# Patient Record
Sex: FEMALE | Race: AMERICAN INDIAN OR ALASKA NATIVE | NOT HISPANIC OR LATINO | Employment: OTHER | URBAN - METROPOLITAN AREA
[De-identification: names, ages, dates, MRNs, and addresses within clinical notes are randomized per-mention and may not be internally consistent; named-entity substitution may affect disease eponyms.]

---

## 2019-11-03 ENCOUNTER — HOSPITAL ENCOUNTER (EMERGENCY)
Facility: CLINIC | Age: 59
Discharge: HOME | End: 2019-11-03
Attending: EMERGENCY MEDICINE | Admitting: EMERGENCY MEDICINE
Payer: COMMERCIAL

## 2019-11-03 VITALS
TEMPERATURE: 97.6 F | BODY MASS INDEX: 39.08 KG/M2 | RESPIRATION RATE: 18 BRPM | OXYGEN SATURATION: 99 % | SYSTOLIC BLOOD PRESSURE: 147 MMHG | WEIGHT: 242.1 LBS | HEART RATE: 69 BPM | DIASTOLIC BLOOD PRESSURE: 84 MMHG

## 2019-11-03 DIAGNOSIS — R60.0 PERIPHERAL EDEMA: ICD-10-CM

## 2019-11-03 DIAGNOSIS — J02.9 SORE THROAT: ICD-10-CM

## 2019-11-03 LAB
ALBUMIN SERPL-MCNC: 3.5 G/DL (ref 3.4–5)
ALBUMIN UR-MCNC: NEGATIVE MG/DL
ALP SERPL-CCNC: 123 U/L (ref 40–150)
ALT SERPL W P-5'-P-CCNC: 33 U/L (ref 0–50)
ANION GAP SERPL CALCULATED.3IONS-SCNC: 4 MMOL/L (ref 3–14)
APPEARANCE UR: ABNORMAL
AST SERPL W P-5'-P-CCNC: 30 U/L (ref 0–45)
BACTERIA #/AREA URNS HPF: ABNORMAL /HPF
BASOPHILS # BLD AUTO: 0 10E9/L (ref 0–0.2)
BASOPHILS NFR BLD AUTO: 0.6 %
BILIRUB SERPL-MCNC: 0.2 MG/DL (ref 0.2–1.3)
BILIRUB UR QL STRIP: NEGATIVE
BUN SERPL-MCNC: 21 MG/DL (ref 7–30)
CALCIUM SERPL-MCNC: 8.2 MG/DL (ref 8.5–10.1)
CHLORIDE SERPL-SCNC: 104 MMOL/L (ref 94–109)
CO2 SERPL-SCNC: 29 MMOL/L (ref 20–32)
COLOR UR AUTO: YELLOW
CREAT SERPL-MCNC: 0.7 MG/DL (ref 0.52–1.04)
DEPRECATED S PYO AG THROAT QL EIA: NORMAL
DIFFERENTIAL METHOD BLD: ABNORMAL
EOSINOPHIL # BLD AUTO: 0.2 10E9/L (ref 0–0.7)
EOSINOPHIL NFR BLD AUTO: 3.1 %
ERYTHROCYTE [DISTWIDTH] IN BLOOD BY AUTOMATED COUNT: 15.4 % (ref 10–15)
GFR SERPL CREATININE-BSD FRML MDRD: >90 ML/MIN/{1.73_M2}
GLUCOSE SERPL-MCNC: 126 MG/DL (ref 70–99)
GLUCOSE UR STRIP-MCNC: NEGATIVE MG/DL
HCT VFR BLD AUTO: 36.6 % (ref 35–47)
HGB BLD-MCNC: 11.7 G/DL (ref 11.7–15.7)
HGB UR QL STRIP: NEGATIVE
IMM GRANULOCYTES # BLD: 0 10E9/L (ref 0–0.4)
IMM GRANULOCYTES NFR BLD: 0.4 %
KETONES UR STRIP-MCNC: NEGATIVE MG/DL
LEUKOCYTE ESTERASE UR QL STRIP: ABNORMAL
LYMPHOCYTES # BLD AUTO: 2.3 10E9/L (ref 0.8–5.3)
LYMPHOCYTES NFR BLD AUTO: 31.7 %
MCH RBC QN AUTO: 29.7 PG (ref 26.5–33)
MCHC RBC AUTO-ENTMCNC: 32 G/DL (ref 31.5–36.5)
MCV RBC AUTO: 93 FL (ref 78–100)
MONOCYTES # BLD AUTO: 0.8 10E9/L (ref 0–1.3)
MONOCYTES NFR BLD AUTO: 10.9 %
NEUTROPHILS # BLD AUTO: 3.8 10E9/L (ref 1.6–8.3)
NEUTROPHILS NFR BLD AUTO: 53.3 %
NITRATE UR QL: NEGATIVE
NRBC # BLD AUTO: 0 10*3/UL
NRBC BLD AUTO-RTO: 0 /100
NT-PROBNP SERPL-MCNC: 179 PG/ML (ref 0–900)
PH UR STRIP: 5.5 PH (ref 5–7)
PLATELET # BLD AUTO: 248 10E9/L (ref 150–450)
POTASSIUM SERPL-SCNC: 3.8 MMOL/L (ref 3.4–5.3)
PROT SERPL-MCNC: 8.1 G/DL (ref 6.8–8.8)
RBC # BLD AUTO: 3.94 10E12/L (ref 3.8–5.2)
RBC #/AREA URNS AUTO: 10 /HPF (ref 0–2)
SODIUM SERPL-SCNC: 137 MMOL/L (ref 133–144)
SOURCE: ABNORMAL
SP GR UR STRIP: 1.02 (ref 1–1.03)
SPECIMEN SOURCE: NORMAL
SQUAMOUS #/AREA URNS AUTO: 5 /HPF (ref 0–1)
TROPONIN I SERPL-MCNC: <0.015 UG/L (ref 0–0.04)
UROBILINOGEN UR STRIP-MCNC: NORMAL MG/DL (ref 0–2)
WBC # BLD AUTO: 7.2 10E9/L (ref 4–11)
WBC #/AREA URNS AUTO: 39 /HPF (ref 0–5)

## 2019-11-03 PROCEDURE — 84484 ASSAY OF TROPONIN QUANT: CPT | Performed by: EMERGENCY MEDICINE

## 2019-11-03 PROCEDURE — 87880 STREP A ASSAY W/OPTIC: CPT | Performed by: FAMILY MEDICINE

## 2019-11-03 PROCEDURE — 87081 CULTURE SCREEN ONLY: CPT | Performed by: FAMILY MEDICINE

## 2019-11-03 PROCEDURE — 87086 URINE CULTURE/COLONY COUNT: CPT | Performed by: EMERGENCY MEDICINE

## 2019-11-03 PROCEDURE — 80053 COMPREHEN METABOLIC PANEL: CPT | Performed by: EMERGENCY MEDICINE

## 2019-11-03 PROCEDURE — 99284 EMERGENCY DEPT VISIT MOD MDM: CPT | Performed by: EMERGENCY MEDICINE

## 2019-11-03 PROCEDURE — 83880 ASSAY OF NATRIURETIC PEPTIDE: CPT | Performed by: EMERGENCY MEDICINE

## 2019-11-03 PROCEDURE — 81001 URINALYSIS AUTO W/SCOPE: CPT | Performed by: EMERGENCY MEDICINE

## 2019-11-03 PROCEDURE — 99284 EMERGENCY DEPT VISIT MOD MDM: CPT | Mod: 25 | Performed by: EMERGENCY MEDICINE

## 2019-11-03 PROCEDURE — 85025 COMPLETE CBC W/AUTO DIFF WBC: CPT | Performed by: EMERGENCY MEDICINE

## 2019-11-03 PROCEDURE — 93010 ELECTROCARDIOGRAM REPORT: CPT | Mod: Z6 | Performed by: EMERGENCY MEDICINE

## 2019-11-03 PROCEDURE — 93005 ELECTROCARDIOGRAM TRACING: CPT | Performed by: EMERGENCY MEDICINE

## 2019-11-03 RX ORDER — HYDROXYZINE PAMOATE 25 MG/1
25 CAPSULE ORAL 3 TIMES DAILY PRN
Qty: 15 CAPSULE | Refills: 0 | Status: ON HOLD | OUTPATIENT
Start: 2019-11-03 | End: 2020-01-28

## 2019-11-03 RX ORDER — AMLODIPINE BESYLATE 5 MG/1
5 TABLET ORAL DAILY
Qty: 15 TABLET | Refills: 0 | Status: ON HOLD | OUTPATIENT
Start: 2019-11-03 | End: 2020-02-02

## 2019-11-03 RX ORDER — AMOXICILLIN 500 MG/1
1000 CAPSULE ORAL 2 TIMES DAILY
Qty: 40 CAPSULE | Refills: 0 | Status: ON HOLD | OUTPATIENT
Start: 2019-11-03 | End: 2020-01-28

## 2019-11-03 RX ORDER — GABAPENTIN 600 MG/1
600 TABLET ORAL 3 TIMES DAILY
Qty: 90 TABLET | Refills: 0 | Status: ON HOLD | OUTPATIENT
Start: 2019-11-03 | End: 2020-01-28

## 2019-11-03 ASSESSMENT — ENCOUNTER SYMPTOMS
SHORTNESS OF BREATH: 0
COLOR CHANGE: 0
EYE REDNESS: 0
SORE THROAT: 1
ABDOMINAL PAIN: 0
NECK STIFFNESS: 0
ARTHRALGIAS: 0
HEADACHES: 0
FEVER: 0
CONFUSION: 0
DIFFICULTY URINATING: 0

## 2019-11-03 NOTE — ED TRIAGE NOTES
Pt when asked about being suicidal stated know but states she had an overdose back in Aug and has been in treatment since that time.  Pt states feeling depressed and at times feels like life is nor worth living but states she is getting help with that at the treatment facility and contracts for safety at this time.

## 2019-11-03 NOTE — ED AVS SNAPSHOT
[facility], [place], Emergency Department  [address]  [facility] [address]  Phone:  [phone]  Fax:  [phone]                                    No Yusuf   MRN: [id number]    Department:  [facility], Emergency Department   Date of Visit:  11/3/2019           After Visit Summary Signature Page    I have received my discharge instructions, and my questions have been answered. I have discussed any challenges I see with this plan with the nurse or doctor.    ..........................................................................................................................................  Patient/Patient Representative Signature      ..........................................................................................................................................  Patient Representative Print Name and Relationship to Patient    ..................................................               ................................................  Date                                   Time    ..........................................................................................................................................  Reviewed by Signature/Title    ...................................................              ..............................................  Date                                               Time          22EPIC Rev 08/18

## 2019-11-03 NOTE — ED PROVIDER NOTES
[facility] [place] [facility] ([facility])    11/03/19      History     Chief Complaint   Patient presents with     Pharyngitis     Joint Swelling     The history is provided by the patient and medical records.     No Yusuf is a 59 year old female with a past medical history of arthritis, fibromyalgia, overdose, and hypertension who is S/P cholecystectomy who presents to the Emergency Department with pharyngitis and leg swelling.  Patient states that she has had a sudden onset of lower leg swelling roughly four days ago. The patient states that the swelling worsens with movement and usage of her legs, and gets better if she is immobile. She presented to the Emergency Department after resting for most of the day, with some relief of her leg swelling. The patient states her legs were still more swollen than baseline. The patient states that this is her third overall episode of her leg swelling. She states that she was treated with IV antibiotics to treat the latest episode of this. The patient states that she also has a sore throat accompanying her leg swelling. She is unsure if the symptoms are related, as they haven't accompanied each other in the past.  She notes that she has been out of her home medications for the past several days including her amlodipine.  Other symptoms noted.       Past Medical History:   Diagnosis Date     Arthritis      Fibromyalgia      Hypertension        Past Surgical History:   Procedure Laterality Date     CHOLECYSTECTOMY       GYN SURGERY      c section     GYN SURGERY      oblation     ORTHOPEDIC SURGERY      left leg, left shoulder, back       History reviewed. No pertinent family history.    Social History     Tobacco Use     Smoking status: Former Smoker     Packs/day: 0.00   Substance Use Topics     Alcohol use: No       No current facility-administered medications for this encounter.      Current Outpatient Medications   Medication     acetaminophen (TYLENOL)  500 MG tablet     DULoxetine HCl (CYMBALTA PO)     gabapentin (NEURONTIN) 600 MG tablet     HYDROCHLOROTHIAZIDE     LEVOTHYROXINE SODIUM PO     NAPROXEN PO     oxyCODONE-acetaminophen (PERCOCET) 5-325 MG per tablet        Allergies   Allergen Reactions     Codeine Phosphate GI Disturbance     Phenergan [Promethazine]      Noted in 8/30/08 ER     I have reviewed the Medications, Allergies, Past Medical and Surgical History, and Social History in the Epic system.    Review of Systems   Constitutional: Negative for fever.   HENT: Positive for sore throat. Negative for congestion.    Eyes: Negative for redness.   Respiratory: Negative for shortness of breath.    Cardiovascular: Positive for leg swelling. Negative for chest pain.   Gastrointestinal: Negative for abdominal pain.   Genitourinary: Negative for difficulty urinating.   Musculoskeletal: Negative for arthralgias and neck stiffness.   Skin: Negative for color change.   Neurological: Negative for headaches.   Psychiatric/Behavioral: Negative for confusion.   All other systems reviewed and are negative.      Physical Exam   BP: (!) 143/79  Pulse: 84  Temp: 97.6  F (36.4  C)  Resp: 18  Weight: 109.8 kg (242 lb 1.6 oz)  SpO2: 100 %      Physical Exam  Constitutional:       General: She is not in acute distress.     Appearance: She is well-developed. She is not diaphoretic.   HENT:      Head: Normocephalic and atraumatic.      Mouth/Throat:      Dentition: Abnormal dentition. No gingival swelling or dental abscesses.      Pharynx: No pharyngeal swelling, oropharyngeal exudate or posterior oropharyngeal erythema.   Eyes:      General: No scleral icterus.        Right eye: No discharge.         Left eye: No discharge.      Pupils: Pupils are equal, round, and reactive to light.   Neck:      Musculoskeletal: Normal range of motion and neck supple.   Cardiovascular:      Rate and Rhythm: Normal rate and regular rhythm.      Heart sounds: Normal heart sounds. No murmur.  No friction rub. No gallop.    Pulmonary:      Effort: Pulmonary effort is normal. No respiratory distress.      Breath sounds: Normal breath sounds. No wheezing.   Chest:      Chest wall: No tenderness.   Abdominal:      General: Bowel sounds are normal. There is no distension.      Palpations: Abdomen is soft.      Tenderness: There is no tenderness.   Musculoskeletal: Normal range of motion.         General: No tenderness or deformity.      Right lower leg: Edema present.      Left lower leg: Edema present.      Comments: Bilateral lower extremity edema with no erythema or warmth.   Lymphadenopathy:      Cervical: Cervical adenopathy present.   Skin:     General: Skin is warm and dry.      Coloration: Skin is not pale.      Findings: No erythema or rash.   Neurological:      Mental Status: She is alert and oriented to person, place, and time.      Cranial Nerves: No cranial nerve deficit.         ED Course   5:28 PM  The patient was seen and examined by Rayray Lagunas DO in Room ED09.        Procedures             EKG Interpretation:      Interpreted by Rayray Lagunas DO  Time reviewed: 1746  Symptoms at time of EKG: leg swelling   Rhythm: normal sinus   Rate: 74  Axis: normal  Ectopy: none  Conduction: normal  ST Segments/ T Waves: No ST-T wave changes  Q Waves: none  Comparison to prior: No old EKG available    Clinical Impression: normal EKG          Critical Care time:  none             Labs Ordered and Resulted from Time of ED Arrival Up to the Time of Departure from the ED - No data to display         Assessments & Plan (with Medical Decision Making)   Is a 59-year-old female who presents with bilateral lower extremity edema.  This has been present for the past several days.  No known precipitating factors.  She has had 2 similar occurrences in the distant past.  She also notes pharyngitis.  On exam she does have bilateral lower extremity edema with no appearance of cellulitis.  She has anterior cervical  lymphadenopathy.  No exudate or pharyngeal swelling.  ECG shows no acute abnormalities.  Lab work shows no acute abnormalities.  Troponin and proBNP are not elevated.  I discussed all results with patient.  Patient not having her antihypertensives may be contributing to her peripheral edema and we will provide a prescription for short course of her home medications until she can get them refilled.  Strep culture is pending at this time and I will provide a prescription for an antibiotic that can be filled and used if it is positive or she does not have resolution of her symptoms.  Will discharge home with return precautions. Discussed reasons to return to the emergency department.  Patient understands and agrees with this plan.    I have reviewed the nursing notes.    I have reviewed the findings, diagnosis, plan and need for follow up with the patient.    New Prescriptions    No medications on file       Final diagnoses:   None   Rogelio ABDUL, am serving as a trained medical scribe to document services personally performed by Rayray Lagunas DO, based on the provider's statements to me.      Rayray ABDUL DO, was physically present and have reviewed and verified the accuracy of this note documented by Rogelio Ordaz.    11/3/2019   [facility], [place], EMERGENCY DEPARTMENT     Rayray Laguans DO  11/04/19 0036

## 2019-11-03 NOTE — ED TRIAGE NOTES
Pt complaining of a ST for the past couple of days and has bilat ankle swelling with redness.  Pt concern she has strep and cellulitis.

## 2019-11-04 NOTE — RESULT ENCOUNTER NOTE
Emergency Dept/Urgent Care discharge antibiotic (if prescribed): Amoxicillin 500 mg PO capsule, 2 capsules (1,000 mg) by mouth 2 times daily for 10 days  Date of Rx (if applicable):  11/3/19  No changes in treatment per Urine culture protocol.

## 2019-11-05 LAB
BACTERIA SPEC CULT: NORMAL
BACTERIA SPEC CULT: NORMAL
Lab: NORMAL
Lab: NORMAL
SPECIMEN SOURCE: NORMAL
SPECIMEN SOURCE: NORMAL

## 2019-11-05 NOTE — RESULT ENCOUNTER NOTE
Final Beta strep group A r/o culture is NEGATIVE for Group A streptococcus.    No treatment or change in treatment per [place] Strep protocol.

## 2019-11-17 ENCOUNTER — HOSPITAL ENCOUNTER (EMERGENCY)
Facility: CLINIC | Age: 59
Discharge: HOME | End: 2019-11-17
Attending: FAMILY MEDICINE | Admitting: FAMILY MEDICINE
Payer: COMMERCIAL

## 2019-11-17 ENCOUNTER — APPOINTMENT (OUTPATIENT)
Dept: ULTRASOUND IMAGING | Facility: CLINIC | Age: 59
End: 2019-11-17
Attending: FAMILY MEDICINE
Payer: COMMERCIAL

## 2019-11-17 VITALS
SYSTOLIC BLOOD PRESSURE: 132 MMHG | OXYGEN SATURATION: 96 % | DIASTOLIC BLOOD PRESSURE: 74 MMHG | BODY MASS INDEX: 40.85 KG/M2 | RESPIRATION RATE: 16 BRPM | HEART RATE: 80 BPM | TEMPERATURE: 98.8 F | WEIGHT: 253.1 LBS

## 2019-11-17 DIAGNOSIS — R60.0 PERIPHERAL EDEMA: ICD-10-CM

## 2019-11-17 LAB
ALBUMIN SERPL-MCNC: 3.5 G/DL (ref 3.4–5)
ALP SERPL-CCNC: 122 U/L (ref 40–150)
ALT SERPL W P-5'-P-CCNC: 38 U/L (ref 0–50)
ANION GAP SERPL CALCULATED.3IONS-SCNC: 2 MMOL/L (ref 3–14)
AST SERPL W P-5'-P-CCNC: 33 U/L (ref 0–45)
BASOPHILS # BLD AUTO: 0.1 10E9/L (ref 0–0.2)
BASOPHILS NFR BLD AUTO: 0.8 %
BILIRUB SERPL-MCNC: 0.2 MG/DL (ref 0.2–1.3)
BUN SERPL-MCNC: 20 MG/DL (ref 7–30)
CALCIUM SERPL-MCNC: 8.2 MG/DL (ref 8.5–10.1)
CHLORIDE SERPL-SCNC: 103 MMOL/L (ref 94–109)
CO2 SERPL-SCNC: 31 MMOL/L (ref 20–32)
CREAT SERPL-MCNC: 0.72 MG/DL (ref 0.52–1.04)
CRP SERPL-MCNC: <2.9 MG/L (ref 0–8)
DIFFERENTIAL METHOD BLD: ABNORMAL
EOSINOPHIL # BLD AUTO: 0.3 10E9/L (ref 0–0.7)
EOSINOPHIL NFR BLD AUTO: 3.8 %
ERYTHROCYTE [DISTWIDTH] IN BLOOD BY AUTOMATED COUNT: 14.6 % (ref 10–15)
ERYTHROCYTE [SEDIMENTATION RATE] IN BLOOD BY WESTERGREN METHOD: 35 MM/H (ref 0–30)
GFR SERPL CREATININE-BSD FRML MDRD: >90 ML/MIN/{1.73_M2}
GLUCOSE SERPL-MCNC: 98 MG/DL (ref 70–99)
HCT VFR BLD AUTO: 38.5 % (ref 35–47)
HGB BLD-MCNC: 12 G/DL (ref 11.7–15.7)
IMM GRANULOCYTES # BLD: 0.1 10E9/L (ref 0–0.4)
IMM GRANULOCYTES NFR BLD: 0.6 %
LYMPHOCYTES # BLD AUTO: 2.4 10E9/L (ref 0.8–5.3)
LYMPHOCYTES NFR BLD AUTO: 31 %
MCH RBC QN AUTO: 29.1 PG (ref 26.5–33)
MCHC RBC AUTO-ENTMCNC: 31.2 G/DL (ref 31.5–36.5)
MCV RBC AUTO: 93 FL (ref 78–100)
MONOCYTES # BLD AUTO: 0.8 10E9/L (ref 0–1.3)
MONOCYTES NFR BLD AUTO: 10.8 %
NEUTROPHILS # BLD AUTO: 4.1 10E9/L (ref 1.6–8.3)
NEUTROPHILS NFR BLD AUTO: 53 %
NRBC # BLD AUTO: 0 10*3/UL
NRBC BLD AUTO-RTO: 0 /100
NT-PROBNP SERPL-MCNC: 66 PG/ML (ref 0–900)
PLATELET # BLD AUTO: 244 10E9/L (ref 150–450)
POTASSIUM SERPL-SCNC: 4.2 MMOL/L (ref 3.4–5.3)
PROT SERPL-MCNC: 8.4 G/DL (ref 6.8–8.8)
RBC # BLD AUTO: 4.13 10E12/L (ref 3.8–5.2)
SODIUM SERPL-SCNC: 136 MMOL/L (ref 133–144)
T4 FREE SERPL-MCNC: 0.48 NG/DL (ref 0.76–1.46)
TSH SERPL DL<=0.005 MIU/L-ACNC: 97.65 MU/L (ref 0.4–4)
WBC # BLD AUTO: 7.7 10E9/L (ref 4–11)

## 2019-11-17 PROCEDURE — 84439 ASSAY OF FREE THYROXINE: CPT | Performed by: FAMILY MEDICINE

## 2019-11-17 PROCEDURE — 76700 US EXAM ABDOM COMPLETE: CPT

## 2019-11-17 PROCEDURE — 84443 ASSAY THYROID STIM HORMONE: CPT | Performed by: FAMILY MEDICINE

## 2019-11-17 PROCEDURE — 85025 COMPLETE CBC W/AUTO DIFF WBC: CPT | Performed by: FAMILY MEDICINE

## 2019-11-17 PROCEDURE — 85652 RBC SED RATE AUTOMATED: CPT | Performed by: FAMILY MEDICINE

## 2019-11-17 PROCEDURE — 80053 COMPREHEN METABOLIC PANEL: CPT | Performed by: FAMILY MEDICINE

## 2019-11-17 PROCEDURE — 83880 ASSAY OF NATRIURETIC PEPTIDE: CPT | Performed by: FAMILY MEDICINE

## 2019-11-17 PROCEDURE — 99284 EMERGENCY DEPT VISIT MOD MDM: CPT | Mod: 25 | Performed by: FAMILY MEDICINE

## 2019-11-17 PROCEDURE — 99283 EMERGENCY DEPT VISIT LOW MDM: CPT | Mod: Z6 | Performed by: FAMILY MEDICINE

## 2019-11-17 PROCEDURE — 86140 C-REACTIVE PROTEIN: CPT | Performed by: FAMILY MEDICINE

## 2019-11-17 RX ORDER — MULTIPLE VITAMINS W/ MINERALS TAB 9MG-400MCG
1 TAB ORAL DAILY
COMMUNITY
End: 2022-02-09

## 2019-11-17 RX ORDER — TOLNAFTATE 1 G/100G
POWDER TOPICAL 2 TIMES DAILY
Status: ON HOLD | COMMUNITY
End: 2020-01-28

## 2019-11-17 RX ORDER — METHADONE HYDROCHLORIDE 10 MG/5ML
80 SOLUTION ORAL DAILY
Status: ON HOLD | COMMUNITY
End: 2022-03-04

## 2019-11-17 RX ORDER — FUROSEMIDE 20 MG
20 TABLET ORAL DAILY
Qty: 5 TABLET | Refills: 0 | Status: ON HOLD | OUTPATIENT
Start: 2019-11-17 | End: 2020-01-28

## 2019-11-17 RX ORDER — NYSTATIN 100000 U/G
CREAM TOPICAL 2 TIMES DAILY
Status: ON HOLD | COMMUNITY
End: 2020-01-28

## 2019-11-17 ASSESSMENT — ENCOUNTER SYMPTOMS
SORE THROAT: 1
UNEXPECTED WEIGHT CHANGE: 1
COLOR CHANGE: 1
ABDOMINAL DISTENTION: 1

## 2019-11-17 NOTE — ED PROVIDER NOTES
[facility] [place]GENCY DEPART[facility] ([facility])    11/17/19     ED 3 5:49 PM   History     Chief Complaint   Patient presents with     Cellulitis     legs are painful now with some ankle edema. She is keeping them elevated. The lower leg does appear reddened. She was seen on 11/3 for leg swelling.     The history is provided by the patient and medical records.     No Yusuf is a 59 year old female who presents with lower extremity redness, swelling and pain that has been ongoing for some time. She has a history of hepatitis C, opiate abuse (currently in treatment), hypertension, thyroid disease, and prior episodes of bilateral lower extremity edema and cellulitis. Patient states she has had ongoing lower extremity edema for some time that improves with keeping legs elevated.  She notes that the swelling has gotten worse to point where she has to keep them elevated for hours and hours at a time to help reduce the swelling.  She also has noticed discoloration to her lower extremities in a sock-like distribution from her foot up to her ankles as well as pain.  She is concerned that she has cellulitis to her extremities bilaterally.  She had presented to the ED on 11/3/2019 complaining of leg swelling and sore throat, was seen by Dr. Lua who performed rapid strep screen, twelve-lead EKG, labs.  It was felt that patient not taking her antihypertensive may be contributing to her peripheral edema and she was prescribed provided a short course of hypertension medications until she could get them refilled.  She was given a prescription for antibiotics to be filled if she has a positive culture or does not have resolution of symptoms.  She did take these antibiotics with no improvement to her symptoms--her throat is still sore and she continues to have concern for cellulitis of her lower extremities. She is not sure if its due to hypertension medications, notes she was on hydrochlorothiazide in the past. She  states she is  taking hypertension medications. She notes prior history of cellultis this spring, was treated with oral antibiotics. She notes that her legs look fine now, but they get swollen and shiny the longer her legs are in a dependent position. She can't wear socks due to the swelling. Patient notes unintentional weight gain over the past few months, was 210 lbs when she first seen at [facility] this summer and is around 250 lbs today. Patient states she doesn't eat a lot. Abdomen seems bigger, not sure if its from fluid retention or eating. No history of kidney disease     Patient states she came to the [place] area in the past year, was depressed, not taking care of herself. Had had a slip up going while through emotional issues and grieving the loss of 2 children which resulted in a significant overdose where she needed aggressive resuscitation. Patient notes that she is at [facility] for opiate treatment and has been sober x 80 days. She has plans to go to a step down program afterwards. She wants to dedicate a year to recovery and grief. She had previously gone to treatment in the spring for grief and loss. Patient follows with [facility] clinic.    I have reviewed the Medications, Allergies, Past Medical and Surgical History, and Social History in the [employer] system.  PAST MEDICAL HISTORY:   Past Medical History:   Diagnosis Date     Arthritis      Fibromyalgia      Hypertension        PAST SURGICAL HISTORY:   Past Surgical History:   Procedure Laterality Date     CHOLECYSTECTOMY       GYN SURGERY      c section     GYN SURGERY      oblation     ORTHOPEDIC SURGERY      left leg, left shoulder, back       FAMILY HISTORY: History reviewed. No pertinent family history.    SOCIAL HISTORY:   Social History     Tobacco Use     Smoking status: Current Every Day Smoker     Packs/day: 0.25     Smokeless tobacco: Never Used   Substance Use Topics     Alcohol use: No       Patient's Medications   New Prescriptions     FUROSEMIDE (LASIX) 20 MG TABLET    Take 1 tablet (20 mg) by mouth daily for 5 days   Previous Medications    ACETAMINOPHEN (TYLENOL) 500 MG TABLET    Take 1-2 tablets by mouth every 6 hours as needed.    AMLODIPINE (NORVASC) 5 MG TABLET    Take 1 tablet (5 mg) by mouth daily    CHOLECALCIFEROL 25 MCG (1000 UT) TABS    Take 1,000 Units by mouth    CYANOCOBALAMIN 1000 MCG CAPS        DULOXETINE HCL (CYMBALTA PO)    Take 60 mg by mouth daily. Takes a 60mg and a 30mg tablet in am     GABAPENTIN (NEURONTIN) 600 MG TABLET    Take 1 tablet (600 mg) by mouth 3 times daily Takes two 600mg tablets three times a day.    HYDROXYZINE (VISTARIL) 25 MG CAPSULE    Take 1 capsule (25 mg) by mouth 3 times daily as needed for itching    LEVOTHYROXINE SODIUM PO    Take 175 mcg by mouth    METHADONE HCL 10 MG/5ML SOLN    Take 10 mg by mouth Dosed at clinic for her    MULTIVITAMIN W/MINERALS (MULTI-VITAMIN) TABLET    Take 1 tablet by mouth daily    NYSTATIN (MYCOSTATIN) 954268 UNIT/GM EXTERNAL CREAM    Apply topically 2 times daily    RANITIDINE (ZANTAC) 150 MG TABLET    Take 150 mg by mouth 2 times daily    TOLNAFTATE (TINACTIN) 1 % EXTERNAL POWDER    Apply topically 2 times daily   Modified Medications    No medications on file   Discontinued Medications    HYDROCHLOROTHIAZIDE    daily.    NAPROXEN PO    Take 500 mg by mouth    OXYCODONE-ACETAMINOPHEN (PERCOCET) 5-325 MG PER TABLET    Take 1-2 tablets by mouth every 4 hours as needed for pain          Allergies   Allergen Reactions     Codeine Phosphate GI Disturbance     Phenergan [Promethazine]      Noted in 8/30/08 ER     Trileptal [Oxcarbazepine] Unknown      Review of Systems   Constitutional: Positive for unexpected weight change.   HENT: Positive for sore throat.    Cardiovascular: Positive for leg swelling.   Gastrointestinal: Positive for abdominal distention.   Skin: Positive for color change (erythema to lower extremities).   All other systems were reviewed and are  negative      Physical Exam   BP: 132/74  Pulse: 80  Temp: 98.8  F (37.1  C)  Resp: 16  Weight: 114.8 kg (253 lb 1.6 oz)  SpO2: 96 %      Physical Exam  Constitutional:       General: She is not in acute distress.     Appearance: She is not diaphoretic.   HENT:      Head: Atraumatic.      Mouth/Throat:      Pharynx: No oropharyngeal exudate.   Eyes:      General: No scleral icterus.     Pupils: Pupils are equal, round, and reactive to light.   Cardiovascular:      Heart sounds: Normal heart sounds.   Pulmonary:      Effort: No respiratory distress.      Breath sounds: Normal breath sounds.   Abdominal:      General: Bowel sounds are normal. There is distension.      Palpations: Abdomen is soft.      Tenderness: There is no abdominal tenderness.   Musculoskeletal:         General: No tenderness.      Right lower leg: Edema present.      Left lower leg: Edema present.      Comments: Patient has 1-2+ edema of both lower extremities.  There is some dependent rubor but no erythema, warmth or signs of cellulitis or abscess.  Pedal pulses normal.   Skin:     General: Skin is warm.      Findings: No rash.   Neurological:      General: No focal deficit present.      Mental Status: Mental status is at baseline.         ED Course        Procedures             Critical Care time:  none             Results for orders placed or performed during the hospital encounter of 11/17/19 (from the past 24 hour(s))   CBC with platelets differential   Result Value Ref Range    WBC 7.7 4.0 - 11.0 10e9/L    RBC Count 4.13 3.8 - 5.2 10e12/L    Hemoglobin 12.0 11.7 - 15.7 g/dL    Hematocrit 38.5 35.0 - 47.0 %    MCV 93 78 - 100 fl    MCH 29.1 26.5 - 33.0 pg    MCHC 31.2 (L) 31.5 - 36.5 g/dL    RDW 14.6 10.0 - 15.0 %    Platelet Count 244 150 - 450 10e9/L    Diff Method Automated Method     % Neutrophils 53.0 %    % Lymphocytes 31.0 %    % Monocytes 10.8 %    % Eosinophils 3.8 %    % Basophils 0.8 %    % Immature Granulocytes 0.6 %    Nucleated  RBCs 0 0 /100    Absolute Neutrophil 4.1 1.6 - 8.3 10e9/L    Absolute Lymphocytes 2.4 0.8 - 5.3 10e9/L    Absolute Monocytes 0.8 0.0 - 1.3 10e9/L    Absolute Eosinophils 0.3 0.0 - 0.7 10e9/L    Absolute Basophils 0.1 0.0 - 0.2 10e9/L    Abs Immature Granulocytes 0.1 0 - 0.4 10e9/L    Absolute Nucleated RBC 0.0    Comprehensive metabolic panel   Result Value Ref Range    Sodium 136 133 - 144 mmol/L    Potassium 4.2 3.4 - 5.3 mmol/L    Chloride 103 94 - 109 mmol/L    Carbon Dioxide 31 20 - 32 mmol/L    Anion Gap 2 (L) 3 - 14 mmol/L    Glucose 98 70 - 99 mg/dL    Urea Nitrogen 20 7 - 30 mg/dL    Creatinine 0.72 0.52 - 1.04 mg/dL    GFR Estimate >90 >60 mL/min/[1.73_m2]    GFR Estimate If Black >90 >60 mL/min/[1.73_m2]    Calcium 8.2 (L) 8.5 - 10.1 mg/dL    Bilirubin Total 0.2 0.2 - 1.3 mg/dL    Albumin 3.5 3.4 - 5.0 g/dL    Protein Total 8.4 6.8 - 8.8 g/dL    Alkaline Phosphatase 122 40 - 150 U/L    ALT 38 0 - 50 U/L    AST 33 0 - 45 U/L   Erythrocyte sedimentation rate auto   Result Value Ref Range    Sed Rate 35 (H) 0 - 30 mm/h   CRP inflammation   Result Value Ref Range    CRP Inflammation <2.9 0.0 - 8.0 mg/L   Nt probnp inpatient (BNP)   Result Value Ref Range    N-Terminal Pro BNP Inpatient 66 0 - 900 pg/mL   TSH with free T4 reflex   Result Value Ref Range    TSH 97.65 (H) 0.40 - 4.00 mU/L   T4 free   Result Value Ref Range    T4 Free 0.48 (L) 0.76 - 1.46 ng/dL   US Abdomen Complete    Narrative    US ABDOMEN COMPLETE 11/17/2019 7:46 PM     HISTORY: Abdominal pain, possible ascites.     FINDINGS: Study is limited due to patient body habitus. Liver is  grossly normal in echogenicity but is predominantly obscured by  shadowing. Gallbladder is surgically absent. Common bile duct is  normal in diameter. Pancreas is obscured by shadowing from overlying  bowel. Spleen is normal. Kidneys are normal in size. There is no  hydronephrosis. Proximal abdominal aorta and IVC are within normal  limits. No ultrasound findings of  ascites.      Impression    IMPRESSION:  Limited exam due to patient body habitus and shadowing  related to bowel gas. Pancreas is obscured. Prior cholecystectomy. No  bile duct dilatation.    JOHNIE DURANT MD     Medications - No data to display      Assessments & Plan (with Medical Decision Making)   Patient with a history of chemical dependency and hepatitis C presenting with several weeks of bilateral lower extremity edema.  My initial differential diagnosis includes cellulitis, DVT, congestive heart failure, venous insufficiency, lymphedema states, edema due to protein wasting states such as nephrotic syndrome, liver or renal insufficiency, severe anemia.  On exam her vital signs are normal.  She is in no respiratory distress.  Her cardiovascular exam is normal.  There is no jugular venous distention or signs of volume overload other than her lower extremity edema.  Her lungs are clear to auscultation.  Abdomen slightly distended and there may be a fluid wave.  The exam does not show any findings to support infection.  Her CRP and white count are normal.  Sed rate normal for age.  Abdominal ultrasound does not show any ascites or other significant findings of chronic liver disease, cirrhosis or hydronephrosis.  B-type natriuretic peptide also normal.  She does have elevated TSH and low T4, however upon review of her chart this is her baseline and in fact is improved from the last time it was checked.  She reassures me that she is in fact taking her levothyroxine.  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  Patient is very frustrated and would like a diuretic.  I agreed to give her 5 days of Lasix 20 mg.  She will follow-up in 3 to 5 days at her clinic for recheck.  Based on the clinical findings and the entire clinical scenario, the patient appears stable at this time to be treated symptomatically, and to follow-up as an outpatient for any further evaluation and treatment.  Discussed expected course, need for  follow up, and indications for return with the patient.  See discharge instructions.      I have reviewed the nursing notes.    I have reviewed the findings, diagnosis, plan and need for follow up with the patient.    New Prescriptions    FUROSEMIDE (LASIX) 20 MG TABLET    Take 1 tablet (20 mg) by mouth daily for 5 days       Final diagnoses:   Peripheral edema     I, Lindsay Sood, am serving as a trained medical scribe to document services personally performed by Misael Angel MD based on the provider's statements to me on November 17, 2019.  This document has been checked and approved by the attending provider.    I, Misael Angel MD, was physically present and have reviewed and verified the accuracy of this note documented by Lindsay Sood, medical scribe.     11/17/2019   [facility], [place], EMERGENCY DEPARTMENT     Misael Angel MD  11/17/19 2019

## 2019-11-18 NOTE — DISCHARGE INSTRUCTIONS
Thank you for choosing [facility].     Please closely monitor for further symptoms. Return to the Emergency Department if you develop any new or worsening signs or symptoms.    If you received any opiate pain medications or sedatives during your visit, please do not drive for at least 8 hours.     Labs, cultures or final xray interpretations may still need to be reviewed.  We will call you if your plan of care needs to be changed.    Please follow up with your primary care physician or clinic this week 3-5 days for re-check.  Elevate legs as much as possible.

## 2020-01-10 ENCOUNTER — TRANSFERRED RECORDS (OUTPATIENT)
Dept: HEALTH INFORMATION MANAGEMENT | Facility: CLINIC | Age: 60
End: 2020-01-10

## 2020-01-11 ENCOUNTER — TRANSFERRED RECORDS (OUTPATIENT)
Dept: HEALTH INFORMATION MANAGEMENT | Facility: CLINIC | Age: 60
End: 2020-01-11

## 2020-01-14 ENCOUNTER — TRANSCRIBE ORDERS (OUTPATIENT)
Dept: OTHER | Age: 60
End: 2020-01-14

## 2020-01-14 DIAGNOSIS — R60.9 EDEMA: Primary | ICD-10-CM

## 2020-01-17 ENCOUNTER — TELEPHONE (OUTPATIENT)
Dept: SLEEP MEDICINE | Facility: CLINIC | Age: 60
End: 2020-01-17

## 2020-01-17 NOTE — TELEPHONE ENCOUNTER
Received referral from [facility] clinic for patient to have sleep consultation. Left phone number for patient to call and schedule if interested.

## 2020-01-17 NOTE — TELEPHONE ENCOUNTER
RECORDS RECEIVED FROM: Internal/External/Care Everywhere   DATE RECEIVED: 1-21-20   NOTES STATUS DETAILS   OFFICE NOTE from referring provider    Received Referral from [facility] sent to scanning   OFFICE NOTE from other cardiologist    N/A    DISCHARGE SUMMARY from hospital    N/A    DISCHARGE REPORT from the ER   Internal 11-17-19  11-3-19   OPERATIVE REPORT    N/A    MEDICATION LIST   Internal    LABS     BMP   Care Everywhere 8-25-19   CBC   Internal 11-17-19   CMP   Internal 11-17-19   Lipids   N/A    TSH   Internal 11-17-19   DIAGNOSTIC PROCEDURES     EKG   Internal 11-3-19   Monitor Reports   N/A    IMAGING (DISC & REPORT)      Echo   Care Everywhere 8-29-19   Stress Tests   N/A    Cath   N/A    MRI/MRA   N/A    CT/CTA   N/A

## 2020-01-21 ENCOUNTER — OFFICE VISIT (OUTPATIENT)
Dept: CARDIOLOGY | Facility: CLINIC | Age: 60
End: 2020-01-21
Attending: INTERNAL MEDICINE
Payer: COMMERCIAL

## 2020-01-21 ENCOUNTER — PRE VISIT (OUTPATIENT)
Dept: CARDIOLOGY | Facility: CLINIC | Age: 60
End: 2020-01-21

## 2020-01-21 VITALS
BODY MASS INDEX: 50.85 KG/M2 | HEIGHT: 63 IN | HEART RATE: 84 BPM | WEIGHT: 287 LBS | DIASTOLIC BLOOD PRESSURE: 75 MMHG | OXYGEN SATURATION: 94 % | SYSTOLIC BLOOD PRESSURE: 117 MMHG

## 2020-01-21 DIAGNOSIS — I87.2 CHRONIC VENOUS INSUFFICIENCY OF LOWER EXTREMITY: ICD-10-CM

## 2020-01-21 DIAGNOSIS — I89.0 LYMPHEDEMA OF BOTH LOWER EXTREMITIES: ICD-10-CM

## 2020-01-21 DIAGNOSIS — R60.0 BILATERAL EDEMA OF LOWER EXTREMITY: Primary | ICD-10-CM

## 2020-01-21 LAB — INTERPRETATION ECG - MUSE: NORMAL

## 2020-01-21 PROCEDURE — G0463 HOSPITAL OUTPT CLINIC VISIT: HCPCS | Mod: 25,ZF

## 2020-01-21 PROCEDURE — 93005 ELECTROCARDIOGRAM TRACING: CPT | Mod: ZF

## 2020-01-21 PROCEDURE — 99204 OFFICE O/P NEW MOD 45 MIN: CPT | Mod: ZP | Performed by: INTERNAL MEDICINE

## 2020-01-21 RX ORDER — NALOXONE HYDROCHLORIDE 4 MG/.1ML
SPRAY NASAL
Refills: 0 | Status: ON HOLD | COMMUNITY
Start: 2019-11-07 | End: 2023-12-05

## 2020-01-21 RX ORDER — DIPHENOXYLATE HYDROCHLORIDE AND ATROPINE SULFATE 2.5; .025 MG/1; MG/1
TABLET ORAL
Refills: 3 | Status: ON HOLD | COMMUNITY
Start: 2019-06-13 | End: 2020-01-28

## 2020-01-21 RX ORDER — CALCIUM CARBONATE 500 MG/1
TABLET, CHEWABLE ORAL
Refills: 0 | Status: ON HOLD | COMMUNITY
Start: 2019-11-07 | End: 2020-01-28

## 2020-01-21 RX ORDER — DULOXETIN HYDROCHLORIDE 30 MG/1
60 CAPSULE, DELAYED RELEASE ORAL DAILY
Refills: 3 | Status: ON HOLD | COMMUNITY
Start: 2019-06-13 | End: 2020-01-28

## 2020-01-21 RX ORDER — CEPHALEXIN 500 MG/1
CAPSULE ORAL
Status: ON HOLD | COMMUNITY
Start: 2019-03-17 | End: 2020-01-28

## 2020-01-21 RX ORDER — IBUPROFEN 600 MG/1
600 TABLET, FILM COATED ORAL EVERY 6 HOURS PRN
Status: ON HOLD | COMMUNITY
Start: 2020-01-18 | End: 2020-06-16

## 2020-01-21 RX ORDER — AMLODIPINE BESYLATE 5 MG/1
5 TABLET ORAL DAILY
Refills: 4 | Status: ON HOLD | COMMUNITY
Start: 2019-06-13 | End: 2020-01-28

## 2020-01-21 RX ORDER — PANTOPRAZOLE SODIUM 40 MG/1
TABLET, DELAYED RELEASE ORAL
Refills: 3 | Status: ON HOLD | COMMUNITY
Start: 2019-06-13 | End: 2020-01-28

## 2020-01-21 RX ORDER — GABAPENTIN 400 MG/1
CAPSULE ORAL
Refills: 0 | Status: ON HOLD | COMMUNITY
Start: 2019-10-03 | End: 2020-01-28

## 2020-01-21 RX ORDER — BUPRENORPHINE HYDROCHLORIDE AND NALOXONE HYDROCHLORIDE DIHYDRATE 8; 2 MG/1; MG/1
TABLET SUBLINGUAL
Status: ON HOLD | COMMUNITY
Start: 2019-04-12 | End: 2020-01-28

## 2020-01-21 RX ORDER — CEFADROXIL 500 MG/1
CAPSULE ORAL
Refills: 0 | Status: ON HOLD | COMMUNITY
Start: 2019-10-03 | End: 2020-01-28

## 2020-01-21 RX ORDER — HYDROXYZINE HYDROCHLORIDE 50 MG/1
50 TABLET, FILM COATED ORAL EVERY 4 HOURS PRN
Refills: 0 | Status: ON HOLD | COMMUNITY
Start: 2019-10-03 | End: 2021-05-27

## 2020-01-21 ASSESSMENT — MIFFLIN-ST. JEOR: SCORE: 1850.95

## 2020-01-21 ASSESSMENT — PAIN SCALES - GENERAL: PAINLEVEL: NO PAIN (0)

## 2020-01-21 NOTE — PATIENT INSTRUCTIONS
You were seen at the [facility] today.  You saw Dr. Kendell Lunsford  Here are your Instructions:    1. Follow up in edema clinic for your leg swelling (lymphedema/venous insufficiency.)  2. Follow up with Cardiology as needed.         If you have questions after this visit:  Send a [employer] message or contact Gracia Perez LPN  Office:  [phone] option #1, then #3 & ask for Gracia (nurse line)  Fax:  [phone]  After Hours:  [phone] option #4 ask to speak to he on-call Cardiologist  Appointments:  [phone] option #1, then option #1

## 2020-01-21 NOTE — PROGRESS NOTES
Chief complaint: bilateral leg edema    HPI:   No Yusuf is a 59 year old female with no prior cardiac history referred for evaluation of leg edema.     She had an opioid overdose 8/24/19 for which she was admitted at [facility]. She reportedly briefly received CPR in that setting but per notes recovered with naloxone and this was ultimately attributed to respiratory arrest. TTE (see below) was unremarkable at that time. She is currently in a chemical dependency treatment center.     She reports bilateral leg edema. She was seen in the ED 11/17/19 for this and was felt to have lymphedema and prescribed a short supply of furosemide. She has longstanding bilateral leg swelling (including an episode of cellulitis in 2015) but feels this has been worse over the past year. Her left leg has typically been worse since she had a trauma requiring surgery on that side a number of years ago.     ECG today shows: sinus, VR 81,  QRS 80 QTc 478. Low voltage QRS.     She denies any chest pain, dyspnea at rest or with exertion, PND, orthopnea, palpitations, lightheadedness or syncope.       PAST MEDICAL HISTORY:  Past Medical History:   Diagnosis Date     Arthritis      Fibromyalgia      Hypertension        CURRENT MEDICATIONS:  Current Outpatient Medications   Medication Sig Dispense Refill     acetaminophen (TYLENOL) 500 MG tablet Take 1-2 tablets by mouth every 6 hours as needed.       amLODIPine (NORVASC) 5 MG tablet Take 1 tablet (5 mg) by mouth daily 15 tablet 0     cholecalciferol 25 MCG (1000 UT) TABS Take 1,000 Units by mouth       Cyanocobalamin 1000 MCG CAPS        DULoxetine HCl (CYMBALTA PO) Take 60 mg by mouth daily. Takes a 60mg and a 30mg tablet in am        gabapentin (NEURONTIN) 600 MG tablet Take 1 tablet (600 mg) by mouth 3 times daily Takes two 600mg tablets three times a day. 90 tablet 0     hydrOXYzine (VISTARIL) 25 MG capsule Take 1 capsule (25 mg) by mouth 3 times daily as needed for itching  "15 capsule 0     LEVOTHYROXINE SODIUM PO Take 175 mcg by mouth       methadone HCl 10 MG/5ML SOLN Take 10 mg by mouth Dosed at clinic for her       multivitamin w/minerals (MULTI-VITAMIN) tablet Take 1 tablet by mouth daily       nystatin (MYCOSTATIN) 744614 UNIT/GM external cream Apply topically 2 times daily       ranitidine (ZANTAC) 150 MG tablet Take 150 mg by mouth 2 times daily       tolnaftate (TINACTIN) 1 % external powder Apply topically 2 times daily       furosemide (LASIX) 20 MG tablet Take 1 tablet (20 mg) by mouth daily for 5 days 5 tablet 0       PAST SURGICAL HISTORY:  Past Surgical History:   Procedure Laterality Date     CHOLECYSTECTOMY       GYN SURGERY      c section     GYN SURGERY      oblation     ORTHOPEDIC SURGERY      left leg, left shoulder, back       ALLERGIES:     Allergies   Allergen Reactions     Codeine Phosphate GI Disturbance     Phenergan [Promethazine]      Noted in 8/30/08 ER     Trileptal [Oxcarbazepine] Unknown       FAMILY HISTORY:  No family history of premature CAD or sudden death.    SOCIAL HISTORY:  Social History     Tobacco Use     Smoking status: Current Every Day Smoker     Packs/day: 0.25     Smokeless tobacco: Never Used   Substance Use Topics     Alcohol use: No     Drug use: No       ROS:   A comprehensive 14 point review of systems is negative other than as mentioned in HPI.    Exam:  /75 (BP Location: Right arm, Patient Position: Chair, Cuff Size: Adult Regular)   Pulse 84   Ht 1.6 m (5' 3\")   Wt 130.2 kg (287 lb)   LMP 11/01/2013   SpO2 94%   BMI 50.84 kg/m    GENERAL APPEARANCE: healthy, alert and no distress  EYES: no icterus, no xanthelasmas  ENT: normal palate, mucosa moist, no central cyanosis  NECK: JVP~7 cmH2O  RESPIRATORY: lungs clear to auscultation - no rales, rhonchi or wheezes, no use of accessory muscles, no retractions, respirations are unlabored, normal respiratory rate  CARDIOVASCULAR: regular rhythm, normal S1 with physiologic split " S2, no S3 or S4 and no murmur, click or rub.  GI: soft, non tender, bowel sounds normal,no abdominal bruits  EXTREMITIES: 3+ edema (mixed pitting and non-pitting) to bilateral knees, no bruits  NEURO: alert and oriented to person/place/time, normal speech, gait and affect  VASC: Radial, dorsalis pedis and posterior tibialis pulses 2+ bilaterally.  SKIN: no ecchymoses, no rashes.  PSYCH: cooperative, affect appropriate.     Labs:  Reviewed.       Testing/Procedures:      I personally visualized and interpreted:  ECG 1/21/20: sinus, VR 81,  QRS 80 QTc 478. Low voltage QRS.     Outside records from [facility] were obtained, and relevant results/notes have been incorporated into HPI.    Outside results of note:  TTE 8/25/19 ([facility]):  The left ventricular systolic function is normal, estimated LVEF 55-60%.  Left ventricular wall motion is normal.  There is mild mitral regurgitation.  No prior study.    Assessment and Plan:   1. Bilateral lower extremity edema  2. Venous insufficiency  3. Lymphedema  The patient's clinical exam is overall suggestive of euvolemia.  Furthermore her echocardiogram from [facility] is normal and reassuring against a cardiac cause of edema.  Clinically her overall presentation is most suggestive of a combination of chronic venous insufficiency and lymphedema.  I counseled her regarding local behavioral measures including elevation of her lower extremities, and have referred her to Edema clinic for compressive therapy.  No further cardiac work-up is indicated at this time, and future Cardiology follow can be on as-needed basis.     The patient states understanding and is agreeable with plan.     Kendell Lunsford MD  Cardiology    CC  [facility]

## 2020-01-21 NOTE — LETTER
1/21/2020      RE: No Yusuf  [address]  [facility] [address]       Dear Colleague,    Thank you for the opportunity to participate in the care of your patient, No Yusuf, at the [facility]. Please see a copy of my visit note below.    Chief complaint: bilateral leg edema    HPI:   No Yusuf is a 59 year old female with no prior cardiac history referred for evaluation of leg edema.     She had an opioid overdose 8/24/19 for which she was admitted at [facility]. She reportedly briefly received CPR in that setting but per notes recovered with naloxone and this was ultimately attributed to respiratory arrest. TTE (see below) was unremarkable at that time. She is currently in a chemical dependency treatment center.     She reports bilateral leg edema. She was seen in the ED 11/17/19 for this and was felt to have lymphedema and prescribed a short supply of furosemide. She has longstanding bilateral leg swelling (including an episode of cellulitis in 2015) but feels this has been worse over the past year. Her left leg has typically been worse since she had a trauma requiring surgery on that side a number of years ago.     ECG today shows: sinus, VR 81,  QRS 80 QTc 478. Low voltage QRS.     She denies any chest pain, dyspnea at rest or with exertion, PND, orthopnea, palpitations, lightheadedness or syncope.       PAST MEDICAL HISTORY:  Past Medical History:   Diagnosis Date     Arthritis      Fibromyalgia      Hypertension        CURRENT MEDICATIONS:  Current Outpatient Medications   Medication Sig Dispense Refill     acetaminophen (TYLENOL) 500 MG tablet Take 1-2 tablets by mouth every 6 hours as needed.       amLODIPine (NORVASC) 5 MG tablet Take 1 tablet (5 mg) by mouth daily 15 tablet 0     cholecalciferol 25 MCG (1000 UT) TABS Take 1,000 Units by mouth       Cyanocobalamin 1000 MCG CAPS        DULoxetine HCl (CYMBALTA PO) Take 60 mg by  "mouth daily. Takes a 60mg and a 30mg tablet in am        gabapentin (NEURONTIN) 600 MG tablet Take 1 tablet (600 mg) by mouth 3 times daily Takes two 600mg tablets three times a day. 90 tablet 0     hydrOXYzine (VISTARIL) 25 MG capsule Take 1 capsule (25 mg) by mouth 3 times daily as needed for itching 15 capsule 0     LEVOTHYROXINE SODIUM PO Take 175 mcg by mouth       methadone HCl 10 MG/5ML SOLN Take 10 mg by mouth Dosed at clinic for her       multivitamin w/minerals (MULTI-VITAMIN) tablet Take 1 tablet by mouth daily       nystatin (MYCOSTATIN) 546012 UNIT/GM external cream Apply topically 2 times daily       ranitidine (ZANTAC) 150 MG tablet Take 150 mg by mouth 2 times daily       tolnaftate (TINACTIN) 1 % external powder Apply topically 2 times daily       furosemide (LASIX) 20 MG tablet Take 1 tablet (20 mg) by mouth daily for 5 days 5 tablet 0       PAST SURGICAL HISTORY:  Past Surgical History:   Procedure Laterality Date     CHOLECYSTECTOMY       GYN SURGERY      c section     GYN SURGERY      oblation     ORTHOPEDIC SURGERY      left leg, left shoulder, back       ALLERGIES:     Allergies   Allergen Reactions     Codeine Phosphate GI Disturbance     Phenergan [Promethazine]      Noted in 8/30/08 ER     Trileptal [Oxcarbazepine] Unknown       FAMILY HISTORY:  No family history of premature CAD or sudden death.    SOCIAL HISTORY:  Social History     Tobacco Use     Smoking status: Current Every Day Smoker     Packs/day: 0.25     Smokeless tobacco: Never Used   Substance Use Topics     Alcohol use: No     Drug use: No       ROS:   A comprehensive 14 point review of systems is negative other than as mentioned in HPI.    Exam:  /75 (BP Location: Right arm, Patient Position: Chair, Cuff Size: Adult Regular)   Pulse 84   Ht 1.6 m (5' 3\")   Wt 130.2 kg (287 lb)   LMP 11/01/2013   SpO2 94%   BMI 50.84 kg/m     GENERAL APPEARANCE: healthy, alert and no distress  EYES: no icterus, no xanthelasmas  ENT: " normal palate, mucosa moist, no central cyanosis  NECK: JVP~7 cmH2O  RESPIRATORY: lungs clear to auscultation - no rales, rhonchi or wheezes, no use of accessory muscles, no retractions, respirations are unlabored, normal respiratory rate  CARDIOVASCULAR: regular rhythm, normal S1 with physiologic split S2, no S3 or S4 and no murmur, click or rub.  GI: soft, non tender, bowel sounds normal,no abdominal bruits  EXTREMITIES: 3+ edema (mixed pitting and non-pitting) to bilateral knees, no bruits  NEURO: alert and oriented to person/place/time, normal speech, gait and affect  VASC: Radial, dorsalis pedis and posterior tibialis pulses 2+ bilaterally.  SKIN: no ecchymoses, no rashes.  PSYCH: cooperative, affect appropriate.     Labs:  Reviewed.       Testing/Procedures:      I personally visualized and interpreted:  ECG 1/21/20: sinus, VR 81,  QRS 80 QTc 478. Low voltage QRS.     Outside records from [facility] were obtained, and relevant results/notes have been incorporated into HPI.    Outside results of note:  TTE 8/25/19 ([facility]):  The left ventricular systolic function is normal, estimated LVEF 55-60%.  Left ventricular wall motion is normal.  There is mild mitral regurgitation.  No prior study.    Assessment and Plan:   1. Bilateral lower extremity edema  2. Venous insufficiency  3. Lymphedema  The patient's clinical exam is overall suggestive of euvolemia.  Furthermore her echocardiogram from [facility] is normal and reassuring against a cardiac cause of edema.  Clinically her overall presentation is most suggestive of a combination of chronic venous insufficiency and lymphedema.  I counseled her regarding local behavioral measures including elevation of her lower extremities, and have referred her to Edema clinic for compressive therapy.  No further cardiac work-up is indicated at this time, and future Cardiology follow can be on as-needed basis.     The patient states understanding and is  agreeable with plan.     Kendell Lunsford MD  Cardiology    CC  [facility]

## 2020-01-21 NOTE — NURSING NOTE
Chief Complaint   Patient presents with     Follow Up     consult for bilateral LE edema     Vitals were taken and medications were reconciled. EKG was performed.    Estefany Miller  12:29 PM

## 2020-01-27 ENCOUNTER — HOSPITAL ENCOUNTER (INPATIENT)
Facility: CLINIC | Age: 60
LOS: 6 days | Discharge: PSYCHIATRIC OR SUBSTANCE USE FACILITY | DRG: 603 | End: 2020-02-03
Attending: EMERGENCY MEDICINE | Admitting: INTERNAL MEDICINE
Payer: COMMERCIAL

## 2020-01-27 DIAGNOSIS — M79.604 RIGHT LEG PAIN: ICD-10-CM

## 2020-01-27 DIAGNOSIS — L03.115 BILATERAL LOWER LEG CELLULITIS: ICD-10-CM

## 2020-01-27 DIAGNOSIS — R60.0 EDEMA OF BOTH LEGS: Primary | ICD-10-CM

## 2020-01-27 DIAGNOSIS — L03.311 ABDOMINAL WALL CELLULITIS: ICD-10-CM

## 2020-01-27 DIAGNOSIS — M79.605 LEFT LEG PAIN: ICD-10-CM

## 2020-01-27 DIAGNOSIS — L03.116 BILATERAL LOWER LEG CELLULITIS: ICD-10-CM

## 2020-01-27 LAB
ALBUMIN UR-MCNC: NEGATIVE MG/DL
APPEARANCE UR: CLEAR
BACTERIA #/AREA URNS HPF: ABNORMAL /HPF
BILIRUB UR QL STRIP: NEGATIVE
COLOR UR AUTO: YELLOW
GLUCOSE UR STRIP-MCNC: NEGATIVE MG/DL
HGB UR QL STRIP: NEGATIVE
KETONES UR STRIP-MCNC: NEGATIVE MG/DL
LEUKOCYTE ESTERASE UR QL STRIP: ABNORMAL
MUCOUS THREADS #/AREA URNS LPF: PRESENT /LPF
NITRATE UR QL: NEGATIVE
PH UR STRIP: 6.5 PH (ref 5–7)
RBC #/AREA URNS AUTO: 0 /HPF (ref 0–2)
RENAL EPI CELLS #/AREA URNS HPF: <1 /HPF
SOURCE: ABNORMAL
SP GR UR STRIP: 1.02 (ref 1–1.03)
SQUAMOUS #/AREA URNS AUTO: 2 /HPF (ref 0–1)
UROBILINOGEN UR STRIP-MCNC: NORMAL MG/DL (ref 0–2)
WBC #/AREA URNS AUTO: 7 /HPF (ref 0–5)

## 2020-01-27 PROCEDURE — 99285 EMERGENCY DEPT VISIT HI MDM: CPT | Mod: 25 | Performed by: EMERGENCY MEDICINE

## 2020-01-27 PROCEDURE — 87880 STREP A ASSAY W/OPTIC: CPT | Performed by: EMERGENCY MEDICINE

## 2020-01-27 PROCEDURE — 87086 URINE CULTURE/COLONY COUNT: CPT | Performed by: EMERGENCY MEDICINE

## 2020-01-27 PROCEDURE — 93010 ELECTROCARDIOGRAM REPORT: CPT | Mod: Z6 | Performed by: EMERGENCY MEDICINE

## 2020-01-27 PROCEDURE — 87081 CULTURE SCREEN ONLY: CPT | Performed by: EMERGENCY MEDICINE

## 2020-01-27 PROCEDURE — 93005 ELECTROCARDIOGRAM TRACING: CPT | Performed by: EMERGENCY MEDICINE

## 2020-01-27 PROCEDURE — 81001 URINALYSIS AUTO W/SCOPE: CPT | Performed by: EMERGENCY MEDICINE

## 2020-01-27 RX ORDER — KETOROLAC TROMETHAMINE 15 MG/ML
15 INJECTION, SOLUTION INTRAMUSCULAR; INTRAVENOUS ONCE
Status: COMPLETED | OUTPATIENT
Start: 2020-01-27 | End: 2020-01-28

## 2020-01-28 ENCOUNTER — APPOINTMENT (OUTPATIENT)
Dept: CARDIOLOGY | Facility: CLINIC | Age: 60
DRG: 603 | End: 2020-01-28
Attending: INTERNAL MEDICINE
Payer: COMMERCIAL

## 2020-01-28 ENCOUNTER — APPOINTMENT (OUTPATIENT)
Dept: CT IMAGING | Facility: CLINIC | Age: 60
DRG: 603 | End: 2020-01-28
Attending: EMERGENCY MEDICINE
Payer: COMMERCIAL

## 2020-01-28 ENCOUNTER — APPOINTMENT (OUTPATIENT)
Dept: ULTRASOUND IMAGING | Facility: CLINIC | Age: 60
DRG: 603 | End: 2020-01-28
Attending: EMERGENCY MEDICINE
Payer: COMMERCIAL

## 2020-01-28 PROBLEM — R60.0 EDEMA OF BOTH LEGS: Status: ACTIVE | Noted: 2020-01-28

## 2020-01-28 LAB
ALBUMIN SERPL-MCNC: 2.7 G/DL (ref 3.4–5)
ALP SERPL-CCNC: 118 U/L (ref 40–150)
ALT SERPL W P-5'-P-CCNC: 36 U/L (ref 0–50)
ANION GAP SERPL CALCULATED.3IONS-SCNC: <1 MMOL/L (ref 3–14)
AST SERPL W P-5'-P-CCNC: 38 U/L (ref 0–45)
BASOPHILS # BLD AUTO: 0 10E9/L (ref 0–0.2)
BASOPHILS NFR BLD AUTO: 0.3 %
BILIRUB SERPL-MCNC: 0.3 MG/DL (ref 0.2–1.3)
BUN SERPL-MCNC: 19 MG/DL (ref 7–30)
CALCIUM SERPL-MCNC: 7.9 MG/DL (ref 8.5–10.1)
CHLORIDE SERPL-SCNC: 103 MMOL/L (ref 94–109)
CO2 SERPL-SCNC: 34 MMOL/L (ref 20–32)
CREAT BLD-MCNC: 0.8 MG/DL (ref 0.52–1.04)
CREAT SERPL-MCNC: 0.7 MG/DL (ref 0.52–1.04)
CRP SERPL-MCNC: 28.5 MG/L (ref 0–8)
DEPRECATED S PYO AG THROAT QL EIA: NORMAL
DIFFERENTIAL METHOD BLD: ABNORMAL
EOSINOPHIL # BLD AUTO: 0.3 10E9/L (ref 0–0.7)
EOSINOPHIL NFR BLD AUTO: 2.9 %
ERYTHROCYTE [DISTWIDTH] IN BLOOD BY AUTOMATED COUNT: 14.3 % (ref 10–15)
ERYTHROCYTE [SEDIMENTATION RATE] IN BLOOD BY WESTERGREN METHOD: 82 MM/H (ref 0–30)
FERRITIN SERPL-MCNC: 32 NG/ML (ref 8–252)
FLUAV+FLUBV AG SPEC QL: NEGATIVE
FLUAV+FLUBV AG SPEC QL: NEGATIVE
GFR SERPL CREATININE-BSD FRML MDRD: 73 ML/MIN/{1.73_M2}
GFR SERPL CREATININE-BSD FRML MDRD: >90 ML/MIN/{1.73_M2}
GLUCOSE BLDC GLUCOMTR-MCNC: 108 MG/DL (ref 70–99)
GLUCOSE SERPL-MCNC: 136 MG/DL (ref 70–99)
HCT VFR BLD AUTO: 32.1 % (ref 35–47)
HGB BLD-MCNC: 10 G/DL (ref 11.7–15.7)
IMM GRANULOCYTES # BLD: 0.1 10E9/L (ref 0–0.4)
IMM GRANULOCYTES NFR BLD: 1 %
INR PPP: 1.03 (ref 0.86–1.14)
INTERPRETATION ECG - MUSE: NORMAL
IRON SATN MFR SERPL: NORMAL % (ref 15–46)
IRON SERPL-MCNC: NORMAL UG/DL (ref 35–180)
LACTATE BLD-SCNC: 1 MMOL/L (ref 0.7–2)
LYMPHOCYTES # BLD AUTO: 1.8 10E9/L (ref 0.8–5.3)
LYMPHOCYTES NFR BLD AUTO: 18 %
MCH RBC QN AUTO: 28.6 PG (ref 26.5–33)
MCHC RBC AUTO-ENTMCNC: 31.2 G/DL (ref 31.5–36.5)
MCV RBC AUTO: 92 FL (ref 78–100)
MONOCYTES # BLD AUTO: 1.3 10E9/L (ref 0–1.3)
MONOCYTES NFR BLD AUTO: 12.5 %
NEUTROPHILS # BLD AUTO: 6.6 10E9/L (ref 1.6–8.3)
NEUTROPHILS NFR BLD AUTO: 65.3 %
NRBC # BLD AUTO: 0 10*3/UL
NRBC BLD AUTO-RTO: 0 /100
NT-PROBNP SERPL-MCNC: 328 PG/ML (ref 0–900)
PLATELET # BLD AUTO: 297 10E9/L (ref 150–450)
POTASSIUM SERPL-SCNC: 4.5 MMOL/L (ref 3.4–5.3)
PROT SERPL-MCNC: 7.6 G/DL (ref 6.8–8.8)
RBC # BLD AUTO: 3.5 10E12/L (ref 3.8–5.2)
RETICS # AUTO: 125 10E9/L (ref 25–95)
RETICS/RBC NFR AUTO: 3.5 % (ref 0.5–2)
SODIUM SERPL-SCNC: 137 MMOL/L (ref 133–144)
SPECIMEN SOURCE: NORMAL
SPECIMEN SOURCE: NORMAL
T4 FREE SERPL-MCNC: 0.98 NG/DL (ref 0.76–1.46)
TIBC SERPL-MCNC: NORMAL UG/DL (ref 240–430)
TROPONIN I SERPL-MCNC: <0.015 UG/L (ref 0–0.04)
TSH SERPL DL<=0.005 MIU/L-ACNC: 19.01 MU/L (ref 0.4–4)
WBC # BLD AUTO: 10.1 10E9/L (ref 4–11)

## 2020-01-28 PROCEDURE — 96367 TX/PROPH/DG ADDL SEQ IV INF: CPT | Performed by: EMERGENCY MEDICINE

## 2020-01-28 PROCEDURE — 83880 ASSAY OF NATRIURETIC PEPTIDE: CPT | Performed by: EMERGENCY MEDICINE

## 2020-01-28 PROCEDURE — 87804 INFLUENZA ASSAY W/OPTIC: CPT | Performed by: EMERGENCY MEDICINE

## 2020-01-28 PROCEDURE — 25000128 H RX IP 250 OP 636

## 2020-01-28 PROCEDURE — 83605 ASSAY OF LACTIC ACID: CPT | Performed by: EMERGENCY MEDICINE

## 2020-01-28 PROCEDURE — 96375 TX/PRO/DX INJ NEW DRUG ADDON: CPT | Performed by: EMERGENCY MEDICINE

## 2020-01-28 PROCEDURE — 96365 THER/PROPH/DIAG IV INF INIT: CPT | Mod: 59 | Performed by: EMERGENCY MEDICINE

## 2020-01-28 PROCEDURE — 25000125 ZZHC RX 250: Performed by: EMERGENCY MEDICINE

## 2020-01-28 PROCEDURE — 25000132 ZZH RX MED GY IP 250 OP 250 PS 637: Performed by: INTERNAL MEDICINE

## 2020-01-28 PROCEDURE — 25000128 H RX IP 250 OP 636: Performed by: INTERNAL MEDICINE

## 2020-01-28 PROCEDURE — 80053 COMPREHEN METABOLIC PANEL: CPT | Performed by: EMERGENCY MEDICINE

## 2020-01-28 PROCEDURE — 85610 PROTHROMBIN TIME: CPT | Performed by: EMERGENCY MEDICINE

## 2020-01-28 PROCEDURE — 84484 ASSAY OF TROPONIN QUANT: CPT | Performed by: EMERGENCY MEDICINE

## 2020-01-28 PROCEDURE — 93306 TTE W/DOPPLER COMPLETE: CPT

## 2020-01-28 PROCEDURE — 84439 ASSAY OF FREE THYROXINE: CPT | Performed by: EMERGENCY MEDICINE

## 2020-01-28 PROCEDURE — 82728 ASSAY OF FERRITIN: CPT | Performed by: EMERGENCY MEDICINE

## 2020-01-28 PROCEDURE — 96366 THER/PROPH/DIAG IV INF ADDON: CPT | Performed by: EMERGENCY MEDICINE

## 2020-01-28 PROCEDURE — 83550 IRON BINDING TEST: CPT | Performed by: EMERGENCY MEDICINE

## 2020-01-28 PROCEDURE — 00000146 ZZHCL STATISTIC GLUCOSE BY METER IP

## 2020-01-28 PROCEDURE — 83540 ASSAY OF IRON: CPT | Performed by: EMERGENCY MEDICINE

## 2020-01-28 PROCEDURE — 85045 AUTOMATED RETICULOCYTE COUNT: CPT | Performed by: EMERGENCY MEDICINE

## 2020-01-28 PROCEDURE — 86140 C-REACTIVE PROTEIN: CPT | Performed by: EMERGENCY MEDICINE

## 2020-01-28 PROCEDURE — 85652 RBC SED RATE AUTOMATED: CPT | Performed by: EMERGENCY MEDICINE

## 2020-01-28 PROCEDURE — 99223 1ST HOSP IP/OBS HIGH 75: CPT | Mod: AI | Performed by: INTERNAL MEDICINE

## 2020-01-28 PROCEDURE — 25000128 H RX IP 250 OP 636: Performed by: EMERGENCY MEDICINE

## 2020-01-28 PROCEDURE — 87040 BLOOD CULTURE FOR BACTERIA: CPT | Performed by: EMERGENCY MEDICINE

## 2020-01-28 PROCEDURE — 85025 COMPLETE CBC W/AUTO DIFF WBC: CPT | Performed by: EMERGENCY MEDICINE

## 2020-01-28 PROCEDURE — 25800030 ZZH RX IP 258 OP 636: Performed by: EMERGENCY MEDICINE

## 2020-01-28 PROCEDURE — 12000001 ZZH R&B MED SURG/OB UMMC

## 2020-01-28 PROCEDURE — 93970 EXTREMITY STUDY: CPT

## 2020-01-28 PROCEDURE — 93306 TTE W/DOPPLER COMPLETE: CPT | Mod: 26 | Performed by: INTERNAL MEDICINE

## 2020-01-28 PROCEDURE — 36415 COLL VENOUS BLD VENIPUNCTURE: CPT | Performed by: EMERGENCY MEDICINE

## 2020-01-28 PROCEDURE — 84443 ASSAY THYROID STIM HORMONE: CPT | Performed by: EMERGENCY MEDICINE

## 2020-01-28 PROCEDURE — 82565 ASSAY OF CREATININE: CPT

## 2020-01-28 PROCEDURE — 25800030 ZZH RX IP 258 OP 636

## 2020-01-28 PROCEDURE — 71275 CT ANGIOGRAPHY CHEST: CPT

## 2020-01-28 RX ORDER — GABAPENTIN 600 MG/1
1200 TABLET ORAL 3 TIMES DAILY
Status: DISCONTINUED | OUTPATIENT
Start: 2020-01-28 | End: 2020-02-03 | Stop reason: HOSPADM

## 2020-01-28 RX ORDER — AMOXICILLIN 250 MG
1 CAPSULE ORAL 2 TIMES DAILY
Status: DISCONTINUED | OUTPATIENT
Start: 2020-01-28 | End: 2020-02-03 | Stop reason: HOSPADM

## 2020-01-28 RX ORDER — SODIUM CHLORIDE 9 MG/ML
INJECTION, SOLUTION INTRAVENOUS
Status: COMPLETED
Start: 2020-01-28 | End: 2020-01-28

## 2020-01-28 RX ORDER — IOPAMIDOL 755 MG/ML
100 INJECTION, SOLUTION INTRAVASCULAR ONCE
Status: COMPLETED | OUTPATIENT
Start: 2020-01-28 | End: 2020-01-28

## 2020-01-28 RX ORDER — SODIUM CHLORIDE 9 MG/ML
INJECTION, SOLUTION INTRAVENOUS
Status: DISCONTINUED
Start: 2020-01-28 | End: 2020-01-28 | Stop reason: HOSPADM

## 2020-01-28 RX ORDER — LEVOTHYROXINE SODIUM 100 UG/1
200 TABLET ORAL
Status: DISCONTINUED | OUTPATIENT
Start: 2020-01-28 | End: 2020-02-03 | Stop reason: HOSPADM

## 2020-01-28 RX ORDER — METHADONE HYDROCHLORIDE 5 MG/5ML
5 SOLUTION ORAL ONCE
Status: DISCONTINUED | OUTPATIENT
Start: 2020-01-28 | End: 2020-01-28

## 2020-01-28 RX ORDER — GABAPENTIN 600 MG/1
600 TABLET ORAL 3 TIMES DAILY
Status: ON HOLD | COMMUNITY
End: 2020-02-02

## 2020-01-28 RX ORDER — NALOXONE HYDROCHLORIDE 0.4 MG/ML
.1-.4 INJECTION, SOLUTION INTRAMUSCULAR; INTRAVENOUS; SUBCUTANEOUS
Status: DISCONTINUED | OUTPATIENT
Start: 2020-01-28 | End: 2020-02-03 | Stop reason: HOSPADM

## 2020-01-28 RX ORDER — AMOXICILLIN 250 MG
2 CAPSULE ORAL 2 TIMES DAILY
Status: DISCONTINUED | OUTPATIENT
Start: 2020-01-28 | End: 2020-02-03 | Stop reason: HOSPADM

## 2020-01-28 RX ORDER — METHADONE HYDROCHLORIDE 10 MG/5ML
65 SOLUTION ORAL DAILY
Status: DISCONTINUED | OUTPATIENT
Start: 2020-01-28 | End: 2020-01-28

## 2020-01-28 RX ORDER — POLYETHYLENE GLYCOL 3350 17 G/17G
17 POWDER, FOR SOLUTION ORAL DAILY PRN
Status: DISCONTINUED | OUTPATIENT
Start: 2020-01-28 | End: 2020-02-03 | Stop reason: HOSPADM

## 2020-01-28 RX ORDER — FAMOTIDINE 20 MG/1
20 TABLET, FILM COATED ORAL 2 TIMES DAILY PRN
Status: DISCONTINUED | OUTPATIENT
Start: 2020-01-28 | End: 2020-02-03 | Stop reason: HOSPADM

## 2020-01-28 RX ORDER — KETOROLAC TROMETHAMINE 30 MG/ML
30 INJECTION, SOLUTION INTRAMUSCULAR; INTRAVENOUS EVERY 6 HOURS PRN
Status: DISCONTINUED | OUTPATIENT
Start: 2020-01-28 | End: 2020-01-29

## 2020-01-28 RX ORDER — ACETAMINOPHEN 500 MG
500-1000 TABLET ORAL EVERY 6 HOURS PRN
Status: DISCONTINUED | OUTPATIENT
Start: 2020-01-28 | End: 2020-02-03 | Stop reason: HOSPADM

## 2020-01-28 RX ORDER — FUROSEMIDE 20 MG
20 TABLET ORAL DAILY
Status: ON HOLD | COMMUNITY
End: 2020-02-02

## 2020-01-28 RX ORDER — METHADONE HYDROCHLORIDE 5 MG/5ML
70 SOLUTION ORAL DAILY
Status: DISCONTINUED | OUTPATIENT
Start: 2020-01-28 | End: 2020-02-03 | Stop reason: HOSPADM

## 2020-01-28 RX ORDER — NYSTATIN 100000 U/G
CREAM TOPICAL 2 TIMES DAILY
Status: DISCONTINUED | OUTPATIENT
Start: 2020-01-28 | End: 2020-01-28

## 2020-01-28 RX ORDER — LIDOCAINE 40 MG/G
CREAM TOPICAL
Status: DISCONTINUED | OUTPATIENT
Start: 2020-01-28 | End: 2020-02-03 | Stop reason: HOSPADM

## 2020-01-28 RX ORDER — PIPERACILLIN SODIUM, TAZOBACTAM SODIUM 3; .375 G/15ML; G/15ML
3.38 INJECTION, POWDER, LYOPHILIZED, FOR SOLUTION INTRAVENOUS EVERY 6 HOURS
Status: DISCONTINUED | OUTPATIENT
Start: 2020-01-28 | End: 2020-01-29

## 2020-01-28 RX ORDER — VITAMIN B COMPLEX
1000 TABLET ORAL DAILY
Status: DISCONTINUED | OUTPATIENT
Start: 2020-01-28 | End: 2020-02-03 | Stop reason: HOSPADM

## 2020-01-28 RX ORDER — PANTOPRAZOLE SODIUM 40 MG/1
40 TABLET, DELAYED RELEASE ORAL
Status: DISCONTINUED | OUTPATIENT
Start: 2020-01-28 | End: 2020-02-03 | Stop reason: HOSPADM

## 2020-01-28 RX ORDER — FUROSEMIDE 10 MG/ML
20 INJECTION INTRAMUSCULAR; INTRAVENOUS DAILY
Status: DISCONTINUED | OUTPATIENT
Start: 2020-01-28 | End: 2020-01-30

## 2020-01-28 RX ORDER — PIPERACILLIN SODIUM, TAZOBACTAM SODIUM 3; .375 G/15ML; G/15ML
3.38 INJECTION, POWDER, LYOPHILIZED, FOR SOLUTION INTRAVENOUS ONCE
Status: COMPLETED | OUTPATIENT
Start: 2020-01-28 | End: 2020-01-28

## 2020-01-28 RX ORDER — DULOXETIN HYDROCHLORIDE 60 MG/1
60 CAPSULE, DELAYED RELEASE ORAL DAILY
Status: DISCONTINUED | OUTPATIENT
Start: 2020-01-28 | End: 2020-02-03 | Stop reason: HOSPADM

## 2020-01-28 RX ORDER — SODIUM CHLORIDE 9 MG/ML
INJECTION, SOLUTION INTRAVENOUS
Status: DISPENSED
Start: 2020-01-28 | End: 2020-01-28

## 2020-01-28 RX ORDER — ACETAMINOPHEN 325 MG/1
650 TABLET ORAL EVERY 4 HOURS PRN
Status: DISCONTINUED | OUTPATIENT
Start: 2020-01-28 | End: 2020-01-28

## 2020-01-28 RX ORDER — DULOXETIN HYDROCHLORIDE 60 MG/1
60 CAPSULE, DELAYED RELEASE ORAL DAILY
Status: ON HOLD | COMMUNITY
End: 2022-03-04

## 2020-01-28 RX ORDER — TRAZODONE HYDROCHLORIDE 50 MG/1
50 TABLET, FILM COATED ORAL AT BEDTIME
Status: ON HOLD | COMMUNITY
End: 2022-03-04

## 2020-01-28 RX ADMIN — PIPERACILLIN AND TAZOBACTAM 3.38 G: 3; .375 INJECTION, POWDER, FOR SOLUTION INTRAVENOUS at 04:17

## 2020-01-28 RX ADMIN — LEVOTHYROXINE SODIUM 200 MCG: 100 TABLET ORAL at 10:43

## 2020-01-28 RX ADMIN — SENNOSIDES AND DOCUSATE SODIUM 2 TABLET: 8.6; 5 TABLET ORAL at 09:32

## 2020-01-28 RX ADMIN — GABAPENTIN 1200 MG: 600 TABLET, FILM COATED ORAL at 10:47

## 2020-01-28 RX ADMIN — VANCOMYCIN HYDROCHLORIDE 2000 MG: 1 INJECTION, POWDER, LYOPHILIZED, FOR SOLUTION INTRAVENOUS at 17:30

## 2020-01-28 RX ADMIN — IOPAMIDOL 135 ML: 755 INJECTION, SOLUTION INTRAVENOUS at 03:39

## 2020-01-28 RX ADMIN — METHADONE HYDROCHLORIDE 70 MG: 5 SOLUTION ORAL at 14:47

## 2020-01-28 RX ADMIN — MELATONIN 1000 UNITS: at 10:43

## 2020-01-28 RX ADMIN — SODIUM CHLORIDE 500 ML: 9 INJECTION, SOLUTION INTRAVENOUS at 17:31

## 2020-01-28 RX ADMIN — PIPERACILLIN AND TAZOBACTAM 3.38 G: 3; .375 INJECTION, POWDER, FOR SOLUTION INTRAVENOUS at 16:00

## 2020-01-28 RX ADMIN — PIPERACILLIN AND TAZOBACTAM 3.38 G: 3; .375 INJECTION, POWDER, FOR SOLUTION INTRAVENOUS at 22:10

## 2020-01-28 RX ADMIN — FUROSEMIDE 20 MG: 10 INJECTION, SOLUTION INTRAMUSCULAR; INTRAVENOUS at 14:47

## 2020-01-28 RX ADMIN — POLYETHYLENE GLYCOL 3350 17 G: 17 POWDER, FOR SOLUTION ORAL at 10:43

## 2020-01-28 RX ADMIN — VANCOMYCIN HYDROCHLORIDE 2000 MG: 10 INJECTION, POWDER, LYOPHILIZED, FOR SOLUTION INTRAVENOUS at 04:59

## 2020-01-28 RX ADMIN — PIPERACILLIN AND TAZOBACTAM 3.38 G: 3; .375 INJECTION, POWDER, FOR SOLUTION INTRAVENOUS at 10:42

## 2020-01-28 RX ADMIN — GABAPENTIN 1200 MG: 600 TABLET, FILM COATED ORAL at 20:30

## 2020-01-28 RX ADMIN — SODIUM CHLORIDE 90 ML: 9 INJECTION, SOLUTION INTRAVENOUS at 03:39

## 2020-01-28 RX ADMIN — SENNOSIDES AND DOCUSATE SODIUM 2 TABLET: 8.6; 5 TABLET ORAL at 20:30

## 2020-01-28 RX ADMIN — KETOROLAC TROMETHAMINE 15 MG: 15 INJECTION, SOLUTION INTRAMUSCULAR; INTRAVENOUS at 01:18

## 2020-01-28 RX ADMIN — ACETAMINOPHEN 1000 MG: 325 TABLET, FILM COATED ORAL at 10:49

## 2020-01-28 RX ADMIN — ACETAMINOPHEN 1000 MG: 325 TABLET, FILM COATED ORAL at 20:33

## 2020-01-28 RX ADMIN — KETOROLAC TROMETHAMINE 30 MG: 30 INJECTION, SOLUTION INTRAMUSCULAR at 10:53

## 2020-01-28 RX ADMIN — DULOXETINE HYDROCHLORIDE 60 MG: 60 CAPSULE, DELAYED RELEASE ORAL at 10:42

## 2020-01-28 RX ADMIN — ENOXAPARIN SODIUM 40 MG: 40 INJECTION SUBCUTANEOUS at 09:32

## 2020-01-28 RX ADMIN — PANTOPRAZOLE SODIUM 40 MG: 40 TABLET, DELAYED RELEASE ORAL at 09:32

## 2020-01-28 ASSESSMENT — ACTIVITIES OF DAILY LIVING (ADL)
AMBULATION: 1-->ASSISTIVE EQUIPMENT
TRANSFERRING: 1-->ASSISTIVE EQUIPMENT
SWALLOWING: 0-->SWALLOWS FOODS/LIQUIDS WITHOUT DIFFICULTY
COGNITION: 0 - NO COGNITION ISSUES REPORTED
TOILETING: 0-->INDEPENDENT
RETIRED_COMMUNICATION: 0-->UNDERSTANDS/COMMUNICATES WITHOUT DIFFICULTY
BATHING: 0-->INDEPENDENT
RETIRED_EATING: 0-->INDEPENDENT
DRESS: 0-->INDEPENDENT
FALL_HISTORY_WITHIN_LAST_SIX_MONTHS: NO

## 2020-01-28 NOTE — PHARMACY-ADMISSION MEDICATION HISTORY
Admission Medication History by pharmacy is complete.  See EPIC admission navigator for Prior to Admission medications.     Medication History Sources:  Patient interview, [facility] dispense report and [facility] to obtain methadone dose and regimen.      Medication History Source Reliability: Fair; patient was able to recall medication name and regimen but was not able to state doses for many of her prescriptions.      Medication Adherence: Fair; Patient reports that she is reminded to take her medications by staff at [facility] in [place] and dispense report shows consistent fills for prescribed medications.     Current Outpatient Pharmacy: Data Unavailable     Changes made to PTA medication list (reason)  Added:     Omeprazole: Per [facility], 30 day supply dispensed on 01/14/2020.     Trazodone: Per [facility], 30 day supply dispensed recently on 01/12/2020.     Nicotine gum: Per [facility], dispensed recently on 01/20/2020.     Furosemide: Per [facility], 30 day supply dispensed on 12/13/2019.    Deleted:     Amoxicillin: Per patient, finished antibiotic course on 11/13/2019.     Cholecalciferol:  Per patient, not taking.     Cyanocobalamin: Per patient, not taking.     Nystatin: Per patient, not taking.     pantoprazole: Per patient, not taking; Per [facility] patient is now on omeprazole.     Suboxone: Per [facility]; last dispensed on 06/12/2019. Patient reports not taking.     Tolnaftate: Per patient, not taking.     Multivitamin: Duplicate on file.     Changed:    Amlodipine:     Current dose: Per [facility], patient filled 30 day supply of Amlodipine 5 mg on 01/19/2020.     Previous dose: Amlodipine 10 mg, last filled 06/13/2019.     Methadone:     Current Dose: Per Methadone clinic patient is taking 70 mg daily instead of 60 mg daily.     Duloxetine    Current dose: Per [facility], patient filled 30 day supply of Duloxetine 60 mg capsule on 01/12/2020.      Previous dose: Per [facility], 30 day supply of Duloxetine 30 mg last filled 06/13/2019.     Gabapentin     Current dose: Per [facility], patient filled 30 day supply of Gabapentin 600 mg capsule on 01/12/2020.     Previous dose: Per [facility], 30 day supply of Gabapentin 300 mg last filled 12/26/2019.    Levothyroxine     Per [facility], clarified unknown strength to 200 mg tablets.      Additional medication history information (including reliability of information, actions taken by pharmacist):   Medication Adherence     Patient report taking medications as prescribed with the help of the staff at the [facility].     Methadone Clinic:     Clinic: [facility]     Phone number: [phone]    Per clinic: Patient received 6 doses on 1/22/2019. Patient should finishing her last dose today 1/28/2019. However, patient reports last dose was yesterday morning.       Prior to Admission medications    Medication Sig Last Dose Taking? Auth Provider   acetaminophen (TYLENOL) 500 MG tablet Take 1-2 tablets by mouth every 6 hours as needed. Past Week Yes Reported, Patient   amLODIPine (NORVASC) 5 MG tablet Take 1 tablet (5 mg) by mouth daily Past Week Yes Rayray Lagunas,    DULoxetine (CYMBALTA) 60 MG capsule Take 60 mg by mouth daily Past Week Yes Unknown, Entered By History   furosemide (LASIX) 20 MG tablet Take 20 mg by mouth daily Past Month Yes Unknown, Entered By History   gabapentin (NEURONTIN) 600 MG tablet Take 600 mg by mouth 3 times daily Past Week Yes Unknown, Entered By History   hydrOXYzine (ATARAX) 50 MG tablet Take 50 mg by mouth every 8 hours as needed  Past Month Yes Reported, Patient   ibuprofen (ADVIL/MOTRIN) 600 MG tablet  Past Week Yes Reported, Patient   methadone HCl 10 MG/5ML SOLN Take 70 mg by mouth Dosed at clinic for her  1/27/2020 Yes Reported, Patient   nicotine polacrilex (NICORETTE) 4 MG gum Place 4 mg inside cheek as needed for smoking cessation Past  Week Yes Unknown, Entered By History   omeprazole 20 MG tablet Take 20 mg by mouth daily Past Week Yes Unknown, Entered By History   ranitidine (ZANTAC) 150 MG tablet Take 150 mg by mouth 2 times daily as needed Past Week Yes Reported, Patient   traZODone (DESYREL) 50 MG tablet Take 50 mg by mouth At Bedtime Past Week Yes Unknown, Entered By History   levothyroxine (SYNTHROID/LEVOTHROID) 200 MCG tablet Take 200 mcg by mouth daily    Reported, Patient   multivitamin w/minerals (MULTI-VITAMIN) tablet Take 1 tablet by mouth daily   Reported, Patient   NARCAN 4 MG/0.1ML nasal spray CALL 911. SPRAY CONTENTS OF ONE SPRAYER (0.1ML) INTO ONE NOSTRIL. REPEAT IN 2-3 MINS IF SYMPTOMS OF OPIOID PERSIST, ALTERNATE NOSTRILS Unknown  Reported, Patient     Time spent in this activity: 25 minutes     Medication history completed by: Isadora Kelly, Student Pharmacist

## 2020-01-28 NOTE — ED NOTES
[facility], [place]   ED Nurse to Floor Handoff     No Yusuf is a 60 year old female who speaks English and lives with others,  in a home  They arrived in the ED by car from home    ED Chief Complaint: Leg Swelling (Bilateral leg swollen, pain and red been going on for about a month. )    ED Dx;   Final diagnoses:   Bilateral lower leg cellulitis   Abdominal wall cellulitis         Needed?: No    Allergies:   Allergies   Allergen Reactions     Codeine Phosphate GI Disturbance     Phenergan [Promethazine]      Noted in 8/30/08 ER     Trileptal [Oxcarbazepine] Unknown   .  Past Medical Hx:   Past Medical History:   Diagnosis Date     Arthritis      Bipolar affective (H)      Fibromyalgia      Hypertension       Baseline Mental status: WDL  Current Mental Status changes: at basesline    Infection present or suspected this encounter: yes skin/wound/contact  Sepsis suspected: No  Isolation type: No active isolations     Activity level - Baseline/Home:  Independent  Activity Level - Current:   Independent    Bariatric equipment needed?: No    In the ED these meds were given:   Medications   vancomycin (VANCOCIN) 2,000 mg in sodium chloride 0.9 % 500 mL intermittent infusion (2,000 mg Intravenous New Bag 1/28/20 0459)   sodium chloride 0.9 % infusion (  Canceled Entry 1/28/20 0418)   acetaminophen (TYLENOL) tablet 650 mg (has no administration in time range)   ketorolac (TORADOL) injection 15 mg (15 mg Intravenous Given 1/28/20 0118)   iopamidol (ISOVUE-370) solution 100 mL (135 mLs Intravenous Given 1/28/20 0339)   sodium chloride 0.9 % bag 500mL for CT scan flush use (90 mLs Intravenous Given 1/28/20 0339)   piperacillin-tazobactam (ZOSYN) 3.375 g vial to attach to  mL bag (0 g Intravenous Stopped 1/28/20 0458)       Drips running?  Yes    Home pump  No    Current LDAs  Peripheral IV 11/17/19 Right Lower forearm (Active)   Number of days: 72       Peripheral IV 01/28/20  Right Lower forearm (Active)   Number of days: 0       Peripheral IV 01/28/20 Left (Active)   Number of days: 0       Labs results:   Labs Ordered and Resulted from Time of ED Arrival Up to the Time of Departure from the ED   CBC WITH PLATELETS DIFFERENTIAL - Abnormal; Notable for the following components:       Result Value    RBC Count 3.50 (*)     Hemoglobin 10.0 (*)     Hematocrit 32.1 (*)     MCHC 31.2 (*)     All other components within normal limits   COMPREHENSIVE METABOLIC PANEL - Abnormal; Notable for the following components:    Carbon Dioxide 34 (*)     Anion Gap <1 (*)     Glucose 136 (*)     Calcium 7.9 (*)     Albumin 2.7 (*)     All other components within normal limits   TSH WITH FREE T4 REFLEX - Abnormal; Notable for the following components:    TSH 19.01 (*)     All other components within normal limits   ROUTINE UA WITH MICROSCOPIC - Abnormal; Notable for the following components:    Leukocyte Esterase Urine Large (*)     WBC Urine 7 (*)     Bacteria Urine Few (*)     Squamous Epithelial /HPF Urine 2 (*)     Renal Tub Epi <1 (*)     Mucous Urine Present (*)     All other components within normal limits   ERYTHROCYTE SEDIMENTATION RATE AUTO - Abnormal; Notable for the following components:    Sed Rate 82 (*)     All other components within normal limits   CRP INFLAMMATION - Abnormal; Notable for the following components:    CRP Inflammation 28.5 (*)     All other components within normal limits   NT PROBNP INPATIENT   TROPONIN I   LACTIC ACID WHOLE BLOOD   CREATININE POCT   T4 FREE   T4 FREE   INR   ISTAT CREATININE NURSING POCT   BLOOD CULTURE   BLOOD CULTURE   INFLUENZA A/B ANTIGEN   RAPID STREP SCREEN   BETA STREP GROUP A CULTURE   URINE CULTURE AEROBIC BACTERIAL       Imaging Studies:   Recent Results (from the past 24 hour(s))   US Lower Extremity Venous Duplex Bilateral    Narrative    EXAM: US LOWER EXTREMITY VENOUS DUPLEX BILATERAL  LOCATION: [facility]  DATE/TIME: 1/28/2020  2:41 AM    INDICATION: Bilateral leg swelling, pain and redness.  COMPARISON: None.  TECHNIQUE: Venous Duplex ultrasound of bilateral lower extremities with and without compression, augmentation and duplex. Color flow and spectral Doppler with waveform analysis performed.    FINDINGS: Exam includes the common femoral, femoral, popliteal veins as well as segmentally visualized deep calf veins and greater saphenous vein.     RIGHT: No deep vein thrombosis. No superficial thrombophlebitis. No popliteal cyst.    LEFT: No deep vein thrombosis. No superficial thrombophlebitis. No popliteal cyst.      Impression    IMPRESSION:  1.  No deep venous thrombosis in the bilateral lower extremities.   CT Chest (PE) Abdomen Pelvis w Contrast    Narrative    EXAM: CT CHEST PE ABDOMEN PELVIS W CONTRAST  LOCATION: [facility]  DATE/TIME: 1/28/2020 2:58 AM    INDICATION: Chest pain, shortness of breath, leg swelling  COMPARISON: None.  TECHNIQUE: CT angiogram chest and routine CT abdomen pelvis with IV contrast. Arterial phase through the chest and venous phase through the abdomen and pelvis. 2D and 3D MIP reconstructions were preformed by the CT technologist. Dose reduction techniques   were used.   CONTRAST: 135ml's     FINDINGS:  ANGIOGRAM CHEST: Pulmonary arteries are normal caliber and negative for pulmonary emboli. Thoracic aorta is negative for dissection. Normal ductus bump noted distal aortic arch. There is mild atherosclerosis of the aorta and coronary arteries. No CT   evidence of right heart strain.     LUNGS AND PLEURA: In the lingula there are few patchy clustered groundglass nodularities up to 8 mm seen, likely inflammatory. No solid features. No lung consolidation. No pleural effusion or pneumothorax. There are couple thin-walled air cysts in the   left lung. A small cystic focus in the left lower lobe on image #50 series 8 measures 7 x 6 mm and has slightly thicker walls, also see coronal image  #119. There is a 3 mm fissural node in the right upper lung along the minor fissure, image #51 series 8.   In the posterior right upper lung there is some mild dependent groundglass attenuation, probably atelectasis. No pleural effusion or pneumothorax.    MEDIASTINUM/AXILLAE: Normal heart size. No pericardial effusion. Normal esophagus. No adenopathy.    HEPATOBILIARY: Hepatic steatosis. The liver is normal in contour, mildly enlarged. In the inferior right liver there is a circumscribed hypodense lesion measuring 1.6 cm which has density of 17 Hounsfield units, fluid attenuation, consistent with a cyst.   There are some geographic areas of fat sparing in the anterior left hepatic medial segment bordering the anterior capsule and bordering the falciform ligament, also seen along the upper gallbladder fossa. No suspicious hepatic lesion. There is a cyst in   the left hepatic lateral segment measuring 13 mm. Status post cholecystectomy. No significant biliary dilatation.    PANCREAS: Normal.    SPLEEN: Normal.    ADRENAL GLANDS: 1.4 x 1.9 cm homogeneous right adrenal gland nodule is likely an adenoma.    KIDNEYS/BLADDER: Normal in size. No hydronephrosis. Some density seen in the collecting systems is favored to be excreted contrast. No definite stones. Normal caliber ureters. The bladder is low in volume and otherwise unremarkable.    BOWEL: Normal caliber bowel. No obstruction or acute inflammatory process. Large amount of stool noted. There is a duodenal diverticulum. Normal appendix.    LYMPH NODES: There are shotty retroperitoneal para-aortic nodes, none of which are pathologically enlarged, nonspecific and probably reactive. No adenopathy by size criteria in the abdomen or pelvis.    VASCULAR: No aortic aneurysm. Moderate atherosclerosis of the aorta and iliac arteries. There are no areas of venous thrombosis.    PELVIC ORGANS: Normal-sized uterus and ovaries. No pelvic free fluid. Unremarkable  rectum.    OTHER: Body wall edema along the lateral flanks and dependent lumbar region.    MUSCULOSKELETAL: There is levoscoliosis of the lumbar spine. Multilevel degenerative changes are noted in the spine, which are moderate to severe in the lumbar spine. No acute fracture. Bilateral L5 pars defects and grade 1 spondylolisthesis noted.   Schmorl's node in the superior L5 endplate. No suspicious osseous lesion.      Impression    IMPRESSION:  1.  No PE.    2.  Clustered groundglass nodularities in the lingula up to 8 mm are likely inflammatory such as from mild aspiration or other pneumonitis. There is some respiratory motion in this region which slightly limits assessment. Airways are clear. A few   thin-walled air cysts are also noted in the left lung and there is an thick-walled cystic semisolid nodule 7 mm diameter which has thicker walls but no definite nodularity. Suggest follow-up chest CT in 3 months.  3.  Right adrenal gland homogeneous 1.4 cm nodule is almost certainly an adenoma. Attention suggested at time of next chest CT which could be extended through the adrenal glands in order to assess the right adrenal without contrast for more definitive   characterization.  4.  Hepatic steatosis and mild hepatomegaly. Couple incidental hepatic cysts are noted.  5. Large amount of colonic stool. Correlate for constipation. No bowel obstruction or acute inflammatory process of the bowel.  6.  Nonspecific body wall edema in the flanks and dependent lumbar region.  7. Bilateral L5 spondylolysis and grade 1 L5-S1 spondylolisthesis.    REFERENCE:  Guidelines for Management of Incidental Pulmonary Nodules Detected on CT Images: From the Fleischner Society 2017.   Guidelines apply to incidental nodules in patients who are 35 years or older.  Guidelines do not apply to lung cancer screening, patients with immunosuppression, or patients with known primary cancer.    SUBSOLID NODULES  Ground glass  Nodule size <6 mm  No  routine follow-up. If suspicious, consider follow-up at 2 and 4 years.    Nodule size 6 mm or greater  CT at 6-12 months to confirm persistence, then CT every 2 years until 5 years.    Part solid  Nodule size <6 mm  No routine follow-up.    Nodule size 6 mm or greater  CT at 3-6 months to confirm persistence. If unchanged and solid component remains <6 mm, annual CT for 5 years.    Multiple  CT at 3-6 months. If stable, consider CT at 2 and 4 years. Management based on the most suspicious nodule.    Consider referral to lung nodule clinic.         Recent vital signs:   /58   Pulse 92   Temp 98.3  F (36.8  C) (Oral)   Resp 16   Wt 131 kg (288 lb 14.4 oz)   LMP 11/01/2013   SpO2 97%   BMI 51.18 kg/m              Cardiac Rhythm: Normal Sinus  Pt needs tele? No  Skin/wound Issues: None    Code Status: Full Code    Pain control: good    Nausea control: good    Abnormal labs/tests/findings requiring intervention:     Family present during ED course? No   Family Comments/Social Situation comments:     Tasks needing completion: None    Nigel Mark, RN  [address] [facility]

## 2020-01-28 NOTE — PLAN OF CARE
Patient discussed with Dr Reese , see H and P for details.  Patient seen and discussed with nursing   Agree with IV diuresis and IV abx for now   Psych consult  Social work consult  Echo   Follow inflammatory markers
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
## 2020-01-28 NOTE — PHARMACY
Methadone Clinic Information Note    Clinic Name:  [facility]   Clinic Location (city): [place]  Phone Number: [phone]  Verified dose with SILVIO Aguilar  Dose verified: Methadone 70 mg daily last dosed at the center on 1/22/2020   Take home: 6 doses (last dose should be today 1/28) due to go back to center on 1/29/2020    Ana Laura Saenz, BenitezD, BCPS

## 2020-01-28 NOTE — H&P
[facility] [place]    History and Physical - Hospitalist Service       Date of Admission:  1/27/2020    Assessment & Plan   No Yusuf is a 60 year old female with PMHx of fibromyalgia, hypertension, hypothyroidism, hep C, OUD on methadone maintenance who presents with bilateral LE redness, pain, and 40+ lb weight gain.     # Bilateral LE erythema   Possible cellulitis, though symmetry and extension is atypical. Redness doesn't completely improve with elevation, extension onto pannus concerning.  - started on vanc/zosyn in the ED, will continue for now.   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 Echo last 8/2019 WNL, seen by cardiology 1 week PTA where impression was more of lymphedema and non-cardiac etiology of chronic edema. Must consider GENE/Pickwickian and pulmonary hypertension, worsening diastolic dysfunction (previously grade 1). Hepatic steatosis without evidence of CORREA (nl LFTs though have fluctuated in the past, normal INR, no ascites on CT). Creatinine normal/baseline, no protein in UA to suggest nephrotic syndrome. Additional ddx includes hypoalbuminemia, known thyroid disease, calcium channel blocker  - stop amlodipine, reassess choice if antihypertensive indicated   - repeat echo   - notable protein gap, consider SPEP/UPEP  - essentially lasix naive, will start with IV lasix 20 mg once, redose based on response  - Referred for outpaitent sleep study, not done yet.  - lymphedema consult when skin can tolerate compression     # Cough   # GGO on CT   Unclear etiology, atypical pneumonia vs pulmonary edema vs other. Exam benign, nonfocal, no hypoxia.    - diuresis as above, continue  to monitor for now     # Mild normocytic anemia   Unclear etiology or duration.   - anemia panel added on     # Likely GENE   As above, likely contributing.   - Referred for outpaitent sleep study, pending.     Chronic issues:   # MDD/Anxiety: continue duloxetine, hydroxyzine    # Abnormal imaging: nodules and adrenal adenoma will need outpatient PCP follow-up per radiology recs   # GERD: continue PPI, zantac   # OUD: continue methadone 65 mg per patient   # Hep C: not on treatment, followed at [facility]   # hypothyroidism: TSH elevated but improved from OSH records, fT4 nl. Continue levothyroxine   # Constipation: increase bowel regimen     Pharmacy med-rec requested due to patient not knowing doses of her medications, difficult to verify.      Diet: regular   DVT Prophylaxis: Enoxaparin (Lovenox) SQ  Valle Catheter: not present  Code Status: full     Disposition Plan   Expected discharge: 2 - 3 days, recommended to prior living arrangement once adequate pain management/ tolerating PO medications, antibiotic plan established and workup completed.  Entered: Merissa Reese MD 01/28/2020, 5:47 AM     The patient's care was discussed with the Bedside Nurse and Patient.    Merissa Reese MD  [facility], [place]    ______________________________________________________________________    Chief Complaint   Leg pain     History is obtained from the patient    History of Present Illness   No Yusuf is a 60 year old female with PMHx of fibromyalgia, hypertension, hypothyroidism, hep C, OUD on methadone maintenance who presents with bilateral LE redness, pain, and 40+ lb weight gain.     Patient has been dealing with chronic worsening lower extremity edema for many months.  She has also been in a residential treatment facility after accidental overdose in August last year, currently on methadone.  She describes that over the past year her lower extremity edema has gotten worse, associated with  "significant (40+ pound) weight gain, abdominal distention, early satiety, and \"whole body heaviness\" that limits her ADLs.  She denies any chest pain, palpitations.  Feels like her urine output is somewhat decreased.  No easy bleeding/bruising.  Poor appetite and mild nausea.  She has been having some cough and shortness of breath while lying flat the last few days, no fevers, sputum production.    She was seen by cardiology on 1/21, where they reviewed her outside echo from August 2019, EKG.  During that visit she did not seem to have significant lower extremity redness.  At the time it was suspected her symptoms were due to lymphedema, and she was referred to their service.  Endorses that it is suspected she has sleep apnea, she does snore and frequently wake up at night, has excessive sleepiness throughout the day - was referred for sleep study in the outpatient setting but has not done this yet.    She has been seen intermittently at different facilities, [facility] and [facility] for the lower extremity edema and possible cellulitis.  At that time work-up did not prompt any specific diagnosis, she was given a few days of Lasix (which she is not able to take due to insurance/pharmacy reasons).     Patient is a poor historian when it comes to her medications timeline of symptoms, very tearful throughout the interview and relating many of her family losses over the past year.     Review of Systems    The 10 point Review of Systems is negative other than noted in the HPI or here.     Past Medical History    I have reviewed this patient's medical history and updated it with pertinent information if needed.   Past Medical History:   Diagnosis Date     Arthritis      Bipolar affective (H)      Fibromyalgia      Hypertension        Past Surgical History   I have reviewed this patient's surgical history and updated it with pertinent information if needed.  Past Surgical History:   Procedure Laterality Date     " CHOLECYSTECTOMY       GYN SURGERY      c section     GYN SURGERY      oblation     ORTHOPEDIC SURGERY      left leg, left shoulder, back       Social History   I have reviewed this patient's social history and updated it with pertinent information if needed.  Social History     Tobacco Use     Smoking status: Current Every Day Smoker     Packs/day: 0.25     Smokeless tobacco: Never Used   Substance Use Topics     Alcohol use: No     Drug use: No       Family History   I have reviewed this patient's family history and updated it with pertinent information if needed.   No family history on file.    Prior to Admission Medications   Prior to Admission Medications   Prescriptions Last Dose Informant Patient Reported? Taking?   CALCIUM ANTACID 500 MG chewable tablet   Yes No   Sig: CHEW AND SWALLOW 1 TAB BY MOUTH FOUR TIMES DAILY AS NEEDED   Cyanocobalamin 1000 MCG CAPS   Yes No   DULoxetine (CYMBALTA) 30 MG capsule   Yes No   DULoxetine HCl (CYMBALTA PO)   Yes No   Sig: Take 60 mg by mouth daily. Takes a 60mg and a 30mg tablet in am    LEVOTHYROXINE SODIUM PO   Yes No   Sig: Take 200 mcg by mouth    Multiple Vitamin (MULTI-VITAMINS) TABS   Yes No   Sig: TK 1 T PO ONCE D WITH FOOD   NARCAN 4 MG/0.1ML nasal spray   Yes No   Sig: CALL 911. SPRAY CONTENTS OF ONE SPRAYER (0.1ML) INTO ONE NOSTRIL. REPEAT IN 2-3 MINS IF SYMPTOMS OF OPIOID PERSIST, ALTERNATE NOSTRILS   acetaminophen (TYLENOL) 500 MG tablet   Yes No   Sig: Take 1-2 tablets by mouth every 6 hours as needed.   amLODIPine (NORVASC) 10 MG tablet   Yes No   Sig: TK 1 T PO QD   amLODIPine (NORVASC) 5 MG tablet   No No   Sig: Take 1 tablet (5 mg) by mouth daily   amoxicillin (AMOXIL) 500 MG capsule   No No   Sig: Take 2 capsules (1,000 mg) by mouth 2 times daily for 10 days   buprenorphine-naloxone (SUBOXONE) 8-2 MG SUBL sublingual tablet   Yes No   cefadroxil (DURICEF) 500 MG capsule   Yes No   Sig: TK 2 CS PO BID FOR 6 DOSES   cephALEXin (KEFLEX) 500 MG capsule   Yes  No   cholecalciferol 25 MCG (1000 UT) TABS   Yes No   Sig: Take 1,000 Units by mouth   furosemide (LASIX) 20 MG tablet   No No   Sig: Take 1 tablet (20 mg) by mouth daily for 5 days   gabapentin (NEURONTIN) 400 MG capsule   Yes No   gabapentin (NEURONTIN) 600 MG tablet   No No   Sig: Take 1 tablet (600 mg) by mouth 3 times daily Takes two 600mg tablets three times a day.   hydrOXYzine (ATARAX) 50 MG tablet   Yes No   hydrOXYzine (VISTARIL) 25 MG capsule   No No   Sig: Take 1 capsule (25 mg) by mouth 3 times daily as needed for itching   Patient not taking: Reported on 1/21/2020   ibuprofen (ADVIL/MOTRIN) 600 MG tablet   Yes No   methadone HCl 10 MG/5ML SOLN   Yes No   Sig: Take 65 mg by mouth Dosed at clinic for her    multivitamin w/minerals (MULTI-VITAMIN) tablet   Yes No   Sig: Take 1 tablet by mouth daily   nystatin (MYCOSTATIN) 982276 UNIT/GM external cream   Yes No   Sig: Apply topically 2 times daily   pantoprazole (PROTONIX) 40 MG EC tablet   Yes No   ranitidine (ZANTAC) 150 MG tablet   Yes No   Sig: Take 150 mg by mouth 2 times daily   tolnaftate (TINACTIN) 1 % external powder   Yes No   Sig: Apply topically 2 times daily      Facility-Administered Medications: None     Allergies   Allergies   Allergen Reactions     Codeine Phosphate GI Disturbance     Phenergan [Promethazine]      Noted in 8/30/08 ER     Trileptal [Oxcarbazepine] Unknown       Physical Exam   Vital Signs: Temp: 98.3  F (36.8  C) Temp src: Oral BP: 131/58 Pulse: 92   Resp: 16 SpO2: 97 % O2 Device: None (Room air)    Weight: 288 lbs 14.4 oz  Gen: sleeping, wakes to voice, labile mood and tearful during interview   Head: Normocephalic/atraumatic  Eyes: sclera clear, PERRLA, EOEMs intact   ENT: no rhinorrhea, oropharynx clear with moist mucous membranes. Unable to confidently assess JVD.   Resp: Diminished but clear bilaterally, easy work of breathing on room air  CV: Very distant but regular rate and rhythm, no murmurs noted   Abd: soft,  obese, non-tender, non-distended, +BS, mild pitting laterally on flank  Ext:   - Bilateral LE with erythema, mildly pitting edema. Erythema extends up to her thighs, including the lower abdominal pannus. Tender to palpation. No clear lesions/bullae.  brisk capillary refill   Neuro: Alert and oriented x4, grossly non-focal, moving all extremities equally   Psych: tearful       Data   Data reviewed today: I reviewed all medications, new labs and imaging results over the last 24 hours.

## 2020-01-28 NOTE — PHARMACY-VANCOMYCIN DOSING SERVICE
Pharmacy Vancomycin Initial Note  Date of Service 2020  Patient's  1960  60 year old, female    Indication: Skin and Soft Tissue Infection    Current estimated CrCl = Estimated Creatinine Clearance: 113.9 mL/min (based on SCr of 0.7 mg/dL).    Creatinine for last 3 days  2020: 12:25 AM Creatinine 0.70 mg/dL    Recent Vancomycin Level(s) for last 3 days  No results found for requested labs within last 72 hours.      Vancomycin IV Administrations (past 72 hours)                   vancomycin (VANCOCIN) 2,000 mg in sodium chloride 0.9 % 500 mL intermittent infusion (mg) 2,000 mg New Bag 20 0459                Nephrotoxins and other renal medications (From now, onward)    Start     Dose/Rate Route Frequency Ordered Stop    20 1007  ketorolac (TORADOL) injection 30 mg      30 mg Intravenous EVERY 6 HOURS PRN 20 1007 20 1006    20 1000  piperacillin-tazobactam (ZOSYN) 3.375 g vial to attach to  mL bag      3.375 g  over 30 Minutes Intravenous EVERY 6 HOURS 20 0851      20 0900  furosemide (LASIX) injection 20 mg      20 mg  over 1-2 Minutes Intravenous DAILY 20 0851            Contrast Orders - past 72 hours (72h ago, onward)    Start     Dose/Rate Route Frequency Ordered Stop    20 0245  iopamidol (ISOVUE-370) solution 100 mL      100 mL Intravenous ONCE 20 0244 20 0339                Plan:  1.  Start vancomycin  2000 mg IV q12h.   2.  Goal Trough Level: 10-15 mg/L   3.  Pharmacy will check trough levels as appropriate in 1-3 Days.    4. Serum creatinine levels will be ordered a minimum of twice weekly.    5. Hudson method utilized to dose vancomycin therapy: Method 2    Isadora Kelly

## 2020-01-28 NOTE — PROGRESS NOTES
Pt admitted to unit at approx 0830 for B LE cellulitis. Pt is SBA w/ cane, reports pain to B LE, hands, abd and back, Pt sleeping when not being assessed. Pt is VSS (SaO2 drops below 90% briefly.) B LE appear slightly swollen, pink and warm to the touch.

## 2020-01-28 NOTE — ED PROVIDER NOTES
"    [facility] EMERGENCY DEPARTMENT ([facility])     January 27, 2020  History     Chief Complaint   Patient presents with     Leg Swelling     Bilateral leg swollen, pain and red been going on for about a month.      AYE Yusuf is a 59 year old female with a past medical history for fibromyalgia, hypertension, arthritis, chronic lower extremity swelling, polysubstance abuse on methadone, and bipolar affective disorder who presents to the Emergency Department for complaints of bilateral leg pain, swelling, and redness and redness on her abdomen.  Patient complains of symptoms that has been ongoing for the last couple of months.  Patient states that the coloration of her skin has been gradually worsening and now including her upper legs. She reports that her legs are more red and it is now spreading to her thighs over the past week and her stomach today.  Patient describes swelling of her legs like \"it is going to rip her skin off \".  She reports of gaining approximately 60 pounds since December 2019.  She denies wrapping her legs.  Patient states she does elevate her legs for some slight improvements.  She reports of taking Tylenol and gabapentin today.  Patient endorses of associated shortness of breath, cough, nausea.  Patient denies chest pain. She denies medical history for DVT/PE.  States that she is not on blood thinner.  Patient denies fever, diarrhea, or vomiting.     While here in the ED, patient complains of abdominal pain, abdominal distention, and reddening of the lower abdomen pain.  Patient states she was seen in by her cardiologist last week where they referred her to a lymphedema specialist.  Patient states that she is currently living at a [facility] house.  She is currently taking methadone for heroin withdrawal.  States that she was advised by her care team to take Suboxone but because of the side effect of making her feel nauseous, she stopped taking it  She denies any recent " illicit drug use since her last treatment.      Past Medical History:   Diagnosis Date     Arthritis      Bipolar affective (H)      Fibromyalgia      Hypertension      Past Surgical History:   Procedure Laterality Date     CHOLECYSTECTOMY       GYN SURGERY      c section     GYN SURGERY      oblation     ORTHOPEDIC SURGERY      left leg, left shoulder, back     No family history on file.    Social History     Tobacco Use     Smoking status: Current Every Day Smoker     Packs/day: 0.25     Smokeless tobacco: Never Used   Substance Use Topics     Alcohol use: No     Current Facility-Administered Medications   Medication     ketorolac (TORADOL) injection 15 mg     Current Outpatient Medications   Medication     acetaminophen (TYLENOL) 500 MG tablet     amLODIPine (NORVASC) 10 MG tablet     amLODIPine (NORVASC) 5 MG tablet     buprenorphine-naloxone (SUBOXONE) 8-2 MG SUBL sublingual tablet     CALCIUM ANTACID 500 MG chewable tablet     cefadroxil (DURICEF) 500 MG capsule     cephALEXin (KEFLEX) 500 MG capsule     cholecalciferol 25 MCG (1000 UT) TABS     Cyanocobalamin 1000 MCG CAPS     DULoxetine (CYMBALTA) 30 MG capsule     DULoxetine HCl (CYMBALTA PO)     furosemide (LASIX) 20 MG tablet     gabapentin (NEURONTIN) 400 MG capsule     gabapentin (NEURONTIN) 600 MG tablet     hydrOXYzine (ATARAX) 50 MG tablet     hydrOXYzine (VISTARIL) 25 MG capsule     ibuprofen (ADVIL/MOTRIN) 600 MG tablet     LEVOTHYROXINE SODIUM PO     methadone HCl 10 MG/5ML SOLN     Multiple Vitamin (MULTI-VITAMINS) TABS     multivitamin w/minerals (MULTI-VITAMIN) tablet     NARCAN 4 MG/0.1ML nasal spray     nystatin (MYCOSTATIN) [id number] UNIT/GM external cream     pantoprazole (PROTONIX) 40 MG EC tablet     ranitidine (ZANTAC) 150 MG tablet     tolnaftate (TINACTIN) 1 % external powder        Allergies   Allergen Reactions     Codeine Phosphate GI Disturbance     Phenergan [Promethazine]      Noted in 8/30/08 ER     Trileptal [Oxcarbazepine]  Unknown     I have reviewed the Medications, Allergies, Past Medical and Surgical History, and Social History in the Epic system.    Review of Systems  Pertinent positives and negatives are documented in the HPI. All other systems reviewed and are negative.    Physical Exam   BP: 131/58  Pulse: 92  Temp: 97.4  F (36.3  C)  Resp: 16  Weight: 131 kg (288 lb 14.4 oz)  SpO2: 97 %      Physical Exam  Gen: labile mood and tearful during interview, alert and oriented  Head: Normocephalic/atraumatic  Eyes: sclera clear, PERRLA, EOMs intact   ENT: no rhinorrhea, oropharynx clear with moist mucous membranes. Unable to confidently assess JVD.   Resp: Diminished but clear bilaterally, easy work of breathing on room air  CV:  regular rate and rhythm, no murmurs noted   Abd: soft, obese, non-distended, +BS, mild pitting laterally on flank with erythema and tenderness over the erythema.  Ext: Bilateral lower extremities with erythema from feet to thighs, mildly pitting edema. Erythema extends up to her thighs, including the lower abdominal pannus. Tender to palpation diffusely in both legs. No clear lesions/bullae.  brisk capillary refill. 2+ radial and DP, PT pulses. Blanchable erythema. No crepitus. Comparments are soft and compressible.  Neuro: Alert and oriented x4, 5-5 strength in all 4 extremities bilaterally.  Sensation intact light touch in all 4 extremities bilaterally.  Psych: tearful   ED Course   10:49 PM  The patient was seen and examined by Olivia Hayden MD, in Room ED03.      Procedures             EKG Interpretation:      Interpreted by Olivia Hayden MD  Time reviewed: 11:40 pm  Symptoms at time of EKG: swelling   Rhythm: normal sinus   Rate: normal  Axis: normal  Ectopy: none  Conduction: normal  ST Segments/ T Waves: No ST-T wave changes  Q Waves: none  Comparison to prior: Unchanged    Clinical Impression: no evidence of acute ischemia           Critical Care time:  none       Labs Ordered and Resulted  from Time of ED Arrival Up to the Time of Departure from the ED   CBC WITH PLATELETS DIFFERENTIAL - Abnormal; Notable for the following components:       Result Value    RBC Count 3.50 (*)     Hemoglobin 10.0 (*)     Hematocrit 32.1 (*)     MCHC 31.2 (*)     All other components within normal limits   COMPREHENSIVE METABOLIC PANEL - Abnormal; Notable for the following components:    Carbon Dioxide 34 (*)     Anion Gap <1 (*)     Glucose 136 (*)     Calcium 7.9 (*)     Albumin 2.7 (*)     All other components within normal limits   TSH WITH FREE T4 REFLEX - Abnormal; Notable for the following components:    TSH 19.01 (*)     All other components within normal limits   ROUTINE UA WITH MICROSCOPIC - Abnormal; Notable for the following components:    Leukocyte Esterase Urine Large (*)     WBC Urine 7 (*)     Bacteria Urine Few (*)     Squamous Epithelial /HPF Urine 2 (*)     Renal Tub Epi <1 (*)     Mucous Urine Present (*)     All other components within normal limits   ERYTHROCYTE SEDIMENTATION RATE AUTO - Abnormal; Notable for the following components:    Sed Rate 82 (*)     All other components within normal limits   CRP INFLAMMATION - Abnormal; Notable for the following components:    CRP Inflammation 28.5 (*)     All other components within normal limits   RETICULOCYTE COUNT - Abnormal; Notable for the following components:    % Retic 3.5 (*)     Absolute Retic 125.0 (*)     All other components within normal limits   NT PROBNP INPATIENT   TROPONIN I   LACTIC ACID WHOLE BLOOD   CREATININE POCT   T4 FREE   T4 FREE   INR   FERRITIN   ISTAT CREATININE NURSING POCT   INFLUENZA A/B ANTIGEN   RAPID STREP SCREEN            Assessments & Plan (with Medical Decision Making)   Patient presents for worsening bilateral lower extremity swelling as well as worsening spreading erythema from her dorsum of her feet all the way up to her thighs and onto her abdomen.  She has had ongoing issues with lymphedema however this is  worsened and the redness has spread over the past week and on her abdomen today.  She reports some tenderness in the abdomen and distention in the abdomen as well.  Differential diagnosis for this is broad including worsening lymphedema and chronic venous stasis, cellulitis, worsening hypothyroidism, pulmonary edema and volume overload due to heart failure, DVT or PE.  She has not had any trauma and she does not have any open skin noted on exam.  I also examined her external genitalia and she does not have any erythema of the labia or groin area.  This was done with chaperone.  Thus we have low suspicion for Alec's gangrene at this time.  Patient would like to avoid narcotic pain medication as she is recovering on methadone.  Thus she was given IV Toradol.  We obtained ultrasound of the bilateral lower extremities to evaluate for DVT.  We also obtain CT of the chest abdomen and pelvis for further evaluation of PE or intra-abdominal pathology or pulmonary edema. CT showed no evidence of PE.  There was clustered groundglass nodularities in the lingula likely inflammatory such as from aspiration or pneumonitis.  There is a right adrenal nodule.  There is hepatic steatosis and mild hepatomegaly without ascites.  There is nonspecific body wall edema in the flanks and dependent lumbar region.  There is a large amount of stool.  No other acute pathology.  Doppler venous ultrasounds did not reveal any evidence of DVT bilaterally.  No evidence of pulmonary edema on CT.  EKG had been obtained which not reveal any evidence of acute ischemia.  Troponin was less than 0.015 and BNP was within normal limits at 328.  CRP was slightly elevated at 28.5 with a sed rate of 82.  Urinalysis revealed large leukocyte esterase with 7 white blood cells.  Urine culture was sent.  We obtain blood cultures and did start the patient on IV Zosyn and IV vancomycin with the concern for this being cellulitis as we do not have an exact cause at  this time.  There is no obvious sign of necrotizing fasciitis at this time.  Again no sign of Alec's gangrene.CBC reveals a white blood cell count of 10.1.  Hemoglobin was 10.  CMP is largely unremarkable with normal renal function.  TSH was elevated at 19 however free T4 was within normal limits at 0.98.  INR is 1.03.  Again at this time I am not sure of the exact etiology of her symptoms however does appear that she at least has cellulitis.  Influenza was negative.  We also obtained a rapid strep as she reported some intermittent throat discomfort this was negative.  Culture is pending.  At this point this does not appear to be a scarlet fever appearing rash or sandpaper rash.  I did discuss the patient with the hospitalist service here at the [facility] who accepted her for admission.  Patient was in agreement with this plan.  She was transferred to the floor in stable condition.  She remained hemodynamically stable and afebrile without signs of sepsis at this time.  Lactic acid was within normal limits.    I have reviewed the nursing notes.    I have reviewed the findings, diagnosis, plan and need for follow up with the patient.    New Prescriptions    No medications on file       Final diagnoses:   Bilateral lower leg cellulitis   Abdominal wall cellulitis   IFlo, am serving as a trained medical scribe to document services personally performed by Olivia Hayden MD, based on the provider's statements to me.      IOlivia MD, was physically present and have reviewed and verified the accuracy of this note documented by Flo Smalls.      1/27/2020   [facility], [place], EMERGENCY DEPARTMENT     Olivia Hayden MD  01/31/20 1126

## 2020-01-28 NOTE — PHARMACY-VANCOMYCIN DOSING SERVICE
Pharmacy Vancomycin Initial Note  Date of Service 2020  Patient's  1960  60 year old, female    Indication: Skin and Soft Tissue Infection    Current estimated CrCl = Estimated Creatinine Clearance: 114.5 mL/min (based on SCr of 0.7 mg/dL).    Creatinine for last 3 days  2020: 12:25 AM Creatinine 0.70 mg/dL    Recent Vancomycin Level(s) for last 3 days  No results found for requested labs within last 72 hours.      Vancomycin IV Administrations (past 72 hours)      No vancomycin orders with administrations in past 72 hours.                Nephrotoxins and other renal medications (From now, onward)    Start     Dose/Rate Route Frequency Ordered Stop    20 0430  vancomycin (VANCOCIN) 2,000 mg in sodium chloride 0.9 % 500 mL intermittent infusion      2,000 mg  over 2 Hours Intravenous ONCE 20 0352      20 0348  piperacillin-tazobactam (ZOSYN) 3.375 g vial to attach to  mL bag      3.375 g  over 30 Minutes Intravenous ONCE 20 0348            Contrast Orders - past 72 hours (72h ago, onward)    Start     Dose/Rate Route Frequency Ordered Stop    20 0245  iopamidol (ISOVUE-370) solution 100 mL      100 mL Intravenous ONCE 20 0244 20 0339                Plan:  1.  Start vancomycin  2000 mg IV once. Recommend 2000 mg IV q12h if continuing  2.  Goal Trough Level: 10-15 mg/L   3.  Pharmacy will check trough levels as appropriate in 1-3 Days.    4. Serum creatinine levels will be ordered daily for the first week of therapy and at least twice weekly for subsequent weeks if continuing therapy  5. Tyrone method utilized to dose vancomycin therapy: Method 1    Colt Hebert [facility]

## 2020-01-28 NOTE — PLAN OF CARE
VS: Stable    O2: Sats 88-90% while sleeping.    Output: Voiding adequate amts in bathroom and had a BM today    Last BM: 1/28   Activity: Ambulating with SBA and cane.    Skin: Reddened and inflamed tissue to BLE.    Pain: Reports generalized pain.    CMS: Intact    Dressing: N/A   Diet: Tolerating regular    LDA: PIV bilateral arms    Equipment: Fww or cane.    Plan: Continue to monitor and follow abx plan.    Additional Info:

## 2020-01-29 LAB
ALBUMIN SERPL-MCNC: 2.6 G/DL (ref 3.4–5)
ALP SERPL-CCNC: 96 U/L (ref 40–150)
ALT SERPL W P-5'-P-CCNC: 47 U/L (ref 0–50)
ANION GAP SERPL CALCULATED.3IONS-SCNC: 2 MMOL/L (ref 3–14)
AST SERPL W P-5'-P-CCNC: 40 U/L (ref 0–45)
BACTERIA SPEC CULT: NORMAL
BILIRUB SERPL-MCNC: 0.4 MG/DL (ref 0.2–1.3)
BUN SERPL-MCNC: 16 MG/DL (ref 7–30)
CALCIUM SERPL-MCNC: 7.9 MG/DL (ref 8.5–10.1)
CHLORIDE SERPL-SCNC: 107 MMOL/L (ref 94–109)
CO2 SERPL-SCNC: 31 MMOL/L (ref 20–32)
CREAT SERPL-MCNC: 0.87 MG/DL (ref 0.52–1.04)
CRP SERPL-MCNC: 32.2 MG/L (ref 0–8)
ERYTHROCYTE [DISTWIDTH] IN BLOOD BY AUTOMATED COUNT: 14.8 % (ref 10–15)
GFR SERPL CREATININE-BSD FRML MDRD: 73 ML/MIN/{1.73_M2}
GLUCOSE SERPL-MCNC: 137 MG/DL (ref 70–99)
HCT VFR BLD AUTO: 33.6 % (ref 35–47)
HGB BLD-MCNC: 10.2 G/DL (ref 11.7–15.7)
Lab: NORMAL
MCH RBC QN AUTO: 28.6 PG (ref 26.5–33)
MCHC RBC AUTO-ENTMCNC: 30.4 G/DL (ref 31.5–36.5)
MCV RBC AUTO: 94 FL (ref 78–100)
PLATELET # BLD AUTO: 300 10E9/L (ref 150–450)
POTASSIUM SERPL-SCNC: 4.2 MMOL/L (ref 3.4–5.3)
PROT SERPL-MCNC: 7.3 G/DL (ref 6.8–8.8)
RBC # BLD AUTO: 3.57 10E12/L (ref 3.8–5.2)
SODIUM SERPL-SCNC: 140 MMOL/L (ref 133–144)
SPECIMEN SOURCE: NORMAL
VANCOMYCIN SERPL-MCNC: 26.4 MG/L
WBC # BLD AUTO: 11 10E9/L (ref 4–11)

## 2020-01-29 PROCEDURE — 99207 ZZC CDG-MDM COMPONENT: MEETS MODERATE - UP CODED: CPT | Performed by: INTERNAL MEDICINE

## 2020-01-29 PROCEDURE — 25000128 H RX IP 250 OP 636

## 2020-01-29 PROCEDURE — 80053 COMPREHEN METABOLIC PANEL: CPT | Performed by: INTERNAL MEDICINE

## 2020-01-29 PROCEDURE — 25000128 H RX IP 250 OP 636: Performed by: INTERNAL MEDICINE

## 2020-01-29 PROCEDURE — 86140 C-REACTIVE PROTEIN: CPT | Performed by: INTERNAL MEDICINE

## 2020-01-29 PROCEDURE — 25000132 ZZH RX MED GY IP 250 OP 250 PS 637: Performed by: INTERNAL MEDICINE

## 2020-01-29 PROCEDURE — 80202 ASSAY OF VANCOMYCIN: CPT | Performed by: INTERNAL MEDICINE

## 2020-01-29 PROCEDURE — 85027 COMPLETE CBC AUTOMATED: CPT | Performed by: INTERNAL MEDICINE

## 2020-01-29 PROCEDURE — 25800030 ZZH RX IP 258 OP 636

## 2020-01-29 PROCEDURE — 12000001 ZZH R&B MED SURG/OB UMMC

## 2020-01-29 PROCEDURE — 99232 SBSQ HOSP IP/OBS MODERATE 35: CPT | Performed by: INTERNAL MEDICINE

## 2020-01-29 PROCEDURE — 36415 COLL VENOUS BLD VENIPUNCTURE: CPT | Performed by: INTERNAL MEDICINE

## 2020-01-29 RX ORDER — IBUPROFEN 600 MG/1
600 TABLET, FILM COATED ORAL EVERY 6 HOURS PRN
Status: DISCONTINUED | OUTPATIENT
Start: 2020-01-29 | End: 2020-01-30

## 2020-01-29 RX ORDER — CEFAZOLIN SODIUM 1 G/3ML
1 INJECTION, POWDER, FOR SOLUTION INTRAMUSCULAR; INTRAVENOUS EVERY 8 HOURS
Status: DISCONTINUED | OUTPATIENT
Start: 2020-01-29 | End: 2020-02-03 | Stop reason: HOSPADM

## 2020-01-29 RX ORDER — SODIUM CHLORIDE 9 MG/ML
INJECTION, SOLUTION INTRAVENOUS
Status: COMPLETED
Start: 2020-01-29 | End: 2020-01-29

## 2020-01-29 RX ADMIN — IBUPROFEN 600 MG: 600 TABLET ORAL at 14:47

## 2020-01-29 RX ADMIN — SODIUM CHLORIDE 500 ML: 9 INJECTION, SOLUTION INTRAVENOUS at 20:10

## 2020-01-29 RX ADMIN — Medication 1 MG: at 23:54

## 2020-01-29 RX ADMIN — CEFAZOLIN 1 G: 1 INJECTION, POWDER, FOR SOLUTION INTRAMUSCULAR; INTRAVENOUS at 20:07

## 2020-01-29 RX ADMIN — GABAPENTIN 1200 MG: 600 TABLET, FILM COATED ORAL at 08:06

## 2020-01-29 RX ADMIN — IBUPROFEN 600 MG: 600 TABLET ORAL at 23:49

## 2020-01-29 RX ADMIN — NICOTINE POLACRILEX 2 MG: 2 GUM, CHEWING BUCCAL at 23:49

## 2020-01-29 RX ADMIN — GABAPENTIN 1200 MG: 600 TABLET, FILM COATED ORAL at 20:06

## 2020-01-29 RX ADMIN — DULOXETINE HYDROCHLORIDE 60 MG: 60 CAPSULE, DELAYED RELEASE ORAL at 08:06

## 2020-01-29 RX ADMIN — PIPERACILLIN AND TAZOBACTAM 3.38 G: 3; .375 INJECTION, POWDER, FOR SOLUTION INTRAVENOUS at 03:51

## 2020-01-29 RX ADMIN — CEFAZOLIN 1 G: 1 INJECTION, POWDER, FOR SOLUTION INTRAMUSCULAR; INTRAVENOUS at 12:36

## 2020-01-29 RX ADMIN — PANTOPRAZOLE SODIUM 40 MG: 40 TABLET, DELAYED RELEASE ORAL at 08:06

## 2020-01-29 RX ADMIN — METHADONE HYDROCHLORIDE 70 MG: 5 SOLUTION ORAL at 08:06

## 2020-01-29 RX ADMIN — GABAPENTIN 1200 MG: 600 TABLET, FILM COATED ORAL at 14:47

## 2020-01-29 RX ADMIN — MELATONIN 1000 UNITS: at 08:06

## 2020-01-29 RX ADMIN — ENOXAPARIN SODIUM 40 MG: 40 INJECTION SUBCUTANEOUS at 08:06

## 2020-01-29 RX ADMIN — LEVOTHYROXINE SODIUM 200 MCG: 100 TABLET ORAL at 12:37

## 2020-01-29 RX ADMIN — VANCOMYCIN HYDROCHLORIDE 2000 MG: 1 INJECTION, POWDER, LYOPHILIZED, FOR SOLUTION INTRAVENOUS at 05:15

## 2020-01-29 RX ADMIN — KETOROLAC TROMETHAMINE 30 MG: 30 INJECTION, SOLUTION INTRAMUSCULAR at 08:06

## 2020-01-29 RX ADMIN — FUROSEMIDE 20 MG: 10 INJECTION, SOLUTION INTRAMUSCULAR; INTRAVENOUS at 08:06

## 2020-01-29 NOTE — CONSULTS
Social Work Services Progress Note    Hospital Day: 3  Collaborated with:  Pt, chart review    Data:  SW consulted to assist with discharge planning    Intervention:  SW met with pt to provide introductions and reason for visit. Per pt, she came from [facility] in [place] [phone]. Pt plans to return to [facility] upon discharge and use her [employer] benefits for a ride (has used this previously for other appointments).     SW offered to call [facility] to coordinate discharge planning, as pt was likely ready to discharge within a day or so. Pt not comfortable with SW calling, reports that she is capable of calling and making arrangements. Pt stated that writer was caring for [facility] more than her needs. Pt expressed frustration toward [facility], stating that they did not let her go to the hospital and threatened to put her in a nursing home because she has been unable to put her own shoes on or wipe her bottom.     Pt stated that she would call [facility] to coordinate with her counselor, Renee. Pt able to call and set up her ride.    SW available to assist as needed.    Assessment:  Pt feeling that SW cared about needs of [facility] more than her needs. Pt interrupts writer. Pt makes appropraite eye contact. Pt appears capable to call and make arragements.     Plan:    Anticipated Disposition:  Facility:  [facility]    Barriers to d/c plan:  Medical stability     Follow Up:  SW to continue to be available for discharge planning    SHERRY Martinez  5A/10A Ortho/Med/S[facility]  Phone: [phone] Pager: [phone]

## 2020-01-29 NOTE — PHARMACY-VANCOMYCIN DOSING SERVICE
Pharmacy Vancomycin Note  Date of Service 2020  Patient's  1960   60 year old, female    Indication: Skin and Soft Tissue Infection  Goal Trough Level: 10-15 mg/L  Day of Therapy: 2  Current Vancomycin regimen:  2000 mg IV q12h    Current estimated CrCl = Estimated Creatinine Clearance: 91.7 mL/min (based on SCr of 0.87 mg/dL).    Creatinine for last 3 days  2020: 12:25 AM Creatinine 0.70 mg/dL  2020:  6:18 AM Creatinine 0.87 mg/dL    Recent Vancomycin Levels (past 3 days)  2020:  4:06 PM Vancomycin Level 26.4 mg/L (11 hour trough)    Vancomycin IV Administrations (past 72 hours)                   vancomycin (VANCOCIN) 2,000 mg in sodium chloride 0.9 % 500 mL intermittent infusion (mg) 2,000 mg New Bag 20 0515     2,000 mg New Bag 20 1730    vancomycin (VANCOCIN) 2,000 mg in sodium chloride 0.9 % 500 mL intermittent infusion (mg) 2,000 mg New Bag 20 0459                Nephrotoxins and other renal medications (From now, onward)    Start     Dose/Rate Route Frequency Ordered Stop    20 1400  ibuprofen (ADVIL/MOTRIN) tablet 600 mg      600 mg Oral EVERY 6 HOURS PRN 20 1128      20 0900  furosemide (LASIX) injection 20 mg      20 mg  over 1-2 Minutes Intravenous DAILY 20 0851               Contrast Orders - past 72 hours (72h ago, onward)    Start     Dose/Rate Route Frequency Ordered Stop    20 0245  iopamidol (ISOVUE-370) solution 100 mL      100 mL Intravenous ONCE 20 0244 20 0339          Interpretation of levels and current regimen:  Trough level is  Supratherapeutic    Has serum creatinine changed > 50% in last 72 hours: No    Urine output:  unable to determine    Renal Function: Stable    Plan:  1.  Vancomycin was discontinued today. No need for further levels.    Norma Walters [facility]        .

## 2020-01-29 NOTE — PROGRESS NOTES
VS: Stable    O2: Sats > 90% on RA   Output: Voiding adequate in bathroom   Last BM: 1/28   Activity: Ambulating with SBA and cane.    Skin: Mild redness and tense edema to bilateral LE   Pain: Complained of generalized pain and was medicated with PRN Tylenol 1000 mg X 1.    CMS: Intact    Dressing: N/A   Diet: Tolerating regular    LDA: PIV bilateral arms    Equipment: Fww or cane.    Plan: Continue the plan of care   Additional Info:

## 2020-01-29 NOTE — PLAN OF CARE
Pt A&O x's 4. VSS. Afebrile. Pt de-sat to low  to upper 80s on RA. Pt put on 02 at 2  LPM, weaned off to 1 LPM. Lungs clear. Denies SOB, CP or  nausea. Tolerating regular diet. Bowel sound active in all quadrants. No BM but passing gas. Voiding adequate amount into the toilet. Complained of legs pain , but declined tylenol.  want to take Ibuprofen  and gabapentin. Bilateral LE edematous. Legs elevation encouraged. CMS intact, denies N/T. PIV and infusing abx.Pt slept between care and is able to make needs known, call light with in reach. Will continue to monitor.

## 2020-01-29 NOTE — PROGRESS NOTES
[facility] Progress Note - Hospitalist Service       Date of Admission:  1/27/2020  Assessment & Plan   No Yusuf is a 60 year old female with PMHx of fibromyalgia, hypertension, hypothyroidism, hep C, OUD on methadone maintenance who presented with bilateral LE redness, pain, and 40+ lb weight gain.     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 Echo last 8/2019 WNL, seen by cardiology 1 week PTA where impression was more of lymphedema and non-cardiac etiology of chronic edema. Must consider GENE/Pickwickian and pulmonary hypertension, worsening diastolic dysfunction (previously grade 1). Hepatic steatosis without evidence of CORREA (nl LFTs though have fluctuated in the past, normal INR, no ascites on CT). Creatinine normal/baseline, no protein in UA to suggest nephrotic syndrome. Additional ddx includes hypoalbuminemia, known thyroid disease, calcium channel blocker  - stopped amlodipine, reassess choice if antihypertensive indicated   - repeat echo   Interpretation Summary 1/28  Left ventricular function, chamber size, wall motion, and wall thickness are  normal.The EF is 60-65%.  Right ventricular function, chamber size, wall motion, and thickness are  normal.  No hemodynamically significant valve abnormalities.  Pulmonary artery systolic pressure is normal.        # Cough   # GGO on CT   Unclear etiology, atypical pneumonia vs pulmonary edema vs other. Exam benign, nonfocal, no hypoxia.    - diuresis  continue to monitor for now     # Mild normocytic anemia   Unclear etiology or duration.       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   - Referred for outpaitent sleep  study, pending.     Chronic issues:   # MDD/Anxiety: continue duloxetine, hydroxyzine  . Appreciate psych input 1/28  # Abnormal imaging: nodules and adrenal adenoma will need outpatient PCP follow-up per radiology recs   # GERD: continue PPI, zantac   # OUD: continue methadone 65 mg per patient   # Hep C: not on treatment, followed at [facility]   # hypothyroidism: TSH elevated but improved from OSH records, fT4 nl. Continue levothyroxine   # Constipation: increase bowel regimen   Diet: Regular Diet Adult    DVT Prophylaxis: Enoxaparin (Lovenox) SQ  Valle Catheter: not present  Code Status: full     Disposition Plan   Expected discharge: 2 - 3 days, recommended to prior living arrangement once cellulitis and edema improved.  Entered: Zena Wheeler MD 01/29/2020, 11:48 AM       The patient's care was discussed with the Bedside Nurse and Patient.    Zena Wheeler MD  Hospitalist Service  [facility], [place]    ______________________________________________________________________    Interval History   Feels better today   No cp   No sob   No fever   No chills  No nausea     Data reviewed today: I reviewed all medications, new labs and imaging results over the last 24 hours.   Physical Exam   Vital Signs: Temp: 98.2  F (36.8  C) Temp src: Oral BP: 96/56 Pulse: 75   Resp: 16 SpO2: 95 % O2 Device: Nasal cannula Oxygen Delivery: 1 LPM  Weight: 286 lbs 9.6 oz   Eyes: sclera clear, PERRLA, EOEMs intact   ENT: no rhinorrhea, oropharynx clear with moist mucous membranes. Unable to  assess JVD.   Resp: Diminished but clear bilaterally, easy work of breathing on room air  CV: Very distant but regular rate and rhythm, no murmurs noted   Abd: soft, obese, non-tender, non-distended, +BS, mild pitting laterally on flank  Ext:   - Bilateral LE with erythema, mildly pitting edema. Erythema extends up to her thighs, including the lower abdominal pannus. Tender to palpation. No clear lesions/bullae.   brisk capillary refill   Neuro: Alert and oriented x4, grossly non-focal, moving all extremities equally

## 2020-01-30 LAB
ANION GAP SERPL CALCULATED.3IONS-SCNC: 2 MMOL/L (ref 3–14)
ANION GAP SERPL CALCULATED.3IONS-SCNC: 5 MMOL/L (ref 3–14)
BACTERIA SPEC CULT: NORMAL
BUN SERPL-MCNC: 13 MG/DL (ref 7–30)
BUN SERPL-MCNC: 15 MG/DL (ref 7–30)
CALCIUM SERPL-MCNC: 8.2 MG/DL (ref 8.5–10.1)
CALCIUM SERPL-MCNC: 8.7 MG/DL (ref 8.5–10.1)
CHLORIDE SERPL-SCNC: 103 MMOL/L (ref 94–109)
CHLORIDE SERPL-SCNC: 106 MMOL/L (ref 94–109)
CO2 SERPL-SCNC: 31 MMOL/L (ref 20–32)
CO2 SERPL-SCNC: 32 MMOL/L (ref 20–32)
CREAT SERPL-MCNC: 0.74 MG/DL (ref 0.52–1.04)
CREAT SERPL-MCNC: 0.85 MG/DL (ref 0.52–1.04)
ERYTHROCYTE [DISTWIDTH] IN BLOOD BY AUTOMATED COUNT: 14.5 % (ref 10–15)
GFR SERPL CREATININE-BSD FRML MDRD: 75 ML/MIN/{1.73_M2}
GFR SERPL CREATININE-BSD FRML MDRD: 89 ML/MIN/{1.73_M2}
GLUCOSE SERPL-MCNC: 106 MG/DL (ref 70–99)
GLUCOSE SERPL-MCNC: 119 MG/DL (ref 70–99)
HCT VFR BLD AUTO: 32.3 % (ref 35–47)
HGB BLD-MCNC: 10 G/DL (ref 11.7–15.7)
MCH RBC QN AUTO: 28.9 PG (ref 26.5–33)
MCHC RBC AUTO-ENTMCNC: 31 G/DL (ref 31.5–36.5)
MCV RBC AUTO: 93 FL (ref 78–100)
PLATELET # BLD AUTO: 270 10E9/L (ref 150–450)
POTASSIUM SERPL-SCNC: 4.1 MMOL/L (ref 3.4–5.3)
POTASSIUM SERPL-SCNC: 4.5 MMOL/L (ref 3.4–5.3)
RBC # BLD AUTO: 3.46 10E12/L (ref 3.8–5.2)
SODIUM SERPL-SCNC: 139 MMOL/L (ref 133–144)
SODIUM SERPL-SCNC: 140 MMOL/L (ref 133–144)
SPECIMEN SOURCE: NORMAL
WBC # BLD AUTO: 7.4 10E9/L (ref 4–11)

## 2020-01-30 PROCEDURE — 80048 BASIC METABOLIC PNL TOTAL CA: CPT | Performed by: INTERNAL MEDICINE

## 2020-01-30 PROCEDURE — 25000132 ZZH RX MED GY IP 250 OP 250 PS 637: Performed by: INTERNAL MEDICINE

## 2020-01-30 PROCEDURE — 12000001 ZZH R&B MED SURG/OB UMMC

## 2020-01-30 PROCEDURE — 99207 ZZC CDG-MDM COMPONENT: MEETS MODERATE - UP CODED: CPT | Performed by: INTERNAL MEDICINE

## 2020-01-30 PROCEDURE — 99233 SBSQ HOSP IP/OBS HIGH 50: CPT | Performed by: INTERNAL MEDICINE

## 2020-01-30 PROCEDURE — 93005 ELECTROCARDIOGRAM TRACING: CPT

## 2020-01-30 PROCEDURE — 25000132 ZZH RX MED GY IP 250 OP 250 PS 637: Performed by: STUDENT IN AN ORGANIZED HEALTH CARE EDUCATION/TRAINING PROGRAM

## 2020-01-30 PROCEDURE — 25000128 H RX IP 250 OP 636: Performed by: INTERNAL MEDICINE

## 2020-01-30 PROCEDURE — 93010 ELECTROCARDIOGRAM REPORT: CPT | Performed by: INTERNAL MEDICINE

## 2020-01-30 PROCEDURE — 25800030 ZZH RX IP 258 OP 636

## 2020-01-30 PROCEDURE — 85027 COMPLETE CBC AUTOMATED: CPT | Performed by: INTERNAL MEDICINE

## 2020-01-30 PROCEDURE — 36415 COLL VENOUS BLD VENIPUNCTURE: CPT | Performed by: INTERNAL MEDICINE

## 2020-01-30 RX ORDER — POTASSIUM CL/LIDO/0.9 % NACL 10MEQ/0.1L
10 INTRAVENOUS SOLUTION, PIGGYBACK (ML) INTRAVENOUS
Status: DISCONTINUED | OUTPATIENT
Start: 2020-01-30 | End: 2020-02-03 | Stop reason: HOSPADM

## 2020-01-30 RX ORDER — POTASSIUM CHLORIDE 7.45 MG/ML
10 INJECTION INTRAVENOUS
Status: DISCONTINUED | OUTPATIENT
Start: 2020-01-30 | End: 2020-02-03 | Stop reason: HOSPADM

## 2020-01-30 RX ORDER — KETOROLAC TROMETHAMINE 30 MG/ML
30 INJECTION, SOLUTION INTRAMUSCULAR; INTRAVENOUS EVERY 6 HOURS PRN
Status: COMPLETED | OUTPATIENT
Start: 2020-01-30 | End: 2020-01-31

## 2020-01-30 RX ORDER — CALCIUM CARBONATE 500 MG/1
1000 TABLET, CHEWABLE ORAL 2 TIMES DAILY PRN
Status: DISCONTINUED | OUTPATIENT
Start: 2020-01-30 | End: 2020-02-03 | Stop reason: HOSPADM

## 2020-01-30 RX ORDER — FUROSEMIDE 10 MG/ML
40 INJECTION INTRAMUSCULAR; INTRAVENOUS DAILY
Status: DISCONTINUED | OUTPATIENT
Start: 2020-01-30 | End: 2020-01-30

## 2020-01-30 RX ORDER — FUROSEMIDE 10 MG/ML
40 INJECTION INTRAMUSCULAR; INTRAVENOUS 2 TIMES DAILY
Status: DISCONTINUED | OUTPATIENT
Start: 2020-01-30 | End: 2020-01-31

## 2020-01-30 RX ORDER — SODIUM CHLORIDE 9 MG/ML
INJECTION, SOLUTION INTRAVENOUS
Status: COMPLETED
Start: 2020-01-30 | End: 2020-01-30

## 2020-01-30 RX ORDER — POTASSIUM CHLORIDE 29.8 MG/ML
20 INJECTION INTRAVENOUS
Status: DISCONTINUED | OUTPATIENT
Start: 2020-01-30 | End: 2020-02-03 | Stop reason: HOSPADM

## 2020-01-30 RX ORDER — POTASSIUM CHLORIDE 1.5 G/1.58G
20-40 POWDER, FOR SOLUTION ORAL
Status: DISCONTINUED | OUTPATIENT
Start: 2020-01-30 | End: 2020-02-03 | Stop reason: HOSPADM

## 2020-01-30 RX ORDER — POTASSIUM CHLORIDE 750 MG/1
20 TABLET, EXTENDED RELEASE ORAL 2 TIMES DAILY
Status: DISCONTINUED | OUTPATIENT
Start: 2020-01-30 | End: 2020-02-03 | Stop reason: HOSPADM

## 2020-01-30 RX ORDER — POTASSIUM CHLORIDE 750 MG/1
20-40 TABLET, EXTENDED RELEASE ORAL
Status: DISCONTINUED | OUTPATIENT
Start: 2020-01-30 | End: 2020-02-03 | Stop reason: HOSPADM

## 2020-01-30 RX ADMIN — GABAPENTIN 1200 MG: 600 TABLET, FILM COATED ORAL at 20:24

## 2020-01-30 RX ADMIN — DULOXETINE HYDROCHLORIDE 60 MG: 60 CAPSULE, DELAYED RELEASE ORAL at 09:00

## 2020-01-30 RX ADMIN — NICOTINE POLACRILEX 2 MG: 2 GUM, CHEWING BUCCAL at 09:00

## 2020-01-30 RX ADMIN — SODIUM CHLORIDE 100 ML: 900 INJECTION INTRAVENOUS at 13:06

## 2020-01-30 RX ADMIN — FUROSEMIDE 40 MG: 10 INJECTION, SOLUTION INTRAMUSCULAR; INTRAVENOUS at 09:00

## 2020-01-30 RX ADMIN — Medication 1 MG: at 23:02

## 2020-01-30 RX ADMIN — CALCIUM CARBONATE (ANTACID) CHEW TAB 500 MG 1000 MG: 500 CHEW TAB at 04:20

## 2020-01-30 RX ADMIN — CALCIUM CARBONATE (ANTACID) CHEW TAB 500 MG 1000 MG: 500 CHEW TAB at 23:56

## 2020-01-30 RX ADMIN — CEFAZOLIN 1 G: 1 INJECTION, POWDER, FOR SOLUTION INTRAMUSCULAR; INTRAVENOUS at 20:24

## 2020-01-30 RX ADMIN — CEFAZOLIN 1 G: 1 INJECTION, POWDER, FOR SOLUTION INTRAMUSCULAR; INTRAVENOUS at 13:05

## 2020-01-30 RX ADMIN — POTASSIUM CHLORIDE 20 MEQ: 750 TABLET, EXTENDED RELEASE ORAL at 08:59

## 2020-01-30 RX ADMIN — PANTOPRAZOLE SODIUM 40 MG: 40 TABLET, DELAYED RELEASE ORAL at 06:29

## 2020-01-30 RX ADMIN — FUROSEMIDE 40 MG: 10 INJECTION, SOLUTION INTRAMUSCULAR; INTRAVENOUS at 14:29

## 2020-01-30 RX ADMIN — IBUPROFEN 600 MG: 600 TABLET ORAL at 06:25

## 2020-01-30 RX ADMIN — METHADONE HYDROCHLORIDE 70 MG: 5 SOLUTION ORAL at 09:04

## 2020-01-30 RX ADMIN — POTASSIUM CHLORIDE 20 MEQ: 750 TABLET, EXTENDED RELEASE ORAL at 14:19

## 2020-01-30 RX ADMIN — GABAPENTIN 1200 MG: 600 TABLET, FILM COATED ORAL at 08:59

## 2020-01-30 RX ADMIN — KETOROLAC TROMETHAMINE 30 MG: 30 INJECTION, SOLUTION INTRAMUSCULAR at 12:56

## 2020-01-30 RX ADMIN — NICOTINE POLACRILEX 4 MG: 4 GUM, CHEWING ORAL at 14:19

## 2020-01-30 RX ADMIN — CEFAZOLIN 1 G: 1 INJECTION, POWDER, FOR SOLUTION INTRAMUSCULAR; INTRAVENOUS at 03:58

## 2020-01-30 RX ADMIN — MELATONIN 1000 UNITS: at 08:59

## 2020-01-30 RX ADMIN — GABAPENTIN 1200 MG: 600 TABLET, FILM COATED ORAL at 14:19

## 2020-01-30 RX ADMIN — ENOXAPARIN SODIUM 40 MG: 40 INJECTION SUBCUTANEOUS at 08:59

## 2020-01-30 RX ADMIN — KETOROLAC TROMETHAMINE 30 MG: 30 INJECTION, SOLUTION INTRAMUSCULAR at 20:34

## 2020-01-30 RX ADMIN — LEVOTHYROXINE SODIUM 200 MCG: 100 TABLET ORAL at 06:30

## 2020-01-30 RX ADMIN — ACETAMINOPHEN 650 MG: 325 TABLET, FILM COATED ORAL at 23:54

## 2020-01-30 NOTE — PLAN OF CARE
VS: Stable    O2: Sats 88-90% while sleeping.    Output: Voiding adequate amts in bathroom and had BM x3 today    Last BM: 1/29   Activity: Ambulating with SBA and cane.    Skin: Reddened and inflamed tissue to BLE.    Pain: Reports generalized pain.    CMS: Intact    Dressing: N/A   Diet: Tolerating regular    LDA: PIV bilateral arms    Equipment: Fww or cane.    Plan: Continue to monitor and follow abx plan.    Additional Info:  Critical Vanco level although Vanco has been discontinued so no further interventions per Dr. Parra and pharmacy.

## 2020-01-30 NOTE — PLAN OF CARE
VS: VSS.   O2: 93% on RA.   Output: Up to BR x3.    Last BM: 1/30. Small amt loose stools x3 overnight.   Activity: SBA w/ walker.     Skin: BLE reddened and inflamed tissue. Seems like edema has increased overnight. Legs are elevated on two pillows.   Pain: Reports generalized pain. Feels pain is increasing in legs. Given PRN ibuprofen. Declines tylenol.    CMS: Intact    Dressing: N/A   Diet: Regular.   LDA: Bilateral forearm PIVs SL.   Equipment: Walker, cane, IV pole   Plan: Continue abx plan.    Additional Info: Pt reported heaviness in chest. MD notified. EKG performed. Pt request PRN TUMS.

## 2020-01-30 NOTE — PROGRESS NOTES
Assessment & Plan     No Yusuf is a 60 year old female with PMHx of fibromyalgia, hypertension, hypothyroidism, hep C, OUD on methadone maintenance who presented with bilateral LE redness, pain, and 40+ lb weight gain.     # Bilateral LE cellulitis;  Improving   Initially was vanc and zosyn which was stopped .  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 Echo last 8/2019 WNL, seen by cardiology 1 week PTA where impression was more of lymphedema and non-cardiac etiology of chronic edema. Must consider GENE/Pickwickian and pulmonary hypertension, worsening diastolic dysfunction (previously grade 1). Hepatic steatosis without evidence of CORREA (nl LFTs though have fluctuated in the past, normal INR, no ascites on CT). Creatinine normal/baseline, no protein in UA to suggest nephrotic syndrome. Additional ddx includes hypoalbuminemia, known thyroid disease, calcium channel blocker  - stopped amlodipine, reassess choice if antihypertensive indicated   - repeat echo   Interpretation Summary 1/28  Left ventricular function, chamber size, wall motion, and wall thickness are  normal.The EF is 60-65%.  Right ventricular function, chamber size, wall motion, and thickness are  normal.  No hemodynamically significant valve abnormalities.  Pulmonary artery systolic pressure is normal.        # Cough   # GGO on CT   Unclear etiology, atypical pneumonia vs pulmonary edema vs other. Exam benign, nonfocal, no hypoxia.    - diuresis  continue to monitor for now     # Mild normocytic anemia   Unclear etiology or duration.       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   - Referred for outpaitent sleep study, pending.     Chronic issues:  "    # MDD/Anxiety: continue duloxetine, hydroxyzine  . Appreciate psych input 1/28  # Abnormal imaging: nodules and adrenal adenoma will need outpatient PCP follow-up per radiology recs   # GERD: continue PPI, zantac   # OUD: continue methadone 65 mg per patient   # Hep C: not on treatment, [place] [facility]   # hypothyroidism: TSH elevated but improved from OSH records, fT4 nl. Continue levothyroxine   # Constipation: increase bowel regimen   Diet: Regular Diet Adult    DVT Prophylaxis: Enoxaparin (Lovenox) SQ  Valle Catheter: not present  Code Status: full                 The patient's care was discussed with the Bedside Nurse and Patient.     Zena Wheeler MD  Hospitalist Service  [facility], [place]     ______________________________________________________________________        Interval History     Feels more bloated   Pain in legs worse   Weight up 2 piunds  Has some chest discomfort yesterday resolved with tums. EKG non revealing   No sob   No fever   No chills  No nausea        Physical Exam  Vital signs:  Temp: 96.4  F (35.8  C) Temp src: Oral BP: 135/72 Pulse: 74   Resp: 16 SpO2: 96 % O2 Device: None (Room air) Oxygen Delivery: 1 LPM   Weight: 131.2 kg (289 lb 3.2 oz)  Estimated body mass index is 51.23 kg/m  as calculated from the following:    Height as of 1/21/20: 1.6 m (5' 3\").    Weight as of this encounter: 131.2 kg (289 lb 3.2 oz).         Eyes: sclera clear, PERRLA, EOEMs intact   ENT: no rhinorrhea, oropharynx clear with moist mucous membranes. Unable to  assess JVD.   Resp: Diminished but clear bilaterally, easy work of breathing on room air  CV: Very distant but regular rate and rhythm, no murmurs noted   Abd: soft, obese, non-tender, non-distended, +BS, mild pitting laterally on flank  Ext:   - Bilateral LE with erythema, mildly pitting edema. Erythema extends up to her thighs, including the lower abdominal pannus. Tender to palpation. No clear " lesions/bullae.  brisk capillary refill   Neuro: Alert and oriented x4, grossly non-focal, moving all extremities equally      labs 'pending

## 2020-01-30 NOTE — PLAN OF CARE
VS: VSS   O2: Room Air     Output: Strict I & O   Last BM: 01/30/2020, loose, smear; loose stools 01/29/2020, bowel meds held today.    Activity: SBA with FWW   Skin: Edema Dhruv LEs   Pain: 5/10 Dhruv LE, PRN toradol administered   CMS: Tingling Dhruv LEs   Dressing: Mod A of 1    Diet: Reg thin   LDA: PIVs Dhruv upper extremities     Equipment: Walker   Plan:    Additional Info: AOX4, C of B&B, mod A of 1 for pericares and clothing management, prefers 4 mg nicorette gum

## 2020-01-30 NOTE — PROVIDER NOTIFICATION
Pt reported heaviness in her chest and increased pain in her legs. Text page sent to overnight Hospitalist. Orders added for TUMs and EKG. EKG completed.

## 2020-01-31 LAB
ANION GAP SERPL CALCULATED.3IONS-SCNC: 3 MMOL/L (ref 3–14)
BUN SERPL-MCNC: 18 MG/DL (ref 7–30)
CALCIUM SERPL-MCNC: 8.3 MG/DL (ref 8.5–10.1)
CHLORIDE SERPL-SCNC: 103 MMOL/L (ref 94–109)
CO2 SERPL-SCNC: 32 MMOL/L (ref 20–32)
CREAT SERPL-MCNC: 0.87 MG/DL (ref 0.52–1.04)
ERYTHROCYTE [DISTWIDTH] IN BLOOD BY AUTOMATED COUNT: 14.4 % (ref 10–15)
GFR SERPL CREATININE-BSD FRML MDRD: 73 ML/MIN/{1.73_M2}
GLUCOSE SERPL-MCNC: 97 MG/DL (ref 70–99)
HCT VFR BLD AUTO: 32 % (ref 35–47)
HGB BLD-MCNC: 10 G/DL (ref 11.7–15.7)
INTERPRETATION ECG - MUSE: NORMAL
MCH RBC QN AUTO: 28.8 PG (ref 26.5–33)
MCHC RBC AUTO-ENTMCNC: 31.3 G/DL (ref 31.5–36.5)
MCV RBC AUTO: 92 FL (ref 78–100)
PLATELET # BLD AUTO: 281 10E9/L (ref 150–450)
POTASSIUM SERPL-SCNC: 4.2 MMOL/L (ref 3.4–5.3)
RBC # BLD AUTO: 3.47 10E12/L (ref 3.8–5.2)
SODIUM SERPL-SCNC: 138 MMOL/L (ref 133–144)
WBC # BLD AUTO: 8.9 10E9/L (ref 4–11)

## 2020-01-31 PROCEDURE — 25000128 H RX IP 250 OP 636: Performed by: INTERNAL MEDICINE

## 2020-01-31 PROCEDURE — 99233 SBSQ HOSP IP/OBS HIGH 50: CPT | Performed by: INTERNAL MEDICINE

## 2020-01-31 PROCEDURE — 25000132 ZZH RX MED GY IP 250 OP 250 PS 637: Performed by: INTERNAL MEDICINE

## 2020-01-31 PROCEDURE — 99207 ZZC CDG-MDM COMPONENT: MEETS MODERATE - UP CODED: CPT | Performed by: INTERNAL MEDICINE

## 2020-01-31 PROCEDURE — 85027 COMPLETE CBC AUTOMATED: CPT | Performed by: INTERNAL MEDICINE

## 2020-01-31 PROCEDURE — 12000001 ZZH R&B MED SURG/OB UMMC

## 2020-01-31 PROCEDURE — 36415 COLL VENOUS BLD VENIPUNCTURE: CPT | Performed by: INTERNAL MEDICINE

## 2020-01-31 PROCEDURE — 80048 BASIC METABOLIC PNL TOTAL CA: CPT | Performed by: INTERNAL MEDICINE

## 2020-01-31 PROCEDURE — 25800030 ZZH RX IP 258 OP 636

## 2020-01-31 RX ORDER — FUROSEMIDE 10 MG/ML
60 INJECTION INTRAMUSCULAR; INTRAVENOUS 2 TIMES DAILY
Status: DISCONTINUED | OUTPATIENT
Start: 2020-01-31 | End: 2020-02-02

## 2020-01-31 RX ORDER — FUROSEMIDE 10 MG/ML
60 INJECTION INTRAMUSCULAR; INTRAVENOUS 2 TIMES DAILY
Status: DISCONTINUED | OUTPATIENT
Start: 2020-01-31 | End: 2020-01-31

## 2020-01-31 RX ORDER — MULTIVITAMIN,THERAPEUTIC
1 TABLET ORAL DAILY
Status: DISCONTINUED | OUTPATIENT
Start: 2020-01-31 | End: 2020-02-03 | Stop reason: HOSPADM

## 2020-01-31 RX ORDER — SODIUM CHLORIDE 9 MG/ML
INJECTION, SOLUTION INTRAVENOUS
Status: COMPLETED
Start: 2020-01-31 | End: 2020-01-31

## 2020-01-31 RX ORDER — UBIDECARENONE 75 MG
100 CAPSULE ORAL DAILY
Status: DISCONTINUED | OUTPATIENT
Start: 2020-01-31 | End: 2020-02-03 | Stop reason: HOSPADM

## 2020-01-31 RX ORDER — GABAPENTIN 600 MG/1
600 TABLET ORAL 3 TIMES DAILY
Qty: 90 TABLET | Refills: 0 | Status: CANCELLED | OUTPATIENT
Start: 2020-01-31

## 2020-01-31 RX ORDER — GABAPENTIN 600 MG/1
1200 TABLET ORAL 3 TIMES DAILY
Status: CANCELLED | OUTPATIENT
Start: 2020-01-31

## 2020-01-31 RX ADMIN — GABAPENTIN 1200 MG: 600 TABLET, FILM COATED ORAL at 08:14

## 2020-01-31 RX ADMIN — SODIUM CHLORIDE 500 ML: 9 INJECTION, SOLUTION INTRAVENOUS at 04:55

## 2020-01-31 RX ADMIN — GABAPENTIN 1200 MG: 600 TABLET, FILM COATED ORAL at 14:43

## 2020-01-31 RX ADMIN — GABAPENTIN 1200 MG: 600 TABLET, FILM COATED ORAL at 20:14

## 2020-01-31 RX ADMIN — MELATONIN 1000 UNITS: at 08:14

## 2020-01-31 RX ADMIN — SENNOSIDES AND DOCUSATE SODIUM 2 TABLET: 8.6; 5 TABLET ORAL at 20:14

## 2020-01-31 RX ADMIN — VITAM B12 100 MCG: 100 TAB at 09:49

## 2020-01-31 RX ADMIN — CEFAZOLIN 1 G: 1 INJECTION, POWDER, FOR SOLUTION INTRAMUSCULAR; INTRAVENOUS at 12:39

## 2020-01-31 RX ADMIN — LEVOTHYROXINE SODIUM 200 MCG: 100 TABLET ORAL at 06:48

## 2020-01-31 RX ADMIN — DULOXETINE HYDROCHLORIDE 60 MG: 60 CAPSULE, DELAYED RELEASE ORAL at 08:14

## 2020-01-31 RX ADMIN — KETOROLAC TROMETHAMINE 30 MG: 30 INJECTION, SOLUTION INTRAMUSCULAR at 20:59

## 2020-01-31 RX ADMIN — FUROSEMIDE 60 MG: 10 INJECTION, SOLUTION INTRAMUSCULAR; INTRAVENOUS at 08:47

## 2020-01-31 RX ADMIN — ENOXAPARIN SODIUM 40 MG: 40 INJECTION SUBCUTANEOUS at 08:16

## 2020-01-31 RX ADMIN — POTASSIUM CHLORIDE 20 MEQ: 750 TABLET, EXTENDED RELEASE ORAL at 14:43

## 2020-01-31 RX ADMIN — KETOROLAC TROMETHAMINE 30 MG: 30 INJECTION, SOLUTION INTRAMUSCULAR at 14:58

## 2020-01-31 RX ADMIN — FUROSEMIDE 60 MG: 10 INJECTION, SOLUTION INTRAMUSCULAR; INTRAVENOUS at 14:44

## 2020-01-31 RX ADMIN — CEFAZOLIN 1 G: 1 INJECTION, POWDER, FOR SOLUTION INTRAMUSCULAR; INTRAVENOUS at 04:52

## 2020-01-31 RX ADMIN — THERA TABS 1 TABLET: TAB at 08:45

## 2020-01-31 RX ADMIN — METHADONE HYDROCHLORIDE 70 MG: 5 SOLUTION ORAL at 08:36

## 2020-01-31 RX ADMIN — KETOROLAC TROMETHAMINE 30 MG: 30 INJECTION, SOLUTION INTRAMUSCULAR at 07:51

## 2020-01-31 RX ADMIN — PANTOPRAZOLE SODIUM 40 MG: 40 TABLET, DELAYED RELEASE ORAL at 06:48

## 2020-01-31 RX ADMIN — POTASSIUM CHLORIDE 20 MEQ: 750 TABLET, EXTENDED RELEASE ORAL at 08:35

## 2020-01-31 RX ADMIN — CEFAZOLIN 1 G: 1 INJECTION, POWDER, FOR SOLUTION INTRAMUSCULAR; INTRAVENOUS at 20:13

## 2020-01-31 NOTE — PLAN OF CARE
VS: VSS. /81.   O2: 98% on RA.    Output: Up to bathroom x2 overnight. Voiding adequately. On strict I&Os. Pt misses hat.   Last BM: Loose stool on 1/30. Bowel meds held.   Activity: SBA and walker or cane.   Skin: BLE reddened and inflamed tissue. Legs elevated.    Pain: Pt reports generalized pain. Given PRN tylenol and toradol.   CMS: Intact. Baseline neuropathy.   Dressing: N/A.   Diet: Regular.   LDA: R hand PIV infusing.   Equipment: Walker, cane, IV pole.   Plan: Continue w/ abx plan.   Additional Info: Given PRN TUMS for stomach upset.

## 2020-01-31 NOTE — PLAN OF CARE
Pt A/O X 4. Afebrile. VSS. Lungs-Clear bilaterally with both anterior and posterior. Bowels-Hyperactive in all four quadrants. Voids spontaneously without difficulty in the bathroom. Is on Strict I & O's. Denies nausea and vomiting. CMS and Neuro's are intact. Has baseline numbness and tingling in the BLE's. Has pain in the bilateral legs and given Toradol, and pain is manageable. Is on a Regular diet and appetite was Good this shift. Both BLE's have slight blanchable redness, +3 - +4 edema, IV Lasix given.  is aware of pt's potassium lab level of 4.2 and instructed writer to give scheduled Potassium replacements. Pt up in room with SBA and a walker. PIV patent in the right hand and saline locked. Bilateral heels are elevated off the bed. Pt is able to make needs known, and call light is within reach. Continue to monitor.

## 2020-01-31 NOTE — PROGRESS NOTES
Assessment & Plan     No Yusuf is a 60 year old female with PMHx of fibromyalgia, hypertension, hypothyroidism, hep C, OUD on methadone maintenance who presented with bilateral LE redness, pain, and 40+ lb weight gain.     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 Echo last 8/2019 WNL, seen by cardiology 1 week PTA where impression was more of lymphedema and non-cardiac etiology of chronic edema. Must consider GENE/Pickwickian and pulmonary hypertension, worsening diastolic dysfunction (previously grade 1). Hepatic steatosis without evidence of CORREA (nl LFTs though have fluctuated in the past, normal INR, no ascites on CT). Creatinine normal/baseline, no protein in UA to suggest nephrotic syndrome. Additional ddx includes hypoalbuminemia, known thyroid disease, calcium channel blocker  - stopped amlodipine, reassess choice if antihypertensive indicated   - repeat echo   Interpretation Summary 1/28  Left ventricular function, chamber size, wall motion, and wall thickness are  normal.The EF is 60-65%.  Right ventricular function, chamber size, wall motion, and thickness are  normal.  No hemodynamically significant valve abnormalities.  Pulmonary artery systolic pressure is normal.        # Cough ; reolved   # GGO on CT   Unclear etiology, atypical pneumonia vs pulmonary edema vs other. Exam benign, nonfocal, no hypoxia.    - diuresis  continue to monitor for now     # Mild normocytic anemia   Unclear etiology or duration.       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   - Referred for outpaitent sleep study, pending.     Chronic issues:     # MDD   #Anxiety: continue duloxetine, hydroxyzine  . Appreciate psych input  "1/28  # Abnormal imaging: nodules and adrenal adenoma will need outpatient PCP follow-up per radiology recs   # GERD: continue PPI, zantac   # OUD: continue methadone 70 mg per pharmacy  # Hep C: not on treatment, followed at [facility]   # Hypothyroidism: TSH elevated but improved from OSH records, fT4 nl. Continue levothyroxine   # Constipation: increase bowel regimen   Diet: Regular Diet Adult    DVT Prophylaxis: Enoxaparin (Lovenox) SQ  Valle Catheter: not present  Code Status: full                  The patient's care was discussed with the Bedside Nurse and Patient.     Zena Wheeler MD  Hospitalist Service  [facility], [place]     ______________________________________________________________________        Interval History     Feels swelling is better   Leg pain improved   No sob   No fever   No chills  No nausea         Physical Exam  Vital signs:  Temp: 96.7  F (35.9  C) Temp src: Oral BP: (!) 145/81 Pulse: 80   Resp: 14 SpO2: 98 % O2 Device: None (Room air) Oxygen Delivery: 1 LPM   Weight: 131.2 kg (289 lb 3.2 oz)  Estimated body mass index is 51.23 kg/m  as calculated from the following:    Height as of 1/21/20: 1.6 m (5' 3\").    Weight as of this encounter: 131.2 kg (289 lb 3.2 oz).    Eyes: sclera clear, PERRL  ENT: no rhinorrhea, oropharynx clear with moist mucous membranes. Unable to  assess JVD.   Resp: Diminished but clear bilaterally, easy work of breathing on room air  CV: Very distant but regular rate and rhythm, no murmurs noted   Abd: soft, obese, non-tender, non-distended, +BS, mild pitting laterally on flank  Ext:   - Bilateral LE with erythema, mildly pitting edema. Erythema extends up to her thighs, including the lower abdominal pannus. Tender to palpation. No clear lesions/bullae.  brisk capillary refill     Neuro: Alert and oriented x4, grossly non-focal, moving all extremities equally    Labs reviewed ; BMP and cbc     "

## 2020-01-31 NOTE — PROGRESS NOTES
Acupuncture Clinical Internship Intake and Treatment Documentation   [facility]    Date:  1/31/2020  Patient Name:  No Yusuf   YOB: 1960     Repeat Patient:  no  Has patient had acupoint/acupressure treatment before:  yes    Signed consent placed in the medical record:  yes  Patient/Family verbalizes understanding of risks and benefits:  yes  Required information provided to patient:  yes    Diagnosis:  Abdominal wall cellulitis [L03.311]  Bilateral lower leg cellulitis [L03.116, L03.115]    Patient condition and treatment:  Edema of the legs, Depression, overall pain  Reason for Intervention Today/Chief Complaint:  Pain, edema, depression    Isolation:  No  Type:  None    PRE-SCORE:  severe    Other Western medical information:  Has fibromyalgia     Medications  No current outpatient medications on file.     Current Facility-Administered Medications   Medication     acetaminophen (TYLENOL) tablet 500-1,000 mg     calcium carbonate (TUMS) chewable tablet 1,000 mg     ceFAZolin (ANCEF) 1 g vial to attach to  ml bag for ADULT or 50 ml bag for PEDS     cyanocobalamin (VITAMIN B-12) tablet 100 mcg     DULoxetine (CYMBALTA) DR capsule 60 mg     enoxaparin ANTICOAGULANT (LOVENOX) injection 40 mg     famotidine (PEPCID) tablet 20 mg     furosemide (LASIX) injection 60 mg     gabapentin (NEURONTIN) tablet 1,200 mg     ketorolac (TORADOL) injection 30 mg     levothyroxine (SYNTHROID/LEVOTHROID) tablet 200 mcg     lidocaine (LMX4) cream     lidocaine 1 % 0.1-1 mL     melatonin tablet 1 mg     methadone (DOLPHINE) solution 70 mg     multivitamin, therapeutic (THERA-VIT) tablet 1 tablet     naloxone (NARCAN) injection 0.1-0.4 mg     nicotine polacrilex (NICORETTE) gum 4 mg     pantoprazole (PROTONIX) EC tablet 40 mg     polyethylene glycol (MIRALAX/GLYCOLAX) Packet 17 g     potassium chloride (KLOR-CON) Packet 20-40 mEq     potassium chloride 10 mEq in 100 mL intermittent  infusion with 10 mg lidocaine     potassium chloride 10 mEq in 100 mL sterile water intermittent infusion (premix)     potassium chloride 20 mEq in 50 mL intermittent infusion     potassium chloride ER (K-DUR/KLOR-CON M) CR tablet 20 mEq     potassium chloride ER (K-DUR/KLOR-CON M) CR tablet 20-40 mEq     senna-docusate (SENOKOT-S/PERICOLACE) 8.6-50 MG per tablet 1 tablet    Or     senna-docusate (SENOKOT-S/PERICOLACE) 8.6-50 MG per tablet 2 tablet     sodium chloride (PF) 0.9% PF flush 3 mL     sodium chloride (PF) 0.9% PF flush 3 mL     Vitamin D3 (CHOLECALCIFEROL) 25 mcg (1000 units) tablet 1,000 Units       Pre-Treatment Assessment  Chief Complaint/ Reason for Intervention Today:  Edema in legs, Overall pain, Depression  Chief Complaint Pre-Score:  severe   Describe:  Edema in legs: Patient has edema in legs that makes her skin feel tight. There is lots of pain with the edema especially down the back of the patients legs and along the sides. There is also a throbbing and burning quality to the pain much of the time.  Depression: patient is currently dealing with severe grief over the loss of two of her children. This has ssemed to manifest in back pain, severe sadness, and increased anxiety  Pain Location:  Legs, abdomen  Pre Session Pain:  Severe  Pre Session Anxiety:  Moderate  Pre Session Nausea:  None    10 Traditional Chinese Medicine Assessment Questions  - Cold/ Heat:  Patient has occasional chills but no other symptoms  - Sweat:  No reported symptoms  - Headaches/Body aches:  Patient has a headache that feels like a band around her head that is incredibly sharp and occurs occasionally. Hip hurts and feels very tight especially on the left side but is better with movement and stretching. Patient feels heaviness in shoulders that seems to drag her entire body down.  - Chest/Abdomen: Chest: Patient gets tightness in her chest , palpitations, and SOB associated with increased anxiety. Abdomen: Patient has  bloating and pain in her abdomen associated with her current condition along with the retention of water.  - Digestion:  Patient feels that she has had a normal appetite but has gained a lot of weight recently. Patient has had some acid reflux during her sleep leaving her throat feeling dry and sore in the morning. She has also noted that she has been extra gassy and belching more frequently.  - Bowel Movement/Urination:  BM: She has had a hard time having bowel movements and when they do come they are small hard balls and does not feel as though she has had a full bowel movement. Often times she feels as though she has to go but instead just has gas. U: 1x/3hours with a strong smell. Occasionally she feels as though she is urinating more than she drinks.  - Hearing/Vision:  Hearing: Inside the right ear is painful Vision: Floaters in one eyes (unsure which) that has occurred for years  - Sleep (prior to hospital):  Patient has been sleeping okay. Often wakes up a couple of times during the night due to pain or the need to urinate  - Energy: Patient does not feel rested and has been diagnosed with chronic fatigue syndrome  - Emotions:  Getting through everything but severely depressed over the loss of her two children  - Ob Gyn:  No symptoms reported  - Miscellaneous:  No symptoms reported    Traditional Chinese Medicine Assessment  - TONGUE:  Fissures across tongue, pink, dry, no coat  - PULSE:  Strong and tight  - OBSERVATIONS:  None     Traditional Chinese Medicine Diagnosis  - BRANCH:  Spleen Qi Deficiency with phlegm dampness causing edema  - ROOT:  Stomach yin deficiency and Liver Qi stagnation     Traditional Chinese Medicine Treatment  - ACUPUNCTURE:  Du 20, Ventura Men, Point zero, Yin Ferraro, Sp 6, Sp 9, St 36, Jenna 3, Leftside: LI 4, Pc 6, Maryan 9, Ht 7     Magnet informed consent signed and given:  no    Post Treatment Assessment  Chief complaint post score:  better  Post Session Observation:  Patient seems  calmer and having less pain in legs  Patient/Family Education:  None  Verbal information provided:  no  Written information provided:  no  All questions answered at time of treatment:  yes    Treatment/Procedure(s) performed by: Luz Ware    Date: 1/31/2020     I attest that this acupuncture treatment was done under my supervision and this note is complete and true.     Supervising acupuncturist:  Micheal Dalton LAc [facility] license #: [id number]  P: [phone]

## 2020-02-01 LAB
ANION GAP SERPL CALCULATED.3IONS-SCNC: 4 MMOL/L (ref 3–14)
BUN SERPL-MCNC: 20 MG/DL (ref 7–30)
CALCIUM SERPL-MCNC: 8.5 MG/DL (ref 8.5–10.1)
CHLORIDE SERPL-SCNC: 102 MMOL/L (ref 94–109)
CO2 SERPL-SCNC: 32 MMOL/L (ref 20–32)
CREAT SERPL-MCNC: 0.92 MG/DL (ref 0.52–1.04)
GFR SERPL CREATININE-BSD FRML MDRD: 68 ML/MIN/{1.73_M2}
GLUCOSE SERPL-MCNC: 122 MG/DL (ref 70–99)
MAGNESIUM SERPL-MCNC: 2.2 MG/DL (ref 1.6–2.3)
POTASSIUM SERPL-SCNC: 4.2 MMOL/L (ref 3.4–5.3)
SODIUM SERPL-SCNC: 138 MMOL/L (ref 133–144)

## 2020-02-01 PROCEDURE — 25000132 ZZH RX MED GY IP 250 OP 250 PS 637: Performed by: STUDENT IN AN ORGANIZED HEALTH CARE EDUCATION/TRAINING PROGRAM

## 2020-02-01 PROCEDURE — 12000001 ZZH R&B MED SURG/OB UMMC

## 2020-02-01 PROCEDURE — 99207 ZZC CDG-MDM COMPONENT: MEETS MODERATE - UP CODED: CPT | Performed by: INTERNAL MEDICINE

## 2020-02-01 PROCEDURE — 80048 BASIC METABOLIC PNL TOTAL CA: CPT | Performed by: INTERNAL MEDICINE

## 2020-02-01 PROCEDURE — 25000128 H RX IP 250 OP 636: Performed by: INTERNAL MEDICINE

## 2020-02-01 PROCEDURE — 25000132 ZZH RX MED GY IP 250 OP 250 PS 637: Performed by: INTERNAL MEDICINE

## 2020-02-01 PROCEDURE — 83735 ASSAY OF MAGNESIUM: CPT | Performed by: INTERNAL MEDICINE

## 2020-02-01 PROCEDURE — 36415 COLL VENOUS BLD VENIPUNCTURE: CPT | Performed by: INTERNAL MEDICINE

## 2020-02-01 PROCEDURE — 99233 SBSQ HOSP IP/OBS HIGH 50: CPT | Performed by: INTERNAL MEDICINE

## 2020-02-01 RX ORDER — LIDOCAINE 4 G/G
1 PATCH TOPICAL
Status: DISCONTINUED | OUTPATIENT
Start: 2020-02-01 | End: 2020-02-03 | Stop reason: HOSPADM

## 2020-02-01 RX ORDER — KETOROLAC TROMETHAMINE 30 MG/ML
30 INJECTION, SOLUTION INTRAMUSCULAR; INTRAVENOUS EVERY 6 HOURS PRN
Status: DISCONTINUED | OUTPATIENT
Start: 2020-02-01 | End: 2020-02-02

## 2020-02-01 RX ADMIN — NICOTINE POLACRILEX 4 MG: 4 GUM, CHEWING ORAL at 02:04

## 2020-02-01 RX ADMIN — SENNOSIDES AND DOCUSATE SODIUM 1 TABLET: 8.6; 5 TABLET ORAL at 19:46

## 2020-02-01 RX ADMIN — THERA TABS 1 TABLET: TAB at 08:31

## 2020-02-01 RX ADMIN — CEFAZOLIN 1 G: 1 INJECTION, POWDER, FOR SOLUTION INTRAMUSCULAR; INTRAVENOUS at 12:17

## 2020-02-01 RX ADMIN — LEVOTHYROXINE SODIUM 200 MCG: 100 TABLET ORAL at 08:31

## 2020-02-01 RX ADMIN — CEFAZOLIN 1 G: 1 INJECTION, POWDER, FOR SOLUTION INTRAMUSCULAR; INTRAVENOUS at 20:28

## 2020-02-01 RX ADMIN — PANTOPRAZOLE SODIUM 40 MG: 40 TABLET, DELAYED RELEASE ORAL at 08:31

## 2020-02-01 RX ADMIN — POTASSIUM CHLORIDE 20 MEQ: 750 TABLET, EXTENDED RELEASE ORAL at 14:32

## 2020-02-01 RX ADMIN — KETOROLAC TROMETHAMINE 30 MG: 30 INJECTION, SOLUTION INTRAMUSCULAR; INTRAVENOUS at 12:43

## 2020-02-01 RX ADMIN — DULOXETINE HYDROCHLORIDE 60 MG: 60 CAPSULE, DELAYED RELEASE ORAL at 08:31

## 2020-02-01 RX ADMIN — GABAPENTIN 1200 MG: 600 TABLET, FILM COATED ORAL at 14:32

## 2020-02-01 RX ADMIN — Medication 1 MG: at 22:24

## 2020-02-01 RX ADMIN — ENOXAPARIN SODIUM 40 MG: 40 INJECTION SUBCUTANEOUS at 09:06

## 2020-02-01 RX ADMIN — FUROSEMIDE 60 MG: 10 INJECTION, SOLUTION INTRAMUSCULAR; INTRAVENOUS at 08:55

## 2020-02-01 RX ADMIN — POTASSIUM CHLORIDE 20 MEQ: 750 TABLET, EXTENDED RELEASE ORAL at 08:31

## 2020-02-01 RX ADMIN — GABAPENTIN 1200 MG: 600 TABLET, FILM COATED ORAL at 19:43

## 2020-02-01 RX ADMIN — ACETAMINOPHEN 1000 MG: 325 TABLET, FILM COATED ORAL at 02:01

## 2020-02-01 RX ADMIN — CEFAZOLIN 1 G: 1 INJECTION, POWDER, FOR SOLUTION INTRAMUSCULAR; INTRAVENOUS at 03:59

## 2020-02-01 RX ADMIN — METHADONE HYDROCHLORIDE 70 MG: 5 SOLUTION ORAL at 08:54

## 2020-02-01 RX ADMIN — KETOROLAC TROMETHAMINE 30 MG: 30 INJECTION, SOLUTION INTRAMUSCULAR; INTRAVENOUS at 19:43

## 2020-02-01 RX ADMIN — GABAPENTIN 1200 MG: 600 TABLET, FILM COATED ORAL at 08:31

## 2020-02-01 RX ADMIN — LIDOCAINE 1 PATCH: 560 PATCH PERCUTANEOUS; TOPICAL; TRANSDERMAL at 08:42

## 2020-02-01 RX ADMIN — MELATONIN 1000 UNITS: at 08:31

## 2020-02-01 RX ADMIN — NICOTINE POLACRILEX 4 MG: 4 GUM, CHEWING ORAL at 13:14

## 2020-02-01 RX ADMIN — VITAM B12 100 MCG: 100 TAB at 08:31

## 2020-02-01 RX ADMIN — FUROSEMIDE 60 MG: 10 INJECTION, SOLUTION INTRAMUSCULAR; INTRAVENOUS at 15:17

## 2020-02-01 NOTE — PLAN OF CARE
VS: Stable.   O2: Saturation >90% on RA.   Output: Voiding without difficulty to the bathroom.   Last BM: 1/31 and passing gas.   Activity: Up independently in room and hallway,    Skin: Intact except +3 pitting edema in BLE's.   Pain: C/o pain in BLE's, given prn IV Toradol with relief.   CMS: Intact. Except c/o occasional numbness in bilateral hands.   Dressing: None.   Diet: Tolerating regular diet.   LDA: PIV SL between abx into right hand.   Equipment: Walker, IV pole.   Plan: TBD. Will continue to monitor.   Additional Info:

## 2020-02-01 NOTE — PLAN OF CARE
"Patient has been educated on potential risks of choosing to leave the unit and that the responsibility for patient well-being will belong to the patient. Pt has been informed that admission to hospital is due to need for medical treatment. Education given to the patient on some of the potential risks included but are not limited to:      - lack of access to nursing intervention      - possible missed appointments with MD, therapies, tests      - possible missed medications, antibiotics, management of IV's    Patient Response: Pt called unit from the [facility] ED saying she went downstairs to visit family and help them in car and \"see them off.\" \"I'm coming back don't worry about me!\"    Patient notified staff prior to leaving unit: No  Coban wrap placed over IV prior to pt leaving unit: No  "

## 2020-02-01 NOTE — PLAN OF CARE
"        VS:     Pt A/O X 4. Afebrile. VSS. /74   Pulse 87   Temp 99  F (37.2  C) (Oral)   Resp 16  SpO2 93%. Lungs- clear and equal bilaterally. Denies nausea, shortness of breath, and chest pain.     Output:     Bowels- active in all four quadrants. Strict I/O. Emptied hat in toilet herself. Educated to call after voiding so we can measure. 475 output measured by staff. Pt taking Lasix.      Activity:     Pt up independently.      Skin: Edema to BLEs.      Pain:     Has pain \"all over\" and Toradol administered. Lidocaine patch applied to bilat shoulders.      CMS:     CMS and Neuro's are intact. Denies numbness and tingling in all extremities.      Dressing:     None.      Diet:     Pt is on a Regular diet and appetite was good this shift.       LDA:     PIV is patent in the right hand and saline locked.      Equipment:     None.      Plan:     Pt is able to make needs known and the call light is within the pt's reach. Continue to monitor.                      "

## 2020-02-01 NOTE — PLAN OF CARE
Patient A/Ox4. Declined VS and weight overnight - pt hopeless about edema and did not want to do it. Denies CP, SOB, dizziness/LH. LSCTA. +fl/BS. Voiding well in bathroom. CMS intact/baseline for patient. Tolerating regular diet without NV. Activity level is pretty good, pt was able to spend some time today in peds ED with a relative. IV infusing antibiotics occasionally. Pain rated as not well managed per pt - she is sad and distraught that she cannot have Toradol (greater than 3 days is evidently contraindicated per cross-coverage doctor), pt really thought toradol was doing the trick, and would like to follow-up with Dr. Wheeler this morning regarding pain control. Pt loved acupuncture and it really helped her spiritually as well. Patient has demonstrated ability to call appropriately. Patient is resting with call light within reach. Will continue to monitor.

## 2020-02-01 NOTE — PROGRESS NOTES
[facility], [place] Progress Note - Hospitalist Service       Date of Admission:  1/27/2020  Assessment & Plan    No Yusuf is a 60 year old female with PMHx of fibromyalgia, hypertension, hypothyroidism, hep C, OUD on methadone maintenance who presented with bilateral LE redness, pain, and 40+ lb weight gain.     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 Continue same dose   KCL BID  Strict I/0  Weights     # Hypertension     40 + lb weight gain since November 2019. Echo last 8/2019 WNL, seen by cardiology 1 week PTA where impression was more of lymphedema and non-cardiac etiology of chronic edema. Consider GENE/Pickwickian and pulmonary hypertension, worsening diastolic dysfunction (previously grade 1). Hepatic steatosis without evidence of CORREA (nl LFTs though have fluctuated in the past, normal INR, no ascites on CT). Creatinine normal/baseline, no protein in UA to suggest nephrotic syndrome. Additional ddx includes hypoalbuminemia, known thyroid disease, calcium channel blocker  - stopped amlodipine, reassess choice if antihypertensive indicated   - repeat echo   Interpretation Summary 1/28  Left ventricular function, chamber size, wall motion, and wall thickness are  normal.The EF is 60-65%.  Right ventricular function, chamber size, wall motion, and thickness are  normal.  No hemodynamically significant valve abnormalities.  Pulmonary artery systolic pressure is normal.  bnp 328        # Cough ; resolved   # GGO on CT   Unclear etiology, atypical pneumonia vs pulmonary edema vs other. Exam benign, nonfocal, no hypoxia.    - diuresis  continue to monitor for now     # Mild normocytic anemia   Unclear etiology or  "duration.       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   - Referred for outpaitent sleep study, pending.     Chronic issues:     # MDD  Anxiety: continue duloxetine, hydroxyzine  . Appreciate psych input 1/28  # Abnormal imaging: nodules and adrenal adenoma will need outpatient PCP follow-up per radiology recs   # GERD: continue PPI, zantac   # OUD: continue methadone 70 mg per pharmacy  # Hep C: not on treatment, followed at [facility]   # Hypothyroidism: TSH elevated but improved from OSH records, fT4 nl. Continue levothyroxine   # Constipation: increase bowel regimen   Diet: Regular Diet Adult    DVT Prophylaxis: Enoxaparin (Lovenox) SQ  Valle Catheter: not present  Code Status: full     Diet: Regular Diet Adult    DVT Prophylaxis: Enoxaparin (Lovenox) SQ  Valle Catheter: not present      Disposition Plan   Expected discharge: 2 - 3 days, recommended to prior living arrangement once cellulitis and Fluid retention improved .  Entered: Zena Wheeler MD 02/01/2020, 8:47 AM       The patient's care was discussed with the Bedside Nurse and Patient.    Zena Wheeler MD  Hospitalist Service  [facility], [place]    ______________________________________________________________________    Interval History   Feels leg swelling is improved   Thinks may have over done activity yesterday   Tolerating lasix    Data reviewed today: I reviewed all medications, new labs and imaging results over the last 24 hours.   On Exam ;   Alert and oriented . No acute distress  Vital signs:  Temp: 97.7  F (36.5  C) Temp src: Oral BP: 120/68 Pulse: 74   Resp: 16 SpO2: 94 % O2 Device: None (Room air) Oxygen Delivery: 1 LPM   Weight: 131.4 kg (289 lb 11.2 oz)  Estimated body mass index is 51.32 kg/m  as calculated from the following:    Height as of 1/21/20: 1.6 m (5' 3\").    Weight as of this encounter: 131.4 kg (289 lb 11.2 oz).    Chest ; equal BS bilaterally , no rales or rhonchi   CVS; RRR, no murmur " /rubs or gallops  GI ; soft abdomen, non tender, BS positive  Ext ; pitting  edema , no cynosis  Neuro ; CN 2 to 12 grossly intact , No Facial asymmetry noticed  .  Psych ; appropriate mood and effect  Skin ; erythema both lower ext improving

## 2020-02-02 LAB
ANION GAP SERPL CALCULATED.3IONS-SCNC: 3 MMOL/L (ref 3–14)
BUN SERPL-MCNC: 22 MG/DL (ref 7–30)
CALCIUM SERPL-MCNC: 8.2 MG/DL (ref 8.5–10.1)
CHLORIDE SERPL-SCNC: 101 MMOL/L (ref 94–109)
CO2 SERPL-SCNC: 34 MMOL/L (ref 20–32)
CREAT SERPL-MCNC: 1 MG/DL (ref 0.52–1.04)
GFR SERPL CREATININE-BSD FRML MDRD: 62 ML/MIN/{1.73_M2}
GLUCOSE SERPL-MCNC: 112 MG/DL (ref 70–99)
POTASSIUM SERPL-SCNC: 4.5 MMOL/L (ref 3.4–5.3)
SODIUM SERPL-SCNC: 138 MMOL/L (ref 133–144)

## 2020-02-02 PROCEDURE — 12000001 ZZH R&B MED SURG/OB UMMC

## 2020-02-02 PROCEDURE — 99239 HOSP IP/OBS DSCHRG MGMT >30: CPT | Performed by: INTERNAL MEDICINE

## 2020-02-02 PROCEDURE — 25000128 H RX IP 250 OP 636: Performed by: INTERNAL MEDICINE

## 2020-02-02 PROCEDURE — 36415 COLL VENOUS BLD VENIPUNCTURE: CPT | Performed by: INTERNAL MEDICINE

## 2020-02-02 PROCEDURE — 80048 BASIC METABOLIC PNL TOTAL CA: CPT | Performed by: INTERNAL MEDICINE

## 2020-02-02 PROCEDURE — 25000132 ZZH RX MED GY IP 250 OP 250 PS 637: Performed by: INTERNAL MEDICINE

## 2020-02-02 RX ORDER — FUROSEMIDE 40 MG
40 TABLET ORAL 2 TIMES DAILY
Qty: 60 TABLET | Refills: 0 | Status: ON HOLD | OUTPATIENT
Start: 2020-02-02 | End: 2020-06-16

## 2020-02-02 RX ORDER — FUROSEMIDE 40 MG
40 TABLET ORAL 2 TIMES DAILY
Qty: 60 TABLET | Refills: 0 | Status: SHIPPED | OUTPATIENT
Start: 2020-02-02 | End: 2020-02-02

## 2020-02-02 RX ORDER — KETOROLAC TROMETHAMINE 15 MG/ML
15 INJECTION, SOLUTION INTRAMUSCULAR; INTRAVENOUS EVERY 6 HOURS PRN
Status: COMPLETED | OUTPATIENT
Start: 2020-02-02 | End: 2020-02-03

## 2020-02-02 RX ORDER — GABAPENTIN 600 MG/1
1200 TABLET ORAL 3 TIMES DAILY
Status: ON HOLD | COMMUNITY
Start: 2020-02-02 | End: 2020-06-16

## 2020-02-02 RX ORDER — FUROSEMIDE 10 MG/ML
40 INJECTION INTRAMUSCULAR; INTRAVENOUS 2 TIMES DAILY
Status: DISCONTINUED | OUTPATIENT
Start: 2020-02-02 | End: 2020-02-03 | Stop reason: HOSPADM

## 2020-02-02 RX ORDER — POTASSIUM CHLORIDE 1500 MG/1
20 TABLET, EXTENDED RELEASE ORAL 2 TIMES DAILY
Qty: 60 TABLET | Refills: 0 | Status: SHIPPED | OUTPATIENT
Start: 2020-02-02 | End: 2020-07-06

## 2020-02-02 RX ADMIN — POTASSIUM CHLORIDE 20 MEQ: 750 TABLET, EXTENDED RELEASE ORAL at 13:25

## 2020-02-02 RX ADMIN — VITAM B12 100 MCG: 100 TAB at 08:22

## 2020-02-02 RX ADMIN — THERA TABS 1 TABLET: TAB at 08:22

## 2020-02-02 RX ADMIN — CEFAZOLIN 1 G: 1 INJECTION, POWDER, FOR SOLUTION INTRAMUSCULAR; INTRAVENOUS at 20:17

## 2020-02-02 RX ADMIN — GABAPENTIN 1200 MG: 600 TABLET, FILM COATED ORAL at 08:22

## 2020-02-02 RX ADMIN — MELATONIN 1000 UNITS: at 08:22

## 2020-02-02 RX ADMIN — PANTOPRAZOLE SODIUM 40 MG: 40 TABLET, DELAYED RELEASE ORAL at 08:22

## 2020-02-02 RX ADMIN — CEFAZOLIN 1 G: 1 INJECTION, POWDER, FOR SOLUTION INTRAMUSCULAR; INTRAVENOUS at 12:34

## 2020-02-02 RX ADMIN — GABAPENTIN 1200 MG: 600 TABLET, FILM COATED ORAL at 13:25

## 2020-02-02 RX ADMIN — KETOROLAC TROMETHAMINE 30 MG: 30 INJECTION, SOLUTION INTRAMUSCULAR; INTRAVENOUS at 02:09

## 2020-02-02 RX ADMIN — POTASSIUM CHLORIDE 20 MEQ: 750 TABLET, EXTENDED RELEASE ORAL at 08:22

## 2020-02-02 RX ADMIN — CEFAZOLIN 1 G: 1 INJECTION, POWDER, FOR SOLUTION INTRAMUSCULAR; INTRAVENOUS at 04:06

## 2020-02-02 RX ADMIN — SENNOSIDES AND DOCUSATE SODIUM 1 TABLET: 8.6; 5 TABLET ORAL at 20:12

## 2020-02-02 RX ADMIN — FUROSEMIDE 40 MG: 10 INJECTION, SOLUTION INTRAMUSCULAR; INTRAVENOUS at 13:25

## 2020-02-02 RX ADMIN — FUROSEMIDE 40 MG: 10 INJECTION, SOLUTION INTRAMUSCULAR; INTRAVENOUS at 08:36

## 2020-02-02 RX ADMIN — GABAPENTIN 1200 MG: 600 TABLET, FILM COATED ORAL at 20:12

## 2020-02-02 RX ADMIN — ACETAMINOPHEN 1000 MG: 325 TABLET, FILM COATED ORAL at 20:24

## 2020-02-02 RX ADMIN — KETOROLAC TROMETHAMINE 15 MG: 15 INJECTION, SOLUTION INTRAMUSCULAR; INTRAVENOUS at 08:21

## 2020-02-02 RX ADMIN — DULOXETINE HYDROCHLORIDE 60 MG: 60 CAPSULE, DELAYED RELEASE ORAL at 08:22

## 2020-02-02 RX ADMIN — ENOXAPARIN SODIUM 40 MG: 40 INJECTION SUBCUTANEOUS at 08:22

## 2020-02-02 RX ADMIN — KETOROLAC TROMETHAMINE 15 MG: 15 INJECTION, SOLUTION INTRAMUSCULAR; INTRAVENOUS at 15:02

## 2020-02-02 RX ADMIN — METHADONE HYDROCHLORIDE 70 MG: 5 SOLUTION ORAL at 12:33

## 2020-02-02 RX ADMIN — ACETAMINOPHEN 500 MG: 325 TABLET, FILM COATED ORAL at 05:52

## 2020-02-02 RX ADMIN — KETOROLAC TROMETHAMINE 15 MG: 15 INJECTION, SOLUTION INTRAMUSCULAR; INTRAVENOUS at 22:23

## 2020-02-02 RX ADMIN — LEVOTHYROXINE SODIUM 200 MCG: 100 TABLET ORAL at 08:22

## 2020-02-02 NOTE — PLAN OF CARE
Vitals: /79 (BP Location: Left arm)   Pulse 81   Temp 97.8  F (36.6  C) (Oral)   Resp 16   Wt 127.9 kg (282 lb)   LMP 11/01/2013   SpO2 95%   BMI 49.95 kg/m    Lung sounds clear bilaterlly. Denies CP, SOB.   Output: Continent of B/B.  Voiding spontaneously w/out difficulty.   Strict I&O  Last BM: 2/1   Activity: Independent w/ walker   Skin: Intact. Ex 3+ pitting edema on BLE   Pain: Leg pain- managed with PRN IV toradol.   CMS/Neuro: Intact. A&O x 4.   Dressing: None   Diet: Regular. Denies N/V. Strict I&O   LDA: R-hand-PIV- SL   Equipment: Walker, IV pole/pump   Plan/Add'l info: Pt received melatonin at beginning of shift and slept most of the night.   Able to make needs known. Call light within reach. Continue with POC.

## 2020-02-02 NOTE — PLAN OF CARE
VS: Stable.   O2: Saturation >90% on RA.   Output: Voiding without difficulty to the bathroom.   Last BM: 2/1 and passing gas.   Activity: Up independently in room and hallway,    Skin: Intact except +3 pitting edema in BLE's.   Pain: C/o pain in BLE's, given prn IV Toradol with relief.   CMS: Intact. Except c/o occasional numbness in bilateral hands.   Dressing: None.   Diet: Tolerating regular diet. On strict I&O.   LDA: PIV SL between abx into right hand.   Equipment: Walker, IV pole.   Plan: TBD. Will continue to monitor.   Additional Info:        Patient has been educated on potential risks of choosing to leave the unit and that the responsibility for patient well-being will belong to the patient. Pt has been informed that admission to hospital is due to need for medical treatment. Education given to the patient on some of the potential risks included but are not limited to:      - lack of access to nursing intervention      - possible missed appointments with MD, therapies, tests      - possible missed medications, antibiotics, management of IV's    Patient Response: verbalize understanding.    Patient notified staff prior to leaving unit: yes.  Coban wrap placed over IV prior to pt leaving unit. Done.

## 2020-02-02 NOTE — PLAN OF CARE
VS: VSS   O2: >90% on RA   Output: Voiding without difficulty in BR, strict I/O (pt helpful and puts times/volumes on board). Frequent urination with lasix use.    Last BM: LBM 2/2 per pt report, passing flatus, BS active   Activity: Pt up independently, using walker as needed. Goes outside to smoke at times.    Skin: Skin intact ex for redness to BLE and lower abdomen. Skin is warm and dry.    Pain: Pain is being managed with PRN toradol Q6H, scheduled gabapentin, scheduled methadone.    CMS: Reports baseline neuropathy in BLE, swelling +3 in BLE, +2 in bilateral ankles, and trace swelling in feet. DP +2 bilaterally. Able to wiggle toes. Swelling is improving per pt and MD.   Dressing: No dressings, BLE and abdomen VU.   Diet: Tolerating regular diet without N/V, adequate intake of PO fluids   LDA: PIV removed due to infiltration- new PIV placed and SL.    Equipment: IV pole, walker, personal belongings    Plan: Continue to monitor patient, manage pain/swelling. Plan to most likely discharge home tomorrow with PO abx and PO lasix.    Additional Info: Please obtain daily weight early AM- obtained this afternoon and number may not be most accurate.   Daughter has been present throughout shift, lethargic and rarely is awake during shift.   Noted daughter has on patient wristband- inquired to pt, told RN daughter went through ED (was not admitted) to come visit her and a band was placed on her- Charge RN also notified of situation.

## 2020-02-02 NOTE — PROGRESS NOTES
Patient has been educated on potential risks of choosing to leave the unit and that the responsibility for patient well-being will belong to the patient. Pt has been informed that admission to hospital is due to need for medical treatment. Education given to the patient on some of the potential risks included but are not limited to:      - lack of access to nursing intervention      - possible missed appointments with MD, therapies, tests      - possible missed medications, antibiotics, management of IV's    Patient Response: Told RN she was going out to smoke    Patient notified staff prior to leaving unit: YES  Coban wrap placed over IV prior to pt leaving unit: NO, PIV was removed

## 2020-02-02 NOTE — DISCHARGE SUMMARY
Medicine Discharge Summary       No Yusuf MRN# [id number]   YOB: 1960 Age: 60 year old     Date of Admission:  1/27/2020  Date of Discharge:  2/2/2020  Admitting Physician:  Merissa Reese MD  Discharge Physician:  Zena Wheeler  Discharging Service:  Hospital Medicine     Primary Provider: [facility]              Discharge Diagnosis:     Bilateral lower extremity cellulitis  Hypertension  Normocytic anemia etiology unspecified  Obstructive sleep apnea likely  Obesity  hypo albuminemia  Lymphedema  chronic venous stasis  cough resolved  ground glass opacities and CT etiology unspecified  GERD  methadone dependence  hepatitis C not on treatment  Hypothyroidism  major depressive disorder  Anxiety  adrenal adenoma         Hospital Course     Patient is a 60-year-old woman with past medical history significant for fibromyalgia, hypertension, hypothyroidism, hepatitis C, on methadone maintenance who presented with bilateral lower extremity pain redness and a 30 pound weight gain.    Patient had been seen by cardiology a week prior to admission by impression was more of lymphedema and noncardiac etiology of chronic edema she had an echocardiogram done which showed a normal LV function.  She was diuresed in the hospital with IV Lasix.  Her swelling improved and she had a weight loss of 8 pounds she has been advised to continue oral Lasix and follow-up with her primary care physician in one weeks time for repeat visibility panel and continued need of diuretics    she also had been on amlodipine which was stopped as the thought was that may be aggravating the lymphedema    Initially she was started on vancomycin and Zosyn for her cellulitis this was later switch to answer which he tolerated well and on discharge she is being given Augmentin to complete a 10 day course            On Exam ;   Alert and oriented . No acute distress  Vital signs:  Temp: 97.7  F (36.5  C) Temp src: Oral BP: 111/51 Pulse:  "73   Resp: 16 SpO2: 96 % O2 Device: None (Room air) Oxygen Delivery: 1 LPM   Weight: 127.9 kg (282 lb)  Estimated body mass index is 49.95 kg/m  as calculated from the following:    Height as of 1/21/20: 1.6 m (5' 3\").    Weight as of this encounter: 127.9 kg (282 lb).  Neck ; supple ,   Chest ; equal BS bilaterally , no rales or rhonchi   CVS; RRR, no murmur /rubs or gallops  GI ; soft abdomen, non tender, BS positive  Ext ; edema upto groin  Neuro ; CN 2 to 12 grossly intact , No Facial asymmetry noticed  .  Psych ; appropriate mood and effect      ROUTINE IP LABS (Last four results)  BMP  Recent Labs   Lab 02/03/20  0601 02/02/20  0556 02/01/20  0733 01/31/20  0608 01/30/20  1756   NA  --  138 138 138 139   POTASSIUM  --  4.5 4.2 4.2 4.5   CHLORIDE  --  101 102 103 103   RODOLFO  --  8.2* 8.5 8.3* 8.7   CO2  --  34* 32 32 31   BUN  --  22 20 18 15   CR 1.02 1.00 0.92 0.87 0.85   GLC  --  112* 122* 97 119*     CBC  Recent Labs   Lab 02/03/20  0601 01/31/20  0608 01/30/20  0844 01/29/20  0618 01/28/20  0025   WBC  --  8.9 7.4 11.0 10.1   RBC  --  3.47* 3.46* 3.57* 3.50*   HGB  --  10.0* 10.0* 10.2* 10.0*   HCT  --  32.0* 32.3* 33.6* 32.1*   MCV  --  92 93 94 92   MCH  --  28.8 28.9 28.6 28.6   MCHC  --  31.3* 31.0* 30.4* 31.2*   RDW  --  14.4 14.5 14.8 14.3    281 270 300 297     INR  Recent Labs   Lab 01/28/20  0025   INR 1.03       Results for orders placed or performed during the hospital encounter of 01/27/20   US Lower Extremity Venous Duplex Bilateral    Narrative    EXAM: US LOWER EXTREMITY VENOUS DUPLEX BILATERAL  LOCATION: [facility]  DATE/TIME: 1/28/2020 2:41 AM    INDICATION: Bilateral leg swelling, pain and redness.  COMPARISON: None.  TECHNIQUE: Venous Duplex ultrasound of bilateral lower extremities with and without compression, augmentation and duplex. Color flow and spectral Doppler with waveform analysis performed.    FINDINGS: Exam includes the common femoral, femoral, popliteal " veins as well as segmentally visualized deep calf veins and greater saphenous vein.     RIGHT: No deep vein thrombosis. No superficial thrombophlebitis. No popliteal cyst.    LEFT: No deep vein thrombosis. No superficial thrombophlebitis. No popliteal cyst.      Impression    IMPRESSION:  1.  No deep venous thrombosis in the bilateral lower extremities.   CT Chest (PE) Abdomen Pelvis w Contrast    Narrative    EXAM: CT CHEST PE ABDOMEN PELVIS W CONTRAST  LOCATION: [facility]  DATE/TIME: 1/28/2020 2:58 AM    INDICATION: Chest pain, shortness of breath, leg swelling  COMPARISON: None.  TECHNIQUE: CT angiogram chest and routine CT abdomen pelvis with IV contrast. Arterial phase through the chest and venous phase through the abdomen and pelvis. 2D and 3D MIP reconstructions were preformed by the CT technologist. Dose reduction techniques   were used.   CONTRAST: 135ml's     FINDINGS:  ANGIOGRAM CHEST: Pulmonary arteries are normal caliber and negative for pulmonary emboli. Thoracic aorta is negative for dissection. Normal ductus bump noted distal aortic arch. There is mild atherosclerosis of the aorta and coronary arteries. No CT   evidence of right heart strain.     LUNGS AND PLEURA: In the lingula there are few patchy clustered groundglass nodularities up to 8 mm seen, likely inflammatory. No solid features. No lung consolidation. No pleural effusion or pneumothorax. There are couple thin-walled air cysts in the   left lung. A small cystic focus in the left lower lobe on image #50 series 8 measures 7 x 6 mm and has slightly thicker walls, also see coronal image #119. There is a 3 mm fissural node in the right upper lung along the minor fissure, image #51 series 8.   In the posterior right upper lung there is some mild dependent groundglass attenuation, probably atelectasis. No pleural effusion or pneumothorax.    MEDIASTINUM/AXILLAE: Normal heart size. No pericardial effusion. Normal esophagus. No  adenopathy.    HEPATOBILIARY: Hepatic steatosis. The liver is normal in contour, mildly enlarged. In the inferior right liver there is a circumscribed hypodense lesion measuring 1.6 cm which has density of 17 Hounsfield units, fluid attenuation, consistent with a cyst.   There are some geographic areas of fat sparing in the anterior left hepatic medial segment bordering the anterior capsule and bordering the falciform ligament, also seen along the upper gallbladder fossa. No suspicious hepatic lesion. There is a cyst in   the left hepatic lateral segment measuring 13 mm. Status post cholecystectomy. No significant biliary dilatation.    PANCREAS: Normal.    SPLEEN: Normal.    ADRENAL GLANDS: 1.4 x 1.9 cm homogeneous right adrenal gland nodule is likely an adenoma.    KIDNEYS/BLADDER: Normal in size. No hydronephrosis. Some density seen in the collecting systems is favored to be excreted contrast. No definite stones. Normal caliber ureters. The bladder is low in volume and otherwise unremarkable.    BOWEL: Normal caliber bowel. No obstruction or acute inflammatory process. Large amount of stool noted. There is a duodenal diverticulum. Normal appendix.    LYMPH NODES: There are shotty retroperitoneal para-aortic nodes, none of which are pathologically enlarged, nonspecific and probably reactive. No adenopathy by size criteria in the abdomen or pelvis.    VASCULAR: No aortic aneurysm. Moderate atherosclerosis of the aorta and iliac arteries. There are no areas of venous thrombosis.    PELVIC ORGANS: Normal-sized uterus and ovaries. No pelvic free fluid. Unremarkable rectum.    OTHER: Body wall edema along the lateral flanks and dependent lumbar region.    MUSCULOSKELETAL: There is levoscoliosis of the lumbar spine. Multilevel degenerative changes are noted in the spine, which are moderate to severe in the lumbar spine. No acute fracture. Bilateral L5 pars defects and grade 1 spondylolisthesis noted.   Schmorl's node in  the superior L5 endplate. No suspicious osseous lesion.      Impression    IMPRESSION:  1.  No PE.    2.  Clustered groundglass nodularities in the lingula up to 8 mm are likely inflammatory such as from mild aspiration or other pneumonitis. There is some respiratory motion in this region which slightly limits assessment. Airways are clear. A few   thin-walled air cysts are also noted in the left lung and there is an thick-walled cystic semisolid nodule 7 mm diameter which has thicker walls but no definite nodularity. Suggest follow-up chest CT in 3 months.  3.  Right adrenal gland homogeneous 1.4 cm nodule is almost certainly an adenoma. Attention suggested at time of next chest CT which could be extended through the adrenal glands in order to assess the right adrenal without contrast for more definitive   characterization.  4.  Hepatic steatosis and mild hepatomegaly. Couple incidental hepatic cysts are noted.  5. Large amount of colonic stool. Correlate for constipation. No bowel obstruction or acute inflammatory process of the bowel.  6.  Nonspecific body wall edema in the flanks and dependent lumbar region.  7. Bilateral L5 spondylolysis and grade 1 L5-S1 spondylolisthesis.    REFERENCE:  Guidelines for Management of Incidental Pulmonary Nodules Detected on CT Images: From the Fleischner Society 2017.   Guidelines apply to incidental nodules in patients who are 35 years or older.  Guidelines do not apply to lung cancer screening, patients with immunosuppression, or patients with known primary cancer.    SUBSOLID NODULES  Ground glass  Nodule size <6 mm  No routine follow-up. If suspicious, consider follow-up at 2 and 4 years.    Nodule size 6 mm or greater  CT at 6-12 months to confirm persistence, then CT every 2 years until 5 years.    Part solid  Nodule size <6 mm  No routine follow-up.    Nodule size 6 mm or greater  CT at 3-6 months to confirm persistence. If unchanged and solid component remains <6 mm,  annual CT for 5 years.    Multiple  CT at 3-6 months. If stable, consider CT at 2 and 4 years. Management based on the most suspicious nodule.    Consider referral to lung nodule clinic.     Echo Complete    Narrative    [id number]  [id number]  [id number]  [id number]^[email or web address]^LUIS           [facility],[place]  Echocardiography Laboratory  [address] [place] [address]     Name: ELLA MOULTON  MRN: [id number]  : 1960  Study Date: 2020 01:58 PM  Age: 60 yrs  Gender: Female  [place] [facility] [place]  Reason For Study: Edema  Ordering Physician: LUIS PORTILLO  Performed By: Orlin Jorgensen RDCS     BSA: 2.3 m2  Height: 63 in  Weight: 288 lb  BP: 129/74 mmHg  _____________________________________________________________________________  __        Procedure  Complete Portable Echo Adult.  _____________________________________________________________________________  __        Interpretation Summary  Left ventricular function, chamber size, wall motion, and wall thickness are  normal.The EF is 60-65%.  Right ventricular function, chamber size, wall motion, and thickness are  normal.  No hemodynamically significant valve abnormalities.  Pulmonary artery systolic pressure is normal.     There is no prior study for direct comparison.  _____________________________________________________________________________  __        Left Ventricle  Left ventricular function, chamber size, wall motion, and wall thickness are  normal.The EF is 60-65%. Left ventricular diastolic function is normal.     Right Ventricle  Right ventricular function, chamber size, wall motion, and thickness are  normal.     Atria  The right atria appears normal. Mild left atrial enlargement is present.        Mitral Valve  The mitral valve is normal.     Aortic Valve  The valve leaflets are not well visualized. On Doppler interrogation, there is  no significant stenosis or regurgitation.     Tricuspid Valve  The  tricuspid valve is normal. Mild tricuspid insufficiency is present.  Estimated pulmonary artery systolic pressure is 26 mmHg plus right atrial  pressure. Pulmonary artery systolic pressure is normal.     Pulmonic Valve  The valve leaflets are not well visualized. On Doppler interrogation, there is  no significant stenosis or regurgitation.     Vessels  Normal diameter aortic root and proximal ascending aorta. The inferior vena  cava cannot be assessed.     Pericardium  No pericardial effusion is present.        Compared to Previous Study  There is no prior study for direct comparison.  _____________________________________________________________________________  __     MMode/2D Measurements & Calculations  RVDd: 3.6 cm  IVSd: 1.0 cm  LVIDd: 5.1 cm  LVIDs: 3.4 cm  LVPWd: 0.97 cm  FS: 33.4 %  LV mass(C)d: 187.6 grams  LV mass(C)dI: 83.1 grams/m2  asc Aorta Diam: 3.2 cm  LVOT diam: 2.0 cm  LVOT area: 3.1 cm2  RWT: 0.38        Doppler Measurements & Calculations  MV E max kathy: 109.6 cm/sec  MV A max kathy: 110.1 cm/sec  MV E/A: 1.00  MV dec time: 0.27 sec  TR max kathy: 247.2 cm/sec  TR max P.5 mmHg  E/E' av.4  Lateral E/e': 9.3  Medial E/e': 13.5        _____________________________________________________________________________  __           Report approved by: Santo Escobar 2020 04:02 PM                   Discharge Medications:     Current Discharge Medication List      START taking these medications    Details   amoxicillin-clavulanate (AUGMENTIN) 875-125 MG tablet Take 1 tablet by mouth 2 times daily  Qty: 20 tablet, Refills: 0    Associated Diagnoses: Bilateral lower leg cellulitis      potassium chloride ER (K-DUR/KLOR-CON M) 20 MEQ CR tablet Take 1 tablet (20 mEq) by mouth 2 times daily  Qty: 60 tablet, Refills: 0    Associated Diagnoses: Edema of both legs         CONTINUE these medications which have CHANGED    Details   cholecalciferol 25 MCG (1000 UT) TABS Take 1,000 Units by mouth       furosemide (LASIX) 40 MG tablet Take 1 tablet (40 mg) by mouth 2 times daily  Qty: 60 tablet, Refills: 0    Associated Diagnoses: Edema of both legs      gabapentin (NEURONTIN) 600 MG tablet Take 1 tablet (600 mg) by mouth 3 times daily Takes two 600mg tablets three times a day.         CONTINUE these medications which have NOT CHANGED    Details   acetaminophen (TYLENOL) 500 MG tablet Take 1-2 tablets by mouth every 6 hours as needed.      DULoxetine (CYMBALTA) 60 MG capsule Take 60 mg by mouth daily      hydrOXYzine (ATARAX) 50 MG tablet Take 50 mg by mouth every 8 hours as needed   Refills: 0      ibuprofen (ADVIL/MOTRIN) 600 MG tablet 600 mg every 6 hours as needed       levothyroxine (SYNTHROID/LEVOTHROID) 200 MCG tablet Take 200 mcg by mouth daily       methadone HCl 10 MG/5ML SOLN Take 70 mg by mouth Dosed at clinic for her       multivitamin w/minerals (MULTI-VITAMIN) tablet Take 1 tablet by mouth daily      nicotine polacrilex (NICORETTE) 4 MG gum Place 4 mg inside cheek as needed for smoking cessation      omeprazole 20 MG tablet Take 20 mg by mouth daily      ranitidine (ZANTAC) 150 MG tablet Take 150 mg by mouth 2 times daily as needed       traZODone (DESYREL) 50 MG tablet Take 50 mg by mouth At Bedtime      NARCAN 4 MG/0.1ML nasal spray CALL 911. SPRAY CONTENTS OF ONE SPRAYER (0.1ML) INTO ONE NOSTRIL. REPEAT IN 2-3 MINS IF SYMPTOMS OF OPIOID PERSIST, ALTERNATE NOSTRILS  Refills: 0         STOP taking these medications       amLODIPine (NORVASC) 5 MG tablet Comments:   Reason for Stopping:         pantoprazole (PROTONIX) 40 MG EC tablet Comments:   Reason for Stopping:                    Discharge Instructions and Follow-Up:     Discharge Procedure Orders   Reason for your hospital stay   Order Comments: You were admitted with cellulitis and edema     Adult [facility] Follow-up and recommended labs and tests   Order Comments: Follow up with primary care provider, [facility], within 7 days to evaluate  treatment change.  The following labs/tests are recommended: BMP.     # Clustered groundglass nodularities in the lingula up to 8 mm are likely inflammatory such as from mild aspiration or other pneumonitis. There is some respiratory motion in this region which slightly limits assessment. Airways are clear. A few   thin-walled air cysts are also noted in the left lung and there is an thick-walled cystic semisolid nodule 7 mm diameter which has thicker walls but no definite nodularity. Suggest follow-up chest CT in 3 months.  # Right adrenal gland homogeneous 1.4 cm nodule is almost certainly an adenoma. Attention suggested at time of next chest CT which could be extended through the adrenal glands in order to assess the right adrenal without contrast for more definitive   characterization    Appointments on [place] and/or [facility] (with [facility] or [facility] provider or service). Call [phone] if you haven't heard regarding these appointments within 7 days of discharge.     Activity   Order Comments: Your activity upon discharge: activity as tolerated     Order Specific Question Answer Comments   Is discharge order? Yes      Monitor and record   Order Comments: fluid intake and output daily  Limit intake to 1800 ml  per day     When to contact your care team   Order Comments: Call your primary doctor if you have any of the following: temperature greater than 101 F or less than or increased swelling.     Diet   Order Comments: Follow this diet upon discharge: Orders Placed This Encounter      Regular Diet Adult     Order Specific Question Answer Comments   Is discharge order? Yes          Reason for your hospital stay    You were admitted with cellulitis and edema     Adult [facility] Follow-up and recommended labs and tests    Follow up with primary care provider, [facility], within 7 days to evaluate treatment change.  The following labs/tests are recommended: BMP.     # Clustered groundglass nodularities in  the lingula up to 8 mm are likely inflammatory such as from mild aspiration or other pneumonitis. There is some respiratory motion in this region which slightly limits assessment. Airways are clear. A few   thin-walled air cysts are also noted in the left lung and there is an thick-walled cystic semisolid nodule 7 mm diameter which has thicker walls but no definite nodularity. Suggest follow-up chest CT in 3 months.  # Right adrenal gland homogeneous 1.4 cm nodule is almost certainly an adenoma. Attention suggested at time of next chest CT which could be extended through the adrenal glands in order to assess the right adrenal without contrast for more definitive   characterization    Appointments on [place] and/or [facility] (with [facility] or [facility] provider or service). Call [phone] if you haven't heard regarding these appointments within 7 days of discharge.     Activity    Your activity upon discharge: activity as tolerated     Monitor and record    fluid intake and output daily  Limit intake to 1800 ml  per day     When to contact your care team    Call your primary doctor if you have any of the following: temperature greater than 101 F or less than or increased swelling.     Diet    Follow this diet upon discharge: Orders Placed This Encounter      Regular Diet Adult                Discharge Disposition:   35  minutes spent in discharge, including >50% in counseling and coordination of care, medication review and plan of care recommended on follow up. Questions were answered to satisfaction       Zena Wheeler MD  Internal Medicine Hospitalist & Staff Physician  [facility]

## 2020-02-03 VITALS
DIASTOLIC BLOOD PRESSURE: 98 MMHG | HEART RATE: 85 BPM | BODY MASS INDEX: 50.96 KG/M2 | TEMPERATURE: 97.9 F | OXYGEN SATURATION: 96 % | RESPIRATION RATE: 16 BRPM | SYSTOLIC BLOOD PRESSURE: 133 MMHG | WEIGHT: 287.7 LBS

## 2020-02-03 LAB
BACTERIA SPEC CULT: NO GROWTH
BACTERIA SPEC CULT: NO GROWTH
CREAT SERPL-MCNC: 1.02 MG/DL (ref 0.52–1.04)
GFR SERPL CREATININE-BSD FRML MDRD: 60 ML/MIN/{1.73_M2}
Lab: NORMAL
Lab: NORMAL
PLATELET # BLD AUTO: 324 10E9/L (ref 150–450)
SPECIMEN SOURCE: NORMAL
SPECIMEN SOURCE: NORMAL

## 2020-02-03 PROCEDURE — 36415 COLL VENOUS BLD VENIPUNCTURE: CPT | Performed by: INTERNAL MEDICINE

## 2020-02-03 PROCEDURE — 25000128 H RX IP 250 OP 636: Performed by: INTERNAL MEDICINE

## 2020-02-03 PROCEDURE — 25000132 ZZH RX MED GY IP 250 OP 250 PS 637: Performed by: INTERNAL MEDICINE

## 2020-02-03 PROCEDURE — 85049 AUTOMATED PLATELET COUNT: CPT | Performed by: INTERNAL MEDICINE

## 2020-02-03 PROCEDURE — 82565 ASSAY OF CREATININE: CPT | Performed by: INTERNAL MEDICINE

## 2020-02-03 PROCEDURE — 25000132 ZZH RX MED GY IP 250 OP 250 PS 637: Performed by: STUDENT IN AN ORGANIZED HEALTH CARE EDUCATION/TRAINING PROGRAM

## 2020-02-03 RX ORDER — HYDROXYZINE HYDROCHLORIDE 25 MG/1
50 TABLET, FILM COATED ORAL ONCE
Status: COMPLETED | OUTPATIENT
Start: 2020-02-03 | End: 2020-02-03

## 2020-02-03 RX ADMIN — METHADONE HYDROCHLORIDE 70 MG: 5 SOLUTION ORAL at 11:39

## 2020-02-03 RX ADMIN — POTASSIUM CHLORIDE 20 MEQ: 750 TABLET, EXTENDED RELEASE ORAL at 13:13

## 2020-02-03 RX ADMIN — POTASSIUM CHLORIDE 20 MEQ: 750 TABLET, EXTENDED RELEASE ORAL at 08:27

## 2020-02-03 RX ADMIN — HYDROXYZINE HYDROCHLORIDE 50 MG: 25 TABLET, FILM COATED ORAL at 05:36

## 2020-02-03 RX ADMIN — KETOROLAC TROMETHAMINE 15 MG: 15 INJECTION, SOLUTION INTRAMUSCULAR; INTRAVENOUS at 11:39

## 2020-02-03 RX ADMIN — CEFAZOLIN 1 G: 1 INJECTION, POWDER, FOR SOLUTION INTRAMUSCULAR; INTRAVENOUS at 13:13

## 2020-02-03 RX ADMIN — MELATONIN 1000 UNITS: at 08:27

## 2020-02-03 RX ADMIN — ACETAMINOPHEN 1000 MG: 325 TABLET, FILM COATED ORAL at 03:33

## 2020-02-03 RX ADMIN — GABAPENTIN 1200 MG: 600 TABLET, FILM COATED ORAL at 13:13

## 2020-02-03 RX ADMIN — Medication 1 MG: at 00:51

## 2020-02-03 RX ADMIN — PANTOPRAZOLE SODIUM 40 MG: 40 TABLET, DELAYED RELEASE ORAL at 08:27

## 2020-02-03 RX ADMIN — VITAM B12 100 MCG: 100 TAB at 08:27

## 2020-02-03 RX ADMIN — DULOXETINE HYDROCHLORIDE 60 MG: 60 CAPSULE, DELAYED RELEASE ORAL at 08:27

## 2020-02-03 RX ADMIN — LEVOTHYROXINE SODIUM 200 MCG: 100 TABLET ORAL at 08:27

## 2020-02-03 RX ADMIN — SENNOSIDES AND DOCUSATE SODIUM 1 TABLET: 8.6; 5 TABLET ORAL at 08:27

## 2020-02-03 RX ADMIN — FUROSEMIDE 40 MG: 10 INJECTION, SOLUTION INTRAMUSCULAR; INTRAVENOUS at 13:13

## 2020-02-03 RX ADMIN — CEFAZOLIN 1 G: 1 INJECTION, POWDER, FOR SOLUTION INTRAMUSCULAR; INTRAVENOUS at 04:47

## 2020-02-03 RX ADMIN — FUROSEMIDE 40 MG: 10 INJECTION, SOLUTION INTRAMUSCULAR; INTRAVENOUS at 08:27

## 2020-02-03 RX ADMIN — GABAPENTIN 1200 MG: 600 TABLET, FILM COATED ORAL at 08:27

## 2020-02-03 RX ADMIN — ENOXAPARIN SODIUM 40 MG: 40 INJECTION SUBCUTANEOUS at 08:27

## 2020-02-03 RX ADMIN — THERA TABS 1 TABLET: TAB at 08:27

## 2020-02-03 RX ADMIN — KETOROLAC TROMETHAMINE 15 MG: 15 INJECTION, SOLUTION INTRAMUSCULAR; INTRAVENOUS at 04:47

## 2020-02-03 NOTE — PLAN OF CARE
VS: Stable except hypertensive at start of shift.   O2: RA.   Output: Voiding independently in bathroom, strict I/O, pt is pretty good about telling staff her outputs.   Last BM: 2/1/20.   Activity: Independent with cane, walker, or WC.   Skin: BLE redness, abdominal redness.   Pain: Aching, cramping pain in back and legs managed with toradol and tylenol.   CMS: BLE numbness and tingling.   Dressing: NA.   Diet: Regular, strict I/O, pt frequently asks for multiple ice creams with lots of patel crackers.   LDA: PIV left upper forearm SL between abx.   Equipment: IV pole, cane, walker, WC, call light within reach.   Plan: Anticipate discharge on orals.   Additional Info: Pt reporting anxiety about being discharged, one time dose of atarax given.    Pt's daughter slept in pt's bed throughout entire shift.

## 2020-02-03 NOTE — PLAN OF CARE
VS: VSS   O2: 90% on room air    Output: Voiding spontaneously without difficulties    Last BM: 2/1/2020 and passing gas   Activity: Up independently   Skin: Redness to BLE and abdomen    Pain: C/o lower back pain that is well controlled with PRN Toradol and tylenol     CMS: Intact    Dressing: None    Diet: Regular    LDA: PIV to left arm   Equipment: IV pole and Pump, walker, cane, call light within reach   Plan: To discharge to [facility] home.    Additional Info:

## 2020-02-04 ENCOUNTER — PATIENT OUTREACH (OUTPATIENT)
Dept: CARE COORDINATION | Facility: CLINIC | Age: 60
End: 2020-02-04

## 2020-02-04 NOTE — PROGRESS NOTES
Attempted post discharge  Staffed the phone for Care facility   Stated patient is doing good this morning they have no questions or concerns

## 2020-02-21 ENCOUNTER — PRE VISIT (OUTPATIENT)
Dept: SLEEP MEDICINE | Facility: CLINIC | Age: 60
End: 2020-02-21

## 2020-02-21 NOTE — TELEPHONE ENCOUNTER
"  1.  Reason for the visit:  Consult to discuss sleep apnea  2.  Referring provider and clinic name:    3.  Previous Sleep Doctor or Pulmonlogist (clinic name)?  RAGHAV and info needed  4.  Records, Procedures, Imaging, and Labs (see below)          All NOTES from previous office visits that pertain to why they are being seen in the Sleep Center    Previous Sleep Studies, Chest CT, Echos and reports that pertain to why they are seeing Sleep Center    All Sleep records that have been done in the last 2 years that pertain to why they are seeing Sleep Center            Are they being seen for continuation of care for Cpap/Bipap/Avap/Trilogy/Dental Device?     If yes to above Who and Where was Device issued/currently getting supplies from?     Are you currently on \"Supplemental Oxygen\" during the day or night?                                                                                                                                                         Please remind pt to bring Cpap machine and ask to arrive 15 minutes early to appointment due traffic and congestion                                                 5. Pt Sleep Center Packet received Message left asking pt to arrive 30 minutes early to appointment if no packet received.        Yes: \"please make sure that you bring this to your appointment completed, either the doctor will not see you until this completed or you may be asked to reschedule your appointment.\"     No: mail or email to the pt and explain, \"please make sure that you bring this to your appointment completed, either the doctor will not see you until this completed or you may be asked to reschedule your appointment.\"     ~If pt coming early to fill packet out, ask that they come 30 minutes prior to their appointment~     6. Has the pt's medication list been updated and preferred pharmacy added?     7. Has the allergy list been reviewed?    \"Thank you for choosing [facility] and we " "look forward to seeing you at your upcoming appointment\"     "

## 2020-05-15 ENCOUNTER — ANCILLARY PROCEDURE (OUTPATIENT)
Dept: CT IMAGING | Facility: CLINIC | Age: 60
End: 2020-05-15
Attending: FAMILY MEDICINE

## 2020-05-15 DIAGNOSIS — R60.0 EDEMA OF BOTH LEGS: ICD-10-CM

## 2020-05-15 RX ORDER — IOPAMIDOL 755 MG/ML
135 INJECTION, SOLUTION INTRAVASCULAR ONCE
Status: COMPLETED | OUTPATIENT
Start: 2020-05-15 | End: 2020-05-15

## 2020-05-15 RX ADMIN — IOPAMIDOL 125 ML: 755 INJECTION, SOLUTION INTRAVASCULAR at 13:50

## 2020-05-15 NOTE — DISCHARGE INSTRUCTIONS

## 2020-06-09 ENCOUNTER — APPOINTMENT (OUTPATIENT)
Dept: GENERAL RADIOLOGY | Facility: CLINIC | Age: 60
DRG: 607 | End: 2020-06-09
Attending: EMERGENCY MEDICINE
Payer: COMMERCIAL

## 2020-06-09 ENCOUNTER — HOSPITAL ENCOUNTER (INPATIENT)
Facility: CLINIC | Age: 60
LOS: 6 days | Discharge: HOME | DRG: 607 | End: 2020-06-16
Attending: EMERGENCY MEDICINE | Admitting: INTERNAL MEDICINE
Payer: COMMERCIAL

## 2020-06-09 DIAGNOSIS — Z20.828 CONTACT WITH AND (SUSPECTED) EXPOSURE TO OTHER VIRAL COMMUNICABLE DISEASES: ICD-10-CM

## 2020-06-09 DIAGNOSIS — R06.02 SHORTNESS OF BREATH: ICD-10-CM

## 2020-06-09 DIAGNOSIS — K59.00 CONSTIPATION, UNSPECIFIED CONSTIPATION TYPE: Primary | ICD-10-CM

## 2020-06-09 DIAGNOSIS — E03.8 OTHER SPECIFIED HYPOTHYROIDISM: ICD-10-CM

## 2020-06-09 DIAGNOSIS — B37.2 YEAST INFECTION OF THE SKIN: ICD-10-CM

## 2020-06-09 DIAGNOSIS — R60.0 EDEMA OF BOTH LEGS: ICD-10-CM

## 2020-06-09 DIAGNOSIS — I89.0 LYMPHEDEMA: ICD-10-CM

## 2020-06-09 DIAGNOSIS — M17.12 PRIMARY OSTEOARTHRITIS OF LEFT KNEE: ICD-10-CM

## 2020-06-09 DIAGNOSIS — M79.7 FIBROMYALGIA: ICD-10-CM

## 2020-06-09 LAB
BASOPHILS # BLD AUTO: 0 10E9/L (ref 0–0.2)
BASOPHILS NFR BLD AUTO: 0.3 %
DIFFERENTIAL METHOD BLD: ABNORMAL
EOSINOPHIL # BLD AUTO: 0.2 10E9/L (ref 0–0.7)
EOSINOPHIL NFR BLD AUTO: 2.3 %
ERYTHROCYTE [DISTWIDTH] IN BLOOD BY AUTOMATED COUNT: 16.5 % (ref 10–15)
HCT VFR BLD AUTO: 34.5 % (ref 35–47)
HGB BLD-MCNC: 10.9 G/DL (ref 11.7–15.7)
IMM GRANULOCYTES # BLD: 0 10E9/L (ref 0–0.4)
IMM GRANULOCYTES NFR BLD: 0.4 %
LYMPHOCYTES # BLD AUTO: 1.7 10E9/L (ref 0.8–5.3)
LYMPHOCYTES NFR BLD AUTO: 17.9 %
MCH RBC QN AUTO: 27.4 PG (ref 26.5–33)
MCHC RBC AUTO-ENTMCNC: 31.6 G/DL (ref 31.5–36.5)
MCV RBC AUTO: 87 FL (ref 78–100)
MONOCYTES # BLD AUTO: 1.1 10E9/L (ref 0–1.3)
MONOCYTES NFR BLD AUTO: 11.2 %
NEUTROPHILS # BLD AUTO: 6.4 10E9/L (ref 1.6–8.3)
NEUTROPHILS NFR BLD AUTO: 67.9 %
NRBC # BLD AUTO: 0 10*3/UL
NRBC BLD AUTO-RTO: 0 /100
PLATELET # BLD AUTO: 284 10E9/L (ref 150–450)
RBC # BLD AUTO: 3.98 10E12/L (ref 3.8–5.2)
WBC # BLD AUTO: 9.4 10E9/L (ref 4–11)

## 2020-06-09 PROCEDURE — 85025 COMPLETE CBC W/AUTO DIFF WBC: CPT | Performed by: EMERGENCY MEDICINE

## 2020-06-09 PROCEDURE — 93010 ELECTROCARDIOGRAM REPORT: CPT | Mod: Z6 | Performed by: EMERGENCY MEDICINE

## 2020-06-09 PROCEDURE — 84145 PROCALCITONIN (PCT): CPT | Performed by: EMERGENCY MEDICINE

## 2020-06-09 PROCEDURE — 80053 COMPREHEN METABOLIC PANEL: CPT | Performed by: EMERGENCY MEDICINE

## 2020-06-09 PROCEDURE — 99285 EMERGENCY DEPT VISIT HI MDM: CPT | Mod: 25 | Performed by: EMERGENCY MEDICINE

## 2020-06-09 PROCEDURE — 93005 ELECTROCARDIOGRAM TRACING: CPT | Performed by: EMERGENCY MEDICINE

## 2020-06-09 PROCEDURE — 71045 X-RAY EXAM CHEST 1 VIEW: CPT

## 2020-06-09 PROCEDURE — 85379 FIBRIN DEGRADATION QUANT: CPT | Performed by: EMERGENCY MEDICINE

## 2020-06-09 PROCEDURE — C9803 HOPD COVID-19 SPEC COLLECT: HCPCS | Performed by: EMERGENCY MEDICINE

## 2020-06-09 RX ORDER — FUROSEMIDE 10 MG/ML
40 INJECTION INTRAMUSCULAR; INTRAVENOUS ONCE
Status: COMPLETED | OUTPATIENT
Start: 2020-06-09 | End: 2020-06-10

## 2020-06-09 ASSESSMENT — ENCOUNTER SYMPTOMS
FEVER: 0
ABDOMINAL PAIN: 0
COUGH: 0
SHORTNESS OF BREATH: 1

## 2020-06-10 ENCOUNTER — APPOINTMENT (OUTPATIENT)
Dept: ULTRASOUND IMAGING | Facility: CLINIC | Age: 60
DRG: 607 | End: 2020-06-10
Attending: EMERGENCY MEDICINE
Payer: COMMERCIAL

## 2020-06-10 ENCOUNTER — APPOINTMENT (OUTPATIENT)
Dept: CT IMAGING | Facility: CLINIC | Age: 60
DRG: 607 | End: 2020-06-10
Attending: EMERGENCY MEDICINE
Payer: COMMERCIAL

## 2020-06-10 PROBLEM — I89.0 LYMPHEDEMA: Status: ACTIVE | Noted: 2020-06-10

## 2020-06-10 LAB
ALBUMIN SERPL-MCNC: 3 G/DL (ref 3.4–5)
ALBUMIN UR-MCNC: NEGATIVE MG/DL
ALP SERPL-CCNC: 147 U/L (ref 40–150)
ALT SERPL W P-5'-P-CCNC: 55 U/L (ref 0–50)
ANION GAP SERPL CALCULATED.3IONS-SCNC: 5 MMOL/L (ref 3–14)
ANION GAP SERPL CALCULATED.3IONS-SCNC: 6 MMOL/L (ref 3–14)
APPEARANCE UR: CLEAR
AST SERPL W P-5'-P-CCNC: 51 U/L (ref 0–45)
BILIRUB SERPL-MCNC: 0.2 MG/DL (ref 0.2–1.3)
BILIRUB UR QL STRIP: NEGATIVE
BUN SERPL-MCNC: 11 MG/DL (ref 7–30)
BUN SERPL-MCNC: 12 MG/DL (ref 7–30)
CALCIUM SERPL-MCNC: 8.8 MG/DL (ref 8.5–10.1)
CALCIUM SERPL-MCNC: 8.8 MG/DL (ref 8.5–10.1)
CHLORIDE SERPL-SCNC: 103 MMOL/L (ref 94–109)
CHLORIDE SERPL-SCNC: 103 MMOL/L (ref 94–109)
CO2 SERPL-SCNC: 29 MMOL/L (ref 20–32)
CO2 SERPL-SCNC: 31 MMOL/L (ref 20–32)
COLOR UR AUTO: NORMAL
CREAT SERPL-MCNC: 0.84 MG/DL (ref 0.52–1.04)
CREAT SERPL-MCNC: 0.86 MG/DL (ref 0.52–1.04)
CRP SERPL-MCNC: 20 MG/L (ref 0–8)
D DIMER PPP FEU-MCNC: 1 UG/ML FEU (ref 0–0.5)
GFR SERPL CREATININE-BSD FRML MDRD: 74 ML/MIN/{1.73_M2}
GFR SERPL CREATININE-BSD FRML MDRD: 75 ML/MIN/{1.73_M2}
GLUCOSE SERPL-MCNC: 113 MG/DL (ref 70–99)
GLUCOSE SERPL-MCNC: 127 MG/DL (ref 70–99)
GLUCOSE UR STRIP-MCNC: NEGATIVE MG/DL
HGB UR QL STRIP: NEGATIVE
INTERPRETATION ECG - MUSE: NORMAL
KETONES UR STRIP-MCNC: NEGATIVE MG/DL
LEUKOCYTE ESTERASE UR QL STRIP: NEGATIVE
MAGNESIUM SERPL-MCNC: 2.1 MG/DL (ref 1.6–2.3)
NITRATE UR QL: NEGATIVE
NT-PROBNP SERPL-MCNC: 277 PG/ML (ref 0–900)
PH UR STRIP: 6.5 PH (ref 5–7)
PLATELET # BLD AUTO: 278 10E9/L (ref 150–450)
POTASSIUM SERPL-SCNC: 3.8 MMOL/L (ref 3.4–5.3)
POTASSIUM SERPL-SCNC: 3.9 MMOL/L (ref 3.4–5.3)
PROCALCITONIN SERPL-MCNC: 0.05 NG/ML
PROCALCITONIN SERPL-MCNC: <0.05 NG/ML
PROT SERPL-MCNC: 8.3 G/DL (ref 6.8–8.8)
RBC #/AREA URNS AUTO: <1 /HPF (ref 0–2)
SARS-COV-2 PCR COMMENT: NORMAL
SARS-COV-2 RNA SPEC QL NAA+PROBE: NEGATIVE
SARS-COV-2 RNA SPEC QL NAA+PROBE: NORMAL
SARS-COV-2 RNA SPEC QL NAA+PROBE: NOT DETECTED
SODIUM SERPL-SCNC: 138 MMOL/L (ref 133–144)
SODIUM SERPL-SCNC: 139 MMOL/L (ref 133–144)
SOURCE: NORMAL
SP GR UR STRIP: 1.01 (ref 1–1.03)
SPECIMEN SOURCE: NORMAL
SQUAMOUS #/AREA URNS AUTO: <1 /HPF (ref 0–1)
T4 FREE SERPL-MCNC: 1.26 NG/DL (ref 0.76–1.46)
TSH SERPL DL<=0.005 MIU/L-ACNC: 9.07 MU/L (ref 0.4–4)
UROBILINOGEN UR STRIP-MCNC: NORMAL MG/DL (ref 0–2)
WBC #/AREA URNS AUTO: <1 /HPF (ref 0–5)

## 2020-06-10 PROCEDURE — 25000132 ZZH RX MED GY IP 250 OP 250 PS 637: Performed by: EMERGENCY MEDICINE

## 2020-06-10 PROCEDURE — 96374 THER/PROPH/DIAG INJ IV PUSH: CPT | Mod: 59 | Performed by: EMERGENCY MEDICINE

## 2020-06-10 PROCEDURE — 25000125 ZZHC RX 250: Performed by: EMERGENCY MEDICINE

## 2020-06-10 PROCEDURE — 25000128 H RX IP 250 OP 636: Performed by: HOSPITALIST

## 2020-06-10 PROCEDURE — 25000132 ZZH RX MED GY IP 250 OP 250 PS 637: Performed by: INTERNAL MEDICINE

## 2020-06-10 PROCEDURE — 25000128 H RX IP 250 OP 636: Performed by: EMERGENCY MEDICINE

## 2020-06-10 PROCEDURE — 81001 URINALYSIS AUTO W/SCOPE: CPT | Performed by: EMERGENCY MEDICINE

## 2020-06-10 PROCEDURE — 71275 CT ANGIOGRAPHY CHEST: CPT

## 2020-06-10 PROCEDURE — 80048 BASIC METABOLIC PNL TOTAL CA: CPT | Performed by: INTERNAL MEDICINE

## 2020-06-10 PROCEDURE — 36415 COLL VENOUS BLD VENIPUNCTURE: CPT | Performed by: INTERNAL MEDICINE

## 2020-06-10 PROCEDURE — 84443 ASSAY THYROID STIM HORMONE: CPT | Performed by: INTERNAL MEDICINE

## 2020-06-10 PROCEDURE — 99223 1ST HOSP IP/OBS HIGH 75: CPT | Mod: AI | Performed by: INTERNAL MEDICINE

## 2020-06-10 PROCEDURE — 86140 C-REACTIVE PROTEIN: CPT | Performed by: INTERNAL MEDICINE

## 2020-06-10 PROCEDURE — 12000001 ZZH R&B MED SURG/OB UMMC

## 2020-06-10 PROCEDURE — 84145 PROCALCITONIN (PCT): CPT | Performed by: INTERNAL MEDICINE

## 2020-06-10 PROCEDURE — 25000128 H RX IP 250 OP 636: Performed by: INTERNAL MEDICINE

## 2020-06-10 PROCEDURE — 85049 AUTOMATED PLATELET COUNT: CPT | Performed by: INTERNAL MEDICINE

## 2020-06-10 PROCEDURE — 83880 ASSAY OF NATRIURETIC PEPTIDE: CPT | Performed by: INTERNAL MEDICINE

## 2020-06-10 PROCEDURE — 83735 ASSAY OF MAGNESIUM: CPT | Performed by: INTERNAL MEDICINE

## 2020-06-10 PROCEDURE — 87635 SARS-COV-2 COVID-19 AMP PRB: CPT | Performed by: EMERGENCY MEDICINE

## 2020-06-10 PROCEDURE — 84439 ASSAY OF FREE THYROXINE: CPT | Performed by: INTERNAL MEDICINE

## 2020-06-10 PROCEDURE — 93970 EXTREMITY STUDY: CPT

## 2020-06-10 PROCEDURE — U0003 INFECTIOUS AGENT DETECTION BY NUCLEIC ACID (DNA OR RNA); SEVERE ACUTE RESPIRATORY SYNDROME CORONAVIRUS 2 (SARS-COV-2) (CORONAVIRUS DISEASE [COVID-19]), AMPLIFIED PROBE TECHNIQUE, MAKING USE OF HIGH THROUGHPUT TECHNOLOGIES AS DESCRIBED BY CMS-2020-01-R: HCPCS | Performed by: HOSPITALIST

## 2020-06-10 RX ORDER — IOPAMIDOL 755 MG/ML
100 INJECTION, SOLUTION INTRAVASCULAR ONCE
Status: COMPLETED | OUTPATIENT
Start: 2020-06-10 | End: 2020-06-10

## 2020-06-10 RX ORDER — GABAPENTIN 600 MG/1
600 TABLET ORAL 3 TIMES DAILY
Status: DISCONTINUED | OUTPATIENT
Start: 2020-06-10 | End: 2020-06-10

## 2020-06-10 RX ORDER — VITAMIN B COMPLEX
1000 TABLET ORAL DAILY
Status: DISCONTINUED | OUTPATIENT
Start: 2020-06-10 | End: 2020-06-16 | Stop reason: HOSPADM

## 2020-06-10 RX ORDER — LIDOCAINE 40 MG/G
CREAM TOPICAL
Status: DISCONTINUED | OUTPATIENT
Start: 2020-06-10 | End: 2020-06-16 | Stop reason: HOSPADM

## 2020-06-10 RX ORDER — FUROSEMIDE 10 MG/ML
40 INJECTION INTRAMUSCULAR; INTRAVENOUS ONCE
Status: COMPLETED | OUTPATIENT
Start: 2020-06-10 | End: 2020-06-10

## 2020-06-10 RX ORDER — LEVOTHYROXINE SODIUM 100 UG/1
200 TABLET ORAL DAILY
Status: DISCONTINUED | OUTPATIENT
Start: 2020-06-10 | End: 2020-06-11

## 2020-06-10 RX ORDER — METHADONE HYDROCHLORIDE 10 MG/5ML
70 SOLUTION ORAL DAILY
Status: DISCONTINUED | OUTPATIENT
Start: 2020-06-10 | End: 2020-06-10

## 2020-06-10 RX ORDER — DULOXETIN HYDROCHLORIDE 60 MG/1
60 CAPSULE, DELAYED RELEASE ORAL DAILY
Status: DISCONTINUED | OUTPATIENT
Start: 2020-06-10 | End: 2020-06-16 | Stop reason: HOSPADM

## 2020-06-10 RX ORDER — GABAPENTIN 800 MG/1
800 TABLET ORAL 3 TIMES DAILY
Status: DISCONTINUED | OUTPATIENT
Start: 2020-06-10 | End: 2020-06-16 | Stop reason: HOSPADM

## 2020-06-10 RX ORDER — POTASSIUM CHLORIDE 750 MG/1
20 TABLET, EXTENDED RELEASE ORAL 2 TIMES DAILY
Status: DISCONTINUED | OUTPATIENT
Start: 2020-06-10 | End: 2020-06-16 | Stop reason: HOSPADM

## 2020-06-10 RX ORDER — IBUPROFEN 600 MG/1
600 TABLET, FILM COATED ORAL ONCE
Status: COMPLETED | OUTPATIENT
Start: 2020-06-10 | End: 2020-06-10

## 2020-06-10 RX ORDER — HYDROXYZINE HYDROCHLORIDE 25 MG/1
50 TABLET, FILM COATED ORAL EVERY 8 HOURS PRN
Status: DISCONTINUED | OUTPATIENT
Start: 2020-06-10 | End: 2020-06-16 | Stop reason: HOSPADM

## 2020-06-10 RX ORDER — TRAZODONE HYDROCHLORIDE 50 MG/1
50 TABLET, FILM COATED ORAL AT BEDTIME
Status: DISCONTINUED | OUTPATIENT
Start: 2020-06-10 | End: 2020-06-16 | Stop reason: HOSPADM

## 2020-06-10 RX ORDER — FAMOTIDINE 20 MG/1
20 TABLET, FILM COATED ORAL 2 TIMES DAILY PRN
Status: DISCONTINUED | OUTPATIENT
Start: 2020-06-10 | End: 2020-06-10

## 2020-06-10 RX ORDER — MULTIPLE VITAMINS W/ MINERALS TAB 9MG-400MCG
1 TAB ORAL DAILY
Status: DISCONTINUED | OUTPATIENT
Start: 2020-06-10 | End: 2020-06-16 | Stop reason: HOSPADM

## 2020-06-10 RX ORDER — TRAZODONE HYDROCHLORIDE 50 MG/1
50 TABLET, FILM COATED ORAL
Status: DISCONTINUED | OUTPATIENT
Start: 2020-06-10 | End: 2020-06-16 | Stop reason: HOSPADM

## 2020-06-10 RX ORDER — FUROSEMIDE 10 MG/ML
40 INJECTION INTRAMUSCULAR; INTRAVENOUS DAILY
Status: DISCONTINUED | OUTPATIENT
Start: 2020-06-10 | End: 2020-06-12

## 2020-06-10 RX ADMIN — FUROSEMIDE 40 MG: 10 INJECTION, SOLUTION INTRAMUSCULAR; INTRAVENOUS at 13:57

## 2020-06-10 RX ADMIN — MELATONIN 1000 UNITS: at 08:15

## 2020-06-10 RX ADMIN — IBUPROFEN 600 MG: 600 TABLET, FILM COATED ORAL at 02:34

## 2020-06-10 RX ADMIN — POTASSIUM CHLORIDE 20 MEQ: 750 TABLET, EXTENDED RELEASE ORAL at 21:14

## 2020-06-10 RX ADMIN — MULTIPLE VITAMINS W/ MINERALS TAB 1 TABLET: TAB at 08:15

## 2020-06-10 RX ADMIN — TRAZODONE HYDROCHLORIDE 50 MG: 50 TABLET ORAL at 21:14

## 2020-06-10 RX ADMIN — ENOXAPARIN SODIUM 40 MG: 40 INJECTION SUBCUTANEOUS at 08:17

## 2020-06-10 RX ADMIN — SODIUM CHLORIDE 90 ML: 9 INJECTION, SOLUTION INTRAVENOUS at 02:31

## 2020-06-10 RX ADMIN — OMEPRAZOLE 20 MG: 20 CAPSULE, DELAYED RELEASE ORAL at 08:15

## 2020-06-10 RX ADMIN — DULOXETINE HYDROCHLORIDE 60 MG: 60 CAPSULE, DELAYED RELEASE ORAL at 08:15

## 2020-06-10 RX ADMIN — LEVOTHYROXINE SODIUM 200 MCG: 100 TABLET ORAL at 08:15

## 2020-06-10 RX ADMIN — GABAPENTIN 800 MG: 800 TABLET, FILM COATED ORAL at 21:14

## 2020-06-10 RX ADMIN — FUROSEMIDE 40 MG: 10 INJECTION, SOLUTION INTRAMUSCULAR; INTRAVENOUS at 00:35

## 2020-06-10 RX ADMIN — POTASSIUM CHLORIDE 20 MEQ: 750 TABLET, EXTENDED RELEASE ORAL at 08:15

## 2020-06-10 RX ADMIN — FUROSEMIDE 40 MG: 10 INJECTION, SOLUTION INTRAMUSCULAR; INTRAVENOUS at 08:32

## 2020-06-10 RX ADMIN — IOPAMIDOL 77 ML: 755 INJECTION, SOLUTION INTRAVENOUS at 02:30

## 2020-06-10 RX ADMIN — GABAPENTIN 800 MG: 800 TABLET, FILM COATED ORAL at 13:49

## 2020-06-10 RX ADMIN — GABAPENTIN 800 MG: 800 TABLET, FILM COATED ORAL at 08:14

## 2020-06-10 ASSESSMENT — ACTIVITIES OF DAILY LIVING (ADL)
RETIRED_EATING: 0-->INDEPENDENT
COGNITION: 0 - NO COGNITION ISSUES REPORTED
FALL_HISTORY_WITHIN_LAST_SIX_MONTHS: NO
TRANSFERRING: 1-->ASSISTIVE EQUIPMENT
DRESS: 0-->INDEPENDENT
AMBULATION: 1-->ASSISTIVE EQUIPMENT
BATHING: 1-->ASSISTIVE EQUIPMENT
TOILETING: 1-->ASSISTIVE EQUIPMENT
SWALLOWING: 0-->SWALLOWS FOODS/LIQUIDS WITHOUT DIFFICULTY
RETIRED_COMMUNICATION: 0-->UNDERSTANDS/COMMUNICATES WITHOUT DIFFICULTY

## 2020-06-10 NOTE — H&P
[facility], [place]    History and Physical - Hospitalist Service       Date of Admission:  6/9/2020    Assessment & Plan   No Yusuf is a 60 year old female with htn, hypothyroidism, lymphedema, polysubstance abuse, bipolar, arthritis, and fibromyalgia who was admitted with shortness of breath, weight gain, and increased diameter of thighs with associated pain. It is difficult for her to ambulate due to pain/tightness in legs    Acute lymphedema exacerbation with fluid overload likely into lungs causing dyspnea and increased orthopnea- pink and swollen, but not exquisitely tender or very warm. BLE US without DVT. D dimer elevated but obese, CTA without evidence of PE. Weight up 16 lbs since discharge 2/2/20, with furosemide dose decreased from 40 mg BID to 40 mg qam during her hospitalization for influenza at [facility] (2/14-2/17) before she returned to her chem dep treatment center in [place]. Amlodipine was also restarted at the [facility] Hospital[facility] but will be held now (peripheral edema). BLE are now tight and edematous, with dyspnea on exertion now. 40 IV lasix given in ED. augmentin given in ED. CXR interpretation limited by habitus.  - 40 mg IV lasix daily  - monitor and replace lytes prn  - monitor for evidence of cellulitis, will not give additional abx at this time, crp pending and procal 0.05   - daily weights  - repeat echo ordered  - cont home KCl 20 mg BID and adjust prn  - BNP, TSH pending    Covid-r/o-  No is a LOW SUSPICION PUI.  Follow these instructions:    If COVID test positive -> continue isolation precautions    If COVID test negative -> discontinue COVID-specific isolation precautions    Bipolar, insomnia-  - cont home duloxetine and trazodone prn    Chronic neuropathic pain- home dose of gabapentin 1200mg TID per [place] and [facility] discharges, but we discussed that this seems unsafe. We settled on 800 mg TID, and this can be verified in  am  - 800 mg TID gabapentin    Hx of polysubstance abuse- opiate dependence, pt says takes methadone 75 daily- (chart had 70 mg daily)  - can verify methadone dose with [facility] (due for weekly supply tmrw) and will need to order this in am    Hypothyroidism-  - cont home synthroid    JOSE-  - cont home omeprazole    Tobacco dependence  - cont home nicotine gum     Diet: Combination Diet Regular Diet Adult  Cont home multivit  DVT Prophylaxis: Enoxaparin (Lovenox) SQ  [place] Catheter: not present  Code Status: Full Code         Disposition Plan   Expected discharge: 2 - 3 days, recommended to prior living arrangement once diuresed adequately so can ambulate and will remain stable on po lasix.  Entered: Makenzie Beauchamp MD 06/10/2020, 5:27 AM     The patient's care was discussed with the Bedside Nurse and Patient.    Makenzie Beauchamp MD   [id number]  [facility], [place]  ______________________________________________________________________    Chief Complaint   Shortness of breath    History is obtained from the patient and epic    History of Present Illness   No Yusuf is a 60 year old female with htn, hypothyroidism, lymphedema, polysubstance abuse, bipolar, arthritis, and fibromyalgia who was admitted with shortness of breath, weight gain, and increased diameter of thighs with associated pain. She feels like her BLE swelling began 2 wks ago, and now has become very painful and making it difficult for her to ambulate. She doesn't wear compression stockings. She has a hx of lymphedema with a normal echocardiogram on 1/27 during a previous hospitalization ([place] 1/27-2/2) during which she was diuresed and treated for BLE cellulitis.  She was discharged from that hospitalization at 282 lbs with a prescription for 40 mg lasix BID to a chem dep treatment center in [place]. She was hospitalized at [facility] 2/14-2/17 with influenza and discharged at 283 lbs with instructions to take 40  mg qam lasix. Her weight is now 298 lbs. With this weight gain she has had dyspnea on exertion and increased orthopnea. Her legs are both tender and pink now.     In the ED she was found not to have a clot on US of BLE and CTA (ddimer was elevated likely due to obesity). She was given IV lasix 40 mg and augmentin for presumed BLE cellulitis.    Review of Systems    The 10 point Review of Systems is negative other than noted in the HPI or here.     Past Medical History    I have reviewed this patient's medical history and updated it with pertinent information if needed.   Past Medical History:   Diagnosis Date     Arthritis      Bipolar affective (H)      Fibromyalgia      Hypertension    hypothyroidism  Lymphedema  Polysubstance abuse- on methadone    Past Surgical History   I have reviewed this patient's surgical history and updated it with pertinent information if needed.  Past Surgical History:   Procedure Laterality Date     CHOLECYSTECTOMY       GYN SURGERY      c section     GYN SURGERY      oblation     ORTHOPEDIC SURGERY      left leg, left shoulder, back       Social History   I have reviewed this patient's social history and updated it with pertinent information if needed.  Social History     Tobacco Use     Smoking status: Current Every Day Smoker, trying to quit with nicotine gum     Packs/day: 0.25     Smokeless tobacco: Never Used   Substance Use Topics     Alcohol use: Not now     Drug use: Not now- hx of opiates and meth     She has been at the [facility] treatment center in [place] since Nov, and she feels it is working well for her.  Family History   I have reviewed this patient's family history and updated it with pertinent information if needed.   Father who had bypass x4 and lung cancer  DM in the family    Prior to Admission Medications   Prior to Admission Medications   Prescriptions Last Dose Informant Patient Reported? Taking?   DULoxetine (CYMBALTA) 60 MG capsule 6/9/2020 at Unknown time  Yes  Yes   Sig: Take 60 mg by mouth daily   NARCAN 4 MG/0.1ML nasal spray   Yes No   Sig: CALL 911. SPRAY CONTENTS OF ONE SPRAYER (0.1ML) INTO ONE NOSTRIL. REPEAT IN 2-3 MINS IF SYMPTOMS OF OPIOID PERSIST, ALTERNATE NOSTRILS   acetaminophen (TYLENOL) 500 MG tablet   Yes No   Sig: Take 1-2 tablets by mouth every 6 hours as needed.   amoxicillin-clavulanate (AUGMENTIN) 875-125 MG tablet   No No   Sig: Take 1 tablet by mouth 2 times daily   cholecalciferol 25 MCG (1000 UT) TABS   Yes No   Sig: Take 1,000 Units by mouth   furosemide (LASIX) 40 MG tablet   No No   Sig: Take 1 tablet (40 mg) by mouth 2 times daily   gabapentin (NEURONTIN) 600 MG tablet 2020 at Unknown time  Yes Yes   Sig: Take 1 tablet (600 mg) by mouth 3 times daily Takes two 600mg tablets three times a day.   hydrOXYzine (ATARAX) 50 MG tablet   Yes No   Sig: Take 50 mg by mouth every 8 hours as needed    ibuprofen (ADVIL/MOTRIN) 600 MG tablet   Yes No   Si mg every 6 hours as needed    levothyroxine (SYNTHROID/LEVOTHROID) 200 MCG tablet 2020 at Unknown time  Yes Yes   Sig: Take 200 mcg by mouth daily    methadone HCl 10 MG/5ML SOLN   Yes No   Sig: Take 70 mg by mouth Dosed at clinic for her    multivitamin w/minerals (MULTI-VITAMIN) tablet   Yes No   Sig: Take 1 tablet by mouth daily   nicotine polacrilex (NICORETTE) 4 MG gum   Yes No   Sig: Place 4 mg inside cheek as needed for smoking cessation   omeprazole 20 MG tablet   Yes No   Sig: Take 20 mg by mouth daily   potassium chloride ER (K-DUR/KLOR-CON M) 20 MEQ CR tablet   No No   Sig: Take 1 tablet (20 mEq) by mouth 2 times daily   ranitidine (ZANTAC) 150 MG tablet   Yes No   Sig: Take 150 mg by mouth 2 times daily as needed    traZODone (DESYREL) 50 MG tablet   Yes No   Sig: Take 50 mg by mouth At Bedtime      Facility-Administered Medications: None     Allergies   Allergies   Allergen Reactions     Promethazine Other (See Comments) and Anxiety     Noted in 08 ER     Codeine Phosphate  GI Disturbance     Lamotrigine Other (See Comments)     hyponatremia     Oxcarbazepine Unknown and Other (See Comments)     Low sodium  LegacyRecord#[id number]       Codeine Other (See Comments) and Rash     GI bleeding  LegacyRecord#[id number]       Physical Exam   Vital Signs: Temp: 98.6  F (37  C) Temp src: Oral BP: 115/68 Pulse: 84 Heart Rate: 84 Resp: 16 SpO2: 94 % O2 Device: None (Room air)    Weight: 298 lbs 0 oz  Gen-  oriented, sleepy due to the hour, but easily interacts and answers questions  HEENT- no scleral icterus, MMM, no rhinorrhea  Resp: CTAB but auscultation limited by habitus, no wheezes or crackles auscultated  CV: RRR, no m/r/g  Abd: soft, NT, obese, ++BS  Extrem: +3 pitting pedal edema hiral with light pink erythema from ankles up to knees  Neuro: CN grossly intact, oriented    Data   Data reviewed today: I reviewed all medications, new labs and imaging results over the last 24 hours.     Most Recent 3 CBC's:  Recent Labs   Lab Test 06/09/20 2323 02/03/20  0601 01/31/20  0608 01/30/20  0844   WBC 9.4  --  8.9 7.4   HGB 10.9*  --  10.0* 10.0*   MCV 87  --  92 93    324 281 270     Most Recent 3 BMP's:  Recent Labs   Lab Test 06/09/20 2323 02/03/20  0601 02/02/20  0556 02/01/20  0733     --  138 138   POTASSIUM 3.9  --  4.5 4.2   CHLORIDE 103  --  101 102   CO2 29  --  34* 32   BUN 11  --  22 20   CR 0.84 1.02 1.00 0.92   ANIONGAP 6  --  3 4   RODOLFO 8.8  --  8.2* 8.5   *  --  112* 122*     Most Recent 2 LFT's:  Recent Labs   Lab Test 06/09/20 2323 01/29/20  0618   AST 51* 40   ALT 55* 47   ALKPHOS 147 96   BILITOTAL 0.2 0.4     Most Recent 3 BNP's:  Recent Labs   Lab Test 01/28/20  0025 11/17/19  1829 11/03/19  1828   NTBNPI 328 66 179     Most Recent TSH and T4:  Recent Labs   Lab Test 01/28/20  0025   TSH 19.01*   T4 0.98     Most Recent ESR & CRP:  Recent Labs   Lab Test 01/29/20  0618 01/28/20 0025   SED  --  82*   CRP 32.2* 28.5*     Recent Results (from the past 24 hour(s))   XR  Chest Port 1 View    Narrative    EXAM: XR CHEST PORT 1 VW  LOCATION: [facility]  DATE/TIME: 6/9/2020 11:49 PM    INDICATION: Short of breath.    COMPARISON: Chest CT 1/28/2020.      Impression    IMPRESSION: Shallow inspiration. No convincing focal air-space disease. Normal heart size.   US Lower Extremity Venous Duplex Bilateral    Narrative    EXAM: US LOWER EXTREMITY VENOUS DUPLEX BILATERAL  LOCATION: [facility]  DATE/TIME: 6/10/2020 12:16 AM    INDICATION: Bilateral leg swelling.  COMPARISON: 7 01/28/2020  TECHNIQUE: Venous Duplex ultrasound of bilateral lower extremities with and without compression, augmentation and duplex. Color flow and spectral Doppler with waveform analysis performed.    FINDINGS: Exam includes the common femoral, femoral, popliteal veins as well as segmentally visualized deep calf veins and greater saphenous vein.     RIGHT: No deep vein thrombosis. No superficial thrombophlebitis. No popliteal cyst.    LEFT: No deep vein thrombosis. No superficial thrombophlebitis. No popliteal cyst.      Impression    IMPRESSION:  1.  No deep venous thrombosis in the bilateral lower extremities.   CT Chest Pulmonary Embolism w Contrast    Narrative    EXAM: CT CHEST PULMONARY EMBOLISM W CONTRAST  LOCATION: [facility]  DATE/TIME: 6/10/2020 2:06 AM    INDICATION: Pulmonary embolus suspected, intermediate probability, positive D-dimer.    COMPARISON: None.    TECHNIQUE: CT chest pulmonary angiogram during arterial phase injection of IV contrast. Multiplanar reformats and MIP reconstructions were performed. Dose reduction techniques were used.     CONTRAST: 77 mL Isovue-370.    FINDINGS:  ANGIOGRAM CHEST: Pulmonary arteries are normal caliber and negative for pulmonary emboli. Thoracic aorta is negative for dissection. No CT evidence of right heart strain.    LUNGS AND PLEURA: Diffuse air trapping. Small bilateral basilar pneumatoceles.    MEDIASTINUM/AXILLAE:  Mildly enlarged heart. No pericardial effusion. No hilar or mediastinal lymphadenopathy.    UPPER ABDOMEN: Fatty liver.    MUSCULOSKELETAL: Normal.      Impression    IMPRESSION:  1.  No pulmonary embolus, aortic dissection, or aneurysm.    2.  Mildly enlarged heart.    3.  Diffuse air trapping, correlate for small airways disease/bronchiolitis.

## 2020-06-10 NOTE — PROGRESS NOTES
[facility], [place], [place]  Internal Medicine Progress Note      Assessment and Plans:       No Yusuf is a 60 year old female who was admitted on 6/9/2020.     No Yusuf is a 60 year old female with htn, hypothyroidism, lymphedema, polysubstance abuse, bipolar, arthritis, and fibromyalgia who was admitted with shortness of breath, weight gain, and increased diameter of thighs with associated pain. It is difficult for her to ambulate due to pain/tightness in legs     # Acute lymphedema exacerbation with fluid overload likely into lungs causing dyspnea and increased orthopnea- pink and swollen, but not exquisitely tender or very warm. BLE US without DVT. D dimer elevated but obese, CTA without evidence of PE. Weight up 16 lbs since discharge 2/2/20, with furosemide dose decreased from 40 mg BID to 40 mg qam during her hospitalization for influenza at [facility] (2/14-2/17) before she returned to her chem dep treatment center in [place]. Amlodipine was also restarted at the [facility] but will be held now (peripheral edema). BLE are now tight and edematous, with dyspnea on exertion now. 40 IV lasix given in ED. augmentin given in ED. CXR interpretation limited by habitus.    - Needs 40 mg IV lasix BID for two days, then daily may be fine. We will order it daily based on the need and labs.   - monitor and replace lytes prn  - monitor for evidence of cellulitis, will not give additional abx at this time, crp pending and procal 0.05   - daily weights  - Follow-up echo  - cont home KCl 20 mg BID and adjust prn  - BNP, TSH pending  - lymphedema consult  - strict iO     # Covid-r/o-  No is a LOW SUSPICION PUI.  Follow these instructions:    If COVID test positive -> continue isolation precautions    If COVID test negative -> discontinue COVID-specific isolation precautions     # Bipolar, insomnia-  - cont home duloxetine and  trazodone prn     # Chronic neuropathic pain- home dose of gabapentin 1200mg TID per [place] [facility] discharges, but we discussed that this seems unsafe. We settled on 800 mg TID, and this can be verified in am  - 800 mg TID gabapentin     # Hx of polysubstance abuse- opiate dependence, pt says takes methadone 75 daily- (chart had 70 mg daily)  - can verify methadone dose with [facility] (due for weekly supply tmrw) and will need to order this in am     # Hypothyroidism-  - cont home synthroid     # GERD  - cont home omeprazole     # Tobacco dependence  - cont home nicotine gum       Code Status: Full Code  Disposition: Expected discharge: 2 - 3 days, recommended to prior living arrangement once diuresed adequately so can ambulate and will remain stable on po lasix.      Tl Roman MD  Text Page  (7am - 5pm, M-F)    Subjective:      This is tele health visit. Not seen in person.   Patient was just admitted this morning. She is admitted for leg edema, weight gain, sob. No fevers. Covid test ordered and is pending.   Overnight Events reviewed, Chart Reviewed  Sign out taken from Dr Beauchamp.   I called her room and tried tele health. No one is picking up.   I called her room as well. Finally we were able to get IPAD set up done and I was able to talk to her.     She reports pain in the legs. Weight gai. Sob is better.   No fever or chills.   Have been using ibuprofen for pain 600 mg BID. Has fibromyalgia. Using it for a while.   She came from chemical dependency program. She has been there since Nov 2019.        -Data reviewed today: I reviewed all new labs and imaging results over the last 24 hours.     Exam:         Temp: 97.6  F (36.4  C) Temp src: Oral BP: 131/73 Pulse: 84 Heart Rate: 77 Resp: 15 SpO2: 93 % O2 Device: None (Room air)    Vitals:    06/09/20 2256 06/10/20 1037   Weight: 135.2 kg (298 lb) 127 kg (280 lb)     Vital Signs with Ranges  Temp:  [97.6  F (36.4  C)-98.6  F (37  C)] 97.6  F  (36.4  C)  Pulse:  [84] 84  Heart Rate:  [77-90] 77  Resp:  [15-18] 15  BP: ()/(59-75) 131/73  SpO2:  [93 %-96 %] 93 %  No intake/output data recorded.    No exam was done. This is virtual visit.     This is per Dr Boland visit:     Vital Signs: Temp: 98.6  F (37  C) Temp src: Oral BP: 115/68 Pulse: 84 Heart Rate: 84 Resp: 16 SpO2: 94 % O2 Device: None (Room air)    Weight: 298 lbs 0 oz  Gen-  oriented, sleepy due to the hour, but easily interacts and answers questions  HEENT- no scleral icterus, MMM, no rhinorrhea  Resp: CTAB but auscultation limited by habitus, no wheezes or crackles auscultated  CV: RRR, no m/r/g  Abd: soft, NT, obese, ++BS  Extrem: +3 pitting pedal edema hiral with light pink erythema from ankles up to knees  Neuro: CN grossly intact, oriented    Medications       DULoxetine  60 mg Oral Daily     enoxaparin ANTICOAGULANT  40 mg Subcutaneous Q24H     furosemide  40 mg Intravenous Daily     gabapentin  800 mg Oral TID     levothyroxine  200 mcg Oral Daily     multivitamin w/minerals  1 tablet Oral Daily     omeprazole  20 mg Oral Daily     potassium chloride ER  20 mEq Oral BID     sodium chloride (PF)  3 mL Intracatheter Q8H     traZODone  50 mg Oral At Bedtime     Vitamin D3  1,000 Units Oral Daily       Data   Recent Labs   Lab 06/10/20  0701 06/09/20  2323   WBC  --  9.4   HGB  --  10.9*   MCV  --  87    284    138   POTASSIUM 3.8 3.9   CHLORIDE 103 103   CO2 31 29   BUN 12 11   CR 0.86 0.84   ANIONGAP 5 6   RODOLFO 8.8 8.8   * 127*   ALBUMIN  --  3.0*   PROTTOTAL  --  8.3   BILITOTAL  --  0.2   ALKPHOS  --  147   ALT  --  55*   AST  --  51*       Recent Results (from the past 24 hour(s))   XR Chest Port 1 View    Narrative    EXAM: XR CHEST PORT 1 VW  LOCATION: [facility]  DATE/TIME: 6/9/2020 11:49 PM    INDICATION: Short of breath.    COMPARISON: Chest CT 1/28/2020.      Impression    IMPRESSION: Shallow inspiration. No convincing focal air-space  disease. Normal heart size.   US Lower Extremity Venous Duplex Bilateral    Narrative    EXAM: US LOWER EXTREMITY VENOUS DUPLEX BILATERAL  LOCATION: [facility]  DATE/TIME: 6/10/2020 12:16 AM    INDICATION: Bilateral leg swelling.  COMPARISON: 7 01/28/2020  TECHNIQUE: Venous Duplex ultrasound of bilateral lower extremities with and without compression, augmentation and duplex. Color flow and spectral Doppler with waveform analysis performed.    FINDINGS: Exam includes the common femoral, femoral, popliteal veins as well as segmentally visualized deep calf veins and greater saphenous vein.     RIGHT: No deep vein thrombosis. No superficial thrombophlebitis. No popliteal cyst.    LEFT: No deep vein thrombosis. No superficial thrombophlebitis. No popliteal cyst.      Impression    IMPRESSION:  1.  No deep venous thrombosis in the bilateral lower extremities.   CT Chest Pulmonary Embolism w Contrast    Narrative    EXAM: CT CHEST PULMONARY EMBOLISM W CONTRAST  LOCATION: [facility]  DATE/TIME: 6/10/2020 2:06 AM    INDICATION: Pulmonary embolus suspected, intermediate probability, positive D-dimer.    COMPARISON: None.    TECHNIQUE: CT chest pulmonary angiogram during arterial phase injection of IV contrast. Multiplanar reformats and MIP reconstructions were performed. Dose reduction techniques were used.     CONTRAST: 77 mL Isovue-370.    FINDINGS:  ANGIOGRAM CHEST: Pulmonary arteries are normal caliber and negative for pulmonary emboli. Thoracic aorta is negative for dissection. No CT evidence of right heart strain.    LUNGS AND PLEURA: Diffuse air trapping. Small bilateral basilar pneumatoceles.    MEDIASTINUM/AXILLAE: Mildly enlarged heart. No pericardial effusion. No hilar or mediastinal lymphadenopathy.    UPPER ABDOMEN: Fatty liver.    MUSCULOSKELETAL: Normal.      Impression    IMPRESSION:  1.  No pulmonary embolus, aortic dissection, or aneurysm.    2.  Mildly enlarged heart.    3.   Diffuse air trapping, correlate for small airways disease/bronchiolitis.

## 2020-06-10 NOTE — ED NOTES
"[facility]   ED Nurse to Floor Handoff     No Yusuf is a 60 year old female who speaks English and lives with family members,  in a home  They arrived in the ED by car from home    ED Chief Complaint: Leg Swelling (per pt she has been gaining wt \"I got a lot of fluids especially on lower legs\" Denies any cardiac conditions) and Shortness of Breath    ED Dx;   Final diagnoses:   Lymphedema   Shortness of breath         Needed?: No    Allergies:   Allergies   Allergen Reactions     Codeine Phosphate GI Disturbance     Phenergan [Promethazine]      Noted in 8/30/08 ER     Trileptal [Oxcarbazepine] Unknown   .  Past Medical Hx:   Past Medical History:   Diagnosis Date     Arthritis      Bipolar affective (H)      Fibromyalgia      Hypertension       Baseline Mental status: WDL  Current Mental Status changes: at basesline    Infection present or suspected this encounter: no  Sepsis suspected: No  Isolation type: Special Precautions-COVID-19     Activity level - Baseline/Home:  Independent  Activity Level - Current:   Stand with Assist    Bariatric equipment needed?: No    In the ED these meds were given:   Medications   ibuprofen (ADVIL/MOTRIN) tablet 600 mg (has no administration in time range)   amoxicillin-clavulanate (AUGMENTIN) 875-125 MG per tablet 1 tablet (has no administration in time range)   iopamidol (ISOVUE-370) solution 100 mL (has no administration in time range)   sodium chloride 0.9 % bag 500mL for CT scan flush use (has no administration in time range)   furosemide (LASIX) injection 40 mg (40 mg Intravenous Given 6/10/20 0035)       Drips running?  No    Home pump  No    Current LDAs  Peripheral IV 11/17/19 Right Lower forearm (Active)   Number of days: 206       Peripheral IV 02/02/20 Left Upper forearm (Active)   Number of days: 129       Peripheral IV 05/15/20 Right Upper forearm (Active)   Number of days: 26       Peripheral IV 06/09/20 Right " Hand (Active)   Site Assessment [facility] 06/09/20 2322   Line Status Saline locked 06/09/20 2322   Phlebitis Scale 0-->no symptoms 06/09/20 2322   Infiltration Scale 0 06/09/20 2322   Number of days: 1       Peripheral IV 06/10/20 Left Upper arm (Active)   Site Assessment [facility] 06/10/20 0156   Line Status Saline locked 06/10/20 0156   Phlebitis Scale 0-->no symptoms 06/10/20 0156   Infiltration Scale 0 06/10/20 0156   Number of days: 0       Labs results:   Labs Ordered and Resulted from Time of ED Arrival Up to the Time of Departure from the ED   CBC WITH PLATELETS DIFFERENTIAL - Abnormal; Notable for the following components:       Result Value    Hemoglobin 10.9 (*)     Hematocrit 34.5 (*)     RDW 16.5 (*)     All other components within normal limits   COMPREHENSIVE METABOLIC PANEL - Abnormal; Notable for the following components:    Glucose 127 (*)     Albumin 3.0 (*)     ALT 55 (*)     AST 51 (*)     All other components within normal limits   D DIMER QUANTITATIVE - Abnormal; Notable for the following components:    D Dimer 1.0 (*)     All other components within normal limits   ROUTINE UA WITH MICROSCOPIC   COVID-19 VIRUS (CORONAVIRUS) BY PCR   CARDIAC CONTINUOUS MONITORING   PULSE OXIMETRY NURSING       Imaging Studies:   Recent Results (from the past 24 hour(s))   XR Chest Port 1 View    Narrative    EXAM: XR CHEST PORT 1 VW  LOCATION: [facility]  DATE/TIME: 6/9/2020 11:49 PM    INDICATION: Short of breath.    COMPARISON: Chest CT 1/28/2020.      Impression    IMPRESSION: Shallow inspiration. No convincing focal air-space disease. Normal heart size.   US Lower Extremity Venous Duplex Bilateral    Narrative    EXAM: US LOWER EXTREMITY VENOUS DUPLEX BILATERAL  LOCATION: [facility]  DATE/TIME: 6/10/2020 12:16 AM    INDICATION: Bilateral leg swelling.  COMPARISON: 7 01/28/2020  TECHNIQUE: Venous Duplex ultrasound of bilateral lower extremities with and without compression, augmentation and  duplex. Color flow and spectral Doppler with waveform analysis performed.    FINDINGS: Exam includes the common femoral, femoral, popliteal veins as well as segmentally visualized deep calf veins and greater saphenous vein.     RIGHT: No deep vein thrombosis. No superficial thrombophlebitis. No popliteal cyst.    LEFT: No deep vein thrombosis. No superficial thrombophlebitis. No popliteal cyst.      Impression    IMPRESSION:  1.  No deep venous thrombosis in the bilateral lower extremities.       Recent vital signs:   /63   Temp 98.6  F (37  C) (Oral)   Resp 16   Wt 135.2 kg (298 lb)   LMP 11/01/2013   SpO2 95%   BMI 52.79 kg/m      [place] Coma Scale Score: 15 (06/09/20 2256)       Cardiac Rhythm: Normal Sinus  Pt needs tele? Yes  Skin/wound Issues: None    Code Status: Full Code    Pain control: fair    Nausea control: good    Abnormal labs/tests/findings requiring intervention: CT and Ultrasound pending    Family present during ED course? No   Family Comments/Social Situation comments:     Tasks needing completion: tests pending, COVID 19 pending    Janay Fofana, RN  [phone] [place] [facility]  [phone] [facility]

## 2020-06-10 NOTE — PLAN OF CARE
VS: /68   Pulse 84   Temp 98.6  F (37  C) (Oral)   Resp 16   Wt 135.2 kg (298 lb)   LMP 11/01/2013   SpO2 94%   BMI 52.79 kg/m       O2: RA, PLATT    Output: Voids frequently due to diuresis (lasix)   Last BM:    Activity: UAL, BSC    Skin: intact   Pain: Neuropathy, gabapentin PO   CMS: +3/4 edema bilateral lower legs to pannus   Dressing: none   Diet: regular   LDA: PIV x2   Equipment: Cane (patient's own)   Plan: Monitor diuresis, SOB/PLATT   Additional Info: Pt is part of methadone program, presently treatment housing

## 2020-06-10 NOTE — ED PROVIDER NOTES
"    [facility] EMERGENCY DEPARTMENT ([facility])    6/09/20        History     Chief Complaint   Patient presents with     Leg Swelling     per pt she has been gaining wt \"I got a lot of fluids especially on lower legs\" Denies any cardiac conditions     Shortness of Breath     The history is provided by the patient and medical records.   Shortness of Breath   Associated symptoms: no abdominal pain, no chest pain, no cough and no fever      No Yusuf is a 60 year old female with a past medical history of hypertension, hypothyroidism, lymphedema, polysubstance abuse (on methadone), and bipolar affective disorder who presents to the Emergency Department for evaluation of bilateral leg swelling that began a couple of weeks ago. She admits to pain in both lower extremities that she believed was worse on the right side initially but notes today her pain was worse in the left extremity.  She states she is on diuretics (Lasix).  She states she does not wear compression stockings.  She admits to associated feelings of bloating in her abdomen and burning in her feet and thighs.  She admits to associated shortness of breath that is present at rest and with exertion.  She denies any chest pain, fever, cough, abdominal pain, history of DVT or PE, history of kidney problems, or known contacts with anyone positive for COVID-19.  She states she is in a chemical dependency treatment center in [place], [place] and no one there has tested positive for COVID-19.    Per chart review, the patient was admitted here from 1/27/2020-2/3/2020 for bilateral lower extremity pain, swelling, and redness.  The patient was diuresed in the hospital with IV Lasix and her swelling improved.  It was noted in this visit that she had been on amlodipine which was stopped as the thought was it may have been aggravating the lymphedema.  On discharge she was given a 10-day course of Augmentin to complete for lower extremity cellulitis.    The " patient recently had a CT chest abdomen pelvis with contrast done on 5/15/2020 as well as a echocardiogram on 1/27/2020, impressions & interpretation summary below the HPI.    CT Chest/Abdomen/Pelvis w/contrast impressions (5/15/2020):  1. Interval resolution of groundglass nodularity in the left lingula  consistent with resolved infectious etiology.     2. 8 mm nodule in the superior left lower lobe demonstrating  four-month stability. Consider 12 month follow-up.     3. Diffuse faint centrilobular nodules, favors respiratory  bronchiolitis in the setting of current smoking.     4. Severe hepatic steatosis.     5. Right adrenal nodule with microscopic fat, likely adrenal.    Echo Interpretation Summary (1/27/2020):  Left ventricular function, chamber size, wall motion, and wall thickness are  normal.The EF is 60-65%.  Right ventricular function, chamber size, wall motion, and thickness are  normal.  No hemodynamically significant valve abnormalities.  Pulmonary artery systolic pressure is normal.     There is no prior study for direct comparison.    I have reviewed the Medications, Allergies, Past Medical and Surgical History, and Social History in the [employer] system.  PAST MEDICAL HISTORY:   Past Medical History:   Diagnosis Date     Arthritis      Bipolar affective (H)      Fibromyalgia      Hypertension        PAST SURGICAL HISTORY:   Past Surgical History:   Procedure Laterality Date     CHOLECYSTECTOMY       GYN SURGERY      c section     GYN SURGERY      oblation     ORTHOPEDIC SURGERY      left leg, left shoulder, back       Past medical history, past surgical history, medications, and allergies were reviewed with the patient. Additional pertinent items: None    FAMILY HISTORY: No family history on file.    SOCIAL HISTORY:   Social History     Tobacco Use     Smoking status: Current Every Day Smoker     Packs/day: 0.25     Smokeless tobacco: Never Used   Substance Use Topics     Alcohol use: No       Patient's  Medications   New Prescriptions    No medications on file   Previous Medications    ACETAMINOPHEN (TYLENOL) 500 MG TABLET    Take 1-2 tablets by mouth every 6 hours as needed.    AMOXICILLIN-CLAVULANATE (AUGMENTIN) 875-125 MG TABLET    Take 1 tablet by mouth 2 times daily    CHOLECALCIFEROL 25 MCG (1000 UT) TABS    Take 1,000 Units by mouth    DULOXETINE (CYMBALTA) 60 MG CAPSULE    Take 60 mg by mouth daily    FUROSEMIDE (LASIX) 40 MG TABLET    Take 1 tablet (40 mg) by mouth 2 times daily    GABAPENTIN (NEURONTIN) 600 MG TABLET    Take 1 tablet (600 mg) by mouth 3 times daily Takes two 600mg tablets three times a day.    HYDROXYZINE (ATARAX) 50 MG TABLET    Take 50 mg by mouth every 8 hours as needed     IBUPROFEN (ADVIL/MOTRIN) 600 MG TABLET    600 mg every 6 hours as needed     LEVOTHYROXINE (SYNTHROID/LEVOTHROID) 200 MCG TABLET    Take 200 mcg by mouth daily     METHADONE HCL 10 MG/5ML SOLN    Take 70 mg by mouth Dosed at clinic for her     MULTIVITAMIN W/MINERALS (MULTI-VITAMIN) TABLET    Take 1 tablet by mouth daily    NARCAN 4 MG/0.1ML NASAL SPRAY    CALL 911. SPRAY CONTENTS OF ONE SPRAYER (0.1ML) INTO ONE NOSTRIL. REPEAT IN 2-3 MINS IF SYMPTOMS OF OPIOID PERSIST, ALTERNATE NOSTRILS    NICOTINE POLACRILEX (NICORETTE) 4 MG GUM    Place 4 mg inside cheek as needed for smoking cessation    OMEPRAZOLE 20 MG TABLET    Take 20 mg by mouth daily    POTASSIUM CHLORIDE ER (K-DUR/KLOR-CON M) 20 MEQ CR TABLET    Take 1 tablet (20 mEq) by mouth 2 times daily    RANITIDINE (ZANTAC) 150 MG TABLET    Take 150 mg by mouth 2 times daily as needed     TRAZODONE (DESYREL) 50 MG TABLET    Take 50 mg by mouth At Bedtime   Modified Medications    No medications on file   Discontinued Medications    No medications on file          Allergies   Allergen Reactions     Codeine Phosphate GI Disturbance     Phenergan [Promethazine]      Noted in 8/30/08 ER     Trileptal [Oxcarbazepine] Unknown        Review of Systems   Constitutional:  Negative for fever.   Respiratory: Positive for shortness of breath. Negative for cough.    Cardiovascular: Positive for leg swelling (w/pain, bilateral). Negative for chest pain.   Gastrointestinal: Negative for abdominal pain.   All other systems reviewed and are negative.    Physical Exam   BP: 139/75  Heart Rate: 87  Temp: 98.6  F (37  C)  Resp: 18  Weight: 135.2 kg (298 lb)  SpO2: 96 %      Physical Exam  GEN:  Well developed, no acute distress  HEENT:  EOMI, Mucous membranes are moist.   Cardio:  RRR, no murmur, radial pulses equal bilaterally  PULM:  Lungs clear, good air movement, no wheezes, rales   Abd:  Soft, normal bowel sounds, no focal tenderness  Musculoskeletal:  normal range of motion of all 4 extremities, there is bilateral lower extremity swelling bilateral pedal edema and bilateral calf tenderness.  Both legs have a pink color diffusely throughout the lower leg, but no induration, no wheeping  Neuro:  Alert and oriented X3, Follows commands, moving all extremities spontaneously   Skin:  Warm, dry    ED Course   11:15 PM  The patient was seen and examined by Larissa Soto MD in Room ED03.      Procedures             EKG Interpretation:      Interpreted by Larissa Soto MD  Time reviewed: 23:10  Symptoms at time of EKG: shortness of breath   Rhythm: normal sinus   Rate: normal  Axis: normal  Ectopy: none  Conduction: Prolonged QT with a QTC of 484  ST Segments/ T Waves: No ST-T wave changes  Q Waves: none  Comparison to prior: Unchanged from January 30, 2020, QTC at that time was 464 ms    Clinical Impression: normal sinus rhythm with prolonged QT.       Patient was given 40 mg of IV Lasix.  Labs are normal except as shown.  Lateral lower extremity ultrasound was done and results are shown below.  Chest x-ray was reviewed by me and results are shown below.  Patient's d-dimer is elevated, so we will plan for CT of the chest to rule out PE.  Patient was also given oral Augmentin to  treat for possible cellulitis of both legs.      Results for orders placed or performed during the hospital encounter of 06/09/20 (from the past 24 hour(s))   EKG 12 lead   Result Value Ref Range    Interpretation ECG Click View Image link to view waveform and result    CBC with platelets differential   Result Value Ref Range    WBC 9.4 4.0 - 11.0 10e9/L    RBC Count 3.98 3.8 - 5.2 10e12/L    Hemoglobin 10.9 (L) 11.7 - 15.7 g/dL    Hematocrit 34.5 (L) 35.0 - 47.0 %    MCV 87 78 - 100 fl    MCH 27.4 26.5 - 33.0 pg    MCHC 31.6 31.5 - 36.5 g/dL    RDW 16.5 (H) 10.0 - 15.0 %    Platelet Count 284 150 - 450 10e9/L    Diff Method Automated Method     % Neutrophils 67.9 %    % Lymphocytes 17.9 %    % Monocytes 11.2 %    % Eosinophils 2.3 %    % Basophils 0.3 %    % Immature Granulocytes 0.4 %    Nucleated RBCs 0 0 /100    Absolute Neutrophil 6.4 1.6 - 8.3 10e9/L    Absolute Lymphocytes 1.7 0.8 - 5.3 10e9/L    Absolute Monocytes 1.1 0.0 - 1.3 10e9/L    Absolute Eosinophils 0.2 0.0 - 0.7 10e9/L    Absolute Basophils 0.0 0.0 - 0.2 10e9/L    Abs Immature Granulocytes 0.0 0 - 0.4 10e9/L    Absolute Nucleated RBC 0.0    Comprehensive metabolic panel   Result Value Ref Range    Sodium 138 133 - 144 mmol/L    Potassium 3.9 3.4 - 5.3 mmol/L    Chloride 103 94 - 109 mmol/L    Carbon Dioxide 29 20 - 32 mmol/L    Anion Gap 6 3 - 14 mmol/L    Glucose 127 (H) 70 - 99 mg/dL    Urea Nitrogen 11 7 - 30 mg/dL    Creatinine 0.84 0.52 - 1.04 mg/dL    GFR Estimate 75 >60 mL/min/[1.73_m2]    GFR Estimate If Black 87 >60 mL/min/[1.73_m2]    Calcium 8.8 8.5 - 10.1 mg/dL    Bilirubin Total 0.2 0.2 - 1.3 mg/dL    Albumin 3.0 (L) 3.4 - 5.0 g/dL    Protein Total 8.3 6.8 - 8.8 g/dL    Alkaline Phosphatase 147 40 - 150 U/L    ALT 55 (H) 0 - 50 U/L    AST 51 (H) 0 - 45 U/L   D dimer quantitative   Result Value Ref Range    D Dimer 1.0 (H) 0.0 - 0.50 ug/ml FEU   XR Chest Port 1 View    Narrative    EXAM: XR CHEST PORT 1 VW  LOCATION: [facility]  Services  DATE/TIME: 6/9/2020 11:49 PM    INDICATION: Short of breath.    COMPARISON: Chest CT 1/28/2020.      Impression    IMPRESSION: Shallow inspiration. No convincing focal air-space disease. Normal heart size.   Routine UA with microscopic   Result Value Ref Range    Color Urine Light Yellow     Appearance Urine Clear     Glucose Urine Negative NEG^Negative mg/dL    Bilirubin Urine Negative NEG^Negative    Ketones Urine Negative NEG^Negative mg/dL    Specific Gravity Urine 1.008 1.003 - 1.035    Blood Urine Negative NEG^Negative    pH Urine 6.5 5.0 - 7.0 pH    Protein Albumin Urine Negative NEG^Negative mg/dL    Urobilinogen mg/dL Normal 0.0 - 2.0 mg/dL    Nitrite Urine Negative NEG^Negative    Leukocyte Esterase Urine Negative NEG^Negative    Source Unspecified Urine     WBC Urine <1 0 - 5 /HPF    RBC Urine <1 0 - 2 /HPF    Squamous Epithelial /HPF Urine <1 0 - 1 /HPF   US Lower Extremity Venous Duplex Bilateral    Narrative    EXAM: US LOWER EXTREMITY VENOUS DUPLEX BILATERAL  LOCATION: [facility]  DATE/TIME: 6/10/2020 12:16 AM    INDICATION: Bilateral leg swelling.  COMPARISON: 7 01/28/2020  TECHNIQUE: Venous Duplex ultrasound of bilateral lower extremities with and without compression, augmentation and duplex. Color flow and spectral Doppler with waveform analysis performed.    FINDINGS: Exam includes the common femoral, femoral, popliteal veins as well as segmentally visualized deep calf veins and greater saphenous vein.     RIGHT: No deep vein thrombosis. No superficial thrombophlebitis. No popliteal cyst.    LEFT: No deep vein thrombosis. No superficial thrombophlebitis. No popliteal cyst.      Impression    IMPRESSION:  1.  No deep venous thrombosis in the bilateral lower extremities.     Medications   furosemide (LASIX) injection 40 mg (40 mg Intravenous Given 6/10/20 0035)   ibuprofen (ADVIL/MOTRIN) tablet 600 mg (600 mg Oral Given 6/10/20 0234)   iopamidol (ISOVUE-370) solution 100 mL  (77 mLs Intravenous Given 6/10/20 0230)   sodium chloride 0.9 % bag 500mL for CT scan flush use (90 mLs Intravenous Given 6/10/20 0231)          Assessments & Plan (with Medical Decision Making)   Patient presents with bilateral lower extremity swelling with discomfort and shortness of breath.  She has had previous echocardiogram, has no history of CHF.  Had a previous admission from January 27 to February 2 with similar presentation where she was diuresed and was able to go home on Lasix.  At this time, patient is reporting that she has so much swelling, that is difficult for her to walk and do her personal cares.  She does not think [facility] with [facility].  We will plan for admission to medicine for treatment of both possible cellulitis and lymphedema.    I have reviewed the nursing notes.    I have reviewed the findings, diagnosis, plan and need for follow up with the patient.    New Prescriptions    No medications on file       Final diagnoses:   Lymphedema   Shortness of breath       6/9/2020   [facility], [place], EMERGENCY DEPARTMENT    I, Sam Fuentes, am serving as a trained medical scribe to document services personally performed by Larissa Soto MD, based on the provider's statements to me.     I, Larissa Soto MD, was physically present and have reviewed and verified the accuracy of this note documented by Sam Fuentes.     Larissa Soot MD  06/10/20 0238

## 2020-06-10 NOTE — PLAN OF CARE
Pt A/O X 4. Afebrile. VSS. Lungs-Clear bilaterally with both anterior and posterior. Bowels-Hyperactive in all four quadrants. Voids spontaneously without difficulty into the bedside commode. Denies nausea and vomiting. CMS and Neuro's are intact. Denies numbness and tingling in all extremities. Has pain in the bilateral legs and given scheduled Gabapentin, and pain is manageable. Is on a Regular diet and appetite and pt did not have an appetite  this shift. BLE's has +4 edema that is blanchable red and warm to touch, non-pitting. Pt up to the bedside commode independently with a cane. PIV is patent in the right hand and saline locked, 2nd PIV patent in the left arm and saline locked. Bilateral heels are elevated off the bed. Pt is able to make needs known, and call light is within reach.  notified writer that it is OK to wait with the Echocardiogram until the PUI results come back. Continue to monitor.

## 2020-06-11 ENCOUNTER — APPOINTMENT (OUTPATIENT)
Dept: CARDIOLOGY | Facility: CLINIC | Age: 60
DRG: 607 | End: 2020-06-11
Attending: INTERNAL MEDICINE
Payer: COMMERCIAL

## 2020-06-11 PROCEDURE — 25000132 ZZH RX MED GY IP 250 OP 250 PS 637: Performed by: INTERNAL MEDICINE

## 2020-06-11 PROCEDURE — 25000132 ZZH RX MED GY IP 250 OP 250 PS 637: Performed by: HOSPITALIST

## 2020-06-11 PROCEDURE — 99232 SBSQ HOSP IP/OBS MODERATE 35: CPT | Performed by: HOSPITALIST

## 2020-06-11 PROCEDURE — 93306 TTE W/DOPPLER COMPLETE: CPT

## 2020-06-11 PROCEDURE — 25000128 H RX IP 250 OP 636: Performed by: INTERNAL MEDICINE

## 2020-06-11 PROCEDURE — 93306 TTE W/DOPPLER COMPLETE: CPT | Mod: 26 | Performed by: INTERNAL MEDICINE

## 2020-06-11 PROCEDURE — 25500064 ZZH RX 255 OP 636: Performed by: INTERNAL MEDICINE

## 2020-06-11 PROCEDURE — 12000001 ZZH R&B MED SURG/OB UMMC

## 2020-06-11 RX ORDER — AMLODIPINE BESYLATE 5 MG/1
5 TABLET ORAL DAILY
Status: ON HOLD | COMMUNITY
End: 2020-06-16

## 2020-06-11 RX ORDER — DIPHENHYDRAMINE HCL 25 MG
25 CAPSULE ORAL EVERY 6 HOURS PRN
Status: ON HOLD | COMMUNITY
End: 2021-05-27

## 2020-06-11 RX ORDER — NYSTATIN 100000 U/G
CREAM TOPICAL DAILY
Status: ON HOLD | COMMUNITY
End: 2020-06-16

## 2020-06-11 RX ORDER — CALCIUM CARBONATE 500 MG/1
1 TABLET, CHEWABLE ORAL 4 TIMES DAILY PRN
Status: ON HOLD | COMMUNITY
End: 2021-05-27

## 2020-06-11 RX ORDER — METHADONE HYDROCHLORIDE 5 MG/5ML
75 SOLUTION ORAL DAILY
Status: DISCONTINUED | OUTPATIENT
Start: 2020-06-11 | End: 2020-06-16 | Stop reason: HOSPADM

## 2020-06-11 RX ORDER — ALBUTEROL SULFATE 90 UG/1
2 AEROSOL, METERED RESPIRATORY (INHALATION) EVERY 6 HOURS PRN
Status: ON HOLD | COMMUNITY
End: 2021-05-27

## 2020-06-11 RX ORDER — LOPERAMIDE HCL 2 MG
2 CAPSULE ORAL PRN
COMMUNITY
End: 2020-07-06

## 2020-06-11 RX ORDER — ACETAMINOPHEN 650 MG/1
650 SUPPOSITORY RECTAL EVERY 4 HOURS PRN
Status: DISCONTINUED | OUTPATIENT
Start: 2020-06-11 | End: 2020-06-16 | Stop reason: HOSPADM

## 2020-06-11 RX ORDER — THERMOMETER, ELECTRONIC,ORAL
EACH MISCELLANEOUS 2 TIMES DAILY
Status: ON HOLD | COMMUNITY
End: 2020-06-16

## 2020-06-11 RX ORDER — LANOLIN ALCOHOL/MO/W.PET/CERES
5 CREAM (GRAM) TOPICAL AT BEDTIME
Status: ON HOLD | COMMUNITY
End: 2021-06-04

## 2020-06-11 RX ORDER — ACETAMINOPHEN 325 MG/1
650 TABLET ORAL EVERY 4 HOURS PRN
Status: DISCONTINUED | OUTPATIENT
Start: 2020-06-11 | End: 2020-06-16 | Stop reason: HOSPADM

## 2020-06-11 RX ADMIN — GABAPENTIN 800 MG: 800 TABLET, FILM COATED ORAL at 08:57

## 2020-06-11 RX ADMIN — POTASSIUM CHLORIDE 20 MEQ: 750 TABLET, EXTENDED RELEASE ORAL at 08:57

## 2020-06-11 RX ADMIN — DULOXETINE HYDROCHLORIDE 60 MG: 60 CAPSULE, DELAYED RELEASE ORAL at 08:57

## 2020-06-11 RX ADMIN — TRAZODONE HYDROCHLORIDE 50 MG: 50 TABLET ORAL at 22:41

## 2020-06-11 RX ADMIN — OMEPRAZOLE 20 MG: 20 CAPSULE, DELAYED RELEASE ORAL at 08:57

## 2020-06-11 RX ADMIN — MELATONIN 1000 UNITS: at 08:57

## 2020-06-11 RX ADMIN — MULTIPLE VITAMINS W/ MINERALS TAB 1 TABLET: TAB at 08:57

## 2020-06-11 RX ADMIN — ACETAMINOPHEN 650 MG: 325 TABLET, FILM COATED ORAL at 16:48

## 2020-06-11 RX ADMIN — LEVOTHYROXINE SODIUM 200 MCG: 100 TABLET ORAL at 08:57

## 2020-06-11 RX ADMIN — FUROSEMIDE 40 MG: 10 INJECTION, SOLUTION INTRAMUSCULAR; INTRAVENOUS at 09:02

## 2020-06-11 RX ADMIN — HUMAN ALBUMIN MICROSPHERES AND PERFLUTREN 4 ML: 10; .22 INJECTION, SOLUTION INTRAVENOUS at 11:15

## 2020-06-11 RX ADMIN — ACETAMINOPHEN 650 MG: 325 TABLET, FILM COATED ORAL at 12:34

## 2020-06-11 RX ADMIN — POTASSIUM CHLORIDE 20 MEQ: 750 TABLET, EXTENDED RELEASE ORAL at 20:04

## 2020-06-11 RX ADMIN — GABAPENTIN 800 MG: 800 TABLET, FILM COATED ORAL at 13:43

## 2020-06-11 RX ADMIN — METHADONE HYDROCHLORIDE 75 MG: 5 SOLUTION ORAL at 12:27

## 2020-06-11 RX ADMIN — GABAPENTIN 800 MG: 800 TABLET, FILM COATED ORAL at 20:04

## 2020-06-11 RX ADMIN — ENOXAPARIN SODIUM 40 MG: 40 INJECTION SUBCUTANEOUS at 09:00

## 2020-06-11 NOTE — PHARMACY
Methadone Clinic Information Note    Clinic Name: [facility]  Clinic Location (city): [place]  Phone Number: [phone]  Verified with: SILVIO Aguilar    Verified dose: Methadone 75 mg daily (last dosed 6/9)    Ana Laura Saenz, BenitezD, BCPS

## 2020-06-11 NOTE — PLAN OF CARE
"Pt A/O X 4. Afebrile. VSS. Lungs-Clear bilaterally with both anterior and posterior. Bowels-Hyperactive in all four quadrants, and pt reports having a BM today. Voids spontaneously without difficulty on the bedside commode. Denies nausea and vomiting. CMS and Neuro's are intact. Denies numbness and tingling in all extremities. Has pain in the bilateral legs and given Tylenol, scheduled Gabapentin, and pain is manageable. Is on a Regular diet and appetite was Fair this shift. BLE's have blanchable redness with +3 to +4 edema (Non-pitting). Pt up to the bedside commode with SBA to independently. PIV's are patent in the left hand and right arm, and each are saline locked. Bilateral heels are elevated off the bed. Pt is able to make needs known, and call light is within reach. Pt wanted to sleep most of this shift, and was easy to awaken. Pt stated,\"I just don't feel like getting up and moving. I want to just relax.\" Continue to monitor.   "

## 2020-06-11 NOTE — PLAN OF CARE
VS:     Pt A/O X 4. Afebrile. VSS. Lungs- clear and equal bilaterally. Denies nausea, shortness of breath, and chest pain.     Output:     Bowels- active in all four quadrants. Voids spontaneously without   difficulty in the commode.      Activity:     Pt up SBA with cane.     Skin: BLE edema.      Pain:     Pt c/o generalized pain. PRN Tylenol 650mg administered.      CMS:     CMS and Neuro's are intact. Denies numbness and tingling in all extremities.      Dressing:     None.     Diet:     Pt is on a Regular diet and appetite was good this shift.       LDA:     PIV is patent in the right hand and left arm and saline locked.      Equipment:     Commode.      Plan:     Continue to monitor.

## 2020-06-11 NOTE — PHARMACY-ADMISSION MEDICATION HISTORY
Admission medication history for the June 11, 2020 admission is complete.     Interview sources:  [facility] medication list    Reliability of source: Good    Medication compliance: Fair    Changes made to PTA medication list (reason)  Added:   Albuterol inhaler  Amlodipine  Nystatin cream  Imodium  Melatonin  Milk of Mag  Tums  Tolnaftate powder  Vitamin B-12  Benadryl  Chloraseptic lozenge  Deleted:   Augmentin  Changed:   Hydroxyzine 50 mg--->Hydroxyzine 25 mg  Methadone 70mg--->Methadone 75 mg    Additional medication history information:   None    Prior to Admission medications    Medication Sig Last Dose Taking? Auth Provider   albuterol (PROAIR HFA/PROVENTIL HFA/VENTOLIN HFA) 108 (90 Base) MCG/ACT inhaler Inhale 2 puffs into the lungs every 6 hours as needed for shortness of breath / dyspnea or wheezing  Yes Unknown, Entered By History   amLODIPine (NORVASC) 5 MG tablet Take 5 mg by mouth daily 6/9/2020 Yes Unknown, Entered By History   benzocaine-menthol (CHLORASEPTIC) 6-10 MG lozenge Place 1 lozenge inside cheek every 2 hours as needed for moderate pain  Yes Unknown, Entered By History   calcium carbonate (TUMS) 500 MG chewable tablet Take 1 chew tab by mouth 4 times daily as needed for heartburn  Yes Unknown, Entered By History   diphenhydrAMINE (BENADRYL) 25 MG capsule Take 25 mg by mouth every 6 hours as needed for allergies  Yes Unknown, Entered By History   DULoxetine (CYMBALTA) 60 MG capsule Take 60 mg by mouth daily 6/9/2020 at Unknown time Yes Unknown, Entered By History   gabapentin (NEURONTIN) 600 MG tablet Take 1,200 mg by mouth 3 times daily  6/9/2020 at Unknown time Yes Zena Wheeler MD   levothyroxine (SYNTHROID/LEVOTHROID) 200 MCG tablet Take 200 mcg by mouth daily  6/9/2020 at Unknown time Yes Reported, Patient   loperamide (IMODIUM) 2 MG capsule Take 2 mg by mouth as needed for diarrhea Take 2 capsules after the first loose stool. Do not exceed 4 capsules in 24 hours  Yes  Unknown, Entered By History   magnesium hydroxide (MILK OF MAGNESIA) 400 MG/5ML suspension Take 10 mLs by mouth daily as needed for constipation or heartburn  Yes Unknown, Entered By History   melatonin 3 MG tablet Take 3 mg by mouth nightly as needed for sleep  Yes Unknown, Entered By History   nystatin (MYCOSTATIN) 179436 UNIT/GM external cream Apply topically daily Apply to skin folds daily  Yes Unknown, Entered By History   tolnaftate (TINACTIN) 1 % external cream Apply topically 2 times daily Apply to affected areas bid 6/9/2020 Yes Unknown, Entered By History   vitamin B-12 (CYANOCOBALAMIN) 1000 MCG tablet Take 1,000 mcg by mouth daily 6/9/2020 Yes Unknown, Entered By History   acetaminophen (TYLENOL) 500 MG tablet Take 1-2 tablets by mouth every 6 hours as needed.   Reported, Patient   cholecalciferol 25 MCG (1000 UT) TABS Take 1,000 Units by mouth daily  6/9/2020  Zena Wheeler MD   furosemide (LASIX) 40 MG tablet Take 1 tablet (40 mg) by mouth 2 times daily 6/9/2020  Zena Wheeler MD   hydrOXYzine (ATARAX) 50 MG tablet Take 25 mg by mouth 3 times daily as needed for itching    Reported, Patient   ibuprofen (ADVIL/MOTRIN) 600 MG tablet 600 mg every 6 hours as needed    Reported, Patient   methadone HCl 10 MG/5ML SOLN Take 75 mg by mouth Dosed at clinic for her    Reported, Patient   multivitamin w/minerals (MULTI-VITAMIN) tablet Take 1 tablet by mouth daily   Reported, Patient   NARCAN 4 MG/0.1ML nasal spray CALL 911. SPRAY CONTENTS OF ONE SPRAYER (0.1ML) INTO ONE NOSTRIL. REPEAT IN 2-3 MINS IF SYMPTOMS OF OPIOID PERSIST, ALTERNATE NOSTRILS   Reported, Patient   nicotine polacrilex (NICORETTE) 4 MG gum Place 4 mg inside cheek 5 x daily PRN for smoking cessation    Unknown, Entered By History   omeprazole 20 MG tablet Take 20 mg by mouth daily   Unknown, Entered By History   potassium chloride ER (K-DUR/KLOR-CON M) 20 MEQ CR tablet Take 1 tablet (20 mEq) by mouth 2 times daily 6/9/2020  Zena Wheeler MD    ranitidine (ZANTAC) 150 MG tablet Take 150 mg by mouth 2 times daily as needed    Reported, Patient   traZODone (DESYREL) 50 MG tablet Take 50 mg by mouth At Bedtime   Unknown, Entered By History       Time spent: 30 minutes    Medication history completed by:     Ana Laura Saenz, PharmD, BCPS

## 2020-06-11 NOTE — PROGRESS NOTES
Brief Cross Cover Note    COVID PCR negative, per prior notes, patient is low suspicion and has developed no clinical change or worsening in the interim.  Per protocol, special precautions were discontinued.    Rosa Vazquez MD  6/10/2020

## 2020-06-11 NOTE — PLAN OF CARE
VS: VSS   O2: 95% on room air. PLATT    Output: Voids frequently due to diuresis   Last BM: 6/9/2020   Activity: SBA with a cane    Skin: Intact    Pain: C/o BLE pain when walking and with touch, schedule Gabapentin PO given    CMS: +3 edema to BLE   Dressing: none   Diet: Regular diet, however patient has no appetite.    LDA: PIV too both arms saline locked    Equipment: Cane, BSC, call light within reach    Plan: Discharge prior living arrangement once diuresed adequately so can ambulate and will remain stable on po lasix.      Additional Info:

## 2020-06-11 NOTE — PLAN OF CARE
VS: VSS   O2: Room air, maintaining sats >90 this shift   Output: Voiding in bedside commode overnight.   Last BM: 6/9/2020   Activity: SBA, cane. Pt. Stated they were up to bedside commode overnight   Skin: Edema in BLE   Pain: Complains of generalized pain in body. Pt. Stated they just wanted rest.    CMS: Denies numbness/tingling   Dressing: none   Diet: Regular diet as tolerated by pt. Denies nausea   LDA: L PIV SL, R PIV SL   Equipment: Bedside commode, pillows   Plan: Lymphedema consult in place   Additional Info: Pt. A&O x4

## 2020-06-11 NOTE — PROGRESS NOTES
[facility], [facility], [place], [place]  Internal Medicine Progress Note      Assessment and Plans:       No Yusuf is a 60 year old female who was admitted on 6/9/2020.     No Yusuf is a 60 year old female with htn, hypothyroidism, lymphedema, polysubstance abuse, bipolar, arthritis, and fibromyalgia who was admitted with shortness of breath, weight gain, and increased diameter of thighs with associated pain. It is difficult for her to ambulate due to pain/tightness in legs     # Acute lymphedema exacerbation with fluid overload likely into lungs causing dyspnea and increased orthopnea- pink and swollen, but not exquisitely tender or very warm. BLE US without DVT. D dimer elevated but obese, CTA without evidence of PE. Weight up 16 lbs since discharge 2/2/20, with furosemide dose decreased from 40 mg BID to 40 mg qam during her hospitalization for influenza at [facility] (2/14-2/17) before she returned to her chem dep treatment center in [place]. Amlodipine was also restarted at the [facility] Hospitalization but will be held now (peripheral edema). BLE are now tight and edematous, with dyspnea on exertion now. 40 IV lasix given in ED. augmentin given in ED. CXR interpretation limited by habitus.    - Continue 40 mg of Lasix IV daily  - monitor and replace lytes prn  - monitor for evidence of cellulitis, will not give additional abx at this time, crp pending and procal 0.05   - daily weights  - Follow-up echo  - cont home KCl 20 mg BID and adjust prn  - , TSH pending elevated at 9.07, free T4 is 1.26 which is normal  - lymphedema consult  - strict iO  - COVID-19 negative     # Bipolar, insomnia-  - cont home duloxetine and trazodone prn     # Chronic neuropathic pain- home dose of gabapentin 1200mg TID per [place] and [facility] discharges, but we discussed that this seems unsafe. We settled on 800 mg TID, and this can be verified in am  -  800 mg TID gabapentin     # Hx of polysubstance abuse- opiate dependence, pt says takes methadone 75 daily- (chart had 70 mg daily)  -Resume methadone.  Pharmacy to verify the dose.     # Hypothyroidism-  - cont home synthroid  -Currently on 200 mcg of Synthroid daily.  TSH is elevated free T4 is normal.  TSH is not suppressed.  We will go ahead and increase the dose of Synthroid to 225 mcg daily.  Needs follow-up TSH in 6 to 8 weeks time     # GERD  - cont home omeprazole     # Tobacco dependence  - cont home nicotine gum       Code Status: Full Code  Disposition: Expected discharge: Likely tomorrow back to her prior detox facility where she came from       Tl Roman MD  Text Page  (7am - 5pm, M-F)    Subjective:     Patient was seen in person.  Chart reviewed.  I spoke with the nursing.  Patient has aches and pains all over.  Specifically her shoulders.  She is chronically on methadone which she has not gotten it yet.  Her shortness of breath is gotten better leg swelling is down.  No fever sweats or chills no cough or phlegm.     -Data reviewed today: I reviewed all new labs and imaging results over the last 24 hours.     Exam:      Obese individual.  No apparent distress noted   H EENT: Mucosa moist neck supple   Lungs: Air entry is good on both side is no wheezing or crackles   Heart: S1-S2 normal no murmurs or gallops   Skin: Venous stasis changes noted in the bilateral lower extremity redness is better   Legs some venous stasis changes noted.  Leg edema has improved.  There is no presacral edema  either.    Temp: 98.1  F (36.7  C) Temp src: Oral BP: 123/67 Pulse: 85 Heart Rate: 86 Resp: 15 SpO2: 91 % O2 Device: None (Room air)    Vitals:    06/09/20 2256 06/10/20 1037   Weight: 135.2 kg (298 lb) 127 kg (280 lb)     Vital Signs with Ranges  Temp:  [97  F (36.1  C)-98.4  F (36.9  C)] 98.1  F (36.7  C)  Pulse:  [80-85] 85  Heart Rate:  [74-86] 86  Resp:  [15-17] 15  BP: (111-132)/(54-67) 123/67  SpO2:   [91 %-95 %] 91 %  I/O last 3 completed shifts:  In: -   Out: 2500 [Urine:2500]        Medications       DULoxetine  60 mg Oral Daily     enoxaparin ANTICOAGULANT  40 mg Subcutaneous Q24H     furosemide  40 mg Intravenous Daily     gabapentin  800 mg Oral TID     levothyroxine  200 mcg Oral Daily     methadone  75 mg Oral Daily     multivitamin w/minerals  1 tablet Oral Daily     omeprazole  20 mg Oral Daily     potassium chloride ER  20 mEq Oral BID     sodium chloride (PF)  3 mL Intracatheter Q8H     traZODone  50 mg Oral At Bedtime     Vitamin D3  1,000 Units Oral Daily       Data   Recent Labs   Lab 06/10/20  0701 06/09/20  2323   WBC  --  9.4   HGB  --  10.9*   MCV  --  87    284    138   POTASSIUM 3.8 3.9   CHLORIDE 103 103   CO2 31 29   BUN 12 11   CR 0.86 0.84   ANIONGAP 5 6   RODOLFO 8.8 8.8   * 127*   ALBUMIN  --  3.0*   PROTTOTAL  --  8.3   BILITOTAL  --  0.2   ALKPHOS  --  147   ALT  --  55*   AST  --  51*       No results found for this or any previous visit (from the past 24 hour(s)).

## 2020-06-12 LAB
ANION GAP SERPL CALCULATED.3IONS-SCNC: 3 MMOL/L (ref 3–14)
BUN SERPL-MCNC: 21 MG/DL (ref 7–30)
CALCIUM SERPL-MCNC: 8.5 MG/DL (ref 8.5–10.1)
CHLORIDE SERPL-SCNC: 102 MMOL/L (ref 94–109)
CO2 SERPL-SCNC: 31 MMOL/L (ref 20–32)
CREAT SERPL-MCNC: 0.91 MG/DL (ref 0.52–1.04)
ERYTHROCYTE [DISTWIDTH] IN BLOOD BY AUTOMATED COUNT: 16.7 % (ref 10–15)
GFR SERPL CREATININE-BSD FRML MDRD: 69 ML/MIN/{1.73_M2}
GLUCOSE SERPL-MCNC: 123 MG/DL (ref 70–99)
HCT VFR BLD AUTO: 38.9 % (ref 35–47)
HGB BLD-MCNC: 12.1 G/DL (ref 11.7–15.7)
MAGNESIUM SERPL-MCNC: 2.3 MG/DL (ref 1.6–2.3)
MCH RBC QN AUTO: 26.8 PG (ref 26.5–33)
MCHC RBC AUTO-ENTMCNC: 31.1 G/DL (ref 31.5–36.5)
MCV RBC AUTO: 86 FL (ref 78–100)
PLATELET # BLD AUTO: 321 10E9/L (ref 150–450)
POTASSIUM SERPL-SCNC: 3.8 MMOL/L (ref 3.4–5.3)
RBC # BLD AUTO: 4.51 10E12/L (ref 3.8–5.2)
SODIUM SERPL-SCNC: 136 MMOL/L (ref 133–144)
WBC # BLD AUTO: 6.9 10E9/L (ref 4–11)

## 2020-06-12 PROCEDURE — 25000132 ZZH RX MED GY IP 250 OP 250 PS 637: Performed by: INTERNAL MEDICINE

## 2020-06-12 PROCEDURE — 25000128 H RX IP 250 OP 636: Performed by: INTERNAL MEDICINE

## 2020-06-12 PROCEDURE — 12000001 ZZH R&B MED SURG/OB UMMC

## 2020-06-12 PROCEDURE — 25000132 ZZH RX MED GY IP 250 OP 250 PS 637: Performed by: HOSPITALIST

## 2020-06-12 PROCEDURE — 99232 SBSQ HOSP IP/OBS MODERATE 35: CPT | Performed by: HOSPITALIST

## 2020-06-12 PROCEDURE — 85027 COMPLETE CBC AUTOMATED: CPT | Performed by: HOSPITALIST

## 2020-06-12 PROCEDURE — 80048 BASIC METABOLIC PNL TOTAL CA: CPT | Performed by: HOSPITALIST

## 2020-06-12 PROCEDURE — 36415 COLL VENOUS BLD VENIPUNCTURE: CPT | Performed by: HOSPITALIST

## 2020-06-12 PROCEDURE — 83735 ASSAY OF MAGNESIUM: CPT | Performed by: HOSPITALIST

## 2020-06-12 RX ADMIN — GABAPENTIN 800 MG: 800 TABLET, FILM COATED ORAL at 15:39

## 2020-06-12 RX ADMIN — Medication 3 MG: at 22:46

## 2020-06-12 RX ADMIN — POTASSIUM CHLORIDE 20 MEQ: 750 TABLET, EXTENDED RELEASE ORAL at 20:27

## 2020-06-12 RX ADMIN — ACETAMINOPHEN 650 MG: 325 TABLET, FILM COATED ORAL at 20:32

## 2020-06-12 RX ADMIN — OMEPRAZOLE 20 MG: 20 CAPSULE, DELAYED RELEASE ORAL at 10:36

## 2020-06-12 RX ADMIN — TRAZODONE HYDROCHLORIDE 50 MG: 50 TABLET ORAL at 22:34

## 2020-06-12 RX ADMIN — ACETAMINOPHEN 650 MG: 325 TABLET, FILM COATED ORAL at 06:45

## 2020-06-12 RX ADMIN — NICOTINE POLACRILEX 4 MG: 2 GUM, CHEWING BUCCAL at 17:35

## 2020-06-12 RX ADMIN — Medication 30 MG: at 15:39

## 2020-06-12 RX ADMIN — DULOXETINE HYDROCHLORIDE 60 MG: 60 CAPSULE, DELAYED RELEASE ORAL at 10:36

## 2020-06-12 RX ADMIN — LEVOTHYROXINE SODIUM 225 MCG: 150 TABLET ORAL at 10:36

## 2020-06-12 RX ADMIN — ENOXAPARIN SODIUM 40 MG: 40 INJECTION SUBCUTANEOUS at 10:35

## 2020-06-12 RX ADMIN — MULTIPLE VITAMINS W/ MINERALS TAB 1 TABLET: TAB at 10:36

## 2020-06-12 RX ADMIN — GABAPENTIN 800 MG: 800 TABLET, FILM COATED ORAL at 20:27

## 2020-06-12 RX ADMIN — ACETAMINOPHEN 650 MG: 325 TABLET, FILM COATED ORAL at 10:45

## 2020-06-12 RX ADMIN — POTASSIUM CHLORIDE 20 MEQ: 750 TABLET, EXTENDED RELEASE ORAL at 10:36

## 2020-06-12 RX ADMIN — MELATONIN 1000 UNITS: at 10:37

## 2020-06-12 RX ADMIN — METHADONE HYDROCHLORIDE 75 MG: 5 SOLUTION ORAL at 10:45

## 2020-06-12 RX ADMIN — GABAPENTIN 800 MG: 800 TABLET, FILM COATED ORAL at 10:36

## 2020-06-12 RX ADMIN — Medication 30 MG: at 11:21

## 2020-06-12 NOTE — PLAN OF CARE
0700-1930  Patient A & O x4. VSS. Pt up with SBA pt goes to commode IND next to her bed because of lasix. Lung sounds . Patient denies chest pain, SOB, lightheadedness, dizziness, tingling, numbness, nausea, and vomiting. Bowel sounds active, passing flatus, and tolerating regular diet. Drinking well and voiding spontaneously without difficulties. PIV SL in L AC. Patient able to wiggle toes. CMS intact, BLE edema going down with lasix. Pain is tolerable and patient taking PRN tylenol for pain, ice pack applied. Plan is to discharge to home possibly tomorrow. Patient demonstrates ability to use call light appropriately. Will continue to monitor patient.

## 2020-06-12 NOTE — PLAN OF CARE
Pt. A&Ox4. VSS. Afebrile. Lung sounds CTA. Maintaining sats on RA. Bowel sounds active, LBM 6/11 per pt report. CMS and neuro's are intact. Denies numbness and tingling in all extremities. BLE edema +2. Denies nausea, shortness of breath, and chest pain. Pain managed w/ prn Tylenol. Voids spontaneously without difficulty in the BSC or up to BR. Tolerating regular diet. Pt up with SBA. PIV is patent and SL. PCD's on BLE's. Bilateral heels are elevated off the bed. Call light is within reach, pt able to make needs known and is resting comfortably between cares. Pt was feeling better after a shower last evening. Will continue to monitor.

## 2020-06-12 NOTE — PROGRESS NOTES
[facility], [facility], [place], [place]  Internal Medicine Progress Note      Assessment and Plans:       No Yusuf is a 60 year old female who was admitted on 6/9/2020.     No Yusuf is a 60 year old female with htn, hypothyroidism, lymphedema, polysubstance abuse, bipolar, arthritis, and fibromyalgia who was admitted with shortness of breath, weight gain, and increased diameter of thighs with associated pain. It is difficult for her to ambulate due to pain/tightness in legs     # Acute lymphedema exacerbation with fluid overload likely into lungs causing dyspnea and increased orthopnea- pink and swollen, but not exquisitely tender or very warm. BLE US without DVT. D dimer elevated but obese, CTA without evidence of PE. Weight up 16 lbs since discharge 2/2/20, with furosemide dose decreased from 40 mg BID to 40 mg qam during her hospitalization for influenza at [facility] (2/14-2/17) before she returned to her chem dep treatment center in [place]. Amlodipine was also restarted at the [facility] Hospitalization but will be held now (peripheral edema). BLE are now tight and edematous, with dyspnea on exertion now. 40 IV lasix given in ED. augmentin given in ED. CXR interpretation limited by habitus.    - Stop IV lasix. Start oral lasix 30 mg BID. See how she does on this. If doing well. Discharge back to her inpatient treatment program  - monitor and replace lytes prn  -venous stasis changes in the legs. Better with diuresis.   - daily weights  - Follow-up echo  - cont home KCl 20 mg BID and adjust prn  - , TSH pending elevated at 9.07, free T4 is 1.26 which is normal  - lymphedema consult  - strict iO  - COVID-19 negative     # Bipolar, insomnia-  - cont home duloxetine and trazodone prn     # Chronic neuropathic pain- home dose of gabapentin 1200mg TID per [place] and [facility] discharges, but we discussed that this seems unsafe. We settled  on 800 mg TID, and this can be verified in am  - 800 mg TID gabapentin     # Hx of polysubstance abuse- opiate dependence, pt says takes methadone 75 daily- (chart had 70 mg daily)  -Resume methadone.  Pharmacy to verify the dose.     # Hypothyroidism-  - cont home synthroid  -Currently on 200 mcg of Synthroid daily.  TSH is elevated free T4 is normal.  TSH is not suppressed.  We will go ahead and increase the dose of Synthroid to 225 mcg daily.  Needs follow-up TSH in 6 to 8 weeks time     # GERD  - cont home omeprazole     # Tobacco dependence  - cont home nicotine gum       Code Status: Full Code  Disposition: Expected discharge: Likely tomorrow back to her prior detox facility where she came from       Tl Roman MD  Text Page  (7am - 5pm, M-F)    Subjective:        Patient was seen in person.  Chart reviewed.    Sob is better. No chest pain.    Body aches is better.      -Data reviewed today: I reviewed all new labs and imaging results over the last 24 hours.     Exam:      Obese individual.  No apparent distress noted   H EENT: Mucosa moist neck supple   Lungs: Air entry is good on both side is no wheezing or crackles   Heart: S1-S2 normal no murmurs or gallops   Skin: Venous stasis changes noted in the bilateral lower extremity redness is better   Legs some venous stasis changes noted.  Leg edema has improved.  There is no presacral edema either.    Temp: 96.6  F (35.9  C) Temp src: Oral BP: 130/73 Pulse: 82 Heart Rate: 88 Resp: 16 SpO2: 93 % O2 Device: None (Room air)    Vitals:    06/09/20 2256 06/10/20 1037 06/11/20 1235   Weight: 135.2 kg (298 lb) 127 kg (280 lb) 123.8 kg (273 lb)     Vital Signs with Ranges  Temp:  [96.6  F (35.9  C)-98.7  F (37.1  C)] 96.6  F (35.9  C)  Pulse:  [82] 82  Heart Rate:  [88-89] 88  Resp:  [16-17] 16  BP: ()/(50-73) 130/73  SpO2:  [92 %-94 %] 93 %  I/O last 3 completed shifts:  In: 500 [P.O.:500]  Out: -         Medications       DULoxetine  60 mg Oral Daily      enoxaparin ANTICOAGULANT  40 mg Subcutaneous Q24H     furosemide  30 mg Oral BID     gabapentin  800 mg Oral TID     levothyroxine  225 mcg Oral Daily     methadone  75 mg Oral Daily     multivitamin w/minerals  1 tablet Oral Daily     omeprazole  20 mg Oral Daily     potassium chloride ER  20 mEq Oral BID     sodium chloride (PF)  3 mL Intracatheter Q8H     traZODone  50 mg Oral At Bedtime     Vitamin D3  1,000 Units Oral Daily     Data      Recent Labs   Lab 06/12/20  0801 06/10/20  0701 06/09/20  2323   WBC 6.9  --  9.4   HGB 12.1  --  10.9*   MCV 86  --  87    278 284    139 138   POTASSIUM 3.8 3.8 3.9   CHLORIDE 102 103 103   CO2 31 31 29   BUN 21 12 11   CR 0.91 0.86 0.84   ANIONGAP 3 5 6   RODOLFO 8.5 8.8 8.8   * 113* 127*   ALBUMIN  --   --  3.0*   PROTTOTAL  --   --  8.3   BILITOTAL  --   --  0.2   ALKPHOS  --   --  147   ALT  --   --  55*   AST  --   --  51*       No results found for this or any previous visit (from the past 24 hour(s)).

## 2020-06-13 ENCOUNTER — APPOINTMENT (OUTPATIENT)
Dept: GENERAL RADIOLOGY | Facility: CLINIC | Age: 60
DRG: 607 | End: 2020-06-13
Attending: HOSPITALIST
Payer: COMMERCIAL

## 2020-06-13 LAB
ANION GAP SERPL CALCULATED.3IONS-SCNC: 5 MMOL/L (ref 3–14)
BUN SERPL-MCNC: 21 MG/DL (ref 7–30)
CALCIUM SERPL-MCNC: 8.4 MG/DL (ref 8.5–10.1)
CHLORIDE SERPL-SCNC: 103 MMOL/L (ref 94–109)
CO2 SERPL-SCNC: 30 MMOL/L (ref 20–32)
CREAT SERPL-MCNC: 0.93 MG/DL (ref 0.52–1.04)
GFR SERPL CREATININE-BSD FRML MDRD: 67 ML/MIN/{1.73_M2}
GLUCOSE SERPL-MCNC: 174 MG/DL (ref 70–99)
MAGNESIUM SERPL-MCNC: 2.1 MG/DL (ref 1.6–2.3)
PLATELET # BLD AUTO: 289 10E9/L (ref 150–450)
POTASSIUM SERPL-SCNC: 3.8 MMOL/L (ref 3.4–5.3)
SODIUM SERPL-SCNC: 138 MMOL/L (ref 133–144)

## 2020-06-13 PROCEDURE — 25000128 H RX IP 250 OP 636: Performed by: INTERNAL MEDICINE

## 2020-06-13 PROCEDURE — 73562 X-RAY EXAM OF KNEE 3: CPT | Mod: LT

## 2020-06-13 PROCEDURE — 12000001 ZZH R&B MED SURG/OB UMMC

## 2020-06-13 PROCEDURE — 83735 ASSAY OF MAGNESIUM: CPT | Performed by: INTERNAL MEDICINE

## 2020-06-13 PROCEDURE — 25000132 ZZH RX MED GY IP 250 OP 250 PS 637: Performed by: INTERNAL MEDICINE

## 2020-06-13 PROCEDURE — 85049 AUTOMATED PLATELET COUNT: CPT | Performed by: INTERNAL MEDICINE

## 2020-06-13 PROCEDURE — 80048 BASIC METABOLIC PNL TOTAL CA: CPT | Performed by: INTERNAL MEDICINE

## 2020-06-13 PROCEDURE — 36415 COLL VENOUS BLD VENIPUNCTURE: CPT | Performed by: INTERNAL MEDICINE

## 2020-06-13 PROCEDURE — 25000132 ZZH RX MED GY IP 250 OP 250 PS 637: Performed by: HOSPITALIST

## 2020-06-13 PROCEDURE — 99207 ZZC CDG-MDM COMPONENT: MEETS LOW - DOWN CODED: CPT | Performed by: HOSPITALIST

## 2020-06-13 PROCEDURE — 99232 SBSQ HOSP IP/OBS MODERATE 35: CPT | Performed by: HOSPITALIST

## 2020-06-13 RX ORDER — POLYETHYLENE GLYCOL 3350 17 G/17G
17 POWDER, FOR SOLUTION ORAL DAILY
Status: DISCONTINUED | OUTPATIENT
Start: 2020-06-13 | End: 2020-06-16 | Stop reason: HOSPADM

## 2020-06-13 RX ORDER — POLYETHYLENE GLYCOL 3350 17 G/17G
17 POWDER, FOR SOLUTION ORAL DAILY
Status: DISCONTINUED | OUTPATIENT
Start: 2020-06-14 | End: 2020-06-13

## 2020-06-13 RX ADMIN — Medication 30 MG: at 09:25

## 2020-06-13 RX ADMIN — Medication 1 MG: at 21:54

## 2020-06-13 RX ADMIN — NICOTINE POLACRILEX 2 MG: 2 GUM, CHEWING BUCCAL at 20:03

## 2020-06-13 RX ADMIN — POLYETHYLENE GLYCOL 3350 17 G: 17 POWDER, FOR SOLUTION ORAL at 21:49

## 2020-06-13 RX ADMIN — ACETAMINOPHEN 650 MG: 325 TABLET, FILM COATED ORAL at 20:04

## 2020-06-13 RX ADMIN — ACETAMINOPHEN 650 MG: 325 TABLET, FILM COATED ORAL at 09:33

## 2020-06-13 RX ADMIN — TRAZODONE HYDROCHLORIDE 50 MG: 50 TABLET ORAL at 21:54

## 2020-06-13 RX ADMIN — ACETAMINOPHEN 650 MG: 325 TABLET, FILM COATED ORAL at 03:06

## 2020-06-13 RX ADMIN — LEVOTHYROXINE SODIUM 225 MCG: 150 TABLET ORAL at 09:24

## 2020-06-13 RX ADMIN — GABAPENTIN 800 MG: 800 TABLET, FILM COATED ORAL at 21:54

## 2020-06-13 RX ADMIN — POTASSIUM CHLORIDE 20 MEQ: 750 TABLET, EXTENDED RELEASE ORAL at 20:01

## 2020-06-13 RX ADMIN — HYDROXYZINE HYDROCHLORIDE 50 MG: 25 TABLET, FILM COATED ORAL at 21:54

## 2020-06-13 RX ADMIN — MELATONIN 1000 UNITS: at 09:25

## 2020-06-13 RX ADMIN — DULOXETINE HYDROCHLORIDE 60 MG: 60 CAPSULE, DELAYED RELEASE ORAL at 09:23

## 2020-06-13 RX ADMIN — GABAPENTIN 800 MG: 800 TABLET, FILM COATED ORAL at 09:24

## 2020-06-13 RX ADMIN — POTASSIUM CHLORIDE 20 MEQ: 750 TABLET, EXTENDED RELEASE ORAL at 09:24

## 2020-06-13 RX ADMIN — NICOTINE POLACRILEX 2 MG: 2 GUM, CHEWING BUCCAL at 20:06

## 2020-06-13 RX ADMIN — Medication 30 MG: at 19:08

## 2020-06-13 RX ADMIN — GABAPENTIN 800 MG: 800 TABLET, FILM COATED ORAL at 19:08

## 2020-06-13 RX ADMIN — OMEPRAZOLE 20 MG: 20 CAPSULE, DELAYED RELEASE ORAL at 09:25

## 2020-06-13 RX ADMIN — METHADONE HYDROCHLORIDE 75 MG: 5 SOLUTION ORAL at 09:33

## 2020-06-13 RX ADMIN — ENOXAPARIN SODIUM 40 MG: 40 INJECTION SUBCUTANEOUS at 09:23

## 2020-06-13 RX ADMIN — MULTIPLE VITAMINS W/ MINERALS TAB 1 TABLET: TAB at 09:25

## 2020-06-13 NOTE — PLAN OF CARE
VS: VSS.    O2: Room air >90%.    Output: Voiding without difficulty.    Last BM: 6/11.   Activity: Up independently/SBA with walker.    Skin: Intact.    Pain: C/o knee pain. Tylenol PRN and pt wanted knee wrapped. Heat/ice as needed   CMS: Intact.    Dressing: None.    Diet: Regular.    LDA: PIV removed per pt request-stated it hurt.    Equipment: Personal belongings, walker.    Plan: Discharge back to inpatient tx program, possibly today, 6/13.    Additional Info:

## 2020-06-13 NOTE — PROGRESS NOTES
[facility], [facility], [place], [place]  Internal Medicine Progress Note      Assessment and Plans:       No Yusuf is a 60 year old female who was admitted on 6/9/2020.     No Yusuf is a 60 year old female with htn, hypothyroidism, lymphedema, polysubstance abuse, bipolar, arthritis, and fibromyalgia who was admitted with shortness of breath, weight gain, and increased diameter of thighs with associated pain. It is difficult for her to ambulate due to pain/tightness in legs     # Acute lymphedema exacerbation with fluid overload likely into lungs causing dyspnea and increased orthopnea- pink and swollen, but not exquisitely tender or very warm. BLE US without DVT. D dimer elevated but obese, CTA without evidence of PE. Weight up 16 lbs since discharge 2/2/20, with furosemide dose decreased from 40 mg BID to 40 mg qam during her hospitalization for influenza at [facility] (2/14-2/17) before she returned to her [facility] treatment center in [place]. Amlodipine was also restarted at the [facility] Hospitalization but will be held now (peripheral edema). BLE are now tight and edematous, with dyspnea on exertion now. 40 IV lasix given in ED. augmentin given in ED. CXR interpretation limited by habitus.    - Stop IV lasix. Start oral lasix 30 mg BID. See how she does on this. If doing well. Discharge back to her inpatient treatment program  - monitor and replace lytes prn  - Venous stasis changes in the legs. Better with diuresis.   - Daily weights  - ECHO on 06/09 showed EF is 60-65%. Left ventricular diastolic function is indeterminate, The right ventricle is normal size. Global right ventricular function is  normal.  - cont home KCl 20 mg BID and adjust prn  - , TSH pending elevated at 9.07, free T4 is 1.26 which is normal  - antiembolic stockings  - strict iO  - COVID-19 negative     # Bipolar, insomnia-  - cont home duloxetine and trazodone  prn     # Chronic neuropathic pain- home dose of gabapentin 1200mg TID per [facility] discharges, but we discussed that this seems unsafe. We settled on 800 mg TID, and this can be verified in am  - 800 mg TID gabapentin     # Hx of polysubstance abuse- opiate dependence, pt says takes methadone 75 daily- (chart had 70 mg daily)  -Resume methadone.  Pharmacy to verify the dose.     # Hypothyroidism-  -cont home synthroid  -Currently on 200 mcg of Synthroid daily.  TSH is elevated free T4 is normal.  TSH is not suppressed.  We will go ahead and increase the dose of Synthroid to 225 mcg daily.  Needs follow-up TSH in 6 to 8 weeks time     # GERD  - cont home omeprazole     # Tobacco dependence  - cont home nicotine gum    # Left knee pain:   Get  XR of left knee. Likely OA. Start topical diclofenac and tylenol.      Code Status: Full Code  Disposition: Expected discharge: Likely tomorrow back to her prior detox facility where she came from       Tl Roman MD  Text Page  (7am - 5pm, M-F)    Subjective:        Patient was seen in person.  Chart reviewed.    Sob is better. No chest pain.    Body aches is better.    She has left knee pain. Difficulty walking.      -Data reviewed today: I reviewed all new labs and imaging results over the last 24 hours.     Exam:      Obese individual.  No apparent distress noted   H EENT: Mucosa moist neck supple   Lungs: Air entry is good on both side is no wheezing or crackles   Heart: S1-S2 normal no murmurs or gallops   Skin: Venous stasis changes noted in the bilateral lower extremity redness is better   Legs some venous stasis changes noted.  Leg edema has improved.  There is no presacral edema either.    Temp: 97  F (36.1  C) Temp src: Oral BP: 124/72 Pulse: 82 Heart Rate: 74 Resp: 16 SpO2: 95 % O2 Device: None (Room air)    Vitals:    06/11/20 1235 06/12/20 1545 06/13/20 0913   Weight: 123.8 kg (273 lb) 123.6 kg (272 lb 6.4 oz) 125 kg (275 lb 8 oz)      Vital Signs with Ranges  Temp:  [96.6  F (35.9  C)-97.6  F (36.4  C)] 97  F (36.1  C)  Pulse:  [82] 82  Heart Rate:  [74-91] 74  Resp:  [16] 16  BP: (124-138)/(71-75) 124/72  SpO2:  [93 %-95 %] 95 %  No intake/output data recorded.        Medications       DULoxetine  60 mg Oral Daily     enoxaparin ANTICOAGULANT  40 mg Subcutaneous Q24H     furosemide  30 mg Oral BID     gabapentin  800 mg Oral TID     levothyroxine  225 mcg Oral Daily     methadone  75 mg Oral Daily     multivitamin w/minerals  1 tablet Oral Daily     omeprazole  20 mg Oral Daily     potassium chloride ER  20 mEq Oral BID     sodium chloride (PF)  3 mL Intracatheter Q8H     traZODone  50 mg Oral At Bedtime     Vitamin D3  1,000 Units Oral Daily     Data      Recent Labs   Lab 06/13/20  0526 06/12/20  0801 06/10/20  0701 06/09/20  2323   WBC  --  6.9  --  9.4   HGB  --  12.1  --  10.9*   MCV  --  86  --  87    321 278 284    136 139 138   POTASSIUM 3.8 3.8 3.8 3.9   CHLORIDE 103 102 103 103   CO2 30 31 31 29   BUN 21 21 12 11   CR 0.93 0.91 0.86 0.84   ANIONGAP 5 3 5 6   RODOLFO 8.4* 8.5 8.8 8.8   * 123* 113* 127*   ALBUMIN  --   --   --  3.0*   PROTTOTAL  --   --   --  8.3   BILITOTAL  --   --   --  0.2   ALKPHOS  --   --   --  147   ALT  --   --   --  55*   AST  --   --   --  51*       No results found for this or any previous visit (from the past 24 hour(s)).

## 2020-06-13 NOTE — PROGRESS NOTES
Social Work Services Progress Note    Hospital Day: 5    Collaborated with:  5A charge RN, Chart Review, attempted to contact pt in her room, no answer    Data:  Discharge plan    Intervention:  Pt lives at [facility], and in January 2020 she did not want support of SW to help with discharge from hospital to return there. SW attempted to call pt in her room, no answer.    Assessment:  pt may need help with discharge plan and return to [facility]    Plan:    Anticipated Disposition:  ? [facility]    Barriers to d/c plan:  Medical stability    Follow Up:  [facility] or [facility] to follow for discharge plan    SHERRY Jose, MSW     [employer]     Text paging available through [employer] on [employer] - search SOCIAL WORK     ON CALL PAGER [phone]   ON CALL COVERAGE AFTER 1600 [phone]

## 2020-06-13 NOTE — PLAN OF CARE
VS: VSS, pt denies CP or SOB .   O2: Room air sat. > 90%.   Output: Voiding adequate amount in the bathroom.    Last BM: 06/11/20 passing gas.   Activity: Up walking in the room and fritz using walker independent.    Skin: Intact.    Pain: Denies pain or discomfort.    CMS: CMS and neuro intact.    Dressing: None.    Diet: Regular tolerating without N/V.    LDA: none   Equipment: Walker, and personal belongings.    Plan: TBD.   Additional Info: Strict I&O and abdullahi patiño. Standing.

## 2020-06-14 LAB
ANION GAP SERPL CALCULATED.3IONS-SCNC: 5 MMOL/L (ref 3–14)
BUN SERPL-MCNC: 17 MG/DL (ref 7–30)
CALCIUM SERPL-MCNC: 8.6 MG/DL (ref 8.5–10.1)
CHLORIDE SERPL-SCNC: 103 MMOL/L (ref 94–109)
CO2 SERPL-SCNC: 30 MMOL/L (ref 20–32)
CREAT SERPL-MCNC: 0.86 MG/DL (ref 0.52–1.04)
GFR SERPL CREATININE-BSD FRML MDRD: 73 ML/MIN/{1.73_M2}
GLUCOSE SERPL-MCNC: 122 MG/DL (ref 70–99)
POTASSIUM SERPL-SCNC: 3.9 MMOL/L (ref 3.4–5.3)
SODIUM SERPL-SCNC: 138 MMOL/L (ref 133–144)

## 2020-06-14 PROCEDURE — 25000132 ZZH RX MED GY IP 250 OP 250 PS 637: Performed by: INTERNAL MEDICINE

## 2020-06-14 PROCEDURE — 99232 SBSQ HOSP IP/OBS MODERATE 35: CPT | Performed by: HOSPITALIST

## 2020-06-14 PROCEDURE — 12000001 ZZH R&B MED SURG/OB UMMC

## 2020-06-14 PROCEDURE — 25000128 H RX IP 250 OP 636: Performed by: INTERNAL MEDICINE

## 2020-06-14 PROCEDURE — 25000132 ZZH RX MED GY IP 250 OP 250 PS 637: Performed by: HOSPITALIST

## 2020-06-14 PROCEDURE — 36415 COLL VENOUS BLD VENIPUNCTURE: CPT | Performed by: HOSPITALIST

## 2020-06-14 PROCEDURE — 80048 BASIC METABOLIC PNL TOTAL CA: CPT | Performed by: HOSPITALIST

## 2020-06-14 RX ORDER — FUROSEMIDE 20 MG
40 TABLET ORAL
Status: DISCONTINUED | OUTPATIENT
Start: 2020-06-14 | End: 2020-06-15

## 2020-06-14 RX ADMIN — METHADONE HYDROCHLORIDE 75 MG: 5 SOLUTION ORAL at 09:48

## 2020-06-14 RX ADMIN — POTASSIUM CHLORIDE 20 MEQ: 750 TABLET, EXTENDED RELEASE ORAL at 09:48

## 2020-06-14 RX ADMIN — MELATONIN 1000 UNITS: at 09:48

## 2020-06-14 RX ADMIN — MULTIPLE VITAMINS W/ MINERALS TAB 1 TABLET: TAB at 09:47

## 2020-06-14 RX ADMIN — ENOXAPARIN SODIUM 40 MG: 40 INJECTION SUBCUTANEOUS at 09:45

## 2020-06-14 RX ADMIN — GABAPENTIN 800 MG: 800 TABLET, FILM COATED ORAL at 20:09

## 2020-06-14 RX ADMIN — DULOXETINE HYDROCHLORIDE 60 MG: 60 CAPSULE, DELAYED RELEASE ORAL at 09:47

## 2020-06-14 RX ADMIN — OMEPRAZOLE 20 MG: 20 CAPSULE, DELAYED RELEASE ORAL at 09:47

## 2020-06-14 RX ADMIN — FUROSEMIDE 40 MG: 20 TABLET ORAL at 15:59

## 2020-06-14 RX ADMIN — GABAPENTIN 800 MG: 800 TABLET, FILM COATED ORAL at 15:59

## 2020-06-14 RX ADMIN — Medication 3 MG: at 21:59

## 2020-06-14 RX ADMIN — POLYETHYLENE GLYCOL 3350 17 G: 17 POWDER, FOR SOLUTION ORAL at 09:45

## 2020-06-14 RX ADMIN — GABAPENTIN 800 MG: 800 TABLET, FILM COATED ORAL at 09:47

## 2020-06-14 RX ADMIN — FUROSEMIDE 40 MG: 20 TABLET ORAL at 09:44

## 2020-06-14 RX ADMIN — POTASSIUM CHLORIDE 20 MEQ: 750 TABLET, EXTENDED RELEASE ORAL at 20:09

## 2020-06-14 RX ADMIN — ACETAMINOPHEN 650 MG: 325 TABLET, FILM COATED ORAL at 13:44

## 2020-06-14 RX ADMIN — DICLOFENAC SODIUM 2 G: 10 GEL TOPICAL at 09:51

## 2020-06-14 RX ADMIN — LEVOTHYROXINE SODIUM 225 MCG: 150 TABLET ORAL at 09:46

## 2020-06-14 RX ADMIN — TRAZODONE HYDROCHLORIDE 50 MG: 50 TABLET ORAL at 21:59

## 2020-06-14 NOTE — PROGRESS NOTES
[facility], [facility], [place], [place]  Internal Medicine Progress Note      Assessment and Plans:       No Yusuf is a 60 year old female who was admitted on 6/9/2020.     No Yusuf is a 60 year old female with htn, hypothyroidism, lymphedema, polysubstance abuse, bipolar, arthritis, and fibromyalgia who was admitted with shortness of breath, weight gain, and increased diameter of thighs with associated pain. It is difficult for her to ambulate due to pain/tightness in legs     # Acute lymphedema exacerbation with fluid overload likely into lungs causing dyspnea and increased orthopnea- pink and swollen, but not exquisitely tender or very warm. BLE US without DVT. D dimer elevated but obese, CTA without evidence of PE. Weight up 16 lbs since discharge 2/2/20, with furosemide dose decreased from 40 mg BID to 40 mg qam during her hospitalization for influenza at [facility] (2/14-2/17) before she returned to her chem dep treatment center in [place]. Amlodipine was also restarted at the [facility] but will be held now (peripheral edema). BLE are now tight and edematous, with dyspnea on exertion now. 40 IV lasix given in ED. augmentin given in ED. CXR interpretation limited by habitus.    - Change lasix to 40 mg BID. Her weight is going up. Making sure the weight is stable prior to discharge.   - Monitor and replace lytes prn  - Venous stasis changes in the legs. Better with diuresis.   - ECHO on 06/09 showed EF is 60-65%. Left ventricular diastolic function is indeterminate, The right ventricle is normal size. Global right ventricular function is  normal.  - Cont home KCl 20 mg BID and adjust prn  - , TSH pending elevated at 9.07, free T4 is 1.26 which is normal  - Antiembolic stockings  - COVID-19 negative     # Bipolar, insomnia:  - cont home duloxetine and trazodone prn     # Chronic neuropathic pain- home dose of gabapentin 1200mg  TID per [facility] discharges, but we discussed that this seems unsafe. We settled on 800 mg TID, and this can be verified in am  - 800 mg TID gabapentin     # Hx of polysubstance abuse- opiate dependence, pt says takes methadone 75 daily- (chart had 70 mg daily)  -Resume methadone 75 mg po daily.       # Hypothyroidism:   -Cont home synthroid  -PTA on 200 mcg of Synthroid daily.  TSH is elevated free T4 is normal.  TSH is not suppressed.  We will go ahead and increase the dose of Synthroid to 225 mcg daily.  Needs follow-up TSH in 6 to 8 weeks time     # GERD  - cont home omeprazole     # Tobacco dependence  - cont home nicotine gum    # Left knee pain from OA, arthrosis of knee noted.   Ct diclofenac and tylenol. She is on Neurontin and methadone too.   -out pt Follow-up with Orthopedics. Likely needs surgery.     Code Status: Full Code  Disposition: Expected discharge: Likely tomorrow back to her prior detox facility where she came from       Tl Roman MD  Text Page  (7am - 5pm, M-F)    Subjective:      She is doing better. Sob and leg swelling have improved.    Reports left knee pain.    No fever or chills.    No cough or phlegm.      -Data reviewed today: I reviewed all new labs and imaging results over the last 24 hours.     Exam:      Obese individual.  No apparent distress noted   H EENT: Mucosa moist neck supple   Lungs: Air entry is good on both side is no wheezing or crackles   Heart: S1-S2 normal no murmurs or gallops  Skin: Improved, leg edema. Venous stasis changes noted in the bilateral lower extremity,  redness is better       Temp: 97.4  F (36.3  C) Temp src: Oral BP: (!) 145/82 Pulse: 75 Heart Rate: 73 Resp: 16 SpO2: 95 % O2 Device: None (Room air)    Vitals:    06/12/20 1545 06/13/20 0913 06/14/20 0824   Weight: 123.6 kg (272 lb 6.4 oz) 125 kg (275 lb 8 oz) 125.8 kg (277 lb 4.8 oz)     Vital Signs with Ranges  Temp:  [96  F (35.6  C)-97.4  F (36.3  C)] 97.4  F (36.3   C)  Pulse:  [75-87] 75  Heart Rate:  [73] 73  Resp:  [15-16] 16  BP: (112-145)/(63-82) 145/82  SpO2:  [94 %-95 %] 95 %  I/O last 3 completed shifts:  In: 1210 [P.O.:1210]  Out: 1100 [Urine:1100]        Medications       DULoxetine  60 mg Oral Daily     enoxaparin ANTICOAGULANT  40 mg Subcutaneous Q24H     furosemide  40 mg Oral BID     gabapentin  800 mg Oral TID     levothyroxine  225 mcg Oral Daily     methadone  75 mg Oral Daily     multivitamin w/minerals  1 tablet Oral Daily     omeprazole  20 mg Oral Daily     polyethylene glycol  17 g Oral Daily     potassium chloride ER  20 mEq Oral BID     sodium chloride (PF)  3 mL Intracatheter Q8H     traZODone  50 mg Oral At Bedtime     Vitamin D3  1,000 Units Oral Daily     Data      Recent Labs   Lab 06/14/20  0538 06/13/20  0526 06/12/20  0801 06/10/20  0701 06/09/20  2323   WBC  --   --  6.9  --  9.4   HGB  --   --  12.1  --  10.9*   MCV  --   --  86  --  87   PLT  --  289 321 278 284    138 136 139 138   POTASSIUM 3.9 3.8 3.8 3.8 3.9   CHLORIDE 103 103 102 103 103   CO2 30 30 31 31 29   BUN 17 21 21 12 11   CR 0.86 0.93 0.91 0.86 0.84   ANIONGAP 5 5 3 5 6   RODOLFO 8.6 8.4* 8.5 8.8 8.8   * 174* 123* 113* 127*   ALBUMIN  --   --   --   --  3.0*   PROTTOTAL  --   --   --   --  8.3   BILITOTAL  --   --   --   --  0.2   ALKPHOS  --   --   --   --  147   ALT  --   --   --   --  55*   AST  --   --   --   --  51*       Recent Results (from the past 24 hour(s))   XR Knee Left 3 Views    Narrative    Exam: 3 views of the left knee dated 6/13/2020.    COMPARISON: None.    CLINICAL HISTORY: Left knee pain.    FINDINGS: AP, lateral, and axial views of the left knee were obtained.  Joint space narrowing in the medial femorotibial joint compartment of  the left knee. Small left knee joint effusion. Marked osteoarthrosis  in the left knee patellofemoral joint compartment with marked  irregularity along the trochlear surface.      Impression    IMPRESSION: Osteoarthrosis  in the left knee, greatest in the  patellofemoral joint compartment, with a small to moderate-sized left  knee joint effusion.    RUT VITAL MD

## 2020-06-14 NOTE — PLAN OF CARE
VS: VSS, pt denies CP or SOB.    O2: Room air sat. > 90%.    Output: Voiding adequate amount in the bathroom.    Last BM: 06/11/20 passing gas, dulcolax suppository offered but pt declined, prune juice given.    Activity: Up independent using walker.    Skin: Intact.    Pain: Denies pain or discomfort.    CMS: CMS and neuro intact.    Dressing: None.    Diet: Regular tolerating without N/V.    LDA: None.    Equipment: Walker, personal belongings.    Plan: TBD.    Additional Info:

## 2020-06-14 NOTE — PLAN OF CARE
VS: VSS.   O2: Room air >90%.    Output: Voiding without difficulty.    Last BM: 6/11. Pt states she takes miralax at home- order placed this evening.    Activity: Up independently with walker. Walks the halls.    Skin: Intact.    Pain: Minimal pain-generalized. Tylenol PRN.    CMS: Intact.    Dressing: None.    Diet: Regular.    LDA: None.    Equipment: Personal belongings, walker.    Plan: Possible discharge tomorrow, 6/14 to previous treatment center.    Additional Info:

## 2020-06-15 ENCOUNTER — APPOINTMENT (OUTPATIENT)
Dept: PHYSICAL THERAPY | Facility: CLINIC | Age: 60
DRG: 607 | End: 2020-06-15
Payer: COMMERCIAL

## 2020-06-15 LAB
ANION GAP SERPL CALCULATED.3IONS-SCNC: 7 MMOL/L (ref 3–14)
BUN SERPL-MCNC: 14 MG/DL (ref 7–30)
CALCIUM SERPL-MCNC: 8.6 MG/DL (ref 8.5–10.1)
CHLORIDE SERPL-SCNC: 103 MMOL/L (ref 94–109)
CO2 SERPL-SCNC: 28 MMOL/L (ref 20–32)
CREAT SERPL-MCNC: 0.84 MG/DL (ref 0.52–1.04)
GFR SERPL CREATININE-BSD FRML MDRD: 75 ML/MIN/{1.73_M2}
GLUCOSE SERPL-MCNC: 139 MG/DL (ref 70–99)
POTASSIUM SERPL-SCNC: 3.9 MMOL/L (ref 3.4–5.3)
SODIUM SERPL-SCNC: 138 MMOL/L (ref 133–144)

## 2020-06-15 PROCEDURE — 25000132 ZZH RX MED GY IP 250 OP 250 PS 637: Performed by: INTERNAL MEDICINE

## 2020-06-15 PROCEDURE — 97161 PT EVAL LOW COMPLEX 20 MIN: CPT | Mod: GP | Performed by: PHYSICAL THERAPIST

## 2020-06-15 PROCEDURE — 99233 SBSQ HOSP IP/OBS HIGH 50: CPT | Performed by: HOSPITALIST

## 2020-06-15 PROCEDURE — 36415 COLL VENOUS BLD VENIPUNCTURE: CPT | Performed by: HOSPITALIST

## 2020-06-15 PROCEDURE — 97140 MANUAL THERAPY 1/> REGIONS: CPT | Mod: GP | Performed by: PHYSICAL THERAPIST

## 2020-06-15 PROCEDURE — 25000132 ZZH RX MED GY IP 250 OP 250 PS 637: Performed by: HOSPITALIST

## 2020-06-15 PROCEDURE — 12000001 ZZH R&B MED SURG/OB UMMC

## 2020-06-15 PROCEDURE — 25000128 H RX IP 250 OP 636: Performed by: INTERNAL MEDICINE

## 2020-06-15 PROCEDURE — 80048 BASIC METABOLIC PNL TOTAL CA: CPT | Performed by: HOSPITALIST

## 2020-06-15 RX ORDER — FUROSEMIDE 20 MG
60 TABLET ORAL
Status: DISCONTINUED | OUTPATIENT
Start: 2020-06-15 | End: 2020-06-16 | Stop reason: HOSPADM

## 2020-06-15 RX ORDER — FUROSEMIDE 20 MG
60 TABLET ORAL
Status: DISCONTINUED | OUTPATIENT
Start: 2020-06-15 | End: 2020-06-15

## 2020-06-15 RX ORDER — FUROSEMIDE 20 MG
20 TABLET ORAL ONCE
Status: COMPLETED | OUTPATIENT
Start: 2020-06-15 | End: 2020-06-15

## 2020-06-15 RX ADMIN — LEVOTHYROXINE SODIUM 225 MCG: 150 TABLET ORAL at 08:13

## 2020-06-15 RX ADMIN — DULOXETINE HYDROCHLORIDE 60 MG: 60 CAPSULE, DELAYED RELEASE ORAL at 08:13

## 2020-06-15 RX ADMIN — POTASSIUM CHLORIDE 20 MEQ: 750 TABLET, EXTENDED RELEASE ORAL at 08:13

## 2020-06-15 RX ADMIN — NICOTINE POLACRILEX 4 MG: 2 GUM, CHEWING BUCCAL at 20:03

## 2020-06-15 RX ADMIN — ACETAMINOPHEN 650 MG: 325 TABLET, FILM COATED ORAL at 00:06

## 2020-06-15 RX ADMIN — DICLOFENAC SODIUM 2 G: 10 GEL TOPICAL at 10:45

## 2020-06-15 RX ADMIN — ACETAMINOPHEN 650 MG: 325 TABLET, FILM COATED ORAL at 15:33

## 2020-06-15 RX ADMIN — OMEPRAZOLE 20 MG: 20 CAPSULE, DELAYED RELEASE ORAL at 08:13

## 2020-06-15 RX ADMIN — ACETAMINOPHEN 650 MG: 325 TABLET, FILM COATED ORAL at 10:45

## 2020-06-15 RX ADMIN — FUROSEMIDE 40 MG: 20 TABLET ORAL at 08:13

## 2020-06-15 RX ADMIN — TRAZODONE HYDROCHLORIDE 50 MG: 50 TABLET ORAL at 21:26

## 2020-06-15 RX ADMIN — POLYETHYLENE GLYCOL 3350 17 G: 17 POWDER, FOR SOLUTION ORAL at 08:13

## 2020-06-15 RX ADMIN — GABAPENTIN 800 MG: 800 TABLET, FILM COATED ORAL at 19:57

## 2020-06-15 RX ADMIN — GABAPENTIN 800 MG: 800 TABLET, FILM COATED ORAL at 15:33

## 2020-06-15 RX ADMIN — MULTIPLE VITAMINS W/ MINERALS TAB 1 TABLET: TAB at 08:13

## 2020-06-15 RX ADMIN — FUROSEMIDE 60 MG: 20 TABLET ORAL at 19:57

## 2020-06-15 RX ADMIN — HYDROXYZINE HYDROCHLORIDE 50 MG: 25 TABLET, FILM COATED ORAL at 20:03

## 2020-06-15 RX ADMIN — MELATONIN 1000 UNITS: at 08:13

## 2020-06-15 RX ADMIN — DICLOFENAC SODIUM 2 G: 10 GEL TOPICAL at 00:11

## 2020-06-15 RX ADMIN — FUROSEMIDE 20 MG: 20 TABLET ORAL at 15:33

## 2020-06-15 RX ADMIN — METHADONE HYDROCHLORIDE 75 MG: 5 SOLUTION ORAL at 08:12

## 2020-06-15 RX ADMIN — NICOTINE POLACRILEX 4 MG: 2 GUM, CHEWING BUCCAL at 17:08

## 2020-06-15 RX ADMIN — POTASSIUM CHLORIDE 20 MEQ: 750 TABLET, EXTENDED RELEASE ORAL at 19:57

## 2020-06-15 RX ADMIN — GABAPENTIN 800 MG: 800 TABLET, FILM COATED ORAL at 08:13

## 2020-06-15 RX ADMIN — ENOXAPARIN SODIUM 40 MG: 40 INJECTION SUBCUTANEOUS at 08:13

## 2020-06-15 RX ADMIN — NICOTINE POLACRILEX 4 MG: 2 GUM, CHEWING BUCCAL at 21:26

## 2020-06-15 NOTE — PLAN OF CARE
Discharge Planner PT   Patient plan for discharge: CD treatment  Current status: WBAT - uses 4WW for amb -IND with bed mobility. Pt was prepped and wrapped on both legs. Pt wrapped with TG soft 8 cm and 10 cm wraps. PT stated they were comfortable and educated on compression stockings that she can purchase at a medical supply.   Barriers to return to prior living situation: None  Recommendations for discharge: CD treatment  Rationale for recommendations: Pt 1/4 for LE edema today. PT education for compression but she is at her baseline for mobility.        Entered by: Arjun Greenfield 06/15/2020 2:49 PM

## 2020-06-15 NOTE — PLAN OF CARE
VS: VSS.   O2: Room air >90%.    Output: Voiding without difficulty.        Activity: Up independently.    Skin: Intact.    Pain: Generalized body aches. Tylenol PRN.    CMS: Intact.    Dressing: None.    Diet: Regular.    LDA: None.    Equipment: Personal belongings.    Plan: Discharge back to treatment facility once edema improves.    Additional Info: Strict I and Os and daily weights.   Pt on methadone for hx of substance abuse

## 2020-06-15 NOTE — PROGRESS NOTES
[facility], [facility], [place], [place]  Internal Medicine Progress Note      Assessment and Plans:       No Yusuf is a 60 year old female who was admitted on 6/9/2020.     No Yusuf is a 60 year old female with HT, chronic lymphedema, hypothyroidism, polysubstance abuse, bipolar, arthritis, and fibromyalgia who was admitted with shortness of breath, weight gain, and increased diameter of thighs with associated pain. It is difficult for her to ambulate due to pain/tightness in legs.     # Acute lymphedema exacerbation with fluid overload likely into lungs causing dyspnea and increased orthopnea- pink and swollen, but not exquisitely tender or very warm. BLE US without DVT. D dimer elevated but pt is obese, CTA without evidence of PE. Weight up 16 lbs since discharge 2/2/20, with furosemide dose decreased from 40 mg BID to 40 mg qam during her hospitalization for influenza at [facility] (2/14-2/17) before she returned to her chem dep treatment center in [place]. Amlodipine was also restarted at the [facility] but will be held now (peripheral edema). BLE are were tight and edematous on admit, with new dyspnea on exertion. CXR interpretation limited by habitus.    - ECHO on 06/09 showed EF is 60-65%. Left ventricular diastolic function is indeterminate, The right ventricle is normal size. Global right ventricular function is  normal.  - Initially diuresed with lasix 40 mg IV BID. Baseline weight in the clinics have varied, but around 270 lbs to 280 lbs range. Admit weight here was 298 lbs. It dropped to 272 lbs with IV lasix. Now slowly creeping up on oral lasix. So I have increased the dose to 40 mg BID, but still weight going up.  - Change lasix to 60 mg BID oral. Needs to have stable weight on oral regimen prior to discharge.   - Fluid restriction 1500 ml per day. PILI diet. Lower extremity stockings.   - Monitor and replace lytes prn  -  Venous stasis changes in the legs. Better with diuresis.   - Cont home KCl 20 mg BID and adjust prn  - , TSH elevated at 9.07, free T4 is 1.26 which is normal. Out pt FU  - Lymphedema consult.     # Bipolar, insomnia:  - cont home duloxetine and trazodone prn     # Chronic neuropathic pain- home dose of gabapentin 1200mg TID per [facility] discharges, but we discussed that this seems unsafe. We settled on 800 mg TID, and this can be verified in am  - 800 mg TID gabapentin     # Hx of polysubstance abuse- opiate dependence, pt says takes methadone 75 daily- (chart had 70 mg daily)  -Resume methadone 75 mg po daily.       # Hypothyroidism:   -Cont home synthroid  -PTA on 200 mcg of Synthroid daily.  TSH is elevated free T4 is normal.  TSH is not suppressed.  We will go ahead and increase the dose of Synthroid to 225 mcg daily.  Needs follow-up TSH in 6 to 8 weeks time     # GERD  - cont home omeprazole     # Tobacco dependence  - cont home nicotine gum    # Left knee pain from OA, arthrosis of knee noted on XR knee here.   Ct topical diclofenac and oral tylenol. She is on Neurontin and methadone too.   -out pt Follow-up with Orthopedics. Likely needs surgery.     Code Status: Full Code  Disposition: Expected discharge: Likely tomorrow back to her prior detox facility where she came from       Tl Roman MD  Text Page  (7am - 5pm, M-F)    Subjective:      Her weight went up a touch.     Reports left knee pain.    No fever or chills.    No cough or phlegm.      -Data reviewed today: I reviewed all new labs and imaging results over the last 24 hours.     Exam:      Obese individual.  No apparent distress noted   H EENT: Mucosa moist neck supple   Lungs: Air entry is good on both side is no wheezing or crackles   Heart: S1-S2 normal no murmurs or gallops  Skin: Improved, leg edema. Venous stasis changes noted in the bilateral lower extremity,  redness is better       Temp: 97.5  F (36.4  C)  Temp src: Oral BP: 123/79 Pulse: 77   Resp: 16 SpO2: 94 % O2 Device: None (Room air)    Vitals:    06/13/20 0913 06/14/20 0824 06/15/20 1041   Weight: 125 kg (275 lb 8 oz) 125.8 kg (277 lb 4.8 oz) 127 kg (280 lb)     Vital Signs with Ranges  Temp:  [97  F (36.1  C)-97.5  F (36.4  C)] 97.5  F (36.4  C)  Pulse:  [73-77] 77  Resp:  [16-17] 16  BP: (121-123)/(70-79) 123/79  SpO2:  [94 %-95 %] 94 %  I/O last 3 completed shifts:  In: 928 [P.O.:928]  Out: 580 [Urine:580]        Medications       DULoxetine  60 mg Oral Daily     enoxaparin ANTICOAGULANT  40 mg Subcutaneous Q24H     furosemide  40 mg Oral BID     gabapentin  800 mg Oral TID     levothyroxine  225 mcg Oral Daily     methadone  75 mg Oral Daily     multivitamin w/minerals  1 tablet Oral Daily     omeprazole  20 mg Oral Daily     polyethylene glycol  17 g Oral Daily     potassium chloride ER  20 mEq Oral BID     sodium chloride (PF)  3 mL Intracatheter Q8H     traZODone  50 mg Oral At Bedtime     Vitamin D3  1,000 Units Oral Daily     Data      Recent Labs   Lab 06/15/20  0827 06/14/20  0538 06/13/20  0526 06/12/20  0801 06/10/20  0701 06/09/20  2323   WBC  --   --   --  6.9  --  9.4   HGB  --   --   --  12.1  --  10.9*   MCV  --   --   --  86  --  87   PLT  --   --  289 321 278 284    138 138 136 139 138   POTASSIUM 3.9 3.9 3.8 3.8 3.8 3.9   CHLORIDE 103 103 103 102 103 103   CO2 28 30 30 31 31 29   BUN 14 17 21 21 12 11   CR 0.84 0.86 0.93 0.91 0.86 0.84   ANIONGAP 7 5 5 3 5 6   RODOLFO 8.6 8.6 8.4* 8.5 8.8 8.8   * 122* 174* 123* 113* 127*   ALBUMIN  --   --   --   --   --  3.0*   PROTTOTAL  --   --   --   --   --  8.3   BILITOTAL  --   --   --   --   --  0.2   ALKPHOS  --   --   --   --   --  147   ALT  --   --   --   --   --  55*   AST  --   --   --   --   --  51*       No results found for this or any previous visit (from the past 24 hour(s)).

## 2020-06-15 NOTE — PLAN OF CARE
VS: VSS   O2: >90% on RA   Output: Adequate in BR   Last BM: 6/14; +fl   Activity: WBAT; up ind in room   Up for meals? Yes   Skin: CDI   Pain: Managed with tylenol and voltaren gel   CMS: Intact   Dressing: None; BLE will be lymphedema wrapped by PT this afternoon   Diet: Regular   LDA: None   Equipment: Personal rolling walker   Plan: Back to [facility] when medically stable   Additional Info: 1500 fluid restriction, strict I&O. Up 3lbs from yesterday; MD aware.

## 2020-06-15 NOTE — PLAN OF CARE
VS: Stable   O2: Room air sat. > 90%.    Output: Voiding spontaneously without difficulty   Last BM: No BM tonight   Activity: Up independent using walker.    Skin: Intact.    Pain: Denies pain or discomfort.    CMS: CMS and neuro intact.    Dressing: None.    Diet: Regular tolerating without N/V.    LDA: None.    Equipment: Walker, personal belongings.    Plan: Possible discharge (back ) to CD treatment   Additional Info:

## 2020-06-16 ENCOUNTER — PATIENT OUTREACH (OUTPATIENT)
Dept: CARE COORDINATION | Facility: CLINIC | Age: 60
End: 2020-06-16

## 2020-06-16 VITALS
SYSTOLIC BLOOD PRESSURE: 121 MMHG | RESPIRATION RATE: 16 BRPM | WEIGHT: 279 LBS | DIASTOLIC BLOOD PRESSURE: 58 MMHG | TEMPERATURE: 98.3 F | BODY MASS INDEX: 49.42 KG/M2 | HEART RATE: 77 BPM | OXYGEN SATURATION: 97 %

## 2020-06-16 LAB
ANION GAP SERPL CALCULATED.3IONS-SCNC: 3 MMOL/L (ref 3–14)
BUN SERPL-MCNC: 15 MG/DL (ref 7–30)
CALCIUM SERPL-MCNC: 8.7 MG/DL (ref 8.5–10.1)
CHLORIDE SERPL-SCNC: 102 MMOL/L (ref 94–109)
CO2 SERPL-SCNC: 32 MMOL/L (ref 20–32)
CREAT SERPL-MCNC: 0.93 MG/DL (ref 0.52–1.04)
ERYTHROCYTE [DISTWIDTH] IN BLOOD BY AUTOMATED COUNT: 16.1 % (ref 10–15)
GFR SERPL CREATININE-BSD FRML MDRD: 67 ML/MIN/{1.73_M2}
GLUCOSE SERPL-MCNC: 123 MG/DL (ref 70–99)
HCT VFR BLD AUTO: 37.2 % (ref 35–47)
HGB BLD-MCNC: 11.4 G/DL (ref 11.7–15.7)
MAGNESIUM SERPL-MCNC: 2 MG/DL (ref 1.6–2.3)
MCH RBC QN AUTO: 26.6 PG (ref 26.5–33)
MCHC RBC AUTO-ENTMCNC: 30.6 G/DL (ref 31.5–36.5)
MCV RBC AUTO: 87 FL (ref 78–100)
PLATELET # BLD AUTO: 264 10E9/L (ref 150–450)
POTASSIUM SERPL-SCNC: 3.9 MMOL/L (ref 3.4–5.3)
RBC # BLD AUTO: 4.28 10E12/L (ref 3.8–5.2)
SODIUM SERPL-SCNC: 137 MMOL/L (ref 133–144)
WBC # BLD AUTO: 7.1 10E9/L (ref 4–11)

## 2020-06-16 PROCEDURE — 25000128 H RX IP 250 OP 636: Performed by: INTERNAL MEDICINE

## 2020-06-16 PROCEDURE — 25000132 ZZH RX MED GY IP 250 OP 250 PS 637: Performed by: INTERNAL MEDICINE

## 2020-06-16 PROCEDURE — 85027 COMPLETE CBC AUTOMATED: CPT | Performed by: HOSPITALIST

## 2020-06-16 PROCEDURE — 36415 COLL VENOUS BLD VENIPUNCTURE: CPT | Performed by: HOSPITALIST

## 2020-06-16 PROCEDURE — 25000132 ZZH RX MED GY IP 250 OP 250 PS 637: Performed by: HOSPITALIST

## 2020-06-16 PROCEDURE — 99239 HOSP IP/OBS DSCHRG MGMT >30: CPT | Performed by: INTERNAL MEDICINE

## 2020-06-16 PROCEDURE — 83735 ASSAY OF MAGNESIUM: CPT | Performed by: HOSPITALIST

## 2020-06-16 PROCEDURE — 80048 BASIC METABOLIC PNL TOTAL CA: CPT | Performed by: HOSPITALIST

## 2020-06-16 RX ORDER — POLYETHYLENE GLYCOL 3350 17 G/17G
17 POWDER, FOR SOLUTION ORAL DAILY
Status: ON HOLD
Start: 2020-06-17 | End: 2021-05-27

## 2020-06-16 RX ORDER — THERMOMETER, ELECTRONIC,ORAL
EACH MISCELLANEOUS 2 TIMES DAILY PRN
DISCHARGE
Start: 2020-06-16 | End: 2020-07-06

## 2020-06-16 RX ORDER — NYSTATIN 100000 U/G
CREAM TOPICAL DAILY PRN
Status: ON HOLD | DISCHARGE
Start: 2020-06-16 | End: 2021-05-27

## 2020-06-16 RX ORDER — GABAPENTIN 800 MG/1
800 TABLET ORAL 3 TIMES DAILY
Qty: 90 TABLET | Refills: 0 | Status: SHIPPED | OUTPATIENT
Start: 2020-06-16 | End: 2020-07-06

## 2020-06-16 RX ORDER — LEVOTHYROXINE SODIUM 75 UG/1
225 TABLET ORAL DAILY
Qty: 30 TABLET | Refills: 0 | Status: SHIPPED | OUTPATIENT
Start: 2020-06-17 | End: 2021-05-26

## 2020-06-16 RX ORDER — FUROSEMIDE 20 MG
60 TABLET ORAL
Qty: 60 TABLET | Refills: 0 | Status: ON HOLD | OUTPATIENT
Start: 2020-06-16 | End: 2021-06-09

## 2020-06-16 RX ADMIN — LEVOTHYROXINE SODIUM 225 MCG: 150 TABLET ORAL at 08:32

## 2020-06-16 RX ADMIN — OMEPRAZOLE 20 MG: 20 CAPSULE, DELAYED RELEASE ORAL at 08:33

## 2020-06-16 RX ADMIN — ENOXAPARIN SODIUM 40 MG: 40 INJECTION SUBCUTANEOUS at 08:32

## 2020-06-16 RX ADMIN — METHADONE HYDROCHLORIDE 75 MG: 5 SOLUTION ORAL at 08:33

## 2020-06-16 RX ADMIN — DULOXETINE HYDROCHLORIDE 60 MG: 60 CAPSULE, DELAYED RELEASE ORAL at 08:33

## 2020-06-16 RX ADMIN — MELATONIN 1000 UNITS: at 08:32

## 2020-06-16 RX ADMIN — FUROSEMIDE 60 MG: 20 TABLET ORAL at 08:32

## 2020-06-16 RX ADMIN — POLYETHYLENE GLYCOL 3350 17 G: 17 POWDER, FOR SOLUTION ORAL at 08:32

## 2020-06-16 RX ADMIN — GABAPENTIN 800 MG: 800 TABLET, FILM COATED ORAL at 08:32

## 2020-06-16 RX ADMIN — MULTIPLE VITAMINS W/ MINERALS TAB 1 TABLET: TAB at 08:32

## 2020-06-16 RX ADMIN — POTASSIUM CHLORIDE 20 MEQ: 750 TABLET, EXTENDED RELEASE ORAL at 08:32

## 2020-06-16 NOTE — PLAN OF CARE
Patient A&O x4, lungs sound clear, Bowel sound active- passing gas, Denied CP, lightheadedness, dizziness, numbness, tingling and SOB, drinking well and voiding spontaneously without difficulties,lymphedema wrap on both legs,denied pain, up to bathroom using walker, heels elevated off bed, demonstrates the ability to use call light appropriately, will continue to monitor patient and discharge today.

## 2020-06-16 NOTE — PLAN OF CARE
PT: Patient discharging today    Physical Therapy Discharge Summary    Reason for therapy discharge:    Discharged to treatment    Progress towards therapy goal(s). See goals on Care Plan in [facility] electronic health record for goal details.  Goals partially met.  Barriers to achieving goals:   discharge from facility.    Therapy recommendation(s):    OP lymphedema as appropriate

## 2020-06-16 NOTE — PROGRESS NOTES
Discharged instructions read and explained to patient. No medication given to patient as patient is restricted to her primary MD only.

## 2020-06-16 NOTE — PLAN OF CARE
VS:   BP (!) 143/79 (BP Location: Left arm)   Pulse 77   Temp 97.6  F (36.4  C) (Oral)   Resp 16   Wt 127 kg (280 lb)   LMP 11/01/2013   SpO2 95%   BMI 49.60 kg/m    Room air   Output:   Pt voiding well, LBM 6/14   Lungs Lung sounds clear, diminished   Activity:   Up independently   Skin: WDL ex edema   Pain:   Pt taking tylenol for pain, Voltaren cream, declined this shift   Neuro/CMS:   CMS intact, denies N/T, A&O x4   Dressing(s):   Lymph wraps placed today, CDI   Diet:   Low saturated fat diet, 1500ml fluid restriction.   LDA:   None present   Equipment:   Lymph wraps, television, call light   Plan:   Continue to monitor lymphedema, strict I&O, lasix.    Additional Info:   Increased lasix dose today, instructed pt to call afer she voids so we can record.

## 2020-06-16 NOTE — DISCHARGE SUMMARY
[facility] [place]  Hospitalist Discharge Summary      Date of Admission:  6/9/2020  Date of Discharge:  6/16/2020  3:00 PM  Discharging Provider: Benjamín Parra MD      Discharge Diagnoses      Acute on chronic lymphedema exacerbation with fluid overload  Bipolar, insomnia  Chronic neuropathic pain  Hx of polysubstance abuse- opiate dependence  Hypothyroidism  GERD  Tobacco dependence  Left knee pain from OA      Follow-ups Needed After Discharge   Follow-up Appointments     Adult [facility] Follow-up and recommended labs and tests      Follow up with primary care provider, [facility], within  4 days to   evaluate medication change, to evaluate treatment change and for hospital   follow- up.  The following labs/tests are recommended: BMP.      Appointments on [place] and/or [facility] (with [facility] or [facility]   provider or service). Call [phone] if you haven't heard regarding   these appointments within 7 days of discharge.         Follow Up and recommended labs and tests      BMP in 4 days             Discharge Disposition   Discharged to home  Condition at discharge: Stable      Hospital Course      No Yusuf is a 60 year old female who was admitted on 6/9/2020.      No Yusuf is a 60 year old female with HT, chronic lymphedema, hypothyroidism, polysubstance abuse, bipolar, arthritis, and fibromyalgia who was admitted with shortness of breath, weight gain, and increased diameter of thighs with associated pain. It is difficult for her to ambulate due to pain/tightness in legs.     # Acute on chronic lymphedema exacerbation with fluid overload likely into lungs causing dyspnea and increased orthopnea- pink and swollen, but not exquisitely tender or very warm. BLE US without DVT. D dimer elevated but pt is obese, CTA without evidence of PE. Weight up 16 lbs since discharge 2/2/20, with furosemide dose decreased from 40 mg BID to 40 mg qam during her hospitalization for  influenza at [facility] (2/14-2/17) before she returned to her chem dep treatment center in [place]. Amlodipine was also restarted at the [facility] but will be held now (peripheral edema). BLE are were tight and edematous on admit, with new dyspnea on exertion. CXR interpretation limited by habitus.     - ECHO on 06/09 showed EF is 60-65%. Left ventricular diastolic function is indeterminate, The right ventricle is normal size. Global right ventricular function is normal.  - Initially diuresed with lasix 40 mg IV BID. Baseline weight in the clinics have varied, but around 270 lbs to 280 lbs range. Admit weight here was 298 lbs. It dropped to 272 lbs with IV lasix. Now slowly creeping up on oral lasix. So I have increased the dose to 40 mg BID, but still weight going up.  - Change lasix to 60 mg BID oral. Needs to have stable weight on oral regimen prior to discharge.   - Fluid restriction 1500 ml per day. PILI diet. Lower extremity stockings.   - Monitor and replace lytes prn  - Venous stasis changes in the legs. Better with diuresis.   - Cont home KCl 20 mg BID and adjust prn  - , TSH elevated at 9.07, free T4 is 1.26 which is normal. Out pt FU  - Lymphedema consulted.      # Bipolar, insomnia:  - cont home duloxetine and trazodone prn  - Mood stable.      # Chronic neuropathic pain- home dose of gabapentin 1200mg TID per [place] and [facility] discharges, but we discussed that this seems unsafe. We settled on 800 mg TID, and this can be verified in am  - 800 mg TID gabapentin     # Hx of polysubstance abuse- opiate dependence, pt says takes methadone 75 daily- (chart had 70 mg daily)  -Resume methadone 75 mg po daily.       # Hypothyroidism:   -Cont home synthroid  -PTA on 200 mcg of Synthroid daily.  TSH is elevated free T4 is normal.  TSH is not suppressed.  We will go ahead and increase the dose of Synthroid to 225 mcg daily.  Needs follow-up TSH in 6 to 8 weeks time     # GERD  -  cont home omeprazole     # Tobacco dependence  - cont home nicotine gum     # Left knee pain from OA, arthrosis of knee noted on XR knee here.   Ct topical diclofenac and oral tylenol. She is on Neurontin and methadone too.   -Out pt Follow-up with Orthopedics. Likely needs surgery.        Consultations This Hospital Stay   LYMPHEDEMA THERAPY IP CONSULT  MEDICATION HISTORY IP PHARMACY CONSULT  LYMPHEDEMA THERAPY IP CONSULT    Code Status   Full Code    Time Spent on this Encounter   IBenjamín MD, personally saw the patient today and spent greater than 30 minutes discharging this patient.       Benjamín Parra MD  [facility] [place]  ______________________________________________________________________    Physical Exam   Vital Signs: Temp: 98.3  F (36.8  C) Temp src: Oral BP: 121/58   Heart Rate: 73 Resp: 16 SpO2: 97 % O2 Device: None (Room air)    Weight: 279 lbs 0 oz    General: alert, interactive, NAD, obese.   HEENT: AT/NC, Anicteric, Moist MM  Neck: Supple.  Respi/Chest: Non labored, CTA BL.  CVS/Heart: S1S2 regular  GI/Abd: Soft, non tender, non distended  MSK/Extremities: Distally warm, well perfused. bl leg ace wraps.      Neuro: AO x 4  Psychiatry: Stable mood.   Skin: no new rash on exposed areas.            Primary Care Physician   [facility]    Discharge Orders      **Basic metabolic panel FUTURE 1yr    BMP in 4 days prior to PCP FU  Report to PCP     **TSH with free T4 reflex FUTURE 1yr    Do TSH with Reflex free T4 after 6 weeks. Report to PCP    Last Lab Result: TSH (mU/L)       Date                     Value                 06/10/2020               9.07 (H)         ----------     Orthopedic & Spine  Referral      Reason for your hospital stay    BL LE edema and sob due to fluid overload.     Adult [facility] Follow-up and recommended labs and tests    Follow up with primary care provider, [facility], within  4 days to evaluate medication change, to  evaluate treatment change and for hospital follow- up.  The following labs/tests are recommended: BMP.      Appointments on [place] and/or [facility] (with [facility] or [facility] provider or service). Call [phone] if you haven't heard regarding these appointments within 7 days of discharge.     Follow Up and recommended labs and tests    BMP in 4 days     Activity    Your activity upon discharge: activity as tolerated     Miscellaneous DME Order    DME Documentation:   Describe the reason for need to support medical necessity: gait instability.     I, the undersigned, certify that the above prescribed supplies are medically necessary for this patient and is both reasonable and necessary in reference to accepted standards of medical and necessary in reference to accepted standards of medical practice in the treatment of this patient's condition and is not prescribed as a convenience.     Diet    Follow this diet upon discharge: Orders Placed This Encounter      Fluid restriction 1500 ML FLUID      Low Saturated Fat Na <2400 mg       Significant Results and Procedures   Most Recent 3 CBC's:  Recent Labs   Lab Test 06/16/20  0537 06/13/20  0526 06/12/20  0801  06/09/20  2323   WBC 7.1  --  6.9  --  9.4   HGB 11.4*  --  12.1  --  10.9*   MCV 87  --  86  --  87    289 321   < > 284    < > = values in this interval not displayed.     Most Recent 3 BMP's:  Recent Labs   Lab Test 06/16/20  0537 06/15/20  0827 06/14/20  0538    138 138   POTASSIUM 3.9 3.9 3.9   CHLORIDE 102 103 103   CO2 32 28 30   BUN 15 14 17   CR 0.93 0.84 0.86   ANIONGAP 3 7 5   RODOLFO 8.7 8.6 8.6   * 139* 122*     Most Recent 2 LFT's:  Recent Labs   Lab Test 06/09/20  2323 01/29/20  0618   AST 51* 40   ALT 55* 47   ALKPHOS 147 96   BILITOTAL 0.2 0.4     Most Recent 6 Bacteria Isolates From Any Culture (See EPIC Reports for Culture Details):  Recent Labs   Lab Test 01/28/20  0025 01/27/20  2305 01/27/20 2035 11/03/19 1945  11/03/19  1710   CULT No growth  No growth No Beta Streptococcus isolated 50,000 to 100,000 colonies/mL  mixed urogenital abril  Susceptibility testing not routinely done   50,000 to 100,000 colonies/mL  mixed urogenital abril   No Beta Streptococcus isolated     Most Recent ESR & CRP:  Recent Labs   Lab Test 06/10/20  0701  01/28/20  0025   SED  --   --  82*   CRP 20.0*   < > 28.5*    < > = values in this interval not displayed.     Most Recent CPK:No lab results found.,   Results for orders placed or performed during the hospital encounter of 06/09/20   US Lower Extremity Venous Duplex Bilateral    Narrative    EXAM: US LOWER EXTREMITY VENOUS DUPLEX BILATERAL  LOCATION: [facility]  DATE/TIME: 6/10/2020 12:16 AM    INDICATION: Bilateral leg swelling.  COMPARISON: 7 01/28/2020  TECHNIQUE: Venous Duplex ultrasound of bilateral lower extremities with and without compression, augmentation and duplex. Color flow and spectral Doppler with waveform analysis performed.    FINDINGS: Exam includes the common femoral, femoral, popliteal veins as well as segmentally visualized deep calf veins and greater saphenous vein.     RIGHT: No deep vein thrombosis. No superficial thrombophlebitis. No popliteal cyst.    LEFT: No deep vein thrombosis. No superficial thrombophlebitis. No popliteal cyst.      Impression    IMPRESSION:  1.  No deep venous thrombosis in the bilateral lower extremities.   XR Chest Port 1 View    Narrative    EXAM: XR CHEST PORT 1 VW  LOCATION: [facility]  DATE/TIME: 6/9/2020 11:49 PM    INDICATION: Short of breath.    COMPARISON: Chest CT 1/28/2020.      Impression    IMPRESSION: Shallow inspiration. No convincing focal air-space disease. Normal heart size.   CT Chest Pulmonary Embolism w Contrast    Narrative    EXAM: CT CHEST PULMONARY EMBOLISM W CONTRAST  LOCATION: [facility]  DATE/TIME: 6/10/2020 2:06 AM    INDICATION: Pulmonary embolus suspected, intermediate  probability, positive D-dimer.    COMPARISON: None.    TECHNIQUE: CT chest pulmonary angiogram during arterial phase injection of IV contrast. Multiplanar reformats and MIP reconstructions were performed. Dose reduction techniques were used.     CONTRAST: 77 mL Isovue-370.    FINDINGS:  ANGIOGRAM CHEST: Pulmonary arteries are normal caliber and negative for pulmonary emboli. Thoracic aorta is negative for dissection. No CT evidence of right heart strain.    LUNGS AND PLEURA: Diffuse air trapping. Small bilateral basilar pneumatoceles.    MEDIASTINUM/AXILLAE: Mildly enlarged heart. No pericardial effusion. No hilar or mediastinal lymphadenopathy.    UPPER ABDOMEN: Fatty liver.    MUSCULOSKELETAL: Normal.      Impression    IMPRESSION:  1.  No pulmonary embolus, aortic dissection, or aneurysm.    2.  Mildly enlarged heart.    3.  Diffuse air trapping, correlate for small airways disease/bronchiolitis.     XR Knee Left 3 Views    Narrative    Exam: 3 views of the left knee dated 2020.    COMPARISON: None.    CLINICAL HISTORY: Left knee pain.    FINDINGS: AP, lateral, and axial views of the left knee were obtained.  Joint space narrowing in the medial femorotibial joint compartment of  the left knee. Small left knee joint effusion. Marked osteoarthrosis  in the left knee patellofemoral joint compartment with marked  irregularity along the trochlear surface.      Impression    IMPRESSION: Osteoarthrosis in the left knee, greatest in the  patellofemoral joint compartment, with a small to moderate-sized left  knee joint effusion.    RUT VITAL MD   Echo Complete    Narrative    [id number]  [id number]  [id number]  [id number]^MAGY^HOPE^RICK           [facility],[place]  Echocardiography Laboratory  [address] [place] [id number]     Name: ELLA MOULTON  MRN: [id number]  : 1960  Study Date: 2020 10:55 AM  Age: 60 yrs  Gender: Female  [place] [facility]  Reason For  Study: SOB  Ordering Physician: PAMELA BHATTI  Performed By: Johnny Arshad     BSA: 2.2 m2  Height: 63 in  Weight: 280 lb  HR: 81  BP: 115/68 mmHg  _____________________________________________________________________________  __        Procedure  Complete Portable Echo Adult. Technically difficult study. Poor acoustic  windows. Optison (NDC #0644-9694-39) given intravenously. Patient was given 4  ml mixture of 3 ml Optison and 6 ml saline. 5 ml wasted.  _____________________________________________________________________________  __        Interpretation Summary  Left ventricular function, chamber size, wall motion, and wall thickness are  normal.The EF is 60-65%.  Global right ventricular size and function are normal.  No hemodynamically significant valve abnormalities.     This study was compared with the study from 1/28/20. There has been no  significant change.  _____________________________________________________________________________  __        Left Ventricle  Left ventricular function, chamber size, wall motion, and wall thickness are  normal.The EF is 60-65%. Left ventricular diastolic function is indeterminate.     Right Ventricle  The right ventricle is normal size. Global right ventricular function is  normal.     Atria  The right atria appears normal. Mild left atrial enlargement is present.     Mitral Valve  The mitral valve is normal.        Aortic Valve  The valve leaflets are not well visualized. On Doppler interrogation, there is  no significant stenosis or regurgitation.     Tricuspid Valve  The valve leaflets are not well visualized. Trace tricuspid insufficiency is  present. Pulmonary artery systolic pressure is normal.     Pulmonic Valve  The valve leaflets are not well visualized. On Doppler interrogation, there is  no significant stenosis or regurgitation.     Vessels  Normal diameter aortic root and proximal ascending aorta. The inferior vena  cava cannot be assessed.     Pericardium  No  pericardial effusion is present.        Compared to Previous Study  This study was compared with the study from 20 . There has been no  change.  _____________________________________________________________________________  __  MMode/2D Measurements & Calculations     IVSd: 1.0 cm  LVIDd: 4.9 cm  LVIDs: 3.6 cm  LVPWd: 1.1 cm  FS: 26.0 %  LV mass(C)d: 195.6 grams  LV mass(C)dI: 87.7 grams/m2  Ao root diam: 3.2 cm  asc Aorta Diam: 3.0 cm  LVOT diam: 1.9 cm  LVOT area: 2.9 cm2  RWT: 0.45        Doppler Measurements & Calculations  MV E max kathy: 70.1 cm/sec  MV A max kathy: 102.7 cm/sec  MV E/A: 0.68  MV dec time: 0.20 sec  PA acc time: 0.09 sec  E/E' av.9  Lateral E/e': 7.6  Medial E/e': 12.2        _____________________________________________________________________________  __           Report approved by: Santo Escobar 2020 12:59 PM            Discharge Medications   Current Discharge Medication List      START taking these medications    Details   diclofenac (VOLTAREN) 1 % topical gel Place 2 g onto the skin 4 times daily as needed (left knee pain)  Qty: 1 Tube, Refills: 0    Associated Diagnoses: Primary osteoarthritis of left knee      polyethylene glycol (MIRALAX) 17 g packet Take 17 g by mouth daily  Qty:      Associated Diagnoses: Constipation, unspecified constipation type         CONTINUE these medications which have CHANGED    Details   furosemide (LASIX) 20 MG tablet Take 3 tablets (60 mg) by mouth 2 times daily  Qty: 60 tablet, Refills: 0    Associated Diagnoses: Lymphedema      gabapentin (NEURONTIN) 800 MG tablet Take 1 tablet (800 mg) by mouth 3 times daily  Qty: 90 tablet, Refills: 0    Associated Diagnoses: Primary osteoarthritis of left knee; Fibromyalgia      levothyroxine (SYNTHROID/LEVOTHROID) 75 MCG tablet Take 3 tablets (225 mcg) by mouth daily  Qty: 30 tablet, Refills: 0    Associated Diagnoses: Other specified hypothyroidism      nystatin (MYCOSTATIN) 894676 UNIT/GM  external cream Apply topically daily as needed for dry skin Apply to skin folds daily    Associated Diagnoses: Yeast infection of the skin      tolnaftate (TINACTIN) 1 % external cream Apply topically 2 times daily as needed for irritation Apply to affected areas bid    Associated Diagnoses: Yeast infection of the skin         CONTINUE these medications which have NOT CHANGED    Details   albuterol (PROAIR HFA/PROVENTIL HFA/VENTOLIN HFA) 108 (90 Base) MCG/ACT inhaler Inhale 2 puffs into the lungs every 6 hours as needed for shortness of breath / dyspnea or wheezing    Comments: Pharmacy may dispense brand covered by insurance (Proair, or proventil or ventolin or generic albuterol inhaler)      calcium carbonate (TUMS) 500 MG chewable tablet Take 1 chew tab by mouth 4 times daily as needed for heartburn      diphenhydrAMINE (BENADRYL) 25 MG capsule Take 25 mg by mouth every 6 hours as needed for allergies      DULoxetine (CYMBALTA) 60 MG capsule Take 60 mg by mouth daily      loperamide (IMODIUM) 2 MG capsule Take 2 mg by mouth as needed for diarrhea Take 2 capsules after the first loose stool. Do not exceed 4 capsules in 24 hours      magnesium hydroxide (MILK OF MAGNESIA) 400 MG/5ML suspension Take 10 mLs by mouth daily as needed for constipation or heartburn      melatonin 3 MG tablet Take 3 mg by mouth nightly as needed for sleep      vitamin B-12 (CYANOCOBALAMIN) 1000 MCG tablet Take 1,000 mcg by mouth daily      acetaminophen (TYLENOL) 500 MG tablet Take 1-2 tablets by mouth every 6 hours as needed.      cholecalciferol 25 MCG (1000 UT) TABS Take 1,000 Units by mouth daily       hydrOXYzine (ATARAX) 50 MG tablet Take 25 mg by mouth 3 times daily as needed for itching   Refills: 0      methadone HCl 10 MG/5ML SOLN Take 75 mg by mouth Dosed at clinic for her       multivitamin w/minerals (MULTI-VITAMIN) tablet Take 1 tablet by mouth daily      NARCAN 4 MG/0.1ML nasal spray CALL 911. SPRAY CONTENTS OF ONE SPRAYER  (0.1ML) INTO ONE NOSTRIL. REPEAT IN 2-3 MINS IF SYMPTOMS OF OPIOID PERSIST, ALTERNATE NOSTRILS  Refills: 0      nicotine polacrilex (NICORETTE) 4 MG gum Place 4 mg inside cheek 5 x daily PRN for smoking cessation       omeprazole 20 MG tablet Take 20 mg by mouth daily      potassium chloride ER (K-DUR/KLOR-CON M) 20 MEQ CR tablet Take 1 tablet (20 mEq) by mouth 2 times daily  Qty: 60 tablet, Refills: 0    Associated Diagnoses: Edema of both legs      ranitidine (ZANTAC) 150 MG tablet Take 150 mg by mouth 2 times daily as needed       traZODone (DESYREL) 50 MG tablet Take 50 mg by mouth At Bedtime         STOP taking these medications       amLODIPine (NORVASC) 5 MG tablet Comments:   Reason for Stopping:         benzocaine-menthol (CHLORASEPTIC) 6-10 MG lozenge Comments:   Reason for Stopping:         ibuprofen (ADVIL/MOTRIN) 600 MG tablet Comments:   Reason for Stopping:             Allergies   Allergies   Allergen Reactions     Promethazine Other (See Comments) and Anxiety     Noted in 8/30/08 ER     Codeine Phosphate GI Disturbance     Lamotrigine Other (See Comments)     hyponatremia     Oxcarbazepine Unknown and Other (See Comments)     Low sodium  [employer]acyRecord#[id number]       Codeine Other (See Comments) and Rash     GI bleeding  [employer]#[id number]

## 2020-07-06 ENCOUNTER — APPOINTMENT (OUTPATIENT)
Dept: GENERAL RADIOLOGY | Facility: CLINIC | Age: 60
DRG: 603 | End: 2020-07-06
Attending: EMERGENCY MEDICINE
Payer: COMMERCIAL

## 2020-07-06 ENCOUNTER — HOSPITAL ENCOUNTER (INPATIENT)
Facility: CLINIC | Age: 60
LOS: 2 days | Discharge: HOME | DRG: 603 | End: 2020-07-08
Attending: EMERGENCY MEDICINE | Admitting: INTERNAL MEDICINE
Payer: COMMERCIAL

## 2020-07-06 ENCOUNTER — APPOINTMENT (OUTPATIENT)
Dept: ULTRASOUND IMAGING | Facility: CLINIC | Age: 60
DRG: 603 | End: 2020-07-06
Attending: EMERGENCY MEDICINE
Payer: COMMERCIAL

## 2020-07-06 DIAGNOSIS — Z20.822 COVID-19 VIRUS NOT DETECTED: ICD-10-CM

## 2020-07-06 DIAGNOSIS — R60.0 EDEMA OF BOTH LEGS: ICD-10-CM

## 2020-07-06 DIAGNOSIS — Z20.828 EXPOSURE TO SARS-ASSOCIATED CORONAVIRUS: ICD-10-CM

## 2020-07-06 DIAGNOSIS — R60.0 BILATERAL LOWER EXTREMITY EDEMA: ICD-10-CM

## 2020-07-06 DIAGNOSIS — L03.116 CELLULITIS OF LEFT LOWER EXTREMITY: ICD-10-CM

## 2020-07-06 DIAGNOSIS — R06.02 SHORTNESS OF BREATH: ICD-10-CM

## 2020-07-06 LAB
ALBUMIN SERPL-MCNC: 3.1 G/DL (ref 3.4–5)
ALBUMIN UR-MCNC: NEGATIVE MG/DL
ALP SERPL-CCNC: 143 U/L (ref 40–150)
ALT SERPL W P-5'-P-CCNC: 52 U/L (ref 0–50)
ANION GAP SERPL CALCULATED.3IONS-SCNC: <1 MMOL/L (ref 3–14)
APPEARANCE UR: CLEAR
AST SERPL W P-5'-P-CCNC: 55 U/L (ref 0–45)
BASOPHILS # BLD AUTO: 0 10E9/L (ref 0–0.2)
BASOPHILS NFR BLD AUTO: 0.6 %
BILIRUB SERPL-MCNC: 0.4 MG/DL (ref 0.2–1.3)
BILIRUB UR QL STRIP: NEGATIVE
BUN SERPL-MCNC: 13 MG/DL (ref 7–30)
CALCIUM SERPL-MCNC: 8.9 MG/DL (ref 8.5–10.1)
CHLORIDE SERPL-SCNC: 102 MMOL/L (ref 94–109)
CO2 SERPL-SCNC: 37 MMOL/L (ref 20–32)
COLOR UR AUTO: ABNORMAL
CREAT SERPL-MCNC: 0.96 MG/DL (ref 0.52–1.04)
CREAT SERPL-MCNC: 0.99 MG/DL (ref 0.52–1.04)
CRP SERPL-MCNC: 15.5 MG/L (ref 0–8)
DIFFERENTIAL METHOD BLD: ABNORMAL
EOSINOPHIL # BLD AUTO: 0.3 10E9/L (ref 0–0.7)
EOSINOPHIL NFR BLD AUTO: 4.5 %
ERYTHROCYTE [DISTWIDTH] IN BLOOD BY AUTOMATED COUNT: 16.6 % (ref 10–15)
ERYTHROCYTE [SEDIMENTATION RATE] IN BLOOD BY WESTERGREN METHOD: 57 MM/H (ref 0–30)
GFR SERPL CREATININE-BSD FRML MDRD: 62 ML/MIN/{1.73_M2}
GFR SERPL CREATININE-BSD FRML MDRD: 64 ML/MIN/{1.73_M2}
GLUCOSE SERPL-MCNC: 104 MG/DL (ref 70–99)
GLUCOSE UR STRIP-MCNC: NEGATIVE MG/DL
HCT VFR BLD AUTO: 39.8 % (ref 35–47)
HGB BLD-MCNC: 12.3 G/DL (ref 11.7–15.7)
HGB UR QL STRIP: NEGATIVE
IMM GRANULOCYTES # BLD: 0.1 10E9/L (ref 0–0.4)
IMM GRANULOCYTES NFR BLD: 0.8 %
INTERPRETATION ECG - MUSE: NORMAL
KETONES UR STRIP-MCNC: NEGATIVE MG/DL
LACTATE BLD-SCNC: 0.7 MMOL/L (ref 0.7–2)
LEUKOCYTE ESTERASE UR QL STRIP: NEGATIVE
LYMPHOCYTES # BLD AUTO: 1.9 10E9/L (ref 0.8–5.3)
LYMPHOCYTES NFR BLD AUTO: 26.2 %
MCH RBC QN AUTO: 27.2 PG (ref 26.5–33)
MCHC RBC AUTO-ENTMCNC: 30.9 G/DL (ref 31.5–36.5)
MCV RBC AUTO: 88 FL (ref 78–100)
MONOCYTES # BLD AUTO: 0.7 10E9/L (ref 0–1.3)
MONOCYTES NFR BLD AUTO: 10.1 %
MUCOUS THREADS #/AREA URNS LPF: PRESENT /LPF
NEUTROPHILS # BLD AUTO: 4.1 10E9/L (ref 1.6–8.3)
NEUTROPHILS NFR BLD AUTO: 57.8 %
NITRATE UR QL: NEGATIVE
NRBC # BLD AUTO: 0 10*3/UL
NRBC BLD AUTO-RTO: 0 /100
NT-PROBNP SERPL-MCNC: 347 PG/ML (ref 0–900)
PH UR STRIP: 7 PH (ref 5–7)
PLATELET # BLD AUTO: 246 10E9/L (ref 150–450)
PLATELET # BLD AUTO: 278 10E9/L (ref 150–450)
POTASSIUM SERPL-SCNC: 3.9 MMOL/L (ref 3.4–5.3)
PROCALCITONIN SERPL-MCNC: <0.05 NG/ML
PROT SERPL-MCNC: 9.1 G/DL (ref 6.8–8.8)
RBC # BLD AUTO: 4.52 10E12/L (ref 3.8–5.2)
RBC #/AREA URNS AUTO: 0 /HPF (ref 0–2)
SARS-COV-2 PCR COMMENT: NORMAL
SARS-COV-2 RNA SPEC QL NAA+PROBE: NEGATIVE
SARS-COV-2 RNA SPEC QL NAA+PROBE: NORMAL
SODIUM SERPL-SCNC: 139 MMOL/L (ref 133–144)
SOURCE: ABNORMAL
SP GR UR STRIP: 1 (ref 1–1.03)
SPECIMEN SOURCE: NORMAL
SPECIMEN SOURCE: NORMAL
TROPONIN I SERPL-MCNC: 0.01 UG/L (ref 0–0.04)
UROBILINOGEN UR STRIP-MCNC: NORMAL MG/DL (ref 0–2)
WBC # BLD AUTO: 7.1 10E9/L (ref 4–11)
WBC #/AREA URNS AUTO: <1 /HPF (ref 0–5)

## 2020-07-06 PROCEDURE — 81001 URINALYSIS AUTO W/SCOPE: CPT | Performed by: EMERGENCY MEDICINE

## 2020-07-06 PROCEDURE — 99285 EMERGENCY DEPT VISIT HI MDM: CPT | Mod: 25 | Performed by: EMERGENCY MEDICINE

## 2020-07-06 PROCEDURE — 25000132 ZZH RX MED GY IP 250 OP 250 PS 637: Performed by: INTERNAL MEDICINE

## 2020-07-06 PROCEDURE — 25000132 ZZH RX MED GY IP 250 OP 250 PS 637: Performed by: EMERGENCY MEDICINE

## 2020-07-06 PROCEDURE — 80053 COMPREHEN METABOLIC PANEL: CPT | Performed by: EMERGENCY MEDICINE

## 2020-07-06 PROCEDURE — 71045 X-RAY EXAM CHEST 1 VIEW: CPT

## 2020-07-06 PROCEDURE — 93010 ELECTROCARDIOGRAM REPORT: CPT | Mod: Z6 | Performed by: EMERGENCY MEDICINE

## 2020-07-06 PROCEDURE — 85652 RBC SED RATE AUTOMATED: CPT | Performed by: EMERGENCY MEDICINE

## 2020-07-06 PROCEDURE — 83880 ASSAY OF NATRIURETIC PEPTIDE: CPT | Performed by: EMERGENCY MEDICINE

## 2020-07-06 PROCEDURE — 36415 COLL VENOUS BLD VENIPUNCTURE: CPT | Performed by: EMERGENCY MEDICINE

## 2020-07-06 PROCEDURE — 25800030 ZZH RX IP 258 OP 636: Performed by: EMERGENCY MEDICINE

## 2020-07-06 PROCEDURE — 93970 EXTREMITY STUDY: CPT

## 2020-07-06 PROCEDURE — 85025 COMPLETE CBC W/AUTO DIFF WBC: CPT | Performed by: EMERGENCY MEDICINE

## 2020-07-06 PROCEDURE — 12000001 ZZH R&B MED SURG/OB UMMC

## 2020-07-06 PROCEDURE — 73590 X-RAY EXAM OF LOWER LEG: CPT | Mod: LT

## 2020-07-06 PROCEDURE — 96365 THER/PROPH/DIAG IV INF INIT: CPT | Performed by: EMERGENCY MEDICINE

## 2020-07-06 PROCEDURE — 36415 COLL VENOUS BLD VENIPUNCTURE: CPT | Performed by: INTERNAL MEDICINE

## 2020-07-06 PROCEDURE — 99223 1ST HOSP IP/OBS HIGH 75: CPT | Mod: AI | Performed by: INTERNAL MEDICINE

## 2020-07-06 PROCEDURE — 87040 BLOOD CULTURE FOR BACTERIA: CPT | Performed by: EMERGENCY MEDICINE

## 2020-07-06 PROCEDURE — 85049 AUTOMATED PLATELET COUNT: CPT | Performed by: INTERNAL MEDICINE

## 2020-07-06 PROCEDURE — 84484 ASSAY OF TROPONIN QUANT: CPT | Performed by: EMERGENCY MEDICINE

## 2020-07-06 PROCEDURE — 82565 ASSAY OF CREATININE: CPT | Performed by: INTERNAL MEDICINE

## 2020-07-06 PROCEDURE — 83605 ASSAY OF LACTIC ACID: CPT | Performed by: FAMILY MEDICINE

## 2020-07-06 PROCEDURE — 84145 PROCALCITONIN (PCT): CPT | Performed by: INTERNAL MEDICINE

## 2020-07-06 PROCEDURE — 86140 C-REACTIVE PROTEIN: CPT | Performed by: EMERGENCY MEDICINE

## 2020-07-06 PROCEDURE — 93005 ELECTROCARDIOGRAM TRACING: CPT | Performed by: EMERGENCY MEDICINE

## 2020-07-06 PROCEDURE — 25000128 H RX IP 250 OP 636: Performed by: INTERNAL MEDICINE

## 2020-07-06 PROCEDURE — C9803 HOPD COVID-19 SPEC COLLECT: HCPCS | Performed by: EMERGENCY MEDICINE

## 2020-07-06 PROCEDURE — 25000128 H RX IP 250 OP 636: Performed by: EMERGENCY MEDICINE

## 2020-07-06 PROCEDURE — 96366 THER/PROPH/DIAG IV INF ADDON: CPT | Performed by: EMERGENCY MEDICINE

## 2020-07-06 RX ORDER — IBUPROFEN 600 MG/1
600 TABLET, FILM COATED ORAL ONCE
Status: COMPLETED | OUTPATIENT
Start: 2020-07-06 | End: 2020-07-06

## 2020-07-06 RX ORDER — NALOXONE HYDROCHLORIDE 0.4 MG/ML
.1-.4 INJECTION, SOLUTION INTRAMUSCULAR; INTRAVENOUS; SUBCUTANEOUS
Status: DISCONTINUED | OUTPATIENT
Start: 2020-07-06 | End: 2020-07-08 | Stop reason: HOSPADM

## 2020-07-06 RX ORDER — ONDANSETRON 4 MG/1
4 TABLET, ORALLY DISINTEGRATING ORAL EVERY 6 HOURS PRN
Status: DISCONTINUED | OUTPATIENT
Start: 2020-07-06 | End: 2020-07-08 | Stop reason: HOSPADM

## 2020-07-06 RX ORDER — MULTIPLE VITAMINS W/ MINERALS TAB 9MG-400MCG
1 TAB ORAL DAILY
Status: DISCONTINUED | OUTPATIENT
Start: 2020-07-07 | End: 2020-07-08 | Stop reason: HOSPADM

## 2020-07-06 RX ORDER — AMLODIPINE BESYLATE 5 MG/1
5 TABLET ORAL DAILY
Status: DISCONTINUED | OUTPATIENT
Start: 2020-07-07 | End: 2020-07-08 | Stop reason: HOSPADM

## 2020-07-06 RX ORDER — TRAZODONE HYDROCHLORIDE 50 MG/1
50 TABLET, FILM COATED ORAL
Status: DISCONTINUED | OUTPATIENT
Start: 2020-07-06 | End: 2020-07-08 | Stop reason: HOSPADM

## 2020-07-06 RX ORDER — GABAPENTIN 600 MG/1
1200 TABLET ORAL 3 TIMES DAILY
Status: DISCONTINUED | OUTPATIENT
Start: 2020-07-06 | End: 2020-07-08 | Stop reason: HOSPADM

## 2020-07-06 RX ORDER — CALCIUM CARBONATE 500 MG/1
500 TABLET, CHEWABLE ORAL 4 TIMES DAILY PRN
Status: DISCONTINUED | OUTPATIENT
Start: 2020-07-06 | End: 2020-07-08 | Stop reason: HOSPADM

## 2020-07-06 RX ORDER — ONDANSETRON 2 MG/ML
4 INJECTION INTRAMUSCULAR; INTRAVENOUS EVERY 6 HOURS PRN
Status: DISCONTINUED | OUTPATIENT
Start: 2020-07-06 | End: 2020-07-08 | Stop reason: HOSPADM

## 2020-07-06 RX ORDER — LIDOCAINE 40 MG/G
CREAM TOPICAL
Status: DISCONTINUED | OUTPATIENT
Start: 2020-07-06 | End: 2020-07-08 | Stop reason: HOSPADM

## 2020-07-06 RX ORDER — POTASSIUM CHLORIDE 600 MG/1
8 TABLET, FILM COATED, EXTENDED RELEASE ORAL 2 TIMES DAILY
COMMUNITY

## 2020-07-06 RX ORDER — ALBUTEROL SULFATE 90 UG/1
2 AEROSOL, METERED RESPIRATORY (INHALATION) EVERY 6 HOURS PRN
Status: DISCONTINUED | OUTPATIENT
Start: 2020-07-06 | End: 2020-07-08 | Stop reason: HOSPADM

## 2020-07-06 RX ORDER — METHADONE HYDROCHLORIDE 5 MG/5ML
80 SOLUTION ORAL DAILY
Status: DISCONTINUED | OUTPATIENT
Start: 2020-07-07 | End: 2020-07-08 | Stop reason: HOSPADM

## 2020-07-06 RX ORDER — VITAMIN B COMPLEX
1000 TABLET ORAL DAILY
Status: DISCONTINUED | OUTPATIENT
Start: 2020-07-07 | End: 2020-07-08 | Stop reason: HOSPADM

## 2020-07-06 RX ORDER — AMLODIPINE BESYLATE 5 MG/1
5 TABLET ORAL DAILY
Status: ON HOLD | COMMUNITY
End: 2021-06-09

## 2020-07-06 RX ORDER — TRAZODONE HYDROCHLORIDE 50 MG/1
50 TABLET, FILM COATED ORAL AT BEDTIME
Status: DISCONTINUED | OUTPATIENT
Start: 2020-07-06 | End: 2020-07-06

## 2020-07-06 RX ORDER — ACETAMINOPHEN 325 MG/1
325-650 TABLET ORAL EVERY 6 HOURS PRN
Status: DISCONTINUED | OUTPATIENT
Start: 2020-07-06 | End: 2020-07-08 | Stop reason: HOSPADM

## 2020-07-06 RX ORDER — POTASSIUM CHLORIDE 750 MG/1
40 TABLET, EXTENDED RELEASE ORAL 2 TIMES DAILY
Status: DISCONTINUED | OUTPATIENT
Start: 2020-07-06 | End: 2020-07-08 | Stop reason: HOSPADM

## 2020-07-06 RX ORDER — GABAPENTIN 600 MG/1
1200 TABLET ORAL 3 TIMES DAILY
Status: ON HOLD | COMMUNITY
End: 2021-06-04

## 2020-07-06 RX ORDER — UREA 10 %
1000 LOTION (ML) TOPICAL DAILY
Status: DISCONTINUED | OUTPATIENT
Start: 2020-07-07 | End: 2020-07-08 | Stop reason: HOSPADM

## 2020-07-06 RX ORDER — POLYETHYLENE GLYCOL 3350 17 G/17G
17 POWDER, FOR SOLUTION ORAL DAILY
Status: DISCONTINUED | OUTPATIENT
Start: 2020-07-07 | End: 2020-07-08 | Stop reason: HOSPADM

## 2020-07-06 RX ORDER — DIPHENHYDRAMINE HCL 25 MG
25 CAPSULE ORAL EVERY 6 HOURS PRN
Status: DISCONTINUED | OUTPATIENT
Start: 2020-07-06 | End: 2020-07-08 | Stop reason: HOSPADM

## 2020-07-06 RX ORDER — DULOXETIN HYDROCHLORIDE 60 MG/1
60 CAPSULE, DELAYED RELEASE ORAL DAILY
Status: DISCONTINUED | OUTPATIENT
Start: 2020-07-07 | End: 2020-07-08 | Stop reason: HOSPADM

## 2020-07-06 RX ADMIN — POTASSIUM CHLORIDE 40 MEQ: 750 TABLET, EXTENDED RELEASE ORAL at 19:54

## 2020-07-06 RX ADMIN — TRAZODONE HYDROCHLORIDE 50 MG: 50 TABLET ORAL at 20:02

## 2020-07-06 RX ADMIN — VANCOMYCIN HYDROCHLORIDE 2000 MG: 1 INJECTION, POWDER, LYOPHILIZED, FOR SOLUTION INTRAVENOUS at 17:31

## 2020-07-06 RX ADMIN — GABAPENTIN 1200 MG: 600 TABLET, FILM COATED ORAL at 19:54

## 2020-07-06 RX ADMIN — IBUPROFEN 600 MG: 600 TABLET ORAL at 17:48

## 2020-07-06 RX ADMIN — ENOXAPARIN SODIUM 40 MG: 40 INJECTION SUBCUTANEOUS at 19:54

## 2020-07-06 RX ADMIN — Medication 1 MG: at 20:02

## 2020-07-06 NOTE — ED NOTES
Pt  called and pt was to move from a treatment facility to a group home today.  Pt needs a covid test done for that move.   is Angélica at [phone]

## 2020-07-06 NOTE — ED PROVIDER NOTES
[facility] ([facility])     July 6, 2020    History     Chief Complaint   Patient presents with     Leg Pain     Bilateral lower leg redness and swelling.     The history is provided by the patient and medical records.     No Yusuf is a 60 year old female with a past medical history significant for bilateral cellulitis of lower leg, lymphedema, sudden cardiac arrest(overdose 2019), chronic pain syndrome, HTN, opiate dependence, influenza A, hypothyroidism, GERD, fibromyalgia, polysubstance abuse, depression, bipolar affective disorder and anxiety who presents to the Emergency Department for evaluation of bilateral leg swelling and redness.    Patient reports redness and swelling in her left leg started approximately 5 days ago.  She states the redness goes from knee to ankle. Patient notes her other leg became red and swollen after the first leg. She reports the left is more red than the right. Patient states both of her legs hurt and make it harder for her to walk.  She reports she started to get a subjective fever (99.9  F) after noticing her swelling but has been taking Tylenol (last dose 1 hour PTA).  She reports getting an inhaler for shortness of breath during her last visit to the ED but notes it makes her feel shaky after using. She states her intermittent shortness of breath has gotten worse in the last week since onset of symptoms. Patient notes increased urination per onset of symptoms.  Patient notes intermittent paraesthesias in her legs simultaneously with her symptoms.  Patient denies chest pain, cough, generalized myalgias, abdominal pain, back pain, runny nose, loss of taste or smell.  Patient denies prior history of diabetes. She has been admitted for similar presentation in the past. No known trauma or falls.     She states she lives in a transformation home in her own room with no known exposure to COVID.  Patient notes compliance with her Lasix 60 mg twice  daily.  She does note she is taking high blood pressure medication and is unsure if she has high cholesterol.  She reports having cardiac problems in her family.  Patient states compliance with her methadone.  Patient reports she is still smoking cigarettes. She denies any recent IV drug use. No history of DVT or PE.     She was admitted in June for lymphedema and diuresis and reports history of cellulitis in the past but uncertain of any history of MRSA (not listed as isolation status on epic). She appears to have gained 20 pounds since June admission.       PAST MEDICAL HISTORY:   Past Medical History:   Diagnosis Date     Arthritis      Bipolar affective (H)      Fibromyalgia      Hypertension        PAST SURGICAL HISTORY:   Past Surgical History:   Procedure Laterality Date     CHOLECYSTECTOMY       GYN SURGERY      c section     GYN SURGERY      oblation     ORTHOPEDIC SURGERY      left leg, left shoulder, back       Past medical history, past surgical history, medications, and allergies were reviewed with the patient. Additional pertinent items: None    FAMILY HISTORY: No family history on file.    SOCIAL HISTORY:   Social History     Tobacco Use     Smoking status: Current Every Day Smoker     Packs/day: 0.25     Smokeless tobacco: Never Used   Substance Use Topics     Alcohol use: No     Social history was reviewed with the patient. Additional pertinent items: None      Patient's Medications   New Prescriptions    No medications on file   Previous Medications    ACETAMINOPHEN (TYLENOL) 500 MG TABLET    Take 1-2 tablets by mouth every 6 hours as needed.    ALBUTEROL (PROAIR HFA/PROVENTIL HFA/VENTOLIN HFA) 108 (90 BASE) MCG/ACT INHALER    Inhale 2 puffs into the lungs every 6 hours as needed for shortness of breath / dyspnea or wheezing    CALCIUM CARBONATE (TUMS) 500 MG CHEWABLE TABLET    Take 1 chew tab by mouth 4 times daily as needed for heartburn    CHOLECALCIFEROL 25 MCG (1000 UT) TABS    Take 1,000  Units by mouth daily     DICLOFENAC (VOLTAREN) 1 % TOPICAL GEL    Place 2 g onto the skin 4 times daily as needed (left knee pain)    DIPHENHYDRAMINE (BENADRYL) 25 MG CAPSULE    Take 25 mg by mouth every 6 hours as needed for allergies    DULOXETINE (CYMBALTA) 60 MG CAPSULE    Take 60 mg by mouth daily    FUROSEMIDE (LASIX) 20 MG TABLET    Take 3 tablets (60 mg) by mouth 2 times daily    GABAPENTIN (NEURONTIN) 800 MG TABLET    Take 1 tablet (800 mg) by mouth 3 times daily    HYDROXYZINE (ATARAX) 50 MG TABLET    Take 25 mg by mouth 3 times daily as needed for itching     LEVOTHYROXINE (SYNTHROID/LEVOTHROID) 75 MCG TABLET    Take 3 tablets (225 mcg) by mouth daily    LOPERAMIDE (IMODIUM) 2 MG CAPSULE    Take 2 mg by mouth as needed for diarrhea Take 2 capsules after the first loose stool. Do not exceed 4 capsules in 24 hours    MAGNESIUM HYDROXIDE (MILK OF MAGNESIA) 400 MG/5ML SUSPENSION    Take 10 mLs by mouth daily as needed for constipation or heartburn    MELATONIN 3 MG TABLET    Take 3 mg by mouth nightly as needed for sleep    METHADONE HCL 10 MG/5ML SOLN    Take 75 mg by mouth Dosed at clinic for her     MULTIVITAMIN W/MINERALS (MULTI-VITAMIN) TABLET    Take 1 tablet by mouth daily    NARCAN 4 MG/0.1ML NASAL SPRAY    CALL 911. SPRAY CONTENTS OF ONE SPRAYER (0.1ML) INTO ONE NOSTRIL. REPEAT IN 2-3 MINS IF SYMPTOMS OF OPIOID PERSIST, ALTERNATE NOSTRILS    NICOTINE POLACRILEX (NICORETTE) 4 MG GUM    Place 4 mg inside cheek 5 x daily PRN for smoking cessation     NYSTATIN (MYCOSTATIN) 613127 UNIT/GM EXTERNAL CREAM    Apply topically daily as needed for dry skin Apply to skin folds daily    OMEPRAZOLE 20 MG TABLET    Take 20 mg by mouth daily    POLYETHYLENE GLYCOL (MIRALAX) 17 G PACKET    Take 17 g by mouth daily    POTASSIUM CHLORIDE ER (K-DUR/KLOR-CON M) 20 MEQ CR TABLET    Take 1 tablet (20 mEq) by mouth 2 times daily    RANITIDINE (ZANTAC) 150 MG TABLET    Take 150 mg by mouth 2 times daily as needed      TOLNAFTATE (TINACTIN) 1 % EXTERNAL CREAM    Apply topically 2 times daily as needed for irritation Apply to affected areas bid    TRAZODONE (DESYREL) 50 MG TABLET    Take 50 mg by mouth At Bedtime    VITAMIN B-12 (CYANOCOBALAMIN) 1000 MCG TABLET    Take 1,000 mcg by mouth daily   Modified Medications    No medications on file   Discontinued Medications    No medications on file          Allergies   Allergen Reactions     Promethazine Other (See Comments) and Anxiety     Noted in 8/30/08 ER     Codeine Phosphate GI Disturbance     Lamotrigine Other (See Comments)     hyponatremia     Oxcarbazepine Unknown and Other (See Comments)     Low sodium  LegacyRecord#[id number]       Codeine Other (See Comments) and Rash     GI bleeding  LegacyRecord#[id number]          Review of Systems  Pertinent positives and negatives are documented in the HPI. All other systems reviewed and are negative.    Physical Exam   Pulse: 106  Temp: 98.9  F (37.2  C)  Resp: 22  SpO2: 96 %      Physical Exam  Vitals signs reviewed.   Constitutional:       General: She is not in acute distress.     Appearance: She is well-developed.   HENT:      Head: Normocephalic and atraumatic.      Nose: Nose normal.      Mouth/Throat:      Mouth: Mucous membranes are moist.      Pharynx: No oropharyngeal exudate or posterior oropharyngeal erythema.   Eyes:      Extraocular Movements: Extraocular movements intact.      Conjunctiva/sclera: Conjunctivae normal.      Pupils: Pupils are equal, round, and reactive to light.   Neck:      Musculoskeletal: Normal range of motion and neck supple.   Cardiovascular:      Rate and Rhythm: Normal rate and regular rhythm.      Pulses: Normal pulses.      Heart sounds: Normal heart sounds. No murmur.   Pulmonary:      Effort: Pulmonary effort is normal. No respiratory distress.      Breath sounds: Normal breath sounds. No stridor. No wheezing, rhonchi or rales.   Abdominal:      General: Bowel sounds are normal. There is no distension.       Palpations: Abdomen is soft. There is no mass.      Tenderness: There is no abdominal tenderness. There is no guarding or rebound.   Musculoskeletal: Normal range of motion.      Comments: Patient has erythema and warmth covering the anterior shin from the mid shin to the ankle bilaterally.  She is tender over this area.  She does have normal range of motion of the ankle, knee, and hip joints bilaterally.  She does not have pain with range of motion of these joints.  She has 2+ pitting edema bilaterally.  2+ radial pulses bilaterally.  1+ DP and PT pulses bilaterally.  Compartments are soft and compressible.  No obvious abrasions or lacerations noted.  Erythema appears to be worse in the left shin compared to the right. No crepitus.    Skin:     General: Skin is warm and dry.      Capillary Refill: Capillary refill takes less than 2 seconds.      Comments: Skin changes as above in lower extremities.    Neurological:      General: No focal deficit present.      Mental Status: She is alert and oriented to person, place, and time.      GCS: GCS eye subscore is 4. GCS verbal subscore is 5. GCS motor subscore is 6.      Cranial Nerves: No cranial nerve deficit.      Sensory: No sensory deficit.      Motor: No weakness.      Comments: 5-5 strength in all 4 extremities bilaterally.  Sensation intact light touch in all 4 extremities bilaterally.   Psychiatric:         Mood and Affect: Mood normal.         ED Course   12:33 PM  The patient was seen and examined by Dr. Hayden in Room ED19.      Procedures             EKG Interpretation:      Interpreted by Olivia Hayden MD  Time reviewed: 1:40 pm  Symptoms at time of EKG: shortness of breath   Rhythm: normal sinus   Rate: normal  Axis: normal  Ectopy: none  Conduction: normal  ST Segments/ T Waves: No ST-T wave changes  Q Waves: III and avF, unchanged from prior  Comparison to prior: Unchanged from 6/9/20    Clinical Impression: no evidence of acute ischemia.  Unchanged from prior EKG.            Labs Ordered and Resulted from Time of ED Arrival Up to the Time of Departure from the ED   CBC WITH PLATELETS DIFFERENTIAL - Abnormal; Notable for the following components:       Result Value    MCHC 30.9 (*)     RDW 16.6 (*)     All other components within normal limits   COMPREHENSIVE METABOLIC PANEL - Abnormal; Notable for the following components:    Carbon Dioxide 37 (*)     Anion Gap <1 (*)     Glucose 104 (*)     Albumin 3.1 (*)     Protein Total 9.1 (*)     ALT 52 (*)     AST 55 (*)     All other components within normal limits   CRP INFLAMMATION - Abnormal; Notable for the following components:    CRP Inflammation 15.5 (*)     All other components within normal limits   ERYTHROCYTE SEDIMENTATION RATE AUTO - Abnormal; Notable for the following components:    Sed Rate 57 (*)     All other components within normal limits   ROUTINE UA WITH MICROSCOPIC - Abnormal; Notable for the following components:    Mucous Urine Present (*)     All other components within normal limits   TROPONIN I   NT PROBNP INPATIENT   LACTIC ACID WHOLE BLOOD   COVID-19 VIRUS (CORONAVIRUS) BY PCR   SARS-COV-2 (COVID-19) VIRUS RT-PCR       Medications   sodium chloride 0.9 % infusion (  Canceled Entry 7/7/20 0528)   vancomycin (VANCOCIN) 2,000 mg in sodium chloride 0.9 % 500 mL intermittent infusion (0 mg Intravenous Stopped 7/6/20 1940)   ibuprofen (ADVIL/MOTRIN) tablet 600 mg (600 mg Oral Given 7/6/20 1748)   furosemide (LASIX) injection 20 mg (20 mg Intravenous Given 7/8/20 1116)             Assessments & Plan (with Medical Decision Making)   On exam, patient has bilateral erythema swelling and tenderness and warmth of the shins bilaterally.  Left does appear more red than right.  No obvious asymmetric swelling.  Patient has been admitted for lymphedema and diuresis as well as cellulitis in the past.  It appears she does not have known history of MRSA.  There is no obvious traumatic injury to the  area.  It appears she has gained 20 pounds since her admission in June for similar presentation.  Her presentation is concerning for worsening lymphedema, cellulitis, and we also considered the potential of necrotizing fasciitis however this would be abnormal to be bilateral.  We also considered the potential for DVT which would also be abnormal to be bilateral.  She reports some chronic intermittent shortness of breath that is somewhat worsening and thus we did consider the potential for pulmonary edema as well.  She reports a subjective fever so COVID-19 is also on the differential.  We considered cardiac etiology as well.  EKG was obtained which did not appear to show any evidence of acute ischemia and troponin was negative at 0.015 and she has not had any chest pain and thus we felt that cardiac etiology was less likely.  Laboratory studies were obtained.  Lactic acid was normal at 0.7.  Urinalysis was unremarkable.  CMP was unrevealing.  CBC revealed a white blood cell count of 7.1 with a hemoglobin of 12.3.  CRP is slightly elevated at 15.5.  Sed rate is 57.  BNP is 347.  2 blood cultures were ordered.  Symptomatic COVID-19 test was obtained.  She was initially mildly tachycardic at 106 however this resolved spontaneously to 77.  She was afebrile here.  She had no signs of respiratory distress.  Lung sounds were clear on exam.  With the left having more redness on the right we did obtain an x-ray of the left tibia and fibula which did not show any evidence of gas.  It did show soft tissue swelling.  She did not have any signs of compartment syndrome on exam.  Pedal pulses were equal and palpable bilaterally and thus we felt vascular occlusion was less likely. LRINEC score is low at 1 and with her having bilateral symptoms we felt that necrotizing fasciitis was less likely at this time.  X-ray did not show any fracture.  We did obtain a chest x-ray which did not show any acute pathology.  DVT ultrasound  bilaterally did not reveal any evidence of DVT.  I do feel that most likely this represents cellulitis at this time.  We felt that PE was less likely as the patient just had a CT scan on Sera 10 that did not show any evidence of PE and this overall appears to be more consistent with her chronic lymphedema now giving her cellulitis as well.  She was started on IV vancomycin.  She remained hemodynamically stable here.  No signs of sepsis at this time.  We did feel that she warranted admission for further antibiotic treatment and monitoring as well as probably likely diuresis.  She was given ibuprofen for pain.  She was in agreement with this plan.  I spoke with the [facility] hospitalist who accepted the patient for admission.  She was transferred to the floor in stable condition.    I have reviewed the nursing notes.    I have reviewed the findings, diagnosis, plan and need for follow up with the patient.    New Prescriptions    No medications on file       Final diagnoses:   Cellulitis of left lower extremity   Bilateral lower extremity edema     IPeter, am serving as a trained medical scribe to document services personally performed by Olivia Hayden MD, based on the provider's statements to me.     Olivia ABDUL MD, was physically present and have reviewed and verified the accuracy of this note documented by Peter Mancini.    7/6/2020   [facility], [place], EMERGENCY DEPARTMENT     Olivia Hayden MD  07/22/20 7800

## 2020-07-06 NOTE — PHARMACY
Methadone Clinic Information Note    Clinic Name: [facility]  Clinic Location & Phone Number: [place], [place] [phone]    Verified patient's current methadone dose is 80 mg daily (verified with [facility] inpatient pharmacy - Fritz Carpio at [phone]).   Last dose was administered in clinic on 7/1/2020.   Does patient receive take-home doses? Yes - patient received 6 take home doses (through 7/7/2020 - patient receives one dose in clinic each week on Wednesdays + 6 take home doses).    Mansoor Quintero, PharmD  [facility]  Emergency Department: Ascom *[phone]

## 2020-07-06 NOTE — PHARMACY-VANCOMYCIN DOSING SERVICE
Pharmacy Vancomycin Initial Note  Date of Service 2020  Patient's  1960  60 year old, female    Indication: Skin and Soft Tissue Infection    Current estimated CrCl = Estimated Creatinine Clearance: 81.5 mL/min (based on SCr of 0.99 mg/dL).    Creatinine for last 3 days  2020:  2:00 PM Creatinine 0.99 mg/dL    Recent Vancomycin Level(s) for last 3 days  No results found for requested labs within last 72 hours.      Vancomycin IV Administrations (past 72 hours)      No vancomycin orders with administrations in past 72 hours.                Nephrotoxins and other renal medications (From now, onward)    Start     Dose/Rate Route Frequency Ordered Stop    20 0400  vancomycin (VANCOCIN) 2,000 mg in sodium chloride 0.9 % 500 mL intermittent infusion      2,000 mg  over 2 Hours Intravenous EVERY 12 HOURS 20 1536      20 1600  vancomycin (VANCOCIN) 2,000 mg in sodium chloride 0.9 % 500 mL intermittent infusion      2,000 mg  over 2 Hours Intravenous ONCE 20 1534            Contrast Orders - past 72 hours (72h ago, onward)    None                Plan:  1. Start vancomycin  2000 mg IV q12h (15.1 mg/kg/dose).   2. Goal trough level: 10-15 mg/L   3. Pharmacy will check trough levels as appropriate in 1-3 Days.    4. Serum creatinine levels will be ordered a minimum of twice weekly.    5. [place] method utilized to dose vancomycin therapy: Method 2      Mansoor Quintero, [place]  [facility]  Emergency Department: Ascom *[phone]

## 2020-07-06 NOTE — PHARMACY-ADMISSION MEDICATION HISTORY
Admission Medication History Completed by Pharmacy    See [facility] Admission Navigator for allergy information, preferred outpatient pharmacy, prior to admission medications and immunization status.     Medication history sources:  patient via iPad interview, [facility] dispense history, [email or web address] [phone]    Changes made to PTA medication list (reason)  Added:   - amlodipine  Deleted:   - loperamide PRN, tolnaftate 1% cream BID PRN (not taking)  Changed:   - gabapentin 800 mg TID-->1200 mg TID (per pt/[facility])  - potassium chloride 20 mEq BID-->8 mEq BID (per [facility])    Additional medication history information:  - Patient reviewed paperwork from her treatment facility with writer - confirmed scheduled medications matched PTA list below.    Prior to Admission medications    Medication Sig Last Dose Taking? Auth Provider   acetaminophen (TYLENOL) 500 MG tablet Take 325-650 mg by mouth every 6 hours as needed for mild pain or fever  7/6/2020 Yes Reported, Patient   albuterol (PROAIR HFA/PROVENTIL HFA/VENTOLIN HFA) 108 (90 Base) MCG/ACT inhaler Inhale 2 puffs into the lungs every 6 hours as needed for shortness of breath / dyspnea or wheezing PRN Yes Unknown, Entered By History   amLODIPine (NORVASC) 5 MG tablet Take 5 mg by mouth daily 7/6/2020 Yes Unknown, Entered By History   calcium carbonate (TUMS) 500 MG chewable tablet Take 1 chew tab by mouth 4 times daily as needed for heartburn PRN Yes Unknown, Entered By History   cholecalciferol 25 MCG (1000 UT) TABS Take 1,000 Units by mouth daily  7/6/2020 Yes Zena Wheeler MD   diclofenac (VOLTAREN) 1 % topical gel Place 2 g onto the skin 4 times daily as needed (left knee pain) PRN Yes Tl Roman MD   diphenhydrAMINE (BENADRYL) 25 MG capsule Take 25 mg by mouth every 6 hours as needed for allergies PRN Yes Unknown, Entered By History   DULoxetine (CYMBALTA) 60 MG capsule Take 60 mg by mouth daily 7/6/2020 Yes Unknown, Entered By  History   furosemide (LASIX) 20 MG tablet Take 3 tablets (60 mg) by mouth 2 times daily 7/6/2020 Yes Tl Roman MD   gabapentin (NEURONTIN) 600 MG tablet Take 1,200 mg by mouth 3 times daily 7/6/2020 Yes Unknown, Entered By History   hydrOXYzine (ATARAX) 50 MG tablet Take 50 mg by mouth every 4 hours as needed for anxiety  PRN Yes Reported, Patient   levothyroxine (SYNTHROID/LEVOTHROID) 75 MCG tablet Take 3 tablets (225 mcg) by mouth daily Past Week Yes Tl Roman MD   magnesium hydroxide (MILK OF MAGNESIA) 400 MG/5ML suspension Take 10 mLs by mouth daily as needed for constipation or heartburn PRN Yes Unknown, Entered By History   melatonin 3 MG tablet Take 3 mg by mouth At Bedtime  7/5/2020 Yes Unknown, Entered By History   methadone HCl 10 MG/5ML SOLN Take 80 mg by mouth daily 7/6/2020 Yes Reported, Patient   multivitamin w/minerals (MULTI-VITAMIN) tablet Take 1 tablet by mouth daily 7/6/2020 Yes Reported, Patient   NARCAN 4 MG/0.1ML nasal spray CALL 911. SPRAY CONTENTS OF ONE SPRAYER (0.1ML) INTO ONE NOSTRIL. REPEAT IN 2-3 MINS IF SYMPTOMS OF OPIOID PERSIST, ALTERNATE NOSTRILS  Yes Reported, Patient   nicotine polacrilex (NICORETTE) 4 MG gum Place 4 mg inside cheek every hour as needed for smoking cessation  PRN Yes Unknown, Entered By History   nystatin (MYCOSTATIN) 981862 UNIT/GM external cream Apply topically daily as needed for dry skin Apply to skin folds daily PRN Yes Tl Roman MD   omeprazole (PRILOSEC) 20 MG DR capsule Take 20 mg by mouth daily  7/6/2020 Yes Unknown, Entered By History   polyethylene glycol (MIRALAX) 17 g packet Take 17 g by mouth daily Past Week Yes Tl Roman MD   potassium chloride ER (KLOR-CON) 8 MEQ CR tablet Take 8 mEq by mouth 2 times daily 7/6/2020 Yes Unknown, Entered By History   traZODone (DESYREL) 50 MG tablet Take 50 mg by mouth At Bedtime 7/5/2020 Yes Unknown, Entered By History   vitamin B-12 (CYANOCOBALAMIN) 1000 MCG tablet Take 1,000 mcg by mouth  daily 7/6/2020 Yes Unknown, Entered By History       Date completed: 07/06/20    Medication history completed by:   Mansoor Quintero PharmD  [facility]  Emergency Department: Ascom *[phone]

## 2020-07-06 NOTE — LETTER
[facility]  [address] [address]  Phone: [phone]  Fax: [phone]    July 8, 2020        No Yusuf  [address] [facility] [place] [address]          To whom it may concern:    RE: No Yusuf was admitted to the [facility] under my care.  I recommend that whenever possible, she lifts her legs up when sitting.  She should also walk 5 - 10 minutes at least four times a day.    Please contact me should you have questions or concerns.      Sincerely,            Oseas Cao MD  [phone]

## 2020-07-06 NOTE — ED NOTES
[facility], [place]   ED Nurse to Floor Handoff     No Yusuf is a 60 year old female who speaks English and lives with others,  home status is unknown  They arrived in the ED by car from clinic    ED Chief Complaint: Leg Pain (Bilateral lower leg redness and swelling.)    ED Dx;   Final diagnoses:   None         Needed?: No    Allergies:   Allergies   Allergen Reactions     Promethazine Other (See Comments) and Anxiety     Noted in 8/30/08 ER     Codeine Phosphate GI Disturbance     Lamotrigine Other (See Comments)     hyponatremia     Oxcarbazepine Unknown and Other (See Comments)     Low sodium  LegacyRecord#[id number]       Codeine Other (See Comments) and Rash     GI bleeding  LegacyRecord#[id number]     .  Past Medical Hx:   Past Medical History:   Diagnosis Date     Arthritis      Bipolar affective (H)      Fibromyalgia      Hypertension       Baseline Mental status: WDL  Current Mental Status changes: at basesline    Infection present or suspected this encounter: cultures pending  Sepsis suspected: No  Isolation type: Special Precautions-COVID-19     Activity level - Baseline/Home:  Independent  Activity Level - Current:   Independent    Bariatric equipment needed?: No    In the ED these meds were given:   Medications   vancomycin (VANCOCIN) 2,000 mg in sodium chloride 0.9 % 500 mL intermittent infusion (has no administration in time range)   vancomycin (VANCOCIN) 2,000 mg in sodium chloride 0.9 % 500 mL intermittent infusion (has no administration in time range)       Drips running?  No    Home pump  No    Current LDAs  Peripheral IV 07/06/20 Left Wrist (Active)   Site Assessment WDL 07/06/20 1401   Line Status Saline locked 07/06/20 1401   Phlebitis Scale 0-->no symptoms 07/06/20 1401   Infiltration Scale 0 07/06/20 1401   Extravasation? No 07/06/20 1401   Dressing Intervention New dressing  07/06/20 1401   Number of days: 0       Labs results:   Labs Ordered and Resulted  from Time of ED Arrival Up to the Time of Departure from the ED   CBC WITH PLATELETS DIFFERENTIAL - Abnormal; Notable for the following components:       Result Value    MCHC 30.9 (*)     RDW 16.6 (*)     All other components within normal limits   COMPREHENSIVE METABOLIC PANEL - Abnormal; Notable for the following components:    Carbon Dioxide 37 (*)     Anion Gap <1 (*)     Glucose 104 (*)     Albumin 3.1 (*)     Protein Total 9.1 (*)     ALT 52 (*)     AST 55 (*)     All other components within normal limits   CRP INFLAMMATION - Abnormal; Notable for the following components:    CRP Inflammation 15.5 (*)     All other components within normal limits   ERYTHROCYTE SEDIMENTATION RATE AUTO - Abnormal; Notable for the following components:    Sed Rate 57 (*)     All other components within normal limits   ROUTINE UA WITH MICROSCOPIC - Abnormal; Notable for the following components:    Mucous Urine Present (*)     All other components within normal limits   TROPONIN I   NT PROBNP INPATIENT   LACTIC ACID WHOLE BLOOD   COVID-19 VIRUS (CORONAVIRUS) BY PCR   SARS-COV-2 (COVID-19) VIRUS RT-PCR   BLOOD CULTURE   BLOOD CULTURE       Imaging Studies:   Recent Results (from the past 24 hour(s))   US Lower Extremity Venous Bilateral Port    Narrative    US LOWER EXTREMITY VENOUS DUPLEX BILATERAL PORT 7/6/2020 3:43 PM    CLINICAL HISTORY: leg swelling, eval for DVT  TECHNIQUE: Venous Duplex ultrasound of bilateral lower extremities  with and without compression, augmentation and duplex. Color flow and  spectral Doppler with waveform analysis performed.    COMPARISON: None.    FINDINGS: Exam includes the common femoral, femoral, popliteal veins  as well as segmentally visualized deep calf veins and greater  saphenous vein.     RIGHT: No deep vein thrombosis. No superficial thrombophlebitis. No  popliteal cyst.    LEFT: No deep vein thrombosis. No superficial thrombophlebitis. No  popliteal cyst.      Impression    IMPRESSION:  1.   No deep venous thrombosis in the bilateral lower extremities.  2.  Bilateral lower extremity subcutaneous edema.    EDILMA BURTON MD       Recent vital signs:   /75   Pulse 77   Temp 98.6  F (37  C) (Oral)   Resp 18   Wt 132.5 kg (292 lb)   LMP 11/01/2013   SpO2 92%   Breastfeeding No   BMI 51.73 kg/m      Leonel Coma Scale Score: 15 (07/06/20 1303)       Cardiac Rhythm: Normal Sinus  Pt needs tele? No  Skin/wound Issues: None    Code Status: Full Code    Pain control: good    Nausea control: pt had none    Abnormal labs/tests/findings requiring intervention:     Family present during ED course? No   Family Comments/Social Situation comments:       Tasks needing completion: None    Sherine Frazier RN  [facility] --   [phone] [facility]  [phone] [facility]

## 2020-07-06 NOTE — H&P
[facility], [place]    History and Physical - Hospitalist Service       Date of Admission:  7/6/2020    Assessment & Plan   No Yusuf is a 60 year old female with hypertesnion, hypothyroidism, lymphedema, polysubstance abuse, bipolar, arthritis, and fibromyalgia who was admitted with increase pain, erythema and swelling in her legs and subjective fevers  She is admitted to Internal Medicine with concern of Infection ( evidenced by clinical presentation) and diuresis due to acute on chronic lymphedema   Individual problems and their management are outlined below       Acute lymphedema exacerbation  Bilateral Cellulitis  Bilateral legs  exquisitely tender and  very warm. BLE US without DVT  Weight up 20  lbs since discharge 6/16/20, despite lasix increased to 60 mg BID, which patient says she is compliant with   BLE are now tight and edematous and tender.  Patient received IV vancomycin in the ER. We will continue that ( ESR at 58 and CRP at 15.5)  Change lasix to 40 mg IV BID  - Monitor and replace lytes prn  - Daily weights  - Potassium supplements 40 meq BID while on IV lasix    - Na restricted diet  -1500 mls fluid restriction     Bipolar, insomnia  Cont home duloxetine and trazodone prn    Chronic neuropathic pain   Home dose of gabapentin 1200mg TID per [place] [facility] discharges     Hx of polysubstance abuse- opiate dependence   Pt says takes methadone 80 mg daily   Pharmacy Verified patient's current methadone dose is 80 mg daily (verified with [facility] inpatient pharmacy - Fritz Carpio at [phone]).   Last dose was administered in clinic on 7/1/2020.     Hypothyroidism  Cont home synthroid     JOSE  Cont home omeprazole     Tobacco dependence  Cont home nicotine gum        Diet: Na restricted diet   DVT Prophylaxis: Enoxaparin (Lovenox) SQ  Valle Catheter: not present  Code Status: Full  Rule Out COVID-19 Handoff:  No is a LOW SUSPICION PUI.  Follow these  "instructions:    If COVID test positive -> continue isolation precautions    If COVID test negative -> discontinue COVID-specific isolation precautions       Disposition Plan   Expected discharge: 2 - 3 days, recommended to prior living arrangement  Vs group home once antibiotic plan established, safe disposition plan/ TCU bed available, SIRS/Sepsis treated and Weight at baseline .  Entered: Jared Hathaway MD 07/06/2020, 7:15 PM     The patient's care was discussed with the Bedside Nurse and Patient.    aJred Hathaway MD  [facility], [place]    ______________________________________________________________________    Chief Complaint   Bilateral lower extremity pain and swelling     History is obtained from the patient    History of Present Illness   No Yusuf is a 60 year old female with a past medical history significant for bilateral cellulitis of lower leg, sudden cardiac arrest(overdose 2019), chronic pain syndrome, HTN, opiate dependence, influenza A, hypothyroidism, GERD, fibromyalgia, polysubstance abuse, depression, bipolar affective disorder and anxiety who presents to the Emergency Department for evaluation of cellulitis.  She has been at an inpatient treatment program called \"[facility]\", from where she was to discharge to a group home today.     Patient reports redness and swelling in her left leg started approximately 5 days ago.  She states the redness goes from knee to ankle. Patient notes her other leg became red and swollen after the first leg. Patient states both of her legs hurt and make it harder for her to walk.  She reports she started to get a subjective fever (99.9  F) after noticing her swelling but has been taking Tylenol (last dose 1 hour PTA).   She states her intermittent shortness of breath has gotten worse in the last week since onset of symptoms. Patient denies chest pain, cough, generalized myalgias, abdominal " pain, back pain, runny nose, loss of taste or smell.  Patient denies prior history of diabetes.     No known exposure to COVID.  Patient notes compliance with her Lasix 60 mg twice daily. Patient is on methadone daily and did take her methadone today  She was only recently discharged from [facility] on 6/1 , when she was treated for acute on chronic lymphedema exacerbation with fluid overload. She has been compliant with her medications ( 60 BID of lasix). Baseline weight at 270 lbs        Review of Systems    The 10 point Review of Systems is negative other than noted in the HPI or here.     Past Medical History    I have reviewed this patient's medical history and updated it with pertinent information if needed.   Past Medical History:   Diagnosis Date     Arthritis      Bipolar affective (H)      Fibromyalgia      Hypertension        Past Surgical History   I have reviewed this patient's surgical history and updated it with pertinent information if needed.  Past Surgical History:   Procedure Laterality Date     CHOLECYSTECTOMY       GYN SURGERY      c section     GYN SURGERY      oblation     ORTHOPEDIC SURGERY      left leg, left shoulder, back       Social History   I have reviewed this patient's social history and updated it with pertinent information if needed.      Family History     Family history reviewed and non contributory     Prior to Admission Medications   Prior to Admission Medications   Prescriptions Last Dose Informant Patient Reported? Taking?   DULoxetine (CYMBALTA) 60 MG capsule 7/6/2020  Yes Yes   Sig: Take 60 mg by mouth daily   NARCAN 4 MG/0.1ML nasal spray   Yes Yes   Sig: CALL 911. SPRAY CONTENTS OF ONE SPRAYER (0.1ML) INTO ONE NOSTRIL. REPEAT IN 2-3 MINS IF SYMPTOMS OF OPIOID PERSIST, ALTERNATE NOSTRILS   acetaminophen (TYLENOL) 500 MG tablet 7/6/2020  Yes Yes   Sig: Take 325-650 mg by mouth every 6 hours as needed for mild pain or fever    albuterol (PROAIR HFA/PROVENTIL  HFA/VENTOLIN HFA) 108 (90 Base) MCG/ACT inhaler PRN  Yes Yes   Sig: Inhale 2 puffs into the lungs every 6 hours as needed for shortness of breath / dyspnea or wheezing   amLODIPine (NORVASC) 5 MG tablet 7/6/2020  Yes Yes   Sig: Take 5 mg by mouth daily   calcium carbonate (TUMS) 500 MG chewable tablet PRN  Yes Yes   Sig: Take 1 chew tab by mouth 4 times daily as needed for heartburn   cholecalciferol 25 MCG (1000 UT) TABS 7/6/2020  Yes Yes   Sig: Take 1,000 Units by mouth daily    diclofenac (VOLTAREN) 1 % topical gel PRN  No Yes   Sig: Place 2 g onto the skin 4 times daily as needed (left knee pain)   diphenhydrAMINE (BENADRYL) 25 MG capsule PRN  Yes Yes   Sig: Take 25 mg by mouth every 6 hours as needed for allergies   furosemide (LASIX) 20 MG tablet 7/6/2020  No Yes   Sig: Take 3 tablets (60 mg) by mouth 2 times daily   gabapentin (NEURONTIN) 600 MG tablet 7/6/2020  Yes Yes   Sig: Take 1,200 mg by mouth 3 times daily   hydrOXYzine (ATARAX) 50 MG tablet PRN  Yes Yes   Sig: Take 50 mg by mouth every 4 hours as needed for anxiety    levothyroxine (SYNTHROID/LEVOTHROID) 75 MCG tablet Past Week  No Yes   Sig: Take 3 tablets (225 mcg) by mouth daily   magnesium hydroxide (MILK OF MAGNESIA) 400 MG/5ML suspension PRN  Yes Yes   Sig: Take 10 mLs by mouth daily as needed for constipation or heartburn   melatonin 3 MG tablet 7/5/2020  Yes Yes   Sig: Take 3 mg by mouth At Bedtime    methadone HCl 10 MG/5ML SOLN 7/6/2020  Yes Yes   Sig: Take 80 mg by mouth daily    multivitamin w/minerals (MULTI-VITAMIN) tablet 7/6/2020  Yes Yes   Sig: Take 1 tablet by mouth daily   nicotine polacrilex (NICORETTE) 4 MG gum PRN  Yes Yes   Sig: Place 4 mg inside cheek every hour as needed for smoking cessation    nystatin (MYCOSTATIN) 190474 UNIT/GM external cream PRN  No Yes   Sig: Apply topically daily as needed for dry skin Apply to skin folds daily   omeprazole (PRILOSEC) 20 MG DR capsule 7/6/2020  Yes Yes   Sig: Take 20 mg by mouth daily     polyethylene glycol (MIRALAX) 17 g packet Past Week  No Yes   Sig: Take 17 g by mouth daily   potassium chloride ER (KLOR-CON) 8 MEQ CR tablet 7/6/2020  Yes Yes   Sig: Take 8 mEq by mouth 2 times daily   traZODone (DESYREL) 50 MG tablet 7/5/2020  Yes Yes   Sig: Take 50 mg by mouth At Bedtime   vitamin B-12 (CYANOCOBALAMIN) 1000 MCG tablet 7/6/2020  Yes Yes   Sig: Take 1,000 mcg by mouth daily      Facility-Administered Medications: None     Allergies   Allergies   Allergen Reactions     Promethazine Other (See Comments) and Anxiety     Noted in 8/30/08 ER     Codeine Phosphate GI Disturbance     Lamotrigine Other (See Comments)     hyponatremia     Oxcarbazepine Unknown and Other (See Comments)     Low sodium  LegacyRecord#[id number]       Codeine Other (See Comments) and Rash     GI bleeding  LegacyRecord#[id number]         Physical Exam   Vital Signs: Temp: 97.4  F (36.3  C) Temp src: Oral BP: 122/70 Pulse: 76   Resp: 18 SpO2: 93 % O2 Device: None (Room air)    Weight: 292 lbs 0 oz    Constitutional: awake, alert, cooperative, no apparent distress, and appears stated age  Eyes: Lids and lashes normal, pupils equal, round and reactive to light, extra ocular muscles intact, sclera clear, conjunctiva normal  ENT: Normocephalic, without obvious abnormality, atraumatic, sinuses nontender on palpation, external ears without lesions, oral pharynx with moist mucous membranes  Respiratory: No increased work of breathing, good air exchange, clear to auscultation bilaterally, no crackles or wheezing  Cardiovascular: Normal apical impulse, regular rate and rhythm, normal S1 and S2, no S3 or S4, and no murmur noted  GI: Normal bowel sounds, soft, non-distended, non-tender, no masses palpated, no hepatosplenomegally  Skin: no bruising or bleeding  Musculoskeletal: erythema, warmth and tenderness in both legs from mid way down the shin  Neurologic: Awake, alert, oriented to name, place and time.  Cranial nerves II-XII are grossly intact.     Data   Data reviewed today: I reviewed all medications, new labs and imaging results over the last 24 hours. I personally reviewed the chest x-ray image(s) showing low lung volumes, but no acute cardiopulmonarty disease .  Doppler U/S of both extremities with no evidence of DVT   COVID - negative     Recent Labs   Lab 07/06/20  1400   WBC 7.1   HGB 12.3   MCV 88         POTASSIUM 3.9   CHLORIDE 102   CO2 37*   BUN 13   CR 0.99   ANIONGAP <1*   RODOLFO 8.9   *   ALBUMIN 3.1*   PROTTOTAL 9.1*   BILITOTAL 0.4   ALKPHOS 143   ALT 52*   AST 55*   TROPI 0.015

## 2020-07-07 LAB
ALBUMIN SERPL-MCNC: 2.6 G/DL (ref 3.4–5)
ALP SERPL-CCNC: 121 U/L (ref 40–150)
ALT SERPL W P-5'-P-CCNC: 41 U/L (ref 0–50)
ANION GAP SERPL CALCULATED.3IONS-SCNC: 2 MMOL/L (ref 3–14)
AST SERPL W P-5'-P-CCNC: 39 U/L (ref 0–45)
BASOPHILS # BLD AUTO: 0 10E9/L (ref 0–0.2)
BASOPHILS NFR BLD AUTO: 0.6 %
BILIRUB SERPL-MCNC: 0.3 MG/DL (ref 0.2–1.3)
BUN SERPL-MCNC: 16 MG/DL (ref 7–30)
CALCIUM SERPL-MCNC: 8.1 MG/DL (ref 8.5–10.1)
CHLORIDE SERPL-SCNC: 106 MMOL/L (ref 94–109)
CO2 SERPL-SCNC: 32 MMOL/L (ref 20–32)
CREAT SERPL-MCNC: 0.98 MG/DL (ref 0.52–1.04)
CRP SERPL-MCNC: 13 MG/L (ref 0–8)
DIFFERENTIAL METHOD BLD: ABNORMAL
EOSINOPHIL # BLD AUTO: 0.3 10E9/L (ref 0–0.7)
EOSINOPHIL NFR BLD AUTO: 4.8 %
ERYTHROCYTE [DISTWIDTH] IN BLOOD BY AUTOMATED COUNT: 16.6 % (ref 10–15)
GFR SERPL CREATININE-BSD FRML MDRD: 63 ML/MIN/{1.73_M2}
GLUCOSE SERPL-MCNC: 106 MG/DL (ref 70–99)
HCT VFR BLD AUTO: 37.1 % (ref 35–47)
HGB BLD-MCNC: 11.3 G/DL (ref 11.7–15.7)
IMM GRANULOCYTES # BLD: 0 10E9/L (ref 0–0.4)
IMM GRANULOCYTES NFR BLD: 0.4 %
LYMPHOCYTES # BLD AUTO: 1.7 10E9/L (ref 0.8–5.3)
LYMPHOCYTES NFR BLD AUTO: 25.4 %
MCH RBC QN AUTO: 27 PG (ref 26.5–33)
MCHC RBC AUTO-ENTMCNC: 30.5 G/DL (ref 31.5–36.5)
MCV RBC AUTO: 89 FL (ref 78–100)
MONOCYTES # BLD AUTO: 0.8 10E9/L (ref 0–1.3)
MONOCYTES NFR BLD AUTO: 11.8 %
NEUTROPHILS # BLD AUTO: 3.9 10E9/L (ref 1.6–8.3)
NEUTROPHILS NFR BLD AUTO: 57 %
NRBC # BLD AUTO: 0 10*3/UL
NRBC BLD AUTO-RTO: 0 /100
PLATELET # BLD AUTO: 251 10E9/L (ref 150–450)
POTASSIUM SERPL-SCNC: 4.2 MMOL/L (ref 3.4–5.3)
PROT SERPL-MCNC: 7.7 G/DL (ref 6.8–8.8)
RBC # BLD AUTO: 4.19 10E12/L (ref 3.8–5.2)
SODIUM SERPL-SCNC: 140 MMOL/L (ref 133–144)
WBC # BLD AUTO: 6.9 10E9/L (ref 4–11)

## 2020-07-07 PROCEDURE — 25000128 H RX IP 250 OP 636: Performed by: INTERNAL MEDICINE

## 2020-07-07 PROCEDURE — 86140 C-REACTIVE PROTEIN: CPT | Performed by: INTERNAL MEDICINE

## 2020-07-07 PROCEDURE — 85025 COMPLETE CBC W/AUTO DIFF WBC: CPT | Performed by: INTERNAL MEDICINE

## 2020-07-07 PROCEDURE — 25800030 ZZH RX IP 258 OP 636: Performed by: EMERGENCY MEDICINE

## 2020-07-07 PROCEDURE — 25000132 ZZH RX MED GY IP 250 OP 250 PS 637: Performed by: INTERNAL MEDICINE

## 2020-07-07 PROCEDURE — 36415 COLL VENOUS BLD VENIPUNCTURE: CPT | Performed by: INTERNAL MEDICINE

## 2020-07-07 PROCEDURE — 80053 COMPREHEN METABOLIC PANEL: CPT | Performed by: INTERNAL MEDICINE

## 2020-07-07 PROCEDURE — 25000128 H RX IP 250 OP 636: Performed by: EMERGENCY MEDICINE

## 2020-07-07 PROCEDURE — 99232 SBSQ HOSP IP/OBS MODERATE 35: CPT | Performed by: INTERNAL MEDICINE

## 2020-07-07 PROCEDURE — 40000893 ZZH STATISTIC PT IP EVAL DEFER

## 2020-07-07 PROCEDURE — 12000001 ZZH R&B MED SURG/OB UMMC

## 2020-07-07 RX ORDER — SODIUM CHLORIDE 9 MG/ML
INJECTION, SOLUTION INTRAVENOUS
Status: DISPENSED
Start: 2020-07-07 | End: 2020-07-07

## 2020-07-07 RX ORDER — DOXYCYCLINE 100 MG/1
100 CAPSULE ORAL EVERY 12 HOURS SCHEDULED
Status: DISCONTINUED | OUTPATIENT
Start: 2020-07-07 | End: 2020-07-08 | Stop reason: HOSPADM

## 2020-07-07 RX ORDER — FUROSEMIDE 10 MG/ML
20 INJECTION INTRAMUSCULAR; INTRAVENOUS EVERY 8 HOURS
Status: COMPLETED | OUTPATIENT
Start: 2020-07-07 | End: 2020-07-08

## 2020-07-07 RX ADMIN — DULOXETINE HYDROCHLORIDE 60 MG: 60 CAPSULE, DELAYED RELEASE ORAL at 10:36

## 2020-07-07 RX ADMIN — MULTIPLE VITAMINS W/ MINERALS TAB 1 TABLET: TAB at 10:36

## 2020-07-07 RX ADMIN — TRAZODONE HYDROCHLORIDE 50 MG: 50 TABLET ORAL at 20:29

## 2020-07-07 RX ADMIN — ACETAMINOPHEN 650 MG: 325 TABLET, FILM COATED ORAL at 12:03

## 2020-07-07 RX ADMIN — AMLODIPINE BESYLATE 5 MG: 5 TABLET ORAL at 10:36

## 2020-07-07 RX ADMIN — GABAPENTIN 1200 MG: 600 TABLET, FILM COATED ORAL at 14:16

## 2020-07-07 RX ADMIN — POTASSIUM CHLORIDE 40 MEQ: 750 TABLET, EXTENDED RELEASE ORAL at 20:23

## 2020-07-07 RX ADMIN — DOXYCYCLINE 100 MG: 100 CAPSULE ORAL at 20:23

## 2020-07-07 RX ADMIN — POTASSIUM CHLORIDE 40 MEQ: 750 TABLET, EXTENDED RELEASE ORAL at 10:35

## 2020-07-07 RX ADMIN — CYANOCOBALAMIN TAB 500 MCG 1000 MCG: 500 TAB at 12:03

## 2020-07-07 RX ADMIN — GABAPENTIN 1200 MG: 600 TABLET, FILM COATED ORAL at 20:23

## 2020-07-07 RX ADMIN — FUROSEMIDE 20 MG: 10 INJECTION, SOLUTION INTRAMUSCULAR; INTRAVENOUS at 16:59

## 2020-07-07 RX ADMIN — MELATONIN 1000 UNITS: at 10:36

## 2020-07-07 RX ADMIN — OMEPRAZOLE 20 MG: 20 CAPSULE, DELAYED RELEASE ORAL at 10:36

## 2020-07-07 RX ADMIN — ACETAMINOPHEN 650 MG: 325 TABLET, FILM COATED ORAL at 05:37

## 2020-07-07 RX ADMIN — METHADONE HYDROCHLORIDE 80 MG: 5 SOLUTION ORAL at 10:43

## 2020-07-07 RX ADMIN — ACETAMINOPHEN 650 MG: 325 TABLET, FILM COATED ORAL at 20:23

## 2020-07-07 RX ADMIN — LEVOTHYROXINE SODIUM 225 MCG: 50 TABLET ORAL at 10:35

## 2020-07-07 RX ADMIN — VANCOMYCIN HYDROCHLORIDE 2000 MG: 1 INJECTION, POWDER, LYOPHILIZED, FOR SOLUTION INTRAVENOUS at 05:25

## 2020-07-07 RX ADMIN — ENOXAPARIN SODIUM 40 MG: 40 INJECTION SUBCUTANEOUS at 20:23

## 2020-07-07 RX ADMIN — GABAPENTIN 1200 MG: 600 TABLET, FILM COATED ORAL at 10:36

## 2020-07-07 ASSESSMENT — ACTIVITIES OF DAILY LIVING (ADL)
AMBULATION: 1-->ASSISTIVE EQUIPMENT
TRANSFERRING: 1-->ASSISTIVE EQUIPMENT
COGNITION: 0 - NO COGNITION ISSUES REPORTED
BATHING: 1-->ASSISTIVE EQUIPMENT
RETIRED_EATING: 0-->INDEPENDENT
FALL_HISTORY_WITHIN_LAST_SIX_MONTHS: NO
RETIRED_COMMUNICATION: 0-->UNDERSTANDS/COMMUNICATES WITHOUT DIFFICULTY
SWALLOWING: 0-->SWALLOWS FOODS/LIQUIDS WITHOUT DIFFICULTY
TOILETING: 1-->ASSISTIVE EQUIPMENT
DRESS: 0-->INDEPENDENT

## 2020-07-07 NOTE — PROGRESS NOTES
[facility], [place]    Medicine Progress Note - Hospitalist Service       Date of Admission:  7/6/2020  Assessment & Plan   Patient Active Problem List   Diagnosis     Fibromyalgia     Lymphedema of both lower extremities     Chronic venous insufficiency of lower extremity     Edema of both legs     Lymphedema     Cellulitis       Lower extremity cellulitis  -  Appears mostly resolved  -  Discontinue IV vancomycin  -  Initiated on doxycycline  -  Pain control with ibuprofen.  Avoid narcotic medication.    Anxiety and depression  -  Continue duloxetine    Hypothyroidism  -  Continue levothyroxine    Chronic pain syndrome  -  Continue methadone       Diet: Fluid restriction 1500 ML FLUID  Combination Diet Regular Diet Adult; 2 gm NA Diet    DVT Prophylaxis: Enoxaparin (Lovenox) SQ  Valle Catheter: not present  Code Status: Full Code           Disposition Plan   Expected discharge: Tomorrow, recommended to home once adequate pain management/ tolerating PO medications.  Entered: Oseas Cao MD 07/07/2020, 2:47 PM       The patient's care was discussed with the care Team.    Oseas Cao MD  Hospitalist Service  [facility] [place]    ______________________________________________________________________    Interval History   Continues to complain of bilateral lower extremity pain.    Data reviewed today: I reviewed all medications, new labs and imaging results over the last 24 hours. I personally reviewed the US image(s) showing No acte DVT.    Physical Exam   Vital Signs: Temp: 97.8  F (36.6  C) Temp src: Oral BP: 126/69 Pulse: 71   Resp: 16 SpO2: 98 % O2 Device: None (Room air)    Weight: 290 lbs 3.2 oz  General Appearance: Awake, alert, interactive. NAD.  Respiratory: CTA hiral  Cardiovascular: Controlled HR.  1+ bilateral lower extremity edema.  GI: Abd nondistended  Skin: Normally turgid.  Mild Right leg erythema  MSK: No joint abnormalities  noted    Data   Recent Labs   Lab 07/07/20  0645 07/06/20 2042 07/06/20  1400   WBC 6.9  --  7.1   HGB 11.3*  --  12.3   MCV 89  --  88    246 278     --  139   POTASSIUM 4.2  --  3.9   CHLORIDE 106  --  102   CO2 32  --  37*   BUN 16  --  13   CR 0.98 0.96 0.99   ANIONGAP 2*  --  <1*   RODOLFO 8.1*  --  8.9   *  --  104*   ALBUMIN 2.6*  --  3.1*   PROTTOTAL 7.7  --  9.1*   BILITOTAL 0.3  --  0.4   ALKPHOS 121  --  143   ALT 41  --  52*   AST 39  --  55*   TROPI  --   --  0.015           US LOWER EXTREMITY VENOUS DUPLEX BILATERAL PORT 7/6/2020 3:43 PM     CLINICAL HISTORY: leg swelling, eval for DVT  TECHNIQUE: Venous Duplex ultrasound of bilateral lower extremities  with and without compression, augmentation and duplex. Color flow and  spectral Doppler with waveform analysis performed.     COMPARISON: None.     FINDINGS: Exam includes the common femoral, femoral, popliteal veins  as well as segmentally visualized deep calf veins and greater  saphenous vein.      RIGHT: No deep vein thrombosis. No superficial thrombophlebitis. No  popliteal cyst.     LEFT: No deep vein thrombosis. No superficial thrombophlebitis. No  popliteal cyst.                                                                      IMPRESSION:  1.  No deep venous thrombosis in the bilateral lower extremities.  2.  Bilateral lower extremity subcutaneous edema.

## 2020-07-07 NOTE — PROGRESS NOTES
Pt arrived on unit around 7pm from ED.  Pt is negative for Covid 19.  She was oriented to room and unit and all questions were answered.  She is resting in room at this time and has no other needs at this time.

## 2020-07-07 NOTE — PROGRESS NOTES
Care Coordinator Progress Note    Admission Date/Time:  7/6/2020  Attending MD:  Roney Dumont MD    Data  Chart reviewed, discussed with interdisciplinary team.   Patient was admitted for:    Cellulitis of left lower extremity  Bilateral lower extremity edema.       Assessment  Per chart review no home care or outpatient physical therapy indicated at this time. RNCC available as needed.     Plan  Anticipated Discharge Date:  7/8/2020  Anticipated Discharge Plan:  Home    Michelle Castanon RN, BSN  Care Coordinator, [facility]  Phone [phone]  Pager [phone]

## 2020-07-07 NOTE — UTILIZATION REVIEW
"  Admission Status; Secondary Review Determination         Under the authority of the Utilization Management Committee, the utilization review process indicated a secondary review on the above patient.  The review outcome is based on review of the medical records, discussions with staff, and applying clinical experience noted on the date of the review.        (X)      Inpatient Status Appropriate - This patient's medical care is consistent with medical management for inpatient care and reasonable inpatient medical practice.      () Observation Status Appropriate - This patient does not meet hospital inpatient criteria and is placed in observation status. If this patient's primary payer is Medicare and was admitted as an inpatient, Condition Code 44 should be used and patient status changed to \"observation\".   () Admission Status NOT Appropriate - This patient's medical care is not consistent with medical management for Inpatient or Observation Status.          RATIONALE FOR DETERMINATION       60 year old female with hypertesnion, hypothyroidism, lymphedema, polysubstance abuse, bipolar, arthritis, and fibromyalgia who was admitted with  increased pain, erythema and swelling in her legs and subjective fevers.    She was admitted to Internal Medicine for treatment of infection and diuresis due to acute on chronic lymphedema.  Cultures were obtained.  Patient was placed on IV antibiotics and scheduled IV Lasix.  She will be in the hospital for several days, and is appropriate for inpatient status.     The severity of illness, intensity of service provided, expected LOS and risk for adverse outcome make the care complex, high risk and appropriate for hospital admission.        The information on this document is developed by the utilization review team in order for the business office to ensure compliance.  This only denotes the appropriateness of proper admission status and does not reflect the quality of care " rendered.         The definitions of Inpatient Status and Observation Status used in making the determination above are those provided in the CMS Coverage Manual, Chapter 1 and Chapter 6, section 70.4.      Sincerely,     Misael Marie MD  Physician Advisor  Utilization Review/ Case Management  [facility].

## 2020-07-07 NOTE — PLAN OF CARE
"PT order received and chart reviewed. Order received for deconditioning. Per nurse patient mobilizing independently and able to get up to the bathroom to complete hygiene cares. Met with patient whom indicates \"when I feel better I will be just fine\", \"right now I am sick\". When asked about need for PT patient very pleasantly declined. Of note previous admissions patient did not see therapies with exception of edema. Currently appears that team is trying to figure an antibiotic plan with then plan to return to treatment in a few days. At this time no acute inpatient PT needs for deconditioning. Patient mobilizing at baseline level of function. Will complete orders but remain open to re-consult if needed.   "

## 2020-07-07 NOTE — PLAN OF CARE
VSS. A/Ox's 4. Pain rated as manageable. Tylenol given for pain control. Tolerated regular diet, given box lunch and patel crackers/ice cream. Denied any nausea, CP, SOB, lightheadedness or dizziness. Voiding without pain or difficulty. Passing flatus. Resting in bed at this time with call light in reach and able to make needs known.

## 2020-07-07 NOTE — PLAN OF CARE
VS: Temp: 97.8  F (36.6  C) Temp src: Oral BP: 126/69 Pulse: 71   Resp: 16 SpO2: 98 % O2 Device: None (Room air)     O2: >90% on RA, denies SOB or CP   Output: Voiding without difficulty in BR   Last BM: LBM 7/6 per pt report, passing flatus, BS active   Activity: Pt up independently in room with walker or cane. Ambulated to BR independently this AM. PT seen, signed off as no PT need.   Skin: Skin intact ex for LLE cellulitis, leg is red and warm. No open areas noted.    Pain: Pain is being managed with scheduled methadone and PRN tylenol. C/o generalized body aches this AM and L leg pain.    CMS: CMS intact, denies N/T. DP +2 bilaterally. Able to wiggle toes. +2 edema to BLE.    Dressing: None   Diet: 2g sodium diet, fluid restriction 1500mL. Denies N/V.    LDA: PIV SL between abx   Equipment: Walker, cane, personal belongings at bedside    Plan: Continue to monitor patient, manage pain. Discharge TBD.    Additional Info: Daily weights

## 2020-07-07 NOTE — PLAN OF CARE
VS: VSS. A&O x4.   O2: Room air. Denies SOB.   Output: Voiding spontaneously in bathroom.   Last BM: 7/6.   Activity: Up independently w/ walker. SBA while IV is running.   Skin: BLE red and warm w/ mild edema.   Pain: C/o pain in legs, managed w/ PRN tylenol.   CMS: Intact, denies numbness/tingling.   Dressing: None.   Diet: 2gm NA diet and 1500mL fluid restriction.    LDA: IV in L wrist saline locked between abx.   Plan: TBD.   Additional Info:

## 2020-07-08 ENCOUNTER — PATIENT OUTREACH (OUTPATIENT)
Dept: CARE COORDINATION | Facility: CLINIC | Age: 60
End: 2020-07-08

## 2020-07-08 VITALS
TEMPERATURE: 98 F | RESPIRATION RATE: 16 BRPM | SYSTOLIC BLOOD PRESSURE: 140 MMHG | WEIGHT: 290.2 LBS | DIASTOLIC BLOOD PRESSURE: 78 MMHG | OXYGEN SATURATION: 95 % | HEART RATE: 70 BPM | BODY MASS INDEX: 51.41 KG/M2

## 2020-07-08 PROCEDURE — 25000128 H RX IP 250 OP 636: Performed by: INTERNAL MEDICINE

## 2020-07-08 PROCEDURE — 25000132 ZZH RX MED GY IP 250 OP 250 PS 637: Performed by: INTERNAL MEDICINE

## 2020-07-08 PROCEDURE — 99239 HOSP IP/OBS DSCHRG MGMT >30: CPT | Performed by: INTERNAL MEDICINE

## 2020-07-08 RX ORDER — DOXYCYCLINE 100 MG/1
100 CAPSULE ORAL EVERY 12 HOURS
Qty: 14 CAPSULE | Refills: 0 | Status: SHIPPED | OUTPATIENT
Start: 2020-07-08 | End: 2020-07-15

## 2020-07-08 RX ADMIN — GABAPENTIN 1200 MG: 600 TABLET, FILM COATED ORAL at 15:01

## 2020-07-08 RX ADMIN — FUROSEMIDE 20 MG: 10 INJECTION, SOLUTION INTRAMUSCULAR; INTRAVENOUS at 11:16

## 2020-07-08 RX ADMIN — ACETAMINOPHEN 650 MG: 325 TABLET, FILM COATED ORAL at 11:15

## 2020-07-08 RX ADMIN — DULOXETINE HYDROCHLORIDE 60 MG: 60 CAPSULE, DELAYED RELEASE ORAL at 11:18

## 2020-07-08 RX ADMIN — FUROSEMIDE 20 MG: 10 INJECTION, SOLUTION INTRAMUSCULAR; INTRAVENOUS at 01:09

## 2020-07-08 RX ADMIN — MULTIPLE VITAMINS W/ MINERALS TAB 1 TABLET: TAB at 11:18

## 2020-07-08 RX ADMIN — LEVOTHYROXINE SODIUM 225 MCG: 50 TABLET ORAL at 11:15

## 2020-07-08 RX ADMIN — GABAPENTIN 1200 MG: 600 TABLET, FILM COATED ORAL at 11:15

## 2020-07-08 RX ADMIN — POTASSIUM CHLORIDE 40 MEQ: 750 TABLET, EXTENDED RELEASE ORAL at 11:16

## 2020-07-08 RX ADMIN — METHADONE HYDROCHLORIDE 80 MG: 5 SOLUTION ORAL at 11:16

## 2020-07-08 RX ADMIN — MELATONIN 1000 UNITS: at 11:15

## 2020-07-08 RX ADMIN — OMEPRAZOLE 20 MG: 20 CAPSULE, DELAYED RELEASE ORAL at 11:15

## 2020-07-08 RX ADMIN — DOXYCYCLINE 100 MG: 100 CAPSULE ORAL at 11:15

## 2020-07-08 RX ADMIN — AMLODIPINE BESYLATE 5 MG: 5 TABLET ORAL at 11:16

## 2020-07-08 RX ADMIN — CYANOCOBALAMIN TAB 500 MCG 1000 MCG: 500 TAB at 11:23

## 2020-07-08 NOTE — PLAN OF CARE
VS: VSS  Temp: 98  F (36.7  C) Temp src: Oral BP: 121/62 Pulse: 71   Resp: 16 SpO2: 95 % O2 Device: None (Room air)     O2: >90% on RA, denies SOB or CP   Output: Voiding without difficulty in BR   Last BM: LBM 7/8 per pt report, passing flatus, BS active   Activity: Pt up independently in room with walker or cane. Ambulated to BR independently this AM.   Skin: Skin intact ex for LLE cellulitis, leg is red and warm. No open areas noted.    Pain: Pain is being managed with scheduled methadone and PRN tylenol.    CMS: CMS intact, denies N/T. DP +2 bilaterally. Able to wiggle toes. +2 edema to BLE.    Dressing: None   Diet: 2g sodium diet, fluid restriction 1500mL. Denies N/V.    LDA: PIV SL between abx   Equipment: Walker, cane, personal belongings at bedside    Plan: Continue to monitor patient, manage pain. Pt is tolerating PO abx. Discharge today back to prior living situation, pt will take a cab back.    Additional Info: Daily weights-refused as she is being discharged.  IV lasix given this AM

## 2020-07-08 NOTE — PROGRESS NOTES
Pt will be discharging this afternoon. Medications were e-prescribed to pt pharmacy by gideon BURDICK. Oncoming RN notified and will go through discharge paperwork with pt. PIV was removed.

## 2020-07-08 NOTE — PLAN OF CARE
VS: VSS. Denies CP/SOB   O2: >90% on RA   Output: Voiding w/o pain or difficulty   Last BM: 7/7/20   Activity: Up independently, steady gait. Walker. Knows to call for help if she needs it   Up for meals? declined   Skin: Redness in LLE   Pain: Mild pain in LLE, tylenol given   CMS: Intact    Dressing: None   Diet: Regular   LDA: PIV saline locked   Equipment: Four wheel walker   Plan: TBD, potential discharge tomorrow   Additional Info:

## 2020-07-08 NOTE — DISCHARGE SUMMARY
[facility], [place]  Hospitalist Discharge Summary      Date of Admission:  7/6/2020  Date of Discharge:  7/8/2020  Discharging Provider: Oseas Cao MD      Discharge Diagnoses   Patient Active Problem List   Diagnosis     Fibromyalgia     Lymphedema of both lower extremities     Chronic venous insufficiency of lower extremity     Edema of both legs     Lymphedema     Cellulitis         Follow-ups Needed After Discharge   Follow-up Appointments     Follow Up and recommended labs and tests      Follow up with primary care provider, [facility], within 7 days for   hospital follow- up.  No follow up labs or test are needed.         PCP    Unresulted Labs Ordered in the Past 30 Days of this Admission     Date and Time Order Name Status Description    7/6/2020 1523 Blood culture Preliminary     7/6/2020 1523 Blood culture Preliminary       These results will be followed up by PCP    Discharge Disposition   Discharged to home  Condition at discharge: Stable      Hospital Course   No Yusuf is a 60-year-old lady who was admitted for evaluation and management of LE cellulitis.      Lower extremity cellulitis  -  Appears mostly resolved  -  Discharged on doxycycline  -  Pain control with ibuprofen.  Avoid narcotic medication.    Chronic LE edema  -  Continue diuresis with furosemide     Anxiety and depression  -  Continue duloxetine     Hypothyroidism  -  Continue levothyroxine     Chronic pain syndrome  -  Continue methadone    Primary hypertension  -  BP elevated  -  Continue amlodipine  -  Monitoring BP          Consultations This Hospital Stay   PHARMACY TO DOSE VANCO  MEDICATION HISTORY IP PHARMACY CONSULT  SOCIAL WORK IP CONSULT  PHYSICAL THERAPY ADULT IP CONSULT  OCCUPATIONAL THERAPY ADULT IP CONSULT  PHARMACY TO DOSE VANCO    Code Status   Full Code    Time Spent on this Encounter   IOseas MD, personally saw the patient today and spent greater than 30 minutes  discharging this patient.       Oseas Cao MD  [facility], [place]  ______________________________________________________________________    Physical Exam   Vital Signs: Temp: 98  F (36.7  C) Temp src: Oral BP: (!) 140/78 Pulse: 70   Resp: 16 SpO2: 95 % O2 Device: None (Room air)    Weight: 290 lbs 3.2 oz  General Appearance: Awake, alert, interactive. NAD.  Respiratory: CTA hiral  Cardiovascular: Controlled HR  GI: Abd nondistended  Skin: Normally turgid         Primary Care Physician   [facility]    Discharge Orders      Reason for your hospital stay    Cellulitis     Follow Up and recommended labs and tests    Follow up with primary care provider, [facility], within 7 days for hospital follow- up.  No follow up labs or test are needed.     Activity    Your activity upon discharge: activity as tolerated     Diet    Follow this diet upon discharge: Fluid restriction:  limit fluid intake to 1500  per day.  No more than 2g of salt daily.       Significant Results and Procedures   Results for orders placed or performed during the hospital encounter of 07/06/20   XR Chest Port 1 View    Narrative    EXAM: AP chest radiograph  7/6/2020 4:23 PM      HISTORY: Shortness of breath, pulmonary edema or infection    COMPARISON: Chest CT 6/10/2020    FINDINGS: Single AP chest radiograph. Trachea is midline, heart size  is normal. Low lung volumes. No focal pulmonary opacity, pneumothorax,  or pleural effusion. No acute osseous or abdominal abnormality.      Impression    IMPRESSION: Low lung volumes, acute cardiopulmonary disease.         I have personally reviewed the examination and initial interpretation  and I agree with the findings.    DERRELL JUAREZ MD   XR Tibia & Fibula Left 2 Views    Narrative    XR LEFT TIBIA AND FIBULA TWO VIEWS  7/6/2020 4:27 PM     HISTORY: Pain, redness, evaluate for injury, gas.    COMPARISON: None.      Impression    IMPRESSION: No acute bony  abnormality. Status post ORIF bimalleolar  fracture which appears healed. Diffuse subcutaneous edema throughout  the lower leg. No radiographic evidence for soft tissue gas. Two small  calcific densities projecting in the anterior knee joint region,  largest measuring 5 mm, potentially representing loose bodies.    KEVIN DENNEY MD   US Lower Extremity Venous Bilateral Port    Narrative    US LOWER EXTREMITY VENOUS DUPLEX BILATERAL PORT 7/6/2020 3:43 PM    CLINICAL HISTORY: leg swelling, eval for DVT  TECHNIQUE: Venous Duplex ultrasound of bilateral lower extremities  with and without compression, augmentation and duplex. Color flow and  spectral Doppler with waveform analysis performed.    COMPARISON: None.    FINDINGS: Exam includes the common femoral, femoral, popliteal veins  as well as segmentally visualized deep calf veins and greater  saphenous vein.     RIGHT: No deep vein thrombosis. No superficial thrombophlebitis. No  popliteal cyst.    LEFT: No deep vein thrombosis. No superficial thrombophlebitis. No  popliteal cyst.      Impression    IMPRESSION:  1.  No deep venous thrombosis in the bilateral lower extremities.  2.  Bilateral lower extremity subcutaneous edema.    EDILMA BURTON MD       Discharge Medications   Current Discharge Medication List      START taking these medications    Details   doxycycline hyclate (VIBRAMYCIN) 100 MG capsule Take 1 capsule (100 mg) by mouth every 12 hours for 7 days  Qty: 14 capsule, Refills: 0    Associated Diagnoses: Cellulitis of left lower extremity         CONTINUE these medications which have NOT CHANGED    Details   acetaminophen (TYLENOL) 500 MG tablet Take 325-650 mg by mouth every 6 hours as needed for mild pain or fever       albuterol (PROAIR HFA/PROVENTIL HFA/VENTOLIN HFA) 108 (90 Base) MCG/ACT inhaler Inhale 2 puffs into the lungs every 6 hours as needed for shortness of breath / dyspnea or wheezing    Comments: Pharmacy may dispense brand covered by  insurance (Proair, or proventil or ventolin or generic albuterol inhaler)      amLODIPine (NORVASC) 5 MG tablet Take 5 mg by mouth daily      calcium carbonate (TUMS) 500 MG chewable tablet Take 1 chew tab by mouth 4 times daily as needed for heartburn      cholecalciferol 25 MCG (1000 UT) TABS Take 1,000 Units by mouth daily       diclofenac (VOLTAREN) 1 % topical gel Place 2 g onto the skin 4 times daily as needed (left knee pain)  Qty: 1 Tube, Refills: 0    Associated Diagnoses: Primary osteoarthritis of left knee      diphenhydrAMINE (BENADRYL) 25 MG capsule Take 25 mg by mouth every 6 hours as needed for allergies      DULoxetine (CYMBALTA) 60 MG capsule Take 60 mg by mouth daily      furosemide (LASIX) 20 MG tablet Take 3 tablets (60 mg) by mouth 2 times daily  Qty: 60 tablet, Refills: 0    Associated Diagnoses: Lymphedema      gabapentin (NEURONTIN) 600 MG tablet Take 1,200 mg by mouth 3 times daily      hydrOXYzine (ATARAX) 50 MG tablet Take 50 mg by mouth every 4 hours as needed for anxiety   Refills: 0      levothyroxine (SYNTHROID/LEVOTHROID) 75 MCG tablet Take 3 tablets (225 mcg) by mouth daily  Qty: 30 tablet, Refills: 0    Associated Diagnoses: Other specified hypothyroidism      magnesium hydroxide (MILK OF MAGNESIA) 400 MG/5ML suspension Take 10 mLs by mouth daily as needed for constipation or heartburn      melatonin 3 MG tablet Take 3 mg by mouth At Bedtime       methadone HCl 10 MG/5ML SOLN Take 80 mg by mouth daily       multivitamin w/minerals (MULTI-VITAMIN) tablet Take 1 tablet by mouth daily      NARCAN 4 MG/0.1ML nasal spray CALL 911. SPRAY CONTENTS OF ONE SPRAYER (0.1ML) INTO ONE NOSTRIL. REPEAT IN 2-3 MINS IF SYMPTOMS OF OPIOID PERSIST, ALTERNATE NOSTRILS  Refills: 0      nicotine polacrilex (NICORETTE) 4 MG gum Place 4 mg inside cheek every hour as needed for smoking cessation       nystatin (MYCOSTATIN) 340810 UNIT/GM external cream Apply topically daily as needed for dry skin Apply to  skin folds daily    Associated Diagnoses: Yeast infection of the skin      omeprazole (PRILOSEC) 20 MG DR capsule Take 20 mg by mouth daily       polyethylene glycol (MIRALAX) 17 g packet Take 17 g by mouth daily  Qty:      Associated Diagnoses: Constipation, unspecified constipation type      potassium chloride ER (KLOR-CON) 8 MEQ CR tablet Take 8 mEq by mouth 2 times daily      traZODone (DESYREL) 50 MG tablet Take 50 mg by mouth At Bedtime      vitamin B-12 (CYANOCOBALAMIN) 1000 MCG tablet Take 1,000 mcg by mouth daily           Allergies   Allergies   Allergen Reactions     Promethazine Other (See Comments) and Anxiety     Noted in 8/30/08 ER     Codeine Phosphate GI Disturbance     Lamotrigine Other (See Comments)     hyponatremia     Oxcarbazepine Unknown and Other (See Comments)     Low sodium  LegacyRecord#[id number]       Codeine Other (See Comments) and Rash     GI bleeding  LegacyRecord#[id number]

## 2020-07-08 NOTE — PLAN OF CARE
VS: Temp: 97.3  F (36.3  C) Temp src: Oral BP: 122/65 Pulse: 71   Resp: 16 SpO2: 94 % O2 Device: None (Room air)      O2: Stable. Room air. Denies SOB   Output: Voids spontaneously    Last BM: 7/6/2020 per pt report    Activity: Independent    Skin: Intact    Pain: L leg pain    CMS: Blanchable redness on L leg and +2 edema   Dressing: N/a   Diet: 2g Na, 1500 fluid restriction    LDA: L PIV SL   Equipment: Iv pole/pump, walker    Plan: Discharge tomorrow    Additional Info:

## 2020-07-08 NOTE — CONSULTS
Social Work Services Progress Note  SW consulted for discharge planning. Pt returning to previous living situation. Pt coordinates her own transportation and communicates with the home she lives at ([facility] in [place] [phone]). Pt is discharging. No SW needs at this time. SW to remain available.     SHERRY Martinez  [address] Ortho/Med/Surg [facility]  Phone: [phone] Pager: [phone]

## 2020-07-09 ENCOUNTER — TELEPHONE (OUTPATIENT)
Dept: CARE COORDINATION | Facility: CLINIC | Age: 60
End: 2020-07-09

## 2020-07-09 NOTE — PROGRESS NOTES
Patient has clinic visit within 24-48 hours of Discharge so no post DC follow up call is needed    7/9/2020 Status: Mckayla    Time: 3:00 PM Length: 30   Visit Type: UMP NEW KNEE [[id number]] MAIRA:     Provider: Aakash Herndon MD Department:  ORTHOPEDICS

## 2020-07-09 NOTE — PLAN OF CARE
VS: VSS. Denies CP/SOB   O2: >90% on RA   Output: Voiding adequate amounts w/o pain or dificulty   Last BM: 7/8/20   Activity: Up independently steady gait   Skin: Redness/swelling LLE otherwise intact   LDA: Removed   Additional Info: Discharge paperwork and medications reviewed with patient. No further questions. All belongings sent home with pt. Escorted safely to elevators

## 2020-07-12 LAB
BACTERIA SPEC CULT: NO GROWTH
BACTERIA SPEC CULT: NO GROWTH
SPECIMEN SOURCE: NORMAL
SPECIMEN SOURCE: NORMAL

## 2020-09-28 ENCOUNTER — TRANSFERRED RECORDS (OUTPATIENT)
Dept: HEALTH INFORMATION MANAGEMENT | Facility: CLINIC | Age: 60
End: 2020-09-28

## 2020-09-29 ENCOUNTER — MEDICAL CORRESPONDENCE (OUTPATIENT)
Dept: HEALTH INFORMATION MANAGEMENT | Facility: CLINIC | Age: 60
End: 2020-09-29

## 2020-10-01 ENCOUNTER — TRANSCRIBE ORDERS (OUTPATIENT)
Dept: OTHER | Age: 60
End: 2020-10-01

## 2020-10-01 DIAGNOSIS — G89.29 CHRONIC PAIN OF BOTH ANKLES: Primary | ICD-10-CM

## 2020-10-01 DIAGNOSIS — M25.561 BILATERAL CHRONIC KNEE PAIN: ICD-10-CM

## 2020-10-01 DIAGNOSIS — M25.562 BILATERAL CHRONIC KNEE PAIN: ICD-10-CM

## 2020-10-01 DIAGNOSIS — M25.571 CHRONIC PAIN OF BOTH ANKLES: Primary | ICD-10-CM

## 2020-10-01 DIAGNOSIS — M25.572 CHRONIC PAIN OF BOTH ANKLES: Primary | ICD-10-CM

## 2020-10-01 DIAGNOSIS — G89.29 BILATERAL CHRONIC KNEE PAIN: ICD-10-CM

## 2020-11-27 NOTE — TELEPHONE ENCOUNTER
DIAGNOSIS: Chronic pain of both ankles /Bilateral chronic knee pain /Dr Le/no images/Medicaid/   APPOINTMENT DATE: 12.10.20   NOTES STATUS DETAILS   OFFICE NOTE from referring provider Care Everywhere 10.1.20 KELVIN Le   OFFICE NOTE from other specialist N/A    DISCHARGE SUMMARY from hospital N/A    DISCHARGE REPORT from the ER N/A    OPERATIVE REPORT N/A    MEDICATION LIST Internal    MRI N/A    CT SCAN N/A    XRAYS (IMAGES & REPORTS) Internal 7.6.20 tibia and fibula  6.9.20 L knee

## 2020-12-10 ENCOUNTER — ANCILLARY PROCEDURE (OUTPATIENT)
Dept: GENERAL RADIOLOGY | Facility: CLINIC | Age: 60
End: 2020-12-10
Attending: FAMILY MEDICINE
Payer: COMMERCIAL

## 2020-12-10 ENCOUNTER — PRE VISIT (OUTPATIENT)
Dept: ORTHOPEDICS | Facility: CLINIC | Age: 60
End: 2020-12-10

## 2020-12-10 ENCOUNTER — OFFICE VISIT (OUTPATIENT)
Dept: ORTHOPEDICS | Facility: CLINIC | Age: 60
End: 2020-12-10
Payer: COMMERCIAL

## 2020-12-10 ENCOUNTER — MEDICAL CORRESPONDENCE (OUTPATIENT)
Dept: HEALTH INFORMATION MANAGEMENT | Facility: CLINIC | Age: 60
End: 2020-12-10

## 2020-12-10 VITALS — BODY MASS INDEX: 51.91 KG/M2 | HEIGHT: 63 IN | WEIGHT: 293 LBS

## 2020-12-10 DIAGNOSIS — M75.41 IMPINGEMENT SYNDROME OF RIGHT SHOULDER: ICD-10-CM

## 2020-12-10 DIAGNOSIS — M25.511 RIGHT SHOULDER PAIN, UNSPECIFIED CHRONICITY: Primary | ICD-10-CM

## 2020-12-10 DIAGNOSIS — M25.511 RIGHT SHOULDER PAIN, UNSPECIFIED CHRONICITY: ICD-10-CM

## 2020-12-10 DIAGNOSIS — M17.10 PATELLOFEMORAL ARTHRITIS: ICD-10-CM

## 2020-12-10 PROCEDURE — 99203 OFFICE O/P NEW LOW 30 MIN: CPT | Mod: 25 | Performed by: FAMILY MEDICINE

## 2020-12-10 PROCEDURE — 73030 X-RAY EXAM OF SHOULDER: CPT | Mod: RT | Performed by: RADIOLOGY

## 2020-12-10 PROCEDURE — 20610 DRAIN/INJ JOINT/BURSA W/O US: CPT | Mod: LT | Performed by: FAMILY MEDICINE

## 2020-12-10 RX ORDER — LEVOTHYROXINE SODIUM 200 UG/1
200 TABLET ORAL DAILY
COMMUNITY
Start: 2020-11-25

## 2020-12-10 RX ORDER — IBUPROFEN 600 MG/1
600 TABLET, FILM COATED ORAL
Status: ON HOLD | COMMUNITY
Start: 2020-07-15 | End: 2021-05-27

## 2020-12-10 RX ORDER — LIDOCAINE 50 MG/G
1 PATCH TOPICAL EVERY 24 HOURS
Qty: 30 PATCH | Refills: 0 | Status: ON HOLD | OUTPATIENT
Start: 2020-12-10 | End: 2021-05-27

## 2020-12-10 RX ADMIN — TRIAMCINOLONE ACETONIDE 40 MG: 40 INJECTION, SUSPENSION INTRA-ARTICULAR; INTRAMUSCULAR at 15:23

## 2020-12-10 RX ADMIN — LIDOCAINE HYDROCHLORIDE 4 ML: 10 INJECTION, SOLUTION EPIDURAL; INFILTRATION; INTRACAUDAL; PERINEURAL at 15:23

## 2020-12-10 ASSESSMENT — MIFFLIN-ST. JEOR: SCORE: 1879.07

## 2020-12-10 NOTE — LETTER
12/10/2020      RE: No Yusuf  [address]  [place] [address]        Subjective:   No Yusuf is a 60 year old RHD female who presents with several concerns today. Biggest concerns that she would like to focus on are her right shoulder and left knee pain.    Background:   Right anterior shoulder: For the last 2-3 months she has been experiencing shoulder pain on the top of shoulder. History of bone spurs on left shoulder than needed to be surgically removed. Reaching above her head makes the pain worse.  History of numbness in tingling in the past prior to shoulder pain starting. Taking gabapentin which helps.  Date of injury: None  Duration of symptoms: 2-3 months  Mechanism of Injury: Insidious Onset; Unknown   Intensity:  7/10 with movement  Aggravating factors: Movement, sleeping on right side  Relieving Factors: Pain cream   Prior Evaluation: None    Left knee: Left knee has been bothering her over the last 7 years. Feels majority of her pain in her knee in the front. Intermittent swelling. Walks with a cane. Walking, standing motion and going up stairs makes the pain worse.  Date of injury: None  Duration of symptoms:  Couple years  Mechanism of Injury: Chronic; Unknown   Intensity: 9/10 with activity   Aggravating factors: Walking, bending  Relieving Factors: rest and elevation  Prior Evaluation: [facility], xrays    PAST MEDICAL, SOCIAL, SURGICAL AND FAMILY HISTORY: She  has a past medical history of Arthritis, Bipolar affective (H), Fibromyalgia, and Hypertension.  She  has a past surgical history that includes Cholecystectomy; orthopedic surgery; GYN surgery; and GYN surgery.  Her family history is not on file.  She reports that she has been smoking. She has been smoking about 0.10 packs per day. She has never used smokeless tobacco. She reports that she does not drink alcohol or use drugs.    ALLERGIES: She is allergic to promethazine; codeine phosphate; lamotrigine; oxcarbazepine; and  "codeine.    CURRENT MEDICATIONS: She has a current medication list which includes the following prescription(s): amlodipine, cholecalciferol, diclofenac, diclofenac, duloxetine, furosemide, gabapentin, hydroxyzine, ibuprofen, levothyroxine, lidocaine, methadone hcl, multivitamin w/minerals, nicotine polacrilex, omeprazole, polyethylene glycol, potassium chloride er, trazodone, vitamin b-12, acetaminophen, albuterol, calcium carbonate, diphenhydramine, levothyroxine, magnesium hydroxide, melatonin, narcan, and nystatin.     REVIEW OF SYSTEMS: 3 point review of systems is negative except as noted above.     Exam:   Ht 1.6 m (5' 3\")   Wt 133.5 kg (294 lb 4.8 oz)   LMP 11/01/2013   BMI 52.13 kg/m       CONSTITUTIONAL: healthy, alert and no distress  HEAD: Normocephalic. No masses, lesions, tenderness or abnormalities  SKIN: no suspicious lesions or rashes  GAIT: normal  NEUROLOGIC: Non-focal  PSYCHIATRIC: affect normal/bright and mentation appears normal.    MUSCULOSKELETAL:     Right shoulder: Full range of motion with flexion, extension, external rotation. Internal rotation to the level of the sacrum. 5/5 strength with flexion, extension, internal/external rotation. Pain with Neers, Rothman and cross arm. Minimal pain with empty can. Negative speeds and obriens. Sensation intact. Good distal pulses      Left knee: Full range of motion with flexion extension of the left knee.  Pain to palpation along medial joint line.  Positive patellar compression and quad inhibition.  5/5 strength with flexion extension.  Sensation intact.  Good distal pulses.    Xray tibia and fibula left 2 views: No acute bony abnormality. Status post ORIF bimalleolar fracture which appears healed. Diffuse subcutaneous edema throughout the lower leg. No radiographic evidence for soft tissue gas. Two small calcific densities projecting in the anterior knee joint region, largest measuring 5 mm, potentially representing loose bodies.    Xray right " shoulder: Moderate amount of degenerative disease noted in the AC joint. No significant degenerative disease in glenohumeral joint. No other bony abnormalities.     Assessment/Plan:   1. Right shoulder pain, unspecified chronicity  2. Impingement syndrome of right shoulder with AC arthritis  Physical exam and history concerning for possible impingement syndrome of the right shoulder.  X-ray consistent with AC arthritis.  We will have the patient start with physical therapy.  Diclofenac and lidocaine patches as below.  If not improving patient will call to do a trial of an AC CSI.  - X-ray rt Shoulder G/E 3 vw; Future  - PHYSICAL THERAPY REFERRAL (Internal); Future  - diclofenac (VOLTAREN) 1 % topical gel; Apply 4 g topically 4 times daily  Dispense: 350 g; Refill: 1  - lidocaine (LIDODERM) 5 % patch; Place 1 patch onto the skin every 24 hours To prevent lidocaine toxicity, patient should be patch free for 12 hrs daily.  Dispense: 30 patch; Refill: 0    2. Patellofemoral arthritis  Patient's x-ray and physical exam findings consistent with patellofemoral arthritis.  Corticosteroid injection performed today.  No injections in recent history.  Diclofenac and lidocaine patches as below.  Physical therapy for bilateral knees.  - PHYSICAL THERAPY REFERRAL (Internal); Future  - diclofenac (VOLTAREN) 1 % topical gel; Apply 4 g topically 4 times daily  Dispense: 350 g; Refill: 1  - lidocaine (LIDODERM) 5 % patch; Place 1 patch onto the skin every 24 hours To prevent lidocaine toxicity, patient should be patch free for 12 hrs daily.  Dispense: 30 patch; Refill: 0  - CSI as noted below    Return to clinic in 6 weeks for follow up. Return sooner if develops new or worsening symptoms.      Large Joint Injection/Arthocentesis: L knee joint    Date/Time: 12/10/2020 3:23 PM  Performed by: Christie Tenorio DO  Authorized by: Christie Tenorio DO     Indications:  Osteoarthritis  Needle Size:  22 G  Guidance: landmark guided     Approach:  Anterolateral  Location:  Knee      Medications:  40 mg triamcinolone 40 MG/ML; 4 mL lidocaine (PF) 1 %  Outcome:  Tolerated well, no immediate complications  Procedure discussed: discussed risks, benefits, and alternatives    Consent Given by:  Patient  Timeout: timeout called immediately prior to procedure    Prep: patient was prepped and draped in usual sterile fashion     Scribed by Lizbet Herrera MS, LAT, ATC for Dr. Tenorio on 12/10/2020 at 3:23 PM, based on the provider's statements to me.          Outcome: Patient felt improvement of pain following procedure.     The patient was seen/dicussed with Dr. Fouzia MD and procedure was performed by myself with the supervision of Dr. Fouzia MD.                 Christie Tenorio DO   [facility]  Sports Medicine Fellow      Attending Note:   I have  examined this patient and have reviewed the clinical presentation and progress note with the fellow. I agree with the treatment plan as outlined. The plan was formulated with the fellow on the day of the patient's visit. I have reviewed all imaging with the fellow and agree with the findings in the documentation. I have supervised the injection.     Regina Fragoso MD, CAQ, CCD  [facility]  Sports Medicine and Bone Health      Christie Tenorio DO

## 2020-12-10 NOTE — PROGRESS NOTES
Subjective:   No Yusuf is a 60 year old RHD female who presents with several concerns today. Biggest concerns that she would like to focus on are her right shoulder and left knee pain.    Background:   Right anterior shoulder: For the last 2-3 months she has been experiencing shoulder pain on the top of shoulder. History of bone spurs on left shoulder than needed to be surgically removed. Reaching above her head makes the pain worse.  History of numbness in tingling in the past prior to shoulder pain starting. Taking gabapentin which helps.  Date of injury: None  Duration of symptoms: 2-3 months  Mechanism of Injury: Insidious Onset; Unknown   Intensity:  7/10 with movement  Aggravating factors: Movement, sleeping on right side  Relieving Factors: Pain cream   Prior Evaluation: None    Left knee: Left knee has been bothering her over the last 7 years. Feels majority of her pain in her knee in the front. Intermittent swelling. Walks with a cane. Walking, standing motion and going up stairs makes the pain worse.  Date of injury: None  Duration of symptoms:  Couple years  Mechanism of Injury: Chronic; Unknown   Intensity: 9/10 with activity   Aggravating factors: Walking, bending  Relieving Factors: rest and elevation  Prior Evaluation: [facility], xrays    PAST MEDICAL, SOCIAL, SURGICAL AND FAMILY HISTORY: She  has a past medical history of Arthritis, Bipolar affective (H), Fibromyalgia, and Hypertension.  She  has a past surgical history that includes Cholecystectomy; orthopedic surgery; GYN surgery; and GYN surgery.  Her family history is not on file.  She reports that she has been smoking. She has been smoking about 0.10 packs per day. She has never used smokeless tobacco. She reports that she does not drink alcohol or use drugs.    ALLERGIES: She is allergic to promethazine; codeine phosphate; lamotrigine; oxcarbazepine; and codeine.    CURRENT MEDICATIONS: She has a current medication list which includes the  "following prescription(s): amlodipine, cholecalciferol, diclofenac, diclofenac, duloxetine, furosemide, gabapentin, hydroxyzine, ibuprofen, levothyroxine, lidocaine, methadone hcl, multivitamin w/minerals, nicotine polacrilex, omeprazole, polyethylene glycol, potassium chloride er, trazodone, vitamin b-12, acetaminophen, albuterol, calcium carbonate, diphenhydramine, levothyroxine, magnesium hydroxide, melatonin, narcan, and nystatin.     REVIEW OF SYSTEMS: 3 point review of systems is negative except as noted above.     Exam:   Ht 1.6 m (5' 3\")   Wt 133.5 kg (294 lb 4.8 oz)   LMP 11/01/2013   BMI 52.13 kg/m       CONSTITUTIONAL: healthy, alert and no distress  HEAD: Normocephalic. No masses, lesions, tenderness or abnormalities  SKIN: no suspicious lesions or rashes  GAIT: normal  NEUROLOGIC: Non-focal  PSYCHIATRIC: affect normal/bright and mentation appears normal.    MUSCULOSKELETAL:     Right shoulder: Full range of motion with flexion, extension, external rotation. Internal rotation to the level of the sacrum. 5/5 strength with flexion, extension, internal/external rotation. Pain with Neers, Rothman and cross arm. Minimal pain with empty can. Negative speeds and obriens. Sensation intact. Good distal pulses      Left knee: Full range of motion with flexion extension of the left knee.  Pain to palpation along medial joint line.  Positive patellar compression and quad inhibition.  5/5 strength with flexion extension.  Sensation intact.  Good distal pulses.    Xray tibia and fibula left 2 views: No acute bony abnormality. Status post ORIF bimalleolar fracture which appears healed. Diffuse subcutaneous edema throughout the lower leg. No radiographic evidence for soft tissue gas. Two small calcific densities projecting in the anterior knee joint region, largest measuring 5 mm, potentially representing loose bodies.    Xray right shoulder: Moderate amount of degenerative disease noted in the AC joint. No " significant degenerative disease in glenohumeral joint. No other bony abnormalities.     Assessment/Plan:   1. Right shoulder pain, unspecified chronicity  2. Impingement syndrome of right shoulder with AC arthritis  Physical exam and history concerning for possible impingement syndrome of the right shoulder.  X-ray consistent with AC arthritis.  We will have the patient start with physical therapy.  Diclofenac and lidocaine patches as below.  If not improving patient will call to do a trial of an AC CSI.  - X-ray rt Shoulder G/E 3 vw; Future  - PHYSICAL THERAPY REFERRAL (Internal); Future  - diclofenac (VOLTAREN) 1 % topical gel; Apply 4 g topically 4 times daily  Dispense: 350 g; Refill: 1  - lidocaine (LIDODERM) 5 % patch; Place 1 patch onto the skin every 24 hours To prevent lidocaine toxicity, patient should be patch free for 12 hrs daily.  Dispense: 30 patch; Refill: 0    2. Patellofemoral arthritis  Patient's x-ray and physical exam findings consistent with patellofemoral arthritis.  Corticosteroid injection performed today.  No injections in recent history.  Diclofenac and lidocaine patches as below.  Physical therapy for bilateral knees.  - PHYSICAL THERAPY REFERRAL (Internal); Future  - diclofenac (VOLTAREN) 1 % topical gel; Apply 4 g topically 4 times daily  Dispense: 350 g; Refill: 1  - lidocaine (LIDODERM) 5 % patch; Place 1 patch onto the skin every 24 hours To prevent lidocaine toxicity, patient should be patch free for 12 hrs daily.  Dispense: 30 patch; Refill: 0  - CSI as noted below    Return to clinic in 6 weeks for follow up. Return sooner if develops new or worsening symptoms.      Large Joint Injection/Arthocentesis: L knee joint    Date/Time: 12/10/2020 3:23 PM  Performed by: Christie Tenorio DO  Authorized by: Christie Tenorio DO     Indications:  Osteoarthritis  Needle Size:  22 G  Guidance: landmark guided    Approach:  Anterolateral  Location:  Knee      Medications:  40 mg  triamcinolone 40 MG/ML; 4 mL lidocaine (PF) 1 %  Outcome:  Tolerated well, no immediate complications  Procedure discussed: discussed risks, benefits, and alternatives    Consent Given by:  Patient  Timeout: timeout called immediately prior to procedure    Prep: patient was prepped and draped in usual sterile fashion     Scribed by Lizbet Herrera MS, LAT, ATC for Dr. Tenorio on 12/10/2020 at 3:23 PM, based on the provider's statements to me.          Outcome: Patient felt improvement of pain following procedure.     The patient was seen/dicussed with Dr. Fouzia MD and procedure was performed by myself with the supervision of Dr. Fouzia MD.                 Christie Tenorio DO   [facility]  Sports Medicine Fellow

## 2020-12-10 NOTE — NURSING NOTE
[address] [address]  Dept: [phone]  ______________________________________________________________________________    Patient: No Yusuf   : 1960   MRN: [id number]   December 10, 2020    INVASIVE PROCEDURE SAFETY CHECKLIST    Date: 12/10/2020   Procedure: Left knee kenalog injection  Patient Name: No Yusuf  MRN: [id number]  YOB: 1960    Action: Complete sections as appropriate. Any discrepancy results in a HARD COPY until resolved.     PRE PROCEDURE:  Patient ID verified with 2 identifiers (name and  or MRN): Yes  Procedure and site verified with patient/designee (when able): Yes  Accurate consent documentation in medical record: Yes  H&P (or appropriate assessment) documented in medical record: Yes  H&P must be up to 20 days prior to procedure and updates within 24 hours of procedure as applicable: NA  Relevant diagnostic and radiology test results appropriately labeled and displayed as applicable: Yes  Procedure site(s) marked with provider initials: NA    TIMEOUT:  Time-Out performed immediately prior to starting procedure, including verbal and active participation of all team members addressing the following:Yes  * Correct patient identify  * Confirmed that the correct side and site are marked  * An accurate procedure consent form  * Agreement on the procedure to be done  * Correct patient position  * Relevant images and results are properly labeled and appropriately displayed  * The need to administer antibiotics or fluids for irrigation purposes during the procedure as applicable   * Safety precautions based on patient history or medication use    DURING PROCEDURE: Verification of correct person, site, and procedures any time the responsibility for care of the patient is transferred to another member of the care team.       Prior to injection, verified patient identity using patient's name and date of  birth.  Due to injection administration, patient instructed to remain in clinic for 15 minutes  afterwards, and to report any adverse reaction to me immediately.    Joint injection was performed.      Drug Amount Wasted:  Yes: 1 mg/ml lidocaine  Vial/Syringe: Single dose vial  Expiration Date:  04/24      Lizbet Herrera, [facility]  December 10, 2020

## 2020-12-13 RX ORDER — TRIAMCINOLONE ACETONIDE 40 MG/ML
40 INJECTION, SUSPENSION INTRA-ARTICULAR; INTRAMUSCULAR
Status: DISCONTINUED | OUTPATIENT
Start: 2020-12-10 | End: 2021-05-27

## 2020-12-13 RX ORDER — LIDOCAINE HYDROCHLORIDE 10 MG/ML
4 INJECTION, SOLUTION EPIDURAL; INFILTRATION; INTRACAUDAL; PERINEURAL
Status: DISCONTINUED | OUTPATIENT
Start: 2020-12-10 | End: 2021-05-27

## 2020-12-14 NOTE — PROGRESS NOTES
Attending Note:   I have  examined this patient and have reviewed the clinical presentation and progress note with the fellow. I agree with the treatment plan as outlined. The plan was formulated with the fellow on the day of the patient's visit. I have reviewed all imaging with the fellow and agree with the findings in the documentation. I have supervised the injection.     Regina Fragoso MD, CAQ, CCD  [facility]  Sports Medicine and Bone Health

## 2021-01-19 ENCOUNTER — TRANSCRIBE ORDERS (OUTPATIENT)
Dept: OTHER | Age: 61
End: 2021-01-19

## 2021-01-19 DIAGNOSIS — B19.20 HEPATITIS C: Primary | ICD-10-CM

## 2021-01-26 ENCOUNTER — TRANSFERRED RECORDS (OUTPATIENT)
Dept: HEALTH INFORMATION MANAGEMENT | Facility: CLINIC | Age: 61
End: 2021-01-26

## 2021-03-22 ENCOUNTER — TRANSCRIBE ORDERS (OUTPATIENT)
Dept: GASTROENTEROLOGY | Facility: OUTPATIENT CENTER | Age: 61
End: 2021-03-22

## 2021-03-22 DIAGNOSIS — C18.9 COLON CANCER (H): Primary | ICD-10-CM

## 2021-05-24 NOTE — TELEPHONE ENCOUNTER
RECORDS RECEIVED FROM: Care Everywhere   Appt Date: 06.01.2021   NOTES STATUS DETAILS   OFFICE NOTE from referring provider Care Everywhere 01.15.2021 Price Naranjo MD  [facility]     OFFICE NOTES from other specialists N/A    DISCHARGE SUMMARY from hospital N/A    MEDICATION LIST Internal / CE    LIVER BIOSPY (IF APPLICABLE)      PATHOLOGY REPORTS  N/A    IMAGING     ENDOSCOPY (IF AVAILABLE) N/A    COLONOSCOPY (IF AVAILABLE) N/A    ULTRASOUND LIVER N/A    CT OF ABDOMEN N/A    MRI OF LIVER N/A    FIBROSCAN, US ELASTOGRAPHY, FIBROSIS SCAN, MR ELASTOGRAPHY N/A    LABS     HEPATIC PANEL (LIVER PANEL) N/A    BASIC METABOLIC PANEL Care Everywhere 03.18.2021   COMPLETE METABOLIC PANEL Care Everywhere 01.07.2021   COMPLETE BLOOD COUNT (CBC) Care Everywhere 01.07.2021   INTERNATIONAL NORMALIZED RATIO (INR) N/A    HEPATITIS C ANTIBODY Care Everywhere 01.07. 09.26.2019   HEPATITIS C VIRAL LOAD/PCR N/A    HEPATITIS C GENOTYPE N/A    HEPATITIS B SURFACE ANTIGEN Care Everywhere 12.10.2019  09.26.2019   HEPATITIS B SURFACE ANTIBODY Care Everywhere 12.10.2019  09.26.2019   HEPATITIS B DNA QUANT LEVEL N/A    HEPATITIS B CORE ANTIBODY N/A

## 2021-05-25 ENCOUNTER — TRANSCRIBE ORDERS (OUTPATIENT)
Dept: OTHER | Age: 61
End: 2021-05-25

## 2021-05-25 DIAGNOSIS — Z12.11 SCREEN FOR COLON CANCER: Primary | ICD-10-CM

## 2021-05-26 ENCOUNTER — DOCUMENTATION ONLY (OUTPATIENT)
Dept: LAB | Facility: CLINIC | Age: 61
End: 2021-05-26

## 2021-05-26 ENCOUNTER — TRANSCRIBE ORDERS (OUTPATIENT)
Dept: OTHER | Age: 61
End: 2021-05-26

## 2021-05-26 DIAGNOSIS — B18.2 CHRONIC HEPATITIS C WITHOUT HEPATIC COMA (H): Primary | ICD-10-CM

## 2021-05-26 DIAGNOSIS — Z12.4 SCREENING FOR CERVICAL CANCER: Primary | ICD-10-CM

## 2021-05-26 PROCEDURE — 93005 ELECTROCARDIOGRAM TRACING: CPT | Performed by: EMERGENCY MEDICINE

## 2021-05-26 PROCEDURE — 99285 EMERGENCY DEPT VISIT HI MDM: CPT | Mod: 25 | Performed by: EMERGENCY MEDICINE

## 2021-05-26 PROCEDURE — C9803 HOPD COVID-19 SPEC COLLECT: HCPCS | Performed by: EMERGENCY MEDICINE

## 2021-05-26 PROCEDURE — 93010 ELECTROCARDIOGRAM REPORT: CPT | Performed by: EMERGENCY MEDICINE

## 2021-05-26 ASSESSMENT — MIFFLIN-ST. JEOR: SCORE: 1900.37

## 2021-05-27 ENCOUNTER — HOSPITAL ENCOUNTER (OUTPATIENT)
Facility: CLINIC | Age: 61
Setting detail: OBSERVATION
Discharge: NURSING, REHAB OR LONG-TERM CARE | End: 2021-05-28
Attending: EMERGENCY MEDICINE | Admitting: FAMILY MEDICINE
Payer: COMMERCIAL

## 2021-05-27 ENCOUNTER — APPOINTMENT (OUTPATIENT)
Dept: GENERAL RADIOLOGY | Facility: CLINIC | Age: 61
End: 2021-05-27
Attending: EMERGENCY MEDICINE
Payer: COMMERCIAL

## 2021-05-27 ENCOUNTER — APPOINTMENT (OUTPATIENT)
Dept: PHYSICAL THERAPY | Facility: CLINIC | Age: 61
End: 2021-05-27
Payer: COMMERCIAL

## 2021-05-27 ENCOUNTER — APPOINTMENT (OUTPATIENT)
Dept: ULTRASOUND IMAGING | Facility: CLINIC | Age: 61
End: 2021-05-27
Attending: EMERGENCY MEDICINE
Payer: COMMERCIAL

## 2021-05-27 DIAGNOSIS — R06.02 SHORTNESS OF BREATH: ICD-10-CM

## 2021-05-27 DIAGNOSIS — R53.1 WEAKNESS: ICD-10-CM

## 2021-05-27 DIAGNOSIS — Z11.52 ENCOUNTER FOR SCREENING LABORATORY TESTING FOR COVID-19 VIRUS: ICD-10-CM

## 2021-05-27 DIAGNOSIS — E11.9 TYPE 2 DIABETES MELLITUS WITHOUT COMPLICATION, UNSPECIFIED WHETHER LONG TERM INSULIN USE (H): ICD-10-CM

## 2021-05-27 DIAGNOSIS — L03.116 CELLULITIS OF LEFT LOWER EXTREMITY: ICD-10-CM

## 2021-05-27 DIAGNOSIS — M79.89 LEG SWELLING: ICD-10-CM

## 2021-05-27 PROBLEM — L03.90 CELLULITIS: Status: ACTIVE | Noted: 2020-07-06

## 2021-05-27 LAB
ALBUMIN SERPL-MCNC: 2.9 G/DL (ref 3.4–5)
ALBUMIN UR-MCNC: NEGATIVE MG/DL
ALP SERPL-CCNC: 128 U/L (ref 40–150)
ALT SERPL W P-5'-P-CCNC: 68 U/L (ref 0–50)
AMMONIA PLAS-SCNC: 27 UMOL/L (ref 10–50)
AMPHETAMINES UR QL SCN: NEGATIVE
ANION GAP SERPL CALCULATED.3IONS-SCNC: 6 MMOL/L (ref 3–14)
APPEARANCE UR: CLEAR
AST SERPL W P-5'-P-CCNC: 35 U/L (ref 0–45)
BASOPHILS # BLD AUTO: 0.1 10E9/L (ref 0–0.2)
BASOPHILS # BLD AUTO: 0.1 10E9/L (ref 0–0.2)
BASOPHILS NFR BLD AUTO: 0.7 %
BASOPHILS NFR BLD AUTO: 0.7 %
BILIRUB SERPL-MCNC: 0.3 MG/DL (ref 0.2–1.3)
BILIRUB UR QL STRIP: NEGATIVE
BUN SERPL-MCNC: 15 MG/DL (ref 7–30)
CALCIUM SERPL-MCNC: 8.8 MG/DL (ref 8.5–10.1)
CANNABINOIDS UR QL SCN: NEGATIVE
CHLORIDE SERPL-SCNC: 98 MMOL/L (ref 94–109)
CO2 BLDCOV-SCNC: 29 MMOL/L (ref 21–28)
CO2 SERPL-SCNC: 30 MMOL/L (ref 20–32)
COCAINE UR QL: NEGATIVE
COLOR UR AUTO: ABNORMAL
CREAT SERPL-MCNC: 0.9 MG/DL (ref 0.52–1.04)
CRP SERPL-MCNC: 11.1 MG/L (ref 0–8)
CRP SERPL-MCNC: 13 MG/L (ref 0–8)
DIFFERENTIAL METHOD BLD: ABNORMAL
DIFFERENTIAL METHOD BLD: NORMAL
EOSINOPHIL # BLD AUTO: 0.3 10E9/L (ref 0–0.7)
EOSINOPHIL # BLD AUTO: 0.3 10E9/L (ref 0–0.7)
EOSINOPHIL NFR BLD AUTO: 2.6 %
EOSINOPHIL NFR BLD AUTO: 2.9 %
ERYTHROCYTE [DISTWIDTH] IN BLOOD BY AUTOMATED COUNT: 15 % (ref 10–15)
ERYTHROCYTE [DISTWIDTH] IN BLOOD BY AUTOMATED COUNT: 15.3 % (ref 10–15)
ERYTHROCYTE [SEDIMENTATION RATE] IN BLOOD BY WESTERGREN METHOD: 67 MM/H (ref 0–30)
ETHANOL UR QL SCN: NEGATIVE
GFR SERPL CREATININE-BSD FRML MDRD: 69 ML/MIN/{1.73_M2}
GLUCOSE BLDC GLUCOMTR-MCNC: 114 MG/DL (ref 70–99)
GLUCOSE BLDC GLUCOMTR-MCNC: 126 MG/DL (ref 70–99)
GLUCOSE BLDC GLUCOMTR-MCNC: 146 MG/DL (ref 70–99)
GLUCOSE BLDC GLUCOMTR-MCNC: 166 MG/DL (ref 70–99)
GLUCOSE SERPL-MCNC: 167 MG/DL (ref 70–99)
GLUCOSE UR STRIP-MCNC: NEGATIVE MG/DL
HBA1C MFR BLD: 6.5 % (ref 0–5.6)
HCT VFR BLD AUTO: 37.6 % (ref 35–47)
HCT VFR BLD AUTO: 41.6 % (ref 35–47)
HGB BLD-MCNC: 12 G/DL (ref 11.7–15.7)
HGB BLD-MCNC: 12.9 G/DL (ref 11.7–15.7)
HGB UR QL STRIP: NEGATIVE
IMM GRANULOCYTES # BLD: 0.1 10E9/L (ref 0–0.4)
IMM GRANULOCYTES # BLD: 0.1 10E9/L (ref 0–0.4)
IMM GRANULOCYTES NFR BLD: 0.9 %
IMM GRANULOCYTES NFR BLD: 0.9 %
INTERPRETATION ECG - MUSE: NORMAL
KETONES UR STRIP-MCNC: NEGATIVE MG/DL
LABORATORY COMMENT REPORT: NORMAL
LACTATE BLD-SCNC: 0.9 MMOL/L (ref 0.7–2.1)
LACTATE BLD-SCNC: 2.3 MMOL/L (ref 0.7–2)
LEUKOCYTE ESTERASE UR QL STRIP: ABNORMAL
LYMPHOCYTES # BLD AUTO: 1.7 10E9/L (ref 0.8–5.3)
LYMPHOCYTES # BLD AUTO: 1.8 10E9/L (ref 0.8–5.3)
LYMPHOCYTES NFR BLD AUTO: 16.5 %
LYMPHOCYTES NFR BLD AUTO: 18.7 %
MCH RBC QN AUTO: 28.7 PG (ref 26.5–33)
MCH RBC QN AUTO: 28.8 PG (ref 26.5–33)
MCHC RBC AUTO-ENTMCNC: 31 G/DL (ref 31.5–36.5)
MCHC RBC AUTO-ENTMCNC: 31.9 G/DL (ref 31.5–36.5)
MCV RBC AUTO: 90 FL (ref 78–100)
MCV RBC AUTO: 92 FL (ref 78–100)
MONOCYTES # BLD AUTO: 0.9 10E9/L (ref 0–1.3)
MONOCYTES # BLD AUTO: 0.9 10E9/L (ref 0–1.3)
MONOCYTES NFR BLD AUTO: 9 %
MONOCYTES NFR BLD AUTO: 9.2 %
MUCOUS THREADS #/AREA URNS LPF: PRESENT /LPF
NEUTROPHILS # BLD AUTO: 6.4 10E9/L (ref 1.6–8.3)
NEUTROPHILS # BLD AUTO: 7.4 10E9/L (ref 1.6–8.3)
NEUTROPHILS NFR BLD AUTO: 67.6 %
NEUTROPHILS NFR BLD AUTO: 70.3 %
NITRATE UR QL: NEGATIVE
NRBC # BLD AUTO: 0 10*3/UL
NRBC # BLD AUTO: 0 10*3/UL
NRBC BLD AUTO-RTO: 0 /100
NRBC BLD AUTO-RTO: 0 /100
NT-PROBNP SERPL-MCNC: 60 PG/ML (ref 0–900)
OPIATES UR QL SCN: NEGATIVE
PCO2 BLDV: 53 MM HG (ref 40–50)
PH BLDV: 7.34 PH (ref 7.32–7.43)
PH UR STRIP: 5 PH (ref 5–7)
PLATELET # BLD AUTO: 244 10E9/L (ref 150–450)
PLATELET # BLD AUTO: 249 10E9/L (ref 150–450)
PO2 BLDV: 55 MM HG (ref 25–47)
POTASSIUM SERPL-SCNC: 3.8 MMOL/L (ref 3.4–5.3)
PROT SERPL-MCNC: 8.2 G/DL (ref 6.8–8.8)
RBC # BLD AUTO: 4.16 10E12/L (ref 3.8–5.2)
RBC # BLD AUTO: 4.5 10E12/L (ref 3.8–5.2)
RBC #/AREA URNS AUTO: <1 /HPF (ref 0–2)
SAO2 % BLDV FROM PO2: 86 %
SARS-COV-2 RNA RESP QL NAA+PROBE: NEGATIVE
SODIUM SERPL-SCNC: 134 MMOL/L (ref 133–144)
SOURCE: ABNORMAL
SP GR UR STRIP: 1.01 (ref 1–1.03)
SPECIMEN SOURCE: NORMAL
SQUAMOUS #/AREA URNS AUTO: 1 /HPF (ref 0–1)
TROPONIN I SERPL-MCNC: <0.015 UG/L (ref 0–0.04)
UROBILINOGEN UR STRIP-MCNC: NORMAL MG/DL (ref 0–2)
WBC # BLD AUTO: 10.5 10E9/L (ref 4–11)
WBC # BLD AUTO: 9.4 10E9/L (ref 4–11)
WBC #/AREA URNS AUTO: 4 /HPF (ref 0–5)

## 2021-05-27 PROCEDURE — 83036 HEMOGLOBIN GLYCOSYLATED A1C: CPT | Performed by: EMERGENCY MEDICINE

## 2021-05-27 PROCEDURE — G0378 HOSPITAL OBSERVATION PER HR: HCPCS

## 2021-05-27 PROCEDURE — 83880 ASSAY OF NATRIURETIC PEPTIDE: CPT | Performed by: EMERGENCY MEDICINE

## 2021-05-27 PROCEDURE — 250N000013 HC RX MED GY IP 250 OP 250 PS 637: Performed by: FAMILY MEDICINE

## 2021-05-27 PROCEDURE — 86140 C-REACTIVE PROTEIN: CPT | Performed by: EMERGENCY MEDICINE

## 2021-05-27 PROCEDURE — 999N001017 HC STATISTIC GLUCOSE BY METER IP

## 2021-05-27 PROCEDURE — 96375 TX/PRO/DX INJ NEW DRUG ADDON: CPT | Performed by: EMERGENCY MEDICINE

## 2021-05-27 PROCEDURE — 99220 PR INITIAL OBSERVATION CARE,LEVEL III: CPT | Performed by: FAMILY MEDICINE

## 2021-05-27 PROCEDURE — 82140 ASSAY OF AMMONIA: CPT | Performed by: FAMILY MEDICINE

## 2021-05-27 PROCEDURE — 84484 ASSAY OF TROPONIN QUANT: CPT | Performed by: EMERGENCY MEDICINE

## 2021-05-27 PROCEDURE — 250N000013 HC RX MED GY IP 250 OP 250 PS 637: Performed by: NURSE PRACTITIONER

## 2021-05-27 PROCEDURE — 83605 ASSAY OF LACTIC ACID: CPT | Performed by: EMERGENCY MEDICINE

## 2021-05-27 PROCEDURE — 71046 X-RAY EXAM CHEST 2 VIEWS: CPT

## 2021-05-27 PROCEDURE — 81001 URINALYSIS AUTO W/SCOPE: CPT | Performed by: NURSE PRACTITIONER

## 2021-05-27 PROCEDURE — 93970 EXTREMITY STUDY: CPT

## 2021-05-27 PROCEDURE — 36415 COLL VENOUS BLD VENIPUNCTURE: CPT | Performed by: FAMILY MEDICINE

## 2021-05-27 PROCEDURE — 85025 COMPLETE CBC W/AUTO DIFF WBC: CPT | Performed by: EMERGENCY MEDICINE

## 2021-05-27 PROCEDURE — 258N000003 HC RX IP 258 OP 636: Performed by: EMERGENCY MEDICINE

## 2021-05-27 PROCEDURE — 250N000011 HC RX IP 250 OP 636: Performed by: EMERGENCY MEDICINE

## 2021-05-27 PROCEDURE — 250N000013 HC RX MED GY IP 250 OP 250 PS 637: Performed by: PHYSICIAN ASSISTANT

## 2021-05-27 PROCEDURE — 80307 DRUG TEST PRSMV CHEM ANLYZR: CPT | Performed by: NURSE PRACTITIONER

## 2021-05-27 PROCEDURE — 83605 ASSAY OF LACTIC ACID: CPT

## 2021-05-27 PROCEDURE — 85025 COMPLETE CBC W/AUTO DIFF WBC: CPT | Performed by: NURSE PRACTITIONER

## 2021-05-27 PROCEDURE — 80053 COMPREHEN METABOLIC PANEL: CPT | Performed by: EMERGENCY MEDICINE

## 2021-05-27 PROCEDURE — 86140 C-REACTIVE PROTEIN: CPT | Mod: 91 | Performed by: NURSE PRACTITIONER

## 2021-05-27 PROCEDURE — 80320 DRUG SCREEN QUANTALCOHOLS: CPT | Performed by: NURSE PRACTITIONER

## 2021-05-27 PROCEDURE — 82803 BLOOD GASES ANY COMBINATION: CPT

## 2021-05-27 PROCEDURE — 96361 HYDRATE IV INFUSION ADD-ON: CPT | Performed by: EMERGENCY MEDICINE

## 2021-05-27 PROCEDURE — 250N000011 HC RX IP 250 OP 636: Performed by: PHYSICIAN ASSISTANT

## 2021-05-27 PROCEDURE — 96372 THER/PROPH/DIAG INJ SC/IM: CPT | Performed by: PHYSICIAN ASSISTANT

## 2021-05-27 PROCEDURE — 87635 SARS-COV-2 COVID-19 AMP PRB: CPT | Performed by: EMERGENCY MEDICINE

## 2021-05-27 PROCEDURE — 36415 COLL VENOUS BLD VENIPUNCTURE: CPT | Performed by: NURSE PRACTITIONER

## 2021-05-27 PROCEDURE — 96365 THER/PROPH/DIAG IV INF INIT: CPT | Performed by: EMERGENCY MEDICINE

## 2021-05-27 PROCEDURE — 87040 BLOOD CULTURE FOR BACTERIA: CPT | Performed by: EMERGENCY MEDICINE

## 2021-05-27 PROCEDURE — 85652 RBC SED RATE AUTOMATED: CPT | Performed by: EMERGENCY MEDICINE

## 2021-05-27 PROCEDURE — 97161 PT EVAL LOW COMPLEX 20 MIN: CPT | Mod: GP

## 2021-05-27 RX ORDER — CALCIUM CARBONATE 500 MG/1
500 TABLET, CHEWABLE ORAL 4 TIMES DAILY PRN
Status: DISCONTINUED | OUTPATIENT
Start: 2021-05-27 | End: 2021-05-28 | Stop reason: HOSPADM

## 2021-05-27 RX ORDER — AMLODIPINE BESYLATE 5 MG/1
5 TABLET ORAL DAILY
Status: DISCONTINUED | OUTPATIENT
Start: 2021-05-27 | End: 2021-05-28 | Stop reason: HOSPADM

## 2021-05-27 RX ORDER — NICOTINE POLACRILEX 4 MG
15-30 LOZENGE BUCCAL
Status: DISCONTINUED | OUTPATIENT
Start: 2021-05-27 | End: 2021-05-28 | Stop reason: HOSPADM

## 2021-05-27 RX ORDER — FUROSEMIDE 20 MG
60 TABLET ORAL DAILY
Status: DISCONTINUED | OUTPATIENT
Start: 2021-05-27 | End: 2021-05-28 | Stop reason: HOSPADM

## 2021-05-27 RX ORDER — CEFAZOLIN SODIUM 2 G/100ML
2 INJECTION, SOLUTION INTRAVENOUS ONCE
Status: COMPLETED | OUTPATIENT
Start: 2021-05-27 | End: 2021-05-27

## 2021-05-27 RX ORDER — ONDANSETRON 2 MG/ML
4 INJECTION INTRAMUSCULAR; INTRAVENOUS EVERY 6 HOURS PRN
Status: DISCONTINUED | OUTPATIENT
Start: 2021-05-27 | End: 2021-05-28 | Stop reason: HOSPADM

## 2021-05-27 RX ORDER — ACETAMINOPHEN 500 MG
1000 TABLET ORAL EVERY 8 HOURS PRN
Status: DISCONTINUED | OUTPATIENT
Start: 2021-05-27 | End: 2021-05-28 | Stop reason: HOSPADM

## 2021-05-27 RX ORDER — METHADONE HYDROCHLORIDE 10 MG/ML
80 CONCENTRATE ORAL DAILY
Status: DISCONTINUED | OUTPATIENT
Start: 2021-05-27 | End: 2021-05-28 | Stop reason: HOSPADM

## 2021-05-27 RX ORDER — HYDROXYZINE HYDROCHLORIDE 25 MG/1
50 TABLET, FILM COATED ORAL EVERY 4 HOURS PRN
Status: DISCONTINUED | OUTPATIENT
Start: 2021-05-27 | End: 2021-05-28 | Stop reason: HOSPADM

## 2021-05-27 RX ORDER — KETOROLAC TROMETHAMINE 15 MG/ML
15 INJECTION, SOLUTION INTRAMUSCULAR; INTRAVENOUS ONCE
Status: COMPLETED | OUTPATIENT
Start: 2021-05-27 | End: 2021-05-27

## 2021-05-27 RX ORDER — POTASSIUM CHLORIDE 600 MG/1
8 TABLET, FILM COATED, EXTENDED RELEASE ORAL DAILY
Status: DISCONTINUED | OUTPATIENT
Start: 2021-05-27 | End: 2021-05-28 | Stop reason: HOSPADM

## 2021-05-27 RX ORDER — DIPHENHYDRAMINE HCL 25 MG
25 CAPSULE ORAL EVERY 6 HOURS PRN
Status: DISCONTINUED | OUTPATIENT
Start: 2021-05-27 | End: 2021-05-28 | Stop reason: HOSPADM

## 2021-05-27 RX ORDER — ATORVASTATIN CALCIUM 20 MG/1
20 TABLET, FILM COATED ORAL AT BEDTIME
Status: ON HOLD | COMMUNITY
End: 2022-03-04

## 2021-05-27 RX ORDER — METHADONE HYDROCHLORIDE 5 MG/5ML
80 SOLUTION ORAL DAILY
Status: DISCONTINUED | OUTPATIENT
Start: 2021-05-27 | End: 2021-05-27

## 2021-05-27 RX ORDER — ACETAMINOPHEN 650 MG/1
650 SUPPOSITORY RECTAL EVERY 4 HOURS PRN
Status: DISCONTINUED | OUTPATIENT
Start: 2021-05-27 | End: 2021-05-28 | Stop reason: HOSPADM

## 2021-05-27 RX ORDER — ALBUTEROL SULFATE 90 UG/1
1-2 AEROSOL, METERED RESPIRATORY (INHALATION) EVERY 4 HOURS PRN
Status: ON HOLD | COMMUNITY
End: 2021-06-04

## 2021-05-27 RX ORDER — DEXTROSE MONOHYDRATE 25 G/50ML
25-50 INJECTION, SOLUTION INTRAVENOUS
Status: DISCONTINUED | OUTPATIENT
Start: 2021-05-27 | End: 2021-05-28 | Stop reason: HOSPADM

## 2021-05-27 RX ORDER — GABAPENTIN 600 MG/1
1200 TABLET ORAL 3 TIMES DAILY
Status: DISCONTINUED | OUTPATIENT
Start: 2021-05-27 | End: 2021-05-28 | Stop reason: HOSPADM

## 2021-05-27 RX ORDER — ONDANSETRON 4 MG/1
4 TABLET, ORALLY DISINTEGRATING ORAL EVERY 6 HOURS PRN
Status: DISCONTINUED | OUTPATIENT
Start: 2021-05-27 | End: 2021-05-28 | Stop reason: HOSPADM

## 2021-05-27 RX ORDER — LEVOTHYROXINE SODIUM 100 UG/1
200 TABLET ORAL DAILY
Status: DISCONTINUED | OUTPATIENT
Start: 2021-05-27 | End: 2021-05-28 | Stop reason: HOSPADM

## 2021-05-27 RX ORDER — DULOXETIN HYDROCHLORIDE 30 MG/1
60 CAPSULE, DELAYED RELEASE ORAL DAILY
Status: DISCONTINUED | OUTPATIENT
Start: 2021-05-27 | End: 2021-05-28 | Stop reason: HOSPADM

## 2021-05-27 RX ORDER — ATORVASTATIN CALCIUM 20 MG/1
20 TABLET, FILM COATED ORAL DAILY
Status: DISCONTINUED | OUTPATIENT
Start: 2021-05-27 | End: 2021-05-28 | Stop reason: HOSPADM

## 2021-05-27 RX ADMIN — METFORMIN HYDROCHLORIDE 1000 MG: 500 TABLET ORAL at 09:13

## 2021-05-27 RX ADMIN — KETOROLAC TROMETHAMINE 15 MG: 15 INJECTION, SOLUTION INTRAMUSCULAR; INTRAVENOUS at 02:01

## 2021-05-27 RX ADMIN — GABAPENTIN 1200 MG: 600 TABLET, FILM COATED ORAL at 19:33

## 2021-05-27 RX ADMIN — DULOXETINE HYDROCHLORIDE 60 MG: 60 CAPSULE, DELAYED RELEASE ORAL at 09:13

## 2021-05-27 RX ADMIN — ACETAMINOPHEN 1000 MG: 500 TABLET, FILM COATED ORAL at 17:49

## 2021-05-27 RX ADMIN — AMOXICILLIN AND CLAVULANATE POTASSIUM 1 TABLET: 875; 125 TABLET, FILM COATED ORAL at 19:33

## 2021-05-27 RX ADMIN — CEFAZOLIN SODIUM 2 G: 2 INJECTION, SOLUTION INTRAVENOUS at 03:22

## 2021-05-27 RX ADMIN — GABAPENTIN 1200 MG: 600 TABLET, FILM COATED ORAL at 14:08

## 2021-05-27 RX ADMIN — SODIUM CHLORIDE 500 ML: 9 INJECTION, SOLUTION INTRAVENOUS at 02:25

## 2021-05-27 RX ADMIN — POTASSIUM CHLORIDE 8 MEQ: 600 TABLET, FILM COATED, EXTENDED RELEASE ORAL at 10:56

## 2021-05-27 RX ADMIN — ENOXAPARIN SODIUM 40 MG: 40 INJECTION, SOLUTION INTRAVENOUS; SUBCUTANEOUS at 10:57

## 2021-05-27 RX ADMIN — ATORVASTATIN CALCIUM 20 MG: 20 TABLET, FILM COATED ORAL at 11:51

## 2021-05-27 RX ADMIN — METFORMIN HYDROCHLORIDE 1000 MG: 500 TABLET ORAL at 17:47

## 2021-05-27 RX ADMIN — AMOXICILLIN AND CLAVULANATE POTASSIUM 1 TABLET: 875; 125 TABLET, FILM COATED ORAL at 09:12

## 2021-05-27 RX ADMIN — LEVOTHYROXINE SODIUM 200 MCG: 100 TABLET ORAL at 09:13

## 2021-05-27 RX ADMIN — METHADONE HYDROCHLORIDE 80 MG: 10 CONCENTRATE ORAL at 12:14

## 2021-05-27 RX ADMIN — OMEPRAZOLE 20 MG: 20 CAPSULE, DELAYED RELEASE ORAL at 09:13

## 2021-05-27 RX ADMIN — FUROSEMIDE 60 MG: 20 TABLET ORAL at 09:13

## 2021-05-27 RX ADMIN — GABAPENTIN 1200 MG: 600 TABLET, FILM COATED ORAL at 09:13

## 2021-05-27 RX ADMIN — AMLODIPINE BESYLATE 5 MG: 5 TABLET ORAL at 09:14

## 2021-05-27 ASSESSMENT — ENCOUNTER SYMPTOMS
VOMITING: 0
NAUSEA: 0
SHORTNESS OF BREATH: 1
BRUISES/BLEEDS EASILY: 0
CONFUSION: 0
FEVER: 0
BACK PAIN: 0
COUGH: 0
WEAKNESS: 1

## 2021-05-27 ASSESSMENT — MIFFLIN-ST. JEOR: SCORE: 1891.3

## 2021-05-27 NOTE — PROGRESS NOTES
05/27/21 0900   Quick Adds   Type of Visit Initial PT Evaluation   Living Environment   People in home facility resident  (pt reports group home type setting where has A as needed)   Current Living Arrangements group home   Home Accessibility stairs within home   Number of Stairs, Within Home, Primary other (see comments)  (14+)   Stair Railings, Within Home, Primary railing on right side (ascending)   Living Environment Comments Pt reports goes up daily for breakfast and usually another meal, but can eat in her room on lower level if needed. Pt reports takes stairs slowly, has no difficulty and not concerned about doing stairs upon return home. Pt declined need to practice stairs here, states a little weaker but has confidence will do fine on stairs at home.    Self-Care   Usual Activity Tolerance good   Current Activity Tolerance moderate   Regular Exercise No   Equipment Currently Used at Home cane, straight  (has walker can use, has graduated from it)   Activity/Exercise/Self-Care Comment n   Disability/Function   Hearing Difficulty or Deaf no   Wear Glasses or Blind no   Concentrating, Remembering or Making Decisions Difficulty no   Difficulty Communicating no   Difficulty Eating/Swallowing no   Walking or Climbing Stairs Difficulty yes   Walking or Climbing Stairs ambulation difficulty, requires equipment;stair climbing difficulty, requires equipment   Dressing/Bathing Difficulty no   Toileting issues no   Doing Errands Independently Difficulty (such as shopping) yes   [employer] home employees A as needed   Change in Functional Status Since Onset of Current Illness/Injury no   General Information   Onset of Illness/Injury or Date of Surgery 05/27/21   Referring Physician Misael Angel MD   Patient/Family Therapy Goals Statement (PT) return home   Pertinent History of Current Problem (include personal factors and/or comorbidities that impact the POC) 61 year old female admitted on 5/27/2021.  She has a past medical history significant for bilateral cellulitis of lower leg, lymphedema, sudden cardiac arrest(overdose 2019), chronic pain syndrome, HTN, opiate dependence, influenza A, hypothyroidism, GERD, fibromyalgia, polysubstance abuse, depression, bipolar affective disorder and anxiety who presents to the Emergency Department for evaluation of bilateral leg swelling, pain, and redness.   General Observations Pt supine, very agreeable to PT assessment and walking a bit, would like to eat breakfast right after (arrived while up with PT) and agreable to sitting in chair   Cognition   Orientation Status (Cognition) oriented x 4   Affect/Mental Status (Cognition) WFL   Follows Commands (Cognition) follows two-step commands;50-74% accuracy;delayed response/completion;increased processing time needed  (in an dout of sleepiness)   Posture    Posture Forward head position;Protracted shoulders   Strength   Strength Comments B LEs WFL based on functional mobility, mildly decreased activity tolerance but functional   Bed Mobility   Comment (Bed Mobility) mod I   Transfers   Transfer Safety Comments mod I   Gait/Stairs (Locomotion)   Comment (Gait/Stairs) SBA to mod I with SEC 50'   Clinical Impression   Criteria for Skilled Therapeutic Intervention evaluation only   Functional limitations due to impairments mildly decreased activity tolerance, pt reports due to feeling ill   Risk & Benefits of therapy have been explained evaluation/treatment results reviewed;participants voiced agreement with care plan;participants included;patient   Clinical Impression Comments No skilled needs, will discontinue PT.   PT Discharge Planning    PT Discharge Recommendation (DC Rec) home with assist   PT Rationale for DC Rec Pt declines need for home or OP PT reports will resume PT for shoulder when she talks to her doc about it again.   PT Brief overview of current status  Pt  mobilizing mod I with SEC and transferring mod I. Pt  appears at baseline mobility, with mildly decreased activity tolerance, but declines need for continued PT. PT eval only completed, no further skilled acute needs and pt safe to discharge to home when medical goals met.   Total Evaluation Time   Total Evaluation Time (Minutes) 11

## 2021-05-27 NOTE — H&P
[facility]    History and Physical - ED Observation Service       Date of Admission:  5/27/2021    Assessment & Plan   No Yusuf is a 61 year old female admitted on 5/27/2021. She has a past medical history significant for bilateral cellulitis of lower leg, lymphedema, sudden cardiac arrest(overdose 2019), chronic pain syndrome, HTN, opiate dependence, influenza A, hypothyroidism, GERD, fibromyalgia, polysubstance abuse, depression, bipolar affective disorder and anxiety who presents to the Emergency Department for evaluation of bilateral leg swelling, pain, and redness.     ##. BLE Edema: In ED, VSS, afebrile. Labs show normal CMP with ALT 68, glucose 167, albumin 2.9 otherwise normal. CBC normal. Lactic acid 2.3 and down to 0.9 after 500ml NS bolus. CRP 11.1 Normal BNP, troponin I. VBG 7.34, pCO2 53, pO2 55, bicarb 29. Covid 19 negative. CXR negative. US bilateral LE negative for DVT. EKG which demonstrates normal sinus rhythm with a ventricular rate of 77 bpm, normal axis, , diffuse T wave flattening with no acute ischemic change, no significant change when compared to prior 7/6/2020. In ED patient given 500ml NS bolus, ancef 2gm IV x 1, toradol 15mg IV x 1. Patient was going to be discharged from ED with po antibiotics but reportsedconcern for discharge due to generalized weakness and concern for her being able to care for self at home, states she has difficulty ambulating, and feels too weak to go home. Cellulitis vs lymphedema exacerbation vs chronic venous stasis. Given Ancef in ED. No erythema noted on area of demarcation - likely marked improvement in a few hours.   - Start Augmentin 875mg po bid  - PT consult  - SW consult  - Strict I/O  - Daily weights  - Continue diuresis with Lasix - per patient 60mg daily, per PTA MAR 60mg BID. Pharmacy consult to reconcile meds  - Follow daily CBC, inflammatory markers     ##. Generalized Weakness: Per report  "in ED. Patient is sleepy at the moment and cannot assess. Labs are unremarkable.  - Check UA with reflex culture  - PT evaluation     ##. DM2: Last A1c was 6.5 on 5/20/21. . On Metformin per chart review   - Continue metformin per home regimen  - BG checks TID ac and Qhs  - Carbohydrate Consistent Diet  - Pharmacy consult for med rec    ##. Chronic neuropathic pain:  - Continue with PTA Gabapentin 1200mg TID      ##. H/o Polysubstance Abuse & Opiate Dependence: Patient reports taking methadone 85 mg daily but per chart review it is 80mg.  - pharmacy IP consult to verify Methadone dose     ##. Hypothyroidism: - Continue with PTA Levothyroxine     ##. GERD: - Continue with PTA omeprazole     ##. Tobacco dependence: - Continue home nicotine gum    ##. Bipolar Disorder  ##. MDD  ##. Insomnia - Continue PTA Duloxetine, Hydroxyzine and Trazodone prn    ##. Hep C: not on treatment, followed at [facility]     ##. Constipation: - Continue with PTA Bowel Regimen        Diet: Combination Diet 3564-7980 Calories: Moderate Consistent CHO (4-6 CHO units/meal); 2 gm NA Diet  DVT Prophylaxis: Lovenox  Valle Catheter: not present  Code Status: Full Code         Disposition Plan   Expected discharge: Today, recommended to TBD once adequate pain management/ tolerating PO medications, antibiotic plan established, renal function improved and safe disposition plan/ TCU bed available.  Entered: HECTOR Morris 05/27/2021, 7:44 AM     The patient's care was discussed with the Attending Physician, Dr. Angel.    HECTOR Morris  [facility]  Contact information available via [facility] Paging/Directory      ______________________________________________________________________    Chief Complaint   Leg pain and swelling    History is obtained from the patient and chart review     History of Present Illness   Per Ed Note: \"No Yusuf is a 61 year old female with a past medical " history significant for bilateral cellulitis of lower leg, lymphedema, sudden cardiac arrest(overdose 2019), chronic pain syndrome, HTN, opiate dependence, influenza A, hypothyroidism, GERD, fibromyalgia, polysubstance abuse, depression, bipolar affective disorder and anxiety who presents to the Emergency Department for evaluation of bilateral leg swelling, pain, and redness. Patient c/o of bilateral leg pain for the past few days. Patient reports pain came on quickly and seems to be worse when standing up or weight bearing on her lower extremities. Patient reports trying to take it easy the past few days but attempting to elevate her legs while at rest. Patient states she is sedentary majority of the day but she does try to get up and walk. Patient reports difficulty walking and does not like to walk alone as she has had falls in the past. Patient likes to watch TV and do crafts.       Patient also noticed redness mostly to her left lower extremity that she reports has increased over the past 24 hours. Redness worse on the left when compared to right. Patient describes the pain in her legs as a sharp pain however also does report some cramping when she is lying down and is worse at night.  Patient reports feeling unwell with generalized weakness and also notes some shortness of breath today.  Patient reports history of lower extremity edema as well as cellulitis in the past, states that she has been taking her Lasix daily as directed but feels as if it is not helping.  Patient denies any fever, chills, chest pain, cough, no recent illnesses.  Denies any nausea, vomiting, diarrhea, constipation.  Patient does report some occasional abdominal pain but no new or worsening pain. Patient denies any history of congestive heart failure but did have history of cardiac arrest in 2019 from an overdose.      Patient denies any history of DVT/PE. Patient does report she is worried for a blood clot due to the calf cramping.   "She has been taking Tylenol for pain. Patient states that her diabetes was just diagnosed 3 months ago.  Patient has cut down on her smoking, down to 1-2 cigarettes a day. \"    In the ED, HR 70's-80's, -119/56-88, RR 16, SaO2 92-94% on RA, Temp 97.6. Labs show normal CMP with ALT 68, glucose 167, albumin 2.9 otherwise normal. CBC normal. Lactic acid 2.3 and down to 0.9 after 500ml NS bolus. CRP 11.1 normal BNP, troponin I. VBG 7.34, pCO2 53, pO2 55, bicarb 29. Covid 19 negative. CXR negative. US bilateral LE negative for DVT. EKG which demonstrates normal sinus rhythm with a ventricular rate of 77 bpm, normal axis, , diffuse T wave flattening with no acute ischemic change, no significant change when compared to prior 7/6/2020. In ED patient given 500ml NS bolus, ancef 2gm IV x 1, toradol 15mg IV x 1. Patient was going to be discharged from ED with po antibiotics but reportsedconcern for discharge due to generalized weakness and concern for her being able to care for self at home, states she has difficulty ambulating, and feels too weak to go home.      Upon arrival from [place] to the [address], patient apparently went to use the bathroom and once she got back in bed was very drowsy. She is arousable briefly but goes right back to sleep and unable to answers simple questions sustainably.     Review of Systems    All other ROS negative except those mentioned in above note.      Past Medical History    I have reviewed this patient's medical history and updated it with pertinent information if needed.   Past Medical History:   Diagnosis Date     Arthritis      Bipolar affective (H)      Fibromyalgia      Hypertension        Past Surgical History   I have reviewed this patient's surgical history and updated it with pertinent information if needed.  Past Surgical History:   Procedure Laterality Date     CHOLECYSTECTOMY       GYN SURGERY      c section     GYN SURGERY      oblation     ORTHOPEDIC SURGERY   "    left leg, left shoulder, back       Social History   I have reviewed this patient's social history and updated it with pertinent information if needed.  Social History     Tobacco Use     Smoking status: Current Every Day Smoker     Packs/day: 0.10     Smokeless tobacco: Never Used   Substance Use Topics     Alcohol use: No     Drug use: No       Family History         Prior to Admission Medications   Prior to Admission Medications   Prescriptions Last Dose Informant Patient Reported? Taking?   DULoxetine (CYMBALTA) 60 MG capsule 5/26/2021 at Unknown time  Yes Yes   Sig: Take 60 mg by mouth daily   NARCAN 4 MG/0.1ML nasal spray   Yes No   Sig: CALL 911. SPRAY CONTENTS OF ONE SPRAYER (0.1ML) INTO ONE NOSTRIL. REPEAT IN 2-3 MINS IF SYMPTOMS OF OPIOID PERSIST, ALTERNATE NOSTRILS   acetaminophen (TYLENOL) 500 MG tablet 5/26/2021 at Unknown time  Yes Yes   Sig: Take 325-650 mg by mouth every 6 hours as needed for mild pain or fever    albuterol (PROAIR HFA/PROVENTIL HFA/VENTOLIN HFA) 108 (90 Base) MCG/ACT inhaler 5/26/2021 at Unknown time  Yes Yes   Sig: Inhale 2 puffs into the lungs every 6 hours as needed for shortness of breath / dyspnea or wheezing   amLODIPine (NORVASC) 5 MG tablet 5/26/2021 at Unknown time  Yes Yes   Sig: Take 5 mg by mouth daily   calcium carbonate (TUMS) 500 MG chewable tablet 5/26/2021 at Unknown time  Yes Yes   Sig: Take 1 chew tab by mouth 4 times daily as needed for heartburn   cholecalciferol 25 MCG (1000 UT) TABS 5/26/2021 at Unknown time  Yes Yes   Sig: Take 1,000 Units by mouth daily    diclofenac (VOLTAREN) 1 % topical gel   No No   Sig: Place 2 g onto the skin 4 times daily as needed (left knee pain)   diclofenac (VOLTAREN) 1 % topical gel   No No   Sig: Apply 4 g topically 4 times daily   diphenhydrAMINE (BENADRYL) 25 MG capsule Unknown at Unknown time  Yes No   Sig: Take 25 mg by mouth every 6 hours as needed for allergies   furosemide (LASIX) 20 MG tablet 5/26/2021 at Unknown time   No Yes   Sig: Take 3 tablets (60 mg) by mouth 2 times daily   gabapentin (NEURONTIN) 600 MG tablet 5/26/2021 at Unknown time  Yes Yes   Sig: Take 1,200 mg by mouth 3 times daily   hydrOXYzine (ATARAX) 50 MG tablet Past Month at Unknown time  Yes Yes   Sig: Take 50 mg by mouth every 4 hours as needed for anxiety    ibuprofen (ADVIL/MOTRIN) 600 MG tablet Past Month at Unknown time  Yes Yes   Sig: Take 600 mg by mouth   levothyroxine (SYNTHROID/LEVOTHROID) 200 MCG tablet 5/26/2021 at Unknown time  Yes Yes   lidocaine (LIDODERM) 5 % patch   No No   Sig: Place 1 patch onto the skin every 24 hours To prevent lidocaine toxicity, patient should be patch free for 12 hrs daily.   magnesium hydroxide (MILK OF MAGNESIA) 400 MG/5ML suspension Unknown at Unknown time  Yes No   Sig: Take 10 mLs by mouth daily as needed for constipation or heartburn   melatonin 3 MG tablet Past Month at Unknown time  Yes Yes   Sig: Take 3 mg by mouth At Bedtime    metFORMIN (GLUCOPHAGE) 1000 MG tablet 5/26/2021 at Unknown time  Yes Yes   Sig: Take 1,000 mg by mouth 2 times daily (with meals)   methadone HCl 10 MG/5ML SOLN 5/26/2021 at Unknown time  Yes Yes   Sig: Take 80 mg by mouth daily    multivitamin w/minerals (MULTI-VITAMIN) tablet 5/26/2021 at Unknown time  Yes Yes   Sig: Take 1 tablet by mouth daily   nicotine polacrilex (NICORETTE) 4 MG gum   Yes No   Sig: Place 4 mg inside cheek every hour as needed for smoking cessation    nystatin (MYCOSTATIN) 820546 UNIT/GM external cream   No No   Sig: Apply topically daily as needed for dry skin Apply to skin folds daily   omeprazole (PRILOSEC) 20 MG DR capsule Unknown at Unknown time  Yes No   Sig: Take 20 mg by mouth daily    polyethylene glycol (MIRALAX) 17 g packet Unknown at Unknown time  No No   Sig: Take 17 g by mouth daily   potassium chloride ER (KLOR-CON) 8 MEQ CR tablet 5/26/2021 at Unknown time  Yes Yes   Sig: Take 8 mEq by mouth 2 times daily   traZODone (DESYREL) 50 MG tablet 5/25/2021  at Unknown time  Yes Yes   Sig: Take 50 mg by mouth At Bedtime   vitamin B-12 (CYANOCOBALAMIN) 1000 MCG tablet 5/26/2021 at Unknown time  Yes Yes   Sig: Take 1,000 mcg by mouth daily      Facility-Administered Medications Last Administration Doses Remaining   lidocaine (PF) (XYLOCAINE) 1 % injection 4 mL 12/10/2020  3:23 PM    triamcinolone (KENALOG-40) injection 40 mg 12/10/2020  3:23 PM         Allergies   Allergies   Allergen Reactions     Promethazine Other (See Comments) and Anxiety     Noted in 8/30/08 ER     Codeine Phosphate GI Disturbance     Lamotrigine Other (See Comments)     hyponatremia     Oxcarbazepine Unknown and Other (See Comments)     Low sodium  LegacyRecord#[id number]       Codeine Other (See Comments) and Rash     GI bleeding  LegacyRecord#[id number]         Physical Exam   Vital Signs: Temp: 98  F (36.7  C) Temp src: Oral BP: 102/55 Pulse: 77   Resp: 18 SpO2: 92 % O2 Device: None (Room air)    Weight: 300 lbs 1.6 oz    Constitutional: obese, sleepy, appears to have upper A/W obstruction, arousable with stimulation briefly, no apparent distress, and appears stated age  Eyes: Lids and lashes normal, pupils equal, round and reactive to light, extra ocular muscles intact, sclera clear, conjunctiva normal  ENT: Normocephalic, without obvious abnormality, atraumatic, sinuses nontender on palpation, external ears without lesions, oral pharynx with moist mucous membranes, tonsils without erythema or exudates, gums normal and good dentition.  Hematologic / Lymphatic: no cervical lymphadenopathy  Respiratory: upper airway obstruction, good air exchange, clear to auscultation bilaterally, no crackles or wheezing  Cardiovascular: Normal apical impulse, regular rate and rhythm, normal S1 and S2, no S3 or S4, and no murmur noted  GI: No scars, normal bowel sounds, soft, obese, non-tender, no masses palpated, no hepatosplenomegally  Skin: no bruising or bleeding  Musculoskeletal: BLE edema, 2+ pitting, TTP  bilaterally, mild warmth. Difficult to appreciate erythema demarcated in ED. Full spontaneous ROM.   Neurologic: Grossly Sensory is intact.  Responds to stimuli x 3  Neuropsychiatric: General: sleeping, calm      Data   Data reviewed today: I reviewed all medications, new labs and imaging results over the last 24 hours. I personally reviewed   Recent Labs   Lab 05/27/21 0137   WBC 10.5   HGB 12.0   MCV 90         POTASSIUM 3.8   CHLORIDE 98   CO2 30   BUN 15   CR 0.90   ANIONGAP 6   RODOLFO 8.8   *   ALBUMIN 2.9*   PROTTOTAL 8.2   BILITOTAL 0.3   ALKPHOS 128   ALT 68*   AST 35   TROPI <0.015     Most Recent 3 CBC's:  Recent Labs   Lab Test 05/27/21 0137 07/07/20 0645 07/06/20 2042 07/06/20  1400   WBC 10.5 6.9  --  7.1   HGB 12.0 11.3*  --  12.3   MCV 90 89  --  88    251 246 278     Most Recent 3 BMP's:  Recent Labs   Lab Test 05/27/21 0137 07/07/20  0645 07/06/20 2042 07/06/20  1400    140  --  139   POTASSIUM 3.8 4.2  --  3.9   CHLORIDE 98 106  --  102   CO2 30 32  --  37*   BUN 15 16  --  13   CR 0.90 0.98 0.96 0.99   ANIONGAP 6 2*  --  <1*   RODOLFO 8.8 8.1*  --  8.9   * 106*  --  104*     Most Recent 2 LFT's:  Recent Labs   Lab Test 05/27/21 0137 07/07/20  0645   AST 35 39   ALT 68* 41   ALKPHOS 128 121   BILITOTAL 0.3 0.3     Most Recent 3 Creatinines:  Recent Labs   Lab Test 05/27/21 0137 07/07/20  0645 07/06/20  2042   CR 0.90 0.98 0.96     Most Recent 3 Troponin's:  Recent Labs   Lab Test 05/27/21 0137 07/06/20  1400 01/28/20  0025   TROPI <0.015 0.015 <0.015     Most Recent 3 BNP's:  Recent Labs   Lab Test 05/27/21 0137 07/06/20  1400 06/10/20  0701   NTBNPI 60 347 277     Most Recent Hemoglobin A1c:No lab results found.  Most Recent 6 glucoses:  Recent Labs   Lab Test 05/27/21  0137 07/07/20  0645 07/06/20  1400 06/16/20  0537 06/15/20  0827 06/14/20  0538   * 106* 104* 123* 139* 122*     Recent Results (from the past 24 hour(s))   XR Chest 2 Views     Narrative    EXAM: XR CHEST 2 VW  LOCATION: [facility]  DATE/TIME: 5/27/2021 12:50 AM    INDICATION: Shortness of breath  COMPARISON: 07/06/2020      Impression    IMPRESSION: Heart size and pulmonary vascularity normal. Minimal atelectasis left base, lungs otherwise clear.   US Lower Extremity Venous Duplex Bilateral    Narrative    EXAM: US LOWER EXTREMITY VENOUS DUPLEX BILATERAL  LOCATION: [facility]  DATE/TIME: 5/27/2021 12:57 AM    INDICATION: Bilateral leg pain and swelling.  COMPARISON: None.  TECHNIQUE: Venous Duplex ultrasound of bilateral lower extremities with and without compression, augmentation and duplex. Color flow and spectral Doppler with waveform analysis performed.    FINDINGS: Exam includes the common femoral, femoral, popliteal veins as well as segmentally visualized deep calf veins and greater saphenous vein.     RIGHT: No deep vein thrombosis. No superficial thrombophlebitis. No popliteal cyst.    LEFT: No deep vein thrombosis. No superficial thrombophlebitis. No popliteal cyst.      Impression    IMPRESSION:  1.  No deep venous thrombosis in the bilateral lower extremities.

## 2021-05-27 NOTE — PROGRESS NOTES
Admitted/transferred from: [facility] [facility] ED  2 RN full   skin assessment completed by Luis Daniel James, RN and Leroy Saleh, SILVIO.  Skin assessment finding: redness on BLE, worse on L. Bilateral feet with purple discoloration, nonblanchable.    Interventions/actions: none at this time (no orders placed by team)     Will continue to monitor.

## 2021-05-27 NOTE — PLAN OF CARE
"Observation Goals:   -diagnostic tests and consults completed and resulted: Not met     -vital signs normal or at patient baseline: met     -tolerating oral intake to maintain hydration: met     -adequate pain control on oral analgesics: in progress     -tolerating oral antibiotics or has plans for home infusion setup: met    -infection is improving: in progress     -returns to baseline functional status: not met     -safe disposition plan has been identified: Met      -pt evaluation completed: met     /83 (BP Location: Right arm)   Pulse 81   Temp 98  F (36.7  C) (Oral)   Resp 15   Ht 1.6 m (5' 3\")   Wt 135.2 kg (298 lb 1.6 oz)   LMP 11/01/2013   SpO2 91%   Breastfeeding No   BMI 52.81 kg/m     "

## 2021-05-27 NOTE — ED PROVIDER NOTES
[facility] [place] EMERGENCY DEPARTMENT ([facility])   May 26, 2021  ED 2   History     Chief Complaint   Patient presents with     Leg Swelling     Bilateral leg swelling. Left leg pain with some redness. Leg cramps when lying down.     HPI   The history is provided by the patient and medical records.      No Yusuf is a 61 year old female with a past medical history significant for bilateral cellulitis of lower leg, lymphedema, sudden cardiac arrest(overdose 2019), chronic pain syndrome, HTN, opiate dependence, influenza A, hypothyroidism, GERD, fibromyalgia, polysubstance abuse, depression, bipolar affective disorder and anxiety who presents to the Emergency Department for evaluation of bilateral leg swelling, pain, and redness. Patient c/o of bilateral leg pain for the past few days. Patient reports pain came on quickly and seems to be worse when standing up or weight bearing on her lower extremities. Patient reports trying to take it easy the past few days but attempting to elevate her legs while at rest. Patient states she is sedentary majority of the day but she does try to get up and walk. Patient reports difficulty walking and does not like to walk alone as she has had falls in the past. Patient likes to watch TV and do crafts.      Patient also noticed redness mostly to her left lower extremity that she reports has increased over the past 24 hours. Redness worse on the left when compared to right. Patient describes the pain in her legs as a sharp pain however also does report some cramping when she is lying down and is worse at night.  Patient reports feeling unwell with generalized weakness and also notes some shortness of breath today.  Patient reports history of lower extremity edema as well as cellulitis in the past, states that she has been taking her Lasix daily as directed but feels as if it is not helping.  Patient denies any fever, chills, chest pain, cough, no recent illnesses.  Denies any  nausea, vomiting, diarrhea, constipation.  Patient does report some occasional abdominal pain but no new or worsening pain. Patient denies any history of congestive heart failure but did have history of cardiac arrest in 2019 from an overdose.     Patient denies any history of DVT/PE. Patient does report she is worried for a blood clot due to the calf cramping.  She has been taking Tylenol for pain. Patient states that her diabetes was just diagnosed 3 months ago.  Patient has cut down on her smoking, down to 1-2 cigarettes a day.     Wt Readings from Last 10 Encounters:   05/26/21 136.1 kg (300 lb 1.6 oz)   12/10/20 133.5 kg (294 lb 4.8 oz)   07/07/20 131.6 kg (290 lb 3.2 oz)   06/16/20 126.6 kg (279 lb)   02/03/20 130.5 kg (287 lb 11.2 oz)   01/21/20 130.2 kg (287 lb)   11/17/19 114.8 kg (253 lb 1.6 oz)   11/03/19 109.8 kg (242 lb 1.6 oz)   05/10/12 113.4 kg (250 lb)         Accompanied by daughter   PAST MEDICAL HISTORY:   Past Medical History:   Diagnosis Date     Arthritis      Bipolar affective (H)      Fibromyalgia      Hypertension        PAST SURGICAL HISTORY:   Past Surgical History:   Procedure Laterality Date     CHOLECYSTECTOMY       GYN SURGERY      c section     GYN SURGERY      oblation     ORTHOPEDIC SURGERY      left leg, left shoulder, back       Past medical history, past surgical history, medications, and allergies were reviewed with the patient. Additional pertinent items: None    FAMILY HISTORY: No family history on file.    SOCIAL HISTORY:   Social History     Tobacco Use     Smoking status: Current Every Day Smoker     Packs/day: 0.10     Smokeless tobacco: Never Used   Substance Use Topics     Alcohol use: No     Social history was reviewed with the patient. Additional pertinent items: None      Patient's Medications   New Prescriptions    No medications on file   Previous Medications    ACETAMINOPHEN (TYLENOL) 500 MG TABLET    Take 325-650 mg by mouth every 6 hours as needed for mild pain  or fever     ALBUTEROL (PROAIR HFA/PROVENTIL HFA/VENTOLIN HFA) 108 (90 BASE) MCG/ACT INHALER    Inhale 2 puffs into the lungs every 6 hours as needed for shortness of breath / dyspnea or wheezing    AMLODIPINE (NORVASC) 5 MG TABLET    Take 5 mg by mouth daily    CALCIUM CARBONATE (TUMS) 500 MG CHEWABLE TABLET    Take 1 chew tab by mouth 4 times daily as needed for heartburn    CHOLECALCIFEROL 25 MCG (1000 UT) TABS    Take 1,000 Units by mouth daily     DICLOFENAC (VOLTAREN) 1 % TOPICAL GEL    Place 2 g onto the skin 4 times daily as needed (left knee pain)    DICLOFENAC (VOLTAREN) 1 % TOPICAL GEL    Apply 4 g topically 4 times daily    DIPHENHYDRAMINE (BENADRYL) 25 MG CAPSULE    Take 25 mg by mouth every 6 hours as needed for allergies    DULOXETINE (CYMBALTA) 60 MG CAPSULE    Take 60 mg by mouth daily    FUROSEMIDE (LASIX) 20 MG TABLET    Take 3 tablets (60 mg) by mouth 2 times daily    GABAPENTIN (NEURONTIN) 600 MG TABLET    Take 1,200 mg by mouth 3 times daily    HYDROXYZINE (ATARAX) 50 MG TABLET    Take 50 mg by mouth every 4 hours as needed for anxiety     IBUPROFEN (ADVIL/MOTRIN) 600 MG TABLET    Take 600 mg by mouth    LEVOTHYROXINE (SYNTHROID/LEVOTHROID) 200 MCG TABLET        LIDOCAINE (LIDODERM) 5 % PATCH    Place 1 patch onto the skin every 24 hours To prevent lidocaine toxicity, patient should be patch free for 12 hrs daily.    MAGNESIUM HYDROXIDE (MILK OF MAGNESIA) 400 MG/5ML SUSPENSION    Take 10 mLs by mouth daily as needed for constipation or heartburn    MELATONIN 3 MG TABLET    Take 3 mg by mouth At Bedtime     METFORMIN (GLUCOPHAGE) 1000 MG TABLET    Take 1,000 mg by mouth 2 times daily (with meals)    METHADONE HCL 10 MG/5ML SOLN    Take 80 mg by mouth daily     MULTIVITAMIN W/MINERALS (MULTI-VITAMIN) TABLET    Take 1 tablet by mouth daily    NARCAN 4 MG/0.1ML NASAL SPRAY    CALL 911. SPRAY CONTENTS OF ONE SPRAYER (0.1ML) INTO ONE NOSTRIL. REPEAT IN 2-3 MINS IF SYMPTOMS OF OPIOID PERSIST,  ALTERNATE NOSTRILS    NICOTINE POLACRILEX (NICORETTE) 4 MG GUM    Place 4 mg inside cheek every hour as needed for smoking cessation     NYSTATIN (MYCOSTATIN) 250190 UNIT/GM EXTERNAL CREAM    Apply topically daily as needed for dry skin Apply to skin folds daily    OMEPRAZOLE (PRILOSEC) 20 MG DR CAPSULE    Take 20 mg by mouth daily     POLYETHYLENE GLYCOL (MIRALAX) 17 G PACKET    Take 17 g by mouth daily    POTASSIUM CHLORIDE ER (KLOR-CON) 8 MEQ CR TABLET    Take 8 mEq by mouth 2 times daily    TRAZODONE (DESYREL) 50 MG TABLET    Take 50 mg by mouth At Bedtime    VITAMIN B-12 (CYANOCOBALAMIN) 1000 MCG TABLET    Take 1,000 mcg by mouth daily   Modified Medications    No medications on file   Discontinued Medications    LEVOTHYROXINE (SYNTHROID/LEVOTHROID) 75 MCG TABLET    Take 3 tablets (225 mcg) by mouth daily          Allergies   Allergen Reactions     Promethazine Other (See Comments) and Anxiety     Noted in 8/30/08 ER     Codeine Phosphate GI Disturbance     Lamotrigine Other (See Comments)     hyponatremia     Oxcarbazepine Unknown and Other (See Comments)     Low sodium  LegacyRecord#[id number]       Codeine Other (See Comments) and Rash     GI bleeding  LegacyRecord#[id number]          Review of Systems   Constitutional: Negative for fever.   HENT: Negative for congestion.    Eyes: Negative for visual disturbance.   Respiratory: Positive for shortness of breath. Negative for cough.    Cardiovascular: Positive for leg swelling. Negative for chest pain.   Gastrointestinal: Negative for nausea and vomiting.   Endocrine: Negative for polyuria.   Musculoskeletal: Negative for back pain.        +b/l leg pain   Skin: Negative for rash.   Allergic/Immunologic: Negative for immunocompromised state.   Neurological: Positive for weakness.   Hematological: Does not bruise/bleed easily.   Psychiatric/Behavioral: Negative for confusion.     Physical Exam   BP: 109/56  Pulse: 80  Temp: 97.6  F (36.4  C)  Resp: 16  Height: 160 cm (5'  "3\")  Weight: 136.1 kg (300 lb 1.6 oz)  SpO2: 93 %      Physical Exam  General: Afebrile, no acute distress   HEENT: Normocephalic, atraumatic, conjunctivae normal. MMM  Neck: non-tender, supple  Cardio: regular rate. regular rhythm   Resp: Normal work of breathing, no respiratory distress, lungs clear bilaterally, no wheezing, rhonchi, rales  Chest/Back: no visual signs of trauma, no CVA tenderness   Abdomen: soft, non distension, no tenderness, no peritoneal signs   Neuro: alert and fully oriented. CN II-XII grossly intact. Grossly normal strength and sensation in all extremities.   MSK: bilaterally symmetrical lower extremity swelling with 2+ pitting edema bilaterally,  Along with +tenderness to palpation over both lower extremities mostly over anterior aspect of shin.   left lower extremity with warmth to touch and erythema over anterior shin up to mid calf, no deformities, no evidence of trauma, normal range of motion, compartments soft, cap refill < 3 seconds  Integumentary/Skin: no rash visualized, normal color  Psych: normal affect, normal behavior      ED Course        Procedures         EKG Interpretation:      Interpreted by Lauren Jaimes MD  Time reviewed: 0048  Symptoms at time of EKG: shortness of breath   Rhythm: normal sinus   Rate: normal  Axis: normal  Ectopy: none  Conduction: normal  ST Segments/ T Waves: diffuse T wave flattening, no acute ischemic change   Q Waves: none  Comparison to prior: no significant change when compared to prior EKG 7/6/2020    Clinical Impression: normal sinus rhythm with no acute ischemic change     Results for orders placed or performed during the hospital encounter of 05/27/21 (from the past 24 hour(s))   EKG 12-lead, tracing only   Result Value Ref Range    Interpretation ECG Click View Image link to view waveform and result    XR Chest 2 Views    Narrative    EXAM: XR CHEST 2 VW  LOCATION: [facility]  DATE/TIME: 5/27/2021 12:50 AM    INDICATION: " Shortness of breath  COMPARISON: 07/06/2020      Impression    IMPRESSION: Heart size and pulmonary vascularity normal. Minimal atelectasis left base, lungs otherwise clear.   US Lower Extremity Venous Duplex Bilateral    Narrative    EXAM: US LOWER EXTREMITY VENOUS DUPLEX BILATERAL  LOCATION: [facility]  DATE/TIME: 5/27/2021 12:57 AM    INDICATION: Bilateral leg pain and swelling.  COMPARISON: None.  TECHNIQUE: Venous Duplex ultrasound of bilateral lower extremities with and without compression, augmentation and duplex. Color flow and spectral Doppler with waveform analysis performed.    FINDINGS: Exam includes the common femoral, femoral, popliteal veins as well as segmentally visualized deep calf veins and greater saphenous vein.     RIGHT: No deep vein thrombosis. No superficial thrombophlebitis. No popliteal cyst.    LEFT: No deep vein thrombosis. No superficial thrombophlebitis. No popliteal cyst.      Impression    IMPRESSION:  1.  No deep venous thrombosis in the bilateral lower extremities.   CBC with platelets differential   Result Value Ref Range    WBC 10.5 4.0 - 11.0 10e9/L    RBC Count 4.16 3.8 - 5.2 10e12/L    Hemoglobin 12.0 11.7 - 15.7 g/dL    Hematocrit 37.6 35.0 - 47.0 %    MCV 90 78 - 100 fl    MCH 28.8 26.5 - 33.0 pg    MCHC 31.9 31.5 - 36.5 g/dL    RDW 15.0 10.0 - 15.0 %    Platelet Count 249 150 - 450 10e9/L    Diff Method Automated Method     % Neutrophils 70.3 %    % Lymphocytes 16.5 %    % Monocytes 9.0 %    % Eosinophils 2.6 %    % Basophils 0.7 %    % Immature Granulocytes 0.9 %    Nucleated RBCs 0 0 /100    Absolute Neutrophil 7.4 1.6 - 8.3 10e9/L    Absolute Lymphocytes 1.7 0.8 - 5.3 10e9/L    Absolute Monocytes 0.9 0.0 - 1.3 10e9/L    Absolute Eosinophils 0.3 0.0 - 0.7 10e9/L    Absolute Basophils 0.1 0.0 - 0.2 10e9/L    Abs Immature Granulocytes 0.1 0 - 0.4 10e9/L    Absolute Nucleated RBC 0.0    Comprehensive metabolic panel   Result Value Ref Range    Sodium 134 133 -  144 mmol/L    Potassium 3.8 3.4 - 5.3 mmol/L    Chloride 98 94 - 109 mmol/L    Carbon Dioxide 30 20 - 32 mmol/L    Anion Gap 6 3 - 14 mmol/L    Glucose 167 (H) 70 - 99 mg/dL    Urea Nitrogen 15 7 - 30 mg/dL    Creatinine 0.90 0.52 - 1.04 mg/dL    GFR Estimate 69 >60 mL/min/[1.73_m2]    GFR Estimate If Black 80 >60 mL/min/[1.73_m2]    Calcium 8.8 8.5 - 10.1 mg/dL    Bilirubin Total 0.3 0.2 - 1.3 mg/dL    Albumin 2.9 (L) 3.4 - 5.0 g/dL    Protein Total 8.2 6.8 - 8.8 g/dL    Alkaline Phosphatase 128 40 - 150 U/L    ALT 68 (H) 0 - 50 U/L    AST 35 0 - 45 U/L   Nt probnp inpatient (BNP)   Result Value Ref Range    N-Terminal Pro BNP Inpatient 60 0 - 900 pg/mL   Lactic acid whole blood   Result Value Ref Range    Lactic Acid 2.3 (H) 0.7 - 2.0 mmol/L   CRP inflammation   Result Value Ref Range    CRP Inflammation 11.1 (H) 0.0 - 8.0 mg/L   Troponin I   Result Value Ref Range    Troponin I ES <0.015 0.000 - 0.045 ug/L     Medications   ceFAZolin (ANCEF) intermittent infusion 2 g in 100 mL dextrose PRE-MIX (2 g Intravenous New Bag 5/27/21 0322)   ketorolac (TORADOL) injection 15 mg (15 mg Intravenous Given 5/27/21 0201)   0.9% sodium chloride BOLUS (500 mLs Intravenous New Bag 5/27/21 0225)             Assessments & Plan (with Medical Decision Making)   The history is provided by the patient and medical records.      No Yusuf is a 61 year old female with a past medical history significant for bilateral cellulitis of lower leg, lymphedema, sudden cardiac arrest(overdose 2019), chronic pain syndrome, HTN, opiate dependence, influenza A, hypothyroidism, GERD, fibromyalgia, polysubstance abuse, depression, bipolar affective disorder and anxiety who presents to the Emergency Department for evaluation of bilateral leg pain, swelling, and redness.  Upon arrival patient is well-appearing, afebrile, mild distress secondary to pain.  Patient hemodynamically stable and vital signs within normal limits, blood pressure 109/56, heart  rate 80 patient 93% on room air.  On examination patient with bilateral lower extremity swelling and tenderness to palpation, left lower extremity with more erythema and warmth when compared to the right.  Differential diagnosis includes but is not limited to cellulitis versus lymphedema versus peripheral edema versus chronic venous insufficiency versus DVT among others.  At this time will plan for EKG, comprehensive labs, will obtain chest x-ray given patient's complaint of shortness of breath as well has bilateral lower extremity ultrasound to rule out dvt for her lower extremity pain, swelling. Patient given IV Toradol for pain control. Per chart review patient has done well with ibuprofen in the past, has a history of opiate abuse and cardiac arrest in 2019 from overdose so will try to avoid narcotic pain medication if possible.     I reviewed EKG which demonstrates normal sinus rhythm with a ventricular rate of 77 bpm, normal axis, , diffuse T wave flattening with no acute ischemic change, no significant change when compared to prior 7/6/2020. I reviewed CXR which is demonstrates no acute cardiopulmonary process, no evidence of focal infiltrate, pneumothorax, effusion, minimal atelectasis at the left base.  I reviewed ultrasound of bilateral lower extremities which is unremarkable with no evidence of DVT, popliteal cyst, or superficial thrombophlebitis. I reviewed comprehensive labs blood cell count of 10.5, hemoglobin 12, no acute Bolick or electrolyte abnormality, lactic acid mildly elevated at 2.3, CRP 11.1,negative troponin, BNP 60.  Given patient's lower extremity pain along with warmth, and redness, concern for cellulitis.  No signs of fluid overload on chest x-ray, no evidence of DVT on ultrasound, patient does have a history of lymphedema which could be contributing to some of her lower extremity swelling/edema.  At this time will give patient a 500 cc IV fluid bolus as well as IV antibiotics  with cefazolin in the emergency department.  I reviewed patient chart and patient was last admitted for cellulitis of her lower extremities back in July 2020.  At this time patient did well with IV  Cefazolin while in patient and transition to oral Augmentin upon discharge.  Patient does not have a history of MRSA.     I discussed results with patient and on reevaluation patient continues to be nontoxic appearing, area of cellulitis marked, discussed with patient possible discharge after IV hydration, IV fluids, and discharge home with oral outpatient antibiotics with close outpatient follow-up.  Patient reports concern for discharge due to generalized weakness and concern for her being able to care for self at home, states she has difficulty ambulating, and feels too weak to go home.  At this time I discussed the case with ED observation who is agreeable to admission for IV antibiotics, PT/OT, and continue close monitoring of her lower extremity cellulitis.  Patient is agreeable to this plan.       I have reviewed the nursing notes.    I have reviewed the findings, diagnosis, plan and need for follow up with the patient.    New Prescriptions    No medications on file       Final diagnoses:   Cellulitis of left lower extremity   Leg swelling   Weakness       5/26/2021   [facility] EMERGENCY DEPARTMENT     Lauren Jaimes MD  05/27/21 1067

## 2021-05-27 NOTE — PLAN OF CARE
VSS. Lethargic but wakes up when talked to.  LS clear.  +BS. Voids spontaneously.    Observation goals:  -diagnostic tests and consults completed and resulted -in progress, labs drawn.  -vital signs normal or at patient baseline - met, VSS  -tolerating oral intake to maintain hydration - met, Tolerating combi-diet.  -adequate pain control on oral analgesics - in progress, Pain controlled with methadone.   -tolerating oral antibiotics or has plans for home infusion setup -in progress, PO abx given.  -infection is improving - not met,redness in BLE not improving  -returns to baseline functional status- met, up using her own cane   -safe disposition plan has been identified - not met.no plans for discharge at this time.   -pt evaluation completed - in progress, still observation

## 2021-05-27 NOTE — PROGRESS NOTES
[facility] [place]  Daily Progress Note          Assessment & Plan:   No Yusuf is a 61 year old female admitted on 5/27/2021. She has a past medical history significant for bilateral cellulitis of lower leg, lymphedema, sudden cardiac arrest(overdose 2019), chronic pain syndrome, HTN, opiate dependence, influenza A, hypothyroidism, GERD, fibromyalgia, polysubstance abuse, depression, bipolar affective disorder and anxiety who presents to the Emergency Department for evaluation of bilateral leg swelling, pain, and redness.      ##. BLE Edema: In ED, VSS, afebrile. Labs show normal CMP with ALT 68, glucose 167, albumin 2.9 otherwise normal. CBC normal. Lactic acid 2.3 and down to 0.9 after 500ml NS bolus. CRP 11.1 Normal BNP, troponin I. VBG 7.34, pCO2 53, pO2 55, bicarb 29. Covid 19 negative. CXR negative. US bilateral LE negative for DVT. EKG which demonstrates normal sinus rhythm with a ventricular rate of 77 bpm, normal axis, , diffuse T wave flattening with no acute ischemic change, no significant change when compared to prior 7/6/2020. In ED patient given 500ml NS bolus, ancef 2gm IV x 1, toradol 15mg IV x 1. Patient was going to be discharged from ED with po antibiotics but reportsedconcern for discharge due to generalized weakness and concern for her being able to care for self at home, states she has difficulty ambulating, and feels too weak to go home. Cellulitis vs lymphedema exacerbation vs chronic venous stasis. Given Ancef in ED. No erythema noted on area of demarcation - likely marked improvement in a few hours. PT consulted and recommended discharge back to group home.    - continue Augmentin 875mg po bid  - SW consult  - Strict I/O  - Daily weights  - Continue diuresis with Lasix - per patient 60mg daily, per PTA MAR 60mg BID.   - Follow daily CBC, inflammatory markers      ##. Generalized Weakness: Was more sleepy this am, now alert and oriented this  "afternoon.  Patient states she hadn't slept for 2 nights.  Ammonia normal. VSS, afebrile.  Utox negative. UA with no evidence of infection. PT recommending discharge back to group home.  Called staff at group home today and gave update.  #[phone]     ##. DM2: Last A1c was 6.5 on 5/20/21. . On Metformin per chart review and Levemir 20 units in am.  Held Levemir this am as patient was not eating much and sleepy.  Now more alert. Glucose 126 after eating lunch.   - Continue metformin per home regimen  -hold Levemir for now, if needed can restart at half dose  - BG checks TID ac and Qhs  - Carbohydrate Consistent Diet       ##. Chronic neuropathic pain:  - Continue with PTA Gabapentin 1200mg TID      ##. H/o Polysubstance Abuse & Opiate Dependence: Patient takes 80 mg methadone daily, last dose was yesterday.  Fills through [facility].  -continue methadone 80 mg po daily     ##. Hypothyroidism: - Continue with PTA Levothyroxine     ##. GERD: - Continue with PTA omeprazole     ##. Tobacco dependence: - Continue home nicotine gum    ##. Bipolar Disorder  ##. MDD  ##. Insomnia - Continue PTA Duloxetine, Hydroxyzine and Trazodone prn    ##. Hep C: not on treatment, followed at [facility]     ##. Constipation: - Continue with PTA Bowel Regimen       FEN: moderate CHO diet  Lines: PIV  Prophylaxis: anticipate short stay          Consults:   none         Discharge Planning:   Tomorrow am        Interval History:   No is doing ok this am, reports some LE cramping and swelling.      ROS:   Constitutional: No fevers/chills. Tolerating diet.   Cardiovascular: No chest pain or palpitations.   Respiratory: No cough or SOB.   GI: No abdominal pain. No N/V.      : No urinary complaints.   Musculoskeletal: Denies pain.           Physical Exam:   /83 (BP Location: Right arm)   Pulse 81   Temp 98  F (36.7  C) (Oral)   Resp 15   Ht 1.6 m (5' 3\")   Wt 135.2 kg (298 lb 1.6 oz)   LMP 11/01/2013   SpO2 91%   Breastfeeding No "   BMI 52.81 kg/m       GENERAL: Alert and oriented x 3. NAD.   HEENT: Anicteric sclera. Mucous membranes moist.   CV: RRR. S1, S2. No murmurs appreciated.   RESPIRATORY: Effort normal. Lungs CTAB with no wheezing, rales, rhonchi.   GI: Abdomen soft and non distended with normoactive bowel sounds present in all quadrants. No tenderness, rebound, guarding.   NEUROLOGICAL: No focal deficits. Moves all extremities.    EXTREMITIES: No peripheral edema. Intact bilateral pedal pulses.   SKIN: No jaundice. No rashes. Bilateral LE edema with slight erythema on LLE    Medication list reviewed.   Labs and imaging were reviewed.     Terrie Lopes, APRN, CNP  Ascom #[id number]

## 2021-05-27 NOTE — ED NOTES
[facility]   ED Nurse to Floor Handoff     No Yusuf is a 61 year old female who speaks English and lives with family members,  in a home  They arrived in the ED by car from home    ED Chief Complaint: Leg Swelling (Bilateral leg swelling. Left leg pain with some redness. Leg cramps when lying down.)    ED Dx;   Final diagnoses:   Cellulitis of left lower extremity   Leg swelling   Weakness         Needed?: No    Allergies:   Allergies   Allergen Reactions     Promethazine Other (See Comments) and Anxiety     Noted in 8/30/08 ER     Codeine Phosphate GI Disturbance     Lamotrigine Other (See Comments)     hyponatremia     Oxcarbazepine Unknown and Other (See Comments)     Low sodium  LegacyRecord#[id number]       Codeine Other (See Comments) and Rash     GI bleeding  LegacyRecord#[id number]     .  Past Medical Hx:   Past Medical History:   Diagnosis Date     Arthritis      Bipolar affective (H)      Fibromyalgia      Hypertension       Baseline Mental status: WDL  Current Mental Status changes: at basesline    Infection present or suspected this encounter: no  Sepsis suspected: No  Isolation type: No active isolations  Patient tested for COVID 19 prior to admission: YES     Activity level - Baseline/Home:  Cane  Activity Level - Current:   Stand with Assist and Cane    Bariatric equipment needed?: No    In the ED these meds were given:   Medications   ketorolac (TORADOL) injection 15 mg (15 mg Intravenous Given 5/27/21 0201)   0.9% sodium chloride BOLUS (0 mLs Intravenous Stopped 5/27/21 0358)   ceFAZolin (ANCEF) intermittent infusion 2 g in 100 mL dextrose PRE-MIX (0 g Intravenous Stopped 5/27/21 0358)       Drips running?  No    Home pump  No    Current LDAs  Peripheral IV 05/27/21 Anterior;Left Lower forearm (Active)   Number of days: 0       Labs results:   Labs Ordered and Resulted from Time of ED Arrival Up to the Time of Departure from the ED    COMPREHENSIVE METABOLIC PANEL - Abnormal; Notable for the following components:       Result Value    Glucose 167 (*)     Albumin 2.9 (*)     ALT 68 (*)     All other components within normal limits   LACTIC ACID WHOLE BLOOD - Abnormal; Notable for the following components:    Lactic Acid 2.3 (*)     All other components within normal limits   CRP INFLAMMATION - Abnormal; Notable for the following components:    CRP Inflammation 11.1 (*)     All other components within normal limits   ERYTHROCYTE SEDIMENTATION RATE AUTO - Abnormal; Notable for the following components:    Sed Rate 67 (*)     All other components within normal limits   ISTAT  GASES LACTATE JAQUELINE POCT - Abnormal; Notable for the following components:    PCO2 Venous 53 (*)     PO2 Venous 55 (*)     Bicarbonate Venous 29 (*)     All other components within normal limits   CBC WITH PLATELETS DIFFERENTIAL   NT PROBNP INPATIENT   TROPONIN I   SARS-COV-2 (COVID-19) VIRUS RT-PCR   PERIPHERAL IV CATHETER   NURSING DRAW AND HOLD   ISTAT CG4 GASES LACTATE JAQUELINE NURSING POCT   BLOOD CULTURE   BLOOD CULTURE       Imaging Studies:   Recent Results (from the past 24 hour(s))   XR Chest 2 Views    Narrative    EXAM: XR CHEST 2 VW  LOCATION: [facility]  DATE/TIME: 5/27/2021 12:50 AM    INDICATION: Shortness of breath  COMPARISON: 07/06/2020      Impression    IMPRESSION: Heart size and pulmonary vascularity normal. Minimal atelectasis left base, lungs otherwise clear.   US Lower Extremity Venous Duplex Bilateral    Narrative    EXAM: US LOWER EXTREMITY VENOUS DUPLEX BILATERAL  LOCATION: [facility]  DATE/TIME: 5/27/2021 12:57 AM    INDICATION: Bilateral leg pain and swelling.  COMPARISON: None.  TECHNIQUE: Venous Duplex ultrasound of bilateral lower extremities with and without compression, augmentation and duplex. Color flow and spectral Doppler with waveform analysis performed.    FINDINGS: Exam includes the common femoral, femoral,  "popliteal veins as well as segmentally visualized deep calf veins and greater saphenous vein.     RIGHT: No deep vein thrombosis. No superficial thrombophlebitis. No popliteal cyst.    LEFT: No deep vein thrombosis. No superficial thrombophlebitis. No popliteal cyst.      Impression    IMPRESSION:  1.  No deep venous thrombosis in the bilateral lower extremities.       Recent vital signs:   /88   Pulse 70   Temp 97.6  F (36.4  C) (Oral)   Resp 16   Ht 1.6 m (5' 3\")   Wt 136.1 kg (300 lb 1.6 oz)   LMP 11/01/2013   SpO2 94%   Breastfeeding No   BMI 53.16 kg/m              Cardiac Rhythm: Normal Sinus  Pt needs tele? No  Skin/wound Issues: None    Code Status: Full Code    Pain control: fair    Nausea control: pt had none    Abnormal labs/tests/findings requiring intervention:     Family present during ED course? Yes   Family Comments/Social Situation comments: Daughter in hospital w/ pt.    Tasks needing completion: None    Jaqueline Lael RN  [facility]   [phone] [place] ED  [phone] [facility] ED      "

## 2021-05-27 NOTE — PHARMACY-CONSULT NOTE
Methadone Clinic Information Note    Clinic Name: [facility]   Clinic Location (city): [place]  Phone Number: [phone]    Patient received methadone 80mg on 5/26/21 at the clinic and received 13 take-out doses. She is due to return to the clinic on 6/9/26.    Blanca Hines RPH on 5/27/2021 at 9:56 AM

## 2021-05-27 NOTE — PROGRESS NOTES
Observation goals:  -diagnostic tests and consults completed and resulted -in progress, labs drawn.  -vital signs normal or at patient baseline - met, VSS  -tolerating oral intake to maintain hydration - met, Tolerating combi-diet.  -adequate pain control on oral analgesics - in progress, Pain controlled with methadone.   -tolerating oral antibiotics or has plans for home infusion setup -in progress, PO abx given.  -infection is improving - not met,redness in BLE not improving  -returns to baseline functional status- met, up using her own cane   -safe disposition plan has been identified - not met.no plans for discharge at this time.   -pt evaluation completed - in progress, still observation  Pt to be transferred to ED/OBS, report given to Yanick ANGUIANO.

## 2021-05-27 NOTE — PLAN OF CARE
Pt arrived from [place] ED at 0600. Complains of soreness in BLE, awaiting orders. Voided upon arrival. PIV SL. BLE with redness and +2 edema, L leg is slightly more red and tender per pt. Up with assist of 1 and cane. Oriented to room and call light, pt currently sleeping.

## 2021-05-27 NOTE — PHARMACY-ADMISSION MEDICATION HISTORY
Admission Medication History Completed by Pharmacy    See [facility] Admission Navigator for allergy information, preferred outpatient pharmacy, prior to admission medications and immunization status.     Medication History Sources:     MAR from [facility], Phone [phone]    Changes made to PTA medication list (reason):    Added: insulin detemir, atorvastatin, Advair    Deleted: albuterol, Tums, Benadryl, hydroxyzine, lidocaine patch, milk of mag, nystatin cream, Miralax,     Changed: Lasix to once daily (was BID), Tylenol from PRN to scheduled, Volataren gel to BID, melatonin from 3mg to 5mg,     Additional Information:    It's possible PRN meds were not included on list. Will add back albuterol PRN.     Prior to Admission medications    Medication Sig Last Dose Taking? Auth Provider   acetaminophen (TYLENOL) 500 MG tablet Take 500 mg by mouth 3 times daily  5/26/2021 at Unknown time Yes Reported, Patient   amLODIPine (NORVASC) 5 MG tablet Take 5 mg by mouth daily 5/26/2021 at Unknown time Yes Unknown, Entered By History   atorvastatin (LIPITOR) 20 MG tablet Take 20 mg by mouth daily 5/26/2021 at Unknown time Yes Unknown, Entered By History   cholecalciferol 25 MCG (1000 UT) TABS Take 1,000 Units by mouth daily  5/26/2021 at Unknown time Yes Zena Wheeler MD   diclofenac (VOLTAREN) 1 % topical gel Apply 4 g topically 4 times daily  Patient taking differently: Apply 2-4 g topically 2 times daily  Past Week at Unknown time Yes Regina Fragoso MD   DULoxetine (CYMBALTA) 60 MG capsule Take 60 mg by mouth daily 5/26/2021 at Unknown time Yes Unknown, Entered By History   fluticasone-salmeterol (ADVAIR) 250-50 MCG/DOSE inhaler Inhale 1 puff into the lungs 2 times daily 5/26/2021 at Unknown time Yes Unknown, Entered By History   furosemide (LASIX) 20 MG tablet Take 3 tablets (60 mg) by mouth 2 times daily  Patient taking differently: Take 60 mg by mouth daily  5/26/2021 at Unknown time Yes Abel  MD Tl   gabapentin (NEURONTIN) 600 MG tablet Take 1,200 mg by mouth 3 times daily 5/26/2021 at Unknown time Yes Unknown, Entered By History   insulin detemir (LEVEMIR PEN) 100 UNIT/ML pen Inject 20 Units Subcutaneous every morning 5/26/2021 at Unknown time Yes Unknown, Entered By History   levothyroxine (SYNTHROID/LEVOTHROID) 200 MCG tablet  5/26/2021 at Unknown time Yes Reported, Patient   melatonin 3 MG tablet Take 5 mg by mouth At Bedtime  5/26/2021 at Unknown time Yes Unknown, Entered By History   metFORMIN (GLUCOPHAGE) 1000 MG tablet Take 1,000 mg by mouth 2 times daily (with meals) 5/26/2021 at Unknown time Yes Reported, Patient   methadone HCl 10 MG/5ML SOLN Take 80 mg by mouth daily 10mg/mL strength 5/26/2021 at Unknown time Yes Reported, Patient   multivitamin w/minerals (MULTI-VITAMIN) tablet Take 1 tablet by mouth daily 5/26/2021 at Unknown time Yes Reported, Patient   nicotine polacrilex (NICORETTE) 4 MG gum Place 4 mg inside cheek every hour as needed for smoking cessation  5/26/2021 at Unknown time Yes Unknown, Entered By History   potassium chloride ER (KLOR-CON) 8 MEQ CR tablet Take 8 mEq by mouth 2 times daily 5/26/2021 at Unknown time Yes Unknown, Entered By History   traZODone (DESYREL) 50 MG tablet Take 50 mg by mouth At Bedtime 5/25/2021 at Unknown time Yes Unknown, Entered By History   vitamin B-12 (CYANOCOBALAMIN) 1000 MCG tablet Take 1,000 mcg by mouth daily 5/26/2021 at Unknown time Yes Unknown, Entered By History   albuterol (PROAIR HFA/PROVENTIL HFA/VENTOLIN HFA) 108 (90 Base) MCG/ACT inhaler Inhale 1-2 puffs into the lungs every 4 hours as needed for shortness of breath / dyspnea or wheezing Unknown at Unknown time  Unknown, Entered By History   NARCAN 4 MG/0.1ML nasal spray CALL 911. SPRAY CONTENTS OF ONE SPRAYER (0.1ML) INTO ONE NOSTRIL. REPEAT IN 2-3 MINS IF SYMPTOMS OF OPIOID PERSIST, ALTERNATE NOSTRILS Unknown at Unknown time  Reported, Patient   omeprazole (PRILOSEC) 20 MG  DR capsule Take 20 mg by mouth daily  Unknown at Unknown time  Unknown, Entered By History       Date completed: 05/27/21    Medication history completed by: Blanca Hines [facility]

## 2021-05-28 VITALS
TEMPERATURE: 98.5 F | RESPIRATION RATE: 20 BRPM | HEART RATE: 89 BPM | SYSTOLIC BLOOD PRESSURE: 126 MMHG | OXYGEN SATURATION: 95 % | HEIGHT: 63 IN | WEIGHT: 293 LBS | DIASTOLIC BLOOD PRESSURE: 73 MMHG | BODY MASS INDEX: 51.91 KG/M2

## 2021-05-28 LAB
ALBUMIN SERPL-MCNC: 2.9 G/DL (ref 3.4–5)
ALBUMIN SERPL-MCNC: NORMAL G/DL (ref 3.4–5)
ALP SERPL-CCNC: 106 U/L (ref 40–150)
ALP SERPL-CCNC: NORMAL U/L (ref 40–150)
ALT SERPL W P-5'-P-CCNC: 55 U/L (ref 0–50)
ALT SERPL W P-5'-P-CCNC: NORMAL U/L (ref 0–50)
ANION GAP SERPL CALCULATED.3IONS-SCNC: 4 MMOL/L (ref 3–14)
ANION GAP SERPL CALCULATED.3IONS-SCNC: NORMAL MMOL/L (ref 6–17)
AST SERPL W P-5'-P-CCNC: 35 U/L (ref 0–45)
AST SERPL W P-5'-P-CCNC: NORMAL U/L (ref 0–45)
BASOPHILS # BLD AUTO: 0.1 10E9/L (ref 0–0.2)
BASOPHILS NFR BLD AUTO: 0.7 %
BILIRUB SERPL-MCNC: 0.4 MG/DL (ref 0.2–1.3)
BILIRUB SERPL-MCNC: NORMAL MG/DL (ref 0.2–1.3)
BUN SERPL-MCNC: 14 MG/DL (ref 7–30)
BUN SERPL-MCNC: NORMAL MG/DL (ref 7–30)
CALCIUM SERPL-MCNC: 8.6 MG/DL (ref 8.5–10.1)
CALCIUM SERPL-MCNC: NORMAL MG/DL (ref 8.5–10.1)
CHLORIDE SERPL-SCNC: 101 MMOL/L (ref 94–109)
CHLORIDE SERPL-SCNC: NORMAL MMOL/L (ref 94–109)
CO2 SERPL-SCNC: 30 MMOL/L (ref 20–32)
CO2 SERPL-SCNC: NORMAL MMOL/L (ref 20–32)
CREAT SERPL-MCNC: 0.82 MG/DL (ref 0.52–1.04)
CREAT SERPL-MCNC: NORMAL MG/DL (ref 0.52–1.04)
CRP SERPL-MCNC: 13 MG/L (ref 0–8)
CRP SERPL-MCNC: NORMAL MG/L (ref 0–8)
DIFFERENTIAL METHOD BLD: ABNORMAL
DIFFERENTIAL METHOD BLD: NORMAL
EOSINOPHIL # BLD AUTO: 0.2 10E9/L (ref 0–0.7)
EOSINOPHIL NFR BLD AUTO: 2.7 %
ERYTHROCYTE [DISTWIDTH] IN BLOOD BY AUTOMATED COUNT: 15.3 % (ref 10–15)
ERYTHROCYTE [DISTWIDTH] IN BLOOD BY AUTOMATED COUNT: NORMAL % (ref 10–15)
GFR SERPL CREATININE-BSD FRML MDRD: 77 ML/MIN/{1.73_M2}
GFR SERPL CREATININE-BSD FRML MDRD: NORMAL ML/MIN/{1.73_M2}
GLUCOSE SERPL-MCNC: 99 MG/DL (ref 70–99)
GLUCOSE SERPL-MCNC: NORMAL MG/DL (ref 70–99)
HCT VFR BLD AUTO: 38.9 % (ref 35–47)
HCT VFR BLD AUTO: NORMAL % (ref 35–47)
HGB BLD-MCNC: 12.2 G/DL (ref 11.7–15.7)
HGB BLD-MCNC: NORMAL G/DL (ref 11.7–15.7)
IMM GRANULOCYTES # BLD: 0.1 10E9/L (ref 0–0.4)
IMM GRANULOCYTES NFR BLD: 1.3 %
LYMPHOCYTES # BLD AUTO: 2 10E9/L (ref 0.8–5.3)
LYMPHOCYTES NFR BLD AUTO: 23.9 %
MCH RBC QN AUTO: 28.6 PG (ref 26.5–33)
MCH RBC QN AUTO: NORMAL PG (ref 26.5–33)
MCHC RBC AUTO-ENTMCNC: 31.4 G/DL (ref 31.5–36.5)
MCHC RBC AUTO-ENTMCNC: NORMAL G/DL (ref 31.5–36.5)
MCV RBC AUTO: 91 FL (ref 78–100)
MCV RBC AUTO: NORMAL FL (ref 78–100)
MONOCYTES # BLD AUTO: 0.8 10E9/L (ref 0–1.3)
MONOCYTES NFR BLD AUTO: 9.3 %
NEUTROPHILS # BLD AUTO: 5.2 10E9/L (ref 1.6–8.3)
NEUTROPHILS NFR BLD AUTO: 62.1 %
NRBC # BLD AUTO: 0 10*3/UL
NRBC BLD AUTO-RTO: 0 /100
PLATELET # BLD AUTO: 254 10E9/L (ref 150–450)
PLATELET # BLD AUTO: NORMAL 10E9/L (ref 150–450)
POTASSIUM SERPL-SCNC: 4 MMOL/L (ref 3.4–5.3)
POTASSIUM SERPL-SCNC: NORMAL MMOL/L (ref 3.4–5.3)
PROT SERPL-MCNC: 8.3 G/DL (ref 6.8–8.8)
PROT SERPL-MCNC: NORMAL G/DL (ref 6.8–8.8)
RBC # BLD AUTO: 4.27 10E12/L (ref 3.8–5.2)
RBC # BLD AUTO: NORMAL 10E12/L (ref 3.8–5.2)
SODIUM SERPL-SCNC: 134 MMOL/L (ref 133–144)
SODIUM SERPL-SCNC: NORMAL MMOL/L (ref 133–144)
WBC # BLD AUTO: 8.4 10E9/L (ref 4–11)
WBC # BLD AUTO: NORMAL 10E9/L (ref 4–11)

## 2021-05-28 PROCEDURE — 96372 THER/PROPH/DIAG INJ SC/IM: CPT | Mod: 59 | Performed by: PHYSICIAN ASSISTANT

## 2021-05-28 PROCEDURE — 250N000013 HC RX MED GY IP 250 OP 250 PS 637: Performed by: PHYSICIAN ASSISTANT

## 2021-05-28 PROCEDURE — 99217 PR OBSERVATION CARE DISCHARGE: CPT | Performed by: FAMILY MEDICINE

## 2021-05-28 PROCEDURE — 36415 COLL VENOUS BLD VENIPUNCTURE: CPT | Performed by: FAMILY MEDICINE

## 2021-05-28 PROCEDURE — 86140 C-REACTIVE PROTEIN: CPT | Performed by: NURSE PRACTITIONER

## 2021-05-28 PROCEDURE — 80053 COMPREHEN METABOLIC PANEL: CPT | Performed by: NURSE PRACTITIONER

## 2021-05-28 PROCEDURE — 36415 COLL VENOUS BLD VENIPUNCTURE: CPT | Performed by: NURSE PRACTITIONER

## 2021-05-28 PROCEDURE — 85025 COMPLETE CBC W/AUTO DIFF WBC: CPT | Performed by: FAMILY MEDICINE

## 2021-05-28 PROCEDURE — G0378 HOSPITAL OBSERVATION PER HR: HCPCS

## 2021-05-28 PROCEDURE — 250N000013 HC RX MED GY IP 250 OP 250 PS 637: Performed by: NURSE PRACTITIONER

## 2021-05-28 PROCEDURE — 250N000011 HC RX IP 250 OP 636: Performed by: PHYSICIAN ASSISTANT

## 2021-05-28 PROCEDURE — 250N000013 HC RX MED GY IP 250 OP 250 PS 637: Performed by: FAMILY MEDICINE

## 2021-05-28 RX ADMIN — ENOXAPARIN SODIUM 40 MG: 40 INJECTION, SOLUTION INTRAVENOUS; SUBCUTANEOUS at 12:19

## 2021-05-28 RX ADMIN — GABAPENTIN 1200 MG: 600 TABLET, FILM COATED ORAL at 09:02

## 2021-05-28 RX ADMIN — FUROSEMIDE 60 MG: 20 TABLET ORAL at 08:59

## 2021-05-28 RX ADMIN — GABAPENTIN 1200 MG: 600 TABLET, FILM COATED ORAL at 13:50

## 2021-05-28 RX ADMIN — POTASSIUM CHLORIDE 8 MEQ: 600 TABLET, FILM COATED, EXTENDED RELEASE ORAL at 09:02

## 2021-05-28 RX ADMIN — METHADONE HYDROCHLORIDE 80 MG: 10 CONCENTRATE ORAL at 09:28

## 2021-05-28 RX ADMIN — DULOXETINE HYDROCHLORIDE 60 MG: 60 CAPSULE, DELAYED RELEASE ORAL at 08:59

## 2021-05-28 RX ADMIN — OMEPRAZOLE 20 MG: 20 CAPSULE, DELAYED RELEASE ORAL at 08:59

## 2021-05-28 RX ADMIN — ATORVASTATIN CALCIUM 20 MG: 20 TABLET, FILM COATED ORAL at 08:59

## 2021-05-28 RX ADMIN — AMOXICILLIN AND CLAVULANATE POTASSIUM 1 TABLET: 875; 125 TABLET, FILM COATED ORAL at 08:59

## 2021-05-28 RX ADMIN — AMLODIPINE BESYLATE 5 MG: 5 TABLET ORAL at 08:59

## 2021-05-28 RX ADMIN — METFORMIN HYDROCHLORIDE 1000 MG: 500 TABLET ORAL at 08:59

## 2021-05-28 RX ADMIN — LEVOTHYROXINE SODIUM 200 MCG: 100 TABLET ORAL at 08:58

## 2021-05-28 NOTE — PLAN OF CARE
"Observation Goals:   -diagnostic tests and consults completed and resulted: Not met     -vital signs normal or at patient baseline: met     -tolerating oral intake to maintain hydration: met     -adequate pain control on oral analgesics: in progress     -tolerating oral antibiotics or has plans for home infusion setup: met    -infection is improving: in progress     -returns to baseline functional status: not met     -safe disposition plan has been identified: Met      -pt evaluation completed: met       /88 (BP Location: Right arm)   Pulse 84   Temp 98.5  F (36.9  C) (Oral)   Resp 20   Ht 1.6 m (5' 3\")   Wt 135.2 kg (298 lb 1.6 oz)   LMP 11/01/2013   SpO2 93%   Breastfeeding No   BMI 52.81 kg/m         "

## 2021-05-28 NOTE — PROGRESS NOTES
BRAYDEN  (SHEYLA) was notified of patient discharge,nursing report given to her,discharge med script sent to pharmacy used by [facility]..[facility] is providing transportation ride for patient .Patient ready for discharge waiting for ride to [facility].

## 2021-05-28 NOTE — DISCHARGE SUMMARY
Discharge Summary    No Yusuf MRN# [id number]   YOB: 1960 Age: 61 year old     Date of Admission:  5/27/2021  Date of Discharge:  5/28/2021  Admitting Physician:  Misael Angel MD  Discharge Physician:  Misael Angel MD   Discharging Service:  Emergency Medicine     Primary Provider: [facility]          Discharge Diagnosis:     Cellulitis    Leg swelling    * No resolved hospital problems. *               Discharge Disposition:   Discharged to group home           Condition on Discharge:   Discharge condition: Stable   Code status on discharge: Full Code           Procedures:   No procedures performed during this admission          Discharge Medications:     Current Discharge Medication List      START taking these medications    Details   amoxicillin-clavulanate (AUGMENTIN) 875-125 MG tablet Take 1 tablet by mouth every 12 hours for 7 days  Qty: 14 tablet, Refills: 0    Associated Diagnoses: Cellulitis of left lower extremity         CONTINUE these medications which have NOT CHANGED    Details   acetaminophen (TYLENOL) 500 MG tablet Take 500 mg by mouth 3 times daily       amLODIPine (NORVASC) 5 MG tablet Take 5 mg by mouth daily      atorvastatin (LIPITOR) 20 MG tablet Take 20 mg by mouth daily      cholecalciferol 25 MCG (1000 UT) TABS Take 1,000 Units by mouth daily       diclofenac (VOLTAREN) 1 % topical gel Apply 4 g topically 4 times daily  Qty: 350 g, Refills: 1    Associated Diagnoses: Patellofemoral arthritis; Impingement syndrome of right shoulder      DULoxetine (CYMBALTA) 60 MG capsule Take 60 mg by mouth daily      fluticasone-salmeterol (ADVAIR) 250-50 MCG/DOSE inhaler Inhale 1 puff into the lungs 2 times daily      furosemide (LASIX) 20 MG tablet Take 3 tablets (60 mg) by mouth 2 times daily  Qty: 60 tablet, Refills: 0    Associated Diagnoses: Lymphedema      gabapentin (NEURONTIN) 600 MG tablet Take 1,200 mg by mouth 3 times daily      insulin detemir (LEVEMIR PEN) 100  UNIT/ML pen Inject 20 Units Subcutaneous every morning      levothyroxine (SYNTHROID/LEVOTHROID) 200 MCG tablet       melatonin 3 MG tablet Take 5 mg by mouth At Bedtime       metFORMIN (GLUCOPHAGE) 1000 MG tablet Take 1,000 mg by mouth 2 times daily (with meals)      methadone HCl 10 MG/5ML SOLN Take 80 mg by mouth daily 10mg/mL strength      multivitamin w/minerals (MULTI-VITAMIN) tablet Take 1 tablet by mouth daily      nicotine polacrilex (NICORETTE) 4 MG gum Place 4 mg inside cheek every hour as needed for smoking cessation       potassium chloride ER (KLOR-CON) 8 MEQ CR tablet Take 8 mEq by mouth 2 times daily      traZODone (DESYREL) 50 MG tablet Take 50 mg by mouth At Bedtime      vitamin B-12 (CYANOCOBALAMIN) 1000 MCG tablet Take 1,000 mcg by mouth daily      albuterol (PROAIR HFA/PROVENTIL HFA/VENTOLIN HFA) 108 (90 Base) MCG/ACT inhaler Inhale 1-2 puffs into the lungs every 4 hours as needed for shortness of breath / dyspnea or wheezing    Comments: Pharmacy may dispense brand covered by insurance (Proair, or proventil or ventolin or generic albuterol inhaler)      NARCAN 4 MG/0.1ML nasal spray CALL 911. SPRAY CONTENTS OF ONE SPRAYER (0.1ML) INTO ONE NOSTRIL. REPEAT IN 2-3 MINS IF SYMPTOMS OF OPIOID PERSIST, ALTERNATE NOSTRILS  Refills: 0      omeprazole (PRILOSEC) 20 MG DR capsule Take 20 mg by mouth daily          STOP taking these medications       calcium carbonate (TUMS) 500 MG chewable tablet Comments:   Reason for Stopping:         diphenhydrAMINE (BENADRYL) 25 MG capsule Comments:   Reason for Stopping:         hydrOXYzine (ATARAX) 50 MG tablet Comments:   Reason for Stopping:         magnesium hydroxide (MILK OF MAGNESIA) 400 MG/5ML suspension Comments:   Reason for Stopping:                     Consultations:   No consultations were requested during this admission             Brief History of Illness:   Please see detailed H&P from 5/27/21, in brief: No Yusuf is a 61 year old female  admitted on 5/27/2021. She has a past medical history significant for bilateral cellulitis of lower leg, lymphedema, sudden cardiac arrest(overdose 2019), chronic pain syndrome, HTN, opiate dependence, influenza A, hypothyroidism, GERD, fibromyalgia, polysubstance abuse, depression, bipolar affective disorder and anxiety who presents to the Emergency Department for evaluation of bilateral leg swelling, pain, and redness.           Hospital Course:      ##. BLE Edema: In ED, VSS, afebrile. Labs show normal CMP with ALT 68, glucose 167, albumin 2.9 otherwise normal. CBC normal. Lactic acid 2.3 and down to 0.9 after 500ml NS bolus. CRP 11.1 Normal BNP, troponin I. VBG 7.34, pCO2 53, pO2 55, bicarb 29. Covid 19 negative. CXR negative. US bilateral LE negative for DVT. EKG which demonstrates normal sinus rhythm with a ventricular rate of 77 bpm, normal axis, , diffuse T wave flattening with no acute ischemic change, no significant change when compared to prior 7/6/2020. In ED patient given 500ml NS bolus, ancef 2gm IV x 1, toradol 15mg IV x 1. Patient was going to be discharged from ED with po antibiotics but reportsedconcern for discharge due to generalized weakness and concern for her being able to care for self at home, states she has difficulty ambulating, and feels too weak to go home. Cellulitis vs lymphedema exacerbation vs chronic venous stasis. Given Ancef in ED. She was given PO Augmenting throughout her observation unit stay. PT consulted and recommended discharge back to group home. Today, her LE are looking improved, LLE shows erythema receding from previous marked borders.  VSS, afebrile. No leukocytosis.  CRP 13.  She is stable for discharge back to the group home.    - continue Augmentin 875mg po bid X 7 days  - follow up with PCP, Dr. Naranjo in 1 week  -when to return to the ED reviewed with the patient  - continue PTA Lasix  -elevated LE when not sitting or ambulating     ##. Generalized Weakness:  "Was more sleepy this am, now alert and oriented this afternoon.  Patient states she hadn't slept for 2 nights.  Ammonia normal. VSS, afebrile.  Utox negative. UA with no evidence of infection. PT recommending discharge back to group home.  Called staff at group home today and gave update.  #[phone]  -resolved.     ##. DM2: Last A1c was 6.5 on 5/20/21. . On Metformin per chart review and Levemir 20 units in am.  Held Levemir this am as patient was not eating much and sleepy.  Now more alert. Glucose  without Levemir.  I suspect she may eat different foods with more carbs at the group home.    - Continue metformin per home regimen  -hold Levemir until glucose >200  -check glucose before meals and bedtime at group home  - if glucose persistently in low 100s, call PCP to adjust or stop her Levemir dose        ##. Chronic neuropathic pain:  - Continue with PTA Gabapentin 1200mg TID      ##. H/o Polysubstance Abuse & Opiate Dependence: Patient takes 80 mg methadone daily, last dose was yesterday.  [place] [facility] [place]  -continue methadone 80 mg po daily     ##. Hypothyroidism: - Continue with PTA Levothyroxine     ##. GERD: - Continue with PTA omeprazole     ##. Tobacco dependence: - Continue home nicotine gum    ##. Bipolar Disorder  ##. MDD  ##. Insomnia - Continue PTA Duloxetine, Hydroxyzine and Trazodone prn    ##. Hep C: not on treatment, [place] [facility] [place]     ##. Constipation: - Continue with PTA Bowel Regimen                  Final Day of Progress before Discharge:       Physical Exam:  Blood pressure 126/73, pulse 89, temperature 98.5  F (36.9  C), temperature source Oral, resp. rate 20, height 1.6 m (5' 3\"), weight 135.2 kg (298 lb 1.6 oz), last menstrual period 11/01/2013, SpO2 95 %, not currently breastfeeding.    EXAM:  GENERAL: Alert and oriented x 3. NAD.   HEENT: Anicteric sclera. Mucous membranes moist.   CV: RRR. S1, S2. No murmurs appreciated.   RESPIRATORY: Effort normal. Lungs CTAB with " no wheezing, rales, rhonchi.   GI: Abdomen soft and non distended with normoactive bowel sounds present in all quadrants. No tenderness, rebound, guarding.   NEUROLOGICAL: No focal deficits. Moves all extremities.    EXTREMITIES: No peripheral edema. Intact bilateral pedal pulses.   SKIN: No jaundice. No rashes. Bilateral LE edema with slight erythema on LLE       Data:  All laboratory data reviewed             Significant Results:     Results for orders placed or performed during the hospital encounter of 05/27/21   XR Chest 2 Views     Status: None    Narrative    EXAM: XR CHEST 2 VW  LOCATION: [facility]  DATE/TIME: 5/27/2021 12:50 AM    INDICATION: Shortness of breath  COMPARISON: 07/06/2020      Impression    IMPRESSION: Heart size and pulmonary vascularity normal. Minimal atelectasis left base, lungs otherwise clear.   US Lower Extremity Venous Duplex Bilateral     Status: None    Narrative    EXAM: US LOWER EXTREMITY VENOUS DUPLEX BILATERAL  LOCATION: [facility]  DATE/TIME: 5/27/2021 12:57 AM    INDICATION: Bilateral leg pain and swelling.  COMPARISON: None.  TECHNIQUE: Venous Duplex ultrasound of bilateral lower extremities with and without compression, augmentation and duplex. Color flow and spectral Doppler with waveform analysis performed.    FINDINGS: Exam includes the common femoral, femoral, popliteal veins as well as segmentally visualized deep calf veins and greater saphenous vein.     RIGHT: No deep vein thrombosis. No superficial thrombophlebitis. No popliteal cyst.    LEFT: No deep vein thrombosis. No superficial thrombophlebitis. No popliteal cyst.      Impression    IMPRESSION:  1.  No deep venous thrombosis in the bilateral lower extremities.   CBC with platelets differential     Status: None   Result Value Ref Range    WBC 10.5 4.0 - 11.0 10e9/L    RBC Count 4.16 3.8 - 5.2 10e12/L    Hemoglobin 12.0 11.7 - 15.7 g/dL    Hematocrit 37.6 35.0 - 47.0 %    MCV 90 78 -  100 fl    MCH 28.8 26.5 - 33.0 pg    MCHC 31.9 31.5 - 36.5 g/dL    RDW 15.0 10.0 - 15.0 %    Platelet Count 249 150 - 450 10e9/L    Diff Method Automated Method     % Neutrophils 70.3 %    % Lymphocytes 16.5 %    % Monocytes 9.0 %    % Eosinophils 2.6 %    % Basophils 0.7 %    % Immature Granulocytes 0.9 %    Nucleated RBCs 0 0 /100    Absolute Neutrophil 7.4 1.6 - 8.3 10e9/L    Absolute Lymphocytes 1.7 0.8 - 5.3 10e9/L    Absolute Monocytes 0.9 0.0 - 1.3 10e9/L    Absolute Eosinophils 0.3 0.0 - 0.7 10e9/L    Absolute Basophils 0.1 0.0 - 0.2 10e9/L    Abs Immature Granulocytes 0.1 0 - 0.4 10e9/L    Absolute Nucleated RBC 0.0    Comprehensive metabolic panel     Status: Abnormal   Result Value Ref Range    Sodium 134 133 - 144 mmol/L    Potassium 3.8 3.4 - 5.3 mmol/L    Chloride 98 94 - 109 mmol/L    Carbon Dioxide 30 20 - 32 mmol/L    Anion Gap 6 3 - 14 mmol/L    Glucose 167 (H) 70 - 99 mg/dL    Urea Nitrogen 15 7 - 30 mg/dL    Creatinine 0.90 0.52 - 1.04 mg/dL    GFR Estimate 69 >60 mL/min/[1.73_m2]    GFR Estimate If Black 80 >60 mL/min/[1.73_m2]    Calcium 8.8 8.5 - 10.1 mg/dL    Bilirubin Total 0.3 0.2 - 1.3 mg/dL    Albumin 2.9 (L) 3.4 - 5.0 g/dL    Protein Total 8.2 6.8 - 8.8 g/dL    Alkaline Phosphatase 128 40 - 150 U/L    ALT 68 (H) 0 - 50 U/L    AST 35 0 - 45 U/L   Nt probnp inpatient (BNP)     Status: None   Result Value Ref Range    N-Terminal Pro BNP Inpatient 60 0 - 900 pg/mL   Lactic acid whole blood     Status: Abnormal   Result Value Ref Range    Lactic Acid 2.3 (H) 0.7 - 2.0 mmol/L   CRP inflammation     Status: Abnormal   Result Value Ref Range    CRP Inflammation 11.1 (H) 0.0 - 8.0 mg/L   Erythrocyte sedimentation rate auto     Status: Abnormal   Result Value Ref Range    Sed Rate 67 (H) 0 - 30 mm/h   Troponin I     Status: None   Result Value Ref Range    Troponin I ES <0.015 0.000 - 0.045 ug/L   Asymptomatic SARS-CoV-2 COVID-19 Virus (Coronavirus) by PCR     Status: None    Specimen:  Nasopharyngeal   Result Value Ref Range    SARS-CoV-2 Virus Specimen Source Nasopharyngeal     SARS-CoV-2 PCR Result NEGATIVE     SARS-CoV-2 PCR Comment (Note)    Hemoglobin A1c     Status: Abnormal   Result Value Ref Range    Hemoglobin A1C 6.5 (H) 0 - 5.6 %   Glucose by meter     Status: Abnormal   Result Value Ref Range    Glucose 166 (H) 70 - 99 mg/dL   Drug abuse screen 4 chem dep urine (, [facility])     Status: None   Result Value Ref Range    Amphetamine Qual Urine Negative NEG^Negative    Cannabinoids Qual Urine Negative NEG^Negative    Cocaine Qual Urine Negative NEG^Negative    Ethanol Qual Urine Negative NEG^Negative    Opiates Qualitative Urine Negative NEG^Negative   UA with Microscopic reflex to Culture     Status: Abnormal    Specimen: Random Urine; Unspecified Urine   Result Value Ref Range    Color Urine Light Yellow     Appearance Urine Clear     Glucose Urine Negative NEG^Negative mg/dL    Bilirubin Urine Negative NEG^Negative    Ketones Urine Negative NEG^Negative mg/dL    Specific Gravity Urine 1.015 1.003 - 1.035    Blood Urine Negative NEG^Negative    pH Urine 5.0 5.0 - 7.0 pH    Protein Albumin Urine Negative NEG^Negative mg/dL    Urobilinogen mg/dL Normal 0.0 - 2.0 mg/dL    Nitrite Urine Negative NEG^Negative    Leukocyte Esterase Urine Trace (A) NEG^Negative    Source Unspecified Urine     WBC Urine 4 0 - 5 /HPF    RBC Urine <1 0 - 2 /HPF    Squamous Epithelial /HPF Urine 1 0 - 1 /HPF    Mucous Urine Present (A) NEG^Negative /LPF   CBC with platelets differential     Status: Abnormal   Result Value Ref Range    WBC 9.4 4.0 - 11.0 10e9/L    RBC Count 4.50 3.8 - 5.2 10e12/L    Hemoglobin 12.9 11.7 - 15.7 g/dL    Hematocrit 41.6 35.0 - 47.0 %    MCV 92 78 - 100 fl    MCH 28.7 26.5 - 33.0 pg    MCHC 31.0 (L) 31.5 - 36.5 g/dL    RDW 15.3 (H) 10.0 - 15.0 %    Platelet Count 244 150 - 450 10e9/L    Diff Method Automated Method     % Neutrophils 67.6 %    % Lymphocytes 18.7 %    % Monocytes 9.2 %    %  Eosinophils 2.9 %    % Basophils 0.7 %    % Immature Granulocytes 0.9 %    Nucleated RBCs 0 0 /100    Absolute Neutrophil 6.4 1.6 - 8.3 10e9/L    Absolute Lymphocytes 1.8 0.8 - 5.3 10e9/L    Absolute Monocytes 0.9 0.0 - 1.3 10e9/L    Absolute Eosinophils 0.3 0.0 - 0.7 10e9/L    Absolute Basophils 0.1 0.0 - 0.2 10e9/L    Abs Immature Granulocytes 0.1 0 - 0.4 10e9/L    Absolute Nucleated RBC 0.0    CRP inflammation     Status: Abnormal   Result Value Ref Range    CRP Inflammation 13.0 (H) 0.0 - 8.0 mg/L   Glucose by meter     Status: Abnormal   Result Value Ref Range    Glucose 126 (H) 70 - 99 mg/dL   Ammonia     Status: None   Result Value Ref Range    Ammonia 27 10 - 50 umol/L   Glucose by meter     Status: Abnormal   Result Value Ref Range    Glucose 114 (H) 70 - 99 mg/dL   Glucose by meter     Status: Abnormal   Result Value Ref Range    Glucose 146 (H) 70 - 99 mg/dL   Comprehensive metabolic panel     Status: None   Result Value Ref Range    Sodium Canceled, Test credited 133 - 144 mmol/L    Potassium Canceled, Test credited 3.4 - 5.3 mmol/L    Chloride Canceled, Test credited 94 - 109 mmol/L    Carbon Dioxide Canceled, Test credited 20 - 32 mmol/L    Anion Gap Canceled, Test credited 6 - 17 mmol/L    Glucose Canceled, Test credited 70 - 99 mg/dL    Urea Nitrogen Canceled, Test credited 7 - 30 mg/dL    Creatinine Canceled, Test credited 0.52 - 1.04 mg/dL    GFR Estimate Canceled, Test credited >60 mL/min/[1.73_m2]    GFR Estimate If Black Canceled, Test credited >60 mL/min/[1.73_m2]    Calcium Canceled, Test credited 8.5 - 10.1 mg/dL    Bilirubin Total Canceled, Test credited 0.2 - 1.3 mg/dL    Albumin Canceled, Test credited 3.4 - 5.0 g/dL    Protein Total Canceled, Test credited 6.8 - 8.8 g/dL    Alkaline Phosphatase Canceled, Test credited 40 - 150 U/L    ALT Canceled, Test credited 0 - 50 U/L    AST Canceled, Test credited 0 - 45 U/L   CBC with platelets differential     Status: None   Result Value Ref Range     WBC Canceled, Test credited 4.0 - 11.0 10e9/L    RBC Count Canceled, Test credited 3.8 - 5.2 10e12/L    Hemoglobin Canceled, Test credited 11.7 - 15.7 g/dL    Hematocrit Canceled, Test credited 35.0 - 47.0 %    MCV Canceled, Test credited 78 - 100 fl    MCH Canceled, Test credited 26.5 - 33.0 pg    MCHC Canceled, Test credited 31.5 - 36.5 g/dL    RDW Canceled, Test credited 10.0 - 15.0 %    Platelet Count Canceled, Test credited 150 - 450 10e9/L    Diff Method Canceled, Test credited    CRP inflammation     Status: None   Result Value Ref Range    CRP Inflammation Canceled, Test credited 0.0 - 8.0 mg/L   CBC with platelets differential     Status: Abnormal   Result Value Ref Range    WBC 8.4 4.0 - 11.0 10e9/L    RBC Count 4.27 3.8 - 5.2 10e12/L    Hemoglobin 12.2 11.7 - 15.7 g/dL    Hematocrit 38.9 35.0 - 47.0 %    MCV 91 78 - 100 fl    MCH 28.6 26.5 - 33.0 pg    MCHC 31.4 (L) 31.5 - 36.5 g/dL    RDW 15.3 (H) 10.0 - 15.0 %    Platelet Count 254 150 - 450 10e9/L    Diff Method Automated Method     % Neutrophils 62.1 %    % Lymphocytes 23.9 %    % Monocytes 9.3 %    % Eosinophils 2.7 %    % Basophils 0.7 %    % Immature Granulocytes 1.3 %    Nucleated RBCs 0 0 /100    Absolute Neutrophil 5.2 1.6 - 8.3 10e9/L    Absolute Lymphocytes 2.0 0.8 - 5.3 10e9/L    Absolute Monocytes 0.8 0.0 - 1.3 10e9/L    Absolute Eosinophils 0.2 0.0 - 0.7 10e9/L    Absolute Basophils 0.1 0.0 - 0.2 10e9/L    Abs Immature Granulocytes 0.1 0 - 0.4 10e9/L    Absolute Nucleated RBC 0.0    Comprehensive metabolic panel     Status: Abnormal   Result Value Ref Range    Sodium 134 133 - 144 mmol/L    Potassium 4.0 3.4 - 5.3 mmol/L    Chloride 101 94 - 109 mmol/L    Carbon Dioxide 30 20 - 32 mmol/L    Anion Gap 4 3 - 14 mmol/L    Glucose 99 70 - 99 mg/dL    Urea Nitrogen 14 7 - 30 mg/dL    Creatinine 0.82 0.52 - 1.04 mg/dL    GFR Estimate 77 >60 mL/min/[1.73_m2]    GFR Estimate If Black 89 >60 mL/min/[1.73_m2]    Calcium 8.6 8.5 - 10.1 mg/dL     Bilirubin Total 0.4 0.2 - 1.3 mg/dL    Albumin 2.9 (L) 3.4 - 5.0 g/dL    Protein Total 8.3 6.8 - 8.8 g/dL    Alkaline Phosphatase 106 40 - 150 U/L    ALT 55 (H) 0 - 50 U/L    AST 35 0 - 45 U/L   CRP inflammation     Status: Abnormal   Result Value Ref Range    CRP Inflammation 13.0 (H) 0.0 - 8.0 mg/L   EKG 12-lead, tracing only     Status: None   Result Value Ref Range    Interpretation ECG Click View Image link to view waveform and result    ISTAT gases lactate leah POCT     Status: Abnormal   Result Value Ref Range    Ph Venous 7.34 7.32 - 7.43 pH    PCO2 Venous 53 (H) 40 - 50 mm Hg    PO2 Venous 55 (H) 25 - 47 mm Hg    Bicarbonate Venous 29 (H) 21 - 28 mmol/L    O2 Sat Venous 86 %    Lactic Acid 0.9 0.7 - 2.1 mmol/L   Blood culture     Status: None (Preliminary result)    Specimen: Blood    Left Arm   Result Value Ref Range    Specimen Description Blood Left Arm     Culture Micro No growth after 1 day       Recent Results (from the past 48 hour(s))   XR Chest 2 Views    Narrative    EXAM: XR CHEST 2 VW  LOCATION: [facility]  DATE/TIME: 5/27/2021 12:50 AM    INDICATION: Shortness of breath  COMPARISON: 07/06/2020      Impression    IMPRESSION: Heart size and pulmonary vascularity normal. Minimal atelectasis left base, lungs otherwise clear.   US Lower Extremity Venous Duplex Bilateral    Narrative    EXAM: US LOWER EXTREMITY VENOUS DUPLEX BILATERAL  LOCATION: [facility]  DATE/TIME: 5/27/2021 12:57 AM    INDICATION: Bilateral leg pain and swelling.  COMPARISON: None.  TECHNIQUE: Venous Duplex ultrasound of bilateral lower extremities with and without compression, augmentation and duplex. Color flow and spectral Doppler with waveform analysis performed.    FINDINGS: Exam includes the common femoral, femoral, popliteal veins as well as segmentally visualized deep calf veins and greater saphenous vein.     RIGHT: No deep vein thrombosis. No superficial thrombophlebitis. No popliteal  cyst.    LEFT: No deep vein thrombosis. No superficial thrombophlebitis. No popliteal cyst.      Impression    IMPRESSION:  1.  No deep venous thrombosis in the bilateral lower extremities.                Pending Results:   Unresulted Labs Ordered in the Past 30 Days of this Admission     Date and Time Order Name Status Description    5/27/2021 0235 Blood culture Preliminary                   Discharge Instructions and Follow-Up:     Discharge Procedure Orders   Reason for your hospital stay   Order Comments: You were admitted to the observation unit for lower extremity swelling and redness.  There was concern for cellulitis.  You were started on antibiotics for this with slow improvement.  PT consulted and recommended discharge back to the group home. Your blood sugar was in the low 100s without Levemir.  Please continue to check your blood sugar before meals and bedtime.  If greater than >200 restart your Levemir.  If your blood sugars are still in the low 100's without Levemir please discuss with your PCP- Dr. Naranjo.     Follow Up and recommended labs and tests   Order Comments: Follow up with primary care provider, [facility], within 7 days for hospital follow- up.  Discuss glucose levels and Levemir     Activity   Order Comments: Your activity upon discharge: activity as tolerated, elevate lower extremities while not sitting up or ambulating.     Order Specific Question Answer Comments   Is discharge order? Yes      When to contact your care team   Order Comments: Return to the ER if you have fevers, worsening pain, worsening redness, falls, confusion.     Monitor and record   Order Comments: blood glucose 4 times a day, before meals and at bedtime     Diet   Order Comments: Follow this diet upon discharge: Orders Placed This Encounter      Combination Diet [id number] Calories: Moderate Consistent CHO (4-6 CHO units/meal); 2 gm NA Diet     Order Specific Question Answer Comments   Is discharge order? Yes            Attestation:  Terrie Lopes, APRN CNP.

## 2021-05-28 NOTE — PLAN OF CARE
"-diagnostic tests and consults completed and resulted- met  -vital signs normal or at patient baseline- met  -tolerating oral intake to maintain hydration- met  -adequate pain control on oral analgesics- met  -tolerating oral antibiotics or has plans for home infusion setup- met  -infection is improving- in progress  -returns to baseline functional status- in progress  -safe disposition plan has been identified- not met  -pt evaluation completed- met    /78 (BP Location: Right arm)   Pulse 77   Temp 98.5  F (36.9  C) (Oral)   Resp 20   Ht 1.6 m (5' 3\")   Wt 135.2 kg (298 lb 1.6 oz)   LMP 11/01/2013   SpO2 94%   Breastfeeding No   BMI 52.81 kg/m      Pt reported sharp cramp in left side overnight. T-pump heat pad was ordered, which patient is utilizing. Pt ambulated in room and to bathroom with cane and standby assist.   "

## 2021-05-28 NOTE — PROGRESS NOTES
diagnostic tests and consults completed and resulted :met  -vital signs normal or at patient baseline :met  -tolerating oral intake to maintain hydration:Met   -adequate pain control on oral analgesics:Met   -tolerating oral antibiotics or has plans for home infusion setup Met,would be home on oral antibiotics  -infection is improving:Met,improving, Left leg cellulitis  redness improving,swelling improving ,afebrile and labs are stable.   -returns to baseline functional status :Met  -safe disposition plan has been identified Met,pt returning to her group home  with outpatient follow up.  -pt evaluation completed :Met., discharge order  written

## 2021-05-28 NOTE — CONSULTS
Care Management Initial Consult    General Information  Assessment completed with: No Martinez  Type of CM/SW Visit: Initial Assessment    Primary Care Provider verified and updated as needed: Yes   Readmission within the last 30 days: no previous admission in last 30 days   Reason for Consult: discharge planning  Advance Care Planning: Advance Care Planning Reviewed: other (comment)(No docs)       Communication Assessment  Patient's communication style: spoken language (English or Bilingual)    Hearing Difficulty or Deaf: no   Wear Glasses or Blind: yes    Cognitive  Cognitive/Neuro/Behavioral: WDL  Level of Consciousness: alert     Orientation: oriented x 4             Living Environment:   People in home: facility resident     Current living Arrangements: group home      Able to return to prior arrangements: yes    Family/Social Support:  Care provided by: other (see comments)( Staff)  Provides care for: no one, unable/limited ability to care for self  Marital Status: Single  Children, Facility resident(s)/Staff          Description of Support System: Supportive, Involved    Support Assessment: Adequate family and caregiver support    Current Resources:   Patient receiving home care services: No  Community Resources: [facility]-waiver services. CM is Shannon  Equipment currently used at home: cane, straight  Supplies currently used at home: None    Employment/Financial:  Employment Status: disabled        Financial Concerns: No concerns identified   Referral to Financial Counselor: No    Lifestyle & Psychosocial Needs:    Socioeconomic History     Marital status: Single     Spouse name: Not on file     Number of children: Not on file     Years of education: Not on file     Highest education level: Not on file     Tobacco Use     Smoking status: Current Every Day Smoker     Packs/day: 0.10     Smokeless tobacco: Never Used   Substance and Sexual Activity     Alcohol use: No     Drug use: No     Sexual  activity: Not Currently       Functional Status:  Prior to admission patient needed assistance: Independent.  staff assists as needed     Mental Health Status: No current issues          Chemical Dependency Status:No current issues        Values/Beliefs:  Spiritual, Cultural Beliefs, Hoahaoism Practices, Values that affect care: no         Care Management Discharge Note    Discharge Date: 05/28/21  Expected Time of Departure: TBD-pending ride    Discharge Disposition: Group Home([facility]  [phone])    Discharge Services: None    Discharge DME: None    Discharge Transportation: family or friend will provide( will transport)    Private pay costs discussed: Not applicable    PAS Confirmation Code: (N/A)  Patient/family educated on Medicare website which has current facility and service quality ratings: (N/A)    Education Provided on the Discharge Plan:  Yes  Persons Notified of Discharge Plans: Patient,   Patient/Family in Agreement with the Plan: yes    Additional Information:  Per observation provider, patient is ready for discharge back to . She is a restricted patient (restricted to Dr.Christopher Naranjo at [facility] [phone]). Patient needs an antibiotic at discharge which cannot be filled here due to restriction. This RNCC spoke to [facility] triage RN. She reports that Dr. Naranjo is in today. She sent an urgent message to get abx ordered. Augmentin was ordered at the [facility] (this is the pharmacy the  uses).   This RNCC met with patient to provide PELAEZ and to discuss discharge plan. She is in agreement with plan and is aware that abx will be at the [facility]. She called  while I was with her. They will provide transportation home. They will call patient when they are on the way. She will let nursing staff know when she has a time.  Patient is ready for discharge back to [facility].    Junaid Busby, RN Care Coordinator [phone]

## 2021-05-28 NOTE — PROGRESS NOTES
diagnostic tests and consults completed and resulted :Yes  -vital signs normal or at patient baseline :yes  -tolerating oral intake to maintain hydration:Yes   -adequate pain control on oral analgesics :Yes  -tolerating oral antibiotics or has plans for home infusion setup  Yes :tolerating oral antibiotics,   -infection is improving:On going,afebrile, lab stable, left leg cellulitis improving.  -returns to baseline functional status:yes   -safe disposition plan has been identified :Yes plan for discharge in progress.  -pt evaluation completed: In progress.  Nurse to notify provider when observation goals have been met and patient is ready for discharge.

## 2021-05-28 NOTE — PLAN OF CARE
"-diagnostic tests and consults completed and resulted- met  -vital signs normal or at patient baseline- met  -tolerating oral intake to maintain hydration- met  -adequate pain control on oral analgesics- met  -tolerating oral antibiotics or has plans for home infusion setup- met  -infection is improving- in progress  -returns to baseline functional status- in progress  -safe disposition plan has been identified- not met  -pt evaluation completed- met    /88 (BP Location: Right arm)   Pulse 84   Temp 98.5  F (36.9  C) (Oral)   Resp 20   Ht 1.6 m (5' 3\")   Wt 135.2 kg (298 lb 1.6 oz)   LMP 11/01/2013   SpO2 93%   Breastfeeding No   BMI 52.81 kg/m      "

## 2021-05-31 ENCOUNTER — MEDICAL CORRESPONDENCE (OUTPATIENT)
Dept: HEALTH INFORMATION MANAGEMENT | Facility: CLINIC | Age: 61
End: 2021-05-31

## 2021-06-01 ENCOUNTER — PRE VISIT (OUTPATIENT)
Dept: GASTROENTEROLOGY | Facility: CLINIC | Age: 61
End: 2021-06-01

## 2021-06-02 LAB
BACTERIA SPEC CULT: NO GROWTH
SPECIMEN SOURCE: NORMAL

## 2021-06-03 ENCOUNTER — OFFICE VISIT (OUTPATIENT)
Dept: OBGYN | Facility: CLINIC | Age: 61
DRG: 603 | End: 2021-06-03
Attending: FAMILY MEDICINE
Payer: COMMERCIAL

## 2021-06-03 ENCOUNTER — HOSPITAL ENCOUNTER (INPATIENT)
Facility: CLINIC | Age: 61
LOS: 6 days | Discharge: HOME | DRG: 603 | End: 2021-06-09
Attending: EMERGENCY MEDICINE | Admitting: PEDIATRICS
Payer: COMMERCIAL

## 2021-06-03 VITALS
HEIGHT: 64 IN | HEART RATE: 83 BPM | SYSTOLIC BLOOD PRESSURE: 106 MMHG | WEIGHT: 286.3 LBS | DIASTOLIC BLOOD PRESSURE: 70 MMHG | BODY MASS INDEX: 48.88 KG/M2

## 2021-06-03 DIAGNOSIS — L03.116 CELLULITIS OF LEFT LOWER EXTREMITY: ICD-10-CM

## 2021-06-03 DIAGNOSIS — I89.0 LYMPHEDEMA: ICD-10-CM

## 2021-06-03 DIAGNOSIS — E11.9 TYPE 2 DIABETES MELLITUS WITHOUT COMPLICATION, WITHOUT LONG-TERM CURRENT USE OF INSULIN (H): ICD-10-CM

## 2021-06-03 DIAGNOSIS — Z12.4 SCREENING FOR MALIGNANT NEOPLASM OF CERVIX: ICD-10-CM

## 2021-06-03 DIAGNOSIS — Z11.52 ENCOUNTER FOR SCREENING LABORATORY TESTING FOR COVID-19 VIRUS: ICD-10-CM

## 2021-06-03 DIAGNOSIS — Z12.4 SCREENING FOR CERVICAL CANCER: ICD-10-CM

## 2021-06-03 DIAGNOSIS — M79.609 PAIN IN SOFT TISSUES OF LIMB: ICD-10-CM

## 2021-06-03 DIAGNOSIS — K59.00 CONSTIPATION, UNSPECIFIED CONSTIPATION TYPE: Primary | ICD-10-CM

## 2021-06-03 DIAGNOSIS — Z01.419 ENCOUNTER FOR GYNECOLOGICAL EXAMINATION WITHOUT ABNORMAL FINDING: Primary | ICD-10-CM

## 2021-06-03 DIAGNOSIS — L03.119 CELLULITIS OF LOWER EXTREMITY, UNSPECIFIED LATERALITY: ICD-10-CM

## 2021-06-03 LAB
ALBUMIN SERPL-MCNC: 3.4 G/DL (ref 3.4–5)
ALP SERPL-CCNC: 119 U/L (ref 40–150)
ALT SERPL W P-5'-P-CCNC: 52 U/L (ref 0–50)
ANION GAP SERPL CALCULATED.3IONS-SCNC: 8 MMOL/L (ref 3–14)
AST SERPL W P-5'-P-CCNC: 34 U/L (ref 0–45)
BASOPHILS # BLD AUTO: 0.1 10E9/L (ref 0–0.2)
BASOPHILS NFR BLD AUTO: 0.6 %
BILIRUB SERPL-MCNC: 0.4 MG/DL (ref 0.2–1.3)
BUN SERPL-MCNC: 13 MG/DL (ref 7–30)
CALCIUM SERPL-MCNC: 9.5 MG/DL (ref 8.5–10.1)
CHLORIDE SERPL-SCNC: 97 MMOL/L (ref 94–109)
CO2 SERPL-SCNC: 32 MMOL/L (ref 20–32)
CREAT SERPL-MCNC: 0.98 MG/DL (ref 0.52–1.04)
DIFFERENTIAL METHOD BLD: ABNORMAL
EOSINOPHIL # BLD AUTO: 0.3 10E9/L (ref 0–0.7)
EOSINOPHIL NFR BLD AUTO: 2.4 %
ERYTHROCYTE [DISTWIDTH] IN BLOOD BY AUTOMATED COUNT: 15.1 % (ref 10–15)
GFR SERPL CREATININE-BSD FRML MDRD: 63 ML/MIN/{1.73_M2}
GLUCOSE SERPL-MCNC: 86 MG/DL (ref 70–99)
HCT VFR BLD AUTO: 43.7 % (ref 35–47)
HGB BLD-MCNC: 13.5 G/DL (ref 11.7–15.7)
IMM GRANULOCYTES # BLD: 0.1 10E9/L (ref 0–0.4)
IMM GRANULOCYTES NFR BLD: 0.9 %
LYMPHOCYTES # BLD AUTO: 2 10E9/L (ref 0.8–5.3)
LYMPHOCYTES NFR BLD AUTO: 18.5 %
MCH RBC QN AUTO: 28.4 PG (ref 26.5–33)
MCHC RBC AUTO-ENTMCNC: 30.9 G/DL (ref 31.5–36.5)
MCV RBC AUTO: 92 FL (ref 78–100)
MONOCYTES # BLD AUTO: 1.1 10E9/L (ref 0–1.3)
MONOCYTES NFR BLD AUTO: 9.7 %
NEUTROPHILS # BLD AUTO: 7.5 10E9/L (ref 1.6–8.3)
NEUTROPHILS NFR BLD AUTO: 67.9 %
NRBC # BLD AUTO: 0 10*3/UL
NRBC BLD AUTO-RTO: 0 /100
PLATELET # BLD AUTO: 313 10E9/L (ref 150–450)
POTASSIUM SERPL-SCNC: 4.1 MMOL/L (ref 3.4–5.3)
PROT SERPL-MCNC: 9.3 G/DL (ref 6.8–8.8)
RBC # BLD AUTO: 4.75 10E12/L (ref 3.8–5.2)
SODIUM SERPL-SCNC: 137 MMOL/L (ref 133–144)
WBC # BLD AUTO: 11 10E9/L (ref 4–11)

## 2021-06-03 PROCEDURE — 80053 COMPREHEN METABOLIC PANEL: CPT | Performed by: EMERGENCY MEDICINE

## 2021-06-03 PROCEDURE — 99285 EMERGENCY DEPT VISIT HI MDM: CPT | Performed by: EMERGENCY MEDICINE

## 2021-06-03 PROCEDURE — 250N000011 HC RX IP 250 OP 636: Performed by: EMERGENCY MEDICINE

## 2021-06-03 PROCEDURE — 258N000003 HC RX IP 258 OP 636: Performed by: EMERGENCY MEDICINE

## 2021-06-03 PROCEDURE — G0463 HOSPITAL OUTPT CLINIC VISIT: HCPCS

## 2021-06-03 PROCEDURE — C9803 HOPD COVID-19 SPEC COLLECT: HCPCS | Performed by: EMERGENCY MEDICINE

## 2021-06-03 PROCEDURE — G0101 CA SCREEN;PELVIC/BREAST EXAM: HCPCS | Performed by: NURSE PRACTITIONER

## 2021-06-03 PROCEDURE — G0145 SCR C/V CYTO,THINLAYER,RESCR: HCPCS | Performed by: NURSE PRACTITIONER

## 2021-06-03 PROCEDURE — 96367 TX/PROPH/DG ADDL SEQ IV INF: CPT | Performed by: EMERGENCY MEDICINE

## 2021-06-03 PROCEDURE — 99285 EMERGENCY DEPT VISIT HI MDM: CPT | Mod: 25 | Performed by: EMERGENCY MEDICINE

## 2021-06-03 PROCEDURE — 85025 COMPLETE CBC W/AUTO DIFF WBC: CPT | Performed by: EMERGENCY MEDICINE

## 2021-06-03 PROCEDURE — 87624 HPV HI-RISK TYP POOLED RSLT: CPT | Performed by: NURSE PRACTITIONER

## 2021-06-03 PROCEDURE — 87506 IADNA-DNA/RNA PROBE TQ 6-11: CPT | Performed by: EMERGENCY MEDICINE

## 2021-06-03 PROCEDURE — 120N000002 HC R&B MED SURG/OB UMMC

## 2021-06-03 PROCEDURE — 258N000003 HC RX IP 258 OP 636

## 2021-06-03 PROCEDURE — 96365 THER/PROPH/DIAG IV INF INIT: CPT | Performed by: EMERGENCY MEDICINE

## 2021-06-03 RX ORDER — CEFTRIAXONE 2 G/1
2 INJECTION, POWDER, FOR SOLUTION INTRAMUSCULAR; INTRAVENOUS ONCE
Status: COMPLETED | OUTPATIENT
Start: 2021-06-03 | End: 2021-06-03

## 2021-06-03 RX ORDER — CEFAZOLIN SODIUM 1 G/50ML
1750 SOLUTION INTRAVENOUS EVERY 24 HOURS
Status: DISCONTINUED | OUTPATIENT
Start: 2021-06-04 | End: 2021-06-06 | Stop reason: DRUGHIGH

## 2021-06-03 RX ORDER — CEFAZOLIN SODIUM 1 G/50ML
2000 SOLUTION INTRAVENOUS ONCE
Status: COMPLETED | OUTPATIENT
Start: 2021-06-03 | End: 2021-06-04

## 2021-06-03 RX ORDER — SODIUM CHLORIDE 9 MG/ML
INJECTION, SOLUTION INTRAVENOUS
Status: COMPLETED
Start: 2021-06-03 | End: 2021-06-03

## 2021-06-03 RX ORDER — CEFAZOLIN SODIUM 1 G/50ML
1750 SOLUTION INTRAVENOUS EVERY 24 HOURS
Status: DISCONTINUED | OUTPATIENT
Start: 2021-06-04 | End: 2021-06-03 | Stop reason: RX

## 2021-06-03 RX ORDER — CEFAZOLIN SODIUM 1 G/50ML
1750 SOLUTION INTRAVENOUS EVERY 24 HOURS
Status: DISCONTINUED | OUTPATIENT
Start: 2021-06-04 | End: 2021-06-03

## 2021-06-03 RX ADMIN — SODIUM CHLORIDE 50 ML: 9 INJECTION, SOLUTION INTRAVENOUS at 21:32

## 2021-06-03 RX ADMIN — CEFTRIAXONE SODIUM 2 G: 2 INJECTION, POWDER, FOR SOLUTION INTRAMUSCULAR; INTRAVENOUS at 21:33

## 2021-06-03 RX ADMIN — VANCOMYCIN HYDROCHLORIDE 2000 MG: 1 INJECTION, POWDER, LYOPHILIZED, FOR SOLUTION INTRAVENOUS at 22:28

## 2021-06-03 ASSESSMENT — PATIENT HEALTH QUESTIONNAIRE - PHQ9
SUM OF ALL RESPONSES TO PHQ QUESTIONS 1-9: 3
5. POOR APPETITE OR OVEREATING: NOT AT ALL

## 2021-06-03 ASSESSMENT — ANXIETY QUESTIONNAIRES
GAD7 TOTAL SCORE: 4
5. BEING SO RESTLESS THAT IT IS HARD TO SIT STILL: NOT AT ALL
6. BECOMING EASILY ANNOYED OR IRRITABLE: SEVERAL DAYS
1. FEELING NERVOUS, ANXIOUS, OR ON EDGE: NOT AT ALL
7. FEELING AFRAID AS IF SOMETHING AWFUL MIGHT HAPPEN: SEVERAL DAYS
2. NOT BEING ABLE TO STOP OR CONTROL WORRYING: SEVERAL DAYS
3. WORRYING TOO MUCH ABOUT DIFFERENT THINGS: SEVERAL DAYS

## 2021-06-03 ASSESSMENT — PAIN SCALES - GENERAL: PAINLEVEL: NO PAIN (0)

## 2021-06-03 ASSESSMENT — MIFFLIN-ST. JEOR: SCORE: 1853.65

## 2021-06-03 ASSESSMENT — ENCOUNTER SYMPTOMS: DIARRHEA: 1

## 2021-06-03 NOTE — LETTER
June 13, 2021      No Moulton  [address] [address]        Dear ,    We are writing to inform you of your test results. Your pap smear and test for HPV were negative (normal). Your next pap smear is due in 5 yrs.      Resulted Orders   Pap imaged thin layer screen with HPV - recommended age 30 - 65 years (select HPV order below)   Result Value Ref Range    PAP NIL     Copath Report         Patient Name: NO MOULTON  MR#: [id number]  Specimen #: [id number]  Collected: 6/3/2021  Received: 6/4/2021  Reported: 6/8/2021 10:38  Ordering Phy(s): RAINA MAHONEY    For improved result formatting, select 'View Enhanced Report Format' under   Linked Documents section.    SPECIMEN/STAIN PROCESS:  Pap imaged thin layer prep screening (Surepath, FocalPoint with guided   screening)       Pap-Cyto x 1, HPV ordered x 1    SOURCE: Cervical, endocervical  ----------------------------------------------------------------   Pap imaged thin layer prep screening (Surepath, FocalPoint with guided   screening)  SPECIMEN ADEQUACY:  Satisfactory for evaluation.  -Transitional zone component could not be determined due to atrophy.    CYTOLOGIC INTERPRETATION:    Negative for intraepithelial lesion or malignancy    Electronically signed out by:  AMINA Curtis (ASCP)    CLINICAL HISTORY:  LMP: 11/01/2013    Papanicolaou Test Limitations:  Cervical cytology is a screening test with    limited sensitivity; regular  screening is critical for cancer prevention; Pap tests are primarily   effective for the diagnosis/prevention of  squamous cell carcinoma, not adenocarcinomas or other cancers.    [facility] of [facility] with the professional component performed   at [facility], [address] [place] [id number]  ([phone])    COLLECTION SITE:  Client:  [facility] [place]  Location: [facility] (B)       HPV High Risk Types DNA Cervical   Result Value Ref Range    HPV Source SurePath     HPV 16 DNA Negative NEG^Negative    HPV 18 DNA Negative NEG^Negative    Other HR HPV Negative NEG^Negative    Final Diagnosis This patient's sample is negative for HPV DNA.       Comment:      This test was developed and its performance characteristics determined by the   [facility], [facility]. It   has not been cleared or approved by the FDA. The laboratory is regulated under   CLIA as qualified to perform high-complexity testing. This test is used for   clinical purposes. It should not be regarded as investigational or for   research.  (Note)  METHODOLOGY:  The Roche rancho 4800 system uses automated extraction,   simultaneous amplification of HPV (L1 region) and beta-globin,    followed by  real time detection of fluorescent labeled HPV and beta   globin using specific oligonucleotide probes . The test specifically   identifies types HPV 16 DNA and HPV 18 DNA while concurrently   detecting the rest of the high risk types (31, 33, 35, 39, 45, 51,   52, 56, 58, 59, 66 or 68).  COMMENTS:  This test is not intended for use as a screening device   for women under age 30 with normal cervical cytology.  Results should   be correl ated with cytologic and histologic findings. Close clinical   followup is recommended.      Specimen Description Cervical Cells        If you have any questions or concerns, please call the clinic at the number listed above.       Sincerely,      ETTA Salcedo CNP

## 2021-06-03 NOTE — PROGRESS NOTES
Progress Note    SUBJECTIVE:  No Yusuf is an 61 year old  , who requests a breast and pelvic exam.  Patient is followed by Dr. Naranjo for primary care.    Concerns today include:     Last mammogram: unknown  - Scheduled for mammography on  at the [facility]    Last pap smear:    - no hx of an abnormal pap smear    Sexual Health:  - Not currently sexually active  - Denies pelvic or vaginal concerns    Menstrual Hx:  - Had an ablation at age 51 or 52 and no bleeding since then.  Had hot flashes but these have resolved. Denies vaginal dryness.     Menstrual History:  Menstrual History 2013 6/3/2021   LAST MENSTRUAL PERIOD 2013 -   Menarche Age - 14   Period Cycle (Days) - menapausal  since      HISTORY:  Prescription Medications as of 6/3/2021       Rx Number Disp Refills Start End Last Dispensed Date Next Fill Date Owning Pharmacy    acetaminophen (TYLENOL) 500 MG tablet            Sig: Take 500 mg by mouth 3 times daily     Class: Historical    Route: Oral    albuterol (PROAIR HFA/PROVENTIL HFA/VENTOLIN HFA) 108 (90 Base) MCG/ACT inhaler            Sig: Inhale 1-2 puffs into the lungs every 4 hours as needed for shortness of breath / dyspnea or wheezing    Class: Historical    Notes to Pharmacy: Pharmacy may dispense brand covered by insurance (Proair, or proventil or ventolin or generic albuterol inhaler)    Route: Inhalation    amLODIPine (NORVASC) 5 MG tablet            Sig: Take 5 mg by mouth daily    Class: Historical    Route: Oral    amoxicillin-clavulanate (AUGMENTIN) 875-125 MG tablet  14 tablet 0 2021   [facility] [place], [place] - [address] [facility]    Sig: Take 1 tablet by mouth every 12 hours for 7 days    Class: E-Prescribe    Route: Oral    atorvastatin (LIPITOR) 20 MG tablet            Sig: Take 20 mg by mouth daily    Class: Historical    Route: Oral    cholecalciferol 25 MCG (1000 UT) TABS    2020        Sig: Take  1,000 Units by mouth daily     Class: Historical    Route: Oral    diclofenac (VOLTAREN) 1 % topical gel  350 g 1 12/10/2020        Sig: Apply 4 g topically 4 times daily    Class: Local Print    Route: Topical    DULoxetine (CYMBALTA) 60 MG capsule            Sig: Take 60 mg by mouth daily    Class: Historical    Route: Oral    fluticasone-salmeterol (ADVAIR) 250-50 MCG/DOSE inhaler            Sig: Inhale 1 puff into the lungs 2 times daily    Class: Historical    Route: Inhalation    furosemide (LASIX) 20 MG tablet  60 tablet 0 6/16/2020    [place], [place] - [address]    Sig: Take 3 tablets (60 mg) by mouth 2 times daily    Class: E-Prescribe    Route: Oral    gabapentin (NEURONTIN) 600 MG tablet            Sig: Take 1,200 mg by mouth 3 times daily    Class: Historical    Route: Oral    insulin detemir (LEVEMIR PEN) 100 UNIT/ML pen            Sig: Inject 20 Units Subcutaneous every morning    Class: Historical    Route: Subcutaneous    levothyroxine (SYNTHROID/LEVOTHROID) 200 MCG tablet    11/25/2020        Class: Historical    melatonin 3 MG tablet            Sig: Take 5 mg by mouth At Bedtime     Class: Historical    Route: Oral    metFORMIN (GLUCOPHAGE) 1000 MG tablet            Sig: Take 1,000 mg by mouth 2 times daily (with meals)    Class: Historical    Route: Oral    methadone HCl 10 MG/5ML SOLN            Sig: Take 80 mg by mouth daily 10mg/mL strength    Class: Historical    Route: Oral    multivitamin w/minerals (MULTI-VITAMIN) tablet            Sig: Take 1 tablet by mouth daily    Class: Historical    Route: Oral    NARCAN 4 MG/0.1ML nasal spray   0 11/7/2019    [facility] [place], [place] - [address] [place] [address] [phone]    Sig: CALL 911. SPRAY CONTENTS OF ONE SPRAYER (0.1ML) INTO ONE NOSTRIL. REPEAT IN 2-3 MINS IF SYMPTOMS OF OPIOID PERSIST, ALTERNATE NOSTRILS    Class: Historical    nicotine polacrilex (NICORETTE) 4 MG gum            Sig: Place  4 mg inside cheek every hour as needed for smoking cessation     Class: Historical    Route: Buccal    omeprazole (PRILOSEC) 20 MG DR capsule            Sig: Take 20 mg by mouth daily     Class: Historical    Route: Oral    potassium chloride ER (KLOR-CON) 8 MEQ CR tablet            Sig: Take 8 mEq by mouth 2 times daily    Class: Historical    Route: Oral    traZODone (DESYREL) 50 MG tablet            Sig: Take 50 mg by mouth At Bedtime    Class: Historical    Route: Oral    vitamin B-12 (CYANOCOBALAMIN) 1000 MCG tablet            Sig: Take 1,000 mcg by mouth daily    Class: Historical    Route: Oral        Allergies   Allergen Reactions     Promethazine Other (See Comments) and Anxiety     Noted in 08 ER     Codeine Phosphate GI Disturbance     Lamotrigine Other (See Comments)     hyponatremia     Oxcarbazepine Unknown and Other (See Comments)     Low sodium  LegacyRecord#[id number]       Codeine Other (See Comments) and Rash     GI bleeding  LegacyRecord#[id number]       Immunization History   Administered Date(s) Administered     COVID-19,PF,Moderna 2021, 2021     DTaP, Unspecified 2007     Flu, Unspecified 2012     [id number] Novel Flu 2010     Hep B, Peds or Adolescent 2000     HepA-Adult 2018     HepB, Unspecified 1994, 2000     HepB-Adult 1993, 1993     Influenza (IIV3) PF 2012     Influenza Vaccine IM > 6 months [place] IIV4 2015, 2021     Td (Adult), Adsorbed 1997, 2018     Tdap (Adacel,Boostrix) 2012       OB History    Para Term  AB Living   7 0 0 0 0 5   SAB TAB Ectopic Multiple Live Births   0 0 0 0 0     Past Medical History:   Diagnosis Date     Arthritis      Bipolar affective (H)      Fibromyalgia      Hypertension      Past Surgical History:   Procedure Laterality Date     CHOLECYSTECTOMY       GYN SURGERY      c section     GYN SURGERY      oblation     ORTHOPEDIC SURGERY      left leg, left  "shoulder, back     Family History   Problem Relation Age of Onset     Heart Disease Mother      Diabetes Mother      Ovarian Cancer Mother      Heart Disease Father      Lung Cancer Father      Diabetes Sister      Ovarian Cancer Sister      Diabetes Brother      Social History     Socioeconomic History     Marital status: Single     Spouse name: None     Number of children: None     Years of education: None     Highest education level: None   Occupational History     None   Social Needs     Financial resource strain: None     Food insecurity     Worry: None     Inability: None     Transportation needs     Medical: None     Non-medical: None   Tobacco Use     Smoking status: Former Smoker     Packs/day: 0.10     Smokeless tobacco: Never Used   Substance and Sexual Activity     Alcohol use: No     Drug use: No     Sexual activity: Not Currently   Lifestyle     Physical activity     Days per week: None     Minutes per session: None     Stress: None   Relationships     Social connections     Talks on phone: None     Gets together: None     Attends Adventism service: None     Active member of club or organization: None     Attends meetings of clubs or organizations: None     Relationship status: None     Intimate partner violence     Fear of current or ex partner: None     Emotionally abused: None     Physically abused: None     Forced sexual activity: None   Other Topics Concern     None   Social History Narrative     None       ROS    EXAM:  Blood pressure 106/70, pulse 83, height 1.626 m (5' 4\"), weight 129.9 kg (286 lb 4.8 oz), last menstrual period 11/01/2013, not currently breastfeeding. Body mass index is 49.14 kg/m .  General appearance: Pleasant female in no acute distress.     BREAST EXAM:  Breast: Without visible skin changes. No dimpling or lesions seen.   Breasts supple, non-tender with palpation, no dominant mass, nodularity, or nipple discharge noted bilaterally. Axillary nodes negative.      PELVIC " EXAM:  EG/BUS: Normal genital architecture without lesions, erythema or abnormal secretions; Bartholin's, Urethra, Royston's normal   Urethral meatus: normal   Urethra: no masses, tenderness, or scarring   Bladder: no masses or tenderness   Vagina: pink, moist, minimal rugae with normal clear secretions  Cervix: pale, dry, stenotic    ASSESSMENT:  Encounter Diagnoses   Name Primary?     Screening for cervical cancer      Screening for malignant neoplasm of cervix      Encounter for gynecological examination without abnormal finding Yes      61 year old Female Pelvic and Breast Exam    PLAN:   Pap smear with HPV testing completed today.  If normal, next pap smear due in 5 yrs.  Pt to complete scheduled mammogram 6/7/2021.     Return in one year/PRN for preventive care or problems/concerns.     Verbalized understanding and agreement with visit plan.    Evangelina Jang, DNP, APRN, WHNP

## 2021-06-04 PROBLEM — E11.9 TYPE 2 DIABETES MELLITUS, WITHOUT LONG-TERM CURRENT USE OF INSULIN (H): Status: ACTIVE | Noted: 2021-01-09

## 2021-06-04 PROBLEM — Z72.0 TOBACCO ABUSE: Status: ACTIVE | Noted: 2018-05-17

## 2021-06-04 PROBLEM — F14.21 COCAINE USE DISORDER, SEVERE, IN EARLY REMISSION (H): Status: ACTIVE | Noted: 2017-03-20

## 2021-06-04 PROBLEM — Z86.74 HISTORY OF SUDDEN CARDIAC ARREST: Status: ACTIVE | Noted: 2019-08-24

## 2021-06-04 PROBLEM — F15.21 METHAMPHETAMINE USE DISORDER, SEVERE, IN EARLY REMISSION (H): Status: ACTIVE | Noted: 2020-01-08

## 2021-06-04 PROBLEM — F11.20 OPIOID TYPE DEPENDENCE, ABUSE (H): Status: ACTIVE | Noted: 2020-02-15

## 2021-06-04 LAB
C COLI+JEJUNI+LARI FUSA STL QL NAA+PROBE: NOT DETECTED
CREAT SERPL-MCNC: 0.81 MG/DL (ref 0.52–1.04)
CRP SERPL-MCNC: 12 MG/L (ref 0–8)
EC STX1 GENE STL QL NAA+PROBE: NOT DETECTED
EC STX2 GENE STL QL NAA+PROBE: NOT DETECTED
ENTERIC PATHOGEN COMMENT: NORMAL
ERYTHROCYTE [SEDIMENTATION RATE] IN BLOOD BY WESTERGREN METHOD: 83 MM/H (ref 0–30)
GFR SERPL CREATININE-BSD FRML MDRD: 79 ML/MIN/{1.73_M2}
GLUCOSE BLDC GLUCOMTR-MCNC: 106 MG/DL (ref 70–99)
GLUCOSE BLDC GLUCOMTR-MCNC: 121 MG/DL (ref 70–99)
GLUCOSE BLDC GLUCOMTR-MCNC: 130 MG/DL (ref 70–99)
GLUCOSE BLDC GLUCOMTR-MCNC: 134 MG/DL (ref 70–99)
GLUCOSE BLDC GLUCOMTR-MCNC: 139 MG/DL (ref 70–99)
LABORATORY COMMENT REPORT: NORMAL
NOROV GI+II ORF1-ORF2 JNC STL QL NAA+PR: NOT DETECTED
RVA NSP5 STL QL NAA+PROBE: NOT DETECTED
SALMONELLA SP RPOD STL QL NAA+PROBE: NOT DETECTED
SARS-COV-2 RNA RESP QL NAA+PROBE: NEGATIVE
SHIGELLA SP+EIEC IPAH STL QL NAA+PROBE: NOT DETECTED
SPECIMEN SOURCE: NORMAL
V CHOL+PARA RFBL+TRKH+TNAA STL QL NAA+PR: NOT DETECTED
Y ENTERO RECN STL QL NAA+PROBE: NOT DETECTED

## 2021-06-04 PROCEDURE — 82565 ASSAY OF CREATININE: CPT | Performed by: PEDIATRICS

## 2021-06-04 PROCEDURE — 999N001017 HC STATISTIC GLUCOSE BY METER IP

## 2021-06-04 PROCEDURE — 250N000011 HC RX IP 250 OP 636: Performed by: PEDIATRICS

## 2021-06-04 PROCEDURE — 36415 COLL VENOUS BLD VENIPUNCTURE: CPT | Performed by: PEDIATRICS

## 2021-06-04 PROCEDURE — 87635 SARS-COV-2 COVID-19 AMP PRB: CPT | Performed by: EMERGENCY MEDICINE

## 2021-06-04 PROCEDURE — 85652 RBC SED RATE AUTOMATED: CPT | Performed by: PEDIATRICS

## 2021-06-04 PROCEDURE — 85652 RBC SED RATE AUTOMATED: CPT | Performed by: HOSPITALIST

## 2021-06-04 PROCEDURE — 86140 C-REACTIVE PROTEIN: CPT | Performed by: PEDIATRICS

## 2021-06-04 PROCEDURE — 99223 1ST HOSP IP/OBS HIGH 75: CPT | Mod: AI | Performed by: PEDIATRICS

## 2021-06-04 PROCEDURE — 250N000013 HC RX MED GY IP 250 OP 250 PS 637: Performed by: PEDIATRICS

## 2021-06-04 PROCEDURE — 250N000012 HC RX MED GY IP 250 OP 636 PS 637: Performed by: PEDIATRICS

## 2021-06-04 PROCEDURE — 120N000002 HC R&B MED SURG/OB UMMC

## 2021-06-04 PROCEDURE — 258N000003 HC RX IP 258 OP 636: Performed by: PEDIATRICS

## 2021-06-04 PROCEDURE — 258N000003 HC RX IP 258 OP 636

## 2021-06-04 RX ORDER — LEVALBUTEROL TARTRATE 45 UG/1
2 AEROSOL, METERED ORAL EVERY 4 HOURS PRN
Status: ON HOLD | COMMUNITY
End: 2022-03-25

## 2021-06-04 RX ORDER — GABAPENTIN 400 MG/1
1200 CAPSULE ORAL 3 TIMES DAILY
Status: ON HOLD | COMMUNITY
End: 2022-03-04

## 2021-06-04 RX ORDER — ALBUTEROL SULFATE 90 UG/1
1-2 AEROSOL, METERED RESPIRATORY (INHALATION) EVERY 4 HOURS PRN
Status: DISCONTINUED | OUTPATIENT
Start: 2021-06-04 | End: 2021-06-09 | Stop reason: HOSPADM

## 2021-06-04 RX ORDER — POLYETHYLENE GLYCOL 3350 17 G/17G
17 POWDER, FOR SOLUTION ORAL DAILY PRN
Status: DISCONTINUED | OUTPATIENT
Start: 2021-06-04 | End: 2021-06-09 | Stop reason: HOSPADM

## 2021-06-04 RX ORDER — POTASSIUM CHLORIDE 750 MG/1
10 CAPSULE, EXTENDED RELEASE ORAL 2 TIMES DAILY
Status: DISCONTINUED | OUTPATIENT
Start: 2021-06-04 | End: 2021-06-09 | Stop reason: HOSPADM

## 2021-06-04 RX ORDER — CALCIUM CARBONATE 500(1250)
500 TABLET,CHEWABLE ORAL
Status: ON HOLD | COMMUNITY
Start: 2019-10-01 | End: 2021-06-04

## 2021-06-04 RX ORDER — LANOLIN ALCOHOL/MO/W.PET/CERES
1000 CREAM (GRAM) TOPICAL DAILY
Status: DISCONTINUED | OUTPATIENT
Start: 2021-06-04 | End: 2021-06-09 | Stop reason: HOSPADM

## 2021-06-04 RX ORDER — LIDOCAINE 40 MG/G
CREAM TOPICAL
Status: DISCONTINUED | OUTPATIENT
Start: 2021-06-04 | End: 2021-06-09 | Stop reason: HOSPADM

## 2021-06-04 RX ORDER — ONDANSETRON 4 MG/1
4 TABLET, ORALLY DISINTEGRATING ORAL EVERY 6 HOURS PRN
Status: DISCONTINUED | OUTPATIENT
Start: 2021-06-04 | End: 2021-06-09 | Stop reason: HOSPADM

## 2021-06-04 RX ORDER — FUROSEMIDE 20 MG
60 TABLET ORAL DAILY
Status: DISCONTINUED | OUTPATIENT
Start: 2021-06-04 | End: 2021-06-06

## 2021-06-04 RX ORDER — NICOTINE POLACRILEX 4 MG
15-30 LOZENGE BUCCAL
Status: DISCONTINUED | OUTPATIENT
Start: 2021-06-04 | End: 2021-06-09 | Stop reason: HOSPADM

## 2021-06-04 RX ORDER — ACETAMINOPHEN 325 MG/1
650 TABLET ORAL EVERY 4 HOURS PRN
Status: DISCONTINUED | OUTPATIENT
Start: 2021-06-04 | End: 2021-06-04

## 2021-06-04 RX ORDER — METHADONE HYDROCHLORIDE 10 MG/ML
80 CONCENTRATE ORAL DAILY
Status: DISCONTINUED | OUTPATIENT
Start: 2021-06-04 | End: 2021-06-09 | Stop reason: HOSPADM

## 2021-06-04 RX ORDER — SODIUM CHLORIDE 9 MG/ML
INJECTION, SOLUTION INTRAVENOUS
Status: COMPLETED
Start: 2021-06-04 | End: 2021-06-04

## 2021-06-04 RX ORDER — MULTIPLE VITAMINS W/ MINERALS TAB 9MG-400MCG
1 TAB ORAL DAILY
Status: DISCONTINUED | OUTPATIENT
Start: 2021-06-04 | End: 2021-06-09 | Stop reason: HOSPADM

## 2021-06-04 RX ORDER — ATORVASTATIN CALCIUM 20 MG/1
20 TABLET, FILM COATED ORAL DAILY
Status: DISCONTINUED | OUTPATIENT
Start: 2021-06-04 | End: 2021-06-09 | Stop reason: HOSPADM

## 2021-06-04 RX ORDER — HYDROXYZINE HYDROCHLORIDE 50 MG/1
100 TABLET, FILM COATED ORAL
Status: ON HOLD | COMMUNITY
Start: 2020-10-02 | End: 2021-06-04

## 2021-06-04 RX ORDER — GABAPENTIN 600 MG/1
1200 TABLET ORAL 3 TIMES DAILY
Status: DISCONTINUED | OUTPATIENT
Start: 2021-06-04 | End: 2021-06-09 | Stop reason: HOSPADM

## 2021-06-04 RX ORDER — LEVOTHYROXINE SODIUM 200 UG/1
200 TABLET ORAL
Status: DISCONTINUED | OUTPATIENT
Start: 2021-06-04 | End: 2021-06-09 | Stop reason: HOSPADM

## 2021-06-04 RX ORDER — ONDANSETRON 2 MG/ML
4 INJECTION INTRAMUSCULAR; INTRAVENOUS EVERY 6 HOURS PRN
Status: DISCONTINUED | OUTPATIENT
Start: 2021-06-04 | End: 2021-06-09 | Stop reason: HOSPADM

## 2021-06-04 RX ORDER — BLOOD SUGAR DIAGNOSTIC
STRIP MISCELLANEOUS
Status: ON HOLD | COMMUNITY
Start: 2021-04-20 | End: 2022-03-25

## 2021-06-04 RX ORDER — ACETAMINOPHEN 325 MG/1
975 TABLET ORAL EVERY 6 HOURS PRN
Status: DISCONTINUED | OUTPATIENT
Start: 2021-06-04 | End: 2021-06-09 | Stop reason: HOSPADM

## 2021-06-04 RX ORDER — BLOOD SUGAR DIAGNOSTIC
STRIP MISCELLANEOUS
COMMUNITY
Start: 2021-01-27

## 2021-06-04 RX ORDER — AMLODIPINE BESYLATE 5 MG/1
5 TABLET ORAL DAILY
Status: DISCONTINUED | OUTPATIENT
Start: 2021-06-04 | End: 2021-06-05

## 2021-06-04 RX ORDER — DEXTROSE MONOHYDRATE 25 G/50ML
25-50 INJECTION, SOLUTION INTRAVENOUS
Status: DISCONTINUED | OUTPATIENT
Start: 2021-06-04 | End: 2021-06-09 | Stop reason: HOSPADM

## 2021-06-04 RX ORDER — HYDROXYZINE HYDROCHLORIDE 50 MG/1
100 TABLET, FILM COATED ORAL EVERY 6 HOURS PRN
COMMUNITY

## 2021-06-04 RX ORDER — VITAMIN B COMPLEX
1000 TABLET ORAL DAILY
Status: DISCONTINUED | OUTPATIENT
Start: 2021-06-04 | End: 2021-06-09 | Stop reason: HOSPADM

## 2021-06-04 RX ORDER — TRAZODONE HYDROCHLORIDE 50 MG/1
50 TABLET, FILM COATED ORAL AT BEDTIME
Status: DISCONTINUED | OUTPATIENT
Start: 2021-06-04 | End: 2021-06-09 | Stop reason: HOSPADM

## 2021-06-04 RX ORDER — DULOXETIN HYDROCHLORIDE 60 MG/1
60 CAPSULE, DELAYED RELEASE ORAL DAILY
Status: DISCONTINUED | OUTPATIENT
Start: 2021-06-04 | End: 2021-06-09 | Stop reason: HOSPADM

## 2021-06-04 RX ORDER — BLOOD GLUCOSE CONTROL HIGH,LOW
EACH MISCELLANEOUS
COMMUNITY
Start: 2021-06-01

## 2021-06-04 RX ADMIN — METFORMIN HYDROCHLORIDE 1000 MG: 500 TABLET, FILM COATED ORAL at 09:03

## 2021-06-04 RX ADMIN — GABAPENTIN 1200 MG: 600 TABLET, FILM COATED ORAL at 20:35

## 2021-06-04 RX ADMIN — GABAPENTIN 1200 MG: 600 TABLET, FILM COATED ORAL at 09:02

## 2021-06-04 RX ADMIN — SODIUM CHLORIDE 500 ML: 9 INJECTION, SOLUTION INTRAVENOUS at 21:34

## 2021-06-04 RX ADMIN — TRAZODONE HYDROCHLORIDE 50 MG: 50 TABLET ORAL at 21:18

## 2021-06-04 RX ADMIN — ACETAMINOPHEN 975 MG: 325 TABLET, FILM COATED ORAL at 09:16

## 2021-06-04 RX ADMIN — FLUTICASONE FUROATE AND VILANTEROL TRIFENATATE 1 PUFF: 200; 25 POWDER RESPIRATORY (INHALATION) at 09:10

## 2021-06-04 RX ADMIN — ATORVASTATIN CALCIUM 20 MG: 20 TABLET, FILM COATED ORAL at 09:02

## 2021-06-04 RX ADMIN — OMEPRAZOLE 20 MG: 20 CAPSULE, DELAYED RELEASE ORAL at 09:03

## 2021-06-04 RX ADMIN — Medication 1000 UNITS: at 09:02

## 2021-06-04 RX ADMIN — GABAPENTIN 1200 MG: 600 TABLET, FILM COATED ORAL at 15:01

## 2021-06-04 RX ADMIN — ENOXAPARIN SODIUM 40 MG: 40 INJECTION SUBCUTANEOUS at 09:02

## 2021-06-04 RX ADMIN — POTASSIUM CHLORIDE 10 MEQ: 750 CAPSULE, EXTENDED RELEASE ORAL at 09:02

## 2021-06-04 RX ADMIN — VANCOMYCIN HYDROCHLORIDE 1750 MG: 10 INJECTION, POWDER, LYOPHILIZED, FOR SOLUTION INTRAVENOUS at 21:34

## 2021-06-04 RX ADMIN — METHADONE HYDROCHLORIDE 80 MG: 10 CONCENTRATE ORAL at 09:50

## 2021-06-04 RX ADMIN — DICLOFENAC SODIUM 4 G: 10 GEL TOPICAL at 20:37

## 2021-06-04 RX ADMIN — POTASSIUM CHLORIDE 10 MEQ: 750 CAPSULE, EXTENDED RELEASE ORAL at 21:18

## 2021-06-04 RX ADMIN — INSULIN DETEMIR 20 UNITS: 100 INJECTION, SOLUTION SUBCUTANEOUS at 09:11

## 2021-06-04 RX ADMIN — METFORMIN HYDROCHLORIDE 1000 MG: 500 TABLET, FILM COATED ORAL at 18:06

## 2021-06-04 RX ADMIN — ACETAMINOPHEN 975 MG: 325 TABLET, FILM COATED ORAL at 20:35

## 2021-06-04 RX ADMIN — MULTIPLE VITAMINS W/ MINERALS TAB 1 TABLET: TAB at 09:03

## 2021-06-04 RX ADMIN — CYANOCOBALAMIN TAB 1000 MCG 1000 MCG: 1000 TAB at 09:03

## 2021-06-04 RX ADMIN — DULOXETINE HYDROCHLORIDE 60 MG: 60 CAPSULE, DELAYED RELEASE ORAL at 09:03

## 2021-06-04 RX ADMIN — LEVOTHYROXINE SODIUM 200 MCG: 200 TABLET ORAL at 09:06

## 2021-06-04 ASSESSMENT — ACTIVITIES OF DAILY LIVING (ADL): DEPENDENT_IADLS:: COOKING;LAUNDRY;SHOPPING;MEAL PREPARATION;MEDICATION MANAGEMENT;TRANSPORTATION

## 2021-06-04 ASSESSMENT — ANXIETY QUESTIONNAIRES: GAD7 TOTAL SCORE: 4

## 2021-06-04 NOTE — DISCHARGE SUMMARY
[facility], [place], [place]  Internal Medicine Progress Note      Assessment and Plans:     No Yusuf is a 62 yo F w/PMH of DM2 c/b neuropathy, HTN HLD Morbid obesity (BMI 49), polysubstance abuse and dependance hx on stable methadone, fibromyalgia, GERD, depression, and chronic lymphedema, who presented with worsening left lower extremity cellulitis.    # Left LE cellulitis  # Reports having BL cellulitis. It appears that BL legs improved on Augmentin for a short while, then left leg worsened. Althought reassuring no pus or induration or purulence, staph infection must be considered.   -Ct IV Vancomycin    # BL LE Chronic edema  -Likely from obesity. But Amlodipine not helping. Stop amlodipine  -Consider starting ARB or ACEI given pts dgx of Dm2 and HTN  -Ct with home lasix 60 mg daily    # Hypertension: BP on soft side. PTA on Amlodipine and lasix.   -Hold amlodipine.   -Ct lasix.        # DM2  -Last A1c was 6.5 on 5/20/21   -c/w home Metformin, Levemir 20 units  -BG checks TID ac and at bedtime; medium sliding scale insulin        # H/o Polysubstance Abuse & Opiate Dependence:   -takes 80 mg methadone daily, last dose was yesterday.  Fills through [facility].  -continue methadone 80 mg po daily     # Hypothyroidism:   -Continue with PTA Levothyroxine  # GERD:   - Continue with PTA omeprazole  # Depression  - Continue PTA Duloxetine, Hydroxyzine  # Insomnia  - Continue PTA Trazodone prn  # Chronic neuropathic pain:    - Continue with PTA Gabapentin 1200mg TID, duloxetine    # Hep C: not on treatment, followed at [facility]  -per care everywhere HCV RNA Quant 1/7/2021 was [phone] and on 12/10/2019 was [phone] with  HEPATITIS C, RNA, QUANT 1/7/2021 was 6.2 and on 12/10/2019  was 5.9  -possible HCV contributing to edema and rash, but not likely      Diet: Combination Diet Regular Diet Adult    DVT Prophylaxis: Enoxaparin (Lovenox) SQ  [place]  Catheter: not present  Code Status: Full Code         Disposition Plan    Expect discharge on sunday    Tl Roman MD, MPH.  Internal Medicine Attending  [place], [place]    Click on the link below to page me.   Text Page  (7am - 5pm, M-F)    Subjective:     She left leg cellulitis is same  No fever.   She had diarrhea with augmentin, but now better.   No abdominal pain.   No fevers.     -Data reviewed today: I reviewed all new labs and imaging results over the last 24 hours.     Exam:     Temp: 98.5  F (36.9  C) Temp src: Oral BP: 126/76 Pulse: 70   Resp: 14 SpO2: 92 % O2 Device: None (Room air)    Vitals:    06/03/21 1902   Weight: 129.3 kg (285 lb)     Vital Signs with Ranges  Temp:  [97.4  F (36.3  C)-98.5  F (36.9  C)] 98.5  F (36.9  C)  Pulse:  [50-90] 70  Resp:  [14-17] 14  BP: (105-126)/(62-76) 126/76  SpO2:  [92 %-95 %] 92 %  No intake/output data recorded.    Constitutional: No distress noted, Alert, Answering questions appropriately  Respiratory: AE is goog on both sides, no wheezing onr crackles  Cardiovascular: S1S2 normal, no new murmur  GI: Soft, non tender  Skin/Integumen:  LLE cellulitis in marked area. B/L venous stasis.     Medications       [Held by provider] amLODIPine  5 mg Oral Daily     atorvastatin  20 mg Oral Daily     cyanocobalamin  1,000 mcg Oral Daily     diclofenac  2-4 g Topical BID     DULoxetine  60 mg Oral Daily     enoxaparin ANTICOAGULANT  40 mg Subcutaneous Q24H     fluticasone-vilanterol  1 puff Inhalation Daily     furosemide  60 mg Oral Daily     gabapentin  1,200 mg Oral TID     insulin aspart  1-7 Units Subcutaneous TID AC     insulin aspart  1-5 Units Subcutaneous At Bedtime     insulin detemir  20 Units Subcutaneous QAM     levothyroxine  200 mcg Oral QAM AC     metFORMIN  1,000 mg Oral BID w/meals     methadone  80 mg Oral Daily     multivitamin w/minerals  1 tablet Oral Daily     omeprazole  20 mg Oral QAM AC     potassium  chloride ER  10 mEq Oral BID     sodium chloride (PF)  3 mL Intracatheter Q8H     traZODone  50 mg Oral At Bedtime     vancomycin (VANCOCIN) IV  1,750 mg Intravenous Q24H     Vitamin D3  1,000 Units Oral Daily       Data   Recent Labs   Lab 06/04/21 0805 06/03/21  1956   WBC  --  11.0   HGB  --  13.5   MCV  --  92   PLT  --  313   NA  --  137   POTASSIUM  --  4.1   CHLORIDE  --  97   CO2  --  32   BUN  --  13   CR 0.81 0.98   ANIONGAP  --  8   RODOLFO  --  9.5   GLC  --  86   ALBUMIN  --  3.4   PROTTOTAL  --  9.3*   BILITOTAL  --  0.4   ALKPHOS  --  119   ALT  --  52*   AST  --  34       No results found for this or any previous visit (from the past 24 hour(s)).

## 2021-06-04 NOTE — PHARMACY-ADMISSION MEDICATION HISTORY
Admission Medication History Completed by Pharmacy    See [facility] Admission Navigator for allergy information, preferred outpatient pharmacy, prior to admission medications and immunization status.     Medication History Sources:     Patient    Sure Scripts    Care Everywhere    Changes made to PTA medication list (reason):    Added: hydroxyzine prn (per fill hx/pt)    Deleted: None    Changed: albuterol inhaler to xopenex inhaler (per fill hx), gabapentin capsule strength (dose still same though), calcium (per pt)    Additional Information:    Patient was taking Augmentin for cellulitis prior to admission, but is now on IV antibiotics.    Prior to Admission medications    Medication Sig Last Dose Taking? Auth Provider   ACCU-CHEK RAFAELA PLUS test strip  Past Week at Unknown time Yes Reported, Patient   acetaminophen (TYLENOL) 500 MG tablet Take 500 mg by mouth 3 times daily  Past Week at Unknown time Yes Reported, Patient   Alcohol Swabs (ALCOHOL WIPES) 70 % PADS USE WITH INSULIN INJECTION ONCE DAILY. **100 DAYS SUPPLY** Past Week at Unknown time Yes Reported, Patient   amLODIPine (NORVASC) 5 MG tablet Take 5 mg by mouth daily 6/3/2021 at Unknown time Yes Unknown, Entered By History   amoxicillin-clavulanate (AUGMENTIN) 875-125 MG tablet Take 1 tablet by mouth every 12 hours for 7 days 6/3/2021 at Unknown time Yes Terrie Lopes APRN CNP   atorvastatin (LIPITOR) 20 MG tablet Take 20 mg by mouth daily 6/3/2021 at Unknown time Yes Unknown, Entered By History   blood glucose (ACCU-CHEK RAFAELA PLUS) test strip Use to check blood sugar 3x daily or as directed.  Yes Reported, Patient   Blood Glucose Calibration (ACCU-CHEK ACTIVE GLUCOSE CONT VI) 1 each by Other route Past Week at Unknown time Yes Reported, Patient   blood glucose calibration (ACCU-CHEK RAFAELA) solution  Past Week at Unknown time Yes Reported, Patient   calcium carbonate (OS-RODOLOF) 1500 (600 Ca) MG tablet Take 600 mg by mouth daily 6/3/2021 at Unknown time  Yes Unknown, Entered By History   cholecalciferol 25 MCG (1000 UT) TABS Take 1,000 Units by mouth daily  6/3/2021 at Unknown time Yes Zena Wheeler MD   diclofenac (VOLTAREN) 1 % topical gel Apply 4 g topically 4 times daily  Patient taking differently: Apply 2-4 g topically 2 times daily  Past Week at Unknown time Yes Regina Fragoso MD   DULoxetine (CYMBALTA) 60 MG capsule Take 60 mg by mouth daily 6/3/2021 at Unknown time Yes Unknown, Entered By History   fluticasone-salmeterol (ADVAIR) 250-50 MCG/DOSE inhaler Inhale 1 puff into the lungs 2 times daily 6/3/2021 at Unknown time Yes Unknown, Entered By History   furosemide (LASIX) 20 MG tablet Take 3 tablets (60 mg) by mouth 2 times daily  Patient taking differently: Take 60 mg by mouth daily  6/3/2021 at Unknown time Yes Tl Roman MD   gabapentin (NEURONTIN) 400 MG capsule Take 1,200 mg by mouth 3 times daily 6/3/2021 at Unknown time Yes Unknown, Entered By History   hydrOXYzine (ATARAX) 50 MG tablet Take 100 mg by mouth every 6 hours as needed for anxiety Past Month at Unknown time Yes Unknown, Entered By History   insulin detemir (LEVEMIR PEN) 100 UNIT/ML pen Inject 20 Units Subcutaneous every morning 6/3/2021 at Unknown time Yes Unknown, Entered By History   levalbuterol (XOPENEX HFA) 45 MCG/ACT inhaler Inhale 2 puffs into the lungs every 4 hours as needed for shortness of breath / dyspnea or wheezing  Past Month at Unknown time Yes Unknown, Entered By History   levothyroxine (SYNTHROID/LEVOTHROID) 200 MCG tablet Take 200 mcg by mouth daily  6/3/2021 at Unknown time Yes Reported, Patient   melatonin 5 MG tablet Take 5 mg by mouth At Bedtime 6/2/2021 at Unknown time Yes Unknown, Entered By History   metFORMIN (GLUCOPHAGE) 1000 MG tablet Take 1,000 mg by mouth 2 times daily (with meals) 6/3/2021 at Unknown time Yes Reported, Patient   methadone HCl 10 MG/5ML SOLN Take 80 mg by mouth daily 10mg/mL strength 6/3/2021 at Unknown time Yes  Reported, Patient   multivitamin w/minerals (MULTI-VITAMIN) tablet Take 1 tablet by mouth daily 6/3/2021 at Unknown time Yes Reported, Patient   nicotine polacrilex (NICORETTE) 4 MG gum Place 4 mg inside cheek every hour as needed for smoking cessation  6/3/2021 at Unknown time Yes Unknown, Entered By History   omeprazole (PRILOSEC) 20 MG DR capsule Take 20 mg by mouth daily  6/3/2021 at Unknown time Yes Unknown, Entered By History   potassium chloride ER (KLOR-CON) 8 MEQ CR tablet Take 8 mEq by mouth 2 times daily 6/3/2021 at Unknown time Yes Unknown, Entered By History   traZODone (DESYREL) 50 MG tablet Take 50 mg by mouth At Bedtime 6/3/2021 at Unknown time Yes Unknown, Entered By History   vitamin B-12 (CYANOCOBALAMIN) 1000 MCG tablet Take 1,000 mcg by mouth daily 6/3/2021 at Unknown time Yes Unknown, Entered By History   NARCAN 4 MG/0.1ML nasal spray CALL 911. SPRAY CONTENTS OF ONE SPRAYER (0.1ML) INTO ONE NOSTRIL. REPEAT IN 2-3 MINS IF SYMPTOMS OF OPIOID PERSIST, ALTERNATE NOSTRILS More than a month at Unknown time  Reported, Patient       Date completed: 06/04/21    Medication history completed by: Yanet Gonzalez, PharmD, BCPS

## 2021-06-04 NOTE — H&P
[facility]    History and Physical - Hospitalist Service       Date of Admission:  6/3/2021    Assessment & Plan   No Yusuf is a 62 yo F w/PMH of DM2 c/b neuropathy, HTN HLD Morbid obesity (BMI 49), polysubstance abuse and dependance hx on stable methadone, fibromyalgia, GERD, depression, and chronic lymphedema, who presented with worsening left lower extremity cellulitis       #Left LE cellulitis  :: hx notable for BL cellulitis reportedly, this is quite rare; it appears that BL legs improved on Augmentin for a short while, then left leg worsened. Althought reassuring no pus or induration or purulence, staph infection must be considered.   :: got CTX + vanc in the ID, would favor continued IV vanc monotherapy for now, consider PO doxy BID once ready for discharge    #BL LE edema aka chronic lymphedema  :: consider stopping amlodipine and starting ARB or ACEI given pts dgx of Dm2 and HTN, defer to PCP  :: c/w home lasix 60 mg daily       #DM2  :: Last A1c was 6.5 on 5/20/21.   :: c/w home Metformin, Levemir 20 units  :: BG checks TID ac and at bedtime; medium sliding scale insulin     #H/o Polysubstance Abuse & Opiate Dependence:   :: takes 80 mg methadone daily, last dose was yesterday.  [place] [facility] [place]  :: continue methadone 80 mg po daily     #Hypothyroidism: - Continue with PTA Levothyroxine  #GERD: - Continue with PTA omeprazole  #Depression- Continue PTA Duloxetine, Hydroxyzine  #Insomnia-    Continue PTA Trazodone prn  #Chronic neuropathic pain:  - Continue with PTA Gabapentin 1200mg TID, duloxetine    #Hep C: not on treatment[place] [facility] [place]  ::  per care everywhere HCV RNA Quant 1/7/2021 was [phone] and on 12/10/2019 was [phone] with  HEPATITIS C, RNA, QUANT 1/7/2021 was 6.2 and on 12/10/2019  was 5.9  :: possible HCV contributing to edema and rash, but not likely        Diet: Combination Diet Regular Diet Adult    DVT Prophylaxis: Enoxaparin  (Lovenox) SQ  [place] Catheter: not present  Code Status: Full Code           Disposition Plan   Expected discharge: 2 - 3 days, recommended to prior living arrangement once adequate pain management/ tolerating PO medications and antibiotic plan established.  Entered: Mario Mcleod MD 06/04/2021, 1:57 AM     The patient's care was discussed with the Bedside Nurse and Patient.    Mario Mcleod MD  [facility]  Contact information available via [facility] Paging/Directory      ______________________________________________________________________    Chief Complaint   Leg rash worsening, pain in left leg    History is obtained from the patient, thorough EMR and careeverywhere review    History of Present Illness   No Yusuf is a  62 yo F w/PMH of DM2 c/b neuropathy, HTN HLD Morbid obesity (BMI 49), polysubstance abuse and dependance hx on stable methadone, fibromyalgia, GERD, depression, and chronic lymphedema, who presented with worsening left lower extremity cellulitis  She noted improvement in right leg since dispo to group home  Left leg improved somewhat but now worse again such that rash is beyond original margins from admission  Compliant on meds, no recent trauma to leg  Legs kept up mostly during day, pt not very active of late, below her baseline of 30 to 40 min walking per day (10 min walks x3 )  Denies numbness or tingling, no FCS, no SOB no CP no NV no abd pain,  no blood per any os  Stable diarrhea since dispo, assoc with abtx      Review of Systems    The 10 point Review of Systems is negative other than noted in the HPI or here.      Past Medical History    I have reviewed this patient's medical history and updated it with pertinent information if needed.   Past Medical History:   Diagnosis Date     Arthritis      Bipolar affective (H)      Fibromyalgia      Hypertension        Past Surgical History   I have reviewed this patient's surgical history and  updated it with pertinent information if needed.  Past Surgical History:   Procedure Laterality Date     CHOLECYSTECTOMY       GYN SURGERY      c section     GYN SURGERY      oblation     ORTHOPEDIC SURGERY      left leg, left shoulder, back       Social History   I have reviewed this patient's social history and updated it with pertinent information if needed.  Social History     Tobacco Use     Smoking status: Former Smoker     Packs/day: 0.10     Smokeless tobacco: Never Used   Substance Use Topics     Alcohol use: No     Drug use: No       Family History   I have reviewed this patient's family history and updated it with pertinent information if needed.  Family History   Problem Relation Age of Onset     Heart Disease Mother      Diabetes Mother      Ovarian Cancer Mother      Heart Disease Father      Lung Cancer Father      Diabetes Sister      Ovarian Cancer Sister      Diabetes Brother        Prior to Admission Medications   Prior to Admission Medications   Prescriptions Last Dose Informant Patient Reported? Taking?   ACCU-CHEK RAFAELA PLUS test strip Past Week at Unknown time  Yes Yes   Alcohol Swabs (ALCOHOL WIPES) 70 % PADS Past Week at Unknown time  Yes Yes   Sig: USE WITH INSULIN INJECTION ONCE DAILY. **100 DAYS SUPPLY**   Blood Glucose Calibration (ACCU-CHEK ACTIVE GLUCOSE CONT VI) Past Week at Unknown time  Yes Yes   Si each by Other route   DULoxetine (CYMBALTA) 60 MG capsule 6/3/2021 at Unknown time  Yes Yes   Sig: Take 60 mg by mouth daily   NARCAN 4 MG/0.1ML nasal spray More than a month at Unknown time  Yes No   Sig: CALL 911. SPRAY CONTENTS OF ONE SPRAYER (0.1ML) INTO ONE NOSTRIL. REPEAT IN 2-3 MINS IF SYMPTOMS OF OPIOID PERSIST, ALTERNATE NOSTRILS   acetaminophen (TYLENOL) 500 MG tablet Past Week at Unknown time  Yes Yes   Sig: Take 500 mg by mouth 3 times daily    albuterol (PROAIR HFA/PROVENTIL HFA/VENTOLIN HFA) 108 (90 Base) MCG/ACT inhaler More than a month at Unknown time  Yes No   Sig:  Inhale 1-2 puffs into the lungs every 4 hours as needed for shortness of breath / dyspnea or wheezing   amLODIPine (NORVASC) 5 MG tablet 6/3/2021 at Unknown time  Yes Yes   Sig: Take 5 mg by mouth daily   amoxicillin-clavulanate (AUGMENTIN) 875-125 MG tablet 6/3/2021 at Unknown time  No Yes   Sig: Take 1 tablet by mouth every 12 hours for 7 days   atorvastatin (LIPITOR) 20 MG tablet 6/3/2021 at Unknown time  Yes Yes   Sig: Take 20 mg by mouth daily   blood glucose (ACCU-CHEK RAFAELA PLUS) test strip   Yes Yes   Sig: Use to check blood sugar 3x daily or as directed.   blood glucose calibration (ACCU-CHEK RAFAELA) solution Past Week at Unknown time  Yes Yes   calcium carbonate 1250 (500 Ca) MG CHEW Past Week at Unknown time  Yes Yes   Sig: Take 500 mg by mouth   cholecalciferol 25 MCG (1000 UT) TABS 6/3/2021 at Unknown time  Yes Yes   Sig: Take 1,000 Units by mouth daily    diclofenac (VOLTAREN) 1 % topical gel Past Week at Unknown time  No Yes   Sig: Apply 4 g topically 4 times daily   Patient taking differently: Apply 2-4 g topically 2 times daily    fluticasone-salmeterol (ADVAIR) 250-50 MCG/DOSE inhaler 6/3/2021 at Unknown time  Yes Yes   Sig: Inhale 1 puff into the lungs 2 times daily   furosemide (LASIX) 20 MG tablet 6/3/2021 at Unknown time  No Yes   Sig: Take 3 tablets (60 mg) by mouth 2 times daily   Patient taking differently: Take 60 mg by mouth daily    gabapentin (NEURONTIN) 600 MG tablet   Yes No   Sig: Take 1,200 mg by mouth 3 times daily   insulin detemir (LEVEMIR PEN) 100 UNIT/ML pen 6/3/2021 at Unknown time  Yes Yes   Sig: Inject 20 Units Subcutaneous every morning   levothyroxine (SYNTHROID/LEVOTHROID) 200 MCG tablet 6/3/2021 at Unknown time  Yes Yes   melatonin 3 MG tablet 6/3/2021 at Unknown time  Yes Yes   Sig: Take 5 mg by mouth At Bedtime    metFORMIN (GLUCOPHAGE) 1000 MG tablet 6/3/2021 at Unknown time  Yes Yes   Sig: Take 1,000 mg by mouth 2 times daily (with meals)   methadone HCl 10 MG/5ML SOLN  6/3/2021 at Unknown time  Yes Yes   Sig: Take 80 mg by mouth daily 10mg/mL strength   multivitamin w/minerals (MULTI-VITAMIN) tablet 6/3/2021 at Unknown time  Yes Yes   Sig: Take 1 tablet by mouth daily   nicotine polacrilex (NICORETTE) 4 MG gum More than a month at Unknown time  Yes No   Sig: Place 4 mg inside cheek every hour as needed for smoking cessation    omeprazole (PRILOSEC) 20 MG DR capsule 6/3/2021 at Unknown time  Yes Yes   Sig: Take 20 mg by mouth daily    potassium chloride ER (KLOR-CON) 8 MEQ CR tablet 6/3/2021 at Unknown time  Yes Yes   Sig: Take 8 mEq by mouth 2 times daily   traZODone (DESYREL) 50 MG tablet 6/3/2021 at Unknown time  Yes Yes   Sig: Take 50 mg by mouth At Bedtime   vitamin B-12 (CYANOCOBALAMIN) 1000 MCG tablet 6/3/2021 at Unknown time  Yes Yes   Sig: Take 1,000 mcg by mouth daily      Facility-Administered Medications: None     Allergies   Allergies   Allergen Reactions     Promethazine Other (See Comments) and Anxiety     Noted in 8/30/08 ER     Codeine Phosphate GI Disturbance     Lamotrigine Other (See Comments)     hyponatremia     Oxcarbazepine Unknown and Other (See Comments)     Low sodium  LegacyRecord#[id number]       Codeine Other (See Comments) and Rash     GI bleeding  LegacyRecord#[id number]         Physical Exam   Vital Signs: Temp: 97.9  F (36.6  C) Temp src: Oral BP: 121/62 Pulse: 90   Resp: 16 SpO2: 93 % O2 Device: None (Room air)    Weight: 285 lbs 0 oz    General: Well-appearing woman lying flat in bed, asleep, rouses to voice  Head: NC, AT  Eye: symm gaze, anicteric sclerae  ENT: patent nares wo drainage/epistaxis, MMM  Pulm: CTAB, comfortable WOB on room air  CV: normal rate, regular rhythm  GI: soft, NTND, obese  Neuro: awake, alert, hearing speech and phonation, intact grossly  Skin: multiplel well healed tattoos on UE and LE; Left LE warm with circumferential erythema and trace edema; no flaking, crusting, or skin breakdown in interdigit areas BL, + onychomycosis  BL                  Data   Data reviewed today: I reviewed all medications, new labs and imaging results over the last 24 hours. I personally reviewed the CXR and the US from 5-27, agree with report, and the EKG on 5-27 was NSR      Recent Labs   Lab 06/03/21 1956 05/28/21  0732 05/28/21  0654   WBC 11.0 8.4 Canceled, Test credited   HGB 13.5 12.2 Canceled, Test credited   MCV 92 91 Canceled, Test credited    254 Canceled, Test credited    134 Canceled, Test credited   POTASSIUM 4.1 4.0 Canceled, Test credited   CHLORIDE 97 101 Canceled, Test credited   CO2 32 30 Canceled, Test credited   BUN 13 14 Canceled, Test credited   CR 0.98 0.82 Canceled, Test credited   ANIONGAP 8 4 Canceled, Test credited   RODOLFO 9.5 8.6 Canceled, Test credited   GLC 86 99 Canceled, Test credited   ALBUMIN 3.4 2.9* Canceled, Test credited   PROTTOTAL 9.3* 8.3 Canceled, Test credited   BILITOTAL 0.4 0.4 Canceled, Test credited   ALKPHOS 119 106 Canceled, Test credited   ALT 52* 55* Canceled, Test credited   AST 34 35 Canceled, Test credited     Most Recent 3 CBC's:  Recent Labs   Lab Test 06/03/21 1956 05/28/21  0732 05/28/21  0654   WBC 11.0 8.4 Canceled, Test credited   HGB 13.5 12.2 Canceled, Test credited   MCV 92 91 Canceled, Test credited    254 Canceled, Test credited     Most Recent 3 BMP's:  Recent Labs   Lab Test 06/03/21 1956 05/28/21  0732 05/28/21  0654    134 Canceled, Test credited   POTASSIUM 4.1 4.0 Canceled, Test credited   CHLORIDE 97 101 Canceled, Test credited   CO2 32 30 Canceled, Test credited   BUN 13 14 Canceled, Test credited   CR 0.98 0.82 Canceled, Test credited   ANIONGAP 8 4 Canceled, Test credited   RODOLFO 9.5 8.6 Canceled, Test credited   GLC 86 99 Canceled, Test credited     Most Recent 2 LFT's:  Recent Labs   Lab Test 06/03/21 1956 05/28/21  0732   AST 34 35   ALT 52* 55*   ALKPHOS 119 106   BILITOTAL 0.4 0.4     Most Recent 3 INR's:  Recent Labs   Lab Test 01/28/20  0025   INR 1.03      Anticoagulation Dose History     Recent Dosing and Labs Latest Ref Rng & Units 1/28/2020    INR 0.86 - 1.14 1.03          Most Recent 3 Creatinines:  Recent Labs   Lab Test 06/03/21 1956 05/28/21  0732 05/28/21  0654   CR 0.98 0.82 Canceled, Test credited     Most Recent 3 Hemoglobins:  Recent Labs   Lab Test 06/03/21 1956 05/28/21  0732 05/28/21  0654   HGB 13.5 12.2 Canceled, Test credited     Most Recent 3 Troponin's:  Recent Labs   Lab Test 05/27/21  0137 07/06/20  1400 01/28/20  0025   TROPI <0.015 0.015 <0.015     Most Recent 3 BNP's:  Recent Labs   Lab Test 05/27/21  0137 07/06/20  1400 06/10/20  0701   NTBNPI 60 347 277     Most Recent D-dimer:  Recent Labs   Lab Test 06/09/20  2323   DD 1.0*     Most Recent Cholesterol Panel:No lab results found.  Most Recent 6 Bacteria Isolates From Any Culture (See EPIC Reports for Culture Details):  Recent Labs   Lab Test 05/27/21  0137 07/06/20  1630 07/06/20  1400 01/28/20  0025 01/27/20  2305 01/27/20 2035   CULT No growth No growth No growth No growth  No growth No Beta Streptococcus isolated 50,000 to 100,000 colonies/mL  mixed urogenital abril  Susceptibility testing not routinely done       Most Recent TSH and T4:  Recent Labs   Lab Test 06/10/20  0701   TSH 9.07*   T4 1.26     Most Recent Hemoglobin A1c:  Recent Labs   Lab Test 05/27/21  0137   A1C 6.5*     Most Recent 6 glucoses:  Recent Labs   Lab Test 06/03/21 1956 05/28/21  0732 05/28/21  0654 05/27/21  0137 07/07/20  0645 07/06/20  1400   GLC 86 99 Canceled, Test credited 167* 106* 104*     Most Recent Urinalysis:  Recent Labs   Lab Test 05/27/21  1045   COLOR Light Yellow   APPEARANCE Clear   URINEGLC Negative   URINEBILI Negative   URINEKETONE Negative   SG 1.015   UBLD Negative   URINEPH 5.0   PROTEIN Negative   NITRITE Negative   LEUKEST Trace*   RBCU <1   WBCU 4     Most Recent ABG:No lab results found.  Most Recent ESR & CRP:  Recent Labs   Lab Test 05/28/21  0732 05/27/21  0137 05/27/21  0137    SED  --   --  67*   CRP 13.0*   < > 11.1*    < > = values in this interval not displayed.     Most Recent Anemia Panel:  Recent Labs   Lab Test 06/03/21 1956 01/28/20 0025 01/28/20  0025   WBC 11.0   < > 10.1   HGB 13.5   < > 10.0*   HCT 43.7   < > 32.1*   MCV 92   < > 92      < > 297   IRON  --   --  Unsatisfactory specimen - hemolyzed   IRONSAT  --   --  Not Calculated   RETICABSCT  --   --  125.0*   RETP  --   --  3.5*   FEB  --   --  Unsatisfactory specimen - hemolyzed   GILA  --   --  32    < > = values in this interval not displayed.     Most Recent CPK:No lab results found.  No results found for this or any previous visit (from the past 24 hour(s)).

## 2021-06-04 NOTE — PHARMACY
Methadone Clinic information    Clinic Name: [facility]              Clinic Location (city): [place]  Phone Number: [phone]     Patient received methadone 80mg on 5/26/21 at the clinic and received 13 take-out doses. She is due to return to the clinic on 6/9/26. She got 2 doses while hospitalized on [place] 5/27 and 5/28/21.

## 2021-06-04 NOTE — ED PROVIDER NOTES
[facility] EMERGENCY DEPARTMENT ([facility])    6/03/21     ED 19    History     Chief Complaint   Patient presents with     Cellulitis     Worsening Left Leg Cellulitis, patient currently on oral antibiotics but patient think its not working     Diarrhea     Started a week ago      The history is provided by the patient and medical records.     No Yusuf is a 61 year old female with history of type 2 diabetes, and chronic lymphedema, who presents with worsening left lower extremity cellulitis as well as diarrhea. Patient had recent ED Obs unit stay from 5/27-5/28/2021 for bilateral lower extremity edema with left lower extremity cellulitis..  She underwent evaluation with labs, EKG.  She was treated with Ancef followed by p.o. Augmentin throughout her observation unit stay.  She had a physical therapy consult and was recommended discharge back to group home.  Her lower extremity erythema started to improve and was receding from the marked borders.  She was discharged home on a prescription for Augmentin 875 p.o. twice daily x7-day course with instructions to follow-up with her doctor in a week.  She returns to the ER today with worsening left lower extremity cellulitis. Patient lives in a group home and both patient and group staff have noticed that the erythema is extending past the original margins that were previously illustrated. They are concerned that her antibiotics are not working, as she is on day 6 of 7 Augmentin. She also notes developing diarrhea as soon as she got the IV Ancef. No mucus or blood in her stools, no abdominal discomfort, just loose stools. She endorses generalized weakness, subjective fevers and has been sleeping all day. No other symptoms noted.      PAST MEDICAL HISTORY:   Past Medical History:   Diagnosis Date     Arthritis      Bipolar affective (H)      Fibromyalgia      Hypertension        PAST SURGICAL HISTORY:   Past Surgical History:   Procedure Laterality Date      CHOLECYSTECTOMY       GYN SURGERY      c section     GYN SURGERY      oblation     ORTHOPEDIC SURGERY      left leg, left shoulder, back       Past medical history, past surgical history, medications, and allergies were reviewed with the patient. Additional pertinent items: None    FAMILY HISTORY:   Family History   Problem Relation Age of Onset     Heart Disease Mother      Diabetes Mother      Ovarian Cancer Mother      Heart Disease Father      Lung Cancer Father      Diabetes Sister      Ovarian Cancer Sister      Diabetes Brother        SOCIAL HISTORY:   Social History     Tobacco Use     Smoking status: Former Smoker     Packs/day: 0.10     Smokeless tobacco: Never Used   Substance Use Topics     Alcohol use: No     Social history was reviewed with the patient. Additional pertinent items: None    Patient's Medications   New Prescriptions    No medications on file   Previous Medications    ACETAMINOPHEN (TYLENOL) 500 MG TABLET    Take 500 mg by mouth 3 times daily     ALBUTEROL (PROAIR HFA/PROVENTIL HFA/VENTOLIN HFA) 108 (90 BASE) MCG/ACT INHALER    Inhale 1-2 puffs into the lungs every 4 hours as needed for shortness of breath / dyspnea or wheezing    AMLODIPINE (NORVASC) 5 MG TABLET    Take 5 mg by mouth daily    AMOXICILLIN-CLAVULANATE (AUGMENTIN) 875-125 MG TABLET    Take 1 tablet by mouth every 12 hours for 7 days    ATORVASTATIN (LIPITOR) 20 MG TABLET    Take 20 mg by mouth daily    CHOLECALCIFEROL 25 MCG (1000 UT) TABS    Take 1,000 Units by mouth daily     DICLOFENAC (VOLTAREN) 1 % TOPICAL GEL    Apply 4 g topically 4 times daily    DULOXETINE (CYMBALTA) 60 MG CAPSULE    Take 60 mg by mouth daily    FLUTICASONE-SALMETEROL (ADVAIR) 250-50 MCG/DOSE INHALER    Inhale 1 puff into the lungs 2 times daily    FUROSEMIDE (LASIX) 20 MG TABLET    Take 3 tablets (60 mg) by mouth 2 times daily    GABAPENTIN (NEURONTIN) 600 MG TABLET    Take 1,200 mg by mouth 3 times daily    INSULIN DETEMIR (LEVEMIR PEN) 100  UNIT/ML PEN    Inject 20 Units Subcutaneous every morning    LEVOTHYROXINE (SYNTHROID/LEVOTHROID) 200 MCG TABLET        MELATONIN 3 MG TABLET    Take 5 mg by mouth At Bedtime     METFORMIN (GLUCOPHAGE) 1000 MG TABLET    Take 1,000 mg by mouth 2 times daily (with meals)    METHADONE HCL 10 MG/5ML SOLN    Take 80 mg by mouth daily 10mg/mL strength    MULTIVITAMIN W/MINERALS (MULTI-VITAMIN) TABLET    Take 1 tablet by mouth daily    NARCAN 4 MG/0.1ML NASAL SPRAY    CALL 911. SPRAY CONTENTS OF ONE SPRAYER (0.1ML) INTO ONE NOSTRIL. REPEAT IN 2-3 MINS IF SYMPTOMS OF OPIOID PERSIST, ALTERNATE NOSTRILS    NICOTINE POLACRILEX (NICORETTE) 4 MG GUM    Place 4 mg inside cheek every hour as needed for smoking cessation     OMEPRAZOLE (PRILOSEC) 20 MG DR CAPSULE    Take 20 mg by mouth daily     POTASSIUM CHLORIDE ER (KLOR-CON) 8 MEQ CR TABLET    Take 8 mEq by mouth 2 times daily    TRAZODONE (DESYREL) 50 MG TABLET    Take 50 mg by mouth At Bedtime    VITAMIN B-12 (CYANOCOBALAMIN) 1000 MCG TABLET    Take 1,000 mcg by mouth daily   Modified Medications    No medications on file   Discontinued Medications    No medications on file          Allergies   Allergen Reactions     Promethazine Other (See Comments) and Anxiety     Noted in 8/30/08 ER     Codeine Phosphate GI Disturbance     Lamotrigine Other (See Comments)     hyponatremia     Oxcarbazepine Unknown and Other (See Comments)     Low sodium  LegacyRecord#[id number]       Codeine Other (See Comments) and Rash     GI bleeding  LegacyRecord#[id number]         ROS: 14 point ROS neg other than the symptoms noted above in the HPI.      Physical Exam   BP: 121/62  Pulse: 90  Temp: 97.9  F (36.6  C)  Resp: 16  Weight: 129.3 kg (285 lb)  SpO2: 93 %      Physical Exam  Vitals signs and nursing note reviewed.   Constitutional:       General: She is not in acute distress.     Appearance: She is well-developed. She is not diaphoretic.   HENT:      Head: Normocephalic and atraumatic.      Mouth/Throat:       Pharynx: No oropharyngeal exudate.   Eyes:      General: No scleral icterus.        Right eye: No discharge.         Left eye: No discharge.      Pupils: Pupils are equal, round, and reactive to light.   Neck:      Musculoskeletal: Normal range of motion and neck supple.   Cardiovascular:      Rate and Rhythm: Normal rate and regular rhythm.      Heart sounds: Normal heart sounds. No murmur. No friction rub. No gallop.    Pulmonary:      Effort: Pulmonary effort is normal. No respiratory distress.      Breath sounds: Normal breath sounds. No wheezing.   Chest:      Chest wall: No tenderness.   Abdominal:      General: Bowel sounds are normal. There is no distension.      Palpations: Abdomen is soft.      Tenderness: There is no abdominal tenderness.   Musculoskeletal: Normal range of motion.         General: No tenderness or deformity.      Right lower leg: Edema present.      Left lower leg: Edema present.   Skin:     General: Skin is warm and dry.      Coloration: Skin is not pale.      Findings: No erythema or rash.          Neurological:      Mental Status: She is alert and oriented to person, place, and time.      Cranial Nerves: No cranial nerve deficit.         ED Course        Procedures                          Labs Ordered and Resulted from Time of ED Arrival Up to the Time of Departure from the ED - No data to display         Assessments & Plan (with Medical Decision Making)   This is a 7-year-old female with a history of bilateral lower extremity edema and recent diagnosis of cellulitis who presents with worsening over lower extremity cellulitis.  She is apparently on p.o. antibiotics for this and it has been worsening, extending the skin marker.  No other associated symptoms with this.  On exam patient has erythema warmth and tenderness consistent with cellulitis to her left lower extremity.  There are no foot ulcers on exam.  Lab work shows acute abnormalities.  As she is having diarrhea in the  setting of antibiotic use C. difficile has been obtained and is pending at this time.  Patient was given ceftriaxone and vancomycin. We will admit for further monitoring work-up and treatment.    I have reviewed the nursing notes.    I have reviewed the findings, diagnosis, plan and need for follow up with the patient.    New Prescriptions    No medications on file       Final diagnoses:   None   I, Lindsay Sood, am serving as a trained medical scribe to document services personally performed by Rayray Lagunas DO based on the provider's statements to me on Sera 3, 2021.  This document has been checked and approved by the attending provider.    I, Rayray Lagunas DO, was physically present and have reviewed and verified the accuracy of this note documented by Lindsay Sood, medical scribe.     Rayray Lagunas DO  6/3/2021   [facility] EMERGENCY DEPARTMENT         Rayray Lagunas DO  06/03/21 1381

## 2021-06-04 NOTE — UTILIZATION REVIEW
Admission Status; Secondary Review Determination    Under the authority of the Utilization Management Committee, the utilization review process indicated a secondary review on the above patient. The review outcome is based on review of the medical records, discussions with staff, and applying clinical experience noted on the date of the review.    (x) Inpatient Status Appropriate - This patient's medical care is consistent with medical management for inpatient care and reasonable inpatient medical practice.    RATIONALE FOR DETERMINATION: 69-year-old female presents with worsening cellulitis after hospitalized under observation care for a couple days and almost finished her outpatient Augmentin.  She has significant comorbidities that increase the risk of adverse events related to the infection with type 2 diabetes associated neuropathy, morbid obesity with a BMI of almost 50 and chronic lymphedema.  As patient's cellulitis has clinically worsened after prior outpatient management, patient truly has failed most likely related to patient's comorbidities and potential resistant organism and therefore inpatient management with intravenous vancomycin with serial exams is appropriate.    At the time of admission with the information available to the attending physician more than 2 nights Hospital complex care was anticipated, based on patient risk of adverse outcome if treated as outpatient and complex care required. Inpatient admission is appropriate based on the Medicare guidelines.    This document was produced using voice recognition software    The information on this document is developed by the utilization review team in order for the business office to ensure compliance. This only denotes the appropriateness of proper admission status and does not reflect the quality of care rendered.    The definitions of Inpatient Status and Observation Status used in making the determination above are those provided in the  CMS Coverage Manual, Chapter 1 and Chapter 6, section 70.4.    Sincerely,    Gene Hood MD  Utilization Review  Physician Advisor  [facility].

## 2021-06-04 NOTE — ED TRIAGE NOTES
Patient reports that she was hospitalized in May for Cellulitis and was sent home with oral abx , patient woke up this morning and noted that cellulitis is getting worst. Patient has chills but no fever.

## 2021-06-04 NOTE — ED NOTES
[facility]   ED Nurse to Floor Handoff     No Yusuf is a 61 year old female who speaks English and lives with others,  in a home  They arrived in the ED by car from home    ED Chief Complaint: Cellulitis (Worsening Left Leg Cellulitis, patient currently on oral antibiotics but patient think its not working) and Diarrhea (Started a week ago )    ED Dx;   Final diagnoses:   Cellulitis of left lower extremity         Needed?: No    Allergies:   Allergies   Allergen Reactions     Promethazine Other (See Comments) and Anxiety     Noted in 8/30/08 ER     Codeine Phosphate GI Disturbance     Lamotrigine Other (See Comments)     hyponatremia     Oxcarbazepine Unknown and Other (See Comments)     Low sodium  LegacyRecord#[id number]       Codeine Other (See Comments) and Rash     GI bleeding  LegacyRecord#[id number]     .  Past Medical Hx:   Past Medical History:   Diagnosis Date     Arthritis      Bipolar affective (H)      Fibromyalgia      Hypertension       Baseline Mental status: WDL  Current Mental Status changes: at basesline    Infection present or suspected this encounter: yes other cellulitis  Sepsis suspected: No  Isolation type: No active isolations  Patient tested for COVID 19 prior to admission: YES     Activity level - Baseline/Home:  Independent  Activity Level - Current:   Independent    Bariatric equipment needed?: No    In the ED these meds were given:   Medications   cefTRIAXone (ROCEPHIN) 2 g vial to attach to  ml bag for ADULTS or NS 50 ml bag for PEDS (2 g Intravenous New Bag 6/3/21 2133)   vancomycin (VANCOCIN) 2,000 mg in sodium chloride 0.9 % 500 mL intermittent infusion (has no administration in time range)   vancomycin (VANCOCIN) 1,750 mg in sodium chloride 0.9 % 500 mL intermittent infusion (has no administration in time range)   sodium chloride 0.9 % infusion (50 mLs  New Bag 6/3/21 2132)       Drips running?  Yes    Home pump   No    Current LDAs  Peripheral IV 06/03/21 Left Lower forearm (Active)   Number of days: 0       Labs results:   Labs Ordered and Resulted from Time of ED Arrival Up to the Time of Departure from the ED   CBC WITH PLATELETS DIFFERENTIAL - Abnormal; Notable for the following components:       Result Value    MCHC 30.9 (*)     RDW 15.1 (*)     All other components within normal limits   COMPREHENSIVE METABOLIC PANEL - Abnormal; Notable for the following components:    Protein Total 9.3 (*)     ALT 52 (*)     All other components within normal limits   ENTERIC BACTERIA AND VIRUS PANEL BY SERENE STOOL       Imaging Studies: No results found for this or any previous visit (from the past 24 hour(s)).    Recent vital signs:   /62   Pulse 90   Temp 97.9  F (36.6  C) (Oral)   Resp 16   Wt 129.3 kg (285 lb)   LMP 11/01/2013   SpO2 93%   Breastfeeding No   BMI 48.92 kg/m      Leonel Coma Scale Score: 15 (06/03/21 1902)       Cardiac Rhythm: Normal Sinus  Pt needs tele? No  Skin/wound Issues: Cellulitis on left leg    Code Status: Full Code    Pain control: good    Nausea control: pt had none    Abnormal labs/tests/findings requiring intervention:     Family present during ED course? No   Family Comments/Social Situation comments:     Tasks needing completion: None    Aakash Bates, RN    [phone] [facility]  [phone] [facility]

## 2021-06-04 NOTE — CONSULTS
Care Management Initial Consult    General Information  Assessment completed with: Patient(by phone), chart review   Type of CM/SW Visit: Initial Assessment  Primary Care Provider verified and updated as needed: Yes   Readmission within the last 30 days: previous discharge plan unsuccessful   Return Category: Medically unsuccessful treatment plan     Advance Care Planning: Advance Care Planning Reviewed: (per bedside staff)          Communication Assessment  Patient's communication style: spoken language (English or Bilingual)    Hearing Difficulty or Deaf: other (see comments)(L ear worse than R)   Wear Glasses or Blind: yes(Reading- with pt)  Cognitive/Neuro/Behavioral: WDL          Living Environment:   People in home: facility resident  (1 other resident)  Current living Arrangements: [facility] [phone].   Able to return to prior arrangements:         Family/Social Support:  Care provided by: self( staff)  Provides care for: no one  Marital Status: Single  Children( Staff)          Description of Support System: Supportive, Involved    Support Assessment: Adequate family and caregiver support    Current Resources:   Patient receiving home care services: No  Community Resources: County Worker - CADI waiver  Equipment currently used at home: cane, straight, walker, standard  Supplies currently used at home:      Employment/Financial:  Employment Status: disabled     Financial Concerns: No concerns identified     Functional Status:  Prior to admission patient needed assistance:   Dependent ADLs:: Ambulation-cane, Ambulation-walker, pt sometimes gets help with bathing  Dependent IADLs:: Cooking, Laundry (pt sometimes gets help), Shopping, Meal Preparation, Medication Management, Transportation        Values/Beliefs:  Spiritual, Cultural Beliefs, Synagogue Practices, Values that affect care: no               Additional Information:  Initial assessment completed due to high risk readmission score. Patient  lives in Group Home and she will call them for ride home. Per H/P plan is for PO AB at discharge. Pt is restricted but in FYI tab appears that since 6/16/20 [facility] is one of the providers.   Care management will follow for any discharge needs.          Maday Rueda, RN, [place]  Float Care Coordinator  Covering 5O/8A [facility]   Pager: [phone]

## 2021-06-04 NOTE — PROGRESS NOTES
[facility], [place], [place]  Internal Medicine Progress Note      Assessment and Plans:     No Yusuf is a 60 yo F w/PMH of DM2 c/b neuropathy, HTN HLD Morbid obesity (BMI 49), polysubstance abuse and dependance hx on stable methadone, fibromyalgia, GERD, depression, and chronic lymphedema, who presented with worsening left lower extremity cellulitis.    # Left LE cellulitis  # Reports having BL cellulitis. It appears that BL legs improved on Augmentin for a short while, then left leg worsened. Althought reassuring no pus or induration or purulence, staph infection must be considered.   -Ct IV Vancomycin    # BL LE Chronic edema  -Likely from obesity. But Amlodipine not helping. Stop amlodipine  -Consider starting ARB or ACEI given pts dgx of Dm2 and HTN  -Ct with home lasix 60 mg daily    # Hypertension: BP on soft side. PTA on Amlodipine and lasix.   -Hold amlodipine.   -Ct lasix.        # DM2  -Last A1c was 6.5 on 5/20/21   -c/w home Metformin, Levemir 20 units  -BG checks TID ac and at bedtime; medium sliding scale insulin        # H/o Polysubstance Abuse & Opiate Dependence:   -takes 80 mg methadone daily, last dose was yesterday.  Fills through [facility].  -continue methadone 80 mg po daily     # Hypothyroidism:   -Continue with PTA Levothyroxine  # GERD:   - Continue with PTA omeprazole  # Depression  - Continue PTA Duloxetine, Hydroxyzine  # Insomnia  - Continue PTA Trazodone prn  # Chronic neuropathic pain:    - Continue with PTA Gabapentin 1200mg TID, duloxetine    # Hep C: not on treatment, followed at [facility]  -per care everywhere HCV RNA Quant 1/7/2021 was [phone] and on 12/10/2019 was [phone] with  HEPATITIS C, RNA, QUANT 1/7/2021 was 6.2 and on 12/10/2019  was 5.9  -possible HCV contributing to edema and rash, but not likely      Diet: Combination Diet Regular Diet Adult    DVT Prophylaxis: Enoxaparin (Lovenox) SQ  [place]  Catheter: not present  Code Status: Full Code         Disposition Plan    Expect discharge on sunday    Tl Roman MD, MPH.  Internal Medicine Attending  [place], [place]    Click on the link below to page me.   Text Page  (7am - 5pm, M-F)    Subjective:     She left leg cellulitis is same  No fever.   She had diarrhea with augmentin, but now better.   No abdominal pain.   No fevers.     -Data reviewed today: I reviewed all new labs and imaging results over the last 24 hours.     Exam:     Temp: 98.5  F (36.9  C) Temp src: Oral BP: 126/76 Pulse: 70   Resp: 14 SpO2: 92 % O2 Device: None (Room air)    Vitals:    06/03/21 1902   Weight: 129.3 kg (285 lb)     Vital Signs with Ranges  Temp:  [97.4  F (36.3  C)-98.5  F (36.9  C)] 98.5  F (36.9  C)  Pulse:  [50-90] 70  Resp:  [14-17] 14  BP: (105-126)/(62-76) 126/76  SpO2:  [92 %-95 %] 92 %  No intake/output data recorded.    Constitutional: No distress noted, Alert, Answering questions appropriately  Respiratory: AE is goog on both sides, no wheezing onr crackles  Cardiovascular: S1S2 normal, no new murmur  GI: Soft, non tender  Skin/Integumen:  LLE cellulitis in marked area. B/L venous stasis.     Medications       [Held by provider] amLODIPine  5 mg Oral Daily     atorvastatin  20 mg Oral Daily     cyanocobalamin  1,000 mcg Oral Daily     diclofenac  2-4 g Topical BID     DULoxetine  60 mg Oral Daily     enoxaparin ANTICOAGULANT  40 mg Subcutaneous Q24H     fluticasone-vilanterol  1 puff Inhalation Daily     furosemide  60 mg Oral Daily     gabapentin  1,200 mg Oral TID     insulin aspart  1-7 Units Subcutaneous TID AC     insulin aspart  1-5 Units Subcutaneous At Bedtime     insulin detemir  20 Units Subcutaneous QAM     levothyroxine  200 mcg Oral QAM AC     metFORMIN  1,000 mg Oral BID w/meals     methadone  80 mg Oral Daily     multivitamin w/minerals  1 tablet Oral Daily     omeprazole  20 mg Oral QAM AC     potassium  chloride ER  10 mEq Oral BID     sodium chloride (PF)  3 mL Intracatheter Q8H     traZODone  50 mg Oral At Bedtime     vancomycin (VANCOCIN) IV  1,750 mg Intravenous Q24H     Vitamin D3  1,000 Units Oral Daily       Data   Recent Labs   Lab 06/04/21 0805 06/03/21  1956   WBC  --  11.0   HGB  --  13.5   MCV  --  92   PLT  --  313   NA  --  137   POTASSIUM  --  4.1   CHLORIDE  --  97   CO2  --  32   BUN  --  13   CR 0.81 0.98   ANIONGAP  --  8   RODOLFO  --  9.5   GLC  --  86   ALBUMIN  --  3.4   PROTTOTAL  --  9.3*   BILITOTAL  --  0.4   ALKPHOS  --  119   ALT  --  52*   AST  --  34       No results found for this or any previous visit (from the past 24 hour(s)).

## 2021-06-04 NOTE — PLAN OF CARE
Pt A/O X 4. Afebrile. VSS, Scheduled Lasix not given with a BP of 99/57 and  web paged. Lungs-Clear bilaterally with both anterior and posterior. Bowels-Hyperactive in all four quadrants, and pt reports having 1 loose BM this morning. Voids spontaneously without difficulty in the bathroom. Denies nausea and vomiting. CMS and Neuro's are intact. Denies numbness and tingling in all extremities. Has pain in the left leg and given PRN Tylenol, scheduled Gabapentin and Methadone, and pain is manageable. Is on a Regular diet and appetite was Good this shift. Blood glucose levels 106 and 121, scheduled Levemir and Glucophage given. Blanchable redness on the left lower leg is within the black marked lines. Coccyx area has slight blanchable redness and barrier cream applied. Pt up in room with SBA and a cane. PIV patent in the left arm and saline locked. Bilateral heels are elevated off the bed. Pt is able to make needs known, and call light is within reach. Continue to monitor.

## 2021-06-04 NOTE — PHARMACY-VANCOMYCIN DOSING SERVICE
Pharmacy Vancomycin Initial Note  Date of Service Sera 3, 2021  Patient's  1960  61 year old, female    Indication: Skin and Soft Tissue Infection    Current estimated CrCl = Estimated Creatinine Clearance: 81.4 mL/min (based on SCr of 0.98 mg/dL).    Creatinine for last 3 days  6/3/2021:  7:56 PM Creatinine 0.98 mg/dL    Recent Vancomycin Level(s) for last 3 days  No results found for requested labs within last 72 hours.      Vancomycin IV Administrations (past 72 hours)      No vancomycin orders with administrations in past 72 hours.                Nephrotoxins and other renal medications (From now, onward)    Start     Dose/Rate Route Frequency Ordered Stop    21  vancomycin (VANCOCIN) 1,750 mg in sodium chloride 0.9 % 500 mL intermittent infusion      1,750 mg  over 2 Hours Intravenous EVERY 24 HOURS 21  vancomycin (VANCOCIN) 2,000 mg in sodium chloride 0.9 % 500 mL intermittent infusion      2,000 mg  over 2 Hours Intravenous ONCE 21            Contrast Orders - past 72 hours (72h ago, onward)    None        Loading dose: 2000 mg at 22:00 2021-->did a one time higher dose since the obese patients seem to be underdosed with [facility].  Regimen: 1750 mg every 24 hours  Start time: 21:35 on 2021  Exposure target: AUC24 (range)400-600 mg/L.hr   AUC24,ss: 550 mg/L.hr  PAUC*: 75 %  Ctrough,ss: 12.8 mg/L  Pconc*: 26 %  Tox.: 8 %        Plan:  1. Start vancomycin  2000 mg IV x 1 dose (15.5mg/kg/dose). Then 1750mg IV q24h thereafter (13.5mg/kg/dose).   2. Vancomycin monitoring method: AUC  3. Vancomycin therapeutic monitoring goal: 400-600 mg*h/L  4. Pharmacy will check vancomycin levels as appropriate in 1-3 Days.    5. Serum creatinine levels will be ordered every 48 hours.      Yanet Gonzalez, PharmD, BCPS

## 2021-06-04 NOTE — PLAN OF CARE
"  VS: Blood pressure 105/76, pulse 50, temperature 97.4  F (36.3  C), resp. rate 16, weight 129.3 kg (285 lb), last menstrual period 11/01/2013, SpO2 94 %, not currently breastfeeding.     O2: Room air, denies SOB. Lung sounds clear and equal bilaterally.   Output: Pt ambulates to toilet.   Last BM: 6/3- has been having diarrhea.   Activity: SBA with walker.   Skin: Cellulitis LLE, site has been marked.. Complains of perineal area being \"raw\", barrier cream provided.   Pain: Pt denied pain unless when moving.   CMS: Pt denied any N/T.   Dressing: Pt has transparent dressing on L PIV, CDI.   Diet: Reg Diet.   LDA: L arm PIV.   Equipment: Walker, IV pole.   Plan: Initiate plan of care.   Additional Info: Pt arrived on unit around 0030. Pt accidentally pulled PIV from ER out and a new PIV was placed. Pt slept in between cares.       "

## 2021-06-05 LAB
ANION GAP SERPL CALCULATED.3IONS-SCNC: 6 MMOL/L (ref 3–14)
BUN SERPL-MCNC: 13 MG/DL (ref 7–30)
CALCIUM SERPL-MCNC: 8.4 MG/DL (ref 8.5–10.1)
CHLORIDE SERPL-SCNC: 100 MMOL/L (ref 94–109)
CO2 SERPL-SCNC: 28 MMOL/L (ref 20–32)
CREAT SERPL-MCNC: 1.06 MG/DL (ref 0.52–1.04)
GFR SERPL CREATININE-BSD FRML MDRD: 57 ML/MIN/{1.73_M2}
GLUCOSE BLDC GLUCOMTR-MCNC: 108 MG/DL (ref 70–99)
GLUCOSE BLDC GLUCOMTR-MCNC: 117 MG/DL (ref 70–99)
GLUCOSE BLDC GLUCOMTR-MCNC: 142 MG/DL (ref 70–99)
GLUCOSE BLDC GLUCOMTR-MCNC: 149 MG/DL (ref 70–99)
GLUCOSE BLDC GLUCOMTR-MCNC: 96 MG/DL (ref 70–99)
GLUCOSE SERPL-MCNC: 103 MG/DL (ref 70–99)
POTASSIUM SERPL-SCNC: 4.1 MMOL/L (ref 3.4–5.3)
SODIUM SERPL-SCNC: 133 MMOL/L (ref 133–144)

## 2021-06-05 PROCEDURE — 36415 COLL VENOUS BLD VENIPUNCTURE: CPT | Performed by: PEDIATRICS

## 2021-06-05 PROCEDURE — 250N000013 HC RX MED GY IP 250 OP 250 PS 637: Performed by: PEDIATRICS

## 2021-06-05 PROCEDURE — 250N000013 HC RX MED GY IP 250 OP 250 PS 637: Performed by: STUDENT IN AN ORGANIZED HEALTH CARE EDUCATION/TRAINING PROGRAM

## 2021-06-05 PROCEDURE — 99232 SBSQ HOSP IP/OBS MODERATE 35: CPT | Performed by: INTERNAL MEDICINE

## 2021-06-05 PROCEDURE — 999N001017 HC STATISTIC GLUCOSE BY METER IP

## 2021-06-05 PROCEDURE — 250N000013 HC RX MED GY IP 250 OP 250 PS 637: Performed by: INTERNAL MEDICINE

## 2021-06-05 PROCEDURE — 80048 BASIC METABOLIC PNL TOTAL CA: CPT | Performed by: PEDIATRICS

## 2021-06-05 PROCEDURE — 120N000002 HC R&B MED SURG/OB UMMC

## 2021-06-05 PROCEDURE — 250N000011 HC RX IP 250 OP 636: Performed by: PEDIATRICS

## 2021-06-05 PROCEDURE — 258N000003 HC RX IP 258 OP 636: Performed by: PEDIATRICS

## 2021-06-05 RX ORDER — IBUPROFEN 600 MG/1
600 TABLET, FILM COATED ORAL EVERY 6 HOURS PRN
Status: DISCONTINUED | OUTPATIENT
Start: 2021-06-05 | End: 2021-06-09 | Stop reason: HOSPADM

## 2021-06-05 RX ORDER — TRAMADOL HYDROCHLORIDE 50 MG/1
50 TABLET ORAL EVERY 6 HOURS PRN
Status: DISCONTINUED | OUTPATIENT
Start: 2021-06-05 | End: 2021-06-06

## 2021-06-05 RX ADMIN — OMEPRAZOLE 20 MG: 20 CAPSULE, DELAYED RELEASE ORAL at 09:19

## 2021-06-05 RX ADMIN — ACETAMINOPHEN 975 MG: 325 TABLET, FILM COATED ORAL at 01:47

## 2021-06-05 RX ADMIN — DULOXETINE HYDROCHLORIDE 60 MG: 60 CAPSULE, DELAYED RELEASE ORAL at 09:14

## 2021-06-05 RX ADMIN — GABAPENTIN 1200 MG: 600 TABLET, FILM COATED ORAL at 09:14

## 2021-06-05 RX ADMIN — CYANOCOBALAMIN TAB 1000 MCG 1000 MCG: 1000 TAB at 09:14

## 2021-06-05 RX ADMIN — POTASSIUM CHLORIDE 10 MEQ: 750 CAPSULE, EXTENDED RELEASE ORAL at 19:29

## 2021-06-05 RX ADMIN — LEVOTHYROXINE SODIUM 200 MCG: 200 TABLET ORAL at 09:14

## 2021-06-05 RX ADMIN — ATORVASTATIN CALCIUM 20 MG: 20 TABLET, FILM COATED ORAL at 09:14

## 2021-06-05 RX ADMIN — INSULIN DETEMIR 20 UNITS: 100 INJECTION, SOLUTION SUBCUTANEOUS at 09:14

## 2021-06-05 RX ADMIN — ENOXAPARIN SODIUM 40 MG: 40 INJECTION SUBCUTANEOUS at 09:14

## 2021-06-05 RX ADMIN — TRAMADOL HYDROCHLORIDE 50 MG: 50 TABLET, FILM COATED ORAL at 17:51

## 2021-06-05 RX ADMIN — VANCOMYCIN HYDROCHLORIDE 1750 MG: 10 INJECTION, POWDER, LYOPHILIZED, FOR SOLUTION INTRAVENOUS at 22:22

## 2021-06-05 RX ADMIN — FLUTICASONE FUROATE AND VILANTEROL TRIFENATATE 1 PUFF: 200; 25 POWDER RESPIRATORY (INHALATION) at 09:13

## 2021-06-05 RX ADMIN — DICLOFENAC SODIUM 4 G: 10 GEL TOPICAL at 09:19

## 2021-06-05 RX ADMIN — POTASSIUM CHLORIDE 10 MEQ: 750 CAPSULE, EXTENDED RELEASE ORAL at 09:14

## 2021-06-05 RX ADMIN — TRAZODONE HYDROCHLORIDE 50 MG: 50 TABLET ORAL at 22:22

## 2021-06-05 RX ADMIN — FUROSEMIDE 60 MG: 20 TABLET ORAL at 09:14

## 2021-06-05 RX ADMIN — GABAPENTIN 1200 MG: 600 TABLET, FILM COATED ORAL at 13:30

## 2021-06-05 RX ADMIN — Medication 1000 UNITS: at 09:14

## 2021-06-05 RX ADMIN — Medication 2 G: at 17:44

## 2021-06-05 RX ADMIN — Medication 4 G: at 19:30

## 2021-06-05 RX ADMIN — METFORMIN HYDROCHLORIDE 1000 MG: 500 TABLET, FILM COATED ORAL at 09:14

## 2021-06-05 RX ADMIN — IBUPROFEN 600 MG: 600 TABLET, FILM COATED ORAL at 01:47

## 2021-06-05 RX ADMIN — METFORMIN HYDROCHLORIDE 1000 MG: 500 TABLET, FILM COATED ORAL at 17:44

## 2021-06-05 RX ADMIN — GABAPENTIN 1200 MG: 600 TABLET, FILM COATED ORAL at 19:30

## 2021-06-05 RX ADMIN — MULTIPLE VITAMINS W/ MINERALS TAB 1 TABLET: TAB at 09:14

## 2021-06-05 RX ADMIN — METHADONE HYDROCHLORIDE 80 MG: 10 CONCENTRATE ORAL at 09:27

## 2021-06-05 RX ADMIN — MINERAL OIL, PETROLATUM, PHENYLEPHRINE HCL: 14; 74.9; .25 OINTMENT RECTAL at 09:13

## 2021-06-05 NOTE — PROGRESS NOTES
[facility], [place], [place]  Internal Medicine Progress Note      Assessment and Plans:     No Yusuf is a 62 yo F w/PMH of DM2 c/b neuropathy, HTN HLD Morbid obesity (BMI 49), polysubstance abuse and dependance hx on stable methadone, fibromyalgia, GERD, depression, and chronic lymphedema, who presented with worsening left lower extremity cellulitis.    Today's changes: 6/5/2021  Doing well.   Ongoing left leg erythema, pain, swelling consistent cellulitis.  Afebrile.  Vitals stable.  Continue current IV vancomycin.  Added tramadol p.o. as needed for better pain control.    Issues:    # Left LE cellulitis  # Reports having BL cellulitis. It appears that BL legs improved on Augmentin for a short while, then left leg worsened. Althought reassuring no pus or induration or purulence, staph infection must be considered.   -Ct IV Vancomycin    #KERRIE, mild.  Continue to monitor.  Hydration.  Avoid NSAIDs as able.    # BL LE Chronic edema  -Likely from obesity. But Amlodipine not helping. Stop amlodipine  -Consider starting ARB or ACEI given pts dgx of Dm2 and HTN  -Ct with home lasix 60 mg daily    # Hypertension: BP on soft side. PTA on Amlodipine and lasix.   -Hold amlodipine.   -Ct lasix.        # DM2  -Last A1c was 6.5 on 5/20/21   -c/w home Metformin, Levemir 20 units  -BG checks TID ac and at bedtime; medium sliding scale insulin       Recent Labs   Lab 06/05/21  1239 06/05/21  0915 06/05/21  0655 06/05/21  0209 06/04/21  2120 06/04/21  1806 06/04/21  1154 06/03/21  1956 06/03/21 1956   GLC  --   --  103*  --   --   --   --   --  86   * 149*  --  117* 134* 130* 121*   < >  --     < > = values in this interval not displayed.        # H/o Polysubstance Abuse & Opiate Dependence:   -takes 80 mg methadone daily, last dose was yesterday.  [place] [facility]  -continue methadone 80 mg po daily     # Hypothyroidism:   -Continue with  PTA Levothyroxine  # GERD:   - Continue with PTA omeprazole  # Depression  - Continue PTA Duloxetine, Hydroxyzine  # Insomnia  - Continue PTA Trazodone prn  # Chronic neuropathic pain:    - Continue with PTA Gabapentin 1200mg TID, duloxetine    # Hep C: not on treatment, followed at [facility]  -per care everywhere HCV RNA Quant 1/7/2021 was [phone],[phone] and on 12/10/2019 was 838,044 with  HEPATITIS C, RNA, QUANT 1/7/2021 was 6.2 and on 12/10/2019  was 5.9      Diet: Combination Diet Regular Diet Adult    DVT Prophylaxis: Enoxaparin (Lovenox) SQ  Valle Catheter: not present  Code Status: Full Code         Disposition Plan    Expect discharge 2 to 3 days.  Likely home.    Benjamín Parra MD  [facility]  Contact information available via [facility] Paging/Directory      Subjective:     Left leg stable pain/right, mild swelling.  No fever.  No chills.  No nausea vomiting diarrhea.  No abdominal pain.   No cough or chest pain or shortness of breath.    -Data reviewed today: I reviewed all new labs and imaging results over the last 24 hours.     Exam:     Temp: 97.3  F (36.3  C) Temp src: Oral BP: 130/54 Pulse: 70   Resp: 16 SpO2: 96 % O2 Device: None (Room air)    Vitals:    06/03/21 1902   Weight: 129.3 kg (285 lb)     Vital Signs with Ranges  Temp:  [97.3  F (36.3  C)-97.9  F (36.6  C)] 97.3  F (36.3  C)  Pulse:  [70-74] 70  Resp:  [16] 16  BP: (113-140)/(54-82) 130/54  SpO2:  [90 %-98 %] 96 %  No intake/output data recorded.    Constitutional:  No distress noted, Alert, interactive.  Respiratory: Normal work of breathing on room air  Cardiovascular: RRR  GI: Soft, non tender  Skin/Integumen:  L LE cellulitis in marked area. B/L venous stasis.     Medications       atorvastatin  20 mg Oral Daily     cyanocobalamin  1,000 mcg Oral Daily     diclofenac  2-4 g Topical TID     DULoxetine  60 mg Oral Daily     enoxaparin ANTICOAGULANT  40 mg Subcutaneous Q24H     fluticasone-vilanterol  1  puff Inhalation Daily     furosemide  60 mg Oral Daily     gabapentin  1,200 mg Oral TID     insulin aspart  1-7 Units Subcutaneous TID AC     insulin aspart  1-5 Units Subcutaneous At Bedtime     insulin detemir  20 Units Subcutaneous QAM     levothyroxine  200 mcg Oral QAM AC     metFORMIN  1,000 mg Oral BID w/meals     methadone  80 mg Oral Daily     multivitamin w/minerals  1 tablet Oral Daily     omeprazole  20 mg Oral QAM AC     potassium chloride ER  10 mEq Oral BID     sodium chloride (PF)  3 mL Intracatheter Q8H     traZODone  50 mg Oral At Bedtime     vancomycin (VANCOCIN) IV  1,750 mg Intravenous Q24H     Vitamin D3  1,000 Units Oral Daily       Data   Recent Labs   Lab 06/05/21  0655 06/04/21  0805 06/03/21  1956   WBC  --   --  11.0   HGB  --   --  13.5   MCV  --   --  92   PLT  --   --  313     --  137   POTASSIUM 4.1  --  4.1   CHLORIDE 100  --  97   CO2 28  --  32   BUN 13  --  13   CR 1.06* 0.81 0.98   ANIONGAP 6  --  8   RODOLFO 8.4*  --  9.5   *  --  86   ALBUMIN  --   --  3.4   PROTTOTAL  --   --  9.3*   BILITOTAL  --   --  0.4   ALKPHOS  --   --  119   ALT  --   --  52*   AST  --   --  34       No results found for this or any previous visit (from the past 24 hour(s)).

## 2021-06-05 NOTE — PLAN OF CARE
VS: Blood pressure (!) 140/82, pulse 74, temperature 97.8  F (36.6  C), temperature source Oral, resp. rate 16, weight 129.3 kg (285 lb), last menstrual period 11/01/2013, SpO2 98 %, not currently breastfeeding.     O2: Room air, sats >90. Denies SOB.   Output: Ambulates to toilet.   Last BM: 6/4- Diarrhea. Pt is passing gas frequently.   Activity: SBA with walker, steady gait.   Skin: LLE valerie cellulitis, redness in gluteal cleft. Barrier cream applied. Pt also has hemorrhoids that seem to be causing pain, hemorrhoid cream was ordered. Pt has minimal redness in skin folds, interdry is in place.   Pain: Pt's pain scores have been staying around 7/10 in LLE described as burning pain. MD was paged for a solution for better pain control.   CMS: Intact, denies N/T.   Dressing: Transparent dressing PIV.   Diet: Combination reg diet.   LDA: MEGAN, GEM PIV in FA.   Equipment: Walker, IV pole.   Plan: Continue with plan of care.   Additional Info: Pt alert and oriented, did not sleep much d/t pain control issues.

## 2021-06-05 NOTE — PLAN OF CARE
VS: /54 (BP Location: Right arm)   Pulse 70   Temp 97.3  F (36.3  C) (Oral)   Resp 16   Wt 129.3 kg (285 lb)   LMP 11/01/2013   SpO2 96%   Breastfeeding No   BMI 48.92 kg/m     O2: Stable on RA.   Output: Void spontaneously in the toilet.   Last BM: 6/5, soft- per pt report.   Activity: Independent in the room.   Skin: LLE cellulitis.     Pain: Tramadol 50mg Q6hr..    CMS: Intact. Per pt report.    Dressing: None.    Diet: Regular diet and tolerated well.   LDA: PIV SL.   Equipment: Iv pole, walker and personal belonging.   Plan: Back to the group home when course of abx established.    Additional Info:

## 2021-06-05 NOTE — PLAN OF CARE
VS: VSS.   O2: >90% on RA.   Output: Voiding without difficulty to bathroom.   Last BM: LBM 6/4. Pt has X1 diarrhea today.   Activity: Up independently in room.    Skin: Intact except pt c/o burning and pain when wiping her buttocks. Unable to find and redness or discoloration. Applied barrier cream.    Pain: Minimal pain to LLE. Tylenol given.   CMS: Intact.   Dressing: LLE open to air; redness and swelling decreased for marked area.   Diet: Tolerating regular diet.  and 134.    LDA: PIV SL into left forearm.   Equipment: Walker; patient personal belongings.   Plan: TBD. Will continue to monitor.   Additional Info:

## 2021-06-06 ENCOUNTER — APPOINTMENT (OUTPATIENT)
Dept: CT IMAGING | Facility: CLINIC | Age: 61
DRG: 603 | End: 2021-06-06
Attending: INTERNAL MEDICINE
Payer: COMMERCIAL

## 2021-06-06 ENCOUNTER — APPOINTMENT (OUTPATIENT)
Dept: ULTRASOUND IMAGING | Facility: CLINIC | Age: 61
DRG: 603 | End: 2021-06-06
Attending: INTERNAL MEDICINE
Payer: COMMERCIAL

## 2021-06-06 ENCOUNTER — APPOINTMENT (OUTPATIENT)
Dept: GENERAL RADIOLOGY | Facility: CLINIC | Age: 61
DRG: 603 | End: 2021-06-06
Payer: COMMERCIAL

## 2021-06-06 LAB
ANION GAP SERPL CALCULATED.3IONS-SCNC: 6 MMOL/L (ref 3–14)
BUN SERPL-MCNC: 13 MG/DL (ref 7–30)
CALCIUM SERPL-MCNC: 8.7 MG/DL (ref 8.5–10.1)
CHLORIDE SERPL-SCNC: 101 MMOL/L (ref 94–109)
CO2 SERPL-SCNC: 28 MMOL/L (ref 20–32)
CREAT SERPL-MCNC: 0.98 MG/DL (ref 0.52–1.04)
CRP SERPL-MCNC: 13 MG/L (ref 0–8)
ERYTHROCYTE [DISTWIDTH] IN BLOOD BY AUTOMATED COUNT: 14.8 % (ref 10–15)
GFR SERPL CREATININE-BSD FRML MDRD: 62 ML/MIN/{1.73_M2}
GLUCOSE BLDC GLUCOMTR-MCNC: 106 MG/DL (ref 70–99)
GLUCOSE BLDC GLUCOMTR-MCNC: 107 MG/DL (ref 70–99)
GLUCOSE BLDC GLUCOMTR-MCNC: 110 MG/DL (ref 70–99)
GLUCOSE BLDC GLUCOMTR-MCNC: 114 MG/DL (ref 70–99)
GLUCOSE BLDC GLUCOMTR-MCNC: 115 MG/DL (ref 70–99)
GLUCOSE BLDC GLUCOMTR-MCNC: 59 MG/DL (ref 70–99)
GLUCOSE BLDC GLUCOMTR-MCNC: 67 MG/DL (ref 70–99)
GLUCOSE BLDC GLUCOMTR-MCNC: 76 MG/DL (ref 70–99)
GLUCOSE SERPL-MCNC: 94 MG/DL (ref 70–99)
HCT VFR BLD AUTO: 37 % (ref 35–47)
HGB BLD-MCNC: 11.9 G/DL (ref 11.7–15.7)
MCH RBC QN AUTO: 28.5 PG (ref 26.5–33)
MCHC RBC AUTO-ENTMCNC: 32.2 G/DL (ref 31.5–36.5)
MCV RBC AUTO: 89 FL (ref 78–100)
PLATELET # BLD AUTO: 272 10E9/L (ref 150–450)
POTASSIUM SERPL-SCNC: 4 MMOL/L (ref 3.4–5.3)
RBC # BLD AUTO: 4.17 10E12/L (ref 3.8–5.2)
SODIUM SERPL-SCNC: 135 MMOL/L (ref 133–144)
VANCOMYCIN SERPL-MCNC: 8.4 MG/L
WBC # BLD AUTO: 11.4 10E9/L (ref 4–11)

## 2021-06-06 PROCEDURE — 250N000011 HC RX IP 250 OP 636: Performed by: PEDIATRICS

## 2021-06-06 PROCEDURE — 80048 BASIC METABOLIC PNL TOTAL CA: CPT | Performed by: INTERNAL MEDICINE

## 2021-06-06 PROCEDURE — 85027 COMPLETE CBC AUTOMATED: CPT | Performed by: INTERNAL MEDICINE

## 2021-06-06 PROCEDURE — 999N001017 HC STATISTIC GLUCOSE BY METER IP

## 2021-06-06 PROCEDURE — 76882 US LMTD JT/FCL EVL NVASC XTR: CPT | Mod: 26 | Performed by: RADIOLOGY

## 2021-06-06 PROCEDURE — 258N000003 HC RX IP 258 OP 636: Performed by: INTERNAL MEDICINE

## 2021-06-06 PROCEDURE — 80202 ASSAY OF VANCOMYCIN: CPT | Performed by: PEDIATRICS

## 2021-06-06 PROCEDURE — 250N000009 HC RX 250: Performed by: PEDIATRICS

## 2021-06-06 PROCEDURE — 73610 X-RAY EXAM OF ANKLE: CPT | Mod: LT

## 2021-06-06 PROCEDURE — 36415 COLL VENOUS BLD VENIPUNCTURE: CPT | Performed by: PEDIATRICS

## 2021-06-06 PROCEDURE — 120N000002 HC R&B MED SURG/OB UMMC

## 2021-06-06 PROCEDURE — 250N000013 HC RX MED GY IP 250 OP 250 PS 637: Performed by: INTERNAL MEDICINE

## 2021-06-06 PROCEDURE — 86140 C-REACTIVE PROTEIN: CPT | Performed by: INTERNAL MEDICINE

## 2021-06-06 PROCEDURE — 258N000003 HC RX IP 258 OP 636: Performed by: PEDIATRICS

## 2021-06-06 PROCEDURE — 73590 X-RAY EXAM OF LOWER LEG: CPT | Mod: LT

## 2021-06-06 PROCEDURE — 250N000011 HC RX IP 250 OP 636: Performed by: INTERNAL MEDICINE

## 2021-06-06 PROCEDURE — 76882 US LMTD JT/FCL EVL NVASC XTR: CPT | Mod: LT

## 2021-06-06 PROCEDURE — 99233 SBSQ HOSP IP/OBS HIGH 50: CPT | Performed by: INTERNAL MEDICINE

## 2021-06-06 PROCEDURE — 73701 CT LOWER EXTREMITY W/DYE: CPT | Mod: 26 | Performed by: RADIOLOGY

## 2021-06-06 PROCEDURE — 73701 CT LOWER EXTREMITY W/DYE: CPT | Mod: LT

## 2021-06-06 PROCEDURE — 250N000013 HC RX MED GY IP 250 OP 250 PS 637: Performed by: PEDIATRICS

## 2021-06-06 PROCEDURE — 36415 COLL VENOUS BLD VENIPUNCTURE: CPT | Performed by: INTERNAL MEDICINE

## 2021-06-06 RX ORDER — PIPERACILLIN SODIUM, TAZOBACTAM SODIUM 3; .375 G/15ML; G/15ML
3.38 INJECTION, POWDER, LYOPHILIZED, FOR SOLUTION INTRAVENOUS EVERY 6 HOURS
Status: DISCONTINUED | OUTPATIENT
Start: 2021-06-06 | End: 2021-06-07

## 2021-06-06 RX ORDER — PIPERACILLIN SODIUM, TAZOBACTAM SODIUM 3; .375 G/15ML; G/15ML
3.38 INJECTION, POWDER, LYOPHILIZED, FOR SOLUTION INTRAVENOUS ONCE
Status: COMPLETED | OUTPATIENT
Start: 2021-06-06 | End: 2021-06-06

## 2021-06-06 RX ORDER — PIPERACILLIN SODIUM, TAZOBACTAM SODIUM 3; .375 G/15ML; G/15ML
3.38 INJECTION, POWDER, LYOPHILIZED, FOR SOLUTION INTRAVENOUS EVERY 6 HOURS
Status: DISCONTINUED | OUTPATIENT
Start: 2021-06-06 | End: 2021-06-06

## 2021-06-06 RX ORDER — OLANZAPINE 10 MG/2ML
10 INJECTION, POWDER, FOR SOLUTION INTRAMUSCULAR ONCE
Status: DISCONTINUED | OUTPATIENT
Start: 2021-06-06 | End: 2021-06-06

## 2021-06-06 RX ORDER — IOPAMIDOL 755 MG/ML
100 INJECTION, SOLUTION INTRAVASCULAR ONCE
Status: COMPLETED | OUTPATIENT
Start: 2021-06-06 | End: 2021-06-06

## 2021-06-06 RX ORDER — OXYCODONE HYDROCHLORIDE 5 MG/1
5-10 TABLET ORAL
Status: DISCONTINUED | OUTPATIENT
Start: 2021-06-06 | End: 2021-06-09 | Stop reason: HOSPADM

## 2021-06-06 RX ORDER — HYDROMORPHONE HYDROCHLORIDE 1 MG/ML
0.5 INJECTION, SOLUTION INTRAMUSCULAR; INTRAVENOUS; SUBCUTANEOUS ONCE
Status: COMPLETED | OUTPATIENT
Start: 2021-06-06 | End: 2021-06-06

## 2021-06-06 RX ORDER — PIPERACILLIN SODIUM, TAZOBACTAM SODIUM 3; .375 G/15ML; G/15ML
3.38 INJECTION, POWDER, LYOPHILIZED, FOR SOLUTION INTRAVENOUS EVERY 8 HOURS
Status: DISCONTINUED | OUTPATIENT
Start: 2021-06-06 | End: 2021-06-06

## 2021-06-06 RX ORDER — CEFAZOLIN SODIUM 1 G/50ML
2000 SOLUTION INTRAVENOUS EVERY 24 HOURS
Status: DISCONTINUED | OUTPATIENT
Start: 2021-06-06 | End: 2021-06-09

## 2021-06-06 RX ADMIN — TRAMADOL HYDROCHLORIDE 50 MG: 50 TABLET, FILM COATED ORAL at 00:29

## 2021-06-06 RX ADMIN — GABAPENTIN 1200 MG: 600 TABLET, FILM COATED ORAL at 08:40

## 2021-06-06 RX ADMIN — HYDROMORPHONE HYDROCHLORIDE 0.5 MG: 1 INJECTION, SOLUTION INTRAMUSCULAR; INTRAVENOUS; SUBCUTANEOUS at 10:41

## 2021-06-06 RX ADMIN — POTASSIUM CHLORIDE 10 MEQ: 750 CAPSULE, EXTENDED RELEASE ORAL at 21:45

## 2021-06-06 RX ADMIN — POTASSIUM CHLORIDE 10 MEQ: 750 CAPSULE, EXTENDED RELEASE ORAL at 08:40

## 2021-06-06 RX ADMIN — ENOXAPARIN SODIUM 40 MG: 40 INJECTION SUBCUTANEOUS at 08:40

## 2021-06-06 RX ADMIN — TRAZODONE HYDROCHLORIDE 50 MG: 50 TABLET ORAL at 21:45

## 2021-06-06 RX ADMIN — OMEPRAZOLE 20 MG: 20 CAPSULE, DELAYED RELEASE ORAL at 07:44

## 2021-06-06 RX ADMIN — FLUTICASONE FUROATE AND VILANTEROL TRIFENATATE 1 PUFF: 200; 25 POWDER RESPIRATORY (INHALATION) at 08:40

## 2021-06-06 RX ADMIN — OXYCODONE HYDROCHLORIDE 5 MG: 5 TABLET ORAL at 15:32

## 2021-06-06 RX ADMIN — ATORVASTATIN CALCIUM 20 MG: 20 TABLET, FILM COATED ORAL at 08:40

## 2021-06-06 RX ADMIN — Medication 4 G: at 15:32

## 2021-06-06 RX ADMIN — LEVOTHYROXINE SODIUM 200 MCG: 200 TABLET ORAL at 07:44

## 2021-06-06 RX ADMIN — PIPERACILLIN SODIUM AND TAZOBACTAM SODIUM 3.38 G: 3; .375 INJECTION, POWDER, LYOPHILIZED, FOR SOLUTION INTRAVENOUS at 10:41

## 2021-06-06 RX ADMIN — ACETAMINOPHEN 975 MG: 325 TABLET, FILM COATED ORAL at 00:29

## 2021-06-06 RX ADMIN — IOPAMIDOL 100 ML: 755 INJECTION, SOLUTION INTRAVENOUS at 11:38

## 2021-06-06 RX ADMIN — Medication 4 G: at 08:41

## 2021-06-06 RX ADMIN — VANCOMYCIN HYDROCHLORIDE 2000 MG: 10 INJECTION, POWDER, LYOPHILIZED, FOR SOLUTION INTRAVENOUS at 23:59

## 2021-06-06 RX ADMIN — SODIUM CHLORIDE 80 ML: 9 INJECTION, SOLUTION INTRAVENOUS at 11:39

## 2021-06-06 RX ADMIN — METHADONE HYDROCHLORIDE 80 MG: 10 CONCENTRATE ORAL at 08:56

## 2021-06-06 RX ADMIN — GABAPENTIN 1200 MG: 600 TABLET, FILM COATED ORAL at 21:45

## 2021-06-06 RX ADMIN — MULTIPLE VITAMINS W/ MINERALS TAB 1 TABLET: TAB at 08:40

## 2021-06-06 RX ADMIN — INSULIN DETEMIR 20 UNITS: 100 INJECTION, SOLUTION SUBCUTANEOUS at 08:42

## 2021-06-06 RX ADMIN — FUROSEMIDE 60 MG: 20 TABLET ORAL at 08:40

## 2021-06-06 RX ADMIN — PIPERACILLIN SODIUM AND TAZOBACTAM SODIUM 3.38 G: 3; .375 INJECTION, POWDER, LYOPHILIZED, FOR SOLUTION INTRAVENOUS at 15:32

## 2021-06-06 RX ADMIN — SODIUM CHLORIDE, POTASSIUM CHLORIDE, SODIUM LACTATE AND CALCIUM CHLORIDE 500 ML: 600; 310; 30; 20 INJECTION, SOLUTION INTRAVENOUS at 10:41

## 2021-06-06 RX ADMIN — METFORMIN HYDROCHLORIDE 1000 MG: 500 TABLET, FILM COATED ORAL at 08:40

## 2021-06-06 RX ADMIN — Medication 2 G: at 21:46

## 2021-06-06 RX ADMIN — DULOXETINE HYDROCHLORIDE 60 MG: 60 CAPSULE, DELAYED RELEASE ORAL at 08:40

## 2021-06-06 RX ADMIN — GABAPENTIN 1200 MG: 600 TABLET, FILM COATED ORAL at 15:32

## 2021-06-06 RX ADMIN — OXYCODONE HYDROCHLORIDE 10 MG: 5 TABLET ORAL at 21:54

## 2021-06-06 RX ADMIN — PIPERACILLIN SODIUM AND TAZOBACTAM SODIUM 3.38 G: 3; .375 INJECTION, POWDER, LYOPHILIZED, FOR SOLUTION INTRAVENOUS at 21:46

## 2021-06-06 RX ADMIN — Medication 1000 UNITS: at 08:40

## 2021-06-06 RX ADMIN — CYANOCOBALAMIN TAB 1000 MCG 1000 MCG: 1000 TAB at 08:40

## 2021-06-06 NOTE — CONSULTS
[facility], Orthopaedic Surgery Consultation    No Yusuf MRN# [id number]   Age: 61 year old YOB: 1960     Date of Admission:  6/3/2021    Reason for consult: Left leg pain        Requesting physician: Dr. Parra          Assessment and Plan:   Assessment:  61-year-old female with PMH of DM2 c/b neuropathy, HTN HLD Morbid obesity (BMI 49), polysubstance abuse and dependance hx on stable methadone, fibromyalgia, GERD, depression, and chronic lymphedema, who presented with worsening left lower extremity cellulitis.  Orthopedics was consulted for evaluation of left calf mass versus swelling.  Ultrasound and CT scan of left lower extremity without clear delineation of whether this is a fluid collection versus a soft tissue mass.  May also have an element of Charcot arthropathy given history of relatively recent ankle trauma and underlying diabetes mellitus.    0 points on LRINEC score. Low suspicion for necrotizing fasciitis.     Plan:  Recommend MRI left lower extremity with and without contrast to evaluate for soft tissue mass versus abscess.    X-rays of left ankle and tib-fib are pending.  Evaluate for Charcot arthropathy versus fracture versus bony erosion.    Weightbearing as tolerated.    Activities as tolerated.  Treatment of cellulitis per medicine team.    We will follow results of MRI to determine if any utility for abscess decompression or further work-up of soft tissue mass.          History of Present Illness:   This is a 61 year old year old female who was admitted 6/4/2021 for left lower extremity cellulitis.  She has been treated with ceftriaxone and vancomycin under guidance of infectious disease.  She also has chronic lymphedema.  Orthopedics was consulted given worsening left lower extremity cellulitis and a bulge in the left calf..    No numbness, tingling or weakness.  Endorses baseline chronic paresthesias in bilateral tips of toes.    No recent trauma.  Does endorse  previous left ankle fracture requiring surgical intervention.  No LOC. Not on anticoagulants at baseline.     Patient was seen and examined by me. History, PMH, Meds, SH, complete ROS (10 organ systems) and PE reviewed with patient and prior medical records.            Past Medical History:     Past Medical History:   Diagnosis Date     Arthritis      Bipolar affective (H)      Fibromyalgia      Hypertension              Past Surgical History:     Past Surgical History:   Procedure Laterality Date     CHOLECYSTECTOMY       GYN SURGERY      c section     GYN SURGERY      oblation     ORTHOPEDIC SURGERY      left leg, left shoulder, back             Social History:   Occupation: Retired  Living situation: Lives in [facility]  Illicit substances: Does not endorse   Tobacco:    Assistive Devices: none     Social History     Socioeconomic History     Marital status: Single     Spouse name: None     Number of children: None     Years of education: None     Highest education level: None   Occupational History     None   Social Needs     Financial resource strain: None     Food insecurity     Worry: None     Inability: None     Transportation needs     Medical: None     Non-medical: None   Tobacco Use     Smoking status: Former Smoker     Packs/day: 0.10     Smokeless tobacco: Never Used   Substance and Sexual Activity     Alcohol use: No     Drug use: No     Sexual activity: Not Currently   Lifestyle     Physical activity     Days per week: None     Minutes per session: None     Stress: None   Relationships     Social connections     Talks on phone: None     Gets together: None     Attends Tenriism service: None     Active member of club or organization: None     Attends meetings of clubs or organizations: None     Relationship status: None     Intimate partner violence     Fear of current or ex partner: None     Emotionally abused: None     Physically abused: None     Forced sexual activity: None   Other  Topics Concern     None   Social History Narrative     None             Family History:     Family History   Problem Relation Age of Onset     Heart Disease Mother      Diabetes Mother      Ovarian Cancer Mother      Heart Disease Father      Lung Cancer Father      Diabetes Sister      Ovarian Cancer Sister      Diabetes Brother               Medications:     Current Facility-Administered Medications   Medication     acetaminophen (TYLENOL) tablet 975 mg     albuterol (PROAIR HFA/PROVENTIL HFA/VENTOLIN HFA) 108 (90 Base) MCG/ACT inhaler 1-2 puff     atorvastatin (LIPITOR) tablet 20 mg     cyanocobalamin (VITAMIN B-12) tablet 1,000 mcg     glucose gel 15-30 g    Or     dextrose 50 % injection 25-50 mL    Or     glucagon injection 1 mg     diclofenac (VOLTAREN) 1 % topical gel 2-4 g     DULoxetine (CYMBALTA) DR capsule 60 mg     enoxaparin ANTICOAGULANT (LOVENOX) injection 40 mg     fluticasone-vilanterol (BREO ELLIPTA) 200-25 MCG/INH inhaler 1 puff     furosemide (LASIX) tablet 60 mg     gabapentin (NEURONTIN) tablet 1,200 mg     ibuprofen (ADVIL/MOTRIN) tablet 600 mg     insulin aspart (NovoLOG) injection (RAPID ACTING)     insulin aspart (NovoLOG) injection (RAPID ACTING)     insulin detemir (LEVEMIR PEN) injection 20 Units     lactated ringers BOLUS 500 mL     levothyroxine (SYNTHROID/LEVOTHROID) tablet 200 mcg     lidocaine (LMX4) cream     lidocaine 1 % 0.1-1 mL     metFORMIN (GLUCOPHAGE) tablet 1,000 mg     methadone (DOLOPHINE-INTENSOL) 10 MG/ML (HIGH CONC) solution 80 mg     multivitamin w/minerals (THERA-VIT-M) tablet 1 tablet     omeprazole (priLOSEC) CR capsule 20 mg     ondansetron (ZOFRAN-ODT) ODT tab 4 mg    Or     ondansetron (ZOFRAN) injection 4 mg     phenylephrine-mineral oil-petrolatum (PREPARATION H) 0.25-14-74.9 % rectal ointment     piperacillin-tazobactam (ZOSYN) 3.375 g vial to attach to  mL bag     polyethylene glycol (MIRALAX) Packet 17 g     potassium chloride ER (MICRO-K) CR capsule  10 mEq     sodium chloride (PF) 0.9% PF flush 3 mL     sodium chloride (PF) 0.9% PF flush 3 mL     traMADol (ULTRAM) tablet 50 mg     traZODone (DESYREL) tablet 50 mg     vancomycin (VANCOCIN) 1,750 mg in sodium chloride 0.9 % 500 mL intermittent infusion     Vitamin D3 (CHOLECALCIFEROL) tablet 1,000 Units             Allergies:      Allergies   Allergen Reactions     Promethazine Other (See Comments) and Anxiety     Noted in 8/30/08 ER     Codeine Phosphate GI Disturbance     Lamotrigine Other (See Comments)     hyponatremia     Oxcarbazepine Unknown and Other (See Comments)     Low sodium  LegacyRecord#[id number]       Codeine Other (See Comments) and Rash     GI bleeding  LegacyRecord#[id number]              Review of Systems:   A comprehensive 10 point review of systems (constitutional, ENT, cardiac, peripheral vascular, respiratory, GI, , Musculoskeletal, skin, Neurological) was performed and found to be negative except as described in this note.     CONSTITUTIONAL:  negative for  fevers, chills, sweats, fatigue, malaise and weight loss  HEENT:  negative for  tinnitus, earaches, nasal congestion, epistaxis, snoring, sore throat and voice change  RESPIRATORY:  negative for  dyspnea, wheezing, hemoptysis, chest pain and cough  CARDIOVASCULAR:  negative for  chest pain, palpitations, orthopnea, edema, syncope  GASTROINTESTINAL:  negative for nausea, vomiting, diarrhea, constipation, abdominal pain, dysphagia, reflux, hematemesis and hemtochezia  GENITOURINARY:  negative for frequency, dysuria, nocturia, urinary incontinence and hematuria  INTEGUMENT/BREAST:  negative for rash and skin lesion(s)  HEMATOLOGIC/LYMPHATIC:  negative for easy bruising, bleeding, lymphadenopathy and swelling/edema  ALLERGIC/IMMUNOLOGIC:  negative for recurrent infections and drug reactions  ENDOCRINE:  negative for heat intolerance, cold intolerance and diabetic symptoms including polyuria and polydipsia  MUSCULOSKELETAL:  negative for  myalgias,  arthralgias, pain, joint swelling and muscle weakness  NEUROLOGICAL:  negative for headaches, dizziness, seizures, gait problems, tremor, weakness, numbness and tingling            Physical Exam:   COMPLETE EXAMINATION:   VITAL SIGNS: /63   Pulse 71   Temp 97.3  F (36.3  C) (Oral)   Resp 16   Wt 129.3 kg (285 lb)   LMP 11/01/2013   SpO2 98%   Breastfeeding No   BMI 48.92 kg/m      General: Awake, alert, appropriate, following commands, NAD.  Neuro: moving all extremities.   Skin: No rashes,  skin color normal.  HEENT: Normal.   Lungs: Breathing comfortably and nonlabored, no wheezes or stridor noted.  Heart/Cardiovascular: Regular pulse, no peripheral cyanosis.  Abdomen: Soft, non-tender, non-distended.     MUSCULOSKELETAL:   RLE   Chronic venous stasis changes of ankle and foot region.  No wounds.  No significant tenderness to palpation over thigh, knee, leg, ankle/foot. No pain with ROM hip/knee/ankle. Motor intact distally TA/GSC/EHL/FHL. SILT sp/dp/tibial/saph/sural nerves. toes warm and well perfused.     LLE   Erythema overlying the ankle and leg.  erythema improved somewhat with elevation of left lower extremity above the level of the heart, suggestive of possible Charcot changes.  Palpable prominence of proximal anterolateral calf without clear fluctuance; cannot distinguish soft tissue mass versus fluid collection; 8 x 8 cm.  Chronic skin discoloration of anterolateral foot and ankle area.  No palpable prominences suggestive of Charcot arthropathy.  No open wounds. Tender palpation around the generalized ankle area, nonfocal.    No significant tenderness to palpation over thigh, knee, leg. No pain with ROM hip/knee/ankle. Motor intact distally TA/GSC/EHL/FHL. SILT sp/dp/tibial/saph/sural nerves foot generalized numbness at baseline. toes warm and well perfused.           Data:   All pertinent laboratory data reviewed  All imaging studies reviewed by me.    Recent Labs   Lab Test 06/06/21  0626  06/04/21  0805 06/03/21  1956 05/28/21  0732 05/27/21  0137 05/27/21  0137 07/06/20  1400 07/06/20  1400   HGB 11.9  --  13.5 12.2   < > 12.0   < > 12.3   SED  --  83*  --   --   --  67*  --  57*   CRP 13.0* 12.0*  --  13.0*   < > 11.1*   < > 15.5*   WBC 11.4*  --  11.0 8.4   < > 10.5   < > 7.1    < > = values in this interval not displayed.     No results for input(s): FTYP, FNEU, FOTH, FCOL, FAPR, FWBC in the last 04323 hours.    Ultrasound left leg dated 6/6/2021.  Demonstrates subcutaneous edema and cobblestoning involving the left lateral proximal and mid calf.  No obvious discrete fluid collection or abscess.     CT left tib fib 6/6/2021.  Demonstrates diffuse subcutaneous soft tissue edema in the left lower extremity.  No discrete soft tissue mass or fluid collection, though visualization is limited by CT.  No subcutaneous air.  No displaced fractures.  Screw in the medial malleolus as well as the distal fibula.     X-ray left ankle and tib-fib pending    Signed:    This consultation will be discussed with Dr. Fleming, Attending Physician.    Wanda Melgra MD 06/06/2021  Orthopedic Surgery, PGY4  Pager: [phone]

## 2021-06-06 NOTE — PLAN OF CARE
VS: /80 (BP Location: Right arm)   Pulse 79   Temp 96.4  F (35.8  C) (Oral)   Resp 14   Wt 129.3 kg (285 lb)   LMP 11/01/2013   SpO2 95%   Breastfeeding No   BMI 48.92 kg/m     O2: Room air. Denies SOB.   Output: Voiding spontaneously in bathroom.   Last BM: 6/5.   Activity: Up independently in room w/ walker.   Skin: LLE cellulitis.   Pain: Managed w/ PRN tramadol.   CMS: Intact.   Dressing: None.   Diet: Regular, tolerating well.   LDA: PIV SL between antibiotics.   Plan: Back to group home after abx.   Additional Info: BG 96 at bedtime.

## 2021-06-06 NOTE — PROGRESS NOTES
[facility], [place], [place]  Internal Medicine Progress Note      Assessment and Plans:     No Yusuf is a 60 yo F w/PMH of DM2 c/b neuropathy, HTN HLD Morbid obesity (BMI 49), polysubstance abuse and dependance hx on stable methadone, fibromyalgia, GERD, depression, and chronic lymphedema, who presented with worsening left lower extremity cellulitis.    Today's changes: 6/6/2021    Given clinical concern for persistent/worsening cellulitis left lower extremity, area of localized tender swelling left lateral calf area-we will get CT left leg with contrast stat, and ultrasound, nonvascular -to look for drainable abscess or other abnormalities.  Add IV Zosyn to current IV vancomycin.  Orthopedic surgery consultation  Change tramadol to oral oxycodone 5 to 10 mg every 3 hours as needed.  Give 1 dose of 0.5 mg IV Dilaudid now.  Hold metformin, lasix for now.   500 ml RL x 1. Before CT.   Continue monitor vitals, serial exam of the left leg cellulitis.       Issues:    # BL L>R: LE cellulitis   # hx of IDDM.    # BL Chronic edema.   # Chronic venostasis changes.   # BL LE Neuropathy.     05/27: venous duplex US: neg for DVT    Says bl LE ?cellulitis started ~3 weeks prior.   Adm to hospital 05/27--05/28.  Discharged on oral Augmentin.   It appears that both LE mostly right leg improved on Augmentin but then left leg worsened. Denies any trauma or open sore.   On adm: reassuring no pus or induration or purulence.     -Empirically on IV Vancomycin  - 6/6: Will add iv Zosyn (given persistent/ worsening L LE cellulitis). See above for other plans.  - Follow-up imaging, Ortho consult.   - Pain control: tylenol.Ibuprofen.  Oxycodone prn. PTA methadone. Gabapentin. Topical diclofenac.   - Elevate L LE   - Serial leg exam  - Monitor vitals.   - Trend CBC, CRP    #KERRIE, mild.    Continue to monitor.    Oral hydration.   Better.      # BL LE Chronic  edema  # Obesity.   - discontinue amlodipine  - hold home lasix for now given above.   -Consider starting ARB or ACEI given pts dgx of Dm2 and HTN  - elevated l LE.   - Consider edema wraps.     # Hypertension:  PTA on Amlodipine and lasix.   - Hold PTA meds  - Monitor bp       # DM2  -Last A1c was 6.5 on 5/20/21   -c/w home Metformin, Levemir 20 units  6/6/2021: hold metformin.   -BG checks TID ac and at bedtime; medium sliding scale insulin       Recent Labs   Lab 06/06/21  0816 06/06/21  0659 06/06/21  0217 06/05/21  2215 06/05/21  1722 06/05/21  1239 06/05/21  0915 06/05/21  0655 06/03/21  1956 06/03/21 1956   GLC  --  94  --   --   --   --   --  103*  --  86   *  --  115* 96 142* 108* 149*  --    < >  --     < > = values in this interval not displayed.        # H/o Polysubstance Abuse & Opiate Dependence:   -takes 80 mg methadone daily, last dose was 1 day PTA.  Fills through [facility].  -continue methadone 80 mg po daily     # Hypothyroidism:   -Continue with PTA Levothyroxine  # GERD:   - Continue with PTA omeprazole  # Depression  - Continue PTA Duloxetine, Hydroxyzine  # Insomnia  - Continue PTA Trazodone prn  # Chronic neuropathic pain:    - Continue with PTA Gabapentin 1200mg TID, duloxetine    # Hep C: not on treatment, followed at [facility]  -per care everywhere HCV RNA Quant 1/7/2021 was 1,440,950 and on 12/10/2019 was 838,044 with  HEPATITIS C, RNA, QUANT 1/7/2021 was 6.2 and on 12/10/2019  was 5.9      Diet: Combination Diet Regular Diet Adult    DVT Prophylaxis: Enoxaparin (Lovenox) SQ  Valle Catheter: not present  Code Status: Full Code         Disposition Plan    Expect discharge 2 to 3 days. Likely home. Pending on clinical course !     Benjamín Parra MD  [facility]  Contact information available via [facility] Paging/Directory      Subjective:     Left leg pain, swelling: worse today. Erythema: unchanged.   No fever or chills.   No nausea vomiting diarrhea  or pain abdomen.   No cough or chest pain or shortness of breath.  No other medical concern.     -Data reviewed today: I reviewed all new labs and imaging results over the last 24 hours.     Exam:     Temp: 97.3  F (36.3  C) Temp src: Oral BP: 114/63 Pulse: 71   Resp: 16 SpO2: 98 % O2 Device: None (Room air)    Vitals:    06/03/21 1902   Weight: 129.3 kg (285 lb)     Vital Signs with Ranges  Temp:  [96.4  F (35.8  C)-97.9  F (36.6  C)] 97.3  F (36.3  C)  Pulse:  [71-79] 71  Resp:  [14-16] 16  BP: (114-133)/(63-80) 114/63  SpO2:  [95 %-98 %] 98 %  No intake/output data recorded.    Constitutional:  No distress noted, Alert, interactive.   Respiratory: Normal work of breathing on room air  Cardiovascular: RRR s1s2   GI: Soft, ND, NT.   Skin/Integumen:  L LE cellulitis below L Knee w diffuse erythema, TTP edema - w very tender prominent swelling L lateral proximal calf. ttp w ?mild crepitus.   B/L venous stasis changes.   Neuro: A0 x 4.   Mood: stable.     Medications       atorvastatin  20 mg Oral Daily     cyanocobalamin  1,000 mcg Oral Daily     diclofenac  2-4 g Topical TID     DULoxetine  60 mg Oral Daily     enoxaparin ANTICOAGULANT  40 mg Subcutaneous Q24H     fluticasone-vilanterol  1 puff Inhalation Daily     gabapentin  1,200 mg Oral TID     HYDROmorphone  0.5 mg Intravenous Once     insulin aspart  1-7 Units Subcutaneous TID AC     insulin aspart  1-5 Units Subcutaneous At Bedtime     insulin detemir  20 Units Subcutaneous QAM     lactated ringers  500 mL Intravenous Once     levothyroxine  200 mcg Oral QAM AC     methadone  80 mg Oral Daily     multivitamin w/minerals  1 tablet Oral Daily     omeprazole  20 mg Oral QAM AC     piperacillin-tazobactam  3.375 g Intravenous Once    Followed by     piperacillin-tazobactam  3.375 g Intravenous Q8H     potassium chloride ER  10 mEq Oral BID     sodium chloride (PF)  3 mL Intracatheter Q8H     traZODone  50 mg Oral At Bedtime     vancomycin (VANCOCIN) IV  1,750 mg  Intravenous Q24H     Vitamin D3  1,000 Units Oral Daily       Data   Recent Labs   Lab 06/06/21  0659 06/05/21  0655 06/04/21  0805 06/03/21  1956   WBC 11.4*  --   --  11.0   HGB 11.9  --   --  13.5   MCV 89  --   --  92     --   --  313    133  --  137   POTASSIUM 4.0 4.1  --  4.1   CHLORIDE 101 100  --  97   CO2 28 28  --  32   BUN 13 13  --  13   CR 0.98 1.06* 0.81 0.98   ANIONGAP 6 6  --  8   RODOLFO 8.7 8.4*  --  9.5   GLC 94 103*  --  86   ALBUMIN  --   --   --  3.4   PROTTOTAL  --   --   --  9.3*   BILITOTAL  --   --   --  0.4   ALKPHOS  --   --   --  119   ALT  --   --   --  52*   AST  --   --   --  34       No results found for this or any previous visit (from the past 24 hour(s)).

## 2021-06-06 NOTE — PLAN OF CARE
VS: /63   Pulse 74   Temp 97.4  F (36.3  C) (Oral)   Resp 17   Wt 129.3 kg (285 lb)   LMP 11/01/2013   SpO2 95%   Breastfeeding No   BMI 48.92 kg/m     O2: Stable on RA.   Output: Void spontaneously in the toilet.   Last BM: 6/6, soft- per pt report.   Activity: Independent in the room.   Skin: LLE cellulitis.     Pain: Oxycodone, and scheduled Tylenol.   CMS: Intact. Per pt report.    Dressing: None.    Diet: Regular diet and tolerated well.   LDA: PIV SL.   Equipment: Iv pole, walker and personal belonging.  CT, US, X-ray done.  MRI to be done tomorrow.    Plan: Back to the group home when course of abx established.    Additional Info:

## 2021-06-06 NOTE — PLAN OF CARE
VS: /80 (BP Location: Right arm)   Pulse 79   Temp 96.4  F (35.8  C) (Oral)   Resp 14   Wt 129.3 kg (285 lb)   LMP 11/01/2013   SpO2 95%   Breastfeeding No   BMI 48.92 kg/m       Output: Voiding spontaneously without difficulties. LBM 6/5 - loose/watery, per pt report; BS active/audible x4.     Lungs: LS clear, on RA. Denies SOB, chest pain, cough.     Activity: Up w/ SBA and walker; independently ambulating to bathroom overnight.      Skin: Intact ex LLE valerie (cellulitis)   Pain:   Managed w/ prn tylenol and tramadol q6hr   Neuro/CMS:   A&Ox4, CMS intact ex N/T in bilateral toes   Dressing(s):   None   Diet:   Combination regular diet w/ sliding scale (novolog)    LDA:   L PIV SL between abx   Equipment:   IV pole, walker   Plan:   Continue w/ IV abx; anticipated discharge back to group home when IV abx regimen is completed     Additional Info:   T2DM

## 2021-06-07 ENCOUNTER — APPOINTMENT (OUTPATIENT)
Dept: MRI IMAGING | Facility: CLINIC | Age: 61
DRG: 603 | End: 2021-06-07
Attending: INTERNAL MEDICINE
Payer: COMMERCIAL

## 2021-06-07 LAB
ANION GAP SERPL CALCULATED.3IONS-SCNC: 6 MMOL/L (ref 3–14)
BUN SERPL-MCNC: 12 MG/DL (ref 7–30)
CALCIUM SERPL-MCNC: 8.5 MG/DL (ref 8.5–10.1)
CHLORIDE SERPL-SCNC: 102 MMOL/L (ref 94–109)
CO2 SERPL-SCNC: 27 MMOL/L (ref 20–32)
CREAT SERPL-MCNC: 0.82 MG/DL (ref 0.52–1.04)
CRP SERPL-MCNC: 11.7 MG/L (ref 0–8)
ERYTHROCYTE [DISTWIDTH] IN BLOOD BY AUTOMATED COUNT: 14.7 % (ref 10–15)
GFR SERPL CREATININE-BSD FRML MDRD: 77 ML/MIN/{1.73_M2}
GLUCOSE BLDC GLUCOMTR-MCNC: 106 MG/DL (ref 70–99)
GLUCOSE BLDC GLUCOMTR-MCNC: 129 MG/DL (ref 70–99)
GLUCOSE BLDC GLUCOMTR-MCNC: 134 MG/DL (ref 70–99)
GLUCOSE BLDC GLUCOMTR-MCNC: 99 MG/DL (ref 70–99)
GLUCOSE SERPL-MCNC: 100 MG/DL (ref 70–99)
HCT VFR BLD AUTO: 36.9 % (ref 35–47)
HGB BLD-MCNC: 11.8 G/DL (ref 11.7–15.7)
MCH RBC QN AUTO: 28.7 PG (ref 26.5–33)
MCHC RBC AUTO-ENTMCNC: 32 G/DL (ref 31.5–36.5)
MCV RBC AUTO: 90 FL (ref 78–100)
PLATELET # BLD AUTO: 270 10E9/L (ref 150–450)
POTASSIUM SERPL-SCNC: 3.9 MMOL/L (ref 3.4–5.3)
RBC # BLD AUTO: 4.11 10E12/L (ref 3.8–5.2)
SODIUM SERPL-SCNC: 135 MMOL/L (ref 133–144)
WBC # BLD AUTO: 8.9 10E9/L (ref 4–11)

## 2021-06-07 PROCEDURE — 99232 SBSQ HOSP IP/OBS MODERATE 35: CPT | Performed by: INTERNAL MEDICINE

## 2021-06-07 PROCEDURE — 250N000011 HC RX IP 250 OP 636: Performed by: INTERNAL MEDICINE

## 2021-06-07 PROCEDURE — 250N000013 HC RX MED GY IP 250 OP 250 PS 637: Performed by: PEDIATRICS

## 2021-06-07 PROCEDURE — 36415 COLL VENOUS BLD VENIPUNCTURE: CPT | Performed by: INTERNAL MEDICINE

## 2021-06-07 PROCEDURE — 80048 BASIC METABOLIC PNL TOTAL CA: CPT | Performed by: INTERNAL MEDICINE

## 2021-06-07 PROCEDURE — 73720 MRI LWR EXTREMITY W/O&W/DYE: CPT | Mod: LT

## 2021-06-07 PROCEDURE — A9585 GADOBUTROL INJECTION: HCPCS | Performed by: PEDIATRICS

## 2021-06-07 PROCEDURE — 250N000011 HC RX IP 250 OP 636: Performed by: PEDIATRICS

## 2021-06-07 PROCEDURE — 85027 COMPLETE CBC AUTOMATED: CPT | Performed by: INTERNAL MEDICINE

## 2021-06-07 PROCEDURE — 120N000002 HC R&B MED SURG/OB UMMC

## 2021-06-07 PROCEDURE — 255N000002 HC RX 255 OP 636: Performed by: PEDIATRICS

## 2021-06-07 PROCEDURE — 250N000013 HC RX MED GY IP 250 OP 250 PS 637: Performed by: INTERNAL MEDICINE

## 2021-06-07 PROCEDURE — 73720 MRI LWR EXTREMITY W/O&W/DYE: CPT | Mod: 26 | Performed by: RADIOLOGY

## 2021-06-07 PROCEDURE — 250N000013 HC RX MED GY IP 250 OP 250 PS 637: Performed by: STUDENT IN AN ORGANIZED HEALTH CARE EDUCATION/TRAINING PROGRAM

## 2021-06-07 PROCEDURE — 86140 C-REACTIVE PROTEIN: CPT | Performed by: INTERNAL MEDICINE

## 2021-06-07 PROCEDURE — 999N001017 HC STATISTIC GLUCOSE BY METER IP

## 2021-06-07 RX ORDER — DIAZEPAM 5 MG
5 TABLET ORAL ONCE
Status: COMPLETED | OUTPATIENT
Start: 2021-06-07 | End: 2021-06-07

## 2021-06-07 RX ORDER — AMPICILLIN AND SULBACTAM 2; 1 G/1; G/1
3 INJECTION, POWDER, FOR SOLUTION INTRAMUSCULAR; INTRAVENOUS EVERY 6 HOURS
Status: DISCONTINUED | OUTPATIENT
Start: 2021-06-07 | End: 2021-06-09

## 2021-06-07 RX ORDER — HYDROXYZINE HYDROCHLORIDE 25 MG/1
25 TABLET, FILM COATED ORAL EVERY 6 HOURS PRN
Status: DISCONTINUED | OUTPATIENT
Start: 2021-06-07 | End: 2021-06-09 | Stop reason: HOSPADM

## 2021-06-07 RX ORDER — GADOBUTROL 604.72 MG/ML
0.1 INJECTION INTRAVENOUS ONCE
Status: COMPLETED | OUTPATIENT
Start: 2021-06-07 | End: 2021-06-07

## 2021-06-07 RX ADMIN — DULOXETINE HYDROCHLORIDE 60 MG: 60 CAPSULE, DELAYED RELEASE ORAL at 08:10

## 2021-06-07 RX ADMIN — Medication 1000 UNITS: at 08:10

## 2021-06-07 RX ADMIN — AMPICILLIN SODIUM AND SULBACTAM SODIUM 3 G: 2; 1 INJECTION, POWDER, FOR SOLUTION INTRAMUSCULAR; INTRAVENOUS at 16:38

## 2021-06-07 RX ADMIN — LEVOTHYROXINE SODIUM 200 MCG: 200 TABLET ORAL at 08:10

## 2021-06-07 RX ADMIN — FLUTICASONE FUROATE AND VILANTEROL TRIFENATATE 1 PUFF: 200; 25 POWDER RESPIRATORY (INHALATION) at 10:29

## 2021-06-07 RX ADMIN — ATORVASTATIN CALCIUM 20 MG: 20 TABLET, FILM COATED ORAL at 08:10

## 2021-06-07 RX ADMIN — OXYCODONE HYDROCHLORIDE 10 MG: 5 TABLET ORAL at 14:25

## 2021-06-07 RX ADMIN — AMPICILLIN SODIUM AND SULBACTAM SODIUM 3 G: 2; 1 INJECTION, POWDER, FOR SOLUTION INTRAMUSCULAR; INTRAVENOUS at 23:00

## 2021-06-07 RX ADMIN — OXYCODONE HYDROCHLORIDE 5 MG: 5 TABLET ORAL at 17:52

## 2021-06-07 RX ADMIN — DIAZEPAM 5 MG: 5 TABLET ORAL at 14:52

## 2021-06-07 RX ADMIN — OXYCODONE HYDROCHLORIDE 5 MG: 5 TABLET ORAL at 17:49

## 2021-06-07 RX ADMIN — ACETAMINOPHEN 975 MG: 325 TABLET, FILM COATED ORAL at 14:25

## 2021-06-07 RX ADMIN — CYANOCOBALAMIN TAB 1000 MCG 1000 MCG: 1000 TAB at 08:10

## 2021-06-07 RX ADMIN — OXYCODONE HYDROCHLORIDE 10 MG: 5 TABLET ORAL at 22:14

## 2021-06-07 RX ADMIN — PIPERACILLIN SODIUM AND TAZOBACTAM SODIUM 3.38 G: 3; .375 INJECTION, POWDER, LYOPHILIZED, FOR SOLUTION INTRAVENOUS at 05:38

## 2021-06-07 RX ADMIN — IBUPROFEN 600 MG: 600 TABLET, FILM COATED ORAL at 17:49

## 2021-06-07 RX ADMIN — TRAZODONE HYDROCHLORIDE 50 MG: 50 TABLET ORAL at 22:08

## 2021-06-07 RX ADMIN — MULTIPLE VITAMINS W/ MINERALS TAB 1 TABLET: TAB at 08:10

## 2021-06-07 RX ADMIN — GABAPENTIN 1200 MG: 600 TABLET, FILM COATED ORAL at 08:10

## 2021-06-07 RX ADMIN — ACETAMINOPHEN 975 MG: 325 TABLET, FILM COATED ORAL at 22:14

## 2021-06-07 RX ADMIN — GADOBUTROL 13 ML: 604.72 INJECTION INTRAVENOUS at 15:04

## 2021-06-07 RX ADMIN — ENOXAPARIN SODIUM 40 MG: 40 INJECTION SUBCUTANEOUS at 08:11

## 2021-06-07 RX ADMIN — POTASSIUM CHLORIDE 10 MEQ: 750 CAPSULE, EXTENDED RELEASE ORAL at 08:10

## 2021-06-07 RX ADMIN — OXYCODONE HYDROCHLORIDE 10 MG: 5 TABLET ORAL at 08:41

## 2021-06-07 RX ADMIN — GABAPENTIN 1200 MG: 600 TABLET, FILM COATED ORAL at 22:08

## 2021-06-07 RX ADMIN — Medication 4 G: at 14:29

## 2021-06-07 RX ADMIN — PIPERACILLIN SODIUM AND TAZOBACTAM SODIUM 3.38 G: 3; .375 INJECTION, POWDER, LYOPHILIZED, FOR SOLUTION INTRAVENOUS at 11:47

## 2021-06-07 RX ADMIN — METHADONE HYDROCHLORIDE 80 MG: 10 CONCENTRATE ORAL at 10:32

## 2021-06-07 RX ADMIN — OMEPRAZOLE 20 MG: 20 CAPSULE, DELAYED RELEASE ORAL at 08:10

## 2021-06-07 RX ADMIN — Medication 2 G: at 22:19

## 2021-06-07 RX ADMIN — GABAPENTIN 1200 MG: 600 TABLET, FILM COATED ORAL at 14:24

## 2021-06-07 RX ADMIN — POTASSIUM CHLORIDE 10 MEQ: 750 CAPSULE, EXTENDED RELEASE ORAL at 22:08

## 2021-06-07 NOTE — PROGRESS NOTES
[facility], [place], [place]  Internal Medicine Progress Note      Assessment and Plans:     No Yusuf is a 62 yo F w/PMH of DM2 c/b neuropathy, HTN HLD Morbid obesity (BMI 49), polysubstance abuse and dependance hx on stable methadone, fibromyalgia, GERD, depression, and chronic lymphedema, who presented with worsening left lower extremity cellulitis.      Issues:    # BL L>R: LE cellulitis   #Subcutaneous edema versus soft tissue mass, left lower extremity  # hx of IDDM.    # BL Chronic edema.   # Chronic venostasis changes.   # BL LE Neuropathy.     05/27: venous duplex US: neg for DVT     Ultrasound left leg dated 6/6/2021.  Demonstrates subcutaneous edema and cobblestoning involving the left lateral proximal and mid calf.   No obvious discrete fluid collection or abscess.      CT left tib fib 6/6/2021.  Demonstrates diffuse subcutaneous soft tissue edema in the left lower extremity.  No discrete soft tissue mass  or fluid collection, though visualization is limited by CT.  No subcutaneous air.  No displaced fractures.  Screw in the medial malleolus as  well as the distal fibula.      X-rays of left ankle and tib-fib : Soft tissue swelling with ankle. No evidence osteomyelitis. Moderate to advanced degenerative arthritis of  the tibiotalar joint. Screw within the distal fibula and screw. Medial malleolus. No acute fracture.      Says bl LE ?cellulitis started ~3 weeks prior. Denies any trauma or open sore or IV drug use.  Adm to hospital 05/27--05/28.  Discharged on oral Augmentin.   It appears that both LE mostly right leg improved on Augmentin but then left leg worsened..   On adm: reassuring no pus or induration or purulence.     - Antimicrobials: On admission started empirically on IV Vancomycin.  06/06: Added IV Zosyn.    - Follow-up imaging as above, Ortho consult done.  Consult 06/06 reviewed.  Appreciate input.  Will  follow.  -Ortho: MR left lower extremity with and without contrast to evaluate for soft tissue mass versus abscess-ordered, pending.  - Pain control: tylenol.Ibuprofen.  Oxycodone prn. PTA methadone. Gabapentin. Topical diclofenac.   - Elevate L LE   - Activity as tolerated, weightbearing as tolerated.  - Serial leg exam  - Monitor vitals.   - Trend CBC, CRP    6/7/2021:   Clinically doing better.  WBC, CRP trending down.  Afebrile.  VSS. MRI leg-pending, follow-up  Per antimicrobial team, change IV Zosyn to Unasyn.    #KERRIE, mild.    Continue to monitor.    Oral hydration.   Better.      # BL LE Chronic edema  # Obesity.   - discontinue amlodipine  - hold home lasix for now given above.   -Consider starting ARB or ACEI given pts dgx of Dm2 and HTN  - elevated l LE.   - Consider edema wraps.     # Hypertension:  PTA on Amlodipine and lasix.   - Hold PTA meds  - Monitor bp       # DM2  -Last A1c was 6.5 on 5/20/21   -c/w home Metformin, Levemir 20 units  6/6/2021: Hold metformin.   -BG checks TID ac and at bedtime; medium sliding scale insulin       6/7/2021:  Given hypoglycemia episode, decrease Levemir 15 Units.     Recent Labs   Lab 06/07/21  1202 06/07/21  0833 06/07/21  0556 06/06/21  2105 06/06/21  1858 06/06/21  1839 06/06/21  1828 06/06/21  0659 06/06/21  0659 06/05/21  0655 06/05/21  0655 06/03/21  1956 06/03/21 1956   GLC  --   --  100*  --   --   --   --   --  94  --  103*  --  86   * 99  --  107* 110* 76 59*   < >  --    < >  --    < >  --     < > = values in this interval not displayed.         # H/o Polysubstance Abuse & Opiate Dependence:   -Takes 80 mg methadone daily, last dose was 1 day PTA.  [place] [facility]  -Continue    # Hypothyroidism:   -Continue with PTA Levothyroxine  # GERD:   - Continue with PTA omeprazole  # Depression  - Continue PTA Duloxetine, Hydroxyzine  # Insomnia  - Continue PTA Trazodone prn  # Chronic neuropathic pain:    - Continue with PTA Gabapentin 1200mg TID,  duloxetine    # Hep C: not on treatment, followed at [facility]  -per care everywhere HCV RNA Quant 1/7/2021 was 1,440,950 and on 12/10/2019 was 838,044 with  HEPATITIS C, RNA, QUANT 1/7/2021 was 6.2 and on 12/10/2019  was 5.9      Diet: Combination Diet Regular Diet Adult    DVT Prophylaxis: Enoxaparin (Lovenox) SQ  Valle Catheter: not present  Code Status: Full Code         Disposition Plan  Expect discharge 2 to 3 days. TBD.     Benjamín Parra MD  [facility]  Contact information available via [facility] Paging/Directory      Subjective:     Left leg pain, swelling, erythema: stable to better.   No fever or chills.   No nausea vomiting diarrhea or pain abdomen.   No cough or chest pain or shortness of breath.  No other medical concern.     -Data reviewed today: I reviewed all new labs and imaging results over the last 24 hours.     Exam:     Temp: 97.9  F (36.6  C) Temp src: Oral BP: 110/58 Pulse: 66   Resp: 16 SpO2: 94 % O2 Device: None (Room air)    Vitals:    06/03/21 1902   Weight: 129.3 kg (285 lb)     Vital Signs with Ranges  Temp:  [97.4  F (36.3  C)-98.8  F (37.1  C)] 97.9  F (36.6  C)  Pulse:  [66-74] 66  Resp:  [14-17] 16  BP: (108-110)/(52-58) 110/58  SpO2:  [91 %-95 %] 94 %  No intake/output data recorded.    Constitutional:  No distress noted, Alert, interactive.   Respiratory: Normal work of breathing on room air  Cardiovascular: RRR s1s2   GI: Soft, ND, NT.   Skin/Integumen:  L LE cellulitis below L Knee w diffuse erythema, TTP edema - w very tender prominent swelling L lateral proximal calf. ttp w ?mild crepitus: stable to slight better.   B/L venous stasis changes.   Neuro: A0 x 4.   Mood: stable.     Medications       atorvastatin  20 mg Oral Daily     cyanocobalamin  1,000 mcg Oral Daily     diclofenac  2-4 g Topical TID     DULoxetine  60 mg Oral Daily     enoxaparin ANTICOAGULANT  40 mg Subcutaneous Q24H     fluticasone-vilanterol  1 puff Inhalation Daily      gabapentin  1,200 mg Oral TID     insulin aspart  1-7 Units Subcutaneous TID AC     insulin aspart  1-5 Units Subcutaneous At Bedtime     insulin detemir  15 Units Subcutaneous QAM     levothyroxine  200 mcg Oral QAM AC     methadone  80 mg Oral Daily     multivitamin w/minerals  1 tablet Oral Daily     omeprazole  20 mg Oral QAM AC     piperacillin-tazobactam  3.375 g Intravenous Q6H     potassium chloride ER  10 mEq Oral BID     sodium chloride (PF)  3 mL Intracatheter Q8H     traZODone  50 mg Oral At Bedtime     vancomycin (VANCOCIN) IV  2,000 mg Intravenous Q24H     Vitamin D3  1,000 Units Oral Daily       Data   Recent Labs   Lab 06/07/21  0556 06/06/21  0659 06/05/21  0655 06/03/21 1956 06/03/21 1956   WBC 8.9 11.4*  --   --  11.0   HGB 11.8 11.9  --   --  13.5   MCV 90 89  --   --  92    272  --   --  313    135 133  --  137   POTASSIUM 3.9 4.0 4.1  --  4.1   CHLORIDE 102 101 100  --  97   CO2 27 28 28  --  32   BUN 12 13 13  --  13   CR 0.82 0.98 1.06*   < > 0.98   ANIONGAP 6 6 6  --  8   RODOLFO 8.5 8.7 8.4*  --  9.5   * 94 103*  --  86   ALBUMIN  --   --   --   --  3.4   PROTTOTAL  --   --   --   --  9.3*   BILITOTAL  --   --   --   --  0.4   ALKPHOS  --   --   --   --  119   ALT  --   --   --   --  52*   AST  --   --   --   --  34    < > = values in this interval not displayed.       Recent Results (from the past 24 hour(s))   XR Ankle Left G/E 3 Views    Narrative    EXAM: XR ANKLE LEFT G/E 3 VIEWS, XR TIBIA and FIBULA LT 2 VW  LOCATION: [facility]  DATE/TIME: 6/6/2021 4:07 PM    INDICATION: Left inguinal leg pain and swelling, worsening cellulitis  COMPARISON: CT 6/6/2021      Impression    IMPRESSION:   Ankle: Soft tissue swelling with ankle. No evidence osteomyelitis. Moderate to advanced degenerative arthritis of the tibiotalar joint. Screw within the distal fibula and screw. Medial malleolus. No acute fracture.    Lower leg: No acute fracture. No evidence  osteomyelitis. Increased density in the subcutaneous fat may be related to edema-cellulitis. Degenerative arthritis knee. Small knee joint effusion.   XR Tibia & Fibula Left 2 Views    Narrative    EXAM: XR ANKLE LEFT G/E 3 VIEWS, XR TIBIA and FIBULA LT 2 VW  LOCATION: [facility]  DATE/TIME: 6/6/2021 4:07 PM    INDICATION: Left inguinal leg pain and swelling, worsening cellulitis  COMPARISON: CT 6/6/2021      Impression    IMPRESSION:   Ankle: Soft tissue swelling with ankle. No evidence osteomyelitis. Moderate to advanced degenerative arthritis of the tibiotalar joint. Screw within the distal fibula and screw. Medial malleolus. No acute fracture.    Lower leg: No acute fracture. No evidence osteomyelitis. Increased density in the subcutaneous fat may be related to edema-cellulitis. Degenerative arthritis knee. Small knee joint effusion.

## 2021-06-07 NOTE — PROGRESS NOTES
Antimicrobial Stewardship Team Note    Antimicrobial Stewardship Program - A joint venture between [facility] and  Physicians to optimize antibiotic management.  NOT a formal consult - Restricted Antimicrobial Review     Patient: No Yusuf  MRN: [id number]  Allergies: Promethazine, Codeine phosphate, Lamotrigine, Oxcarbazepine, and Codeine    Brief Summary: No Yusuf is a 61 year old female with PMHx of T2DM (5/27 Hgb A1c 6.5) c/b neuropathy, HTN, HLD, fibromyalgia, GERD, depression, chronic lymphedema, h/o polysubstance abuse (stable on methadone), and morbid obesity (BMI ~49) who was admitted on 6/3/2021 with worsening LLE cellulitis.    History of Present Illness: Patient had recent observation stay (5/27-5/28) for bilateral lower extremity edema with concerns for left lower extremity cellulitis. She was started on cefazolin, then transitioned to oral Augmention with discharge back to group home after lower extremity erythema was receeding from marked borders. She returned to the ED on 6/3 after patient and group home staff noticed erythema extending past the original margins with it being day 6/7 of Augmentin. She also endorsed developing diarrhea shortly after receipt of cefazolin, which has persisted, along with endorsement of generalized weakness and subjective fevers. Physical exam in the ED notes with bilateral edema and erythema, warmth, and tenderness extending from L foot up to just below knee. On presentation, she was afebrile and vitally stable. Initial labs were largely unremarkable (WBC 11.0, CRP 12.0, ESR 83.0, mild ALT elevation 52). She received a one-time ceftriaxone dose (6/3) and was transitioned to IV vancomycin, which she remains on today. Enteric panel was negative. COVID also negative.     Yesterday, Zosyn was added for worsening/persistent LLE cellulitis as an increased tender bulge was noticed on L lateral calf, positive for crepitus. 6/6 CT tib/fib w/  contrast showed diffuse subcutaneous soft tissue edema within the LLE, small amount of fluid in the L knee joint, no discrete soft tissue mass or fluid collection. LLE US with subcutaneous edema and cobblestoning in the subcutaneous tissues in the L lateral proximal and mid calf with no discrete fluid collection. L ankle XR with soft tissue swelling, no evidence of osteomyelitis, moderate to advanced degenerative arthritis of the tibiotalar joint and screw within the distal fibula. L tib/fib XR with no acute fracture, no evidence of osteomyelitis, and increased density in the subcutaneous fat may be related to edema-cellulitis. Orthopedic surgery was consulted and MRI is planned for today.               Active Anti-infective Medications   (From admission, onward)                 Start     Stop    06/04/21 2230  vancomycin (VANCOCIN) injection  1,750 mg,   Intravenous,   EVERY 24 HOURS     Skin and Soft Tissue Infection        --                  Assessment: Left lower extremity cellulitis c/b L calf bulge of unknown etiology in the setting of chronic lymphedema    Patient has remained afebrile throughout hospitalization with sporadic Tylenol use and continues to be vitally stable. She had a very mild leukocytosis yesterday (WBC 11.4) with resolution seen today (WBC 8.9). Given cellulitis worsening while on IV vancomycin monotherapy, reasonable to broaden therapy. Patient does not have a history of Pseudomonas aeruginosa. Lower extremity pictures from admission are not revealing for any open wounds, pus. Patient's diabetes appears to be fairly well controlled, lessening the risk for Pseudomonas aeruginosa. Would still favor a Gram-positive organism being the most likely pathogenic culprit. Patient showed initial improvement with Augmentin. Given body habitus, it could be that patient was under dosed with Augmentin resulting in cellulitis worsening. Recommend transitioning from Zosyn to Unasyn. In preparation for  discharge, an oral regimen is doxycycline (highly bioavailable lending reliable concentrations in the tissues) and high-dose Augmentin.     Recommendations:  Recommend transitioning from Zosyn to Unasyn 3 g IV every 6 hours  An oral regimen may be doxycycline 100 mg PO twice daily plus amoxicillin/clavulanate 875/125 mg PO three times daily     Discussed with ID Staff KVNG Garner, PharmD, BCIDP  Pager: [phone]    Vital Signs/Clinical Features:  Vitals         06/05 0700  -  06/06 0659 06/06 0700  -  06/07 0659 06/07 0700  -  06/07 1209   Most Recent    Temp ( F) 96.4 -  97.9    97.3 -  98.8    97.5 -  97.9     97.9 (36.6)    Pulse 70 -  79    71 -  74      66     66    Resp 14 -  16    14 -  17      16     16    /75 -  133/80    108/52 -  114/63      110/58     110/58    SpO2 (%) 95 -  96    91 -  98      94     94            Labs  Estimated Creatinine Clearance: 97.3 mL/min (based on SCr of 0.82 mg/dL).  Recent Labs   Lab Test 05/28/21  0732 06/03/21 1956 06/04/21  0805 06/05/21  0655 06/06/21  0659 06/07/21  0556   CR 0.82 0.98 0.81 1.06* 0.98 0.82       Recent Labs   Lab Test 07/06/20  1400 07/06/20  1400 07/07/20  0645 05/27/21  0137 05/27/21  1228 05/28/21  0654 05/28/21  0732 06/03/21 1956 06/06/21  0659 06/07/21  0556   WBC 7.1  --  6.9 10.5 9.4 Canceled, Test credited 8.4 11.0 11.4* 8.9   ANEU 4.1  --  3.9 7.4 6.4  --  5.2 7.5  --   --    ALYM 1.9  --  1.7 1.7 1.8  --  2.0 2.0  --   --    NADIR 0.7  --  0.8 0.9 0.9  --  0.8 1.1  --   --    AEOS 0.3  --  0.3 0.3 0.3  --  0.2 0.3  --   --    HGB 12.3  --  11.3* 12.0 12.9 Canceled, Test credited 12.2 13.5 11.9 11.8   HCT 39.8  --  37.1 37.6 41.6 Canceled, Test credited 38.9 43.7 37.0 36.9   MCV 88  --  89 90 92 Canceled, Test credited 91 92 89 90      < > 251 249 244 Canceled, Test credited 254 313 272 270    < > = values in this interval not displayed.       Recent Labs   Lab Test 07/06/20  1400 07/07/20  0645  05/27/21  0137 05/28/21  0654 05/28/21  0732 06/03/21 1956   BILITOTAL 0.4 0.3 0.3 Canceled, Test credited 0.4 0.4   ALKPHOS 143 121 128 Canceled, Test credited 106 119   ALBUMIN 3.1* 2.6* 2.9* Canceled, Test credited 2.9* 3.4   AST 55* 39 35 Canceled, Test credited 35 34   ALT 52* 41 68* Canceled, Test credited 55* 52*       Recent Labs   Lab Test 11/17/19  1829 01/28/20  0025 01/28/20  0117 01/28/20  0117 06/09/20  2323 06/10/20  0701 07/06/20  1400 07/06/20 2042 07/06/20 2042 05/27/21  0137 05/27/21  0523 05/27/21  1228 05/28/21  0654 05/28/21  0732 06/04/21  0805 06/06/21  0659 06/07/21  0556   PCAL  --   --   --   --  0.05 <0.05  --  <0.05  --   --   --   --   --   --   --   --   --    LACT  --   --  1.0  --   --   --  0.7  --   --  2.3* 0.9  --   --   --   --   --   --    CRP <2.9 28.5*  --    < >  --  20.0* 15.5*  --    < > 11.1*  --  13.0* Canceled, Test credited 13.0* 12.0* 13.0* 11.7*   SED 35* 82*  --   --   --   --  57*  --   --  67*  --   --   --   --  83*  --   --     < > = values in this interval not displayed.       Recent Labs   Lab Test 06/06/21  2134   VANCOMYCIN 8.4       Culture Results:  7-Day Micro Results       Procedure Component Value Units Date/Time    Enteric Bacteria and Virus Panel by SERENE Stool [[id number]] Collected: 06/03/21 1937    Order Status: Completed Lab Status: Final result Updated: 06/04/21 0144    Specimen: Feces      Campylobacter group by SERENE Not Detected     Salmonella species by SERENE Not Detected     Shigella species by SERENE Not Detected     Vibrio group by SERENE Not Detected     Rotavirus A by SERENE Not Detected     Shiga toxin 1 gene by SERENE Not Detected     Shiga toxin 2 gene by SERENE Not Detected     Norovirus I and II by SERENE Not Detected     Yersinia enterocolitica by SERENE Not Detected     Enteric pathogen comment --     Testing performed by multiplexed, qualitative PCR using the "[employer]igene Enteric   Pathogens Nucleic Acid Test. Results should not be used as the sole  basis for diagnosis,   treatment, or other patient management decisions.       Comment: Positive results do not rule out co-infection with other organisms that are   not detected by this test, and may not be the sole or definitive cause of   patient illness.   Negative results in the setting of clinical illness compatible with   gastroenteritis may be due to infection by pathogens that are not detected by   this test or non-infectious causes such as ulcerative colitis, irritable bowel   syndrome, or Crohn's disease.   Note: Shiga toxin producing E. coli (STEC) typically harbor one or both genes   that encode for Shiga toxins 1 and 2.                 Recent Labs   Lab Test 11/03/19  1945 01/27/20  2035 06/10/20  0032 07/06/20  1338 05/27/21  1045   URINEPH 5.5 6.5 6.5 7.0 5.0   NITRITE Negative Negative Negative Negative Negative   LEUKEST Large* Large* Negative Negative Trace*   WBCU 39* 7* <1 <1 4                         Imaging: Ct Tibia Fibula Lower Leg Left W Contrast    Result Date: 6/6/2021  Exam: CT of the left lower extremity dated 6/6/2021. COMPARISON: None. CLINICAL HISTORY: Soft tissue infection suspected. TECHNIQUE: Axial CT images of the left lower extremity were obtained; sagittal and coronal reconstructions were acquired; reviewed under bone and soft tissue windows. Total DLP: 621. FINDINGS: Diffuse subcutaneous soft tissue edema is seen within the left lower extremity. Small amount of fluid in the left knee joint. No discrete soft tissue mass or fluid collection, although CT is limited in evaluating the soft tissues. Metallic artifact is seen at the ankle joint. No air is noted within the soft tissues. Degenerative osteoarthrosis at the patellofemoral joint compartment as well as marked osteoarthrosis at the ankle joint. Scattered cystic changes at the ankle joint as well as within the navicular. Screw is seen within the distal fibula and a screw fragment within the medial malleolus. No displaced  fracture is noted.     IMPRESSION: 1. Diffuse subcutaneous soft tissue edema in the left lower extremity. No discrete soft tissue mass or fluid collection is noted however CT is limited evaluation of the soft tissues. Findings presumed to represent cellulitis. 2. No air is noted within the soft tissues. 3. No displaced fractures. Osteoarthrosis at the left knee patellofemoral joint compartment as well as significant osteoarthrosis at the left ankle joint. Screw fragment in the region of the medial malleolus as well as a screw is seen within the distal fibula. RUT VITAL MD    Us Extremity Non Vascular Left    Result Date: 6/6/2021  Exam: US EXTREMITY NON VASCULAR LEFT, 6/6/2021 1:04 PM Indication: Left leg cellulitis, swelling w buldge L upper lateral calf. ? r/o abscess or drainable collection or others. Comparison: None Findings: There is subcutaneous edema and cobblestoning in the subcutaneous tissues in the left lateral proximal and mid calf. No discrete fluid collection. Echogenic appearance of the fat.     Impression: Severe left leg subcutaneous edema consistent with clinical history of cellulitis. No discrete drainable fluid collection or abscess. I have personally reviewed the examination and initial interpretation and I agree with the findings. KATHERINE JAMIL MD    Xr Ankle Left G/e 3 Views    Result Date: 6/6/2021  EXAM: XR ANKLE LEFT G/E 3 VIEWS, XR TIBIA and FIBULA LT 2 VW LOCATION: [facility] DATE/TIME: 6/6/2021 4:07 PM INDICATION: Left inguinal leg pain and swelling, worsening cellulitis COMPARISON: CT 6/6/2021     IMPRESSION: Ankle: Soft tissue swelling with ankle. No evidence osteomyelitis. Moderate to advanced degenerative arthritis of the tibiotalar joint. Screw within the distal fibula and screw. Medial malleolus. No acute fracture. Lower leg: No acute fracture. No evidence osteomyelitis. Increased density in the subcutaneous fat may be related to edema-cellulitis. Degenerative  arthritis knee. Small knee joint effusion.    Xr Tibia & Fibula Left 2 Views    Result Date: 6/6/2021  EXAM: XR ANKLE LEFT G/E 3 VIEWS, XR TIBIA and FIBULA LT 2 VW LOCATION: [facility] DATE/TIME: 6/6/2021 4:07 PM INDICATION: Left inguinal leg pain and swelling, worsening cellulitis COMPARISON: CT 6/6/2021     IMPRESSION: Ankle: Soft tissue swelling with ankle. No evidence osteomyelitis. Moderate to advanced degenerative arthritis of the tibiotalar joint. Screw within the distal fibula and screw. Medial malleolus. No acute fracture. Lower leg: No acute fracture. No evidence osteomyelitis. Increased density in the subcutaneous fat may be related to edema-cellulitis. Degenerative arthritis knee. Small knee joint effusion.

## 2021-06-07 NOTE — PLAN OF CARE
VS: VSS. Denies CP/SOB    O2: >90% on RA    Output: Voiding adequate amounts w/o pain    Last BM: 6/7, loose per pt report    Activity: Up independently, steady gait    Up for meals? Yes   Skin: Cellulitis LLE   Pain: Pain in LLE, managing with PRN oxycodone and tylenol. Scheduled Voltaren gel.   CMS: Intact   Dressing: None    Diet: Regular, tolerating well. BGs 99 and 129   LDA: PIV saline locked btw abx    Equipment: IV pole    Plan: TBD    Additional Info: MRI to be done this evening, MRI checklist completed yesterday evening (6/7) per night RN report

## 2021-06-07 NOTE — PHARMACY-VANCOMYCIN DOSING SERVICE
Pharmacy Vancomycin Note  Date of Service 2021  Patient's  1960   61 year old, female    Indication: Skin and Soft Tissue Infection  Day of Therapy: 3  Current vancomycin regimen:  1750 mg IV q 24h  Current vancomycin monitoring method: AUC  Current vancomycin therapeutic monitoring goal: 400-600 mg*h/L    Current estimated CrCl = Estimated Creatinine Clearance: 81.4 mL/min (based on SCr of 0.98 mg/dL).    Creatinine for last 3 days  2021:  8:05 AM Creatinine 0.81 mg/dL  2021:  6:55 AM Creatinine 1.06 mg/dL  2021:  6:59 AM Creatinine 0.98 mg/dL    Recent Vancomycin Levels (past 3 days)  2021:  9:34 PM Vancomycin Level 8.4 mg/L    Vancomycin IV Administrations (past 72 hours)                   vancomycin (VANCOCIN) 1,750 mg in sodium chloride 0.9 % 500 mL intermittent infusion (mg) 1,750 mg Given 21 2222     1,750 mg Given 21 2134                Nephrotoxins and other renal medications (From now, onward)    Start     Dose/Rate Route Frequency Ordered Stop    21 2300  vancomycin (VANCOCIN) 2,000 mg in sodium chloride 0.9 % 500 mL intermittent infusion      2,000 mg  over 2 Hours Intravenous EVERY 24 HOURS 21 2236      21 1630  piperacillin-tazobactam (ZOSYN) 3.375 g vial to attach to  mL bag     Note to Pharmacy: Extended infusion dosing to start 6 hours after initial infusion.    3.375 g  over 30 Minutes Intravenous EVERY 6 HOURS 21 1222      21 1030  furosemide (LASIX) tablet 60 mg      60 mg Oral HOLD 21 1009      21 0118  ibuprofen (ADVIL/MOTRIN) tablet 600 mg      600 mg Oral EVERY 6 HOURS PRN 21 0119               Contrast Orders - past 72 hours (72h ago, onward)    Start     Dose/Rate Route Frequency Ordered Stop    21 1200  iopamidol (ISOVUE-370) solution 100 mL      100 mL Intravenous ONCE 21 1131 21 1138          Interpretation of levels and current regimen:  Vancomycin level is reflective of  -600    Has serum creatinine changed greater than 50% in last 72 hours: No    Urine output:  unable to determine    Renal Function: Stable   Loading dose: N/A  Regimen: 2000 mg every 24 hours   Start time: 2300 on 06/06/2021  Exposure target: AUC24 (range)400-600 mg/L.hr   AUC24,ss: 548 mg/L.hr  PAUC*: 90 %  Ctrough,ss: 9.2 mg/L  Pconc*: 0 %  Tox.: 5 %      Plan:  1. Increase Dose to 2000mg iv q 24h  2. Vancomycin monitoring method: AUC  3. Vancomycin therapeutic monitoring goal: 400-600 mg*h/L  4. Pharmacy will check vancomycin levels as appropriate in 1-3 Days.  5. Serum creatinine levels will be ordered daily for the first week of therapy and at least twice weekly for subsequent weeks.    Mónica Sandra RPH

## 2021-06-07 NOTE — PROGRESS NOTES
Care Management Follow Up    Length of Stay (days): 4    Expected Discharge Date: 06/09/21     Concerns to be Addressed:       Patient plan of care discussed at interdisciplinary rounds: Yes    Anticipated Discharge Disposition:       Anticipated Discharge Services:  None new  Anticipated Discharge DME:  None new    Additional Information:  This RNCC assessed this patient 6/4 when she was on the EB obs unit. This is what was noted:  Patient is restricted to Dr.Christopher Naranjo at [facility] [phone].  [facility] is the pharmacy the  uses.   On that admission, a call was made to triage RN at [facility]. She was able to get orders from Dr. Naranjo and they were sent to above pharmacy.    Junaid Busby, SILVIO Care Coordinator [phone]

## 2021-06-08 LAB
COPATH REPORT: NORMAL
GLUCOSE BLDC GLUCOMTR-MCNC: 110 MG/DL (ref 70–99)
GLUCOSE BLDC GLUCOMTR-MCNC: 133 MG/DL (ref 70–99)
GLUCOSE BLDC GLUCOMTR-MCNC: 135 MG/DL (ref 70–99)
GLUCOSE BLDC GLUCOMTR-MCNC: 135 MG/DL (ref 70–99)
GLUCOSE BLDC GLUCOMTR-MCNC: 155 MG/DL (ref 70–99)
PAP: NORMAL

## 2021-06-08 PROCEDURE — 258N000003 HC RX IP 258 OP 636: Performed by: PEDIATRICS

## 2021-06-08 PROCEDURE — 999N001017 HC STATISTIC GLUCOSE BY METER IP

## 2021-06-08 PROCEDURE — 99222 1ST HOSP IP/OBS MODERATE 55: CPT | Performed by: STUDENT IN AN ORGANIZED HEALTH CARE EDUCATION/TRAINING PROGRAM

## 2021-06-08 PROCEDURE — 250N000013 HC RX MED GY IP 250 OP 250 PS 637: Performed by: INTERNAL MEDICINE

## 2021-06-08 PROCEDURE — 250N000013 HC RX MED GY IP 250 OP 250 PS 637: Performed by: PEDIATRICS

## 2021-06-08 PROCEDURE — 99232 SBSQ HOSP IP/OBS MODERATE 35: CPT | Performed by: INTERNAL MEDICINE

## 2021-06-08 PROCEDURE — 120N000002 HC R&B MED SURG/OB UMMC

## 2021-06-08 PROCEDURE — 250N000013 HC RX MED GY IP 250 OP 250 PS 637: Performed by: STUDENT IN AN ORGANIZED HEALTH CARE EDUCATION/TRAINING PROGRAM

## 2021-06-08 PROCEDURE — 250N000011 HC RX IP 250 OP 636: Performed by: INTERNAL MEDICINE

## 2021-06-08 PROCEDURE — 250N000011 HC RX IP 250 OP 636: Performed by: PEDIATRICS

## 2021-06-08 RX ADMIN — ACETAMINOPHEN 975 MG: 325 TABLET, FILM COATED ORAL at 13:29

## 2021-06-08 RX ADMIN — MULTIPLE VITAMINS W/ MINERALS TAB 1 TABLET: TAB at 09:42

## 2021-06-08 RX ADMIN — VANCOMYCIN HYDROCHLORIDE 2000 MG: 10 INJECTION, POWDER, LYOPHILIZED, FOR SOLUTION INTRAVENOUS at 00:44

## 2021-06-08 RX ADMIN — AMPICILLIN SODIUM AND SULBACTAM SODIUM 3 G: 2; 1 INJECTION, POWDER, FOR SOLUTION INTRAMUSCULAR; INTRAVENOUS at 05:06

## 2021-06-08 RX ADMIN — Medication 2 G: at 17:30

## 2021-06-08 RX ADMIN — ACETAMINOPHEN 975 MG: 325 TABLET, FILM COATED ORAL at 05:14

## 2021-06-08 RX ADMIN — OXYCODONE HYDROCHLORIDE 10 MG: 5 TABLET ORAL at 15:01

## 2021-06-08 RX ADMIN — OXYCODONE HYDROCHLORIDE 10 MG: 5 TABLET ORAL at 05:14

## 2021-06-08 RX ADMIN — ATORVASTATIN CALCIUM 20 MG: 20 TABLET, FILM COATED ORAL at 09:43

## 2021-06-08 RX ADMIN — AMPICILLIN SODIUM AND SULBACTAM SODIUM 3 G: 2; 1 INJECTION, POWDER, FOR SOLUTION INTRAMUSCULAR; INTRAVENOUS at 17:26

## 2021-06-08 RX ADMIN — CYANOCOBALAMIN TAB 1000 MCG 1000 MCG: 1000 TAB at 09:42

## 2021-06-08 RX ADMIN — OXYCODONE HYDROCHLORIDE 10 MG: 5 TABLET ORAL at 22:31

## 2021-06-08 RX ADMIN — LEVOTHYROXINE SODIUM 200 MCG: 200 TABLET ORAL at 06:42

## 2021-06-08 RX ADMIN — FLUTICASONE FUROATE AND VILANTEROL TRIFENATATE 1 PUFF: 200; 25 POWDER RESPIRATORY (INHALATION) at 09:46

## 2021-06-08 RX ADMIN — GABAPENTIN 1200 MG: 600 TABLET, FILM COATED ORAL at 15:01

## 2021-06-08 RX ADMIN — DULOXETINE HYDROCHLORIDE 60 MG: 60 CAPSULE, DELAYED RELEASE ORAL at 09:42

## 2021-06-08 RX ADMIN — OMEPRAZOLE 20 MG: 20 CAPSULE, DELAYED RELEASE ORAL at 06:42

## 2021-06-08 RX ADMIN — POTASSIUM CHLORIDE 10 MEQ: 750 CAPSULE, EXTENDED RELEASE ORAL at 20:13

## 2021-06-08 RX ADMIN — POTASSIUM CHLORIDE 10 MEQ: 750 CAPSULE, EXTENDED RELEASE ORAL at 09:42

## 2021-06-08 RX ADMIN — Medication 1000 UNITS: at 09:43

## 2021-06-08 RX ADMIN — IBUPROFEN 600 MG: 600 TABLET, FILM COATED ORAL at 18:39

## 2021-06-08 RX ADMIN — ACETAMINOPHEN 975 MG: 325 TABLET, FILM COATED ORAL at 20:13

## 2021-06-08 RX ADMIN — METHADONE HYDROCHLORIDE 80 MG: 10 CONCENTRATE ORAL at 09:58

## 2021-06-08 RX ADMIN — GABAPENTIN 1200 MG: 600 TABLET, FILM COATED ORAL at 20:13

## 2021-06-08 RX ADMIN — AMPICILLIN SODIUM AND SULBACTAM SODIUM 3 G: 2; 1 INJECTION, POWDER, FOR SOLUTION INTRAMUSCULAR; INTRAVENOUS at 10:55

## 2021-06-08 RX ADMIN — GABAPENTIN 1200 MG: 600 TABLET, FILM COATED ORAL at 09:43

## 2021-06-08 RX ADMIN — OXYCODONE HYDROCHLORIDE 10 MG: 5 TABLET ORAL at 18:38

## 2021-06-08 RX ADMIN — ENOXAPARIN SODIUM 40 MG: 40 INJECTION SUBCUTANEOUS at 09:53

## 2021-06-08 NOTE — PLAN OF CARE
VS: /82 (BP Location: Right arm)   Pulse 80   Temp 96.6  F (35.9  C) (Oral)   Resp 16   Wt 129.3 kg (285 lb)   LMP 11/01/2013   SpO2 94%   Breastfeeding No   BMI 48.92 kg/m     O2: Room air >90%   Output: Voiding adequately and spontaneously    Last BM: 6/7. 2 loose BM's per pt report    Activity: Up ad jose luis, steady gait   Up for meals? yes   Skin: Cellulitis in LLE however BLE are valerie. LLE has softball sized lump on leg. MRI done today   Pain: Managed by oral pain medications    CMS: Pt reports feeling numbness on the top of her left foot and having the feeling of tape being stuck on the top of her foot   Dressing: N/a    Diet: Tolerating well. BGs 134 and    LDA: PIV in L forearm. SL between antibiotics    Equipment: Walker, personal belongings, IV poles    Plan: Tbd. Continue on antibiotics    Additional Info: Took a shower this evening. Pt is forgetful but is alert to place time situation and self.

## 2021-06-08 NOTE — PLAN OF CARE
Brief ortho update    MRI L tib fib reviewed from 6/7/21. Subcutaneous edema throughout left leg. No subcutaneous mass or rim-enhancing fluid collection. Small fascial herniation of peroneal muscle belly at mid-distal tibial shaft.     Left calf bulge on clinical exam consistent with herniation of peroneal muscle belly through fascial wrent. No soft tissue mass or abscess.     Recommendations:   Weightbearing as tolerated.    Activities as tolerated.  Treatment of cellulitis per medicine team.  No specific orthopaedic follow up.    Wanda Melgar MD   Orthopedic Surgery, PGY4  Please see [facility] for pager

## 2021-06-08 NOTE — PLAN OF CARE
VS: Stable.   O2: RA.   Output: Voiding independently in bathroom.   Last BM: 6/7.   Activity: Independent with walker.   Skin: Jamie BLEs, lump in left leg.   Pain: Dull pain in left leg, pt declined interventions.   CMS: Numbness and tingling BLEs per pt.   Dressing: NA.   Diet: Regular,  at 0151.   LDA: PIV left lower forearm TKO between abx overnight.   Equipment: IV pole, walker, call light within reach.   Plan: TBD. Continue abx.   Additional Info:

## 2021-06-08 NOTE — PLAN OF CARE
VS: VSS. Denies CP/SOB.    O2: >90% on RA    Output: Voiding adequate amounts w/o pain    Last BM: 6/7   Activity: Up independently, steady gait    Up for meals? Yes   Skin: Cellulitis LLE   Pain: Pain in LLE, managing with PRN oxycodone and tylenol. Scheduled methadone and Voltaren gel.   CMS: Baseline neuropathy in BLE, improved from yesterday per pt report    Dressing: None    Diet: Regular, tolerating well. BGs 135 and 135   LDA: PIV saline locked btw abx    Equipment: IV pole    Plan: TBD    Additional Info:

## 2021-06-08 NOTE — PROGRESS NOTES
[facility], [place], [place]  Internal Medicine Progress Note      Assessment and Plans:     61 female with past medical history of diabetes, diabetic neuropathy, hypertension, hyperlipidemia, fibromyalgia, GERD, depression, chronic lymphedema history of polysubstance abuse (stable on methadone) and morbid obesity who was admitted on 6/3/2021 with worsening left lower extremity cellulitis.  Patient was recently admitted as an observation stay from 5/27-5/28 for bilateral lower extremity swelling but left lower than right pain, redness, tenderness.  She received 1 day of Ancef and was transitioned to p.o. Augmentin at discharge to group home with plan for 7-day course.  Patient reported that she did not feel like her symptoms are completely improved and had persistent discomfort after going back to her group home.  She returned to ER on 6/3 after group home staff noted that her redness and swelling has extended beyond marked regional margins despite her being on Augmentin.  On arrival to ER she was afebrile hemodynamically stable labs were significant for CRP of 12 ESR 83, WBC count 11 point.  Enteric panel was negative, Covid was negative.  She received ceftriaxone in 6/3 and was subsequently switched to vancomycin.  On 6/10 Zosyn was added for persistent worsening left lower extremity cellulitis.  A tender bulge was noted on left lateral calf, underwent multiple imaging studies - 6/6 CT tib/fib w/ contrast showed diffuse subcutaneous soft tissue edema within the LLE, small amount of fluid in the L knee joint, no discrete soft tissue mass or fluid collection.   - 6/6 LLE US with subcutaneous edema and cobblestoning in the subcutaneous tissues in the L lateral proximal and mid calf with no discrete fluid collection.   - 6/6 L ankle XR with soft tissue swelling, no evidence of osteomyelitis, moderate to advanced degenerative arthritis of the  "tibiotalar joint and screw within the distal fibula.   - 6/6 L tib/fib XR with no acute fracture, no evidence of osteomyelitis, and increased density in the subcutaneous fat may be related to edema-cellulitis     On 6/7 underwent MRI which showed \"Subcutaneous edema throughout the left leg. No subcutaneous enhancing mass or rim-enhancing fluid collection. No extension of  edema into deep intermuscular fascial layers\". Also evaluated by Ortho who felt that Left calf bulge on clinical exam consistent with herniation of peroneal muscle belly through fascial wrent    Lower extremity nonpurulent cellulitis, left greater than right: Appears to be improving.  Multiple imaging studies as mentioned above did not show deep extension or osteomyelitis on CT/MRI  Patient has history of chronic lymphedema, venous stasis  MRI showed herniation of the renal muscle belly through fascial rent presenting his left shin/calf mass.  Appreciate orthopedic surgery and infectious disease consult.  Weightbearing as tolerated.  PT consult ordered.  Continue vancomycin and Unasyn for now.  At the time of discharge can be transition to Augmentin 875/125 mg p.o. 3 times daily and doxycycline 100 mg twice daily for 14-day course.  Elevate leg.      Acute kidney injury, Rx for still catching up allopurinol  Mild possible due to dehydration: Resolved    Bilateral lower extremity chronic edema  Obesity  Discontinued amlodipine during this admission.  Blood pressure stable off antihypertensives.    Hypertension: Blood pressure normal hold prior to admission Klonopin and Lasix.    Diabetes mellitus  Home medication include Metformin and Levemir 20 units.  Last HbA1c 5/20/2021 6.5.  Continue insulin sliding scale.  Had an episode of hypoglycemia last admission decreased Levemir to 15 units.    History of opioid abuse: Continue prior to admission methadone.    Hypothyroidism: Continue prior to admission levothyroxine  GERD continue prior admission " omeprazole  Depression continue prior to admission duloxetine and hydroxyzine  Insomnia continue admission trazodone.  Chronic neuropathic pain due to diabetes: Continue admission gabapentin and duloxetine.      Hep C: not on treatment, followed at [facility] [place]  -per care everywhere HCV RNA Quant 1/7/2021 was [phone] and on 12/10/2019 was [phone] with  HEPATITIS C, RNA, QUANT 1/7/2021 was 6.2 and on 12/10/2019  was 5.9             Disposition Plan  Possible discharge tomorrow.      Jose Pope MD  [facility]  Contact information available via [facility] Paging/Directory      Subjective:     Patient seen and examined.  Patient reports that left leg is little worse to right but overall she feels that she is improving.  Redness is getting better  No acute events overnight.       Exam:     Temp: 96.7  F (35.9  C) Temp src: Oral BP: 121/61 Pulse: 68   Resp: 16 SpO2: 96 % O2 Device: None (Room air)    Vitals:    06/03/21 1902 06/08/21 0650   Weight: 129.3 kg (285 lb) 133.1 kg (293 lb 6.4 oz)     Vital Signs with Ranges  Temp:  [96.2  F (35.7  C)-96.7  F (35.9  C)] 96.7  F (35.9  C)  Pulse:  [68-80] 68  Resp:  [16] 16  BP: (111-127)/(61-82) 121/61  SpO2:  [94 %-96 %] 96 %  No intake/output data recorded.    Constitutional:  No distress noted, Alert, interactive.   Respiratory: Normal work of breathing on room air  Cardiovascular: RRR s1s2   GI: Soft, ND, NT.   Skin/Integumen:  L LE cellulitis below L Knee w diffuse erythema, TTP edema - w very tender prominent swelling L lateral proximal calf. ttp w ?mild crepitus: stable to slight better.   B/L venous stasis changes.   Neuro: A0 x 4.   Mood: stable.     Medications       ampicillin-sulbactam (UNASYN) IV  3 g Intravenous Q6H     atorvastatin  20 mg Oral Daily     cyanocobalamin  1,000 mcg Oral Daily     diclofenac  2-4 g Topical TID     DULoxetine  60 mg Oral Daily     enoxaparin ANTICOAGULANT  40 mg Subcutaneous Q24H      fluticasone-vilanterol  1 puff Inhalation Daily     gabapentin  1,200 mg Oral TID     insulin aspart  1-7 Units Subcutaneous TID AC     insulin aspart  1-5 Units Subcutaneous At Bedtime     insulin detemir  15 Units Subcutaneous QAM     levothyroxine  200 mcg Oral QAM AC     methadone  80 mg Oral Daily     multivitamin w/minerals  1 tablet Oral Daily     omeprazole  20 mg Oral QAM AC     potassium chloride ER  10 mEq Oral BID     sodium chloride (PF)  3 mL Intracatheter Q8H     traZODone  50 mg Oral At Bedtime     vancomycin (VANCOCIN) IV  2,000 mg Intravenous Q24H     Vitamin D3  1,000 Units Oral Daily       Data   Recent Labs   Lab 06/07/21  0556 06/06/21  0659 06/05/21  0655 06/03/21 1956 06/03/21 1956   WBC 8.9 11.4*  --   --  11.0   HGB 11.8 11.9  --   --  13.5   MCV 90 89  --   --  92    272  --   --  313    135 133  --  137   POTASSIUM 3.9 4.0 4.1  --  4.1   CHLORIDE 102 101 100  --  97   CO2 27 28 28  --  32   BUN 12 13 13  --  13   CR 0.82 0.98 1.06*   < > 0.98   ANIONGAP 6 6 6  --  8   RODOLFO 8.5 8.7 8.4*  --  9.5   * 94 103*  --  86   ALBUMIN  --   --   --   --  3.4   PROTTOTAL  --   --   --   --  9.3*   BILITOTAL  --   --   --   --  0.4   ALKPHOS  --   --   --   --  119   ALT  --   --   --   --  52*   AST  --   --   --   --  34    < > = values in this interval not displayed.       Recent Results (from the past 24 hour(s))   MR Tibia Fibula Lower Leg Left wo & w Contr    Narrative    MR left tibia/fibula without and with IV contrast 6/7/2021 4:25 PM    Techniques: Multiplanar multisequence imaging of the left tibia/fibula  was obtained without and with administration of intravenous contrast  using routing protocol. Contrast: 12 mL Gadavist IV    History: Soft tissue infection suspected, lower leg, xray done     Comparison: CT 6/6/2021    Findings:    Similar to recent CT, there is subcutaneous edema throughout the left  leg extending to the investing layer of fascia circumferentially.  No  substantial edema extending into deep intermuscular fascial layers.  Possible area of fascial dehiscence over the peroneal muscle belly on  series 12 image 43. No subcutaneous rim-enhancing fluid collection or  mass.    Osseous structures    Osseous structures: No fracture, stress reaction, avascular necrosis,  or focal osseous lesion is seen.    Muscles  Muscles: Mild diffuse fatty infiltration. No substantial atrophy.    Joint/Periarticular soft tissue    Internal derangement of joint assessment are limited owing to chosen  field of view. Patellofemoral osteoarthrosis. Ankle hardware.    Other Findings:  None.      Impression    Impression:    1. Subcutaneous edema throughout the left leg. No subcutaneous  enhancing mass or rim-enhancing fluid collection. No extension of  edema into deep intermuscular fascial layers however necrotizing  fasciitis remains primarily a clinical diagnosis.    2. Small fascial hernia overlying the peroneal muscle belly at the  level of the mid to distal tibial shaft centered approximately 12 cm  proximal to the tibiotalar joint space.    DIDIER SHANNON MD (Joe)

## 2021-06-08 NOTE — CONSULTS
[facility], [place]  Infectious Disease Consult Note - New Patient     Patient:  No Yusuf, Date of birth 1960, Medical record number [id number]  Date of Visit:  06/08/2021  Consult requested by Dr. Mario Mcledo for evaluation of lower extremity cellulitis         Assessment and Recommendations:   Problem List:  1. Lower extremity non-purulent cellulitis, L>R, appears to be improving. No e/o deep extension or osteomyelitis on CT/MRI  2. Chronic LE edema, venous stasis changes  3. Left shin/calf mass - consistent with herniation of peroneal muscle belly through fascial wrent, seen on MRI  4. T2DM, last A1c 6.5 (5/27/21) with neuropathy  5. Obesity (BMI 49)  6. Fibromyalgia    Recommendations:  - Continue Vancomycin IV for now, pharmacy to assist in dosing by AUC  - Continue Unasyn IV 3gm q6h for now  - With continued improvement, at discharge would transition to an oral regimen consisting of Doxycycline 100 mg PO twice daily plus Amoxicillin/clavulanate 875/125 mg PO three times daily to complete 14 days of antibiotics total     Thank you very much for this consultation.   Infectious Disease will sign off  Please call us back with questions or concerns    Assessment:  No Yusuf is a 61 year old female with PMHx of T2DM (5/27 Hgb A1c 6.5) c/b neuropathy, HTN, HLD, fibromyalgia, GERD, depression, chronic lymphedema, h/o polysubstance abuse (stable on methadone), and morbid obesity (BMI ~49) who was admitted on 6/3/2021 with worsening LLE cellulitis.    Since admission, she has remained afebrile throughout hospital stay despite sporadic Tylenol use and remains hemodynamically stable. Very mild leukocytosis (peak 11.4) which has subsequently resolved (8.9 on 6/7). Given lack of resolution on vancomycin alone, it was reasonable to broaden coverage, however, patient has no history of Pseudomonas aeruginosa infections, no open wounds on exam and diabetes also fairly well controlled  (which might lessen the risk further). As such, would favor use of Unasyn over Zosyn. Likely that Gram positive organism likely culprit and that failure of Augmentin might indicate under dosing given body habitus/BMI 49  Now with day 5 of Vancomycin and day 3 of Zosyn -> Unasyn with clinical improvement (hemodynamic stability, improved swelling and redness per patient and improvement compared to 6/4 picture in chart)  Plan to continue Vancomycin and Unasyn while inpatient with plans to transition to PO regimen at discharge. Recommend 2 weeks (14 days) antibiotic course given slow response and readmission    KVNG Garner  Staff Physician, Infectious Diseases  Pager [phone]         History of Infectious Disease Illness:     No Yusuf is a 61 year old female with PMHx of T2DM (5/27 Hgb A1c 6.5) c/b neuropathy, HTN, HLD, fibromyalgia, GERD, depression, chronic lymphedema, h/o polysubstance abuse (stable on methadone), and morbid obesity (BMI ~49) who was admitted on 6/3/2021 with worsening LLE cellulitis.    Patient was recently admitted as an observation stay (5/27 - 5/28) for bilateral lower extremity swelling with L>R pain, redness and tenderness. She received 1 day of cefazolin and was transitioned to PO Augmentin 875-125 mg BID at discharge to her group home, with plans for a 7 day course. Patient mentions that at the time, she didn't feel like her symptoms had completely improved and had persistent discomfort after going back to her group home. She returned to the ER on 6/3 after group home staff noticed that her redness/erythema extended beyond marked original margins despite her being on day 6/7 of Augmentin. Also reported some diarrhea that started after she received antibiotics    On arrival in the ER, she was afebrile and hemodynamically stable. Initial labs showed Creatinine 0.98, normal LFTs, CRP 12 and ESR 83. Also had WBC count 11. Enteric panel was negative. COVID also negative. She  "received a dose of ceftriaxone on 6/3 and was subsequently switched to Vancomycin.     On 6/6, Zosyn was added for persistent/worsening LLE cellulitis. A tender bulge was also noted on left lateral calf.  Underwent multiple imaging studies as follows:  - 6/6 CT tib/fib w/ contrast showed diffuse subcutaneous soft tissue edema within the LLE, small amount of fluid in the L knee joint, no discrete soft tissue mass or fluid collection.   - 6/6 LLE US with subcutaneous edema and cobblestoning in the subcutaneous tissues in the L lateral proximal and mid calf with no discrete fluid collection.   - 6/6 L ankle XR with soft tissue swelling, no evidence of osteomyelitis, moderate to advanced degenerative arthritis of the tibiotalar joint and screw within the distal fibula.   - 6/6 L tib/fib XR with no acute fracture, no evidence of osteomyelitis, and increased density in the subcutaneous fat may be related to edema-cellulitis    On 6/7 underwent MRI which showed \"Subcutaneous edema throughout the left leg. No subcutaneous enhancing mass or rim-enhancing fluid collection. No extension of  edema into deep intermuscular fascial layers\". Also evaluated by Ortho who felt that Left calf bulge on clinical exam consistent with herniation of peroneal muscle belly through fascial wrent    Today, ID consulted for assistance with antibiotic management  Patient seen and examined. She reports overall improvement in leg swelling since she was admitted to the hospital. Did mention some discomfort overnight and trouble falling asleep with concerns that perhaps swelling was coming back. However, she mentioned this morning (upon seeing her legs) that the redness and swelling had overall improved    Review of Systems:  CONSTITUTIONAL:  No fevers or chills  EYES: negative for icterus or acute vision changes  ENT:  negative for acute hearing loss, tinnitus, sore throat  RESPIRATORY:  negative for cough, sputum or dyspnea  CARDIOVASCULAR:  " negative for chest pain, palpitations  GASTROINTESTINAL:  negative for nausea, vomiting, constipation; positive for diarrhea on admission - improved  GENITOURINARY:  negative for dysuria or hematuria  HEME:  No easy bruising or bleeding  INTEGUMENT:  Positive for redness, swelling and pain involving bilateral lower extremities, worse for L>R  NEURO:  Negative for headache or tremor    Past Medical History:   Diagnosis Date     Arthritis      Bipolar affective (H)      Fibromyalgia      Hypertension        Past Surgical History:   Procedure Laterality Date     CHOLECYSTECTOMY       GYN SURGERY      c section     GYN SURGERY      oblation     ORTHOPEDIC SURGERY      left leg, left shoulder, back       Family History   Problem Relation Age of Onset     Heart Disease Mother      Diabetes Mother      Ovarian Cancer Mother      Heart Disease Father      Lung Cancer Father      Diabetes Sister      Ovarian Cancer Sister      Diabetes Brother        Social History     Social History Narrative     Not on file     Social History     Tobacco Use     Smoking status: Former Smoker     Packs/day: 0.10     Smokeless tobacco: Never Used   Substance Use Topics     Alcohol use: No     Drug use: No       Immunization History   Administered Date(s) Administered     COVID-19,PF,Moderna 01/27/2021, 03/08/2021     DTaP, Unspecified 11/19/2007     Flu, Unspecified 11/16/2012     [id number] Novel Flu 01/20/2010     Hep B, Peds or Adolescent 01/17/2000     HepA-Adult 04/06/2018     HepB, Unspecified 02/03/1994, 03/07/2000     HepB-Adult 02/03/1993, 03/03/1993     Influenza (IIV3) PF 11/16/2012     Influenza Vaccine IM > 6 months Valent IIV4 03/13/2015, 01/13/2021     Td (Adult), Adsorbed 03/29/1997, 04/06/2018     Tdap (Adacel,Boostrix) 11/16/2012       Patient Active Problem List   Diagnosis     Pain in soft tissues of limb     Lymphedema of both lower extremities     Chronic venous insufficiency of lower extremity     Edema of both legs      Lymphedema     Cellulitis     Lower extremity edema     Backache     Benign essential hypertension     Chronic low back pain     Cocaine use disorder, severe, in early remission (H)     Episodic mood disorder (H)     GERD (gastroesophageal reflux disease)     Hemorrhoids     History of sudden cardiac arrest     Hyperglycemia     Hypothyroidism     Knee pain, bilateral     Thoracic or lumbosacral neuritis or radiculitis, unspecified     Methamphetamine use disorder, severe, in early remission (H)     Myalgia and myositis     Opioid type dependence, abuse (H)     Opioid abuse, in remission (H)     Osteoarthrosis     Severe obesity (H)     Tobacco abuse     Type 2 diabetes mellitus, without long-term current use of insulin (H)     Vitamin D deficiency     Hepatitis C, chronic (H)            Current Medications & Allergies:       ampicillin-sulbactam (UNASYN) IV  3 g Intravenous Q6H     atorvastatin  20 mg Oral Daily     cyanocobalamin  1,000 mcg Oral Daily     diclofenac  2-4 g Topical TID     DULoxetine  60 mg Oral Daily     enoxaparin ANTICOAGULANT  40 mg Subcutaneous Q24H     fluticasone-vilanterol  1 puff Inhalation Daily     gabapentin  1,200 mg Oral TID     insulin aspart  1-7 Units Subcutaneous TID AC     insulin aspart  1-5 Units Subcutaneous At Bedtime     insulin detemir  15 Units Subcutaneous QAM     levothyroxine  200 mcg Oral QAM AC     methadone  80 mg Oral Daily     multivitamin w/minerals  1 tablet Oral Daily     omeprazole  20 mg Oral QAM AC     potassium chloride ER  10 mEq Oral BID     sodium chloride (PF)  3 mL Intracatheter Q8H     traZODone  50 mg Oral At Bedtime     vancomycin (VANCOCIN) IV  2,000 mg Intravenous Q24H     Vitamin D3  1,000 Units Oral Daily       Infusions/Drips:      Allergies   Allergen Reactions     Promethazine Other (See Comments) and Anxiety     Noted in 8/30/08 ER     Codeine Phosphate GI Disturbance     Lamotrigine Other (See Comments)     hyponatremia     Oxcarbazepine  Unknown and Other (See Comments)     Low sodium  LegacyRecord#[id number]       Codeine Other (See Comments) and Rash     GI bleeding  LegacyRecord#[id number]              Physical Exam:     Patient Vitals for the past 24 hrs:   BP Temp Temp src Pulse Resp SpO2 Weight   06/08/21 0855 121/61 96.7  F (35.9  C) Oral 68 16 -- --   06/08/21 0650 -- -- -- -- -- -- 133.1 kg (293 lb 6.4 oz)   06/07/21 2238 111/66 96.2  F (35.7  C) Oral 77 16 96 % --   06/07/21 1554 127/82 96.6  F (35.9  C) Oral 80 16 94 % --     Ranges for vital signs:  Temp:  [96.2  F (35.7  C)-96.7  F (35.9  C)] 96.7  F (35.9  C)  Pulse:  [68-80] 68  Resp:  [16] 16  BP: (111-127)/(61-82) 121/61  SpO2:  [94 %-96 %] 96 %  Vitals:    06/03/21 1902 06/08/21 0650   Weight: 129.3 kg (285 lb) 133.1 kg (293 lb 6.4 oz)       Physical Examination:  GENERAL:  well-developed, well-nourished, in bed in no acute distress.  HEAD:  Head is normocephalic, atraumatic   EYES:  Eyes have anicteric sclerae without conjunctival injection   ENT:  Oropharynx is moist without exudates or ulcers. Tongue is midline  NECK:  Supple. No cervical lymphadenopathy  LUNGS:  Clear to auscultation bilateral. On room air, no use of accessory muscles  CARDIOVASCULAR:  Regular rate and rhythm with no murmurs, gallops or rubs.  ABDOMEN:  Normal bowel sounds, soft, nontender. No appreciable hepatosplenomegaly.  SKIN:  Bilateral lower extremity edema, L>R, minimal erythema (compared to pictures in chart from 6/4, erythema appears improved). No tenderness to superficial palpation, patient does report some discomfort at points on deep pressure. No discharge  NEUROLOGIC:  Grossly nonfocal. Active x4 extremities         Laboratory Data:     No results found for: ACD4    Inflammatory Markers    Recent Labs   Lab Test 06/07/21  0556 06/06/21  0659 06/04/21  0805 05/27/21  0137 05/27/21  0137 07/06/20  1400 07/06/20  1400   SED  --   --  83*  --  67*  --  57*   CRP 11.7* 13.0* 12.0*   < > 11.1*   < > 15.5*    < > =  values in this interval not displayed.         Metabolic Studies       Recent Labs   Lab Test 06/07/21  0556 06/06/21 0659 05/27/21  0523 05/27/21 0523 05/27/21  0137 07/06/20 2042 07/06/20 2042 06/16/20 0537 06/16/20 0537    135   < >  --  134   < >  --    < > 137   POTASSIUM 3.9 4.0   < >  --  3.8   < >  --    < > 3.9   CHLORIDE 102 101   < >  --  98   < >  --    < > 102   CO2 27 28   < >  --  30   < >  --    < > 32   ANIONGAP 6 6   < >  --  6   < >  --    < > 3   BUN 12 13   < >  --  15   < >  --    < > 15   CR 0.82 0.98   < >  --  0.90   < > 0.96   < > 0.93   GFRESTIMATED 77 62   < >  --  69   < > 64   < > 67   * 94   < >  --  167*   < >  --    < > 123*   A1C  --   --   --   --  6.5*  --   --   --   --    RODOLFO 8.5 8.7   < >  --  8.8   < >  --    < > 8.7   MAG  --   --   --   --   --   --   --   --  2.0   LACT  --   --   --  0.9 2.3*  --   --    < >  --    PCAL  --   --   --   --   --   --  <0.05  --   --     < > = values in this interval not displayed.       Hepatic Studies    Recent Labs   Lab Test 06/03/21 1956 05/28/21 0732 05/27/21 1432 05/27/21  1432   BILITOTAL 0.4 0.4   < >  --    ALKPHOS 119 106   < >  --    PROTTOTAL 9.3* 8.3   < >  --    ALBUMIN 3.4 2.9*   < >  --    AST 34 35   < >  --    ALT 52* 55*   < >  --    JODY  --   --   --  27    < > = values in this interval not displayed.       Hematology Studies      Recent Labs   Lab Test 06/07/21 0556 06/06/21 0659 06/03/21 1956 05/28/21 0732 05/28/21 0654 05/27/21  1228   WBC 8.9 11.4* 11.0 8.4 Canceled, Test credited 9.4   ANEU  --   --  7.5 5.2  --  6.4   ALYM  --   --  2.0 2.0  --  1.8   NADIR  --   --  1.1 0.8  --  0.9   AEOS  --   --  0.3 0.2  --  0.3   HGB 11.8 11.9 13.5 12.2 Canceled, Test credited 12.9   HCT 36.9 37.0 43.7 38.9 Canceled, Test credited 41.6    272 313 254 Canceled, Test credited 244       Clotting Studies    Recent Labs   Lab Test 01/28/20  0025   INR 1.03       Iron Testing    Recent Labs   Lab  Test 06/07/21  0556 01/28/20  0025 01/28/20  0025   IRON  --   --  Unsatisfactory specimen - hemolyzed   FEB  --   --  Unsatisfactory specimen - hemolyzed   IRONSAT  --   --  Not Calculated   GILA  --   --  32   MCV 90   < > 92   RETP  --   --  3.5*   RETICABSCT  --   --  125.0*    < > = values in this interval not displayed.         Thyroid Studies     Recent Labs   Lab Test 06/10/20  0701 01/28/20  0025 11/17/19  1829   TSH 9.07* 19.01* 97.65*   T4 1.26 0.98 0.48*       Urine Studies     Recent Labs   Lab Test 05/27/21  1045 07/06/20  1338 06/10/20  0032 01/27/20 2035 11/03/19  1945   URINEPH 5.0 7.0 6.5 6.5 5.5   NITRITE Negative Negative Negative Negative Negative   LEUKEST Trace* Negative Negative Large* Large*   WBCU 4 <1 <1 7* 39*       Medication levels    Recent Labs   Lab Test 06/06/21  2134   VANCOMYCIN 8.4       Last Culture results with specimen source  Culture Micro   Date Value Ref Range Status   05/27/2021 No growth  Final   07/06/2020 No growth  Final   07/06/2020 No growth  Final   01/28/2020 No growth  Final   01/28/2020 No growth  Final   01/27/2020 No Beta Streptococcus isolated  Final   01/27/2020   Final    50,000 to 100,000 colonies/mL  mixed urogenital abril  Susceptibility testing not routinely done     11/03/2019   Final    50,000 to 100,000 colonies/mL  mixed urogenital abril     11/03/2019 No Beta Streptococcus isolated  Final   08/20/2008   Final    10 to 50,000 colonies/mL Multiple species present, probable perineal   contamination.    Specimen Description   Date Value Ref Range Status   05/27/2021 Blood Left Arm  Final   07/06/2020 Blood Right Hand  Final   07/06/2020 Blood Left Arm  Final   01/28/2020 Blood Right Arm  Final   01/28/2020 Blood Left Arm  Final   01/27/2020 Throat  Final   01/27/2020 Throat  Final   01/27/2020 Midstream Urine  Final   11/03/2019 Midstream Urine  Final   11/03/2019 Throat  Final   11/03/2019 Throat  Final   08/20/2008 Midstream Urine  Final         Quantiferon testing   Recent Labs   Lab Test 06/03/21 1956 05/28/21  0732   LYMPH 18.5 23.9       Virology:  Coronavirus-19 testing    Recent Labs   Lab Test 06/04/21  0026 05/27/21  0315 07/06/20  1406 06/10/20  1625 06/10/20  0227 06/10/20  0227   [id number] Nasopharyngeal Nasopharyngeal Nasopharyngeal Nasal   < >  --    SARSCOVRES NEGATIVE NEGATIVE NEGATIVE NEGATIVE  --   --    [id number]  --   --  Nasopharyngeal Nasal  --  Nasopharyngeal   [id number]  --   --  Test received-See reflex to IDDL test SARS CoV2 (COVID-19) Virus RT-PCR Test received-See reflex to IDDL test SARS CoV2 (COVID-19) Virus RT-PCR  --  Not Detected    < > = values in this interval not displayed.       Respiratory virus (non-coronavirus-19) testing    Recent Labs   Lab Test 01/28/20  0109   AFLU Negative   BFLU Negative       Imaging:  Recent Results (from the past 48 hour(s))   CT Tibia Fibula Lower Leg Left w Contrast    Narrative    Exam: CT of the left lower extremity dated 6/6/2021.    COMPARISON: None.    CLINICAL HISTORY: Soft tissue infection suspected.    TECHNIQUE: Axial CT images of the left lower extremity were obtained;  sagittal and coronal reconstructions were acquired; reviewed under  bone and soft tissue windows.    Total DLP: 621.    FINDINGS:    Diffuse subcutaneous soft tissue edema is seen within the left lower  extremity. Small amount of fluid in the left knee joint. No discrete  soft tissue mass or fluid collection, although CT is limited in  evaluating the soft tissues. Metallic artifact is seen at the ankle  joint.    No air is noted within the soft tissues.    Degenerative osteoarthrosis at the patellofemoral joint compartment as  well as marked osteoarthrosis at the ankle joint. Scattered cystic  changes at the ankle joint as well as within the navicular. Screw is  seen within the distal fibula and a screw fragment within the medial  malleolus. No displaced fracture is noted.      Impression    IMPRESSION:  1.  Diffuse subcutaneous soft tissue edema in the left lower extremity.  No discrete soft tissue mass or fluid collection is noted however CT  is limited evaluation of the soft tissues. Findings presumed to  represent cellulitis.  2. No air is noted within the soft tissues.  3. No displaced fractures. Osteoarthrosis at the left knee  patellofemoral joint compartment as well as significant osteoarthrosis  at the left ankle joint. Screw fragment in the region of the medial  malleolus as well as a screw is seen within the distal fibula.    RUT VITAL MD   US Extremity Non Vascular Left    Narrative    Exam: US EXTREMITY NON VASCULAR LEFT, 6/6/2021 1:04 PM    Indication: Left leg cellulitis, swelling w buldge L upper lateral  calf. ? r/o abscess or drainable collection or others.    Comparison: None    Findings:   There is subcutaneous edema and cobblestoning in the subcutaneous  tissues in the left lateral proximal and mid calf. No discrete fluid  collection. Echogenic appearance of the fat.      Impression    Impression: Severe left leg subcutaneous edema consistent with  clinical history of cellulitis. No discrete drainable fluid collection  or abscess.    I have personally reviewed the examination and initial interpretation  and I agree with the findings.    KATHERINE JAMIL MD   XR Ankle Left G/E 3 Views    Narrative    EXAM: XR ANKLE LEFT G/E 3 VIEWS, XR TIBIA and FIBULA LT 2 VW  LOCATION: [facility]  DATE/TIME: 6/6/2021 4:07 PM    INDICATION: Left inguinal leg pain and swelling, worsening cellulitis  COMPARISON: CT 6/6/2021      Impression    IMPRESSION:   Ankle: Soft tissue swelling with ankle. No evidence osteomyelitis. Moderate to advanced degenerative arthritis of the tibiotalar joint. Screw within the distal fibula and screw. Medial malleolus. No acute fracture.    Lower leg: No acute fracture. No evidence osteomyelitis. Increased density in the subcutaneous fat may be related to  edema-cellulitis. Degenerative arthritis knee. Small knee joint effusion.   XR Tibia & Fibula Left 2 Views    Narrative    EXAM: XR ANKLE LEFT G/E 3 VIEWS, XR TIBIA and FIBULA LT 2 VW  LOCATION: [facility]  DATE/TIME: 6/6/2021 4:07 PM    INDICATION: Left inguinal leg pain and swelling, worsening cellulitis  COMPARISON: CT 6/6/2021      Impression    IMPRESSION:   Ankle: Soft tissue swelling with ankle. No evidence osteomyelitis. Moderate to advanced degenerative arthritis of the tibiotalar joint. Screw within the distal fibula and screw. Medial malleolus. No acute fracture.    Lower leg: No acute fracture. No evidence osteomyelitis. Increased density in the subcutaneous fat may be related to edema-cellulitis. Degenerative arthritis knee. Small knee joint effusion.   MR Tibia Fibula Lower Leg Left wo & w Contr    Narrative    MR left tibia/fibula without and with IV contrast 6/7/2021 4:25 PM    Techniques: Multiplanar multisequence imaging of the left tibia/fibula  was obtained without and with administration of intravenous contrast  using routing protocol. Contrast: 12 mL Gadavist IV    History: Soft tissue infection suspected, lower leg, xray done     Comparison: CT 6/6/2021    Findings:    Similar to recent CT, there is subcutaneous edema throughout the left  leg extending to the investing layer of fascia circumferentially. No  substantial edema extending into deep intermuscular fascial layers.  Possible area of fascial dehiscence over the peroneal muscle belly on  series 12 image 43. No subcutaneous rim-enhancing fluid collection or  mass.    Osseous structures    Osseous structures: No fracture, stress reaction, avascular necrosis,  or focal osseous lesion is seen.    Muscles  Muscles: Mild diffuse fatty infiltration. No substantial atrophy.    Joint/Periarticular soft tissue    Internal derangement of joint assessment are limited owing to chosen  field of view. Patellofemoral osteoarthrosis. Ankle  hardware.    Other Findings:  None.      Impression    Impression:    1. Subcutaneous edema throughout the left leg. No subcutaneous  enhancing mass or rim-enhancing fluid collection. No extension of  edema into deep intermuscular fascial layers however necrotizing  fasciitis remains primarily a clinical diagnosis.    2. Small fascial hernia overlying the peroneal muscle belly at the  level of the mid to distal tibial shaft centered approximately 12 cm  proximal to the tibiotalar joint space.    DIDIER SHANNON MD (Joe)

## 2021-06-09 ENCOUNTER — APPOINTMENT (OUTPATIENT)
Dept: ULTRASOUND IMAGING | Facility: CLINIC | Age: 61
DRG: 603 | End: 2021-06-09
Attending: INTERNAL MEDICINE
Payer: COMMERCIAL

## 2021-06-09 VITALS
RESPIRATION RATE: 16 BRPM | OXYGEN SATURATION: 96 % | DIASTOLIC BLOOD PRESSURE: 90 MMHG | SYSTOLIC BLOOD PRESSURE: 151 MMHG | HEART RATE: 71 BPM | BODY MASS INDEX: 50.36 KG/M2 | WEIGHT: 293 LBS | TEMPERATURE: 97.6 F

## 2021-06-09 LAB
CREAT SERPL-MCNC: 0.81 MG/DL (ref 0.52–1.04)
CREAT SERPL-MCNC: 0.92 MG/DL (ref 0.52–1.04)
GFR SERPL CREATININE-BSD FRML MDRD: 67 ML/MIN/{1.73_M2}
GFR SERPL CREATININE-BSD FRML MDRD: 78 ML/MIN/{1.73_M2}
GLUCOSE BLDC GLUCOMTR-MCNC: 137 MG/DL (ref 70–99)
GLUCOSE BLDC GLUCOMTR-MCNC: 176 MG/DL (ref 70–99)
GLUCOSE BLDC GLUCOMTR-MCNC: 86 MG/DL (ref 70–99)
VANCOMYCIN SERPL-MCNC: 21.7 MG/L

## 2021-06-09 PROCEDURE — 82565 ASSAY OF CREATININE: CPT | Performed by: PEDIATRICS

## 2021-06-09 PROCEDURE — 999N001017 HC STATISTIC GLUCOSE BY METER IP

## 2021-06-09 PROCEDURE — 999N000147 HC STATISTIC PT IP EVAL DEFER: Performed by: PHYSICAL THERAPIST

## 2021-06-09 PROCEDURE — 258N000003 HC RX IP 258 OP 636: Performed by: PEDIATRICS

## 2021-06-09 PROCEDURE — 36415 COLL VENOUS BLD VENIPUNCTURE: CPT | Performed by: PEDIATRICS

## 2021-06-09 PROCEDURE — 93971 EXTREMITY STUDY: CPT | Mod: LT

## 2021-06-09 PROCEDURE — 99239 HOSP IP/OBS DSCHRG MGMT >30: CPT | Performed by: INTERNAL MEDICINE

## 2021-06-09 PROCEDURE — 250N000011 HC RX IP 250 OP 636: Performed by: INTERNAL MEDICINE

## 2021-06-09 PROCEDURE — 250N000013 HC RX MED GY IP 250 OP 250 PS 637: Performed by: PEDIATRICS

## 2021-06-09 PROCEDURE — 80202 ASSAY OF VANCOMYCIN: CPT | Performed by: PEDIATRICS

## 2021-06-09 PROCEDURE — 250N000011 HC RX IP 250 OP 636: Performed by: PEDIATRICS

## 2021-06-09 PROCEDURE — 93971 EXTREMITY STUDY: CPT | Mod: 26 | Performed by: RADIOLOGY

## 2021-06-09 PROCEDURE — 250N000013 HC RX MED GY IP 250 OP 250 PS 637: Performed by: INTERNAL MEDICINE

## 2021-06-09 RX ORDER — FUROSEMIDE 20 MG
60 TABLET ORAL DAILY
Qty: 90 TABLET | Refills: 0 | Status: SHIPPED | OUTPATIENT
Start: 2021-06-09 | End: 2022-02-09

## 2021-06-09 RX ORDER — OXYCODONE HYDROCHLORIDE 5 MG/1
5 TABLET ORAL EVERY 6 HOURS PRN
Qty: 20 TABLET | Refills: 0 | Status: CANCELLED | OUTPATIENT
Start: 2021-06-09

## 2021-06-09 RX ORDER — POLYETHYLENE GLYCOL 3350 17 G/17G
17 POWDER, FOR SOLUTION ORAL DAILY
Qty: 510 G | Refills: 0 | Status: SHIPPED | OUTPATIENT
Start: 2021-06-09 | End: 2021-06-09

## 2021-06-09 RX ORDER — DOXYCYCLINE 100 MG/1
100 CAPSULE ORAL EVERY 12 HOURS
Qty: 28 CAPSULE | Refills: 0 | Status: SHIPPED | OUTPATIENT
Start: 2021-06-09 | End: 2021-06-09

## 2021-06-09 RX ORDER — FUROSEMIDE 20 MG
60 TABLET ORAL DAILY
Qty: 30 TABLET | Refills: 0 | Status: SHIPPED | OUTPATIENT
Start: 2021-06-09 | End: 2021-06-09

## 2021-06-09 RX ORDER — POLYETHYLENE GLYCOL 3350 17 G/17G
17 POWDER, FOR SOLUTION ORAL DAILY PRN
Qty: 510 G | Refills: 0 | Status: SHIPPED | OUTPATIENT
Start: 2021-06-09

## 2021-06-09 RX ORDER — DOXYCYCLINE 100 MG/1
100 CAPSULE ORAL EVERY 12 HOURS
Qty: 28 CAPSULE | Refills: 0 | Status: SHIPPED | OUTPATIENT
Start: 2021-06-09 | End: 2022-02-09

## 2021-06-09 RX ORDER — DOXYCYCLINE 100 MG/1
100 CAPSULE ORAL EVERY 12 HOURS SCHEDULED
Status: DISCONTINUED | OUTPATIENT
Start: 2021-06-09 | End: 2021-06-09 | Stop reason: HOSPADM

## 2021-06-09 RX ADMIN — OXYCODONE HYDROCHLORIDE 10 MG: 5 TABLET ORAL at 14:31

## 2021-06-09 RX ADMIN — VANCOMYCIN HYDROCHLORIDE 2000 MG: 10 INJECTION, POWDER, LYOPHILIZED, FOR SOLUTION INTRAVENOUS at 01:30

## 2021-06-09 RX ADMIN — ENOXAPARIN SODIUM 40 MG: 40 INJECTION SUBCUTANEOUS at 08:43

## 2021-06-09 RX ADMIN — GABAPENTIN 1200 MG: 600 TABLET, FILM COATED ORAL at 14:21

## 2021-06-09 RX ADMIN — MULTIPLE VITAMINS W/ MINERALS TAB 1 TABLET: TAB at 11:53

## 2021-06-09 RX ADMIN — OXYCODONE HYDROCHLORIDE 10 MG: 5 TABLET ORAL at 08:52

## 2021-06-09 RX ADMIN — Medication 2 G: at 00:19

## 2021-06-09 RX ADMIN — ATORVASTATIN CALCIUM 20 MG: 20 TABLET, FILM COATED ORAL at 08:43

## 2021-06-09 RX ADMIN — AMOXICILLIN AND CLAVULANATE POTASSIUM 1 TABLET: 875; 125 TABLET, FILM COATED ORAL at 14:21

## 2021-06-09 RX ADMIN — Medication 1000 UNITS: at 08:41

## 2021-06-09 RX ADMIN — DOXYCYCLINE HYCLATE 100 MG: 100 CAPSULE ORAL at 08:45

## 2021-06-09 RX ADMIN — OXYCODONE HYDROCHLORIDE 10 MG: 5 TABLET ORAL at 05:23

## 2021-06-09 RX ADMIN — AMOXICILLIN AND CLAVULANATE POTASSIUM 1 TABLET: 875; 125 TABLET, FILM COATED ORAL at 08:45

## 2021-06-09 RX ADMIN — FLUTICASONE FUROATE AND VILANTEROL TRIFENATATE 1 PUFF: 200; 25 POWDER RESPIRATORY (INHALATION) at 09:02

## 2021-06-09 RX ADMIN — OMEPRAZOLE 20 MG: 20 CAPSULE, DELAYED RELEASE ORAL at 08:43

## 2021-06-09 RX ADMIN — GABAPENTIN 1200 MG: 600 TABLET, FILM COATED ORAL at 08:43

## 2021-06-09 RX ADMIN — OXYCODONE HYDROCHLORIDE 10 MG: 5 TABLET ORAL at 11:55

## 2021-06-09 RX ADMIN — TRAZODONE HYDROCHLORIDE 50 MG: 50 TABLET ORAL at 00:19

## 2021-06-09 RX ADMIN — AMPICILLIN SODIUM AND SULBACTAM SODIUM 3 G: 2; 1 INJECTION, POWDER, FOR SOLUTION INTRAMUSCULAR; INTRAVENOUS at 05:23

## 2021-06-09 RX ADMIN — OXYCODONE HYDROCHLORIDE 10 MG: 5 TABLET ORAL at 01:29

## 2021-06-09 RX ADMIN — POTASSIUM CHLORIDE 10 MEQ: 750 CAPSULE, EXTENDED RELEASE ORAL at 08:42

## 2021-06-09 RX ADMIN — DULOXETINE HYDROCHLORIDE 60 MG: 60 CAPSULE, DELAYED RELEASE ORAL at 08:42

## 2021-06-09 RX ADMIN — AMPICILLIN SODIUM AND SULBACTAM SODIUM 3 G: 2; 1 INJECTION, POWDER, FOR SOLUTION INTRAMUSCULAR; INTRAVENOUS at 00:20

## 2021-06-09 RX ADMIN — METHADONE HYDROCHLORIDE 80 MG: 10 CONCENTRATE ORAL at 08:52

## 2021-06-09 RX ADMIN — LEVOTHYROXINE SODIUM 200 MCG: 200 TABLET ORAL at 08:43

## 2021-06-09 RX ADMIN — CYANOCOBALAMIN TAB 1000 MCG 1000 MCG: 1000 TAB at 08:41

## 2021-06-09 NOTE — PLAN OF CARE
Patient A&O x4 and able to make needs known using call light. VSS and lung sounds clear and equal bilaterally. Bowel sounds active and passing gas. Denies chest pain, lightheadedness, dizziness, and SOB. IV discontinued. Drinking well and tolerating regular diet. Voiding spontaneously without difficulties. CMS intact with baseline numbness in bilateral feet. Pain in left LE and taking PRN Oxycodone with relief. Up independently. Patient discharged home at 1430. Discharge medications filled via [facility] and patient will  on the way home. All belongings sent with patient. All discharge instructions reviewed with patient with all questions answered.

## 2021-06-09 NOTE — PLAN OF CARE
VS: /86   Pulse 84   Temp 96.3  F (35.7  C) (Oral)   Resp 16   Wt 133.1 kg (293 lb 6.4 oz)   LMP 2013   SpO2 96%   Breastfeeding No   BMI 50.36 kg/m    Pt denies chest pain   O2: >90% on room air. Pt denies SOB   Output: Voids spontaneously without difficulty.    Last BM: 21 per pt report   Activity: Independent in room   Up for meals? N/A   Skin: LLE cellulitis - Lump on shin  BLE valerie in color   Pain: Controlled with PRN Tylenol, Ibuprofen, and Oxycodone. Scheduled Voltaren gel.    CMS: Pt reports baseline neuropathy in BLE.    Dressing: None   Diet: Regular   LDA: PIV SL between antibiotics.    Equipment: IV pole/pump, pt personal belongings.    Plan: Possible discharge 21 per Dr. Pope note   Additional Info: 0200 B

## 2021-06-09 NOTE — PHARMACY-VANCOMYCIN DOSING SERVICE
Pharmacy Vancomycin Note  Date of Service 2021  Patient's  1960   61 year old, female    Indication: Skin and Soft Tissue Infection  Day of Therapy: started on 6/3/21  Current vancomycin regimen:  2000 mg IV q24h  Current vancomycin monitoring method: AUC  Current vancomycin therapeutic monitoring goal: 400-600 mg*h/L    Current estimated CrCl = Estimated Creatinine Clearance: 100.4 mL/min (based on SCr of 0.81 mg/dL).    Creatinine for last 3 days  2021:  5:56 AM Creatinine 0.82 mg/dL  2021:  5:49 AM Creatinine 0.92 mg/dL;  7:56 AM Creatinine 0.81 mg/dL    Recent Vancomycin Levels (past 3 days)  2021:  9:34 PM Vancomycin Level 8.4 mg/L  2021:  7:56 AM Vancomycin Level 21.7 mg/L    Vancomycin IV Administrations (past 72 hours)                   vancomycin (VANCOCIN) 2,000 mg in sodium chloride 0.9 % 500 mL intermittent infusion (mg) 2,000 mg New Bag 21 0130     2,000 mg New Bag 21 0044     2,000 mg New Bag 21 2359                Nephrotoxins and other renal medications (From now, onward)    Start     Dose/Rate Route Frequency Ordered Stop    21 0800  amoxicillin-clavulanate (AUGMENTIN) 875-125 MG per tablet 1 tablet      1 tablet Oral 3 TIMES DAILY 21 0743      21 0000  amoxicillin-clavulanate (AUGMENTIN) 875-125 MG tablet      1 tablet Oral 3 TIMES DAILY 21 1223 21 2359    21 0000  furosemide (LASIX) 20 MG tablet      60 mg Oral DAILY 21 1223      21 1030  furosemide (LASIX) tablet 60 mg      60 mg Oral HOLD 21 1009      21 0118  ibuprofen (ADVIL/MOTRIN) tablet 600 mg      600 mg Oral EVERY 6 HOURS PRN 21 0119               Contrast Orders - past 72 hours (72h ago, onward)    Start     Dose/Rate Route Frequency Ordered Stop    21 1530  gadobutrol (GADAVIST) injection 12.93 mL      0.1 mL/kg × 129.3 kg Intravenous ONCE 21 1503 21 1504          Interpretation of levels and current  regimen:  Vancomycin level is reflective of AUC less than 400    Has serum creatinine changed greater than 50% in last 72 hours: No    Urine output:  unable to determine    Renal Function: Stable    Loading dose: N/A  Regimen: 2000 mg every 24 hours for 4 doses.  Start time: 01:30 on 06/10/2021  Exposure target: AUC24 (range)400-600 mg/L.hr   AUC24,ss: 377 mg/L.hr  PAUC*: 15 %  Ctrough,ss: 7.2 mg/L  Pconc*: 0 %  Tox.: 4 %    Loading dose: N/A  Regimen: 1250 mg every 12 hours for 8 doses.  Start time: 01:30 on 06/10/2021  Exposure target: AUC24 (range)400-600 mg/L.hr   AUC24,ss: 471 mg/L.hr  PAUC*: 100 %  Ctrough,ss: 13.5 mg/L  Pconc*: 0 %  Tox.: 9 %        Plan:  1. Vancomycin was stopped on 6/9 before the next dose. If it is restarted, please consider restarting vancomycin 1250 mg IV q12h    Diandra Lauren [facility]

## 2021-06-09 NOTE — PLAN OF CARE
Discharge Planner PT   Patient plan for discharge: home  Current status: PT orders received PT evaluation and treatment: deconditioning.  Pt declined need for PT evaluation, reported getting up and mobilizing around the room independently.  Pt does not have any concerns about discharging home this PM.  PT evaluation cancelled and orders completed.   Barriers to return to prior living situation: Pt declined need for PT evaluation and discharging home this PM.   Recommendations for discharge: Per medical team, pt declined need for PT evaluation has no concerns about mobilizing at home.    Rationale for recommendations: PT evaluation cancelled and PT order completed.  Pt declined a PT need and is discharging home this PM.         Entered by: Sujatha Coley 06/09/2021 2:15 PM

## 2021-06-09 NOTE — DISCHARGE SUMMARY
[facility]  Hospitalist Discharge Summary      Date of Admission:  6/3/2021  Date of Discharge:  6/9/2021  Discharging Provider: Jose Pope MD      Discharge Diagnoses   B/l leg cellulitis    Follow-ups Needed After Discharge   Follow-up Appointments     Adult [facility] Follow-up and recommended labs and tests      Follow up with primary care provider, Price Naranjo, within 7 days to   evaluate medication change and for hospital follow- up.  The following   labs/tests are recommended: cbc, cmp.      Appointments on [place] and/or [facility] (with [facility] or [facility]   provider or service). Call [phone] if you haven't heard regarding   these appointments within 7 days of discharge.             Unresulted Labs Ordered in the Past 30 Days of this Admission     Date and Time Order Name Status Description    6/3/2021 1322 HPV HIGH RISK TYPES DNA CERVICAL In process       These results will be followed up by PMD    Discharge Disposition   Discharged to home  Condition at discharge: Stable      Hospital Course     61 female with past medical history of diabetes, diabetic neuropathy, hypertension, hyperlipidemia, fibromyalgia, GERD, depression, chronic lymphedema history of polysubstance abuse (stable on methadone) and morbid obesity who was admitted on 6/3/2021 with worsening left lower extremity cellulitis.  Patient was recently admitted as an observation stay from 5/27-5/28 for bilateral lower extremity swelling but left lower than right pain, redness, tenderness.  She received 1 day of Ancef and was transitioned to p.o. Augmentin at discharge to group home with plan for 7-day course.  Patient reported that she did not feel like her symptoms are completely improved and had persistent discomfort after going back to her group home.  She returned to ER on 6/3 after group home staff noted that her redness and swelling has extended beyond marked regional margins despite her  "being on Augmentin.  On arrival to ER she was afebrile hemodynamically stable labs were significant for CRP of 12 ESR 83, WBC count 11 point.  Enteric panel was negative, Covid was negative.  She received ceftriaxone in 6/3 and was subsequently switched to vancomycin.  On 6/10 Zosyn was added for persistent worsening left lower extremity cellulitis.  A tender bulge was noted on left lateral calf, underwent multiple imaging studies - 6/6 CT tib/fib w/ contrast showed diffuse subcutaneous soft tissue edema within the LLE, small amount of fluid in the L knee joint, no discrete soft tissue mass or fluid collection.   - 6/6 LLE US with subcutaneous edema and cobblestoning in the subcutaneous tissues in the L lateral proximal and mid calf with no discrete fluid collection.   - 6/6 L ankle XR with soft tissue swelling, no evidence of osteomyelitis, moderate to advanced degenerative arthritis of the tibiotalar joint and screw within the distal fibula.   - 6/6 L tib/fib XR with no acute fracture, no evidence of osteomyelitis, and increased density in the subcutaneous fat may be related to edema-cellulitis     On 6/7 underwent MRI which showed \"Subcutaneous edema throughout the left leg. No subcutaneous enhancing mass or rim-enhancing fluid collection. No extension of  edema into deep intermuscular fascial layers\". Also evaluated by Ortho who felt that Left calf bulge on clinical exam consistent with herniation of peroneal muscle belly through fascial wrent     Lower extremity nonpurulent cellulitis, left greater than right: Appears to be improving.  Multiple imaging studies as mentioned above did not show deep extension or osteomyelitis on CT/MRI  Patient has history of chronic lymphedema, venous stasis  MRI showed herniation of the peroneal muscle belly through fascial rent presenting his left shin/calf mass.  Appreciate orthopedic surgery and infectious disease consult.  Weightbearing as tolerated.   Continued vancomycin " and Unasyn inpatient,  At the time of discharge can be transition to Augmentin 875/125 mg p.o. 3 times daily and doxycycline 100 mg twice daily for 14-day course.  Elevate leg.   ace weraps  discontinue amlodipine  On lasix 60 mg daily on discharge     Acute kidney injury,   Mild possible due to dehydration: Resolved     Bilateral lower extremity chronic edema  Obesity  Discontinued amlodipine during this admission.  Blood pressure stable off antihypertensives. Restarted lasix 60 mg daily.      Hypertension: discontinue amlodipine, reduce lasix to 60 mg dailyt on discharge     Diabetes mellitus  Home medication include Metformin and Levemir 20 units.  Last HbA1c 5/20/2021 6.5.  Continue insulin sliding scale.  Had an episode of hypoglycemia last admission decreased Levemir to 15 units.     History of opioid abuse: Continue prior to admission methadone. No oxycodone at the time of discharge     Hypothyroidism: Continue prior to admission levothyroxine  GERD continue prior admission omeprazole  Depression continue prior to admission duloxetine and hydroxyzine  Insomnia continue admission trazodone.  Chronic neuropathic pain due to diabetes: Continue admission gabapentin and duloxetine.        Hep C: not on treatment, followed at [facility]  -per care everywhere HCV RNA Quant 1/7/2021 was 1,440,950 and on 12/10/2019 was 838,044 with  HEPATITIS C, RNA, QUANT 1/7/2021 was 6.2 and on 12/10/2019  was 5.9             Consultations This Hospital Stay   PHARMACY TO DOSE VANCO  PHARMACY TO DOSE VANCO  CARE MANAGEMENT / SOCIAL WORK IP CONSULT  ORTHOPAEDIC SURGERY ADULT/PEDS IP CONSULT  INFECTIOUS DISEASE [facility] ADULT IP CONSULT  PHYSICAL THERAPY ADULT IP CONSULT    Code Status   Full Code    Time Spent on this Encounter   I, Jose Pope MD, personally saw the patient today and spent greater than 30 minutes discharging this patient.       Jose Pope MD  [facility] MED SURG ORTHOPEDIC  [address] [place] [address]  [id number]  Phone: [phone]  Fax: [phone]  ______________________________________________________________________    Physical Exam   Vital Signs: Temp: 97.6  F (36.4  C) Temp src: Oral BP: (!) 151/90 Pulse: 71   Resp: 16 SpO2: 96 % O2 Device: None (Room air)    Weight: 293 lbs 6.4 oz  Constitutional:  No distress noted, Alert, interactive.   Respiratory: Normal work of breathing on room air  Cardiovascular: RRR s1s2   GI: Soft, ND, NT.   Skin/Integumen:  L LE cellulitis below L Knee w diffuse erythema, TTP edema - w very tender prominent swelling L lateral proximal calf. ttp w ?mild crepitus: better, left calf mass/tense  B/L venous stasis changes.   Neuro: A0 x 4.   Mood: stable.        Primary Care Physician   Price Naranjo    Discharge Orders      Reason for your hospital stay    Lower extremity cellulitis     Adult [facility] Follow-up and recommended labs and tests    Follow up with primary care provider, Price Naranjo, within 7 days to evaluate medication change and for hospital follow- up.  The following labs/tests are recommended: cbc, cmp.      Appointments on [place] and/or [facility] (with [facility] or [facility] provider or service). Call [phone] if you haven't heard regarding these appointments within 7 days of discharge.     Activity    Your activity upon discharge: activity as tolerated     Diet    Follow this diet upon discharge: Orders Placed This Encounter      Regular Diet Adult       Significant Results and Procedures   Most Recent 3 CBC's:  Recent Labs   Lab Test 06/07/21  0556 06/06/21  0659 06/03/21  1956   WBC 8.9 11.4* 11.0   HGB 11.8 11.9 13.5   MCV 90 89 92    272 313     Most Recent 3 BMP's:  Recent Labs   Lab Test 06/09/21  0756 06/09/21  0549 06/07/21  0556 06/06/21  0659 06/05/21  0655   NA  --   --  135 135 133   POTASSIUM  --   --  3.9 4.0 4.1   CHLORIDE  --   --  102 101 100   CO2  --   --  27 28 28   BUN  --   --  12 13 13   CR 0.81 0.92 0.82 0.98 1.06*    ANIONGAP  --   --  6 6 6   RODOLFO  --   --  8.5 8.7 8.4*   GLC  --   --  100* 94 103*     Most Recent 2 LFT's:  Recent Labs   Lab Test 06/03/21 1956 05/28/21  0732   AST 34 35   ALT 52* 55*   ALKPHOS 119 106   BILITOTAL 0.4 0.4     Most Recent 3 INR's:  Recent Labs   Lab Test 01/28/20  0025   INR 1.03   ,   Results for orders placed or performed during the hospital encounter of 06/03/21   CT Tibia Fibula Lower Leg Left w Contrast    Narrative    Exam: CT of the left lower extremity dated 6/6/2021.    COMPARISON: None.    CLINICAL HISTORY: Soft tissue infection suspected.    TECHNIQUE: Axial CT images of the left lower extremity were obtained;  sagittal and coronal reconstructions were acquired; reviewed under  bone and soft tissue windows.    Total DLP: 621.    FINDINGS:    Diffuse subcutaneous soft tissue edema is seen within the left lower  extremity. Small amount of fluid in the left knee joint. No discrete  soft tissue mass or fluid collection, although CT is limited in  evaluating the soft tissues. Metallic artifact is seen at the ankle  joint.    No air is noted within the soft tissues.    Degenerative osteoarthrosis at the patellofemoral joint compartment as  well as marked osteoarthrosis at the ankle joint. Scattered cystic  changes at the ankle joint as well as within the navicular. Screw is  seen within the distal fibula and a screw fragment within the medial  malleolus. No displaced fracture is noted.      Impression    IMPRESSION:  1. Diffuse subcutaneous soft tissue edema in the left lower extremity.  No discrete soft tissue mass or fluid collection is noted however CT  is limited evaluation of the soft tissues. Findings presumed to  represent cellulitis.  2. No air is noted within the soft tissues.  3. No displaced fractures. Osteoarthrosis at the left knee  patellofemoral joint compartment as well as significant osteoarthrosis  at the left ankle joint. Screw fragment in the region of the  medial  malleolus as well as a screw is seen within the distal fibula.    RUT VITAL MD   US Extremity Non Vascular Left    Narrative    Exam: US EXTREMITY NON VASCULAR LEFT, 6/6/2021 1:04 PM    Indication: Left leg cellulitis, swelling w buldge L upper lateral  calf. ? r/o abscess or drainable collection or others.    Comparison: None    Findings:   There is subcutaneous edema and cobblestoning in the subcutaneous  tissues in the left lateral proximal and mid calf. No discrete fluid  collection. Echogenic appearance of the fat.      Impression    Impression: Severe left leg subcutaneous edema consistent with  clinical history of cellulitis. No discrete drainable fluid collection  or abscess.    I have personally reviewed the examination and initial interpretation  and I agree with the findings.    KATHERINE JAMIL MD   XR Ankle Left G/E 3 Views    Narrative    EXAM: XR ANKLE LEFT G/E 3 VIEWS, XR TIBIA and FIBULA LT 2 VW  LOCATION: [facility]  DATE/TIME: 6/6/2021 4:07 PM    INDICATION: Left inguinal leg pain and swelling, worsening cellulitis  COMPARISON: CT 6/6/2021      Impression    IMPRESSION:   Ankle: Soft tissue swelling with ankle. No evidence osteomyelitis. Moderate to advanced degenerative arthritis of the tibiotalar joint. Screw within the distal fibula and screw. Medial malleolus. No acute fracture.    Lower leg: No acute fracture. No evidence osteomyelitis. Increased density in the subcutaneous fat may be related to edema-cellulitis. Degenerative arthritis knee. Small knee joint effusion.   XR Tibia & Fibula Left 2 Views    Narrative    EXAM: XR ANKLE LEFT G/E 3 VIEWS, XR TIBIA and FIBULA LT 2 VW  LOCATION: [facility]  DATE/TIME: 6/6/2021 4:07 PM    INDICATION: Left inguinal leg pain and swelling, worsening cellulitis  COMPARISON: CT 6/6/2021      Impression    IMPRESSION:   Ankle: Soft tissue swelling with ankle. No evidence osteomyelitis. Moderate to advanced degenerative  arthritis of the tibiotalar joint. Screw within the distal fibula and screw. Medial malleolus. No acute fracture.    Lower leg: No acute fracture. No evidence osteomyelitis. Increased density in the subcutaneous fat may be related to edema-cellulitis. Degenerative arthritis knee. Small knee joint effusion.   MR Tibia Fibula Lower Leg Left wo & w Contr    Narrative    MR left tibia/fibula without and with IV contrast 6/7/2021 4:25 PM    Techniques: Multiplanar multisequence imaging of the left tibia/fibula  was obtained without and with administration of intravenous contrast  using routing protocol. Contrast: 12 mL Gadavist IV    History: Soft tissue infection suspected, lower leg, xray done     Comparison: CT 6/6/2021    Findings:    Similar to recent CT, there is subcutaneous edema throughout the left  leg extending to the investing layer of fascia circumferentially. No  substantial edema extending into deep intermuscular fascial layers.  Possible area of fascial dehiscence over the peroneal muscle belly on  series 12 image 43. No subcutaneous rim-enhancing fluid collection or  mass.    Osseous structures    Osseous structures: No fracture, stress reaction, avascular necrosis,  or focal osseous lesion is seen.    Muscles  Muscles: Mild diffuse fatty infiltration. No substantial atrophy.    Joint/Periarticular soft tissue    Internal derangement of joint assessment are limited owing to chosen  field of view. Patellofemoral osteoarthrosis. Ankle hardware.    Other Findings:  None.      Impression    Impression:    1. Subcutaneous edema throughout the left leg. No subcutaneous  enhancing mass or rim-enhancing fluid collection. No extension of  edema into deep intermuscular fascial layers however necrotizing  fasciitis remains primarily a clinical diagnosis.    2. Small fascial hernia overlying the peroneal muscle belly at the  level of the mid to distal tibial shaft centered approximately 12 cm  proximal to the  tibiotalar joint space.    DIDIER SHANNON MD (Joe)   US Lower Extremity Venous Duplex Left    Narrative    EXAMINATION: US LOWER EXTREMITY VENOUS DUPLEX LEFT 6/9/2021 11:23 AM      CLINICAL HISTORY: Rule out DVT    COMPARISON: 5/27/2021        PROCEDURE COMMENTS: Ultrasound was performed of the deep venous system  of the left lower extremity using grayscale, color, and spectral  Doppler.    FINDINGS:  The common femoral, greater saphenous origin, femoral, popliteal, and  deep calf veins are visualized and are patent. Venous waveforms are  normal. There is normal response to compression. Diffuse subcutaneous  edema in the proximal left calf. The contralateral right common  femoral vein demonstrates normal color flow, compressibility, and  phasicity.      Impression    IMPRESSION:  No deep vein thrombosis in the left lower extremity.    I have personally reviewed the examination and initial interpretation  and I agree with the findings.    KEITH JANG MD       Discharge Medications   Current Discharge Medication List      START taking these medications    Details   doxycycline hyclate (VIBRAMYCIN) 100 MG capsule Take 1 capsule (100 mg) by mouth every 12 hours for 14 days  Qty: 28 capsule, Refills: 0    Associated Diagnoses: Cellulitis of lower extremity, unspecified laterality      polyethylene glycol (MIRALAX) 17 GM/Dose powder Take 17 g by mouth daily  Qty: 510 g, Refills: 0    Associated Diagnoses: Constipation, unspecified constipation type         CONTINUE these medications which have CHANGED    Details   amoxicillin-clavulanate (AUGMENTIN) 875-125 MG tablet Take 1 tablet by mouth 3 times daily for 14 days  Qty: 42 tablet, Refills: 0    Associated Diagnoses: Cellulitis of lower extremity, unspecified laterality      furosemide (LASIX) 20 MG tablet Take 3 tablets (60 mg) by mouth daily  Qty: 30 tablet, Refills: 0    Associated Diagnoses: Lymphedema      insulin detemir (LEVEMIR PEN) 100 UNIT/ML pen  Inject 15 Units Subcutaneous every morning  Qty: 3 mL, Refills: 1    Associated Diagnoses: Type 2 diabetes mellitus without complication, without long-term current use of insulin (H)         CONTINUE these medications which have NOT CHANGED    Details   !! ACCU-CHEK RAFAELA PLUS test strip       acetaminophen (TYLENOL) 500 MG tablet Take 500 mg by mouth 3 times daily       Alcohol Swabs (ALCOHOL WIPES) 70 % PADS USE WITH INSULIN INJECTION ONCE DAILY. **100 DAYS SUPPLY**      atorvastatin (LIPITOR) 20 MG tablet Take 20 mg by mouth daily      !! blood glucose (ACCU-CHEK RAFAELA PLUS) test strip Use to check blood sugar 3x daily or as directed.      !! Blood Glucose Calibration (ACCU-CHEK ACTIVE GLUCOSE CONT VI) 1 each by Other route      !! blood glucose calibration (ACCU-CHEK RFAAELA) solution       calcium carbonate (OS-RODOLFO) 1500 (600 Ca) MG tablet Take 600 mg by mouth daily      cholecalciferol 25 MCG (1000 UT) TABS Take 1,000 Units by mouth daily       diclofenac (VOLTAREN) 1 % topical gel Apply 4 g topically 4 times daily  Qty: 350 g, Refills: 1    Associated Diagnoses: Patellofemoral arthritis; Impingement syndrome of right shoulder      DULoxetine (CYMBALTA) 60 MG capsule Take 60 mg by mouth daily      fluticasone-salmeterol (ADVAIR) 250-50 MCG/DOSE inhaler Inhale 1 puff into the lungs 2 times daily      gabapentin (NEURONTIN) 400 MG capsule Take 1,200 mg by mouth 3 times daily      hydrOXYzine (ATARAX) 50 MG tablet Take 100 mg by mouth every 6 hours as needed for anxiety      levalbuterol (XOPENEX HFA) 45 MCG/ACT inhaler Inhale 2 puffs into the lungs every 4 hours as needed for shortness of breath / dyspnea or wheezing       levothyroxine (SYNTHROID/LEVOTHROID) 200 MCG tablet Take 200 mcg by mouth daily       melatonin 5 MG tablet Take 5 mg by mouth At Bedtime      metFORMIN (GLUCOPHAGE) 1000 MG tablet Take 1,000 mg by mouth 2 times daily (with meals)      methadone HCl 10 MG/5ML SOLN Take 80 mg by mouth daily  10mg/mL strength      multivitamin w/minerals (MULTI-VITAMIN) tablet Take 1 tablet by mouth daily      nicotine polacrilex (NICORETTE) 4 MG gum Place 4 mg inside cheek every hour as needed for smoking cessation       omeprazole (PRILOSEC) 20 MG DR capsule Take 20 mg by mouth daily       potassium chloride ER (KLOR-CON) 8 MEQ CR tablet Take 8 mEq by mouth 2 times daily      traZODone (DESYREL) 50 MG tablet Take 50 mg by mouth At Bedtime      vitamin B-12 (CYANOCOBALAMIN) 1000 MCG tablet Take 1,000 mcg by mouth daily      NARCAN 4 MG/0.1ML nasal spray CALL 911. SPRAY CONTENTS OF ONE SPRAYER (0.1ML) INTO ONE NOSTRIL. REPEAT IN 2-3 MINS IF SYMPTOMS OF OPIOID PERSIST, ALTERNATE NOSTRILS  Refills: 0       !! - Potential duplicate medications found. Please discuss with provider.      STOP taking these medications       albuterol (PROAIR HFA/PROVENTIL HFA/VENTOLIN HFA) 108 (90 Base) MCG/ACT inhaler Comments:   Reason for Stopping:         amLODIPine (NORVASC) 5 MG tablet Comments:   Reason for Stopping:         gabapentin (NEURONTIN) 600 MG tablet Comments:   Reason for Stopping:             Allergies   Allergies   Allergen Reactions     Promethazine Other (See Comments) and Anxiety     Noted in 8/30/08 ER     Codeine Phosphate GI Disturbance     Lamotrigine Other (See Comments)     hyponatremia     Oxcarbazepine Unknown and Other (See Comments)     Low sodium  LegacyRecord#[id number]       Codeine Other (See Comments) and Rash     GI bleeding  LegacyRecord#[id number]

## 2021-06-09 NOTE — PLAN OF CARE
VS: /77   Pulse 73   Temp 96.8  F (36  C) (Oral)   Resp 16   Wt 133.1 kg (293 lb 6.4 oz)   LMP 11/01/2013   SpO2 95%   Breastfeeding No   BMI 50.36 kg/m       O2: Room air >90%. Per report, pt was sedated on AM, O2 levels dropped when pt was sleeping. Pt o2 levels have been >92% today   Output: Voiding spontaneously    Last BM: 6/7   Activity: Up ad jose luis   Up for meals? yes   Skin: Cellulitis LLE, however BLE are valerie. Lump on LLE (shin)   Pain: Managed by oral pain medications. Oxy q3, tylenol q6 and advil q6.    CMS: Baseline neuropathy in BLE   Dressing: none   Diet: Regular. BG checks 110 and 155    LDA: PIV is SL between antibiotics    Equipment: IV pole, personal belongings    Plan: tbd    Additional Info: Pt taking shower at end of shift.

## 2021-06-10 ENCOUNTER — PATIENT OUTREACH (OUTPATIENT)
Dept: CARE COORDINATION | Facility: CLINIC | Age: 61
End: 2021-06-10

## 2021-06-10 DIAGNOSIS — Z71.89 OTHER SPECIFIED COUNSELING: ICD-10-CM

## 2021-06-10 NOTE — PROGRESS NOTES
Clinic Care Coordination Contact  [facility]/Voicemail    Clinical Data: Care Coordinator Outreach- Transition Call Management    Patient was discharged back to group home.    Dilia Parrish MA  [facility]

## 2021-06-11 LAB
FINAL DIAGNOSIS: NORMAL
HPV HR 12 DNA CVX QL NAA+PROBE: NEGATIVE
HPV16 DNA SPEC QL NAA+PROBE: NEGATIVE
HPV18 DNA SPEC QL NAA+PROBE: NEGATIVE
SPECIMEN DESCRIPTION: NORMAL
SPECIMEN SOURCE CVX/VAG CYTO: NORMAL

## 2021-06-21 DIAGNOSIS — B19.20 HEPATITIS C VIRUS INFECTION WITHOUT HEPATIC COMA, UNSPECIFIED CHRONICITY: Primary | ICD-10-CM

## 2021-06-23 ENCOUNTER — VIRTUAL VISIT (OUTPATIENT)
Dept: GASTROENTEROLOGY | Facility: CLINIC | Age: 61
End: 2021-06-23
Attending: FAMILY MEDICINE
Payer: COMMERCIAL

## 2021-06-23 DIAGNOSIS — K76.0 HEPATIC STEATOSIS: ICD-10-CM

## 2021-06-23 DIAGNOSIS — B19.20 HEPATITIS C VIRUS INFECTION WITHOUT HEPATIC COMA, UNSPECIFIED CHRONICITY: Primary | ICD-10-CM

## 2021-06-23 PROCEDURE — 99204 OFFICE O/P NEW MOD 45 MIN: CPT | Mod: TEL | Performed by: PHYSICIAN ASSISTANT

## 2021-06-23 ASSESSMENT — PAIN SCALES - GENERAL: PAINLEVEL: NO PAIN (0)

## 2021-06-23 NOTE — Clinical Note
Labs and MR elastography at next convenience. Please let me know when scheduled, she'll need pre-imaging medication. Call her group home to schedule: [phone]  Thanks, Angel

## 2021-06-23 NOTE — PROGRESS NOTES
No is a 61 year old who is being evaluated via a billable telephone visit.      What phone number would you like to be contacted at? [phone]  How would you like to obtain your AVS? Mail a copy  Phone call duration:23 minutes

## 2021-06-23 NOTE — PROGRESS NOTES
Hepatology Clinic note  No Yusuf   Date of Birth 1960  Date of Service 6/23/2021    REASON FOR CONSULTATION: Hepatitis C  REFERRING PROVIDER: Price Naranjo MD          Assessment/plan:   No Yusuf is a 61 year old female with chronic hepatitis C, genotype 2b. Currently there are no biochemical or physical signs of cirrhosis. We discussed that is will be important to determine baseline fibrosis to assess need for on-going HCC screening. She has several risk factors for fatty liver disease and imaging findings of hepatic steatosis.      We discussed the natural course of Hepatitis C virus and the benefits of treating the disease. We discussed the treatment regimen and medication side effects.  Patient would be a good candidate for Hepatitis C treatment to prevent worsening fibrosis and to prevent extrahepatic manifestations of the disease.     - MR Elastography to evaluate degree of fibrosis and need for HCC screening in the future. Will needs 5 mg valium 30 min prior to procedure.   - Will plan to send prescription for Hepatitis C pending MR elastography and Hep B serologies   - Repeat HCV RNA at the end of treatment and 12-weeks after finishing treatment to determine SVR  - If patient achieves SVR and Fibrosis Stage 2 or less, she does not need regular follow-up in Hepatology Clinic.   - Follow-up in Hepatology Clinic as needed    Angel Orellana PA-C   [facility] Hepatology     -----------------------------------------------------       HPI:   No Yusuf is a 61 year old female  presenting for the evaluation of Hepatitis C.     Hepatitis C  Dx: 20 + years ago  Etiology: IVDU  Fibrosis/Liver biopsy: no  Treatment: naive     Patient states she was diagnosed about 20 years ago, history of IVDU. No previous treatment for Hep C.     She was recent hospitalized for bilateral leg cellulitis, mild KERRIE and chronic bilateral lower extremity edema (unknown etiology?).     Appetite is  good. No particular diet. Weight is up quite a bit over the last year, 50 lbs (some fluids and also lack of activity). Swelling in legs have improved. She is on 60 mg furosemide for chronic bilateral lower extremity edema.     Having a couple bowel movements a day, pretty loose currently. No blood or black in the stools.     Patient denies jaundice, abdominal distension or confusion.  Patient also denies melena, hematochezia or hematemesis. Patient denies  fevers, sweats or chills.    PMH: DM, diabetic neuropathy, HTN, hyperlipidemia, fibromyalgia, GERD, depression, morbid obesity, Chronic lymphedema, history of polysubstance abuse, maintained on methadone.     SMH: cholecystectomy, , left ankle surgery, left shoulder, history of ablation     Medications: See below     No smoking for the past month. No history of significant alcohol use. History of IV drug use, last used 2 years ago. She is maintained on methadone. She currently lives in a group home [facility] of [facility], almost a year in July. RN's name is comfort. No known family history of liver cancer.     Previous work-up:  HCV RNA - 1.44 million   HBV SAb: nonreactive - Not immune   HBV SAg nonreactive  HBV CAb:   HIV: nonreactive    Medical hx Surgical hx   Past Medical History:   Diagnosis Date     Arthritis      Bipolar affective (H)      Fibromyalgia      Hypertension     Past Surgical History:   Procedure Laterality Date     CHOLECYSTECTOMY       GYN SURGERY      c section     GYN SURGERY      oblation     ORTHOPEDIC SURGERY      left leg, left shoulder, back                 Medications:     Current Outpatient Medications   Medication     ACCU-CHEK RAFAELA PLUS test strip     acetaminophen (TYLENOL) 500 MG tablet     Alcohol Swabs (ALCOHOL WIPES) 70 % PADS     amoxicillin-clavulanate (AUGMENTIN) 875-125 MG tablet     atorvastatin (LIPITOR) 20 MG tablet     blood glucose (ACCU-CHEK RAFAELA PLUS) test strip     Blood Glucose Calibration  (ACCU-CHEK ACTIVE GLUCOSE CONT VI)     blood glucose calibration (ACCU-CHEK RAFAELA) solution     calcium carbonate (OS-RODOLFO) 1500 (600 Ca) MG tablet     cholecalciferol 25 MCG (1000 UT) TABS     diclofenac (VOLTAREN) 1 % topical gel     doxycycline hyclate (VIBRAMYCIN) 100 MG capsule     DULoxetine (CYMBALTA) 60 MG capsule     fluticasone-salmeterol (ADVAIR) 250-50 MCG/DOSE inhaler     furosemide (LASIX) 20 MG tablet     gabapentin (NEURONTIN) 400 MG capsule     hydrOXYzine (ATARAX) 50 MG tablet     insulin detemir (LEVEMIR PEN) 100 UNIT/ML pen     levalbuterol (XOPENEX HFA) 45 MCG/ACT inhaler     levothyroxine (SYNTHROID/LEVOTHROID) 200 MCG tablet     melatonin 5 MG tablet     metFORMIN (GLUCOPHAGE) 1000 MG tablet     methadone HCl 10 MG/5ML SOLN     multivitamin w/minerals (MULTI-VITAMIN) tablet     NARCAN 4 MG/0.1ML nasal spray     nicotine polacrilex (NICORETTE) 4 MG gum     omeprazole (PRILOSEC) 20 MG DR capsule     polyethylene glycol (MIRALAX) 17 GM/Dose powder     potassium chloride ER (KLOR-CON) 8 MEQ CR tablet     traZODone (DESYREL) 50 MG tablet     vitamin B-12 (CYANOCOBALAMIN) 1000 MCG tablet     No current facility-administered medications for this visit.             Allergies:     Allergies   Allergen Reactions     Promethazine Other (See Comments) and Anxiety     Noted in 8/30/08 ER     Codeine Phosphate GI Disturbance     Lamotrigine Other (See Comments)     hyponatremia     Oxcarbazepine Unknown and Other (See Comments)     Low sodium  LegacyRecord#[id number]       Codeine Other (See Comments) and Rash     GI bleeding  LegacyRecord#[id number]              Social History:     Social History     Socioeconomic History     Marital status: Single     Spouse name: Not on file     Number of children: Not on file     Years of education: Not on file     Highest education level: Not on file   Occupational History     Not on file   Social Needs     Financial resource strain: Not on file     Food insecurity     Worry: Not  on file     Inability: Not on file     Transportation needs     Medical: Not on file     Non-medical: Not on file   Tobacco Use     Smoking status: Former Smoker     Packs/day: 0.10     Smokeless tobacco: Never Used   Substance and Sexual Activity     Alcohol use: No     Drug use: No     Sexual activity: Not Currently   Lifestyle     Physical activity     Days per week: Not on file     Minutes per session: Not on file     Stress: Not on file   Relationships     Social connections     Talks on phone: Not on file     Gets together: Not on file     Attends Hoahaoism service: Not on file     Active member of club or organization: Not on file     Attends meetings of clubs or organizations: Not on file     Relationship status: Not on file     Intimate partner violence     Fear of current or ex partner: Not on file     Emotionally abused: Not on file     Physically abused: Not on file     Forced sexual activity: Not on file   Other Topics Concern     Not on file   Social History Narrative     Not on file            Family History:     Family History   Problem Relation Age of Onset     Heart Disease Mother      Diabetes Mother      Ovarian Cancer Mother      Heart Disease Father      Lung Cancer Father      Diabetes Sister      Ovarian Cancer Sister      Diabetes Brother               Physical Exam:     PSYCH: Alert and oriented times 3; coherent speech, normal   rate and volume, able to articulate logical thoughts, able   to abstract reason, no tangential thoughts, no hallucinations   or delusions  His affect is normal  RESP: No cough, no audible wheezing, able to talk in full sentences  Remainder of exam unable to be completed due to telephone visits         Data:   Reviewed in person and significant for:    Lab Results   Component Value Date     06/07/2021      Lab Results   Component Value Date    POTASSIUM 3.9 06/07/2021     Lab Results   Component Value Date    CHLORIDE 102 06/07/2021     Lab Results   Component  Value Date    CO2 27 06/07/2021     Lab Results   Component Value Date    BUN 12 06/07/2021     Lab Results   Component Value Date    CR 0.81 06/09/2021       Lab Results   Component Value Date    WBC 8.9 06/07/2021     Lab Results   Component Value Date    HGB 11.8 06/07/2021     Lab Results   Component Value Date    HCT 36.9 06/07/2021     Lab Results   Component Value Date    MCV 90 06/07/2021     Lab Results   Component Value Date     06/07/2021       Lab Results   Component Value Date    AST 34 06/03/2021     Lab Results   Component Value Date    ALT 52 06/03/2021     No results found for: BILICONJ   Lab Results   Component Value Date    BILITOTAL 0.4 06/03/2021       Lab Results   Component Value Date    ALBUMIN 3.4 06/03/2021     Lab Results   Component Value Date    PROTTOTAL 9.3 06/03/2021      Lab Results   Component Value Date    ALKPHOS 119 06/03/2021       Lab Results   Component Value Date    INR 1.03 01/28/2020         Imaging:        CT CHEST/ABDOMEN/PELVIS:   5/15/2020:   IMPRESSION:   1. Interval resolution of groundglass nodularity in the left lingula  consistent with resolved infectious etiology.     2. 8 mm nodule in the superior left lower lobe demonstrating  four-month stability. Consider 12 month follow-up.     3. Diffuse faint centrilobular nodules, favors respiratory  bronchiolitis in the setting of current smoking.     4. Severe hepatic steatosis.     5. Right adrenal nodule with microscopic fat, likely adrenal.

## 2021-06-28 LAB — INTERPRETATION ECG - MUSE: NORMAL

## 2021-07-01 ENCOUNTER — ANCILLARY PROCEDURE (OUTPATIENT)
Dept: MAMMOGRAPHY | Facility: CLINIC | Age: 61
End: 2021-07-01
Attending: FAMILY MEDICINE
Payer: COMMERCIAL

## 2021-07-01 DIAGNOSIS — B19.20 HEPATITIS C VIRUS INFECTION WITHOUT HEPATIC COMA, UNSPECIFIED CHRONICITY: ICD-10-CM

## 2021-07-01 DIAGNOSIS — Z12.31 VISIT FOR SCREENING MAMMOGRAM: ICD-10-CM

## 2021-07-01 LAB
ANION GAP SERPL CALCULATED.3IONS-SCNC: 4 MMOL/L (ref 3–14)
BUN SERPL-MCNC: 9 MG/DL (ref 7–30)
CALCIUM SERPL-MCNC: 8.8 MG/DL (ref 8.5–10.1)
CHLORIDE SERPL-SCNC: 104 MMOL/L (ref 94–109)
CO2 SERPL-SCNC: 30 MMOL/L (ref 20–32)
CREAT SERPL-MCNC: 0.76 MG/DL (ref 0.52–1.04)
ERYTHROCYTE [DISTWIDTH] IN BLOOD BY AUTOMATED COUNT: 14.6 % (ref 10–15)
GFR SERPL CREATININE-BSD FRML MDRD: 84 ML/MIN/{1.73_M2}
GLUCOSE SERPL-MCNC: 127 MG/DL (ref 70–99)
HBV CORE AB SERPL QL IA: NONREACTIVE
HCT VFR BLD AUTO: 39.4 % (ref 35–47)
HGB BLD-MCNC: 12.6 G/DL (ref 11.7–15.7)
INR PPP: 1.11 (ref 0.86–1.14)
MCH RBC QN AUTO: 28.7 PG (ref 26.5–33)
MCHC RBC AUTO-ENTMCNC: 32 G/DL (ref 31.5–36.5)
MCV RBC AUTO: 90 FL (ref 78–100)
PLATELET # BLD AUTO: 248 10E9/L (ref 150–450)
POTASSIUM SERPL-SCNC: 3.9 MMOL/L (ref 3.4–5.3)
RBC # BLD AUTO: 4.39 10E12/L (ref 3.8–5.2)
SODIUM SERPL-SCNC: 137 MMOL/L (ref 133–144)
WBC # BLD AUTO: 8.3 10E9/L (ref 4–11)

## 2021-07-01 PROCEDURE — 77067 SCR MAMMO BI INCL CAD: CPT | Mod: GC | Performed by: RADIOLOGY

## 2021-07-01 PROCEDURE — 80048 BASIC METABOLIC PNL TOTAL CA: CPT | Performed by: PATHOLOGY

## 2021-07-01 PROCEDURE — 77063 BREAST TOMOSYNTHESIS BI: CPT | Mod: GC | Performed by: RADIOLOGY

## 2021-07-01 PROCEDURE — 85610 PROTHROMBIN TIME: CPT | Performed by: PATHOLOGY

## 2021-07-01 PROCEDURE — 36415 COLL VENOUS BLD VENIPUNCTURE: CPT | Performed by: PATHOLOGY

## 2021-07-01 PROCEDURE — 85027 COMPLETE CBC AUTOMATED: CPT | Performed by: PATHOLOGY

## 2021-07-01 PROCEDURE — 87522 HEPATITIS C REVRS TRNSCRPJ: CPT | Mod: 90 | Performed by: PATHOLOGY

## 2021-07-01 PROCEDURE — 86704 HEP B CORE ANTIBODY TOTAL: CPT | Mod: 90 | Performed by: PATHOLOGY

## 2021-07-01 PROCEDURE — 99000 SPECIMEN HANDLING OFFICE-LAB: CPT | Performed by: PATHOLOGY

## 2021-07-02 LAB
HCV RNA SERPL NAA+PROBE-ACNC: ABNORMAL [IU]/ML
HCV RNA SERPL NAA+PROBE-LOG IU: 5.8 LOG IU/ML

## 2021-07-29 ENCOUNTER — ANCILLARY PROCEDURE (OUTPATIENT)
Dept: MRI IMAGING | Facility: CLINIC | Age: 61
End: 2021-07-29
Attending: PHYSICIAN ASSISTANT
Payer: COMMERCIAL

## 2021-07-29 DIAGNOSIS — B19.20 HEPATITIS C VIRUS INFECTION WITHOUT HEPATIC COMA, UNSPECIFIED CHRONICITY: ICD-10-CM

## 2021-07-29 PROCEDURE — 74181 MRI ABDOMEN W/O CONTRAST: CPT | Mod: 52 | Performed by: RADIOLOGY

## 2021-07-29 NOTE — LETTER
August 3, 2021       TO: No Yusuf  [address] [address]       Dear Ms. Yusuf,    We are writing to inform you of your test results.    We were not able to assess scarring in your liver on recent MRI. I would like to see you back in liver clinic in 6 months and we can discuss the need to evaluate scarring in your liver with an ultrasound.     Your MRI did show significant fatty liver. We will treat Hep C and it will also be important to have good glucose levels and weight to help your liver as well.     We have sent prescription for Hep C treatment. You should hear within the next 3-4 weeks from [facility] about approval and delivery of the medication.       It was a pleasure to see you at your recent visit. Please let me know if you have any questions or concerns.     Clinic Staff - [phone] option 3     Sincerely,     Angel Orellana PA-C   [address], [id number]  [place]  [address].

## 2021-07-31 NOTE — PROGRESS NOTES
"Our records indicate that you have not scheduled an appointment for a colonosocpy, as recommended by Price White. If you wish to schedule within [facility], we have several options to help you schedule your appointment:      Call [phone] option 2    Visit our website at [email or web address] and click \"I Want To\" (in upper right) and select Request an Appointment    Request an appointment via [employer] at [email or web address]     If you have chosen to schedule elsewhere or if you have already made an appointment, please disregard this letter.    If you have any questions or concerns regarding the information above, please contact the [facility] Gastroenterology Scheduling department, at [phone] option 2.      Sincerely,      [facility] Gastroenterology Scheduling     "

## 2021-08-03 DIAGNOSIS — B19.20 HEPATITIS C VIRUS INFECTION WITHOUT HEPATIC COMA, UNSPECIFIED CHRONICITY: Primary | ICD-10-CM

## 2021-08-04 ENCOUNTER — TELEPHONE (OUTPATIENT)
Dept: GASTROENTEROLOGY | Facility: CLINIC | Age: 61
End: 2021-08-04

## 2021-08-04 NOTE — TELEPHONE ENCOUNTER
Sent over attestation form to clinic to fill out and send back to me to get started on PA for the mavyret

## 2021-08-13 NOTE — TELEPHONE ENCOUNTER
PA Initiation    Medication: Mavyret 100-40mg - pa  pending  Insurance Company: [employer] - Phone [phone] Fax [phone]  Pharmacy Filling the Rx: [facility] - [place], [place] - [address]  Filling Pharmacy Phone: [phone]  Filling Pharmacy Fax: [phone]  Start Date: 8/4/2021

## 2021-08-30 NOTE — TELEPHONE ENCOUNTER
Prior Authorization Approval    Authorization Effective Date: 7/13/2021  Authorization Expiration Date: 10/8/2021  Medication: Mavyret 100-40mg - pa approved  Approved Dose/Quantity: UD  Reference #:     Insurance Company: [employer] - Phone [phone] Fax [phone]  Expected CoPay:       CoPay Card Available:      Foundation Assistance Needed:    Which Pharmacy is filling the prescription (Not needed for infusion/clinic administered): [facility] - [place], [place] - [address]  Pharmacy Notified: Yes  Patient Notified: Yes

## 2021-09-03 ENCOUNTER — CARE COORDINATION (OUTPATIENT)
Dept: GASTROENTEROLOGY | Facility: CLINIC | Age: 61
End: 2021-09-03

## 2021-09-03 DIAGNOSIS — B19.20 HEPATITIS C VIRUS INFECTION WITHOUT HEPATIC COMA, UNSPECIFIED CHRONICITY: Primary | ICD-10-CM

## 2021-09-03 NOTE — PROGRESS NOTES
Patient lives in group home and states she did receive the Mavyret delivery yesterday. It is locked up for now as she is going to her daughter's for the long weekend. She plans to review the medication with the nurse at her home next week and begin the medication. This writer will touch base with the home's nurse next week and have also provided the patient my direct contact information.

## 2021-09-03 NOTE — LETTER
September 13, 2021       TO: No Yusuf  [address] [address]       Dear ,    Hepatitis C Treatment  Treatment: Mavyret x 8 weeks    Please have labs drawn as close to the date indicated at an [facility].      Start Date: 09/10/21    Week 8-End of Treatment  HCV RNA Quant Lab due: 11/5/2021  Please have this lab drawn on or within a week after this date 11/5/2021. You will need to repeat this lab 3 months after completing treatment to ensure you have cleared the virus. Please see date for the final lab below. You do not need to fast for this lab.       3 Months Post Treatment  HCV RNA Quant Lab due: 2/3/2022  Please have this lab drawn on the specified date of 2/3/2021 or within a week after. This final lab will determine if you have cleared the Hepatitis C virus with Mavyret treatment. Without this final lab, we will be unable to determine if treatment was successful. You do not need to fast for this lab.             Educational information to patient on Hep C treatment:     -Contact the [facility] Hepatology clinic and speak with clinical RN prior to starting any new prescribed or OTC medications.   -Take medications exactly as prescribed, do not change dose or stop taking without consulting your provider.   -Take Medication one time each day with food  -If you miss a dose of medication, then take it as soon as you remember on the same day. If not remembered on the same day, then skip the dose and take the next dose at the usual time. Do not take more than the recommended dose. Contact the clinic if you miss a dose.    Please contact the pharmacy 1-2 weeks prior to needing a medication refill.      Side Effects  The most common side effects of Hep C medication treatment can include:  -tiredness  -headache  -nausea  Notify the clinic of any side effects that bother you or that do not go away.    Contact clinic for rash, itching or unmanageable nausea.     How to  store Hep C Treatment Medications  -Store Medication at room temperature below 86 degrees F  -Keep Medication in it's original container  -Do not use Medication if the seal is broken or missing     General information  It is not known if treatment will prevent you from infecting another person or reinfecting yourself with the hepatitis C virus during treatment. It is best that as soon as you start treatment to buy a new toothbrush, disposable razors (if you use a rotating shaver you do not need to buy a new one) and nail clippers. If you wear dentures, you should soak your dentures in 70-90% Isopropyl solution for 5 minutes one time within the first week of starting treatment. After dentures are done soaking, rinse your dentures off thoroughly with water. If you check your blood sugar at home, please dispose of the fingerstick needle after each use and DO NOT REUSE the insulin needles. These items should not be shared with anyone.          If you have any questions, please contact the main clinic at [phone] or your clinical RN at [phone]. We appreciate you choosing the [facility] Physicians clinic for your treatment.    Sherine Kincaid RN Care Coordinator  [facility]  Hepatology Clinic/Specialty Program

## 2021-09-13 NOTE — PROGRESS NOTES
Connected with patient and group home aid for f/u on Hep C treatment delivery/start status. Patient received their Mavyret medication and are ready to start treatment. Patient will be on Hep C treatment for 8 weeks. Patient reports no recent changes in health, hospitalizations or recent changes in medications. Patient did discuss with a pharmacist. Reviewed the following Hep C POC and education with patient.     Hepatitis C Treatment  Treatment: Mavyret x 8 weeks  Genotype: 2  Stage Fibrosis: F3   (Significant steatosis, follow up imaging recommended)    Please have labs drawn as close to the date indicated at an [facility].    Start Date: 09/10/21    Week 8-End of Treatment  HCV RNA Quant Lab due: 11/5/2021  Please have this lab drawn on or within a week after this date 11/5/2021. You will need to repeat this lab 3 months after completing treatment to ensure you have cleared the virus. Please see date for the final lab below. You do not need to fast for this lab.       3 Months Post Treatment  HCV RNA Quant Lab due: 2/3/2022  Please have this lab drawn on the specified date of 2/3/2021 or within a week after. This final lab will determine if you have cleared the Hepatitis C virus with Mavyret treatment. Without this final lab, we will be unable to determine if treatment was successful. You do not need to fast for this lab.             Educational information to patient on Hep C treatment:     -Contact the [facility] Hepatology clinic and speak with clinical RN prior to starting any new prescribed or OTC medications.   -Take medications exactly as prescribed, do not change dose or stop taking without consulting your provider.   -Take Medication one time each day with food  -If you miss a dose of medication, then take it as soon as you remember on the same day. If not remembered on the same day, then skip the dose and take the next dose at the usual time. Do not take more than the recommended dose. Contact the  clinic if you miss a dose.    Please contact the pharmacy 1-2 weeks prior to needing a medication refill.      Side Effects  The most common side effects of Hep C medication treatment can include:  -tiredness  -headache  -nausea  Notify the clinic of any side effects that bother you or that do not go away.    Contact clinic for rash, itching or unmanageable nausea.     How to store Hep C Treatment Medications  -Store Medication at room temperature below 86 degrees F  -Keep Medication in it's original container  -Do not use Medication if the seal is broken or missing     General information  It is not known if treatment will prevent you from infecting another person or reinfecting yourself with the hepatitis C virus during treatment. It is best that as soon as you start treatment to buy a new toothbrush, disposable razors (if you use a rotating shaver you do not need to buy a new one) and nail clippers. If you wear dentures, you should soak your dentures in 70-90% Isopropyl solution for 5 minutes one time within the first week of starting treatment. After dentures are done soaking, rinse your dentures off thoroughly with water. If you check your blood sugar at home, please dispose of the fingerstick needle after each use and DO NOT REUSE the insulin needles. These items should not be shared with anyone.          If you have any questions, please contact the main clinic at [phone] or your clinical RN at [phone]. We appreciate you choosing the [facility] Physicians clinic for your treatment. Patient agrees to Hep C treatment POC and verbalizes understanding. Patient will receive a copy of treatment plan in the mail, address verified with patient. Patient has no further questions or concerns. Hep C care team updated on patient status.    Sherine Kincaid RN Care Coordinator  [facility]  Hepatology Clinic/Specialty Program

## 2021-10-21 ENCOUNTER — LAB REQUISITION (OUTPATIENT)
Dept: LAB | Facility: CLINIC | Age: 61
End: 2021-10-21
Payer: COMMERCIAL

## 2021-10-21 DIAGNOSIS — R30.0 DYSURIA: ICD-10-CM

## 2021-10-21 DIAGNOSIS — J02.9 ACUTE PHARYNGITIS, UNSPECIFIED: ICD-10-CM

## 2021-10-21 PROCEDURE — 87086 URINE CULTURE/COLONY COUNT: CPT | Mod: ORL | Performed by: FAMILY MEDICINE

## 2021-10-21 PROCEDURE — 87081 CULTURE SCREEN ONLY: CPT | Mod: ORL | Performed by: FAMILY MEDICINE

## 2021-10-23 LAB
BACTERIA SPEC CULT: NORMAL
BACTERIA UR CULT: ABNORMAL

## 2021-11-05 ENCOUNTER — TELEPHONE (OUTPATIENT)
Dept: GASTROENTEROLOGY | Facility: CLINIC | Age: 61
End: 2021-11-05

## 2021-11-05 NOTE — TELEPHONE ENCOUNTER
Pt stated that she finished hepatitis C treatment and is wondering where she can gets labs drawn. Reviewed with pt that she can go to any [facility] to have labs completed. Pt requested lab order be faxed to [facility]. Lab order faxed.

## 2021-11-05 NOTE — TELEPHONE ENCOUNTER
[facility] Call Center    Phone Message    May a detailed message be left on voicemail: yes     Reason for Call: Other: Patient called as today, 11/5/21, was the last day of her medications; therefore, she needs orders to have labs done, as per the call from pharmacy.  Please follow up with patient.  Thank you.     Action Taken: Message routed to:  [facility] ([facility]): [facility] Hepatology Adult [facility]    Travel Screening: Not Applicable

## 2021-12-12 ENCOUNTER — APPOINTMENT (OUTPATIENT)
Dept: GENERAL RADIOLOGY | Facility: CLINIC | Age: 61
End: 2021-12-12
Attending: FAMILY MEDICINE
Payer: COMMERCIAL

## 2021-12-12 ENCOUNTER — HOSPITAL ENCOUNTER (EMERGENCY)
Facility: CLINIC | Age: 61
Discharge: HOME | End: 2021-12-13
Attending: FAMILY MEDICINE | Admitting: FAMILY MEDICINE
Payer: COMMERCIAL

## 2021-12-12 ENCOUNTER — APPOINTMENT (OUTPATIENT)
Dept: CT IMAGING | Facility: CLINIC | Age: 61
End: 2021-12-12
Attending: FAMILY MEDICINE
Payer: COMMERCIAL

## 2021-12-12 VITALS
OXYGEN SATURATION: 96 % | TEMPERATURE: 99 F | HEART RATE: 82 BPM | RESPIRATION RATE: 23 BRPM | SYSTOLIC BLOOD PRESSURE: 133 MMHG | DIASTOLIC BLOOD PRESSURE: 83 MMHG

## 2021-12-12 DIAGNOSIS — S10.93XA CONTUSION OF FACE, SCALP AND NECK, INITIAL ENCOUNTER: ICD-10-CM

## 2021-12-12 DIAGNOSIS — S80.02XA CONTUSION OF LEFT KNEE, INITIAL ENCOUNTER: ICD-10-CM

## 2021-12-12 DIAGNOSIS — S00.03XA CONTUSION OF FACE, SCALP AND NECK, INITIAL ENCOUNTER: ICD-10-CM
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
## 2021-12-12 DIAGNOSIS — S00.83XA CONTUSION OF FACE, SCALP AND NECK, INITIAL ENCOUNTER: ICD-10-CM

## 2021-12-12 LAB
ALBUMIN SERPL-MCNC: 2.9 G/DL (ref 3.4–5)
ALBUMIN UR-MCNC: 20 MG/DL
ALP SERPL-CCNC: 97 U/L (ref 40–150)
ALT SERPL W P-5'-P-CCNC: 24 U/L (ref 0–50)
ANION GAP SERPL CALCULATED.3IONS-SCNC: 5 MMOL/L (ref 3–14)
APPEARANCE UR: CLEAR
AST SERPL W P-5'-P-CCNC: 27 U/L (ref 0–45)
BASOPHILS # BLD AUTO: 0.1 10E3/UL (ref 0–0.2)
BASOPHILS NFR BLD AUTO: 1 %
BILIRUB SERPL-MCNC: 0.3 MG/DL (ref 0.2–1.3)
BILIRUB UR QL STRIP: NEGATIVE
BUN SERPL-MCNC: 16 MG/DL (ref 7–30)
CALCIUM SERPL-MCNC: 8.8 MG/DL (ref 8.5–10.1)
CHLORIDE BLD-SCNC: 106 MMOL/L (ref 94–109)
CO2 SERPL-SCNC: 30 MMOL/L (ref 20–32)
COLOR UR AUTO: YELLOW
CREAT SERPL-MCNC: 1.15 MG/DL (ref 0.52–1.04)
EOSINOPHIL # BLD AUTO: 0.3 10E3/UL (ref 0–0.7)
EOSINOPHIL NFR BLD AUTO: 4 %
ERYTHROCYTE [DISTWIDTH] IN BLOOD BY AUTOMATED COUNT: 15 % (ref 10–15)
GFR SERPL CREATININE-BSD FRML MDRD: 52 ML/MIN/1.73M2
GLUCOSE BLD-MCNC: 109 MG/DL (ref 70–99)
GLUCOSE UR STRIP-MCNC: NEGATIVE MG/DL
HCT VFR BLD AUTO: 36 % (ref 35–47)
HGB BLD-MCNC: 11.6 G/DL (ref 11.7–15.7)
HGB UR QL STRIP: NEGATIVE
IMM GRANULOCYTES # BLD: 0 10E3/UL
IMM GRANULOCYTES NFR BLD: 0 %
KETONES UR STRIP-MCNC: ABNORMAL MG/DL
LEUKOCYTE ESTERASE UR QL STRIP: NEGATIVE
LYMPHOCYTES # BLD AUTO: 2.4 10E3/UL (ref 0.8–5.3)
LYMPHOCYTES NFR BLD AUTO: 35 %
MCH RBC QN AUTO: 28.9 PG (ref 26.5–33)
MCHC RBC AUTO-ENTMCNC: 32.2 G/DL (ref 31.5–36.5)
MCV RBC AUTO: 90 FL (ref 78–100)
MONOCYTES # BLD AUTO: 0.9 10E3/UL (ref 0–1.3)
MONOCYTES NFR BLD AUTO: 12 %
MUCOUS THREADS #/AREA URNS LPF: PRESENT /LPF
NEUTROPHILS # BLD AUTO: 3.3 10E3/UL (ref 1.6–8.3)
NEUTROPHILS NFR BLD AUTO: 48 %
NITRATE UR QL: NEGATIVE
NRBC # BLD AUTO: 0 10E3/UL
NRBC BLD AUTO-RTO: 0 /100
PH UR STRIP: 6 [PH] (ref 5–7)
PLATELET # BLD AUTO: 263 10E3/UL (ref 150–450)
POTASSIUM BLD-SCNC: 4.2 MMOL/L (ref 3.4–5.3)
PROT SERPL-MCNC: 7.7 G/DL (ref 6.8–8.8)
RBC # BLD AUTO: 4.02 10E6/UL (ref 3.8–5.2)
RBC URINE: <1 /HPF
SARS-COV-2 RNA RESP QL NAA+PROBE: NEGATIVE
SODIUM SERPL-SCNC: 141 MMOL/L (ref 133–144)
SP GR UR STRIP: 1.03 (ref 1–1.03)
SQUAMOUS EPITHELIAL: 1 /HPF
TROPONIN I SERPL HS-MCNC: 45 NG/L
UROBILINOGEN UR STRIP-MCNC: NORMAL MG/DL
WBC # BLD AUTO: 7 10E3/UL (ref 4–11)
WBC URINE: 2 /HPF

## 2021-12-12 PROCEDURE — 96361 HYDRATE IV INFUSION ADD-ON: CPT | Performed by: FAMILY MEDICINE

## 2021-12-12 PROCEDURE — 93010 ELECTROCARDIOGRAM REPORT: CPT | Performed by: FAMILY MEDICINE

## 2021-12-12 PROCEDURE — 84484 ASSAY OF TROPONIN QUANT: CPT | Performed by: FAMILY MEDICINE

## 2021-12-12 PROCEDURE — 99285 EMERGENCY DEPT VISIT HI MDM: CPT | Mod: 25 | Performed by: FAMILY MEDICINE

## 2021-12-12 PROCEDURE — 250N000011 HC RX IP 250 OP 636: Performed by: FAMILY MEDICINE

## 2021-12-12 PROCEDURE — 80053 COMPREHEN METABOLIC PANEL: CPT | Performed by: FAMILY MEDICINE

## 2021-12-12 PROCEDURE — C9803 HOPD COVID-19 SPEC COLLECT: HCPCS | Performed by: FAMILY MEDICINE

## 2021-12-12 PROCEDURE — 71046 X-RAY EXAM CHEST 2 VIEWS: CPT

## 2021-12-12 PROCEDURE — 258N000003 HC RX IP 258 OP 636: Performed by: FAMILY MEDICINE

## 2021-12-12 PROCEDURE — 96374 THER/PROPH/DIAG INJ IV PUSH: CPT | Performed by: FAMILY MEDICINE

## 2021-12-12 PROCEDURE — 93005 ELECTROCARDIOGRAM TRACING: CPT | Performed by: FAMILY MEDICINE

## 2021-12-12 PROCEDURE — 73560 X-RAY EXAM OF KNEE 1 OR 2: CPT | Mod: LT

## 2021-12-12 PROCEDURE — 85025 COMPLETE CBC W/AUTO DIFF WBC: CPT | Performed by: FAMILY MEDICINE

## 2021-12-12 PROCEDURE — 36415 COLL VENOUS BLD VENIPUNCTURE: CPT | Performed by: FAMILY MEDICINE

## 2021-12-12 PROCEDURE — 70450 CT HEAD/BRAIN W/O DYE: CPT

## 2021-12-12 PROCEDURE — U0005 INFEC AGEN DETEC AMPLI PROBE: HCPCS | Performed by: FAMILY MEDICINE

## 2021-12-12 PROCEDURE — 81001 URINALYSIS AUTO W/SCOPE: CPT | Performed by: FAMILY MEDICINE

## 2021-12-12 RX ORDER — SODIUM CHLORIDE 9 MG/ML
INJECTION, SOLUTION INTRAVENOUS CONTINUOUS
Status: DISCONTINUED | OUTPATIENT
Start: 2021-12-12 | End: 2021-12-13 | Stop reason: HOSPADM

## 2021-12-12 RX ORDER — KETOROLAC TROMETHAMINE 15 MG/ML
15 INJECTION, SOLUTION INTRAMUSCULAR; INTRAVENOUS ONCE
Status: COMPLETED | OUTPATIENT
Start: 2021-12-12 | End: 2021-12-12

## 2021-12-12 RX ADMIN — KETOROLAC TROMETHAMINE 15 MG: 15 INJECTION, SOLUTION INTRAMUSCULAR; INTRAVENOUS at 21:05

## 2021-12-12 RX ADMIN — SODIUM CHLORIDE: 9 INJECTION, SOLUTION INTRAVENOUS at 20:43

## 2021-12-12 RX ADMIN — SODIUM CHLORIDE 500 ML: 9 INJECTION, SOLUTION INTRAVENOUS at 21:28

## 2021-12-13 LAB
ATRIAL RATE - MUSE: 66 BPM
DIASTOLIC BLOOD PRESSURE - MUSE: NORMAL MMHG
INTERPRETATION ECG - MUSE: NORMAL
P AXIS - MUSE: 57 DEGREES
PR INTERVAL - MUSE: 162 MS
QRS DURATION - MUSE: 84 MS
QT - MUSE: 468 MS
QTC - MUSE: 490 MS
R AXIS - MUSE: 12 DEGREES
SYSTOLIC BLOOD PRESSURE - MUSE: NORMAL MMHG
T AXIS - MUSE: 50 DEGREES
VENTRICULAR RATE- MUSE: 66 BPM

## 2021-12-13 NOTE — DISCHARGE INSTRUCTIONS
Thank you for choosing [facility].     Please closely monitor for further symptoms. Return to the Emergency Department if you develop any new or worsening signs or symptoms.    If you received any opiate pain medications or sedatives during your visit, please do not drive for at least 8 hours.     Labs, cultures or final xray interpretations may still need to be reviewed.  We will call you if your plan of care needs to be changed.    Please follow up with your primary care physician or clinic.  Use caution with rapid position changes, specifically when getting up in the night to go to the bathroom to prevent further falls. May use ice and Tylenol to your contusions. Follow-up with your regular outpatient provider this week for recheck, consider physical therapy if you have any further difficulties.

## 2021-12-13 NOTE — ED TRIAGE NOTES
Pt fell yesterday on to her left side. Has bruising on left hand, no bruising noted anywhere else. Left ribs sore, hard to take a deep breath. During the night had a second fall.

## 2021-12-13 NOTE — ED PROVIDER NOTES
"    [facility] [place] EMERGENCY DEPARTMENT ([facility])    12/12/21        History     Chief Complaint   Patient presents with     Fall     yesterday pt fell onto her left side. During the night had a second fall.      The history is provided by the patient and medical records.     No Yusuf is a 61 year old female with a history of type 2 diabetes mellitus, HTN, bipolar disorder who presents to the Emergency Department for evaluation after a fall. Patient reports she had 2 falls yesterday. She states she believes she was sitting on her bed about to get up to use the bathroom when she fell and hit her face on the TV. She states she does not remember anything besides falling and hitting her face. She is unsure if she lost consciousness or simply lost her balance.  She believes she may have little recall because she struck her head on the dresser.  There is no report from the group home of any seizure activity, the patient was not incontinent, postictal, nor does she have any bites on her tongue or cheek.  Patient reports she fell onto her left side. She states she lives in a group home and was calling for her staff but they did not hear her. Patient reports she now has left-sided rib pain. She states it hurts when taking a deep breath. No head pain, neck pain, or back pain. Patient reports after the fall she did not feel like herself and felt like she was \"under the influence of drugs.\" She denies any drug or alcohol use. Patient notes prior to this she had been feeling manic and had not been able to sleep for 3 days. Patient reports later, she woke up in the middle of the night when she fell and hit the floor. She states she hit her hands and knees. Patient has been able to walk since the falls. She states she has left leg soreness, states it is almost like a burning pain.    Past Medical History  Past Medical History:   Diagnosis Date     Arthritis      Bipolar affective (H)      Fibromyalgia      " Hypertension      Past Surgical History:   Procedure Laterality Date     CHOLECYSTECTOMY       GYN SURGERY      c section     GYN SURGERY      oblation     ORTHOPEDIC SURGERY      left leg, left shoulder, back     acetaminophen (TYLENOL) 500 MG tablet  amoxicillin-clavulanate (AUGMENTIN) 875-125 MG tablet  atorvastatin (LIPITOR) 20 MG tablet  calcium carbonate (OS-RODOLFO) 1500 (600 Ca) MG tablet  cholecalciferol 25 MCG (1000 UT) TABS  diclofenac (VOLTAREN) 1 % topical gel  doxycycline hyclate (VIBRAMYCIN) 100 MG capsule  DULoxetine (CYMBALTA) 60 MG capsule  fluticasone-salmeterol (ADVAIR) 250-50 MCG/DOSE inhaler  furosemide (LASIX) 20 MG tablet  gabapentin (NEURONTIN) 400 MG capsule  glecaprevir-pibrentasvir (MAVYRET) 100-40 MG per tablet  hydrOXYzine (ATARAX) 50 MG tablet  insulin detemir (LEVEMIR PEN) 100 UNIT/ML pen  levalbuterol (XOPENEX HFA) 45 MCG/ACT inhaler  levothyroxine (SYNTHROID/LEVOTHROID) 200 MCG tablet  melatonin 5 MG tablet  metFORMIN (GLUCOPHAGE) 1000 MG tablet  methadone HCl 10 MG/5ML SOLN  multivitamin w/minerals (MULTI-VITAMIN) tablet  NARCAN 4 MG/0.1ML nasal spray  nicotine polacrilex (NICORETTE) 4 MG gum  omeprazole (PRILOSEC) 20 MG DR capsule  polyethylene glycol (MIRALAX) 17 GM/Dose powder  potassium chloride ER (KLOR-CON) 8 MEQ CR tablet  traZODone (DESYREL) 50 MG tablet  vitamin B-12 (CYANOCOBALAMIN) 1000 MCG tablet  ACCU-CHEK RAFAELA PLUS test strip  Alcohol Swabs (ALCOHOL WIPES) 70 % PADS  blood glucose (ACCU-CHEK RAFAELA PLUS) test strip  Blood Glucose Calibration (ACCU-CHEK ACTIVE GLUCOSE CONT VI)  blood glucose calibration (ACCU-CHEK RAFAELA) solution      Allergies   Allergen Reactions     Promethazine Other (See Comments) and Anxiety     Noted in 8/30/08 ER     Codeine Phosphate GI Disturbance     Lamotrigine Other (See Comments)     hyponatremia     Oxcarbazepine Unknown and Other (See Comments)     Low sodium  LegacyRecord#[id number]       Codeine Other (See Comments) and Rash     GI  bleeding  LegacyRecord#[id number]       Family History  Family History   Problem Relation Age of Onset     Heart Disease Mother      Diabetes Mother      Ovarian Cancer Mother      Heart Disease Father      Lung Cancer Father      Diabetes Sister      Ovarian Cancer Sister      Diabetes Brother      Social History   Social History     Tobacco Use     Smoking status: Former Smoker     Packs/day: 0.10     Smokeless tobacco: Never Used   Substance Use Topics     Alcohol use: No     Drug use: No      Past medical history, past surgical history, medications, allergies, family history, and social history were reviewed with the patient. No additional pertinent items.       Review of Systems   ROS: 14 point ROS neg other than the symptoms noted above in the HPI.  A complete review of systems was performed with pertinent positives and negatives noted in the HPI, and all other systems negative.    Physical Exam   BP: (!) 112/32  Pulse: 76  Temp: 99  F (37.2  C)  Resp: 18  SpO2: 93 %  Physical Exam  Vitals and nursing note reviewed.   Constitutional:       General: She is not in acute distress.     Appearance: She is not diaphoretic.   HENT:      Head: Atraumatic.      Mouth/Throat:      Pharynx: No oropharyngeal exudate.   Eyes:      General: No scleral icterus.     Pupils: Pupils are equal, round, and reactive to light.   Cardiovascular:      Heart sounds: Normal heart sounds.   Pulmonary:      Effort: Pulmonary effort is normal. No respiratory distress.      Breath sounds: Normal breath sounds.   Chest:      Chest wall: Tenderness present.       Abdominal:      General: Bowel sounds are normal.      Palpations: Abdomen is soft.      Tenderness: There is no abdominal tenderness.   Musculoskeletal:      Cervical back: Neck supple. No tenderness.      Left knee: Tenderness present.        Legs:    Skin:     General: Skin is warm.      Findings: No rash.   Neurological:      General: No focal deficit present.      Mental Status: She  is alert and oriented to person, place, and time.      GCS: GCS eye subscore is 4. GCS verbal subscore is 5. GCS motor subscore is 6.      Cranial Nerves: Cranial nerves are intact.      Motor: Motor function is intact.      Coordination: Coordination is intact.      Deep Tendon Reflexes: Reflexes are normal and symmetric.   Psychiatric:         Attention and Perception: Attention normal.         Mood and Affect: Mood is anxious.         Speech: Speech normal.         Behavior: Behavior normal.         Thought Content: Thought content normal.         Cognition and Memory: Cognition normal.         Judgment: Judgment normal.         ED Course   6:32 PM  The patient was seen and examined by Misael Angel MD in Room ED06.     Procedures            EKG Interpretation:      Interpreted by Misael Angel MD  Time reviewed: 2031  Symptoms at time of EKG: Fall  Rhythm: normal sinus   Rate: Normal  Axis: Normal  Ectopy: none  Conduction: QTc slightly prolonged 0.490  ST Segments/ T Waves: No ST-T wave changes, No acute ischemic changes and low voltage QRS  Q Waves: v1 and III  Comparison to prior: Unchanged from 5/27/2021    Clinical Impression: no acute changes and non-specific EKG              The medical record was reviewed and interpreted.  Current labs reviewed and interpreted.  Previous labs reviewed and interpreted.  Current images reviewed and interpreted: Plain films of left knee and chest show no acute traumatic injury, CT head shows no signs of acute trauma.  Previous images reviewed and interpreted: Prior imaging of left knee is unchanged from today.  Managed outpatient prescription medications.  Mental Health Risk Assessment      PSS-3    Date and Time Over the past 2 weeks have you felt down, depressed, or hopeless? Over the past 2 weeks have you had thoughts of killing yourself? Have you ever attempted to kill yourself? When did this last happen? User   12/12/21 0572 yes no yes more than 6 months ago JR                      Results for orders placed or performed during the hospital encounter of 12/12/21   CT Head w/o Contrast     Status: None    Narrative    EXAM: CT HEAD W/O CONTRAST  LOCATION: [facility]  DATE/TIME: 12/12/2021 7:21 PM    INDICATION: Trauma - head injury.  COMPARISON: None.  TECHNIQUE: Routine CT Head without IV contrast. Multiplanar reformats. Dose reduction techniques were used.    FINDINGS:  INTRACRANIAL CONTENTS: No intracranial hemorrhage, extraaxial collection, or mass effect. No CT evidence of acute infarct. There are 2 foci of hypodensity in the right basal ganglia that could represent prominent perivascular spaces versus chronic   lacunar infarcts. Parenchymal attenuation is otherwise normal. Normal ventricles and sulci.     VISUALIZED ORBITS/SINUSES/MASTOIDS: No intraorbital abnormality. No paranasal sinus mucosal disease. No middle ear or mastoid effusion.    BONES/SOFT TISSUES: No acute abnormality.      Impression    IMPRESSION:  1.  Prominent perivascular spaces versus small chronic lacunar infarcts in the right basal ganglia.  2.  Otherwise, normal head CT.   XR Knee Left 1/2 Views     Status: None    Narrative    EXAM: XR KNEE LT 1/2 VW  LOCATION: [facility]  DATE/TIME: 12/12/2021 7:21 PM    INDICATION: Trauma and pain.  COMPARISON: Radiographs from 6/13/2020.      Impression    IMPRESSION: Tricompartmental osteoarthrosis and moderate effusion as well as calcified intra-articular bodies again noted. No new findings. There is no evidence of fracture.   XR Chest 2 Views     Status: None    Narrative    EXAM: XR CHEST 2 VW  LOCATION: [facility]  DATE/TIME: 12/12/2021 7:21 PM    INDICATION: Trauma.  COMPARISON: 05/27/2021.      Impression    IMPRESSION: Negative chest.   Troponin I     Status: Normal   Result Value Ref Range    Troponin I High  Sensitivity 45 <54 ng/L   Comprehensive metabolic panel     Status: Abnormal   Result Value Ref Range    Sodium 141 133 - 144 mmol/L    Potassium 4.2 3.4 - 5.3 mmol/L    Chloride 106 94 - 109 mmol/L    Carbon Dioxide (CO2) 30 20 - 32 mmol/L    Anion Gap 5 3 - 14 mmol/L    Urea Nitrogen 16 7 - 30 mg/dL    Creatinine 1.15 (H) 0.52 - 1.04 mg/dL    Calcium 8.8 8.5 - 10.1 mg/dL    Glucose 109 (H) 70 - 99 mg/dL    Alkaline Phosphatase 97 40 - 150 U/L    AST 27 0 - 45 U/L    ALT 24 0 - 50 U/L    Protein Total 7.7 6.8 - 8.8 g/dL    Albumin 2.9 (L) 3.4 - 5.0 g/dL    Bilirubin Total 0.3 0.2 - 1.3 mg/dL    GFR Estimate 52 (L) >60 mL/min/1.73m2   UA with Microscopic reflex to Culture     Status: Abnormal    Specimen: Urine, Clean Catch   Result Value Ref Range    Color Urine Yellow Colorless, Straw, Light Yellow, Yellow    Appearance Urine Clear Clear    Glucose Urine Negative Negative mg/dL    Bilirubin Urine Negative Negative    Ketones Urine Trace (A) Negative mg/dL    Specific Gravity Urine 1.034 1.003 - 1.035    Blood Urine Negative Negative    pH Urine 6.0 5.0 - 7.0    Protein Albumin Urine 20  (A) Negative mg/dL    Urobilinogen Urine Normal Normal, 2.0 mg/dL    Nitrite Urine Negative Negative    Leukocyte Esterase Urine Negative Negative    Mucus Urine Present (A) None Seen /LPF    RBC Urine <1 <=2 /HPF    WBC Urine 2 <=5 /HPF    Squamous Epithelials Urine 1 <=1 /HPF    Narrative    Urine Culture not indicated   Asymptomatic COVID-19 Virus (Coronavirus) by PCR Nasopharyngeal     Status: Normal    Specimen: Nasopharyngeal; Swab   Result Value Ref Range    SARS CoV2 PCR Negative Negative, Testing sent to reference lab. Results will be returned via unsolicited result    Narrative    Testing was performed using the Xpert Xpress SARS-CoV-2 Assay on the  Cepheid Gene-Xpert Instrument Systems. Additional information about  this Emergency Use Authorization (EUA) assay can be found via the Lab  Guide. This test should be ordered  for the detection of SARS-CoV-2 in  individuals who meet SARS-CoV-2 clinical and/or epidemiological  criteria. Test performance is unknown in asymptomatic patients. This  test is for in vitro diagnostic use under the FDA EUA for  laboratories certified under CLIA to perform high complexity testing.  This test has not been FDA cleared or approved. A negative result  does not rule out the presence of PCR inhibitors in the specimen or  target RNA in concentration below the limit of detection for the  assay. The possibility of a false negative should be considered if  the patient's recent exposure or clinical presentation suggests  COVID-19. This test was validated by the [facility]. This laboratory is certified under  the Clinical Laboratory Improvement Amendments of 1988 (CLIA-88) as  qualified to perform high complexity laboratory testing.     CBC with platelets and differential     Status: Abnormal   Result Value Ref Range    WBC Count 7.0 4.0 - 11.0 10e3/uL    RBC Count 4.02 3.80 - 5.20 10e6/uL    Hemoglobin 11.6 (L) 11.7 - 15.7 g/dL    Hematocrit 36.0 35.0 - 47.0 %    MCV 90 78 - 100 fL    MCH 28.9 26.5 - 33.0 pg    MCHC 32.2 31.5 - 36.5 g/dL    RDW 15.0 10.0 - 15.0 %    Platelet Count 263 150 - 450 10e3/uL    % Neutrophils 48 %    % Lymphocytes 35 %    % Monocytes 12 %    % Eosinophils 4 %    % Basophils 1 %    % Immature Granulocytes 0 %    NRBCs per 100 WBC 0 <1 /100    Absolute Neutrophils 3.3 1.6 - 8.3 10e3/uL    Absolute Lymphocytes 2.4 0.8 - 5.3 10e3/uL    Absolute Monocytes 0.9 0.0 - 1.3 10e3/uL    Absolute Eosinophils 0.3 0.0 - 0.7 10e3/uL    Absolute Basophils 0.1 0.0 - 0.2 10e3/uL    Absolute Immature Granulocytes 0.0 <=0.4 10e3/uL    Absolute NRBCs 0.0 10e3/uL   EKG 12-lead, tracing only     Status: None (Preliminary result)   Result Value Ref Range    Systolic Blood Pressure  mmHg    Diastolic Blood Pressure  mmHg    Ventricular Rate 66 BPM    Atrial  Rate 66 BPM    TX Interval 162 ms    QRS Duration 84 ms     ms    QTc 490 ms    P Axis 57 degrees    R AXIS 12 degrees    T Axis 50 degrees    Interpretation ECG       Sinus rhythm  Low voltage QRS  Nonspecific T wave abnormality  Prolonged QT  Abnormal ECG     CBC with platelets differential     Status: Abnormal    Narrative    The following orders were created for panel order CBC with platelets differential.  Procedure                               Abnormality         Status                     ---------                               -----------         ------                     CBC with platelets and d...[[id number]]  Abnormal            Final result                 Please view results for these tests on the individual orders.     Medications   sodium chloride 0.9% infusion ( Intravenous New Bag 12/12/21 2043)   ketorolac (TORADOL) injection 15 mg (15 mg Intravenous Given 12/12/21 2105)   0.9% sodium chloride BOLUS (500 mLs Intravenous New Bag 12/12/21 2128)        Assessments & Plan (with Medical Decision Making)   61-year-old woman with history of bipolar disorder and hypertension presenting after 2 falls which occurred yesterday. Etiology of falls is unclear, this could be related to a mechanical fall or postural syncope, however the patient has little recall of the events. There is no history to support seizure as the etiology of her falls.  Differential diagnosis initially considered included mechanical fall, orthostatic hypotension, arrhythmia, aortic stenosis, peripheral vertigo, PE, MI, vertebrobasilar TIA, congenital conduction abnormalities such as Brugada syndrome, toxic-metabolic syndromes, less likely seizure.  On exam she has a small scalp contusion, left knee contusion, and tenderness of the left lateral chest wall. Her neurologic exam is normal and her physical exam is otherwise unremarkable as documented above. Her EKG does not show any acute changes and her labs are unremarkable. Her  imaging of the head, left knee and ribs is also negative. Upon review of her chart she had a normal echocardiogram in 2020, with no evidence of structural abnormality. Labs do reveal slight increase in creatinine which could suggest mild dehydration supporting the possibility of postural syncope.   Patient has no signs of an acute coronary syndrome, no worrisome cardiac history, no history of CHF or structural cardiac disease, no family history of sudden cardiac death, no history or exam findings to suggest valvular heart disease, no history or EKG findings to support conduction system disease, no abnormal vital signs, no signs of CNS event. Patient is also on numerous medications which could cause orthostatic hypotension. Patient states she is feeling better and would like to go home now, agrees to use great caution with rapid position changes or when getting up in the night to use the bathroom as I feel she is at some risk during the night when up unsupervised at her group home. She should follow-up with her outpatient provider to discuss further work-up and/or physical therapy if she continues to have any similar problems.  I have reviewed the nursing notes. I have reviewed the findings, diagnosis, plan and need for follow up with the patient.    New Prescriptions    No medications on file       Final diagnoses:   Fall, initial encounter   Contusion of face, scalp and neck, initial encounter   Contusion of left knee, initial encounter     I, Sujatha Israel, am serving as a trained medical scribe to document services personally performed by Misael Angel MD, based on the provider's statements to me.      I, Misael Angel MD, was physically present and have reviewed and verified the accuracy of this note documented by Sujatha Israel.     --  Misael Angel MD  [facility] EMERGENCY DEPARTMENT  12/12/2021     Misael Angel MD  12/12/21 3560

## 2022-02-08 ENCOUNTER — APPOINTMENT (OUTPATIENT)
Dept: CT IMAGING | Facility: CLINIC | Age: 62
DRG: 177 | End: 2022-02-08
Attending: EMERGENCY MEDICINE
Payer: COMMERCIAL

## 2022-02-08 ENCOUNTER — APPOINTMENT (OUTPATIENT)
Dept: GENERAL RADIOLOGY | Facility: CLINIC | Age: 62
DRG: 177 | End: 2022-02-08
Attending: EMERGENCY MEDICINE
Payer: COMMERCIAL

## 2022-02-08 ENCOUNTER — HOSPITAL ENCOUNTER (INPATIENT)
Facility: CLINIC | Age: 62
LOS: 2 days | Discharge: ANOTHER HOSPITAL | DRG: 177 | End: 2022-02-10
Attending: EMERGENCY MEDICINE | Admitting: FAMILY MEDICINE
Payer: COMMERCIAL

## 2022-02-08 DIAGNOSIS — J96.02 ACUTE RESPIRATORY FAILURE WITH HYPOXIA AND HYPERCAPNIA (H): ICD-10-CM

## 2022-02-08 DIAGNOSIS — U07.1 INFECTION DUE TO 2019 NOVEL CORONAVIRUS: ICD-10-CM

## 2022-02-08 DIAGNOSIS — J96.01 ACUTE RESPIRATORY FAILURE WITH HYPOXIA AND HYPERCAPNIA (H): ICD-10-CM

## 2022-02-08 PROBLEM — G89.29 CHRONIC BILATERAL BACK PAIN: Status: ACTIVE | Noted: 2021-09-20

## 2022-02-08 PROBLEM — M54.9 CHRONIC BILATERAL BACK PAIN: Status: ACTIVE | Noted: 2021-09-20

## 2022-02-08 PROBLEM — F41.9 ANXIETY DISORDER: Status: ACTIVE | Noted: 2022-02-08

## 2022-02-08 PROBLEM — G89.4 CHRONIC PAIN DISORDER: Status: ACTIVE | Noted: 2020-02-15

## 2022-02-08 LAB
ALBUMIN SERPL-MCNC: 2.6 G/DL (ref 3.4–5)
ALBUMIN UR-MCNC: NEGATIVE MG/DL
ALP SERPL-CCNC: 106 U/L (ref 40–150)
ALT SERPL W P-5'-P-CCNC: 55 U/L (ref 0–50)
ANION GAP SERPL CALCULATED.3IONS-SCNC: 3 MMOL/L (ref 3–14)
APPEARANCE UR: ABNORMAL
AST SERPL W P-5'-P-CCNC: ABNORMAL U/L
ATRIAL RATE - MUSE: 97 BPM
BACTERIA #/AREA URNS HPF: ABNORMAL /HPF
BASOPHILS # BLD AUTO: 0 10E3/UL (ref 0–0.2)
BASOPHILS NFR BLD AUTO: 0 %
BILIRUB SERPL-MCNC: 0.4 MG/DL (ref 0.2–1.3)
BILIRUB UR QL STRIP: NEGATIVE
BUN SERPL-MCNC: 15 MG/DL (ref 7–30)
CA-I BLD-MCNC: 4.6 MG/DL (ref 4.4–5.2)
CALCIUM SERPL-MCNC: 8.8 MG/DL (ref 8.5–10.1)
CHLORIDE BLD-SCNC: 101 MMOL/L (ref 94–109)
CO2 SERPL-SCNC: 28 MMOL/L (ref 20–32)
COLOR UR AUTO: YELLOW
CPB POCT: NO
CREAT SERPL-MCNC: 1.03 MG/DL (ref 0.52–1.04)
CRP SERPL-MCNC: 77 MG/L (ref 0–8)
CRP SERPL-MCNC: 78 MG/L (ref 0–8)
D DIMER PPP FEU-MCNC: 0.47 UG/ML FEU (ref 0–0.5)
D DIMER PPP FEU-MCNC: 0.48 UG/ML FEU (ref 0–0.5)
DIASTOLIC BLOOD PRESSURE - MUSE: NORMAL MMHG
EOSINOPHIL # BLD AUTO: 0 10E3/UL (ref 0–0.7)
EOSINOPHIL NFR BLD AUTO: 0 %
ERYTHROCYTE [DISTWIDTH] IN BLOOD BY AUTOMATED COUNT: 14.6 % (ref 10–15)
FERRITIN SERPL-MCNC: 439 NG/ML (ref 8–252)
GFR SERPL CREATININE-BSD FRML MDRD: 62 ML/MIN/1.73M2
GLUCOSE BLD-MCNC: 110 MG/DL (ref 70–99)
GLUCOSE BLD-MCNC: 116 MG/DL (ref 70–99)
GLUCOSE BLDC GLUCOMTR-MCNC: 200 MG/DL (ref 70–99)
GLUCOSE BLDC GLUCOMTR-MCNC: 223 MG/DL (ref 70–99)
GLUCOSE UR STRIP-MCNC: NEGATIVE MG/DL
HBA1C MFR BLD: 6.2 % (ref 0–5.6)
HCO3 BLDV-SCNC: 31 MMOL/L (ref 21–28)
HCT VFR BLD AUTO: 41.1 % (ref 35–47)
HCT VFR BLD CALC: 39 % (ref 35–47)
HGB BLD-MCNC: 12.9 G/DL (ref 11.7–15.7)
HGB BLD-MCNC: 13.3 G/DL (ref 11.7–15.7)
HGB UR QL STRIP: NEGATIVE
HOLD SPECIMEN: NORMAL
HOLD SPECIMEN: NORMAL
IMM GRANULOCYTES # BLD: 0.1 10E3/UL
IMM GRANULOCYTES NFR BLD: 2 %
INR PPP: 0.89 (ref 0.85–1.15)
INTERPRETATION ECG - MUSE: NORMAL
KETONES UR STRIP-MCNC: NEGATIVE MG/DL
LACTATE SERPL-SCNC: 1.2 MMOL/L (ref 0.7–2)
LDH SERPL L TO P-CCNC: 435 U/L (ref 81–234)
LEUKOCYTE ESTERASE UR QL STRIP: ABNORMAL
LIPASE SERPL-CCNC: 61 U/L (ref 73–393)
LYMPHOCYTES # BLD AUTO: 1.1 10E3/UL (ref 0.8–5.3)
LYMPHOCYTES NFR BLD AUTO: 27 %
MCH RBC QN AUTO: 28.9 PG (ref 26.5–33)
MCHC RBC AUTO-ENTMCNC: 31.4 G/DL (ref 31.5–36.5)
MCV RBC AUTO: 92 FL (ref 78–100)
MONOCYTES # BLD AUTO: 0.8 10E3/UL (ref 0–1.3)
MONOCYTES NFR BLD AUTO: 18 %
MUCOUS THREADS #/AREA URNS LPF: PRESENT /LPF
NEUTROPHILS # BLD AUTO: 2.2 10E3/UL (ref 1.6–8.3)
NEUTROPHILS NFR BLD AUTO: 53 %
NITRATE UR QL: POSITIVE
NRBC # BLD AUTO: 0 10E3/UL
NRBC BLD AUTO-RTO: 0 /100
NT-PROBNP SERPL-MCNC: 218 PG/ML (ref 0–900)
P AXIS - MUSE: 14 DEGREES
PCO2 BLDV: 54 MM HG (ref 40–50)
PH BLDV: 7.37 [PH] (ref 7.32–7.43)
PH UR STRIP: 5 [PH] (ref 5–7)
PHOSPHATE SERPL-MCNC: 2.7 MG/DL (ref 2.5–4.5)
PLATELET # BLD AUTO: 155 10E3/UL (ref 150–450)
PO2 BLDV: 33 MM HG (ref 25–47)
POTASSIUM BLD-SCNC: 4.7 MMOL/L (ref 3.4–5.3)
POTASSIUM BLD-SCNC: 5.7 MMOL/L (ref 3.4–5.3)
POTASSIUM BLD-SCNC: 5.8 MMOL/L (ref 3.4–5.3)
POTASSIUM BLD-SCNC: 6.1 MMOL/L (ref 3.4–5.3)
POTASSIUM BLD-SCNC: 6.4 MMOL/L (ref 3.4–5.3)
PR INTERVAL - MUSE: 152 MS
PROCALCITONIN SERPL-MCNC: 0.07 NG/ML
PROCALCITONIN SERPL-MCNC: 0.08 NG/ML
PROT SERPL-MCNC: 8.4 G/DL (ref 6.8–8.8)
QRS DURATION - MUSE: 82 MS
QT - MUSE: 348 MS
QTC - MUSE: 441 MS
R AXIS - MUSE: -9 DEGREES
RBC # BLD AUTO: 4.47 10E6/UL (ref 3.8–5.2)
RBC URINE: 3 /HPF
RETICS # AUTO: 0.06 10E6/UL (ref 0.03–0.1)
RETICS/RBC NFR AUTO: 1.4 % (ref 0.5–2)
SAO2 % BLDV: 60 % (ref 94–100)
SODIUM BLD-SCNC: 137 MMOL/L (ref 133–144)
SODIUM SERPL-SCNC: 132 MMOL/L (ref 133–144)
SP GR UR STRIP: 1.01 (ref 1–1.03)
SQUAMOUS EPITHELIAL: 2 /HPF
SYSTOLIC BLOOD PRESSURE - MUSE: NORMAL MMHG
T AXIS - MUSE: 21 DEGREES
TRANSITIONAL EPI: 1 /HPF
TROPONIN I SERPL HS-MCNC: 81 NG/L
UROBILINOGEN UR STRIP-MCNC: NORMAL MG/DL
VENTRICULAR RATE- MUSE: 97 BPM
WBC # BLD AUTO: 4.1 10E3/UL (ref 4–11)
WBC URINE: 43 /HPF

## 2022-02-08 PROCEDURE — 82803 BLOOD GASES ANY COMBINATION: CPT

## 2022-02-08 PROCEDURE — 93005 ELECTROCARDIOGRAM TRACING: CPT

## 2022-02-08 PROCEDURE — 85045 AUTOMATED RETICULOCYTE COUNT: CPT | Performed by: STUDENT IN AN ORGANIZED HEALTH CARE EDUCATION/TRAINING PROGRAM

## 2022-02-08 PROCEDURE — 84145 PROCALCITONIN (PCT): CPT | Performed by: STUDENT IN AN ORGANIZED HEALTH CARE EDUCATION/TRAINING PROGRAM

## 2022-02-08 PROCEDURE — 82947 ASSAY GLUCOSE BLOOD QUANT: CPT | Performed by: EMERGENCY MEDICINE

## 2022-02-08 PROCEDURE — 36620 INSERTION CATHETER ARTERY: CPT | Performed by: EMERGENCY MEDICINE

## 2022-02-08 PROCEDURE — 250N000009 HC RX 250: Performed by: STUDENT IN AN ORGANIZED HEALTH CARE EDUCATION/TRAINING PROGRAM

## 2022-02-08 PROCEDURE — 999N000185 HC STATISTIC TRANSPORT TIME EA 15 MIN

## 2022-02-08 PROCEDURE — XW0DXM6 INTRODUCTION OF BARICITINIB INTO MOUTH AND PHARYNX, EXTERNAL APPROACH, NEW TECHNOLOGY GROUP 6: ICD-10-PCS | Performed by: FAMILY MEDICINE

## 2022-02-08 PROCEDURE — 99285 EMERGENCY DEPT VISIT HI MDM: CPT | Mod: 25

## 2022-02-08 PROCEDURE — 83615 LACTATE (LD) (LDH) ENZYME: CPT | Performed by: STUDENT IN AN ORGANIZED HEALTH CARE EDUCATION/TRAINING PROGRAM

## 2022-02-08 PROCEDURE — 86140 C-REACTIVE PROTEIN: CPT | Performed by: STUDENT IN AN ORGANIZED HEALTH CARE EDUCATION/TRAINING PROGRAM

## 2022-02-08 PROCEDURE — 250N000011 HC RX IP 250 OP 636: Performed by: STUDENT IN AN ORGANIZED HEALTH CARE EDUCATION/TRAINING PROGRAM

## 2022-02-08 PROCEDURE — 36556 INSERT NON-TUNNEL CV CATH: CPT

## 2022-02-08 PROCEDURE — 83690 ASSAY OF LIPASE: CPT | Performed by: EMERGENCY MEDICINE

## 2022-02-08 PROCEDURE — 71275 CT ANGIOGRAPHY CHEST: CPT

## 2022-02-08 PROCEDURE — 96367 TX/PROPH/DG ADDL SEQ IV INF: CPT

## 2022-02-08 PROCEDURE — 85379 FIBRIN DEGRADATION QUANT: CPT | Performed by: STUDENT IN AN ORGANIZED HEALTH CARE EDUCATION/TRAINING PROGRAM

## 2022-02-08 PROCEDURE — 84155 ASSAY OF PROTEIN SERUM: CPT | Performed by: EMERGENCY MEDICINE

## 2022-02-08 PROCEDURE — 83735 ASSAY OF MAGNESIUM: CPT | Performed by: STUDENT IN AN ORGANIZED HEALTH CARE EDUCATION/TRAINING PROGRAM

## 2022-02-08 PROCEDURE — 84484 ASSAY OF TROPONIN QUANT: CPT | Performed by: EMERGENCY MEDICINE

## 2022-02-08 PROCEDURE — 71275 CT ANGIOGRAPHY CHEST: CPT | Mod: 26 | Performed by: RADIOLOGY

## 2022-02-08 PROCEDURE — 93010 ELECTROCARDIOGRAM REPORT: CPT | Mod: 59 | Performed by: EMERGENCY MEDICINE

## 2022-02-08 PROCEDURE — 36415 COLL VENOUS BLD VENIPUNCTURE: CPT | Performed by: STUDENT IN AN ORGANIZED HEALTH CARE EDUCATION/TRAINING PROGRAM

## 2022-02-08 PROCEDURE — 250N000009 HC RX 250: Performed by: EMERGENCY MEDICINE

## 2022-02-08 PROCEDURE — 31500 INSERT EMERGENCY AIRWAY: CPT

## 2022-02-08 PROCEDURE — 87086 URINE CULTURE/COLONY COUNT: CPT | Performed by: EMERGENCY MEDICINE

## 2022-02-08 PROCEDURE — 71045 X-RAY EXAM CHEST 1 VIEW: CPT

## 2022-02-08 PROCEDURE — 250N000011 HC RX IP 250 OP 636: Performed by: EMERGENCY MEDICINE

## 2022-02-08 PROCEDURE — 272N000054 HC CANNULA HIGH FLOW, ADULT

## 2022-02-08 PROCEDURE — 85025 COMPLETE CBC W/AUTO DIFF WBC: CPT | Performed by: EMERGENCY MEDICINE

## 2022-02-08 PROCEDURE — 71045 X-RAY EXAM CHEST 1 VIEW: CPT | Mod: 26 | Performed by: RADIOLOGY

## 2022-02-08 PROCEDURE — 99292 CRITICAL CARE ADDL 30 MIN: CPT | Mod: 25 | Performed by: EMERGENCY MEDICINE

## 2022-02-08 PROCEDURE — 83605 ASSAY OF LACTIC ACID: CPT | Performed by: EMERGENCY MEDICINE

## 2022-02-08 PROCEDURE — 83036 HEMOGLOBIN GLYCOSYLATED A1C: CPT | Performed by: STUDENT IN AN ORGANIZED HEALTH CARE EDUCATION/TRAINING PROGRAM

## 2022-02-08 PROCEDURE — 94660 CPAP INITIATION&MGMT: CPT

## 2022-02-08 PROCEDURE — 258N000003 HC RX IP 258 OP 636: Performed by: STUDENT IN AN ORGANIZED HEALTH CARE EDUCATION/TRAINING PROGRAM

## 2022-02-08 PROCEDURE — 36556 INSERT NON-TUNNEL CV CATH: CPT | Performed by: EMERGENCY MEDICINE

## 2022-02-08 PROCEDURE — 84132 ASSAY OF SERUM POTASSIUM: CPT | Performed by: EMERGENCY MEDICINE

## 2022-02-08 PROCEDURE — 85379 FIBRIN DEGRADATION QUANT: CPT | Performed by: EMERGENCY MEDICINE

## 2022-02-08 PROCEDURE — 99223 1ST HOSP IP/OBS HIGH 75: CPT | Mod: AI | Performed by: FAMILY MEDICINE

## 2022-02-08 PROCEDURE — 31500 INSERT EMERGENCY AIRWAY: CPT | Performed by: EMERGENCY MEDICINE

## 2022-02-08 PROCEDURE — 82728 ASSAY OF FERRITIN: CPT | Performed by: STUDENT IN AN ORGANIZED HEALTH CARE EDUCATION/TRAINING PROGRAM

## 2022-02-08 PROCEDURE — 82947 ASSAY GLUCOSE BLOOD QUANT: CPT

## 2022-02-08 PROCEDURE — 99291 CRITICAL CARE FIRST HOUR: CPT | Mod: 25 | Performed by: EMERGENCY MEDICINE

## 2022-02-08 PROCEDURE — 36415 COLL VENOUS BLD VENIPUNCTURE: CPT | Performed by: EMERGENCY MEDICINE

## 2022-02-08 PROCEDURE — 85610 PROTHROMBIN TIME: CPT | Performed by: STUDENT IN AN ORGANIZED HEALTH CARE EDUCATION/TRAINING PROGRAM

## 2022-02-08 PROCEDURE — 999N000157 HC STATISTIC RCP TIME EA 10 MIN

## 2022-02-08 PROCEDURE — 96375 TX/PRO/DX INJ NEW DRUG ADDON: CPT

## 2022-02-08 PROCEDURE — 258N000003 HC RX IP 258 OP 636: Performed by: EMERGENCY MEDICINE

## 2022-02-08 PROCEDURE — 94640 AIRWAY INHALATION TREATMENT: CPT

## 2022-02-08 PROCEDURE — 86140 C-REACTIVE PROTEIN: CPT | Performed by: EMERGENCY MEDICINE

## 2022-02-08 PROCEDURE — 250N000013 HC RX MED GY IP 250 OP 250 PS 637: Performed by: STUDENT IN AN ORGANIZED HEALTH CARE EDUCATION/TRAINING PROGRAM

## 2022-02-08 PROCEDURE — 87040 BLOOD CULTURE FOR BACTERIA: CPT | Performed by: EMERGENCY MEDICINE

## 2022-02-08 PROCEDURE — 250N000013 HC RX MED GY IP 250 OP 250 PS 637: Performed by: EMERGENCY MEDICINE

## 2022-02-08 PROCEDURE — 36620 INSERTION CATHETER ARTERY: CPT

## 2022-02-08 PROCEDURE — 250N000012 HC RX MED GY IP 250 OP 636 PS 637: Performed by: STUDENT IN AN ORGANIZED HEALTH CARE EDUCATION/TRAINING PROGRAM

## 2022-02-08 PROCEDURE — 81001 URINALYSIS AUTO W/SCOPE: CPT | Performed by: EMERGENCY MEDICINE

## 2022-02-08 PROCEDURE — 84145 PROCALCITONIN (PCT): CPT | Performed by: EMERGENCY MEDICINE

## 2022-02-08 PROCEDURE — 120N000001 HC R&B MED SURG/OB

## 2022-02-08 PROCEDURE — 83880 ASSAY OF NATRIURETIC PEPTIDE: CPT | Performed by: EMERGENCY MEDICINE

## 2022-02-08 PROCEDURE — 96368 THER/DIAG CONCURRENT INF: CPT

## 2022-02-08 PROCEDURE — 96365 THER/PROPH/DIAG IV INF INIT: CPT

## 2022-02-08 PROCEDURE — 96376 TX/PRO/DX INJ SAME DRUG ADON: CPT

## 2022-02-08 RX ORDER — ATORVASTATIN CALCIUM 20 MG/1
20 TABLET, FILM COATED ORAL DAILY
Status: DISCONTINUED | OUTPATIENT
Start: 2022-02-09 | End: 2022-02-10 | Stop reason: HOSPADM

## 2022-02-08 RX ORDER — CEFTRIAXONE 2 G/1
2 INJECTION, POWDER, FOR SOLUTION INTRAMUSCULAR; INTRAVENOUS EVERY 24 HOURS
Status: DISCONTINUED | OUTPATIENT
Start: 2022-02-09 | End: 2022-02-08

## 2022-02-08 RX ORDER — LANOLIN ALCOHOL/MO/W.PET/CERES
1000 CREAM (GRAM) TOPICAL DAILY
Status: DISCONTINUED | OUTPATIENT
Start: 2022-02-09 | End: 2022-02-10 | Stop reason: HOSPADM

## 2022-02-08 RX ORDER — DEXTROSE MONOHYDRATE 25 G/50ML
25-50 INJECTION, SOLUTION INTRAVENOUS
Status: DISCONTINUED | OUTPATIENT
Start: 2022-02-08 | End: 2022-02-10

## 2022-02-08 RX ORDER — NICOTINE POLACRILEX 4 MG
15-30 LOZENGE BUCCAL
Status: DISCONTINUED | OUTPATIENT
Start: 2022-02-08 | End: 2022-02-10

## 2022-02-08 RX ORDER — DULOXETIN HYDROCHLORIDE 60 MG/1
60 CAPSULE, DELAYED RELEASE ORAL DAILY
Status: DISCONTINUED | OUTPATIENT
Start: 2022-02-09 | End: 2022-02-10 | Stop reason: HOSPADM

## 2022-02-08 RX ORDER — FUROSEMIDE 20 MG
60 TABLET ORAL DAILY
Status: DISCONTINUED | OUTPATIENT
Start: 2022-02-08 | End: 2022-02-09

## 2022-02-08 RX ORDER — CEFTRIAXONE 2 G/1
2 INJECTION, POWDER, FOR SOLUTION INTRAMUSCULAR; INTRAVENOUS EVERY 24 HOURS
Status: DISCONTINUED | OUTPATIENT
Start: 2022-02-09 | End: 2022-02-10

## 2022-02-08 RX ORDER — LIDOCAINE 40 MG/G
CREAM TOPICAL
Status: DISCONTINUED | OUTPATIENT
Start: 2022-02-08 | End: 2022-02-10 | Stop reason: HOSPADM

## 2022-02-08 RX ORDER — CEFTRIAXONE 1 G/1
1 INJECTION, POWDER, FOR SOLUTION INTRAMUSCULAR; INTRAVENOUS ONCE
Status: COMPLETED | OUTPATIENT
Start: 2022-02-08 | End: 2022-02-08

## 2022-02-08 RX ORDER — POLYETHYLENE GLYCOL 3350 17 G/17G
17 POWDER, FOR SOLUTION ORAL DAILY PRN
Status: DISCONTINUED | OUTPATIENT
Start: 2022-02-08 | End: 2022-02-10 | Stop reason: HOSPADM

## 2022-02-08 RX ORDER — LEVALBUTEROL TARTRATE 45 UG/1
2 AEROSOL, METERED ORAL EVERY 4 HOURS PRN
Status: DISCONTINUED | OUTPATIENT
Start: 2022-02-08 | End: 2022-02-10 | Stop reason: HOSPADM

## 2022-02-08 RX ORDER — DEXAMETHASONE SODIUM PHOSPHATE 10 MG/ML
10 INJECTION, SOLUTION INTRAMUSCULAR; INTRAVENOUS ONCE
Status: COMPLETED | OUTPATIENT
Start: 2022-02-08 | End: 2022-02-08

## 2022-02-08 RX ORDER — FUROSEMIDE 10 MG/ML
40 INJECTION INTRAMUSCULAR; INTRAVENOUS 2 TIMES DAILY
Status: DISCONTINUED | OUTPATIENT
Start: 2022-02-08 | End: 2022-02-09

## 2022-02-08 RX ORDER — FUROSEMIDE 10 MG/ML
40 INJECTION INTRAMUSCULAR; INTRAVENOUS 2 TIMES DAILY
Status: DISCONTINUED | OUTPATIENT
Start: 2022-02-08 | End: 2022-02-08

## 2022-02-08 RX ORDER — HYDROXYZINE HYDROCHLORIDE 50 MG/1
100 TABLET, FILM COATED ORAL EVERY 6 HOURS PRN
Status: DISCONTINUED | OUTPATIENT
Start: 2022-02-08 | End: 2022-02-10 | Stop reason: HOSPADM

## 2022-02-08 RX ORDER — SODIUM CHLORIDE 9 MG/ML
INJECTION, SOLUTION INTRAVENOUS CONTINUOUS
Status: DISCONTINUED | OUTPATIENT
Start: 2022-02-08 | End: 2022-02-08

## 2022-02-08 RX ORDER — ONDANSETRON 4 MG/1
4 TABLET, ORALLY DISINTEGRATING ORAL EVERY 6 HOURS PRN
Status: DISCONTINUED | OUTPATIENT
Start: 2022-02-08 | End: 2022-02-10 | Stop reason: HOSPADM

## 2022-02-08 RX ORDER — CHOLECALCIFEROL (VITAMIN D3) 25 MCG
TABLET ORAL
COMMUNITY
Start: 2022-01-17 | End: 2022-02-09

## 2022-02-08 RX ORDER — POTASSIUM CHLORIDE 600 MG/1
8 TABLET, FILM COATED, EXTENDED RELEASE ORAL 2 TIMES DAILY
Status: DISCONTINUED | OUTPATIENT
Start: 2022-02-08 | End: 2022-02-10 | Stop reason: HOSPADM

## 2022-02-08 RX ORDER — ALBUTEROL SULFATE 90 UG/1
4 AEROSOL, METERED RESPIRATORY (INHALATION) ONCE
Status: COMPLETED | OUTPATIENT
Start: 2022-02-08 | End: 2022-02-08

## 2022-02-08 RX ORDER — DEXAMETHASONE SODIUM PHOSPHATE 10 MG/ML
6 INJECTION, SOLUTION INTRAMUSCULAR; INTRAVENOUS DAILY
Status: DISCONTINUED | OUTPATIENT
Start: 2022-02-09 | End: 2022-02-10 | Stop reason: HOSPADM

## 2022-02-08 RX ORDER — CALCIUM GLUCONATE 20 MG/ML
1 INJECTION, SOLUTION INTRAVENOUS ONCE
Status: COMPLETED | OUTPATIENT
Start: 2022-02-08 | End: 2022-02-08

## 2022-02-08 RX ORDER — VITAMIN B COMPLEX
1000 TABLET ORAL DAILY
Status: DISCONTINUED | OUTPATIENT
Start: 2022-02-09 | End: 2022-02-10 | Stop reason: HOSPADM

## 2022-02-08 RX ORDER — NICOTINE POLACRILEX 4 MG
15-30 LOZENGE BUCCAL
Status: DISCONTINUED | OUTPATIENT
Start: 2022-02-08 | End: 2022-02-10 | Stop reason: HOSPADM

## 2022-02-08 RX ORDER — IOPAMIDOL 755 MG/ML
100 INJECTION, SOLUTION INTRAVASCULAR ONCE
Status: COMPLETED | OUTPATIENT
Start: 2022-02-08 | End: 2022-02-08

## 2022-02-08 RX ORDER — AMLODIPINE BESYLATE 5 MG/1
5 TABLET ORAL DAILY
Status: ON HOLD | COMMUNITY
Start: 2022-01-17 | End: 2022-03-04

## 2022-02-08 RX ORDER — PANTOPRAZOLE SODIUM 40 MG/1
40 TABLET, DELAYED RELEASE ORAL DAILY
Status: DISCONTINUED | OUTPATIENT
Start: 2022-02-08 | End: 2022-02-09

## 2022-02-08 RX ORDER — UBIDECARENONE 75 MG
CAPSULE ORAL
COMMUNITY
Start: 2021-09-13 | End: 2022-02-09

## 2022-02-08 RX ORDER — AMOXICILLIN 250 MG
1 CAPSULE ORAL 2 TIMES DAILY PRN
Status: DISCONTINUED | OUTPATIENT
Start: 2022-02-08 | End: 2022-02-10 | Stop reason: HOSPADM

## 2022-02-08 RX ORDER — TRAZODONE HYDROCHLORIDE 50 MG/1
50 TABLET, FILM COATED ORAL AT BEDTIME
Status: DISCONTINUED | OUTPATIENT
Start: 2022-02-08 | End: 2022-02-10 | Stop reason: HOSPADM

## 2022-02-08 RX ORDER — GABAPENTIN 300 MG/1
1200 CAPSULE ORAL 3 TIMES DAILY
Status: DISCONTINUED | OUTPATIENT
Start: 2022-02-08 | End: 2022-02-10 | Stop reason: HOSPADM

## 2022-02-08 RX ORDER — ONDANSETRON 2 MG/ML
4 INJECTION INTRAMUSCULAR; INTRAVENOUS EVERY 6 HOURS PRN
Status: DISCONTINUED | OUTPATIENT
Start: 2022-02-08 | End: 2022-02-10 | Stop reason: HOSPADM

## 2022-02-08 RX ORDER — DEXTROSE MONOHYDRATE 25 G/50ML
25-50 INJECTION, SOLUTION INTRAVENOUS
Status: DISCONTINUED | OUTPATIENT
Start: 2022-02-08 | End: 2022-02-10 | Stop reason: HOSPADM

## 2022-02-08 RX ORDER — ACETAMINOPHEN 325 MG/1
650 TABLET ORAL EVERY 6 HOURS PRN
Status: DISCONTINUED | OUTPATIENT
Start: 2022-02-08 | End: 2022-02-10 | Stop reason: HOSPADM

## 2022-02-08 RX ORDER — LEVOTHYROXINE SODIUM 200 UG/1
200 TABLET ORAL DAILY
Status: DISCONTINUED | OUTPATIENT
Start: 2022-02-09 | End: 2022-02-10 | Stop reason: HOSPADM

## 2022-02-08 RX ORDER — AMOXICILLIN 250 MG
2 CAPSULE ORAL 2 TIMES DAILY PRN
Status: DISCONTINUED | OUTPATIENT
Start: 2022-02-08 | End: 2022-02-10 | Stop reason: HOSPADM

## 2022-02-08 RX ADMIN — ALBUTEROL SULFATE 4 PUFF: 90 AEROSOL, METERED RESPIRATORY (INHALATION) at 10:38

## 2022-02-08 RX ADMIN — DEXAMETHASONE SODIUM PHOSPHATE 10 MG: 10 INJECTION, SOLUTION INTRAMUSCULAR; INTRAVENOUS at 10:35

## 2022-02-08 RX ADMIN — SODIUM CHLORIDE: 900 INJECTION, SOLUTION INTRAVENOUS at 17:26

## 2022-02-08 RX ADMIN — INSULIN ASPART 2 UNITS: 100 INJECTION, SOLUTION INTRAVENOUS; SUBCUTANEOUS at 18:04

## 2022-02-08 RX ADMIN — SODIUM CHLORIDE 250 ML: 9 INJECTION, SOLUTION INTRAVENOUS at 17:25

## 2022-02-08 RX ADMIN — FLUTICASONE FUROATE AND VILANTEROL TRIFENATATE 1 PUFF: 100; 25 POWDER RESPIRATORY (INHALATION) at 21:28

## 2022-02-08 RX ADMIN — IOPAMIDOL 100 ML: 755 INJECTION, SOLUTION INTRAVENOUS at 13:15

## 2022-02-08 RX ADMIN — GABAPENTIN 1200 MG: 300 CAPSULE ORAL at 21:28

## 2022-02-08 RX ADMIN — ENOXAPARIN SODIUM 40 MG: 40 INJECTION SUBCUTANEOUS at 21:28

## 2022-02-08 RX ADMIN — AZITHROMYCIN MONOHYDRATE 500 MG: 500 INJECTION, POWDER, LYOPHILIZED, FOR SOLUTION INTRAVENOUS at 12:37

## 2022-02-08 RX ADMIN — CEFTRIAXONE SODIUM 1 G: 1 INJECTION, POWDER, FOR SOLUTION INTRAMUSCULAR; INTRAVENOUS at 11:55

## 2022-02-08 RX ADMIN — CALCIUM GLUCONATE 1 G: 20 INJECTION, SOLUTION INTRAVENOUS at 15:41

## 2022-02-08 RX ADMIN — TRAZODONE HYDROCHLORIDE 50 MG: 50 TABLET ORAL at 21:29

## 2022-02-08 RX ADMIN — BARICITINIB 4 MG: 2 TABLET, FILM COATED ORAL at 18:04

## 2022-02-08 RX ADMIN — SODIUM BICARBONATE 50 MEQ: 84 INJECTION INTRAVENOUS at 15:41

## 2022-02-08 RX ADMIN — CALCIUM GLUCONATE 1 G: 20 INJECTION, SOLUTION INTRAVENOUS at 18:04

## 2022-02-08 RX ADMIN — FUROSEMIDE 40 MG: 10 INJECTION, SOLUTION INTRAVENOUS at 18:04

## 2022-02-08 ASSESSMENT — ACTIVITIES OF DAILY LIVING (ADL)
ADLS_ACUITY_SCORE: 10

## 2022-02-08 NOTE — H&P
[facility]    History and Physical - Hospitalist Service, [facility]       Date of Admission:  2/8/2022    Assessment & Plan   No Yusuf is a 62 year old female admitted on 2/8/2022. She has a history of DM2, HTN, HLD, bipolar, fibromyalgia, hx polysubstance use (on methadone), hypothyroidism, chronic hep C and is admitted for acute hypoxic respiratory failure due to COVID-19 pneumonia.    # Confirmed COVID-19 infection    # Acute Hypoxic Respiratory Failure secondary to COVID-19 infection   Patient presented with SOB   Symptom Onset 2/2/2022   Date of 1st Positive Test 2/2/2022   Vaccination Status Fully Vaccinated, not boosted       - COVID-19 special precautions, continuous pulse-ox  - Oxygen: continue current support with BiPAP with transition to HFNC as able; titrate to keep SpO2 between 90-96%  - Labs: Daily COVID labs ordered x4 days (CBC, BMP, D-dimer, CRP).  Continue to trend after 4 days as needed.   - Imaging: no additional imaging needed at this time  - Breathing treatments:   PTA levalbuterol PRN, PTA advair; avoid nebulizers in favor of MDIs   - IV fluids: ordered at a rate of 100 mL/hr; hydrate cautiously to avoid worsening respiratory status with volume overload.  - Antibiotics: ceftriaxone 2g IV daily   - COVID-Focused Medications: Dexamethasone 6 mg x 10 days or until hospital discharge, started on 2/8 and Baracitinib x 14 days or until hospital discharge, started on 2/8  - DVT Prophylaxis: at high risk of thrombotic complications due to COVID-19 (DDimer = 0.48 ug/mL FEU (Ref range: 0.00 - 0.50 ug/mL FEU) ).          - PROPHYLACTIC dosing: lovenox 40mg daily         - consider anticoag on discharge for 30 days & until return to normal mobility       #Concern for UTI  Patient asymptomatic with UA positive for nitrite, LE, bacteria and WBCs.  -follow-up 2/8 blood and urine culture  -ceftriaxone (2/8- )    #Hyperkalemia  Potassium up to 6.1 on  admission. No EKG changes x2. s/p 1g Calcium in ED.   -K q2h  -NS @100ml/h  -lasix 40mg IV BID  -hold PTA PO lasix  -hold PTA potassium supplement  -if develops EKG changes, will treat with insulin (10U regular)+ dextrose    #DM2  Last a1c 6.5 on 5/27/2021.  -a1c  -moderate carb diet  -hypoglycemia protocol  -detemir 10U daily (15U daily at home)  -MISS  -hold PTA metformin      --Chronic-----------------------------------------------------------------  #Depression/ anxiety  #Mood disorder  -PTA duloxetine 60mg daily  -PTA hydroxyzine 100mg q6h   -PTA trazodone 50mg at bedtime  #Hx polysubstance use disorder  -PTA methadone  #Fibromyalgia  -PTA gabapentin 1200mg TID  -PTA diclofenac gel  #Hypothyroidism  -PTA levothyroxine 200mcg daily  #GERD  -PTA omeprazole 20mg daily  #HLD  -PTA statin  #Vitmain D deficiency  -PTA calcium, vitamin D         Diet: Moderate Consistent Carb (60 g CHO per Meal) Diet  DVT Prophylaxis: Enoxaparin (Lovenox) SQ  Valle Catheter: Not present  Fluids: PO  Central Lines: None  Cardiac Monitoring: None  Code Status: Full Code      Clinically Significant Risk Factors Present on Admission        # Hyperkalemia: K = 6.1 mmol/L (Ref range: 3.4 - 5.3 mmol/L) on admission, will monitor as appropriate  # Hyponatremia: Na = 132 mmol/L (Ref range: 133 - 144 mmol/L); 137 mmol/L (Ref range: 133 - 144 mmol/L) on admission, will monitor as appropriate             Disposition Plan   Expected Discharge:    Anticipated discharge location:  Awaiting care coordination huddle  Delays:          The patient's care was discussed with the Attending Physician, Dr. Menendez, seen by Dr. Herron.    Micheal Stark MD  Hospitalist Service, [place] TEAM   [facility]  Securely message with the Vocera Web Console (learn more here)  Text page via [email or web address] Paging/Directory   Please see signed in provider for up to date coverage  information    ______________________________________________________________________    Chief Complaint   SOB    History is obtained from the patient, group home and EMR    History of Present Illness   No Yusuf is a 62 year old female admitted on 2/8/2022. She has a history of DM2, HTN, HLD, bipolar, fibromyalgia, hx polysubstance use (on methadone), hypothyroidism, chronic hep C and is admitted for acute hypoxic respiratory failure due to COVID-19 pneumonia.    Patient reports being in her normal state of health until she tested positive for COVID-19 on 2/2. She reports having worsening SOB throughout the week and intermittent cough productive of nonbloody sputum. She also reports sore throat and intermittent headache that has improved. Per discussion with group home, patient was found to have O2 to 60s prior to admission.      ED COURSE:  Vitals: afebrile, HR peak 110s, BP 110s/90s, RR 20s, O2 low 90s on BIPAP  Workup:   -COVID-19 positive, VBG pH 7.37, pCO2 54, pO2 33 bicarb 31  -Lactate 1.2, WBC 4.1, CRP 77, procal 0.07  -Na 132, K 5.8->6.1  -Trop 81, EKG sinus, no ST changes x2,   -UA with nitrites, leuks, bacteria, WBCs   -CTPE negative for acute PE with diffuse patchy opacities consistent with COVID-19  Interventions: CTX 1g x1, azithromycin 500mg x1, decadron 10mg x1, albuterol, calcium 1g IV    Review of Systems    The 10 point Review of Systems is negative other than noted in the HPI or here.    Past Medical History    I have reviewed this patient's medical history and updated it with pertinent information if needed.   Past Medical History:   Diagnosis Date     Arthritis      Bipolar affective (H)      Fibromyalgia      Hypertension       Past Surgical History   I have reviewed this patient's surgical history and updated it with pertinent information if needed.  Past Surgical History:   Procedure Laterality Date     CHOLECYSTECTOMY       GYN SURGERY      c section     GYN SURGERY       oblation     ORTHOPEDIC SURGERY      left leg, left shoulder, back      Social History   I have reviewed this patient's social history and updated it with pertinent information if needed. No Yusuf  reports that she has quit smoking. She smoked 0.10 packs per day. She has never used smokeless tobacco. She reports that she does not drink alcohol and does not use drugs.    Family History   I have reviewed this patient's family history and updated it with pertinent information if needed.  Family History   Problem Relation Age of Onset     Heart Disease Mother      Diabetes Mother      Ovarian Cancer Mother      Heart Disease Father      Lung Cancer Father      Diabetes Sister      Ovarian Cancer Sister      Diabetes Brother        Prior to Admission Medications   Prior to Admission Medications   Prescriptions Last Dose Informant Patient Reported? Taking?   ACCU-CHEK RAFAELA PLUS test strip   Yes No   Alcohol Swabs (ALCOHOL WIPES) 70 % PADS   Yes No   Sig: USE WITH INSULIN INJECTION ONCE DAILY. **100 DAYS SUPPLY**   Blood Glucose Calibration (ACCU-CHEK ACTIVE GLUCOSE CONT VI)   Yes No   Si each by Other route   DULoxetine (CYMBALTA) 60 MG capsule   Yes No   Sig: Take 60 mg by mouth daily   NARCAN 4 MG/0.1ML nasal spray   Yes No   Sig: CALL 911. SPRAY CONTENTS OF ONE SPRAYER (0.1ML) INTO ONE NOSTRIL. REPEAT IN 2-3 MINS IF SYMPTOMS OF OPIOID PERSIST, ALTERNATE NOSTRILS   VITAMIN D3 25 MCG (1000 UT) tablet   Yes No   acetaminophen (TYLENOL) 500 MG tablet   Yes No   Sig: Take 500 mg by mouth 3 times daily    amLODIPine (NORVASC) 5 MG tablet   Yes No   amoxicillin-clavulanate (AUGMENTIN) 875-125 MG tablet   No No   Sig: Take 1 tablet by mouth 3 times daily   atorvastatin (LIPITOR) 20 MG tablet   Yes No   Sig: Take 20 mg by mouth daily   blood glucose (ACCU-CHEK RAFAELA PLUS) test strip   Yes No   Sig: Use to check blood sugar 3x daily or as directed.   blood glucose calibration (ACCU-CHEK RAFAELA) solution   Yes No    calcium carbonate (OS-RODOLFO) 1500 (600 Ca) MG tablet   Yes No   Sig: Take 600 mg by mouth daily   cholecalciferol 25 MCG (1000 UT) TABS   Yes No   Sig: Take 1,000 Units by mouth daily    cyanocobalamin (VITAMIN B-12) 100 MCG tablet   Yes No   diclofenac (VOLTAREN) 1 % topical gel   No No   Sig: Apply 4 g topically 4 times daily   Patient taking differently: Apply 2-4 g topically 2 times daily    doxycycline hyclate (VIBRAMYCIN) 100 MG capsule   No No   Sig: Take 1 capsule (100 mg) by mouth every 12 hours   fluticasone-salmeterol (ADVAIR) 250-50 MCG/DOSE inhaler   Yes No   Sig: Inhale 1 puff into the lungs 2 times daily   furosemide (LASIX) 20 MG tablet   No No   Sig: Take 3 tablets (60 mg) by mouth daily   gabapentin (NEURONTIN) 400 MG capsule   Yes No   Sig: Take 1,200 mg by mouth 3 times daily   glecaprevir-pibrentasvir (MAVYRET) 100-40 MG per tablet   No No   Sig: Take 3 tablets by mouth daily   hydrOXYzine (ATARAX) 50 MG tablet   Yes No   Sig: Take 100 mg by mouth every 6 hours as needed for anxiety   insulin detemir (LEVEMIR PEN) 100 UNIT/ML pen   No No   Sig: Inject 15 Units Subcutaneous every morning   levalbuterol (XOPENEX HFA) 45 MCG/ACT inhaler   Yes No   Sig: Inhale 2 puffs into the lungs every 4 hours as needed for shortness of breath / dyspnea or wheezing    levothyroxine (SYNTHROID/LEVOTHROID) 200 MCG tablet   Yes No   Sig: Take 200 mcg by mouth daily    melatonin 5 MG tablet   Yes No   Sig: Take 5 mg by mouth At Bedtime   metFORMIN (GLUCOPHAGE) 1000 MG tablet   Yes No   Sig: Take 1,000 mg by mouth 2 times daily (with meals)   metFORMIN (GLUCOPHAGE) 500 MG tablet   Yes No   methadone HCl 10 MG/5ML SOLN   Yes No   Sig: Take 80 mg by mouth daily 10mg/mL strength   multivitamin w/minerals (MULTI-VITAMIN) tablet   Yes No   Sig: Take 1 tablet by mouth daily   nicotine polacrilex (NICORETTE) 4 MG gum   Yes No   Sig: Place 4 mg inside cheek every hour as needed for smoking cessation    omeprazole (PRILOSEC)  20 MG DR capsule   Yes No   Sig: Take 20 mg by mouth daily    polyethylene glycol (MIRALAX) 17 GM/Dose powder   No No   Sig: Take 17 g by mouth daily as needed for constipation   potassium chloride ER (KLOR-CON) 8 MEQ CR tablet   Yes No   Sig: Take 8 mEq by mouth 2 times daily   traZODone (DESYREL) 50 MG tablet   Yes No   Sig: Take 50 mg by mouth At Bedtime   vitamin B-12 (CYANOCOBALAMIN) 1000 MCG tablet   Yes No   Sig: Take 1,000 mcg by mouth daily      Facility-Administered Medications: None     Allergies   Allergies   Allergen Reactions     Promethazine Other (See Comments) and Anxiety     Noted in 8/30/08 ER     Codeine Phosphate GI Disturbance     Lamotrigine Other (See Comments)     hyponatremia     Oxcarbazepine Unknown and Other (See Comments)     Low sodium  LegacyRecord#[id number]       Codeine Other (See Comments) and Rash     GI bleeding  LegacyRecord#[id number]         Physical Exam   Vital Signs:     BP: 108/69 Pulse: 88   Resp: 22 SpO2: 99 % O2 Device: High Flow Nasal Cannula (HFNC) Oxygen Delivery: 50 LPM  Weight: 0 lbs 0 oz    General Appearance: NAD, laying comfortably in bed on BIPAP  Eyes: no conjunctival erythema  HEENT: mucous membranes dry, clear voice  Respiratory: nonlabored breathing, diffucult auscultation over bipap  Cardiovascular: tachycardic, no murmurs appreciated, noncyanotic  GI: soft, mildly tender LLQ, no rebound or guarding  Skin: warm, dry  Musculoskeletal: moves all 4 extremities  Neurologic: alert, oriented, normal gross sensation  Psychiatric: mood and affect appropriate    Data   Data reviewed today: I reviewed all medications, new labs and imaging results over the last 24 hours. I personally reviewed the EKG tracing showing sinus rhythm, no ST changes and the chest CT image(s) showing COVID-19 pneumonia.    4.1    \    12.9; 13.3    /    155   N 53    L N/A    132 (L); 137    101    15 /   ------------------------------------ 110 (H); 116 (H)   ALT 55 (H)   AST N/A      ALB  2.6 (L)   Ca 8.8  6.1 (HH)    28    1.03 \    % RETIC N/A    LDH N/A  Troponin N/A    BNP N/A    CK N/A  INR N/A   PTT N/A    D-dimer 0.48    Fibrinogen N/A    Antithrombin N/A  Ferritin N/A  CRP 77.0 (H)    IL-6 N/A  Recent Results (from the past 24 hour(s))   XR Chest Port 1 View    Narrative    Exam: XR CHEST PORT 1 VIEW, 2/8/2022 10:57 AM    Indication: covid, hypoxia, eval for pneumonia    Comparison: 12/12/2021 chest x-ray    Findings:   Semiupright, AP view the chest. Low lung volumes. Midline trachea.  Cardiac silhouette is not well delineated. Diffuse reticular and  groundglass opacities throughout the lungs. No pneumothorax or pleural  effusion.      Impression    Impression: Patchy opacities throughout the lungs, compatible with  patient's known Covid-19 infection.    CT Chest Pulmonary Embolism w Contrast    Narrative    EXAM: CTA pulmonary angiogram, 2/8/2022 1:35 PM    HISTORY: PE suspected, high prob; shortness of breath, known covid,  worsening hypoxia    COMPARISON: 2/8/2022 chest x-ray, 6/10/2020 chest CT.    TECHNIQUE: Volumetric CT images obtained through the chest with  contrast. Coronal and axial MIP reformatted images obtained.  Three-dimensional (3D) post-processed angiographic images were  reconstructed, archived to PACS and used in interpretation of this  study.     CONTRAST: iopamidol (ISOVUE-370) solution 100 mL ml isovue 370 IV.    FINDINGS:     Vascular: There is good contrast opacification of the pulmonary  arterial vasculature. No pulmonary embolus.  Heart is normal size  without pericardial effusion. No evidence of right heart strain or  elevated right heart pressures.    Remaining Chest: Central tracheobronchial tree is patent. Diffuse  groundglass and reticular opacities with mosaic attenuation within the  lung apices that display relative subpleural sparing. No pleural  effusion or pneumothorax. No focal airspace consolidation.    Upper Abdomen: No acute findings within the  partially visualized upper  abdomen. Diffuse hepatic steatosis. Fatty atrophy of the pancreas.    Bones/Soft Tissues: No suspicious soft tissue lesions.      Impression    IMPRESSION:   1. Exam is negative for acute pulmonary embolism. No evidence of right  heart strain or increased right heart pressures.   2. Patchy groundglass and consolidative opacities throughout the  lungs, compatible with patient's known Covid-19 infection.

## 2022-02-08 NOTE — ED NOTES
Notified Resident at 1715 regarding lab results & hypotension.     Orders were not obtained.    Comments: Pt's K+ 6.4, BP also soft 89/56 (within notifying parameters).

## 2022-02-08 NOTE — ED TRIAGE NOTES
Pt BIBA from group home with O2 in 60% on RA. Pt diagnosed with COVID on 2/2. Currently on 15 LPM oxymask w/sats in the 90s. PMHX BPD & DM.

## 2022-02-08 NOTE — ED PROVIDER NOTES
[facility] EMERGENC[facility] DE[facility]MENT ([facility])  2/08/22  History     Chief Complaint   Patient presents with     Shortness of Breath     The history is provided by the patient and medical records.     No Yusuf is a 62 year old female with a past medical history significant for polysubstance abuse (cocaine, methamphetamines, opioids, tobacco and now on methadone), type 2 diabetes mellitus, chronic hepatitis C, hypothyroidism, HTN, bipolar disorder, and recent positive COVID-19 (2/2/22) who presents to the Emergency Department from [facility] for evaluation of shortness of breath.  Per EMS, patient's oxygen saturation was 60% on room air. Patient is currently on 15 L/min O2 mask with oxygen saturation in the 89/90 percent. Patient reportedly had been having productive cough of brownish sputum for the past week with worsening shortness of breath over the past 3 days. She reports she also was having intermittent headaches. She denies significant chest pain. She reports feeling short of breath but this this improved with oxygen. She denies any history of pulmonary disease. She denies any history of cardiac disease. She denies any history of DVT or PE. She denies any leg swelling. Per EMS, the group [place] is not aware of any other residents with COVID. She had two COVID vaccinations but no booster per records. She is full code.     Past Medical History  Past Medical History:   Diagnosis Date     Arthritis      Bipolar affective (H)      Fibromyalgia      Hypertension      Past Surgical History:   Procedure Laterality Date     CHOLECYSTECTOMY       GYN SURGERY      c section     GYN SURGERY      oblation     ORTHOPEDIC SURGERY      left leg, left shoulder, back     acetaminophen (TYLENOL) 500 MG tablet  Acidophilus Lactobacillus CAPS  amLODIPine (NORVASC) 5 MG tablet  atorvastatin (LIPITOR) 20 MG tablet  cholecalciferol 25 MCG (1000 UT) TABS  diclofenac (VOLTAREN) 1 % topical gel  DULoxetine (CYMBALTA) 60  MG capsule  fluticasone-salmeterol (ADVAIR) 250-50 MCG/DOSE inhaler  furosemide (LASIX) 20 MG tablet  gabapentin (NEURONTIN) 400 MG capsule  hydrOXYzine (ATARAX) 50 MG tablet  insulin detemir (LEVEMIR PEN) 100 UNIT/ML pen  levalbuterol (XOPENEX HFA) 45 MCG/ACT inhaler  levothyroxine (SYNTHROID/LEVOTHROID) 200 MCG tablet  melatonin 5 MG tablet  metFORMIN (GLUCOPHAGE) 1000 MG tablet  methadone HCl 10 MG/5ML SOLN  multivitamin w/minerals (THERA-VIT-M) tablet  nicotine polacrilex (NICORETTE) 4 MG gum  omeprazole (PRILOSEC) 20 MG DR capsule  polyethylene glycol (MIRALAX) 17 GM/Dose powder  potassium chloride ER (KLOR-CON) 8 MEQ CR tablet  traZODone (DESYREL) 50 MG tablet  vitamin B-12 (CYANOCOBALAMIN) 1000 MCG tablet  ACCU-CHEK RAFAELA PLUS test strip  Alcohol Swabs (ALCOHOL WIPES) 70 % PADS  blood glucose (ACCU-CHEK RAFAELA PLUS) test strip  Blood Glucose Calibration (ACCU-CHEK ACTIVE GLUCOSE CONT VI)  blood glucose calibration (ACCU-CHEK RAFAELA) solution  NARCAN 4 MG/0.1ML nasal spray      Allergies   Allergen Reactions     Promethazine Other (See Comments) and Anxiety     Noted in 8/30/08 ER     Codeine Phosphate GI Disturbance     Lamotrigine Other (See Comments)     hyponatremia     Oxcarbazepine Unknown and Other (See Comments)     Low sodium  LegacyRecord#[id number]       Codeine Other (See Comments) and Rash     GI bleeding  LegacyRecord#[id number]       Family History  Family History   Problem Relation Age of Onset     Heart Disease Mother      Diabetes Mother      Ovarian Cancer Mother      Heart Disease Father      Lung Cancer Father      Diabetes Sister      Ovarian Cancer Sister      Diabetes Brother      Social History   Social History     Tobacco Use     Smoking status: Former Smoker     Packs/day: 0.10     Smokeless tobacco: Never Used   Substance Use Topics     Alcohol use: No     Drug use: No      Past medical history, past surgical history, medications, allergies, family history, and social history were reviewed with  the patient. No additional pertinent items.     I have reviewed the Medications, Allergies, Past Medical and Surgical History, and Social History in the Epic system.    Review of Systems  A complete review of systems was performed with pertinent positives and negatives noted in the HPI, and all other systems negative.    Physical Exam   BP: 124/72  Pulse: 101  Resp: 22  SpO2: 90 %      Physical Exam  Vitals reviewed.   Constitutional:       Appearance: She is well-developed.   HENT:      Head: Normocephalic and atraumatic.   Eyes:      Extraocular Movements: Extraocular movements intact.      Conjunctiva/sclera: Conjunctivae normal.      Pupils: Pupils are equal, round, and reactive to light.   Cardiovascular:      Rate and Rhythm: Regular rhythm. Tachycardia present.      Pulses: Normal pulses.      Heart sounds: Normal heart sounds. No murmur heard.      Pulmonary:      Effort: Pulmonary effort is normal.      Breath sounds: No stridor.      Comments: Breath sounds are coarse. She is satting 89 percent on 15L NRB mask. She was quickly switched to HFNC. No accessory muscle use or tachypnea.   Abdominal:      General: Bowel sounds are normal. There is no distension.      Palpations: Abdomen is soft. There is no mass.      Tenderness: There is no abdominal tenderness. There is no guarding or rebound.   Musculoskeletal:         General: No swelling. Normal range of motion.      Cervical back: Normal range of motion and neck supple. No rigidity.   Skin:     General: Skin is warm and dry.      Capillary Refill: Capillary refill takes less than 2 seconds.      Findings: No rash.   Neurological:      General: No focal deficit present.      Mental Status: She is alert and oriented to person, place, and time.      GCS: GCS eye subscore is 4. GCS verbal subscore is 5. GCS motor subscore is 6.      Cranial Nerves: No cranial nerve deficit.      Sensory: No sensory deficit.      Motor: No weakness.   Psychiatric:         Mood  and Affect: Mood normal.         ED Course     At 10:20 AM the patient was seen and examined by Olivia Hayden MD in Room ED 14.        Procedures              EKG Interpretation:      Interpreted by Olivia Hayden MD  Time reviewed: 1045  Symptoms at time of EKG: shortness of breath   Rhythm: normal sinus   Rate: Normal  Axis: Normal  Ectopy: none  Conduction: normal  ST Segments/ T Waves: No ST-T wave changes  Q Waves: none  Comparison to prior: Unchanged    Clinical Impression: No evidence of acute ischemia. No signs of hyperkalemic changes.       Critical Care Addendum    My initial assessment, based on my review of prehospital provider report, review of nursing observations, review of vital signs, focused history and physical exam, established that No Yusuf has respiratory failure, which requires immediate intervention, and therefore she is critically ill.     After the initial assessment, the care team initiated multiple lab tests, initiated medication therapy with IV dexamethasone, IV antibiotics, albuterol and initiated intensive non-invasive respiratory support to provide stabilization care. Due to the critical nature of this patient, I reassessed nursing observations, vital signs, physical exam, mental status and respiratory status multiple times prior to her disposition.     Time also spent performing documentation, reviewing test results and coordination of care.     Critical care time (excluding teaching time and procedures): 45 minutes.   The medical record was reviewed and interpreted.  Current labs reviewed and interpreted.  Current images reviewed and interpreted: as below.  EKG reviewed and interpreted: as above.     Labs Ordered and Resulted from Time of ED Arrival to Time of ED Departure   COMPREHENSIVE METABOLIC PANEL - Abnormal       Result Value    Sodium 132 (*)     Potassium 5.8 (*)     Chloride 101      Carbon Dioxide (CO2) 28      Anion Gap 3      Urea Nitrogen 15       Creatinine 1.03      Calcium 8.8      Glucose 110 (*)     Alkaline Phosphatase 106      AST        ALT 55 (*)     Protein Total 8.4      Albumin 2.6 (*)     Bilirubin Total 0.4      GFR Estimate 62     LIPASE - Abnormal    Lipase 61 (*)    TROPONIN I - Abnormal    Troponin I High Sensitivity 81 (*)    CRP INFLAMMATION - Abnormal    CRP Inflammation 77.0 (*)    ROUTINE UA WITH MICROSCOPIC REFLEX TO CULTURE - Abnormal    Color Urine Yellow      Appearance Urine Slightly Cloudy (*)     Glucose Urine Negative      Bilirubin Urine Negative      Ketones Urine Negative      Specific Gravity Urine 1.015      Blood Urine Negative      pH Urine 5.0      Protein Albumin Urine Negative      Urobilinogen Urine Normal      Nitrite Urine Positive (*)     Leukocyte Esterase Urine Moderate (*)     Bacteria Urine Many (*)     Mucus Urine Present (*)     RBC Urine 3 (*)     WBC Urine 43 (*)     Squamous Epithelials Urine 2 (*)     Transitional Epithelials Urine 1     CBC WITH PLATELETS AND DIFFERENTIAL - Abnormal    WBC Count 4.1      RBC Count 4.47      Hemoglobin 12.9      Hematocrit 41.1      MCV 92      MCH 28.9      MCHC 31.4 (*)     RDW 14.6      Platelet Count 155      % Neutrophils 53      % Lymphocytes 27      % Monocytes 18      % Eosinophils 0      % Basophils 0      % Immature Granulocytes 2      NRBCs per 100 WBC 0      Absolute Neutrophils 2.2      Absolute Lymphocytes 1.1      Absolute Monocytes 0.8      Absolute Eosinophils 0.0      Absolute Basophils 0.0      Absolute Immature Granulocytes 0.1      Absolute NRBCs 0.0     ISTAT GASES ELECTROLYTES ICA GLUCOSE VENOUS POCT - Abnormal    CPB Applied No      Hematocrit POCT 39      Calcium, Ionized Whole Blood POCT 4.6      Glucose Whole Blood POCT 116 (*)     Bicarbonate Venous POCT 31 (*)     Hemoglobin POCT 13.3      Potassium POCT 5.7 (*)     Sodium POCT 137      pCO2 Venous POCT 54 (*)     pO2 Venous POCT 33      pH Venous POCT 7.37      O2 Sat, Venous POCT 60 (*)     PROCALCITONIN - Abnormal    Procalcitonin 0.07 (*)    POTASSIUM - Abnormal    Potassium 6.1 (*)    POTASSIUM - Abnormal    Potassium 6.4 (*)    HEMOGLOBIN A1C - Abnormal    Hemoglobin A1C 6.2 (*)    CRP INFLAMMATION - Abnormal    CRP Inflammation 78.0 (*)    LACTATE DEHYDROGENASE - Abnormal    Lactate Dehydrogenase 435 (*)    FERRITIN - Abnormal    Ferritin 439 (*)    PROCALCITONIN - Abnormal    Procalcitonin 0.08 (*)    MAGNESIUM - Abnormal    Magnesium 1.6 (*)    GLUCOSE BY METER - Abnormal    GLUCOSE BY METER POCT 200 (*)    GLUCOSE BY METER - Abnormal    GLUCOSE BY METER POCT 223 (*)    BLOOD GAS VENOUS - Abnormal    pH Venous 7.42      pCO2 Venous 50      pO2 Venous 67 (*)     Bicarbonate Venous 32 (*)     Base Excess/Deficit (+/-) 6.8 (*)     FIO2 95     CBC WITH PLATELETS - Abnormal    WBC Count 3.9 (*)     RBC Count 4.23      Hemoglobin 12.2      Hematocrit 38.9      MCV 92      MCH 28.8      MCHC 31.4 (*)     RDW 14.5      Platelet Count 199     CRP INFLAMMATION - Abnormal    CRP Inflammation 76.0 (*)    LACTATE DEHYDROGENASE - Abnormal    Lactate Dehydrogenase 349 (*)    FIBRINOGEN ACTIVITY - Abnormal    Fibrinogen Activity 522 (*)    COMPREHENSIVE METABOLIC PANEL - Abnormal    Sodium 136      Potassium 4.8      Chloride 101      Carbon Dioxide (CO2) 30      Anion Gap 5      Urea Nitrogen 19      Creatinine 0.96      Calcium 8.7      Glucose 192 (*)     Alkaline Phosphatase 90      AST 44      ALT 41      Protein Total 8.0      Albumin 2.4 (*)     Bilirubin Total 0.3      GFR Estimate 67     BLOOD GAS VENOUS - Abnormal    pH Venous 7.41      pCO2 Venous 52 (*)     pO2 Venous 62 (*)     Bicarbonate Venous 33 (*)     Base Excess/Deficit (+/-) 6.9 (*)     FIO2 80     GLUCOSE BY METER - Abnormal    GLUCOSE BY METER POCT 193 (*)    GLUCOSE BY METER - Abnormal    GLUCOSE BY METER POCT 175 (*)    GLUCOSE BY METER - Abnormal    GLUCOSE BY METER POCT 192 (*)    GLUCOSE BY METER - Abnormal    GLUCOSE BY METER POCT  184 (*)    CBC WITH PLATELETS - Abnormal    WBC Count 6.3      RBC Count 4.55      Hemoglobin 12.9      Hematocrit 41.1      MCV 90      MCH 28.4      MCHC 31.4 (*)     RDW 14.3      Platelet Count 252     CRP INFLAMMATION - Abnormal    CRP Inflammation 23.0 (*)    D DIMER QUANTITATIVE - Abnormal    D-Dimer Quantitative 0.51 (*)    LACTATE DEHYDROGENASE - Abnormal    Lactate Dehydrogenase 492 (*)    FIBRINOGEN ACTIVITY - Abnormal    Fibrinogen Activity 506 (*)    COMPREHENSIVE METABOLIC PANEL - Abnormal    Sodium 138      Potassium 3.9      Chloride 103      Carbon Dioxide (CO2) 31      Anion Gap 4      Urea Nitrogen 17      Creatinine 0.75      Calcium 8.8      Glucose 139 (*)     Alkaline Phosphatase 81      AST 55 (*)     ALT 36      Protein Total 8.5      Albumin 2.5 (*)     Bilirubin Total 0.4      GFR Estimate 90     GLUCOSE BY METER - Abnormal    GLUCOSE BY METER POCT 185 (*)    BLOOD GAS VENOUS - Abnormal    pH Venous 7.44 (*)     pCO2 Venous 51 (*)     pO2 Venous 46      Bicarbonate Venous 35 (*)     Base Excess/Deficit (+/-) 8.0 (*)     FIO2 100     GLUCOSE BY METER - Abnormal    GLUCOSE BY METER POCT 137 (*)    BLOOD GAS ARTERIAL - Abnormal    pH Arterial 7.47 (*)     pCO2 Arterial 47 (*)     pO2 Arterial 55 (*)     FIO2 100      Bicarbonate Arterial 34 (*)     Base Excess/Deficit (+/-) 8.9 (*)    URINE CULTURE - Abnormal    Culture >100,000 CFU/mL Escherichia coli (*)    D DIMER QUANTITATIVE - Normal    D-Dimer Quantitative 0.48     LACTIC ACID WHOLE BLOOD - Normal    Lactic Acid 1.2     NT PROBNP INPATIENT - Normal    N terminal Pro BNP Inpatient 218     INR - Normal    INR 0.89     RETICULOCYTE COUNT - Normal    % Reticulocyte 1.4      Absolute Reticulocyte 0.062     D DIMER QUANTITATIVE - Normal    D-Dimer Quantitative 0.47     PHOSPHORUS - Normal    Phosphorus 2.7     POTASSIUM - Normal    Potassium 4.7     D DIMER QUANTITATIVE - Normal    D-Dimer Quantitative 0.40     RETICULOCYTE COUNT - Normal     % Reticulocyte 1.5      Absolute Reticulocyte 0.063     INR - Normal    INR 1.01     RETICULOCYTE COUNT - Normal    % Reticulocyte 1.6      Absolute Reticulocyte 0.071     INR - Normal    INR 0.99     PROCALCITONIN - Normal    Procalcitonin <0.05     BLOOD CULTURE - Normal    Culture No growth after 1 day     BLOOD CULTURE - Normal    Culture No growth after 1 day     STREPTOCOCCUS PNEUMONIAE ANTIGEN - Normal    Streptococcus pneumoniae antigen Negative     GLUCOSE MONITOR NURSING POCT   GLUCOSE MONITOR NURSING POCT   GLUCOSE MONITOR NURSING POCT   GLUCOSE MONITOR NURSING POCT   GLUCOSE MONITOR NURSING POCT   GLUCOSE MONITOR NURSING POCT   GLUCOSE MONITOR NURSING POCT   GLUCOSE MONITOR NURSING POCT   GLUCOSE MONITOR NURSING POCT   HEPATITIS C RNA, QUANTITATIVE BY PCR WITH CONFIRMATORY REFLEX TO GENOTYPING   GLUCOSE MONITOR NURSING POCT   GLUCOSE MONITOR NURSING POCT   GLUCOSE MONITOR NURSING POCT   GLUCOSE MONITOR NURSING POCT   CBC WITH PLATELETS AND DIFFERENTIAL   RESPIRATORY AEROBIC BACTERIAL CULTURE    Culture        Value: >10 Squamous epithelial cells/low power field indicates oral contamination. Please recollect.    Gram Stain Result >10 Squamous epithelial cells/low power field      Gram Stain Result <25 PMNs/low power field      Gram Stain Result 3+ Mixed abril     MRSA MSSA PCR, NASAL SWAB       CT Chest Pulmonary Embolism w Contrast   Final Result   IMPRESSION:    1. Exam is negative for acute pulmonary embolism. No evidence of right   heart strain or increased right heart pressures.    2. Patchy groundglass and consolidative opacities throughout the   lungs, compatible with patient's known Covid-19 infection.       I have personally reviewed the examination and initial interpretation   and I agree with the findings.      SHA PAEZ MD            SYSTEM ID:  [id number]      XR Chest Port 1 View   Final Result   Impression: Patchy opacities throughout the lungs, compatible with   patient's known Covid-19  infection.       I have personally reviewed the examination and initial interpretation   and I agree with the findings.      SHA PAEZ MD            SYSTEM ID:  [id number]            Medications   atorvastatin (LIPITOR) tablet 20 mg ( Oral Automatically Held 2/15/22 0800)   Vitamin D3 (CHOLECALCIFEROL) tablet 1,000 Units ( Oral Automatically Held 2/15/22 0800)   calcium carbonate (OS-RODOLFO) tablet 600 mg ( Oral Automatically Held 2/15/22 0800)   diclofenac (VOLTAREN) 1 % topical gel 2-4 g (4 g Topical Given 2/10/22 0939)   DULoxetine (CYMBALTA) DR capsule 60 mg (60 mg Oral Not Given 2/10/22 0828)   fluticasone-vilanterol (BREO ELLIPTA) 100-25 MCG/INH inhaler 1 puff (1 puff Inhalation Given 2/9/22 0806)   gabapentin (NEURONTIN) capsule 1,200 mg (1,200 mg Oral Not Given 2/10/22 0827)   hydrOXYzine (ATARAX) tablet 100 mg (has no administration in time range)   levalbuterol (XOPENEX HFA) 45 MCG/ACT Inhaler 2 puff (has no administration in time range)   levothyroxine (SYNTHROID/LEVOTHROID) tablet 200 mcg (200 mcg Oral Not Given 2/10/22 0828)   nicotine (NICORETTE) gum 4 mg (has no administration in time range)   polyethylene glycol (MIRALAX) Packet 17 g (has no administration in time range)   potassium chloride ER (KLOR-CON) CR tablet 8 mEq ( Oral Automatically Held 2/14/22 2000)   cyanocobalamin (VITAMIN B-12) tablet 1,000 mcg ( Oral Automatically Held 2/15/22 0800)   traZODone (DESYREL) tablet 50 mg (50 mg Oral Not Given 2/9/22 2120)   glucose gel 15-30 g (has no administration in time range)     Or   dextrose 50 % injection 25-50 mL (has no administration in time range)     Or   glucagon injection 1 mg (has no administration in time range)   insulin detemir (LEVEMIR PEN) injection 10 Units (10 Units Subcutaneous Given 2/10/22 0747)   insulin aspart (NovoLOG) injection (RAPID ACTING) (1 Units Subcutaneous Not Given 2/10/22 0747)   insulin aspart (NovoLOG) injection (RAPID ACTING) (1 Units Subcutaneous Not Given 2/9/22  2124)   lidocaine 1 % 0.1-1 mL (has no administration in time range)   lidocaine (LMX4) cream (has no administration in time range)   sodium chloride (PF) 0.9% PF flush 3 mL (3 mLs Intracatheter Given 2/10/22 0822)   sodium chloride (PF) 0.9% PF flush 3 mL (has no administration in time range)   Medication instructions: Do NOT use nebulized medications (has no administration in time range)   dexamethasone PF (DECADRON) injection 6 mg (6 mg Intravenous Given 2/9/22 1440)   baricitinib (OLUMIANT) tablet 4 mg (4 mg Oral Not Given 2/10/22 0828)   acetaminophen (TYLENOL) tablet 650 mg (has no administration in time range)     Or   acetaminophen (TYLENOL) Suppository 650 mg (has no administration in time range)   senna-docusate (SENOKOT-S/PERICOLACE) 8.6-50 MG per tablet 1 tablet (has no administration in time range)     Or   senna-docusate (SENOKOT-S/PERICOLACE) 8.6-50 MG per tablet 2 tablet (has no administration in time range)   ondansetron (ZOFRAN-ODT) ODT tab 4 mg (has no administration in time range)     Or   ondansetron (ZOFRAN) injection 4 mg (has no administration in time range)   pantoprazole (PROTONIX) IV push injection 40 mg (40 mg Intravenous Given 2/10/22 0732)   methadone (DOLOPHINE) injection 5 mg (5 mg Intravenous Given 2/10/22 1014)   bisacodyl (DULCOLAX) Suppository 10 mg (has no administration in time range)   furosemide (LASIX) injection 10 mg (10 mg Intravenous Given 2/10/22 0734)   ceFEPIme (MAXIPIME) 2 g vial to attach to  ml bag for ADULTS or 50 ml bag for PEDS (2 g Intravenous New Bag 2/10/22 7694)   naloxone (NARCAN) injection 0.2 mg (has no administration in time range)     Or   naloxone (NARCAN) injection 0.4 mg (has no administration in time range)     Or   naloxone (NARCAN) injection 0.2 mg (has no administration in time range)     Or   naloxone (NARCAN) injection 0.4 mg (has no administration in time range)   azithromycin (ZITHROMAX) 500 mg in sodium chloride 0.9 % 250 mL intermittent  infusion (has no administration in time range)   vancomycin (VANCOCIN) 2,500 mg in sodium chloride 0.9 % 500 mL intermittent infusion (2,500 mg Intravenous New Bag 2/10/22 1014)   vancomycin 1500 mg in 0.9% NaCl 250 ml intermittent infusion 1,500 mg (has no administration in time range)   enoxaparin ANTICOAGULANT (LOVENOX) injection 60 mg (has no administration in time range)   dexamethasone PF (DECADRON) injection 10 mg (10 mg Intravenous Given 2/8/22 1035)   albuterol (PROVENTIL HFA/VENTOLIN HFA) inhaler (4 puffs Inhalation Given 2/8/22 1038)   azithromycin (ZITHROMAX) 500 mg in sodium chloride 0.9 % 250 mL intermittent infusion (0 mg Intravenous Stopped 2/8/22 1522)   cefTRIAXone (ROCEPHIN) 1 g vial to attach to  mL bag for ADULTS or NS 50 mL bag for PEDS (0 g Intravenous Stopped 2/8/22 1233)   iopamidol (ISOVUE-370) solution 100 mL (100 mLs Intravenous Given 2/8/22 1315)   sodium chloride (PF) 0.9% PF flush 104 mL (104 mLs Intravenous Given 2/8/22 1316)   calcium gluconate 1 g in 50 mL sodium chloride intermittent infusion (premix) (1 g Intravenous Given 2/8/22 1541)   sodium bicarbonate 8.4 % injection 50 mEq (50 mEq Intravenous Given 2/8/22 1541)   calcium gluconate 1 g in 50 mL sodium chloride intermittent infusion (premix) (1 g Intravenous Given 2/8/22 1804)   0.9% sodium chloride BOLUS (0 mLs Intravenous Stopped 2/8/22 1758)   furosemide (LASIX) injection 30 mg (30 mg Intravenous Given 2/9/22 0932)   perflutren diluted 1mL to 2mL with saline (OPTISON) diluted injection 6 mL (6 mLs Intravenous Given 2/10/22 1024)             Assessments & Plan (with Medical Decision Making)   Patient presents from her group home with known COVID-19 infection diagnosed approximately 6 days ago now with hypoxia and shortness of breath.  She was reportedly 60% on room air per EMS.  She arrives on 15 L via nonrebreather mask and is satting 89 to 90% on this.  Surprisingly she does not have any signs of increased work of  breathing with this.  She is awake and alert and answering questions appropriately.  She was quickly transitioned to high flow nasal cannula due to her hypoxia on nonrebreather.  Her saturations improved to the low 90s with this.  We did obtain a VBG which revealed a pH of 7.37 with a PCO2 of 54.  Glucose 116.  Bicarb was 31.  She is somewhat obese and may have some component of chronic hypercarbia but does not have any known diagnosis of COPD or asthma.  She reportedly though is a smoker.  On reevaluation she was falling asleep with the high flow nasal cannula on but was easily arousable.  However when she did fall asleep her oxygen saturations would dip back into the 80s.  Thus we switched her over to BiPAP with her hypercarbia and she did have improvement with this.  She was satting normally on the BiPAP at 12/8.  She reported feeling improved with this.  She remained without any increased work of breathing.  She was mildly tachycardic however the remainder of her vital signs were within normal limits.  Did have concern for the potential of worsening Covid versus bacterial superimposed pneumonia versus PE.  Also considered the potential of pneumothorax or pulmonary edema however no known cardiac disease.  EKG was obtained which did not reveal any evidence of acute ischemia.    Initial portable chest x-ray revealed patchy opacities throughout the lungs.  She was given IV dexamethasone 10 mg and we did also start IV ceftriaxone and IV azithromycin for the potential of superimposed bacterial pneumonia with her high oxygen requirements.  Did also order a CT of the chest to evaluate for PE due to her progressing need of oxygen support.  CT did not reveal any evidence of PE.  No pneumothorax or pulmonary edema.  Did show diffuse patchy opacities consistent with her COVID-19 infection.  Lactic acid is within normal limits at 1.2.  BNP is also within normal limits at 218.  Troponin was slightly elevated at 81 likely  related to demand ischemia with her severe COVID-19 infection.  She denies any chest pain at this time and EKG does not show any signs of ST changes or ischemia.  Blood cultures were obtained prior to antibiotics.  Urine and urine culture were also obtained.  Urinalysis does reveal potential evidence of UTI and this will be treated by the ceftriaxone already started.  CBC reveals a white blood cell count of 4.1 with a hemoglobin of 12.9.  CMP initially revealed a potassium of 5.8 however this was hemolyzed.  She does not have any signs of hyperkalemic changes on her EKG.  Also has normal kidney function so uncertain why she would have elevated potassium.  At this point her repeat potassium was ordered.  Patient was admitted to the medical service for [facility] bed and they were aware that we were repeating the potassium.  At this point we did not give any intervention until repeat potassium was obtained as the previous sample was hemolyzed and likely artificially elevating the potassium.  Patient was in agreement with admission.  She is also full code.  She was admitted to the medical service in stable but guarded condition as I do have concerns she may worsen as she is already requiring this much respiratory support is 6 days into her illness.    After admission, the patient's repeat potassium resulted at 6.1.  I did still have question of whether the sample could be hemolyzed and was not reported by the lab.  I called the lab personnel who stated that they did not believe it was hemolyzed.  At this point we did give the patient 1 g of calcium gluconate IV and an amp of bicarb but wanted to hold on insulin dextrose or Lasix until we did again repeat this specimen as this causes of hyperkalemia is not evident to me at this time and she has not had a history of this.  She had also received albuterol here which will help to shift the potassium with the bicarb and the calcium gluconate will stabilize the membranes while we  await repeat sample testing.  I asked the nurse to also notify the medicine team of this.  Repeat is pending currently.  Again she does not have any EKG changes suggestive of hyperkalemia.  On reevaluation she is feeling improved on the BiPAP and is hemodynamically stable at this time.    I have reviewed the nursing notes.    I have reviewed the findings, diagnosis, plan and need for follow up with the patient.    New Prescriptions    No medications on file       Final diagnoses:   Infection due to 2019 novel coronavirus   Acute respiratory failure with hypoxia and hypercapnia (H)       I, Jaqueline Mathews am serving as a trained medical scribe to document services personally performed by Olivia Hayden MD, based on the provider's statements to me.      I, Olivia Hayden MD, was physically present and have reviewed and verified the accuracy of this note documented by Jaqueline Mathews.     Olivia Hayden MD  2/8/2022   Formerly [facility] EMERGENCY DEPARTMENT     Olivia Hayden MD  02/10/22 1142

## 2022-02-08 NOTE — PROGRESS NOTES
Brief Note:     Notified by RN of low pressures at 89/56 (blood pressure cuff appropriately positioned), and critical K of 6.4.     #Hyperkalemia  #COVID-19 Infection  #Hypotension  At this time, we are balancing many conflicting needs: her potassium needs to be shifted and total body potassium needs to be removed, we would like to keep her dry for COVID infection, and we may need to fluid resuscitate with NS for hypotension.   - 250 ml bolus now of NS, followed by increased IVF to 150 ml/hr   - repeat calcium gluc now to short-term shift K intracellularly  - Give IV lasix (will start to work in about 30 minutes as calcium gluc wears off)  - repeat Potassium in 2 hours as previously planned.   - monitor for hypotension and need to repeat fluid bolus  - monitor for worsening respiratory status for need to decrease IV fluid dose.     This was discussed with bedside RN. She did voice concern about the lasix in the context of hypotension. For now, the bolus will hopefully help even a small amount. Can repeat a bolus if needed (to gravity).     We remain available to answer questions and respond emergently should BPs worsen, etc.     This will be signed out to the night team.     Maday Jean-Baptiste MD

## 2022-02-09 LAB
ALBUMIN SERPL-MCNC: 2.4 G/DL (ref 3.4–5)
ALP SERPL-CCNC: 90 U/L (ref 40–150)
ALT SERPL W P-5'-P-CCNC: 41 U/L (ref 0–50)
ANION GAP SERPL CALCULATED.3IONS-SCNC: 5 MMOL/L (ref 3–14)
AST SERPL W P-5'-P-CCNC: 44 U/L (ref 0–45)
ATRIAL RATE - MUSE: 83 BPM
BASE EXCESS BLDV CALC-SCNC: 6.8 MMOL/L (ref -7.7–1.9)
BASE EXCESS BLDV CALC-SCNC: 6.9 MMOL/L (ref -7.7–1.9)
BILIRUB SERPL-MCNC: 0.3 MG/DL (ref 0.2–1.3)
BUN SERPL-MCNC: 19 MG/DL (ref 7–30)
CALCIUM SERPL-MCNC: 8.7 MG/DL (ref 8.5–10.1)
CHLORIDE BLD-SCNC: 101 MMOL/L (ref 94–109)
CO2 SERPL-SCNC: 30 MMOL/L (ref 20–32)
CREAT SERPL-MCNC: 0.96 MG/DL (ref 0.52–1.04)
CRP SERPL-MCNC: 76 MG/L (ref 0–8)
D DIMER PPP FEU-MCNC: 0.4 UG/ML FEU (ref 0–0.5)
DIASTOLIC BLOOD PRESSURE - MUSE: NORMAL MMHG
ERYTHROCYTE [DISTWIDTH] IN BLOOD BY AUTOMATED COUNT: 14.5 % (ref 10–15)
FIBRINOGEN PPP-MCNC: 522 MG/DL (ref 170–490)
GFR SERPL CREATININE-BSD FRML MDRD: 67 ML/MIN/1.73M2
GLUCOSE BLD-MCNC: 192 MG/DL (ref 70–99)
GLUCOSE BLDC GLUCOMTR-MCNC: 175 MG/DL (ref 70–99)
GLUCOSE BLDC GLUCOMTR-MCNC: 184 MG/DL (ref 70–99)
GLUCOSE BLDC GLUCOMTR-MCNC: 192 MG/DL (ref 70–99)
GLUCOSE BLDC GLUCOMTR-MCNC: 193 MG/DL (ref 70–99)
HCO3 BLDV-SCNC: 32 MMOL/L (ref 21–28)
HCO3 BLDV-SCNC: 33 MMOL/L (ref 21–28)
HCT VFR BLD AUTO: 38.9 % (ref 35–47)
HGB BLD-MCNC: 12.2 G/DL (ref 11.7–15.7)
INR PPP: 1.01 (ref 0.85–1.15)
INTERPRETATION ECG - MUSE: NORMAL
LDH SERPL L TO P-CCNC: 349 U/L (ref 81–234)
MAGNESIUM SERPL-MCNC: 1.6 MG/DL (ref 1.8–2.6)
MCH RBC QN AUTO: 28.8 PG (ref 26.5–33)
MCHC RBC AUTO-ENTMCNC: 31.4 G/DL (ref 31.5–36.5)
MCV RBC AUTO: 92 FL (ref 78–100)
O2/TOTAL GAS SETTING VFR VENT: 80 %
O2/TOTAL GAS SETTING VFR VENT: 95 %
P AXIS - MUSE: NORMAL DEGREES
PCO2 BLDV: 50 MM HG (ref 40–50)
PCO2 BLDV: 52 MM HG (ref 40–50)
PH BLDV: 7.41 [PH] (ref 7.32–7.43)
PH BLDV: 7.42 [PH] (ref 7.32–7.43)
PLATELET # BLD AUTO: 199 10E3/UL (ref 150–450)
PO2 BLDV: 62 MM HG (ref 25–47)
PO2 BLDV: 67 MM HG (ref 25–47)
POTASSIUM BLD-SCNC: 4.8 MMOL/L (ref 3.4–5.3)
PR INTERVAL - MUSE: 154 MS
PROT SERPL-MCNC: 8 G/DL (ref 6.8–8.8)
QRS DURATION - MUSE: 84 MS
QT - MUSE: 404 MS
QTC - MUSE: 474 MS
R AXIS - MUSE: 9 DEGREES
RBC # BLD AUTO: 4.23 10E6/UL (ref 3.8–5.2)
RETICS # AUTO: 0.06 10E6/UL (ref 0.03–0.1)
RETICS/RBC NFR AUTO: 1.5 % (ref 0.5–2)
SODIUM SERPL-SCNC: 136 MMOL/L (ref 133–144)
SYSTOLIC BLOOD PRESSURE - MUSE: NORMAL MMHG
T AXIS - MUSE: 44 DEGREES
VENTRICULAR RATE- MUSE: 83 BPM
WBC # BLD AUTO: 3.9 10E3/UL (ref 4–11)

## 2022-02-09 PROCEDURE — 99233 SBSQ HOSP IP/OBS HIGH 50: CPT | Mod: GC | Performed by: FAMILY MEDICINE

## 2022-02-09 PROCEDURE — 250N000012 HC RX MED GY IP 250 OP 636 PS 637: Performed by: STUDENT IN AN ORGANIZED HEALTH CARE EDUCATION/TRAINING PROGRAM

## 2022-02-09 PROCEDURE — 85027 COMPLETE CBC AUTOMATED: CPT | Performed by: STUDENT IN AN ORGANIZED HEALTH CARE EDUCATION/TRAINING PROGRAM

## 2022-02-09 PROCEDURE — 85384 FIBRINOGEN ACTIVITY: CPT | Performed by: STUDENT IN AN ORGANIZED HEALTH CARE EDUCATION/TRAINING PROGRAM

## 2022-02-09 PROCEDURE — 36415 COLL VENOUS BLD VENIPUNCTURE: CPT | Performed by: STUDENT IN AN ORGANIZED HEALTH CARE EDUCATION/TRAINING PROGRAM

## 2022-02-09 PROCEDURE — 85379 FIBRIN DEGRADATION QUANT: CPT | Performed by: STUDENT IN AN ORGANIZED HEALTH CARE EDUCATION/TRAINING PROGRAM

## 2022-02-09 PROCEDURE — 85610 PROTHROMBIN TIME: CPT | Performed by: STUDENT IN AN ORGANIZED HEALTH CARE EDUCATION/TRAINING PROGRAM

## 2022-02-09 PROCEDURE — 120N000001 HC R&B MED SURG/OB

## 2022-02-09 PROCEDURE — 83615 LACTATE (LD) (LDH) ENZYME: CPT | Performed by: STUDENT IN AN ORGANIZED HEALTH CARE EDUCATION/TRAINING PROGRAM

## 2022-02-09 PROCEDURE — 80053 COMPREHEN METABOLIC PANEL: CPT | Performed by: STUDENT IN AN ORGANIZED HEALTH CARE EDUCATION/TRAINING PROGRAM

## 2022-02-09 PROCEDURE — 250N000011 HC RX IP 250 OP 636: Performed by: STUDENT IN AN ORGANIZED HEALTH CARE EDUCATION/TRAINING PROGRAM

## 2022-02-09 PROCEDURE — 250N000013 HC RX MED GY IP 250 OP 250 PS 637: Performed by: STUDENT IN AN ORGANIZED HEALTH CARE EDUCATION/TRAINING PROGRAM

## 2022-02-09 PROCEDURE — 85045 AUTOMATED RETICULOCYTE COUNT: CPT | Performed by: STUDENT IN AN ORGANIZED HEALTH CARE EDUCATION/TRAINING PROGRAM

## 2022-02-09 PROCEDURE — 999N000157 HC STATISTIC RCP TIME EA 10 MIN

## 2022-02-09 PROCEDURE — 94660 CPAP INITIATION&MGMT: CPT

## 2022-02-09 PROCEDURE — 82803 BLOOD GASES ANY COMBINATION: CPT | Performed by: STUDENT IN AN ORGANIZED HEALTH CARE EDUCATION/TRAINING PROGRAM

## 2022-02-09 PROCEDURE — 86140 C-REACTIVE PROTEIN: CPT | Performed by: STUDENT IN AN ORGANIZED HEALTH CARE EDUCATION/TRAINING PROGRAM

## 2022-02-09 PROCEDURE — 87902 NFCT AGT GNTYP ALYS HEP C: CPT | Performed by: STUDENT IN AN ORGANIZED HEALTH CARE EDUCATION/TRAINING PROGRAM

## 2022-02-09 RX ORDER — FUROSEMIDE 20 MG
60 TABLET ORAL DAILY
Status: DISCONTINUED | OUTPATIENT
Start: 2022-02-09 | End: 2022-02-09

## 2022-02-09 RX ORDER — MULTIPLE VITAMINS W/ MINERALS TAB 9MG-400MCG
1 TAB ORAL DAILY
COMMUNITY

## 2022-02-09 RX ORDER — FUROSEMIDE 10 MG/ML
30 INJECTION INTRAMUSCULAR; INTRAVENOUS ONCE
Status: COMPLETED | OUTPATIENT
Start: 2022-02-09 | End: 2022-02-09

## 2022-02-09 RX ORDER — FUROSEMIDE 10 MG/ML
30 INJECTION INTRAMUSCULAR; INTRAVENOUS DAILY
Status: DISCONTINUED | OUTPATIENT
Start: 2022-02-10 | End: 2022-02-10

## 2022-02-09 RX ORDER — FUROSEMIDE 20 MG
20 TABLET ORAL DAILY
Status: ON HOLD | COMMUNITY
End: 2023-12-08

## 2022-02-09 RX ORDER — LACTOBACILLUS ACIDOPHILUS 500MM CELL
1 CAPSULE ORAL DAILY
COMMUNITY

## 2022-02-09 RX ORDER — FUROSEMIDE 20 MG
60 TABLET ORAL DAILY
Status: DISCONTINUED | OUTPATIENT
Start: 2022-02-10 | End: 2022-02-09

## 2022-02-09 RX ORDER — METHADONE HYDROCHLORIDE 10 MG/ML
5 INJECTION, SOLUTION INTRAMUSCULAR; INTRAVENOUS; SUBCUTANEOUS EVERY 6 HOURS
Status: DISCONTINUED | OUTPATIENT
Start: 2022-02-09 | End: 2022-02-10

## 2022-02-09 RX ADMIN — METHADONE HYDROCHLORIDE 5 MG: 10 INJECTION, SOLUTION INTRAMUSCULAR; INTRAVENOUS; SUBCUTANEOUS at 17:51

## 2022-02-09 RX ADMIN — ENOXAPARIN SODIUM 40 MG: 40 INJECTION SUBCUTANEOUS at 08:02

## 2022-02-09 RX ADMIN — ATORVASTATIN CALCIUM 20 MG: 20 TABLET, FILM COATED ORAL at 08:03

## 2022-02-09 RX ADMIN — CEFTRIAXONE SODIUM 2 G: 2 INJECTION, POWDER, FOR SOLUTION INTRAMUSCULAR; INTRAVENOUS at 09:32

## 2022-02-09 RX ADMIN — INSULIN ASPART 2 UNITS: 100 INJECTION, SOLUTION INTRAVENOUS; SUBCUTANEOUS at 08:07

## 2022-02-09 RX ADMIN — INSULIN ASPART 1 UNITS: 100 INJECTION, SOLUTION INTRAVENOUS; SUBCUTANEOUS at 17:42

## 2022-02-09 RX ADMIN — Medication 1000 UNITS: at 08:02

## 2022-02-09 RX ADMIN — BARICITINIB 4 MG: 2 TABLET, FILM COATED ORAL at 08:02

## 2022-02-09 RX ADMIN — FUROSEMIDE 30 MG: 10 INJECTION, SOLUTION INTRAVENOUS at 09:32

## 2022-02-09 RX ADMIN — DEXAMETHASONE SODIUM PHOSPHATE 6 MG: 10 INJECTION, SOLUTION INTRAMUSCULAR; INTRAVENOUS at 14:40

## 2022-02-09 RX ADMIN — DULOXETINE HYDROCHLORIDE 60 MG: 60 CAPSULE, DELAYED RELEASE ORAL at 08:03

## 2022-02-09 RX ADMIN — ENOXAPARIN SODIUM 40 MG: 40 INJECTION SUBCUTANEOUS at 21:19

## 2022-02-09 RX ADMIN — INSULIN DETEMIR 10 UNITS: 100 INJECTION, SOLUTION SUBCUTANEOUS at 08:07

## 2022-02-09 RX ADMIN — GABAPENTIN 1200 MG: 300 CAPSULE ORAL at 08:02

## 2022-02-09 RX ADMIN — INSULIN ASPART 1 UNITS: 100 INJECTION, SOLUTION INTRAVENOUS; SUBCUTANEOUS at 11:35

## 2022-02-09 RX ADMIN — METHADONE HYDROCHLORIDE 5 MG: 10 INJECTION, SOLUTION INTRAMUSCULAR; INTRAVENOUS; SUBCUTANEOUS at 23:32

## 2022-02-09 RX ADMIN — Medication 600 MG: at 08:02

## 2022-02-09 RX ADMIN — LEVOTHYROXINE SODIUM 200 MCG: 0.2 TABLET ORAL at 08:03

## 2022-02-09 RX ADMIN — PANTOPRAZOLE SODIUM 40 MG: 40 TABLET, DELAYED RELEASE ORAL at 08:02

## 2022-02-09 RX ADMIN — Medication 1000 MCG: at 08:02

## 2022-02-09 RX ADMIN — FLUTICASONE FUROATE AND VILANTEROL TRIFENATATE 1 PUFF: 100; 25 POWDER RESPIRATORY (INHALATION) at 08:06

## 2022-02-09 ASSESSMENT — ACTIVITIES OF DAILY LIVING (ADL)
ADLS_ACUITY_SCORE: 10
ADLS_ACUITY_SCORE: 12

## 2022-02-09 NOTE — ED NOTES
Spoke with jory bran/Jessica's team regarding O2 sats and FIO2@100%..patient still 88-89% on 100%FIO2 jonn bipap.  Was wondering at what point to intubate patient. MD stated if patient sustains @ O2 sats 85% and unable to improve- to page MD and draw abg. MD notified that unable to give any oral medications due to how fast her o2 sats drop.  MD okay with not giving oral meds.

## 2022-02-09 NOTE — PROGRESS NOTES
[facility]    Progress Note - [facility] Medicine Service       Date of Admission:  2/8/2022    Today:  - Continue oxygen support with BIPAP  - Restart PTA lasix  - Follow urine culture & blood cultures  - Hep C RNA & reflex to genotype     Assessment & Plan   No Yusuf is a 62 year old female admitted on 2/8/2022. She has a history of DM2, HTN, HLD, bipolar, fibromyalgia, hx polysubstance use (on methadone), hypothyroidism, chronic hep C and is admitted for acute hypoxic respiratory failure due to COVID-19 pneumonia.     # Confirmed COVID-19 infection    # Acute Hypoxic Respiratory Failure secondary to COVID-19 infection   Patient presented with SOB   Symptom Onset 2/2/2022   Date of 1st Positive Test 2/2/2022   Vaccination Status Fully Vaccinated, not boosted         - COVID-19 special precautions, continuous pulse-ox  - Oxygen: Continue current support with BiPAP/ HFNC as able; titrate to keep SpO2 between 90-96%  - Labs: Daily COVID labs ordered x4 days (CBC, BMP, D-dimer, CRP).  Continue to trend after 4 days as needed.   - Imaging: no additional imaging needed at this time  - Breathing treatments:   PTA levalbuterol PRN, PTA advair; avoid nebulizers in favor of MDIs   - IV fluids: None  - Antibiotics: ceftriaxone 2g IV daily due to UTI concern   - COVID-Focused Medications: Dexamethasone 6 mg x 10 days or until hospital discharge, started on 2/8 and Baracitinib x 14 days or until hospital discharge, started on 2/8  - DVT Prophylaxis: at high risk of thrombotic complications due to COVID-19 (DDimer = 0.48 ug/mL FEU (Ref range: 0.00 - 0.50 ug/mL FEU) ).          - PROPHYLACTIC dosing: lovenox 40mg daily         - consider anticoag on discharge for 30 days & until return to normal mobility      #Urinary tract infection  Patient with increased urinary frequency and urgency with UA positive for nitrite, LE, bacteria and WBCs. Previously grown  Klebsiella (resistant to ampicillin only).   - Blood culture 2/8 NGTD  -ceftriaxone (2/8- ), last dose planned for 2/10   - Follow urine cultures    #DM2  Last a1c 6.5 on 5/27/2021, on admission 6.2.   -moderate carb diet  -hypoglycemia protocol  -detemir 10U daily (15U daily at home)  -MISS  -hold PTA metformin     --Chronic-----------------------------------------------------------------  #History of Hepatitis C  Appears to have been treated with Mayvret last fall. Cannot find confirmation of SVR.  - Check HCV RNA w/ reflex to genotype  #Depression/ anxiety  #Mood disorder  -PTA duloxetine 60mg daily  -PTA hydroxyzine 100mg q6h   -PTA trazodone 50mg at bedtime  #Hx polysubstance use disorder  -PTA methadone  #Fibromyalgia  -PTA gabapentin 1200mg TID  -PTA diclofenac gel  #Hypothyroidism  -PTA levothyroxine 200mcg daily  #GERD  -PTA omeprazole 20mg daily  #HLD  -PTA statin  #Vitmain D deficiency  -PTA calcium, vitamin D  _____________________________________________  Resolved:   #Hyperkalemia  Potassium up to 6.1 on admission. No EKG changes x2. s/p 1g Calcium in ED. K normalized 2/8 PM.   - BMP daily   - Resume PTA PO lasix 60 mg daily   - Hold PTA potassium supplement, consider restarting 2/10         Diet: Moderate Consistent Carb (60 g CHO per Meal) Diet    DVT Prophylaxis: Enoxaparin (Lovenox) SQ  Valle Catheter: Not present  Fluids: PO  Central Lines: None  Cardiac Monitoring: None  Code Status: Full Code      Disposition Plan   Expected Discharge: 02/16/2022  Anticipated discharge location:  Awaiting care coordination huddle  Delays: Currently on high oxygen needs       The patient's care was discussed with the Attending Physician, Dr. Herron.    Elsie Hunter, DO  Family Medicine PGY-2  Pronouns: She/[facility]  Securely message with the i[employer] Web Console (learn more here)  Text page via [facility] Paging/Directory   Please see signed in provider for up to date  "coverage information    ______________________________________________________________________    Interval History     Overnight went from HFNC 55L 100% fiO2 to BIPAP FiO2 95%, now weaned down to 80%.   Today seen at bedside, feeling \"okay\" still very fatigued feeling. Comfortable work of breathing on BIPAP.   She notes she has had urinary frequency and urgency for the past week.   Per conversation with RN this morning trailed HFNC which was not successful.   Contacted by research coordinator, patient may be eligible for COVID-19 specific study they will talk to the patient today.     4 point ROS negative unless noted here or HPI.     Data reviewed today: I reviewed all medications, new labs and imaging results over the last 24 hours.     Physical Exam   Vital Signs: Temp: 98.2  F (36.8  C) Temp src: Oral BP: 101/73 Pulse: 67   Resp: 18 SpO2: 96 % O2 Device: BiPAP/CPAP Oxygen Delivery: 55 LPM  Weight: 0 lbs 0 oz    General: Alert and oriented, in no acute distress.    Skin: Warm and dry, no abnormalities noted.  Eyes: Extra-ocular muscles grossly intact, pupils equal.  ENT: Speech intact, nasal passages open, no hearing impairment noted.  CV: S1 S2 RRR. No murmur. No cyanosis or pallor, warm and well perfused.  Respiratory: Coarse crackles difficult to auscultate over BIPAP. No respiratory distress, no accessory muscle use.  Neuro: Comprehension intact. Gross and fine motor skills intact.   Psychiatric: Mood and affect appear normal.   Extremities: Warm, able to move all four extremities at will.    Data   Recent Labs   Lab 02/09/22  0637 02/08/22  2137 02/08/22  2032 02/08/22  1759 02/08/22  1556 02/08/22  1422 02/08/22  1027   WBC 3.9*  --   --   --   --   --  4.1   HGB 12.2  --   --   --   --   --  12.9  13.3   MCV 92  --   --   --   --   --  92     --   --   --   --   --  155   INR 1.01  --   --   --   --   --  0.89     --   --   --   --   --  132*  137   POTASSIUM 4.8  --  4.7  --  6.4*   < > " 5.8*  5.7*   CHLORIDE 101  --   --   --   --   --  101   CO2 30  --   --   --   --   --  28   BUN 19  --   --   --   --   --  15   CR 0.96  --   --   --   --   --  1.03   ANIONGAP 5  --   --   --   --   --  3   RODOLFO 8.7  --   --   --   --   --  8.8   * 223*  --  200*  --   --  110*  116*   ALBUMIN 2.4*  --   --   --   --   --  2.6*   PROTTOTAL 8.0  --   --   --   --   --  8.4   BILITOTAL 0.3  --   --   --   --   --  0.4   ALKPHOS 90  --   --   --   --   --  106   ALT 41  --   --   --   --   --  55*   AST 44  --   --   --   --   --   --    LIPASE  --   --   --   --   --   --  61*    < > = values in this interval not displayed.     Recent Results (from the past 24 hour(s))   XR Chest Port 1 View    Narrative    Exam: XR CHEST PORT 1 VIEW, 2/8/2022 10:57 AM    Indication: covid, hypoxia, eval for pneumonia    Comparison: 12/12/2021 chest x-ray    Findings:   Semiupright, AP view the chest. Low lung volumes. Midline trachea.  Cardiac silhouette is not well delineated. Diffuse reticular and  groundglass opacities throughout the lungs. No pneumothorax or pleural  effusion.      Impression    Impression: Patchy opacities throughout the lungs, compatible with  patient's known Covid-19 infection.     I have personally reviewed the examination and initial interpretation  and I agree with the findings.    SHA PAEZ MD         SYSTEM ID:  [id number]   CT Chest Pulmonary Embolism w Contrast    Narrative    EXAM: CTA pulmonary angiogram, 2/8/2022 1:35 PM    HISTORY: PE suspected, high prob; shortness of breath, known covid,  worsening hypoxia    COMPARISON: 2/8/2022 chest x-ray, 6/10/2020 chest CT.    TECHNIQUE: Volumetric CT images obtained through the chest with  contrast. Coronal and axial MIP reformatted images obtained.  Three-dimensional (3D) post-processed angiographic images were  reconstructed, archived to PACS and used in interpretation of this  study.     CONTRAST: iopamidol (ISOVUE-370) solution 100 mL ml  isovue 370 IV.    FINDINGS:     Vascular: There is good contrast opacification of the pulmonary  arterial vasculature. No pulmonary embolus.  Heart is normal size  without pericardial effusion. No evidence of right heart strain or  elevated right heart pressures.    Remaining Chest: Central tracheobronchial tree is patent. Diffuse  groundglass and reticular opacities with mosaic attenuation within the  lung apices that display relative subpleural sparing. No pleural  effusion or pneumothorax. No focal airspace consolidation.    Upper Abdomen: No acute findings within the partially visualized upper  abdomen. Diffuse hepatic steatosis. Fatty atrophy of the pancreas.    Bones/Soft Tissues: No suspicious soft tissue lesions.      Impression    IMPRESSION:   1. Exam is negative for acute pulmonary embolism. No evidence of right  heart strain or increased right heart pressures.   2. Patchy groundglass and consolidative opacities throughout the  lungs, compatible with patient's known Covid-19 infection.

## 2022-02-09 NOTE — PROGRESS NOTES
BRIEF PROGRESS NOTE:    930pm: on 55L high flow, 100% FiO2, satting low 90's  1130pm: soft BP 95/70, MAP 78  12am: back on bipap, 95% FiO2, 12/8, satting 97%  2am: per RN, pt more comfortable. Sleeping on bipap.     BP 92/61   Pulse 71   Temp 97.8  F (36.6  C) (Oral)   Resp 20   LMP 11/01/2013   SpO2 95%     Plan:  -VBG ordered  -continue to monitor resp status  -stable on bipap    Ministerio Landon, DO   PGY-3  [facility]  Pager [phone]

## 2022-02-09 NOTE — PHARMACY
Methadone Clinic Information Note    Clinic Name: [facility]  Clinic Location (city): [place]  Phone Number: [phone]  Prescriber's Name: Dr. Leo Morris    Formulation: 10 mg/mL solution  Dose: 80 mg  Take home doses: last at clinic on 2/2/22 and received 13 takeout doses until return to clinic on 2/16/22      Tejas Taylor, PharmD, BCPS  [phone]

## 2022-02-09 NOTE — PHARMACY-ADMISSION MEDICATION HISTORY
Admission Medication History Completed by Pharmacy    See [facility] Admission Navigator for allergy information, preferred outpatient pharmacy, prior to admission medications and immunization status.     Medication History Sources:     [facility] ([phone])  o RN at group [place] able to confirm current medications, but not doses. Confirmed doses either through SureScripts or by calling [facility]    [facility]  o Called to confirm methadone dose    [facility]    Surescripts    Changes made to PTA medication list (reason):    Added:   o Lactobacillus acidophilus (Nature Dangeloten) probiotic - 1 tablet three times daily    Deleted:   o Augmentin 875-125 mg --> therapy completed  o Calcium carbonate --> reported as not taking by group home  o Cyanocobalamin 100 mcg tablet --> not taking, taking 1000 mcg per fill history  o Doxycycline 100 mg capsule --> therapy completed  o glecaprevir-pibrentasvir (Mavyret) 100-40 mg --> therapy completed    Changed:   o Furosemide 60 mg by mouth daily --> 20 mg by mouth daily (per SureScript fill history and group home)    Additional Information:    See to 2/9/22 methadone note for more information    Last fill for insulin detemir (Levemir) at [facility] was in December 2021 for 15 units daily    Prior to Admission medications    Medication Sig Last Dose Taking? Auth Provider   acetaminophen (TYLENOL) 500 MG tablet Take 500 mg by mouth 3 times daily  2/8/2022 at Unknown time Yes Reported, Patient   Acidophilus Lactobacillus CAPS Take 1 tablet by mouth 3 times daily Past Week at Unknown time Yes Unknown, Entered By History   amLODIPine (NORVASC) 5 MG tablet Take 5 mg by mouth daily  2/8/2022 at Unknown time Yes Reported, Patient   atorvastatin (LIPITOR) 20 MG tablet Take 20 mg by mouth At Bedtime  Past Week at Unknown time Yes Unknown, Entered By History   cholecalciferol 25 MCG (1000 UT) TABS Take 1,000 Units by  mouth daily  Past Week at Unknown time Yes Zena Wheeler MD   diclofenac (VOLTAREN) 1 % topical gel Apply 4 g topically 4 times daily  Patient taking differently: Apply 2-4 g topically 2 times daily  Past Week at Unknown time Yes Regina Fragoso MD   DULoxetine (CYMBALTA) 60 MG capsule Take 60 mg by mouth daily 2/8/2022 at Unknown time Yes Unknown, Entered By History   fluticasone-salmeterol (ADVAIR) 250-50 MCG/DOSE inhaler Inhale 1 puff into the lungs 2 times daily Unknown at Unknown time Yes Unknown, Entered By History   furosemide (LASIX) 20 MG tablet Take 20 mg by mouth daily 2/8/2022 at Unknown time Yes Unknown, Entered By History   gabapentin (NEURONTIN) 400 MG capsule Take 1,200 mg by mouth 3 times daily 2/8/2022 at Unknown time Yes Unknown, Entered By History   hydrOXYzine (ATARAX) 50 MG tablet Take 100 mg by mouth every 6 hours as needed for anxiety Past Month at Unknown time Yes Unknown, Entered By History   insulin detemir (LEVEMIR PEN) 100 UNIT/ML pen Inject 15 Units Subcutaneous every morning Past Week at Unknown time Yes Jose Pope MD   levalbuterol (XOPENEX HFA) 45 MCG/ACT inhaler Inhale 2 puffs into the lungs every 4 hours as needed for shortness of breath / dyspnea or wheezing  Unknown at Unknown time Yes Unknown, Entered By History   levothyroxine (SYNTHROID/LEVOTHROID) 200 MCG tablet Take 200 mcg by mouth daily  2/8/2022 at Unknown time Yes Reported, Patient   melatonin 5 MG tablet Take 5 mg by mouth At Bedtime Past Week at Unknown time Yes Unknown, Entered By History   metFORMIN (GLUCOPHAGE) 1000 MG tablet Take 1,000 mg by mouth 2 times daily (with meals) 2/8/2022 at Unknown time Yes Reported, Patient   methadone HCl 10 MG/5ML SOLN Take 80 mg by mouth daily 10mg/mL strength 2/8/2022 at Unknown time Yes Reported, Patient   multivitamin w/minerals (THERA-VIT-M) tablet Take 1 tablet by mouth daily Past Week at Unknown time Yes Unknown, Entered By History   nicotine polacrilex  (NICORETTE) 4 MG gum Place 4 mg inside cheek every hour as needed for smoking cessation  2/8/2022 at Unknown time Yes Unknown, Entered By History   omeprazole (PRILOSEC) 20 MG DR capsule Take 20 mg by mouth daily  2/8/2022 at Unknown time Yes Unknown, Entered By History   polyethylene glycol (MIRALAX) 17 GM/Dose powder Take 17 g by mouth daily as needed for constipation Unknown at Unknown time Yes Jose Pope MD   potassium chloride ER (KLOR-CON) 8 MEQ CR tablet Take 8 mEq by mouth 2 times daily 2/8/2022 at Unknown time Yes Unknown, Entered By History   traZODone (DESYREL) 50 MG tablet Take 50 mg by mouth At Bedtime Past Week at Unknown time Yes Unknown, Entered By History   vitamin B-12 (CYANOCOBALAMIN) 1000 MCG tablet Take 1,000 mcg by mouth daily 2/8/2022 at Unknown time Yes Unknown, Entered By History   ACCU-CHEK RAFAELA PLUS test strip    Reported, Patient   Alcohol Swabs (ALCOHOL WIPES) 70 % PADS USE WITH INSULIN INJECTION ONCE DAILY. **100 DAYS SUPPLY**   Reported, Patient   blood glucose (ACCU-CHEK RAFAELA PLUS) test strip Use to check blood sugar 3x daily or as directed.   Reported, Patient   Blood Glucose Calibration (ACCU-CHEK ACTIVE GLUCOSE CONT VI) 1 each by Other route   Reported, Patient   blood glucose calibration (ACCU-CHEK RAFAELA) solution    Reported, Patient   NARCAN 4 MG/0.1ML nasal spray CALL 911. SPRAY CONTENTS OF ONE SPRAYER (0.1ML) INTO ONE NOSTRIL. REPEAT IN 2-3 MINS IF SYMPTOMS OF OPIOID PERSIST, ALTERNATE NOSTRILS   Reported, Patient         Date completed: 02/09/22    Medication history completed by: Tejas Taylor [facility]

## 2022-02-10 ENCOUNTER — APPOINTMENT (OUTPATIENT)
Dept: GENERAL RADIOLOGY | Facility: CLINIC | Age: 62
DRG: 177 | End: 2022-02-10
Attending: EMERGENCY MEDICINE
Payer: COMMERCIAL

## 2022-02-10 ENCOUNTER — HOSPITAL ENCOUNTER (INPATIENT)
Facility: CLINIC | Age: 62
LOS: 3 days | Discharge: ANOTHER HOSPITAL | DRG: 871 | End: 2022-02-13
Attending: INTERNAL MEDICINE | Admitting: INTERNAL MEDICINE
Payer: COMMERCIAL

## 2022-02-10 ENCOUNTER — APPOINTMENT (OUTPATIENT)
Dept: CARDIOLOGY | Facility: CLINIC | Age: 62
DRG: 177 | End: 2022-02-10
Attending: STUDENT IN AN ORGANIZED HEALTH CARE EDUCATION/TRAINING PROGRAM
Payer: COMMERCIAL

## 2022-02-10 ENCOUNTER — APPOINTMENT (OUTPATIENT)
Dept: GENERAL RADIOLOGY | Facility: CLINIC | Age: 62
DRG: 177 | End: 2022-02-10
Attending: STUDENT IN AN ORGANIZED HEALTH CARE EDUCATION/TRAINING PROGRAM
Payer: COMMERCIAL

## 2022-02-10 ENCOUNTER — APPOINTMENT (OUTPATIENT)
Dept: GENERAL RADIOLOGY | Facility: CLINIC | Age: 62
DRG: 871 | End: 2022-02-10
Attending: STUDENT IN AN ORGANIZED HEALTH CARE EDUCATION/TRAINING PROGRAM
Payer: COMMERCIAL

## 2022-02-10 VITALS
SYSTOLIC BLOOD PRESSURE: 97 MMHG | RESPIRATION RATE: 18 BRPM | BODY MASS INDEX: 47.72 KG/M2 | DIASTOLIC BLOOD PRESSURE: 70 MMHG | WEIGHT: 278 LBS | HEART RATE: 69 BPM | OXYGEN SATURATION: 99 % | TEMPERATURE: 98.6 F

## 2022-02-10 DIAGNOSIS — J96.01 ACUTE RESPIRATORY FAILURE WITH HYPOXIA (H): ICD-10-CM

## 2022-02-10 DIAGNOSIS — J12.82 PNEUMONIA DUE TO COVID-19 VIRUS: Primary | ICD-10-CM

## 2022-02-10 DIAGNOSIS — U07.1 PNEUMONIA DUE TO COVID-19 VIRUS: Primary | ICD-10-CM

## 2022-02-10 LAB
ALBUMIN SERPL-MCNC: 2.5 G/DL (ref 3.4–5)
ALP SERPL-CCNC: 81 U/L (ref 40–150)
ALT SERPL W P-5'-P-CCNC: 36 U/L (ref 0–50)
ANION GAP SERPL CALCULATED.3IONS-SCNC: 4 MMOL/L (ref 3–14)
AST SERPL W P-5'-P-CCNC: 55 U/L (ref 0–45)
BACTERIA SPT CULT: NORMAL
BASE EXCESS BLDA CALC-SCNC: 2.9 MMOL/L (ref -9–1.8)
BASE EXCESS BLDA CALC-SCNC: 8.9 MMOL/L (ref -9–1.8)
BASE EXCESS BLDV CALC-SCNC: 8 MMOL/L (ref -7.7–1.9)
BASOPHILS # BLD AUTO: 0 10E3/UL (ref 0–0.2)
BASOPHILS NFR BLD AUTO: 0 %
BILIRUB SERPL-MCNC: 0.4 MG/DL (ref 0.2–1.3)
BUN SERPL-MCNC: 17 MG/DL (ref 7–30)
CALCIUM SERPL-MCNC: 8.8 MG/DL (ref 8.5–10.1)
CHLORIDE BLD-SCNC: 103 MMOL/L (ref 94–109)
CO2 SERPL-SCNC: 31 MMOL/L (ref 20–32)
CREAT SERPL-MCNC: 0.75 MG/DL (ref 0.52–1.04)
CRP SERPL-MCNC: 23 MG/L (ref 0–8)
D DIMER PPP FEU-MCNC: 0.51 UG/ML FEU (ref 0–0.5)
EOSINOPHIL # BLD AUTO: 0 10E3/UL (ref 0–0.7)
EOSINOPHIL NFR BLD AUTO: 0 %
ERYTHROCYTE [DISTWIDTH] IN BLOOD BY AUTOMATED COUNT: 14.3 % (ref 10–15)
ERYTHROCYTE [DISTWIDTH] IN BLOOD BY AUTOMATED COUNT: 14.3 % (ref 10–15)
FIBRINOGEN PPP-MCNC: 506 MG/DL (ref 170–490)
GFR SERPL CREATININE-BSD FRML MDRD: 90 ML/MIN/1.73M2
GLUCOSE BLD-MCNC: 139 MG/DL (ref 70–99)
GLUCOSE BLDC GLUCOMTR-MCNC: 123 MG/DL (ref 70–99)
GLUCOSE BLDC GLUCOMTR-MCNC: 137 MG/DL (ref 70–99)
GLUCOSE BLDC GLUCOMTR-MCNC: 185 MG/DL (ref 70–99)
GLUCOSE BLDC GLUCOMTR-MCNC: 195 MG/DL (ref 70–99)
GLUCOSE BLDC GLUCOMTR-MCNC: 202 MG/DL (ref 70–99)
GLUCOSE BLDC GLUCOMTR-MCNC: 212 MG/DL (ref 70–99)
GRAM STAIN RESULT: NORMAL
HCO3 BLD-SCNC: 26 MMOL/L (ref 21–28)
HCO3 BLD-SCNC: 34 MMOL/L (ref 21–28)
HCO3 BLDV-SCNC: 35 MMOL/L (ref 21–28)
HCT VFR BLD AUTO: 41.1 % (ref 35–47)
HCT VFR BLD AUTO: 41.1 % (ref 35–47)
HCV RNA SERPL NAA+PROBE-ACNC: NOT DETECTED IU/ML
HGB BLD-MCNC: 12.9 G/DL (ref 11.7–15.7)
HGB BLD-MCNC: 12.9 G/DL (ref 11.7–15.7)
IMM GRANULOCYTES # BLD: 0.1 10E3/UL
IMM GRANULOCYTES NFR BLD: 1 %
INR PPP: 0.99 (ref 0.85–1.15)
LDH SERPL L TO P-CCNC: 492 U/L (ref 81–234)
LYMPHOCYTES # BLD AUTO: 1 10E3/UL (ref 0.8–5.3)
LYMPHOCYTES NFR BLD AUTO: 15 %
MCH RBC QN AUTO: 28.4 PG (ref 26.5–33)
MCH RBC QN AUTO: 28.4 PG (ref 26.5–33)
MCHC RBC AUTO-ENTMCNC: 31.4 G/DL (ref 31.5–36.5)
MCHC RBC AUTO-ENTMCNC: 31.4 G/DL (ref 31.5–36.5)
MCV RBC AUTO: 90 FL (ref 78–100)
MCV RBC AUTO: 90 FL (ref 78–100)
MONOCYTES # BLD AUTO: 0.8 10E3/UL (ref 0–1.3)
MONOCYTES NFR BLD AUTO: 12 %
MRSA DNA SPEC QL NAA+PROBE: NEGATIVE
NEUTROPHILS # BLD AUTO: 4.7 10E3/UL (ref 1.6–8.3)
NEUTROPHILS NFR BLD AUTO: 72 %
NRBC # BLD AUTO: 0 10E3/UL
NRBC BLD AUTO-RTO: 0 /100
O2/TOTAL GAS SETTING VFR VENT: 100 %
OXYHGB MFR BLD: 95 % (ref 92–100)
PCO2 BLD: 36 MM HG (ref 35–45)
PCO2 BLD: 47 MM HG (ref 35–45)
PCO2 BLDV: 51 MM HG (ref 40–50)
PH BLD: 7.47 [PH] (ref 7.35–7.45)
PH BLD: 7.48 [PH] (ref 7.35–7.45)
PH BLDV: 7.44 [PH] (ref 7.32–7.43)
PLATELET # BLD AUTO: 252 10E3/UL (ref 150–450)
PLATELET # BLD AUTO: 252 10E3/UL (ref 150–450)
PO2 BLD: 55 MM HG (ref 80–105)
PO2 BLD: 77 MM HG (ref 80–105)
PO2 BLDV: 46 MM HG (ref 25–47)
POTASSIUM BLD-SCNC: 3.9 MMOL/L (ref 3.4–5.3)
PROCALCITONIN SERPL-MCNC: <0.05 NG/ML
PROT SERPL-MCNC: 8.5 G/DL (ref 6.8–8.8)
RBC # BLD AUTO: 4.55 10E6/UL (ref 3.8–5.2)
RBC # BLD AUTO: 4.55 10E6/UL (ref 3.8–5.2)
RETICS # AUTO: 0.07 10E6/UL (ref 0.03–0.1)
RETICS/RBC NFR AUTO: 1.6 % (ref 0.5–2)
S PNEUM AG SPEC QL: NEGATIVE
SA TARGET DNA: POSITIVE
SODIUM SERPL-SCNC: 138 MMOL/L (ref 133–144)
WBC # BLD AUTO: 6.3 10E3/UL (ref 4–11)
WBC # BLD AUTO: 6.3 10E3/UL (ref 4–11)

## 2022-02-10 PROCEDURE — 999N000157 HC STATISTIC RCP TIME EA 10 MIN

## 2022-02-10 PROCEDURE — 83615 LACTATE (LD) (LDH) ENZYME: CPT | Performed by: STUDENT IN AN ORGANIZED HEALTH CARE EDUCATION/TRAINING PROGRAM

## 2022-02-10 PROCEDURE — 71045 X-RAY EXAM CHEST 1 VIEW: CPT | Mod: 26 | Performed by: RADIOLOGY

## 2022-02-10 PROCEDURE — 36415 COLL VENOUS BLD VENIPUNCTURE: CPT | Performed by: STUDENT IN AN ORGANIZED HEALTH CARE EDUCATION/TRAINING PROGRAM

## 2022-02-10 PROCEDURE — 250N000011 HC RX IP 250 OP 636: Performed by: INTERNAL MEDICINE

## 2022-02-10 PROCEDURE — 250N000011 HC RX IP 250 OP 636: Performed by: STUDENT IN AN ORGANIZED HEALTH CARE EDUCATION/TRAINING PROGRAM

## 2022-02-10 PROCEDURE — 85379 FIBRIN DEGRADATION QUANT: CPT | Performed by: STUDENT IN AN ORGANIZED HEALTH CARE EDUCATION/TRAINING PROGRAM

## 2022-02-10 PROCEDURE — 87641 MR-STAPH DNA AMP PROBE: CPT | Performed by: STUDENT IN AN ORGANIZED HEALTH CARE EDUCATION/TRAINING PROGRAM

## 2022-02-10 PROCEDURE — 93308 TTE F-UP OR LMTD: CPT | Mod: 26 | Performed by: INTERNAL MEDICINE

## 2022-02-10 PROCEDURE — 258N000003 HC RX IP 258 OP 636: Performed by: STUDENT IN AN ORGANIZED HEALTH CARE EDUCATION/TRAINING PROGRAM

## 2022-02-10 PROCEDURE — 258N000003 HC RX IP 258 OP 636: Performed by: INTERNAL MEDICINE

## 2022-02-10 PROCEDURE — 82805 BLOOD GASES W/O2 SATURATION: CPT | Performed by: INTERNAL MEDICINE

## 2022-02-10 PROCEDURE — 5A1945Z RESPIRATORY VENTILATION, 24-96 CONSECUTIVE HOURS: ICD-10-PCS | Performed by: INTERNAL MEDICINE

## 2022-02-10 PROCEDURE — 94002 VENT MGMT INPAT INIT DAY: CPT

## 2022-02-10 PROCEDURE — 250N000011 HC RX IP 250 OP 636

## 2022-02-10 PROCEDURE — 250N000009 HC RX 250: Performed by: INTERNAL MEDICINE

## 2022-02-10 PROCEDURE — 255N000002 HC RX 255 OP 636: Performed by: INTERNAL MEDICINE

## 2022-02-10 PROCEDURE — 93321 DOPPLER ECHO F-UP/LMTD STD: CPT | Mod: 26 | Performed by: INTERNAL MEDICINE

## 2022-02-10 PROCEDURE — 86140 C-REACTIVE PROTEIN: CPT | Performed by: STUDENT IN AN ORGANIZED HEALTH CARE EDUCATION/TRAINING PROGRAM

## 2022-02-10 PROCEDURE — 94660 CPAP INITIATION&MGMT: CPT

## 2022-02-10 PROCEDURE — 87205 SMEAR GRAM STAIN: CPT | Performed by: STUDENT IN AN ORGANIZED HEALTH CARE EDUCATION/TRAINING PROGRAM

## 2022-02-10 PROCEDURE — 99239 HOSP IP/OBS DSCHRG MGMT >30: CPT | Mod: GC | Performed by: FAMILY MEDICINE

## 2022-02-10 PROCEDURE — 999N000127 HC STATISTIC PERIPHERAL IV START W US GUIDANCE

## 2022-02-10 PROCEDURE — 85045 AUTOMATED RETICULOCYTE COUNT: CPT | Performed by: STUDENT IN AN ORGANIZED HEALTH CARE EDUCATION/TRAINING PROGRAM

## 2022-02-10 PROCEDURE — 200N000001 HC R&B ICU

## 2022-02-10 PROCEDURE — 999N000065 XR ABDOMEN PORT 1 VIEWS

## 2022-02-10 PROCEDURE — 999N000208 ECHOCARDIOGRAM LIMITED

## 2022-02-10 PROCEDURE — 85384 FIBRINOGEN ACTIVITY: CPT | Performed by: STUDENT IN AN ORGANIZED HEALTH CARE EDUCATION/TRAINING PROGRAM

## 2022-02-10 PROCEDURE — 250N000009 HC RX 250: Performed by: STUDENT IN AN ORGANIZED HEALTH CARE EDUCATION/TRAINING PROGRAM

## 2022-02-10 PROCEDURE — 80051 ELECTROLYTE PANEL: CPT | Performed by: STUDENT IN AN ORGANIZED HEALTH CARE EDUCATION/TRAINING PROGRAM

## 2022-02-10 PROCEDURE — 36600 WITHDRAWAL OF ARTERIAL BLOOD: CPT

## 2022-02-10 PROCEDURE — 84145 PROCALCITONIN (PCT): CPT | Performed by: STUDENT IN AN ORGANIZED HEALTH CARE EDUCATION/TRAINING PROGRAM

## 2022-02-10 PROCEDURE — 82803 BLOOD GASES ANY COMBINATION: CPT | Performed by: STUDENT IN AN ORGANIZED HEALTH CARE EDUCATION/TRAINING PROGRAM

## 2022-02-10 PROCEDURE — 93325 DOPPLER ECHO COLOR FLOW MAPG: CPT | Mod: 26 | Performed by: INTERNAL MEDICINE

## 2022-02-10 PROCEDURE — 999N000105 HC STATISTIC NO DOCUMENTATION TO SUPPORT CHARGE

## 2022-02-10 PROCEDURE — 999N000065 XR CHEST PORT 1 VIEW

## 2022-02-10 PROCEDURE — 85027 COMPLETE CBC AUTOMATED: CPT | Performed by: STUDENT IN AN ORGANIZED HEALTH CARE EDUCATION/TRAINING PROGRAM

## 2022-02-10 PROCEDURE — XW033E5 INTRODUCTION OF REMDESIVIR ANTI-INFECTIVE INTO PERIPHERAL VEIN, PERCUTANEOUS APPROACH, NEW TECHNOLOGY GROUP 5: ICD-10-PCS | Performed by: INTERNAL MEDICINE

## 2022-02-10 PROCEDURE — 87899 AGENT NOS ASSAY W/OPTIC: CPT | Performed by: STUDENT IN AN ORGANIZED HEALTH CARE EDUCATION/TRAINING PROGRAM

## 2022-02-10 PROCEDURE — 250N000013 HC RX MED GY IP 250 OP 250 PS 637: Performed by: STUDENT IN AN ORGANIZED HEALTH CARE EDUCATION/TRAINING PROGRAM

## 2022-02-10 PROCEDURE — 99291 CRITICAL CARE FIRST HOUR: CPT | Performed by: INTERNAL MEDICINE

## 2022-02-10 PROCEDURE — 250N000011 HC RX IP 250 OP 636: Performed by: EMERGENCY MEDICINE

## 2022-02-10 PROCEDURE — 71045 X-RAY EXAM CHEST 1 VIEW: CPT

## 2022-02-10 PROCEDURE — 250N000009 HC RX 250: Performed by: EMERGENCY MEDICINE

## 2022-02-10 PROCEDURE — 74018 RADEX ABDOMEN 1 VIEW: CPT | Mod: 26 | Performed by: RADIOLOGY

## 2022-02-10 PROCEDURE — 85610 PROTHROMBIN TIME: CPT | Performed by: STUDENT IN AN ORGANIZED HEALTH CARE EDUCATION/TRAINING PROGRAM

## 2022-02-10 PROCEDURE — C9113 INJ PANTOPRAZOLE SODIUM, VIA: HCPCS | Performed by: STUDENT IN AN ORGANIZED HEALTH CARE EDUCATION/TRAINING PROGRAM

## 2022-02-10 PROCEDURE — 85025 COMPLETE CBC W/AUTO DIFF WBC: CPT | Performed by: INTERNAL MEDICINE

## 2022-02-10 RX ORDER — FENTANYL CITRATE 50 UG/ML
50-100 INJECTION, SOLUTION INTRAMUSCULAR; INTRAVENOUS
Status: DISCONTINUED | OUTPATIENT
Start: 2022-02-10 | End: 2022-02-12

## 2022-02-10 RX ORDER — ATORVASTATIN CALCIUM 20 MG/1
20 TABLET, FILM COATED ORAL AT BEDTIME
Status: DISCONTINUED | OUTPATIENT
Start: 2022-02-10 | End: 2022-02-13

## 2022-02-10 RX ORDER — NOREPINEPHRINE BITARTRATE 0.02 MG/ML
.01-.6 INJECTION, SOLUTION INTRAVENOUS CONTINUOUS
Status: DISCONTINUED | OUTPATIENT
Start: 2022-02-10 | End: 2022-02-11

## 2022-02-10 RX ORDER — UREA 10 %
1000 LOTION (ML) TOPICAL DAILY
Status: DISCONTINUED | OUTPATIENT
Start: 2022-02-11 | End: 2022-02-13 | Stop reason: HOSPADM

## 2022-02-10 RX ORDER — PROPOFOL 10 MG/ML
INJECTION, EMULSION INTRAVENOUS
Status: COMPLETED
Start: 2022-02-10 | End: 2022-02-10

## 2022-02-10 RX ORDER — VITAMIN B COMPLEX
1000 TABLET ORAL DAILY
Status: DISCONTINUED | OUTPATIENT
Start: 2022-02-11 | End: 2022-02-13 | Stop reason: HOSPADM

## 2022-02-10 RX ORDER — POLYETHYLENE GLYCOL 3350 17 G/17G
17 POWDER, FOR SOLUTION ORAL DAILY PRN
Status: DISCONTINUED | OUTPATIENT
Start: 2022-02-10 | End: 2022-02-13 | Stop reason: HOSPADM

## 2022-02-10 RX ORDER — LIDOCAINE 40 MG/G
CREAM TOPICAL
Status: DISCONTINUED | OUTPATIENT
Start: 2022-02-10 | End: 2022-02-10 | Stop reason: HOSPADM

## 2022-02-10 RX ORDER — ACETAMINOPHEN 650 MG/1
650 SUPPOSITORY RECTAL EVERY 4 HOURS PRN
Status: DISCONTINUED | OUTPATIENT
Start: 2022-02-10 | End: 2022-02-13 | Stop reason: HOSPADM

## 2022-02-10 RX ORDER — AMOXICILLIN 250 MG
1 CAPSULE ORAL 2 TIMES DAILY PRN
Status: DISCONTINUED | OUTPATIENT
Start: 2022-02-10 | End: 2022-02-13 | Stop reason: HOSPADM

## 2022-02-10 RX ORDER — ETOMIDATE 2 MG/ML
30 INJECTION INTRAVENOUS ONCE
Status: COMPLETED | OUTPATIENT
Start: 2022-02-10 | End: 2022-02-10

## 2022-02-10 RX ORDER — ACETAMINOPHEN 325 MG/1
650 TABLET ORAL EVERY 4 HOURS PRN
Status: DISCONTINUED | OUTPATIENT
Start: 2022-02-10 | End: 2022-02-13 | Stop reason: HOSPADM

## 2022-02-10 RX ORDER — NICOTINE POLACRILEX 4 MG
15-30 LOZENGE BUCCAL
Status: DISCONTINUED | OUTPATIENT
Start: 2022-02-10 | End: 2022-02-11

## 2022-02-10 RX ORDER — DULOXETIN HYDROCHLORIDE 20 MG/1
60 CAPSULE, DELAYED RELEASE ORAL DAILY
Status: DISCONTINUED | OUTPATIENT
Start: 2022-02-11 | End: 2022-02-13

## 2022-02-10 RX ORDER — NICOTINE POLACRILEX 4 MG
15-30 LOZENGE BUCCAL
Status: DISCONTINUED | OUTPATIENT
Start: 2022-02-10 | End: 2022-02-13 | Stop reason: HOSPADM

## 2022-02-10 RX ORDER — PROPOFOL 10 MG/ML
5-75 INJECTION, EMULSION INTRAVENOUS CONTINUOUS
Status: DISCONTINUED | OUTPATIENT
Start: 2022-02-10 | End: 2022-02-11

## 2022-02-10 RX ORDER — AMOXICILLIN 250 MG
2 CAPSULE ORAL 2 TIMES DAILY PRN
Status: DISCONTINUED | OUTPATIENT
Start: 2022-02-10 | End: 2022-02-13 | Stop reason: HOSPADM

## 2022-02-10 RX ORDER — PROPOFOL 10 MG/ML
50 INJECTION, EMULSION INTRAVENOUS ONCE
Status: COMPLETED | OUTPATIENT
Start: 2022-02-10 | End: 2022-02-10

## 2022-02-10 RX ORDER — GABAPENTIN 400 MG/1
1200 CAPSULE ORAL 3 TIMES DAILY
Status: DISCONTINUED | OUTPATIENT
Start: 2022-02-10 | End: 2022-02-13

## 2022-02-10 RX ORDER — FUROSEMIDE 10 MG/ML
10 INJECTION INTRAMUSCULAR; INTRAVENOUS DAILY
Status: DISCONTINUED | OUTPATIENT
Start: 2022-02-10 | End: 2022-02-10 | Stop reason: HOSPADM

## 2022-02-10 RX ORDER — DEXTROSE MONOHYDRATE 25 G/50ML
25-50 INJECTION, SOLUTION INTRAVENOUS
Status: DISCONTINUED | OUTPATIENT
Start: 2022-02-10 | End: 2022-02-13 | Stop reason: HOSPADM

## 2022-02-10 RX ORDER — ONDANSETRON 2 MG/ML
4 INJECTION INTRAMUSCULAR; INTRAVENOUS EVERY 6 HOURS PRN
Status: DISCONTINUED | OUTPATIENT
Start: 2022-02-10 | End: 2022-02-13 | Stop reason: HOSPADM

## 2022-02-10 RX ORDER — NALOXONE HYDROCHLORIDE 0.4 MG/ML
0.4 INJECTION, SOLUTION INTRAMUSCULAR; INTRAVENOUS; SUBCUTANEOUS
Status: DISCONTINUED | OUTPATIENT
Start: 2022-02-10 | End: 2022-02-10 | Stop reason: HOSPADM

## 2022-02-10 RX ORDER — BISACODYL 10 MG
10 SUPPOSITORY, RECTAL RECTAL DAILY PRN
Status: DISCONTINUED | OUTPATIENT
Start: 2022-02-10 | End: 2022-02-10 | Stop reason: HOSPADM

## 2022-02-10 RX ORDER — FUROSEMIDE 20 MG
20 TABLET ORAL DAILY
Status: DISCONTINUED | OUTPATIENT
Start: 2022-02-11 | End: 2022-02-13

## 2022-02-10 RX ORDER — AMLODIPINE BESYLATE 5 MG/1
5 TABLET ORAL DAILY
Status: DISCONTINUED | OUTPATIENT
Start: 2022-02-10 | End: 2022-02-11

## 2022-02-10 RX ORDER — LEVALBUTEROL TARTRATE 45 UG/1
2 AEROSOL, METERED ORAL EVERY 4 HOURS PRN
Status: DISCONTINUED | OUTPATIENT
Start: 2022-02-10 | End: 2022-02-13

## 2022-02-10 RX ORDER — PROPOFOL 10 MG/ML
5-75 INJECTION, EMULSION INTRAVENOUS CONTINUOUS
Status: DISCONTINUED | OUTPATIENT
Start: 2022-02-10 | End: 2022-02-10 | Stop reason: HOSPADM

## 2022-02-10 RX ORDER — DEXTROSE MONOHYDRATE 25 G/50ML
25-50 INJECTION, SOLUTION INTRAVENOUS
Status: DISCONTINUED | OUTPATIENT
Start: 2022-02-10 | End: 2022-02-11

## 2022-02-10 RX ORDER — ONDANSETRON 4 MG/1
4 TABLET, ORALLY DISINTEGRATING ORAL EVERY 6 HOURS PRN
Status: DISCONTINUED | OUTPATIENT
Start: 2022-02-10 | End: 2022-02-13 | Stop reason: HOSPADM

## 2022-02-10 RX ORDER — METHADONE HYDROCHLORIDE 10 MG/ML
5 INJECTION, SOLUTION INTRAMUSCULAR; INTRAVENOUS; SUBCUTANEOUS EVERY 6 HOURS
Status: DISCONTINUED | OUTPATIENT
Start: 2022-02-10 | End: 2022-02-12

## 2022-02-10 RX ORDER — NALOXONE HYDROCHLORIDE 0.4 MG/ML
0.2 INJECTION, SOLUTION INTRAMUSCULAR; INTRAVENOUS; SUBCUTANEOUS
Status: DISCONTINUED | OUTPATIENT
Start: 2022-02-10 | End: 2022-02-10 | Stop reason: HOSPADM

## 2022-02-10 RX ORDER — DEXAMETHASONE SODIUM PHOSPHATE 10 MG/ML
6 INJECTION, SOLUTION INTRAMUSCULAR; INTRAVENOUS EVERY 24 HOURS
Status: DISCONTINUED | OUTPATIENT
Start: 2022-02-11 | End: 2022-02-13 | Stop reason: HOSPADM

## 2022-02-10 RX ORDER — CIPROFLOXACIN 2 MG/ML
400 INJECTION, SOLUTION INTRAVENOUS EVERY 12 HOURS
Status: DISCONTINUED | OUTPATIENT
Start: 2022-02-11 | End: 2022-02-12

## 2022-02-10 RX ORDER — LEVOTHYROXINE SODIUM 100 UG/1
200 TABLET ORAL DAILY
Status: DISCONTINUED | OUTPATIENT
Start: 2022-02-11 | End: 2022-02-13

## 2022-02-10 RX ADMIN — PROPOFOL 75 MCG/KG/MIN: 10 INJECTION, EMULSION INTRAVENOUS at 22:03

## 2022-02-10 RX ADMIN — PROPOFOL 50 MCG/KG/MIN: 10 INJECTION, EMULSION INTRAVENOUS at 13:30

## 2022-02-10 RX ADMIN — REMDESIVIR 200 MG: 100 INJECTION, POWDER, LYOPHILIZED, FOR SOLUTION INTRAVENOUS at 13:59

## 2022-02-10 RX ADMIN — HUMAN ALBUMIN MICROSPHERES AND PERFLUTREN 6 ML: 10; .22 INJECTION, SOLUTION INTRAVENOUS at 10:24

## 2022-02-10 RX ADMIN — PROPOFOL 80 MCG/KG/MIN: 10 INJECTION, EMULSION INTRAVENOUS at 17:16

## 2022-02-10 RX ADMIN — PROPOFOL 80 MCG/KG/MIN: 10 INJECTION, EMULSION INTRAVENOUS at 15:48

## 2022-02-10 RX ADMIN — SODIUM CHLORIDE 50 ML: 9 INJECTION, SOLUTION INTRAVENOUS at 13:59

## 2022-02-10 RX ADMIN — PANTOPRAZOLE SODIUM 40 MG: 40 INJECTION, POWDER, FOR SOLUTION INTRAVENOUS at 07:32

## 2022-02-10 RX ADMIN — PROPOFOL 50 MG: 10 INJECTION, EMULSION INTRAVENOUS at 13:22

## 2022-02-10 RX ADMIN — SUCCINYLCHOLINE CHLORIDE 200 MG: 20 INJECTION, SOLUTION INTRAMUSCULAR; INTRAVENOUS; PARENTERAL at 13:17

## 2022-02-10 RX ADMIN — Medication 100 MG: at 15:30

## 2022-02-10 RX ADMIN — PROPOFOL 80 MCG/KG/MIN: 10 INJECTION, EMULSION INTRAVENOUS at 17:17

## 2022-02-10 RX ADMIN — CEFEPIME HYDROCHLORIDE 2 G: 2 INJECTION, POWDER, FOR SOLUTION INTRAVENOUS at 09:39

## 2022-02-10 RX ADMIN — HYDROMORPHONE HYDROCHLORIDE 1 MG: 1 INJECTION, SOLUTION INTRAMUSCULAR; INTRAVENOUS; SUBCUTANEOUS at 13:27

## 2022-02-10 RX ADMIN — PROPOFOL 50 MG: 10 INJECTION, EMULSION INTRAVENOUS at 13:20

## 2022-02-10 RX ADMIN — ENOXAPARIN SODIUM 60 MG: 60 INJECTION SUBCUTANEOUS at 20:18

## 2022-02-10 RX ADMIN — METHADONE HYDROCHLORIDE 5 MG: 10 INJECTION, SOLUTION INTRAMUSCULAR; INTRAVENOUS; SUBCUTANEOUS at 20:18

## 2022-02-10 RX ADMIN — ETOMIDATE 30 MG: 2 INJECTION INTRAVENOUS at 13:17

## 2022-02-10 RX ADMIN — INSULIN DETEMIR 10 UNITS: 100 INJECTION, SOLUTION SUBCUTANEOUS at 07:47

## 2022-02-10 RX ADMIN — METHADONE HYDROCHLORIDE 5 MG: 10 INJECTION, SOLUTION INTRAMUSCULAR; INTRAVENOUS; SUBCUTANEOUS at 05:36

## 2022-02-10 RX ADMIN — Medication 100 MG: at 13:25

## 2022-02-10 RX ADMIN — AZITHROMYCIN MONOHYDRATE 500 MG: 500 INJECTION, POWDER, LYOPHILIZED, FOR SOLUTION INTRAVENOUS at 12:25

## 2022-02-10 RX ADMIN — DEXAMETHASONE SODIUM PHOSPHATE 6 MG: 10 INJECTION, SOLUTION INTRAMUSCULAR; INTRAVENOUS at 12:23

## 2022-02-10 RX ADMIN — PROPOFOL 75 MCG/KG/MIN: 10 INJECTION, EMULSION INTRAVENOUS at 20:24

## 2022-02-10 RX ADMIN — ENOXAPARIN SODIUM 40 MG: 40 INJECTION SUBCUTANEOUS at 07:39

## 2022-02-10 RX ADMIN — VANCOMYCIN HYDROCHLORIDE 2500 MG: 10 INJECTION, POWDER, LYOPHILIZED, FOR SOLUTION INTRAVENOUS at 10:14

## 2022-02-10 RX ADMIN — FUROSEMIDE 10 MG: 10 INJECTION, SOLUTION INTRAVENOUS at 07:34

## 2022-02-10 RX ADMIN — METHADONE HYDROCHLORIDE 5 MG: 10 INJECTION, SOLUTION INTRAMUSCULAR; INTRAVENOUS; SUBCUTANEOUS at 10:14

## 2022-02-10 RX ADMIN — DICLOFENAC SODIUM 4 G: 10 GEL TOPICAL at 09:39

## 2022-02-10 ASSESSMENT — ACTIVITIES OF DAILY LIVING (ADL)
ADLS_ACUITY_SCORE: 12
ADLS_ACUITY_SCORE: 16
ADLS_ACUITY_SCORE: 12
ADLS_ACUITY_SCORE: 16
ADLS_ACUITY_SCORE: 12
ADLS_ACUITY_SCORE: 22
ADLS_ACUITY_SCORE: 22

## 2022-02-10 NOTE — PROGRESS NOTES
[facility]   BRIEF PROGRESS NOTE    SUBJECTIVE  Baseline assessment for overnight  Tolerating BIPAP, states it has helped her breathing well. No acute complaints at this time.     OBJECTIVE:  /76   Pulse 75   Temp 97.1  F (36.2  C) (Oral)   Resp 18   Wt 126.1 kg (278 lb)   LMP 11/01/2013   SpO2 95%   BMI 47.72 kg/m      Exam:   General: Alert and oriented, in no acute distress.  Skin: Warm and dry, no abnormalities noted.  Eyes: Extra-ocular muscles intact, pupils equal and reactive.  ENT: Speech intact, no hearing impairment noted.  CV: No cyanosis or pallor, warm and well perfused.  Respiratory: BIPAP, FiO2 100%, IPAP/EPAP 12/8 .   Psychiatric: Mood and affect appear normal.   Extremities: Warm, able to move all four extremities at will.      ASSESSMENT/PLAN:  # Confirmed COVID-19 infection    # Acute Hypoxic Respiratory Failure secondary to COVID-19 infection     1. Continue O2 supports  2. Pending bed on floor 6    Please see daily rounding note for full A/P.  Mark Hui DO  7:21 PM

## 2022-02-10 NOTE — PHARMACY-VANCOMYCIN DOSING SERVICE
Pharmacy Vancomycin Initial Note  Date of Service February 10, 2022  Patient's  1960  62 year old, female    Indication: Community Acquired Pneumonia    Current estimated CrCl = Estimated Creatinine Clearance: 103.6 mL/min (based on SCr of 0.75 mg/dL).    Creatinine for last 3 days  2022: 10:27 AM Creatinine 1.03 mg/dL  2022:  6:37 AM Creatinine 0.96 mg/dL  2/10/2022:  6:35 AM Creatinine 0.75 mg/dL    Recent Vancomycin Level(s) for last 3 days  No results found for requested labs within last 72 hours.      Vancomycin IV Administrations (past 72 hours)      No vancomycin orders with administrations in past 72 hours.                Nephrotoxins and other renal medications (From now, onward)    Start     Dose/Rate Route Frequency Ordered Stop    02/10/22 0850  vancomycin (VANCOCIN) 2,500 mg in sodium chloride 0.9 % 500 mL intermittent infusion         2,500 mg  over 120 Minutes Intravenous ONCE 02/10/22 0849      02/10/22 0849  vancomycin place reynoso - receiving intermittent dosing         1 each Intravenous SEE ADMIN INSTRUCTIONS 02/10/22 0849      02/10/22 0800  furosemide (LASIX) injection 10 mg         10 mg  over 1-3 Minutes Intravenous DAILY 02/10/22 0534            Contrast Orders - past 72 hours (72h ago, onward)            Start     Dose/Rate Route Frequency Ordered Stop    22 1235  iopamidol (ISOVUE-370) solution 100 mL         100 mL Intravenous ONCE 22 1232 22 1315          [employer] Prediction of Planned Initial Vancomycin Regimen  Loading dose: 2500 mg at 09:30 02/10/2022.  Regimen: 1500 mg IV every 18 hours.  Start time: 08:55 on 02/10/2022  Exposure target: AUC24 (range)400-600 mg/L.hr   AUC24,ss: 512 mg/L.hr  Probability of AUC24 > 400: 68 %  Ctrough,ss: 12.4 mg/L  Probability of Ctrough,ss > 20: 30 %  Probability of nephrotoxicity (Lodise AMANDA ): 8 %          Plan:  1. Start vancomycin  2500 mg IV ONCE, then vancomycin IV 1500 mg q18 hours  2. Vancomycin  monitoring method: AUC  3. Vancomycin therapeutic monitoring goal: 400-600 mg*h/L  4. Pharmacy will check vancomycin levels as appropriate in 1-3 Days.    5. Serum creatinine levels will be ordered daily for the first week of therapy and at least twice weekly for subsequent weeks.      Tatyana Cannon, BenitezD

## 2022-02-10 NOTE — PROGRESS NOTES
Medicine Service Note:  -I was asked by [facility] service to assist in the care of this patient is rapidly deteriorating with increased oxygen requirements currently needing FiO2 of 100% on BiPAP.  -The patient has been admitted on 2/8/2022 to [facility] for further management of sepsis secondary to multidrug-resistant E. coli urinary tract infection and acute hypoxic respiratory failure secondary to COVID-19 with possible superimposed bacterial pneumonia.  The patient is adequately covered on cefepime, vancomycin, and azithromycin based on my personal review for the patient's chart.  For the treatment of the patient likely severe Covid, the patient is adequately receiving baricitinib and dexamethasone.  -Given the patient's rapid clinical deterioration, she is best served in an ICU level of care, however unfortunately at the [place] we do not have an intensive care unit bed for this patient available based on my discussion with , the medical ICU attending.  -Patient placement center were able to secure a bed with ICU level of care for this patient in [facility].  However, given oxygen requirements of FiO2 of 100%, the patient cannot be safely transported to [facility] without being intubated first.  The concerns of the primary team, adequately so, is significant reduction of the patient's blood pressure post intubation secondary to reduction of her preload secondary to positive pressure ventilation.  The primary [facility] service asked for the assistance of the undersigned in managing this patient concerning her critical illness in the setting of transition to the intensive care unit and [facility].  -I have reviewed the patient chart and based on discussion with the primary service [[facility] family medicine service], I have arranged for this patient to have the following changes in care:  -Intubation in the emergency department at the ED stabilization bay  -Running peripheral pressors through  the patient's tube peripheral lines  -Placement of PICC line in order for the patient to receive pressors through a central line once the PICC line is a stabilized, this order was placed as an ASAP order  -Ordered lactic acid in order to address any evidence of reduced tissue perfusion prior to intubation which would be associated with even worse hypotension after intubation.  -Adjusted the dose of cefepime to 2 g IV every 8 hours after discussion with with microbiology lab that confirmed the sensitivity of the E. coli to cefepime  -Initiation of remdesivir given lack of any contraindication to  -I have discussed the care of this patient extensively with the primary service, the [facility] family medicine service, Edward Smith of rapid response team, MICU attending .  -I have also ensured that the patient has a bed images and will be transported to [facility] as soon as possible.  I ensured that this is to happen with the system operation command center.`

## 2022-02-10 NOTE — ED NOTES
Pt family called and updated per patient request. Family would like update from provider as well. Provider aware. Pt able to speak with family and pray with family over the phone.

## 2022-02-10 NOTE — PROGRESS NOTES
[facility]   BRIEF PROGRESS NOTE    SUBJECTIVE  Assessed patient at bedside for O2 saturations sustained in the high 80s while on 100% BIPAP. Pt assessed at bedside with Family Medicine Attending Dr. Herron. Pt not feeling short of breath just very tired as she hadn't gotten much sleep overnight. Patient was discussed with ICU fellow and briefly with attending. ICU fellow came to bedside to reassess patient with me. Pt does report history of vaping appears to be nicotine products since this past summer. Does have tobacco smoking history prior. Quick bedside ultrasound LV/RV appears hyperdynamic but difficult windows. Pt mentating appropriately. ICU fellow discussed possibility of intubation. Pt re-iterated that she wants everything to be done, full code. ICU transfer order placed. Discussed with charge RN and RN manager regarding finding an ICU bed. Per ICU if patient needs to be intubated while in ED this is per the ED provider or anesthesia.       OBJECTIVE:  /73 (BP Location: Left arm)   Pulse 89   Temp 98.4  F (36.9  C) (Axillary)   Resp 18   Wt 126.1 kg (278 lb)   LMP 11/01/2013   SpO2 93%   BMI 47.72 kg/m      Exam:   General: Alert and oriented  Skin: Warm and dry  Eyes: Extra-ocular muscles intact, pupils equal and reactive.  ENT: Speech intact, nasal passages open  CV: S1 S2 RRR. No murmur. No cyanosis or pallor, warm and well perfused.  Respiratory: Coarse crackles bilateral bases, no wheezes. No respiratory distress, no accessory muscle use.  Neuro: Comprehension intact. Gross and fine motor skills intact.   Psychiatric: Anxious   Extremities: Warm, able to move all four extremities at will. No LE edema. DP pulses +2      ASSESSMENT/PLAN:    # Confirmed COVID-19 infection    # Acute Hypoxic Respiratory Failure secondary to COVID-19 infection   Pt needing increasing oxygen needs, has been on BIPAP 100% since yesterday afternoon. Early AM was noticed to be satting high 80s. Pt's  saturations at bedside when being evaluated remained in low 90s with normal mentation. No increased work of breathing, but overall patient is feeling fatigued. Concerned she will not be able to be sustained at this level of oxygenation much longer and next step is intubation. Discussed possibility of intubation with the patient, which she agrees to if medically necessary. Pt will need higher level of care, critical care level, than what we cannot provide on a Family Medicine Service. She needs urgent level transfer for ICU level care. At this time, does not meet criteria for sepsis, procalcitonin decreased since admission, while not likely acute bacterial infection due to worsening respiratory status will broaden antibiotics. On admission, CT PE negative, d-dimer has been normal slightly elevated 0.51 this AM (on Lovenox) with no gross RV dysfunction on limited bedside Echo this AM less likely PE. No current chest pain. Does not appear volume overloaded and did not improve with Lasix yesterday. This appears to be overall worsening of known COVID-19 pneumonia, pt already on steroids and barcitinib.   - Called daughter x2 no response, left VM   - ABG  - Limited Echo  - [place] Abx: Cefpemine, Vancomycin, Azithromycin   - MRSA nares  - Sputum culture  - Likely will need intubation   - Transfer to ICU  - Awaiting ICU bed working with charge RN and RN manager   - If starts to worsen acutely will contact anaesthesia for emergent intubation    Please see daily rounding note for full A/P.  Reva Hunter DO  8:21 AM

## 2022-02-10 NOTE — DISCHARGE SUMMARY
[facility]  Transfer Summary        Date of Admission:  2/8/2022  Date of Transfer:  2/10/2022  Discharging Provider:  Dr. Herron  Discharge Service: [facility]    Discharge Diagnoses   Acute respiratory failure with hypoxia and hypercapnia   Infection due to 2019 novel coronavirus      Follow-ups Needed After Discharge     Unresulted Labs Ordered in the Past 30 Days of this Admission     Date and Time Order Name Status Description    2/9/2022 11:09 AM Hepatitis C RNA, Quantitative by PCR with Confirmatory Reflex to Genotyping In process     2/8/2022 12:17 PM Urine Culture Preliminary     2/8/2022 10:13 AM Blood Culture Peripheral Blood Preliminary     2/8/2022 10:13 AM Blood Culture Peripheral Blood Preliminary       These results will be followed up by admitting physician.     Discharge Disposition   Transferred to intensive care at [facility].   Condition at discharge: Stable    Hospital Course   No Yusuf was admitted on 2/8/2022 - 2/10/2022 at [facility]. She has a history of DM2, HTN, HLD, bipolar, fibromyalgia, hx polysubstance use (on methadone), hypothyroidism, chronic hep C and was admitted for acute hypoxic respiratory failure due to COVID-19 pneumonia. She had escalating respiratory needs requiring intubation. Due to no available ICU bed in this facility for critical care she was transferred to [facility] ICU for ICU level care.     The following problems were addressed during her hospitalization:     # Confirmed COVID-19 infection    # Acute Hypoxic Respiratory Failure secondary to COVID-19 infection   Since admission pt has been boarding in the emergency room with tenuous respiratory status needing high levels of HFNC and BIPAP. Since 2/9 afternoon has needed BIPAP FiO2 100%. Pt's procalcitonin decreased since admission, while not likely acute bacterial infection causing worsening respiratory  status, with pt's known urinary tract infection and increasing oxygen needs broadened antibiotics. On admission, CT PE negative, d-dimer was slightly elevated 0.51 2/10 am (on Lovenox) with no gross RV dysfunction on limited bedside Echo this AM less likely PE. Based on Echo 6/2020 no evidence of underlying hx of ventricular dysfunction or valvular disease complicating the picture. Pt does not appear volume overloaded and respiratory status has not improved with Lasix. Her overall increasing oxygen needs and need for intubation this afternoon likely due to a worsening COVID-19 pneumonia. Pt does have a history of cigarette use and for the past 6 months vaping nicotine, so likely has some underlying chronic lung disease though the extent of this is not known. She also reports she has been told she likely has sleep apnea. These underlying respiratory conditions are also likely contributing to her acute respiratory failure in the context of known COVID-19 infection. For her COVID-19 infection, she has been treated while here with Remdesivir and Barcitinib. She was started on Remdesivir 2/10 PM.      Due to patient's escalating oxygen needs, attempted transfer to ICU on [facility] 2/10 AM. However, there were no available beds. Systems operations was contacted to assist in finding ICU bed. Discussed care of patient with Dr. Freeman at [facility] agreed due to pt's escalating oxygen needs it would be best for her to be intubated prior to transfer. Discussed with Dr. Sánchez from the Medicine Triage as our team was needing to transfer pt to a higher level of care than we can provide on our service. Dr. Monte assisted in managing care in setting to transition to ICU at [facility]. Our team discussed patient care with ED physician Dr. Preciado who assumed care to intubate and stabilize the patient prior to transport.     Symptom Onset 2/2/2022   Date of 1st Positive Test 2/2/2022   Vaccination Status Fully  Vaccinated, not boosted             COVID-Focused Medications:   Dexamethasone 6 mg 2/8 -  Baracitinib  2/8-   Remdesivir 2/10 -     Anticoagulation : On admission placed on Lovenox 40 mg daily for prophylaxis. Increased Lovenox to 60 mg BID on 2/10.     #Sepsis like secondary to urinary source   #Urinary tract infection  Patient with increased urinary frequency and urgency with UA positive for nitrite, LE, bacteria and WBCs. Previously grown Klebsiella (resistant to ampicillin only). Here grew >100,000 E coli, multi-drug resistant (ampicillin, amp/sul, cefazolin, cefoxitin). Microbiology lab confirmed sensitivity of E coli to cefepime. Lactate ordered, but unfortunately not gotten prior to transfer. Aggressive fluids were not given due to concern for it worsening her already tenuous respiratory status. Pt without hypotension. Pt was treated with the following antibiotics below.   - Blood culture 2/8 NGTD   - Antibiotics   - Ceftriaxone (2/8-2/9)   Antibiotics broadened on 2/10   - Cefepime 2g q8 hours IV                - Vancomycin               -  Azithromycin 500 mg IV               MRSA nares, pending     #DM2  Last a1c 6.5 on 5/27/2021, on admission 6.2. While here she was treated with Detemir 10 units daily (PTA dosing 15 units) while holding her PTA Metformin.      #Hyperkalemia, resolved  On admission, potassium up to 6.1 with no acute EKG changes. She received 1g Calcium in ED. With lasix her potassium normalized.     --Chronic Problems-----------------------------------------------------------------  #History of Hepatitis C  Appears to have been treated with Mayvret last fall. Cannot find confirmation of SVR.  -  HCV RNA w/ reflex to genotype, pending    While her her home medications had to be held due to worsening respiratory status.   #Depression/ anxiety: PTA Duloxetine 60 mg daily, Hydroxyzine 100 mg q6 hours, Trazodone 50 mg at bedtime.  #Hx polysubstance use disorder  While here unable to give her  oral methadone so was given IV methadone. Due to emergent intubation, pt was then transitioned to propofol gtt.   #Fibromyalgia: PTA Gabapentin 1200 mg TID.   #Hypothyroidism: PTA Levothyroxine 200mcg daily   #GERD: PTA Omeprazole 20 mg daily.   #HLD: PTA Lipitor 20 mg daily   #Vitmain D deficiency: PTA calcium, vitamin D    Consultations This Hospital Stay   PHARMACY TO DOSE VANCO  VASCULAR ACCESS CARE ADULT IP CONSULT  VASCULAR ACCESS FOR PICC PLACEMENT ADULT IP CONSULT    Code Status   Full Code       The patient was discussed with Dr. Germaine Hunter, DO  Family Medicine PGY-2  [facility], [facility]   Pronouns: She/Hers    [facility] EMERGENCY DEPARTMENT  [address] [facility] [place] [address]  Phone: [phone]  ______________________________________________________________________    Physical Exam   Vital Signs: Temp: 98.6  F (37  C) Temp src: Oral BP: 97/70 Pulse: 69   Resp: 18 SpO2: 99 % O2 Device: Mechanical Ventilator    Weight: 278 lbs 0 oz    General: Sedated   Skin: Warm and dry, no abnormalities on visible skin   Eyes: Pupils equal   CV: S1 S2 tachycardic regular rhythm. No murmur. No cyanosis or pallor, warm and well perfused.  Respiratory: Ventilated breath sounds. No respiratory distress, no accessory muscle use.  Extremities: Warm, no LE edema. DP pulses +2    Primary Care Physician   Price Naranjo    Discharge Orders   No discharge procedures on file.    Significant Results and Procedures   Most Recent 3 CBC's:  Recent Labs   Lab Test 02/10/22  0635 02/09/22  0637 02/08/22  1027   WBC 6.3  6.3 3.9* 4.1   HGB 12.9  12.9 12.2 12.9  13.3   MCV 90  90 92 92     252 199 155     Most Recent 3 BMP's:  Recent Labs   Lab Test 02/10/22  1227 02/10/22  0740 02/10/22  0635 02/09/22  0808 02/09/22  0637 02/08/22 2137 02/08/22 2032 02/08/22 1422 02/08/22  1027   NA  --   --  138  --  136  --   --   --  132*  137   POTASSIUM  --   --  3.9  --  4.8  --   4.7   < > 5.8*  5.7*   CHLORIDE  --   --  103  --  101  --   --   --  101   CO2  --   --  31  --  30  --   --   --  28   BUN  --   --  17  --  19  --   --   --  15   CR  --   --  0.75  --  0.96  --   --   --  1.03   ANIONGAP  --   --  4  --  5  --   --   --  3   RODOLFO  --   --  8.8  --  8.7  --   --   --  8.8   * 137* 139*   < > 192*   < >  --    < > 110*  116*    < > = values in this interval not displayed.     Most Recent D-dimer:  Recent Labs   Lab Test 02/10/22  0635   DD 0.51*     Most Recent 6 Bacteria Isolates From Any Culture (See EPIC Reports for Culture Details):  Recent Labs   Lab Test 05/27/21  0137 07/06/20  1630 07/06/20  1400 01/28/20  0025 01/27/20  2305 01/27/20  2035   CULT No growth No growth No growth No growth  No growth No Beta Streptococcus isolated 50,000 to 100,000 colonies/mL  mixed urogenital abril  Susceptibility testing not routinely done       Most Recent ABG:  Recent Labs   Lab Test 02/10/22  0757   PH 7.47*   PO2 55*   PCO2 47*   HCO3 34*   NAHUM 8.9*     Most Recent ESR & CRP:  Recent Labs   Lab Test 02/10/22  0635 06/06/21  0659 06/04/21  0805   SED  --   --  83*   CRP 23.0*   < > 12.0*    < > = values in this interval not displayed.   ,   Results for orders placed or performed during the hospital encounter of 02/08/22   XR Chest Port 1 View    Narrative    Exam: XR CHEST PORT 1 VIEW, 2/8/2022 10:57 AM    Indication: covid, hypoxia, eval for pneumonia    Comparison: 12/12/2021 chest x-ray    Findings:   Semiupright, AP view the chest. Low lung volumes. Midline trachea.  Cardiac silhouette is not well delineated. Diffuse reticular and  groundglass opacities throughout the lungs. No pneumothorax or pleural  effusion.      Impression    Impression: Patchy opacities throughout the lungs, compatible with  patient's known Covid-19 infection.     I have personally reviewed the examination and initial interpretation  and I agree with the findings.    SHA PAEZ MD         SYSTEM  ID:  [id number]   CT Chest Pulmonary Embolism w Contrast    Narrative    EXAM: CTA pulmonary angiogram, 2/8/2022 1:35 PM    HISTORY: PE suspected, high prob; shortness of breath, known covid,  worsening hypoxia    COMPARISON: 2/8/2022 chest x-ray, 6/10/2020 chest CT.    TECHNIQUE: Volumetric CT images obtained through the chest with  contrast. Coronal and axial MIP reformatted images obtained.  Three-dimensional (3D) post-processed angiographic images were  reconstructed, archived to PACS and used in interpretation of this  study.     CONTRAST: iopamidol (ISOVUE-370) solution 100 mL ml isovue 370 IV.    FINDINGS:     Vascular: There is good contrast opacification of the pulmonary  arterial vasculature. No pulmonary embolus.  Heart is normal size  without pericardial effusion. No evidence of right heart strain or  elevated right heart pressures.    Remaining Chest: Central tracheobronchial tree is patent. Diffuse  groundglass and reticular opacities with mosaic attenuation within the  lung apices that display relative subpleural sparing. No pleural  effusion or pneumothorax. No focal airspace consolidation.    Upper Abdomen: No acute findings within the partially visualized upper  abdomen. Diffuse hepatic steatosis. Fatty atrophy of the pancreas.    Bones/Soft Tissues: No suspicious soft tissue lesions.      Impression    IMPRESSION:   1. Exam is negative for acute pulmonary embolism. No evidence of right  heart strain or increased right heart pressures.   2. Patchy groundglass and consolidative opacities throughout the  lungs, compatible with patient's known Covid-19 infection.     I have personally reviewed the examination and initial interpretation  and I agree with the findings.    SHA PAEZ MD         SYSTEM ID:  [id number]   XR Chest Port 1 View    Narrative    EXAM: XR CHEST PORT 1 VIEW  LOCATION: [facility]  DATE/TIME: 2/10/2022 5:25 AM    INDICATION: COVID 19  pneumonia, worsening oxygenation  COMPARISON: 2021.      Impression    IMPRESSION: Cardiac silhouette within normal limits for portable technique. Patchy diffuse bilateral airspace infiltrates consistent with multifocal pneumonia. No visible pneumothorax or pleural effusion.   XR Chest Port 1 View    Narrative    Portable chest 2/10/2022 at 1323 hours     indication: Repeat for endotracheal tube    COMPARISON: 0528 hours earlier today    FINDINGS: Right mainstem intubation. Recommend withdrawal of  endotracheal tube by approximately 3 cm. Enteric tube progress in the  inferior margin of the image. Patchy opacities in the lungs are  increased, especially in the left midlung.      Impression    IMPRESSION: Increasing opacities in the lungs with findings suggestive  of right mainstem intubation. Recommend retraction of ET tube by 3 cm.    DERRELL JUAREZ MD         SYSTEM ID:  [id number]   XR Chest Port 1 View    Narrative    Portable chest 2/10/22 at 1327 hours    INDICATION: Intubation placement    COMPARISON: Earlier this afternoon 1323 hours    Findings: Endotracheal tube tip is well visualized and is  approximately 2.3 cm above the mariel. Enteric tube progresses beyond  the inferior margin of the image. Patchy opacities in the lungs  appears similar comment concerning for ARDS/infection.      Impression    IMPRESSION: Continued patchy opacities in the lungs concerning for  infection. Endotracheal tube tip in good position approximately 2.3 cm  above the mariel.    DERRELL JUAREZ MD         SYSTEM ID:  [id number]   Echo Limited    Narrative    [id number]  [id number]  [id number]  [id number]^PACO^MC     [facility]  Echocardiography Laboratory  [address] [place] [id number]     Name: ELLA MOULTON  MRN: [id number]  : 1960  Study Date: 02/10/2022 09:42 AM  Age: 62 yrs  Gender: Female  Patient Location: [facility]  Reason For Study: Respiratory  Failure  Ordering Physician: MC ANTONIO  Performed By: Edgard Rowell     BSA: 2.2 m2  Height: 64 in  Weight: 278 lb  ______________________________________________________________________________  Procedure  Limited Echocardiogram with portions of two-dimensional, color and spectral  Doppler performed. Contrast Optison. Poor quality two-dimensional was  performed and interpreted. Technically difficult study.Extremely poor acoustic  windows.  ______________________________________________________________________________  Interpretation Summary  Technically limited study with minimal visualization despite several attempts.     Overall unable to determine LVEF but in limited views it appears to be normal.  RV function also appears to be normal. No valvular assessment is possible. No  pericardial effusion.  ______________________________________________________________________________  ______________________________________________________________________________  Report approved by: Santo REYNOSO 02/10/2022 11:47 AM     ______________________________________________________________________________        Discharge Medications   Current Discharge Medication List      CONTINUE these medications which have NOT CHANGED    Details   acetaminophen (TYLENOL) 500 MG tablet Take 500 mg by mouth 3 times daily       Acidophilus Lactobacillus CAPS Take 1 tablet by mouth 3 times daily      amLODIPine (NORVASC) 5 MG tablet Take 5 mg by mouth daily       atorvastatin (LIPITOR) 20 MG tablet Take 20 mg by mouth At Bedtime       cholecalciferol 25 MCG (1000 UT) TABS Take 1,000 Units by mouth daily       diclofenac (VOLTAREN) 1 % topical gel Apply 4 g topically 4 times daily  Qty: 350 g, Refills: 1    Associated Diagnoses: Patellofemoral arthritis; Impingement syndrome of right shoulder      DULoxetine (CYMBALTA) 60 MG capsule Take 60 mg by mouth daily      fluticasone-salmeterol (ADVAIR) 250-50 MCG/DOSE inhaler Inhale 1  puff into the lungs 2 times daily      furosemide (LASIX) 20 MG tablet Take 20 mg by mouth daily      gabapentin (NEURONTIN) 400 MG capsule Take 1,200 mg by mouth 3 times daily      hydrOXYzine (ATARAX) 50 MG tablet Take 100 mg by mouth every 6 hours as needed for anxiety      insulin detemir (LEVEMIR PEN) 100 UNIT/ML pen Inject 15 Units Subcutaneous every morning  Qty: 3 mL, Refills: 1    Associated Diagnoses: Type 2 diabetes mellitus without complication, without long-term current use of insulin (H)      levalbuterol (XOPENEX HFA) 45 MCG/ACT inhaler Inhale 2 puffs into the lungs every 4 hours as needed for shortness of breath / dyspnea or wheezing       levothyroxine (SYNTHROID/LEVOTHROID) 200 MCG tablet Take 200 mcg by mouth daily       melatonin 5 MG tablet Take 5 mg by mouth At Bedtime      metFORMIN (GLUCOPHAGE) 1000 MG tablet Take 1,000 mg by mouth 2 times daily (with meals)      methadone HCl 10 MG/5ML SOLN Take 80 mg by mouth daily 10mg/mL strength      multivitamin w/minerals (THERA-VIT-M) tablet Take 1 tablet by mouth daily      nicotine polacrilex (NICORETTE) 4 MG gum Place 4 mg inside cheek every hour as needed for smoking cessation       omeprazole (PRILOSEC) 20 MG DR capsule Take 20 mg by mouth daily       polyethylene glycol (MIRALAX) 17 GM/Dose powder Take 17 g by mouth daily as needed for constipation  Qty: 510 g, Refills: 0    Associated Diagnoses: Constipation, unspecified constipation type      potassium chloride ER (KLOR-CON) 8 MEQ CR tablet Take 8 mEq by mouth 2 times daily      traZODone (DESYREL) 50 MG tablet Take 50 mg by mouth At Bedtime      vitamin B-12 (CYANOCOBALAMIN) 1000 MCG tablet Take 1,000 mcg by mouth daily      !! ACCU-CHEK RAFAELA PLUS test strip       Alcohol Swabs (ALCOHOL WIPES) 70 % PADS USE WITH INSULIN INJECTION ONCE DAILY. **100 DAYS SUPPLY**      !! blood glucose (ACCU-CHEK RAFAELA PLUS) test strip Use to check blood sugar 3x daily or as directed.      !! Blood Glucose  Calibration (ACCU-CHEK ACTIVE GLUCOSE CONT VI) 1 each by Other route      !! blood glucose calibration (ACCU-CHEK RAFAELA) solution       NARCAN 4 MG/0.1ML nasal spray CALL 911. SPRAY CONTENTS OF ONE SPRAYER (0.1ML) INTO ONE NOSTRIL. REPEAT IN 2-3 MINS IF SYMPTOMS OF OPIOID PERSIST, ALTERNATE NOSTRILS  Refills: 0       !! - Potential duplicate medications found. Please discuss with provider.      STOP taking these medications       calcium carbonate (OS-RODOLFO) 1500 (600 Ca) MG tablet Comments:   Reason for Stopping:             Allergies   Allergies   Allergen Reactions     Promethazine Other (See Comments) and Anxiety     Noted in 8/30/08 ER     Codeine Phosphate GI Disturbance     Lamotrigine Other (See Comments)     hyponatremia     Oxcarbazepine Unknown and Other (See Comments)     Low sodium  [employer]#[id number]       Codeine Other (See Comments) and Rash     GI bleeding  [employer]#[id number]

## 2022-02-10 NOTE — ED PROVIDER NOTES
ED Provider Note  [facility]      History     Chief Complaint   Patient presents with     Shortness of Breath     HPI  No Yusuf is a 62 year old female with a history of polyusbstance abuse, type 2 diabetes mellitus, chronic hepatitis C, HTN, and recent COVID-19 diagnosis (2/2/2022) who presented to the Emergency Department on 2/8 via EMS from group home with worsening shortness of breath over the past 3 days. EMS reported patient's oxygen saturation was 60% on room air. She was on 15L via nonrebreather with sats at 89-90%. Patient reportedly had also been having cough productive of brown sputum for the past week. Here in the ED, patient was quickly transferred to high flow nasal canula due to hypoxia on nonrebreather and sats improved to low 90s. VBG was obtained which revealed pH of 7.3 with PCO2 of 54. Bicarb was 31. When patient fell asleep saturations would dip into the 80s. She was then switched over to BiPAP. Hypercarbia improved with this and she was satting normally on BiPAP at 12/8. EKG obtained and did not reveal evidence of acute ischemia. Initial portable chest x-ray revealed patchy opacities throughout the lungs. She was given IV dexamethasone 10mg and was also started on IV ceftriaxone and azithromycin for potential of superimposed bacterial pneumonia with her high oxygen requirements.     CT was done and did not reveal evidence of PE, no pneumothorax or pulmonary edema.  Ultimately consistent with severe Covid pneumonia, necessitating admission to the ICU.    Reevaluation this morning in conjunction with discussion with the patient as well as left family medicine team was primarily managing the patient, it was determined that an ICU bed was available at an outside facility.     Given that the patient had a very high oxygen requirement on BiPAP including need for pressure support, transportation in this condition was somewhat risky.  Following informed decision  making and extensive discussion, we all, including the patient, agreed that intubation was the safest option at this point,     Particularly given the need to provide 100% oxygen on bipap to maintain sats > 93%    Past Medical History  Past Medical History:   Diagnosis Date     Arthritis      Bipolar affective (H)      Fibromyalgia      Hypertension      Past Surgical History:   Procedure Laterality Date     CHOLECYSTECTOMY       GYN SURGERY      c section     GYN SURGERY      oblation     ORTHOPEDIC SURGERY      left leg, left shoulder, back     No current outpatient medications on file.    Allergies   Allergen Reactions     Promethazine Other (See Comments) and Anxiety     Noted in 8/30/08 ER     Codeine Phosphate GI Disturbance     Lamotrigine Other (See Comments)     hyponatremia     Oxcarbazepine Unknown and Other (See Comments)     Low sodium  [employer]acy[employer]#[id number]       Codeine Other (See Comments) and Rash     GI bleeding  [employer]acyRecord#[id number]       Family History  Family History   Problem Relation Age of Onset     Heart Disease Mother      Diabetes Mother      Ovarian Cancer Mother      Heart Disease Father      Lung Cancer Father      Diabetes Sister      Ovarian Cancer Sister      Diabetes Brother      Social History   Social History     Tobacco Use     Smoking status: Former Smoker     Packs/day: 0.10     Smokeless tobacco: Never Used   Substance Use Topics     Alcohol use: No     Drug use: No      Past medical history, past surgical history, medications, allergies, family history, and social history were reviewed with the patient. No additional pertinent items.       Review of Systems  A complete review of systems was performed with pertinent positives and negatives noted in the HPI, and all other systems negative.    Physical Exam   BP: 124/72  Pulse: 101  Temp: 98.5  F (36.9  C)  Resp: 22  Weight: 126.1 kg (278 lb)  SpO2: 90 %  Physical Exam  GEN: on Bipap, somewhat increased work of breathing.  Cooperative  and conversant.   HEENT: The head is normocephalic and atraumatic. Pupils are equal round and reactive to light. Extraocular motions are intact. There is no facial swelling.   CV: Regular tachycardia, no MGR  PULM: rate of 20, faint diffuse crackles with good air movement.  Difficult exam d/t body habitus.   Abd:   obese, s/nt/nd  EXT: Full range of motion.  No edema.  NEURO: Cranial nerves II through XII are intact and symmetric. Bilateral upper and lower extremities grossly show full range of motion without any focal deficits.     PSYCH: Calm and cooperative, interactive.     ED Course      [facility]    -Intubation    Date/Time: 2/10/2022 6:18 PM  Performed by: Mansoor Preciado MD  Authorized by: Mansoor Preciado MD     Risks, benefits and alternatives discussed.      PRE-PROCEDURE DETAILS     Patient status:  Awake    Mallampati score:  II    Pretreatment medications:  None    Paralytics:  Succinylcholine      PROCEDURE DETAILS     Preoxygenation:  BiPAP    CPR in progress: no      Intubation method:  Oral    Oral intubation technique:  Fiber optic    Laryngoscope blade:  Mac 4    Tube size (mm):  7.5    Tube type:  Cuffed    Number of attempts:  1    Ventilation between attempts: no      Cricoid pressure: no      Tube visualized through cords: yes      PLACEMENT ASSESSMENT     ETT to teeth:  25    Tube secured with:  ETT reynoso    Breath sounds:  Equal and absent over the epigastrium    Placement verification: chest rise, condensation, CXR verification, direct visualization, equal breath sounds, ETCO2 detector and tube exhalation      CXR findings:  ETT in proper place    PROCEDURE  Describe Procedure: cxr shows tube deep, pulled back 1.5 to 23.5 cm  Patient Tolerance:  Patient tolerated the procedure well with no immediate complications[facility]    -Arterial Line    Date/Time: 2/10/2022 6:20 PM  Performed by:  Mansoor Preciado MD  Authorized by: Mansoor Preciado MD     Risks, benefits and alternatives discussed.      UNIVERSAL PROTOCOL   Site Marked: Yes  Prior Images Obtained and Reviewed:  Yes  Required items: Required blood products, implants, devices and special equipment available    Patient identity confirmed:  Arm band and hospital-assigned identification number  NA - No sedation, light sedation, or local anesthesia  Confirmation Checklist:  Patient's identity using two indicators  Time out: Immediately prior to the procedure a time out was called    Universal Protocol: the Joint Commission Universal Protocol was followed      INDICATIONS:   Indications: hemodynamic monitoring and multiple ABGs      PRE-PROCEDURE DETAILS:   Skin preparation:  2% Chlorhexidine  PROCEDURE DETAILS:    Location:  R radial  Needle gauge:  20 G  Placement technique:  Seldinger and ultrasound guided  Number of attempts:  1  Transducer: waveform confirmed      POST PROCEDURE DETAILS:    Post-procedure:  Sutured, secured with tape and sterile dressing applied  CMS:  Unable to assess      PROCEDURE    Patient Tolerance:  Patient tolerated the procedure well with no [facility]    -Central Line    Date/Time: 2/10/2022 6:21 PM  Performed by: Mansoor Preciado MD  Authorized by: Mansoor Preciado MD     Risks, benefits and alternatives discussed.      PRE-PROCEDURE DETAILS:     Hand hygiene: Hand hygiene performed prior to insertion      Sterile barrier technique: All elements of maximal sterile technique followed      Skin preparation:  ChloraPrep    Skin preparation agent: Skin preparation agent completely dried prior to procedure         ANESTHESIA    Anesthesia: Local infiltration  Local Anesthetic:  Lidocaine 1% without epinephrine    PROCEDURE DETAILS:     Location:  R internal jugular    Patient position:  Flat    Procedural supplies:  Triple lumen    Catheter  size:  7 Fr    Landmarks identified: yes      Ultrasound guidance: yes      Sterile ultrasound techniques: Sterile gel and sterile probe covers were used      Number of attempts:  1    Successful placement: yes      POST PROCEDURE DETAILS:      Post-procedure:  Dressing applied and line sutured    Assessment:  Blood return through all ports, no pneumothorax on x-ray, free fluid flow and placement verified by x-ray    PROCEDURE    Patient Tolerance:  Patient tolerated the procedure well with no immediate complications         Critical Care Addendum    My initial assessment, based on my review of prehospital provider report, review of nursing observations, review of vital signs, focused history, physical exam, review of cardiac rhythm monitor, discussion with ICU and Family medicine services, interpretation of CT and CXR  and and review of clinical course , established that No Yusuf has respiratory insufficiency, which requires immediate intervention, and therefore she is critically ill.     After the initial assessment, the care team initiated multiple lab tests, initiated IV fluid administration, initiated medication therapy with Propofol infusion for sedation following intubation , initiated intensive non-invasive respiratory support, achieved central venous access, performed advanced airway management, performed arterial line placement  and initiated physical restraints and cares to provide stabilization care to provide stabilization care. Due to the critical nature of this patient, I reassessed nursing observations, vital signs, review of cardiac rhythm monitor, mental status, neurologic status and respiratory status multiple times prior to her disposition.     Time also spent performing documentation, reviewing test results, discussion with consultants and coordination of care.     Critical care time (excluding teaching time and procedures): 180 minutes.   Mental Health Risk Assessment      PSS-3     Date and Time Over the past 2 weeks have you felt down, depressed, or hopeless? Over the past 2 weeks have you had thoughts of killing yourself? Have you ever attempted to kill yourself? When did this last happen? User   02/08/22 1004 no no yes -- CJ              Pt intubated         Results for orders placed or performed during the hospital encounter of 02/08/22   XR Chest Port 1 View     Status: None    Narrative    Exam: XR CHEST PORT 1 VIEW, 2/8/2022 10:57 AM    Indication: covid, hypoxia, eval for pneumonia    Comparison: 12/12/2021 chest x-ray    Findings:   Semiupright, AP view the chest. Low lung volumes. Midline trachea.  Cardiac silhouette is not well delineated. Diffuse reticular and  groundglass opacities throughout the lungs. No pneumothorax or pleural  effusion.      Impression    Impression: Patchy opacities throughout the lungs, compatible with  patient's known Covid-19 infection.     I have personally reviewed the examination and initial interpretation  and I agree with the findings.    SHA PAEZ MD         SYSTEM ID:  [id number]   CT Chest Pulmonary Embolism w Contrast     Status: None    Narrative    EXAM: CTA pulmonary angiogram, 2/8/2022 1:35 PM    HISTORY: PE suspected, high prob; shortness of breath, known covid,  worsening hypoxia    COMPARISON: 2/8/2022 chest x-ray, 6/10/2020 chest CT.    TECHNIQUE: Volumetric CT images obtained through the chest with  contrast. Coronal and axial MIP reformatted images obtained.  Three-dimensional (3D) post-processed angiographic images were  reconstructed, archived to PACS and used in interpretation of this  study.     CONTRAST: iopamidol (ISOVUE-370) solution 100 mL ml isovue 370 IV.    FINDINGS:     Vascular: There is good contrast opacification of the pulmonary  arterial vasculature. No pulmonary embolus.  Heart is normal size  without pericardial effusion. No evidence of right heart strain or  elevated right heart pressures.    Remaining Chest:  Central tracheobronchial tree is patent. Diffuse  groundglass and reticular opacities with mosaic attenuation within the  lung apices that display relative subpleural sparing. No pleural  effusion or pneumothorax. No focal airspace consolidation.    Upper Abdomen: No acute findings within the partially visualized upper  abdomen. Diffuse hepatic steatosis. Fatty atrophy of the pancreas.    Bones/Soft Tissues: No suspicious soft tissue lesions.      Impression    IMPRESSION:   1. Exam is negative for acute pulmonary embolism. No evidence of right  heart strain or increased right heart pressures.   2. Patchy groundglass and consolidative opacities throughout the  lungs, compatible with patient's known Covid-19 infection.     I have personally reviewed the examination and initial interpretation  and I agree with the findings.    SHA PAEZ MD         SYSTEM ID:  [id number]   XR Chest Port 1 View     Status: None    Narrative    EXAM: XR CHEST PORT 1 VIEW  LOCATION: [facility]  DATE/TIME: 2/10/2022 5:25 AM    INDICATION: COVID 19 pneumonia, worsening oxygenation  COMPARISON: 12/12/2021.      Impression    IMPRESSION: Cardiac silhouette within normal limits for portable technique. Patchy diffuse bilateral airspace infiltrates consistent with multifocal pneumonia. No visible pneumothorax or pleural effusion.   XR Chest Port 1 View     Status: None    Narrative    Portable chest 2/10/2022 at 1323 hours     indication: Repeat for endotracheal tube    COMPARISON: 0528 hours earlier today    FINDINGS: Right mainstem intubation. Recommend withdrawal of  endotracheal tube by approximately 3 cm. Enteric tube progress in the  inferior margin of the image. Patchy opacities in the lungs are  increased, especially in the left midlung.      Impression    IMPRESSION: Increasing opacities in the lungs with findings suggestive  of right mainstem intubation. Recommend retraction of ET tube  by 3 cm.    DERRELL JUAREZ MD         SYSTEM ID:  [id number]   XR Chest Port 1 View     Status: None    Narrative    Portable chest 2/10/22 at 1327 hours    INDICATION: Intubation placement    COMPARISON: Earlier this afternoon 1323 hours    Findings: Endotracheal tube tip is well visualized and is  approximately 2.3 cm above the mariel. Enteric tube progresses beyond  the inferior margin of the image. Patchy opacities in the lungs  appears similar comment concerning for ARDS/infection.      Impression    IMPRESSION: Continued patchy opacities in the lungs concerning for  infection. Endotracheal tube tip in good position approximately 2.3 cm  above the mariel.    DERRELL JUAREZ MD         SYSTEM ID:  [id number]   XR Abdomen Port 1 View     Status: None    Narrative    EXAM: XR ABDOMEN PORT 1 VIEWS  2/10/2022 1:30 PM     HISTORY:  OGT PLACEMENT       COMPARISON:  Chest x-ray 2/10/2022    FINDINGS:   Portable supine AP view of the chest and upper abdomen. Partially  visualized endotracheal tube tip in the lower thoracic trachea.  Orogastric tube with tip and sidehole in the stomach.    Paucity of bowel gas.      Impression    IMPRESSION: Interval placement of orogastric tube with tip and  sidehole projecting in the stomach.    I have personally reviewed the examination and initial interpretation  and I agree with the findings.    TERRY QUINTERO MD         SYSTEM ID:  [id number]   XR Chest Port 1 View     Status: None (Preliminary result)    Impression    RESIDENT PRELIMINARY INTERPRETATION  Impression:   1. New right IJ central venous catheter tip projects over the lower  SVC/cavoatrial junction. No pneumothorax.  2. Endotracheal tube tip projects 1.8 cm proximal to mariel.  3. Continued patchy opacities in the lungs concerning for infection.  Slightly improved lung volumes.   D dimer quantitative     Status: Normal   Result Value Ref Range    D-Dimer Quantitative 0.48 0.00 - 0.50 ug/mL FEU    Narrative    This  D-dimer assay is intended for use in conjunction with a clinical pretest probability assessment model to exclude pulmonary embolism (PE) and deep venous thrombosis (DVT) in outpatients suspected of PE or DVT. The cut-off value is 0.50 ug/mL FEU.   Comprehensive metabolic panel     Status: Abnormal   Result Value Ref Range    Sodium 132 (L) 133 - 144 mmol/L    Potassium 5.8 (H) 3.4 - 5.3 mmol/L    Chloride 101 94 - 109 mmol/L    Carbon Dioxide (CO2) 28 20 - 32 mmol/L    Anion Gap 3 3 - 14 mmol/L    Urea Nitrogen 15 7 - 30 mg/dL    Creatinine 1.03 0.52 - 1.04 mg/dL    Calcium 8.8 8.5 - 10.1 mg/dL    Glucose 110 (H) 70 - 99 mg/dL    Alkaline Phosphatase 106 40 - 150 U/L    AST      ALT 55 (H) 0 - 50 U/L    Protein Total 8.4 6.8 - 8.8 g/dL    Albumin 2.6 (L) 3.4 - 5.0 g/dL    Bilirubin Total 0.4 0.2 - 1.3 mg/dL    GFR Estimate 62 >60 mL/min/1.73m2   Lipase     Status: Abnormal   Result Value Ref Range    Lipase 61 (L) 73 - 393 U/L   Lactic acid whole blood     Status: Normal   Result Value Ref Range    Lactic Acid 1.2 0.7 - 2.0 mmol/L   Troponin I     Status: Abnormal   Result Value Ref Range    Troponin I High Sensitivity 81 (H) <54 ng/L   Nt probnp inpatient (BNP)     Status: Normal   Result Value Ref Range    N terminal Pro BNP Inpatient 218 0 - 900 pg/mL   CRP inflammation     Status: Abnormal   Result Value Ref Range    CRP Inflammation 77.0 (H) 0.0 - 8.0 mg/L   UA with Microscopic reflex to Culture     Status: Abnormal    Specimen: Urine, Clean Catch   Result Value Ref Range    Color Urine Yellow Colorless, Straw, Light Yellow, Yellow    Appearance Urine Slightly Cloudy (A) Clear    Glucose Urine Negative Negative mg/dL    Bilirubin Urine Negative Negative    Ketones Urine Negative Negative mg/dL    Specific Gravity Urine 1.015 1.003 - 1.035    Blood Urine Negative Negative    pH Urine 5.0 5.0 - 7.0    Protein Albumin Urine Negative Negative mg/dL    Urobilinogen Urine Normal Normal, 2.0 mg/dL    Nitrite Urine  Positive (A) Negative    Leukocyte Esterase Urine Moderate (A) Negative    Bacteria Urine Many (A) None Seen /HPF    Mucus Urine Present (A) None Seen /LPF    RBC Urine 3 (H) <=2 /HPF    WBC Urine 43 (H) <=5 /HPF    Squamous Epithelials Urine 2 (H) <=1 /HPF    Transitional Epithelials Urine 1 <=1 /HPF    Narrative    Urine Culture ordered based on laboratory criteria   CBC with platelets and differential     Status: Abnormal   Result Value Ref Range    WBC Count 4.1 4.0 - 11.0 10e3/uL    RBC Count 4.47 3.80 - 5.20 10e6/uL    Hemoglobin 12.9 11.7 - 15.7 g/dL    Hematocrit 41.1 35.0 - 47.0 %    MCV 92 78 - 100 fL    MCH 28.9 26.5 - 33.0 pg    MCHC 31.4 (L) 31.5 - 36.5 g/dL    RDW 14.6 10.0 - 15.0 %    Platelet Count 155 150 - 450 10e3/uL    % Neutrophils 53 %    % Lymphocytes 27 %    % Monocytes 18 %    % Eosinophils 0 %    % Basophils 0 %    % Immature Granulocytes 2 %    NRBCs per 100 WBC 0 <1 /100    Absolute Neutrophils 2.2 1.6 - 8.3 10e3/uL    Absolute Lymphocytes 1.1 0.8 - 5.3 10e3/uL    Absolute Monocytes 0.8 0.0 - 1.3 10e3/uL    Absolute Eosinophils 0.0 0.0 - 0.7 10e3/uL    Absolute Basophils 0.0 0.0 - 0.2 10e3/uL    Absolute Immature Granulocytes 0.1 <=0.4 10e3/uL    Absolute NRBCs 0.0 10e3/uL   Extra Red Top Tube     Status: None   Result Value Ref Range    Hold Specimen JIC    iStat Gases Electrolytes ICA Glucose Venous, POCT     Status: Abnormal   Result Value Ref Range    CPB Applied No     Hematocrit POCT 39 35 - 47 %    Calcium, Ionized Whole Blood POCT 4.6 4.4 - 5.2 mg/dL    Glucose Whole Blood POCT 116 (H) 70 - 99 mg/dL    Bicarbonate Venous POCT 31 (H) 21 - 28 mmol/L    Hemoglobin POCT 13.3 11.7 - 15.7 g/dL    Potassium POCT 5.7 (H) 3.4 - 5.3 mmol/L    Sodium POCT 137 133 - 144 mmol/L    pCO2 Venous POCT 54 (H) 40 - 50 mm Hg    pO2 Venous POCT 33 25 - 47 mm Hg    pH Venous POCT 7.37 7.32 - 7.43    O2 Sat, Venous POCT 60 (L) 94 - 100 %   Procalcitonin     Status: Abnormal   Result Value Ref Range     Procalcitonin 0.07 (H) <0.05 ng/mL   Potassium     Status: Abnormal   Result Value Ref Range    Potassium 6.1 (HH) 3.4 - 5.3 mmol/L   Extra Purple Top Tube     Status: None   Result Value Ref Range    Hold Specimen JIC    Potassium     Status: Abnormal   Result Value Ref Range    Potassium 6.4 (HH) 3.4 - 5.3 mmol/L   Hemoglobin A1c     Status: Abnormal   Result Value Ref Range    Hemoglobin A1C 6.2 (H) 0.0 - 5.6 %   INR     Status: Normal   Result Value Ref Range    INR 0.89 0.85 - 1.15   CRP inflammation     Status: Abnormal   Result Value Ref Range    CRP Inflammation 78.0 (H) 0.0 - 8.0 mg/L   Reticulocyte count     Status: Normal   Result Value Ref Range    % Reticulocyte 1.4 0.5 - 2.0 %    Absolute Reticulocyte 0.062 0.025 - 0.095 10e6/uL   Lactate Dehydrogenase     Status: Abnormal   Result Value Ref Range    Lactate Dehydrogenase 435 (H) 81 - 234 U/L   D dimer quantitative     Status: Normal   Result Value Ref Range    D-Dimer Quantitative 0.47 0.00 - 0.50 ug/mL FEU    Narrative    This D-dimer assay is intended for use in conjunction with a clinical pretest probability assessment model to exclude pulmonary embolism (PE) and deep venous thrombosis (DVT) in outpatients suspected of PE or DVT. The cut-off value is 0.50 ug/mL FEU.   Ferritin     Status: Abnormal   Result Value Ref Range    Ferritin 439 (H) 8 - 252 ng/mL   Procalcitonin     Status: Abnormal   Result Value Ref Range    Procalcitonin 0.08 (H) <0.05 ng/mL   Magnesium     Status: Abnormal   Result Value Ref Range    Magnesium 1.6 (L) 1.8 - 2.6 mg/dL   Phosphorus     Status: Normal   Result Value Ref Range    Phosphorus 2.7 2.5 - 4.5 mg/dL   Potassium     Status: Normal   Result Value Ref Range    Potassium 4.7 3.4 - 5.3 mmol/L   Glucose by meter     Status: Abnormal   Result Value Ref Range    GLUCOSE BY METER POCT 200 (H) 70 - 99 mg/dL   Glucose by meter     Status: Abnormal   Result Value Ref Range    GLUCOSE BY METER POCT 223 (H) 70 - 99 mg/dL    Blood gas venous     Status: Abnormal   Result Value Ref Range    pH Venous 7.42 7.32 - 7.43    pCO2 Venous 50 40 - 50 mm Hg    pO2 Venous 67 (H) 25 - 47 mm Hg    Bicarbonate Venous 32 (H) 21 - 28 mmol/L    Base Excess/Deficit (+/-) 6.8 (H) -7.7 - 1.9 mmol/L    FIO2 95    CBC with platelets     Status: Abnormal   Result Value Ref Range    WBC Count 3.9 (L) 4.0 - 11.0 10e3/uL    RBC Count 4.23 3.80 - 5.20 10e6/uL    Hemoglobin 12.2 11.7 - 15.7 g/dL    Hematocrit 38.9 35.0 - 47.0 %    MCV 92 78 - 100 fL    MCH 28.8 26.5 - 33.0 pg    MCHC 31.4 (L) 31.5 - 36.5 g/dL    RDW 14.5 10.0 - 15.0 %    Platelet Count 199 150 - 450 10e3/uL   CRP inflammation     Status: Abnormal   Result Value Ref Range    CRP Inflammation 76.0 (H) 0.0 - 8.0 mg/L   D dimer quantitative     Status: Normal   Result Value Ref Range    D-Dimer Quantitative 0.40 0.00 - 0.50 ug/mL FEU    Narrative    This D-dimer assay is intended for use in conjunction with a clinical pretest probability assessment model to exclude pulmonary embolism (PE) and deep venous thrombosis (DVT) in outpatients suspected of PE or DVT. The cut-off value is 0.50 ug/mL FEU.   Reticulocyte count     Status: Normal   Result Value Ref Range    % Reticulocyte 1.5 0.5 - 2.0 %    Absolute Reticulocyte 0.063 0.025 - 0.095 10e6/uL   Lactate Dehydrogenase     Status: Abnormal   Result Value Ref Range    Lactate Dehydrogenase 349 (H) 81 - 234 U/L   INR     Status: Normal   Result Value Ref Range    INR 1.01 0.85 - 1.15   Fibrinogen activity     Status: Abnormal   Result Value Ref Range    Fibrinogen Activity 522 (H) 170 - 490 mg/dL   Comprehensive metabolic panel     Status: Abnormal   Result Value Ref Range    Sodium 136 133 - 144 mmol/L    Potassium 4.8 3.4 - 5.3 mmol/L    Chloride 101 94 - 109 mmol/L    Carbon Dioxide (CO2) 30 20 - 32 mmol/L    Anion Gap 5 3 - 14 mmol/L    Urea Nitrogen 19 7 - 30 mg/dL    Creatinine 0.96 0.52 - 1.04 mg/dL    Calcium 8.7 8.5 - 10.1 mg/dL    Glucose 192  (H) 70 - 99 mg/dL    Alkaline Phosphatase 90 40 - 150 U/L    AST 44 0 - 45 U/L    ALT 41 0 - 50 U/L    Protein Total 8.0 6.8 - 8.8 g/dL    Albumin 2.4 (L) 3.4 - 5.0 g/dL    Bilirubin Total 0.3 0.2 - 1.3 mg/dL    GFR Estimate 67 >60 mL/min/1.73m2   Blood gas venous     Status: Abnormal   Result Value Ref Range    pH Venous 7.41 7.32 - 7.43    pCO2 Venous 52 (H) 40 - 50 mm Hg    pO2 Venous 62 (H) 25 - 47 mm Hg    Bicarbonate Venous 33 (H) 21 - 28 mmol/L    Base Excess/Deficit (+/-) 6.9 (H) -7.7 - 1.9 mmol/L    FIO2 80    Glucose by meter     Status: Abnormal   Result Value Ref Range    GLUCOSE BY METER POCT 193 (H) 70 - 99 mg/dL   Glucose by meter     Status: Abnormal   Result Value Ref Range    GLUCOSE BY METER POCT 175 (H) 70 - 99 mg/dL   Glucose by meter     Status: Abnormal   Result Value Ref Range    GLUCOSE BY METER POCT 192 (H) 70 - 99 mg/dL   Glucose by meter     Status: Abnormal   Result Value Ref Range    GLUCOSE BY METER POCT 184 (H) 70 - 99 mg/dL   CBC with platelets     Status: Abnormal   Result Value Ref Range    WBC Count 6.3 4.0 - 11.0 10e3/uL    RBC Count 4.55 3.80 - 5.20 10e6/uL    Hemoglobin 12.9 11.7 - 15.7 g/dL    Hematocrit 41.1 35.0 - 47.0 %    MCV 90 78 - 100 fL    MCH 28.4 26.5 - 33.0 pg    MCHC 31.4 (L) 31.5 - 36.5 g/dL    RDW 14.3 10.0 - 15.0 %    Platelet Count 252 150 - 450 10e3/uL   CRP inflammation     Status: Abnormal   Result Value Ref Range    CRP Inflammation 23.0 (H) 0.0 - 8.0 mg/L   D dimer quantitative     Status: Abnormal   Result Value Ref Range    D-Dimer Quantitative 0.51 (H) 0.00 - 0.50 ug/mL FEU    Narrative    This D-dimer assay is intended for use in conjunction with a clinical pretest probability assessment model to exclude pulmonary embolism (PE) and deep venous thrombosis (DVT) in outpatients suspected of PE or DVT. The cut-off value is 0.50 ug/mL FEU.   Reticulocyte count     Status: Normal   Result Value Ref Range    % Reticulocyte 1.6 0.5 - 2.0 %    Absolute  Reticulocyte 0.071 0.025 - 0.095 10e6/uL   Lactate Dehydrogenase     Status: Abnormal   Result Value Ref Range    Lactate Dehydrogenase 492 (H) 81 - 234 U/L   INR     Status: Normal   Result Value Ref Range    INR 0.99 0.85 - 1.15   Fibrinogen activity     Status: Abnormal   Result Value Ref Range    Fibrinogen Activity 506 (H) 170 - 490 mg/dL   Comprehensive metabolic panel     Status: Abnormal   Result Value Ref Range    Sodium 138 133 - 144 mmol/L    Potassium 3.9 3.4 - 5.3 mmol/L    Chloride 103 94 - 109 mmol/L    Carbon Dioxide (CO2) 31 20 - 32 mmol/L    Anion Gap 4 3 - 14 mmol/L    Urea Nitrogen 17 7 - 30 mg/dL    Creatinine 0.75 0.52 - 1.04 mg/dL    Calcium 8.8 8.5 - 10.1 mg/dL    Glucose 139 (H) 70 - 99 mg/dL    Alkaline Phosphatase 81 40 - 150 U/L    AST 55 (H) 0 - 45 U/L    ALT 36 0 - 50 U/L    Protein Total 8.5 6.8 - 8.8 g/dL    Albumin 2.5 (L) 3.4 - 5.0 g/dL    Bilirubin Total 0.4 0.2 - 1.3 mg/dL    GFR Estimate 90 >60 mL/min/1.73m2   Procalcitonin     Status: Normal   Result Value Ref Range    Procalcitonin <0.05 <0.05 ng/mL   Glucose by meter     Status: Abnormal   Result Value Ref Range    GLUCOSE BY METER POCT 185 (H) 70 - 99 mg/dL   Blood gas venous     Status: Abnormal   Result Value Ref Range    pH Venous 7.44 (H) 7.32 - 7.43    pCO2 Venous 51 (H) 40 - 50 mm Hg    pO2 Venous 46 25 - 47 mm Hg    Bicarbonate Venous 35 (H) 21 - 28 mmol/L    Base Excess/Deficit (+/-) 8.0 (H) -7.7 - 1.9 mmol/L    FIO2 100    Glucose by meter     Status: Abnormal   Result Value Ref Range    GLUCOSE BY METER POCT 137 (H) 70 - 99 mg/dL   Blood gas arterial (ABG)     Status: Abnormal   Result Value Ref Range    pH Arterial 7.47 (H) 7.35 - 7.45    pCO2 Arterial 47 (H) 35 - 45 mm Hg    pO2 Arterial 55 (L) 80 - 105 mm Hg    FIO2 100     Bicarbonate Arterial 34 (H) 21 - 28 mmol/L    Base Excess/Deficit (+/-) 8.9 (H) -9.0 - 1.8 mmol/L   CBC with platelets and differential     Status: Abnormal   Result Value Ref Range    WBC  Count 6.3 4.0 - 11.0 10e3/uL    RBC Count 4.55 3.80 - 5.20 10e6/uL    Hemoglobin 12.9 11.7 - 15.7 g/dL    Hematocrit 41.1 35.0 - 47.0 %    MCV 90 78 - 100 fL    MCH 28.4 26.5 - 33.0 pg    MCHC 31.4 (L) 31.5 - 36.5 g/dL    RDW 14.3 10.0 - 15.0 %    Platelet Count 252 150 - 450 10e3/uL    % Neutrophils 72 %    % Lymphocytes 15 %    % Monocytes 12 %    % Eosinophils 0 %    % Basophils 0 %    % Immature Granulocytes 1 %    NRBCs per 100 WBC 0 <1 /100    Absolute Neutrophils 4.7 1.6 - 8.3 10e3/uL    Absolute Lymphocytes 1.0 0.8 - 5.3 10e3/uL    Absolute Monocytes 0.8 0.0 - 1.3 10e3/uL    Absolute Eosinophils 0.0 0.0 - 0.7 10e3/uL    Absolute Basophils 0.0 0.0 - 0.2 10e3/uL    Absolute Immature Granulocytes 0.1 <=0.4 10e3/uL    Absolute NRBCs 0.0 10e3/uL   Glucose by meter     Status: Abnormal   Result Value Ref Range    GLUCOSE BY METER POCT 123 (H) 70 - 99 mg/dL   EKG 12 lead     Status: None   Result Value Ref Range    Systolic Blood Pressure  mmHg    Diastolic Blood Pressure  mmHg    Ventricular Rate 97 BPM    Atrial Rate 97 BPM    OR Interval 152 ms    QRS Duration 82 ms     ms    QTc 441 ms    P Axis 14 degrees    R AXIS -9 degrees    T Axis 21 degrees    Interpretation ECG       Sinus rhythm  Nonspecific T wave abnormality  Abnormal ECG  Unconfirmed report - interpretation of this ECG is computer generated - see medical record for final interpretation  Confirmed by - EMERGENCY ROOM, PHYSICIAN ([id number]),  RAUL DOTSON ([id number]) on 2/8/2022 2:50:00 PM     EKG 12 lead     Status: None   Result Value Ref Range    Systolic Blood Pressure  mmHg    Diastolic Blood Pressure  mmHg    Ventricular Rate 83 BPM    Atrial Rate 83 BPM    OR Interval 154 ms    QRS Duration 84 ms     ms    QTc 474 ms    P Axis  degrees    R AXIS 9 degrees    T Axis 44 degrees    Interpretation ECG       Sinus rhythm  Low voltage QRS  Cannot rule out Anterior infarct , age undetermined  Abnormal ECG  Unconfirmed report -  interpretation of this ECG is computer generated - see medical record for final interpretation  Confirmed by - EMERGENCY ROOM, PHYSICIAN ([id number]),  RAUL DOTSON ([id number]) on 2022 2:44:58 PM     Echo Limited     Status: None    Narrative    [id number]  [id number]  [id number]  [id number]^[id number]^MC     [facility],[place]  Echocardiography Laboratory  [address] [place] [address]     Name: ELLA MOULTON  MRN: [id number]  : 1960  Study Date: 02/10/2022 09:42 AM  Age: 62 yrs  Gender: Female  Patient Location: [facility]  Reason For Study: Respiratory Failure  Ordering Physician: MC ANTONIO  Performed By: Edgard Rowell     BSA: 2.2 m2  Height: 64 in  Weight: 278 lb  ______________________________________________________________________________  Procedure  Limited Echocardiogram with portions of two-dimensional, color and spectral  Doppler performed. Contrast Optison. Poor quality two-dimensional was  performed and interpreted. Technically difficult study.Extremely poor acoustic  windows.  ______________________________________________________________________________  Interpretation Summary  Technically limited study with minimal visualization despite several attempts.     Overall unable to determine LVEF but in limited views it appears to be normal.  RV function also appears to be normal. No valvular assessment is possible. No  pericardial effusion.  ______________________________________________________________________________  ______________________________________________________________________________  Report approved by: Santo REYNOSO 02/10/2022 11:47 AM     ______________________________________________________________________________      Blood Culture Peripheral Blood     Status: Normal (Preliminary result)    Specimen: Peripheral Blood   Result Value Ref Range    Culture No growth after 2 days    Blood Culture Peripheral Blood     Status: Normal  (Preliminary result)    Specimen: Peripheral Blood   Result Value Ref Range    Culture No growth after 2 days    Urine Culture     Status: Abnormal (Preliminary result)    Specimen: Urine, Clean Catch   Result Value Ref Range    Culture >100,000 CFU/mL Escherichia coli (A)        Susceptibility    Escherichia coli - EUGENIO*     Ampicillin >=32.0 Resistant ug/mL     Ampicillin/ Sulbactam >=32.0 Resistant ug/mL     Piperacillin/Tazobactam 16.0 Susceptible ug/mL     Cefazolin* >=64.0 Resistant ug/mL      * Cefazolin EUGENIO breakpoints are for the treatment of uncomplicated urinary tract infections. For the treatment of systemic infections, please contact the laboratory for additional testing.     Cefoxitin >=64.0 Resistant ug/mL     Gentamicin <=1.0 Susceptible ug/mL     Tobramycin <=1.0 Susceptible ug/mL     Ciprofloxacin <=0.25 Susceptible ug/mL     Levofloxacin <=0.12 Susceptible ug/mL     Nitrofurantoin <=16.0 Susceptible ug/mL     Trimethoprim/Sulfamethoxazole <=1/19 Susceptible ug/mL     * Additional susceptibilities in progress.     Respiratory Aerobic Bacterial Culture with Gram Stain     Status: None    Specimen: Expectorate; Sputum   Result Value Ref Range    Culture       >10 Squamous epithelial cells/low power field indicates oral contamination. Please recollect.    Gram Stain Result >10 Squamous epithelial cells/low power field     Gram Stain Result <25 PMNs/low power field     Gram Stain Result 3+ Mixed abril    Streptococcus pneumoniae antigen     Status: Normal    Specimen: Urine, Clean Catch   Result Value Ref Range    Streptococcus pneumoniae antigen Negative Negative   MRSA MSSA PCR, Nasal Swab     Status: None    Specimen: Nares, Bilateral; Swab   Result Value Ref Range    MRSA Target DNA Negative Negative    SA Target DNA Positive     Narrative    The [employer]  Xpert SA Nasal Complete assay performed in the [employer]  Dx System is a qualitative in vitro diagnostic test designed for rapid detection of  Staphylococcus aureus (SA) and methicillin-resistant Staphylococcus aureus (MRSA) from nasal swabs in patients at risk for nasal colonization. The test utilizes automated real-time polymerase chain reaction (PCR) to detect MRSA/SA DNA. The Xpert SA Nasal Complete assay is intended to aid in the prevention and control of MRSA/SA infections in healthcare settings. The assay is not intended to diagnose, guide or monitor treatment for MRSA/SA infections, or provide results of susceptibility to methicillin. A negative result does not preclude MRSA/SA nasal colonization.    CBC with platelets differential     Status: Abnormal    Narrative    The following orders were created for panel order CBC with platelets differential.  Procedure                               Abnormality         Status                     ---------                               -----------         ------                     CBC with platelets and d...[[id number]]  Abnormal            Final result                 Please view results for these tests on the individual orders.   [place] Draw     Status: None    Narrative    The following orders were created for panel order [place] Draw.  Procedure                               Abnormality         Status                     ---------                               -----------         ------                     Extra Red Top Tube[[id number]]                               Final result                 Please view results for these tests on the individual orders.   Extra Tube ([place] Draw)     Status: None    Narrative    The following orders were created for panel order Extra Tube ([place] Draw).  Procedure                               Abnormality         Status                     ---------                               -----------         ------                     Extra Purple Top Tube[[id number]]                            Final result                 Please view results for these tests on the individual  orders.   CBC with Platelets & Differential     Status: Abnormal    Narrative    The following orders were created for panel order CBC with Platelets & Differential.  Procedure                               Abnormality         Status                     ---------                               -----------         ------                     CBC with platelets and d...[[id number]]  Abnormal            Final result                 Please view results for these tests on the individual orders.     Medications   atorvastatin (LIPITOR) tablet 20 mg ( Oral Automatically Held 2/15/22 0800)   Vitamin D3 (CHOLECALCIFEROL) tablet 1,000 Units ( Oral Automatically Held 2/15/22 0800)   calcium carbonate (OS-RODOLFO) tablet 600 mg ( Oral Automatically Held 2/15/22 0800)   diclofenac (VOLTAREN) 1 % topical gel 2-4 g (4 g Topical Given 2/10/22 0939)   DULoxetine (CYMBALTA) DR capsule 60 mg (60 mg Oral Not Given 2/10/22 0828)   fluticasone-vilanterol (BREO ELLIPTA) 100-25 MCG/INH inhaler 1 puff (1 puff Inhalation Given 2/9/22 0806)   gabapentin (NEURONTIN) capsule 1,200 mg (1,200 mg Oral Not Given 2/10/22 1359)   hydrOXYzine (ATARAX) tablet 100 mg (has no administration in time range)   levalbuterol (XOPENEX HFA) 45 MCG/ACT Inhaler 2 puff (has no administration in time range)   levothyroxine (SYNTHROID/LEVOTHROID) tablet 200 mcg (200 mcg Oral Not Given 2/10/22 0828)   nicotine (NICORETTE) gum 4 mg (has no administration in time range)   polyethylene glycol (MIRALAX) Packet 17 g (has no administration in time range)   potassium chloride ER (KLOR-CON) CR tablet 8 mEq ( Oral Automatically Held 2/14/22 2000)   cyanocobalamin (VITAMIN B-12) tablet 1,000 mcg ( Oral Automatically Held 2/15/22 0800)   traZODone (DESYREL) tablet 50 mg (50 mg Oral Not Given 2/9/22 2120)   glucose gel 15-30 g (has no administration in time range)     Or   dextrose 50 % injection 25-50 mL (has no administration in time range)     Or   glucagon injection 1 mg (has no  administration in time range)   insulin detemir (LEVEMIR PEN) injection 10 Units (10 Units Subcutaneous Given 2/10/22 0747)   insulin aspart (NovoLOG) injection (RAPID ACTING) (1 Units Subcutaneous Not Given 2/10/22 1227)   insulin aspart (NovoLOG) injection (RAPID ACTING) (1 Units Subcutaneous Not Given 2/9/22 2124)   lidocaine 1 % 0.1-1 mL (has no administration in time range)   lidocaine (LMX4) cream (has no administration in time range)   sodium chloride (PF) 0.9% PF flush 3 mL (3 mLs Intracatheter Given 2/10/22 0822)   sodium chloride (PF) 0.9% PF flush 3 mL (has no administration in time range)   Medication instructions: Do NOT use nebulized medications (has no administration in time range)   dexamethasone PF (DECADRON) injection 6 mg (6 mg Intravenous Given 2/10/22 1223)   baricitinib (OLUMIANT) tablet 4 mg (4 mg Oral Not Given 2/10/22 0828)   acetaminophen (TYLENOL) tablet 650 mg (has no administration in time range)     Or   acetaminophen (TYLENOL) Suppository 650 mg (has no administration in time range)   senna-docusate (SENOKOT-S/PERICOLACE) 8.6-50 MG per tablet 1 tablet (has no administration in time range)     Or   senna-docusate (SENOKOT-S/PERICOLACE) 8.6-50 MG per tablet 2 tablet (has no administration in time range)   ondansetron (ZOFRAN-ODT) ODT tab 4 mg (has no administration in time range)     Or   ondansetron (ZOFRAN) injection 4 mg (has no administration in time range)   pantoprazole (PROTONIX) IV push injection 40 mg (40 mg Intravenous Given 2/10/22 0732)   bisacodyl (DULCOLAX) Suppository 10 mg (has no administration in time range)   furosemide (LASIX) injection 10 mg (10 mg Intravenous Given 2/10/22 0734)   naloxone (NARCAN) injection 0.2 mg (has no administration in time range)     Or   naloxone (NARCAN) injection 0.4 mg (has no administration in time range)     Or   naloxone (NARCAN) injection 0.2 mg (has no administration in time range)     Or   naloxone (NARCAN) injection 0.4 mg (has no  administration in time range)   azithromycin (ZITHROMAX) 500 mg in sodium chloride 0.9 % 250 mL intermittent infusion (0 mg Intravenous Stopped 2/10/22 1518)   vancomycin 1500 mg in 0.9% NaCl 250 ml intermittent infusion 1,500 mg (has no administration in time range)   enoxaparin ANTICOAGULANT (LOVENOX) injection 60 mg (has no administration in time range)   ceFEPIme (MAXIPIME) 2 g vial to attach to  ml bag for ADULTS or 50 ml bag for PEDS (has no administration in time range)   lidocaine 1 % 0.1-5 mL (has no administration in time range)   lidocaine (LMX4) cream (has no administration in time range)   sodium chloride (PF) 0.9% PF flush 10-40 mL (has no administration in time range)   remdesivir 100 mg in sodium chloride 0.9 % 250 mL intermittent infusion (has no administration in time range)     And   0.9% sodium chloride BOLUS (has no administration in time range)   propofol (DIPRIVAN) infusion (80 mcg/kg/min × 126.1 kg Intravenous New Bag 2/10/22 1717)   dexamethasone PF (DECADRON) injection 10 mg (10 mg Intravenous Given 2/8/22 1035)   albuterol (PROVENTIL HFA/VENTOLIN HFA) inhaler (4 puffs Inhalation Given 2/8/22 1038)   azithromycin (ZITHROMAX) 500 mg in sodium chloride 0.9 % 250 mL intermittent infusion (0 mg Intravenous Stopped 2/8/22 1522)   cefTRIAXone (ROCEPHIN) 1 g vial to attach to  mL bag for ADULTS or NS 50 mL bag for PEDS (0 g Intravenous Stopped 2/8/22 1233)   iopamidol (ISOVUE-370) solution 100 mL (100 mLs Intravenous Given 2/8/22 1315)   sodium chloride (PF) 0.9% PF flush 104 mL (104 mLs Intravenous Given 2/8/22 1316)   calcium gluconate 1 g in 50 mL sodium chloride intermittent infusion (premix) (1 g Intravenous Given 2/8/22 1541)   sodium bicarbonate 8.4 % injection 50 mEq (50 mEq Intravenous Given 2/8/22 1541)   calcium gluconate 1 g in 50 mL sodium chloride intermittent infusion (premix) (1 g Intravenous Given 2/8/22 1804)   0.9% sodium chloride BOLUS (0 mLs Intravenous Stopped  2/8/22 1758)   furosemide (LASIX) injection 30 mg (30 mg Intravenous Given 2/9/22 0932)   vancomycin (VANCOCIN) 2,500 mg in sodium chloride 0.9 % 500 mL intermittent infusion (0 mg Intravenous Stopped 2/10/22 1225)   perflutren diluted 1mL to 2mL with saline (OPTISON) diluted injection 6 mL (6 mLs Intravenous Given 2/10/22 1024)   remdesivir 200 mg in sodium chloride 0.9 % 250 mL intermittent infusion (0 mg Intravenous Stopped 2/10/22 1525)     Followed by   0.9% sodium chloride BOLUS (0 mLs Intravenous Stopped 2/10/22 1532)   etomidate (AMIDATE) injection 30 mg (30 mg Intravenous Given 2/10/22 1317)   succinylcholine (ANECTINE) injection 200 mg (200 mg Intravenous Given 2/10/22 1317)   HYDROmorphone (DILAUDID) injection 1 mg (1 mg Intravenous Given 2/10/22 1327)   ketamine (KETALAR) injection 100 mg (100 mg Intravenous Given 2/10/22 1530)   ketamine (KETALAR) injection 100 mg (100 mg Intravenous Given 2/10/22 1325)   propofol (DIPRIVAN) injection 10 mg/mL vial (50 mg Intravenous Given 2/10/22 1320)   propofol (DIPRIVAN) injection 10 mg/mL vial (50 mg Intravenous Given 2/10/22 1322)        Assessments & Plan (with Medical Decision Making)   62-year-old female with medical history and course progress as described above with severe Covid pneumonia requiring intubation and advanced hemodynamic monitoring.  Patient taken to recess room three intubated with RSI as noted in procedure note under emergent verbal  consent as the patient appeared to continue to develop increased oxygen dependence.    Intubation was fortunately uneventful.  Patient placed on propofol infusion and received several propofol boluses as well as ketamine boluses to achieve adequate sedation.  Patient did have several episodes of borderline blood pressures with SBP's in the 80s maps in the low 60s.  Ultimately stabilized with propofol at 80  See flow sheet for vent settings. Pt did well with peep of 10, Tv 400,     CVC catheter placed for potential  need of hemodynamic support  Arterial line place for careful HD monitoring while intubated and with borderline blood pressures.     CVC, ETT, NG tube placement confirmed by CXR  Valle in place, urine draining.       Ultimately patient maintained maps in the low 70s without additional vasopressors and is well sedated on propofol alone.  Pt transported to ICU at outside facility.         I have reviewed the nursing notes. I have reviewed the findings, diagnosis, plan and need for follow up with the patient.    Discharge Medication List as of 2/10/2022  5:05 PM          Final diagnoses:   Infection due to 2019 novel coronavirus   Acute respiratory failure with hypoxia and hypercapnia (H)       --  Mansoor Preicado MD    [facility] EMERGENCY DEPARTMENT  2/8/2022     Mansoor Preciado MD  02/10/22 1820

## 2022-02-10 NOTE — ED NOTES
1317 30 etom given, 200 succ given prior to intubation  25 at the teeth  7.5 tube     Breath sounds heard on auscultation

## 2022-02-10 NOTE — PROGRESS NOTES
Called patient's family with patient at bedside. Updated to plan of care. Maybe able to transfer to ICU [facility], pt and family amenable to this plan. Pt needs urgent transfer for ICU level care.     Elsie Hunter DO  Family Medicine PGY-2  [facility], [facility]   Pronouns: She/Hers      Addendum:  Discussed patient case with my attending physician Dr. Herron and ICU physician at ED [facility] over the phone. Reviewed patient case together, Per ICU attending, pt needs to be intubated and stabilized prior to transfer to ED [facility]. Discussed with Triage physician next steps as intubating and stabilizing this patient is outside our service's scope of practice.

## 2022-02-10 NOTE — PROGRESS NOTES
I was contacted by central operation center this morning regarding transferring this patient from the [facility] emergency department to the [facility] intensive care unit today. Chart was reviewed. I did speak with Placido Bhatia and Dale of the [facility] medical service at [facility].    Briefly, No Yusuf is a 62-year-old female with history of type 2 diabetes, hypertension, dyslipidemia, fibromyalgia, arthritis, chronic hep C, bipolar mood disorder, substance abuse (on methadone 80 md daily), and hypothyroidism. She has been vaccinated for COVID-19 but not boosted. Evidently, she tested positive for COVID-19 on 2/2/2022. She presented to the [facility] emergency department on 2/8/2022 for evaluation of worsening shortness of breath. Emergency department evaluation showed hypoxia for which she was placed on supplemental oxygen by high flow nasal cannula. Laboratory evaluation showed sodium 132, potassium of 5.8, albumin 2.6, ALT 55, CRP 77, ferritin 439, hemoglobin A1c 6.2, procalcitonin 0.07, and high-sensitivity troponin 81. Venous blood gas showed PCO2 of 54 and PO2 of 33 and bicarb of 31. Urine analysis showed 43 white blood cells, moderate leukocyte esterase, positive nitrite, and many bacteria. Chest x-ray showed patchy opacities throughout both lungs compatible with known COVID-19 infection. CT of chest showed no pulmonary embolism. It did show patchy groundglass and consolidative opacities throughout the lungs, compatible with patient's known COVID-19 infection. She was admitted to the [facility] medical service. Presumably due to bed shortage, she boarded in the emergency department. She remained on high flow nasal cannula. She was treated with dexamethasone 6 mg IV daily and baricitinib 4 mg PO daily. She was given Lovenox 40 mg subcutaneously daily for DVT prophylaxis. She was treated for UTI and possible bacterial pneumonia with cefepime, Zithromax, and vancomycin. Urine culture came  back growing E. coli resistant to penicillins and cephalosporins. Over the next day and a half her oxygen needs increased to 100% BiPAP. There was mounting concern that No was deteriorating and would need intubation. Unfortunately, there were no ICU beds available at [facility]. I was contacted to accept this patient in transfer. After discussion with the transferring providers we agreed that it would be best for No to be intubated prior to ambulance transfer. It appears that after our discussion the medicine service became involved. PICC line was ordered. Intubation was ordered and performed. Remdesivir was ordered. Plan is for patient to be transferred to [facility] intensive care unit later today once stabilized and transportation arranged. Anticipate she will need ongoing care for COVID-19 pneumonia with acute hypoxic respiratory failure with vent support, dexamethasone, remdesivir, and baricitinib. She will need treatment for E. coli UTI, probably with a fluoroquinolone. She will need DVT prophylaxis with IV Lovenox ICU dosing. It is not clear that she needs continued treatment for possible secondary bacterial pneumonia.

## 2022-02-10 NOTE — PROGRESS NOTES
Assisted with endotracheal intubation for respiratory failure/airway protection. A #7.5 ETT was placed and secured at 24 cm @ lip. Positive color change noted with CO2 detector, breath sounds were equal bilaterally. Neck positioning aid removed. Patient placed on full vent support (22, 400, +14, 100%), labs and CXR pending.    aSlomon Peter, RRT

## 2022-02-11 ENCOUNTER — APPOINTMENT (OUTPATIENT)
Dept: GENERAL RADIOLOGY | Facility: CLINIC | Age: 62
DRG: 871 | End: 2022-02-11
Attending: INTERNAL MEDICINE
Payer: COMMERCIAL

## 2022-02-11 LAB
ALBUMIN SERPL-MCNC: 1.9 G/DL (ref 3.4–5)
ALP SERPL-CCNC: 69 U/L (ref 40–150)
ALT SERPL W P-5'-P-CCNC: 53 U/L (ref 0–50)
ANION GAP SERPL CALCULATED.3IONS-SCNC: 4 MMOL/L (ref 3–14)
ANION GAP SERPL CALCULATED.3IONS-SCNC: 6 MMOL/L (ref 3–14)
ANION GAP SERPL CALCULATED.3IONS-SCNC: 7 MMOL/L (ref 3–14)
AST SERPL W P-5'-P-CCNC: 81 U/L (ref 0–45)
BACTERIA UR CULT: ABNORMAL
BASE EXCESS BLDA CALC-SCNC: 2.4 MMOL/L (ref -9–1.8)
BASE EXCESS BLDA CALC-SCNC: 2.4 MMOL/L (ref -9–1.8)
BASE EXCESS BLDA CALC-SCNC: 2.8 MMOL/L (ref -9–1.8)
BASE EXCESS BLDA CALC-SCNC: 2.8 MMOL/L (ref -9–1.8)
BASE EXCESS BLDV CALC-SCNC: 2.2 MMOL/L (ref -7.7–1.9)
BASOPHILS # BLD AUTO: 0 10E3/UL (ref 0–0.2)
BASOPHILS NFR BLD AUTO: 0 %
BILIRUB DIRECT SERPL-MCNC: 0.1 MG/DL (ref 0–0.2)
BILIRUB SERPL-MCNC: 0.6 MG/DL (ref 0.2–1.3)
BUN SERPL-MCNC: 13 MG/DL (ref 7–30)
BUN SERPL-MCNC: 13 MG/DL (ref 7–30)
BUN SERPL-MCNC: 14 MG/DL (ref 7–30)
CALCIUM SERPL-MCNC: 7.6 MG/DL (ref 8.5–10.1)
CALCIUM SERPL-MCNC: 8.2 MG/DL (ref 8.5–10.1)
CALCIUM SERPL-MCNC: 8.6 MG/DL (ref 8.5–10.1)
CHLORIDE BLD-SCNC: 104 MMOL/L (ref 94–109)
CHLORIDE BLD-SCNC: 105 MMOL/L (ref 94–109)
CHLORIDE BLD-SCNC: 107 MMOL/L (ref 94–109)
CK SERPL-CCNC: 115 U/L (ref 30–225)
CO2 SERPL-SCNC: 25 MMOL/L (ref 20–32)
CO2 SERPL-SCNC: 27 MMOL/L (ref 20–32)
CO2 SERPL-SCNC: 28 MMOL/L (ref 20–32)
CREAT SERPL-MCNC: 0.61 MG/DL (ref 0.52–1.04)
CREAT SERPL-MCNC: 0.65 MG/DL (ref 0.52–1.04)
CREAT SERPL-MCNC: 0.72 MG/DL (ref 0.52–1.04)
CRP SERPL-MCNC: 37.2 MG/L (ref 0–8)
EOSINOPHIL # BLD AUTO: 0 10E3/UL (ref 0–0.7)
EOSINOPHIL NFR BLD AUTO: 0 %
ERYTHROCYTE [DISTWIDTH] IN BLOOD BY AUTOMATED COUNT: 14.1 % (ref 10–15)
ERYTHROCYTE [DISTWIDTH] IN BLOOD BY AUTOMATED COUNT: 14.1 % (ref 10–15)
ERYTHROCYTE [DISTWIDTH] IN BLOOD BY AUTOMATED COUNT: 14.2 % (ref 10–15)
GFR SERPL CREATININE-BSD FRML MDRD: >90 ML/MIN/1.73M2
GLUCOSE BLD-MCNC: 196 MG/DL (ref 70–99)
GLUCOSE BLD-MCNC: 221 MG/DL (ref 70–99)
GLUCOSE BLD-MCNC: 262 MG/DL (ref 70–99)
GLUCOSE BLDC GLUCOMTR-MCNC: 169 MG/DL (ref 70–99)
GLUCOSE BLDC GLUCOMTR-MCNC: 180 MG/DL (ref 70–99)
GLUCOSE BLDC GLUCOMTR-MCNC: 191 MG/DL (ref 70–99)
GLUCOSE BLDC GLUCOMTR-MCNC: 233 MG/DL (ref 70–99)
GLUCOSE BLDC GLUCOMTR-MCNC: 235 MG/DL (ref 70–99)
HCO3 BLD-SCNC: 26 MMOL/L (ref 21–28)
HCO3 BLD-SCNC: 27 MMOL/L (ref 21–28)
HCO3 BLDV-SCNC: 29 MMOL/L (ref 21–28)
HCT VFR BLD AUTO: 37.7 % (ref 35–47)
HCT VFR BLD AUTO: 38.5 % (ref 35–47)
HCT VFR BLD AUTO: 42.3 % (ref 35–47)
HGB BLD-MCNC: 12.3 G/DL (ref 11.7–15.7)
HGB BLD-MCNC: 12.4 G/DL (ref 11.7–15.7)
HGB BLD-MCNC: 13.5 G/DL (ref 11.7–15.7)
IMM GRANULOCYTES # BLD: 0.2 10E3/UL
IMM GRANULOCYTES NFR BLD: 3 %
LACTATE SERPL-SCNC: 2 MMOL/L (ref 0.7–2)
LYMPHOCYTES # BLD AUTO: 1.6 10E3/UL (ref 0.8–5.3)
LYMPHOCYTES NFR BLD AUTO: 23 %
MAGNESIUM SERPL-MCNC: 2 MG/DL (ref 1.6–2.3)
MAGNESIUM SERPL-MCNC: 2.6 MG/DL (ref 1.6–2.3)
MCH RBC QN AUTO: 29 PG (ref 26.5–33)
MCH RBC QN AUTO: 29.2 PG (ref 26.5–33)
MCH RBC QN AUTO: 29.2 PG (ref 26.5–33)
MCHC RBC AUTO-ENTMCNC: 31.9 G/DL (ref 31.5–36.5)
MCHC RBC AUTO-ENTMCNC: 32.2 G/DL (ref 31.5–36.5)
MCHC RBC AUTO-ENTMCNC: 32.6 G/DL (ref 31.5–36.5)
MCV RBC AUTO: 90 FL (ref 78–100)
MCV RBC AUTO: 91 FL (ref 78–100)
MCV RBC AUTO: 91 FL (ref 78–100)
MONOCYTES # BLD AUTO: 1 10E3/UL (ref 0–1.3)
MONOCYTES NFR BLD AUTO: 14 %
NEUTROPHILS # BLD AUTO: 4.2 10E3/UL (ref 1.6–8.3)
NEUTROPHILS NFR BLD AUTO: 60 %
NRBC # BLD AUTO: 0 10E3/UL
NRBC BLD AUTO-RTO: 0 /100
O2/TOTAL GAS SETTING VFR VENT: 100 %
O2/TOTAL GAS SETTING VFR VENT: 70 %
O2/TOTAL GAS SETTING VFR VENT: 80 %
OXYHGB MFR BLD: 91 % (ref 92–100)
PCO2 BLD: 35 MM HG (ref 35–45)
PCO2 BLD: 36 MM HG (ref 35–45)
PCO2 BLD: 38 MM HG (ref 35–45)
PCO2 BLD: 41 MM HG (ref 35–45)
PCO2 BLDV: 50 MM HG (ref 40–50)
PH BLD: 7.43 [PH] (ref 7.35–7.45)
PH BLD: 7.45 [PH] (ref 7.35–7.45)
PH BLD: 7.47 [PH] (ref 7.35–7.45)
PH BLD: 7.48 [PH] (ref 7.35–7.45)
PH BLDV: 7.37 [PH] (ref 7.32–7.43)
PHOSPHATE SERPL-MCNC: 2.6 MG/DL (ref 2.5–4.5)
PLATELET # BLD AUTO: 257 10E3/UL (ref 150–450)
PLATELET # BLD AUTO: 260 10E3/UL (ref 150–450)
PLATELET # BLD AUTO: 270 10E3/UL (ref 150–450)
PO2 BLD: 156 MM HG (ref 80–105)
PO2 BLD: 171 MM HG (ref 80–105)
PO2 BLD: 62 MM HG (ref 80–105)
PO2 BLD: 94 MM HG (ref 80–105)
PO2 BLDV: 34 MM HG (ref 25–47)
POTASSIUM BLD-SCNC: 3.7 MMOL/L (ref 3.4–5.3)
POTASSIUM BLD-SCNC: 3.8 MMOL/L (ref 3.4–5.3)
POTASSIUM BLD-SCNC: 3.9 MMOL/L (ref 3.4–5.3)
PROT SERPL-MCNC: 7 G/DL (ref 6.8–8.8)
RBC # BLD AUTO: 4.21 10E6/UL (ref 3.8–5.2)
RBC # BLD AUTO: 4.24 10E6/UL (ref 3.8–5.2)
RBC # BLD AUTO: 4.65 10E6/UL (ref 3.8–5.2)
SODIUM SERPL-SCNC: 137 MMOL/L (ref 133–144)
SODIUM SERPL-SCNC: 138 MMOL/L (ref 133–144)
SODIUM SERPL-SCNC: 138 MMOL/L (ref 133–144)
TRIGL SERPL-MCNC: 377 MG/DL
WBC # BLD AUTO: 5.2 10E3/UL (ref 4–11)
WBC # BLD AUTO: 6.5 10E3/UL (ref 4–11)
WBC # BLD AUTO: 6.9 10E3/UL (ref 4–11)

## 2022-02-11 PROCEDURE — 82803 BLOOD GASES ANY COMBINATION: CPT | Performed by: INTERNAL MEDICINE

## 2022-02-11 PROCEDURE — 82803 BLOOD GASES ANY COMBINATION: CPT | Performed by: SURGERY

## 2022-02-11 PROCEDURE — 94645 CONT INHLJ TX EACH ADDL HOUR: CPT

## 2022-02-11 PROCEDURE — 83735 ASSAY OF MAGNESIUM: CPT | Performed by: SURGERY

## 2022-02-11 PROCEDURE — 84100 ASSAY OF PHOSPHORUS: CPT | Performed by: SURGERY

## 2022-02-11 PROCEDURE — 250N000011 HC RX IP 250 OP 636: Performed by: STUDENT IN AN ORGANIZED HEALTH CARE EDUCATION/TRAINING PROGRAM

## 2022-02-11 PROCEDURE — 83735 ASSAY OF MAGNESIUM: CPT | Performed by: INTERNAL MEDICINE

## 2022-02-11 PROCEDURE — 84478 ASSAY OF TRIGLYCERIDES: CPT | Performed by: INTERNAL MEDICINE

## 2022-02-11 PROCEDURE — 94644 CONT INHLJ TX 1ST HOUR: CPT

## 2022-02-11 PROCEDURE — 85027 COMPLETE CBC AUTOMATED: CPT | Performed by: INTERNAL MEDICINE

## 2022-02-11 PROCEDURE — 82248 BILIRUBIN DIRECT: CPT | Performed by: INTERNAL MEDICINE

## 2022-02-11 PROCEDURE — 250N000012 HC RX MED GY IP 250 OP 636 PS 637: Performed by: INTERNAL MEDICINE

## 2022-02-11 PROCEDURE — 250N000013 HC RX MED GY IP 250 OP 250 PS 637: Performed by: INTERNAL MEDICINE

## 2022-02-11 PROCEDURE — 99291 CRITICAL CARE FIRST HOUR: CPT | Performed by: SURGERY

## 2022-02-11 PROCEDURE — C9113 INJ PANTOPRAZOLE SODIUM, VIA: HCPCS | Performed by: STUDENT IN AN ORGANIZED HEALTH CARE EDUCATION/TRAINING PROGRAM

## 2022-02-11 PROCEDURE — 82803 BLOOD GASES ANY COMBINATION: CPT | Performed by: STUDENT IN AN ORGANIZED HEALTH CARE EDUCATION/TRAINING PROGRAM

## 2022-02-11 PROCEDURE — 84295 ASSAY OF SERUM SODIUM: CPT | Performed by: INTERNAL MEDICINE

## 2022-02-11 PROCEDURE — 999N000065 XR CHEST PORT 1 VIEW

## 2022-02-11 PROCEDURE — 80053 COMPREHEN METABOLIC PANEL: CPT | Performed by: INTERNAL MEDICINE

## 2022-02-11 PROCEDURE — 250N000011 HC RX IP 250 OP 636: Performed by: INTERNAL MEDICINE

## 2022-02-11 PROCEDURE — 82550 ASSAY OF CK (CPK): CPT | Performed by: INTERNAL MEDICINE

## 2022-02-11 PROCEDURE — 999N000157 HC STATISTIC RCP TIME EA 10 MIN

## 2022-02-11 PROCEDURE — 83605 ASSAY OF LACTIC ACID: CPT | Performed by: INTERNAL MEDICINE

## 2022-02-11 PROCEDURE — 80048 BASIC METABOLIC PNL TOTAL CA: CPT | Performed by: SURGERY

## 2022-02-11 PROCEDURE — 86140 C-REACTIVE PROTEIN: CPT | Performed by: INTERNAL MEDICINE

## 2022-02-11 PROCEDURE — 250N000011 HC RX IP 250 OP 636: Performed by: SURGERY

## 2022-02-11 PROCEDURE — 200N000001 HC R&B ICU

## 2022-02-11 PROCEDURE — 94003 VENT MGMT INPAT SUBQ DAY: CPT

## 2022-02-11 PROCEDURE — 82805 BLOOD GASES W/O2 SATURATION: CPT | Performed by: STUDENT IN AN ORGANIZED HEALTH CARE EDUCATION/TRAINING PROGRAM

## 2022-02-11 PROCEDURE — 99233 SBSQ HOSP IP/OBS HIGH 50: CPT | Performed by: INTERNAL MEDICINE

## 2022-02-11 PROCEDURE — 258N000003 HC RX IP 258 OP 636: Performed by: SURGERY

## 2022-02-11 RX ORDER — MAGNESIUM SULFATE HEPTAHYDRATE 40 MG/ML
2 INJECTION, SOLUTION INTRAVENOUS ONCE
Status: DISCONTINUED | OUTPATIENT
Start: 2022-02-11 | End: 2022-02-11 | Stop reason: CLARIF

## 2022-02-11 RX ORDER — MIDAZOLAM HCL IN 0.9 % NACL/PF 1 MG/ML
6-30 PLASTIC BAG, INJECTION (ML) INTRAVENOUS CONTINUOUS
Status: DISCONTINUED | OUTPATIENT
Start: 2022-02-11 | End: 2022-02-13 | Stop reason: HOSPADM

## 2022-02-11 RX ORDER — DOPAMINE HYDROCHLORIDE 160 MG/100ML
2-20 INJECTION, SOLUTION INTRAVENOUS CONTINUOUS
Status: DISCONTINUED | OUTPATIENT
Start: 2022-02-11 | End: 2022-02-12

## 2022-02-11 RX ORDER — MIDAZOLAM HCL IN 0.9 % NACL/PF 1 MG/ML
1-8 PLASTIC BAG, INJECTION (ML) INTRAVENOUS CONTINUOUS
Status: DISCONTINUED | OUTPATIENT
Start: 2022-02-11 | End: 2022-02-11

## 2022-02-11 RX ORDER — ATROPINE SULFATE 0.1 MG/ML
0.5 INJECTION INTRAVENOUS
Status: DISCONTINUED | OUTPATIENT
Start: 2022-02-11 | End: 2022-02-12

## 2022-02-11 RX ORDER — MAGNESIUM SULFATE HEPTAHYDRATE 40 MG/ML
2 INJECTION, SOLUTION INTRAVENOUS ONCE
Status: COMPLETED | OUTPATIENT
Start: 2022-02-11 | End: 2022-02-11

## 2022-02-11 RX ORDER — GUAIFENESIN 600 MG/1
15 TABLET, EXTENDED RELEASE ORAL DAILY
Status: DISCONTINUED | OUTPATIENT
Start: 2022-02-11 | End: 2022-02-13 | Stop reason: HOSPADM

## 2022-02-11 RX ORDER — DEXTROSE MONOHYDRATE 100 MG/ML
INJECTION, SOLUTION INTRAVENOUS CONTINUOUS PRN
Status: DISCONTINUED | OUTPATIENT
Start: 2022-02-11 | End: 2022-02-13 | Stop reason: HOSPADM

## 2022-02-11 RX ORDER — MAGNESIUM SULFATE HEPTAHYDRATE 500 MG/ML
2 INJECTION, SOLUTION INTRAMUSCULAR; INTRAVENOUS ONCE
Status: COMPLETED | OUTPATIENT
Start: 2022-02-11 | End: 2022-02-11

## 2022-02-11 RX ADMIN — INSULIN ASPART 2 UNITS: 100 INJECTION, SOLUTION INTRAVENOUS; SUBCUTANEOUS at 00:07

## 2022-02-11 RX ADMIN — PROPOFOL 75 MCG/KG/MIN: 10 INJECTION, EMULSION INTRAVENOUS at 00:06

## 2022-02-11 RX ADMIN — LEVOTHYROXINE SODIUM 200 MCG: 0.1 TABLET ORAL at 09:33

## 2022-02-11 RX ADMIN — FENTANYL CITRATE 100 MCG: 50 INJECTION, SOLUTION INTRAMUSCULAR; INTRAVENOUS at 01:11

## 2022-02-11 RX ADMIN — BARICITINIB 4 MG: 2 TABLET, FILM COATED ORAL at 09:33

## 2022-02-11 RX ADMIN — Medication 0.5 MG/HR: at 17:26

## 2022-02-11 RX ADMIN — DEXAMETHASONE SODIUM PHOSPHATE 6 MG: 10 INJECTION, SOLUTION INTRAMUSCULAR; INTRAVENOUS at 09:32

## 2022-02-11 RX ADMIN — PROPOFOL 75 MCG/KG/MIN: 10 INJECTION, EMULSION INTRAVENOUS at 01:42

## 2022-02-11 RX ADMIN — PROPOFOL 60 MCG/KG/MIN: 10 INJECTION, EMULSION INTRAVENOUS at 10:04

## 2022-02-11 RX ADMIN — CYANOCOBALAMIN TAB 500 MCG 1000 MCG: 500 TAB at 09:33

## 2022-02-11 RX ADMIN — METHADONE HYDROCHLORIDE 5 MG: 10 INJECTION, SOLUTION INTRAMUSCULAR; INTRAVENOUS; SUBCUTANEOUS at 09:31

## 2022-02-11 RX ADMIN — INSULIN ASPART 1 UNITS: 100 INJECTION, SOLUTION INTRAVENOUS; SUBCUTANEOUS at 12:38

## 2022-02-11 RX ADMIN — INSULIN ASPART 2 UNITS: 100 INJECTION, SOLUTION INTRAVENOUS; SUBCUTANEOUS at 16:58

## 2022-02-11 RX ADMIN — INSULIN ASPART 1 UNITS: 100 INJECTION, SOLUTION INTRAVENOUS; SUBCUTANEOUS at 22:00

## 2022-02-11 RX ADMIN — INSULIN ASPART 1 UNITS: 100 INJECTION, SOLUTION INTRAVENOUS; SUBCUTANEOUS at 09:34

## 2022-02-11 RX ADMIN — PROPOFOL 75 MCG/KG/MIN: 10 INJECTION, EMULSION INTRAVENOUS at 06:46

## 2022-02-11 RX ADMIN — DOPAMINE HYDROCHLORIDE 2 MCG/KG/MIN: 160 INJECTION, SOLUTION INTRAVENOUS at 14:15

## 2022-02-11 RX ADMIN — MIDAZOLAM HYDROCHLORIDE 1 MG/HR: 1 INJECTION, SOLUTION INTRAVENOUS at 01:27

## 2022-02-11 RX ADMIN — POTASSIUM CHLORIDE 20 MEQ: 149 INJECTION, SOLUTION, CONCENTRATE INTRAVENOUS at 19:14

## 2022-02-11 RX ADMIN — FENTANYL CITRATE 100 MCG: 50 INJECTION, SOLUTION INTRAMUSCULAR; INTRAVENOUS at 16:22

## 2022-02-11 RX ADMIN — ENOXAPARIN SODIUM 60 MG: 60 INJECTION SUBCUTANEOUS at 09:32

## 2022-02-11 RX ADMIN — Medication 15 ML: at 17:34

## 2022-02-11 RX ADMIN — GABAPENTIN 1200 MG: 400 CAPSULE ORAL at 00:06

## 2022-02-11 RX ADMIN — PROPOFOL 75 MCG/KG/MIN: 10 INJECTION, EMULSION INTRAVENOUS at 08:40

## 2022-02-11 RX ADMIN — INSULIN ASPART 2 UNITS: 100 INJECTION, SOLUTION INTRAVENOUS; SUBCUTANEOUS at 04:20

## 2022-02-11 RX ADMIN — FUROSEMIDE 20 MG: 20 TABLET ORAL at 09:33

## 2022-02-11 RX ADMIN — ATORVASTATIN CALCIUM 20 MG: 20 TABLET, FILM COATED ORAL at 23:06

## 2022-02-11 RX ADMIN — METHADONE HYDROCHLORIDE 5 MG: 10 INJECTION, SOLUTION INTRAMUSCULAR; INTRAVENOUS; SUBCUTANEOUS at 12:46

## 2022-02-11 RX ADMIN — PROPOFOL 75 MCG/KG/MIN: 10 INJECTION, EMULSION INTRAVENOUS at 05:16

## 2022-02-11 RX ADMIN — METHADONE HYDROCHLORIDE 5 MG: 10 INJECTION, SOLUTION INTRAMUSCULAR; INTRAVENOUS; SUBCUTANEOUS at 19:19

## 2022-02-11 RX ADMIN — MAGNESIUM SULFATE HEPTAHYDRATE 2 G: 40 INJECTION, SOLUTION INTRAVENOUS at 19:07

## 2022-02-11 RX ADMIN — Medication 20 NG/KG/MIN: at 21:15

## 2022-02-11 RX ADMIN — ENOXAPARIN SODIUM 60 MG: 60 INJECTION SUBCUTANEOUS at 19:35

## 2022-02-11 RX ADMIN — ATORVASTATIN CALCIUM 20 MG: 20 TABLET, FILM COATED ORAL at 00:06

## 2022-02-11 RX ADMIN — PANTOPRAZOLE SODIUM 40 MG: 40 INJECTION, POWDER, FOR SOLUTION INTRAVENOUS at 09:32

## 2022-02-11 RX ADMIN — Medication 20 NG/KG/MIN: at 13:10

## 2022-02-11 RX ADMIN — Medication 20 NG/KG/MIN: at 06:05

## 2022-02-11 RX ADMIN — Medication 1000 UNITS: at 09:33

## 2022-02-11 RX ADMIN — Medication 1 MG/HR: at 18:18

## 2022-02-11 RX ADMIN — DULOXETINE HYDROCHLORIDE 60 MG: 20 CAPSULE, DELAYED RELEASE ORAL at 09:33

## 2022-02-11 RX ADMIN — INSULIN DETEMIR 10 UNITS: 100 INJECTION, SOLUTION SUBCUTANEOUS at 12:36

## 2022-02-11 RX ADMIN — ACETAMINOPHEN 650 MG: 325 TABLET, FILM COATED ORAL at 17:35

## 2022-02-11 RX ADMIN — CIPROFLOXACIN 400 MG: 2 INJECTION, SOLUTION INTRAVENOUS at 20:21

## 2022-02-11 RX ADMIN — GABAPENTIN 1200 MG: 400 CAPSULE ORAL at 09:33

## 2022-02-11 RX ADMIN — PROPOFOL 45 MCG/KG/MIN: 10 INJECTION, EMULSION INTRAVENOUS at 12:37

## 2022-02-11 RX ADMIN — MAGNESIUM SULFATE HEPTAHYDRATE 2 G: 500 INJECTION, SOLUTION INTRAMUSCULAR; INTRAVENOUS at 14:42

## 2022-02-11 RX ADMIN — FENTANYL CITRATE 100 MCG: 50 INJECTION, SOLUTION INTRAMUSCULAR; INTRAVENOUS at 17:19

## 2022-02-11 RX ADMIN — MIDAZOLAM (PF) 1 MG/ML IN 0.9 % SODIUM CHLORIDE INTRAVENOUS SOLUTION 13 MG/HR: at 21:12

## 2022-02-11 RX ADMIN — PROPOFOL 75 MCG/KG/MIN: 10 INJECTION, EMULSION INTRAVENOUS at 03:14

## 2022-02-11 RX ADMIN — GABAPENTIN 1200 MG: 400 CAPSULE ORAL at 19:23

## 2022-02-11 ASSESSMENT — ACTIVITIES OF DAILY LIVING (ADL)
ADLS_ACUITY_SCORE: 22
DEPENDENT_IADLS:: CLEANING;COOKING;LAUNDRY;SHOPPING;MEAL PREPARATION;MEDICATION MANAGEMENT;TRANSPORTATION
ADLS_ACUITY_SCORE: 22

## 2022-02-11 NOTE — PLAN OF CARE
ICU End of Shift Summary.  For vital signs and complete assessments, please see documentation flowsheets.     Pertinent assessments: Patient intubated and sedated this shift - added second sedative Versed. Patient proned this shift. HR sinus bradycardia. Blood pressures stable from arterial line - line positional. Tolerating Q2 head turns. Adequate urine output from miguel catheter. OG tube to LIS.   Major Shift Events: proned, started Versed, started Veletri  Plan (Upcoming Events): TBD  Discharge/Transfer Needs: TBD    Bedside Shift Report Completed : Y  Bedside Safety Check Completed: Y

## 2022-02-11 NOTE — PROGRESS NOTES
Code Blue called @ 8152. Pt went into VT -CPR and 1 shock of 150 J delivered - return to SR w/ inverted T waves & ST depression, Labs drawn from internal jugular. Gave 2 g Magnesium sulfate and primary RN started on Dopamine drip.

## 2022-02-11 NOTE — PROGRESS NOTES
An ABG was completed on the pt's L wrist @ 1925 on an FIO2 of 100% with no complications noted during the procedure.  Rodney Salter, RT on 2/10/2022 at 7:37 PM

## 2022-02-11 NOTE — PROGRESS NOTES
CODE BLUE NOTE    I responded to a CODE BLUE that was called at approximately 2:03 PM. Patient had been having bradycardia earlier today. She had heart rate in the 40s. She was supine from prone position and went into a pulseless ventricular tachycardia. CODE BLUE was called. CPR was begun. 1 cycle of CPR was performed. She was shocked with 150 J with return to sinus rhythm. Patient had previously been on remdesivir and propofol which were stopped. Magnesium is being given. Dopamine was started for heart rate support. As needed atropine as ordered. Routine labs ordered (blood gas, BMP, hemoglobin, magnesium, lactic acid, and troponin). EKG was obtained and showed ST depression in leads V2 through V6.

## 2022-02-11 NOTE — PROGRESS NOTES
"[facility]UAL [facility] Progress Note  ICU 3     responded to overhead page, \"code blue\" for pt.    Upon entrance to ICU, medical team was working with pt.   stayed outside of room (covid precautions) and prayed quietly.    PLAN  SHS remains available.    ROB Montes.    Intern  Pager: [phone]    "

## 2022-02-11 NOTE — PROGRESS NOTES
[facility] ICU VENTILATOR RESPIRATORY NOTE  Date of Admission: 2/10/22  Date of Intubation (most recent): 2/10/22  Reason for Mechanical Ventilation: Respiratory failure  Number of Days on Mechanical Ventilation: 1  Met Criteria for Pressure Support Trial: No  Length of Pressure Support Trial: NA  Reason for Stopping Pressure Support Trial: NA  Reason for No Pressure Support Trial: Patient not stable  Significant Events Today: Patient was placed prone, has tolerated well.  ABG Results: 7.47/36/62/26(80%)  ETT appearance on chest x-ray: 1.8cm above mariel, ETT pulled back to 21cm at teeth    Plan:  Continue to monitor and adjust settings as needed.  Rodney Salter, RT on 2/11/2022 at 5:02 AM

## 2022-02-11 NOTE — PLAN OF CARE
ICU End of Shift Summary.  For vital signs and complete assessments, please see documentation flowsheets.     Pertinent assessments: pt received by transport from [facility].  On vent.  Sats low after transfer. FIO2 turned up to 100%.  Bp soft.  Art line not working well.  Cuff pressures ok.  On propofol.  Sedated.  Lungs dim.  SR.  CVC in place.   Major Shift Events:  transfer from [place].   Plan (Upcoming Events):  cont to monitor respiratory needs.  Start Fent IV- pt on Methadone at home. Monitor sedation needs.   Discharge/Transfer Needs: cont ICU cares    Bedside Shift Report Completed : yes  Bedside Safety Check Completed: yes

## 2022-02-11 NOTE — PLAN OF CARE
ICU End of Shift Summary.  For vital signs and complete assessments, please see documentation flowsheets.     Pertinent assessments: Sedated. RASS goal achieved. Tele shows SR. Afebrile. Tele shows SB with inverted t-waves, dopamine drip in place. BPs stable. Continues on vent. Lungs diminished. Scant clear thin secretions. Valle in place with adequate UOP. BS+. OG in place and clamped.     Major Shift Events: -PF ration in -195 range.                                       -1300 placed in supine position.                                       -CXR completed to verify ETT and OG placement. No pneumo noted                                      -[id number] Patient not tolerating supine position. MD notified. Vent settings adjusted with PEEP placed back to 16.                                     -1400 nurse at bedside. HR 35-40. Informed charge nurse to update Dr. Michelle. Propofol stopped at this time.                                      -1408 nurse noted tele rhythm of what appeared to be torsades. Code blue called. Chest compressions started.                                       -1409 Pads placed on patient at one shock of 150j delivered. Compressions resumes                                       -1410 Pulse check noted palpable and regular rhythm. MDs at bedside and meds ordered as well as labs.                                        -1415 Dopamine drip started and magnesium ordered.                                        -1420 t-wave inverted noted. 12 lead ordered     Plan (Upcoming Events): Continue to monitor in ICU. Continue current care plan. Meds to increase HR.     Discharge/Transfer Needs: TBD    Bedside Shift Report Completed : yes  Bedside Safety Check Completed: yes    Right wrist and Left wrist restraints continued 2/11/2022    Clinical Justification: Pulling lines, pulling tubes, and pulling equipment  Less Restrictive Alternative: Repositioning,Re-evaluate equipment,Disguise equipment,Pain  management,Alarm,De-escalation,Reorientation  Attending Physician Notified: MD ordered restraint,     New orders placed Yes  Length of Order: 1 Day      MANOHAR EDWARD, RN

## 2022-02-11 NOTE — CONSULTS
CLINICAL NUTRITION SERVICES - ASSESSMENT NOTE     Nutrition Prescription    RECOMMENDATIONS FOR MDs/PROVIDERS TO ORDER:  Unclear whether patient is at risk for refeeding; will order labs accordingly    Malnutrition Status:    Unable to determine     Recommendations already ordered by Registered Dietitian (RD):  Enteral Nutrition - Initiate - Vital HP @ 15 ml/hr (360 ml) which will provide 360 kcal, 31 g protein, 40 g CHO, 0 g fiber, 301 ml free H2O    Propofol at current rate provides 935 kcal/day--anticipate decrease of propofol today due to bradycardia    Total energy/protein provisions= 1295 kcal/31 g protein    Order Certavite liquid vitamin to meet 100% of DRIs for micronutrients    Future/Additional Recommendations:  Monitor TF tolerance, labs, GI function, pressor use, propofol rate-- adjust EN prn     REASON FOR ASSESSMENT  No Yusuf is a/an 62 year old female assessed by the dietitian for Provider Order - Registered Dietitian to Assess and Order TF per Medical Nutrition Therapy Protocol.  Pt admitted d/t covid-19 PNA. PMH significant for  type 2 diabetes mellitus, hypertension, hyperlipidemia, bipolar disorder, fibromyalgia, history of polysubstance abuse and currently on methadone, hypothyroidism, chronic hepatitis C     Pt currently intubated, sedated, and proned in ICU. Pt with OG tube to LIS. Per MD, ok to feed once patient is supine.    NUTRITION HISTORY  - Unable to obtain nutrition history due to patient's condition.  - MST not completed  - pt resides at [facility]  - [facility]    CURRENT NUTRITION ORDERS  Diet: NPO    LABS    Electrolytes  Sodium (mmol/L)   Date Value   02/11/2022 138   02/10/2022 138   02/09/2022 136   07/01/2021 137   06/07/2021 135   06/06/2021 135     Potassium (mmol/L)   Date Value   02/11/2022 3.8   02/10/2022 3.9   02/09/2022 4.8   07/01/2021 3.9   06/07/2021 3.9   06/06/2021 4.0      Blood Glucose  Glucose (mg/dL)   Date Value   02/11/2022 196 (H)   02/10/2022 139 (H)    02/09/2022 192 (H)   02/08/2022 110 (H)   12/12/2021 109 (H)   07/01/2021 127 (H)   06/07/2021 100 (H)   06/06/2021 94   06/05/2021 103 (H)   06/03/2021 86     GLUCOSE BY METER POCT (mg/dL)   Date Value   02/11/2022 169 (H)   02/11/2022 180 (H)   02/11/2022 191 (H)   02/10/2022 202 (H)   02/10/2022 212 (H)     Hemoglobin A1C POCT (%)   Date Value   05/27/2021 6.5 (H)     Hemoglobin A1C (%)   Date Value   02/08/2022 6.2 (H)        Inflammatory Markers  CRP Inflammation (mg/L)   Date Value   02/11/2022 37.2 (H)   02/10/2022 23.0 (H)   02/09/2022 76.0 (H)   06/07/2021 11.7 (H)   06/06/2021 13.0 (H)   06/04/2021 12.0 (H)     WBC (10e9/L)   Date Value   07/01/2021 8.3   06/07/2021 8.9   06/06/2021 11.4 (H)     WBC Count (10e3/uL)   Date Value   02/11/2022 6.5   02/10/2022 6.3   02/10/2022 6.3     Albumin (g/dL)   Date Value   02/11/2022 1.9 (L)   02/10/2022 2.5 (L)   02/09/2022 2.4 (L)   06/03/2021 3.4   05/28/2021 2.9 (L)   05/28/2021 Canceled, Test credited          Phosphorus (mg/dL)   Date Value   02/08/2022 2.7      Renal  Urea Nitrogen (mg/dL)   Date Value   02/11/2022 14   02/10/2022 17   02/09/2022 19   07/01/2021 9   06/07/2021 12   06/06/2021 13     Creatinine (mg/dL)   Date Value   02/11/2022 0.65   02/10/2022 0.75   02/09/2022 0.96   07/01/2021 0.76   06/09/2021 0.81   06/09/2021 0.92         Additional  Ketones Urine (mg/dL)   Date Value   02/08/2022 Negative   05/27/2021 Negative          MEDICATIONS    sodium chloride 0.9%  50 mL Intravenous Q24H     [Held by provider] amLODIPine  5 mg Oral Daily     atorvastatin  20 mg Oral At Bedtime     baricitinib  4 mg Oral Daily     ciprofloxacin  400 mg Intravenous Q12H     cyanocobalamin  1,000 mcg Oral Daily     dexamethasone  6 mg Intravenous Q24H     DULoxetine  60 mg Oral Daily     enoxaparin ANTICOAGULANT  0.5 mg/kg Subcutaneous Q12H     [Held by provider] fluticasone-vilanterol  1 puff Inhalation Daily     furosemide  20 mg Oral Daily     gabapentin  1,200  "mg Oral TID     insulin aspart  1-6 Units Subcutaneous Q4H     insulin detemir  10 Units Subcutaneous QAM AC     levothyroxine  200 mcg Oral Daily     methadone  5 mg Intravenous Q6H     pantoprazole  40 mg Per Feeding Tube QAM AC    Or     pantoprazole (PROTONIX) IV  40 mg Intravenous QAM AC     Vitamin D3  1,000 Units Oral Daily        epoprostenol (VELETRI) 20 mcg/mL in sterile water inhalation solution 20 ng/kg/min (02/11/22 0605)     midazolam 2 mg/hr (02/11/22 1004)     norepinephrine Stopped (02/10/22 1958)     propofol (DIPRIVAN) infusion 60 mcg/kg/min (02/11/22 1237)        ANTHROPOMETRICS  Height: 162.6 cm (5' 4\")  Most Recent Weight: 118.1 kg (260 lb 6.4 oz)    IBW: 54.5 kg  Body mass index is 44.7 kg/m .  BMI: Obesity Grade III BMI >40  Weight History:   Wt Readings from Last 10 Encounters:   02/10/22 118.1 kg (260 lb 6.4 oz)   02/09/22 126.1 kg (278 lb)   06/08/21 133.1 kg (293 lb 6.4 oz)   06/03/21 129.9 kg (286 lb 4.8 oz)   05/27/21 135.2 kg (298 lb 1.6 oz)   12/10/20 133.5 kg (294 lb 4.8 oz)   07/07/20 131.6 kg (290 lb 3.2 oz)   06/16/20 126.6 kg (279 lb)   02/03/20 130.5 kg (287 lb 11.2 oz)   01/21/20 130.2 kg (287 lb)     Per Care Everywhere:  10/21/21- 127.5 kg (281 lb)  09/17/21- 127.3 kg (280 lb 9.6 oz)  06/01/21- 133.4 kg (294 lb)  03/18/21- 128.4 kg (283 lb)    Usual body weight appears to be about 280 lbs, unclear whether current weight is accurate or patient has been deliberately losing weight PTA. Per Chart review of visit from 9/17, pt was cutting back on sugary drinks and candy bars at that time.    Dosing Weight: 118.1 kg for energy, 54.5 kg for protein    ASSESSED NUTRITION NEEDS  Estimated Energy Needs: 5583-3561 kcals/day (11 - 14 kcals/kg)  Justification: Obese and Vented  Estimated Protein Needs: 109-136 grams protein/day (2 - 2.5 grams of pro/kg)  Justification: Hypercatabolism with critical illness and Obesity guidelines  Estimated Fluid Needs: Per provider pending fluid " status    PHYSICAL FINDINGS  See malnutrition section below.    MALNUTRITION  % Intake: Unable to assess  % Weight Loss: Unable to assess  Subcutaneous Fat Loss: Unable to assess  Muscle Loss: Unable to assess  Fluid Accumulation/Edema: None noted  Malnutrition Diagnosis: Unable to determine     NUTRITION DIAGNOSIS  Inadequate oral intake related to intubation/respiratory status as evidenced by need for nutrition support to meet needs      INTERVENTIONS  Implementation  Nutrition Education: Not appropriate at this time due to patient condition   Enteral Nutrition - Initiate as above  Multivitamin/mineral supplement- as above    Goals  TF + propofol will meet % estimated needs     Monitoring/Evaluation  Progress toward goals will be monitored and evaluated per protocol.  Anette Kruse RD, LD  Pager - 3rd floor/ICU: [phone]  Pager - All other floors: [phone]  Pager - Weekend/holiday: [phone]  Office: [phone]

## 2022-02-11 NOTE — PROGRESS NOTES
Care Management Initial Consult    General Information  Assessment completed with: Children, Daughter Jose [phone] fax [phone] from her home   Type of CM/SW Visit: Initial Assessment    Primary Care Provider verified and updated as needed:     Readmission within the last 30 days:        Reason for Consult: discharge planning  Advance Care Planning:        Pt does not have HCD     Communication Assessment  Patient's communication style:               Cognitive  Cognitive/Neuro/Behavioral: .WDL except  Level of Consciousness: sedated  Arousal Level: unresponsive  Orientation: other (see comments) (MICHAEL)  Mood/Behavior: behavior appropriate to situation  Best Language: 3 - Mute  Speech: endotracheal tube    Living Environment:   People in home: facility resident  one other resident   Current living Arrangements: group home      Able to return to prior arrangements: yes  Living Arrangement Comments: pt renaldoltpolina lives in lower level of the house     Family/Social Support:  Care provided by: self,other (see comments) (staff are able to assist with needs)  Provides care for: no one  Marital Status: Single  Children,Facility resident(s)/Staff          Description of Support System: Supportive,Involved    Support Assessment: Adequate family and caregiver support,Adequate social supports    Current Resources:   Patient receiving home care services: No     Community Resources: [facility] Worker- [facility] worker,   Equipment currently used at home: cane, straight  Supplies currently used at home: None    Employment/Financial:  Employment Status: disabled        Financial Concerns: No concerns identified           Lifestyle & Psychosocial Needs:  Social Determinants of Health     Tobacco Use: Medium Risk     Smoking Tobacco Use: Former Smoker     Smokeless Tobacco Use: Never Used   Alcohol Use: Not on file   Financial Resource Strain: Not on file   Food Insecurity: Not on file   Transportation Needs: Not on file    Physical Activity: Not on file   Stress: Not on file   Social Connections: Not on file   Intimate Partner Violence: Not on file   Depression: Not at risk     PHQ-2 Score: 0   Housing Stability: Not on file       Functional Status:  Prior to admission patient needed assistance:   Dependent ADLs:: Ambulation-cane  Dependent IADLs:: Cleaning,Cooking,Laundry,Shopping,Meal Preparation,Medication Management,Transportation       Mental Health Status:          Chemical Dependency Status:                Values/Beliefs:  Spiritual, Cultural Beliefs, [employer] Practices, Values that affect care:                 Additional Information:  Spoke with both adult daughters Loc. They live together .,   Pt lives in a  setting that provides pt with 24 hour support there is one staff to 2 residents in current home setting.. Pt lives in the lower level of the home. There are lots of stairs for pt to navigate..   Pt uses a cane at baseline for mobility.. she has support for shopping, medication mgt and meals    Pt is not open to any home care support.. She has been to TCU in the past.   staff would like an update as needs change. They hope pt can return home but it will be dependent on current level of care pt has.. Pt is vaccinated.     CTS team will continue to follow for ongoing support and needs.      02/11/22 1300   Final Resources   Group Home Agency [facility] services    Group Home Contact Info Dione ANGUIANO or Caty   ([phone])         Corinne C. White, LSW

## 2022-02-11 NOTE — PROGRESS NOTES
[facility]  Hospitalist Progress Note  Patient Name: No Yusuf    MRN: [id number]  Provider: Salomon Freeman MD  02/11/22             Assessment and Plan:      Summary of Stay: No Yusuf is a 62 year old female with history of type 2 diabetes mellitus, hypertension, hyperlipidemia, bipolar disorder, fibromyalgia, polysubstance abuse (currently maintained on methadone), hypothyroidism, and chronic hepatitis C. she has been vaccinated for COVID-19 but not boosted. She initially presented to the [facility] emergency department on 2/8/2022 with shortness of breath. Her symptoms started on 2/2/2022. She had a positive COVID-19 test on 2/2/2022.  In the emergency department the patient was found to be hypoxic requiring supplemental oxygen with high flow nasal cannula.  Initial lab work showed a sodium of 132, potassium 5.8, albumin 2.6, ALT 55, CRP 77, ferritin 439, A1c 6.2, procalcitonin 0.07, troponin 81.  Venous blood gas showed PCO2 of 54 and PO2 of 33 and bicarb of 31. Urinalysis showed 43 WBCs, moderate leukocyte esterase, positive nitrates, and many bacteria.  Chest x-ray showed patchy opacities throughout both lungs compatible with COVID-19 infection.  CT chest showed no pulmonary embolism however did show patchy opacities and consolidations throughout the lungs.   She had increasing oxygen requirements requiring continuous BiPAP. FiO2 was titrated up to 100%. Due to bed availability the patient was transferred to [facility] on 2/10/2022. She was intubated prior to transfer. While at the [facility] she was started on remdesivir, dexamethasone, and baricitinib, and ICU dosing Lovenox. She was initially treated with ceftriaxone for her urinary tract infection, which was broadened to cefepime, vancomycin, and azithromycin.  Urine culture ultimately grew E. coli resistant to penicillins and cephalosporins.     Problem list:     Acute Hypoxic Respiratory  Failure  Covid 19 Pneumonia  Sepsis  - Symptom Onset: 2/2/2022  - Positive Test: 2/2/2022  - Vaccinated but not boosted against COVID 19  - Continue dexamethasone 6 mg IV daily - started on 2/8  - Received loading dose of remdesivir on 2/10/2022 at the [facility]. Remdesivir stopped on 2/11/2022 due to bradycardia.  - Continue baricitinib - started on 2/8  - Ventilator settings per Intensivist (CMV, ,  Rate 22, PEEP 16, FiO2 100%)  - Continue Lovenox 0.5 mg/kg q12h  - CXR 2/11/22 showed similar right lung infiltrates but worsened left lung infiltrates.   - Wean FiO2 as able  - Was on levophed earlier- now off  - Propofol drip stopped due to bradycardia; starting versed drip and dilaudid drip for sedation     Urinary Tract Infection with E.coli  Sepsis  - Urine cultures positive for E. Coli resistant to PCN and Cephalosporins   - Initially treated with Ceftriaxone started on 2/8 -> broadened to cefepime, vancomycin, and azithromycin and changed to Cipro upon transfer. Continue Cipro.   - Off Levophed    Cardiac arrest 2/11/22  - Bradycardia  - V tach  - Code blue called  - CPR given for 2 minutes; cardioverted   - Remdesivir and propofol stopped earlier  - Mag sulfate 2 grams given  - Dopamine drip started to support heart rate  - Labs ordered: BMP, mag, VBG, lactic acid, troponin  - Prn atropine ordered  - ECG showed inverted T waves in leads V2-V6. Check serial troponins. Continue to monitor on telemetry.     Type 2 Diabetes Mellitus  - A1C = 6.2 on 2/8/2022  - PTA metformin on hold  - Continue Novolog insulin sliding scale insulin   - Increase levemir insulin insulin to 10 units twice daily      Hx of Polysubstance Abuse, currently on Methadone  - PTA was on methadone 80 mg by mouth daily   - Continue methadone 5 mg IV q 6h  - Starting Dilaudid drip for sedation  - Fentanyl prn     Hyperkalemia  - Treated with IV calcium at [facility]  - Resolved  - Daily BMP     Chronic Medical Problems:  Hepatitis C - treated with  Jason in Fall 2021, unclear status of treatment, HCV RNA genotype pending  Depression/Anxiety  Fibromyalgia  Hypothyroidism  GERD  Hyperlipidemia  Vitamin D Deficiency        DVT Prophylaxis: Enoxaparin (Lovenox) SQ  Code Status: Full Code  Discharge Plan: Here for several days.     I tried to call daughter, Lis, with an update but she did not answer.        Interval History:      Was stable on vent overnight.  Had some bradycardia.  Had cardiac arrest with bradycardia and ventricular tachycardia today.  Abdulaziz was called.  Had 2 minutes of CPR and then was cardioverted.                  Physical Exam:      Last Vital Signs:  /77 (BP Location: Left leg)   Pulse (!) 43   Temp 96.8  F (36  C) (Bladder)   Resp 22   Wt 118.1 kg (260 lb 6.4 oz)   LMP 11/01/2013   SpO2 91%   BMI 44.70 kg/m      Intake/Output Summary (Last 24 hours) at 2/11/2022 1527  Last data filed at 2/11/2022 1004  Gross per 24 hour   Intake 1166.41 ml   Output 1240 ml   Net -73.59 ml       GENERAL:  Comfortable on vent. Cooperative. Sedated.   PSYCH: No acute distress.  EYES: PERRLA, Normal conjunctiva.  HEART:  Regular rate and rhythm. No JVD. Pulses normal. No edema.  LUNGS:  Distant and course breath sounds to auscultation, vented respiratory effort.  ABDOMEN:  Soft, no hepatosplenomegaly, normal bowel sounds.  EXTREMETIES: No clubbing, cyanosis or ischemia  SKIN:  Dry to touch, No rash.           Medications:      All current medications were reviewed.         Data:      All new lab and imaging data was reviewed.   Labs:       Lab Results   Component Value Date     02/11/2022     02/11/2022     02/10/2022     07/01/2021     06/07/2021     06/06/2021    Lab Results   Component Value Date    CHLORIDE 104 02/11/2022    CHLORIDE 107 02/11/2022    CHLORIDE 103 02/10/2022    CHLORIDE 104 07/01/2021    CHLORIDE 102 06/07/2021    CHLORIDE 101 06/06/2021    Lab Results   Component Value Date    BUN 13  02/11/2022    BUN 14 02/11/2022    BUN 17 02/10/2022    BUN 9 07/01/2021    BUN 12 06/07/2021    BUN 13 06/06/2021      Lab Results   Component Value Date    POTASSIUM 3.9 02/11/2022    POTASSIUM 3.8 02/11/2022    POTASSIUM 3.9 02/10/2022    POTASSIUM 3.9 07/01/2021    POTASSIUM 3.9 06/07/2021    POTASSIUM 4.0 06/06/2021    Lab Results   Component Value Date    CO2 27 02/11/2022    CO2 25 02/11/2022    CO2 31 02/10/2022    CO2 30 07/01/2021    CO2 27 06/07/2021    CO2 28 06/06/2021    Lab Results   Component Value Date    CR 0.72 02/11/2022    CR 0.65 02/11/2022    CR 0.75 02/10/2022    CR 0.76 07/01/2021    CR 0.81 06/09/2021    CR 0.92 06/09/2021        Recent Labs   Lab 02/11/22  1422 02/11/22  1243 02/11/22  0426   WBC 6.9 5.2 6.5   HGB 13.5 12.4 12.3   HCT 42.3 38.5 37.7   MCV 91 91 90    260 257

## 2022-02-11 NOTE — PROGRESS NOTES
Brief Tele-ICU Note     62F DM2, HTN, HLD, bipolar, polysubstance abuse, hypothyroidism, chronic hep C admitted for acute hypoxemic respiratory failure 2/2 COVID-19. Admitted at [facility] 2/8-2/10, progressively worsening, required intubation, due to no ICU beds at [facility] she was transferred to [facility].     # Acute hypoxemic respiratory failure 2/2 COVID-19  # ARDS  - continue remdesivir, dexamethasone, baricitinib  - Ventilation Mode: CMV/AC  (Continuous Mandatory Ventilation/ Assist Control)  FiO2 (%): 100 %  Rate Set (breaths/minute): 22 breaths/min  Tidal Volume Set (mL): 400 mL  PEEP (cm H2O): 14 cmH2O  Oxygen Concentration (%): 100 %  Peak Inspiratory Pressure (cm H2O) (Drager Isi): 30  Resp: 22  - P:F 77, will increase PEEP to 16  - prone  - enoxaparin     # UTI - E coli  - agree with cipro  - broaden if worsening fever, leukocytosis, or clinical instability     Rest per hospitalist note. Discussed w/ Dr. Flannery.    Patrick Hays MD  Pulmonary Disease and Critical Care Medicine  [facility]

## 2022-02-11 NOTE — H&P
[facility]  History and Physical  Hospitalist - Mansoor Flannery DO       Date of Admission:  2/10/2022    Chief Complaint   Shortness of breath    History is obtained from the patient.    History of Present Illness   No Yusuf is a 62 year old female with past medical history of type 2 diabetes mellitus, hypertension, hyperlipidemia, bipolar disorder, fibromyalgia, history of polysubstance abuse and currently on methadone, hypothyroidism, chronic hepatitis C who presented initially to the [facility] emergency department on 2/8/2022 with chief complaint of shortness of breath.  Of note, the patient is vaccinated for COVID-19 however did not receive the booster.  The patient's symptoms started on 2/2/2022 and had a positive COVID-19 test on 2/2/2022.  In the emergency department the patient was found to be hypoxic requiring supplemental oxygen with high flow nasal cannula.  Initial lab work showed a sodium of 132, potassium 5.8, albumin 2.6, ALT 55, CRP 77, ferritin 439, A1c 6.2, procalcitonin 0.07, troponin 81.  Venous blood gas showed PCO2 of 54 and PO2 of 33 and bicarb of 31.  Urinalysis revealed 43 WBCs, moderate leukocyte esterase, positive nitrates, and many bacteria.  Chest x-ray showed patchy opacities throughout both lungs compatible with COVID-19 infection.  CT chest showed no pulmonary embolism however did note patchy opacities and consolidations throughout the lungs.  The patient had increasing oxygen requirements requiring continuous BiPAP.  Due to bed availability the patient was transferred to [facility] on 2/10/2022 after requiring intubation and mechanical ventilation for progressive hypoxia secondary to COVID-19 pneumonia.  The patient was started on remdesivir, dexamethasone, and baricitinib, as well as ICU dosing Lovenox.  The patient did receive initial treatment with ceftriaxone for her urinary tract infection, which was broadened to  cefepime, vancomycin, and azithromycin.  Urine cultures returned with E. coli resistant to penicillins and cephalosporins.    I personally updated the patient's daughter via phone.  All questions were answered.  Intensivist updated.    ASSESSMENT/PLAN    Acute Hypoxic Respiratory Failure  Covid 19 Pneumonia  - Symptom Onset: 2/2/2022  - Positive Test: 2/2/2022  - Vaccinated but not boosted against covid 19  - Continue dexamethasone 6 mg IV daily - started on 2/8  - Continue remdesivir - received one dose at the [facility] on 2/10  - Continue baricitinib - started on 2/8  - Ventilator settings per Intensivist  - Lovenox 0.5 mg/kg q12h  - Check VBG and CXR in AM  - Wean O2 as able    Urinary Tract Infection with Sepsis  - Urine cultures positive for E. Coli resistant to PCN and Cephalosporins   - Initially treated with Ceftriaxone started on 2/8 -> broadened to cefepime, vancomycin, and azithromycin -> will start ciprofloxacin pending EKG results regarding QTc    Type 2 Diabetes Mellitus  - A1C = 6.2 on 2/8/2022  - PTA metformin on hold  - ISS  - Treated with levemir 10 units daily at the [facility], will continue this    Hx of Polysubstance Abuse, currently on Methadone  - PTA 80 mg by mouth daily 10mg/mL strength  - Continue methadone 5 mg IV q6h  - Fentanyl prn    Hyperkalemia  - Treated with IV calcium at [facility]  - Daily BMP    Chronic Medical Problems:  Hx of Hepatitis C - treated with Mayvret in Fall 2021, unclear status of treatment, HCV RNA genotype pending  Depression/Anxiety  Fibromyalgia  Hypothyroidism  GERD  Hyperlipidemia  Vitamin D Deficiency    PLAN: Resume home medications as appropriate once confirmed by pharmacy.  I participated in 39 minutes of critical care time in the evaluation and treatment of this patient requiring mechanical ventilation for the treatment of COVID-19 pneumonia and hypoxia.    DVT Prophylaxis: Enoxaparin (Lovenox) SQ  Code Status: Full Code  Discharge Plan: Expected Discharge: >7  days  Anticipated discharge location: Likely TCU    Mansoor Flannery, DO    Primary Care Physician   Price Naranjo    -----------------------------------------------------------------------------------------------------------------------------------------------------------------------------------------------------    Past Medical History    I have reviewed this patient's medical history and updated it with pertinent information if needed.   Past Medical History:   Diagnosis Date     Arthritis      Bipolar affective (H)      Fibromyalgia      Hypertension        Past Surgical History   I have reviewed this patient's surgical history and updated it with pertinent information if needed.  Past Surgical History:   Procedure Laterality Date     CHOLECYSTECTOMY       GYN SURGERY      c section     GYN SURGERY      oblation     ORTHOPEDIC SURGERY      left leg, left shoulder, back       Prior to Admission Medications   Prior to Admission Medications   Prescriptions Last Dose Informant Patient Reported? Taking?   ACCU-CHEK RAFAELA PLUS test strip   Yes No   Acidophilus Lactobacillus CAPS   Yes No   Sig: Take 1 tablet by mouth 3 times daily   Alcohol Swabs (ALCOHOL WIPES) 70 % PADS   Yes No   Sig: USE WITH INSULIN INJECTION ONCE DAILY. **100 DAYS SUPPLY**   Blood Glucose Calibration (ACCU-CHEK ACTIVE GLUCOSE CONT VI)   Yes No   Si each by Other route   DULoxetine (CYMBALTA) 60 MG capsule   Yes No   Sig: Take 60 mg by mouth daily   NARCAN 4 MG/0.1ML nasal spray   Yes No   Sig: CALL 911. SPRAY CONTENTS OF ONE SPRAYER (0.1ML) INTO ONE NOSTRIL. REPEAT IN 2-3 MINS IF SYMPTOMS OF OPIOID PERSIST, ALTERNATE NOSTRILS   acetaminophen (TYLENOL) 500 MG tablet   Yes No   Sig: Take 500 mg by mouth 3 times daily    amLODIPine (NORVASC) 5 MG tablet   Yes No   Sig: Take 5 mg by mouth daily    atorvastatin (LIPITOR) 20 MG tablet   Yes No   Sig: Take 20 mg by mouth At Bedtime    blood glucose (ACCU-CHEK RAFAELA PLUS) test strip   Yes No   Sig:  Use to check blood sugar 3x daily or as directed.   blood glucose calibration (ACCU-CHEK RAFAELA) solution   Yes No   cholecalciferol 25 MCG (1000 UT) TABS   Yes No   Sig: Take 1,000 Units by mouth daily    diclofenac (VOLTAREN) 1 % topical gel   No No   Sig: Apply 4 g topically 4 times daily   Patient taking differently: Apply 2-4 g topically 2 times daily    fluticasone-salmeterol (ADVAIR) 250-50 MCG/DOSE inhaler   Yes No   Sig: Inhale 1 puff into the lungs 2 times daily   furosemide (LASIX) 20 MG tablet   Yes No   Sig: Take 20 mg by mouth daily   gabapentin (NEURONTIN) 400 MG capsule   Yes No   Sig: Take 1,200 mg by mouth 3 times daily   hydrOXYzine (ATARAX) 50 MG tablet   Yes No   Sig: Take 100 mg by mouth every 6 hours as needed for anxiety   insulin detemir (LEVEMIR PEN) 100 UNIT/ML pen   No No   Sig: Inject 15 Units Subcutaneous every morning   levalbuterol (XOPENEX HFA) 45 MCG/ACT inhaler   Yes No   Sig: Inhale 2 puffs into the lungs every 4 hours as needed for shortness of breath / dyspnea or wheezing    levothyroxine (SYNTHROID/LEVOTHROID) 200 MCG tablet   Yes No   Sig: Take 200 mcg by mouth daily    melatonin 5 MG tablet   Yes No   Sig: Take 5 mg by mouth At Bedtime   metFORMIN (GLUCOPHAGE) 1000 MG tablet   Yes No   Sig: Take 1,000 mg by mouth 2 times daily (with meals)   methadone HCl 10 MG/5ML SOLN   Yes No   Sig: Take 80 mg by mouth daily 10mg/mL strength   multivitamin w/minerals (THERA-VIT-M) tablet   Yes No   Sig: Take 1 tablet by mouth daily   nicotine polacrilex (NICORETTE) 4 MG gum   Yes No   Sig: Place 4 mg inside cheek every hour as needed for smoking cessation    omeprazole (PRILOSEC) 20 MG DR capsule   Yes No   Sig: Take 20 mg by mouth daily    polyethylene glycol (MIRALAX) 17 GM/Dose powder   No No   Sig: Take 17 g by mouth daily as needed for constipation   potassium chloride ER (KLOR-CON) 8 MEQ CR tablet   Yes No   Sig: Take 8 mEq by mouth 2 times daily   traZODone (DESYREL) 50 MG tablet    Yes No   Sig: Take 50 mg by mouth At Bedtime   vitamin B-12 (CYANOCOBALAMIN) 1000 MCG tablet   Yes No   Sig: Take 1,000 mcg by mouth daily      Facility-Administered Medications: None     Allergies   Allergies   Allergen Reactions     Promethazine Other (See Comments) and Anxiety     Noted in 8/30/08 ER     Codeine Phosphate GI Disturbance     Lamotrigine Other (See Comments)     hyponatremia     Oxcarbazepine Unknown and Other (See Comments)     Low sodium  LegacyRecord#[id number]       Codeine Other (See Comments) and Rash     GI bleeding  LegacyRecord#[id number]         Social History   I have reviewed this patient's social history and updated it with pertinent information if needed. No Yusuf  reports that she has quit smoking. She smoked 0.10 packs per day. She has never used smokeless tobacco. She reports that she does not drink alcohol and does not use drugs.    Family History   I have reviewed this patient's family history and updated it with pertinent information if needed.   Family History   Problem Relation Age of Onset     Heart Disease Mother      Diabetes Mother      Ovarian Cancer Mother      Heart Disease Father      Lung Cancer Father      Diabetes Sister      Ovarian Cancer Sister      Diabetes Brother        -----------------------------------------------------------------------------------------------------------------------------------------------------------------------------------------------------    Review of Systems   The 10 point Review of Systems is negative other than noted in the HPI or here.     Physical Exam   Temp: 97.9  F (36.6  C) Temp src: Bladder BP: 112/72 Pulse: 63   Resp: 22 SpO2: 97 % O2 Device: Mechanical Ventilator    Vital Signs with Ranges  Temp:  [97.9  F (36.6  C)-98.9  F (37.2  C)] 97.9  F (36.6  C)  Pulse:  [] 63  Resp:  [18-22] 22  BP: ()/(62-96) 112/72  MAP:  [71 mmHg-191 mmHg] 71 mmHg  Arterial Line BP: ()/() 91/59  FiO2 (%):  [75 %-100 %] 100  %  SpO2:  [79 %-99 %] 97 %  260 lbs 6.4 oz    Constitutional: Intubated, sedated  Eyes: Conjunctiva and pupils examined and normal.  HEENT: Moist mucous membranes, normal dentition.  Respiratory: Clear to auscultation bilaterally, no crackles or wheezing.  Cardiovascular: Regular rate and rhythm, normal S1 and S2, and no murmur noted.  GI: Soft, non-distended, non-tender, normal bowel sounds.  Lymph/Hematologic: No anterior cervical or supraclavicular adenopathy.  Skin: No rashes, no cyanosis, no edema.  Musculoskeletal: No joint swelling, erythema or tenderness.  Neurologic: Cranial nerves 2-12 intact, normal strength and sensation.  Psychiatric:  no obvious anxiety or depression.     Data     Recent Labs   Lab 02/10/22  1227 02/10/22  0740 02/10/22  0635 02/09/22  0808 02/09/22  0637 02/08/22  2137 02/08/22  2032 02/08/22  1422 02/08/22  1027   WBC  --   --  6.3  6.3  --  3.9*  --   --   --  4.1   HGB  --   --  12.9  12.9  --  12.2  --   --   --  12.9  13.3   MCV  --   --  90  90  --  92  --   --   --  92   PLT  --   --  252  252  --  199  --   --   --  155   INR  --   --  0.99  --  1.01  --   --   --  0.89   NA  --   --  138  --  136  --   --   --  132*  137   POTASSIUM  --   --  3.9  --  4.8  --  4.7   < > 5.8*  5.7*   CHLORIDE  --   --  103  --  101  --   --   --  101   CO2  --   --  31  --  30  --   --   --  28   BUN  --   --  17  --  19  --   --   --  15   CR  --   --  0.75  --  0.96  --   --   --  1.03   ANIONGAP  --   --  4  --  5  --   --   --  3   RODOLFO  --   --  8.8  --  8.7  --   --   --  8.8   * 137* 139*   < > 192*   < >  --    < > 110*  116*   ALBUMIN  --   --  2.5*  --  2.4*  --   --   --  2.6*   PROTTOTAL  --   --  8.5  --  8.0  --   --   --  8.4   BILITOTAL  --   --  0.4  --  0.3  --   --   --  0.4   ALKPHOS  --   --  81  --  90  --   --   --  106   ALT  --   --  36  --  41  --   --   --  55*   AST  --   --  55*  --  44  --   --   --   --    LIPASE  --   --   --   --   --   --   --    --  61*    < > = values in this interval not displayed.       Recent Results (from the past 24 hour(s))   XR Chest Port 1 View    Narrative    EXAM: XR CHEST PORT 1 VIEW  LOCATION: [facility]  DATE/TIME: 2/10/2022 5:25 AM    INDICATION: COVID 19 pneumonia, worsening oxygenation  COMPARISON: 2021.      Impression    IMPRESSION: Cardiac silhouette within normal limits for portable technique. Patchy diffuse bilateral airspace infiltrates consistent with multifocal pneumonia. No visible pneumothorax or pleural effusion.   Echo Limited    Narrative    [id number]  [id number]  [id number]  [id number]^PACO^MC     [facility],[place]  Echocardiography Laboratory  [address] [id number]     Name: ELLA MOULTON  MRN: [id number]  : 1960  Study Date: 02/10/2022 09:42 AM  Age: 62 yrs  Gender: Female  Patient Location: [facility]  Reason For Study: Respiratory Failure  Ordering Physician: [facility] ANTONIO  Performed By: Edgard Rowell     BSA: 2.2 m2  Height: 64 in  Weight: 278 lb  ______________________________________________________________________________  Procedure  Limited Echocardiogram with portions of two-dimensional, color and spectral  Doppler performed. Contrast Optison. Poor quality two-dimensional was  performed and interpreted. Technically difficult study.Extremely poor acoustic  windows.  ______________________________________________________________________________  Interpretation Summary  Technically limited study with minimal visualization despite several attempts.     Overall unable to determine LVEF but in limited views it appears to be normal.  RV function also appears to be normal. No valvular assessment is possible. No  pericardial  effusion.  ______________________________________________________________________________  ______________________________________________________________________________  Report approved by: Santo REYNOSO 02/10/2022 11:47 AM     ______________________________________________________________________________      XR Abdomen Port 1 View    Narrative    EXAM: XR ABDOMEN PORT 1 VIEWS  2/10/2022 1:30 PM     HISTORY:  OGT PLACEMENT       COMPARISON:  Chest x-ray 2/10/2022    FINDINGS:   Portable supine AP view of the chest and upper abdomen. Partially  visualized endotracheal tube tip in the lower thoracic trachea.  Orogastric tube with tip and sidehole in the stomach.    Paucity of bowel gas.      Impression    IMPRESSION: Interval placement of orogastric tube with tip and  sidehole projecting in the stomach.    I have personally reviewed the examination and initial interpretation  and I agree with the findings.    TERRY QUINTERO MD         SYSTEM ID:  [id number]   XR Chest Port 1 View    Narrative    Portable chest 2/10/2022 at 1323 hours     indication: Repeat for endotracheal tube    COMPARISON: 0528 hours earlier today    FINDINGS: Right mainstem intubation. Recommend withdrawal of  endotracheal tube by approximately 3 cm. Enteric tube progress in the  inferior margin of the image. Patchy opacities in the lungs are  increased, especially in the left midlung.      Impression    IMPRESSION: Increasing opacities in the lungs with findings suggestive  of right mainstem intubation. Recommend retraction of ET tube by 3 cm.    DERRELL JUAREZ MD         SYSTEM ID:  [id number]   XR Chest Port 1 View    Narrative    Portable chest 2/10/22 at 1327 hours    INDICATION: Intubation placement    COMPARISON: Earlier this afternoon 1323 hours    Findings: Endotracheal tube tip is well visualized and is  approximately 2.3 cm above the mariel. Enteric tube progresses beyond  the inferior margin of the image. Patchy opacities in the  lungs  appears similar comment concerning for ARDS/infection.      Impression    IMPRESSION: Continued patchy opacities in the lungs concerning for  infection. Endotracheal tube tip in good position approximately 2.3 cm  above the mariel.    DERRELL JUAREZ MD         SYSTEM ID:  [id number]   XR Chest Port 1 View    Impression    RESIDENT PRELIMINARY INTERPRETATION  Impression:   1. New right IJ central venous catheter tip projects over the lower  SVC/cavoatrial junction. No pneumothorax.  2. Endotracheal tube tip projects 1.8 cm proximal to mariel.  3. Continued patchy opacities in the lungs concerning for infection.  Slightly improved lung volumes.

## 2022-02-11 NOTE — PHARMACY-ADMISSION MEDICATION HISTORY
Admission medication history interview status for this patient is complete. See [facility] admission navigator for allergy information, prior to admission medications and immunization status.     Medication history interview source(s):none  Medication history resources (including written lists, pill bottles, clinic record):None  Patient is transfer from [facility]--see medrec from 2-9-22      Prior to Admission medications    Medication Sig Last Dose Taking? Auth Provider   acetaminophen (TYLENOL) 500 MG tablet Take 500 mg by mouth 3 times daily   Yes Reported, Patient   Acidophilus Lactobacillus CAPS Take 1 tablet by mouth 3 times daily  Yes Unknown, Entered By History   Alcohol Swabs (ALCOHOL WIPES) 70 % PADS USE WITH INSULIN INJECTION ONCE DAILY. **100 DAYS SUPPLY**  Yes Reported, Patient   amLODIPine (NORVASC) 5 MG tablet Take 5 mg by mouth daily   Yes Reported, Patient   atorvastatin (LIPITOR) 20 MG tablet Take 20 mg by mouth At Bedtime   Yes Unknown, Entered By History   blood glucose (ACCU-CHEK RAFAELA PLUS) test strip Use to check blood sugar 3x daily or as directed.  Yes Reported, Patient   cholecalciferol 25 MCG (1000 UT) TABS Take 1,000 Units by mouth daily   Yes Zena Wheeler MD   diclofenac (VOLTAREN) 1 % topical gel Apply 4 g topically 4 times daily  Patient taking differently: Apply 2-4 g topically 2 times daily   Yes Regina Fragoso MD   DULoxetine (CYMBALTA) 60 MG capsule Take 60 mg by mouth daily  Yes Unknown, Entered By History   fluticasone-salmeterol (ADVAIR) 250-50 MCG/DOSE inhaler Inhale 1 puff into the lungs 2 times daily  Yes Unknown, Entered By History   furosemide (LASIX) 20 MG tablet Take 20 mg by mouth daily  Yes Unknown, Entered By History   gabapentin (NEURONTIN) 400 MG capsule Take 1,200 mg by mouth 3 times daily  Yes Unknown, Entered By History   hydrOXYzine (ATARAX) 50 MG tablet Take 100 mg by mouth every 6 hours as needed for anxiety  Yes Unknown, Entered By History   insulin  detemir (LEVEMIR PEN) 100 UNIT/ML pen Inject 15 Units Subcutaneous every morning  Yes Jose Pope MD   levalbuterol (XOPENEX HFA) 45 MCG/ACT inhaler Inhale 2 puffs into the lungs every 4 hours as needed for shortness of breath / dyspnea or wheezing   Yes Unknown, Entered By History   levothyroxine (SYNTHROID/LEVOTHROID) 200 MCG tablet Take 200 mcg by mouth daily   Yes Reported, Patient   melatonin 5 MG tablet Take 5 mg by mouth At Bedtime  Yes Unknown, Entered By History   methadone HCl 10 MG/5ML SOLN Take 80 mg by mouth daily 10mg/mL strength  Yes Reported, Patient   multivitamin w/minerals (THERA-VIT-M) tablet Take 1 tablet by mouth daily  Yes Unknown, Entered By History   NARCAN 4 MG/0.1ML nasal spray CALL 911. SPRAY CONTENTS OF ONE SPRAYER (0.1ML) INTO ONE NOSTRIL. REPEAT IN 2-3 MINS IF SYMPTOMS OF OPIOID PERSIST, ALTERNATE NOSTRILS  Yes Reported, Patient   nicotine polacrilex (NICORETTE) 4 MG gum Place 4 mg inside cheek every hour as needed for smoking cessation   Yes Unknown, Entered By History   omeprazole (PRILOSEC) 20 MG DR capsule Take 20 mg by mouth daily   Yes Unknown, Entered By History   polyethylene glycol (MIRALAX) 17 GM/Dose powder Take 17 g by mouth daily as needed for constipation  Yes Jose Pope MD   potassium chloride ER (KLOR-CON) 8 MEQ CR tablet Take 8 mEq by mouth 2 times daily  Yes Unknown, Entered By History   traZODone (DESYREL) 50 MG tablet Take 50 mg by mouth At Bedtime  Yes Unknown, Entered By History   vitamin B-12 (CYANOCOBALAMIN) 1000 MCG tablet Take 1,000 mcg by mouth daily  Yes Unknown, Entered By History   ACCU-CHEK RAFAELA PLUS test strip    Reported, Patient   Blood Glucose Calibration (ACCU-CHEK ACTIVE GLUCOSE CONT VI) 1 each by Other route   Reported, Patient   blood glucose calibration (ACCU-CHEK RAFAELA) solution    Reported, Patient   metFORMIN (GLUCOPHAGE) 1000 MG tablet Take 1,000 mg by mouth 2 times daily (with meals)   Reported, Patient

## 2022-02-12 ENCOUNTER — APPOINTMENT (OUTPATIENT)
Dept: CARDIOLOGY | Facility: CLINIC | Age: 62
DRG: 871 | End: 2022-02-12
Attending: INTERNAL MEDICINE
Payer: COMMERCIAL

## 2022-02-12 ENCOUNTER — APPOINTMENT (OUTPATIENT)
Dept: GENERAL RADIOLOGY | Facility: CLINIC | Age: 62
DRG: 871 | End: 2022-02-12
Attending: INTERNAL MEDICINE
Payer: COMMERCIAL

## 2022-02-12 LAB
ANION GAP SERPL CALCULATED.3IONS-SCNC: 3 MMOL/L (ref 3–14)
BUN SERPL-MCNC: 14 MG/DL (ref 7–30)
CALCIUM SERPL-MCNC: 8 MG/DL (ref 8.5–10.1)
CHLORIDE BLD-SCNC: 106 MMOL/L (ref 94–109)
CO2 SERPL-SCNC: 30 MMOL/L (ref 20–32)
CREAT SERPL-MCNC: 0.64 MG/DL (ref 0.52–1.04)
CRP SERPL-MCNC: 18.2 MG/L (ref 0–8)
ERYTHROCYTE [DISTWIDTH] IN BLOOD BY AUTOMATED COUNT: 14 % (ref 10–15)
GFR SERPL CREATININE-BSD FRML MDRD: >90 ML/MIN/1.73M2
GLUCOSE BLD-MCNC: 203 MG/DL (ref 70–99)
GLUCOSE BLDC GLUCOMTR-MCNC: 149 MG/DL (ref 70–99)
GLUCOSE BLDC GLUCOMTR-MCNC: 189 MG/DL (ref 70–99)
GLUCOSE BLDC GLUCOMTR-MCNC: 189 MG/DL (ref 70–99)
GLUCOSE BLDC GLUCOMTR-MCNC: 215 MG/DL (ref 70–99)
GLUCOSE BLDC GLUCOMTR-MCNC: 226 MG/DL (ref 70–99)
GLUCOSE BLDC GLUCOMTR-MCNC: 267 MG/DL (ref 70–99)
HCT VFR BLD AUTO: 38 % (ref 35–47)
HGB BLD-MCNC: 12.1 G/DL (ref 11.7–15.7)
LVEF ECHO: NORMAL
MAGNESIUM SERPL-MCNC: 2.4 MG/DL (ref 1.6–2.3)
MCH RBC QN AUTO: 28.5 PG (ref 26.5–33)
MCHC RBC AUTO-ENTMCNC: 31.8 G/DL (ref 31.5–36.5)
MCV RBC AUTO: 89 FL (ref 78–100)
PHOSPHATE SERPL-MCNC: 2.2 MG/DL (ref 2.5–4.5)
PLATELET # BLD AUTO: 278 10E3/UL (ref 150–450)
POTASSIUM BLD-SCNC: 3.4 MMOL/L (ref 3.4–5.3)
POTASSIUM BLD-SCNC: 3.9 MMOL/L (ref 3.4–5.3)
RBC # BLD AUTO: 4.25 10E6/UL (ref 3.8–5.2)
SODIUM SERPL-SCNC: 139 MMOL/L (ref 133–144)
TROPONIN I SERPL HS-MCNC: 810 NG/L
TROPONIN I SERPL HS-MCNC: 893 NG/L
UFH PPP CHRO-ACNC: 0.88 IU/ML
WBC # BLD AUTO: 7.2 10E3/UL (ref 4–11)

## 2022-02-12 PROCEDURE — 71045 X-RAY EXAM CHEST 1 VIEW: CPT

## 2022-02-12 PROCEDURE — 258N000003 HC RX IP 258 OP 636: Performed by: INTERNAL MEDICINE

## 2022-02-12 PROCEDURE — 250N000011 HC RX IP 250 OP 636: Performed by: INTERNAL MEDICINE

## 2022-02-12 PROCEDURE — 93325 DOPPLER ECHO COLOR FLOW MAPG: CPT | Mod: 26 | Performed by: INTERNAL MEDICINE

## 2022-02-12 PROCEDURE — 94644 CONT INHLJ TX 1ST HOUR: CPT

## 2022-02-12 PROCEDURE — 83735 ASSAY OF MAGNESIUM: CPT | Performed by: INTERNAL MEDICINE

## 2022-02-12 PROCEDURE — 250N000009 HC RX 250: Performed by: INTERNAL MEDICINE

## 2022-02-12 PROCEDURE — 93321 DOPPLER ECHO F-UP/LMTD STD: CPT | Mod: 26 | Performed by: INTERNAL MEDICINE

## 2022-02-12 PROCEDURE — 74018 RADEX ABDOMEN 1 VIEW: CPT

## 2022-02-12 PROCEDURE — 84132 ASSAY OF SERUM POTASSIUM: CPT | Performed by: INTERNAL MEDICINE

## 2022-02-12 PROCEDURE — 3E043XZ INTRODUCTION OF VASOPRESSOR INTO CENTRAL VEIN, PERCUTANEOUS APPROACH: ICD-10-PCS | Performed by: INTERNAL MEDICINE

## 2022-02-12 PROCEDURE — 85027 COMPLETE CBC AUTOMATED: CPT | Performed by: INTERNAL MEDICINE

## 2022-02-12 PROCEDURE — 250N000013 HC RX MED GY IP 250 OP 250 PS 637: Performed by: INTERNAL MEDICINE

## 2022-02-12 PROCEDURE — C8924 2D TTE W OR W/O FOL W/CON,FU: HCPCS

## 2022-02-12 PROCEDURE — 84484 ASSAY OF TROPONIN QUANT: CPT | Performed by: INTERNAL MEDICINE

## 2022-02-12 PROCEDURE — 200N000001 HC R&B ICU

## 2022-02-12 PROCEDURE — 99291 CRITICAL CARE FIRST HOUR: CPT | Mod: 25 | Performed by: INTERNAL MEDICINE

## 2022-02-12 PROCEDURE — C9113 INJ PANTOPRAZOLE SODIUM, VIA: HCPCS | Performed by: STUDENT IN AN ORGANIZED HEALTH CARE EDUCATION/TRAINING PROGRAM

## 2022-02-12 PROCEDURE — 250N000011 HC RX IP 250 OP 636: Performed by: STUDENT IN AN ORGANIZED HEALTH CARE EDUCATION/TRAINING PROGRAM

## 2022-02-12 PROCEDURE — 999N000157 HC STATISTIC RCP TIME EA 10 MIN

## 2022-02-12 PROCEDURE — 99233 SBSQ HOSP IP/OBS HIGH 50: CPT | Performed by: INTERNAL MEDICINE

## 2022-02-12 PROCEDURE — 5A2204Z RESTORATION OF CARDIAC RHYTHM, SINGLE: ICD-10-PCS | Performed by: INTERNAL MEDICINE

## 2022-02-12 PROCEDURE — 250N000011 HC RX IP 250 OP 636: Performed by: SURGERY

## 2022-02-12 PROCEDURE — 86140 C-REACTIVE PROTEIN: CPT | Performed by: INTERNAL MEDICINE

## 2022-02-12 PROCEDURE — 94003 VENT MGMT INPAT SUBQ DAY: CPT

## 2022-02-12 PROCEDURE — 94645 CONT INHLJ TX EACH ADDL HOUR: CPT

## 2022-02-12 PROCEDURE — 85520 HEPARIN ASSAY: CPT | Performed by: INTERNAL MEDICINE

## 2022-02-12 PROCEDURE — 84100 ASSAY OF PHOSPHORUS: CPT | Performed by: INTERNAL MEDICINE

## 2022-02-12 PROCEDURE — 80048 BASIC METABOLIC PNL TOTAL CA: CPT | Performed by: INTERNAL MEDICINE

## 2022-02-12 PROCEDURE — 272N000083 HC NUTRITION PRODUCT SEMIELEM INTERMED LITER

## 2022-02-12 PROCEDURE — 93308 TTE F-UP OR LMTD: CPT | Mod: 26 | Performed by: INTERNAL MEDICINE

## 2022-02-12 PROCEDURE — 255N000002 HC RX 255 OP 636: Performed by: INTERNAL MEDICINE

## 2022-02-12 RX ORDER — CEFEPIME HYDROCHLORIDE 1 G/1
1 INJECTION, POWDER, FOR SOLUTION INTRAMUSCULAR; INTRAVENOUS EVERY 12 HOURS
Status: DISCONTINUED | OUTPATIENT
Start: 2022-02-12 | End: 2022-02-13 | Stop reason: HOSPADM

## 2022-02-12 RX ORDER — AMINO AC/PROTEIN HYDR/WHEY PRO 10G-100/30
1 LIQUID (ML) ORAL 3 TIMES DAILY
Status: DISCONTINUED | OUTPATIENT
Start: 2022-02-12 | End: 2022-02-13 | Stop reason: HOSPADM

## 2022-02-12 RX ORDER — POTASSIUM CHLORIDE 29.8 MG/ML
20 INJECTION INTRAVENOUS ONCE
Status: DISCONTINUED | OUTPATIENT
Start: 2022-02-12 | End: 2022-02-12 | Stop reason: CLARIF

## 2022-02-12 RX ORDER — HEPARIN SODIUM 10000 [USP'U]/100ML
0-5000 INJECTION, SOLUTION INTRAVENOUS CONTINUOUS
Status: DISCONTINUED | OUTPATIENT
Start: 2022-02-12 | End: 2022-02-13 | Stop reason: HOSPADM

## 2022-02-12 RX ORDER — ATROPINE SULFATE 0.1 MG/ML
0.5 INJECTION INTRAVENOUS
Status: DISCONTINUED | OUTPATIENT
Start: 2022-02-12 | End: 2022-02-13 | Stop reason: HOSPADM

## 2022-02-12 RX ADMIN — INSULIN ASPART 2 UNITS: 100 INJECTION, SOLUTION INTRAVENOUS; SUBCUTANEOUS at 20:24

## 2022-02-12 RX ADMIN — INSULIN ASPART 1 UNITS: 100 INJECTION, SOLUTION INTRAVENOUS; SUBCUTANEOUS at 12:57

## 2022-02-12 RX ADMIN — GABAPENTIN 1200 MG: 400 CAPSULE ORAL at 23:16

## 2022-02-12 RX ADMIN — CEFEPIME HYDROCHLORIDE 1 G: 1 INJECTION, POWDER, FOR SOLUTION INTRAMUSCULAR; INTRAVENOUS at 23:08

## 2022-02-12 RX ADMIN — ATROPINE SULFATE 0.5 MG: 0.1 INJECTION INTRAVENOUS at 21:38

## 2022-02-12 RX ADMIN — INSULIN ASPART 3 UNITS: 100 INJECTION, SOLUTION INTRAVENOUS; SUBCUTANEOUS at 01:01

## 2022-02-12 RX ADMIN — BARICITINIB 4 MG: 2 TABLET, FILM COATED ORAL at 08:55

## 2022-02-12 RX ADMIN — CEFEPIME HYDROCHLORIDE 1 G: 1 INJECTION, POWDER, FOR SOLUTION INTRAMUSCULAR; INTRAVENOUS at 13:06

## 2022-02-12 RX ADMIN — MIDAZOLAM (PF) 1 MG/ML IN 0.9 % SODIUM CHLORIDE INTRAVENOUS SOLUTION 13 MG/HR: at 03:25

## 2022-02-12 RX ADMIN — INSULIN ASPART 1 UNITS: 100 INJECTION, SOLUTION INTRAVENOUS; SUBCUTANEOUS at 04:55

## 2022-02-12 RX ADMIN — SODIUM PHOSPHATE, MONOBASIC, MONOHYDRATE 15 MMOL: 276; 142 INJECTION, SOLUTION INTRAVENOUS at 12:58

## 2022-02-12 RX ADMIN — Medication 1000 UNITS: at 08:55

## 2022-02-12 RX ADMIN — HEPARIN SODIUM AND DEXTROSE 1200 UNITS/HR: 10000; 5 INJECTION INTRAVENOUS at 13:14

## 2022-02-12 RX ADMIN — Medication 15 ML: at 08:54

## 2022-02-12 RX ADMIN — CIPROFLOXACIN 400 MG: 2 INJECTION, SOLUTION INTRAVENOUS at 08:58

## 2022-02-12 RX ADMIN — FUROSEMIDE 20 MG: 20 TABLET ORAL at 08:56

## 2022-02-12 RX ADMIN — DEXAMETHASONE SODIUM PHOSPHATE 6 MG: 10 INJECTION, SOLUTION INTRAMUSCULAR; INTRAVENOUS at 08:56

## 2022-02-12 RX ADMIN — HEPARIN SODIUM AND DEXTROSE 900 UNITS/HR: 10000; 5 INJECTION INTRAVENOUS at 22:12

## 2022-02-12 RX ADMIN — METHADONE HYDROCHLORIDE 5 MG: 10 INJECTION, SOLUTION INTRAMUSCULAR; INTRAVENOUS; SUBCUTANEOUS at 02:01

## 2022-02-12 RX ADMIN — HUMAN ALBUMIN MICROSPHERES AND PERFLUTREN 3 ML: 10; .22 INJECTION, SOLUTION INTRAVENOUS at 12:02

## 2022-02-12 RX ADMIN — LEVOTHYROXINE SODIUM 200 MCG: 0.1 TABLET ORAL at 08:56

## 2022-02-12 RX ADMIN — ATORVASTATIN CALCIUM 20 MG: 20 TABLET, FILM COATED ORAL at 23:16

## 2022-02-12 RX ADMIN — GABAPENTIN 1200 MG: 400 CAPSULE ORAL at 17:14

## 2022-02-12 RX ADMIN — Medication 1.6 MG/HR: at 06:28

## 2022-02-12 RX ADMIN — METHADONE HYDROCHLORIDE 5 MG: 10 INJECTION, SOLUTION INTRAMUSCULAR; INTRAVENOUS; SUBCUTANEOUS at 08:55

## 2022-02-12 RX ADMIN — DULOXETINE HYDROCHLORIDE 60 MG: 20 CAPSULE, DELAYED RELEASE ORAL at 08:56

## 2022-02-12 RX ADMIN — CYANOCOBALAMIN TAB 500 MCG 1000 MCG: 500 TAB at 08:55

## 2022-02-12 RX ADMIN — INSULIN ASPART 1 UNITS: 100 INJECTION, SOLUTION INTRAVENOUS; SUBCUTANEOUS at 08:57

## 2022-02-12 RX ADMIN — Medication 1 PACKET: at 17:12

## 2022-02-12 RX ADMIN — MIDAZOLAM (PF) 1 MG/ML IN 0.9 % SODIUM CHLORIDE INTRAVENOUS SOLUTION 16 MG/HR: at 13:22

## 2022-02-12 RX ADMIN — Medication 1 PACKET: at 23:16

## 2022-02-12 RX ADMIN — MIDAZOLAM (PF) 1 MG/ML IN 0.9 % SODIUM CHLORIDE INTRAVENOUS SOLUTION 18 MG/HR: at 18:44

## 2022-02-12 RX ADMIN — Medication 20 NG/KG/MIN: at 13:12

## 2022-02-12 RX ADMIN — Medication 20 NG/KG/MIN: at 05:10

## 2022-02-12 RX ADMIN — INSULIN ASPART 2 UNITS: 100 INJECTION, SOLUTION INTRAVENOUS; SUBCUTANEOUS at 16:08

## 2022-02-12 RX ADMIN — POTASSIUM CHLORIDE 20 MEQ: 149 INJECTION, SOLUTION, CONCENTRATE INTRAVENOUS at 09:43

## 2022-02-12 RX ADMIN — PANTOPRAZOLE SODIUM 40 MG: 40 INJECTION, POWDER, FOR SOLUTION INTRAVENOUS at 08:56

## 2022-02-12 RX ADMIN — ENOXAPARIN SODIUM 60 MG: 60 INJECTION SUBCUTANEOUS at 08:54

## 2022-02-12 RX ADMIN — METHADONE HYDROCHLORIDE 5 MG: 10 INJECTION, SOLUTION INTRAMUSCULAR; INTRAVENOUS; SUBCUTANEOUS at 13:28

## 2022-02-12 RX ADMIN — Medication 1.7 MG/HR: at 19:54

## 2022-02-12 RX ADMIN — GABAPENTIN 1200 MG: 400 CAPSULE ORAL at 08:56

## 2022-02-12 RX ADMIN — Medication 20 NG/KG/MIN: at 21:05

## 2022-02-12 RX ADMIN — MIDAZOLAM (PF) 1 MG/ML IN 0.9 % SODIUM CHLORIDE INTRAVENOUS SOLUTION 15 MG/HR: at 09:27

## 2022-02-12 RX ADMIN — GABAPENTIN 1200 MG: 400 CAPSULE ORAL at 01:01

## 2022-02-12 ASSESSMENT — ACTIVITIES OF DAILY LIVING (ADL)
ADLS_ACUITY_SCORE: 22

## 2022-02-12 NOTE — PROGRESS NOTES
Respiratory Therapy Note    [facility] ICU VENTILATOR RESPIRATORY NOTE  Date of Admission: 02/10/22  Date of Intubation (most recent): 02/10/22  Reason for Mechanical Ventilation: Respiratory Failure  Number of Days on Mechanical Ventilation: 2  Met Criteria for Pressure Support Trial: No  Reason for No Pressure Support Trial: Patient's PEEP +16 cmH20 and FiO2 %  Significant Events Today: Patient was supined at 1300. Patient had a CODE BLUE called at 1408, shock was delivered, compressions resumed, pulse obtained  VBG Results: 7.37/50/34/29    Plan:  Continue to monitor patient's respiratory status.    Ventilation Mode: CMV/AC  (Continuous Mandatory Ventilation/ Assist Control)  FiO2 (%): (S) 80 %  Rate Set (breaths/minute): 22 breaths/min  Tidal Volume Set (mL): 350 mL  PEEP (cm H2O): 16 cmH2O  Oxygen Concentration (%): (S) 90 %  Peak Inspiratory Pressure (cm H2O) (Drager Isi): 31  Resp: 21    RT to follow.    Sharon Presley, RT  6:04 PM February 11, 2022

## 2022-02-12 NOTE — PROGRESS NOTES
"ICU Staff:     I examined the patient at the bedside in the [facility] ICU. I managed the patient at the bedside in the [facility] ICU for their critical illness. I reviewed the patient's medical records, progress notes, and imaging studies. I discussed the medical and surgical history of the patient, the physical findings, the labs and imaging studies, and the ICU care plan for the patient with the members of the ICU care team. The patient, 62-year old admitted to the ICU for acute hypoxemic respiratory failure 2/2 COVID-19. The patient has multiple pre-existing medical problems, including DM2, HTN, HLD, bipolar, polysubstance abuse, hypothyroidism, chronic hep C admitted for acute hypoxemic respiratory failure 2/2 COVID-19. The patient had a cardiac arrest \"CODE BLUE\" today and required defibrillation for torsade today; magnesium was given.      PAST MEDICAL HISTORY:  Past Medical History:   Diagnosis Date     Arthritis      Bipolar affective (H)      Fibromyalgia      Hypertension      PAST SURGICAL HISTORY:  Past Surgical History:   Procedure Laterality Date     CHOLECYSTECTOMY       GYN SURGERY      c section     GYN SURGERY      oblation     ORTHOPEDIC SURGERY      left leg, left shoulder, back     MEDICATIONS:  Current Facility-Administered Medications   Medication     0.9% sodium chloride BOLUS     acetaminophen (TYLENOL) tablet 650 mg    Or     acetaminophen (TYLENOL) Suppository 650 mg     atorvastatin (LIPITOR) tablet 20 mg     atropine injection 0.5 mg     baricitinib (OLUMIANT) tablet 4 mg     ciprofloxacin (CIPRO) infusion 400 mg     cyanocobalamin (VITAMIN B-12) tablet 1,000 mcg     dexamethasone PF (DECADRON) injection 6 mg     dextrose 10% infusion     glucose gel 15-30 g    Or     dextrose 50 % injection 25-50 mL    Or     glucagon injection 1 mg     DOPamine 400 mg in dextrose 5% 250 mL (adult std) - premix - CENTRAL     DULoxetine (CYMBALTA) DR capsule 60 mg     " enoxaparin ANTICOAGULANT (LOVENOX) injection 60 mg     epoprostenol (VELETRI) inhalation solution     fentaNYL (PF) (SUBLIMAZE) injection  mcg     [Held by provider] fluticasone-vilanterol (BREO ELLIPTA) 100-25 MCG/INH inhaler 1 puff     furosemide (LASIX) tablet 20 mg     gabapentin (NEURONTIN) capsule 1,200 mg     HYDROmorphone (DILAUDID) 0.2 mg/mL infusion ADULT/PEDS GREATER than or EQUAL to 20 kg     insulin aspart (NovoLOG) injection (RAPID ACTING)     insulin detemir (LEVEMIR PEN) injection 10 Units     levalbuterol (XOPENEX HFA) 45 MCG/ACT Inhaler 2 puff     levothyroxine (SYNTHROID/LEVOTHROID) tablet 200 mcg     magnesium sulfate 2 g in water intermittent infusion     methadone (DOLOPHINE) injection 5 mg     midazolam (VERSED) drip - ADULT 100 mg/100 mL in NS (pre-mix)     multivitamins w/minerals liquid 15 mL     ondansetron (ZOFRAN-ODT) ODT tab 4 mg    Or     ondansetron (ZOFRAN) injection 4 mg     pantoprazole (PROTONIX) 2 mg/mL suspension 40 mg    Or     pantoprazole (PROTONIX) IV push injection 40 mg     polyethylene glycol (MIRALAX) Packet 17 g     potassium chloride 20 mEq in sodium chloride 0.9 % 50 mL intermittent infusion     senna-docusate (SENOKOT-S/PERICOLACE) 8.6-50 MG per tablet 1 tablet    Or     senna-docusate (SENOKOT-S/PERICOLACE) 8.6-50 MG per tablet 2 tablet     Vitamin D3 (CHOLECALCIFEROL) tablet 1,000 Units      ALLERGIES:  Allergies   Allergen Reactions     Promethazine Other (See Comments) and Anxiety     Noted in 8/30/08 ER     Codeine Phosphate GI Disturbance     Lamotrigine Other (See Comments)     hyponatremia     Oxcarbazepine Unknown and Other (See Comments)     Low sodium  LegacyRecord#[id number]       Codeine Other (See Comments) and Rash     GI bleeding  LegacyRecord#[id number]       SOCIAL HISTORY:  Social History     Socioeconomic History     Marital status: Single     Spouse name: Not on file     Number of children: Not on file     Years of education: Not on file     Highest  education level: Not on file   Occupational History     Not on file   Tobacco Use     Smoking status: Former Smoker     Packs/day: 0.10     Smokeless tobacco: Never Used   Substance and Sexual Activity     Alcohol use: No     Drug use: No     Sexual activity: Not Currently   Other Topics Concern     Not on file   Social History Narrative     Not on file     Social Determinants of Health     Financial Resource Strain: Not on file   Food Insecurity: Not on file   Transportation Needs: Not on file   Physical Activity: Not on file   Stress: Not on file   Social Connections: Not on file   Intimate Partner Violence: Not on file   Housing Stability: Not on file     FAMILY HISTORY:  Family History   Problem Relation Age of Onset     Heart Disease Mother      Diabetes Mother      Ovarian Cancer Mother      Heart Disease Father      Lung Cancer Father      Diabetes Sister      Ovarian Cancer Sister      Diabetes Brother      PHYSICAL EXAM:  Vital Signs: /74   Pulse 52   Temp 97.6  F (36.4  C) (Axillary)   Resp 21   Wt 118.1 kg (260 lb 6.4 oz)   LMP 11/01/2013   SpO2 100%   BMI 44.70 kg/m      GEN: The patient is intubated and sedated.      HEENT: the patient is now supine position     Chest: Course BS anteriorly bilaterally     Cor: normal bradycardia; will manage with atropine and IV dopamine drip.       ABD: Soft, no masses, no hernias.      EXT: warm and well perfused.      Neuro: The patient is intubated and sedated.      A/P: The patient is a 62-year old woman who was admitted to the ICU for acute hypoxemic respiratory failure 2/2 COVID-19. The patient has multiple pre-existing medical problems, including DM2, HTN, HLD, bipolar, polysubstance abuse, hypothyroidism, chronic hep C admitted for acute hypoxemic respiratory failure 2/2 COVID-19.     Neuro: The patient is intubated and sedated.    Cardiac:  Cardiac arrest today and defibrillated for torsade today; magnesium was also given.      PULM: Acute  hypoxemic respiratory failure due to COVID-19 pneumonia and ARDS; will continue dexamethasone and full vent support.      GI: OG in place, and tropic feeds could be started.      : UTI - E.  coli was isolated from the urine; managed with cipro     ENDO: Monitor and treat BG.    ID: COVID-19 pneumonia with ARDS.    I examined and evaluated the patient in the [facility] ICU. The patient remains critically ill today. All of the ICU patient care orders and treatments were placed at my direction. I personally managed the patient's ICU supportive measures that were required as the patient's critical illness creates a continuous threat for loss of life for the patient. Key critical care decisions were made by me today to maintain life-saving effective ICU supportive care. I spent 30 minutes providing critical care services at the bedside and on the critical care unit, evaluating the patient, directing care, and reviewing the patient's laboratory values and the patient's radiologic imaging reports associated with the patient's critical illness. I have evaluated all laboratory values and imaging studies from the past 24 hours.. I actively assessed this acute critically ill patient, and I personally provided supportive interventions that were required because the patient has acute and persistent imminently life-threatening organ system failure. The critical care provided required high complexity decision making and clinical evaluation as the patient has an acute critical illness that impairs one or more vital organs. The patient has a high probability of imminent or life-threatening deterioration. I personally spent 30 minutes of Critical Care Time, which was exclusive of any time required to perform any bedside procedures. The Critical Care Time was required to personally provide and direct critical care services at the bedside and on the critical care unit for this acutely critically ill patient. I  personally managed the patient's sedation, pain control and analgesia, metabolic abnormalities, nutritional status, and vasoactive medications.     George Michelle MD, PhD

## 2022-02-12 NOTE — PROGRESS NOTES
[facility]  Hospitalist ICU Progress Note  Mala Araujo, MS-4   02/12/22    Reason for Stay (Diagnosis):   Acute hypoxic Respiratory Failure 2/2 to COVID pneumonia  Septic shock 2/2 to E. Coli UTI         Assessment and Plan:      Summary of Stay: No Yusuf is a 62 year old female with history of type 2 diabetes mellitus, hypertension, hyperlipidemia, bipolar disorder, fibromyalgia, polysubstance abuse (currently maintained on methadone), hypothyroidism, and chronic hepatitis C. she has been vaccinated for COVID-19 but not boosted. She initially presented to the [facility] emergency department on 2/8/2022 with shortness of breath. Her symptoms started on 2/2/2022. She had a positive COVID-19 test on 2/2/2022. In the emergency department the patient was found to be hypoxic requiring supplemental oxygen with high flow nasal cannula. Initial lab work showed a sodium of 132, potassium 5.8, albumin 2.6, ALT 55, CRP 77, ferritin 439, A1c 6.2, procalcitonin 0.07, troponin 81. Venous blood gas showed PCO2 of 54 and PO2 of 33 and bicarb of 31. Urinalysis showed 43 WBCs, moderate leukocyte esterase, positive nitrates, and many bacteria.  Chest x-ray showed patchy opacities throughout both lungs compatible with COVID-19 infection.  CT chest showed no pulmonary embolism however did show patchy opacities and consolidations throughout the lungs. She had increasing oxygen requirements requiring continuous BiPAP. FiO2 was titrated up to 100%. Due to bed availability the patient was transferred to [facility] on 2/10/2022. She was intubated prior to transfer. While at the [place] she was started on remdesivir, dexamethasone, and baricitinib, and ICU dosing Lovenox. She was initially treated with ceftriaxone for her urinary tract infection, which was broadened to cefepime, vancomycin, and azithromycin and switched to ciprofloxacin on 2/11 when urine culture grew E. coli resistant  to penicillins and cephalosporins. On 2/11 she had cardiac arrest at about 2:20 pm. She had been bradycardiac that morning and was placed from the prone to the supine position for a CXR and went into torsades. She had one cycle CPR, received one shock, and returned to normal sinus rhythm. Magnesium level during code was 2.0. She was given 2 gram IV Magnesium. Remdesivir and propofol were discontinued. Dopamine drip given to increased HR. PRN atropine ordered. EKG showed ST depressions in V2-V6.       Problem List/Assessment and Plan:     Pulmonary:  Acute hypoxic Respiratory Failure  Covid-19 Pneumonia  Sepsis  - Symptom Onset: 2/2/2022  - Positive Test: 2/2/2022  - Vaccinated but not boosted against COVID-19  - Continue dexamethasone 6 mg IV daily - started on 2/8  - Received loading dose of remdesivir on 2/10/2022 at the [facility]. Remdesivir stopped on 2/11/2022 due to bradycardia.  - Continue baricitinib - started on 2/8  - Ventilator settings per Intensivist (CMV, ,  Rate 22, PEEP 16, FiO2 65%)  - Lovenox discontinued and started on heparin drip this morning for presumed NSTEMI due to demand ischemia; troponin elevated to 893 this morning  - CXR 2/11/22 showed similar right lung infiltrates but worsened left lung infiltrates.   - Wean FiO2 as able  - Was on levophed earlier- now off  - Propofol drip stopped due to bradycardia; started versed drip and dilaudid drip for sedation    Cardiovascular:  1. Cardiac arrest 2/11/22  - Bradycardia  - V tach  - Code blue called  - CPR given for 2 minutes; cardioverted   - Remdesivir and propofol stopped earlier  - Mag sulfate 2 grams given  - Dopamine drip started to support heart rate  - Labs ordered: BMP, mag, VBG, lactic acid, troponin  - PRN atropine ordered  - ECG showed inverted T waves in leads V2-V6. Check serial troponins. Continue to monitor on telemetry.   -Continue heparin gtt and trend troponins  -Cardiology consulted, appreciate recs. Per consult,  potassium goal >4 and magnesium goal >2. Please see their note.    2. Prolonged QT  -Avoid QT prolonging medications. Stopped methadone today.  -Check ECG periodically    Neuro:  1.  Hx of polysubstance abuse currently on methadone  - PTA was on methadone 80 mg by mouth daily   - Methadone 5 mg IV q 6h was given here but stopped today due to prolonged QT  - Continue Dilaudid drip for sedation and opiate replacement   - Fentanyl prn    2. Hyperkalemia  - Treated with IV calcium at [facility]  - Resolved  - Daily BMP    Renal/FEN:   1. Urinary Tract Infection with E.coli  Sepsis  - Urine cultures positive for E. Coli resistant to PCN and Cephalosporins   - On 2/8, treated with ceftriaxone --> broadened to cefepime, vancomycin, and azithromycin and switched to ciprofloxacin on transfer. Ciprofloxacin switched back to cefepime due to concern for long QT  - Off Levophed    ID:   1. UTI- E. Coli resistant to penicillin and cephalosporins  On 2/8, treated with ceftriaxone --> broadened to cefepime, vancomycin, and azithromycin and switched to ciprofloxacin on transfer. Ciprofloxacin switched back to cefepime on 2/11due to prolonged QT    GI:  1: NPO but receiving OG tube feeds    Heme:  1. No acute issue- WBC 7.2 on 2/12    Type 2 Diabetes Mellitus  - A1C = 6.2 on 2/8/2022  - PTA metformin on hold  - D10W for hypoglycemia  Lines/drains/tubes: peripheral IV, art line, CVC triple lumen, urethral catheter, OG tube, ETT  DVT Prophylaxis: Heparin drip for presumed NSTEMI due to demand ischemia  GI Prophylaxis: pantoprazole  Code Status: Full Code  Discharge Dispo/Date: TBD        Interval History (Subjective):      Ms. Yusuf had cardiac arrest on 2/11 yesterday. She remains bradycardiac but less do.                  Physical Exam:      Last Vital Signs:  /62   Pulse (!) 48   Temp 97.8  F (36.6  C) (Axillary)   Resp 22   Wt 118.1 kg (260 lb 6.4 oz)   LMP 11/01/2013   SpO2 100%   BMI 44.70 kg/m        Intake/Output  Summary (Last 24 hours) at 2/12/2022 1544  Last data filed at 2/12/2022 1500  Gross per 24 hour   Intake 888.13 ml   Output 1980 ml   Net -1091.87 ml     General: Sedated and intubated.  HEENT: Sedated and intubated.  Cardiac: Sinus bradycardia. No murmur.  Pulmonary: Coarse lung sounds bilaterally.  Abdomen: soft & non-distended. No bowel sounds.  Extremities: no deformities.  Warm, well perfused.  Skin: no rashes or lesions noted.  Warm and Dry.  Neuro: Sedated and intubated.         Medications:          sodium chloride 0.9%  50 mL Intravenous Q24H     atorvastatin  20 mg Oral At Bedtime     baricitinib  4 mg Oral Daily     ceFEPIme (MAXIPIME) IV  1 g Intravenous Q12H     cyanocobalamin  1,000 mcg Oral Daily     dexamethasone  6 mg Intravenous Q24H     DULoxetine  60 mg Oral Daily     [Held by provider] fluticasone-vilanterol  1 puff Inhalation Daily     furosemide  20 mg Oral Daily     gabapentin  1,200 mg Oral TID     insulin aspart  2 Units Subcutaneous Q4H     insulin aspart  1-6 Units Subcutaneous Q4H     insulin detemir  10 Units Subcutaneous BID     levothyroxine  200 mcg Oral Daily     methadone  5 mg Intravenous Q6H     multivitamins w/minerals  15 mL Per Feeding Tube Daily     pantoprazole  40 mg Per Feeding Tube QAM AC    Or     pantoprazole (PROTONIX) IV  40 mg Intravenous QAM AC     sodium phosphate  15 mmol Intravenous Once     Vitamin D3  1,000 Units Oral Daily            Data:      All new lab and imaging data was reviewed.   Labs:  Recent labs and studies reviewed.   Imaging:   Recent Results (from the past 24 hour(s))   XR Chest Port 1 View    Narrative    EXAM: CHEST SINGLE VIEW PORTABLE  LOCATION: [facility]  DATE/TIME: 02/12/2022, 6:55 AM    INDICATION: Intubated.  COMPARISON: 02/11/2022 at 1337 hours.      Impression    IMPRESSION:   1. No convincing interval change since the recent comparison study.  2. An endotracheal tube, enteric drainage tube, and right internal  jugular venous catheter are again noted.  3. Patchy opacities scattered throughout the left lung and upper and mid right lung are again noted.     XR Abdomen Port 1 View    Narrative    EXAM: XR ABDOMEN PORTABLE 1 VIEWS  LOCATION: [facility]  DATE/TIME: 2022, 11:09 AM    INDICATION: Check FT placement.  COMPARISON: 02/10/2022.      Impression    IMPRESSION: Given the rotation, an enteric tube is likely in the antrum of the stomach.      Echocardiogram Limited   Result Value    LVEF  20-25%    Narrative    [id number]  [id number]  [id number]  [id number]^AURORA^ALCON^L     [facility]  Echocardiography Laboratory  [address] [place] [address]     Name: ELLA MOULTON  MRN: [id number]  : 1960  Study Date: 2022 10:41 AM  Age: 62 yrs  Gender: Female  Patient Location: [facility]  Reason For Study: MI - Subendocardial  Ordering Physician: Phylicia Matthew MD  Referring Physician: FIONA ROBLES  Performed By: Pauline Moise     BSA: 2.2 m2  Height: 64 in  Weight: 260 lb  HR: 58  BP: 114/62 mmHg  ______________________________________________________________________________  Procedure  Limited Portable Echo Adult. Optison (NDC #[id number]) given intravenously.  ______________________________________________________________________________  Interpretation Summary     1. Techncially extremely difficult study despite the use of contrast to  enhance endocardial definition.  2. Severely decreased left ventricular systolic function. The visual ejection  fraction is 20-25%.  3. There is severe global hypokinesia of the left ventricle. The basal  segments appears to contract the best.  4. Valves are poorly visualized; no gross valvular pathology identified.  5. In comparison with the prior study dated 2/10/22, severe LV dysfunction is  now present. These findings may represent stress  cardiomyopathy.  ______________________________________________________________________________  Left Ventricle  The left ventricle is normal in size. The visual ejection fraction is 20-25%.  Severely decreased left ventricular systolic function. There is severe global  hypokinesia of the left ventricle.     Right Ventricle  The right ventricle is normal in structure, function and size.     Mitral Valve  There is mild mitral annular calcification. The mitral valve is not well  visualized. There is trace mitral regurgitation.     Tricuspid Valve  The tricuspid valve is not well visualized. The tricuspid valve is not well  visualized, but is grossly normal.     Aortic Valve  The aortic valve is not well visualized.     Pulmonic Valve  The pulmonic valve is not well seen, but is grossly normal.     Pericardium  There is no pericardial effusion.     Rhythm  The rhythm was sinus bradycardia.     ______________________________________________________________________________  Doppler Measurements & Calculations  MV E max kathy: 73.2 cm/sec  MV A max kathy: 88.3 cm/sec  MV E/A: 0.83  MV dec time: 0.29 sec  LV V1 max P.2 mmHg  LV V1 max: 103.0 cm/sec  LV V1 VTI: 17.8 cm  TR max kathy: 248.0 cm/sec  TR max P.6 mmHg     E/E' av.7  Lateral E/e': 9.9  Medial E/e': 13.5     ______________________________________________________________________________  Report approved by: Yara Bowers 2022 12:14 PM           Mala Araujo, MS-4  [employer]    I agree with the documentation above, to which I contributed.  I saw and examined the patient independently.  I discussed patient with Mala Araujo MS-4.    /67   Pulse 55   Temp (!) 96.3  F (35.7  C) (Temporal)   Resp 22   Wt 118.1 kg (260 lb 6.4 oz)   LMP 2013   SpO2 91%   BMI 44.70 kg/m    GENERAL:  Comfortable on vent.   PSYCH: No acute distress.  EYES: PERRLA, Normal conjunctiva.  HEART:  Regular rate and rhythm. No  JVD. Pulses normal. No edema.  LUNGS:  Clear to auscultation, normal Respiratory effort.  ABDOMEN:  Soft, no hepatosplenomegaly, normal bowel sounds.  EXTREMETIES: No clubbing, cyanosis or ischemia  SKIN:  Dry to touch, No rash.    I discussed patient with daughter, Lis, today by phone.

## 2022-02-12 NOTE — PROVIDER NOTIFICATION
DATE:  2/12/2022   TIME OF RECEIPT FROM LAB:  0908  LAB TEST:  Troponin  LAB VALUE:  893  RESULTS GIVEN WITH READ-BACK TO (PROVIDER):  Dr. Freeman  TIME LAB VALUE REPORTED TO PROVIDER:   0909    Primary RN notified

## 2022-02-12 NOTE — PROGRESS NOTES
NUTRITION BRIEF NOTE     Chart check for nutrition support patient. Chart reviewed and discussed during IDT rounds.     Recommendations:  Propofol off, per radiology report, feeding tube tip is located in gastric antrum (concern for position earlier today)    Increase TF- Vital High Protein @ goal of  50ml/hr  (1200ml/day)  will provide: 1200 kcals, 104 g PRO, 1003 ml free H20, 133 g CHO, and 0 g fiber daily.    + prosource TF- 3 pkts/day= 120 kcal/33 g protein    Total energy/protein provisions, all sources= 1320 kcal (11 kcal/kg per DW of 118.1 kg)/137 g protein (2.5 g/kg per IBW of 54.5 kg)    Will continue to follow per protocol. Please page/consult as needed.     Anette Kruse RD, LD  Pager - 3rd floor/ICU: [phone]  Pager - All other floors: [phone]  Pager - Weekend/holiday: [phone]  Office: [phone]

## 2022-02-12 NOTE — CONSULTS
CARDIOLOGY CONSULT    REASON FOR CONSULT: torsades, bradycardia      PRIMARY CARE PHYSICIAN:  Price Naranjo    HISTORY OF PRESENT ILLNESS:  Ms. Yusuf is a 63 y/o woman with PMH significant for diabetes, hypertension, hyperlipidemia, chronic hepatitis C,  Hx of polysubstance abuse maintained on methadone who was admitted 2/8/22 with acute hypoxic respiratory failure secondary to covid pneumonia.  She initially present to the [facility] and due to progressive hypoxia required intubation.  The [facility] did not have any ICU beds and subsequently the patient was transferred to [facility].   She received remdesivir, dexamethasone and baricitinib.    Upon arrival to [facility] she was noted to have sinus bradycardia with HR's in the 40-50's.  As remdesivir can cause sinus bradycardia.    According the ICU RN, yesterday afternoon she had more progressive bradycardia with HR's in the upper 40's, lower 40's and then upper 30's. She subsequently went into polymorphic ventricular tachycardia consistent with torsades. She received IV magnesium, CPR and one shock before ROSC.  She was started on low dose dopamine but this was stopped due to hypertension.  Since that time, she has had no further ventricular ectopy of any kind.  She remains bradycardic with HR's in the upper 40's to low 50's.    Her troponin this morning was 893 (not drawn yesterday) with follow up troponin still pending.  An echocardiogram was performed today which demonstrates a severe reduction in LV function, EF 25%.  Her LV function was normal on 2/10/22.  ECG demonstrates sinus bradycardia with deep T wave inversions with prolonged QT up to 616 ms.      PAST MEDICAL HISTORY:  1.  Diabetes type II  2.  Hypertension  3.  Hyperlipidemia  4.  Bipolar disorder  5.  Fibromyalgia  6.  Hx of polysubstance abuse; on methadone  7.  Chronic hepatitis C      MEDICATIONS:  Current Facility-Administered Medications   Medication     0.9% sodium chloride BOLUS     acetaminophen  (TYLENOL) tablet 650 mg    Or     acetaminophen (TYLENOL) Suppository 650 mg     atorvastatin (LIPITOR) tablet 20 mg     atropine injection 0.5 mg     baricitinib (OLUMIANT) tablet 4 mg     ceFEPIme (MAXIPIME) 1g vial to attach to  ml bag for ADULTS or NS 50 ml bag for PEDS     cyanocobalamin (VITAMIN B-12) tablet 1,000 mcg     dexamethasone PF (DECADRON) injection 6 mg     dextrose 10% infusion     glucose gel 15-30 g    Or     dextrose 50 % injection 25-50 mL    Or     glucagon injection 1 mg     DULoxetine (CYMBALTA) DR capsule 60 mg     epoprostenol (VELETRI) inhalation solution     fentaNYL (PF) (SUBLIMAZE) injection  mcg     [Held by provider] fluticasone-vilanterol (BREO ELLIPTA) 100-25 MCG/INH inhaler 1 puff     furosemide (LASIX) tablet 20 mg     gabapentin (NEURONTIN) capsule 1,200 mg     heparin infusion 25,000 units in D5W 250 mL ANTICOAGULANT     HYDROmorphone (DILAUDID) 0.2 mg/mL infusion ADULT/PEDS GREATER than or EQUAL to 20 kg     insulin aspart (NovoLOG) injection (RAPID ACTING)     insulin aspart (NovoLOG) injection (RAPID ACTING)     insulin detemir (LEVEMIR PEN) injection 10 Units     levalbuterol (XOPENEX HFA) 45 MCG/ACT Inhaler 2 puff     levothyroxine (SYNTHROID/LEVOTHROID) tablet 200 mcg     methadone (DOLOPHINE) injection 5 mg     midazolam (VERSED) bolus from infusion pump 2 mg     midazolam (VERSED) drip - ADULT 100 mg/100 mL in NS (pre-mix)     multivitamins w/minerals liquid 15 mL     ondansetron (ZOFRAN-ODT) ODT tab 4 mg    Or     ondansetron (ZOFRAN) injection 4 mg     pantoprazole (PROTONIX) 2 mg/mL suspension 40 mg    Or     pantoprazole (PROTONIX) IV push injection 40 mg     polyethylene glycol (MIRALAX) Packet 17 g     senna-docusate (SENOKOT-S/PERICOLACE) 8.6-50 MG per tablet 1 tablet    Or     senna-docusate (SENOKOT-S/PERICOLACE) 8.6-50 MG per tablet 2 tablet     sodium phosphate (NaPHOS) 15 mMol in 250 mL D5W PREMADE infusion     Vitamin D3 (CHOLECALCIFEROL) tablet  1,000 Units       ALLERGIES:  Allergies   Allergen Reactions     Promethazine Other (See Comments) and Anxiety     Noted in 8/30/08 ER     Codeine Phosphate GI Disturbance     Lamotrigine Other (See Comments)     hyponatremia     Oxcarbazepine Unknown and Other (See Comments)     Low sodium  LegacyRecord#[id number]       Codeine Other (See Comments) and Rash     GI bleeding  LegacyRecord#[id number]         SOCIAL HISTORY:  I have reviewed this patient's social history and updated it with pertinent information if needed. No Yusuf  reports that she has quit smoking. She smoked 0.10 packs per day. She has never used smokeless tobacco. She reports that she does not drink alcohol and does not use drugs.    FAMILY HISTORY:  I have reviewed this patient's family history and updated it with pertinent information if needed.   Family History   Problem Relation Age of Onset     Heart Disease Mother      Diabetes Mother      Ovarian Cancer Mother      Heart Disease Father      Lung Cancer Father      Diabetes Sister      Ovarian Cancer Sister      Diabetes Brother        REVIEW OF SYSTEMS:  A complete ROS was obtained and the pertinent positives are outlined in the history of present illness above.  The remainder of systems is negative.      PHYSICAL EXAM:  Temp: 97.8  F (36.6  C) Temp src: Axillary BP: 114/62 Pulse: (!) 48   Resp: 22 SpO2: 100 % O2 Device: Mechanical Ventilator    Vital Signs with Ranges  Temp:  [97.2  F (36.2  C)-97.8  F (36.6  C)] 97.8  F (36.6  C)  Pulse:  [39-75] 48  Resp:  [8-32] 22  BP: (112-173)/(62-98) 114/62  MAP:  [65 mmHg-200 mmHg] 77 mmHg  Arterial Line BP: ()/() 81/74  FiO2 (%):  [65 %-90 %] 65 %  SpO2:  [65 %-100 %] 100 %  260 lbs 6.4 oz    Constitutional: Intubated and sedated  Eyes:  sclera nonicteric  ENT: trachea midline  Respiratory: CTAB  Cardiovascular:  Bradycardic, regular, no m/r/g,   GI: nondistended, nontender, bowel sounds present  Lymph/Hematologic: no  lymphadenopathy  Skin: dry, no rash  Musculoskeletal: good muscle tone, no edema bilaterally, warm and well perfused  Neurologic: no focal deficits  Neuropsychiatric: appropriate affact    DATA:  Labs:   Troponin 893   Cr 0.64  Hgb 13.5  plts 270        EKG:  Serial ECGs reviewed Sinus bradycardia, deep T wave inversions, prolonged  ms, improved to QTc 587 this morning.      Echocardiogram 2/12/22 images reviewed personally  1. Techncially extremely difficult study despite the use of contrast to  enhance endocardial definition.  2. Severely decreased left ventricular systolic function. The visual ejection  fraction is 20-25%.  3. There is severe global hypokinesia of the left ventricle. The basal  segments appears to contract the best.  4. Valves are poorly visualized; no gross valvular pathology identified.  5. In comparison with the prior study dated 2/10/22, severe LV dysfunction is  now present. These findings may represent stress cardiomyopathy.  ______________________________________________________________________________      ASSESSMENT:  1.  Acute hypoxic respiratory failure  2.  Covid 19 pneumonia  3.  New cardiomyopathy:  Most likely stress related, less likely ischemic in origin although this will need to be excluded   4.  Prolonged QT:   In the setting of deep T wave inversions from #3 above  5.  Episodes of torsades:  No further recurrence over past almost 24 hours, no further ectopy.  Occurred in the setting of acquired prolonged QT and exacerbated by bradycardia  6.  Sinus bradycardia:  Possibly exacerbated by remdesivir    RECOMMENDATIONS:  1. No further ventricular ectopy or recurrent torsades since episode yesterday afternoon.  Will continue to monitor with the following plan:  -Keep K >4 and Mg >2  -Avoid QT prolonging and AV pema blocking agents.  Stop methadone (discussed with attending hopsitalist)   -Discussed case with EP cardiology, Dr. Alvarez.  As nearly 24 hours since episode of  torasades and no further ectopy or recurrence, will continue to monitor.  If significant increase in ventricular ectopy, or recurrent torasades, would recommend emergent transvenous pacer and start lidocaine infusion  -Daily ECGs  -Will consider invasive coronary angiogram next week depending on patient stability and clinical course.  Continue heparin gtt and trend troponins.      Pauline Samuels MD [facility]  February 12, 2022    I personally examined and evaluated the patient today.  No Yusuf was in (or remains in) critical condition today due to bradycardia, torsades, cardiomyopathy.  I personally managed hemodynamics and the key decisions made today include monitoring parameters, discussion with EP cardiologist and discussion with hospitalist regarding QT prolonging meds.   I spent a total of 30 minutes providing critical care services at the bedside, evaluating the patient, directing care and reviewing laboratory values and radiologic reports for Ms. Yusuf.      Pauline Samuels MD

## 2022-02-12 NOTE — PROVIDER NOTIFICATION
DATE:  2/12/2022   TIME OF RECEIPT FROM LAB:  1448  LAB TEST:  troponin  LAB VALUE:  819  RESULTS GIVEN WITH READ-BACK TO (PROVIDER):  Salomon Freeman MD  TIME LAB VALUE REPORTED TO PROVIDER:   1447

## 2022-02-12 NOTE — PROGRESS NOTES
Respiratory Therapy Note     [facility] ICU VENTILATOR RESPIRATORY NOTE  Date of Admission: 02/10/22  Date of Intubation (most recent): 02/10/22  Reason for Mechanical Ventilation: Respiratory Failure  Number of Days on Mechanical Ventilation: 3  Met Criteria for Pressure Support Trial: No  Reason for No Pressure Support Trial: Patient's PEEP +16 cmH20 and FiO2 65%  Significant Events Today: Decreased FiO2 TO 65%  ABG Results: 7.43/41/94/27     Plan:  Continue to monitor patient's respiratory status and wean as tolerated.    Dayna Haynes, RT

## 2022-02-12 NOTE — PROGRESS NOTES
"ICU Staff:     I examined the patient at the bedside in the [facility] ICU. I managed the patient at the bedside in the [facility] ICU for their critical illness. I reviewed the patient's medical records, progress notes, and imaging studies. I discussed the medical and surgical history of the patient, the physical findings, the labs and imaging studies, and the ICU care plan for the patient with the members of the ICU care team. The patient, the 62-year old woman who was admitted to the ICU for acute hypoxemic respiratory failure due to COVID-19 pneumonia and ARDS. The patient has multiple pre-existing medical problems, including DM2, HTN, HLD, bipolar, polysubstance abuse, hypothyroidism, chronic hep C admitted for acute hypoxemic respiratory failure 2/2 COVID-19. The patient had a cardiac arrest \"CODE BLUE\" yesterday and required defibrillation for torsade today; magnesium was administered. I appreciate Cardiology input today.      PAST MEDICAL HISTORY:  Past Medical History:   Diagnosis Date     Arthritis      Bipolar affective (H)      Fibromyalgia      Hypertension      PAST SURGICAL HISTORY:  Past Surgical History:   Procedure Laterality Date     CHOLECYSTECTOMY       GYN SURGERY      c section     GYN SURGERY      oblation     ORTHOPEDIC SURGERY      left leg, left shoulder, back     MEDICATIONS:  Current Facility-Administered Medications   Medication     0.9% sodium chloride BOLUS     acetaminophen (TYLENOL) tablet 650 mg    Or     acetaminophen (TYLENOL) Suppository 650 mg     atorvastatin (LIPITOR) tablet 20 mg     atropine injection 0.5 mg     baricitinib (OLUMIANT) tablet 4 mg     ceFEPIme (MAXIPIME) 1g vial to attach to  ml bag for ADULTS or NS 50 ml bag for PEDS     cyanocobalamin (VITAMIN B-12) tablet 1,000 mcg     dexamethasone PF (DECADRON) injection 6 mg     dextrose 10% infusion     glucose gel 15-30 g    Or     dextrose 50 % injection 25-50 mL    Or     glucagon " injection 1 mg     DULoxetine (CYMBALTA) DR capsule 60 mg     epoprostenol (VELETRI) inhalation solution     fentaNYL (PF) (SUBLIMAZE) injection  mcg     [Held by provider] fluticasone-vilanterol (BREO ELLIPTA) 100-25 MCG/INH inhaler 1 puff     furosemide (LASIX) tablet 20 mg     gabapentin (NEURONTIN) capsule 1,200 mg     heparin infusion 25,000 units in D5W 250 mL ANTICOAGULANT     HYDROmorphone (DILAUDID) 0.2 mg/mL infusion ADULT/PEDS GREATER than or EQUAL to 20 kg     insulin aspart (NovoLOG) injection (RAPID ACTING)     insulin aspart (NovoLOG) injection (RAPID ACTING)     insulin detemir (LEVEMIR PEN) injection 10 Units     levalbuterol (XOPENEX HFA) 45 MCG/ACT Inhaler 2 puff     levothyroxine (SYNTHROID/LEVOTHROID) tablet 200 mcg     methadone (DOLOPHINE) injection 5 mg     midazolam (VERSED) bolus from infusion pump 2 mg     midazolam (VERSED) drip - ADULT 100 mg/100 mL in NS (pre-mix)     multivitamins w/minerals liquid 15 mL     ondansetron (ZOFRAN-ODT) ODT tab 4 mg    Or     ondansetron (ZOFRAN) injection 4 mg     pantoprazole (PROTONIX) 2 mg/mL suspension 40 mg    Or     pantoprazole (PROTONIX) IV push injection 40 mg     polyethylene glycol (MIRALAX) Packet 17 g     senna-docusate (SENOKOT-S/PERICOLACE) 8.6-50 MG per tablet 1 tablet    Or     senna-docusate (SENOKOT-S/PERICOLACE) 8.6-50 MG per tablet 2 tablet     sodium phosphate (NaPHOS) 15 mMol in 250 mL D5W PREMADE infusion     Vitamin D3 (CHOLECALCIFEROL) tablet 1,000 Units     ALLERGIES:  Allergies   Allergen Reactions     Promethazine Other (See Comments) and Anxiety     Noted in 8/30/08 ER     Codeine Phosphate GI Disturbance     Lamotrigine Other (See Comments)     hyponatremia     Oxcarbazepine Unknown and Other (See Comments)     Low sodium  LegacyRecord#[id number]       Codeine Other (See Comments) and Rash     GI bleeding  LegacyRecord#[id number]          SOCIAL HISTORY:  Social History     Socioeconomic History     Marital status: Single      Spouse name: Not on file     Number of children: Not on file     Years of education: Not on file     Highest education level: Not on file   Occupational History     Not on file   Tobacco Use     Smoking status: Former Smoker     Packs/day: 0.10     Smokeless tobacco: Never Used   Substance and Sexual Activity     Alcohol use: No     Drug use: No     Sexual activity: Not Currently   Other Topics Concern     Not on file   Social History Narrative     Not on file     Social Determinants of Health     Financial Resource Strain: Not on file   Food Insecurity: Not on file   Transportation Needs: Not on file   Physical Activity: Not on file   Stress: Not on file   Social Connections: Not on file   Intimate Partner Violence: Not on file   Housing Stability: Not on file       FAMILY HISTORY:  Family History   Problem Relation Age of Onset     Heart Disease Mother      Diabetes Mother      Ovarian Cancer Mother      Heart Disease Father      Lung Cancer Father      Diabetes Sister      Ovarian Cancer Sister      Diabetes Brother         PHYSICAL EXAM:  Vital Signs: /62   Pulse (!) 48   Temp 97.8  F (36.6  C) (Axillary)   Resp 22   Wt 118.1 kg (260 lb 6.4 oz)   LMP 11/01/2013   SpO2 100%   BMI 44.70 kg/m      GEN: The patient is intubated and sedated.      HEENT: the patient is now supine position     Chest: Course BS anteriorly bilaterally     Cor: normal bradycardia; will manage with atropine and IV dopamine drip.       ABD: Soft, no masses, no hernias.      EXT: warm and well perfused.      Neuro: The patient is intubated and sedated.      A/P: The patient, the 62-year old woman, was admitted to the ICU for acute hypoxemic respiratory failure due to COVID-19 pneumonia and ARDS.      Neuro: The patient is intubated and sedated.     Cardiac:  Cardiac arrest yesterday and defibrillated for torsade yesterday; magnesium was given at that time. Appreciate Cardiology in[ut today.       PULM: Acute hypoxemic  respiratory failure due to COVID-19 pneumonia and ARDS; will continue dexamethasone and full vent support.       GI: OG in place, and tube feeds have been started.       : UTI - E.  coli was isolated from the urine; managed with Cipro      ENDO: Monitor and treat BG.     ID: COVID-19 pneumonia with ARDS.     I examined and evaluated the patient in the [facility] ICU. The patient remains critically ill today. All of the ICU patient care orders and treatments were placed at my direction. I personally managed the patient's ICU supportive measures that were required as the patient's critical illness creates a continuous threat for loss of life for the patient. Key critical care decisions were made by me today to maintain life-saving effective ICU supportive care. I spent 30 minutes providing critical care services at the bedside and on the critical care unit, evaluating the patient, directing care, and reviewing the patient's laboratory values and the patient's radiologic imaging reports associated with the patient's critical illness. I have evaluated all laboratory values and imaging studies from the past 24 hours. I actively assessed this acute critically ill patient, and I personally provided supportive interventions that were required because the patient has acute and persistent imminently life-threatening organ system failure. The critical care provided required high complexity decision making and clinical evaluation as the patient has an acute critical illness that impairs one or more vital organs. The patient has a high probability of imminent or life-threatening deterioration. I personally spent 30 minutes of Critical Care Time, which was exclusive of any time required to perform any bedside procedures. The Critical Care Time was required to personally provide and direct critical care services at the bedside and on the critical care unit for this acutely critically ill patient. I personally  managed the patient's sedation, pain control and analgesia, metabolic abnormalities, nutritional status, and vasoactive medications.     George Michelle MD, PhD

## 2022-02-13 ENCOUNTER — APPOINTMENT (OUTPATIENT)
Dept: GENERAL RADIOLOGY | Facility: CLINIC | Age: 62
DRG: 207 | End: 2022-02-13
Attending: INTERNAL MEDICINE
Payer: COMMERCIAL

## 2022-02-13 ENCOUNTER — HOSPITAL ENCOUNTER (INPATIENT)
Facility: CLINIC | Age: 62
LOS: 19 days | Discharge: NURSING, REHAB OR LONG-TERM CARE | DRG: 207 | End: 2022-03-04
Attending: INTERNAL MEDICINE | Admitting: INTERNAL MEDICINE
Payer: COMMERCIAL

## 2022-02-13 ENCOUNTER — APPOINTMENT (OUTPATIENT)
Dept: GENERAL RADIOLOGY | Facility: CLINIC | Age: 62
DRG: 871 | End: 2022-02-13
Attending: INTERNAL MEDICINE
Payer: COMMERCIAL

## 2022-02-13 VITALS
WEIGHT: 270.4 LBS | OXYGEN SATURATION: 97 % | HEART RATE: 47 BPM | SYSTOLIC BLOOD PRESSURE: 152 MMHG | RESPIRATION RATE: 20 BRPM | TEMPERATURE: 97.4 F | DIASTOLIC BLOOD PRESSURE: 79 MMHG | BODY MASS INDEX: 46.41 KG/M2

## 2022-02-13 DIAGNOSIS — I10 BENIGN ESSENTIAL HYPERTENSION: ICD-10-CM

## 2022-02-13 DIAGNOSIS — F41.9 ANXIETY DISORDER, UNSPECIFIED TYPE: ICD-10-CM

## 2022-02-13 DIAGNOSIS — M75.41 IMPINGEMENT SYNDROME OF RIGHT SHOULDER: ICD-10-CM

## 2022-02-13 DIAGNOSIS — M54.50 CHRONIC LOW BACK PAIN, UNSPECIFIED BACK PAIN LATERALITY, UNSPECIFIED WHETHER SCIATICA PRESENT: ICD-10-CM

## 2022-02-13 DIAGNOSIS — E78.5 HYPERLIPIDEMIA, UNSPECIFIED HYPERLIPIDEMIA TYPE: Primary | ICD-10-CM

## 2022-02-13 DIAGNOSIS — G89.29 CHRONIC LOW BACK PAIN, UNSPECIFIED BACK PAIN LATERALITY, UNSPECIFIED WHETHER SCIATICA PRESENT: ICD-10-CM

## 2022-02-13 DIAGNOSIS — M17.10 PATELLOFEMORAL ARTHRITIS: ICD-10-CM

## 2022-02-13 PROBLEM — I47.20 VENTRICULAR TACHYCARDIA (H): Status: ACTIVE | Noted: 2022-02-13

## 2022-02-13 PROBLEM — R00.1 BRADYCARDIA: Status: ACTIVE | Noted: 2022-02-13

## 2022-02-13 PROBLEM — U07.1 ACUTE RESPIRATORY FAILURE DUE TO COVID-19 (H): Status: ACTIVE | Noted: 2022-02-13

## 2022-02-13 PROBLEM — J96.00 ACUTE RESPIRATORY FAILURE DUE TO COVID-19 (H): Status: ACTIVE | Noted: 2022-02-13

## 2022-02-13 PROBLEM — J96.01 ACUTE RESPIRATORY FAILURE WITH HYPOXIA (H): Status: ACTIVE | Noted: 2022-02-13

## 2022-02-13 PROBLEM — I47.21 TORSADES DE POINTES (H): Status: ACTIVE | Noted: 2022-02-13

## 2022-02-13 LAB
ANION GAP SERPL CALCULATED.3IONS-SCNC: 3 MMOL/L (ref 3–14)
ANION GAP SERPL CALCULATED.3IONS-SCNC: 4 MMOL/L (ref 3–14)
BACTERIA BLD CULT: NO GROWTH
BACTERIA BLD CULT: NO GROWTH
BASE EXCESS BLDA CALC-SCNC: 2.2 MMOL/L (ref -9–1.8)
BASE EXCESS BLDA CALC-SCNC: 2.4 MMOL/L (ref -9–1.8)
BASE EXCESS BLDA CALC-SCNC: 2.6 MMOL/L (ref -9–1.8)
BUN SERPL-MCNC: 21 MG/DL (ref 7–30)
BUN SERPL-MCNC: 22 MG/DL (ref 7–30)
CALCIUM SERPL-MCNC: 8.1 MG/DL (ref 8.5–10.1)
CALCIUM SERPL-MCNC: 8.6 MG/DL (ref 8.5–10.1)
CHLORIDE BLD-SCNC: 106 MMOL/L (ref 94–109)
CHLORIDE BLD-SCNC: 109 MMOL/L (ref 94–109)
CO2 SERPL-SCNC: 26 MMOL/L (ref 20–32)
CO2 SERPL-SCNC: 28 MMOL/L (ref 20–32)
CREAT SERPL-MCNC: 0.63 MG/DL (ref 0.52–1.04)
CREAT SERPL-MCNC: 0.73 MG/DL (ref 0.52–1.04)
CRP SERPL-MCNC: 17.2 MG/L (ref 0–8)
CRP SERPL-MCNC: 18.5 MG/L (ref 0–8)
D DIMER PPP FEU-MCNC: 1.09 UG/ML FEU (ref 0–0.5)
ERYTHROCYTE [DISTWIDTH] IN BLOOD BY AUTOMATED COUNT: 14 % (ref 10–15)
ERYTHROCYTE [DISTWIDTH] IN BLOOD BY AUTOMATED COUNT: 14.1 % (ref 10–15)
GFR SERPL CREATININE-BSD FRML MDRD: >90 ML/MIN/1.73M2
GFR SERPL CREATININE-BSD FRML MDRD: >90 ML/MIN/1.73M2
GLUCOSE BLD-MCNC: 187 MG/DL (ref 70–99)
GLUCOSE BLD-MCNC: 246 MG/DL (ref 70–99)
GLUCOSE BLDC GLUCOMTR-MCNC: 146 MG/DL (ref 70–99)
GLUCOSE BLDC GLUCOMTR-MCNC: 173 MG/DL (ref 70–99)
GLUCOSE BLDC GLUCOMTR-MCNC: 182 MG/DL (ref 70–99)
GLUCOSE BLDC GLUCOMTR-MCNC: 184 MG/DL (ref 70–99)
GLUCOSE BLDC GLUCOMTR-MCNC: 186 MG/DL (ref 70–99)
GLUCOSE BLDC GLUCOMTR-MCNC: 186 MG/DL (ref 70–99)
GLUCOSE BLDC GLUCOMTR-MCNC: 215 MG/DL (ref 70–99)
HCO3 BLD-SCNC: 26 MMOL/L (ref 21–28)
HCO3 BLD-SCNC: 27 MMOL/L (ref 21–28)
HCO3 BLD-SCNC: 27 MMOL/L (ref 21–28)
HCT VFR BLD AUTO: 38 % (ref 35–47)
HCT VFR BLD AUTO: 38 % (ref 35–47)
HGB BLD-MCNC: 12 G/DL (ref 11.7–15.7)
HGB BLD-MCNC: 12 G/DL (ref 11.7–15.7)
MAGNESIUM SERPL-MCNC: 1.8 MG/DL (ref 1.8–2.6)
MAGNESIUM SERPL-MCNC: 6.4 MG/DL (ref 1.6–2.3)
MCH RBC QN AUTO: 28.6 PG (ref 26.5–33)
MCH RBC QN AUTO: 28.8 PG (ref 26.5–33)
MCHC RBC AUTO-ENTMCNC: 31.6 G/DL (ref 31.5–36.5)
MCHC RBC AUTO-ENTMCNC: 31.6 G/DL (ref 31.5–36.5)
MCV RBC AUTO: 91 FL (ref 78–100)
MCV RBC AUTO: 91 FL (ref 78–100)
O2/TOTAL GAS SETTING VFR VENT: 100 %
O2/TOTAL GAS SETTING VFR VENT: 100 %
O2/TOTAL GAS SETTING VFR VENT: 70 %
PCO2 BLD: 37 MM HG (ref 35–45)
PCO2 BLD: 40 MM HG (ref 35–45)
PCO2 BLD: 41 MM HG (ref 35–45)
PH BLD: 7.43 [PH] (ref 7.35–7.45)
PH BLD: 7.44 [PH] (ref 7.35–7.45)
PH BLD: 7.46 [PH] (ref 7.35–7.45)
PHOSPHATE SERPL-MCNC: 2.9 MG/DL (ref 2.5–4.5)
PLATELET # BLD AUTO: 307 10E3/UL (ref 150–450)
PLATELET # BLD AUTO: 311 10E3/UL (ref 150–450)
PLATELET # BLD AUTO: 332 10E3/UL (ref 150–450)
PO2 BLD: 138 MM HG (ref 80–105)
PO2 BLD: 153 MM HG (ref 80–105)
PO2 BLD: 211 MM HG (ref 80–105)
POTASSIUM BLD-SCNC: 3.4 MMOL/L (ref 3.4–5.3)
POTASSIUM BLD-SCNC: 3.5 MMOL/L (ref 3.4–5.3)
POTASSIUM BLD-SCNC: 3.9 MMOL/L (ref 3.4–5.3)
RBC # BLD AUTO: 4.17 10E6/UL (ref 3.8–5.2)
RBC # BLD AUTO: 4.19 10E6/UL (ref 3.8–5.2)
SODIUM SERPL-SCNC: 136 MMOL/L (ref 133–144)
SODIUM SERPL-SCNC: 140 MMOL/L (ref 133–144)
T4 FREE SERPL-MCNC: 1.39 NG/DL (ref 0.76–1.46)
T4 FREE SERPL-MCNC: 1.39 NG/DL (ref 0.76–1.46)
TROPONIN I SERPL HS-MCNC: 537 NG/L
TSH SERPL DL<=0.005 MIU/L-ACNC: 0.29 MU/L (ref 0.4–4)
TSH SERPL DL<=0.005 MIU/L-ACNC: 0.34 MU/L (ref 0.4–4)
UFH PPP CHRO-ACNC: 0.23 IU/ML
UFH PPP CHRO-ACNC: 0.39 IU/ML
UFH PPP CHRO-ACNC: 0.66 IU/ML
WBC # BLD AUTO: 10.1 10E3/UL (ref 4–11)
WBC # BLD AUTO: 9.8 10E3/UL (ref 4–11)

## 2022-02-13 PROCEDURE — 272N000083 HC NUTRITION PRODUCT SEMIELEM INTERMED LITER

## 2022-02-13 PROCEDURE — 84439 ASSAY OF FREE THYROXINE: CPT | Performed by: SURGERY

## 2022-02-13 PROCEDURE — 250N000011 HC RX IP 250 OP 636: Performed by: STUDENT IN AN ORGANIZED HEALTH CARE EDUCATION/TRAINING PROGRAM

## 2022-02-13 PROCEDURE — C9113 INJ PANTOPRAZOLE SODIUM, VIA: HCPCS | Performed by: STUDENT IN AN ORGANIZED HEALTH CARE EDUCATION/TRAINING PROGRAM

## 2022-02-13 PROCEDURE — 82803 BLOOD GASES ANY COMBINATION: CPT | Performed by: SURGERY

## 2022-02-13 PROCEDURE — 83735 ASSAY OF MAGNESIUM: CPT | Performed by: INTERNAL MEDICINE

## 2022-02-13 PROCEDURE — 5A1955Z RESPIRATORY VENTILATION, GREATER THAN 96 CONSECUTIVE HOURS: ICD-10-PCS | Performed by: INTERNAL MEDICINE

## 2022-02-13 PROCEDURE — 250N000011 HC RX IP 250 OP 636: Performed by: INTERNAL MEDICINE

## 2022-02-13 PROCEDURE — 250N000013 HC RX MED GY IP 250 OP 250 PS 637: Performed by: INTERNAL MEDICINE

## 2022-02-13 PROCEDURE — 250N000009 HC RX 250: Performed by: INTERNAL MEDICINE

## 2022-02-13 PROCEDURE — 250N000011 HC RX IP 250 OP 636: Performed by: SURGERY

## 2022-02-13 PROCEDURE — 84484 ASSAY OF TROPONIN QUANT: CPT | Performed by: INTERNAL MEDICINE

## 2022-02-13 PROCEDURE — 85520 HEPARIN ASSAY: CPT | Performed by: INTERNAL MEDICINE

## 2022-02-13 PROCEDURE — 85027 COMPLETE CBC AUTOMATED: CPT | Performed by: SURGERY

## 2022-02-13 PROCEDURE — 84100 ASSAY OF PHOSPHORUS: CPT | Performed by: INTERNAL MEDICINE

## 2022-02-13 PROCEDURE — 84132 ASSAY OF SERUM POTASSIUM: CPT | Performed by: SURGERY

## 2022-02-13 PROCEDURE — 71045 X-RAY EXAM CHEST 1 VIEW: CPT

## 2022-02-13 PROCEDURE — 94003 VENT MGMT INPAT SUBQ DAY: CPT

## 2022-02-13 PROCEDURE — 85027 COMPLETE CBC AUTOMATED: CPT | Performed by: INTERNAL MEDICINE

## 2022-02-13 PROCEDURE — 80048 BASIC METABOLIC PNL TOTAL CA: CPT | Performed by: INTERNAL MEDICINE

## 2022-02-13 PROCEDURE — 94002 VENT MGMT INPAT INIT DAY: CPT

## 2022-02-13 PROCEDURE — 85379 FIBRIN DEGRADATION QUANT: CPT | Performed by: INTERNAL MEDICINE

## 2022-02-13 PROCEDURE — 200N000001 HC R&B ICU

## 2022-02-13 PROCEDURE — 85049 AUTOMATED PLATELET COUNT: CPT | Performed by: SURGERY

## 2022-02-13 PROCEDURE — 99292 CRITICAL CARE ADDL 30 MIN: CPT | Mod: AI | Performed by: INTERNAL MEDICINE

## 2022-02-13 PROCEDURE — 84443 ASSAY THYROID STIM HORMONE: CPT | Performed by: SURGERY

## 2022-02-13 PROCEDURE — 86140 C-REACTIVE PROTEIN: CPT | Performed by: INTERNAL MEDICINE

## 2022-02-13 PROCEDURE — XW0DXM6 INTRODUCTION OF BARICITINIB INTO MOUTH AND PHARYNX, EXTERNAL APPROACH, NEW TECHNOLOGY GROUP 6: ICD-10-PCS | Performed by: INTERNAL MEDICINE

## 2022-02-13 PROCEDURE — 258N000003 HC RX IP 258 OP 636: Performed by: INTERNAL MEDICINE

## 2022-02-13 PROCEDURE — 82803 BLOOD GASES ANY COMBINATION: CPT | Performed by: INTERNAL MEDICINE

## 2022-02-13 PROCEDURE — 93005 ELECTROCARDIOGRAM TRACING: CPT

## 2022-02-13 PROCEDURE — 99291 CRITICAL CARE FIRST HOUR: CPT | Mod: AI | Performed by: INTERNAL MEDICINE

## 2022-02-13 PROCEDURE — 36600 WITHDRAWAL OF ARTERIAL BLOOD: CPT

## 2022-02-13 PROCEDURE — 999N000065 XR CHEST PORT 1 VIEW

## 2022-02-13 PROCEDURE — 36620 INSERTION CATHETER ARTERY: CPT | Performed by: INTERNAL MEDICINE

## 2022-02-13 PROCEDURE — 84443 ASSAY THYROID STIM HORMONE: CPT | Performed by: INTERNAL MEDICINE

## 2022-02-13 PROCEDURE — 94645 CONT INHLJ TX EACH ADDL HOUR: CPT

## 2022-02-13 PROCEDURE — 99291 CRITICAL CARE FIRST HOUR: CPT | Performed by: INTERNAL MEDICINE

## 2022-02-13 PROCEDURE — 999N000157 HC STATISTIC RCP TIME EA 10 MIN

## 2022-02-13 PROCEDURE — 84439 ASSAY OF FREE THYROXINE: CPT | Performed by: INTERNAL MEDICINE

## 2022-02-13 PROCEDURE — 85049 AUTOMATED PLATELET COUNT: CPT | Performed by: INTERNAL MEDICINE

## 2022-02-13 PROCEDURE — 84132 ASSAY OF SERUM POTASSIUM: CPT | Performed by: INTERNAL MEDICINE

## 2022-02-13 PROCEDURE — 99239 HOSP IP/OBS DSCHRG MGMT >30: CPT | Performed by: INTERNAL MEDICINE

## 2022-02-13 PROCEDURE — 250N000012 HC RX MED GY IP 250 OP 636 PS 637: Performed by: INTERNAL MEDICINE

## 2022-02-13 RX ORDER — ASPIRIN 81 MG/1
81 TABLET, CHEWABLE ORAL DAILY
Status: DISCONTINUED | OUTPATIENT
Start: 2022-02-13 | End: 2022-02-13 | Stop reason: HOSPADM

## 2022-02-13 RX ORDER — DEXTROSE MONOHYDRATE 25 G/50ML
25-50 INJECTION, SOLUTION INTRAVENOUS
Status: DISCONTINUED | OUTPATIENT
Start: 2022-02-13 | End: 2022-02-13

## 2022-02-13 RX ORDER — VITAMIN B COMPLEX
25 TABLET ORAL DAILY
Status: CANCELLED | OUTPATIENT
Start: 2022-02-13

## 2022-02-13 RX ORDER — ONDANSETRON 2 MG/ML
4 INJECTION INTRAMUSCULAR; INTRAVENOUS EVERY 6 HOURS PRN
Status: CANCELLED | OUTPATIENT
Start: 2022-02-13

## 2022-02-13 RX ORDER — DULOXETIN HYDROCHLORIDE 60 MG/1
60 CAPSULE, DELAYED RELEASE ORAL DAILY
Status: DISCONTINUED | OUTPATIENT
Start: 2022-02-14 | End: 2022-02-13

## 2022-02-13 RX ORDER — HEPARIN SODIUM 10000 [USP'U]/100ML
0-5000 INJECTION, SOLUTION INTRAVENOUS CONTINUOUS
Status: DISCONTINUED | OUTPATIENT
Start: 2022-02-13 | End: 2022-02-17

## 2022-02-13 RX ORDER — VITAMIN B COMPLEX
25 TABLET ORAL DAILY
Status: DISCONTINUED | OUTPATIENT
Start: 2022-02-14 | End: 2022-02-22

## 2022-02-13 RX ORDER — NICOTINE POLACRILEX 4 MG
15-30 LOZENGE BUCCAL
Status: DISCONTINUED | OUTPATIENT
Start: 2022-02-13 | End: 2022-03-04 | Stop reason: HOSPADM

## 2022-02-13 RX ORDER — AMOXICILLIN 250 MG
1 CAPSULE ORAL 2 TIMES DAILY PRN
Status: CANCELLED | OUTPATIENT
Start: 2022-02-13

## 2022-02-13 RX ORDER — LEVOTHYROXINE SODIUM 100 UG/1
200 TABLET ORAL DAILY
Status: DISCONTINUED | OUTPATIENT
Start: 2022-02-14 | End: 2022-02-13

## 2022-02-13 RX ORDER — ONDANSETRON 2 MG/ML
4 INJECTION INTRAMUSCULAR; INTRAVENOUS EVERY 6 HOURS PRN
Status: DISCONTINUED | OUTPATIENT
Start: 2022-02-13 | End: 2022-03-04 | Stop reason: HOSPADM

## 2022-02-13 RX ORDER — ONDANSETRON 4 MG/1
4 TABLET, ORALLY DISINTEGRATING ORAL EVERY 6 HOURS PRN
Status: CANCELLED | OUTPATIENT
Start: 2022-02-13

## 2022-02-13 RX ORDER — LANOLIN ALCOHOL/MO/W.PET/CERES
1000 CREAM (GRAM) TOPICAL DAILY
Status: CANCELLED | OUTPATIENT
Start: 2022-02-13

## 2022-02-13 RX ORDER — GUAIFENESIN 600 MG/1
15 TABLET, EXTENDED RELEASE ORAL DAILY
Status: CANCELLED | OUTPATIENT
Start: 2022-02-13

## 2022-02-13 RX ORDER — DEXTROSE MONOHYDRATE 100 MG/ML
INJECTION, SOLUTION INTRAVENOUS CONTINUOUS PRN
Status: DISCONTINUED | OUTPATIENT
Start: 2022-02-13 | End: 2022-02-25

## 2022-02-13 RX ORDER — DEXAMETHASONE SODIUM PHOSPHATE 4 MG/ML
6 INJECTION, SOLUTION INTRA-ARTICULAR; INTRALESIONAL; INTRAMUSCULAR; INTRAVENOUS; SOFT TISSUE EVERY 24 HOURS
Status: DISCONTINUED | OUTPATIENT
Start: 2022-02-14 | End: 2022-02-13

## 2022-02-13 RX ORDER — HEPARIN SODIUM 10000 [USP'U]/100ML
0-5000 INJECTION, SOLUTION INTRAVENOUS CONTINUOUS
Status: CANCELLED | OUTPATIENT
Start: 2022-02-13

## 2022-02-13 RX ORDER — CEFEPIME HYDROCHLORIDE 1 G/1
1 INJECTION, POWDER, FOR SOLUTION INTRAMUSCULAR; INTRAVENOUS EVERY 12 HOURS
Status: DISCONTINUED | OUTPATIENT
Start: 2022-02-13 | End: 2022-02-13

## 2022-02-13 RX ORDER — ASPIRIN 81 MG/1
81 TABLET, CHEWABLE ORAL DAILY
Status: DISCONTINUED | OUTPATIENT
Start: 2022-02-14 | End: 2022-02-27

## 2022-02-13 RX ORDER — DEXTROSE MONOHYDRATE 100 MG/ML
INJECTION, SOLUTION INTRAVENOUS CONTINUOUS PRN
Status: CANCELLED | OUTPATIENT
Start: 2022-02-13

## 2022-02-13 RX ORDER — CEFEPIME HYDROCHLORIDE 1 G/1
1 INJECTION, POWDER, FOR SOLUTION INTRAMUSCULAR; INTRAVENOUS EVERY 12 HOURS
Status: COMPLETED | OUTPATIENT
Start: 2022-02-13 | End: 2022-02-20

## 2022-02-13 RX ORDER — GABAPENTIN 400 MG/1
1200 CAPSULE ORAL 3 TIMES DAILY
Status: DISCONTINUED | OUTPATIENT
Start: 2022-02-13 | End: 2022-02-13 | Stop reason: CLARIF

## 2022-02-13 RX ORDER — UREA 10 %
1000 LOTION (ML) TOPICAL DAILY
Status: DISCONTINUED | OUTPATIENT
Start: 2022-02-14 | End: 2022-02-22

## 2022-02-13 RX ORDER — GUAIFENESIN 600 MG/1
15 TABLET, EXTENDED RELEASE ORAL DAILY
Status: DISCONTINUED | OUTPATIENT
Start: 2022-02-13 | End: 2022-02-28

## 2022-02-13 RX ORDER — LEVOTHYROXINE SODIUM 100 UG/1
200 TABLET ORAL DAILY
Status: DISCONTINUED | OUTPATIENT
Start: 2022-02-14 | End: 2022-02-22

## 2022-02-13 RX ORDER — DULOXETIN HYDROCHLORIDE 20 MG/1
20 CAPSULE, DELAYED RELEASE ORAL DAILY
Status: DISCONTINUED | OUTPATIENT
Start: 2022-02-14 | End: 2022-03-04 | Stop reason: HOSPADM

## 2022-02-13 RX ORDER — MIDAZOLAM HCL IN 0.9 % NACL/PF 1 MG/ML
1-8 PLASTIC BAG, INJECTION (ML) INTRAVENOUS CONTINUOUS
Status: DISCONTINUED | OUTPATIENT
Start: 2022-02-13 | End: 2022-02-22

## 2022-02-13 RX ORDER — ASPIRIN 81 MG/1
81 TABLET, CHEWABLE ORAL DAILY
Status: CANCELLED | OUTPATIENT
Start: 2022-02-13

## 2022-02-13 RX ORDER — AMOXICILLIN 250 MG
1 CAPSULE ORAL 2 TIMES DAILY PRN
Status: DISCONTINUED | OUTPATIENT
Start: 2022-02-13 | End: 2022-02-13

## 2022-02-13 RX ORDER — CHOLECALCIFEROL (VITAMIN D3) 1250 MCG
1250 CAPSULE ORAL
Status: DISCONTINUED | OUTPATIENT
Start: 2022-02-13 | End: 2022-03-04 | Stop reason: HOSPADM

## 2022-02-13 RX ORDER — SODIUM CHLORIDE 9 MG/ML
INJECTION, SOLUTION INTRAVENOUS CONTINUOUS
Status: DISCONTINUED | OUTPATIENT
Start: 2022-02-13 | End: 2022-03-04 | Stop reason: HOSPADM

## 2022-02-13 RX ORDER — CEFEPIME HYDROCHLORIDE 1 G/1
1 INJECTION, POWDER, FOR SOLUTION INTRAMUSCULAR; INTRAVENOUS EVERY 12 HOURS
Status: CANCELLED | OUTPATIENT
Start: 2022-02-13

## 2022-02-13 RX ORDER — AMINO AC/PROTEIN HYDR/WHEY PRO 10G-100/30
1 LIQUID (ML) ORAL 3 TIMES DAILY
Status: CANCELLED | OUTPATIENT
Start: 2022-02-13

## 2022-02-13 RX ORDER — ACETAMINOPHEN 325 MG/1
650 TABLET ORAL EVERY 4 HOURS PRN
Status: DISCONTINUED | OUTPATIENT
Start: 2022-02-13 | End: 2022-03-04 | Stop reason: HOSPADM

## 2022-02-13 RX ORDER — AMOXICILLIN 250 MG
1 CAPSULE ORAL 2 TIMES DAILY PRN
Status: DISCONTINUED | OUTPATIENT
Start: 2022-02-13 | End: 2022-03-04 | Stop reason: HOSPADM

## 2022-02-13 RX ORDER — LEVOTHYROXINE SODIUM 100 UG/1
200 TABLET ORAL DAILY
Status: DISCONTINUED | OUTPATIENT
Start: 2022-02-14 | End: 2022-02-13 | Stop reason: HOSPADM

## 2022-02-13 RX ORDER — GABAPENTIN 400 MG/1
1200 CAPSULE ORAL 3 TIMES DAILY
Status: CANCELLED | OUTPATIENT
Start: 2022-02-13 | Stop reason: CLARIF

## 2022-02-13 RX ORDER — CEFEPIME HYDROCHLORIDE 1 G/1
1 INJECTION, POWDER, FOR SOLUTION INTRAMUSCULAR; INTRAVENOUS EVERY 8 HOURS
Status: DISCONTINUED | OUTPATIENT
Start: 2022-02-13 | End: 2022-02-13

## 2022-02-13 RX ORDER — GABAPENTIN 250 MG/5ML
1200 SOLUTION ORAL 3 TIMES DAILY
Status: DISCONTINUED | OUTPATIENT
Start: 2022-02-13 | End: 2022-02-13 | Stop reason: HOSPADM

## 2022-02-13 RX ORDER — NICOTINE POLACRILEX 4 MG
15-30 LOZENGE BUCCAL
Status: CANCELLED | OUTPATIENT
Start: 2022-02-13

## 2022-02-13 RX ORDER — ONDANSETRON 4 MG/1
4 TABLET, ORALLY DISINTEGRATING ORAL EVERY 6 HOURS PRN
Status: DISCONTINUED | OUTPATIENT
Start: 2022-02-13 | End: 2022-02-13

## 2022-02-13 RX ORDER — ACETAMINOPHEN 650 MG/1
650 SUPPOSITORY RECTAL EVERY 4 HOURS PRN
Status: DISCONTINUED | OUTPATIENT
Start: 2022-02-13 | End: 2022-03-04 | Stop reason: HOSPADM

## 2022-02-13 RX ORDER — AMINO AC/PROTEIN HYDR/WHEY PRO 10G-100/30
1 LIQUID (ML) ORAL 3 TIMES DAILY
Status: DISCONTINUED | OUTPATIENT
Start: 2022-02-13 | End: 2022-02-28

## 2022-02-13 RX ORDER — CHLORHEXIDINE GLUCONATE ORAL RINSE 1.2 MG/ML
15 SOLUTION DENTAL EVERY 12 HOURS
Status: DISCONTINUED | OUTPATIENT
Start: 2022-02-13 | End: 2022-02-23

## 2022-02-13 RX ORDER — DEXTROSE MONOHYDRATE 25 G/50ML
25-50 INJECTION, SOLUTION INTRAVENOUS
Status: DISCONTINUED | OUTPATIENT
Start: 2022-02-13 | End: 2022-03-04 | Stop reason: HOSPADM

## 2022-02-13 RX ORDER — POLYETHYLENE GLYCOL 3350 17 G/17G
17 POWDER, FOR SOLUTION ORAL DAILY PRN
Status: DISCONTINUED | OUTPATIENT
Start: 2022-02-13 | End: 2022-03-04 | Stop reason: HOSPADM

## 2022-02-13 RX ORDER — GABAPENTIN 250 MG/5ML
300 SOLUTION ORAL EVERY 8 HOURS SCHEDULED
Status: DISCONTINUED | OUTPATIENT
Start: 2022-02-13 | End: 2022-02-22

## 2022-02-13 RX ORDER — ATORVASTATIN CALCIUM 20 MG/1
20 TABLET, FILM COATED ORAL AT BEDTIME
Status: CANCELLED | OUTPATIENT
Start: 2022-02-13

## 2022-02-13 RX ORDER — ACETAMINOPHEN 650 MG/1
650 SUPPOSITORY RECTAL EVERY 4 HOURS PRN
Status: CANCELLED | OUTPATIENT
Start: 2022-02-13

## 2022-02-13 RX ORDER — DULOXETIN HYDROCHLORIDE 20 MG/1
60 CAPSULE, DELAYED RELEASE ORAL DAILY
Status: CANCELLED | OUTPATIENT
Start: 2022-02-14

## 2022-02-13 RX ORDER — MIDAZOLAM HCL IN 0.9 % NACL/PF 1 MG/ML
6-30 PLASTIC BAG, INJECTION (ML) INTRAVENOUS CONTINUOUS
Status: CANCELLED | OUTPATIENT
Start: 2022-02-13

## 2022-02-13 RX ORDER — NICOTINE POLACRILEX 4 MG
15-30 LOZENGE BUCCAL
Status: DISCONTINUED | OUTPATIENT
Start: 2022-02-13 | End: 2022-02-13

## 2022-02-13 RX ORDER — ATORVASTATIN CALCIUM 10 MG/1
20 TABLET, FILM COATED ORAL AT BEDTIME
Status: DISCONTINUED | OUTPATIENT
Start: 2022-02-13 | End: 2022-03-04 | Stop reason: HOSPADM

## 2022-02-13 RX ORDER — MIDAZOLAM HCL IN 0.9 % NACL/PF 1 MG/ML
6-30 PLASTIC BAG, INJECTION (ML) INTRAVENOUS CONTINUOUS
Status: DISCONTINUED | OUTPATIENT
Start: 2022-02-13 | End: 2022-02-13

## 2022-02-13 RX ORDER — HEPARIN SODIUM 10000 [USP'U]/100ML
0-5000 INJECTION, SOLUTION INTRAVENOUS CONTINUOUS
Status: DISCONTINUED | OUTPATIENT
Start: 2022-02-13 | End: 2022-02-13

## 2022-02-13 RX ORDER — ATORVASTATIN CALCIUM 20 MG/1
20 TABLET, FILM COATED ORAL AT BEDTIME
Status: DISCONTINUED | OUTPATIENT
Start: 2022-02-13 | End: 2022-02-13 | Stop reason: HOSPADM

## 2022-02-13 RX ORDER — LEVOTHYROXINE SODIUM 100 UG/1
200 TABLET ORAL DAILY
Status: CANCELLED | OUTPATIENT
Start: 2022-02-14

## 2022-02-13 RX ORDER — POLYETHYLENE GLYCOL 3350 17 G/17G
17 POWDER, FOR SOLUTION ORAL DAILY PRN
Status: CANCELLED | OUTPATIENT
Start: 2022-02-13

## 2022-02-13 RX ORDER — ONDANSETRON 4 MG/1
4 TABLET, ORALLY DISINTEGRATING ORAL EVERY 6 HOURS PRN
Status: DISCONTINUED | OUTPATIENT
Start: 2022-02-13 | End: 2022-03-04 | Stop reason: HOSPADM

## 2022-02-13 RX ORDER — AMOXICILLIN 250 MG
2 CAPSULE ORAL 2 TIMES DAILY PRN
Status: DISCONTINUED | OUTPATIENT
Start: 2022-02-13 | End: 2022-02-13

## 2022-02-13 RX ORDER — ACETAMINOPHEN 325 MG/1
650 TABLET ORAL EVERY 4 HOURS PRN
Status: CANCELLED | OUTPATIENT
Start: 2022-02-13

## 2022-02-13 RX ORDER — ATROPINE SULFATE 0.1 MG/ML
0.5 INJECTION INTRAVENOUS
Status: CANCELLED | OUTPATIENT
Start: 2022-02-13

## 2022-02-13 RX ORDER — DEXTROSE MONOHYDRATE 25 G/50ML
25-50 INJECTION, SOLUTION INTRAVENOUS
Status: CANCELLED | OUTPATIENT
Start: 2022-02-13

## 2022-02-13 RX ORDER — DEXAMETHASONE SODIUM PHOSPHATE 10 MG/ML
6 INJECTION, SOLUTION INTRAMUSCULAR; INTRAVENOUS EVERY 24 HOURS
Status: CANCELLED | OUTPATIENT
Start: 2022-02-14

## 2022-02-13 RX ORDER — MAGNESIUM SULFATE HEPTAHYDRATE 40 MG/ML
2 INJECTION, SOLUTION INTRAVENOUS ONCE
Status: COMPLETED | OUTPATIENT
Start: 2022-02-13 | End: 2022-02-13

## 2022-02-13 RX ORDER — ATROPINE SULFATE 0.1 MG/ML
0.5 INJECTION INTRAVENOUS
Status: DISCONTINUED | OUTPATIENT
Start: 2022-02-13 | End: 2022-03-04 | Stop reason: HOSPADM

## 2022-02-13 RX ORDER — AMOXICILLIN 250 MG
2 CAPSULE ORAL 2 TIMES DAILY PRN
Status: CANCELLED | OUTPATIENT
Start: 2022-02-13

## 2022-02-13 RX ORDER — POLYETHYLENE GLYCOL 3350 17 G/17G
17 POWDER, FOR SOLUTION ORAL DAILY PRN
Status: DISCONTINUED | OUTPATIENT
Start: 2022-02-13 | End: 2022-02-13

## 2022-02-13 RX ORDER — AMOXICILLIN 250 MG
2 CAPSULE ORAL 2 TIMES DAILY PRN
Status: DISCONTINUED | OUTPATIENT
Start: 2022-02-13 | End: 2022-03-04 | Stop reason: HOSPADM

## 2022-02-13 RX ORDER — ATROPINE SULFATE 0.1 MG/ML
1 INJECTION INTRAVENOUS ONCE
Status: COMPLETED | OUTPATIENT
Start: 2022-02-13 | End: 2022-02-13

## 2022-02-13 RX ORDER — DULOXETIN HYDROCHLORIDE 20 MG/1
60 CAPSULE, DELAYED RELEASE ORAL DAILY
Status: DISCONTINUED | OUTPATIENT
Start: 2022-02-14 | End: 2022-02-13 | Stop reason: HOSPADM

## 2022-02-13 RX ORDER — DEXAMETHASONE SODIUM PHOSPHATE 4 MG/ML
6 INJECTION, SOLUTION INTRA-ARTICULAR; INTRALESIONAL; INTRAMUSCULAR; INTRAVENOUS; SOFT TISSUE EVERY 24 HOURS
Status: COMPLETED | OUTPATIENT
Start: 2022-02-14 | End: 2022-02-23

## 2022-02-13 RX ORDER — ONDANSETRON 2 MG/ML
4 INJECTION INTRAMUSCULAR; INTRAVENOUS EVERY 6 HOURS PRN
Status: DISCONTINUED | OUTPATIENT
Start: 2022-02-13 | End: 2022-02-13

## 2022-02-13 RX ADMIN — INSULIN ASPART 1 UNITS: 100 INJECTION, SOLUTION INTRAVENOUS; SUBCUTANEOUS at 04:30

## 2022-02-13 RX ADMIN — PANTOPRAZOLE SODIUM 40 MG: 40 INJECTION, POWDER, FOR SOLUTION INTRAVENOUS at 09:37

## 2022-02-13 RX ADMIN — MIDAZOLAM HYDROCHLORIDE 20 MG/HR: 1 INJECTION, SOLUTION INTRAVENOUS at 15:07

## 2022-02-13 RX ADMIN — FUROSEMIDE 20 MG: 20 TABLET ORAL at 09:39

## 2022-02-13 RX ADMIN — GABAPENTIN 1200 MG: 400 CAPSULE ORAL at 09:38

## 2022-02-13 RX ADMIN — MIDAZOLAM (PF) 1 MG/ML IN 0.9 % SODIUM CHLORIDE INTRAVENOUS SOLUTION 18 MG/HR: at 00:00

## 2022-02-13 RX ADMIN — CHOLECALCIFEROL CAP 1.25 MG (50000 UNIT) 1250 MCG: 1.25 CAP at 18:30

## 2022-02-13 RX ADMIN — Medication 15 ML: at 09:37

## 2022-02-13 RX ADMIN — Medication 1 PACKET: at 21:03

## 2022-02-13 RX ADMIN — ONDANSETRON 4 MG: 2 INJECTION INTRAMUSCULAR; INTRAVENOUS at 20:47

## 2022-02-13 RX ADMIN — LEVOTHYROXINE SODIUM 200 MCG: 0.1 TABLET ORAL at 09:37

## 2022-02-13 RX ADMIN — Medication 1 PACKET: at 09:39

## 2022-02-13 RX ADMIN — INSULIN ASPART 1 UNITS: 100 INJECTION, SOLUTION INTRAVENOUS; SUBCUTANEOUS at 13:19

## 2022-02-13 RX ADMIN — HEPARIN SODIUM AND DEXTROSE 1050 UNITS/HR: 10000; 5 INJECTION INTRAVENOUS at 12:59

## 2022-02-13 RX ADMIN — ATROPINE SULFATE 1 MG: 0.1 INJECTION INTRAVENOUS at 20:20

## 2022-02-13 RX ADMIN — ATROPINE SULFATE 0.5 MG: 0.1 INJECTION INTRAVENOUS at 01:10

## 2022-02-13 RX ADMIN — MIDAZOLAM (PF) 1 MG/ML IN 0.9 % SODIUM CHLORIDE INTRAVENOUS SOLUTION 18 MG/HR: at 06:57

## 2022-02-13 RX ADMIN — MIDAZOLAM HYDROCHLORIDE 20 MG/HR: 1 INJECTION, SOLUTION INTRAVENOUS at 20:18

## 2022-02-13 RX ADMIN — MIDAZOLAM (PF) 1 MG/ML IN 0.9 % SODIUM CHLORIDE INTRAVENOUS SOLUTION 18 MG/HR: at 13:26

## 2022-02-13 RX ADMIN — DEXAMETHASONE SODIUM PHOSPHATE 6 MG: 10 INJECTION, SOLUTION INTRAMUSCULAR; INTRAVENOUS at 09:39

## 2022-02-13 RX ADMIN — SODIUM CHLORIDE: 9 INJECTION, SOLUTION INTRAVENOUS at 15:24

## 2022-02-13 RX ADMIN — Medication 10 NG/KG/MIN: at 13:04

## 2022-02-13 RX ADMIN — CEFEPIME HYDROCHLORIDE 1 G: 1 INJECTION, POWDER, FOR SOLUTION INTRAMUSCULAR; INTRAVENOUS at 20:51

## 2022-02-13 RX ADMIN — Medication 20 NG/KG/MIN: at 05:10

## 2022-02-13 RX ADMIN — CEFEPIME HYDROCHLORIDE 1 G: 1 INJECTION, POWDER, FOR SOLUTION INTRAMUSCULAR; INTRAVENOUS at 09:38

## 2022-02-13 RX ADMIN — CHLORHEXIDINE GLUCONATE 15 ML: 1.2 SOLUTION ORAL at 20:50

## 2022-02-13 RX ADMIN — INSULIN GLARGINE 10 UNITS: 100 INJECTION, SOLUTION SUBCUTANEOUS at 20:59

## 2022-02-13 RX ADMIN — MAGNESIUM SULFATE HEPTAHYDRATE 2 G: 40 INJECTION, SOLUTION INTRAVENOUS at 18:43

## 2022-02-13 RX ADMIN — Medication 1000 UNITS: at 09:39

## 2022-02-13 RX ADMIN — BARICITINIB 4 MG: 2 TABLET, FILM COATED ORAL at 09:39

## 2022-02-13 RX ADMIN — Medication 2 MG/HR: at 15:13

## 2022-02-13 RX ADMIN — Medication 15 ML: at 18:30

## 2022-02-13 RX ADMIN — EPOPROSTENOL 20 NG/KG/MIN: 1.5 INJECTION, POWDER, LYOPHILIZED, FOR SOLUTION INTRAVENOUS at 17:11

## 2022-02-13 RX ADMIN — INSULIN ASPART 1 UNITS: 100 INJECTION, SOLUTION INTRAVENOUS; SUBCUTANEOUS at 00:30

## 2022-02-13 RX ADMIN — DULOXETINE HYDROCHLORIDE 60 MG: 20 CAPSULE, DELAYED RELEASE ORAL at 09:37

## 2022-02-13 RX ADMIN — ASPIRIN 81 MG CHEWABLE TABLET 81 MG: 81 TABLET CHEWABLE at 13:05

## 2022-02-13 RX ADMIN — CYANOCOBALAMIN TAB 500 MCG 1000 MCG: 500 TAB at 09:38

## 2022-02-13 RX ADMIN — SODIUM CHLORIDE 1000 ML: 9 INJECTION, SOLUTION INTRAVENOUS at 18:33

## 2022-02-13 RX ADMIN — INSULIN ASPART 1 UNITS: 100 INJECTION, SOLUTION INTRAVENOUS; SUBCUTANEOUS at 09:40

## 2022-02-13 RX ADMIN — ATROPINE SULFATE 0.5 MG: 0.1 INJECTION INTRAVENOUS at 06:56

## 2022-02-13 RX ADMIN — GABAPENTIN 300 MG: 250 SUSPENSION ORAL at 21:02

## 2022-02-13 ASSESSMENT — ACTIVITIES OF DAILY LIVING (ADL)
ADLS_ACUITY_SCORE: 22
ADLS_ACUITY_SCORE: 12
ADLS_ACUITY_SCORE: 22
ADLS_ACUITY_SCORE: 12
ADLS_ACUITY_SCORE: 22
ADLS_ACUITY_SCORE: 12

## 2022-02-13 NOTE — H&P
Critical Care  Note      02/13/2022    Name: No Yusuf MRN#: [id number]   Age: 62 year old YOB: 1960     Hsptl Day# 0  ICU DAY #    MV DAY #             Problem List:   Active Problems:    Acute respiratory failure due to COVID-19 (H)    1. Acute respiratory failure due to COVID-19 pneumonia - will complete 10 days of Decadron 6 mgms/day; we will check inflammatory markers but she has already had 5 doses of baricitinib and will complete 14 days.   2. ARDS - currently on 70% and +16 PEEP with PIP's about 29 and IPP's 27; we will continue veletri and consider prone positioning based on initial ABG.   3. S/p arrest believed due to VT/ Torsades de Pointes associated with long QT (2/11) and she responded to only 1 round of CPR and meds (no cooling pursued)- she is now off contributing meds and monitoring expectantly. If this recurs we may need to have overdrive pacemaker placed. We will check lytes and treat with 2 gms magnesium sulfate over 4 hours empirically. EKG pending and I appreciate cardiology support and help.   4. Bradycardia after arrest with HR about 40-50, but BP's ok;   5. Demand ischemia - monitor only   6. Cardiomyopathy, likely stress related, EF 20% and will monitor only   7. UTI due to E. Coli - will continue Cefepime for 1 week total.   8. DM - she has been adequately controlled with bid lantus and sliding scale and will continue.   9. Nutrition- dietician to recommend enteral nutrition   10. History of polysubstance abuse and now on methadone - on IV narcotic   11. History of hepatitis C  12. Hypothyroid - follow up TSH pending and continue current synthroid dose.   13. GERD - protonix daily despite potential increased Qt  14. Depression and Anxiety; and bipolar - continue routine meds.   15. Fibromyalgia   16. Dyslipidemia   17. History of vitamin D deficiency   Overall, acute and critical. I spent 80 minutes of critical care time with her today and this did not include the  time spent placing arterial line.          Summary/Hospital Course:     She was transferred from [facility] after arrest 2/11 felt to be due to Torsades de Pointes. She has been bradycardic in 40-50's (not hypotensive nor requiring vasopressors) and was transferred to [facility] with concern that she may need a temporary pacemaker to treat TdP. She has covid lung disease and ARDS and is on high levels of vent support.         Assessment and plan :     No Yusuf IS a 62 year old female admitted on 2/13/2022 for possible TdP and pacemaker; ARDS due to covid-19.   I have personally reviewed the daily labs, imaging studies, cultures and discussed the case with referring physician and consulting physicians.     My assessment and plan by system for this patient is as follows:    Neurology/Psychiatry:   1. Sedated on vent     Cardiovascular:   1.Hemodynamics - compensated at this time     Pulmonary/Ventilator Management:   1. Currently on 70% and +16 and will titrate vent paced on her response to current levels of support.     GI and Nutrition :   1. Will start enteral nutrition     Renal/Fluids/Electrolytes:   1. Compensated with creatinine 0.73 earlier (current pending); will focus on keeping K and Mg replaced.     Infectious Disease:   1. 1 week of UTI treatment for E. Coli with Cefepime     Endocrine:   1. She is on lantus and sliding scale insulin; she is on synthroid with TSH pending     Hematology/Oncology:   1. Hg 12; follow only      IV/Access:   1. Venous access -   2. Arterial access -   3.  Plan  - central access required and necessary      ICU Prophylaxis:   1. DVT: Hep Subq/ LMWH/mechanical  2. VAP: HOB 30 degrees, chlorhexidine rinse  3. Stress Ulcer: PPI/H2 blocker  4. Restraints: Nonviolent soft two point restraints required and necessary for patient safety and continued cares and good effect as patient continues to pull at necessary lines, tubes despite education and distraction. Will readdress daily.   5.  Wound care  -   6. Feeding -  Enteral   7. Family Update: I spoke with daughter by phone today for update   8. Disposition - ICU care         Key goals for next 24 hours:   1. Arterial line to help adjust ventilator  2. Monitor for TdP and bradycardia   3.               Medical History:     Past Medical History:   Diagnosis Date     Arthritis      Bipolar affective (H)      Fibromyalgia      Hypertension      Past Surgical History:   Procedure Laterality Date     CHOLECYSTECTOMY       GYN SURGERY      c section     GYN SURGERY      oblation     ORTHOPEDIC SURGERY      left leg, left shoulder, back     Social History     Socioeconomic History     Marital status: Single     Spouse name: Not on file     Number of children: Not on file     Years of education: Not on file     Highest education level: Not on file   Occupational History     Not on file   Tobacco Use     Smoking status: Former Smoker     Packs/day: 0.10     Smokeless tobacco: Never Used   Substance and Sexual Activity     Alcohol use: No     Drug use: No     Sexual activity: Not Currently   Other Topics Concern     Not on file   Social History Narrative     Not on file     Social Determinants of Health     Financial Resource Strain: Not on file   Food Insecurity: Not on file   Transportation Needs: Not on file   Physical Activity: Not on file   Stress: Not on file   Social Connections: Not on file   Intimate Partner Violence: Not on file   Housing Stability: Not on file        Allergies   Allergen Reactions     Promethazine Other (See Comments) and Anxiety     Noted in 8/30/08 ER     Codeine Phosphate GI Disturbance     Lamotrigine Other (See Comments)     hyponatremia     Oxcarbazepine Unknown and Other (See Comments)     Low sodium  LegacyRecord#[id number]       Codeine Other (See Comments) and Rash     GI bleeding  LegacyRecord#[id number]                Key Medications:       sodium chloride 0.9%  50 mL Intravenous Q24H     [START ON 2/14/2022] aspirin  81 mg Oral  or Feeding Tube Daily     atorvastatin  20 mg Per Feeding Tube At Bedtime     [START ON 2/14/2022] baricitinib  4 mg Oral Daily     ceFEPIme (MAXIPIME) IV  1 g Intravenous Q12H     chlorhexidine  15 mL Mouth/Throat Q12H     [START ON 2/14/2022] cyanocobalamin  1,000 mcg Oral Daily     [START ON 2/14/2022] dexamethasone  6 mg Intravenous Q24H     [START ON 2/14/2022] DULoxetine  60 mg Per Feeding Tube Daily     insulin aspart  1-6 Units Subcutaneous Q4H     insulin aspart  2 Units Subcutaneous Q4H     insulin detemir  10 Units Subcutaneous BID     [START ON 2/14/2022] levothyroxine  200 mcg Per Feeding Tube Daily     multivitamins w/minerals  15 mL Per Feeding Tube Daily     [START ON 2/14/2022] pantoprazole  40 mg Per Feeding Tube QAM AC    Or     [START ON 2/14/2022] pantoprazole (PROTONIX) IV  40 mg Intravenous QAM AC     protein modular  1 packet Per Feeding Tube TID     [START ON 2/14/2022] Vitamin D3  25 mcg Oral Daily       dextrose       epoprostenol (VELETRI) 20 mcg/mL in sterile water inhalation solution       heparin Stopped (02/13/22 1626)     HYDROmorphone 2 mg/hr (02/13/22 1513)     midazolam 20 mg/hr (02/13/22 1507)     sodium chloride 10 mL/hr at 02/13/22 1524        Home Meds  [COMPLETED] heparin - BOLUS DOSE from infusion  [COMPLETED] sodium phosphate (NaPHOS) 15 mMol in 250 mL D5W PREMADE infusion    amLODIPine (NORVASC) 5 MG tablet, Take 5 mg by mouth daily   atorvastatin (LIPITOR) 20 MG tablet, Take 20 mg by mouth At Bedtime   cholecalciferol 25 MCG (1000 UT) TABS, Take 1,000 Units by mouth daily   diclofenac (VOLTAREN) 1 % topical gel, Apply 4 g topically 4 times daily (Patient taking differently: Apply 2-4 g topically 2 times daily )  DULoxetine (CYMBALTA) 60 MG capsule, Take 60 mg by mouth daily  fluticasone-salmeterol (ADVAIR) 250-50 MCG/DOSE inhaler, Inhale 1 puff into the lungs 2 times daily  furosemide (LASIX) 20 MG tablet, Take 20 mg by mouth daily  gabapentin (NEURONTIN) 400 MG capsule,  Take 1,200 mg by mouth 3 times daily  hydrOXYzine (ATARAX) 50 MG tablet, Take 100 mg by mouth every 6 hours as needed for anxiety  insulin detemir (LEVEMIR PEN) 100 UNIT/ML pen, Inject 15 Units Subcutaneous every morning  levalbuterol (XOPENEX HFA) 45 MCG/ACT inhaler, Inhale 2 puffs into the lungs every 4 hours as needed for shortness of breath / dyspnea or wheezing   levothyroxine (SYNTHROID/LEVOTHROID) 200 MCG tablet, Take 200 mcg by mouth daily   melatonin 5 MG tablet, Take 5 mg by mouth At Bedtime  metFORMIN (GLUCOPHAGE) 1000 MG tablet, Take 1,000 mg by mouth 2 times daily (with meals)  methadone HCl 10 MG/5ML SOLN, Take 80 mg by mouth daily 10mg/mL strength  multivitamin w/minerals (THERA-VIT-M) tablet, Take 1 tablet by mouth daily  NARCAN 4 MG/0.1ML nasal spray, CALL 911. SPRAY CONTENTS OF ONE SPRAYER (0.1ML) INTO ONE NOSTRIL. REPEAT IN 2-3 MINS IF SYMPTOMS OF OPIOID PERSIST, ALTERNATE NOSTRILS  nicotine polacrilex (NICORETTE) 4 MG gum, Place 4 mg inside cheek every hour as needed for smoking cessation   omeprazole (PRILOSEC) 20 MG DR capsule, Take 20 mg by mouth daily   polyethylene glycol (MIRALAX) 17 GM/Dose powder, Take 17 g by mouth daily as needed for constipation  potassium chloride ER (KLOR-CON) 8 MEQ CR tablet, Take 8 mEq by mouth 2 times daily  traZODone (DESYREL) 50 MG tablet, Take 50 mg by mouth At Bedtime  vitamin B-12 (CYANOCOBALAMIN) 1000 MCG tablet, Take 1,000 mcg by mouth daily  ACCU-CHEK RAFAELA PLUS test strip,   acetaminophen (TYLENOL) 500 MG tablet, Take 500 mg by mouth 3 times daily   Acidophilus Lactobacillus CAPS, Take 1 tablet by mouth 3 times daily  Alcohol Swabs (ALCOHOL WIPES) 70 % PADS, USE WITH INSULIN INJECTION ONCE DAILY. **100 DAYS SUPPLY**  blood glucose (ACCU-CHEK RAFAELA PLUS) test strip, Use to check blood sugar 3x daily or as directed.  Blood Glucose Calibration (ACCU-CHEK ACTIVE GLUCOSE CONT VI), 1 each by Other route  blood glucose calibration (ACCU-CHEK RAFAELA) solution,                 Physical Examination:   Temp:  [96.7  F (35.9  C)-98.7  F (37.1  C)] 96.7  F (35.9  C)  Pulse:  [43-68] 47  Resp:  [0-31] 20  BP: (124-157)/(64-87) 152/79  FiO2 (%):  [70 %-100 %] 90 %  SpO2:  [75 %-100 %] 97 %  No intake or output data in the 24 hours ending 02/13/22 1647  Wt Readings from Last 4 Encounters:   02/13/22 124.7 kg (274 lb 14.6 oz)   02/13/22 122.7 kg (270 lb 6.4 oz)   02/09/22 126.1 kg (278 lb)   06/08/21 133.1 kg (293 lb 6.4 oz)     BP - Mean:  [] 104  Ventilation Mode: CMV/AC  (Continuous Mandatory Ventilation/ Assist Control)  FiO2 (%): 90 %  Rate Set (breaths/minute): 22 breaths/min  Tidal Volume Set (mL): 350 mL  PEEP (cm H2O): 16 cmH2O  Oxygen Concentration (%): 100 %  Resp: 20    Recent Labs   Lab 02/13/22  1131 02/11/22  2035 02/11/22  1422 02/11/22  1244 02/11/22  0946   PH 7.43 7.43  --  7.45 7.48*   PCO2 41 41  --  38 35   PO2 138* 94  --  156* 171*   HCO3 27 27  --  26 26   O2PER 100 70 100 80 80       GEN: no acute distress; comfortable on vent    HEENT: head ncat, sclera anicteric, OP patent, trachea midline   PULM: unlabored synchronous with vent, clear anteriorly    CV/COR: RRR S1S2 no gallop,  No rub, no murmur  ABD: soft nontender, obese   EXT:  Mild to mod edema   warm  NEURO: heavily sedated   SKIN: no obvious rash; no cyanosis or mottling   LINES: clean, dry intact         Data:   All data and imaging reviewed     ROUTINE ICU LABS (Last four results)  CMP  Recent Labs   Lab 02/13/22  1510 02/13/22  1225 02/13/22  1016 02/13/22  0806 02/13/22  0437 02/12/22  1607 02/12/22  1342 02/12/22  0817 02/12/22  0504 02/12/22  0503 02/12/22  0001 02/11/22  2030 02/11/22  1609 02/11/22  1423 02/11/22  1422 02/11/22  0809 02/11/22  0426 02/10/22  0740 02/10/22  0635 02/09/22  0808 02/09/22  0637 02/08/22  1759 02/08/22  1556 02/08/22  1422 02/08/22  1027   NA  --   --   --   --  140  --   --   --  139  --   --  137  --  138  --   --  138  --  138  --  136  --   --   --   132*  137   POTASSIUM  --   --  3.4  --  3.5  --  3.9  --  3.4  --   --  3.7  --  3.9  --   --  3.8  --  3.9  --  4.8   < > 6.4*   < > 5.8*  5.7*   CHLORIDE  --   --   --   --  109  --   --   --  106  --   --  105  --  104  --   --  107  --  103  --  101  --   --   --  101   CO2  --   --   --   --  28  --   --   --  30  --   --  28  --  27  --   --  25  --  31  --  30  --   --   --  28   ANIONGAP  --   --   --   --  3  --   --   --  3  --   --  4  --  7  --   --  6  --  4  --  5  --   --   --  3   * 173*  --  146* 187*   < >  --    < > 203*  --    < > 262*   < > 221*  --    < > 196*   < > 139*   < > 192*   < >  --   --  110*  116*   BUN  --   --   --   --  21  --   --   --  14  --   --  13  --  13  --   --  14  --  17  --  19  --   --   --  15   CR  --   --   --   --  0.73  --   --   --  0.64  --   --  0.61  --  0.72  --   --  0.65  --  0.75  --  0.96  --   --   --  1.03   GFRESTIMATED  --   --   --   --  >90  --   --   --  >90  --   --  >90  --  >90  --   --  >90  --  90  --  67  --   --   --  62   RODOLFO  --   --   --   --  8.1*  --   --   --  8.0*  --   --  8.2*  --  8.6  --   --  7.6*  --  8.8  --  8.7  --   --   --  8.8   MAG  --   --   --   --  6.4*  --   --   --   --  2.4*  --  2.6*  --   --  2.0  --   --   --   --   --   --    < >  --   --   --    PHOS  --   --   --   --  2.9  --   --   --  2.2*  --   --  2.6  --   --   --   --   --   --   --   --   --   --  2.7  --   --    PROTTOTAL  --   --   --   --   --   --   --   --   --   --   --   --   --   --   --   --  7.0  --  8.5  --  8.0  --   --   --  8.4   ALBUMIN  --   --   --   --   --   --   --   --   --   --   --   --   --   --   --   --  1.9*  --  2.5*  --  2.4*  --   --   --  2.6*   BILITOTAL  --   --   --   --   --   --   --   --   --   --   --   --   --   --   --   --  0.6  --  0.4  --  0.3  --   --   --  0.4   ALKPHOS  --   --   --   --   --   --   --   --   --   --   --   --   --   --   --   --  69  --  81  --  90  --   --   --  106   AST  --    --   --   --   --   --   --   --   --   --   --   --   --   --   --   --  81*  --  55*  --  44  --   --   --   --    ALT  --   --   --   --   --   --   --   --   --   --   --   --   --   --   --   --  53*  --  36  --  41  --   --   --  55*    < > = values in this interval not displayed.     CBC  Recent Labs   Lab 02/13/22  1519 02/13/22  1016 02/13/22  0437 02/12/22  1342 02/11/22  1422   WBC 10.1 9.8  --  7.2 6.9   RBC 4.19 4.17  --  4.25 4.65   HGB 12.0 12.0  --  12.1 13.5   HCT 38.0 38.0  --  38.0 42.3   MCV 91 91  --  89 91   MCH 28.6 28.8  --  28.5 29.0   MCHC 31.6 31.6  --  31.8 31.9   RDW 14.0 14.1  --  14.0 14.1    311 307 278 270     INR  Recent Labs   Lab 02/10/22  0635 02/09/22  0637 02/08/22  1027   INR 0.99 1.01 0.89     Arterial Blood Gas  Recent Labs   Lab 02/13/22  1131 02/11/22  2035 02/11/22  1422 02/11/22  1244 02/11/22  0946   PH 7.43 7.43  --  7.45 7.48*   PCO2 41 41  --  38 35   PO2 138* 94  --  156* 171*   HCO3 27 27  --  26 26   O2PER 100 70 100 80 80       All cultures:  No results for input(s): CULT in the last 168 hours.  Recent Results (from the past 24 hour(s))   XR Chest Port 1 View    Narrative    EXAM: XR CHEST PORT 1 VIEW  LOCATION: [facility]  DATE/TIME: 2/13/2022 6:36 AM    INDICATION: icu intubated  COMPARISON: 02/12/2022      Impression    IMPRESSION: Endotracheal tube 2.9 cm above mariel. Enteric tube below lower aspect of film. Rotation to left. Diffuse, bilateral infiltrates.    XR Chest Port 1 View    Narrative    EXAM: XR CHEST PORT 1 VIEW  LOCATION: [facility]  DATE/TIME: 2/13/2022 3:04 PM    INDICATION: Endotracheal tube positioning.  COMPARISON: 2/13/2022 at 6:23 AM      Impression    IMPRESSION: Endotracheal tube tip 3.5 cm from the mariel. Right IJ line noted with tip in the mid to low SVC. Improved bilateral infiltrates compared to previous.         MD Mahin    Billing: This patient is critically ill: Yes.  Total critical care time today 80 min; this does not include time spent placing procedure.

## 2022-02-13 NOTE — PLAN OF CARE
ICU End of Shift Summary.  For vital signs and complete assessments, please see documentation flowsheets.      Pertinent assessments: Sedated. RASS goal of -4 - -5 achieved. Requires bumps of versed a Afebrile. Tele shows SB with inverted t-waves. BPs stable. Continues on vent. Lungs diminished. Moderate cloudy thin secretions. Valle in place with adequate UOP. Urine al/brown in color. BS+. OG in place. TF increased.      Major Shift Events:  -Transferred to [facility] for closer CARDS following         Plan (Upcoming Events): Continue to monitor in ICU. Monitor bradycardia. Cath lab on Monday???     Discharge/Transfer Needs: TBD

## 2022-02-13 NOTE — PHARMACY-ADMISSION MEDICATION HISTORY
Pharmacy Medication History  Admission medication history compiled 100% from [facility] and [facility] notes and MARs.    Prior to Admission medications    Medication Sig Last Dose Taking? Auth Provider   amLODIPine (NORVASC) 5 MG tablet Take 5 mg by mouth daily  2/8/2022 Yes Reported, Patient   atorvastatin (LIPITOR) 20 MG tablet Take 20 mg by mouth At Bedtime  2/12/2022 at PM Yes Unknown, Entered By History   cholecalciferol 25 MCG (1000 UT) TABS Take 1,000 Units by mouth daily  2/13/2022 at AM Yes Zena Wheeler MD   diclofenac (VOLTAREN) 1 % topical gel Apply 4 g topically 4 times daily  Patient taking differently: Apply 2-4 g topically 2 times daily  Past Week at Unknown time Yes Regian Fragoso MD   DULoxetine (CYMBALTA) 60 MG capsule Take 60 mg by mouth daily 2/13/2022 at AM Yes Unknown, Entered By History   fluticasone-salmeterol (ADVAIR) 250-50 MCG/DOSE inhaler Inhale 1 puff into the lungs 2 times daily Unknown at Unknown time Yes Unknown, Entered By History   furosemide (LASIX) 20 MG tablet Take 20 mg by mouth daily 2/13/2022 at AM Yes Unknown, Entered By History   gabapentin (NEURONTIN) 400 MG capsule Take 1,200 mg by mouth 3 times daily 2/13/2022 at AM Yes Unknown, Entered By History   hydrOXYzine (ATARAX) 50 MG tablet Take 100 mg by mouth every 6 hours as needed for anxiety  at PRN Yes Unknown, Entered By History   insulin detemir (LEVEMIR PEN) 100 UNIT/ML pen Inject 15 Units Subcutaneous every morning 2/13/2022 at AM Yes Jose Pope MD   levalbuterol (XOPENEX HFA) 45 MCG/ACT inhaler Inhale 2 puffs into the lungs every 4 hours as needed for shortness of breath / dyspnea or wheezing   at PRN Yes Unknown, Entered By History   levothyroxine (SYNTHROID/LEVOTHROID) 200 MCG tablet Take 200 mcg by mouth daily  2/13/2022 at AM Yes Reported, Patient   melatonin 5 MG tablet Take 5 mg by mouth At Bedtime Unknown at Unknown time Yes Unknown, Entered By History   metFORMIN (GLUCOPHAGE) 1000 MG tablet Take 1,000 mg  by mouth 2 times daily (with meals) 2/8/2022 Yes Reported, Patient   methadone HCl 10 MG/5ML SOLN Take 80 mg by mouth daily 10mg/mL strength 2/8/2022 Yes Reported, Patient   multivitamin w/minerals (THERA-VIT-M) tablet Take 1 tablet by mouth daily 2/13/2022 at AM Yes Unknown, Entered By History   NARCAN 4 MG/0.1ML nasal spray CALL 911. SPRAY CONTENTS OF ONE SPRAYER (0.1ML) INTO ONE NOSTRIL. REPEAT IN 2-3 MINS IF SYMPTOMS OF OPIOID PERSIST, ALTERNATE NOSTRILS  Yes Reported, Patient   nicotine polacrilex (NICORETTE) 4 MG gum Place 4 mg inside cheek every hour as needed for smoking cessation   at PRN Yes Unknown, Entered By History   omeprazole (PRILOSEC) 20 MG DR capsule Take 20 mg by mouth daily  2/8/2022 Yes Unknown, Entered By History   polyethylene glycol (MIRALAX) 17 GM/Dose powder Take 17 g by mouth daily as needed for constipation  at PRN Yes Jose Pope MD   potassium chloride ER (KLOR-CON) 8 MEQ CR tablet Take 8 mEq by mouth 2 times daily 2/8/2022 Yes Unknown, Entered By History   traZODone (DESYREL) 50 MG tablet Take 50 mg by mouth At Bedtime Past Week at Unknown time Yes Unknown, Entered By History   vitamin B-12 (CYANOCOBALAMIN) 1000 MCG tablet Take 1,000 mcg by mouth daily 2/13/2022 at AM Yes Unknown, Entered By History   ACCU-CHEK RAFAELA PLUS test strip    Reported, Patient   acetaminophen (TYLENOL) 500 MG tablet Take 500 mg by mouth 3 times daily    Reported, Patient   Acidophilus Lactobacillus CAPS Take 1 tablet by mouth 3 times daily   Unknown, Entered By History   Alcohol Swabs (ALCOHOL WIPES) 70 % PADS USE WITH INSULIN INJECTION ONCE DAILY. **100 DAYS SUPPLY**   Reported, Patient   blood glucose (ACCU-CHEK RAFAELA PLUS) test strip Use to check blood sugar 3x daily or as directed.   Reported, Patient   Blood Glucose Calibration (ACCU-CHEK ACTIVE GLUCOSE CONT VI) 1 each by Other route   Reported, Patient   blood glucose calibration (ACCU-CHEK RAFAELA) solution    Reported, Patient       The information  provided in this note is only as accurate as the sources available at the time of update(s)

## 2022-02-13 NOTE — PLAN OF CARE
ICU End of Shift Summary.  For vital signs and complete assessments, please see documentation flowsheets.     Pertinent assessments: Appears well sedated at rest but becomes very agitated with cares.  Had bath tonight and took 3 people.  Had atropine x3 last night for heart rated decreasing below 45 with increasing frequency as well as increasing duration.  Responded well to atropine until effect would wear off.  Urine output is low but she is still making urine.  Major Shift Events: CXR done this AM.  Plan (Upcoming Events): TBD  Discharge/Transfer Needs: TBD    Bedside Shift Report Completed :   Bedside Safety Check Completed:

## 2022-02-13 NOTE — CONSULTS
CLINICAL NUTRITION SERVICES  -  ASSESSMENT NOTE      Recommendations Ordered by Registered Dietitian (RD):   Agree with TF orders already entered by Provider today:  Begin TF Vital HP at 20 mL/hr; increase by 10 mL every 8 hrs to goal 50 mL/hr = 1200 kcals, 104 gm pro, 1003 mL H20, 133 gm CHO, no fiber  Prosource 1 packet TID = 120 kcals, 33 gm pro  Total (TF + Prosource):  1320 kcals (11 kcal/kg), 137 gm pro (2.5 gm/kg).  Free H20 60 mL every 4 hrs  Certavite daily   Malnutrition:   % Weight Loss:  Weight loss does not meet criteria for malnutrition   % Intake:  Unable to determine  Subcutaneous Fat Loss:  Unable to determine  Muscle Loss:  Unable to determine  Fluid Retention:  None noted    Malnutrition Diagnosis: Unable to determine due to lack of nutrition history and Nutrition Focused Physical Assessment        REASON FOR ASSESSMENT  No Yusuf is a 62 year old female seen by Registered Dietitian for Provider Order - Registered Dietitian to Assess and Order TF per Medical Nutrition Therapy Protocol      NUTRITION HISTORY  - Information obtained from Epic records.  - Unable to obtain nutrition history as patient intubated and no family available.  Patient resides in a Group Home.  TF was started via OG at [facility] on 2/11, Vital HP at 15 mL/hr.  At that time, pt was also on Propofol.  Yesterday Propofol was turned off, so RD recommended eventual goal TF rate of 50 mL/hr + 3 Prosource packets per day.    CURRENT NUTRITION ORDERS  Diet Order:     NPO   Begin TF Vital HP at 20 mL/hr; increase by 10 mL every 8 hrs to goal 50 mL/hr = 1200 kcals, 104 gm pro, 1003 mL H20, 133 gm CHO, no fiber  Prosource 1 packet TID = 120 kcals, 33 gm pro  Total (TF + Prosource):  1320 kcals (11 kcal/kg), 137 gm pro (2.5 gm/kg).  Free H20 60 mL every 4 hrs  Certavite daily    Current Intake/Tolerance:  2/12:  AXR = Given the rotation, an enteric tube is likely in the antrum of the stomach  TF just restarting today.    NUTRITION  "FOCUSED PHYSICAL ASSESSMENT FOR DIAGNOSING MALNUTRITION)  No:  Unable to visualize patient due to distancing precautions with COVID-10               Observed:    N/A    Obtained from Chart/Interdisciplinary Team:  None noted    ANTHROPOMETRICS  Height: Data Unavailable  Weight: 124.7 kg (274 lbs 14.62 oz)  Body mass index is 47.19 kg/m .  Weight Status:  Obesity Grade III BMI >40  IBW: 54.5 kg  % IBW: 229%  Weight History:   - Weight has varied btw 122-135 kg over the past year  - Per [facility] RD note 2/12:  \"Usual body weight appears to be about 280 lbs, unclear whether current weight is accurate or patient has been deliberately losing weight PTA. Per Chart review of visit from 9/17, pt was cutting back on sugary drinks and candy bars at that time.\"    Wt Readings from Last 10 Encounters:   02/13/22 124.7 kg (274 lb 14.6 oz)   02/13/22 122.7 kg (270 lb 6.4 oz)   02/09/22 126.1 kg (278 lb)   06/08/21 133.1 kg (293 lb 6.4 oz)   06/03/21 129.9 kg (286 lb 4.8 oz)   05/27/21 135.2 kg (298 lb 1.6 oz)   12/10/20 133.5 kg (294 lb 4.8 oz)   07/07/20 131.6 kg (290 lb 3.2 oz)   06/16/20 126.6 kg (279 lb)   02/03/20 130.5 kg (287 lb 11.2 oz)     LABS  Labs reviewed    MEDICATIONS  Medications reviewed  Decadron  Very High sliding scale insulin + Lantus 10 Units BID  Synthroid  Versed  Vit D3    ASSESSED NUTRITION NEEDS PER APPROVED PRACTICE GUIDELINES:    Dosing Weight:  124.7 kg (actual for energy), 54.5 kg (IBW for pro)  Estimated Energy Needs: 3069-6860 kcals (11-14 Kcal/Kg)  Justification: obese and vented  Estimated Protein Needs: 109-136 grams protein (2-2.5 g pro/Kg)  Justification: hypercatabolism with critical illness and obesity guidelines   Estimated Fluid Needs:  Per provider pending fluid status    MALNUTRITION:  % Weight Loss:  Weight loss does not meet criteria for malnutrition   % Intake:  Unable to determine  Subcutaneous Fat Loss:  Unable to determine  Muscle Loss:  Unable to determine  Fluid Retention:  None " noted    Malnutrition Diagnosis: Unable to determine due to lack of nutrition history and Nutrition Focused Physical Assessment    NUTRITION DIAGNOSIS:  Inadequate enteral nutrition infusion related to TF held during transfer and plan to restart today as evidenced by TF meeting 0% needs.    NUTRITION INTERVENTIONS  Recommendations / Nutrition Prescription  Agree with above TF progression.    Implementation  Nutrition education: Not appropriate at this time due to patient condition    Nutrition Goals  TF + Prosource will meet % estimated needs    MONITORING AND EVALUATION:  Progress towards goals will be monitored and evaluated per protocol and Practice Guidelines    Dina Zamora RD, LD, CNSC

## 2022-02-13 NOTE — PROGRESS NOTES
[facility]    Cardiology Progress Note    Date of Service (when I saw the patient): 02/13/2022            Assessment and Plan:     ASSESSMENT:  1.  Acute hypoxic respiratory failure  2.  Covid 19 pneumonia  3.  New cardiomyopathy:  Most likely stress related, less likely ischemic in origin although this will need to be excluded   4.  Prolonged QT:   In the setting of deep T wave inversions from #3 above.  QT prolongation improved today to 516 (from >600) (with HR 55 following atropine)  5.  Episodes of torsades:  Occurred in the setting of acquired prolonged QT and exacerbated by bradycardia.  No further recurrence over past almost 24 hours, no clearly documented recurrent ectopy.  ICU RN reports 4-5 beats of wide complex tachycardia this morning which I am unable to confirm in smart disclosure.  While QT has improved, it remains prolonged and patient is still at risk for recurrent ventricular arrhythmias.   6.  Sinus bradycardia:  Initially felt to be related to remdesivir.  However, patient only received one dose and are now 72 hours out.  Unclear etiology.  Heart rate increases with stimulus.    7.  Troponin elevation:  893 and downtrending.  Likely secondary to demand ischemia.     RECOMMENDATIONS:  1. No further documented ventricular ectopy or recurrent torsades since Friday afternoon. QT is still prolonged but improving.  Interestingly, patient remains bradycardic without clear cause (recevied one dose of remdesivir 72 hours ago).    Plan as follows:    -Keep K >4 and Mg >2  -Avoid QT prolonging and AV pema blocking agents.  Methadone stopped yesterday.     -Responds to stimulus to increase heart rate, okay to use PRN atropine if needed for sustained heart rates less than 45 bpm.  Patient did not tolerate dopamine gtt due to hypertension  -Discussed case with EP cardiology, Dr. Alvarez yesterday. If recurrent torasades, would recommend emergent transvenous pacer and start lidocaine  infusion.   Given possible need for urgent pacemaker needs, recommend transfer to [facility] ICU.    2.  Okay to stop heparin gtt.  Start aspirin 81 mg daily, continue atorvastatin  3.  Patient will require coronary angiogram in the future; timing to be determined based on clinical course.            Pauline Samuels MD [facility]    I personally examined and evaluated the patient today.  No Yusuf was in (or remains in) critical condition today due to acute respiratory failure, cardiomyopathy, torsades, prolonged QT.   I personally managed hemodynamics and the key decisions made today include monitoring parameters, discussion with hospitalist/critical care regarding persistent bradycardia, possible need for pacer in the future.   I spent a total of 30 minutes providing critical care services at the bedside, evaluating the patient, directing care and reviewing laboratory values and radiologic reports for Ms. Yusuf.                Interval History:     Received atropine 0.5 mg IV x3 overnight for heart rates <45.  Has not required during the day as responds to stimulation.  Day RN reports 4 beats of wide complex tachycardia-however, I cannot find this in smart disclosure.  No other tachyarrhythmias or ectopy.                Medications:       sodium chloride 0.9%  50 mL Intravenous Q24H     atorvastatin  20 mg Oral At Bedtime     baricitinib  4 mg Oral Daily     ceFEPIme (MAXIPIME) IV  1 g Intravenous Q12H     cyanocobalamin  1,000 mcg Oral Daily     dexamethasone  6 mg Intravenous Q24H     DULoxetine  60 mg Oral Daily     [Held by provider] fluticasone-vilanterol  1 puff Inhalation Daily     furosemide  20 mg Oral Daily     gabapentin  1,200 mg Oral TID     insulin aspart  2 Units Subcutaneous Q4H     insulin aspart  1-6 Units Subcutaneous Q4H     insulin detemir  10 Units Subcutaneous BID     levothyroxine  200 mcg Oral Daily     multivitamins w/minerals  15 mL Per Feeding Tube Daily      pantoprazole  40 mg Per Feeding Tube QAM AC    Or     pantoprazole (PROTONIX) IV  40 mg Intravenous QAM AC     protein modular  1 packet Per Feeding Tube TID     Vitamin D3  1,000 Units Oral Daily            Physical Exam:   Blood pressure 139/80, pulse 52, temperature 98.7  F (37.1  C), temperature source Temporal, resp. rate 22, weight 122.7 kg (270 lb 6.4 oz), last menstrual period 2013, SpO2 99 %, not currently breastfeeding.  Vitals:    02/10/22 1810 22 0700   Weight: 118.1 kg (260 lb 6.4 oz) 122.7 kg (270 lb 6.4 oz)       Intake/Output Summary (Last 24 hours) at 2022 1048  Last data filed at 2022 0800  Gross per 24 hour   Intake 1585.27 ml   Output 495 ml   Net 1090.27 ml           Vital Sign Ranges  Temperature Temp  Av.5  F (36.4  C)  Min: 96.3  F (35.7  C)  Max: 98.7  F (37.1  C)   Blood pressure Systolic (24hrs), Av , Min:113 , Max:151        Diastolic (24hrs), Av, Min:64, Max:108      Pulse Pulse  Av.2  Min: 43  Max: 75   Respirations Resp  Av  Min: 0  Max: 34   Pulse oximetry SpO2  Av.4 %  Min: 75 %  Max: 100 %         EXAM:    Constitutional:    Intubated and sedated   Skin:    normal    Head:    Normocephalic, atramatic    Eyes:    Unremarkable    Neck:    JVD difficult to assess   Lungs:    CTAB   Cardiovascular:    S1, S2    Abdomen:    normal bowel sounds    Extremities and Back:    no cyanosis or clubbing and No edema bilaterally, warm and well perfused   Neurological:    No gross or focal neurologic abnormalities               Data:     Recent Labs   Lab Test 22  1016 22  0437 22  1342 22  0426 02/10/22  0635 22  0637   WBC 9.8  --  7.2   < > 6.3  6.3 3.9*   HGB 12.0  --  12.1   < > 12.9  12.9 12.2   MCV 91  --  89   < > 90  90 92    307 278   < > 252  252 199   INR  --   --   --   --  0.99 1.01    < > = values in this interval not displayed.      Recent Labs   Lab Test 22  1016 22  0434  02/12/22  1342 02/12/22  0504   NA  --  140  --  139   POTASSIUM 3.4 3.5   < > 3.4   CHLORIDE  --  109  --  106   BUN  --  21  --  14   CR  --  0.73  --  0.64    < > = values in this interval not displayed.     Recent Labs   Lab 02/13/22  0806 02/13/22  0437 02/13/22  0405 02/12/22  2358   * 187* 184* 186*     Recent Labs   Lab Test 02/11/22  0426 02/10/22  0635   ALT 53* 36   AST 81* 55*     Troponin I ES   Date Value Ref Range Status   05/27/2021 <0.015 0.000 - 0.045 ug/L Final     Comment:     The 99th percentile for upper reference range is 0.045 ug/L.  Troponin values   in the range of 0.045 - 0.120 ug/L may be associated with risks of adverse   clinical events.     07/06/2020 0.015 0.000 - 0.045 ug/L Final     Comment:     The 99th percentile for upper reference range is 0.045 ug/L.  Troponin values   in the range of 0.045 - 0.120 ug/L may be associated with risks of adverse   clinical events.     01/28/2020 <0.015 0.000 - 0.045 ug/L Final     Comment:     The 99th percentile for upper reference range is 0.045 ug/L.  Troponin values   in the range of 0.045 - 0.120 ug/L may be associated with risks of adverse   clinical events.     11/03/2019 <0.015 0.000 - 0.045 ug/L Final     Comment:     The 99th percentile for upper reference range is 0.045 ug/L.  Troponin values   in the range of 0.045 - 0.120 ug/L may be associated with risks of adverse   clinical events.           Recent Labs   Lab Test 02/13/22  0437 02/12/22  0503 02/11/22 2030   MAG 6.4* 2.4* 2.6*       No results found for: CHOL  No results found for: HDL  No results found for: LDL  Lab Results   Component Value Date    TRIG 377 02/11/2022     No results found for: CHOLHDLRATIO       TSH   Date Value Ref Range Status   06/10/2020 9.07 (H) 0.40 - 4.00 mU/L Final         Pauline Samuels MD, FACC  Cardiology

## 2022-02-13 NOTE — PROGRESS NOTES
[facility] ICU VENTILATOR RESPIRATORY NOTE    Date of Admission: 02/10/22  Date of Intubation (most recent): 02/10/22  Reason for Mechanical Ventilation: Respiratory Failure  Number of Days on Mechanical Ventilation: 4  Met Criteria for Pressure Support Trial: No  Reason for No Pressure Support Trial: Patient's PEEP +16 cmH20 and FiO2 90%  Significant Events Today: Transferred to [facility]  Pt gets agitated with sx. Pt will drop SATs to 60's and recovers slowly.     ABG Results:  Last Arterial Blood Gas:  pH Arterial   Date Value Ref Range Status   02/13/2022 7.43 7.35 - 7.45 Final     pCO2 Arterial   Date Value Ref Range Status   02/13/2022 41 35 - 45 mm Hg Final     pO2 Arterial   Date Value Ref Range Status   02/13/2022 138 (H) 80 - 105 mm Hg Final     Bicarbonate Arterial   Date Value Ref Range Status   02/13/2022 27 21 - 28 mmol/L Final     Base Excess/Deficit (+/-)   Date Value Ref Range Status   02/13/2022 2.6 (H) -9.0 - 1.8 mmol/L Final       Ventilation Mode: CMV/AC  (Continuous Mandatory Ventilation/ Assist Control)  FiO2 (%): 90 %  Rate Set (breaths/minute): 22 breaths/min  Tidal Volume Set (mL): 350 mL  PEEP (cm H2O): 16 cmH2O  Oxygen Concentration (%): (S) 100 %  Resp: 20

## 2022-02-13 NOTE — DISCHARGE SUMMARY
Discharge Summary    No Yusuf MRN# [id number]   YOB: 1960 Age: 62 year old     Date of Admission:  2/10/2022  Date of Discharge:  2/13/2022  Admitting Physician:  Mansoor Flannery DO  Discharge Physician:  Salomon Freeman MD  Discharging Service:  Hospitalist       Primary Provider: Price Naranjo          Discharge Diagnosis:   Acute Hypoxic Respiratory Failure  Covid 19 Pneumonia  Sepsis  Bradycardia  Torsades adrian pointes   Ventricular tachycardia  S/p code blue 2/12 with electrical cardioversion  Urinary Tract Infection with E.coli   Type 2 Diabetes Mellitus, insulin dependent   Hx of Polysubstance Abuse,   Opiate replacement therapy with methadone prior to admission  Hyperkalemia on presentation    Other chronic medical problems:  Hepatitis C   Depression/Anxiety  Fibromyalgia  Hypothyroidism  GERD  Hyperlipidemia  Vitamin D Deficiency  Morbid obesity with BMI 47             Discharge Disposition:   Discharged to [facility]           Allergies:   Allergies   Allergen Reactions     Promethazine Other (See Comments) and Anxiety     Noted in 8/30/08 ER     Codeine Phosphate GI Disturbance     Lamotrigine Other (See Comments)     hyponatremia     Oxcarbazepine Unknown and Other (See Comments)     Low sodium  LegacyRecord#[id number]       Codeine Other (See Comments) and Rash     GI bleeding  LegacyRecord#[id number]                  Condition on Discharge:   Discharge condition: Stable   Discharge vitals: Blood pressure 139/80, pulse 52, temperature 98.7  F (37.1  C), temperature source Temporal, resp. rate 22, weight 122.7 kg (270 lb 6.4 oz), last menstrual period 11/01/2013, SpO2 99 %, not currently breastfeeding.   Code status on discharge: Full Code   Physical exam on day of discharge:   GENERAL:  Comfortable on vent, sedated  PSYCH:  No acute distress.  EYES: PERRLA, Normal conjunctiva.  HEART:  Bradycardic with regular rhythm. No JVD. Pulses normal. No edema.  LUNGS:   Some coarse breath sounds in lungs to auscultation, vented respiratory effort.  ABDOMEN:  Soft, no hepatosplenomegaly, normal bowel sounds.  EXTREMETIES: No clubbing, cyanosis or ischemia  SKIN:  Dry to touch, No rash.         History of Present Illness and Hospital Course:     See detailed admission note for full details.  No Yusuf is a 62 year old female with history of type 2 diabetes mellitus, hypertension, hyperlipidemia, bipolar disorder, fibromyalgia, polysubstance abuse (currently maintained on methadone), hypothyroidism, and chronic hepatitis C. she has been vaccinated for COVID-19 but not boosted. She initially presented to the [facility] emergency department on 2/8/2022 with shortness of breath. Her symptoms started on 2/2/2022. She had a positive COVID-19 test on 2/2/2022. In the emergency department she was found to be hypoxic requiring supplemental oxygen with high flow nasal cannula. Initial lab work showed a sodium of 132, potassium 5.8, albumin 2.6, ALT 55, CRP 77, ferritin 439, A1c 6.2, procalcitonin 0.07, troponin 81. Venous blood gas showed PCO2 of 54 and PO2 of 33 and bicarb of 31. Urinalysis showed 43 WBCs, moderate leukocyte esterase, positive nitrates, and many bacteria.  Chest x-ray showed patchy opacities throughout both lungs compatible with COVID-19 infection.  CT chest showed no pulmonary embolism however did show patchy opacities and consolidations throughout the lungs. She had increasing oxygen requirements requiring continuous BiPAP. FiO2 was titrated up to 100%. Due to bed availability No was transferred to [facility] on 2/10/2022. She was intubated prior to transfer. While at the [place] she was started on remdesivir, dexamethasone, and baricitinib, and ICU dosing Lovenox. She was initially treated with ceftriaxone for her urinary tract infection, which was broadened to cefepime, vancomycin, and azithromycin and switched to ciprofloxacin on  2/11 after transfer when urine culture grew E. coli resistant to penicillins and cephalosporins. On 2/11 she had cardiac arrest at about 2:20 pm. She had been bradycardiac that morning and while being changed from proned to supine position she went into ventricular tachycardia consistent with Torsades adrian pointes. She had one cycle of chest compression, received one shock, and returned to sinus rhythm. Bradycardia persisted, however. Magnesium level during code was 2.0. She was given 2 gram IV Magnesium. Remdesivir and propofol were discontinued due to bradycardia. Dopamine drip was given for chronotropic support. PRN atropine was ordered. ECG showed ST depressions in V2-V6. Troponins became elevated to 893.  Heparin drip was started.  Cardiology was consulted.  Plan was for electrolyte monitoring and replacement as needed, bradycardia monitoring, continued heparin drip, and likely coronary angiogram once more stable.  Bradycardia persisted.  Several as needed doses of atropine were needed.  Cardiology recommended transfer to [facility] where cardiology EP and pacemaker capabilities were available.  [facility] intensive care unit graciously accepted this patient in transfer.  She will be transporting today.  She is on ventilator.  Ventilator settings provided discharge/readmission orders.  She is receiving Dilaudid drip for pain control, opiate maintenance, and as an adjuvant for sedation.  She is on a Versed drip for sedation.  She is on Veletri inhalation.  She is receiving heparin drip.  She is receiving tube feeds.  Tube feed orders are included in discharge/readmission orders           Procedures / Imaging:   At the [facility] 5 chest x-rays, one CT of chest, and one abdominal x-ray were obtained.   At [facility] 2 abdominal x-rays and 3 chest x-rays were obtained.           Consultations:   Consultation during this admission received from cardiology  and intensivist             Pending Results:   None           Discharge Instructions and Follow-Up:   Discharge diet:  N.p.o. with tube feeds   Discharge activity: Bedrest on ventilator   Discharge follow-up:  Care to be assumed by the ICU service at [facility]   Outpatient therapy: None    Other instructions:  Orders for tube feeds, ventilator, Dilaudid drip, Versed drip, heparin drip, and Veletri inhalation provided            Discharge Medications:   Current Discharge Medication List      CONTINUE these medications which have NOT CHANGED    Details   acetaminophen (TYLENOL) 500 MG tablet Take 500 mg by mouth 3 times daily       Acidophilus Lactobacillus CAPS Take 1 tablet by mouth 3 times daily      Alcohol Swabs (ALCOHOL WIPES) 70 % PADS USE WITH INSULIN INJECTION ONCE DAILY. **100 DAYS SUPPLY**      amLODIPine (NORVASC) 5 MG tablet Take 5 mg by mouth daily       atorvastatin (LIPITOR) 20 MG tablet Take 20 mg by mouth At Bedtime       !! blood glucose (ACCU-CHEK RAFAELA PLUS) test strip Use to check blood sugar 3x daily or as directed.      cholecalciferol 25 MCG (1000 UT) TABS Take 1,000 Units by mouth daily       diclofenac (VOLTAREN) 1 % topical gel Apply 4 g topically 4 times daily  Qty: 350 g, Refills: 1    Associated Diagnoses: Patellofemoral arthritis; Impingement syndrome of right shoulder      DULoxetine (CYMBALTA) 60 MG capsule Take 60 mg by mouth daily      fluticasone-salmeterol (ADVAIR) 250-50 MCG/DOSE inhaler Inhale 1 puff into the lungs 2 times daily      furosemide (LASIX) 20 MG tablet Take 20 mg by mouth daily      gabapentin (NEURONTIN) 400 MG capsule Take 1,200 mg by mouth 3 times daily      hydrOXYzine (ATARAX) 50 MG tablet Take 100 mg by mouth every 6 hours as needed for anxiety      insulin detemir (LEVEMIR PEN) 100 UNIT/ML pen Inject 15 Units Subcutaneous every morning  Qty: 3 mL, Refills: 1    Associated Diagnoses: Type 2 diabetes mellitus without complication, without  long-term current use of insulin (H)      levalbuterol (XOPENEX HFA) 45 MCG/ACT inhaler Inhale 2 puffs into the lungs every 4 hours as needed for shortness of breath / dyspnea or wheezing       levothyroxine (SYNTHROID/LEVOTHROID) 200 MCG tablet Take 200 mcg by mouth daily       melatonin 5 MG tablet Take 5 mg by mouth At Bedtime      methadone HCl 10 MG/5ML SOLN Take 80 mg by mouth daily 10mg/mL strength      multivitamin w/minerals (THERA-VIT-M) tablet Take 1 tablet by mouth daily      NARCAN 4 MG/0.1ML nasal spray CALL 911. SPRAY CONTENTS OF ONE SPRAYER (0.1ML) INTO ONE NOSTRIL. REPEAT IN 2-3 MINS IF SYMPTOMS OF OPIOID PERSIST, ALTERNATE NOSTRILS  Refills: 0      nicotine polacrilex (NICORETTE) 4 MG gum Place 4 mg inside cheek every hour as needed for smoking cessation       omeprazole (PRILOSEC) 20 MG DR capsule Take 20 mg by mouth daily       polyethylene glycol (MIRALAX) 17 GM/Dose powder Take 17 g by mouth daily as needed for constipation  Qty: 510 g, Refills: 0    Associated Diagnoses: Constipation, unspecified constipation type      potassium chloride ER (KLOR-CON) 8 MEQ CR tablet Take 8 mEq by mouth 2 times daily      traZODone (DESYREL) 50 MG tablet Take 50 mg by mouth At Bedtime      vitamin B-12 (CYANOCOBALAMIN) 1000 MCG tablet Take 1,000 mcg by mouth daily      !! ACCU-CHEK RAFAELA PLUS test strip       !! Blood Glucose Calibration (ACCU-CHEK ACTIVE GLUCOSE CONT VI) 1 each by Other route      !! blood glucose calibration (ACCU-CHEK RAFAELA) solution       metFORMIN (GLUCOPHAGE) 1000 MG tablet Take 1,000 mg by mouth 2 times daily (with meals)       !! - Potential duplicate medications found. Please discuss with provider.             Total time spent in face to face contact with the patient and coordinating discharge was:  65 Minutes

## 2022-02-13 NOTE — PROCEDURES
[facility]    Arterial line placement    Date/Time: 2/13/2022 4:45 PM  Performed by: Christophe Linda MD  Authorized by: Christophe Linda MD       UNIVERSAL PROTOCOL   Site Marked: Yes  Prior Images Obtained and Reviewed:  Yes  Required items: Required blood products, implants, devices and special equipment available    Patient identity confirmed:  Arm band  NA - No sedation, light sedation, or local anesthesia  Confirmation Checklist:  Patient's identity using two indicators  Time out: Immediately prior to the procedure a time out was called    Universal Protocol: the Joint Commission Universal Protocol was followed    Preparation: Patient was prepped and draped in usual sterile fashion    ESBL (mL):  5  Indication: multiple ABGs respiratory failure  Location: left radial       ANESTHESIA    Local Anesthetic: Lidocaine 1% without epinephrine  Anesthetic Total (mL):  3      SEDATION    Patient Sedated: No      PROCEDURE DETAILS    Needle Gauge:  20  Seldinger technique: Seldinger technique used    Number of Attempts:  2  Post-procedure:  Line sutured and dressing applied  CMS: normal    PROCEDURE    Patient Tolerance:  Patient tolerated the procedure well with no immediate complications  Length of time physician/provider present for 1:1 monitoring during sedation: 0

## 2022-02-13 NOTE — PHARMACY-CONSULT NOTE
Pharmacy Tube Feeding Consult    Medication reviewed for administration by feeding tube and for potential food/drug interactions.    Recommendation:   Duloxetine - Capsule may be opened and pellets mixed with Water or Apple sauce for FT administration  Baricitinib may be crushed and mixed with water or apple sauce for FT administration.    Levothyroxine - Absorption is optimized if feeding held 1 hour prior to and 1 hour post dose.    If tube feeding persists for more than 2 weeks, consider monitoring thyroid hormone    All vitamins may be crushed    Pharmacy will continue to follow as new medications are ordered.    
no

## 2022-02-13 NOTE — PLAN OF CARE
ICU End of Shift Summary.  For vital signs and complete assessments, please see documentation flowsheets.     Pertinent assessments: Sedated. RASS goal of -4 - -5 achieved. Required bumps of versed and increasing dilaudid throughout day. Afebrile. Tele shows SB with inverted t-waves. BPs stable. Continues on vent. Lungs diminished. Moderate cloudy thin secretions. Valle in place with adequate UOP. Urine al/brown in color. BS+. OG in place. TF increased.     Major Shift Events:  -Continued with bradycardia. Cards consulted. EKG and ECHO completed. Possible stressed induced cardiomyopathy. EF of 20-25% noted. Heparin drip started.                                        -Cefipime started (cipro discontinued)                                       -Required boluses of versed                                        -Methadone discontinued due to prolonged QT noted                                        -OG advanced and verified                                        -Electrolyte protocols ordered      Plan (Upcoming Events): Continue to monitor in ICU. Monitor bradycardia. Cath lab on Monday???    Discharge/Transfer Needs: TBD    Bedside Shift Report Completed : yes  Bedside Safety Check Completed: yes

## 2022-02-13 NOTE — PROGRESS NOTES
Respiratory Therapy Note     [facility] ICU VENTILATOR RESPIRATORY NOTE  Date of Admission: 02/10/22  Date of Intubation (most recent): 02/10/22  Reason for Mechanical Ventilation: Respiratory Failure  Number of Days on Mechanical Ventilation: 4  Met Criteria for Pressure Support Trial: No  Reason for No Pressure Support Trial: Patient's PEEP +16 cmH20 and FiO2 90%  Significant Events Today:  ABG Results: 7.43/41/94/27     Plan:  Continue to monitor patient's respiratory status and wean as tolerated.     Dayna Haynes, RT

## 2022-02-13 NOTE — PROGRESS NOTES
1338-Telephone report given to Kendell alvarado [facility]. No questions at this time  1400- Patient discharged from ICU

## 2022-02-14 ENCOUNTER — APPOINTMENT (OUTPATIENT)
Dept: GENERAL RADIOLOGY | Facility: CLINIC | Age: 62
DRG: 207 | End: 2022-02-14
Attending: INTERNAL MEDICINE
Payer: COMMERCIAL

## 2022-02-14 LAB
ALBUMIN SERPL-MCNC: 2.1 G/DL (ref 3.4–5)
ALP SERPL-CCNC: 59 U/L (ref 40–150)
ALT SERPL W P-5'-P-CCNC: 62 U/L (ref 0–50)
ANION GAP SERPL CALCULATED.3IONS-SCNC: 3 MMOL/L (ref 3–14)
AST SERPL W P-5'-P-CCNC: 38 U/L (ref 0–45)
ATRIAL RATE - MUSE: 58 BPM
BASE EXCESS BLDA CALC-SCNC: 0.7 MMOL/L (ref -9–1.8)
BASE EXCESS BLDA CALC-SCNC: 1.8 MMOL/L (ref -9–1.8)
BILIRUB SERPL-MCNC: 0.3 MG/DL (ref 0.2–1.3)
BUN SERPL-MCNC: 24 MG/DL (ref 7–30)
CA-I BLD-MCNC: 4.4 MG/DL (ref 4.4–5.2)
CALCIUM SERPL-MCNC: 8.3 MG/DL (ref 8.5–10.1)
CHLORIDE BLD-SCNC: 109 MMOL/L (ref 94–109)
CO2 SERPL-SCNC: 27 MMOL/L (ref 20–32)
CREAT SERPL-MCNC: 0.68 MG/DL (ref 0.52–1.04)
CRP SERPL-MCNC: 17.4 MG/L (ref 0–8)
DIASTOLIC BLOOD PRESSURE - MUSE: NORMAL MMHG
ERYTHROCYTE [DISTWIDTH] IN BLOOD BY AUTOMATED COUNT: 14.2 % (ref 10–15)
GFR SERPL CREATININE-BSD FRML MDRD: >90 ML/MIN/1.73M2
GLUCOSE BLD-MCNC: 185 MG/DL (ref 70–99)
GLUCOSE BLDC GLUCOMTR-MCNC: 163 MG/DL (ref 70–99)
GLUCOSE BLDC GLUCOMTR-MCNC: 218 MG/DL (ref 70–99)
GLUCOSE BLDC GLUCOMTR-MCNC: 222 MG/DL (ref 70–99)
GLUCOSE BLDC GLUCOMTR-MCNC: 254 MG/DL (ref 70–99)
HCO3 BLD-SCNC: 27 MMOL/L (ref 21–28)
HCO3 BLD-SCNC: 27 MMOL/L (ref 21–28)
HCT VFR BLD AUTO: 34.9 % (ref 35–47)
HGB BLD-MCNC: 10.9 G/DL (ref 11.7–15.7)
INR PPP: 1.13 (ref 0.85–1.15)
INTERPRETATION ECG - MUSE: NORMAL
MAGNESIUM SERPL-MCNC: 2.3 MG/DL (ref 1.8–2.6)
MCH RBC QN AUTO: 28.1 PG (ref 26.5–33)
MCHC RBC AUTO-ENTMCNC: 31.2 G/DL (ref 31.5–36.5)
MCV RBC AUTO: 90 FL (ref 78–100)
O2/TOTAL GAS SETTING VFR VENT: 70 %
O2/TOTAL GAS SETTING VFR VENT: 80 %
P AXIS - MUSE: 49 DEGREES
PCO2 BLD: 46 MM HG (ref 35–45)
PCO2 BLD: 47 MM HG (ref 35–45)
PH BLD: 7.37 [PH] (ref 7.35–7.45)
PH BLD: 7.38 [PH] (ref 7.35–7.45)
PHOSPHATE SERPL-MCNC: 3.9 MG/DL (ref 2.5–4.5)
PLATELET # BLD AUTO: 341 10E3/UL (ref 150–450)
PO2 BLD: 128 MM HG (ref 80–105)
PO2 BLD: 65 MM HG (ref 80–105)
POTASSIUM BLD-SCNC: 3.9 MMOL/L (ref 3.4–5.3)
PR INTERVAL - MUSE: 142 MS
PROT SERPL-MCNC: 7 G/DL (ref 6.8–8.8)
QRS DURATION - MUSE: 86 MS
QT - MUSE: 486 MS
QTC - MUSE: 477 MS
R AXIS - MUSE: 37 DEGREES
RBC # BLD AUTO: 3.88 10E6/UL (ref 3.8–5.2)
SODIUM SERPL-SCNC: 139 MMOL/L (ref 133–144)
SYSTOLIC BLOOD PRESSURE - MUSE: NORMAL MMHG
T AXIS - MUSE: 79 DEGREES
TRIGL SERPL-MCNC: 157 MG/DL
UFH PPP CHRO-ACNC: 0.23 IU/ML
UFH PPP CHRO-ACNC: 0.33 IU/ML
UFH PPP CHRO-ACNC: 0.46 IU/ML
VENTRICULAR RATE- MUSE: 58 BPM
WBC # BLD AUTO: 10.2 10E3/UL (ref 4–11)

## 2022-02-14 PROCEDURE — 82803 BLOOD GASES ANY COMBINATION: CPT | Performed by: STUDENT IN AN ORGANIZED HEALTH CARE EDUCATION/TRAINING PROGRAM

## 2022-02-14 PROCEDURE — 85610 PROTHROMBIN TIME: CPT | Performed by: INTERNAL MEDICINE

## 2022-02-14 PROCEDURE — 86140 C-REACTIVE PROTEIN: CPT | Performed by: INTERNAL MEDICINE

## 2022-02-14 PROCEDURE — 82803 BLOOD GASES ANY COMBINATION: CPT | Performed by: INTERNAL MEDICINE

## 2022-02-14 PROCEDURE — 80053 COMPREHEN METABOLIC PANEL: CPT | Performed by: INTERNAL MEDICINE

## 2022-02-14 PROCEDURE — 250N000009 HC RX 250: Performed by: INTERNAL MEDICINE

## 2022-02-14 PROCEDURE — 250N000011 HC RX IP 250 OP 636: Performed by: STUDENT IN AN ORGANIZED HEALTH CARE EDUCATION/TRAINING PROGRAM

## 2022-02-14 PROCEDURE — 83735 ASSAY OF MAGNESIUM: CPT | Performed by: INTERNAL MEDICINE

## 2022-02-14 PROCEDURE — 84478 ASSAY OF TRIGLYCERIDES: CPT | Performed by: STUDENT IN AN ORGANIZED HEALTH CARE EDUCATION/TRAINING PROGRAM

## 2022-02-14 PROCEDURE — 999N000157 HC STATISTIC RCP TIME EA 10 MIN

## 2022-02-14 PROCEDURE — 82330 ASSAY OF CALCIUM: CPT | Performed by: STUDENT IN AN ORGANIZED HEALTH CARE EDUCATION/TRAINING PROGRAM

## 2022-02-14 PROCEDURE — 94003 VENT MGMT INPAT SUBQ DAY: CPT

## 2022-02-14 PROCEDURE — 85520 HEPARIN ASSAY: CPT | Performed by: INTERNAL MEDICINE

## 2022-02-14 PROCEDURE — 85027 COMPLETE CBC AUTOMATED: CPT | Performed by: INTERNAL MEDICINE

## 2022-02-14 PROCEDURE — 93005 ELECTROCARDIOGRAM TRACING: CPT

## 2022-02-14 PROCEDURE — 99291 CRITICAL CARE FIRST HOUR: CPT | Performed by: INTERNAL MEDICINE

## 2022-02-14 PROCEDURE — 3E043XZ INTRODUCTION OF VASOPRESSOR INTO CENTRAL VEIN, PERCUTANEOUS APPROACH: ICD-10-PCS | Performed by: INTERNAL MEDICINE

## 2022-02-14 PROCEDURE — 250N000013 HC RX MED GY IP 250 OP 250 PS 637: Performed by: INTERNAL MEDICINE

## 2022-02-14 PROCEDURE — 250N000009 HC RX 250: Performed by: STUDENT IN AN ORGANIZED HEALTH CARE EDUCATION/TRAINING PROGRAM

## 2022-02-14 PROCEDURE — 250N000011 HC RX IP 250 OP 636: Performed by: INTERNAL MEDICINE

## 2022-02-14 PROCEDURE — 84100 ASSAY OF PHOSPHORUS: CPT | Performed by: INTERNAL MEDICINE

## 2022-02-14 PROCEDURE — 258N000003 HC RX IP 258 OP 636: Performed by: STUDENT IN AN ORGANIZED HEALTH CARE EDUCATION/TRAINING PROGRAM

## 2022-02-14 PROCEDURE — 99291 CRITICAL CARE FIRST HOUR: CPT | Mod: GC | Performed by: INTERNAL MEDICINE

## 2022-02-14 PROCEDURE — 94645 CONT INHLJ TX EACH ADDL HOUR: CPT

## 2022-02-14 PROCEDURE — 272N000272 HC CONTINUOUS NEBULIZER MICRO PUMP

## 2022-02-14 PROCEDURE — 250N000013 HC RX MED GY IP 250 OP 250 PS 637: Performed by: STUDENT IN AN ORGANIZED HEALTH CARE EDUCATION/TRAINING PROGRAM

## 2022-02-14 PROCEDURE — 250N000011 HC RX IP 250 OP 636

## 2022-02-14 PROCEDURE — 258N000003 HC RX IP 258 OP 636: Performed by: INTERNAL MEDICINE

## 2022-02-14 PROCEDURE — 71045 X-RAY EXAM CHEST 1 VIEW: CPT

## 2022-02-14 PROCEDURE — 99207 PR SC NO CHARGE VISIT: CPT | Performed by: INTERNAL MEDICINE

## 2022-02-14 PROCEDURE — 200N000001 HC R&B ICU

## 2022-02-14 RX ORDER — POTASSIUM CHLORIDE 1.5 G/1.58G
40 POWDER, FOR SOLUTION ORAL 2 TIMES DAILY
Status: COMPLETED | OUTPATIENT
Start: 2022-02-14 | End: 2022-02-14

## 2022-02-14 RX ORDER — DOPAMINE HYDROCHLORIDE 160 MG/100ML
2-20 INJECTION, SOLUTION INTRAVENOUS CONTINUOUS
Status: DISCONTINUED | OUTPATIENT
Start: 2022-02-14 | End: 2022-02-16

## 2022-02-14 RX ORDER — DIAZEPAM 5 MG
10 TABLET ORAL EVERY 8 HOURS
Status: DISCONTINUED | OUTPATIENT
Start: 2022-02-14 | End: 2022-02-23

## 2022-02-14 RX ORDER — PROPOFOL 10 MG/ML
INJECTION, EMULSION INTRAVENOUS
Status: DISCONTINUED
Start: 2022-02-14 | End: 2022-02-15 | Stop reason: HOSPADM

## 2022-02-14 RX ORDER — PROPOFOL 10 MG/ML
5-75 INJECTION, EMULSION INTRAVENOUS CONTINUOUS
Status: DISCONTINUED | OUTPATIENT
Start: 2022-02-14 | End: 2022-02-14

## 2022-02-14 RX ORDER — DOPAMINE HYDROCHLORIDE 160 MG/100ML
INJECTION, SOLUTION INTRAVENOUS
Status: COMPLETED
Start: 2022-02-14 | End: 2022-02-14

## 2022-02-14 RX ORDER — MAGNESIUM SULFATE HEPTAHYDRATE 40 MG/ML
2 INJECTION, SOLUTION INTRAVENOUS ONCE
Status: COMPLETED | OUTPATIENT
Start: 2022-02-14 | End: 2022-02-14

## 2022-02-14 RX ADMIN — CEFEPIME HYDROCHLORIDE 1 G: 1 INJECTION, POWDER, FOR SOLUTION INTRAMUSCULAR; INTRAVENOUS at 20:02

## 2022-02-14 RX ADMIN — Medication 15 ML: at 08:01

## 2022-02-14 RX ADMIN — MIDAZOLAM HYDROCHLORIDE 20 MG/HR: 1 INJECTION, SOLUTION INTRAVENOUS at 02:08

## 2022-02-14 RX ADMIN — DULOXETINE HYDROCHLORIDE 20 MG: 20 CAPSULE, DELAYED RELEASE ORAL at 08:02

## 2022-02-14 RX ADMIN — Medication 40 MG: at 08:01

## 2022-02-14 RX ADMIN — DOPAMINE HYDROCHLORIDE 2 MCG/KG/MIN: 160 INJECTION, SOLUTION INTRAVENOUS at 09:05

## 2022-02-14 RX ADMIN — BARICITINIB 4 MG: 2 TABLET, FILM COATED ORAL at 08:02

## 2022-02-14 RX ADMIN — MIDAZOLAM HYDROCHLORIDE 20 MG/HR: 1 INJECTION, SOLUTION INTRAVENOUS at 06:43

## 2022-02-14 RX ADMIN — Medication 2 MG/HR: at 00:11

## 2022-02-14 RX ADMIN — Medication 1 PACKET: at 16:26

## 2022-02-14 RX ADMIN — GABAPENTIN 300 MG: 250 SUSPENSION ORAL at 22:08

## 2022-02-14 RX ADMIN — Medication 5 MG/HR: at 20:30

## 2022-02-14 RX ADMIN — DIAZEPAM 10 MG: 5 TABLET ORAL at 12:30

## 2022-02-14 RX ADMIN — MIDAZOLAM HYDROCHLORIDE 20 MG/HR: 1 INJECTION, SOLUTION INTRAVENOUS at 22:07

## 2022-02-14 RX ADMIN — LEVOTHYROXINE SODIUM 200 MCG: 100 TABLET ORAL at 08:02

## 2022-02-14 RX ADMIN — DEXAMETHASONE SODIUM PHOSPHATE 6 MG: 4 INJECTION, SOLUTION INTRAMUSCULAR; INTRAVENOUS at 08:01

## 2022-02-14 RX ADMIN — GABAPENTIN 300 MG: 250 SUSPENSION ORAL at 05:37

## 2022-02-14 RX ADMIN — ASPIRIN 81 MG CHEWABLE TABLET 81 MG: 81 TABLET CHEWABLE at 08:02

## 2022-02-14 RX ADMIN — ATORVASTATIN CALCIUM 20 MG: 10 TABLET, FILM COATED ORAL at 22:08

## 2022-02-14 RX ADMIN — Medication 5 MG/HR: at 15:34

## 2022-02-14 RX ADMIN — CYANOCOBALAMIN TAB 500 MCG 1000 MCG: 500 TAB at 08:02

## 2022-02-14 RX ADMIN — GABAPENTIN 300 MG: 250 SUSPENSION ORAL at 14:02

## 2022-02-14 RX ADMIN — DIAZEPAM 10 MG: 5 TABLET ORAL at 19:58

## 2022-02-14 RX ADMIN — MIDAZOLAM HYDROCHLORIDE 20 MG/HR: 1 INJECTION, SOLUTION INTRAVENOUS at 11:29

## 2022-02-14 RX ADMIN — ACETAMINOPHEN 650 MG: 325 TABLET, FILM COATED ORAL at 22:08

## 2022-02-14 RX ADMIN — EPOPROSTENOL 20 NG/KG/MIN: 1.5 INJECTION, POWDER, LYOPHILIZED, FOR SOLUTION INTRAVENOUS at 19:50

## 2022-02-14 RX ADMIN — INSULIN GLARGINE 10 UNITS: 100 INJECTION, SOLUTION SUBCUTANEOUS at 08:35

## 2022-02-14 RX ADMIN — HEPARIN SODIUM 1000 UNITS/HR: 1000 INJECTION INTRAVENOUS; SUBCUTANEOUS at 08:07

## 2022-02-14 RX ADMIN — DOPAMINE HYDROCHLORIDE 4 MCG/KG/MIN: 160 INJECTION, SOLUTION INTRAVENOUS at 20:10

## 2022-02-14 RX ADMIN — Medication 1 PACKET: at 22:08

## 2022-02-14 RX ADMIN — CEFEPIME HYDROCHLORIDE 1 G: 1 INJECTION, POWDER, FOR SOLUTION INTRAMUSCULAR; INTRAVENOUS at 08:01

## 2022-02-14 RX ADMIN — EPOPROSTENOL 20 NG/KG/MIN: 1.5 INJECTION, POWDER, LYOPHILIZED, FOR SOLUTION INTRAVENOUS at 14:03

## 2022-02-14 RX ADMIN — Medication 4 MG/HR: at 11:29

## 2022-02-14 RX ADMIN — CHLORHEXIDINE GLUCONATE 15 ML: 1.2 SOLUTION ORAL at 19:58

## 2022-02-14 RX ADMIN — MAGNESIUM SULFATE HEPTAHYDRATE 2 G: 40 INJECTION, SOLUTION INTRAVENOUS at 09:13

## 2022-02-14 RX ADMIN — ATROPINE SULFATE 1 MG: 1 INJECTION, SOLUTION INTRAMUSCULAR; INTRAVENOUS; SUBCUTANEOUS at 05:02

## 2022-02-14 RX ADMIN — CHLORHEXIDINE GLUCONATE 15 ML: 1.2 SOLUTION ORAL at 08:02

## 2022-02-14 RX ADMIN — EPOPROSTENOL 10 NG/KG/MIN: 1.5 INJECTION, POWDER, LYOPHILIZED, FOR SOLUTION INTRAVENOUS at 02:07

## 2022-02-14 RX ADMIN — MIDAZOLAM HYDROCHLORIDE 20 MG/HR: 1 INJECTION, SOLUTION INTRAVENOUS at 16:26

## 2022-02-14 RX ADMIN — Medication 25 MCG: at 08:02

## 2022-02-14 RX ADMIN — KETAMINE HYDROCHLORIDE 0.5 MG/KG/HR: 100 INJECTION, SOLUTION, CONCENTRATE INTRAMUSCULAR; INTRAVENOUS at 13:33

## 2022-02-14 RX ADMIN — Medication 1 PACKET: at 08:02

## 2022-02-14 RX ADMIN — POTASSIUM CHLORIDE 40 MEQ: 1.5 POWDER, FOR SOLUTION ORAL at 09:13

## 2022-02-14 ASSESSMENT — ACTIVITIES OF DAILY LIVING (ADL)
ADLS_ACUITY_SCORE: 20
ADLS_ACUITY_SCORE: 22
ADLS_ACUITY_SCORE: 20
ADLS_ACUITY_SCORE: 22
ADLS_ACUITY_SCORE: 20
ADLS_ACUITY_SCORE: 22
ADLS_ACUITY_SCORE: 20
ADLS_ACUITY_SCORE: 20
ADLS_ACUITY_SCORE: 22
ADLS_ACUITY_SCORE: 20
ADLS_ACUITY_SCORE: 22
ADLS_ACUITY_SCORE: 20
ADLS_ACUITY_SCORE: 22
ADLS_ACUITY_SCORE: 20
ADLS_ACUITY_SCORE: 22

## 2022-02-14 NOTE — PROGRESS NOTES
Recent Labs   Lab 02/13/22  1858 02/13/22  1659 02/13/22  1131 02/11/22 2035   PH 7.46* 7.44 7.43 7.43   PCO2 37 40 41 41   PO2 153* 211* 138* 94   HCO3 26 27 27 27   O2PER 70 100 100 70     P:  Decrease RR to 18 and velitri to 10 and PEEP  To 14.  Decrease O2 to 50%.   Maintain supine

## 2022-02-14 NOTE — PROGRESS NOTES
[facility] ICU RESPIRATORY NOTE      Date of Admission: 2/13/2022    Date of Intubation (most recent): 2/10/22    Reason for Mechanical Ventilation: Respiratory Failure    Number of Days on Mechanical Ventilation: 5    Met Criteria for Spontaneous Breathing Trial: No  Reason for No Spontaneous Breathing Trial: PEEP>8    Significant Events Today: none    ABG Results:   Recent Labs   Lab 02/14/22  1225 02/14/22  0528 02/13/22  1858 02/13/22  1659   PH 7.37 7.38 7.46* 7.44   PCO2 47* 46* 37 40   PO2 128* 65* 153* 211*   HCO3 27 27 26 27   O2PER 80 70 70 100       Current Vent Settings: Ventilation Mode: CMV/AC  (Continuous Mandatory Ventilation/ Assist Control)  FiO2 (%): 80 %  Rate Set (breaths/minute): 18 breaths/min  Tidal Volume Set (mL): 350 mL  PEEP (cm H2O): 16 cmH2O  Oxygen Concentration (%): 80 %  Resp: 18    Skin Assessment: clean and dry     Plan: Continue to monitor and assess respiratory status while in ICU    Amal Ali, RRT

## 2022-02-14 NOTE — PROGRESS NOTES
Critical Care  Note      02/14/2022    Name: No Yusuf MRN#: [id number]   Age: 62 year old YOB: 1960     [facility] Day# 1             Problem List:   Active Problems:    Acute respiratory failure due to COVID-19 (H)    1. Acute respiratory failure due to COVID-19 pneumonia - will complete 10 days of Decadron 6 mgms/day; we will check inflammatory markers but she has already had 5 doses of baricitinib and will complete 14 days.   2. ARDS - currently on 70% and +16 PEEP with PIP's about 29 and IPP's 27; we will continue veletri and consider prone positioning based on initial ABG.   3. S/p arrest believed due to VT/ Torsades de Pointes associated with long QT (2/11) and she responded to only 1 round of CPR and meds (no cooling pursued)- she is now off contributing meds and monitoring expectantly. If this recurs we may need to have overdrive pacemaker placed. We will check lytes and treat with 2 gms magnesium sulfate over 4 hours empirically. EKG pending and I appreciate cardiology support and help.   4. Bradycardia after arrest with HR about 40-50, but BP's ok;   5. Demand ischemia - monitor only   6. Cardiomyopathy, likely stress related, EF 20% and will monitor only   7. UTI due to E. Coli - will continue Cefepime for 1 week total.   8. DM - she has been adequately controlled with bid lantus and sliding scale and will continue.   9. Nutrition- dietician to recommend enteral nutrition   10. History of polysubstance abuse and now on methadone - on IV narcotic   11. History of hepatitis C  12. Hypothyroid - follow up TSH pending and continue current synthroid dose.   13. GERD - protonix daily despite potential increased Qt  14. Depression and Anxiety; and bipolar - continue routine meds.   15. Fibromyalgia   16. Dyslipidemia   17. History of vitamin D deficiency   18. Hx Bipolar and acute Hyperactive Delerium         Summary/Hospital Course:     2/13: She was transferred from [facility] after arrest 2/11  felt to be due to Torsades de Pointes. She has been bradycardic in 40-50's (not hypotensive nor requiring vasopressors) and was transferred to [facility] with concern that she may need a temporary pacemaker to treat TdP. She has covid lung disease and ARDS and is on high levels of vent support.     2/14: remains bradycardic, dopamine infusion started with improvement, sedation ongoing issue, increasing dilaudid dose, added valium enteral, added ketamine gtt, avoiding QT prolonging agents, increased PEEP and Velitri, avoiding proning while concern of unstable arrythmia       Assessment and plan :     No Yusuf is a 62 year old female admitted on 2/13/2022 for possible TdP and pacemaker; ARDS due to covid-19.   I have personally reviewed the daily labs, imaging studies, cultures and discussed the case with referring physician and consulting physicians.   My assessment and plan by system for this patient is as follows:    Neurology/Psychiatry:   1. Sedated on vent - High doses of versed gtt, dilaudid gtt, added enteral valium and ketamine gtt    Cardiovascular:   1.Hemodynamics - compensated at this time   2. Bradycardia - dopamine infusion, cards CS, possible pacing   3. Hx Torsade de Pointes - Mg 2.5, K 4.5, avoid QT prolonging agents  4. Cardiomyopathy - EF 20% on recent  5. Cardiac Arrest - cards recs appreciated, heparin gtt, ASA, statin, beta blocker contraindicated with bradycardia    Pulmonary/Ventilator Management:   1. COVID ARDS - Velitri 20, ARDS NET ventilation: 18/350/16/80% => 7.38/46/65/27    GI and Nutrition :   1. Tube feeds advancing to goal, nutrition CS appreciated, PPI  2. Hx of HCV - monitor LFTs    Renal/Fluids/Electrolytes:   1. Compensated with creatinine 0.68; will focus on keeping K (4.5) and Mg (2.5) replaced.     Infectious Disease:   1. 1 week of UTI treatment for E. Coli with Cefepime   2. Baricitinib 14 day course, Dexamethasone 10 day course    Endocrine:   1. DM, Stress and steroid  induced hyperglycemia - lantus and sliding scale insulin; synthroid    Hematology/Oncology:   1. Hg 10.9, monitor      IV/Access:   1. Venous access - RIJ TLC  2. Arterial access - Radial Art Line  3. x2 PIV  Plan  - central access required and necessary      ICU Prophylaxis:   1. DVT: Hep gtt, mechanical  2. VAP: HOB 30 degrees, chlorhexidine rinse  3. Stress Ulcer: PPI  4. Restraints: Nonviolent soft two point restraints required and necessary for patient safety and continued cares and good effect as patient continues to pull at necessary lines, tubes despite education and distraction. Will readdress daily.   5. Wound care  - positioning and normal preventative measures  6. Feeding -  Enteral   7. Family Update: I spoke with daughter by phone today for update   8. Disposition - ICU care               Medical History:     Past Medical History:   Diagnosis Date     Arthritis      Bipolar affective (H)      Fibromyalgia      Hypertension      Past Surgical History:   Procedure Laterality Date     CHOLECYSTECTOMY       GYN SURGERY      c section     GYN SURGERY      oblation     ORTHOPEDIC SURGERY      left leg, left shoulder, back     Social History     Socioeconomic History     Marital status: Single     Spouse name: Not on file     Number of children: Not on file     Years of education: Not on file     Highest education level: Not on file   Occupational History     Not on file   Tobacco Use     Smoking status: Former Smoker     Packs/day: 0.10     Smokeless tobacco: Never Used   Substance and Sexual Activity     Alcohol use: No     Drug use: No     Sexual activity: Not Currently   Other Topics Concern     Not on file   Social History Narrative     Not on file     Social Determinants of Health     Financial Resource Strain: Not on file   Food Insecurity: Not on file   Transportation Needs: Not on file   Physical Activity: Not on file   Stress: Not on file   Social Connections: Not on file   Intimate Partner  Violence: Not on file   Housing Stability: Not on file        Allergies   Allergen Reactions     Promethazine Other (See Comments) and Anxiety     Noted in 8/30/08 ER     Codeine Phosphate GI Disturbance     Lamotrigine Other (See Comments)     hyponatremia     Oxcarbazepine Unknown and Other (See Comments)     Low sodium  LegacyRecord#[id number]       Codeine Other (See Comments) and Rash     GI bleeding  LegacyRecord#[id number]                Key Medications:       sodium chloride 0.9%  50 mL Intravenous Q24H     aspirin  81 mg Oral or Feeding Tube Daily     atorvastatin  20 mg Per Feeding Tube At Bedtime     baricitinib  4 mg Oral Daily     ceFEPIme (MAXIPIME) IV  1 g Intravenous Q12H     chlorhexidine  15 mL Mouth/Throat Q12H     cholecalciferol  1,250 mcg Oral Q7 Days     cyanocobalamin  1,000 mcg Oral Daily     dexamethasone  6 mg Intravenous Q24H     diazepam  10 mg Oral Q8H     DULoxetine  20 mg Oral Daily     gabapentin  300 mg Oral Q8H RADHA     insulin aspart  1-22 Units Subcutaneous TID AC     insulin aspart  1-16 Units Subcutaneous At Bedtime     insulin glargine  10 Units Subcutaneous BID     levothyroxine  200 mcg Oral Daily     multivitamins w/minerals  15 mL Per Feeding Tube Daily     pantoprazole  40 mg Oral QAM AC     propofol         protein modular  1 packet Per Feeding Tube TID     Vitamin D3  25 mcg Oral Daily       dextrose       DOPamine 5 mcg/kg/min (02/14/22 1045)     epoprostenol (VELETRI) 20 mcg/mL in sterile water inhalation solution 20 ng/kg/min (02/14/22 1012)     heparin 1,000 Units/hr (02/14/22 0807)     HYDROmorphone 4 mg/hr (02/14/22 1129)     midazolam 20 mg/hr (02/14/22 1129)     propofol (DIPRIVAN) infusion       sodium chloride 10 mL/hr at 02/13/22 2351        Home Meds  [COMPLETED] heparin - BOLUS DOSE from infusion    amLODIPine (NORVASC) 5 MG tablet, Take 5 mg by mouth daily   atorvastatin (LIPITOR) 20 MG tablet, Take 20 mg by mouth At Bedtime   cholecalciferol 25 MCG (1000 UT) TABS,  Take 1,000 Units by mouth daily   diclofenac (VOLTAREN) 1 % topical gel, Apply 4 g topically 4 times daily (Patient taking differently: Apply 2-4 g topically 2 times daily )  DULoxetine (CYMBALTA) 60 MG capsule, Take 60 mg by mouth daily  fluticasone-salmeterol (ADVAIR) 250-50 MCG/DOSE inhaler, Inhale 1 puff into the lungs 2 times daily  furosemide (LASIX) 20 MG tablet, Take 20 mg by mouth daily  gabapentin (NEURONTIN) 400 MG capsule, Take 1,200 mg by mouth 3 times daily  hydrOXYzine (ATARAX) 50 MG tablet, Take 100 mg by mouth every 6 hours as needed for anxiety  insulin detemir (LEVEMIR PEN) 100 UNIT/ML pen, Inject 15 Units Subcutaneous every morning  levalbuterol (XOPENEX HFA) 45 MCG/ACT inhaler, Inhale 2 puffs into the lungs every 4 hours as needed for shortness of breath / dyspnea or wheezing   levothyroxine (SYNTHROID/LEVOTHROID) 200 MCG tablet, Take 200 mcg by mouth daily   melatonin 5 MG tablet, Take 5 mg by mouth At Bedtime  metFORMIN (GLUCOPHAGE) 1000 MG tablet, Take 1,000 mg by mouth 2 times daily (with meals)  methadone HCl 10 MG/5ML SOLN, Take 80 mg by mouth daily 10mg/mL strength  multivitamin w/minerals (THERA-VIT-M) tablet, Take 1 tablet by mouth daily  NARCAN 4 MG/0.1ML nasal spray, CALL 911. SPRAY CONTENTS OF ONE SPRAYER (0.1ML) INTO ONE NOSTRIL. REPEAT IN 2-3 MINS IF SYMPTOMS OF OPIOID PERSIST, ALTERNATE NOSTRILS  nicotine polacrilex (NICORETTE) 4 MG gum, Place 4 mg inside cheek every hour as needed for smoking cessation   omeprazole (PRILOSEC) 20 MG DR capsule, Take 20 mg by mouth daily   polyethylene glycol (MIRALAX) 17 GM/Dose powder, Take 17 g by mouth daily as needed for constipation  potassium chloride ER (KLOR-CON) 8 MEQ CR tablet, Take 8 mEq by mouth 2 times daily  traZODone (DESYREL) 50 MG tablet, Take 50 mg by mouth At Bedtime  vitamin B-12 (CYANOCOBALAMIN) 1000 MCG tablet, Take 1,000 mcg by mouth daily  ACCU-CHEK RAFAELA PLUS test strip,   acetaminophen (TYLENOL) 500 MG tablet, Take 500 mg  by mouth 3 times daily   Acidophilus Lactobacillus CAPS, Take 1 tablet by mouth 3 times daily  Alcohol Swabs (ALCOHOL WIPES) 70 % PADS, USE WITH INSULIN INJECTION ONCE DAILY. **100 DAYS SUPPLY**  blood glucose (ACCU-CHEK RAFAELA PLUS) test strip, Use to check blood sugar 3x daily or as directed.  Blood Glucose Calibration (ACCU-CHEK ACTIVE GLUCOSE CONT VI), 1 each by Other route  blood glucose calibration (ACCU-CHEK RAFAELA) solution,                Physical Examination:   Temp:  [96.7  F (35.9  C)-98.8  F (37.1  C)] 98.5  F (36.9  C)  Pulse:  [40-69] 47  Resp:  [13-35] 18  BP: (143-157)/(79-84) 147/83  MAP:  [67 mmHg-111 mmHg] 82 mmHg  Arterial Line BP: (106-159)/(50-79) 138/60  FiO2 (%):  [60 %-90 %] 80 %  SpO2:  [87 %-100 %] 95 %  No intake or output data in the 24 hours ending 02/13/22 1647  Wt Readings from Last 4 Encounters:   02/14/22 125.1 kg (275 lb 12.7 oz)   02/13/22 122.7 kg (270 lb 6.4 oz)   02/09/22 126.1 kg (278 lb)   06/08/21 133.1 kg (293 lb 6.4 oz)     Arterial Line BP: (106-159)/(50-79) 138/60  MAP:  [67 mmHg-111 mmHg] 82 mmHg  BP - Mean:  [104-107] 105  Ventilation Mode: CMV/AC  (Continuous Mandatory Ventilation/ Assist Control)  FiO2 (%): 80 %  Rate Set (breaths/minute): 18 breaths/min  Tidal Volume Set (mL): 350 mL  PEEP (cm H2O): 16 cmH2O  Oxygen Concentration (%): 80 %  Resp: 18    Recent Labs   Lab 02/14/22  0528 02/13/22  1858 02/13/22  1659 02/13/22  1131   PH 7.38 7.46* 7.44 7.43   PCO2 46* 37 40 41   PO2 65* 153* 211* 138*   HCO3 27 26 27 27   O2PER 70 70 100 100       GEN: no acute distress; comfortable on vent    HEENT: head ncat, sclera anicteric, OP patent, trachea midline ETT secured, OGT secured  PULM: unlabored synchronous with vent, clear anteriorly    CV/COR: RRR S1S2 no gallop,  No rub, no murmur  ABD: soft nontender, obese   EXT:  Mild to mod edema   warm  NEURO: heavily sedated   SKIN: no obvious rash; no cyanosis or mottling   LINES: clean, dry intact         Data:   All data  and imaging reviewed     ROUTINE ICU LABS (Last four results)  CMP  Recent Labs   Lab 02/14/22  0817 02/14/22  0526 02/13/22  2338 02/13/22  2058 02/13/22  1659 02/13/22  1225 02/13/22  1016 02/13/22  0806 02/13/22  0437 02/12/22  0817 02/12/22  0504 02/12/22  0503 02/12/22  0001 02/11/22  2030 02/11/22  0809 02/11/22  0426 02/10/22  0740 02/10/22  0635 02/09/22  0808 02/09/22  0637   NA  --  139  --   --  136  --   --   --  140  --  139  --   --  137   < > 138  --  138  --  136   POTASSIUM  --  3.9  --   --  3.9  --  3.4  --  3.5   < > 3.4  --   --  3.7   < > 3.8  --  3.9  --  4.8   CHLORIDE  --  109  --   --  106  --   --   --  109  --  106  --   --  105   < > 107  --  103  --  101   CO2  --  27  --   --  26  --   --   --  28  --  30  --   --  28   < > 25  --  31  --  30   ANIONGAP  --  3  --   --  4  --   --   --  3  --  3  --   --  4   < > 6  --  4  --  5   * 185* 182* 186* 246*   < >  --    < > 187*   < > 203*  --    < > 262*   < > 196*   < > 139*   < > 192*   BUN  --  24  --   --  22  --   --   --  21  --  14  --   --  13   < > 14  --  17  --  19   CR  --  0.68  --   --  0.63  --   --   --  0.73  --  0.64  --   --  0.61   < > 0.65  --  0.75  --  0.96   GFRESTIMATED  --  >90  --   --  >90  --   --   --  >90  --  >90  --   --  >90   < > >90  --  90  --  67   RODOLFO  --  8.3*  --   --  8.6  --   --   --  8.1*  --  8.0*  --   --  8.2*   < > 7.6*  --  8.8  --  8.7   MAG  --  2.3  --   --  1.8  --   --   --  6.4*  --   --  2.4*  --  2.6*   < >  --   --   --   --   --    PHOS  --  3.9  --   --   --   --   --   --  2.9  --  2.2*  --   --  2.6  --   --   --   --   --   --    PROTTOTAL  --  7.0  --   --   --   --   --   --   --   --   --   --   --   --   --  7.0  --  8.5  --  8.0   ALBUMIN  --  2.1*  --   --   --   --   --   --   --   --   --   --   --   --   --  1.9*  --  2.5*  --  2.4*   BILITOTAL  --  0.3  --   --   --   --   --   --   --   --   --   --   --   --   --  0.6  --  0.4  --  0.3   ALKPHOS  --  59  --    --   --   --   --   --   --   --   --   --   --   --   --  69  --  81  --  90   AST  --  38  --   --   --   --   --   --   --   --   --   --   --   --   --  81*  --  55*  --  44   ALT  --  62*  --   --   --   --   --   --   --   --   --   --   --   --   --  53*  --  36  --  41    < > = values in this interval not displayed.     CBC  Recent Labs   Lab 02/14/22  0526 02/13/22  1519 02/13/22  1016 02/13/22  0437 02/12/22  1342   WBC 10.2 10.1 9.8  --  7.2   RBC 3.88 4.19 4.17  --  4.25   HGB 10.9* 12.0 12.0  --  12.1   HCT 34.9* 38.0 38.0  --  38.0   MCV 90 91 91  --  89   MCH 28.1 28.6 28.8  --  28.5   MCHC 31.2* 31.6 31.6  --  31.8   RDW 14.2 14.0 14.1  --  14.0    332 311 307 278     INR  Recent Labs   Lab 02/14/22  0526 02/10/22  0635 02/09/22  0637 02/08/22  1027   INR 1.13 0.99 1.01 0.89     Arterial Blood Gas  Recent Labs   Lab 02/14/22  0528 02/13/22  1858 02/13/22  1659 02/13/22  1131   PH 7.38 7.46* 7.44 7.43   PCO2 46* 37 40 41   PO2 65* 153* 211* 138*   HCO3 27 26 27 27   O2PER 70 70 100 100       All cultures:  No results for input(s): CULT in the last 168 hours.  Recent Results (from the past 24 hour(s))   XR Chest Port 1 View    Narrative    EXAM: XR CHEST PORT 1 VIEW  LOCATION: [facility]  DATE/TIME: 2/13/2022 6:36 AM    INDICATION: icu intubated  COMPARISON: 02/12/2022      Impression    IMPRESSION: Endotracheal tube 2.9 cm above mariel. Enteric tube below lower aspect of film. Rotation to left. Diffuse, bilateral infiltrates.    XR Chest Port 1 View    Narrative    EXAM: XR CHEST PORT 1 VIEW  LOCATION: [facility]  DATE/TIME: 2/13/2022 3:04 PM    INDICATION: Endotracheal tube positioning.  COMPARISON: 2/13/2022 at 6:23 AM      Impression    IMPRESSION: Endotracheal tube tip 3.5 cm from the mariel. Right IJ line noted with tip in the mid to low SVC. Improved bilateral infiltrates compared to previous.         Mansoor Ruiz, DO  SCC  Fellow    Billing: This patient is critically ill: Yes. Total critical care time today 40 min.

## 2022-02-14 NOTE — PLAN OF CARE
Arrived after 1600 from [facility] ICU... Vented on full support. Doesn't follow but arouses and moves all extremities.  Tele SB and 30s-50s. BP unremarkable. Pulses +2.  OG in place. Valle patent. Restraints for safety. On full strength velitri. Trending labs and ABGs.  Dilaudid and Versed for ventilator compliance, requiring high doses of both. Heparin gtts. Rt internal jugular,  Lt radial art line placed at bedside. Family updated by MD, Daughter took all belongings. Nursing to follow closely.

## 2022-02-14 NOTE — PROGRESS NOTES
Shift: [id number]    1435: Discussed labs w/ Dr. Ruiz. Also notified MD that HR has been in the high 40s- titrated dopamine gtt up but not as effective towards HR, SBP 150s. Ok with HR being in 40s as long as pt is hemodynamically stable.     Major Events: Started dopamine gtt for rate control.     Neuro: PERRL. MACHUCA- does not follow commands. Sedated on large dose of versed and dilaudid- sitting up in the bed. Increased Dilaudid gtt. Added in Ketamine gtt.   Cardiac: SB. Stable on the dopamine gtt. Pulses palpable.   Resp: Full vent support. Vent settings changed- ABG at wnl.  GI: TF increased at 1200. No BM. Sliding scale insulin.   : Valle in place, adequate UOP.   Skin: No major skin issues.   Labs: Replaced K and Mg today- recheck in AM.   Family: Updated daughters, able to video chat with pt this afternoon.

## 2022-02-14 NOTE — PROGRESS NOTES
HR in low 40's  , occasional 30s.,  Sinus.  No Torsades.   BP ok  P: give 1 mg atropine as a trial per cards recs before needing pacemaker,.      -------------------------------------------------------  8:48 PM  Updated Note    HR now NSR at 62.  Will continue atropine prn HR < 45.

## 2022-02-14 NOTE — CONSULTS
Electrophysiology/ Cardiology- Consult Note         H&P and Plan:   Consult was performed virtually due to ongoing Covid infection.  Reason for consult: Polymorphic VT/ VF arrest.      History of present illness: 62-year old lady with a history of hypertension, hyperlipidemia, diabetes, hepatitis C, fibromyalgia, bipolar disorder and previous polysubstance abuse (on methadone), who was admitted 2/8/2022 with Covid pneumonia/respiratory failure.  Hospital stay was complicated by VF arrest/torsades.  She was transferred to [facility] for closer monitoring.    Patient was found to have COVID on 2/2/2022.  She was initially at the [facility], and progressed with respiratory failure/ARDS. Due to lack of beds, she was transferred to [facility] on 2/10/2022.  She was intubated prior to transfer and was also started on  remdesivir, dexamethasone and baricitinib.  She was also being treated for UTI and received broad antibiotic therapy (cefepime, vancomycin, and azithromycin, which it was switched to ciprofloxacin on 2/11).      At [facility], she experienced episodes of VF/torsades cardiac arrest (2/11), which she received 1 cycle of CPR and 1 shock and return back to normal rhythm.  Cardiology was involved in the case, and she was found to have severe LV dysfunction as well as severe prolongation of QT interval (suggestive of stress cardiomyopathy).  Prolonged QT medications were discontinued, however she continues to have significant sinus bradycardia during hospital stay.  She was transferred here for further management.       At the moment, she continues to be intubated but not requiring pressors.  She continues to be bradycardia (sinus) but EKG shows improvement in QTc.  Serial EKGs as detailed below:  -2/11: HR of 50 to 60 bpm, QTc of 616 ms.  -2/12: HR of 55 bpm, QTc of 587 ms.  -2/13: HR of 43 bpm, QTc of 544 ms.    Previous studies:  -Echo (02/12/2022): Severely dysfunction.  EF 20-25% with global  hypokinesis.   -Echo (06/10/2020): Normal LV function.  EF of 60%.  No significant valve disease.  -Chest CT (2/8/2022): No PE.  Patchy groundglass and consolidative opacities throughout the lungs, compatible with Covid-19 infection.     Plan:  1. Cardiovascular.   - VF/torsades.  The episode was likely secondary to R-on-T phenomenon/QT prolongation due to ongoing stress cardiomyopathy and use of QT prolonged drugs.  No recurrence of ventricular arrhythmia in the last 48 hours.  Telemetry shows sinus bradycardia but no ventricular arrhythmia.  Most recent EKG shows partial improvement QTc interval.  Recommendations:   *Keep K>4 and  Mg>2   *Repeat ECG daily.  *Avoid QT prolonging drugs.   * If pt exhibited recurrence of ventricular arrhythmias, will consider temporary pacing and lidocaine infusion.      -Cardiomyopathy (likely stress related).  Continue heparin drip and diuresis as needed.  Repeat a limited echo in a week.  Of note, etiology of stress cardiomyopathy likely related to ARDS/Covid.  Chest CT rule out PE.  However, if there is any question regarding mental status, team should consider head CT to rule out intracranial bleed.  -Sinus bradycardia.  Likely vagal component/sedation.  Patient is chronically on a small dose of dopamine.  It is okay to keep heart rate around 50 bpm as long as patient will have frequent PVCs.      2. Respiratory/ ARDS.  Intubated.   Saturating well on MV ( Fio2 of 80; PEEP of 14).  3. ID. COVID infection/ PNA.  On dexamethasone, baricitinib and cefepime.   4. Neuro. Sedated and intubated.   5. Renal. Normal renal Function.   6. GI. PPI for prophylaxis.  7. Hematology. On IV Heparin.    I spent 35 minutes in the case, and over 50% of the time was spent reviewing chart, counseling and coordinating critical care as described above.    Chalino Alvarez MD      Physical Exam:  Vitals: BP (!) 147/83   Pulse 51   Temp 98.5  F (36.9  C) (Axillary)   Resp 18   Wt 125.1 kg (275 lb 12.7  oz)   LMP 11/01/2013   SpO2 94%   BMI 47.34 kg/m        Intake/Output Summary (Last 24 hours) at 2/14/2022 0849  Last data filed at 2/14/2022 0800  Gross per 24 hour   Intake 2349.19 ml   Output 1040 ml   Net 1309.19 ml     Vitals:    02/13/22 1500 02/13/22 1600 02/14/22 0600   Weight: 124.7 kg (274 lb 14.6 oz) 124.7 kg (274 lb 14.6 oz) 125.1 kg (275 lb 12.7 oz)       Constitutional:  Pt is sedated/intubated.   HEAD: Normocephalic. No evidence of injury or laceration.   SKIN: Skin normal color with no lesions or eruptions. No xanthelasma.  ENT/Mouth:  Membranes moist. No nasal discharge or bleeding gums.   Neck:  No evidence of thyromegaly.  Chest/Lungs: No audible wheezing or labored breathing.    Cardiovascular: Lower extremity edema bilaterally.   Abdomen: No evidence of abdominal distention. No observed jaundice.  Eyes: PERRL, EOMI. No scleral icterus, normal conjunctivae.  Extremities:  No cyanosis or clubbing noted.   Neurologic: Sedated/intubated.         Review of Systems:  Complete review of system is otherwise negative with the exception of what was described above.     CURRENT MEDICATIONS:    sodium chloride 0.9%  50 mL Intravenous Q24H     aspirin  81 mg Oral or Feeding Tube Daily     atorvastatin  20 mg Per Feeding Tube At Bedtime     baricitinib  4 mg Oral Daily     ceFEPIme (MAXIPIME) IV  1 g Intravenous Q12H     chlorhexidine  15 mL Mouth/Throat Q12H     cholecalciferol  1,250 mcg Oral Q7 Days     cyanocobalamin  1,000 mcg Oral Daily     dexamethasone  6 mg Intravenous Q24H     DULoxetine  20 mg Oral Daily     gabapentin  300 mg Oral Q8H RADHA     insulin aspart  1-22 Units Subcutaneous TID AC     insulin aspart  1-16 Units Subcutaneous At Bedtime     insulin glargine  10 Units Subcutaneous BID     levothyroxine  200 mcg Oral Daily     magnesium sulfate  2 g Intravenous Once     multivitamins w/minerals  15 mL Per Feeding Tube Daily     pantoprazole  40 mg Oral QAM AC     potassium chloride  40 mEq  Oral BID     protein modular  1 packet Per Feeding Tube TID     Vitamin D3  25 mcg Oral Daily     PRN Meds: acetaminophen **OR** acetaminophen, atropine, atropine, dextrose, glucose **OR** dextrose **OR** glucagon, hydromorphone, midazolam, ondansetron **OR** ondansetron, polyethylene glycol, senna-docusate **OR** senna-docusate    ALLERGIES     Allergies   Allergen Reactions     Promethazine Other (See Comments) and Anxiety     Noted in 8/30/08 ER     Codeine Phosphate GI Disturbance     Lamotrigine Other (See Comments)     hyponatremia     Oxcarbazepine Unknown and Other (See Comments)     Low sodium  LegacyRecord#[id number]       Codeine Other (See Comments) and Rash     GI bleeding  LegacyRecord#[id number]         PAST MEDICAL HISTORY:  Past Medical History:   Diagnosis Date     Arthritis      Bipolar affective (H)      Fibromyalgia      Hypertension        PAST SURGICAL HISTORY:  Past Surgical History:   Procedure Laterality Date     CHOLECYSTECTOMY       GYN SURGERY      c section     GYN SURGERY      oblation     ORTHOPEDIC SURGERY      left leg, left shoulder, back       FAMILY HISTORY:  Family History   Problem Relation Age of Onset     Heart Disease Mother      Diabetes Mother      Ovarian Cancer Mother      Heart Disease Father      Lung Cancer Father      Diabetes Sister      Ovarian Cancer Sister      Diabetes Brother        SOCIAL HISTORY:  Social History     Socioeconomic History     Marital status: Single     Spouse name: Not on file     Number of children: Not on file     Years of education: Not on file     Highest education level: Not on file   Occupational History     Not on file   Tobacco Use     Smoking status: Former Smoker     Packs/day: 0.10     Smokeless tobacco: Never Used   Substance and Sexual Activity     Alcohol use: No     Drug use: No     Sexual activity: Not Currently   Other Topics Concern     Not on file   Social History Narrative     Not on file     Social Determinants of Health      Financial Resource Strain: Not on file   Food Insecurity: Not on file   Transportation Needs: Not on file   Physical Activity: Not on file   Stress: Not on file   Social Connections: Not on file   Intimate Partner Violence: Not on file   Housing Stability: Not on file         Recent Lab Results:  Recent Labs   Lab 02/14/22  0817 02/14/22  0526 02/13/22  2338 02/13/22  2058 02/13/22  1659 02/13/22  1519 02/13/22  1225 02/13/22  1016 02/13/22  0806 02/13/22  0437 02/11/22  0809 02/11/22  0426 02/10/22  0740 02/10/22  0635 02/09/22  0808 02/09/22  0637 02/08/22  1422 02/08/22  1027   WBC  --  10.2  --   --   --  10.1  --  9.8  --   --    < > 6.5  --  6.3  6.3  --  3.9*  --  4.1   HGB  --  10.9*  --   --   --  12.0  --  12.0  --   --    < > 12.3  --  12.9  12.9  --  12.2  --  12.9  13.3   MCV  --  90  --   --   --  91  --  91  --   --    < > 90  --  90  90  --  92  --  92   PLT  --  341  --   --   --  332  --  311  --  307   < > 257  --  252  252  --  199  --  155   INR  --  1.13  --   --   --   --   --   --   --   --   --   --   --  0.99  --  1.01  --  0.89   NA  --  139  --   --  136  --   --   --   --  140   < > 138  --  138  --  136  --  132*  137   POTASSIUM  --  3.9  --   --  3.9  --   --  3.4  --  3.5   < > 3.8  --  3.9  --  4.8   < > 5.8*  5.7*   CHLORIDE  --  109  --   --  106  --   --   --   --  109   < > 107  --  103  --  101  --  101   CO2  --  27  --   --  26  --   --   --   --  28   < > 25  --  31  --  30  --  28   BUN  --  24  --   --  22  --   --   --   --  21   < > 14  --  17  --  19  --  15   CR  --  0.68  --   --  0.63  --   --   --   --  0.73   < > 0.65  --  0.75  --  0.96  --  1.03   ANIONGAP  --  3  --   --  4  --   --   --   --  3   < > 6  --  4  --  5  --  3   RODOLFO  --  8.3*  --   --  8.6  --   --   --   --  8.1*   < > 7.6*  --  8.8  --  8.7  --  8.8   * 185* 182*   < > 246*  --    < >  --    < > 187*   < > 196*   < > 139*   < > 192*   < > 110*  116*   ALBUMIN  --  2.1*  --   --    --   --   --   --   --   --   --  1.9*  --  2.5*  --  2.4*  --  2.6*   PROTTOTAL  --  7.0  --   --   --   --   --   --   --   --   --  7.0  --  8.5  --  8.0  --  8.4   BILITOTAL  --  0.3  --   --   --   --   --   --   --   --   --  0.6  --  0.4  --  0.3  --  0.4   ALKPHOS  --  59  --   --   --   --   --   --   --   --   --  69  --  81  --  90  --  106   ALT  --  62*  --   --   --   --   --   --   --   --   --  53*  --  36  --  41  --  55*   AST  --  38  --   --   --   --   --   --   --   --   --  81*  --  55*  --  44   < >  --    LIPASE  --   --   --   --   --   --   --   --   --   --   --   --   --   --   --   --   --  61*    < > = values in this interval not displayed.

## 2022-02-14 NOTE — PLAN OF CARE
Neuro:  Sedated on 20mg Versed and 2mg/hr dilaudid.  Becomes agitated with cares.  MACHUCA but does not follow commands.       Pulm:  Full vent support.  See Dr. Mock's notes regarding vent changes and decrease in Velitri.   Worsening ABG's this am.  Remains supine.       Cardiac:  SB to SR rates 30's to 60's with Atropine pushes given for rates less than 45 bpm per orders.  Eva in place with good waveform noted.  No hemodynamic instability with bradycardia.  No ventricular ectopy during this writers shift.       GI:  TF re-started, okay per MD to use OG tube.  See orders for rate increases.  Currently at 30ml/hr.      :  Valle in place for strict I/O's.  UOP adequate     Intensivist and Cardiology following.  Continue current plan of care.

## 2022-02-15 ENCOUNTER — APPOINTMENT (OUTPATIENT)
Dept: GENERAL RADIOLOGY | Facility: CLINIC | Age: 62
DRG: 207 | End: 2022-02-15
Attending: INTERNAL MEDICINE
Payer: COMMERCIAL

## 2022-02-15 ENCOUNTER — APPOINTMENT (OUTPATIENT)
Dept: GENERAL RADIOLOGY | Facility: CLINIC | Age: 62
DRG: 207 | End: 2022-02-15
Attending: PEDIATRICS
Payer: COMMERCIAL

## 2022-02-15 LAB
ANION GAP SERPL CALCULATED.3IONS-SCNC: 3 MMOL/L (ref 3–14)
ATRIAL RATE - MUSE: 43 BPM
ATRIAL RATE - MUSE: 55 BPM
ATRIAL RATE - MUSE: 55 BPM
ATRIAL RATE - MUSE: 60 BPM
BASE EXCESS BLDA CALC-SCNC: 2.1 MMOL/L (ref -9–1.8)
BASE EXCESS BLDA CALC-SCNC: 4.3 MMOL/L (ref -9–1.8)
BASE EXCESS BLDA CALC-SCNC: 4.8 MMOL/L (ref -9–1.8)
BUN SERPL-MCNC: 20 MG/DL (ref 7–30)
C DIFF TOX B STL QL: NEGATIVE
CALCIUM SERPL-MCNC: 8.8 MG/DL (ref 8.5–10.1)
CHLORIDE BLD-SCNC: 108 MMOL/L (ref 94–109)
CO2 SERPL-SCNC: 28 MMOL/L (ref 20–32)
CREAT SERPL-MCNC: 0.62 MG/DL (ref 0.52–1.04)
DIASTOLIC BLOOD PRESSURE - MUSE: NORMAL MMHG
ERYTHROCYTE [DISTWIDTH] IN BLOOD BY AUTOMATED COUNT: 13.8 % (ref 10–15)
GFR SERPL CREATININE-BSD FRML MDRD: >90 ML/MIN/1.73M2
GLUCOSE BLD-MCNC: 247 MG/DL (ref 70–99)
GLUCOSE BLDC GLUCOMTR-MCNC: 174 MG/DL (ref 70–99)
GLUCOSE BLDC GLUCOMTR-MCNC: 193 MG/DL (ref 70–99)
GLUCOSE BLDC GLUCOMTR-MCNC: 215 MG/DL (ref 70–99)
GLUCOSE BLDC GLUCOMTR-MCNC: 225 MG/DL (ref 70–99)
GLUCOSE BLDC GLUCOMTR-MCNC: 231 MG/DL (ref 70–99)
GLUCOSE BLDC GLUCOMTR-MCNC: 253 MG/DL (ref 70–99)
HCO3 BLD-SCNC: 28 MMOL/L (ref 21–28)
HCO3 BLD-SCNC: 30 MMOL/L (ref 21–28)
HCO3 BLD-SCNC: 30 MMOL/L (ref 21–28)
HCT VFR BLD AUTO: 38.7 % (ref 35–47)
HGB BLD-MCNC: 12.6 G/DL (ref 11.7–15.7)
INTERPRETATION ECG - MUSE: NORMAL
MAGNESIUM SERPL-MCNC: 2.1 MG/DL (ref 1.8–2.6)
MCH RBC QN AUTO: 28.6 PG (ref 26.5–33)
MCHC RBC AUTO-ENTMCNC: 32.6 G/DL (ref 31.5–36.5)
MCV RBC AUTO: 88 FL (ref 78–100)
O2/TOTAL GAS SETTING VFR VENT: 60 %
O2/TOTAL GAS SETTING VFR VENT: 70 %
O2/TOTAL GAS SETTING VFR VENT: 70 %
OXYHGB MFR BLD: 93 % (ref 92–100)
OXYHGB MFR BLD: 95 % (ref 92–100)
OXYHGB MFR BLD: 98 % (ref 92–100)
P AXIS - MUSE: 40 DEGREES
P AXIS - MUSE: 43 DEGREES
P AXIS - MUSE: 48 DEGREES
P AXIS - MUSE: 92 DEGREES
PCO2 BLD: 47 MM HG (ref 35–45)
PCO2 BLD: 50 MM HG (ref 35–45)
PCO2 BLD: 50 MM HG (ref 35–45)
PH BLD: 7.36 [PH] (ref 7.35–7.45)
PH BLD: 7.39 [PH] (ref 7.35–7.45)
PH BLD: 7.42 [PH] (ref 7.35–7.45)
PHOSPHATE SERPL-MCNC: 3.3 MG/DL (ref 2.5–4.5)
PLATELET # BLD AUTO: 500 10E3/UL (ref 150–450)
PO2 BLD: 176 MM HG (ref 80–105)
PO2 BLD: 69 MM HG (ref 80–105)
PO2 BLD: 84 MM HG (ref 80–105)
POTASSIUM BLD-SCNC: 4.1 MMOL/L (ref 3.4–5.3)
POTASSIUM BLD-SCNC: 4.2 MMOL/L (ref 3.4–5.3)
PR INTERVAL - MUSE: 136 MS
PR INTERVAL - MUSE: 146 MS
PR INTERVAL - MUSE: 146 MS
PR INTERVAL - MUSE: 156 MS
QRS DURATION - MUSE: 84 MS
QRS DURATION - MUSE: 90 MS
QRS DURATION - MUSE: 90 MS
QRS DURATION - MUSE: 92 MS
QT - MUSE: 540 MS
QT - MUSE: 614 MS
QT - MUSE: 616 MS
QT - MUSE: 644 MS
QTC - MUSE: 516 MS
QTC - MUSE: 544 MS
QTC - MUSE: 587 MS
QTC - MUSE: 616 MS
R AXIS - MUSE: 22 DEGREES
R AXIS - MUSE: 35 DEGREES
R AXIS - MUSE: 39 DEGREES
R AXIS - MUSE: 52 DEGREES
RBC # BLD AUTO: 4.4 10E6/UL (ref 3.8–5.2)
SODIUM SERPL-SCNC: 139 MMOL/L (ref 133–144)
SYSTOLIC BLOOD PRESSURE - MUSE: NORMAL MMHG
T AXIS - MUSE: 167 DEGREES
T AXIS - MUSE: 202 DEGREES
T AXIS - MUSE: 208 DEGREES
T AXIS - MUSE: 209 DEGREES
UFH PPP CHRO-ACNC: 0.17 IU/ML
UFH PPP CHRO-ACNC: 0.26 IU/ML
UFH PPP CHRO-ACNC: 0.28 IU/ML
VENTRICULAR RATE- MUSE: 43 BPM
VENTRICULAR RATE- MUSE: 55 BPM
VENTRICULAR RATE- MUSE: 55 BPM
VENTRICULAR RATE- MUSE: 60 BPM
WBC # BLD AUTO: 14.8 10E3/UL (ref 4–11)

## 2022-02-15 PROCEDURE — 85520 HEPARIN ASSAY: CPT | Performed by: INTERNAL MEDICINE

## 2022-02-15 PROCEDURE — 99291 CRITICAL CARE FIRST HOUR: CPT | Mod: GC | Performed by: INTERNAL MEDICINE

## 2022-02-15 PROCEDURE — 999N000157 HC STATISTIC RCP TIME EA 10 MIN

## 2022-02-15 PROCEDURE — 250N000009 HC RX 250: Performed by: STUDENT IN AN ORGANIZED HEALTH CARE EDUCATION/TRAINING PROGRAM

## 2022-02-15 PROCEDURE — 94003 VENT MGMT INPAT SUBQ DAY: CPT

## 2022-02-15 PROCEDURE — 85520 HEPARIN ASSAY: CPT | Performed by: STUDENT IN AN ORGANIZED HEALTH CARE EDUCATION/TRAINING PROGRAM

## 2022-02-15 PROCEDURE — 250N000011 HC RX IP 250 OP 636: Performed by: STUDENT IN AN ORGANIZED HEALTH CARE EDUCATION/TRAINING PROGRAM

## 2022-02-15 PROCEDURE — 87493 C DIFF AMPLIFIED PROBE: CPT | Performed by: STUDENT IN AN ORGANIZED HEALTH CARE EDUCATION/TRAINING PROGRAM

## 2022-02-15 PROCEDURE — 250N000011 HC RX IP 250 OP 636: Performed by: INTERNAL MEDICINE

## 2022-02-15 PROCEDURE — 999N000065 XR ABDOMEN PORT 1 VIEWS

## 2022-02-15 PROCEDURE — 84132 ASSAY OF SERUM POTASSIUM: CPT | Performed by: STUDENT IN AN ORGANIZED HEALTH CARE EDUCATION/TRAINING PROGRAM

## 2022-02-15 PROCEDURE — 82805 BLOOD GASES W/O2 SATURATION: CPT | Performed by: STUDENT IN AN ORGANIZED HEALTH CARE EDUCATION/TRAINING PROGRAM

## 2022-02-15 PROCEDURE — 83735 ASSAY OF MAGNESIUM: CPT | Performed by: INTERNAL MEDICINE

## 2022-02-15 PROCEDURE — 74018 RADEX ABDOMEN 1 VIEW: CPT

## 2022-02-15 PROCEDURE — 99233 SBSQ HOSP IP/OBS HIGH 50: CPT | Mod: 25 | Performed by: INTERNAL MEDICINE

## 2022-02-15 PROCEDURE — 84100 ASSAY OF PHOSPHORUS: CPT | Performed by: INTERNAL MEDICINE

## 2022-02-15 PROCEDURE — 80048 BASIC METABOLIC PNL TOTAL CA: CPT | Performed by: INTERNAL MEDICINE

## 2022-02-15 PROCEDURE — 85520 HEPARIN ASSAY: CPT | Performed by: SURGERY

## 2022-02-15 PROCEDURE — 250N000013 HC RX MED GY IP 250 OP 250 PS 637: Performed by: INTERNAL MEDICINE

## 2022-02-15 PROCEDURE — 272N000272 HC CONTINUOUS NEBULIZER MICRO PUMP

## 2022-02-15 PROCEDURE — 93005 ELECTROCARDIOGRAM TRACING: CPT

## 2022-02-15 PROCEDURE — 250N000009 HC RX 250: Performed by: INTERNAL MEDICINE

## 2022-02-15 PROCEDURE — 85027 COMPLETE CBC AUTOMATED: CPT | Performed by: INTERNAL MEDICINE

## 2022-02-15 PROCEDURE — 71045 X-RAY EXAM CHEST 1 VIEW: CPT

## 2022-02-15 PROCEDURE — 82805 BLOOD GASES W/O2 SATURATION: CPT | Performed by: SURGERY

## 2022-02-15 PROCEDURE — 258N000003 HC RX IP 258 OP 636: Performed by: STUDENT IN AN ORGANIZED HEALTH CARE EDUCATION/TRAINING PROGRAM

## 2022-02-15 PROCEDURE — 258N000003 HC RX IP 258 OP 636: Performed by: INTERNAL MEDICINE

## 2022-02-15 PROCEDURE — 250N000013 HC RX MED GY IP 250 OP 250 PS 637: Performed by: STUDENT IN AN ORGANIZED HEALTH CARE EDUCATION/TRAINING PROGRAM

## 2022-02-15 PROCEDURE — 200N000001 HC R&B ICU

## 2022-02-15 RX ORDER — METOCLOPRAMIDE HYDROCHLORIDE 5 MG/ML
10 INJECTION INTRAMUSCULAR; INTRAVENOUS ONCE
Status: COMPLETED | OUTPATIENT
Start: 2022-02-15 | End: 2022-02-15

## 2022-02-15 RX ORDER — VECURONIUM BROMIDE 1 MG/ML
10 INJECTION, POWDER, LYOPHILIZED, FOR SOLUTION INTRAVENOUS ONCE
Status: DISCONTINUED | OUTPATIENT
Start: 2022-02-15 | End: 2022-02-15

## 2022-02-15 RX ORDER — FUROSEMIDE 10 MG/ML
40 INJECTION INTRAMUSCULAR; INTRAVENOUS ONCE
Status: COMPLETED | OUTPATIENT
Start: 2022-02-15 | End: 2022-02-15

## 2022-02-15 RX ORDER — MAGNESIUM SULFATE HEPTAHYDRATE 40 MG/ML
2 INJECTION, SOLUTION INTRAVENOUS ONCE
Status: COMPLETED | OUTPATIENT
Start: 2022-02-15 | End: 2022-02-15

## 2022-02-15 RX ORDER — NICOTINE POLACRILEX 4 MG
15-30 LOZENGE BUCCAL
Status: DISCONTINUED | OUTPATIENT
Start: 2022-02-15 | End: 2022-02-15

## 2022-02-15 RX ORDER — MINERAL OIL AND WHITE PETROLATUM 150; 830 MG/G; MG/G
1 OINTMENT OPHTHALMIC EVERY 8 HOURS
Status: DISCONTINUED | OUTPATIENT
Start: 2022-02-15 | End: 2022-02-15

## 2022-02-15 RX ORDER — POTASSIUM CHLORIDE 1.5 G/1.58G
40 POWDER, FOR SOLUTION ORAL ONCE
Status: COMPLETED | OUTPATIENT
Start: 2022-02-15 | End: 2022-02-15

## 2022-02-15 RX ORDER — DEXTROSE MONOHYDRATE 25 G/50ML
25-50 INJECTION, SOLUTION INTRAVENOUS
Status: DISCONTINUED | OUTPATIENT
Start: 2022-02-15 | End: 2022-02-15

## 2022-02-15 RX ORDER — LIDOCAINE HYDROCHLORIDE 20 MG/ML
5 SOLUTION OROPHARYNGEAL ONCE
Status: DISCONTINUED | OUTPATIENT
Start: 2022-02-15 | End: 2022-02-15

## 2022-02-15 RX ORDER — LIDOCAINE HYDROCHLORIDE 20 MG/ML
JELLY TOPICAL ONCE
Status: COMPLETED | OUTPATIENT
Start: 2022-02-15 | End: 2022-02-15

## 2022-02-15 RX ORDER — VECURONIUM BROMIDE 1 MG/ML
10 INJECTION, POWDER, LYOPHILIZED, FOR SOLUTION INTRAVENOUS ONCE
Status: COMPLETED | OUTPATIENT
Start: 2022-02-15 | End: 2022-02-15

## 2022-02-15 RX ADMIN — FUROSEMIDE 40 MG: 10 INJECTION, SOLUTION INTRAVENOUS at 08:08

## 2022-02-15 RX ADMIN — GABAPENTIN 300 MG: 250 SUSPENSION ORAL at 13:49

## 2022-02-15 RX ADMIN — Medication 5 MG/HR: at 16:51

## 2022-02-15 RX ADMIN — EPOPROSTENOL 20 NG/KG/MIN: 1.5 INJECTION, POWDER, LYOPHILIZED, FOR SOLUTION INTRAVENOUS at 23:21

## 2022-02-15 RX ADMIN — ATROPINE SULFATE 0.5 MG: 0.1 INJECTION INTRAVENOUS at 02:56

## 2022-02-15 RX ADMIN — DIAZEPAM 10 MG: 5 TABLET ORAL at 04:22

## 2022-02-15 RX ADMIN — Medication 6 MG/HR: at 20:10

## 2022-02-15 RX ADMIN — DEXAMETHASONE SODIUM PHOSPHATE 6 MG: 4 INJECTION, SOLUTION INTRAMUSCULAR; INTRAVENOUS at 08:09

## 2022-02-15 RX ADMIN — ATORVASTATIN CALCIUM 20 MG: 10 TABLET, FILM COATED ORAL at 21:58

## 2022-02-15 RX ADMIN — MIDAZOLAM HYDROCHLORIDE 20 MG/HR: 1 INJECTION, SOLUTION INTRAVENOUS at 22:48

## 2022-02-15 RX ADMIN — EPOPROSTENOL 20 NG/KG/MIN: 1.5 INJECTION, POWDER, LYOPHILIZED, FOR SOLUTION INTRAVENOUS at 16:49

## 2022-02-15 RX ADMIN — EPOPROSTENOL 20 NG/KG/MIN: 1.5 INJECTION, POWDER, LYOPHILIZED, FOR SOLUTION INTRAVENOUS at 09:21

## 2022-02-15 RX ADMIN — METOCLOPRAMIDE 10 MG: 5 INJECTION, SOLUTION INTRAMUSCULAR; INTRAVENOUS at 17:58

## 2022-02-15 RX ADMIN — Medication 40 MG: at 06:39

## 2022-02-15 RX ADMIN — BARICITINIB 4 MG: 2 TABLET, FILM COATED ORAL at 08:09

## 2022-02-15 RX ADMIN — POTASSIUM CHLORIDE 40 MEQ: 1.5 POWDER, FOR SOLUTION ORAL at 12:40

## 2022-02-15 RX ADMIN — VECURONIUM BROMIDE 2 MCG/KG/MIN: 1 INJECTION, POWDER, LYOPHILIZED, FOR SOLUTION INTRAVENOUS at 22:44

## 2022-02-15 RX ADMIN — DIAZEPAM 10 MG: 5 TABLET ORAL at 11:48

## 2022-02-15 RX ADMIN — DULOXETINE HYDROCHLORIDE 20 MG: 20 CAPSULE, DELAYED RELEASE ORAL at 08:08

## 2022-02-15 RX ADMIN — KETAMINE HYDROCHLORIDE 1.9 MG/KG/HR: 100 INJECTION, SOLUTION, CONCENTRATE INTRAMUSCULAR; INTRAVENOUS at 16:49

## 2022-02-15 RX ADMIN — VECURONIUM BROMIDE 2 MCG/KG/MIN: 1 INJECTION, POWDER, LYOPHILIZED, FOR SOLUTION INTRAVENOUS at 19:36

## 2022-02-15 RX ADMIN — GABAPENTIN 300 MG: 250 SUSPENSION ORAL at 05:32

## 2022-02-15 RX ADMIN — MAGNESIUM SULFATE HEPTAHYDRATE 2 G: 40 INJECTION, SOLUTION INTRAVENOUS at 21:55

## 2022-02-15 RX ADMIN — LIDOCAINE HYDROCHLORIDE: 20 JELLY TOPICAL at 15:52

## 2022-02-15 RX ADMIN — EPOPROSTENOL 20 NG/KG/MIN: 1.5 INJECTION, POWDER, LYOPHILIZED, FOR SOLUTION INTRAVENOUS at 02:53

## 2022-02-15 RX ADMIN — MIDAZOLAM HYDROCHLORIDE 20 MG/HR: 1 INJECTION, SOLUTION INTRAVENOUS at 02:53

## 2022-02-15 RX ADMIN — HEPARIN SODIUM 1150 UNITS/HR: 1000 INJECTION INTRAVENOUS; SUBCUTANEOUS at 07:02

## 2022-02-15 RX ADMIN — Medication 5 MG/HR: at 00:40

## 2022-02-15 RX ADMIN — VECURONIUM BROMIDE 2 MCG/KG/MIN: 1 INJECTION, POWDER, LYOPHILIZED, FOR SOLUTION INTRAVENOUS at 17:23

## 2022-02-15 RX ADMIN — MIDAZOLAM HYDROCHLORIDE 20 MG/HR: 1 INJECTION, SOLUTION INTRAVENOUS at 17:04

## 2022-02-15 RX ADMIN — MIDAZOLAM HYDROCHLORIDE 20 MG/HR: 1 INJECTION, SOLUTION INTRAVENOUS at 07:43

## 2022-02-15 RX ADMIN — GABAPENTIN 300 MG: 250 SUSPENSION ORAL at 21:58

## 2022-02-15 RX ADMIN — Medication 1 PACKET: at 08:35

## 2022-02-15 RX ADMIN — Medication 6 MG/HR: at 23:33

## 2022-02-15 RX ADMIN — Medication 5 MG/HR: at 09:20

## 2022-02-15 RX ADMIN — SODIUM CHLORIDE: 9 INJECTION, SOLUTION INTRAVENOUS at 22:46

## 2022-02-15 RX ADMIN — ASPIRIN 81 MG CHEWABLE TABLET 81 MG: 81 TABLET CHEWABLE at 08:08

## 2022-02-15 RX ADMIN — CEFEPIME HYDROCHLORIDE 1 G: 1 INJECTION, POWDER, FOR SOLUTION INTRAMUSCULAR; INTRAVENOUS at 20:49

## 2022-02-15 RX ADMIN — Medication 5 MG/HR: at 13:26

## 2022-02-15 RX ADMIN — Medication 25 MCG: at 08:08

## 2022-02-15 RX ADMIN — DOPAMINE HYDROCHLORIDE 4 MCG/KG/MIN: 160 INJECTION, SOLUTION INTRAVENOUS at 07:44

## 2022-02-15 RX ADMIN — Medication 1 PACKET: at 17:55

## 2022-02-15 RX ADMIN — MIDAZOLAM HYDROCHLORIDE 20 MG/HR: 1 INJECTION, SOLUTION INTRAVENOUS at 12:40

## 2022-02-15 RX ADMIN — CEFEPIME HYDROCHLORIDE 1 G: 1 INJECTION, POWDER, FOR SOLUTION INTRAMUSCULAR; INTRAVENOUS at 08:09

## 2022-02-15 RX ADMIN — LEVOTHYROXINE SODIUM 200 MCG: 100 TABLET ORAL at 08:08

## 2022-02-15 RX ADMIN — DIAZEPAM 10 MG: 5 TABLET ORAL at 20:50

## 2022-02-15 RX ADMIN — CYANOCOBALAMIN TAB 500 MCG 1000 MCG: 500 TAB at 08:08

## 2022-02-15 RX ADMIN — Medication 5 MG/HR: at 05:32

## 2022-02-15 RX ADMIN — Medication 15 ML: at 08:09

## 2022-02-15 RX ADMIN — CHLORHEXIDINE GLUCONATE 15 ML: 1.2 SOLUTION ORAL at 08:08

## 2022-02-15 RX ADMIN — CHLORHEXIDINE GLUCONATE 15 ML: 1.2 SOLUTION ORAL at 19:39

## 2022-02-15 RX ADMIN — VECURONIUM BROMIDE 10 MG: 1 INJECTION, POWDER, LYOPHILIZED, FOR SOLUTION INTRAVENOUS at 17:24

## 2022-02-15 RX ADMIN — Medication 1 PACKET: at 21:58

## 2022-02-15 RX ADMIN — MAGNESIUM SULFATE HEPTAHYDRATE 2 G: 40 INJECTION, SOLUTION INTRAVENOUS at 08:10

## 2022-02-15 ASSESSMENT — ACTIVITIES OF DAILY LIVING (ADL)
ADLS_ACUITY_SCORE: 22
ADLS_ACUITY_SCORE: 20
ADLS_ACUITY_SCORE: 22
ADLS_ACUITY_SCORE: 20
ADLS_ACUITY_SCORE: 20
ADLS_ACUITY_SCORE: 22
ADLS_ACUITY_SCORE: 20
ADLS_ACUITY_SCORE: 22
ADLS_ACUITY_SCORE: 22

## 2022-02-15 NOTE — PROGRESS NOTES
Critical Care  Note      02/15/2022    Name: No Yusuf MRN#: [id number]   Age: 62 year old YOB: 1960     [facility] Day# 2             Problem List:   Active Problems:    Acute respiratory failure due to COVID-19 (H)    1. Acute respiratory failure due to COVID-19 pneumonia - will complete 10 days of Decadron 6 mgms/day; we will check inflammatory markers but she has already had 5 doses of baricitinib and will complete 14 days.   2. ARDS - currently on 60% and +16 PEEP; trending P:F, no plan to prone at present  3. S/p arrest believed due to VT/ Torsades de Pointes associated with long QT (2/11) - Cards following, no pacing indicated at this time, weaning dopamine, cont heparin, echo 2/18  4. Bradycardia after arrest with HR about 40-50, but BP's ok; goal HR >40 per cards  5. Demand ischemia - monitor only   6. Cardiomyopathy, likely stress related, EF 20%, ECHO repeat 2/18  7. UTI due to E. Coli - will continue Cefepime for 1 week total.   8. DM - she has been adequately controlled with bid lantus and sliding scale and will continue, adjusting dose as elevated BGs on decadron  9. Nutrition- TF at goal  10. History of polysubstance abuse and now on methadone - on IV narcotic   11. History of hepatitis C  12. Hypothyroid - follow up TSH pending and continue current synthroid dose.   13. GERD - protonix daily despite potential increased Qt  14. Depression and Anxiety; and bipolar - continue routine meds.   15. Fibromyalgia   16. Dyslipidemia   17. History of vitamin D deficiency   18. Hx Bipolar and acute Hyperactive Delerium         Summary/Hospital Course:     2/13: She was transferred from [facility] after arrest 2/11 felt to be due to Torsades de Pointes. She has been bradycardic in 40-50's (not hypotensive nor requiring vasopressors) and was transferred to [facility] with concern that she may need a temporary pacemaker to treat TdP. She has covid lung disease and ARDS and is on high levels of vent support.      2/14: remains bradycardic, dopamine infusion started with improvement, sedation ongoing issue, increasing dilaudid dose, added valium enteral, added ketamine gtt, avoiding QT prolonging agents, increased PEEP and Velitri, avoiding proning while concern of unstable arrythmia     2/15: Overnight no major issues, bradycardia persisted, but did not get below 40, minimal ectopic beats, cardiology reviewed again today and are ok titrating dopamine off, goal HR >40, heparin gtt to continue until repeat echo 2/18 (moniotr EF and apical ballooning), diuresis with goal negative fluid balance daily.       Assessment and plan :     No Yusuf is a 62 year old female admitted on 2/13/2022 for possible TdP and pacemaker; ARDS due to covid-19.   I have personally reviewed the daily labs, imaging studies, cultures and discussed the case with referring physician and consulting physicians.   My assessment and plan by system for this patient is as follows:    Neurology/Psychiatry:   1. Sedated on vent - High doses of versed gtt, dilaudid gtt, enteral valium and ketamine gtt    Cardiovascular:   1.Hemodynamics - compensated at this time   2. Bradycardia - dopamine infusion to be weaned off, cards CS, goal HR >40  3. Hx Torsade de Pointes - Mg 2.5, K 4.5, avoid QT prolonging agents  4. Cardiomyopathy - EF 20% on recent, repeat 2/18  5. Cardiac Arrest - cards recs appreciated, heparin gtt, ASA, statin, beta blocker contraindicated with bradycardia    Pulmonary/Ventilator Management:   1. COVID ARDS - Velitri 20, ARDS NET ventilation: 18/350/16/60% => 7.36/50/84/28    GI and Nutrition :   1. Tube feeds at goal, nutrition CS appreciated, PPI  2. Hx of HCV - monitor LFTs    Renal/Fluids/Electrolytes:   1. Compensated with creatinine 0.62; will focus on keeping K (4.5) and Mg (2.5) replaced.   2. Lasix 40 x1, 40 mEQ KCl anticipating losses    Infectious Disease:   1. 1 week of UTI treatment for E. Coli with Cefepime   2.  Baricitinib 14 day course, Dexamethasone 10 day course    Endocrine:   1. DM, Stress and steroid induced hyperglycemia - lantus and sliding scale insulin; synthroid    Hematology/Oncology:   1. Hg 12.6, monitor      IV/Access:   1. Venous access - RIJ TLC  2. Arterial access - Radial Art Line  3. x2 PIV  Plan  - central access required and necessary      ICU Prophylaxis:   1. DVT: Hep gtt, mechanical  2. VAP: HOB 30 degrees, chlorhexidine rinse  3. Stress Ulcer: PPI  4. Restraints: Nonviolent soft two point restraints required and necessary for patient safety and continued cares and good effect as patient continues to pull at necessary lines, tubes despite education and distraction. Will readdress daily.   5. Wound care  - positioning and normal preventative measures  6. Feeding -  Enteral   7. Family Update: I spoke with daughter by phone today for update   8. Disposition - ICU care               Medical History:     Past Medical History:   Diagnosis Date     Arthritis      Bipolar affective (H)      Fibromyalgia      Hypertension      Past Surgical History:   Procedure Laterality Date     CHOLECYSTECTOMY       GYN SURGERY      c section     GYN SURGERY      oblation     ORTHOPEDIC SURGERY      left leg, left shoulder, back     Social History     Socioeconomic History     Marital status: Single     Spouse name: Not on file     Number of children: Not on file     Years of education: Not on file     Highest education level: Not on file   Occupational History     Not on file   Tobacco Use     Smoking status: Former Smoker     Packs/day: 0.10     Smokeless tobacco: Never Used   Substance and Sexual Activity     Alcohol use: No     Drug use: No     Sexual activity: Not Currently   Other Topics Concern     Not on file   Social History Narrative     Not on file     Social Determinants of Health     Financial Resource Strain: Not on file   Food Insecurity: Not on file   Transportation Needs: Not on file   Physical  Activity: Not on file   Stress: Not on file   Social Connections: Not on file   Intimate Partner Violence: Not on file   Housing Stability: Not on file        Allergies   Allergen Reactions     Promethazine Other (See Comments) and Anxiety     Noted in 8/30/08 ER     Codeine Phosphate GI Disturbance     Lamotrigine Other (See Comments)     hyponatremia     Oxcarbazepine Unknown and Other (See Comments)     Low sodium  LegacyRecord#[id number]       Codeine Other (See Comments) and Rash     GI bleeding  LegacyRecord#[id number]                Key Medications:       sodium chloride 0.9%  50 mL Intravenous Q24H     aspirin  81 mg Oral or Feeding Tube Daily     atorvastatin  20 mg Per Feeding Tube At Bedtime     baricitinib  4 mg Oral Daily     ceFEPIme (MAXIPIME) IV  1 g Intravenous Q12H     chlorhexidine  15 mL Mouth/Throat Q12H     cholecalciferol  1,250 mcg Oral Q7 Days     cyanocobalamin  1,000 mcg Oral Daily     dexamethasone  6 mg Intravenous Q24H     diazepam  10 mg Oral or Feeding Tube Q8H     DULoxetine  20 mg Oral Daily     gabapentin  300 mg Oral Q8H RADHA     insulin aspart  1-22 Units Subcutaneous TID AC     insulin aspart  1-16 Units Subcutaneous At Bedtime     insulin glargine  20 Units Subcutaneous BID     levothyroxine  200 mcg Oral Daily     magnesium sulfate  2 g Intravenous Once     multivitamins w/minerals  15 mL Per Feeding Tube Daily     pantoprazole  40 mg Oral QAM AC     protein modular  1 packet Per Feeding Tube TID     Vitamin D3  25 mcg Oral Daily       dextrose       DOPamine 4 mcg/kg/min (02/15/22 0800)     epoprostenol (VELETRI) 20 mcg/mL in sterile water inhalation solution 20 ng/kg/min (02/15/22 0921)     heparin 1,150 Units/hr (02/15/22 0800)     HYDROmorphone 5 mg/hr (02/15/22 0920)     ketamine (KETALAR) 25 mg/mL ADULT SEDATION infusion 0.9 mg/kg/hr (02/15/22 0800)     midazolam 20 mg/hr (02/15/22 0800)     sodium chloride 10 mL/hr at 02/14/22 1952        Home Meds  No current facility-administered  medications on file prior to encounter.  amLODIPine (NORVASC) 5 MG tablet, Take 5 mg by mouth daily   atorvastatin (LIPITOR) 20 MG tablet, Take 20 mg by mouth At Bedtime   cholecalciferol 25 MCG (1000 UT) TABS, Take 1,000 Units by mouth daily   diclofenac (VOLTAREN) 1 % topical gel, Apply 4 g topically 4 times daily (Patient taking differently: Apply 2-4 g topically 2 times daily )  DULoxetine (CYMBALTA) 60 MG capsule, Take 60 mg by mouth daily  fluticasone-salmeterol (ADVAIR) 250-50 MCG/DOSE inhaler, Inhale 1 puff into the lungs 2 times daily  furosemide (LASIX) 20 MG tablet, Take 20 mg by mouth daily  gabapentin (NEURONTIN) 400 MG capsule, Take 1,200 mg by mouth 3 times daily  hydrOXYzine (ATARAX) 50 MG tablet, Take 100 mg by mouth every 6 hours as needed for anxiety  insulin detemir (LEVEMIR PEN) 100 UNIT/ML pen, Inject 15 Units Subcutaneous every morning  levalbuterol (XOPENEX HFA) 45 MCG/ACT inhaler, Inhale 2 puffs into the lungs every 4 hours as needed for shortness of breath / dyspnea or wheezing   levothyroxine (SYNTHROID/LEVOTHROID) 200 MCG tablet, Take 200 mcg by mouth daily   melatonin 5 MG tablet, Take 5 mg by mouth At Bedtime  metFORMIN (GLUCOPHAGE) 1000 MG tablet, Take 1,000 mg by mouth 2 times daily (with meals)  methadone HCl 10 MG/5ML SOLN, Take 80 mg by mouth daily 10mg/mL strength  multivitamin w/minerals (THERA-VIT-M) tablet, Take 1 tablet by mouth daily  NARCAN 4 MG/0.1ML nasal spray, CALL 911. SPRAY CONTENTS OF ONE SPRAYER (0.1ML) INTO ONE NOSTRIL. REPEAT IN 2-3 MINS IF SYMPTOMS OF OPIOID PERSIST, ALTERNATE NOSTRILS  nicotine polacrilex (NICORETTE) 4 MG gum, Place 4 mg inside cheek every hour as needed for smoking cessation   omeprazole (PRILOSEC) 20 MG DR capsule, Take 20 mg by mouth daily   polyethylene glycol (MIRALAX) 17 GM/Dose powder, Take 17 g by mouth daily as needed for constipation  potassium chloride ER (KLOR-CON) 8 MEQ CR tablet, Take 8 mEq by mouth 2 times daily  traZODone  (DESYREL) 50 MG tablet, Take 50 mg by mouth At Bedtime  vitamin B-12 (CYANOCOBALAMIN) 1000 MCG tablet, Take 1,000 mcg by mouth daily  ACCU-CHEK RAFAELA PLUS test strip,   acetaminophen (TYLENOL) 500 MG tablet, Take 500 mg by mouth 3 times daily   Acidophilus Lactobacillus CAPS, Take 1 tablet by mouth 3 times daily  Alcohol Swabs (ALCOHOL WIPES) 70 % PADS, USE WITH INSULIN INJECTION ONCE DAILY. **100 DAYS SUPPLY**  blood glucose (ACCU-CHEK RAFAELA PLUS) test strip, Use to check blood sugar 3x daily or as directed.  Blood Glucose Calibration (ACCU-CHEK ACTIVE GLUCOSE CONT VI), 1 each by Other route  blood glucose calibration (ACCU-CHEK RAFAELA) solution,                Physical Examination:   Temp:  [98  F (36.7  C)-99.1  F (37.3  C)] 99.1  F (37.3  C)  Pulse:  [43-61] 48  Resp:  [8-20] 18  MAP:  [79 mmHg-123 mmHg] 79 mmHg  Arterial Line BP: (125-168)/() 125/58  FiO2 (%):  [60 %] 60 %  SpO2:  [86 %-99 %] 90 %  No intake or output data in the 24 hours ending 02/13/22 1647  Wt Readings from Last 4 Encounters:   02/15/22 127.6 kg (281 lb 4.9 oz)   02/13/22 122.7 kg (270 lb 6.4 oz)   02/09/22 126.1 kg (278 lb)   06/08/21 133.1 kg (293 lb 6.4 oz)     Arterial Line BP: (125-168)/() 125/58  MAP:  [79 mmHg-123 mmHg] 79 mmHg  Ventilation Mode: CMV/AC  (Continuous Mandatory Ventilation/ Assist Control)  FiO2 (%): 60 %  Rate Set (breaths/minute): 18 breaths/min  Tidal Volume Set (mL): 350 mL  PEEP (cm H2O): 16 cmH2O  Oxygen Concentration (%): 70 %  Resp: 18    Recent Labs   Lab 02/15/22  0435 02/14/22  1225 02/14/22  0528 02/13/22  1858   PH 7.36 7.37 7.38 7.46*   PCO2 50* 47* 46* 37   PO2 84 128* 65* 153*   HCO3 28 27 27 26   O2PER 60 80 70 70       GEN: no acute distress; comfortable on vent    HEENT: head ncat, sclera anicteric, OP patent, trachea midline ETT secured, OGT secured  PULM: unlabored synchronous with vent, clear anteriorly    CV/COR: RRR S1S2 no gallop,  No rub, no murmur  ABD: soft nontender, obese   EXT:   Mild to mod edema   warm  NEURO: heavily sedated   SKIN: no obvious rash; no cyanosis or mottling   LINES: clean, dry intact         Data:   All data and imaging reviewed     ROUTINE ICU LABS (Last four results)  CMP  Recent Labs   Lab 02/15/22  0835 02/15/22  0435 02/15/22  0434 02/14/22  2203 02/14/22  0817 02/14/22  0526 02/13/22  2058 02/13/22  1659 02/13/22  1225 02/13/22  1016 02/13/22  0806 02/13/22  0437 02/12/22  0817 02/12/22  0504 02/11/22  0809 02/11/22  0426 02/10/22  0740 02/10/22  0635 02/09/22  0808 02/09/22  0637   NA  --  139  --   --   --  139  --  136  --   --   --  140  --  139   < > 138  --  138  --  136   POTASSIUM  --  4.1  --   --   --  3.9  --  3.9  --  3.4  --  3.5   < > 3.4   < > 3.8  --  3.9  --  4.8   CHLORIDE  --  108  --   --   --  109  --  106  --   --   --  109  --  106   < > 107  --  103  --  101   CO2  --  28  --   --   --  27  --  26  --   --   --  28  --  30   < > 25  --  31  --  30   ANIONGAP  --  3  --   --   --  3  --  4  --   --   --  3  --  3   < > 6  --  4  --  5   * 247* 215* 218*   < > 185*   < > 246*   < >  --    < > 187*   < > 203*   < > 196*   < > 139*   < > 192*   BUN  --  20  --   --   --  24  --  22  --   --   --  21  --  14   < > 14  --  17  --  19   CR  --  0.62  --   --   --  0.68  --  0.63  --   --   --  0.73  --  0.64   < > 0.65  --  0.75  --  0.96   GFRESTIMATED  --  >90  --   --   --  >90  --  >90  --   --   --  >90  --  >90   < > >90  --  90  --  67   RODOLFO  --  8.8  --   --   --  8.3*  --  8.6  --   --   --  8.1*  --  8.0*   < > 7.6*  --  8.8  --  8.7   MAG  --   --  2.1  --   --  2.3  --  1.8  --   --   --  6.4*  --   --    < >  --   --   --   --   --    PHOS  --  3.3  --   --   --  3.9  --   --   --   --   --  2.9  --  2.2*   < >  --   --   --   --   --    PROTTOTAL  --   --   --   --   --  7.0  --   --   --   --   --   --   --   --   --  7.0  --  8.5  --  8.0   ALBUMIN  --   --   --   --   --  2.1*  --   --   --   --   --   --   --   --   --  1.9*   --  2.5*  --  2.4*   BILITOTAL  --   --   --   --   --  0.3  --   --   --   --   --   --   --   --   --  0.6  --  0.4  --  0.3   ALKPHOS  --   --   --   --   --  59  --   --   --   --   --   --   --   --   --  69  --  81  --  90   AST  --   --   --   --   --  38  --   --   --   --   --   --   --   --   --  81*  --  55*  --  44   ALT  --   --   --   --   --  62*  --   --   --   --   --   --   --   --   --  53*  --  36  --  41    < > = values in this interval not displayed.     CBC  Recent Labs   Lab 02/15/22  0435 02/14/22  0526 02/13/22  1519 02/13/22  1016   WBC 14.8* 10.2 10.1 9.8   RBC 4.40 3.88 4.19 4.17   HGB 12.6 10.9* 12.0 12.0   HCT 38.7 34.9* 38.0 38.0   MCV 88 90 91 91   MCH 28.6 28.1 28.6 28.8   MCHC 32.6 31.2* 31.6 31.6   RDW 13.8 14.2 14.0 14.1   * 341 332 311     INR  Recent Labs   Lab 02/14/22  0526 02/10/22  0635 02/09/22  0637 02/08/22  1027   INR 1.13 0.99 1.01 0.89     Arterial Blood Gas  Recent Labs   Lab 02/15/22  0435 02/14/22  1225 02/14/22  0528 02/13/22  1858   PH 7.36 7.37 7.38 7.46*   PCO2 50* 47* 46* 37   PO2 84 128* 65* 153*   HCO3 28 27 27 26   O2PER 60 80 70 70       All cultures:  No results for input(s): CULT in the last 168 hours.  Recent Results (from the past 24 hour(s))   XR Chest Port 1 View    Narrative    EXAM: XR CHEST PORT 1 VIEW  LOCATION: [facility]  DATE/TIME: 2/13/2022 6:36 AM    INDICATION: icu intubated  COMPARISON: 02/12/2022      Impression    IMPRESSION: Endotracheal tube 2.9 cm above mariel. Enteric tube below lower aspect of film. Rotation to left. Diffuse, bilateral infiltrates.    XR Chest Port 1 View    Narrative    EXAM: XR CHEST PORT 1 VIEW  LOCATION: [facility]  DATE/TIME: 2/13/2022 3:04 PM    INDICATION: Endotracheal tube positioning.  COMPARISON: 2/13/2022 at 6:23 AM      Impression    IMPRESSION: Endotracheal tube tip 3.5 cm from the mariel. Right IJ line noted with tip in the mid to low SVC.  Improved bilateral infiltrates compared to previous.         Mansoor Ruiz DO  [facility] Fellow    Billing: This patient is critically ill: Yes. Total critical care time today 40 min.

## 2022-02-15 NOTE — PROGRESS NOTES
[facility]    EP Progress Note    Date of Service (when I saw the patient): 02/15/2022     Assessment & Plan   No Yusuf is a 62 year old female with h/o DM2, HTN, HL, bipolar disorder, fibromyalgia h/o polysubstance abuse (on methadone), hypothyroidism, chronic Hepatitis C who was admitted to [facility] 2/8/2022 with c/o SOB.  Dx'd with breakthrough COVID 19 (vaccinated without booster) on 2/2/2022. Required BiPAP therapy. Required intubation/ventilation d/t progressive hypoxia and transferred to [facility] 2/10 d/t bed availability. Had cardiac arrest d/t VF/Torsades de Pointes a/w long QT. Transferred to [facility] 2/13/2022. EP consulted (Dr. Alvarez, 2/14/2022) for TdP/VT arrest.    1. VF/Torsades de Pointes arrest 2/11/2022 -   - EF on echo on 2/12 showed EF 20-25% (very difficult study); on 2/10, images were difficult but appeared EF was wnl.  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 Had also been on cefepime, vancomycin and azithromycin; switched to ciprofloxacin 2/11 (for UTI tx) as well as methadone, all of which could contribute to long QT. Was also getting propofol.     - Had 1 cycle CPR and shocked with 150J, restoring SB.   - EKG on 2/11 with SR 60 bpm with  ms    - Dr. Alvarez reviewed yesterday 2/14. Noted QT prolonging meds all stopped; continued to have SB with need for Dopamine. Required atropine last night 2/14 @0300 for SB 41 bpm    - EKG today with improved QTc. Remains SB 47 bpm despite dopamine 4 mcg/kg/min. Received atropine 0.5 mg x 1 0300 for HR 41 bpm (by report). No ectopy.     PLAN:   1. Continue daily EKGs    2. Continue K & Mg supplementation for K >4.0 and Mg >2.0   3. Note Ketamine & Versed could be contributing to bradycardia, though had been on Ketamine without HR effect prior per Dr. Ruiz.   4. Keeping HR >40 bpm as long as pt remains hemodynamically stable and no significant  ventricular ectopy. OK to stop Dopamine as no great HR response with this.    2. Cardiomyopathy, EF 20-25% -   - Noted on limited echo 2/12/2022 following cardiac arrest; thought Tako-tsubo stress CM  - On heparin for Apical Ballooning  - PTA atorvastatin 20 - this has been continued via FT     PLAN:   1. Limited echo ordered for Friday to assess for improvement in EF. Would likely be able to stop heparin if apex looked better and no thrombus noted.    2. No GDMT given hypotension    Sharon Eid PA-C MSPAS    [facility] Time Statement:     I spent 40 minutes face-to-face or coordinating care of No Charlotte Soloriociro. Over 50% of our time on the unit was spent coordinating care with ICU MD, review of medications and establishing plan of care    Interval History   Tele has remained stable in SB mid/high 40s this AM on dopamine 4 mcg/kg/min. No significant ectopy    Physical Exam   Temp: 99.1  F (37.3  C) Temp src: Axillary  Pulse: (!) 49   Resp: 8 SpO2: (!) 86 % O2 Device: Mechanical Ventilator    Vitals:    02/13/22 1600 02/14/22 0600 02/15/22 0000   Weight: 124.7 kg (274 lb 14.6 oz) 125.1 kg (275 lb 12.7 oz) 127.6 kg (281 lb 4.9 oz)     Vital Signs with Ranges  Temp:  [98  F (36.7  C)-99.1  F (37.3  C)] 99.1  F (37.3  C)  Pulse:  [43-61] 49  Resp:  [8-20] 8  MAP:  [76 mmHg-123 mmHg] 92 mmHg  Arterial Line BP: (112-168)/() 142/67  FiO2 (%):  [60 %] 60 %  SpO2:  [86 %-99 %] 86 %  I/O last 3 completed shifts:  In: 3994.6 [I.V.:2139.6; NG/GT:855]  Out: 3685 [Urine:3685]    Telemetry: SB 47 bpm.   EXAM DEFERRED as intubated    Medications     dextrose       DOPamine 4 mcg/kg/min (02/15/22 9007)     epoprostenol (VELETRI) 20 mcg/mL in sterile water inhalation solution 20 ng/kg/min (02/15/22 0253)     heparin 1,150 Units/hr (02/15/22 0702)     HYDROmorphone 5 mg/hr (02/15/22 4209)     ketamine (KETALAR) 25 mg/mL ADULT SEDATION infusion 0.9 mg/kg/hr (02/15/22 0704)     midazolam 20 mg/hr (02/15/22 6871)     sodium  chloride 10 mL/hr at 02/14/22 1952       sodium chloride 0.9%  50 mL Intravenous Q24H     aspirin  81 mg Oral or Feeding Tube Daily     atorvastatin  20 mg Per Feeding Tube At Bedtime     baricitinib  4 mg Oral Daily     ceFEPIme (MAXIPIME) IV  1 g Intravenous Q12H     chlorhexidine  15 mL Mouth/Throat Q12H     cholecalciferol  1,250 mcg Oral Q7 Days     cyanocobalamin  1,000 mcg Oral Daily     dexamethasone  6 mg Intravenous Q24H     diazepam  10 mg Oral or Feeding Tube Q8H     DULoxetine  20 mg Oral Daily     gabapentin  300 mg Oral Q8H RADHA     insulin aspart  1-22 Units Subcutaneous TID AC     insulin aspart  1-16 Units Subcutaneous At Bedtime     insulin glargine  20 Units Subcutaneous BID     levothyroxine  200 mcg Oral Daily     magnesium sulfate  2 g Intravenous Once     multivitamins w/minerals  15 mL Per Feeding Tube Daily     pantoprazole  40 mg Oral QAM AC     protein modular  1 packet Per Feeding Tube TID     Vitamin D3  25 mcg Oral Daily       Data   I personally reviewed the EKG tracing showing SB 47 bpm. QT ~560 ms QTc ~480 ms; improved.  Results for orders placed or performed during the hospital encounter of 02/13/22 (from the past 24 hour(s))   Ionized Calcium   Result Value Ref Range    Calcium Ionized 4.4 4.4 - 5.2 mg/dL   EKG 12-lead, tracing only   Result Value Ref Range    Systolic Blood Pressure  mmHg    Diastolic Blood Pressure  mmHg    Ventricular Rate 54 BPM    Atrial Rate 54 BPM    CA Interval 150 ms    QRS Duration 74 ms     ms    QTc 508 ms    P Axis 90 degrees    R AXIS 39 degrees    T Axis 191 degrees    Interpretation ECG       Sinus bradycardia  ST & T wave abnormality, consider inferior ischemia  ST & T wave abnormality, consider anterolateral ischemia  Prolonged QT  Abnormal ECG  When compared with ECG of 13-FEB-2022 16:01, (unconfirmed)  ST elevation now present in Inferior leads     Heparin Unfractionated Anti Xa Level   Result Value Ref Range    Anti Xa Unfractionated  Heparin 0.33 For Reference Range, See Comment IU/mL    Narrative    Therapeutic Range: UFH: 0.25-0.50 IU/mL for low intensity dosing,  0.30-0.70 IU/mL for high intensity dosing DVT and PE.  This test is not validated for other direct factor X inhibitors (e.g. rivaroxaban, apixaban, edoxaban, betrixaban, fondaparinux) and should not be used for monitoring of other medications.   Blood gas arterial   Result Value Ref Range    pH Arterial 7.37 7.35 - 7.45    pCO2 Arterial 47 (H) 35 - 45 mm Hg    pO2 Arterial 128 (H) 80 - 105 mm Hg    FIO2 80     Bicarbonate Arterial 27 21 - 28 mmol/L    Base Excess/Deficit (+/-) 0.7 -9.0 - 1.8 mmol/L   Triglycerides   Result Value Ref Range    Triglycerides 157 (H) <150 mg/dL   Glucose by meter   Result Value Ref Range    GLUCOSE BY METER POCT 222 (H) 70 - 99 mg/dL   Glucose by meter   Result Value Ref Range    GLUCOSE BY METER POCT 254 (H) 70 - 99 mg/dL   Glucose by meter   Result Value Ref Range    GLUCOSE BY METER POCT 218 (H) 70 - 99 mg/dL   Heparin Unfractionated Anti Xa Level   Result Value Ref Range    Anti Xa Unfractionated Heparin 0.17 For Reference Range, See Comment IU/mL    Narrative    Therapeutic Range: UFH: 0.25-0.50 IU/mL for low intensity dosing,  0.30-0.70 IU/mL for high intensity dosing DVT and PE.  This test is not validated for other direct factor X inhibitors (e.g. rivaroxaban, apixaban, edoxaban, betrixaban, fondaparinux) and should not be used for monitoring of other medications.   Magnesium   Result Value Ref Range    Magnesium 2.1 1.8 - 2.6 mg/dL   Glucose by meter   Result Value Ref Range    GLUCOSE BY METER POCT 215 (H) 70 - 99 mg/dL   Basic metabolic panel   Result Value Ref Range    Sodium 139 133 - 144 mmol/L    Potassium 4.1 3.4 - 5.3 mmol/L    Chloride 108 94 - 109 mmol/L    Carbon Dioxide (CO2) 28 20 - 32 mmol/L    Anion Gap 3 3 - 14 mmol/L    Urea Nitrogen 20 7 - 30 mg/dL    Creatinine 0.62 0.52 - 1.04 mg/dL    Calcium 8.8 8.5 - 10.1 mg/dL    Glucose  247 (H) 70 - 99 mg/dL    GFR Estimate >90 >60 mL/min/1.73m2   Phosphorus   Result Value Ref Range    Phosphorus 3.3 2.5 - 4.5 mg/dL   CBC with platelets   Result Value Ref Range    WBC Count 14.8 (H) 4.0 - 11.0 10e3/uL    RBC Count 4.40 3.80 - 5.20 10e6/uL    Hemoglobin 12.6 11.7 - 15.7 g/dL    Hematocrit 38.7 35.0 - 47.0 %    MCV 88 78 - 100 fL    MCH 28.6 26.5 - 33.0 pg    MCHC 32.6 31.5 - 36.5 g/dL    RDW 13.8 10.0 - 15.0 %    Platelet Count 500 (H) 150 - 450 10e3/uL   Blood gas arterial with oxyhemoglobin   Result Value Ref Range    pH Arterial 7.36 7.35 - 7.45    pCO2 Arterial 50 (H) 35 - 45 mm Hg    pO2 Arterial 84 80 - 105 mm Hg    Bicarbonate Arterial 28 21 - 28 mmol/L    Oxyhemoglobin Arterial 95 92 - 100 %    Base Excess/Deficit (+/-) 2.1 (H) -9.0 - 1.8 mmol/L    FIO2 60    XR Chest Port 1 View    Narrative    EXAM: XR CHEST PORT 1 VIEW  LOCATION: [facility]  DATE/TIME: 2/15/2022 5:39 AM    INDICATION: icu intubated  COMPARISON: 02/11/2022      Impression    IMPRESSION: Endotracheal tube terminates at 2.5 cm from the mariel. Right-sided central venous catheter with tip over SVC.    Improved but persistent pulmonary vascular congestion with small left pleural effusion. No pneumothorax. Stable cardiomediastinal silhouette.   EKG 12-lead, tracing only   Result Value Ref Range    Systolic Blood Pressure  mmHg    Diastolic Blood Pressure  mmHg    Ventricular Rate 47 BPM    Atrial Rate 47 BPM    PA Interval 152 ms    QRS Duration 88 ms     ms    QTc 479 ms    P Axis 46 degrees    R AXIS 30 degrees    T Axis 173 degrees    Interpretation ECG       Sinus bradycardia  ST & T wave abnormality, consider anterolateral ischemia  Prolonged QT  Abnormal ECG  When compared with ECG of 14-FEB-2022 09:31, (unconfirmed)  No significant change was found

## 2022-02-15 NOTE — PROGRESS NOTES
Increasing fio2 requirement as day progressed, repeat ABG showed P:F < 100, decision was made to prone patient non urgently, Due to high doses of sedation required to prevent patient from becoming dyssynchronous will add NM blockade with vecuronium, vec will be titrated to ventilator compliance. Discussed with Dr. Linda.     Addendum: additionally will obtain conditional ABGs every 4 hours after position changes.

## 2022-02-15 NOTE — PROGRESS NOTES
[facility] ICU RESPIRATORY NOTE        Date of Admission: 2/13/2022    Date of Intubation (most recent): 2/10/22    Reason for Mechanical Ventilation: Respiratory failure    Number of Days on Mechanical Ventilation: 6    Met Criteria for Spontaneous Breathing Trial: No    Reason for No Spontaneous Breathing Trial: Require higher O2 demand, PEEP >8    Significant Events Today: None    ABG Results:   Recent Labs   Lab 02/14/22  1225 02/14/22  0528 02/13/22  1858 02/13/22  1659   PH 7.37 7.38 7.46* 7.44   PCO2 47* 46* 37 40   PO2 128* 65* 153* 211*   HCO3 27 27 26 27   O2PER 80 70 70 100       Current Vent Settings: Ventilation Mode: CMV/AC  (Continuous Mandatory Ventilation/ Assist Control)  FiO2 (%): 60 %  Rate Set (breaths/minute): 18 breaths/min  Tidal Volume Set (mL): 350 mL  PEEP (cm H2O): 16 cmH2O  Oxygen Concentration (%): 60 %  Resp: 18      Plan: Will continue on full ventilatory support and keep the saturations above 90%     Sera Desai, RT

## 2022-02-15 NOTE — PROGRESS NOTES
[facility] ICU RESPIRATORY NOTE        Date of Admission: 2/13/2022    Date of Intubation (most recent): 2/10/22    Reason for Mechanical Ventilation: Resp Failure    Number of Days on Mechanical Ventilation: 6    Met Criteria for Spontaneous Breathing Trial: No    Reason for No Spontaneous Breathing Trial: Full strength Veletri, PEEP +16, FiO2 70%.    Significant Events Today: Possible prone on evening/noc    ABG Results:   Recent Labs   Lab 02/15/22  1358 02/15/22  0435 02/14/22  1225 02/14/22  0528   PH 7.39 7.36 7.37 7.38   PCO2 50* 50* 47* 46*   PO2 69* 84 128* 65*   HCO3 30* 28 27 27   O2PER 70 60 80 70       Current Vent Settings: Ventilation Mode: CMV/AC  (Continuous Mandatory Ventilation/ Assist Control)  FiO2 (%): 70 %  Rate Set (breaths/minute): 18 breaths/min  Tidal Volume Set (mL): 350 mL  PEEP (cm H2O): 16 cmH2O  Oxygen Concentration (%): 70 %  Resp: 15      Skin Assessment: clean, dry, intact    Plan: Continue full vent support; wean oxygen as tolerated.  Plan to prone.    Larissa Baca, RT

## 2022-02-15 NOTE — PROGRESS NOTES
Neuro: PERRL. MACHUCA- does not follow commands. Sedated on large dose of versed and dilaudid and Ketamine gtts.   Cardiac: SB. Stable on the dopamine gtt. Atropine given x1. Pulses palpable.   Resp: Full vent support. No vent setting changes. ABG worsened this AM.  GI: TF at goal. Watery BM x3. Sliding scale insulin.   : Valle in place, adequate UOP.   Skin: No major skin issues.

## 2022-02-16 ENCOUNTER — APPOINTMENT (OUTPATIENT)
Dept: GENERAL RADIOLOGY | Facility: CLINIC | Age: 62
DRG: 207 | End: 2022-02-16
Attending: INTERNAL MEDICINE
Payer: COMMERCIAL

## 2022-02-16 LAB
ANION GAP SERPL CALCULATED.3IONS-SCNC: 3 MMOL/L (ref 3–14)
ATRIAL RATE - MUSE: 54 BPM
BASE EXCESS BLDA CALC-SCNC: 4.9 MMOL/L (ref -9–1.8)
BASE EXCESS BLDA CALC-SCNC: 7.5 MMOL/L (ref -9–1.8)
BUN SERPL-MCNC: 23 MG/DL (ref 7–30)
CALCIUM SERPL-MCNC: 8.4 MG/DL (ref 8.5–10.1)
CHLORIDE BLD-SCNC: 106 MMOL/L (ref 94–109)
CO2 SERPL-SCNC: 30 MMOL/L (ref 20–32)
CREAT SERPL-MCNC: 0.59 MG/DL (ref 0.52–1.04)
DIASTOLIC BLOOD PRESSURE - MUSE: NORMAL MMHG
ERYTHROCYTE [DISTWIDTH] IN BLOOD BY AUTOMATED COUNT: 14.1 % (ref 10–15)
GFR SERPL CREATININE-BSD FRML MDRD: >90 ML/MIN/1.73M2
GLUCOSE BLD-MCNC: 169 MG/DL (ref 70–99)
GLUCOSE BLDC GLUCOMTR-MCNC: 135 MG/DL (ref 70–99)
GLUCOSE BLDC GLUCOMTR-MCNC: 148 MG/DL (ref 70–99)
GLUCOSE BLDC GLUCOMTR-MCNC: 221 MG/DL (ref 70–99)
GLUCOSE BLDC GLUCOMTR-MCNC: 236 MG/DL (ref 70–99)
GLUCOSE BLDC GLUCOMTR-MCNC: 244 MG/DL (ref 70–99)
HCO3 BLD-SCNC: 30 MMOL/L (ref 21–28)
HCO3 BLD-SCNC: 33 MMOL/L (ref 21–28)
HCT VFR BLD AUTO: 35.4 % (ref 35–47)
HGB BLD-MCNC: 11.2 G/DL (ref 11.7–15.7)
INTERPRETATION ECG - MUSE: NORMAL
MAGNESIUM SERPL-MCNC: 2.4 MG/DL (ref 1.8–2.6)
MCH RBC QN AUTO: 28.6 PG (ref 26.5–33)
MCHC RBC AUTO-ENTMCNC: 31.6 G/DL (ref 31.5–36.5)
MCV RBC AUTO: 91 FL (ref 78–100)
O2/TOTAL GAS SETTING VFR VENT: 35 %
O2/TOTAL GAS SETTING VFR VENT: 40 %
OXYHGB MFR BLD: 94 % (ref 92–100)
P AXIS - MUSE: 90 DEGREES
PCO2 BLD: 47 MM HG (ref 35–45)
PCO2 BLD: 49 MM HG (ref 35–45)
PH BLD: 7.42 [PH] (ref 7.35–7.45)
PH BLD: 7.43 [PH] (ref 7.35–7.45)
PHOSPHATE SERPL-MCNC: 3.4 MG/DL (ref 2.5–4.5)
PLATELET # BLD AUTO: 452 10E3/UL (ref 150–450)
PO2 BLD: 70 MM HG (ref 80–105)
PO2 BLD: 72 MM HG (ref 80–105)
POTASSIUM BLD-SCNC: 4 MMOL/L (ref 3.4–5.3)
PR INTERVAL - MUSE: 150 MS
QRS DURATION - MUSE: 74 MS
QT - MUSE: 536 MS
QTC - MUSE: 508 MS
R AXIS - MUSE: 39 DEGREES
RBC # BLD AUTO: 3.91 10E6/UL (ref 3.8–5.2)
SODIUM SERPL-SCNC: 139 MMOL/L (ref 133–144)
SYSTOLIC BLOOD PRESSURE - MUSE: NORMAL MMHG
T AXIS - MUSE: 191 DEGREES
UFH PPP CHRO-ACNC: 0.28 IU/ML
VENTRICULAR RATE- MUSE: 54 BPM
WBC # BLD AUTO: 12.4 10E3/UL (ref 4–11)

## 2022-02-16 PROCEDURE — 94645 CONT INHLJ TX EACH ADDL HOUR: CPT

## 2022-02-16 PROCEDURE — 83735 ASSAY OF MAGNESIUM: CPT | Performed by: INTERNAL MEDICINE

## 2022-02-16 PROCEDURE — 71045 X-RAY EXAM CHEST 1 VIEW: CPT

## 2022-02-16 PROCEDURE — 84100 ASSAY OF PHOSPHORUS: CPT | Performed by: SURGERY

## 2022-02-16 PROCEDURE — 258N000003 HC RX IP 258 OP 636: Performed by: STUDENT IN AN ORGANIZED HEALTH CARE EDUCATION/TRAINING PROGRAM

## 2022-02-16 PROCEDURE — 250N000011 HC RX IP 250 OP 636: Performed by: STUDENT IN AN ORGANIZED HEALTH CARE EDUCATION/TRAINING PROGRAM

## 2022-02-16 PROCEDURE — 82803 BLOOD GASES ANY COMBINATION: CPT | Performed by: STUDENT IN AN ORGANIZED HEALTH CARE EDUCATION/TRAINING PROGRAM

## 2022-02-16 PROCEDURE — 250N000009 HC RX 250: Performed by: INTERNAL MEDICINE

## 2022-02-16 PROCEDURE — 99291 CRITICAL CARE FIRST HOUR: CPT | Mod: GC | Performed by: INTERNAL MEDICINE

## 2022-02-16 PROCEDURE — 250N000013 HC RX MED GY IP 250 OP 250 PS 637: Performed by: STUDENT IN AN ORGANIZED HEALTH CARE EDUCATION/TRAINING PROGRAM

## 2022-02-16 PROCEDURE — 999N000157 HC STATISTIC RCP TIME EA 10 MIN

## 2022-02-16 PROCEDURE — 200N000001 HC R&B ICU

## 2022-02-16 PROCEDURE — 85027 COMPLETE CBC AUTOMATED: CPT | Performed by: INTERNAL MEDICINE

## 2022-02-16 PROCEDURE — 82805 BLOOD GASES W/O2 SATURATION: CPT | Performed by: STUDENT IN AN ORGANIZED HEALTH CARE EDUCATION/TRAINING PROGRAM

## 2022-02-16 PROCEDURE — 80048 BASIC METABOLIC PNL TOTAL CA: CPT | Performed by: INTERNAL MEDICINE

## 2022-02-16 PROCEDURE — 999N000009 HC STATISTIC AIRWAY CARE

## 2022-02-16 PROCEDURE — 99232 SBSQ HOSP IP/OBS MODERATE 35: CPT | Performed by: INTERNAL MEDICINE

## 2022-02-16 PROCEDURE — 999N000043 HC STATISTIC CTO2 CONT OXYGEN TECH TIME EA 90 MIN

## 2022-02-16 PROCEDURE — 93005 ELECTROCARDIOGRAM TRACING: CPT

## 2022-02-16 PROCEDURE — 250N000009 HC RX 250: Performed by: STUDENT IN AN ORGANIZED HEALTH CARE EDUCATION/TRAINING PROGRAM

## 2022-02-16 PROCEDURE — 94003 VENT MGMT INPAT SUBQ DAY: CPT

## 2022-02-16 PROCEDURE — 250N000013 HC RX MED GY IP 250 OP 250 PS 637: Performed by: INTERNAL MEDICINE

## 2022-02-16 PROCEDURE — 85520 HEPARIN ASSAY: CPT | Performed by: INTERNAL MEDICINE

## 2022-02-16 PROCEDURE — 250N000011 HC RX IP 250 OP 636: Performed by: INTERNAL MEDICINE

## 2022-02-16 PROCEDURE — 258N000003 HC RX IP 258 OP 636: Performed by: INTERNAL MEDICINE

## 2022-02-16 RX ORDER — FUROSEMIDE 10 MG/ML
40 INJECTION INTRAMUSCULAR; INTRAVENOUS EVERY 12 HOURS
Status: COMPLETED | OUTPATIENT
Start: 2022-02-16 | End: 2022-02-16

## 2022-02-16 RX ORDER — POTASSIUM CHLORIDE 1.5 G/1.58G
40 POWDER, FOR SOLUTION ORAL 2 TIMES DAILY
Status: COMPLETED | OUTPATIENT
Start: 2022-02-16 | End: 2022-02-16

## 2022-02-16 RX ADMIN — CYANOCOBALAMIN TAB 500 MCG 1000 MCG: 500 TAB at 08:07

## 2022-02-16 RX ADMIN — GABAPENTIN 300 MG: 250 SUSPENSION ORAL at 13:38

## 2022-02-16 RX ADMIN — FUROSEMIDE 40 MG: 10 INJECTION, SOLUTION INTRAVENOUS at 20:46

## 2022-02-16 RX ADMIN — KETAMINE HYDROCHLORIDE 2 MG/KG/HR: 100 INJECTION, SOLUTION, CONCENTRATE INTRAMUSCULAR; INTRAVENOUS at 03:46

## 2022-02-16 RX ADMIN — MIDAZOLAM HYDROCHLORIDE 20 MG/HR: 1 INJECTION, SOLUTION INTRAVENOUS at 04:11

## 2022-02-16 RX ADMIN — Medication 15 ML: at 08:07

## 2022-02-16 RX ADMIN — CHLORHEXIDINE GLUCONATE 15 ML: 1.2 SOLUTION ORAL at 20:46

## 2022-02-16 RX ADMIN — MIDAZOLAM HYDROCHLORIDE 20 MG/HR: 1 INJECTION, SOLUTION INTRAVENOUS at 14:10

## 2022-02-16 RX ADMIN — CEFEPIME HYDROCHLORIDE 1 G: 1 INJECTION, POWDER, FOR SOLUTION INTRAMUSCULAR; INTRAVENOUS at 20:47

## 2022-02-16 RX ADMIN — VECURONIUM BROMIDE 2 MCG/KG/MIN: 1 INJECTION, POWDER, LYOPHILIZED, FOR SOLUTION INTRAVENOUS at 01:56

## 2022-02-16 RX ADMIN — INSULIN ASPART 9 UNITS: 100 INJECTION, SOLUTION INTRAVENOUS; SUBCUTANEOUS at 21:03

## 2022-02-16 RX ADMIN — Medication 6 MG/HR: at 02:55

## 2022-02-16 RX ADMIN — POTASSIUM CHLORIDE 40 MEQ: 1.5 POWDER, FOR SOLUTION ORAL at 08:18

## 2022-02-16 RX ADMIN — Medication 40 MG: at 08:07

## 2022-02-16 RX ADMIN — Medication 6 MG/HR: at 10:29

## 2022-02-16 RX ADMIN — INSULIN ASPART 4 UNITS: 100 INJECTION, SOLUTION INTRAVENOUS; SUBCUTANEOUS at 00:10

## 2022-02-16 RX ADMIN — DEXAMETHASONE SODIUM PHOSPHATE 6 MG: 4 INJECTION, SOLUTION INTRAMUSCULAR; INTRAVENOUS at 08:06

## 2022-02-16 RX ADMIN — EPOPROSTENOL 20 NG/KG/MIN: 1.5 INJECTION, POWDER, LYOPHILIZED, FOR SOLUTION INTRAVENOUS at 06:11

## 2022-02-16 RX ADMIN — LEVOTHYROXINE SODIUM 200 MCG: 100 TABLET ORAL at 08:07

## 2022-02-16 RX ADMIN — ATORVASTATIN CALCIUM 20 MG: 10 TABLET, FILM COATED ORAL at 21:00

## 2022-02-16 RX ADMIN — HEPARIN SODIUM 1150 UNITS/HR: 1000 INJECTION INTRAVENOUS; SUBCUTANEOUS at 08:06

## 2022-02-16 RX ADMIN — MIDAZOLAM HYDROCHLORIDE 20 MG/HR: 1 INJECTION, SOLUTION INTRAVENOUS at 19:08

## 2022-02-16 RX ADMIN — Medication 6 MG/HR: at 06:37

## 2022-02-16 RX ADMIN — Medication 1 PACKET: at 15:13

## 2022-02-16 RX ADMIN — Medication 25 MCG: at 08:07

## 2022-02-16 RX ADMIN — DIAZEPAM 10 MG: 5 TABLET ORAL at 12:06

## 2022-02-16 RX ADMIN — CHLORHEXIDINE GLUCONATE 15 ML: 1.2 SOLUTION ORAL at 08:07

## 2022-02-16 RX ADMIN — GABAPENTIN 300 MG: 250 SUSPENSION ORAL at 05:13

## 2022-02-16 RX ADMIN — MINERAL OIL AND PETROLATUM 3.5 G: 150; 830 OINTMENT OPHTHALMIC at 02:00

## 2022-02-16 RX ADMIN — VECURONIUM BROMIDE 2 MCG/KG/MIN: 1 INJECTION, POWDER, LYOPHILIZED, FOR SOLUTION INTRAVENOUS at 09:03

## 2022-02-16 RX ADMIN — Medication 6 MG/HR: at 13:39

## 2022-02-16 RX ADMIN — Medication 6 MG/HR: at 20:46

## 2022-02-16 RX ADMIN — GABAPENTIN 300 MG: 250 SUSPENSION ORAL at 21:37

## 2022-02-16 RX ADMIN — MINERAL OIL AND PETROLATUM 3.5 G: 150; 830 OINTMENT OPHTHALMIC at 08:13

## 2022-02-16 RX ADMIN — MIDAZOLAM HYDROCHLORIDE 20 MG/HR: 1 INJECTION, SOLUTION INTRAVENOUS at 09:03

## 2022-02-16 RX ADMIN — Medication 1 PACKET: at 08:07

## 2022-02-16 RX ADMIN — CEFEPIME HYDROCHLORIDE 1 G: 1 INJECTION, POWDER, FOR SOLUTION INTRAMUSCULAR; INTRAVENOUS at 08:07

## 2022-02-16 RX ADMIN — MIDAZOLAM HYDROCHLORIDE 20 MG/HR: 1 INJECTION, SOLUTION INTRAVENOUS at 23:35

## 2022-02-16 RX ADMIN — BARICITINIB 4 MG: 2 TABLET, FILM COATED ORAL at 08:07

## 2022-02-16 RX ADMIN — VECURONIUM BROMIDE 2 MCG/KG/MIN: 1 INJECTION, POWDER, LYOPHILIZED, FOR SOLUTION INTRAVENOUS at 05:49

## 2022-02-16 RX ADMIN — INSULIN ASPART 10 UNITS: 100 INJECTION, SOLUTION INTRAVENOUS; SUBCUTANEOUS at 11:42

## 2022-02-16 RX ADMIN — INSULIN ASPART 11 UNITS: 100 INJECTION, SOLUTION INTRAVENOUS; SUBCUTANEOUS at 15:16

## 2022-02-16 RX ADMIN — KETAMINE HYDROCHLORIDE 2 MG/KG/HR: 100 INJECTION, SOLUTION, CONCENTRATE INTRAMUSCULAR; INTRAVENOUS at 23:47

## 2022-02-16 RX ADMIN — DIAZEPAM 10 MG: 5 TABLET ORAL at 05:13

## 2022-02-16 RX ADMIN — INSULIN ASPART 1 UNITS: 100 INJECTION, SOLUTION INTRAVENOUS; SUBCUTANEOUS at 04:08

## 2022-02-16 RX ADMIN — Medication 6 MG/HR: at 23:34

## 2022-02-16 RX ADMIN — Medication 1 PACKET: at 21:00

## 2022-02-16 RX ADMIN — Medication 6 MG/HR: at 17:15

## 2022-02-16 RX ADMIN — ASPIRIN 81 MG CHEWABLE TABLET 81 MG: 81 TABLET CHEWABLE at 08:07

## 2022-02-16 RX ADMIN — POTASSIUM CHLORIDE 40 MEQ: 1.5 POWDER, FOR SOLUTION ORAL at 20:46

## 2022-02-16 RX ADMIN — DULOXETINE HYDROCHLORIDE 20 MG: 20 CAPSULE, DELAYED RELEASE ORAL at 08:07

## 2022-02-16 RX ADMIN — FUROSEMIDE 40 MG: 10 INJECTION, SOLUTION INTRAVENOUS at 08:18

## 2022-02-16 RX ADMIN — DIAZEPAM 10 MG: 5 TABLET ORAL at 20:47

## 2022-02-16 RX ADMIN — KETAMINE HYDROCHLORIDE 2 MG/KG/HR: 100 INJECTION, SOLUTION, CONCENTRATE INTRAMUSCULAR; INTRAVENOUS at 13:38

## 2022-02-16 ASSESSMENT — ACTIVITIES OF DAILY LIVING (ADL)
ADLS_ACUITY_SCORE: 28
ADLS_ACUITY_SCORE: 24
ADLS_ACUITY_SCORE: 28
ADLS_ACUITY_SCORE: 22
ADLS_ACUITY_SCORE: 24
ADLS_ACUITY_SCORE: 22
ADLS_ACUITY_SCORE: 28
ADLS_ACUITY_SCORE: 24
ADLS_ACUITY_SCORE: 28
ADLS_ACUITY_SCORE: 24
ADLS_ACUITY_SCORE: 24
ADLS_ACUITY_SCORE: 22
ADLS_ACUITY_SCORE: 28
ADLS_ACUITY_SCORE: 28
ADLS_ACUITY_SCORE: 22
ADLS_ACUITY_SCORE: 22
ADLS_ACUITY_SCORE: 24
ADLS_ACUITY_SCORE: 28
ADLS_ACUITY_SCORE: 24
ADLS_ACUITY_SCORE: 28
ADLS_ACUITY_SCORE: 22

## 2022-02-16 NOTE — PROGRESS NOTES
Shift Summary [id number]:    Neuro: sedated and paralyzed. Paralytic started in order to prone. Sedation maxed d/t agitation. PERRLA. Was moving all extremities spontaneous but not to commands.     Cardiac: SB, HR remained > 40. MAP maintained> 65. L radial A-line    Respiratory: CMV 70%, peep 16, , RR 18. Full strength veletri. Proned at 1730. Minimal secretions.     GI: OG pulled, [place] feed placed. TF decresed to 25ml/hr. Mulitiple loose stools, rectal tube placed.     : Valle remains in place    Integumentary: intact    Potassium and mag given today. Proned at 1730. Paralytic started before proning.    Itzel Ware RN

## 2022-02-16 NOTE — PLAN OF CARE
Proned/paralyzed/ p-16 40% this AM. Supined and p/f improved. Paralytic and veletri weaned off. Remaining supine today. QT normal this AM. SB/SR. Pp+ skin warm some +1 generalized edema. Diuretics given with great outputs. TF advanced- one loose small stool. BS controlled. All care explained and reassrances provided. Remains on ketamine, dilaudid, and versed gtts.

## 2022-02-16 NOTE — PLAN OF CARE
[id number]    Remains proned, intubated, sedated, and mechanically ventilated. O2 weaned overnight. Unresponsive, RASS -4, paralyzed. SB in 40s to 50s. LS diminished. Rectal tube came out, monitoring loose stools, c. Diff negative. Valle. OG repositioned, X-ray verified. R internal jugular, PIV x2, arterial line in L radial artery. Daughter updated on iPad. Continue to monitor.

## 2022-02-16 NOTE — PROGRESS NOTES
EP Progress Note-Virtual visit r/t COVID infection          Assessment and Plan:   No Moulton is a 62 year old female with h/o DM2, HTN, HL, bipolar disorder, fibromyalgia h/o polysubstance abuse (on methadone), hypothyroidism, chronic Hepatitis C who was admitted to [facility] 2/8/2022 with c/o SOB.  Dx'd with breakthrough COVID 19 (vaccinated without booster) on 2/2/2022. Required BiPAP therapy. Required intubation/ventilation for progressive hypoxia and transferred to [facility] on 2/10 d/t bed availability. Had cardiac arrest r/t VF/Torsades de Pointes with long QT. Transferred to [facility] 2/13/2022. EP consulted (Dr. Alvarez, 2/14/2022) for assistance with arrhythmia management.     1. VF/Torsades de Pointes arrest 2/11/2022 -   - EF on echo on 2/12 showed EF 20-25% (very difficult study); on 2/10, images were difficult but appeared EF was wnl.  Echo 6/2020 showed normal LV function. Tako-tsubo CM suspected    - Had been on remdesivir (contributes to bradycardia) with rates getting to upper 30s. Had also been on cefepime, vancomycin and azithromycin; switched to ciprofloxacin 2/11 (for UTI tx) as well as methadone, all of which could contribute to long QT.     Dopamine weaned off yesterday    Results for NO MOULTON (MRN [id number]) as of 2/16/2022 09:10   Ref. Range 2/16/2022 04:17 2/16/2022 04:22   Potassium Latest Ref Range: 3.4 - 5.3 mmol/L 4.0    Magnesium Latest Ref Range: 1.8 - 2.6 mg/dL  2.4        - EKG on 2/11 with SR 60 bpm with  ms  -EKG today sinus mya 58 bpm  ms    PLAN:  QT has normalized  Continue to avoid QT prolonging drugs  Keep K>4 and  Mg>2  Repeat echo on Friday to reassess EF. If EF back to normal then ok to stop heparin gtt at that time.    EP signing off, but please call with any questions or concerns.      Annette Dotson, CNP        Interval History:   Pt sedated and intubated. Virtual visit completed and spoke with pt's nurse for update.         Medications:        artificial tears   Both Eyes Q8H     aspirin  81 mg Oral or Feeding Tube Daily     atorvastatin  20 mg Per Feeding Tube At Bedtime     baricitinib  4 mg Oral Daily     ceFEPIme (MAXIPIME) IV  1 g Intravenous Q12H     chlorhexidine  15 mL Mouth/Throat Q12H     cholecalciferol  1,250 mcg Oral Q7 Days     cyanocobalamin  1,000 mcg Oral Daily     dexamethasone  6 mg Intravenous Q24H     diazepam  10 mg Oral or Feeding Tube Q8H     DULoxetine  20 mg Oral Daily     furosemide  40 mg Intravenous Q12H     gabapentin  300 mg Oral Q8H RADHA     insulin aspart  1-22 Units Subcutaneous Q4H     insulin glargine  20 Units Subcutaneous BID     levothyroxine  200 mcg Oral Daily     multivitamins w/minerals  15 mL Per Feeding Tube Daily     pantoprazole  40 mg Oral QAM AC     potassium chloride  40 mEq Oral BID     protein modular  1 packet Per Feeding Tube TID     Vitamin D3  25 mcg Oral Daily             Review of Systems: If done, described below  The Review of Systems is negative other than noted in the HPI             Physical Exam:   Blood pressure 127/72, pulse 65, temperature 98.3  F (36.8  C), temperature source Axillary, resp. rate 18, weight 125.6 kg (276 lb 14.4 oz), last menstrual period 2013, SpO2 96 %, not currently breastfeeding.        Vital Sign Ranges  Temperature Temp  Av.1  F (36.7  C)  Min: 97.2  F (36.2  C)  Max: 99  F (37.2  C)   Blood pressure Systolic (24hrs), Av , Min:127 , Max:127        Diastolic (24hrs), Av, Min:72, Max:72      Pulse Pulse  Av.3  Min: 44  Max: 68   Respirations Resp  Av.1  Min: 9  Max: 24   Pulse oximetry SpO2  Av.2 %  Min: 87 %  Max: 100 %         Intake/Output Summary (Last 24 hours) at 2022 0859  Last data filed at 2022 0800  Gross per 24 hour   Intake 3920.81 ml   Output 4270 ml   Net -349.19 ml       Constitutional:   Pt intubated and EXAM deferred. COVID isolation              Data:     Lab Results   Component Value Date    WBC 12.4 (H)  02/16/2022    HGB 11.2 (L) 02/16/2022    HCT 35.4 02/16/2022     (H) 02/16/2022     02/16/2022    POTASSIUM 4.0 02/16/2022    CHLORIDE 106 02/16/2022    CO2 30 02/16/2022    BUN 23 02/16/2022    CR 0.59 02/16/2022     (H) 02/16/2022    SED 83 (H) 06/04/2021    DD 1.09 (H) 02/13/2022    NTBNPI 218 02/08/2022    TROPI <0.015 05/27/2021    AST 38 02/14/2022    ALT 62 (H) 02/14/2022    ALKPHOS 59 02/14/2022    BILITOTAL 0.3 02/14/2022    JODY 27 05/27/2021    INR 1.13 02/14/2022

## 2022-02-16 NOTE — PROGRESS NOTES
[facility] ICU RESPIRATORY NOTE           Date of Admission: 2/13/2022     Date of Intubation (most recent): 2/10/22     Reason for Mechanical Ventilation: Resp Failure     Number of Days on Mechanical Ventilation: 7       Met Criteria for Spontaneous Breathing Trial: No     Reason for No Spontaneous Breathing Trial: Full strength Veletri, PEEP +16, FiO2 70%.     Significant Events Today:proned  At 6pm  Recent Labs   Lab 02/16/22  0418 02/15/22  2057 02/15/22  1358 02/15/22  0435   PH 7.42 7.42 7.39 7.36   PCO2 47* 47* 50* 50*   PO2 70* 176* 69* 84   HCO3 30* 30* 30* 28   O2PER 40 70 70 60   Ventilation Mode: CMV/AC  (Continuous Mandatory Ventilation/ Assist Control)  FiO2 (%): 40 %  Rate Set (breaths/minute): 18 breaths/min  Tidal Volume Set (mL): 350 mL  PEEP (cm H2O): 16 cmH2O  Oxygen Concentration (%): 70 %  Resp: 11

## 2022-02-16 NOTE — PROGRESS NOTES
CLINICAL NUTRITION SERVICES - REASSESSMENT NOTE      Recommendations Ordered by Registered Dietitian (RD):   Modified TF orders to account for need to hold TF 1 hour before and after levothyroxine daily:    Vital High Protein at goal 55 mL/hr x 22 hours (1210 mL/day) provides 1210 kcal, 105 g protein, 134 g CHO, 1012 mL free water and no fiber daily  + Prosource TID = 120 kcal and 33 g protein  TOTAL = 1330 kcal (11 kcal/kg) and 138 g protein (2.5 g/kg)   Future/Additional Recommendations:   Per ASPEN guidelines, OK to feed at goal rate while in prone position    Malnutrition:   % Weight Loss:  Weight loss does not meet criteria for malnutrition   % Intake:  <75% for > 7 days (moderate malnutrition)  Subcutaneous Fat Loss:  Deferred with COVID-19 precautions  Muscle Loss:  Deferred with COVID-19 precautions  Fluid Retention:  None noted    Malnutrition Diagnosis: Unable to determine due to lack of NFPA, patient is at risk for malnutrition        EVALUATION OF PROGRESS TOWARD GOALS   Diet:  NPO (vented)    Nutrition Support:  TF initiated at [facility] on 2/11, [facility]. Reached goal rate on 2/14 evening. TF turned down to 1/2 rate overnight, suspect 2/2 proning. Increased back to goal rate this morning and continues as follows ~      Nutrition Support Enteral:  Type of Feeding Tube: NDT (2/15)  Enteral Frequency:  Continuous  Enteral Regimen: Vital High Protein at goal 50 mL/hr   Total Enteral Provisions: 1200 kcals, 104 gm pro, 1003 mL H20, 133 gm CHO, no fiber   Free Water Flush: 60 mL q 4 hours   Certavite daily for micronutrient needs     + 1 packet Prosource TID = 120 kcal and 33 g protein  TOTAL = 1320 kcal (11 kcal/kg) and 137 g protein (2.5 g/kg)      Intake/Tolerance: First full 24 hours of goal TF regimen on 2/15, however only received 975 mL per I/Os due to decrease in rate when patient was placed in prone position. Received 3 packets Prosource/day over the past 2 days. No s/sx intolerance  noted.       ASSESSED NUTRITION NEEDS:  Dosing Weight:  124.7 kg (actual for energy), 54.5 kg (IBW for pro)  Estimated Energy Needs: 9481-7948 kcals (11-14 Kcal/Kg)  Justification: obese and vented  Estimated Protein Needs: 109-136 grams protein (2-2.5 g pro/Kg)  Justification: hypercatabolism with critical illness and obesity guidelines   Estimated Fluid Needs:  Per provider pending fluid status      NEW FINDINGS:   - Labs: Reviewed. K+ 4 (WNL), Phos 3.4 (WNL), -225.   - Meds: vecuronium, vitamin D3 25 mcg/day, vitamin B12 1000 mcg/day, decadron, very high sliding scale insulin, 20 units lantus BID, levothyroxine   - GI: 400 mL stool yesterday, BM x 2 overnight   - Cardiac: ECHO on 2/12 showed EF of 20-25%, suspect Tako-tsubo cardiomyopathy per provider notes  - Resp: Remains intubated, proned overnight   - Weight: Weight up 1 kg from admission, overall stable.     Vitals:    02/13/22 1500 02/13/22 1600 02/14/22 0600 02/15/22 0000   Weight: 124.7 kg (274 lb 14.6 oz) 124.7 kg (274 lb 14.6 oz) 125.1 kg (275 lb 12.7 oz) 127.6 kg (281 lb 4.9 oz)    02/16/22 0515   Weight: 125.6 kg (276 lb 14.4 oz)       Previous Goals:   TF + Prosource will meet % estimated needs  Evaluation: Not met    Previous Nutrition Diagnosis:   Inadequate enteral nutrition infusion related to TF held during transfer and plan to restart today as evidenced by TF meeting 0% needs.  Evaluation: No change, updated below       CURRENT NUTRITION DIAGNOSIS  Inadequate enteral nutrition infusion related to TF advancing towards goal and rate reduced with proning as evidenced by meeting < 75% nutrition needs on average over the past 3 days     INTERVENTIONS  Recommendations / Nutrition Prescription  See above     Implementation  EN Schedule - as above   Collaboration and Referral of Nutrition care - pt discussed this morning during interdisciplinary rounds    Goals  TF + Prosource will meet % estimated needs       MONITORING AND  EVALUATION:  Progress towards goals will be monitored and evaluated per protocol and Practice Guidelines      Paulina Rodriguez RD, LD

## 2022-02-16 NOTE — PROGRESS NOTES
[facility] ICU RESPIRATORY NOTE        Date of Admission: 2/13/2022    Date of Intubation (most recent):2/10/22    Reason for Mechanical Ventilation:Respiratory failure    Number of Days on Mechanical Ventilation:7    Met Criteria for Spontaneous Breathing Trial:No    Reason for No Spontaneous Breathing Trial:PEEP 16    Significant Events Today:Pt was supined at 1000. Veletri was weaned off per MD order.     ABG Results:   Recent Labs   Lab 02/16/22  1247 02/16/22  0418 02/15/22  2057 02/15/22  1358   PH 7.43 7.42 7.42 7.39   PCO2 49* 47* 47* 50*   PO2 72* 70* 176* 69*   HCO3 33* 30* 30* 30*   O2PER 35 40 70 70       Current Vent Settings: Vent Mode: CMV/AC  (Continuous Mandatory Ventilation/ Assist Control)  FiO2 (%): 35 %  Resp Rate (Set): 18 breaths/min  Tidal Volume (Set, mL): 350 mL  PEEP (cm H2O): 16 cmH2O  Oxygen Concentration (%): 70 %  Resp: 19      Skin Assessment:Intact. ETT secured with ETAD slim.     Plan:Will cont full vent support for now and will assess for weaning readiness daily.    Evangelina Fernandez, RT

## 2022-02-16 NOTE — PROGRESS NOTES
Critical Care  Note      02/16/2022    Name: No Yusuf MRN#: [id number]   Age: 62 year old YOB: 1960     [facility] Day# 3             Problem List:   Active Problems:    Acute respiratory failure due to COVID-19 (H)    1. Acute respiratory failure due to COVID-19 pneumonia - will complete 10 days of Decadron 6 mgms/day; and will complete 14 days baricitinib   2. ARDS - currently on 40% and +16 PEEP; trending P:F, no plan to prone at present, half strength velitri today  3. S/p arrest believed due to VT/ Torsades de Pointes associated with long QT (2/11) - Cards following, no pacing indicated at this time, off dopamine, cont heparin, echo 2/18  4. Bradycardia after arrest with HR about 50-60,normotensive; goal HR >40 per cards  5. Demand ischemia - monitor only   6. Cardiomyopathy, likely stress related, EF 20%, ECHO repeat 2/18  7. UTI due to E. Coli - will continue Cefepime for 1 week total.   8. DM - she has been adequately controlled with bid lantus 20mg and sliding scale and will continue  9. Nutrition- TF at goal  10. History of polysubstance abuse and now on methadone - on IV narcotic   11. History of hepatitis C - trend CMP  12. Hypothyroid - follow up TSH pending and continue current synthroid dose.   13. GERD - protonix daily despite potential increased Qt  14. Depression and Anxiety; and bipolar - continue routine meds.   15. Fibromyalgia   16. Dyslipidemia   17. History of vitamin D deficiency   18. Hx Bipolar and acute Hyperactive Delerium         Summary/Hospital Course:     2/13: She was transferred from [facility] after arrest 2/11 felt to be due to Torsades de Pointes. She has been bradycardic in 40-50's (not hypotensive nor requiring vasopressors) and was transferred to [facility] with concern that she may need a temporary pacemaker to treat TdP. She has covid lung disease and ARDS and is on high levels of vent support.     2/14: remains bradycardic, dopamine infusion started with improvement,  sedation ongoing issue, increasing dilaudid dose, added valium enteral, added ketamine gtt, avoiding QT prolonging agents, increased PEEP and Velitri, avoiding proning while concern of unstable arrythmia     2/15: Overnight no major issues, bradycardia persisted, but did not get below 40, minimal ectopic beats, cardiology reviewed again today and are ok titrating dopamine off, goal HR >40, heparin gtt to continue until repeat echo  (moniotr EF and apical ballooning), diuresis with goal negative fluid balance daily.     : Proned yesterday as P:F <100, improved P:F with proning, post pyloric tube placed, flexiseal for diarrhea, C diff negative, planning to wean velitri today, continue diuresis, will supine this afternoon and trend P:F.      Assessment and plan :     No Yusuf is a 62 year old female admitted on 2022 for possible TdP and pacemaker; ARDS due to covid-19.   I have personally reviewed the daily labs, imaging studies, cultures and discussed the case with referring physician and consulting physicians.   My assessment and plan by system for this patient is as follows:    Neurology/Psychiatry:   1. Sedated on vent - High doses of versed gtt, dilaudid gtt, ketamine gtt, valium enteral    Cardiovascular:   1.Hemodynamics - compensated at this time   2. Bradycardia - off dopamine, cards CS, goal HR >40  3. Hx Torsade de Pointes - Mg 2.5, K 4.5, avoid QT prolonging agents  4. Cardiomyopathy - EF 20% on recent, repeat   5. Cardiac Arrest - cards recs appreciated, heparin gtt, ASA, statin, beta blocker contraindicated with bradycardia    Pulmonary/Ventilator Management:   1. COVID ARDS - Velitri 20, ARDS NET ventilation: 18/350/16/40% => 7.42/47/70/30  -Pronin prone /8 supine, trending P:F  -weaning velitri    GI and Nutrition :   1. Tube feeds at goal, nutrition CS appreciated, PPI  2. Hx of HCV - monitor LFTs    Renal/Fluids/Electrolytes:   1. Compensated with creatinine 0.59; will  focus on keeping K (4.5) and Mg (2.5) replaced.   2. Lasix 40 x2, 40 mEQ KCl x2 anticipating losses    Infectious Disease:   1. 1 week of UTI treatment for E. Coli with Cefepime   2. Baricitinib 14 day course, Dexamethasone 10 day course    Endocrine:   1. DM, Stress and steroid induced hyperglycemia - lantus and sliding scale insulin; synthroid    Hematology/Oncology:   1. Hg 11.2, monitor      IV/Access:   1. Venous access - RIJ TLC  2. Arterial access - Radial Art Line  3. x2 PIV  Plan  - central access required and necessary      ICU Prophylaxis:   1. DVT: Hep gtt, mechanical  2. VAP: HOB 30 degrees, chlorhexidine rinse  3. Stress Ulcer: PPI  4. Restraints: Nonviolent soft two point restraints required and necessary for patient safety and continued cares and good effect as patient continues to pull at necessary lines, tubes despite education and distraction. Will readdress daily.   5. Wound care  - positioning and normal preventative measures  6. Feeding -  Enteral   7. Family Update: I spoke with daughter by phone today for update   8. Disposition - ICU care               Medical History:     Past Medical History:   Diagnosis Date     Arthritis      Bipolar affective (H)      Fibromyalgia      Hypertension      Past Surgical History:   Procedure Laterality Date     CHOLECYSTECTOMY       GYN SURGERY      c section     GYN SURGERY      oblation     ORTHOPEDIC SURGERY      left leg, left shoulder, back     Social History     Socioeconomic History     Marital status: Single     Spouse name: Not on file     Number of children: Not on file     Years of education: Not on file     Highest education level: Not on file   Occupational History     Not on file   Tobacco Use     Smoking status: Former Smoker     Packs/day: 0.10     Smokeless tobacco: Never Used   Substance and Sexual Activity     Alcohol use: No     Drug use: No     Sexual activity: Not Currently   Other Topics Concern     Not on file   Social History  Narrative     Not on file     Social Determinants of Health     Financial Resource Strain: Not on file   Food Insecurity: Not on file   Transportation Needs: Not on file   Physical Activity: Not on file   Stress: Not on file   Social Connections: Not on file   Intimate Partner Violence: Not on file   Housing Stability: Not on file        Allergies   Allergen Reactions     Promethazine Other (See Comments) and Anxiety     Noted in 8/30/08 ER     Codeine Phosphate GI Disturbance     Lamotrigine Other (See Comments)     hyponatremia     Oxcarbazepine Unknown and Other (See Comments)     Low sodium  LegacyRecord#[id number]       Codeine Other (See Comments) and Rash     GI bleeding  LegacyRecord#[id number]                Key Medications:       artificial tears   Both Eyes Q8H     aspirin  81 mg Oral or Feeding Tube Daily     atorvastatin  20 mg Per Feeding Tube At Bedtime     baricitinib  4 mg Oral Daily     ceFEPIme (MAXIPIME) IV  1 g Intravenous Q12H     chlorhexidine  15 mL Mouth/Throat Q12H     cholecalciferol  1,250 mcg Oral Q7 Days     cyanocobalamin  1,000 mcg Oral Daily     dexamethasone  6 mg Intravenous Q24H     diazepam  10 mg Oral or Feeding Tube Q8H     DULoxetine  20 mg Oral Daily     gabapentin  300 mg Oral Q8H RADHA     insulin aspart  1-22 Units Subcutaneous Q4H     insulin glargine  20 Units Subcutaneous BID     levothyroxine  200 mcg Oral Daily     multivitamins w/minerals  15 mL Per Feeding Tube Daily     pantoprazole  40 mg Oral QAM AC     protein modular  1 packet Per Feeding Tube TID     Vitamin D3  25 mcg Oral Daily       dextrose       DOPamine Stopped (02/15/22 1430)     epoprostenol (VELETRI) 20 mcg/mL in sterile water inhalation solution 20 ng/kg/min (02/16/22 0611)     heparin 1,150 Units/hr (02/15/22 1600)     HYDROmorphone 6 mg/hr (02/16/22 0637)     ketamine (KETALAR) 25 mg/mL ADULT SEDATION infusion 2 mg/kg/hr (02/16/22 3475)     midazolam 20 mg/hr (02/16/22 1837)     sodium chloride 10 mL/hr at  02/15/22 7492     vecuronium (NORCURON) infusion in D5W ADULT 2 mcg/kg/min (02/16/22 0570)        Home Meds  No current facility-administered medications on file prior to encounter.  amLODIPine (NORVASC) 5 MG tablet, Take 5 mg by mouth daily   atorvastatin (LIPITOR) 20 MG tablet, Take 20 mg by mouth At Bedtime   cholecalciferol 25 MCG (1000 UT) TABS, Take 1,000 Units by mouth daily   diclofenac (VOLTAREN) 1 % topical gel, Apply 4 g topically 4 times daily (Patient taking differently: Apply 2-4 g topically 2 times daily )  DULoxetine (CYMBALTA) 60 MG capsule, Take 60 mg by mouth daily  fluticasone-salmeterol (ADVAIR) 250-50 MCG/DOSE inhaler, Inhale 1 puff into the lungs 2 times daily  furosemide (LASIX) 20 MG tablet, Take 20 mg by mouth daily  gabapentin (NEURONTIN) 400 MG capsule, Take 1,200 mg by mouth 3 times daily  hydrOXYzine (ATARAX) 50 MG tablet, Take 100 mg by mouth every 6 hours as needed for anxiety  insulin detemir (LEVEMIR PEN) 100 UNIT/ML pen, Inject 15 Units Subcutaneous every morning  levalbuterol (XOPENEX HFA) 45 MCG/ACT inhaler, Inhale 2 puffs into the lungs every 4 hours as needed for shortness of breath / dyspnea or wheezing   levothyroxine (SYNTHROID/LEVOTHROID) 200 MCG tablet, Take 200 mcg by mouth daily   melatonin 5 MG tablet, Take 5 mg by mouth At Bedtime  metFORMIN (GLUCOPHAGE) 1000 MG tablet, Take 1,000 mg by mouth 2 times daily (with meals)  methadone HCl 10 MG/5ML SOLN, Take 80 mg by mouth daily 10mg/mL strength  multivitamin w/minerals (THERA-VIT-M) tablet, Take 1 tablet by mouth daily  NARCAN 4 MG/0.1ML nasal spray, CALL 911. SPRAY CONTENTS OF ONE SPRAYER (0.1ML) INTO ONE NOSTRIL. REPEAT IN 2-3 MINS IF SYMPTOMS OF OPIOID PERSIST, ALTERNATE NOSTRILS  nicotine polacrilex (NICORETTE) 4 MG gum, Place 4 mg inside cheek every hour as needed for smoking cessation   omeprazole (PRILOSEC) 20 MG DR capsule, Take 20 mg by mouth daily   polyethylene glycol (MIRALAX) 17 GM/Dose powder, Take 17 g by  mouth daily as needed for constipation  potassium chloride ER (KLOR-CON) 8 MEQ CR tablet, Take 8 mEq by mouth 2 times daily  traZODone (DESYREL) 50 MG tablet, Take 50 mg by mouth At Bedtime  vitamin B-12 (CYANOCOBALAMIN) 1000 MCG tablet, Take 1,000 mcg by mouth daily  ACCU-CHEK RAFAELA PLUS test strip,   acetaminophen (TYLENOL) 500 MG tablet, Take 500 mg by mouth 3 times daily   Acidophilus Lactobacillus CAPS, Take 1 tablet by mouth 3 times daily  Alcohol Swabs (ALCOHOL WIPES) 70 % PADS, USE WITH INSULIN INJECTION ONCE DAILY. **100 DAYS SUPPLY**  blood glucose (ACCU-CHEK RAFAELA PLUS) test strip, Use to check blood sugar 3x daily or as directed.  Blood Glucose Calibration (ACCU-CHEK ACTIVE GLUCOSE CONT VI), 1 each by Other route  blood glucose calibration (ACCU-CHEK RAFAELA) solution,                Physical Examination:   Temp:  [97.2  F (36.2  C)-99  F (37.2  C)] 97.4  F (36.3  C)  Pulse:  [44-68] 57  Resp:  [9-24] 17  MAP:  [65 mmHg-112 mmHg] 97 mmHg  Arterial Line BP: ()/(50-93) 148/68  FiO2 (%):  [40 %-70 %] 40 %  SpO2:  [87 %-100 %] 97 %  No intake or output data in the 24 hours ending 02/13/22 1647  Wt Readings from Last 4 Encounters:   02/16/22 125.6 kg (276 lb 14.4 oz)   02/13/22 122.7 kg (270 lb 6.4 oz)   02/09/22 126.1 kg (278 lb)   06/08/21 133.1 kg (293 lb 6.4 oz)     Arterial Line BP: ()/(50-93) 148/68  MAP:  [65 mmHg-112 mmHg] 97 mmHg  Vent Mode: CMV/AC  (Continuous Mandatory Ventilation/ Assist Control)  FiO2 (%): 40 %  Resp Rate (Set): 18 breaths/min  Tidal Volume (Set, mL): 350 mL  PEEP (cm H2O): 16 cmH2O  Oxygen Concentration (%): 70 %  Resp: 17    Recent Labs   Lab 02/16/22  0418 02/15/22  2057 02/15/22  1358 02/15/22  0435   PH 7.42 7.42 7.39 7.36   PCO2 47* 47* 50* 50*   PO2 70* 176* 69* 84   HCO3 30* 30* 30* 28   O2PER 40 70 70 60       GEN: no acute distress; comfortable on vent    HEENT: head ncat, sclera anicteric, OP patent, trachea midline ETT secured, OGT secured  PULM: unlabored  synchronous with vent, clear anteriorly    CV/COR: RRR S1S2 no gallop,  No rub, no murmur  ABD: soft nontender, obese   EXT:  Mild to mod edema   warm  NEURO: heavily sedated   SKIN: no obvious rash; no cyanosis or mottling   LINES: clean, dry intact         Data:   All data and imaging reviewed     ROUTINE ICU LABS (Last four results)  CMP  Recent Labs   Lab 02/16/22  0417 02/16/22  0351 02/15/22  2329 02/15/22  2007 02/15/22  1751 02/15/22  0835 02/15/22  0435 02/15/22  0434 02/14/22  0817 02/14/22  0526 02/13/22  2058 02/13/22  1659 02/13/22  0806 02/13/22  0437 02/11/22  0809 02/11/22  0426 02/10/22  0740 02/10/22  0635     --   --   --   --   --  139  --   --  139  --  136  --  140   < > 138  --  138   POTASSIUM 4.0  --   --   --  4.2  --  4.1  --   --  3.9  --  3.9   < > 3.5   < > 3.8  --  3.9   CHLORIDE 106  --   --   --   --   --  108  --   --  109  --  106  --  109   < > 107  --  103   CO2 30  --   --   --   --   --  28  --   --  27  --  26  --  28   < > 25  --  31   ANIONGAP 3  --   --   --   --   --  3  --   --  3  --  4  --  3   < > 6  --  4   * 148* 174* 193*  --    < > 247* 215*   < > 185*   < > 246*   < > 187*   < > 196*   < > 139*   BUN 23  --   --   --   --   --  20  --   --  24  --  22  --  21   < > 14  --  17   CR 0.59  --   --   --   --   --  0.62  --   --  0.68  --  0.63  --  0.73   < > 0.65  --  0.75   GFRESTIMATED >90  --   --   --   --   --  >90  --   --  >90  --  >90  --  >90   < > >90  --  90   RODOLFO 8.4*  --   --   --   --   --  8.8  --   --  8.3*  --  8.6  --  8.1*   < > 7.6*  --  8.8   MAG  --   --   --   --   --   --   --  2.1  --  2.3  --  1.8  --  6.4*   < >  --   --   --    PHOS 3.4  --   --   --   --   --  3.3  --   --  3.9  --   --   --  2.9   < >  --   --   --    PROTTOTAL  --   --   --   --   --   --   --   --   --  7.0  --   --   --   --   --  7.0  --  8.5   ALBUMIN  --   --   --   --   --   --   --   --   --  2.1*  --   --   --   --   --  1.9*  --  2.5*   BILITOTAL   --   --   --   --   --   --   --   --   --  0.3  --   --   --   --   --  0.6  --  0.4   ALKPHOS  --   --   --   --   --   --   --   --   --  59  --   --   --   --   --  69  --  81   AST  --   --   --   --   --   --   --   --   --  38  --   --   --   --   --  81*  --  55*   ALT  --   --   --   --   --   --   --   --   --  62*  --   --   --   --   --  53*  --  36    < > = values in this interval not displayed.     CBC  Recent Labs   Lab 02/16/22  0417 02/15/22  0435 02/14/22  0526 02/13/22  1519   WBC 12.4* 14.8* 10.2 10.1   RBC 3.91 4.40 3.88 4.19   HGB 11.2* 12.6 10.9* 12.0   HCT 35.4 38.7 34.9* 38.0   MCV 91 88 90 91   MCH 28.6 28.6 28.1 28.6   MCHC 31.6 32.6 31.2* 31.6   RDW 14.1 13.8 14.2 14.0   * 500* 341 332     INR  Recent Labs   Lab 02/14/22  0526 02/10/22  0635   INR 1.13 0.99     Arterial Blood Gas  Recent Labs   Lab 02/16/22  0418 02/15/22  2057 02/15/22  1358 02/15/22  0435   PH 7.42 7.42 7.39 7.36   PCO2 47* 47* 50* 50*   PO2 70* 176* 69* 84   HCO3 30* 30* 30* 28   O2PER 40 70 70 60       All cultures:  No results for input(s): CULT in the last 168 hours.  Recent Results (from the past 24 hour(s))   XR Chest Port 1 View    Narrative    EXAM: XR CHEST PORT 1 VIEW  LOCATION: [facility]  DATE/TIME: 2/13/2022 6:36 AM    INDICATION: icu intubated  COMPARISON: 02/12/2022      Impression    IMPRESSION: Endotracheal tube 2.9 cm above mariel. Enteric tube below lower aspect of film. Rotation to left. Diffuse, bilateral infiltrates.    XR Chest Port 1 View    Narrative    EXAM: XR CHEST PORT 1 VIEW  LOCATION: [facility]  DATE/TIME: 2/13/2022 3:04 PM    INDICATION: Endotracheal tube positioning.  COMPARISON: 2/13/2022 at 6:23 AM      Impression    IMPRESSION: Endotracheal tube tip 3.5 cm from the mariel. Right IJ line noted with tip in the mid to low SVC. Improved bilateral infiltrates compared to previous.         Mansoor Ruiz DO  SCC Fellow    Billing:  This patient is critically ill: Yes. Total critical care time today 40 min.

## 2022-02-17 ENCOUNTER — APPOINTMENT (OUTPATIENT)
Dept: CARDIOLOGY | Facility: CLINIC | Age: 62
DRG: 207 | End: 2022-02-17
Attending: PHYSICIAN ASSISTANT
Payer: COMMERCIAL

## 2022-02-17 LAB
ALBUMIN SERPL-MCNC: 2.3 G/DL (ref 3.4–5)
ALP SERPL-CCNC: 106 U/L (ref 40–150)
ALT SERPL W P-5'-P-CCNC: 622 U/L (ref 0–50)
ANION GAP SERPL CALCULATED.3IONS-SCNC: 3 MMOL/L (ref 3–14)
ANION GAP SERPL CALCULATED.3IONS-SCNC: 3 MMOL/L (ref 3–14)
AST SERPL W P-5'-P-CCNC: 285 U/L (ref 0–45)
ATRIAL RATE - MUSE: 47 BPM
BASE EXCESS BLDA CALC-SCNC: 9.9 MMOL/L (ref -9–1.8)
BILIRUB SERPL-MCNC: 0.6 MG/DL (ref 0.2–1.3)
BUN SERPL-MCNC: 29 MG/DL (ref 7–30)
BUN SERPL-MCNC: 33 MG/DL (ref 7–30)
CALCIUM SERPL-MCNC: 8.5 MG/DL (ref 8.5–10.1)
CALCIUM SERPL-MCNC: 8.6 MG/DL (ref 8.5–10.1)
CHLORIDE BLD-SCNC: 102 MMOL/L (ref 94–109)
CHLORIDE BLD-SCNC: 103 MMOL/L (ref 94–109)
CO2 SERPL-SCNC: 32 MMOL/L (ref 20–32)
CO2 SERPL-SCNC: 34 MMOL/L (ref 20–32)
CREAT SERPL-MCNC: 0.67 MG/DL (ref 0.52–1.04)
CREAT SERPL-MCNC: 0.72 MG/DL (ref 0.52–1.04)
DIASTOLIC BLOOD PRESSURE - MUSE: NORMAL MMHG
ERYTHROCYTE [DISTWIDTH] IN BLOOD BY AUTOMATED COUNT: 14.4 % (ref 10–15)
GFR SERPL CREATININE-BSD FRML MDRD: >90 ML/MIN/1.73M2
GFR SERPL CREATININE-BSD FRML MDRD: >90 ML/MIN/1.73M2
GLUCOSE BLD-MCNC: 127 MG/DL (ref 70–99)
GLUCOSE BLD-MCNC: 129 MG/DL (ref 70–99)
GLUCOSE BLDC GLUCOMTR-MCNC: 140 MG/DL (ref 70–99)
GLUCOSE BLDC GLUCOMTR-MCNC: 155 MG/DL (ref 70–99)
GLUCOSE BLDC GLUCOMTR-MCNC: 190 MG/DL (ref 70–99)
GLUCOSE BLDC GLUCOMTR-MCNC: 203 MG/DL (ref 70–99)
GLUCOSE BLDC GLUCOMTR-MCNC: 221 MG/DL (ref 70–99)
GLUCOSE BLDC GLUCOMTR-MCNC: 89 MG/DL (ref 70–99)
GLUCOSE BLDC GLUCOMTR-MCNC: 93 MG/DL (ref 70–99)
HCO3 BLD-SCNC: 36 MMOL/L (ref 21–28)
HCT VFR BLD AUTO: 36.3 % (ref 35–47)
HGB BLD-MCNC: 11.9 G/DL (ref 11.7–15.7)
INTERPRETATION ECG - MUSE: NORMAL
LVEF ECHO: NORMAL
MAGNESIUM SERPL-MCNC: 2 MG/DL (ref 1.6–2.3)
MCH RBC QN AUTO: 28.9 PG (ref 26.5–33)
MCHC RBC AUTO-ENTMCNC: 32.8 G/DL (ref 31.5–36.5)
MCV RBC AUTO: 88 FL (ref 78–100)
O2/TOTAL GAS SETTING VFR VENT: 40 %
P AXIS - MUSE: 46 DEGREES
PCO2 BLD: 50 MM HG (ref 35–45)
PH BLD: 7.46 [PH] (ref 7.35–7.45)
PHOSPHATE SERPL-MCNC: 2.8 MG/DL (ref 2.5–4.5)
PLATELET # BLD AUTO: 526 10E3/UL (ref 150–450)
PO2 BLD: 96 MM HG (ref 80–105)
POTASSIUM BLD-SCNC: 3.6 MMOL/L (ref 3.4–5.3)
POTASSIUM BLD-SCNC: 4 MMOL/L (ref 3.4–5.3)
PR INTERVAL - MUSE: 152 MS
PROT SERPL-MCNC: 7.1 G/DL (ref 6.8–8.8)
QRS DURATION - MUSE: 88 MS
QT - MUSE: 542 MS
QTC - MUSE: 479 MS
R AXIS - MUSE: 30 DEGREES
RBC # BLD AUTO: 4.12 10E6/UL (ref 3.8–5.2)
SODIUM SERPL-SCNC: 138 MMOL/L (ref 133–144)
SODIUM SERPL-SCNC: 139 MMOL/L (ref 133–144)
SYSTOLIC BLOOD PRESSURE - MUSE: NORMAL MMHG
T AXIS - MUSE: 173 DEGREES
UFH PPP CHRO-ACNC: 0.11 IU/ML
UFH PPP CHRO-ACNC: 0.28 IU/ML
VENTRICULAR RATE- MUSE: 47 BPM
WBC # BLD AUTO: 13.6 10E3/UL (ref 4–11)

## 2022-02-17 PROCEDURE — 258N000003 HC RX IP 258 OP 636: Performed by: STUDENT IN AN ORGANIZED HEALTH CARE EDUCATION/TRAINING PROGRAM

## 2022-02-17 PROCEDURE — 200N000001 HC R&B ICU

## 2022-02-17 PROCEDURE — 85520 HEPARIN ASSAY: CPT | Performed by: INTERNAL MEDICINE

## 2022-02-17 PROCEDURE — 94003 VENT MGMT INPAT SUBQ DAY: CPT

## 2022-02-17 PROCEDURE — 82310 ASSAY OF CALCIUM: CPT | Performed by: INTERNAL MEDICINE

## 2022-02-17 PROCEDURE — 255N000002 HC RX 255 OP 636: Performed by: INTERNAL MEDICINE

## 2022-02-17 PROCEDURE — 250N000011 HC RX IP 250 OP 636: Performed by: INTERNAL MEDICINE

## 2022-02-17 PROCEDURE — 93325 DOPPLER ECHO COLOR FLOW MAPG: CPT

## 2022-02-17 PROCEDURE — 93325 DOPPLER ECHO COLOR FLOW MAPG: CPT | Mod: 26 | Performed by: INTERNAL MEDICINE

## 2022-02-17 PROCEDURE — 93321 DOPPLER ECHO F-UP/LMTD STD: CPT | Mod: 26 | Performed by: INTERNAL MEDICINE

## 2022-02-17 PROCEDURE — 250N000011 HC RX IP 250 OP 636: Performed by: STUDENT IN AN ORGANIZED HEALTH CARE EDUCATION/TRAINING PROGRAM

## 2022-02-17 PROCEDURE — 250N000013 HC RX MED GY IP 250 OP 250 PS 637: Performed by: STUDENT IN AN ORGANIZED HEALTH CARE EDUCATION/TRAINING PROGRAM

## 2022-02-17 PROCEDURE — 82803 BLOOD GASES ANY COMBINATION: CPT | Performed by: STUDENT IN AN ORGANIZED HEALTH CARE EDUCATION/TRAINING PROGRAM

## 2022-02-17 PROCEDURE — 250N000013 HC RX MED GY IP 250 OP 250 PS 637: Performed by: INTERNAL MEDICINE

## 2022-02-17 PROCEDURE — 250N000009 HC RX 250: Performed by: STUDENT IN AN ORGANIZED HEALTH CARE EDUCATION/TRAINING PROGRAM

## 2022-02-17 PROCEDURE — 83735 ASSAY OF MAGNESIUM: CPT | Performed by: STUDENT IN AN ORGANIZED HEALTH CARE EDUCATION/TRAINING PROGRAM

## 2022-02-17 PROCEDURE — 999N000157 HC STATISTIC RCP TIME EA 10 MIN

## 2022-02-17 PROCEDURE — 93308 TTE F-UP OR LMTD: CPT | Mod: 26 | Performed by: INTERNAL MEDICINE

## 2022-02-17 PROCEDURE — 999N000009 HC STATISTIC AIRWAY CARE

## 2022-02-17 PROCEDURE — 84100 ASSAY OF PHOSPHORUS: CPT | Performed by: INTERNAL MEDICINE

## 2022-02-17 PROCEDURE — 85027 COMPLETE CBC AUTOMATED: CPT | Performed by: STUDENT IN AN ORGANIZED HEALTH CARE EDUCATION/TRAINING PROGRAM

## 2022-02-17 PROCEDURE — 99291 CRITICAL CARE FIRST HOUR: CPT | Mod: GC | Performed by: INTERNAL MEDICINE

## 2022-02-17 PROCEDURE — 258N000003 HC RX IP 258 OP 636: Performed by: INTERNAL MEDICINE

## 2022-02-17 RX ORDER — ACETAZOLAMIDE 250 MG/1
500 TABLET ORAL
Status: COMPLETED | OUTPATIENT
Start: 2022-02-17 | End: 2022-02-18

## 2022-02-17 RX ORDER — POTASSIUM CHLORIDE 1.5 G/1.58G
20 POWDER, FOR SOLUTION ORAL 2 TIMES DAILY
Status: DISCONTINUED | OUTPATIENT
Start: 2022-02-17 | End: 2022-02-18

## 2022-02-17 RX ORDER — ACETAZOLAMIDE 250 MG/1
250 TABLET ORAL
Status: DISCONTINUED | OUTPATIENT
Start: 2022-02-17 | End: 2022-02-17

## 2022-02-17 RX ORDER — POTASSIUM CHLORIDE 29.8 MG/ML
20 INJECTION INTRAVENOUS ONCE
Status: COMPLETED | OUTPATIENT
Start: 2022-02-17 | End: 2022-02-17

## 2022-02-17 RX ORDER — FUROSEMIDE 10 MG/ML
40 INJECTION INTRAMUSCULAR; INTRAVENOUS EVERY 12 HOURS
Status: COMPLETED | OUTPATIENT
Start: 2022-02-17 | End: 2022-02-17

## 2022-02-17 RX ADMIN — INSULIN ASPART 7 UNITS: 100 INJECTION, SOLUTION INTRAVENOUS; SUBCUTANEOUS at 12:22

## 2022-02-17 RX ADMIN — Medication 6 MG/HR: at 16:54

## 2022-02-17 RX ADMIN — CYANOCOBALAMIN TAB 500 MCG 1000 MCG: 500 TAB at 08:44

## 2022-02-17 RX ADMIN — BARICITINIB 4 MG: 2 TABLET, FILM COATED ORAL at 11:55

## 2022-02-17 RX ADMIN — ATORVASTATIN CALCIUM 20 MG: 10 TABLET, FILM COATED ORAL at 20:48

## 2022-02-17 RX ADMIN — INSULIN ASPART 6 UNITS: 100 INJECTION, SOLUTION INTRAVENOUS; SUBCUTANEOUS at 00:42

## 2022-02-17 RX ADMIN — Medication 6 MG/HR: at 10:19

## 2022-02-17 RX ADMIN — INSULIN ASPART 2 UNITS: 100 INJECTION, SOLUTION INTRAVENOUS; SUBCUTANEOUS at 03:47

## 2022-02-17 RX ADMIN — Medication 40 MG: at 08:44

## 2022-02-17 RX ADMIN — MIDAZOLAM HYDROCHLORIDE 20 MG/HR: 1 INJECTION, SOLUTION INTRAVENOUS at 04:44

## 2022-02-17 RX ADMIN — MIDAZOLAM HYDROCHLORIDE 20 MG/HR: 1 INJECTION, SOLUTION INTRAVENOUS at 22:41

## 2022-02-17 RX ADMIN — Medication 1 PACKET: at 16:00

## 2022-02-17 RX ADMIN — KETAMINE HYDROCHLORIDE 250.2 MG/HR: 100 INJECTION, SOLUTION, CONCENTRATE INTRAMUSCULAR; INTRAVENOUS at 20:08

## 2022-02-17 RX ADMIN — Medication 1 PACKET: at 10:21

## 2022-02-17 RX ADMIN — Medication 6 MG/HR: at 20:40

## 2022-02-17 RX ADMIN — CEFEPIME HYDROCHLORIDE 1 G: 1 INJECTION, POWDER, FOR SOLUTION INTRAMUSCULAR; INTRAVENOUS at 20:38

## 2022-02-17 RX ADMIN — CHLORHEXIDINE GLUCONATE 15 ML: 1.2 SOLUTION ORAL at 19:53

## 2022-02-17 RX ADMIN — FUROSEMIDE 40 MG: 10 INJECTION, SOLUTION INTRAVENOUS at 19:54

## 2022-02-17 RX ADMIN — KETAMINE HYDROCHLORIDE 2 MG/KG/HR: 100 INJECTION, SOLUTION, CONCENTRATE INTRAMUSCULAR; INTRAVENOUS at 09:00

## 2022-02-17 RX ADMIN — Medication 1 PACKET: at 20:48

## 2022-02-17 RX ADMIN — Medication 6 MG/HR: at 13:43

## 2022-02-17 RX ADMIN — INSULIN ASPART 1 UNITS: 100 INJECTION, SOLUTION INTRAVENOUS; SUBCUTANEOUS at 23:49

## 2022-02-17 RX ADMIN — DIAZEPAM 10 MG: 5 TABLET ORAL at 19:54

## 2022-02-17 RX ADMIN — GABAPENTIN 300 MG: 250 SUSPENSION ORAL at 06:25

## 2022-02-17 RX ADMIN — CHLORHEXIDINE GLUCONATE 15 ML: 1.2 SOLUTION ORAL at 08:44

## 2022-02-17 RX ADMIN — DIAZEPAM 10 MG: 5 TABLET ORAL at 03:41

## 2022-02-17 RX ADMIN — Medication 6 MG/HR: at 03:41

## 2022-02-17 RX ADMIN — ACETAZOLAMIDE 500 MG: 250 TABLET ORAL at 16:00

## 2022-02-17 RX ADMIN — INSULIN ASPART 10 UNITS: 100 INJECTION, SOLUTION INTRAVENOUS; SUBCUTANEOUS at 16:13

## 2022-02-17 RX ADMIN — GABAPENTIN 300 MG: 250 SUSPENSION ORAL at 14:27

## 2022-02-17 RX ADMIN — POTASSIUM CHLORIDE 20 MEQ: 1.5 POWDER, FOR SOLUTION ORAL at 20:48

## 2022-02-17 RX ADMIN — POTASSIUM CHLORIDE 20 MEQ: 1.5 POWDER, FOR SOLUTION ORAL at 08:44

## 2022-02-17 RX ADMIN — GABAPENTIN 300 MG: 250 SUSPENSION ORAL at 20:49

## 2022-02-17 RX ADMIN — MIDAZOLAM HYDROCHLORIDE 20 MG/HR: 1 INJECTION, SOLUTION INTRAVENOUS at 09:03

## 2022-02-17 RX ADMIN — HUMAN ALBUMIN MICROSPHERES AND PERFLUTREN 9 ML: 10; .22 INJECTION, SOLUTION INTRAVENOUS at 10:29

## 2022-02-17 RX ADMIN — MIDAZOLAM HYDROCHLORIDE 20 MG/HR: 1 INJECTION, SOLUTION INTRAVENOUS at 19:40

## 2022-02-17 RX ADMIN — ENOXAPARIN SODIUM 60 MG: 60 INJECTION SUBCUTANEOUS at 22:18

## 2022-02-17 RX ADMIN — POTASSIUM CHLORIDE 20 MEQ: 29.8 INJECTION, SOLUTION INTRAVENOUS at 10:19

## 2022-02-17 RX ADMIN — Medication 6 MG/HR: at 07:10

## 2022-02-17 RX ADMIN — Medication 25 MCG: at 08:44

## 2022-02-17 RX ADMIN — ASPIRIN 81 MG CHEWABLE TABLET 81 MG: 81 TABLET CHEWABLE at 08:44

## 2022-02-17 RX ADMIN — LEVOTHYROXINE SODIUM 200 MCG: 100 TABLET ORAL at 08:44

## 2022-02-17 RX ADMIN — CEFEPIME HYDROCHLORIDE 1 G: 1 INJECTION, POWDER, FOR SOLUTION INTRAMUSCULAR; INTRAVENOUS at 08:43

## 2022-02-17 RX ADMIN — Medication 15 ML: at 08:44

## 2022-02-17 RX ADMIN — DEXAMETHASONE SODIUM PHOSPHATE 6 MG: 4 INJECTION, SOLUTION INTRAMUSCULAR; INTRAVENOUS at 08:45

## 2022-02-17 RX ADMIN — HEPARIN SODIUM 1300 UNITS/HR: 1000 INJECTION INTRAVENOUS; SUBCUTANEOUS at 08:44

## 2022-02-17 RX ADMIN — DIAZEPAM 10 MG: 5 TABLET ORAL at 11:19

## 2022-02-17 RX ADMIN — MIDAZOLAM HYDROCHLORIDE 20 MG/HR: 1 INJECTION, SOLUTION INTRAVENOUS at 14:07

## 2022-02-17 RX ADMIN — DULOXETINE HYDROCHLORIDE 20 MG: 20 CAPSULE, DELAYED RELEASE ORAL at 08:44

## 2022-02-17 RX ADMIN — FUROSEMIDE 40 MG: 10 INJECTION, SOLUTION INTRAVENOUS at 08:45

## 2022-02-17 ASSESSMENT — ACTIVITIES OF DAILY LIVING (ADL)
ADLS_ACUITY_SCORE: 24
ADLS_ACUITY_SCORE: 28
ADLS_ACUITY_SCORE: 28
ADLS_ACUITY_SCORE: 26
ADLS_ACUITY_SCORE: 28
ADLS_ACUITY_SCORE: 26
ADLS_ACUITY_SCORE: 28
ADLS_ACUITY_SCORE: 24
ADLS_ACUITY_SCORE: 28
ADLS_ACUITY_SCORE: 28
ADLS_ACUITY_SCORE: 26
ADLS_ACUITY_SCORE: 28
ADLS_ACUITY_SCORE: 26
ADLS_ACUITY_SCORE: 28
ADLS_ACUITY_SCORE: 26
ADLS_ACUITY_SCORE: 28
ADLS_ACUITY_SCORE: 28

## 2022-02-17 NOTE — PROGRESS NOTES
[facility] ICU RESPIRATORY NOTE           Date of Admission: 2/13/2022     Date of Intubation (most recent):2/10/22     Reason for Mechanical Ventilation:Respiratory failure     Number of Days on Mechanical Ventilation:8     Met Criteria for Spontaneous Breathing Trial:No     Reason for No Spontaneous Breathing Trial:PEEP 16     Significant Events Today:none  Recent Labs   Lab 02/16/22  1247 02/16/22  0418 02/15/22  2057 02/15/22  1358   PH 7.43 7.42 7.42 7.39   PCO2 49* 47* 47* 50*   PO2 72* 70* 176* 69*   HCO3 33* 30* 30* 30*   O2PER 35 40 70 70   Vent Mode: CMV/AC  (Continuous Mandatory Ventilation/ Assist Control)  FiO2 (%): 40 %  Resp Rate (Set): 18 breaths/min  Tidal Volume (Set, mL): 350 mL  PEEP (cm H2O): 16 cmH2O  Oxygen Concentration (%): 70 %  Resp: 17

## 2022-02-17 NOTE — PROGRESS NOTES
11am-330pm    Neuro: Sedated, but easily arouses. Thrashing frequently. Agitated.  Pulm: Diminished LS. On full vent support.   CV: SR. BP WNL  : Valle patent with adequate UOP  GI: Small watery BMx1 this shift. On TF via NG. BS present  Extremities: Purposeful movements  Lines: R internal jugular central line. PIVx2 on RUE  Skin: Grossly intact  Family: Sister updated  Plan: Continue to monitor and support.

## 2022-02-17 NOTE — PROGRESS NOTES
Critical Care  Note      02/17/2022    Name: No Yusuf MRN#: [id number]   Age: 62 year old YOB: 1960     [facility] Day# 4             Problem List:   Active Problems:    Acute respiratory failure due to COVID-19 (H)    1. Acute respiratory failure due to COVID-19 pneumonia - will complete 10 days of Decadron 6 mgms/day; and will complete 14 days baricitinib   2. ARDS - currently on 40% and +16 PEEP; trending P:F, no plan to prone at present, off velitri, tolerating supine  3. S/p arrest believed due to VT/ Torsades de Pointes associated with long QT (2/11) - Cards following, no pacing indicated at this time, off dopamine, cont heparin, echo 2/18  4. Bradycardia after arrest with HR about 50-60,normotensive; goal HR >40 per cards  5. Demand ischemia - monitor only   6. Cardiomyopathy, likely stress related, EF 20%, ECHO repeat 2/18  7. UTI due to E. Coli - will continue Cefepime for 1 week total.   8. DM - she has been adequately controlled with bid lantus 20mg and sliding scale and will continue  9. Nutrition- TF at goal  10. History of polysubstance abuse and now on methadone - on IV narcotic   11. History of hepatitis C - trend CMP  12. Hypothyroid - follow up TSH pending and continue current synthroid dose.   13. GERD - protonix daily despite potential increased Qt  14. Depression and Anxiety; and bipolar - continue routine meds.   15. Fibromyalgia   16. Dyslipidemia   17. History of vitamin D deficiency   18. Hx Bipolar and acute Hyperactive Delerium         Summary/Hospital Course:     2/13: She was transferred from [facility] after arrest 2/11 felt to be due to Torsades de Pointes. She has been bradycardic in 40-50's (not hypotensive nor requiring vasopressors) and was transferred to [facility] with concern that she may need a temporary pacemaker to treat TdP. She has covid lung disease and ARDS and is on high levels of vent support.     2/14: remains bradycardic, dopamine infusion started with  improvement, sedation ongoing issue, increasing dilaudid dose, added valium enteral, added ketamine gtt, avoiding QT prolonging agents, increased PEEP and Velitri, avoiding proning while concern of unstable arrythmia     2/15: Overnight no major issues, bradycardia persisted, but did not get below 40, minimal ectopic beats, cardiology reviewed again today and are ok titrating dopamine off, goal HR >40, heparin gtt to continue until repeat echo 2/18 (moniotr EF and apical ballooning), diuresis with goal negative fluid balance daily.     2/16: Proned yesterday as P:F <100, improved P:F with proning, post pyloric tube placed, flexiseal for diarrhea, C diff negative, planning to wean velitri today, continue diuresis, will supine this afternoon and trend P:F.    2/17: Remained supine with stable P:F, weaned from velitri without desaturation, NM blockade removed and remained stable with sat and vent compliance, tolerated diuresis yesterday which will continue today.      Assessment and plan :     No Yusuf is a 62 year old female admitted on 2/13/2022 for possible TdP and pacemaker; ARDS due to covid-19.   I have personally reviewed the daily labs, imaging studies, cultures and discussed the case with referring physician and consulting physicians.   My assessment and plan by system for this patient is as follows:    Neurology/Psychiatry:   1. Sedated on vent - High doses of versed gtt, dilaudid gtt, ketamine gtt, valium enteral  ---off NM blockade    Cardiovascular:   1.Hemodynamics - compensated at this time   2. Bradycardia - off dopamine, cards CS, goal HR >40  3. Hx Torsade de Pointes - Mg 2.5, K 4.5, avoid QT prolonging agents  4. Cardiomyopathy - EF 20% on recent, repeat 2/18  5. Cardiac Arrest - cards recs appreciated, heparin gtt, ASA, statin, beta blocker contraindicated with bradycardia    Pulmonary/Ventilator Management:   1. COVID ARDS - Velitri 20, ARDS NET ventilation: 18/350/16/40% => ABG pending  but stable vent settings while supine, off NM blockade and velitri  -plan to remain supine  -weaning velitri    GI and Nutrition :   1. Tube feeds at goal, nutrition CS appreciated, PPI  2. Hx of HCV - monitor LFTs    Renal/Fluids/Electrolytes:   1. Compensated with creatinine 0.67; will focus on keeping K (4.5) and Mg (2.5) replaced.   2. Lasix 40 x2, 20 mEQ KCl x2 anticipating losses, 20meq IV KCl as well    Infectious Disease:   1. 1 week of UTI treatment for E. Coli with Cefepime   2. Baricitinib 14 day course, Dexamethasone 10 day course    Endocrine:   1. DM, Stress and steroid induced hyperglycemia - lantus 20mg BID and sliding scale insulin; synthroid    Hematology/Oncology:   1. Stable Hb, heparin gtt as per cards given apical ballooning and concern of mural thrombus formation     IV/Access:   1. Venous access - RIJ TLC - possible discontinue today  2. Arterial access - Radial Art Line  3. x2 PIV  Plan  - central access required and necessary      ICU Prophylaxis:   1. DVT: Hep gtt, mechanical  2. VAP: HOB 30 degrees, chlorhexidine rinse  3. Stress Ulcer: PPI  4. Restraints: Nonviolent soft two point restraints required and necessary for patient safety and continued cares and good effect as patient continues to pull at necessary lines, tubes despite education and distraction. Will readdress daily.   5. Wound care  - positioning and normal preventative measures  6. Feeding -  Enteral   7. Family Update: I spoke with daughter by phone today for update   8. Disposition - ICU care               Medical History:     Past Medical History:   Diagnosis Date     Arthritis      Bipolar affective (H)      Fibromyalgia      Hypertension      Past Surgical History:   Procedure Laterality Date     CHOLECYSTECTOMY       GYN SURGERY      c section     GYN SURGERY      oblation     ORTHOPEDIC SURGERY      left leg, left shoulder, back     Social History     Socioeconomic History     Marital status: Single     Spouse name:  Not on file     Number of children: Not on file     Years of education: Not on file     Highest education level: Not on file   Occupational History     Not on file   Tobacco Use     Smoking status: Former Smoker     Packs/day: 0.10     Smokeless tobacco: Never Used   Substance and Sexual Activity     Alcohol use: No     Drug use: No     Sexual activity: Not Currently   Other Topics Concern     Not on file   Social History Narrative     Not on file     Social Determinants of Health     Financial Resource Strain: Not on file   Food Insecurity: Not on file   Transportation Needs: Not on file   Physical Activity: Not on file   Stress: Not on file   Social Connections: Not on file   Intimate Partner Violence: Not on file   Housing Stability: Not on file        Allergies   Allergen Reactions     Promethazine Other (See Comments) and Anxiety     Noted in 8/30/08 ER     Codeine Phosphate GI Disturbance     Lamotrigine Other (See Comments)     hyponatremia     Oxcarbazepine Unknown and Other (See Comments)     Low sodium  LegacyRecord#[id number]       Codeine Other (See Comments) and Rash     GI bleeding  LegacyRecord#[id number]                Key Medications:       aspirin  81 mg Oral or Feeding Tube Daily     atorvastatin  20 mg Per Feeding Tube At Bedtime     baricitinib  4 mg Oral Daily     ceFEPIme (MAXIPIME) IV  1 g Intravenous Q12H     chlorhexidine  15 mL Mouth/Throat Q12H     cholecalciferol  1,250 mcg Oral Q7 Days     cyanocobalamin  1,000 mcg Oral Daily     dexamethasone  6 mg Intravenous Q24H     diazepam  10 mg Oral or Feeding Tube Q8H     DULoxetine  20 mg Oral Daily     furosemide  40 mg Intravenous Q12H     gabapentin  300 mg Oral Q8H RADHA     insulin aspart  1-22 Units Subcutaneous Q4H     insulin glargine  20 Units Subcutaneous BID     levothyroxine  200 mcg Oral Daily     multivitamins w/minerals  15 mL Per Feeding Tube Daily     pantoprazole  40 mg Oral QAM AC     potassium chloride  20 mEq Oral BID     protein  modular  1 packet Per Feeding Tube TID     Vitamin D3  25 mcg Oral Daily       dextrose       heparin 1,300 Units/hr (02/17/22 0844)     HYDROmorphone 6 mg/hr (02/17/22 0710)     ketamine (KETALAR) 25 mg/mL ADULT SEDATION infusion 2 mg/kg/hr (02/17/22 0900)     midazolam 20 mg/hr (02/17/22 0903)     sodium chloride 10 mL/hr at 02/15/22 2246        Home Meds  No current facility-administered medications on file prior to encounter.  amLODIPine (NORVASC) 5 MG tablet, Take 5 mg by mouth daily   atorvastatin (LIPITOR) 20 MG tablet, Take 20 mg by mouth At Bedtime   cholecalciferol 25 MCG (1000 UT) TABS, Take 1,000 Units by mouth daily   diclofenac (VOLTAREN) 1 % topical gel, Apply 4 g topically 4 times daily (Patient taking differently: Apply 2-4 g topically 2 times daily )  DULoxetine (CYMBALTA) 60 MG capsule, Take 60 mg by mouth daily  fluticasone-salmeterol (ADVAIR) 250-50 MCG/DOSE inhaler, Inhale 1 puff into the lungs 2 times daily  furosemide (LASIX) 20 MG tablet, Take 20 mg by mouth daily  gabapentin (NEURONTIN) 400 MG capsule, Take 1,200 mg by mouth 3 times daily  hydrOXYzine (ATARAX) 50 MG tablet, Take 100 mg by mouth every 6 hours as needed for anxiety  insulin detemir (LEVEMIR PEN) 100 UNIT/ML pen, Inject 15 Units Subcutaneous every morning  levalbuterol (XOPENEX HFA) 45 MCG/ACT inhaler, Inhale 2 puffs into the lungs every 4 hours as needed for shortness of breath / dyspnea or wheezing   levothyroxine (SYNTHROID/LEVOTHROID) 200 MCG tablet, Take 200 mcg by mouth daily   melatonin 5 MG tablet, Take 5 mg by mouth At Bedtime  metFORMIN (GLUCOPHAGE) 1000 MG tablet, Take 1,000 mg by mouth 2 times daily (with meals)  methadone HCl 10 MG/5ML SOLN, Take 80 mg by mouth daily 10mg/mL strength  multivitamin w/minerals (THERA-VIT-M) tablet, Take 1 tablet by mouth daily  NARCAN 4 MG/0.1ML nasal spray, CALL 911. SPRAY CONTENTS OF ONE SPRAYER (0.1ML) INTO ONE NOSTRIL. REPEAT IN 2-3 MINS IF SYMPTOMS OF OPIOID PERSIST, ALTERNATE  NOSTRILS  nicotine polacrilex (NICORETTE) 4 MG gum, Place 4 mg inside cheek every hour as needed for smoking cessation   omeprazole (PRILOSEC) 20 MG DR capsule, Take 20 mg by mouth daily   polyethylene glycol (MIRALAX) 17 GM/Dose powder, Take 17 g by mouth daily as needed for constipation  potassium chloride ER (KLOR-CON) 8 MEQ CR tablet, Take 8 mEq by mouth 2 times daily  traZODone (DESYREL) 50 MG tablet, Take 50 mg by mouth At Bedtime  vitamin B-12 (CYANOCOBALAMIN) 1000 MCG tablet, Take 1,000 mcg by mouth daily  ACCU-CHEK RAFAELA PLUS test strip,   acetaminophen (TYLENOL) 500 MG tablet, Take 500 mg by mouth 3 times daily   Acidophilus Lactobacillus CAPS, Take 1 tablet by mouth 3 times daily  Alcohol Swabs (ALCOHOL WIPES) 70 % PADS, USE WITH INSULIN INJECTION ONCE DAILY. **100 DAYS SUPPLY**  blood glucose (ACCU-CHEK RAFAELA PLUS) test strip, Use to check blood sugar 3x daily or as directed.  Blood Glucose Calibration (ACCU-CHEK ACTIVE GLUCOSE CONT VI), 1 each by Other route  blood glucose calibration (ACCU-CHEK RAFAELA) solution,                Physical Examination:   Temp:  [98.3  F (36.8  C)-99.7  F (37.6  C)] 99.7  F (37.6  C)  Pulse:  [49-72] 72  Resp:  [8-47] 17  MAP:  [74 mmHg-120 mmHg] 85 mmHg  Arterial Line BP: (112-166)/(55-91) 122/64  FiO2 (%):  [35 %-50 %] 40 %  SpO2:  [89 %-100 %] 93 %  No intake or output data in the 24 hours ending 02/13/22 1647  Wt Readings from Last 4 Encounters:   02/17/22 120.3 kg (265 lb 3.4 oz)   02/13/22 122.7 kg (270 lb 6.4 oz)   02/09/22 126.1 kg (278 lb)   06/08/21 133.1 kg (293 lb 6.4 oz)     Arterial Line BP: (112-166)/(55-91) 122/64  MAP:  [74 mmHg-120 mmHg] 85 mmHg  Vent Mode: CMV/AC  (Continuous Mandatory Ventilation/ Assist Control)  FiO2 (%): 40 %  Resp Rate (Set): 18 breaths/min  Tidal Volume (Set, mL): 350 mL  PEEP (cm H2O): 16 cmH2O  Oxygen Concentration (%): 70 %  Resp: 17    Recent Labs   Lab 02/16/22  1247 02/16/22  0418 02/15/22  2057 02/15/22  1358   PH 7.43 7.42  7.42 7.39   PCO2 49* 47* 47* 50*   PO2 72* 70* 176* 69*   HCO3 33* 30* 30* 30*   O2PER 35 40 70 70       GEN: no acute distress; comfortable on vent    HEENT: head ncat, sclera anicteric, OP patent, trachea midline ETT secured, NJT secured  PULM: unlabored synchronous with vent, clear anteriorly    CV/COR: RRR S1S2 no gallop,  No rub, no murmur  ABD: soft nontender, obese   EXT:  Mild to mod edema   warm  NEURO: heavily sedated   SKIN: no obvious rash; no cyanosis or mottling   LINES: clean, dry intact         Data:   All data and imaging reviewed     ROUTINE ICU LABS (Last four results)  CMP  Recent Labs   Lab 02/17/22  0624 02/17/22  0349 02/17/22  0041 02/16/22 2058 02/16/22  0741 02/16/22  0422 02/16/22  0417 02/15/22  2007 02/15/22  1751 02/15/22  0835 02/15/22  0435 02/15/22  0434 02/14/22  0817 02/14/22  0526 02/13/22 2058 02/13/22  1659 02/11/22  0809 02/11/22  0426     --   --   --   --   --  139  --   --   --  139  --   --  139  --  136   < > 138   POTASSIUM 3.6  --   --   --   --   --  4.0  --  4.2  --  4.1  --   --  3.9  --  3.9   < > 3.8   CHLORIDE 102  --   --   --   --   --  106  --   --   --  108  --   --  109  --  106   < > 107   CO2 34*  --   --   --   --   --  30  --   --   --  28  --   --  27  --  26   < > 25   ANIONGAP 3  --   --   --   --   --  3  --   --   --  3  --   --  3  --  4   < > 6   * 155* 190* 221*   < >  --  169*   < >  --    < > 247* 215*   < > 185*   < > 246*   < > 196*   BUN 29  --   --   --   --   --  23  --   --   --  20  --   --  24  --  22   < > 14   CR 0.67  --   --   --   --   --  0.59  --   --   --  0.62  --   --  0.68  --  0.63   < > 0.65   GFRESTIMATED >90  --   --   --   --   --  >90  --   --   --  >90  --   --  >90  --  >90   < > >90   RODOLFO 8.6  --   --   --   --   --  8.4*  --   --   --  8.8  --   --  8.3*  --  8.6   < > 7.6*   MAG  --   --   --   --   --  2.4  --   --   --   --   --  2.1  --  2.3  --  1.8   < >  --    PHOS 2.8  --   --   --   --   --   3.4  --   --   --  3.3  --   --  3.9  --   --    < >  --    PROTTOTAL  --   --   --   --   --   --   --   --   --   --   --   --   --  7.0  --   --   --  7.0   ALBUMIN  --   --   --   --   --   --   --   --   --   --   --   --   --  2.1*  --   --   --  1.9*   BILITOTAL  --   --   --   --   --   --   --   --   --   --   --   --   --  0.3  --   --   --  0.6   ALKPHOS  --   --   --   --   --   --   --   --   --   --   --   --   --  59  --   --   --  69   AST  --   --   --   --   --   --   --   --   --   --   --   --   --  38  --   --   --  81*   ALT  --   --   --   --   --   --   --   --   --   --   --   --   --  62*  --   --   --  53*    < > = values in this interval not displayed.     CBC  Recent Labs   Lab 02/16/22  0417 02/15/22  0435 02/14/22  0526 02/13/22  1519   WBC 12.4* 14.8* 10.2 10.1   RBC 3.91 4.40 3.88 4.19   HGB 11.2* 12.6 10.9* 12.0   HCT 35.4 38.7 34.9* 38.0   MCV 91 88 90 91   MCH 28.6 28.6 28.1 28.6   MCHC 31.6 32.6 31.2* 31.6   RDW 14.1 13.8 14.2 14.0   * 500* 341 332     INR  Recent Labs   Lab 02/14/22  0526   INR 1.13     Arterial Blood Gas  Recent Labs   Lab 02/16/22  1247 02/16/22  0418 02/15/22  2057 02/15/22  1358   PH 7.43 7.42 7.42 7.39   PCO2 49* 47* 47* 50*   PO2 72* 70* 176* 69*   HCO3 33* 30* 30* 30*   O2PER 35 40 70 70       All cultures:  No results for input(s): CULT in the last 168 hours.  Recent Results (from the past 24 hour(s))   XR Chest Port 1 View    Narrative    EXAM: XR CHEST PORT 1 VIEW  LOCATION: [facility]  DATE/TIME: 2/13/2022 6:36 AM    INDICATION: icu intubated  COMPARISON: 02/12/2022      Impression    IMPRESSION: Endotracheal tube 2.9 cm above mariel. Enteric tube below lower aspect of film. Rotation to left. Diffuse, bilateral infiltrates.    XR Chest Port 1 View    Narrative    EXAM: XR CHEST PORT 1 VIEW  LOCATION: [facility]  DATE/TIME: 2/13/2022 3:04 PM    INDICATION: Endotracheal tube  positioning.  COMPARISON: 2/13/2022 at 6:23 AM      Impression    IMPRESSION: Endotracheal tube tip 3.5 cm from the mariel. Right IJ line noted with tip in the mid to low SVC. Improved bilateral infiltrates compared to previous.         Mansoor Ruiz DO  [facility] Fellow    Billing: This patient is critically ill: Yes. Total critical care time today 40 min.

## 2022-02-17 NOTE — PLAN OF CARE
Goal Outcome Evaluation: RN assumed care from [id number].. Doesn't follow, arouses easily. Restraints for safety. Tele SR PVCs. BP unremarkable. Afebrile.  Vented, peep  decreased. Art line positional, RT internal jugular patent. Sedation gtts unchanged. TF via NJ at goal. No BMs. Valle patent. Nursing to follow closely

## 2022-02-17 NOTE — PROGRESS NOTES
[facility] ICU RESPIRATORY NOTE        Date of Admission: 2/13/2022    Date of Intubation (most recent): 2/10/22    Reason for Mechanical Ventilation: Respiratory Failure.    Number of Days on Mechanical Ventilation: 8 days.     Met Criteria for Spontaneous Breathing Trial: No. FiO2 demands down PEEP still 14 cm H2O.    Significant Events Today: PEEP weaned from 16 to 14 cm H2O. Ventilator rate decreased from 20 to 18/min.    ABG Results:   Recent Labs   Lab 02/17/22  0925 02/16/22  1247 02/16/22  0418 02/15/22  2057   PH 7.46* 7.43 7.42 7.42   PCO2 50* 49* 47* 47*   PO2 96 72* 70* 176*   HCO3 36* 33* 30* 30*   O2PER 40 35 40 70       Current Vent Settings: Vent Mode: CMV/AC  (Continuous Mandatory Ventilation/ Assist Control)  FiO2 (%): 40 %  Resp Rate (Set): 18 breaths/min  Tidal Volume (Set, mL): 350 mL  PEEP (cm H2O): 14 cmH2O  Oxygen Concentration (%): 40 %  Resp: 18      Skin Assessment: Clean/Dry/Intact.    Plan: Continue to monitor patient and assess for weaning from mechanical ventilation. Continue q4 skin assessment checks. RT will continue to follow.     Dusty Jones, RT

## 2022-02-18 LAB
ALBUMIN SERPL-MCNC: 2.3 G/DL (ref 3.4–5)
ALP SERPL-CCNC: 108 U/L (ref 40–150)
ALT SERPL W P-5'-P-CCNC: 528 U/L (ref 0–50)
ANION GAP SERPL CALCULATED.3IONS-SCNC: 3 MMOL/L (ref 3–14)
AST SERPL W P-5'-P-CCNC: 168 U/L (ref 0–45)
ATRIAL RATE - MUSE: 58 BPM
BASE EXCESS BLDA CALC-SCNC: 5.5 MMOL/L (ref -9–1.8)
BILIRUB DIRECT SERPL-MCNC: 0.2 MG/DL (ref 0–0.2)
BILIRUB SERPL-MCNC: 0.4 MG/DL (ref 0.2–1.3)
BUN SERPL-MCNC: 41 MG/DL (ref 7–30)
CALCIUM SERPL-MCNC: 8.5 MG/DL (ref 8.5–10.1)
CHLORIDE BLD-SCNC: 100 MMOL/L (ref 94–109)
CO2 SERPL-SCNC: 31 MMOL/L (ref 20–32)
CREAT SERPL-MCNC: 0.65 MG/DL (ref 0.52–1.04)
DIASTOLIC BLOOD PRESSURE - MUSE: NORMAL MMHG
ERYTHROCYTE [DISTWIDTH] IN BLOOD BY AUTOMATED COUNT: 14.5 % (ref 10–15)
GFR SERPL CREATININE-BSD FRML MDRD: >90 ML/MIN/1.73M2
GLUCOSE BLD-MCNC: 153 MG/DL (ref 70–99)
GLUCOSE BLDC GLUCOMTR-MCNC: 135 MG/DL (ref 70–99)
GLUCOSE BLDC GLUCOMTR-MCNC: 149 MG/DL (ref 70–99)
GLUCOSE BLDC GLUCOMTR-MCNC: 159 MG/DL (ref 70–99)
GLUCOSE BLDC GLUCOMTR-MCNC: 197 MG/DL (ref 70–99)
GLUCOSE BLDC GLUCOMTR-MCNC: 264 MG/DL (ref 70–99)
GLUCOSE BLDC GLUCOMTR-MCNC: 281 MG/DL (ref 70–99)
HCO3 BLD-SCNC: 31 MMOL/L (ref 21–28)
HCT VFR BLD AUTO: 36.4 % (ref 35–47)
HGB BLD-MCNC: 11.5 G/DL (ref 11.7–15.7)
INTERPRETATION ECG - MUSE: NORMAL
MAGNESIUM SERPL-MCNC: 1.8 MG/DL (ref 1.8–2.6)
MCH RBC QN AUTO: 28.5 PG (ref 26.5–33)
MCHC RBC AUTO-ENTMCNC: 31.6 G/DL (ref 31.5–36.5)
MCV RBC AUTO: 90 FL (ref 78–100)
O2/TOTAL GAS SETTING VFR VENT: 40 %
P AXIS - MUSE: 51 DEGREES
PCO2 BLD: 49 MM HG (ref 35–45)
PH BLD: 7.41 [PH] (ref 7.35–7.45)
PHOSPHATE SERPL-MCNC: 3.7 MG/DL (ref 2.5–4.5)
PLATELET # BLD AUTO: 490 10E3/UL (ref 150–450)
PO2 BLD: 99 MM HG (ref 80–105)
POTASSIUM BLD-SCNC: 3.7 MMOL/L (ref 3.4–5.3)
PR INTERVAL - MUSE: 154 MS
PROT SERPL-MCNC: 7.4 G/DL (ref 6.8–8.8)
QRS DURATION - MUSE: 84 MS
QT - MUSE: 450 MS
QTC - MUSE: 441 MS
R AXIS - MUSE: 47 DEGREES
RBC # BLD AUTO: 4.04 10E6/UL (ref 3.8–5.2)
SODIUM SERPL-SCNC: 134 MMOL/L (ref 133–144)
SYSTOLIC BLOOD PRESSURE - MUSE: NORMAL MMHG
T AXIS - MUSE: 190 DEGREES
VENTRICULAR RATE- MUSE: 58 BPM
WBC # BLD AUTO: 13.9 10E3/UL (ref 4–11)

## 2022-02-18 PROCEDURE — 82248 BILIRUBIN DIRECT: CPT | Performed by: STUDENT IN AN ORGANIZED HEALTH CARE EDUCATION/TRAINING PROGRAM

## 2022-02-18 PROCEDURE — 999N000157 HC STATISTIC RCP TIME EA 10 MIN

## 2022-02-18 PROCEDURE — 999N000009 HC STATISTIC AIRWAY CARE

## 2022-02-18 PROCEDURE — 250N000013 HC RX MED GY IP 250 OP 250 PS 637: Performed by: INTERNAL MEDICINE

## 2022-02-18 PROCEDURE — 250N000013 HC RX MED GY IP 250 OP 250 PS 637: Performed by: STUDENT IN AN ORGANIZED HEALTH CARE EDUCATION/TRAINING PROGRAM

## 2022-02-18 PROCEDURE — 83735 ASSAY OF MAGNESIUM: CPT | Performed by: STUDENT IN AN ORGANIZED HEALTH CARE EDUCATION/TRAINING PROGRAM

## 2022-02-18 PROCEDURE — 200N000001 HC R&B ICU

## 2022-02-18 PROCEDURE — 250N000011 HC RX IP 250 OP 636: Performed by: INTERNAL MEDICINE

## 2022-02-18 PROCEDURE — 250N000011 HC RX IP 250 OP 636: Performed by: STUDENT IN AN ORGANIZED HEALTH CARE EDUCATION/TRAINING PROGRAM

## 2022-02-18 PROCEDURE — 82310 ASSAY OF CALCIUM: CPT | Performed by: INTERNAL MEDICINE

## 2022-02-18 PROCEDURE — 85027 COMPLETE CBC AUTOMATED: CPT | Performed by: STUDENT IN AN ORGANIZED HEALTH CARE EDUCATION/TRAINING PROGRAM

## 2022-02-18 PROCEDURE — 84100 ASSAY OF PHOSPHORUS: CPT | Performed by: STUDENT IN AN ORGANIZED HEALTH CARE EDUCATION/TRAINING PROGRAM

## 2022-02-18 PROCEDURE — 82803 BLOOD GASES ANY COMBINATION: CPT | Performed by: STUDENT IN AN ORGANIZED HEALTH CARE EDUCATION/TRAINING PROGRAM

## 2022-02-18 PROCEDURE — 258N000003 HC RX IP 258 OP 636: Performed by: STUDENT IN AN ORGANIZED HEALTH CARE EDUCATION/TRAINING PROGRAM

## 2022-02-18 PROCEDURE — 250N000009 HC RX 250: Performed by: STUDENT IN AN ORGANIZED HEALTH CARE EDUCATION/TRAINING PROGRAM

## 2022-02-18 PROCEDURE — 99291 CRITICAL CARE FIRST HOUR: CPT | Mod: GC | Performed by: INTERNAL MEDICINE

## 2022-02-18 PROCEDURE — 94003 VENT MGMT INPAT SUBQ DAY: CPT

## 2022-02-18 RX ORDER — POTASSIUM CHLORIDE 1.5 G/1.58G
20 POWDER, FOR SOLUTION ORAL 2 TIMES DAILY
Status: DISCONTINUED | OUTPATIENT
Start: 2022-02-19 | End: 2022-03-02 | Stop reason: CLARIF

## 2022-02-18 RX ORDER — MAGNESIUM SULFATE HEPTAHYDRATE 40 MG/ML
4 INJECTION, SOLUTION INTRAVENOUS ONCE
Status: COMPLETED | OUTPATIENT
Start: 2022-02-18 | End: 2022-02-18

## 2022-02-18 RX ORDER — POTASSIUM CHLORIDE 20MEQ/15ML
20 LIQUID (ML) ORAL ONCE
Status: COMPLETED | OUTPATIENT
Start: 2022-02-18 | End: 2022-02-18

## 2022-02-18 RX ORDER — FUROSEMIDE 10 MG/ML
40 INJECTION INTRAMUSCULAR; INTRAVENOUS EVERY 12 HOURS
Status: COMPLETED | OUTPATIENT
Start: 2022-02-18 | End: 2022-02-18

## 2022-02-18 RX ORDER — METHADONE HYDROCHLORIDE 10 MG/ML
40 CONCENTRATE ORAL 2 TIMES DAILY
Status: DISCONTINUED | OUTPATIENT
Start: 2022-02-18 | End: 2022-03-03

## 2022-02-18 RX ORDER — POTASSIUM CHLORIDE 1.5 G/1.58G
40 POWDER, FOR SOLUTION ORAL 2 TIMES DAILY
Status: COMPLETED | OUTPATIENT
Start: 2022-02-18 | End: 2022-02-18

## 2022-02-18 RX ADMIN — DEXAMETHASONE SODIUM PHOSPHATE 6 MG: 4 INJECTION, SOLUTION INTRAMUSCULAR; INTRAVENOUS at 08:04

## 2022-02-18 RX ADMIN — INSULIN ASPART 6 UNITS: 100 INJECTION, SOLUTION INTRAVENOUS; SUBCUTANEOUS at 19:52

## 2022-02-18 RX ADMIN — ASPIRIN 81 MG CHEWABLE TABLET 81 MG: 81 TABLET CHEWABLE at 08:04

## 2022-02-18 RX ADMIN — Medication 6 MG/HR: at 04:28

## 2022-02-18 RX ADMIN — DULOXETINE HYDROCHLORIDE 20 MG: 20 CAPSULE, DELAYED RELEASE ORAL at 08:04

## 2022-02-18 RX ADMIN — CEFEPIME HYDROCHLORIDE 1 G: 1 INJECTION, POWDER, FOR SOLUTION INTRAMUSCULAR; INTRAVENOUS at 20:22

## 2022-02-18 RX ADMIN — Medication 1 PACKET: at 22:34

## 2022-02-18 RX ADMIN — POTASSIUM CHLORIDE 40 MEQ: 1.5 POWDER, FOR SOLUTION ORAL at 20:23

## 2022-02-18 RX ADMIN — MIDAZOLAM HYDROCHLORIDE 20 MG/HR: 1 INJECTION, SOLUTION INTRAVENOUS at 13:13

## 2022-02-18 RX ADMIN — ENOXAPARIN SODIUM 60 MG: 60 INJECTION SUBCUTANEOUS at 20:22

## 2022-02-18 RX ADMIN — FUROSEMIDE 40 MG: 10 INJECTION, SOLUTION INTRAVENOUS at 09:25

## 2022-02-18 RX ADMIN — METHADONE HYDROCHLORIDE 40 MG: 10 CONCENTRATE ORAL at 21:27

## 2022-02-18 RX ADMIN — ACETAZOLAMIDE 500 MG: 250 TABLET ORAL at 08:06

## 2022-02-18 RX ADMIN — POTASSIUM CHLORIDE 20 MEQ: 1.5 POWDER, FOR SOLUTION ORAL at 09:25

## 2022-02-18 RX ADMIN — Medication 6 MG/HR: at 00:24

## 2022-02-18 RX ADMIN — KETAMINE HYDROCHLORIDE 2 MG/KG/HR: 100 INJECTION, SOLUTION, CONCENTRATE INTRAMUSCULAR; INTRAVENOUS at 17:52

## 2022-02-18 RX ADMIN — INSULIN ASPART 2 UNITS: 100 INJECTION, SOLUTION INTRAVENOUS; SUBCUTANEOUS at 23:49

## 2022-02-18 RX ADMIN — BARICITINIB 4 MG: 2 TABLET, FILM COATED ORAL at 09:32

## 2022-02-18 RX ADMIN — MIDAZOLAM HYDROCHLORIDE 20 MG/HR: 1 INJECTION, SOLUTION INTRAVENOUS at 08:06

## 2022-02-18 RX ADMIN — ATORVASTATIN CALCIUM 20 MG: 10 TABLET, FILM COATED ORAL at 22:34

## 2022-02-18 RX ADMIN — DIAZEPAM 10 MG: 5 TABLET ORAL at 12:12

## 2022-02-18 RX ADMIN — FUROSEMIDE 40 MG: 10 INJECTION, SOLUTION INTRAVENOUS at 20:23

## 2022-02-18 RX ADMIN — Medication 25 MCG: at 08:04

## 2022-02-18 RX ADMIN — MIDAZOLAM HYDROCHLORIDE 17 MG/HR: 1 INJECTION, SOLUTION INTRAVENOUS at 23:41

## 2022-02-18 RX ADMIN — CHLORHEXIDINE GLUCONATE 15 ML: 1.2 SOLUTION ORAL at 20:22

## 2022-02-18 RX ADMIN — GABAPENTIN 300 MG: 250 SUSPENSION ORAL at 14:44

## 2022-02-18 RX ADMIN — ENOXAPARIN SODIUM 60 MG: 60 INJECTION SUBCUTANEOUS at 08:04

## 2022-02-18 RX ADMIN — MIDAZOLAM HYDROCHLORIDE 19 MG/HR: 1 INJECTION, SOLUTION INTRAVENOUS at 18:18

## 2022-02-18 RX ADMIN — INSULIN ASPART 13 UNITS: 100 INJECTION, SOLUTION INTRAVENOUS; SUBCUTANEOUS at 16:42

## 2022-02-18 RX ADMIN — POTASSIUM CHLORIDE 20 MEQ: 20 SOLUTION ORAL at 06:03

## 2022-02-18 RX ADMIN — POTASSIUM CHLORIDE 20 MEQ: 20 SOLUTION ORAL at 08:04

## 2022-02-18 RX ADMIN — DIAZEPAM 10 MG: 5 TABLET ORAL at 04:00

## 2022-02-18 RX ADMIN — METHADONE HYDROCHLORIDE 40 MG: 10 CONCENTRATE ORAL at 09:32

## 2022-02-18 RX ADMIN — LEVOTHYROXINE SODIUM 200 MCG: 100 TABLET ORAL at 08:04

## 2022-02-18 RX ADMIN — GABAPENTIN 300 MG: 250 SUSPENSION ORAL at 22:34

## 2022-02-18 RX ADMIN — GABAPENTIN 300 MG: 250 SUSPENSION ORAL at 06:03

## 2022-02-18 RX ADMIN — ACETAMINOPHEN 650 MG: 325 TABLET, FILM COATED ORAL at 08:04

## 2022-02-18 RX ADMIN — DIAZEPAM 10 MG: 5 TABLET ORAL at 20:22

## 2022-02-18 RX ADMIN — CHLORHEXIDINE GLUCONATE 15 ML: 1.2 SOLUTION ORAL at 08:04

## 2022-02-18 RX ADMIN — MAGNESIUM SULFATE HEPTAHYDRATE 4 G: 40 INJECTION, SOLUTION INTRAVENOUS at 09:23

## 2022-02-18 RX ADMIN — CEFEPIME HYDROCHLORIDE 1 G: 1 INJECTION, POWDER, FOR SOLUTION INTRAMUSCULAR; INTRAVENOUS at 08:03

## 2022-02-18 RX ADMIN — Medication 40 MG: at 08:04

## 2022-02-18 RX ADMIN — Medication 3 MG/HR: at 12:41

## 2022-02-18 RX ADMIN — ACETAZOLAMIDE 500 MG: 250 TABLET ORAL at 16:36

## 2022-02-18 RX ADMIN — Medication 6 MG/HR: at 08:19

## 2022-02-18 RX ADMIN — Medication 1 PACKET: at 17:39

## 2022-02-18 RX ADMIN — Medication 15 ML: at 08:04

## 2022-02-18 RX ADMIN — INSULIN ASPART 15 UNITS: 100 INJECTION, SOLUTION INTRAVENOUS; SUBCUTANEOUS at 12:21

## 2022-02-18 RX ADMIN — CYANOCOBALAMIN TAB 500 MCG 1000 MCG: 500 TAB at 08:04

## 2022-02-18 RX ADMIN — INSULIN ASPART 1 UNITS: 100 INJECTION, SOLUTION INTRAVENOUS; SUBCUTANEOUS at 08:38

## 2022-02-18 RX ADMIN — MIDAZOLAM HYDROCHLORIDE 20 MG/HR: 1 INJECTION, SOLUTION INTRAVENOUS at 02:54

## 2022-02-18 RX ADMIN — KETAMINE HYDROCHLORIDE 2 MG/KG/HR: 100 INJECTION, SOLUTION, CONCENTRATE INTRAMUSCULAR; INTRAVENOUS at 08:01

## 2022-02-18 ASSESSMENT — ACTIVITIES OF DAILY LIVING (ADL)
ADLS_ACUITY_SCORE: 22
ADLS_ACUITY_SCORE: 26
ADLS_ACUITY_SCORE: 22
ADLS_ACUITY_SCORE: 26
ADLS_ACUITY_SCORE: 26
ADLS_ACUITY_SCORE: 24
ADLS_ACUITY_SCORE: 26
ADLS_ACUITY_SCORE: 22
ADLS_ACUITY_SCORE: 26
ADLS_ACUITY_SCORE: 26
ADLS_ACUITY_SCORE: 22
ADLS_ACUITY_SCORE: 24
ADLS_ACUITY_SCORE: 24
ADLS_ACUITY_SCORE: 26
ADLS_ACUITY_SCORE: 22
ADLS_ACUITY_SCORE: 26
ADLS_ACUITY_SCORE: 24
ADLS_ACUITY_SCORE: 22

## 2022-02-18 NOTE — PROGRESS NOTES
[facility] ICU RESPIRATORY NOTE        Date of Admission: 2/13/2022    Date of Intubation (most recent): 2/10/2022    Reason for Mechanical Ventilation: Respiratory failure    Number of Days on Mechanical Ventilation: 9    Met Criteria for Spontaneous Breathing Trial: No    Reason for No Spontaneous Breathing Trial: Peep > 8    Significant Events Today: None overnight    ABG Results:   Recent Labs   Lab 02/18/22  0411 02/17/22  0925 02/16/22  1247 02/16/22  0418   PH 7.41 7.46* 7.43 7.42   PCO2 49* 50* 49* 47*   PO2 99 96 72* 70*   HCO3 31* 36* 33* 30*   O2PER 40 40 35 40       Current Vent Settings: Vent Mode: CMV/AC  (Continuous Mandatory Ventilation/ Assist Control)  FiO2 (%): 40 %  Resp Rate (Set): 18 breaths/min  Tidal Volume (Set, mL): 350 mL  PEEP (cm H2O): 14 cmH2O  Oxygen Concentration (%): 40 %  Resp: 21      Skin Assessment: Intact    Plan: Continue full vent support with daily wean assessment    Davi Machado, RRT

## 2022-02-18 NOTE — PROGRESS NOTES
BRIEF NUTRITION NOTE:    RN reports patient with very watery stools and has requested fiber via FT to help bulk stool.  A full Nutrition Reassessment was completed 2/16.  See note for details.    NEW FINDINGS:  Stool Pattern (No bowel meds given):  2/15:  400 mL (x6) --> C-Diff negative.  2/16:  x2  2/17:  x1  2/18:  x2    - Current TF product Vital HP is formulated for critically ill obese patients and contains no fiber.  - Diarrhea definition:  Stool output > 500 mL/day or > 3 liquid stools/day for at least 2 consecutive days.  - Recorded stool pattern appears improved over past 2 days.    RECOMMENDATION:  If diarrhea worsens, recommend consider Nutrisource Fiber 1 packet TID = 45 kcals, 9 gm fiber  Total (TF + Prosource + Nutritisource Fiber):  1375 kcals (11 kcal/kg).    Dina Zamora, RD, LD, CNSC

## 2022-02-18 NOTE — PLAN OF CARE
Shift [id number]: VSS. Neuro: MICHAEL orientation, does not follow commands, did not withdraw to pain stimulus in any extremity. Pulm: Lungs: diminished throughout, mechanically ventilated. GI/: BS present, BM; Valle in place: WDL output, pt given IV Lasix. Diet: continuous enteral feedings. No pain per CPOT. Access: CVC x 3 lumens; A-line, PIV; ketamine gtt, dilaudid gtt, versed gtt, NS for TKO.    Evening shift:  - No changes

## 2022-02-18 NOTE — PLAN OF CARE
Problem: Risk for Delirium  Goal: Improved Behavioral Control  Outcome: Met   Patient calm overnight, tolerating ventilation and cares, not following commands however and requiring restraints    Problem: ARDS (Acute Respiratory Distress Syndrome)  Goal: Effective Oxygenation  Outcome: Met   Goal Outcome Evaluation:  Maintain ing oxygen sats above 90% on 40% via ventilator, minimal secretions.    Remains in bilateral soft wrist restraints, reaching for ETT during cares and between, patient not following commands and not redirectable, RASS -3 to -4

## 2022-02-18 NOTE — PROGRESS NOTES
Critical Care  Note      02/18/2022    Name: No Yusuf MRN#: [id number]   Age: 62 year old YOB: 1960     [facility] Day# 5             Problem List:   Active Problems:    Acute respiratory failure due to COVID-19 (H)    1. Acute respiratory failure due to COVID-19 pneumonia - will complete 10 days of Decadron 6 mgms/day; and will complete 14 days baricitinib   2. ARDS - currently on 40% and +14 PEEP; trending P:F, no plan to prone at present, off velitri, tolerating supine  3. S/p arrest believed due to VT/ Torsades de Pointes associated with long QT (2/11) - Cards following, no pacing indicated at this time, off dopamine, cont heparin, echo 2/18  4. Bradycardia after arrest with HR about 60-80,normotensive; goal HR >40 per cards  5. Demand ischemia - monitor only   6. Cardiomyopathy, likely stress related, EF 20%, ECHO repeat 2/18: IMPROVEMENT with 40% LVEF  7. UTI due to E. Coli - will continue Cefepime for 1 week total.   8. DM - she has been controlled with bid lantus 20mg and sliding scale, will increase to 25 BID  9. Nutrition- TF at goal  10. History of polysubstance abuse and now on methadone - starting methadone and stopping dilaudid gtt, PRN IV dilaudid  11. History of hepatitis C - trend CMP  12. Hypothyroid - follow up TSH pending and continue current synthroid dose.    13. GERD - protonix daily despite potential increased Qt  14. Depression and Anxiety; and bipolar - continue routine meds.   15. Fibromyalgia   16. Dyslipidemia   17. History of vitamin D deficiency   18. Hx Bipolar and acute Hyperactive Delerium         Summary/Hospital Course:     2/13: She was transferred from [facility] of [facility] after arrest 2/11 felt to be due to Torsades de Pointes. She has been bradycardic in 40-50's (not hypotensive nor requiring vasopressors) and was transferred to [facility] with concern that she may need a temporary pacemaker to treat TdP. She has covid lung disease and ARDS and is on high levels of vent support.      2/14: remains bradycardic, dopamine infusion started with improvement, sedation ongoing issue, increasing dilaudid dose, added valium enteral, added ketamine gtt, avoiding QT prolonging agents, increased PEEP and Velitri, avoiding proning while concern of unstable arrythmia     2/15: Overnight no major issues, bradycardia persisted, but did not get below 40, minimal ectopic beats, cardiology reviewed again today and are ok titrating dopamine off, goal HR >40, heparin gtt to continue until repeat echo 2/18 (moniotr EF and apical ballooning), diuresis with goal negative fluid balance daily.     2/16: Proned yesterday as P:F <100, improved P:F with proning, post pyloric tube placed, flexiseal for diarrhea, C diff negative, planning to wean velitri today, continue diuresis, will supine this afternoon and trend P:F.    2/17: Remained supine with stable P:F, weaned from velitri without desaturation, NM blockade removed and remained stable with sat and vent compliance, tolerated diuresis yesterday which will continue today.    2/18: Continued improvement in oxygenation, decreased PEEP to 14, possibly 12 this afternoon. , increased lantus to 25 BID, lasix 40 x2. Repletions: Mg 4g, K 40meq x2      Assessment and plan :     No Yusuf is a 62 year old female admitted on 2/13/2022 for possible TdP and pacemaker; ARDS due to covid-19.   I have personally reviewed the daily labs, imaging studies, cultures and discussed the case with referring physician and consulting physicians.   My assessment and plan by system for this patient is as follows:    Neurology/Psychiatry:   1. Sedated on vent - High doses of versed gtt, ketamine gtt, valium enteral,  Added back methadone and weaning dilauded gtt  ---off NM blockade    Cardiovascular:   1.Hemodynamics - compensated at this time   2. Bradycardia - off dopamine, cards CS, goal HR >40  3. Hx Torsade de Pointes - Mg 2.5, K 4.5, avoid QT prolonging agents  4. Cardiomyopathy -  EF 20% on recent, repeat 2/18 with 40% LVEF  5. Cardiac Arrest - cards recs appreciated, heparin gtt, ASA, statin, beta blocker contraindicated with bradycardia    Pulmonary/Ventilator Management:   1. COVID ARDS - Velitri 20, ARDS NET ventilation: 18/350/14/40% -plan to remain supine  -PEEP weaning    GI and Nutrition:   1. Tube feeds at goal, nutrition CS appreciated, PPI  2. Hx of HCV - monitor LFTs    Renal/Fluids/Electrolytes:   1. Compensated with creatinine 0.65; will focus on keeping K (4.5) and Mg (2.5) replaced.   2. Lasix 40 x2, 40 mEQ KCl x2 anticipating losses, 4g Mg    Infectious Disease:   1. 1 week of UTI treatment for E. Coli with Cefepime   2. Baricitinib 14 day course, Dexamethasone 10 day course    Endocrine:   1. DM, Stress and steroid induced hyperglycemia - lantus 25mg BID and sliding scale insulin; synthroid    Hematology/Oncology:   1. Stable Hb, off heparin gtt as apical ballooning improved on repeat, lovenox 0.5mg/kg q12     IV/Access:   1. Venous access - RIJ TLC - possible discontinue today  2. Arterial access - Radial Art Line  3. x2 PIV  Plan  - central access required and necessary      ICU Prophylaxis:   1. DVT: lovenox covid dosing, mechanical  2. VAP: HOB 30 degrees, chlorhexidine rinse  3. Stress Ulcer: PPI  4. Restraints: Nonviolent soft two point restraints required and necessary for patient safety and continued cares and good effect as patient continues to pull at necessary lines, tubes despite education and distraction. Will readdress daily.   5. Wound care  - positioning and normal preventative measures  6. Feeding -  Enteral   7. Family Update: I spoke with daughter by phone today for update   8. Disposition - ICU care               Medical History:     Past Medical History:   Diagnosis Date     Arthritis      Bipolar affective (H)      Fibromyalgia      Hypertension      Past Surgical History:   Procedure Laterality Date     CHOLECYSTECTOMY       GYN SURGERY      c section      GYN SURGERY      oblation     ORTHOPEDIC SURGERY      left leg, left shoulder, back     Social History     Socioeconomic History     Marital status: Single     Spouse name: Not on file     Number of children: Not on file     Years of education: Not on file     Highest education level: Not on file   Occupational History     Not on file   Tobacco Use     Smoking status: Former Smoker     Packs/day: 0.10     Smokeless tobacco: Never Used   Substance and Sexual Activity     Alcohol use: No     Drug use: No     Sexual activity: Not Currently   Other Topics Concern     Not on file   Social History Narrative     Not on file     Social Determinants of Health     Financial Resource Strain: Not on file   Food Insecurity: Not on file   Transportation Needs: Not on file   Physical Activity: Not on file   Stress: Not on file   Social Connections: Not on file   Intimate Partner Violence: Not on file   Housing Stability: Not on file        Allergies   Allergen Reactions     Promethazine Other (See Comments) and Anxiety     Noted in 8/30/08 ER     Codeine Phosphate GI Disturbance     Lamotrigine Other (See Comments)     hyponatremia     Oxcarbazepine Unknown and Other (See Comments)     Low sodium  LegacyRecord#[id number]       Codeine Other (See Comments) and Rash     GI bleeding  LegacyRecord#[id number]                Key Medications:       acetaZOLAMIDE  500 mg Oral BID     aspirin  81 mg Oral or Feeding Tube Daily     atorvastatin  20 mg Per Feeding Tube At Bedtime     baricitinib  4 mg Oral Daily     ceFEPIme (MAXIPIME) IV  1 g Intravenous Q12H     chlorhexidine  15 mL Mouth/Throat Q12H     cholecalciferol  1,250 mcg Oral Q7 Days     cyanocobalamin  1,000 mcg Oral Daily     dexamethasone  6 mg Intravenous Q24H     diazepam  10 mg Oral or Feeding Tube Q8H     DULoxetine  20 mg Oral Daily     enoxaparin ANTICOAGULANT  0.5 mg/kg Subcutaneous Q12H     furosemide  40 mg Intravenous Q12H     gabapentin  300 mg Oral Q8H RADHA     insulin  aspart  1-22 Units Subcutaneous Q4H     insulin glargine  25 Units Subcutaneous BID     levothyroxine  200 mcg Oral Daily     magnesium sulfate  4 g Intravenous Once     methadone  40 mg Oral BID     multivitamins w/minerals  15 mL Per Feeding Tube Daily     pantoprazole  40 mg Oral QAM AC     [START ON 2/19/2022] potassium chloride  20 mEq Oral BID     potassium chloride  40 mEq Oral BID     protein modular  1 packet Per Feeding Tube TID     Vitamin D3  25 mcg Oral Daily       dextrose       HYDROmorphone 6 mg/hr (02/18/22 0819)     ketamine (KETALAR) 25 mg/mL ADULT SEDATION infusion 2 mg/kg/hr (02/18/22 0801)     midazolam 20 mg/hr (02/18/22 0806)     sodium chloride 20 mL/hr (02/17/22 1949)        Home Meds  No current facility-administered medications on file prior to encounter.  amLODIPine (NORVASC) 5 MG tablet, Take 5 mg by mouth daily   atorvastatin (LIPITOR) 20 MG tablet, Take 20 mg by mouth At Bedtime   cholecalciferol 25 MCG (1000 UT) TABS, Take 1,000 Units by mouth daily   diclofenac (VOLTAREN) 1 % topical gel, Apply 4 g topically 4 times daily (Patient taking differently: Apply 2-4 g topically 2 times daily )  DULoxetine (CYMBALTA) 60 MG capsule, Take 60 mg by mouth daily  fluticasone-salmeterol (ADVAIR) 250-50 MCG/DOSE inhaler, Inhale 1 puff into the lungs 2 times daily  furosemide (LASIX) 20 MG tablet, Take 20 mg by mouth daily  gabapentin (NEURONTIN) 400 MG capsule, Take 1,200 mg by mouth 3 times daily  hydrOXYzine (ATARAX) 50 MG tablet, Take 100 mg by mouth every 6 hours as needed for anxiety  insulin detemir (LEVEMIR PEN) 100 UNIT/ML pen, Inject 15 Units Subcutaneous every morning  levalbuterol (XOPENEX HFA) 45 MCG/ACT inhaler, Inhale 2 puffs into the lungs every 4 hours as needed for shortness of breath / dyspnea or wheezing   levothyroxine (SYNTHROID/LEVOTHROID) 200 MCG tablet, Take 200 mcg by mouth daily   melatonin 5 MG tablet, Take 5 mg by mouth At Bedtime  metFORMIN (GLUCOPHAGE) 1000 MG  tablet, Take 1,000 mg by mouth 2 times daily (with meals)  methadone HCl 10 MG/5ML SOLN, Take 80 mg by mouth daily 10mg/mL strength  multivitamin w/minerals (THERA-VIT-M) tablet, Take 1 tablet by mouth daily  NARCAN 4 MG/0.1ML nasal spray, CALL 911. SPRAY CONTENTS OF ONE SPRAYER (0.1ML) INTO ONE NOSTRIL. REPEAT IN 2-3 MINS IF SYMPTOMS OF OPIOID PERSIST, ALTERNATE NOSTRILS  nicotine polacrilex (NICORETTE) 4 MG gum, Place 4 mg inside cheek every hour as needed for smoking cessation   omeprazole (PRILOSEC) 20 MG DR capsule, Take 20 mg by mouth daily   polyethylene glycol (MIRALAX) 17 GM/Dose powder, Take 17 g by mouth daily as needed for constipation  potassium chloride ER (KLOR-CON) 8 MEQ CR tablet, Take 8 mEq by mouth 2 times daily  traZODone (DESYREL) 50 MG tablet, Take 50 mg by mouth At Bedtime  vitamin B-12 (CYANOCOBALAMIN) 1000 MCG tablet, Take 1,000 mcg by mouth daily  ACCU-CHEK RAFAELA PLUS test strip,   acetaminophen (TYLENOL) 500 MG tablet, Take 500 mg by mouth 3 times daily   Acidophilus Lactobacillus CAPS, Take 1 tablet by mouth 3 times daily  Alcohol Swabs (ALCOHOL WIPES) 70 % PADS, USE WITH INSULIN INJECTION ONCE DAILY. **100 DAYS SUPPLY**  blood glucose (ACCU-CHEK RAFAELA PLUS) test strip, Use to check blood sugar 3x daily or as directed.  Blood Glucose Calibration (ACCU-CHEK ACTIVE GLUCOSE CONT VI), 1 each by Other route  blood glucose calibration (ACCU-CHEK RAFAELA) solution,                Physical Examination:   Temp:  [98.8  F (37.1  C)-100.2  F (37.9  C)] 99.1  F (37.3  C)  Pulse:  [61-90] 73  Resp:  [14-21] 14  MAP:  [74 mmHg-105 mmHg] 98 mmHg  Arterial Line BP: ()/(57-82) 142/73  FiO2 (%):  [40 %] 40 %  SpO2:  [92 %-100 %] 97 %  No intake or output data in the 24 hours ending 02/13/22 1647  Wt Readings from Last 4 Encounters:   02/18/22 120.5 kg (265 lb 10.5 oz)   02/13/22 122.7 kg (270 lb 6.4 oz)   02/09/22 126.1 kg (278 lb)   06/08/21 133.1 kg (293 lb 6.4 oz)     Arterial Line BP:  ()/(57-82) 142/73  MAP:  [74 mmHg-105 mmHg] 98 mmHg  Vent Mode: CMV/AC  (Continuous Mandatory Ventilation/ Assist Control)  FiO2 (%): 40 %  Resp Rate (Set): 18 breaths/min  Tidal Volume (Set, mL): 350 mL  PEEP (cm H2O): (S) 12 cmH2O  Oxygen Concentration (%): 40 %  Resp: 14    Recent Labs   Lab 02/18/22  0411 02/17/22  0925 02/16/22  1247 02/16/22  0418   PH 7.41 7.46* 7.43 7.42   PCO2 49* 50* 49* 47*   PO2 99 96 72* 70*   HCO3 31* 36* 33* 30*   O2PER 40 40 35 40       GEN: no acute distress; comfortable on vent    HEENT: head ncat, sclera anicteric, OP patent, trachea midline ETT secured, NJT secured  PULM: unlabored synchronous with vent, clear anteriorly    CV/COR: RRR S1S2 no gallop,  No rub, no murmur  ABD: soft nontender, obese   EXT:  Mild to mod edema   warm  NEURO: heavily sedated   SKIN: no obvious rash; no cyanosis or mottling   LINES: clean, dry intact         Data:   All data and imaging reviewed     ROUTINE ICU LABS (Last four results)  CMP  Recent Labs   Lab 02/18/22  0411 02/18/22  0408 02/17/22  2346 02/17/22 2004 02/17/22  0924 02/17/22  0921 02/17/22  0913 02/17/22  0624 02/16/22  0741 02/16/22  0422 02/16/22  0417 02/15/22  0835 02/15/22  0435 02/15/22  0434 02/14/22  0817 02/14/22  0526     --   --   --   --  138  --  139  --   --  139  --  139  --   --  139   POTASSIUM 3.7  --   --   --   --  4.0  --  3.6  --   --  4.0   < > 4.1  --   --  3.9   CHLORIDE 100  --   --   --   --  103  --  102  --   --  106  --  108  --   --  109   CO2 31  --   --   --   --  32  --  34*  --   --  30  --  28  --   --  27   ANIONGAP 3  --   --   --   --  3  --  3  --   --  3  --  3  --   --  3   * 135* 140* 89   < > 127*  --  129*   < >  --  169*   < > 247* 215*   < > 185*   BUN 41*  --   --   --   --  33*  --  29  --   --  23  --  20  --   --  24   CR 0.65  --   --   --   --  0.72  --  0.67  --   --  0.59  --  0.62  --   --  0.68   GFRESTIMATED >90  --   --   --   --  >90  --  >90  --   --  >90   --  >90  --   --  >90   RODOLFO 8.5  --   --   --   --  8.5  --  8.6  --   --  8.4*  --  8.8  --   --  8.3*   MAG 1.8  --   --   --   --   --  2.0  --   --  2.4  --   --   --  2.1  --  2.3   PHOS 3.7  --   --   --   --   --   --  2.8  --   --  3.4  --  3.3  --   --  3.9   PROTTOTAL 7.4  --   --   --   --  7.1  --   --   --   --   --   --   --   --   --  7.0   ALBUMIN 2.3*  --   --   --   --  2.3*  --   --   --   --   --   --   --   --   --  2.1*   BILITOTAL 0.4  --   --   --   --  0.6  --   --   --   --   --   --   --   --   --  0.3   ALKPHOS 108  --   --   --   --  106  --   --   --   --   --   --   --   --   --  59   *  --   --   --   --  285*  --   --   --   --   --   --   --   --   --  38   *  --   --   --   --  622*  --   --   --   --   --   --   --   --   --  62*    < > = values in this interval not displayed.     CBC  Recent Labs   Lab 02/18/22 0411 02/17/22  0913 02/16/22  0417 02/15/22  0435   WBC 13.9* 13.6* 12.4* 14.8*   RBC 4.04 4.12 3.91 4.40   HGB 11.5* 11.9 11.2* 12.6   HCT 36.4 36.3 35.4 38.7   MCV 90 88 91 88   MCH 28.5 28.9 28.6 28.6   MCHC 31.6 32.8 31.6 32.6   RDW 14.5 14.4 14.1 13.8   * 526* 452* 500*     INR  Recent Labs   Lab 02/14/22  0526   INR 1.13     Arterial Blood Gas  Recent Labs   Lab 02/18/22  0411 02/17/22  0925 02/16/22  1247 02/16/22  0418   PH 7.41 7.46* 7.43 7.42   PCO2 49* 50* 49* 47*   PO2 99 96 72* 70*   HCO3 31* 36* 33* 30*   O2PER 40 40 35 40       All cultures:  No results for input(s): CULT in the last 168 hours.  Recent Results (from the past 24 hour(s))   XR Chest Port 1 View    Narrative    EXAM: XR CHEST PORT 1 VIEW  LOCATION: [facility]  DATE/TIME: 2/13/2022 6:36 AM    INDICATION: icu intubated  COMPARISON: 02/12/2022      Impression    IMPRESSION: Endotracheal tube 2.9 cm above mariel. Enteric tube below lower aspect of film. Rotation to left. Diffuse, bilateral infiltrates.    XR Chest Port 1 View    Narrative    EXAM: XR  CHEST PORT 1 VIEW  LOCATION: [facility]  DATE/TIME: 2/13/2022 3:04 PM    INDICATION: Endotracheal tube positioning.  COMPARISON: 2/13/2022 at 6:23 AM      Impression    IMPRESSION: Endotracheal tube tip 3.5 cm from the mariel. Right IJ line noted with tip in the mid to low SVC. Improved bilateral infiltrates compared to previous.         Mansoor Ruiz DO  [facility] Fellow    Billing: This patient is critically ill: Yes. Total critical care time today 40 min.

## 2022-02-19 LAB
ANION GAP SERPL CALCULATED.3IONS-SCNC: 7 MMOL/L (ref 3–14)
BASE EXCESS BLDA CALC-SCNC: 1 MMOL/L (ref -9–1.8)
BUN SERPL-MCNC: 45 MG/DL (ref 7–30)
CALCIUM SERPL-MCNC: 8.6 MG/DL (ref 8.5–10.1)
CHLORIDE BLD-SCNC: 101 MMOL/L (ref 94–109)
CO2 SERPL-SCNC: 26 MMOL/L (ref 20–32)
CREAT SERPL-MCNC: 0.66 MG/DL (ref 0.52–1.04)
ERYTHROCYTE [DISTWIDTH] IN BLOOD BY AUTOMATED COUNT: 14.5 % (ref 10–15)
GFR SERPL CREATININE-BSD FRML MDRD: >90 ML/MIN/1.73M2
GLUCOSE BLD-MCNC: 149 MG/DL (ref 70–99)
GLUCOSE BLDC GLUCOMTR-MCNC: 181 MG/DL (ref 70–99)
GLUCOSE BLDC GLUCOMTR-MCNC: 184 MG/DL (ref 70–99)
GLUCOSE BLDC GLUCOMTR-MCNC: 235 MG/DL (ref 70–99)
GLUCOSE BLDC GLUCOMTR-MCNC: 276 MG/DL (ref 70–99)
HCO3 BLD-SCNC: 26 MMOL/L (ref 21–28)
HCT VFR BLD AUTO: 38.9 % (ref 35–47)
HGB BLD-MCNC: 12.3 G/DL (ref 11.7–15.7)
MAGNESIUM SERPL-MCNC: 2.5 MG/DL (ref 1.6–2.3)
MCH RBC QN AUTO: 28.4 PG (ref 26.5–33)
MCHC RBC AUTO-ENTMCNC: 31.6 G/DL (ref 31.5–36.5)
MCV RBC AUTO: 90 FL (ref 78–100)
O2/TOTAL GAS SETTING VFR VENT: 30 %
PCO2 BLD: 41 MM HG (ref 35–45)
PH BLD: 7.4 [PH] (ref 7.35–7.45)
PHOSPHATE SERPL-MCNC: 3.7 MG/DL (ref 2.5–4.5)
PLATELET # BLD AUTO: 530 10E3/UL (ref 150–450)
PO2 BLD: 75 MM HG (ref 80–105)
POTASSIUM BLD-SCNC: 3.9 MMOL/L (ref 3.4–5.3)
RBC # BLD AUTO: 4.33 10E6/UL (ref 3.8–5.2)
SODIUM SERPL-SCNC: 134 MMOL/L (ref 133–144)
WBC # BLD AUTO: 14.7 10E3/UL (ref 4–11)

## 2022-02-19 PROCEDURE — 250N000013 HC RX MED GY IP 250 OP 250 PS 637: Performed by: INTERNAL MEDICINE

## 2022-02-19 PROCEDURE — 99291 CRITICAL CARE FIRST HOUR: CPT | Performed by: STUDENT IN AN ORGANIZED HEALTH CARE EDUCATION/TRAINING PROGRAM

## 2022-02-19 PROCEDURE — 80048 BASIC METABOLIC PNL TOTAL CA: CPT | Performed by: INTERNAL MEDICINE

## 2022-02-19 PROCEDURE — 200N000001 HC R&B ICU

## 2022-02-19 PROCEDURE — 250N000011 HC RX IP 250 OP 636: Performed by: STUDENT IN AN ORGANIZED HEALTH CARE EDUCATION/TRAINING PROGRAM

## 2022-02-19 PROCEDURE — 258N000003 HC RX IP 258 OP 636: Performed by: STUDENT IN AN ORGANIZED HEALTH CARE EDUCATION/TRAINING PROGRAM

## 2022-02-19 PROCEDURE — 999N000157 HC STATISTIC RCP TIME EA 10 MIN

## 2022-02-19 PROCEDURE — 250N000011 HC RX IP 250 OP 636: Performed by: INTERNAL MEDICINE

## 2022-02-19 PROCEDURE — 94003 VENT MGMT INPAT SUBQ DAY: CPT

## 2022-02-19 PROCEDURE — 84100 ASSAY OF PHOSPHORUS: CPT | Performed by: STUDENT IN AN ORGANIZED HEALTH CARE EDUCATION/TRAINING PROGRAM

## 2022-02-19 PROCEDURE — 250N000013 HC RX MED GY IP 250 OP 250 PS 637: Performed by: STUDENT IN AN ORGANIZED HEALTH CARE EDUCATION/TRAINING PROGRAM

## 2022-02-19 PROCEDURE — 85027 COMPLETE CBC AUTOMATED: CPT | Performed by: STUDENT IN AN ORGANIZED HEALTH CARE EDUCATION/TRAINING PROGRAM

## 2022-02-19 PROCEDURE — 250N000009 HC RX 250: Performed by: STUDENT IN AN ORGANIZED HEALTH CARE EDUCATION/TRAINING PROGRAM

## 2022-02-19 PROCEDURE — 82803 BLOOD GASES ANY COMBINATION: CPT | Performed by: STUDENT IN AN ORGANIZED HEALTH CARE EDUCATION/TRAINING PROGRAM

## 2022-02-19 PROCEDURE — 83735 ASSAY OF MAGNESIUM: CPT | Performed by: STUDENT IN AN ORGANIZED HEALTH CARE EDUCATION/TRAINING PROGRAM

## 2022-02-19 RX ORDER — HYDRALAZINE HYDROCHLORIDE 20 MG/ML
10 INJECTION INTRAMUSCULAR; INTRAVENOUS EVERY 6 HOURS PRN
Status: DISCONTINUED | OUTPATIENT
Start: 2022-02-19 | End: 2022-03-04 | Stop reason: HOSPADM

## 2022-02-19 RX ADMIN — INSULIN ASPART 14 UNITS: 100 INJECTION, SOLUTION INTRAVENOUS; SUBCUTANEOUS at 15:55

## 2022-02-19 RX ADMIN — MIDAZOLAM HYDROCHLORIDE 8 MG/HR: 1 INJECTION, SOLUTION INTRAVENOUS at 15:42

## 2022-02-19 RX ADMIN — DEXAMETHASONE SODIUM PHOSPHATE 6 MG: 4 INJECTION, SOLUTION INTRAMUSCULAR; INTRAVENOUS at 09:03

## 2022-02-19 RX ADMIN — GABAPENTIN 300 MG: 250 SUSPENSION ORAL at 06:12

## 2022-02-19 RX ADMIN — METHADONE HYDROCHLORIDE 40 MG: 10 CONCENTRATE ORAL at 21:09

## 2022-02-19 RX ADMIN — POTASSIUM CHLORIDE 20 MEQ: 1.5 POWDER, FOR SOLUTION ORAL at 21:09

## 2022-02-19 RX ADMIN — LEVOTHYROXINE SODIUM 200 MCG: 100 TABLET ORAL at 09:04

## 2022-02-19 RX ADMIN — ACETAMINOPHEN 650 MG: 325 TABLET, FILM COATED ORAL at 21:08

## 2022-02-19 RX ADMIN — DIAZEPAM 10 MG: 5 TABLET ORAL at 19:34

## 2022-02-19 RX ADMIN — Medication 15 ML: at 09:03

## 2022-02-19 RX ADMIN — CEFEPIME HYDROCHLORIDE 1 G: 1 INJECTION, POWDER, FOR SOLUTION INTRAMUSCULAR; INTRAVENOUS at 21:08

## 2022-02-19 RX ADMIN — Medication 1 PACKET: at 21:09

## 2022-02-19 RX ADMIN — Medication 40 MG: at 09:03

## 2022-02-19 RX ADMIN — INSULIN ASPART 5 UNITS: 100 INJECTION, SOLUTION INTRAVENOUS; SUBCUTANEOUS at 08:51

## 2022-02-19 RX ADMIN — INSULIN ASPART 5 UNITS: 100 INJECTION, SOLUTION INTRAVENOUS; SUBCUTANEOUS at 19:46

## 2022-02-19 RX ADMIN — DIAZEPAM 10 MG: 5 TABLET ORAL at 12:33

## 2022-02-19 RX ADMIN — GABAPENTIN 300 MG: 250 SUSPENSION ORAL at 14:01

## 2022-02-19 RX ADMIN — ASPIRIN 81 MG CHEWABLE TABLET 81 MG: 81 TABLET CHEWABLE at 09:04

## 2022-02-19 RX ADMIN — DIAZEPAM 10 MG: 5 TABLET ORAL at 03:47

## 2022-02-19 RX ADMIN — CHLORHEXIDINE GLUCONATE 15 ML: 1.2 SOLUTION ORAL at 08:45

## 2022-02-19 RX ADMIN — CYANOCOBALAMIN TAB 500 MCG 1000 MCG: 500 TAB at 09:04

## 2022-02-19 RX ADMIN — GABAPENTIN 300 MG: 250 SUSPENSION ORAL at 21:59

## 2022-02-19 RX ADMIN — BARICITINIB 4 MG: 2 TABLET, FILM COATED ORAL at 09:04

## 2022-02-19 RX ADMIN — INSULIN ASPART 1 UNITS: 100 INJECTION, SOLUTION INTRAVENOUS; SUBCUTANEOUS at 05:14

## 2022-02-19 RX ADMIN — DULOXETINE HYDROCHLORIDE 20 MG: 20 CAPSULE, DELAYED RELEASE ORAL at 09:04

## 2022-02-19 RX ADMIN — Medication 1 PACKET: at 15:43

## 2022-02-19 RX ADMIN — HYDROMORPHONE HYDROCHLORIDE 2 MG: 1 INJECTION, SOLUTION INTRAMUSCULAR; INTRAVENOUS; SUBCUTANEOUS at 21:59

## 2022-02-19 RX ADMIN — ENOXAPARIN SODIUM 60 MG: 60 INJECTION SUBCUTANEOUS at 09:03

## 2022-02-19 RX ADMIN — ACETAMINOPHEN 650 MG: 325 TABLET, FILM COATED ORAL at 09:04

## 2022-02-19 RX ADMIN — MIDAZOLAM HYDROCHLORIDE 12 MG/HR: 1 INJECTION, SOLUTION INTRAVENOUS at 06:12

## 2022-02-19 RX ADMIN — Medication 1 PACKET: at 09:03

## 2022-02-19 RX ADMIN — ENOXAPARIN SODIUM 60 MG: 60 INJECTION SUBCUTANEOUS at 21:09

## 2022-02-19 RX ADMIN — POTASSIUM CHLORIDE 20 MEQ: 1.5 POWDER, FOR SOLUTION ORAL at 09:04

## 2022-02-19 RX ADMIN — INSULIN ASPART 10 UNITS: 100 INJECTION, SOLUTION INTRAVENOUS; SUBCUTANEOUS at 12:36

## 2022-02-19 RX ADMIN — KETAMINE HYDROCHLORIDE 2 MG/KG/HR: 100 INJECTION, SOLUTION, CONCENTRATE INTRAMUSCULAR; INTRAVENOUS at 14:01

## 2022-02-19 RX ADMIN — Medication 25 MCG: at 09:04

## 2022-02-19 RX ADMIN — ATORVASTATIN CALCIUM 20 MG: 10 TABLET, FILM COATED ORAL at 21:08

## 2022-02-19 RX ADMIN — CEFEPIME HYDROCHLORIDE 1 G: 1 INJECTION, POWDER, FOR SOLUTION INTRAMUSCULAR; INTRAVENOUS at 09:03

## 2022-02-19 RX ADMIN — KETAMINE HYDROCHLORIDE 2 MG/KG/HR: 100 INJECTION, SOLUTION, CONCENTRATE INTRAMUSCULAR; INTRAVENOUS at 03:52

## 2022-02-19 RX ADMIN — METHADONE HYDROCHLORIDE 40 MG: 10 CONCENTRATE ORAL at 08:34

## 2022-02-19 RX ADMIN — CHLORHEXIDINE GLUCONATE 15 ML: 1.2 SOLUTION ORAL at 19:35

## 2022-02-19 ASSESSMENT — ACTIVITIES OF DAILY LIVING (ADL)
ADLS_ACUITY_SCORE: 22

## 2022-02-19 NOTE — PROGRESS NOTES
[facility] ICU RESPIRATORY NOTE        Date of Admission: 2/13/2022    Date of Intubation (most recent): 2/10/22    Reason for Mechanical Ventilation: Resp. failure    Number of Days on Mechanical Ventilation: 10    Met Criteria for Spontaneous Breathing Trial: No    Reason for No Spontaneous Breathing Trial: Per MD    Significant Events Today: PEEP lowered to 8    ABG Results:   Recent Labs   Lab 02/19/22  0403 02/18/22  0411 02/17/22  0925 02/16/22  1247   PH 7.40 7.41 7.46* 7.43   PCO2 41 49* 50* 49*   PO2 75* 99 96 72*   HCO3 26 31* 36* 33*   O2PER 30 40 40 35       Current Vent Settings: Vent Mode: CMV/AC  (Continuous Mandatory Ventilation/ Assist Control)  FiO2 (%): 30 %  Resp Rate (Set): 18 breaths/min  Tidal Volume (Set, mL): 50 mL  PEEP (cm H2O): 8 cmH2O  Resp: 22        Plan: Will continue full ventilatory support for now and assess for weaning readiness daily.               Sherine Good, RRT

## 2022-02-19 NOTE — PROGRESS NOTES
Neuro: Sedated. Pupils equal and reactive. Not following  Pulm: Clear LS. On full vent support.   CV: SR w/ BP WNL.   : Valle patent with adequate UOP. Lasix x1 given.   GI: Watery stools. On TF via NJ. BS present.  Extremities: Sedated  Skin: Grossly intact.  Lines: R internal jugular central line. Eva on L radial. PIVx2 on RUE.   Family: Updated by intensivist. Not this shift  Plan: Continue weaning vent support and sedation. Will continue to monitor and support

## 2022-02-19 NOTE — PROGRESS NOTES
[facility] ICU RESPIRATORY NOTE        Date of Admission: 2/10/2022    Date of Intubation (most recent):2/10/2022    Reason for Mechanical Ventilation: respiratory failure     Number of Days on Mechanical Ventilation: 10    Met Criteria for Spontaneous Breathing Trial:NO     Reason for No Spontaneous Breathing Trial: None     Significant Events Today:none   ABG Results:   Recent Labs   Lab 02/19/22  0403 02/18/22  0411 02/17/22  0925 02/16/22  1247   PH 7.40 7.41 7.46* 7.43   PCO2 41 49* 50* 49*   PO2 75* 99 96 72*   HCO3 26 31* 36* 33*   O2PER 30 40 40 35       Current Vent Settings: Vent Mode: CMV/AC  (Continuous Mandatory Ventilation/ Assist Control)  FiO2 (%): 30 %  Resp Rate (Set): 18 breaths/min  Tidal Volume (Set, mL): 350 mL  PEEP (cm H2O): 10 cmH2O  Resp: 25      Skin Assessment: intact     Plan:will continue to monitor and wean as tolerated   Josiah Mclean, RT

## 2022-02-19 NOTE — PROGRESS NOTES
Neuro: Sedated. Pupils equal and reactive. Not following  Pulm: Clear LS. On full vent support. Small ETT and oral secretion  CV: SR. Hypertension. Occasional PVCs  : Valle patent with adequate UOP.   GI: Watery stools. On TF via NJ. BS present. Labile BS. intensivist notified, no new orders. Will continue to monitor.   Extremities: Purposeful movements   Skin: Grossly intact.  Lines: R internal jugular central line. Eva on L radial. PIVx2 on RUE.   Family: Updated by intensivist.   Plan: Continue weaning vent support and sedation. Will continue to monitor and support.

## 2022-02-19 NOTE — PROGRESS NOTES
Neuro: Sedated. Pupils equal and reactive. Not following  Pulm: Clear LS. On full vent support. Small ETT and oral secretion  CV: SR w/ BP WNL. PVCs resolved with K and mg replacement   : Valle patent with adequate UOP. Lasix x1 given.   GI: Watery stools. On TF via NJ. BS present.  Extremities: Sedated  Skin: Grossly intact.  Lines: R internal jugular central line. [place] on L radial. PIVx2 on RUE.   Family: Updated by intensivist.   Plan: Continue weaning vent support and sedation. Will continue to monitor and support.

## 2022-02-19 NOTE — PROGRESS NOTES
ICU Progress Note    Date of Service: 02/19/22    A/P:  62F DM2, PSA (on methadone), HCV, hypothyroidism, GERD, depression, anxiety, bipolar admitted initially to [facility] 2/10 for acute hypoxemic respiratory failure 2/2 COVID-19 and E coli UTI. Transferred to [facility] 2/13 following VT/Torsade arrest and subsequent bradycardia w/ potential need for temporary pacemaker.     I have personally reviewed the daily labs, imaging studies, cultures and discussed the case with referring physician and consulting physicians.     Neuro  # Sedation for mechanical ventilation  # PSA - on home methadone  # Hx depression, anxiety, bipolar   - ketamine gtt  - midazolam gtt   - PO diazepam   - duloxetine   - gabapentin   - methadone 40mg BID     Pulmonary  # Acute hypoxemic respiratory failure  # ARDS  - lung protective ventilation  - vent settings below   - wean PEEP  - remain supine     Cardiac  # Cardiomyopathy - improvement in EF from 20% to 40% (2/18)  # Cardiac arrest - Torsade de Pointes  # Bradycardia   - MAP > 65  - not on vasopressors  - goal HR > 40  - avoid QT prolonging medications   - ASA  - atorva    Renal  # No acute issues   - monitor UOP, Cr, I/O    GI  # Hx HCV  # Protein calorie malnutrition  - RD to manage TF    Heme/Onc  # No acute issues   - monitor CBC    ID  # COVID-19  # UTI - E coli  - dexamethasone 6mg x 10d  - baricitinib   - cefepime x 7d    Endo  # DM2 w/ hyperlgycemia  # Hypothyroidism  - monitor BG  - sliding scale insulin  - glargine 25u BID  - levothyroxine      PPX  1. DVT: enoxaparin   2. VAP: HOB 30 degrees, chlorhexidine rinse  3. Stress Ulcer: PPI  4. Restraints: Nonviolent soft two point restraints required and necessary for patient safety and continued cares and good effect as patient continues to pull at necessary lines, tubes despite education and distraction. Will readdress daily.   5. Wound care - per unit routine   6. Feeding - TF  7. Family updated via phone.    Interval  Hx:  NAEON. PIP < 10.     Unable to obtain ROS 2/2 sedation/intubation.     /72   Pulse 78   Temp 99.4  F (37.4  C) (Axillary)   Resp 22   Wt 115.6 kg (254 lb 13.6 oz)   LMP 11/01/2013   SpO2 94%   BMI 43.75 kg/m    Gen: supine  Neuro: sedated, pupils equal   HEENT: anicteric  Card: RRR  Pulm: clear bilaterally   Abd: soft  MSK: no edema  Skin: no obvious rash     Vent Mode: CMV/AC  (Continuous Mandatory Ventilation/ Assist Control)  FiO2 (%): 30 %  Resp Rate (Set): 18 breaths/min  Tidal Volume (Set, mL): 350 mL  PEEP (cm H2O): 10 cmH2O  Resp: 22        Intake/Output Summary (Last 24 hours) at 2/19/2022 1027  Last data filed at 2/19/2022 1000  Gross per 24 hour   Intake 2637.5 ml   Output 6760 ml   Net -4122.5 ml       Labs: reviewed    Imaging: reviewed    Billing: Patient is critically ill. Total critical care time today, excluding procedures, was 45 minutes.    Patrick Hays MD  Pulmonary Disease and Critical Care Medicine   [facility]

## 2022-02-20 LAB
ANION GAP SERPL CALCULATED.3IONS-SCNC: 6 MMOL/L (ref 3–14)
BASE EXCESS BLDA CALC-SCNC: -1.7 MMOL/L (ref -9–1.8)
BUN SERPL-MCNC: 43 MG/DL (ref 7–30)
CALCIUM SERPL-MCNC: 9 MG/DL (ref 8.5–10.1)
CHLORIDE BLD-SCNC: 105 MMOL/L (ref 94–109)
CO2 SERPL-SCNC: 22 MMOL/L (ref 20–32)
CREAT SERPL-MCNC: 0.65 MG/DL (ref 0.52–1.04)
ERYTHROCYTE [DISTWIDTH] IN BLOOD BY AUTOMATED COUNT: 15 % (ref 10–15)
GFR SERPL CREATININE-BSD FRML MDRD: >90 ML/MIN/1.73M2
GLUCOSE BLD-MCNC: 153 MG/DL (ref 70–99)
GLUCOSE BLDC GLUCOMTR-MCNC: 142 MG/DL (ref 70–99)
GLUCOSE BLDC GLUCOMTR-MCNC: 144 MG/DL (ref 70–99)
GLUCOSE BLDC GLUCOMTR-MCNC: 148 MG/DL (ref 70–99)
GLUCOSE BLDC GLUCOMTR-MCNC: 168 MG/DL (ref 70–99)
GLUCOSE BLDC GLUCOMTR-MCNC: 201 MG/DL (ref 70–99)
GLUCOSE BLDC GLUCOMTR-MCNC: 214 MG/DL (ref 70–99)
GLUCOSE BLDC GLUCOMTR-MCNC: 243 MG/DL (ref 70–99)
HCO3 BLD-SCNC: 23 MMOL/L (ref 21–28)
HCT VFR BLD AUTO: 38 % (ref 35–47)
HGB BLD-MCNC: 12.1 G/DL (ref 11.7–15.7)
MAGNESIUM SERPL-MCNC: 2.3 MG/DL (ref 1.6–2.3)
MCH RBC QN AUTO: 28.5 PG (ref 26.5–33)
MCHC RBC AUTO-ENTMCNC: 31.8 G/DL (ref 31.5–36.5)
MCV RBC AUTO: 89 FL (ref 78–100)
O2/TOTAL GAS SETTING VFR VENT: 30 %
PCO2 BLD: 38 MM HG (ref 35–45)
PH BLD: 7.39 [PH] (ref 7.35–7.45)
PHOSPHATE SERPL-MCNC: 2.9 MG/DL (ref 2.5–4.5)
PLATELET # BLD AUTO: 544 10E3/UL (ref 150–450)
PO2 BLD: 74 MM HG (ref 80–105)
POTASSIUM BLD-SCNC: 4 MMOL/L (ref 3.4–5.3)
PROCALCITONIN SERPL-MCNC: <0.05 NG/ML
RBC # BLD AUTO: 4.25 10E6/UL (ref 3.8–5.2)
SODIUM SERPL-SCNC: 133 MMOL/L (ref 133–144)
WBC # BLD AUTO: 16.5 10E3/UL (ref 4–11)

## 2022-02-20 PROCEDURE — 250N000011 HC RX IP 250 OP 636: Performed by: STUDENT IN AN ORGANIZED HEALTH CARE EDUCATION/TRAINING PROGRAM

## 2022-02-20 PROCEDURE — 258N000003 HC RX IP 258 OP 636: Performed by: STUDENT IN AN ORGANIZED HEALTH CARE EDUCATION/TRAINING PROGRAM

## 2022-02-20 PROCEDURE — 999N000009 HC STATISTIC AIRWAY CARE

## 2022-02-20 PROCEDURE — 999N000157 HC STATISTIC RCP TIME EA 10 MIN

## 2022-02-20 PROCEDURE — 85027 COMPLETE CBC AUTOMATED: CPT | Performed by: STUDENT IN AN ORGANIZED HEALTH CARE EDUCATION/TRAINING PROGRAM

## 2022-02-20 PROCEDURE — 99291 CRITICAL CARE FIRST HOUR: CPT | Performed by: STUDENT IN AN ORGANIZED HEALTH CARE EDUCATION/TRAINING PROGRAM

## 2022-02-20 PROCEDURE — 250N000013 HC RX MED GY IP 250 OP 250 PS 637: Performed by: STUDENT IN AN ORGANIZED HEALTH CARE EDUCATION/TRAINING PROGRAM

## 2022-02-20 PROCEDURE — 250N000011 HC RX IP 250 OP 636: Performed by: INTERNAL MEDICINE

## 2022-02-20 PROCEDURE — 200N000001 HC R&B ICU

## 2022-02-20 PROCEDURE — 82803 BLOOD GASES ANY COMBINATION: CPT | Performed by: STUDENT IN AN ORGANIZED HEALTH CARE EDUCATION/TRAINING PROGRAM

## 2022-02-20 PROCEDURE — 84100 ASSAY OF PHOSPHORUS: CPT | Performed by: STUDENT IN AN ORGANIZED HEALTH CARE EDUCATION/TRAINING PROGRAM

## 2022-02-20 PROCEDURE — 94003 VENT MGMT INPAT SUBQ DAY: CPT

## 2022-02-20 PROCEDURE — 250N000012 HC RX MED GY IP 250 OP 636 PS 637: Performed by: PEDIATRICS

## 2022-02-20 PROCEDURE — 250N000009 HC RX 250: Performed by: STUDENT IN AN ORGANIZED HEALTH CARE EDUCATION/TRAINING PROGRAM

## 2022-02-20 PROCEDURE — 80048 BASIC METABOLIC PNL TOTAL CA: CPT | Performed by: INTERNAL MEDICINE

## 2022-02-20 PROCEDURE — 84145 PROCALCITONIN (PCT): CPT | Performed by: STUDENT IN AN ORGANIZED HEALTH CARE EDUCATION/TRAINING PROGRAM

## 2022-02-20 PROCEDURE — 83735 ASSAY OF MAGNESIUM: CPT | Performed by: STUDENT IN AN ORGANIZED HEALTH CARE EDUCATION/TRAINING PROGRAM

## 2022-02-20 PROCEDURE — 250N000013 HC RX MED GY IP 250 OP 250 PS 637: Performed by: INTERNAL MEDICINE

## 2022-02-20 RX ADMIN — ATORVASTATIN CALCIUM 20 MG: 10 TABLET, FILM COATED ORAL at 21:07

## 2022-02-20 RX ADMIN — INSULIN ASPART 1 UNITS: 100 INJECTION, SOLUTION INTRAVENOUS; SUBCUTANEOUS at 04:23

## 2022-02-20 RX ADMIN — Medication 1 PACKET: at 08:42

## 2022-02-20 RX ADMIN — Medication 40 MG: at 08:41

## 2022-02-20 RX ADMIN — INSULIN ASPART 7 UNITS: 100 INJECTION, SOLUTION INTRAVENOUS; SUBCUTANEOUS at 08:59

## 2022-02-20 RX ADMIN — CYANOCOBALAMIN TAB 500 MCG 1000 MCG: 500 TAB at 08:41

## 2022-02-20 RX ADMIN — GABAPENTIN 300 MG: 250 SUSPENSION ORAL at 21:06

## 2022-02-20 RX ADMIN — INSULIN ASPART 3 UNITS: 100 INJECTION, SOLUTION INTRAVENOUS; SUBCUTANEOUS at 19:57

## 2022-02-20 RX ADMIN — ENOXAPARIN SODIUM 60 MG: 60 INJECTION SUBCUTANEOUS at 08:41

## 2022-02-20 RX ADMIN — HYDROMORPHONE HYDROCHLORIDE 2 MG: 1 INJECTION, SOLUTION INTRAMUSCULAR; INTRAVENOUS; SUBCUTANEOUS at 10:20

## 2022-02-20 RX ADMIN — DIAZEPAM 10 MG: 5 TABLET ORAL at 12:05

## 2022-02-20 RX ADMIN — METHADONE HYDROCHLORIDE 40 MG: 10 CONCENTRATE ORAL at 21:07

## 2022-02-20 RX ADMIN — Medication 1 PACKET: at 17:45

## 2022-02-20 RX ADMIN — BARICITINIB 4 MG: 2 TABLET, FILM COATED ORAL at 08:42

## 2022-02-20 RX ADMIN — CEFEPIME HYDROCHLORIDE 1 G: 1 INJECTION, POWDER, FOR SOLUTION INTRAMUSCULAR; INTRAVENOUS at 08:41

## 2022-02-20 RX ADMIN — POTASSIUM CHLORIDE 20 MEQ: 1.5 POWDER, FOR SOLUTION ORAL at 08:41

## 2022-02-20 RX ADMIN — GABAPENTIN 300 MG: 250 SUSPENSION ORAL at 05:06

## 2022-02-20 RX ADMIN — ASPIRIN 81 MG CHEWABLE TABLET 81 MG: 81 TABLET CHEWABLE at 08:41

## 2022-02-20 RX ADMIN — ACETAMINOPHEN 650 MG: 325 TABLET, FILM COATED ORAL at 20:48

## 2022-02-20 RX ADMIN — Medication 15 ML: at 08:41

## 2022-02-20 RX ADMIN — HYDROMORPHONE HYDROCHLORIDE 2 MG: 1 INJECTION, SOLUTION INTRAMUSCULAR; INTRAVENOUS; SUBCUTANEOUS at 15:14

## 2022-02-20 RX ADMIN — CHLORHEXIDINE GLUCONATE 15 ML: 1.2 SOLUTION ORAL at 20:48

## 2022-02-20 RX ADMIN — KETAMINE HYDROCHLORIDE 2 MG/KG/HR: 100 INJECTION, SOLUTION, CONCENTRATE INTRAMUSCULAR; INTRAVENOUS at 00:00

## 2022-02-20 RX ADMIN — KETAMINE HYDROCHLORIDE 2 MG/KG/HR: 100 INJECTION, SOLUTION, CONCENTRATE INTRAMUSCULAR; INTRAVENOUS at 21:07

## 2022-02-20 RX ADMIN — GABAPENTIN 300 MG: 250 SUSPENSION ORAL at 15:14

## 2022-02-20 RX ADMIN — METHADONE HYDROCHLORIDE 40 MG: 10 CONCENTRATE ORAL at 09:17

## 2022-02-20 RX ADMIN — Medication 25 MCG: at 08:41

## 2022-02-20 RX ADMIN — KETAMINE HYDROCHLORIDE 2 MG/KG/HR: 100 INJECTION, SOLUTION, CONCENTRATE INTRAMUSCULAR; INTRAVENOUS at 10:16

## 2022-02-20 RX ADMIN — HYDROMORPHONE HYDROCHLORIDE 2 MG: 1 INJECTION, SOLUTION INTRAMUSCULAR; INTRAVENOUS; SUBCUTANEOUS at 02:18

## 2022-02-20 RX ADMIN — INSULIN ASPART 1 UNITS: 100 INJECTION, SOLUTION INTRAVENOUS; SUBCUTANEOUS at 00:04

## 2022-02-20 RX ADMIN — MIDAZOLAM HYDROCHLORIDE 7 MG/HR: 1 INJECTION, SOLUTION INTRAVENOUS at 05:06

## 2022-02-20 RX ADMIN — DEXAMETHASONE SODIUM PHOSPHATE 6 MG: 4 INJECTION, SOLUTION INTRAMUSCULAR; INTRAVENOUS at 08:41

## 2022-02-20 RX ADMIN — Medication 1 PACKET: at 21:08

## 2022-02-20 RX ADMIN — INSULIN ASPART 1 UNITS: 100 INJECTION, SOLUTION INTRAVENOUS; SUBCUTANEOUS at 23:34

## 2022-02-20 RX ADMIN — DULOXETINE HYDROCHLORIDE 20 MG: 20 CAPSULE, DELAYED RELEASE ORAL at 08:41

## 2022-02-20 RX ADMIN — INSULIN ASPART 11 UNITS: 100 INJECTION, SOLUTION INTRAVENOUS; SUBCUTANEOUS at 12:11

## 2022-02-20 RX ADMIN — INSULIN ASPART 8 UNITS: 100 INJECTION, SOLUTION INTRAVENOUS; SUBCUTANEOUS at 17:47

## 2022-02-20 RX ADMIN — ENOXAPARIN SODIUM 60 MG: 60 INJECTION SUBCUTANEOUS at 20:48

## 2022-02-20 RX ADMIN — INSULIN GLARGINE 25 UNITS: 100 INJECTION, SOLUTION SUBCUTANEOUS at 08:59

## 2022-02-20 RX ADMIN — DIAZEPAM 10 MG: 5 TABLET ORAL at 05:06

## 2022-02-20 RX ADMIN — CHOLECALCIFEROL CAP 1.25 MG (50000 UNIT) 1250 MCG: 1.25 CAP at 17:49

## 2022-02-20 RX ADMIN — POTASSIUM CHLORIDE 20 MEQ: 1.5 POWDER, FOR SOLUTION ORAL at 20:48

## 2022-02-20 RX ADMIN — CHLORHEXIDINE GLUCONATE 15 ML: 1.2 SOLUTION ORAL at 08:41

## 2022-02-20 RX ADMIN — MIDAZOLAM HYDROCHLORIDE 6 MG/HR: 1 INJECTION, SOLUTION INTRAVENOUS at 20:48

## 2022-02-20 RX ADMIN — DIAZEPAM 10 MG: 5 TABLET ORAL at 19:48

## 2022-02-20 RX ADMIN — HYDROMORPHONE HYDROCHLORIDE 2 MG: 1 INJECTION, SOLUTION INTRAMUSCULAR; INTRAVENOUS; SUBCUTANEOUS at 21:07

## 2022-02-20 RX ADMIN — LEVOTHYROXINE SODIUM 200 MCG: 100 TABLET ORAL at 08:41

## 2022-02-20 ASSESSMENT — ACTIVITIES OF DAILY LIVING (ADL)
ADLS_ACUITY_SCORE: 22
ADLS_ACUITY_SCORE: 23
ADLS_ACUITY_SCORE: 22
ADLS_ACUITY_SCORE: 23
ADLS_ACUITY_SCORE: 22
ADLS_ACUITY_SCORE: 23
ADLS_ACUITY_SCORE: 23
ADLS_ACUITY_SCORE: 22
ADLS_ACUITY_SCORE: 23
ADLS_ACUITY_SCORE: 23
ADLS_ACUITY_SCORE: 22
ADLS_ACUITY_SCORE: 23
ADLS_ACUITY_SCORE: 22
ADLS_ACUITY_SCORE: 22
ADLS_ACUITY_SCORE: 23
ADLS_ACUITY_SCORE: 23

## 2022-02-20 NOTE — PROGRESS NOTES
ICU Progress Note    Date of Service: 02/20/22    A/P:  62F DM2, PSA (on methadone), HCV, hypothyroidism, GERD, depression, anxiety, bipolar admitted initially to [facility] 2/10 for acute hypoxemic respiratory failure 2/2 COVID-19 and E coli UTI. Transferred to [facility] 2/13 following VT/Torsade arrest and subsequent bradycardia w/ potential need for temporary pacemaker.     I have personally reviewed the daily labs, imaging studies, cultures and discussed the case with referring physician and consulting physicians.     Neuro  # Sedation for mechanical ventilation  # PSA - on home methadone  # Hx depression, anxiety, bipolar   - ketamine gtt  - midazolam gtt, wean as able    - PO diazepam   - duloxetine   - gabapentin   - methadone 40mg BID     Pulmonary  # Acute hypoxemic respiratory failure  # ARDS  - lung protective ventilation  - vent settings below   - wean PEEP to 5  - remain supine     Cardiac  # Cardiomyopathy - improvement in EF from 20% to 40% (2/18)  # Cardiac arrest - Torsade de Pointes  # Bradycardia   - MAP > 65  - not on vasopressors  - goal HR > 40  - avoid QT prolonging medications   - ASA  - atorva    Renal  # No acute issues   - monitor UOP, Cr, I/O    GI  # Hx HCV  # Protein calorie malnutrition  - RD to manage TF    Heme/Onc  # No acute issues   - monitor CBC    ID  # COVID-19  # UTI - E coli  - dexamethasone 6mg x 10d  - baricitinib   - cefepime x 7d    Endo  # DM2 w/ hyperlgycemia  # Hypothyroidism  - monitor BG  - sliding scale insulin  - glargine 25u qAM 30u qPM  - levothyroxine      PPX  1. DVT: enoxaparin   2. VAP: HOB 30 degrees, chlorhexidine rinse  3. Stress Ulcer: PPI  4. Restraints: Nonviolent soft two point restraints required and necessary for patient safety and continued cares and good effect as patient continues to pull at necessary lines, tubes despite education and distraction. Will readdress daily.   5. Wound care - per unit routine   6. Feeding - TF  7. Family updated via  phone.    Interval Hx:  NAEON. P:F 247. PIP < 10.     Unable to obtain ROS 2/2 sedation/intubation.     /72   Pulse 82   Temp 98.1  F (36.7  C) (Oral)   Resp 19   Wt 116.2 kg (256 lb 2.8 oz)   LMP 11/01/2013   SpO2 95%   BMI 43.97 kg/m    Gen: supine  Neuro: sedated, pupils equal   HEENT: anicteric  Card: RRR  Pulm: clear bilaterally   Abd: soft  MSK: no edema  Skin: no obvious rash     Vent Mode: CMV/AC  (Continuous Mandatory Ventilation/ Assist Control)  FiO2 (%): 30 %  Resp Rate (Set): 18 breaths/min  Tidal Volume (Set, mL): 350 mL  PEEP (cm H2O): 8 cmH2O  Resp: 19        Intake/Output Summary (Last 24 hours) at 2/19/2022 1027  Last data filed at 2/19/2022 1000  Gross per 24 hour   Intake 2637.5 ml   Output 6760 ml   Net -4122.5 ml       Labs: reviewed    Imaging: reviewed    Billing: Patient is critically ill. Total critical care time today, excluding procedures, was 35 minutes.    Patrick Hays MD  Pulmonary Disease and Critical Care Medicine   [facility]

## 2022-02-20 NOTE — PROGRESS NOTES
[facility] ICU RESPIRATORY NOTE        Date of Admission: 2/13/2022    Date of Intubation (most recent): 2/10/2022    Reason for Mechanical Ventilation: Resp. failure    Number of Days on Mechanical Ventilation: 11      Met Criteria for Spontaneous Breathing Trial: No      Significant Events Today: PEEP lowered to 5    ABG Results:   Recent Labs   Lab 02/20/22  0418 02/19/22  0403 02/18/22  0411 02/17/22  0925   PH 7.39 7.40 7.41 7.46*   PCO2 38 41 49* 50*   PO2 74* 75* 99 96   HCO3 23 26 31* 36*   O2PER 30 30 40 40       Current Vent Settings: Vent Mode: CMV/AC  (Continuous Mandatory Ventilation/ Assist Control)  FiO2 (%): 30 %  Resp Rate (Set): 18 breaths/min  Tidal Volume (Set, mL): 350 mL  PEEP (cm H2O): 5 cmH2O  Resp: 14        Plan: Will continue full ventilatory support for now and assess for weaning readiness daily.               Sherine Good, RRT

## 2022-02-20 NOTE — PROGRESS NOTES
[facility] ICU RESPIRATORY NOTE        Date of Admission: 2/13/2022    Date of Intubation (most recent): 2/10/22    Reason for Mechanical Ventilation: Resp failure    Number of Days on Mechanical Ventilation: 11    Met Criteria for Spontaneous Breathing Trial: No    Reason for No Spontaneous Breathing Trial: Per MD    Significant Events Today: PEEP lowered to 8    ABG Results:   Recent Labs   Lab 02/20/22  0418 02/19/22  0403 02/18/22  0411 02/17/22  0925   PH 7.39 7.40 7.41 7.46*   PCO2 38 41 49* 50*   PO2 74* 75* 99 96   HCO3 23 26 31* 36*   O2PER 30 30 40 40       Current Vent Settings: Vent Mode: CMV/AC  (Continuous Mandatory Ventilation/ Assist Control)  FiO2 (%): 30 %  Resp Rate (Set): 18 breaths/min  Tidal Volume (Set, mL): 350 mL  PEEP (cm H2O): 8 cmH2O  Resp: 19      Skin Assessment: Intact    Plan: Continue full vent support and assess for wean daily    Johanny Vasques

## 2022-02-20 NOTE — PLAN OF CARE
[id number]    Neuro: Withdraws, doesn't follow, PERRL  CV: SR with occ. PVCs  Resp: Sats in high 90's, FiO2 30%, weaned PEEP to 5  GI: TF at goal, loose BM x2  : Valle patent, good UOP  Access/Sites: CVC in R I J, Left radial Jackson appropriate  Drips: Ketamine, versed, TKO  Skin: intact with scattered bruising  Plan: Wean sedation as tolerated starting with versed. Pressure support trials once less sedated.

## 2022-02-21 LAB
ALBUMIN SERPL-MCNC: 2.3 G/DL (ref 3.4–5)
ALP SERPL-CCNC: 126 U/L (ref 40–150)
ALT SERPL W P-5'-P-CCNC: 263 U/L (ref 0–50)
ANION GAP SERPL CALCULATED.3IONS-SCNC: 7 MMOL/L (ref 3–14)
AST SERPL W P-5'-P-CCNC: 49 U/L (ref 0–45)
BASE EXCESS BLDA CALC-SCNC: 0.2 MMOL/L (ref -9–1.8)
BILIRUB DIRECT SERPL-MCNC: 0.2 MG/DL (ref 0–0.2)
BILIRUB SERPL-MCNC: 0.4 MG/DL (ref 0.2–1.3)
BUN SERPL-MCNC: 35 MG/DL (ref 7–30)
CALCIUM SERPL-MCNC: 8.9 MG/DL (ref 8.5–10.1)
CHLORIDE BLD-SCNC: 100 MMOL/L (ref 94–109)
CO2 SERPL-SCNC: 23 MMOL/L (ref 20–32)
CREAT SERPL-MCNC: 0.46 MG/DL (ref 0.52–1.04)
ERYTHROCYTE [DISTWIDTH] IN BLOOD BY AUTOMATED COUNT: 14.8 % (ref 10–15)
GFR SERPL CREATININE-BSD FRML MDRD: >90 ML/MIN/1.73M2
GLUCOSE BLD-MCNC: 171 MG/DL (ref 70–99)
GLUCOSE BLDC GLUCOMTR-MCNC: 160 MG/DL (ref 70–99)
GLUCOSE BLDC GLUCOMTR-MCNC: 171 MG/DL (ref 70–99)
GLUCOSE BLDC GLUCOMTR-MCNC: 172 MG/DL (ref 70–99)
GLUCOSE BLDC GLUCOMTR-MCNC: 200 MG/DL (ref 70–99)
GLUCOSE BLDC GLUCOMTR-MCNC: 231 MG/DL (ref 70–99)
GLUCOSE BLDC GLUCOMTR-MCNC: 268 MG/DL (ref 70–99)
HCO3 BLD-SCNC: 25 MMOL/L (ref 21–28)
HCT VFR BLD AUTO: 37.7 % (ref 35–47)
HGB BLD-MCNC: 12.1 G/DL (ref 11.7–15.7)
MAGNESIUM SERPL-MCNC: 2.1 MG/DL (ref 1.6–2.3)
MCH RBC QN AUTO: 28.3 PG (ref 26.5–33)
MCHC RBC AUTO-ENTMCNC: 32.1 G/DL (ref 31.5–36.5)
MCV RBC AUTO: 88 FL (ref 78–100)
O2/TOTAL GAS SETTING VFR VENT: 30 %
PCO2 BLD: 38 MM HG (ref 35–45)
PH BLD: 7.42 [PH] (ref 7.35–7.45)
PHOSPHATE SERPL-MCNC: 3.1 MG/DL (ref 2.5–4.5)
PLATELET # BLD AUTO: 560 10E3/UL (ref 150–450)
PO2 BLD: 75 MM HG (ref 80–105)
POTASSIUM BLD-SCNC: 4 MMOL/L (ref 3.4–5.3)
PROT SERPL-MCNC: 7.6 G/DL (ref 6.8–8.8)
RBC # BLD AUTO: 4.27 10E6/UL (ref 3.8–5.2)
SODIUM SERPL-SCNC: 130 MMOL/L (ref 133–144)
WBC # BLD AUTO: 13.8 10E3/UL (ref 4–11)

## 2022-02-21 PROCEDURE — 200N000001 HC R&B ICU

## 2022-02-21 PROCEDURE — 82248 BILIRUBIN DIRECT: CPT | Performed by: STUDENT IN AN ORGANIZED HEALTH CARE EDUCATION/TRAINING PROGRAM

## 2022-02-21 PROCEDURE — 250N000009 HC RX 250: Performed by: STUDENT IN AN ORGANIZED HEALTH CARE EDUCATION/TRAINING PROGRAM

## 2022-02-21 PROCEDURE — 250N000013 HC RX MED GY IP 250 OP 250 PS 637: Performed by: INTERNAL MEDICINE

## 2022-02-21 PROCEDURE — 83735 ASSAY OF MAGNESIUM: CPT | Performed by: STUDENT IN AN ORGANIZED HEALTH CARE EDUCATION/TRAINING PROGRAM

## 2022-02-21 PROCEDURE — 82803 BLOOD GASES ANY COMBINATION: CPT | Performed by: STUDENT IN AN ORGANIZED HEALTH CARE EDUCATION/TRAINING PROGRAM

## 2022-02-21 PROCEDURE — 80069 RENAL FUNCTION PANEL: CPT | Performed by: INTERNAL MEDICINE

## 2022-02-21 PROCEDURE — 94003 VENT MGMT INPAT SUBQ DAY: CPT

## 2022-02-21 PROCEDURE — 250N000012 HC RX MED GY IP 250 OP 636 PS 637: Performed by: STUDENT IN AN ORGANIZED HEALTH CARE EDUCATION/TRAINING PROGRAM

## 2022-02-21 PROCEDURE — 250N000013 HC RX MED GY IP 250 OP 250 PS 637: Performed by: STUDENT IN AN ORGANIZED HEALTH CARE EDUCATION/TRAINING PROGRAM

## 2022-02-21 PROCEDURE — 250N000011 HC RX IP 250 OP 636: Performed by: INTERNAL MEDICINE

## 2022-02-21 PROCEDURE — 85027 COMPLETE CBC AUTOMATED: CPT | Performed by: STUDENT IN AN ORGANIZED HEALTH CARE EDUCATION/TRAINING PROGRAM

## 2022-02-21 PROCEDURE — 258N000003 HC RX IP 258 OP 636: Performed by: STUDENT IN AN ORGANIZED HEALTH CARE EDUCATION/TRAINING PROGRAM

## 2022-02-21 PROCEDURE — 250N000011 HC RX IP 250 OP 636: Performed by: STUDENT IN AN ORGANIZED HEALTH CARE EDUCATION/TRAINING PROGRAM

## 2022-02-21 PROCEDURE — 99291 CRITICAL CARE FIRST HOUR: CPT | Mod: GC | Performed by: INTERNAL MEDICINE

## 2022-02-21 PROCEDURE — 999N000157 HC STATISTIC RCP TIME EA 10 MIN

## 2022-02-21 PROCEDURE — 84100 ASSAY OF PHOSPHORUS: CPT | Performed by: STUDENT IN AN ORGANIZED HEALTH CARE EDUCATION/TRAINING PROGRAM

## 2022-02-21 PROCEDURE — 93005 ELECTROCARDIOGRAM TRACING: CPT

## 2022-02-21 PROCEDURE — 80053 COMPREHEN METABOLIC PANEL: CPT | Performed by: INTERNAL MEDICINE

## 2022-02-21 RX ORDER — POTASSIUM CHLORIDE 1.5 G/1.58G
20 POWDER, FOR SOLUTION ORAL 2 TIMES DAILY
Status: COMPLETED | OUTPATIENT
Start: 2022-02-21 | End: 2022-02-21

## 2022-02-21 RX ORDER — GUAR GUM
2 PACKET (EA) ORAL 2 TIMES DAILY
Status: DISCONTINUED | OUTPATIENT
Start: 2022-02-21 | End: 2022-02-28

## 2022-02-21 RX ORDER — FUROSEMIDE 10 MG/ML
40 INJECTION INTRAMUSCULAR; INTRAVENOUS EVERY 12 HOURS
Status: DISCONTINUED | OUTPATIENT
Start: 2022-02-21 | End: 2022-02-21

## 2022-02-21 RX ORDER — FUROSEMIDE 10 MG/ML
40 INJECTION INTRAMUSCULAR; INTRAVENOUS EVERY 12 HOURS
Status: COMPLETED | OUTPATIENT
Start: 2022-02-21 | End: 2022-02-21

## 2022-02-21 RX ORDER — MAGNESIUM SULFATE HEPTAHYDRATE 40 MG/ML
2 INJECTION, SOLUTION INTRAVENOUS ONCE
Status: COMPLETED | OUTPATIENT
Start: 2022-02-21 | End: 2022-02-21

## 2022-02-21 RX ADMIN — CHLORHEXIDINE GLUCONATE 15 ML: 1.2 SOLUTION ORAL at 19:54

## 2022-02-21 RX ADMIN — INSULIN ASPART 7 UNITS: 100 INJECTION, SOLUTION INTRAVENOUS; SUBCUTANEOUS at 23:32

## 2022-02-21 RX ADMIN — POTASSIUM CHLORIDE 20 MEQ: 1.5 POWDER, FOR SOLUTION ORAL at 21:43

## 2022-02-21 RX ADMIN — KETAMINE HYDROCHLORIDE 2 MG/KG/HR: 100 INJECTION, SOLUTION, CONCENTRATE INTRAMUSCULAR; INTRAVENOUS at 17:17

## 2022-02-21 RX ADMIN — INSULIN ASPART 4 UNITS: 100 INJECTION, SOLUTION INTRAVENOUS; SUBCUTANEOUS at 04:28

## 2022-02-21 RX ADMIN — GABAPENTIN 300 MG: 250 SUSPENSION ORAL at 06:07

## 2022-02-21 RX ADMIN — ENOXAPARIN SODIUM 60 MG: 60 INJECTION SUBCUTANEOUS at 21:43

## 2022-02-21 RX ADMIN — POTASSIUM CHLORIDE 20 MEQ: 1.5 POWDER, FOR SOLUTION ORAL at 08:34

## 2022-02-21 RX ADMIN — INSULIN ASPART 13 UNITS: 100 INJECTION, SOLUTION INTRAVENOUS; SUBCUTANEOUS at 12:48

## 2022-02-21 RX ADMIN — FUROSEMIDE 40 MG: 10 INJECTION, SOLUTION INTRAVENOUS at 19:54

## 2022-02-21 RX ADMIN — FUROSEMIDE 40 MG: 10 INJECTION, SOLUTION INTRAVENOUS at 08:33

## 2022-02-21 RX ADMIN — ASPIRIN 81 MG CHEWABLE TABLET 81 MG: 81 TABLET CHEWABLE at 08:34

## 2022-02-21 RX ADMIN — GABAPENTIN 300 MG: 250 SUSPENSION ORAL at 21:43

## 2022-02-21 RX ADMIN — DIAZEPAM 10 MG: 5 TABLET ORAL at 12:33

## 2022-02-21 RX ADMIN — CYANOCOBALAMIN TAB 500 MCG 1000 MCG: 500 TAB at 08:33

## 2022-02-21 RX ADMIN — DIAZEPAM 10 MG: 5 TABLET ORAL at 04:28

## 2022-02-21 RX ADMIN — Medication 1 PACKET: at 21:43

## 2022-02-21 RX ADMIN — KETAMINE HYDROCHLORIDE 2 MG/KG/HR: 100 INJECTION, SOLUTION, CONCENTRATE INTRAMUSCULAR; INTRAVENOUS at 06:28

## 2022-02-21 RX ADMIN — DEXAMETHASONE SODIUM PHOSPHATE 6 MG: 4 INJECTION, SOLUTION INTRAMUSCULAR; INTRAVENOUS at 08:34

## 2022-02-21 RX ADMIN — BARICITINIB 4 MG: 2 TABLET, FILM COATED ORAL at 08:43

## 2022-02-21 RX ADMIN — Medication 40 MG: at 08:33

## 2022-02-21 RX ADMIN — ACETAMINOPHEN 650 MG: 325 TABLET, FILM COATED ORAL at 08:33

## 2022-02-21 RX ADMIN — LEVOTHYROXINE SODIUM 200 MCG: 100 TABLET ORAL at 08:33

## 2022-02-21 RX ADMIN — INSULIN ASPART 10 UNITS: 100 INJECTION, SOLUTION INTRAVENOUS; SUBCUTANEOUS at 16:12

## 2022-02-21 RX ADMIN — ACETAMINOPHEN 650 MG: 325 TABLET, FILM COATED ORAL at 04:29

## 2022-02-21 RX ADMIN — POTASSIUM CHLORIDE 20 MEQ: 1.5 POWDER, FOR SOLUTION ORAL at 23:32

## 2022-02-21 RX ADMIN — Medication 25 MCG: at 08:33

## 2022-02-21 RX ADMIN — POTASSIUM CHLORIDE 20 MEQ: 1.5 POWDER, FOR SOLUTION ORAL at 12:51

## 2022-02-21 RX ADMIN — INSULIN ASPART 3 UNITS: 100 INJECTION, SOLUTION INTRAVENOUS; SUBCUTANEOUS at 19:54

## 2022-02-21 RX ADMIN — GABAPENTIN 300 MG: 250 SUSPENSION ORAL at 14:23

## 2022-02-21 RX ADMIN — DIAZEPAM 10 MG: 5 TABLET ORAL at 19:54

## 2022-02-21 RX ADMIN — MAGNESIUM SULFATE HEPTAHYDRATE 2 G: 40 INJECTION, SOLUTION INTRAVENOUS at 09:00

## 2022-02-21 RX ADMIN — Medication 1 PACKET: at 16:19

## 2022-02-21 RX ADMIN — CHLORHEXIDINE GLUCONATE 15 ML: 1.2 SOLUTION ORAL at 08:33

## 2022-02-21 RX ADMIN — ENOXAPARIN SODIUM 60 MG: 60 INJECTION SUBCUTANEOUS at 08:33

## 2022-02-21 RX ADMIN — Medication 2 PACKET: at 21:42

## 2022-02-21 RX ADMIN — Medication 15 ML: at 08:33

## 2022-02-21 RX ADMIN — INSULIN GLARGINE 25 UNITS: 100 INJECTION, SOLUTION SUBCUTANEOUS at 08:39

## 2022-02-21 RX ADMIN — DULOXETINE HYDROCHLORIDE 20 MG: 20 CAPSULE, DELAYED RELEASE ORAL at 08:33

## 2022-02-21 RX ADMIN — METHADONE HYDROCHLORIDE 40 MG: 10 CONCENTRATE ORAL at 21:43

## 2022-02-21 RX ADMIN — Medication 1 PACKET: at 08:40

## 2022-02-21 RX ADMIN — METHADONE HYDROCHLORIDE 40 MG: 10 CONCENTRATE ORAL at 08:33

## 2022-02-21 RX ADMIN — INSULIN ASPART 4 UNITS: 100 INJECTION, SOLUTION INTRAVENOUS; SUBCUTANEOUS at 08:39

## 2022-02-21 RX ADMIN — ATORVASTATIN CALCIUM 20 MG: 10 TABLET, FILM COATED ORAL at 21:43

## 2022-02-21 RX ADMIN — HYDRALAZINE HYDROCHLORIDE 10 MG: 20 INJECTION INTRAMUSCULAR; INTRAVENOUS at 09:00

## 2022-02-21 ASSESSMENT — ACTIVITIES OF DAILY LIVING (ADL)
ADLS_ACUITY_SCORE: 24
ADLS_ACUITY_SCORE: 23
ADLS_ACUITY_SCORE: 22
ADLS_ACUITY_SCORE: 24
ADLS_ACUITY_SCORE: 25
ADLS_ACUITY_SCORE: 23
ADLS_ACUITY_SCORE: 23
ADLS_ACUITY_SCORE: 22
ADLS_ACUITY_SCORE: 24
ADLS_ACUITY_SCORE: 24
ADLS_ACUITY_SCORE: 23
ADLS_ACUITY_SCORE: 22
ADLS_ACUITY_SCORE: 23
ADLS_ACUITY_SCORE: 24
ADLS_ACUITY_SCORE: 23
ADLS_ACUITY_SCORE: 24
ADLS_ACUITY_SCORE: 23

## 2022-02-21 NOTE — PROGRESS NOTES
[facility] ICU RESPIRATORY NOTE        Date of Admission: 2/13/2022    Date of Intubation (most recent): Resp failure     Reason for Mechanical Ventilation: 12    Number of Days on Mechanical Ventilation: No    Met Criteria for Spontaneous Breathing Trial: No    Significant Events Today: PEEP lowered to 5    ABG Results:   Recent Labs   Lab 02/21/22  0450 02/20/22  0418 02/19/22  0403 02/18/22  0411   PH 7.42 7.39 7.40 7.41   PCO2 38 38 41 49*   PO2 75* 74* 75* 99   HCO3 25 23 26 31*   O2PER 30 30 30 40       Current Vent Settings: Vent Mode: CMV/AC  (Continuous Mandatory Ventilation/ Assist Control)  FiO2 (%): 30 %  Resp Rate (Set): 18 breaths/min  Tidal Volume (Set, mL): 350 mL  PEEP (cm H2O): 5 cmH2O  Resp: 9      Skin Assessment: intact    Plan: Continue full support and  Wean as tolerated    Johanny Vasques

## 2022-02-21 NOTE — PROGRESS NOTES
CLINICAL NUTRITION SERVICES - REASSESSMENT NOTE      Recommendations Ordered by Registered Dietitian (RD): Add NutriSource Fiber 2 pkts BID = 60 kcal and 12 g fiber   Total (TF + NutriSource Fiber + Prosource) = 1390 kcal (12 kcal/kg), 138 g protein (2.5 g/kg), 12 g fiber   Malnutrition: (2/16)  % Weight Loss:  Weight loss does not meet criteria for malnutrition   % Intake:  <75% for > 7 days (moderate malnutrition)  Subcutaneous Fat Loss:  Deferred with COVID-19 precautions  Muscle Loss:  Deferred with COVID-19 precautions  Fluid Retention:  None noted     Malnutrition Diagnosis: Unable to determine due to lack of NFPA, patient is at risk for malnutrition        EVALUATION OF PROGRESS TOWARD GOALS   Diet:  NPO on vent     Nutrition Support:  Patient's TF increased slightly on 2/16 (to goal) and continues as follows ~    Nutrition Support Enteral:  Type of Feeding Tube: Nasoduodenal   Enteral Frequency:  Continuous  Enteral Regimen: Vital HP at 55 mL/hr x 22 hours per day   Total Enteral Provisions: 1210 kcal, 105 g protein, 134 g CHO, 1012 mL H2O, 0 g fiber   Free Water Flush: 60 mL every 4 hours  ProSource TID= 120 kcal and 33 g protein  Total = 1330 kcal (11 kcal/kg), 138 g protein (2.5 g/kg)      Intake/Tolerance:    Na 130 (L)  K/Mg/Phos normal  , 214, 168, 148, 171, 171 - Very High sliding scale insulin + Lantus 25 units in am and 30 units at HS   Stool 500 mL yesterday, x 3 on 2/19, and x 7 on 2/18  I/O 2692/3490, wt 116.6 kg (down 8.1 kg from admit) - IV Lasix, also previously receiving Diamox (BMI 44.12 kg/m^2)      ASSESSED NUTRITION NEEDS:  Dosing Weight::  116.6 kg (energy) and 54.5 kg (protein)  Estimated Energy Needs: 7778-8381 kcals (11-14 Kcal/Kg)  Justification: obese and vented  Estimated Protein Needs: 109-136 grams protein (2-2.5 g pro/Kg)  Justification: hypercatabolism with critical illness and obesity guidelines     New Findings:    Patient is receiving Synthroid daily down FT --  holding TF 1 hour before and 1 hour after administration     Patient receiving Certavite daily, Vitamin D3, Vitamin B12      Previous Goals (2/16):   TF + Prosource will meet % estimated needs  Evaluation: Met    Previous Nutrition Diagnosis (2/16):   Inadequate enteral nutrition infusion related to TF advancing towards goal and rate reduced with proning as evidenced by meeting < 75% nutrition needs on average over the past 3 days   Evaluation: Resolved       CURRENT NUTRITION DIAGNOSIS  Altered GI function related to stress, lack of fiber in TF formula as evidenced by need for additional fiber modulars     INTERVENTIONS  Recommendations / Nutrition Prescription  Add NutriSource Fiber 2 pkts BID = 60 kcal and 12 g fiber   Total (TF + NutriSource Fiber + Prosource) = 1390 kcal (12 kcal/kg), 138 g protein (2.5 g/kg), 12 g fiber     Implementation  Composition of Meals and Snacks:  Ordered NutriSource Fiber as above  Collaboration and Referral of Nutrition care:  Patient discussed today during interdisciplinary bedside rounds     Goals  TF + NutriSource Fiber + ProSource will meet % needs     MONITORING AND EVALUATION:  Progress towards goals will be monitored and evaluated per protocol and Practice Guidelines    Bella Cisneros RD, LD, CNSC   Clinical Dietitian - [facility]

## 2022-02-21 NOTE — PROGRESS NOTES
ICU Progress Note    Date of Service: 02/20/22    A/P:  62F DM2, PSA (on methadone), HCV, hypothyroidism, GERD, depression, anxiety, bipolar admitted initially to [facility] 2/10 for acute hypoxemic respiratory failure 2/2 COVID-19 and E coli UTI. Transferred to [facility] 2/13 following VT/Torsade arrest and subsequent bradycardia w/ potential need for temporary pacemaker.     I have personally reviewed the daily labs, imaging studies, cultures and discussed the case with referring physician and consulting physicians.     Neuro  # Sedation for mechanical ventilation  # PSA - on home methadone  # Hx depression, anxiety, bipolar   - ketamine gtt  - midazolam gtt, wean as able    - PO diazepam   - duloxetine   - gabapentin   - methadone 40mg BID     Pulmonary  # Acute hypoxemic respiratory failure  # ARDS  - lung protective ventilation  - vent settings below   - wean PEEP to 5  - remain supine   -SBT and possible extubation to Bipap today      Cardiac  # Cardiomyopathy - improvement in EF from 20% to 40% (2/18)  # Cardiac arrest - Torsade de Pointes  # Bradycardia   - MAP > 65  - not on vasopressors  - goal HR > 40  - avoid QT prolonging medications   ---Mg goal 2.5, K goal 4.5  - ASA  - atorvastatin  - OP cards f/u    Renal  # hypoNa  - monitor UOP, Cr, I/O  -lasix 40mg x2 doses with KCl repletion, 2 g Mg   -Valle for strict I/O    GI  # Hx HCV  # Protein calorie malnutrition  - RD to manage TF  -PPI    Heme/Onc  # No acute issues   - monitor CBC    ID  # COVID-19  # UTI - E coli  - dexamethasone 6mg x 10d  - baricitinib   - cefepime x 7d - completes 2/21    Endo  # DM2 w/ hyperlgycemia  # Hypothyroidism  - monitor BG  - sliding scale insulin  - glargine 25u qAM 30u qPM - WILL DECREASE WHEN OFF DECADRON  - levothyroxine      PPX  1. DVT: enoxaparin   2. VAP: HOB 30 degrees, chlorhexidine rinse  3. Stress Ulcer: PPI  4. Restraints: Nonviolent soft two point restraints required and necessary for patient safety and  continued cares and good effect as patient continues to pull at necessary lines, tubes despite education and distraction. Will readdress daily.   5. Wound care - per unit routine   6. Feeding - TF  7. Family updated via phone.    Interval Hx:  Continued vent and sedation weaning    /72   Pulse 72   Temp 98.2  F (36.8  C) (Axillary)   Resp 20   Wt 116.6 kg (257 lb 0.9 oz)   LMP 11/01/2013   SpO2 94%   BMI 44.12 kg/m    Gen: supine,   Neuro: sedated, awakens to voice, calm, following commands, pupils equal   HEENT: anicteric RIJ, NJ and ETT secured  Card: RRR, warm and well perfused  Pulm: clear bilaterally   Abd: soft  MSK: no edema  Skin: no obvious rash     Vent Mode: CMV/AC  (Continuous Mandatory Ventilation/ Assist Control)  FiO2 (%): 30 %  Resp Rate (Set): 18 breaths/min  Tidal Volume (Set, mL): 350 mL  PEEP (cm H2O): 5 cmH2O  Resp: 20      Intake/Output Summary (Last 24 hours) at 2/21/2022 1152  Last data filed at 2/21/2022 1000  Gross per 24 hour   Intake 2636.52 ml   Output 4190 ml   Net -1553.48 ml       Labs: ABG appropriate on minimal vent settings, hypo Na othereise lytes BMP, stable mild leukocytosis on decadron    Imaging: no recent imaging    Billing: Patient is critically ill. Total critical care time today, excluding procedures, was 35 minutes.    Mansoor Ruiz DO  SCC Fellow

## 2022-02-21 NOTE — PROGRESS NOTES
[facility] ICU RESPIRATORY NOTE        Date of Admission: 2/10/2022    Date of Intubation (most recent): 2/10/2022    Reason for Mechanical Ventilation: Respiratory Failure    Number of Days on Mechanical Ventilation: 12    Met Criteria for Spontaneous Breathing Trial: No    Reason for No Spontaneous Breathing Trial: Per MD    Significant Events Today: None    ABG Results:   Recent Labs   Lab 02/21/22  0450 02/20/22  0418 02/19/22  0403 02/18/22  0411   PH 7.42 7.39 7.40 7.41   PCO2 38 38 41 49*   PO2 75* 74* 75* 99   HCO3 25 23 26 31*   O2PER 30 30 30 40       Current Vent Settings: Vent Mode: CMV/AC  (Continuous Mandatory Ventilation/ Assist Control)  FiO2 (%): 30 %  Resp Rate (Set): 18 breaths/min  Tidal Volume (Set, mL): 350 mL  PEEP (cm H2O): 5 cmH2O  Resp: 20      Plan: Continue full ventilatory support     Manasa Garcia, RT

## 2022-02-21 NOTE — PROGRESS NOTES
7a-3p    Neuro: Sedated. Pupils equal and reactive. Following commands  Pulm: Clear LS. PS 5/5 for 50min. Small ETT and oral secretion  CV: SR. Hypertension.   : Valle patent with adequate UOP.   GI: Watery stools. Rectal tube in place. On TF via NJ. BS present.   Extremities: Purposeful movements   Skin: Grossly intact.  Lines: R internal jugular central line. Very positinoal annie on L radial. PIVx2 on RUE.   Family: Updated by intensivist.   Plan: Continue weaning vent support and sedation. Will continue to monitor and support.

## 2022-02-21 NOTE — PROGRESS NOTES
SPIRITUAL HEALTH SERVICES: Tele-Encounter  Patient Location: [facility] [address]  Spoke with: Giovanna cedeño, Angina    Referral Source: LOS    Spoke briefly with Angina, daughter of pt.  Explained [facility] services and offered prayer and emotional support for the family.  Angina said things seemed to be getting better with her mom and she thought all was good.  I did let her know that [facility] remains available for family support if they ask for it.        Silvia Mcqueen  Associate   [phone] - pager number    ______________________________    Type of service:  Telephone Visit

## 2022-02-22 LAB
ALBUMIN SERPL-MCNC: 2.5 G/DL (ref 3.4–5)
ALP SERPL-CCNC: 138 U/L (ref 40–150)
ALT SERPL W P-5'-P-CCNC: 244 U/L (ref 0–50)
ANION GAP SERPL CALCULATED.3IONS-SCNC: 7 MMOL/L (ref 3–14)
AST SERPL W P-5'-P-CCNC: 53 U/L (ref 0–45)
BASE EXCESS BLDA CALC-SCNC: 2.9 MMOL/L (ref -9–1.8)
BASE EXCESS BLDA CALC-SCNC: 3.3 MMOL/L (ref -9–1.8)
BILIRUB DIRECT SERPL-MCNC: 0.2 MG/DL (ref 0–0.2)
BILIRUB SERPL-MCNC: 0.5 MG/DL (ref 0.2–1.3)
BUN SERPL-MCNC: 44 MG/DL (ref 7–30)
CALCIUM SERPL-MCNC: 9.3 MG/DL (ref 8.5–10.1)
CHLORIDE BLD-SCNC: 99 MMOL/L (ref 94–109)
CO2 SERPL-SCNC: 25 MMOL/L (ref 20–32)
CREAT SERPL-MCNC: 0.56 MG/DL (ref 0.52–1.04)
CRP SERPL-MCNC: 23.8 MG/L (ref 0–8)
ERYTHROCYTE [DISTWIDTH] IN BLOOD BY AUTOMATED COUNT: 15 % (ref 10–15)
GFR SERPL CREATININE-BSD FRML MDRD: >90 ML/MIN/1.73M2
GLUCOSE BLD-MCNC: 222 MG/DL (ref 70–99)
GLUCOSE BLDC GLUCOMTR-MCNC: 209 MG/DL (ref 70–99)
GLUCOSE BLDC GLUCOMTR-MCNC: 219 MG/DL (ref 70–99)
GLUCOSE BLDC GLUCOMTR-MCNC: 221 MG/DL (ref 70–99)
GLUCOSE BLDC GLUCOMTR-MCNC: 240 MG/DL (ref 70–99)
GLUCOSE BLDC GLUCOMTR-MCNC: 247 MG/DL (ref 70–99)
HCO3 BLD-SCNC: 27 MMOL/L (ref 21–28)
HCO3 BLD-SCNC: 28 MMOL/L (ref 21–28)
HCT VFR BLD AUTO: 40.4 % (ref 35–47)
HGB BLD-MCNC: 13.3 G/DL (ref 11.7–15.7)
MAGNESIUM SERPL-MCNC: 2.5 MG/DL (ref 1.6–2.3)
MCH RBC QN AUTO: 28.9 PG (ref 26.5–33)
MCHC RBC AUTO-ENTMCNC: 32.9 G/DL (ref 31.5–36.5)
MCV RBC AUTO: 88 FL (ref 78–100)
O2/TOTAL GAS SETTING VFR VENT: 30 %
O2/TOTAL GAS SETTING VFR VENT: 30 %
PCO2 BLD: 37 MM HG (ref 35–45)
PCO2 BLD: 40 MM HG (ref 35–45)
PH BLD: 7.45 [PH] (ref 7.35–7.45)
PH BLD: 7.47 [PH] (ref 7.35–7.45)
PHOSPHATE SERPL-MCNC: 3.6 MG/DL (ref 2.5–4.5)
PLATELET # BLD AUTO: 622 10E3/UL (ref 150–450)
PO2 BLD: 70 MM HG (ref 80–105)
PO2 BLD: 73 MM HG (ref 80–105)
POTASSIUM BLD-SCNC: 4.2 MMOL/L (ref 3.4–5.3)
PROCALCITONIN SERPL-MCNC: <0.05 NG/ML
PROT SERPL-MCNC: 8.4 G/DL (ref 6.8–8.8)
RBC # BLD AUTO: 4.61 10E6/UL (ref 3.8–5.2)
SODIUM SERPL-SCNC: 131 MMOL/L (ref 133–144)
WBC # BLD AUTO: 18 10E3/UL (ref 4–11)

## 2022-02-22 PROCEDURE — 85027 COMPLETE CBC AUTOMATED: CPT | Performed by: STUDENT IN AN ORGANIZED HEALTH CARE EDUCATION/TRAINING PROGRAM

## 2022-02-22 PROCEDURE — 250N000013 HC RX MED GY IP 250 OP 250 PS 637: Performed by: INTERNAL MEDICINE

## 2022-02-22 PROCEDURE — 258N000003 HC RX IP 258 OP 636: Performed by: STUDENT IN AN ORGANIZED HEALTH CARE EDUCATION/TRAINING PROGRAM

## 2022-02-22 PROCEDURE — 250N000011 HC RX IP 250 OP 636: Performed by: STUDENT IN AN ORGANIZED HEALTH CARE EDUCATION/TRAINING PROGRAM

## 2022-02-22 PROCEDURE — 80053 COMPREHEN METABOLIC PANEL: CPT | Performed by: INTERNAL MEDICINE

## 2022-02-22 PROCEDURE — 99291 CRITICAL CARE FIRST HOUR: CPT | Mod: GC | Performed by: INTERNAL MEDICINE

## 2022-02-22 PROCEDURE — 999N000157 HC STATISTIC RCP TIME EA 10 MIN

## 2022-02-22 PROCEDURE — 999N000259 HC STATISTIC EXTUBATION

## 2022-02-22 PROCEDURE — 84100 ASSAY OF PHOSPHORUS: CPT | Performed by: STUDENT IN AN ORGANIZED HEALTH CARE EDUCATION/TRAINING PROGRAM

## 2022-02-22 PROCEDURE — 82248 BILIRUBIN DIRECT: CPT | Performed by: INTERNAL MEDICINE

## 2022-02-22 PROCEDURE — 83735 ASSAY OF MAGNESIUM: CPT | Performed by: STUDENT IN AN ORGANIZED HEALTH CARE EDUCATION/TRAINING PROGRAM

## 2022-02-22 PROCEDURE — 250N000011 HC RX IP 250 OP 636: Performed by: INTERNAL MEDICINE

## 2022-02-22 PROCEDURE — 250N000009 HC RX 250: Performed by: STUDENT IN AN ORGANIZED HEALTH CARE EDUCATION/TRAINING PROGRAM

## 2022-02-22 PROCEDURE — 200N000001 HC R&B ICU

## 2022-02-22 PROCEDURE — 82803 BLOOD GASES ANY COMBINATION: CPT | Performed by: STUDENT IN AN ORGANIZED HEALTH CARE EDUCATION/TRAINING PROGRAM

## 2022-02-22 PROCEDURE — 999N000253 HC STATISTIC WEANING TRIALS

## 2022-02-22 PROCEDURE — 999N000009 HC STATISTIC AIRWAY CARE

## 2022-02-22 PROCEDURE — 94003 VENT MGMT INPAT SUBQ DAY: CPT

## 2022-02-22 PROCEDURE — 86140 C-REACTIVE PROTEIN: CPT | Performed by: STUDENT IN AN ORGANIZED HEALTH CARE EDUCATION/TRAINING PROGRAM

## 2022-02-22 PROCEDURE — 250N000013 HC RX MED GY IP 250 OP 250 PS 637: Performed by: STUDENT IN AN ORGANIZED HEALTH CARE EDUCATION/TRAINING PROGRAM

## 2022-02-22 PROCEDURE — 94660 CPAP INITIATION&MGMT: CPT

## 2022-02-22 PROCEDURE — 84145 PROCALCITONIN (PCT): CPT | Performed by: STUDENT IN AN ORGANIZED HEALTH CARE EDUCATION/TRAINING PROGRAM

## 2022-02-22 RX ORDER — VITAMIN B COMPLEX
25 TABLET ORAL DAILY
Status: DISCONTINUED | OUTPATIENT
Start: 2022-02-22 | End: 2022-03-02

## 2022-02-22 RX ORDER — GABAPENTIN 250 MG/5ML
300 SOLUTION ORAL EVERY 8 HOURS SCHEDULED
Status: DISCONTINUED | OUTPATIENT
Start: 2022-02-22 | End: 2022-02-27

## 2022-02-22 RX ORDER — FUROSEMIDE 10 MG/ML
20 INJECTION INTRAMUSCULAR; INTRAVENOUS EVERY 12 HOURS
Status: DISPENSED | OUTPATIENT
Start: 2022-02-22 | End: 2022-02-22

## 2022-02-22 RX ORDER — LANOLIN ALCOHOL/MO/W.PET/CERES
1000 CREAM (GRAM) TOPICAL DAILY
Status: DISCONTINUED | OUTPATIENT
Start: 2022-02-22 | End: 2022-02-27

## 2022-02-22 RX ORDER — LEVOTHYROXINE SODIUM 100 UG/1
200 TABLET ORAL DAILY
Status: DISCONTINUED | OUTPATIENT
Start: 2022-02-22 | End: 2022-02-27

## 2022-02-22 RX ADMIN — DULOXETINE HYDROCHLORIDE 20 MG: 20 CAPSULE, DELAYED RELEASE ORAL at 08:30

## 2022-02-22 RX ADMIN — Medication 2 PACKET: at 20:00

## 2022-02-22 RX ADMIN — KETAMINE HYDROCHLORIDE 1.5 MG/KG/HR: 100 INJECTION, SOLUTION, CONCENTRATE INTRAMUSCULAR; INTRAVENOUS at 04:09

## 2022-02-22 RX ADMIN — GABAPENTIN 300 MG: 250 SUSPENSION ORAL at 05:59

## 2022-02-22 RX ADMIN — INSULIN ASPART 7 UNITS: 100 INJECTION, SOLUTION INTRAVENOUS; SUBCUTANEOUS at 19:40

## 2022-02-22 RX ADMIN — CYANOCOBALAMIN TAB 1000 MCG 1000 MCG: 1000 TAB at 08:30

## 2022-02-22 RX ADMIN — INSULIN ASPART 8 UNITS: 100 INJECTION, SOLUTION INTRAVENOUS; SUBCUTANEOUS at 04:06

## 2022-02-22 RX ADMIN — Medication 40 MG: at 08:31

## 2022-02-22 RX ADMIN — ENOXAPARIN SODIUM 60 MG: 60 INJECTION SUBCUTANEOUS at 08:30

## 2022-02-22 RX ADMIN — LEVOTHYROXINE SODIUM 200 MCG: 100 TABLET ORAL at 08:30

## 2022-02-22 RX ADMIN — INSULIN ASPART 9 UNITS: 100 INJECTION, SOLUTION INTRAVENOUS; SUBCUTANEOUS at 08:43

## 2022-02-22 RX ADMIN — Medication 2 PACKET: at 08:36

## 2022-02-22 RX ADMIN — ATORVASTATIN CALCIUM 20 MG: 10 TABLET, FILM COATED ORAL at 21:18

## 2022-02-22 RX ADMIN — Medication 15 ML: at 08:31

## 2022-02-22 RX ADMIN — DIAZEPAM 10 MG: 5 TABLET ORAL at 19:36

## 2022-02-22 RX ADMIN — DIAZEPAM 10 MG: 5 TABLET ORAL at 12:32

## 2022-02-22 RX ADMIN — DIAZEPAM 10 MG: 5 TABLET ORAL at 04:01

## 2022-02-22 RX ADMIN — Medication 1 PACKET: at 21:18

## 2022-02-22 RX ADMIN — Medication 1 PACKET: at 16:54

## 2022-02-22 RX ADMIN — POTASSIUM CHLORIDE 20 MEQ: 1.5 POWDER, FOR SOLUTION ORAL at 20:00

## 2022-02-22 RX ADMIN — Medication 25 MCG: at 08:30

## 2022-02-22 RX ADMIN — FUROSEMIDE 20 MG: 10 INJECTION, SOLUTION INTRAVENOUS at 12:32

## 2022-02-22 RX ADMIN — INSULIN ASPART 11 UNITS: 100 INJECTION, SOLUTION INTRAVENOUS; SUBCUTANEOUS at 16:54

## 2022-02-22 RX ADMIN — ASPIRIN 81 MG CHEWABLE TABLET 81 MG: 81 TABLET CHEWABLE at 08:30

## 2022-02-22 RX ADMIN — ENOXAPARIN SODIUM 60 MG: 60 INJECTION SUBCUTANEOUS at 20:00

## 2022-02-22 RX ADMIN — Medication 1 PACKET: at 08:36

## 2022-02-22 RX ADMIN — INSULIN ASPART 11 UNITS: 100 INJECTION, SOLUTION INTRAVENOUS; SUBCUTANEOUS at 12:39

## 2022-02-22 RX ADMIN — METHADONE HYDROCHLORIDE 40 MG: 10 CONCENTRATE ORAL at 21:17

## 2022-02-22 RX ADMIN — POTASSIUM CHLORIDE 20 MEQ: 1.5 POWDER, FOR SOLUTION ORAL at 08:30

## 2022-02-22 RX ADMIN — GABAPENTIN 300 MG: 250 SOLUTION ORAL at 13:48

## 2022-02-22 RX ADMIN — METHADONE HYDROCHLORIDE 40 MG: 10 CONCENTRATE ORAL at 08:30

## 2022-02-22 RX ADMIN — GABAPENTIN 300 MG: 250 SOLUTION ORAL at 21:17

## 2022-02-22 RX ADMIN — DEXAMETHASONE SODIUM PHOSPHATE 6 MG: 4 INJECTION, SOLUTION INTRAMUSCULAR; INTRAVENOUS at 08:30

## 2022-02-22 RX ADMIN — CHLORHEXIDINE GLUCONATE 15 ML: 1.2 SOLUTION ORAL at 08:30

## 2022-02-22 ASSESSMENT — ACTIVITIES OF DAILY LIVING (ADL)
ADLS_ACUITY_SCORE: 24
ADLS_ACUITY_SCORE: 22
ADLS_ACUITY_SCORE: 24
ADLS_ACUITY_SCORE: 24
ADLS_ACUITY_SCORE: 22
ADLS_ACUITY_SCORE: 22
ADLS_ACUITY_SCORE: 24
ADLS_ACUITY_SCORE: 22
ADLS_ACUITY_SCORE: 24
ADLS_ACUITY_SCORE: 22
ADLS_ACUITY_SCORE: 24

## 2022-02-22 NOTE — PLAN OF CARE
Neuro: Inconsistent in following commands. PERRLA. Opens eyes to voice. MACHUCA when following command. RASS -2.   Cardiac: SR. BP WDL  Pulmonary: ETT on CMV. Scant secretions. LS diminished.   GI: NG (post pyloric). Feeding @ goal. Rectal tube watery stool.  : Valle: good output. On scheduled lasix (Na bit low)  Skin: Scattered bruising.   Lines: ART line left. RT triple lumen internal jugular. PIV1x SL.   Drips: Ketamine, TKOx3.   Activity: Bedrest.   Restraints: BUE soft. Expires 2.22.22 @ 0815    Plan: Continue to wean of ketamine and vent.

## 2022-02-22 NOTE — PROGRESS NOTES
ICU Progress Note    Date of Service: 02/22/22    A/P:  62F DM2, PSA (on methadone), HCV, hypothyroidism, GERD, depression, anxiety, bipolar admitted initially to [facility] 2/10 for acute hypoxemic respiratory failure 2/2 COVID-19 and E coli UTI. Transferred to [facility] 2/13 following VT/Torsade arrest and subsequent bradycardia w/ potential need for temporary pacemaker.     I have personally reviewed the daily labs, imaging studies, cultures and discussed the case with referring physician and consulting physicians.     Neuro  # Sedation for mechanical ventilation  # PSA - on home methadone  # Hx depression, anxiety, bipolar   - ketamine gtt - weaning  - midazolam gtt - off  - PO diazepam   - duloxetine   - gabapentin   - methadone 40mg BID     Pulmonary  # Acute hypoxemic respiratory failure  # ARDS  - lung protective ventilation  - vent settings below   - wean PEEP to 5  - remain supine   -SBT and possible extubation to Bipap today    Cardiac  # Cardiomyopathy - improvement in EF from 20% to 40% (2/18)  # Cardiac arrest - Torsade de Pointes  # Bradycardia   - MAP > 65  - not on vasopressors  - goal HR > 40  - avoid QT prolonging medications   ---Mg goal 2.5, K goal 4.5  - ASA  - atorvastatin  - OP cards f/u    Renal  # hypoNa  - monitor UOP, Cr, I/O  -lasix 20mg x2 doses with KCl repletion  -Valle for strict I/O    GI  # Hx HCV  # Protein calorie malnutrition  - RD to manage TF  -PPI    Heme/Onc  # No acute issues   - monitor CBC    ID  # COVID-19  # UTI - E coli  #Leukocytosis - worsening - afebrile, pro-carrillo negative, CRP moderately elevated (23.8), will continue to monitor  - dexamethasone 6mg x 10d  - baricitinib   - cefepime x 7d - completed 2/21    Endo  # DM2 w/ hyperlgycemia  # Hypothyroidism  - monitor BG  - sliding scale insulin  - glargine 35u qAM 30u qPM - WILL DECREASE WHEN OFF DECADRON  - levothyroxine      PPX  1. DVT: enoxaparin 60mg q12  2. VAP: HOB 30 degrees, chlorhexidine rinse  3. Stress  Ulcer: PPI  4. Restraints: Nonviolent soft two point restraints required and necessary for patient safety and continued cares and good effect as patient continues to pull at necessary lines, tubes despite education and distraction. Will readdress daily.   5. Wound care - per unit routine   6. Feeding - TF  7. Family updated via phone.    Interval Hx:  Continued vent and sedation weaning    /82   Pulse 77   Temp 98.7  F (37.1  C) (Axillary)   Resp 20   Wt 114.3 kg (251 lb 15.8 oz)   LMP 11/01/2013   SpO2 94%   BMI 43.25 kg/m    Gen: supine,   Neuro: sedated, occasionally following commands, pupils equal   HEENT: anicteric RIJ, NJ and ETT secured  Card: RRR, warm and well perfused  Pulm: clear bilaterally   Abd: soft  MSK: no edema  Skin: no obvious rash     Vent Mode: CMV/AC  (Continuous Mandatory Ventilation/ Assist Control)  FiO2 (%): 30 %  Resp Rate (Set): 18 breaths/min  Tidal Volume (Set, mL): 350 mL  PEEP (cm H2O): 5 cmH2O  Resp: 20      Intake/Output Summary (Last 24 hours) at 2/22/2022 0942  Last data filed at 2/22/2022 0800  Gross per 24 hour   Intake 2894.46 ml   Output 4740 ml   Net -1845.54 ml       Labs: ABG appropriate on minimal vent settings, hypo Na othereise lytes BMP, worsening leukocytosis on decadron    Imaging: no recent imaging    Billing: Patient is critically ill. Total critical care time today, excluding procedures, was 30 minutes.    Mansoor Ruiz DO  SCC Fellow

## 2022-02-22 NOTE — PROGRESS NOTES
No was reassessed on decreased sedation and easily awakens to voice, clearly following commands, has god 5 second head lift, minimal secretions, RSBI ~50, has adequate cough on vent. Ketamine stopped, ABG was sent and planning to extubate if all continues to go well.

## 2022-02-22 NOTE — PROGRESS NOTES
[facility] ICU RESPIRATORY NOTE        Date of Admission: 2/13/2022    Date of Intubation (most recent): 2/10/22     Reason for Mechanical Ventilation: Resp. failure      Number of Days on Mechanical Ventilation: 10      Met Criteria for Spontaneous Breathing Trial: {YES     Reason for No Spontaneous Breathing Trial:     Significant Events Today: Did spontaneous breathing trials for 3 hours on 10/5    ABG Results:   Recent Labs   Lab 02/21/22  0450 02/20/22  0418 02/19/22  0403 02/18/22  0411   PH 7.42 7.39 7.40 7.41   PCO2 38 38 41 49*   PO2 75* 74* 75* 99   HCO3 25 23 26 31*   O2PER 30 30 30 40       Current Vent Settings: Vent Mode: CMV/AC  (Continuous Mandatory Ventilation/ Assist Control)  FiO2 (%): 30 %  Resp Rate (Set): 18 breaths/min  Tidal Volume (Set, mL): 350 mL  PEEP (cm H2O): 5 cmH2O  Resp: 23      Skin Assessment: Clean and dry    Plan: Continue weaning toomorrow    Patrick Pike, RT

## 2022-02-22 NOTE — PROGRESS NOTES
Extubation Note    Successful completion of SBT (Yes or No):Yes  Extubation time:1305    Patient assessment:  Lung sounds:Diminished  Stridor Present (Yes or No):No  Patient tolerance:Good    Oxygen device:  Pt was extubated to BiPAP per MD order.  BiPAP 10/5 50%  SpO2:97%    Liquicel and mepilex in place. Skin intact. Alarm volume set at 10.  Will cont to monitor.  2/22/2022  Evangelina Fernandez, RT

## 2022-02-22 NOTE — PLAN OF CARE
Goal Outcome Evaluation:        Neuro: sedated with ketamine, versed weaned off. Pt with intermittent period of sedated to awake, inconsistently following commands. Pupils 5 mm sluggish. On scheduled valium and methadone    CV: SR, bp wnl    Pulm: minimal vent settings, PST this evening 10/5 for 3 hours, scant secretions, diminished LS.    GI/: TF at goal, rectal tube in place, fiber supplement started per dietitian. Valle with good response to scheduled lasix, has scheduled potassium supplement.    Skin: generalized bruising from Lovenox shots    Lines: RIJ with TLC, right PIV, left rad art line    Restraints: Continued to maintain patency of necessary lines and ET tube.     Plan: Rest overnight on vent. PST tomorrow and possible extubation.

## 2022-02-22 NOTE — CONSULTS
Patient was positive for COVID 2/2/22. If patient continues improving and is stable patient can recover 2/23/22.    FOR SYMPTOMATIC - 20 days following symptom onset, >24 hr fever free without fever-reducing meds, AND substantial improvement in COVID-19 symptoms.

## 2022-02-23 LAB
ANION GAP SERPL CALCULATED.3IONS-SCNC: 7 MMOL/L (ref 3–14)
ATRIAL RATE - MUSE: 52 BPM
ATRIAL RATE - MUSE: 68 BPM
BASE EXCESS BLDA CALC-SCNC: 3.6 MMOL/L (ref -9–1.8)
BUN SERPL-MCNC: 54 MG/DL (ref 7–30)
CALCIUM SERPL-MCNC: 10.1 MG/DL (ref 8.5–10.1)
CHLORIDE BLD-SCNC: 104 MMOL/L (ref 94–109)
CO2 SERPL-SCNC: 25 MMOL/L (ref 20–32)
CREAT SERPL-MCNC: 0.58 MG/DL (ref 0.52–1.04)
DIASTOLIC BLOOD PRESSURE - MUSE: NORMAL MMHG
DIASTOLIC BLOOD PRESSURE - MUSE: NORMAL MMHG
ERYTHROCYTE [DISTWIDTH] IN BLOOD BY AUTOMATED COUNT: 15.7 % (ref 10–15)
GFR SERPL CREATININE-BSD FRML MDRD: >90 ML/MIN/1.73M2
GLUCOSE BLD-MCNC: 216 MG/DL (ref 70–99)
GLUCOSE BLDC GLUCOMTR-MCNC: 147 MG/DL (ref 70–99)
GLUCOSE BLDC GLUCOMTR-MCNC: 183 MG/DL (ref 70–99)
GLUCOSE BLDC GLUCOMTR-MCNC: 193 MG/DL (ref 70–99)
GLUCOSE BLDC GLUCOMTR-MCNC: 205 MG/DL (ref 70–99)
GLUCOSE BLDC GLUCOMTR-MCNC: 209 MG/DL (ref 70–99)
GLUCOSE BLDC GLUCOMTR-MCNC: 273 MG/DL (ref 70–99)
HCO3 BLD-SCNC: 28 MMOL/L (ref 21–28)
HCT VFR BLD AUTO: 42.5 % (ref 35–47)
HGB BLD-MCNC: 13.9 G/DL (ref 11.7–15.7)
INTERPRETATION ECG - MUSE: NORMAL
INTERPRETATION ECG - MUSE: NORMAL
MAGNESIUM SERPL-MCNC: 2.6 MG/DL (ref 1.6–2.3)
MCH RBC QN AUTO: 28.6 PG (ref 26.5–33)
MCHC RBC AUTO-ENTMCNC: 32.7 G/DL (ref 31.5–36.5)
MCV RBC AUTO: 87 FL (ref 78–100)
O2/TOTAL GAS SETTING VFR VENT: 40 %
P AXIS - MUSE: 29 DEGREES
P AXIS - MUSE: 45 DEGREES
PCO2 BLD: 39 MM HG (ref 35–45)
PH BLD: 7.46 [PH] (ref 7.35–7.45)
PHOSPHATE SERPL-MCNC: 3.7 MG/DL (ref 2.5–4.5)
PLATELET # BLD AUTO: 651 10E3/UL (ref 150–450)
PO2 BLD: 91 MM HG (ref 80–105)
POTASSIUM BLD-SCNC: 4.1 MMOL/L (ref 3.4–5.3)
PR INTERVAL - MUSE: 156 MS
PR INTERVAL - MUSE: 166 MS
QRS DURATION - MUSE: 88 MS
QRS DURATION - MUSE: 88 MS
QT - MUSE: 418 MS
QT - MUSE: 626 MS
QTC - MUSE: 444 MS
QTC - MUSE: 582 MS
R AXIS - MUSE: 2 DEGREES
R AXIS - MUSE: 37 DEGREES
RBC # BLD AUTO: 4.86 10E6/UL (ref 3.8–5.2)
SODIUM SERPL-SCNC: 136 MMOL/L (ref 133–144)
SYSTOLIC BLOOD PRESSURE - MUSE: NORMAL MMHG
SYSTOLIC BLOOD PRESSURE - MUSE: NORMAL MMHG
T AXIS - MUSE: 159 DEGREES
T AXIS - MUSE: 39 DEGREES
VENTRICULAR RATE- MUSE: 52 BPM
VENTRICULAR RATE- MUSE: 68 BPM
WBC # BLD AUTO: 15.7 10E3/UL (ref 4–11)

## 2022-02-23 PROCEDURE — 999N000157 HC STATISTIC RCP TIME EA 10 MIN

## 2022-02-23 PROCEDURE — 82310 ASSAY OF CALCIUM: CPT | Performed by: INTERNAL MEDICINE

## 2022-02-23 PROCEDURE — 250N000011 HC RX IP 250 OP 636: Performed by: INTERNAL MEDICINE

## 2022-02-23 PROCEDURE — 99222 1ST HOSP IP/OBS MODERATE 55: CPT | Performed by: INTERNAL MEDICINE

## 2022-02-23 PROCEDURE — 250N000013 HC RX MED GY IP 250 OP 250 PS 637: Performed by: INTERNAL MEDICINE

## 2022-02-23 PROCEDURE — 99233 SBSQ HOSP IP/OBS HIGH 50: CPT | Performed by: INTERNAL MEDICINE

## 2022-02-23 PROCEDURE — 83735 ASSAY OF MAGNESIUM: CPT | Performed by: STUDENT IN AN ORGANIZED HEALTH CARE EDUCATION/TRAINING PROGRAM

## 2022-02-23 PROCEDURE — 120N000013 HC R&B IMCU

## 2022-02-23 PROCEDURE — 82803 BLOOD GASES ANY COMBINATION: CPT | Performed by: STUDENT IN AN ORGANIZED HEALTH CARE EDUCATION/TRAINING PROGRAM

## 2022-02-23 PROCEDURE — 99207 PR CONSULT E&M CHANGED TO INITIAL LEVEL: CPT | Performed by: INTERNAL MEDICINE

## 2022-02-23 PROCEDURE — 84100 ASSAY OF PHOSPHORUS: CPT | Performed by: STUDENT IN AN ORGANIZED HEALTH CARE EDUCATION/TRAINING PROGRAM

## 2022-02-23 PROCEDURE — 85027 COMPLETE CBC AUTOMATED: CPT | Performed by: STUDENT IN AN ORGANIZED HEALTH CARE EDUCATION/TRAINING PROGRAM

## 2022-02-23 PROCEDURE — 250N000011 HC RX IP 250 OP 636: Performed by: STUDENT IN AN ORGANIZED HEALTH CARE EDUCATION/TRAINING PROGRAM

## 2022-02-23 PROCEDURE — 250N000013 HC RX MED GY IP 250 OP 250 PS 637: Performed by: STUDENT IN AN ORGANIZED HEALTH CARE EDUCATION/TRAINING PROGRAM

## 2022-02-23 PROCEDURE — 94660 CPAP INITIATION&MGMT: CPT

## 2022-02-23 RX ORDER — HYDROXYZINE HYDROCHLORIDE 25 MG/1
25 TABLET, FILM COATED ORAL EVERY 6 HOURS PRN
Status: DISCONTINUED | OUTPATIENT
Start: 2022-02-23 | End: 2022-03-04 | Stop reason: HOSPADM

## 2022-02-23 RX ORDER — HYDROMORPHONE HYDROCHLORIDE 1 MG/ML
.3-.5 INJECTION, SOLUTION INTRAMUSCULAR; INTRAVENOUS; SUBCUTANEOUS
Status: DISCONTINUED | OUTPATIENT
Start: 2022-02-23 | End: 2022-03-04 | Stop reason: HOSPADM

## 2022-02-23 RX ORDER — DIAZEPAM 5 MG
5 TABLET ORAL EVERY 8 HOURS
Status: DISCONTINUED | OUTPATIENT
Start: 2022-02-23 | End: 2022-02-24

## 2022-02-23 RX ORDER — LEVALBUTEROL TARTRATE 45 UG/1
2 AEROSOL, METERED ORAL EVERY 4 HOURS PRN
Status: DISCONTINUED | OUTPATIENT
Start: 2022-02-23 | End: 2022-03-04 | Stop reason: HOSPADM

## 2022-02-23 RX ORDER — ACETAMINOPHEN 500 MG
500 TABLET ORAL 3 TIMES DAILY
Status: DISCONTINUED | OUTPATIENT
Start: 2022-02-23 | End: 2022-02-23 | Stop reason: CLARIF

## 2022-02-23 RX ORDER — NITROGLYCERIN 0.4 MG/1
0.4 TABLET SUBLINGUAL EVERY 5 MIN PRN
Status: DISCONTINUED | OUTPATIENT
Start: 2022-02-23 | End: 2022-03-04 | Stop reason: HOSPADM

## 2022-02-23 RX ORDER — LIDOCAINE 40 MG/G
CREAM TOPICAL
Status: DISCONTINUED | OUTPATIENT
Start: 2022-02-23 | End: 2022-03-04 | Stop reason: HOSPADM

## 2022-02-23 RX ORDER — ACETAMINOPHEN 325 MG/10.15ML
500 LIQUID ORAL 3 TIMES DAILY
Status: DISCONTINUED | OUTPATIENT
Start: 2022-02-23 | End: 2022-03-02 | Stop reason: CLARIF

## 2022-02-23 RX ADMIN — DEXAMETHASONE SODIUM PHOSPHATE 6 MG: 4 INJECTION, SOLUTION INTRAMUSCULAR; INTRAVENOUS at 08:48

## 2022-02-23 RX ADMIN — ASPIRIN 81 MG CHEWABLE TABLET 81 MG: 81 TABLET CHEWABLE at 08:42

## 2022-02-23 RX ADMIN — Medication 2 PACKET: at 21:02

## 2022-02-23 RX ADMIN — DIAZEPAM 10 MG: 5 TABLET ORAL at 04:24

## 2022-02-23 RX ADMIN — ATORVASTATIN CALCIUM 20 MG: 10 TABLET, FILM COATED ORAL at 23:04

## 2022-02-23 RX ADMIN — INSULIN ASPART 7 UNITS: 100 INJECTION, SOLUTION INTRAVENOUS; SUBCUTANEOUS at 15:44

## 2022-02-23 RX ADMIN — INSULIN ASPART 5 UNITS: 100 INJECTION, SOLUTION INTRAVENOUS; SUBCUTANEOUS at 21:02

## 2022-02-23 RX ADMIN — ENOXAPARIN SODIUM 60 MG: 60 INJECTION SUBCUTANEOUS at 08:48

## 2022-02-23 RX ADMIN — Medication 40 MG: at 08:40

## 2022-02-23 RX ADMIN — GABAPENTIN 300 MG: 250 SOLUTION ORAL at 21:29

## 2022-02-23 RX ADMIN — ENOXAPARIN SODIUM 60 MG: 60 INJECTION SUBCUTANEOUS at 23:04

## 2022-02-23 RX ADMIN — POTASSIUM CHLORIDE 20 MEQ: 1.5 POWDER, FOR SOLUTION ORAL at 12:47

## 2022-02-23 RX ADMIN — INSULIN ASPART 14 UNITS: 100 INJECTION, SOLUTION INTRAVENOUS; SUBCUTANEOUS at 08:58

## 2022-02-23 RX ADMIN — ACETAMINOPHEN 500 MG: 325 SUSPENSION ORAL at 20:49

## 2022-02-23 RX ADMIN — LEVOTHYROXINE SODIUM 200 MCG: 100 TABLET ORAL at 08:42

## 2022-02-23 RX ADMIN — Medication 2 PACKET: at 12:46

## 2022-02-23 RX ADMIN — DIAZEPAM 5 MG: 5 TABLET ORAL at 20:50

## 2022-02-23 RX ADMIN — Medication 15 ML: at 13:06

## 2022-02-23 RX ADMIN — Medication 1 PACKET: at 21:01

## 2022-02-23 RX ADMIN — GABAPENTIN 300 MG: 250 SOLUTION ORAL at 05:05

## 2022-02-23 RX ADMIN — INSULIN ASPART 6 UNITS: 100 INJECTION, SOLUTION INTRAVENOUS; SUBCUTANEOUS at 23:59

## 2022-02-23 RX ADMIN — Medication 25 MCG: at 12:46

## 2022-02-23 RX ADMIN — INSULIN ASPART 1 UNITS: 100 INJECTION, SOLUTION INTRAVENOUS; SUBCUTANEOUS at 00:15

## 2022-02-23 RX ADMIN — GABAPENTIN 300 MG: 250 SOLUTION ORAL at 15:40

## 2022-02-23 RX ADMIN — INSULIN ASPART 7 UNITS: 100 INJECTION, SOLUTION INTRAVENOUS; SUBCUTANEOUS at 13:24

## 2022-02-23 RX ADMIN — DIAZEPAM 5 MG: 5 TABLET ORAL at 12:59

## 2022-02-23 RX ADMIN — INSULIN ASPART 6 UNITS: 100 INJECTION, SOLUTION INTRAVENOUS; SUBCUTANEOUS at 05:17

## 2022-02-23 RX ADMIN — CYANOCOBALAMIN TAB 1000 MCG 1000 MCG: 1000 TAB at 12:46

## 2022-02-23 RX ADMIN — METHADONE HYDROCHLORIDE 40 MG: 10 CONCENTRATE ORAL at 21:26

## 2022-02-23 RX ADMIN — POTASSIUM CHLORIDE 20 MEQ: 1.5 POWDER, FOR SOLUTION ORAL at 20:50

## 2022-02-23 RX ADMIN — Medication 1 PACKET: at 12:42

## 2022-02-23 RX ADMIN — Medication 1 PACKET: at 15:45

## 2022-02-23 ASSESSMENT — ACTIVITIES OF DAILY LIVING (ADL)
ADLS_ACUITY_SCORE: 26
ADLS_ACUITY_SCORE: 22
ADLS_ACUITY_SCORE: 24
ADLS_ACUITY_SCORE: 22
ADLS_ACUITY_SCORE: 26
ADLS_ACUITY_SCORE: 24
ADLS_ACUITY_SCORE: 22
ADLS_ACUITY_SCORE: 22
ADLS_ACUITY_SCORE: 26
ADLS_ACUITY_SCORE: 26
ADLS_ACUITY_SCORE: 24
ADLS_ACUITY_SCORE: 24
ADLS_ACUITY_SCORE: 22
ADLS_ACUITY_SCORE: 22
ADLS_ACUITY_SCORE: 24
ADLS_ACUITY_SCORE: 22
ADLS_ACUITY_SCORE: 26
ADLS_ACUITY_SCORE: 22
ADLS_ACUITY_SCORE: 24
ADLS_ACUITY_SCORE: 22
ADLS_ACUITY_SCORE: 24
ADLS_ACUITY_SCORE: 22
ADLS_ACUITY_SCORE: 22
ADLS_ACUITY_SCORE: 26

## 2022-02-23 NOTE — PROGRESS NOTES
Pt placed on 12/6 40% BIpap for the night. mepilex and liquicell applied. Pt tolerated Bipap.    Josiah Mclean, RT

## 2022-02-23 NOTE — CONSULTS
[facility]  Consult Note - Hospitalist Service  Date of Admission:  2/13/2022  Consult Requested by: RANGEL arvizu  Reason for Consult: transfer of care    Assessment & Plan   No Yusuf is a 62 year old female admitted on 2/13/2022.     No Yusuf is a 62 year old female with past medical history of type 2 diabetes mellitus, hypertension, hyperlipidemia, bipolar disorder, fibromyalgia, history of polysubstance abuse and currently on methadone, hypothyroidism, chronic hepatitis C who presented initially to the [facility] emergency department on 2/8/2022 with chief complaint of shortness of breath.  Of note, the patient is vaccinated for COVID-19 however did not receive the booster.  The patient's symptoms started on 2/2/2022 and had a positive COVID-19 test on 2/2/2022.  In the emergency department the patient was found to be hypoxic requiring supplemental oxygen with high flow nasal cannula.  Initial lab work showed a sodium of 132, potassium 5.8, albumin 2.6, ALT 55, CRP 77, ferritin 439, A1c 6.2, procalcitonin 0.07, troponin 81.  Venous blood gas showed PCO2 of 54 and PO2 of 33 and bicarb of 31.  Urinalysis revealed 43 WBCs, moderate leukocyte esterase, positive nitrates, and many bacteria.  Chest x-ray showed patchy opacities throughout both lungs compatible with COVID-19 infection.  CT chest showed no pulmonary embolism however did note patchy opacities and consolidations throughout the lungs.  The patient had increasing oxygen requirements requiring continuous BiPAP.  Due to bed availability the patient was transferred to [facility] on 2/10/2022 after requiring intubation and mechanical ventilation for progressive hypoxia secondary to COVID-19 pneumonia.  The patient was started on remdesivir, dexamethasone, and baricitinib, as well as ICU dosing Lovenox.  She was initially treated with ceftriaxone for her urinary tract infection, which was  broadened to cefepime, vancomycin, and azithromycin and switched to ciprofloxacin on 2/11 after transfer when urine culture grew E. coli resistant to penicillins and cephalosporins.  On 2/11 she had cardiac arrest at about 2:20 pm. She had been bradycardiac that morning and while being changed from proned to supine position she went into ventricular tachycardia consistent with Torsades adrian pointes. She had one cycle of chest compression, received one shock, and returned to sinus rhythm. Bradycardia persisted, however. Magnesium level during code was 2.0. She was given 2 gram IV Magnesium. Remdesivir and propofol were discontinued due to bradycardia. Dopamine drip was given for chronotropic support. PRN atropine was ordered. ECG showed ST depressions in V2-V6. Troponins became elevated to 893.  Heparin drip was started.  Cardiology was consulted.  Plan was for electrolyte monitoring and replacement as needed, bradycardia monitoring, continued heparin drip, and likely coronary angiogram once more stable.  Bradycardia persisted.  Several as needed doses of atropine were needed.  Cardiology recommended transfer to [facility] where cardiology EP and pacemaker capabilities were available.    # Acute hypoxemic respiratory failure, multifactorial, improved;  - due to COVID 19, ARDS, Hypoventilation- related to habitus/ARDS/medications  - intubated from 2/8 and management in ICU  - extubated on 2/22 to Oxymask  - had been on Ketamine infusion, Dilaudid infusion and Midazolam drip- now weaned off  - switched to Valium 10 mg via NGT q8h  - required BiPAP last night and in am after she was given Valium 10 mg   - on methadone at home but no on benzodiazepines   - discussed with ICU team: plan to taper Valium to 5mg via NGT TID today (hold for lethargy) with plans to further wean off benzos   - continue with BiPAP at bedtime  - PTA on Methadone 80 mg po daily, now on Methadone 40 mg po BID, hold for lethargy  -  transfer to [facility]  - wean off O2 as able  - sitter at bedside    # COVID 19 infection  - COVID + 2/2  - was seen in ER at [facility] on 2/8, apparently hypoxic, initially on BiPAP but hypoxia worsened and was intubated and transferred to [facility] because if bed issues  - CXR on admission - Patchy opacities throughout the lungs, compatible with  patient's known Covid-19 infection  - CT chest 2/8- no PE, Patchy groundglass and consolidative opacities throughout the  Lungs  - started on remdesivir, dexamethasone, and baricitinib, as well as ICU dosing Lovenox.  - finished COVID-directed medications  - wean off O2 as able  - off special precautions now    # Cardiac arrest - Torsade de Pointes  # Cardiomyopathy - improvement in EF from 20% to 40% (2/18); suspecetd stress-related cardiomyopathy   # Bradycardia   - on 2/11, while at [facility]- She had been bradycardiac that morning and while being changed from proned to supine position she went into ventricular tachycardia consistent with Torsades adrian pointes  -  had one cycle of chest compression, received one shock, and returned to sinus rhythm. Bradycardia persisted and she was transferred to [facility]  - Remdesivir was discontinued  - Echo 2/12 - Severely decreased left ventricular systolic function; EF 20-25%.There  is severe global hypokinesia of the left ventricle. The basal segments appears to contract the best- possible stress cardiomyopathy?  - repeat Echo 2/27- improved EF 40-45%; there is mild global hypokinesia of the left ventricle. Mildly decreased right ventricular systolic function  - seen by cardiology- no need for PPM  - TSH was low  0.29 but fT4 1.39  - now HR in 's  - received few doses of Lasix; neg balance almost 10 liters now, does not look fluid overload at this time; PTA on Lasix 20 mg po daily; consider resuming po Lasix   - avoid QT prolonging medications; she was resumed on her PTA Methadone  - Mg goal 2.5, K goal 4.5; high protocol replacement  protocol  - ASA  - atorvastatin  - OP cards f/u  - Tele    # DM type 2  - PTA on Levemir 15 units qam and Metformin 1000 mg po BID; PTA Metformin held  - HbA1c 6.2 on 2/8  - BS had been elevated while on Decadron and Lantus doses adjusted  - --209, Decadron stopped today; decrease glargine 35u qAM to 30u and 30 to 25 qpm PM as off decadron  - BC checks q4h  - very high resistance ISS    # Hypertension  - PTA on Norvasc 5 mg po daily     # UTI  - UC from 2/8 positive for E coli resistant to many cephalosporins but sensitive to Cefepime  - had been on Cefepime for 7 days, now off ABX    # Leucocytosis  - has some mild persistent leucocytosis  - treated for UTO as abobe  - afebrile  - procal <0.05 on 2/20  - possible related to steroids that were stopped today  - monitor fever curve and WBCs trend    # Hypothyroidism  - resumed PTA Synthroid    # History of polysubstance abuse and currently on methadone  # Fibromyalgia   - PTA on Methadone 80 mg po daily, Neurontin 1200 mg po TID  - while was in ICU, intubated- she had been on multiple drips- Ketamine infusion, Dilaudid infusion and Midazolam drip- now weaned off  - it was recommended to avoid QTc-prolonging medications given her cardiac arrest/torsades de alexandria but she was restarted on Methadone 40 mg po BID for now while in ICU; last EKG 2/21- normal QTc 444  - also resumed Gabapentin at a lower dose 300 mg TID    # Acute toxic encephalopathy  - likely related to recent sedation, now off all drips  - resumed Methadone 40 mg po BID  - restarted PTA Gabapentin at a lower dose 300 mg po TID  - serial ABG does not show hypercapnea but it seems that she would benefit from BIPAP at bedtime  - avoid sedating medications, hold Methadone for lethargy  - may have difficulties managing her encephalopathy by holding sedating/pain mediation without becoming agitated   - sitter at bedside    # Hyponatremia  - na was 131 on 2/22, up to 136 today  - BMP in am    # Bipolar  disorder  - PTA on Cymbalta 60 mg po daily, Trazodone 50 mg po at bedtime and Atarax 100 mg po q6h prn for anxiety   - resumed PTA Cymbalta at a lower dose 20 mg po daily; PTA Trazodone on hold    # Dysphagia  - related to recent intubation  - has NGT in place, receiving TF  - SLP consult    # Diarrhea  - likely related to TF  - has rectal tube in place  - Cdiff negative on 2/15  - started on Nutrisource Fiber    # Generalized weakness  - PT/OT  - SW consult    # DVT/px- Lovenox 60 mg q12h         The patient's care was discussed with the Bedside Nurse and ICU team Team.    Iveth Cedeño MD  [facility]  Securely message with the Vocera Web Console (learn more here)  Text page via [facility] Paging/[email or web address]       Hospitalist Service    Clinically Significant Risk Factors Present on Admission                    ______________________________________________________________________    Chief Complaint   Transfer of care    Unable to obtain a history from the patient due to confusion. The informations were gathered from ICU team and personal review of her medical records.     History of Present Illness   No Yusuf is a 62 year old female with past medical history of type 2 diabetes mellitus, hypertension, hyperlipidemia, bipolar disorder, fibromyalgia, history of polysubstance abuse and currently on methadone, hypothyroidism, chronic hepatitis C who presented initially to the [facility] emergency department on 2/8/2022 with chief complaint of shortness of breath.  Of note, the patient is vaccinated for COVID-19 however did not receive the booster.  The patient's symptoms started on 2/2/2022 and had a positive COVID-19 test on 2/2/2022.  In the emergency department the patient was found to be hypoxic requiring supplemental oxygen with high flow nasal cannula.  Initial lab work showed a sodium of 132, potassium 5.8, albumin 2.6, ALT 55, CRP 77, ferritin 439, A1c 6.2,  procalcitonin 0.07, troponin 81.  Venous blood gas showed PCO2 of 54 and PO2 of 33 and bicarb of 31.  Urinalysis revealed 43 WBCs, moderate leukocyte esterase, positive nitrates, and many bacteria.  Chest x-ray showed patchy opacities throughout both lungs compatible with COVID-19 infection.  CT chest showed no pulmonary embolism however did note patchy opacities and consolidations throughout the lungs.  The patient had increasing oxygen requirements requiring continuous BiPAP.  Due to bed availability the patient was transferred to [facility] on 2/10/2022 after requiring intubation and mechanical ventilation for progressive hypoxia secondary to COVID-19 pneumonia.  The patient was started on remdesivir, dexamethasone, and baricitinib, as well as ICU dosing Lovenox.  She was initially treated with ceftriaxone for her urinary tract infection, which was broadened to cefepime, vancomycin, and azithromycin and switched to ciprofloxacin on 2/11 after transfer when urine culture grew E. coli resistant to penicillins and cephalosporins.  On 2/11 she had cardiac arrest at about 2:20 pm. She had been bradycardiac that morning and while being changed from proned to supine position she went into ventricular tachycardia consistent with Torsades adrian pointes. She had one cycle of chest compression, received one shock, and returned to sinus rhythm. Bradycardia persisted, however. Magnesium level during code was 2.0. She was given 2 gram IV Magnesium. Remdesivir and propofol were discontinued due to bradycardia. Dopamine drip was given for chronotropic support. PRN atropine was ordered. ECG showed ST depressions in V2-V6. Troponins became elevated to 893.  Heparin drip was started.  Cardiology was consulted.  Plan was for electrolyte monitoring and replacement as needed, bradycardia monitoring, continued heparin drip, and likely coronary angiogram once more stable.  Bradycardia persisted.  Several as needed doses of  atropine were needed.  Cardiology recommended transfer to [facility] where cardiology EP and pacemaker capabilities were available.      Review of Systems   The 10 point Review of Systems is negative other than noted in the HPI or here.     Past Medical History    I have reviewed this patient's medical history and updated it with pertinent information if needed.   Past Medical History:   Diagnosis Date     Arthritis      Bipolar affective (H)      Fibromyalgia      Hypertension        Past Surgical History   I have reviewed this patient's surgical history and updated it with pertinent information if needed.  Past Surgical History:   Procedure Laterality Date     CHOLECYSTECTOMY       GYN SURGERY      c section     GYN SURGERY      oblation     ORTHOPEDIC SURGERY      left leg, left shoulder, back       Social History   I have reviewed this patient's social history and updated it with pertinent information if needed.  Social History     Tobacco Use     Smoking status: Former Smoker     Packs/day: 0.10     Smokeless tobacco: Never Used   Substance Use Topics     Alcohol use: No     Drug use: No       Family History   I have reviewed this patient's family history and updated it with pertinent information if needed.  Family History   Problem Relation Age of Onset     Heart Disease Mother      Diabetes Mother      Ovarian Cancer Mother      Heart Disease Father      Lung Cancer Father      Diabetes Sister      Ovarian Cancer Sister      Diabetes Brother        Medications   I have reviewed this patient's current medications  Current Facility-Administered Medications   Medication     acetaminophen (TYLENOL) tablet 650 mg    Or     acetaminophen (TYLENOL) Suppository 650 mg     acetaminophen (TYLENOL) tablet 500 mg     aspirin (ASA) chewable tablet 81 mg     atorvastatin (LIPITOR) tablet 20 mg     atropine injection 0.5 mg     cholecalciferol (VITAMIN D3) 1250 mcg (98787 units) capsule 1,250 mcg      cyanocobalamin (VITAMIN B-12) tablet 1,000 mcg     dextrose 10% infusion     glucose gel 15-30 g    Or     dextrose 50 % injection 25-50 mL    Or     glucagon injection 1 mg     diazepam (VALIUM) tablet 5 mg     DULoxetine (CYMBALTA) DR capsule 20 mg     enoxaparin ANTICOAGULANT (LOVENOX) injection 60 mg     fiber modular (NUTRISOURCE FIBER) packet 2 packet     [START ON 2/24/2022] fluticasone-vilanterol (BREO ELLIPTA) 100-25 MCG/INH inhaler 1 puff     gabapentin (NEURONTIN) solution 300 mg     hydrALAZINE (APRESOLINE) injection 10 mg     HYDROmorphone (PF) (DILAUDID) injection 0.3-0.5 mg     hydrOXYzine (ATARAX) tablet 25 mg     insulin aspart (NovoLOG) injection (RAPID ACTING)     insulin glargine (LANTUS PEN) injection 25 Units     [START ON 2/24/2022] insulin glargine (LANTUS PEN) injection 30 Units     levalbuterol (XOPENEX HFA) 45 MCG/ACT Inhaler 2 puff     levothyroxine (SYNTHROID/LEVOTHROID) tablet 200 mcg     lidocaine (LMX4) cream     lidocaine 1 % 0.1-1 mL     methadone (DOLOPHINE-INTENSOL) 10 MG/ML (HIGH CONC) solution 40 mg     multivitamins w/minerals liquid 15 mL     nitroGLYcerin (NITROSTAT) sublingual tablet 0.4 mg     ondansetron (ZOFRAN-ODT) ODT tab 4 mg    Or     ondansetron (ZOFRAN) injection 4 mg     pantoprazole (PROTONIX) 2 mg/mL suspension 40 mg     polyethylene glycol (MIRALAX) Packet 17 g     potassium chloride (KLOR-CON) Packet 20 mEq     protein modular (PROSOURCE TF) 1 packet     senna-docusate (SENOKOT-S/PERICOLACE) 8.6-50 MG per tablet 1 tablet    Or     senna-docusate (SENOKOT-S/PERICOLACE) 8.6-50 MG per tablet 2 tablet     sodium chloride (PF) 0.9% PF flush 3 mL     sodium chloride (PF) 0.9% PF flush 3 mL     sodium chloride 0.9% infusion     Vitamin D3 (CHOLECALCIFEROL) tablet 25 mcg       Allergies   Allergies   Allergen Reactions     Promethazine Other (See Comments) and Anxiety     Noted in 8/30/08 ER     Codeine Phosphate GI Disturbance     Lamotrigine Other (See Comments)      hyponatremia     Oxcarbazepine Unknown and Other (See Comments)     Low sodium  LegacyRecord#[id number]       Codeine Other (See Comments) and Rash     GI bleeding  LegacyRecord#[id number]         Physical Exam   Vital Signs: Temp: 98.8  F (37.1  C) Temp src: Axillary BP: (!) 120/92 Pulse: 90   Resp: 26 SpO2: 95 % O2 Device: Oxymask Oxygen Delivery: 8 LPM  Weight: 251 lbs 15.77 oz     General- sleepy, on BiPAP, intermittent moving her hands  HEENT: normocephalic, atraumatic, PERRL   CV: S1S@, RRR, no murmurs, no rubs  Pulm: bilateral diminished air entry at bases, no rales, no crackles  Abd: soft, obese  MSK: no edema  Skin: no obvious rash     Data   I personally reviewed the echo image(s) showing as above.  Results for orders placed or performed during the hospital encounter of 02/13/22 (from the past 24 hour(s))   Glucose by meter   Result Value Ref Range    GLUCOSE BY METER POCT 240 (H) 70 - 99 mg/dL   Glucose by meter   Result Value Ref Range    GLUCOSE BY METER POCT 209 (H) 70 - 99 mg/dL   Glucose by meter   Result Value Ref Range    GLUCOSE BY METER POCT 147 (H) 70 - 99 mg/dL   Glucose by meter   Result Value Ref Range    GLUCOSE BY METER POCT 193 (H) 70 - 99 mg/dL   Basic metabolic panel   Result Value Ref Range    Sodium 136 133 - 144 mmol/L    Potassium 4.1 3.4 - 5.3 mmol/L    Chloride 104 94 - 109 mmol/L    Carbon Dioxide (CO2) 25 20 - 32 mmol/L    Anion Gap 7 3 - 14 mmol/L    Urea Nitrogen 54 (H) 7 - 30 mg/dL    Creatinine 0.58 0.52 - 1.04 mg/dL    Calcium 10.1 8.5 - 10.1 mg/dL    Glucose 216 (H) 70 - 99 mg/dL    GFR Estimate >90 >60 mL/min/1.73m2   CBC with platelets   Result Value Ref Range    WBC Count 15.7 (H) 4.0 - 11.0 10e3/uL    RBC Count 4.86 3.80 - 5.20 10e6/uL    Hemoglobin 13.9 11.7 - 15.7 g/dL    Hematocrit 42.5 35.0 - 47.0 %    MCV 87 78 - 100 fL    MCH 28.6 26.5 - 33.0 pg    MCHC 32.7 31.5 - 36.5 g/dL    RDW 15.7 (H) 10.0 - 15.0 %    Platelet Count 651 (H) 150 - 450 10e3/uL   Magnesium   Result Value  Ref Range    Magnesium 2.6 (H) 1.6 - 2.3 mg/dL   Phosphorus   Result Value Ref Range    Phosphorus 3.7 2.5 - 4.5 mg/dL   Blood gas arterial   Result Value Ref Range    pH Arterial 7.46 (H) 7.35 - 7.45    pCO2 Arterial 39 35 - 45 mm Hg    pO2 Arterial 91 80 - 105 mm Hg    FIO2 40     Bicarbonate Arterial 28 21 - 28 mmol/L    Base Excess/Deficit (+/-) 3.6 (H) -9.0 - 1.8 mmol/L   Glucose by meter   Result Value Ref Range    GLUCOSE BY METER POCT 273 (H) 70 - 99 mg/dL   Glucose by meter   Result Value Ref Range    GLUCOSE BY METER POCT 205 (H) 70 - 99 mg/dL   Glucose by meter   Result Value Ref Range    GLUCOSE BY METER POCT 209 (H) 70 - 99 mg/dL     Most Recent 3 CBC's:Recent Labs   Lab Test 02/23/22  0518 02/22/22  0420 02/21/22 0450   WBC 15.7* 18.0* 13.8*   HGB 13.9 13.3 12.1   MCV 87 88 88   * 622* 560*     Most Recent 3 BMP's:Recent Labs   Lab Test 02/23/22  1533 02/23/22  1322 02/23/22  0855 02/23/22  0518 02/22/22  0842 02/22/22  0420 02/21/22  0838 02/21/22  0450   NA  --   --   --  136  --  131*  --  130*   POTASSIUM  --   --   --  4.1  --  4.2  --  4.0   CHLORIDE  --   --   --  104  --  99  --  100   CO2  --   --   --  25  --  25  --  23   BUN  --   --   --  54*  --  44*  --  35*   CR  --   --   --  0.58  --  0.56  --  0.46*   ANIONGAP  --   --   --  7  --  7  --  7   RODOLFO  --   --   --  10.1  --  9.3  --  8.9   * 205* 273* 216*   < > 222*   < > 171*    < > = values in this interval not displayed.     Most Recent 2 LFT's:Recent Labs   Lab Test 02/22/22  0420 02/21/22  0450   AST 53* 49*   * 263*   ALKPHOS 138 126   BILITOTAL 0.5 0.4     Most Recent 3 INR's:Recent Labs   Lab Test 02/14/22  0526 02/10/22  0635 02/09/22  0637   INR 1.13 0.99 1.01     Most Recent 3 Creatinines:Recent Labs   Lab Test 02/23/22  0518 02/22/22  0420 02/21/22  0450   CR 0.58 0.56 0.46*     Most Recent 3 Hemoglobins:Recent Labs   Lab Test 02/23/22  0518 02/22/22  0420 02/21/22  0450   HGB 13.9 13.3 12.1     Most  Recent 3 Troponin's:Recent Labs   Lab Test 05/27/21  0137 07/06/20  1400 01/28/20  0025   TROPI <0.015 0.015 <0.015     Most Recent 3 BNP's:Recent Labs   Lab Test 02/08/22  1027 05/27/21  0137 07/06/20  1400   NTBNPI 218 60 347     Most Recent D-dimer:Recent Labs   Lab Test 02/13/22  1851   DD 1.09*     Most Recent Cholesterol Panel:Recent Labs   Lab Test 02/14/22  1225   TRIG 157*     Most Recent 6 Bacteria Isolates From Any Culture (See EPIC Reports for Culture Details):Recent Labs   Lab Test 05/27/21  0137 07/06/20  1630 07/06/20  1400 01/28/20  0025 01/27/20  2305 01/27/20  2035   CULT No growth No growth No growth No growth  No growth No Beta Streptococcus isolated 50,000 to 100,000 colonies/mL  mixed urogenital abril  Susceptibility testing not routinely done       Most Recent TSH and T4:Recent Labs   Lab Test 02/13/22  1536   TSH 0.29*   T4 1.39     Most Recent Hemoglobin A1c:Recent Labs   Lab Test 02/08/22  1027   A1C 6.2*     Most Recent 6 glucoses:Recent Labs   Lab Test 02/23/22  1533 02/23/22  1322 02/23/22  0855 02/23/22  0518 02/23/22  0516 02/23/22  0014   * 205* 273* 216* 193* 147*     Most Recent Urinalysis:Recent Labs   Lab Test 02/08/22  1151   COLOR Yellow   APPEARANCE Slightly Cloudy*   URINEGLC Negative   URINEBILI Negative   URINEKETONE Negative   SG 1.015   UBLD Negative   URINEPH 5.0   PROTEIN Negative   NITRITE Positive*   LEUKEST Moderate*   RBCU 3*   WBCU 43*     Most Recent ABG:Recent Labs   Lab Test 02/23/22  0523   PH 7.46*   PO2 91   PCO2 39   HCO3 28   NAHUM 3.6*     Most Recent ESR & CRP:Recent Labs   Lab Test 02/22/22  0420 06/06/21  0659 06/04/21  0805   SED  --   --  83*   CRP 23.8*   < > 12.0*    < > = values in this interval not displayed.     Most Recent Anemia Panel:Recent Labs   Lab Test 02/23/22  0518 02/11/22  0426 02/10/22  0635 02/08/22 2032 02/08/22  1028 01/29/20  0618 01/28/20  0025   WBC 15.7*   < > 6.3  6.3   < >  --    < > 10.1   HGB 13.9   < > 12.9  12.9    < >  --    < > 10.0*   HCT 42.5   < > 41.1  41.1   < >  --    < > 32.1*   MCV 87   < > 90  90   < >  --    < > 92   *   < > 252  252   < >  --    < > 297   IRON  --   --   --   --   --   --  Unsatisfactory specimen - hemolyzed   IRONSAT  --   --   --   --   --   --  Not Calculated   RETICABSCT  --   --  0.071   < >  --   --  125.0*   RETP  --   --  1.6   < >  --   --  3.5*   FEB  --   --   --   --   --   --  Unsatisfactory specimen - hemolyzed   GILA  --   --   --   --  439*  --  32    < > = values in this interval not displayed.

## 2022-02-23 NOTE — PLAN OF CARE
[facility] Intensive Care Unit   Nursing Note                                                       Neuro:  PERRL.  Moves all extremities.  Follows commands.    Cardiovascular:  RRR.  Hemodynamically stable.  Pulmonary:  On 8L oxymask, Bipap while sleeping. Oxygen saturation > 92  GI/:  Adequate UOP, diuresing   Endocrine: Glucose controlled with sub-q insulin.  Skin:  Intact.  Protective mepilex applied to sacrum.  Restraints:  In use and necessary.   Breathing trials went well this afternoon and patient was extubated at 1305. Extubated to bipap, currently on oxymask but MD states should wear bipap when sleeping. Ketamine drip discontinued.   Family updated  Continue to monitor closely.    Recent Labs   Lab 02/22/22  1241 02/22/22  0420 02/21/22  0450 02/20/22  0418   PH 7.45 7.47* 7.42 7.39   PCO2 40 37 38 38   PO2 70* 73* 75* 74*   HCO3 28 27 25 23   O2PER 30 30 30 30       Lab Results   Component Value Date    TROPI <0.015 05/27/2021    TROPI 0.015 07/06/2020    TROPI <0.015 01/28/2020    TROPI <0.015 11/03/2019       ROUTINE IP LABS (Last four results)  BMP  Recent Labs   Lab 02/22/22  1650 02/22/22  1238 02/22/22  0842 02/22/22  0420 02/21/22  0838 02/21/22  0450 02/20/22  0422 02/20/22  0417 02/19/22  0850 02/19/22  0403   NA  --   --   --  131*  --  130*  --  133  --  134   POTASSIUM  --   --   --  4.2  --  4.0  --  4.0  --  3.9   CHLORIDE  --   --   --  99  --  100  --  105  --  101   RODOLFO  --   --   --  9.3  --  8.9  --  9.0  --  8.6   CO2  --   --   --  25  --  23  --  22  --  26   BUN  --   --   --  44*  --  35*  --  43*  --  45*   CR  --   --   --  0.56  --  0.46*  --  0.65  --  0.66   * 247* 221* 222*   < > 171*   < > 153*   < > 149*    < > = values in this interval not displayed.     CBC  Recent Labs   Lab 02/22/22  0420 02/21/22  0450 02/20/22  0417 02/19/22  0403   WBC 18.0* 13.8* 16.5* 14.7*   RBC 4.61 4.27 4.25 4.33   HGB 13.3 12.1 12.1 12.3   HCT 40.4 37.7 38.0 38.9   MCV 88 88  89 90   MCH 28.9 28.3 28.5 28.4   MCHC 32.9 32.1 31.8 31.6   RDW 15.0 14.8 15.0 14.5   * 560* 544* 530*     INRNo lab results found in last 7 days.  LACTIC ACID  Lactic Acid (mmol/L)   Date Value   02/11/2022 2.0   02/08/2022 1.2   05/27/2021 0.9   05/27/2021 2.3 (H)   07/06/2020 0.7   01/28/2020 1.0     Blood Glucose  Glucose (mg/dL)   Date Value   06/09/2021 176 (H)   06/09/2021 86   06/09/2021 137 (H)   06/08/2021 155 (H)   06/08/2021 110 (H)   06/08/2021 135 (H)       Intake/Output Summary (Last 24 hours) at 2/22/2022 1816  Last data filed at 2/22/2022 1800  Gross per 24 hour   Intake 2869.64 ml   Output 4075 ml   Net -1205.36 ml       George Wenistein  [facility]  Intensive Care Unit

## 2022-02-23 NOTE — PROGRESS NOTES
ICU Progress Note    Date of Service: 02/23/22    A/P:  62F DM2, PSA (on methadone), HCV, hypothyroidism, GERD, depression, anxiety, bipolar admitted initially to [facility] 2/10 for acute hypoxemic respiratory failure 2/2 COVID-19 and E coli UTI. Transferred to [facility] 2/13 following VT/Torsade arrest and subsequent bradycardia w/ potential need for temporary pacemaker.     I have personally reviewed the daily labs, imaging studies, cultures and discussed the case with referring physician and consulting physicians.     Neuro  # Sedation for mechanical ventilation, resolved   # PSA - on home methadone  # Hx depression, anxiety, bipolar   - PO diazepam , decrease from 10 to 5 q 8  - duloxetine   - gabapentin   - methadone 40mg BID   - watch for oversedation    Pulmonary  # Acute hypoxemic respiratory failure, improved   # ARDS  # hypoventilation- related to habitus and ARDS  - nocturnal BIPAP  - wean to oxymask     Cardiac  # Cardiomyopathy - improvement in EF from 20% to 40% (2/18)  # Cardiac arrest - Torsade de Pointes  # Bradycardia   # hemodynamics appropriate off support,  MAP > 65 - emerging HTN  - goal HR > 40  - avoid QT prolonging medications   ---Mg goal 2.5, K goal 4.5  - ASA  - atorvastatin  - OP cards f/u  - pull TLC    Renal  # hypoNa  - monitor UOP, Cr, I/O  -lasix 20mg x2 doses with KCl repletion (net neg -9 L)  -[place] for strict I/O    GI  # Hx HCV  # Protein calorie malnutrition  - RD to manage TF  -PPI    Heme/Onc  # No acute issues   - monitor CBC    ID  # COVID-19, recovered, completed - dexamethasone 6mg x 10d- baricitinib   # UTI - E coli  #Leukocytosis - variable  pro-carrillo negative, CRP moderately elevated (23.8), will continue to monitor; - cefepime x 7d - completed 2/21  - continue to monitor    Endo  # DM2 w/ hyperlgycemia  # Hypothyroidism  - monitor BG  - sliding scale insulin  - decrease glargine 35u qAM to 30u  And  30 to 25 qpm PM as off decadron  - levothyroxine  PTA    PPX  1.  DVT: enoxaparin 60mg q12  2. VAP: HOB 30 degrees, chlorhexidine rinse  3. Stress Ulcer: PPI  4. Restraints: Nonviolent soft two point restraints required and necessary for patient safety and continued cares and good effect as patient continues to pull at necessary lines, tubes despite education and distraction. Will readdress daily.   5. Wound care - per unit routine   6. Feeding - TF  7. Family update pending       Interval Hx:  Extubated 2/22 to 8L mask, BiPAP added overnight given habitus and hypoventilation,  Received several doses of valium overnight - 2/2 agitation  This AM no complaints, synchronous with BiPAP  Sugars elevated but lantus just increased         BP (!) 120/92   Pulse 90   Temp 98.8  F (37.1  C) (Axillary)   Resp 26   Wt 114.3 kg (251 lb 15.8 oz)   LMP 11/01/2013   SpO2 95%   BMI 43.25 kg/m    Gen: supine,   Neuro: awake  following commands, pupils equal   HEENT: anicteric SHO MARTIN   Card: RRR, warm and well perfused  Pulm: clear bilaterally synchronous with BIPAP  Abd: soft, obese  MSK: no edema  Skin: no obvious rash     Vent Mode: CPAP/PS  (Continuous positive airway pressure with Pressure Support)  FiO2 (%): 40 %  Resp Rate (Set): 18 breaths/min  Tidal Volume (Set, mL): 350 mL  PEEP (cm H2O): 5 cmH2O  Pressure Support (cm H2O): 5 cmH2O  Resp: 26      Intake/Output Summary (Last 24 hours) at 2/23/2022 0945  Last data filed at 2/23/2022 0900  Gross per 24 hour   Intake 2049.33 ml   Output 2645 ml   Net -595.67 ml       ROUTINE ICU LABS (Last four results)  CMPRecent Labs   Lab 02/23/22  0855 02/23/22  0518 02/23/22  0516 02/23/22  0014 02/22/22  0842 02/22/22  0420 02/21/22  0838 02/21/22  0450 02/20/22  0422 02/20/22  0417 02/18/22  0837 02/18/22  0411 02/17/22  0924 02/17/22  0921   NA  --  136  --   --   --  131*  --  130*  --  133   < > 134  --  138   POTASSIUM  --  4.1  --   --   --  4.2  --  4.0  --  4.0   < > 3.7  --  4.0   CHLORIDE  --  104  --   --   --  99  --  100  --  105   <  > 100  --  103   CO2  --  25  --   --   --  25  --  23  --  22   < > 31  --  32   ANIONGAP  --  7  --   --   --  7  --  7  --  6   < > 3  --  3   * 216* 193* 147*   < > 222*   < > 171*   < > 153*   < > 153*   < > 127*   BUN  --  54*  --   --   --  44*  --  35*  --  43*   < > 41*  --  33*   CR  --  0.58  --   --   --  0.56  --  0.46*  --  0.65   < > 0.65  --  0.72   GFRESTIMATED  --  >90  --   --   --  >90  --  >90  --  >90   < > >90  --  >90   RODOLFO  --  10.1  --   --   --  9.3  --  8.9  --  9.0   < > 8.5  --  8.5   MAG  --  2.6*  --   --   --  2.5*  --  2.1  --  2.3   < > 1.8  --   --    PHOS  --  3.7  --   --   --  3.6  --  3.1  --  2.9   < > 3.7  --   --    PROTTOTAL  --   --   --   --   --  8.4  --  7.6  --   --   --  7.4  --  7.1   ALBUMIN  --   --   --   --   --  2.5*  --  2.3*  --   --   --  2.3*  --  2.3*   BILITOTAL  --   --   --   --   --  0.5  --  0.4  --   --   --  0.4  --  0.6   ALKPHOS  --   --   --   --   --  138  --  126  --   --   --  108  --  106   AST  --   --   --   --   --  53*  --  49*  --   --   --  168*  --  285*   ALT  --   --   --   --   --  244*  --  263*  --   --   --  528*  --  622*    < > = values in this interval not displayed.     CBC  Recent Labs   Lab 02/23/22  0518 02/22/22  0420 02/21/22  0450 02/20/22  0417   WBC 15.7* 18.0* 13.8* 16.5*   RBC 4.86 4.61 4.27 4.25   HGB 13.9 13.3 12.1 12.1   HCT 42.5 40.4 37.7 38.0   MCV 87 88 88 89   MCH 28.6 28.9 28.3 28.5   MCHC 32.7 32.9 32.1 31.8   RDW 15.7* 15.0 14.8 15.0   * 622* 560* 544*     INRNo lab results found in last 7 days.  Arterial Blood Gas  Recent Labs   Lab 02/23/22  0523 02/22/22  1241 02/22/22  0420 02/21/22  0450   PH 7.46* 7.45 7.47* 7.42   PCO2 39 40 37 38   PO2 91 70* 73* 75*   HCO3 28 28 27 25   O2PER 40 30 30 30       Imaging: no recent imaging    Billing: L3

## 2022-02-23 NOTE — PLAN OF CARE
Pt A&Ox4, vss, afebrile, on 8l oxymask.  Pt lethargic and occasionally confused, reorients easily.  Denies nausea, dizziness, pain.  Some SOB with exertion.   Bipap overnight.  Continue to monitor.

## 2022-02-24 ENCOUNTER — APPOINTMENT (OUTPATIENT)
Dept: SPEECH THERAPY | Facility: CLINIC | Age: 62
DRG: 207 | End: 2022-02-24
Attending: INTERNAL MEDICINE
Payer: COMMERCIAL

## 2022-02-24 ENCOUNTER — APPOINTMENT (OUTPATIENT)
Dept: OCCUPATIONAL THERAPY | Facility: CLINIC | Age: 62
DRG: 207 | End: 2022-02-24
Attending: INTERNAL MEDICINE
Payer: COMMERCIAL

## 2022-02-24 LAB
ALBUMIN SERPL-MCNC: 2.6 G/DL (ref 3.4–5)
ALP SERPL-CCNC: 156 U/L (ref 40–150)
ALT SERPL W P-5'-P-CCNC: 278 U/L (ref 0–50)
ANION GAP SERPL CALCULATED.3IONS-SCNC: 3 MMOL/L (ref 3–14)
AST SERPL W P-5'-P-CCNC: 104 U/L (ref 0–45)
BASOPHILS # BLD AUTO: 0.1 10E3/UL (ref 0–0.2)
BASOPHILS NFR BLD AUTO: 0 %
BILIRUB SERPL-MCNC: 0.6 MG/DL (ref 0.2–1.3)
BUN SERPL-MCNC: 51 MG/DL (ref 7–30)
CALCIUM SERPL-MCNC: 9.8 MG/DL (ref 8.5–10.1)
CHLORIDE BLD-SCNC: 106 MMOL/L (ref 94–109)
CO2 SERPL-SCNC: 28 MMOL/L (ref 20–32)
CREAT SERPL-MCNC: 0.61 MG/DL (ref 0.52–1.04)
EOSINOPHIL # BLD AUTO: 0.1 10E3/UL (ref 0–0.7)
EOSINOPHIL NFR BLD AUTO: 1 %
ERYTHROCYTE [DISTWIDTH] IN BLOOD BY AUTOMATED COUNT: 15.7 % (ref 10–15)
GFR SERPL CREATININE-BSD FRML MDRD: >90 ML/MIN/1.73M2
GLUCOSE BLD-MCNC: 221 MG/DL (ref 70–99)
GLUCOSE BLDC GLUCOMTR-MCNC: 184 MG/DL (ref 70–99)
GLUCOSE BLDC GLUCOMTR-MCNC: 188 MG/DL (ref 70–99)
GLUCOSE BLDC GLUCOMTR-MCNC: 191 MG/DL (ref 70–99)
GLUCOSE BLDC GLUCOMTR-MCNC: 223 MG/DL (ref 70–99)
GLUCOSE BLDC GLUCOMTR-MCNC: 226 MG/DL (ref 70–99)
GLUCOSE BLDC GLUCOMTR-MCNC: 227 MG/DL (ref 70–99)
GLUCOSE BLDC GLUCOMTR-MCNC: 239 MG/DL (ref 70–99)
HCT VFR BLD AUTO: 43 % (ref 35–47)
HGB BLD-MCNC: 13.5 G/DL (ref 11.7–15.7)
IMM GRANULOCYTES # BLD: 0.1 10E3/UL
IMM GRANULOCYTES NFR BLD: 1 %
LACTATE SERPL-SCNC: 2.2 MMOL/L (ref 0.7–2)
LACTATE SERPL-SCNC: 2.4 MMOL/L (ref 0.7–2)
LACTATE SERPL-SCNC: 3.2 MMOL/L (ref 0.7–2)
LYMPHOCYTES # BLD AUTO: 1.3 10E3/UL (ref 0.8–5.3)
LYMPHOCYTES NFR BLD AUTO: 9 %
MAGNESIUM SERPL-MCNC: 2.4 MG/DL (ref 1.6–2.3)
MCH RBC QN AUTO: 28.8 PG (ref 26.5–33)
MCHC RBC AUTO-ENTMCNC: 31.4 G/DL (ref 31.5–36.5)
MCV RBC AUTO: 92 FL (ref 78–100)
MONOCYTES # BLD AUTO: 1.7 10E3/UL (ref 0–1.3)
MONOCYTES NFR BLD AUTO: 12 %
NEUTROPHILS # BLD AUTO: 10.5 10E3/UL (ref 1.6–8.3)
NEUTROPHILS NFR BLD AUTO: 77 %
NRBC # BLD AUTO: 0 10E3/UL
NRBC BLD AUTO-RTO: 0 /100
PHOSPHATE SERPL-MCNC: 3.1 MG/DL (ref 2.5–4.5)
PLATELET # BLD AUTO: 525 10E3/UL (ref 150–450)
POTASSIUM BLD-SCNC: 3.8 MMOL/L (ref 3.4–5.3)
PROT SERPL-MCNC: 8.5 G/DL (ref 6.8–8.8)
RBC # BLD AUTO: 4.68 10E6/UL (ref 3.8–5.2)
SODIUM SERPL-SCNC: 137 MMOL/L (ref 133–144)
WBC # BLD AUTO: 13.8 10E3/UL (ref 4–11)

## 2022-02-24 PROCEDURE — 250N000013 HC RX MED GY IP 250 OP 250 PS 637: Performed by: INTERNAL MEDICINE

## 2022-02-24 PROCEDURE — 92610 EVALUATE SWALLOWING FUNCTION: CPT | Mod: GN

## 2022-02-24 PROCEDURE — 84100 ASSAY OF PHOSPHORUS: CPT | Performed by: INTERNAL MEDICINE

## 2022-02-24 PROCEDURE — 83735 ASSAY OF MAGNESIUM: CPT | Performed by: INTERNAL MEDICINE

## 2022-02-24 PROCEDURE — 250N000011 HC RX IP 250 OP 636: Performed by: INTERNAL MEDICINE

## 2022-02-24 PROCEDURE — 258N000003 HC RX IP 258 OP 636: Performed by: INTERNAL MEDICINE

## 2022-02-24 PROCEDURE — 80053 COMPREHEN METABOLIC PANEL: CPT | Performed by: INTERNAL MEDICINE

## 2022-02-24 PROCEDURE — 97165 OT EVAL LOW COMPLEX 30 MIN: CPT | Mod: GO

## 2022-02-24 PROCEDURE — 97535 SELF CARE MNGMENT TRAINING: CPT | Mod: GO

## 2022-02-24 PROCEDURE — G0463 HOSPITAL OUTPT CLINIC VISIT: HCPCS

## 2022-02-24 PROCEDURE — 99233 SBSQ HOSP IP/OBS HIGH 50: CPT | Performed by: INTERNAL MEDICINE

## 2022-02-24 PROCEDURE — 83605 ASSAY OF LACTIC ACID: CPT | Performed by: INTERNAL MEDICINE

## 2022-02-24 PROCEDURE — 36415 COLL VENOUS BLD VENIPUNCTURE: CPT | Performed by: INTERNAL MEDICINE

## 2022-02-24 PROCEDURE — 120N000013 HC R&B IMCU

## 2022-02-24 PROCEDURE — 85025 COMPLETE CBC W/AUTO DIFF WBC: CPT | Performed by: INTERNAL MEDICINE

## 2022-02-24 RX ORDER — DIAZEPAM 5 MG
5 TABLET ORAL EVERY 12 HOURS
Status: DISCONTINUED | OUTPATIENT
Start: 2022-02-25 | End: 2022-02-27

## 2022-02-24 RX ORDER — POTASSIUM CHLORIDE 20MEQ/15ML
20 LIQUID (ML) ORAL ONCE
Status: COMPLETED | OUTPATIENT
Start: 2022-02-24 | End: 2022-02-24

## 2022-02-24 RX ADMIN — ASPIRIN 81 MG CHEWABLE TABLET 81 MG: 81 TABLET CHEWABLE at 09:17

## 2022-02-24 RX ADMIN — INSULIN ASPART 9 UNITS: 100 INJECTION, SOLUTION INTRAVENOUS; SUBCUTANEOUS at 09:22

## 2022-02-24 RX ADMIN — POTASSIUM CHLORIDE 20 MEQ: 20 SOLUTION ORAL at 09:17

## 2022-02-24 RX ADMIN — METHADONE HYDROCHLORIDE 40 MG: 10 CONCENTRATE ORAL at 09:18

## 2022-02-24 RX ADMIN — Medication 2 PACKET: at 21:38

## 2022-02-24 RX ADMIN — GABAPENTIN 300 MG: 250 SOLUTION ORAL at 21:37

## 2022-02-24 RX ADMIN — DULOXETINE HYDROCHLORIDE 20 MG: 20 CAPSULE, DELAYED RELEASE ORAL at 09:18

## 2022-02-24 RX ADMIN — Medication 1 PACKET: at 16:00

## 2022-02-24 RX ADMIN — INSULIN ASPART 5 UNITS: 100 INJECTION, SOLUTION INTRAVENOUS; SUBCUTANEOUS at 20:17

## 2022-02-24 RX ADMIN — ACETAMINOPHEN 500 MG: 325 SUSPENSION ORAL at 21:37

## 2022-02-24 RX ADMIN — Medication 2 PACKET: at 09:23

## 2022-02-24 RX ADMIN — GABAPENTIN 300 MG: 250 SOLUTION ORAL at 14:30

## 2022-02-24 RX ADMIN — CYANOCOBALAMIN TAB 1000 MCG 1000 MCG: 1000 TAB at 09:18

## 2022-02-24 RX ADMIN — INSULIN ASPART 5 UNITS: 100 INJECTION, SOLUTION INTRAVENOUS; SUBCUTANEOUS at 04:36

## 2022-02-24 RX ADMIN — METHADONE HYDROCHLORIDE 40 MG: 10 CONCENTRATE ORAL at 20:16

## 2022-02-24 RX ADMIN — FLUTICASONE FUROATE AND VILANTEROL TRIFENATATE 1 PUFF: 100; 25 POWDER RESPIRATORY (INHALATION) at 09:25

## 2022-02-24 RX ADMIN — INSULIN ASPART 9 UNITS: 100 INJECTION, SOLUTION INTRAVENOUS; SUBCUTANEOUS at 12:23

## 2022-02-24 RX ADMIN — POTASSIUM CHLORIDE 20 MEQ: 1.5 POWDER, FOR SOLUTION ORAL at 09:17

## 2022-02-24 RX ADMIN — ACETAMINOPHEN 500 MG: 325 SUSPENSION ORAL at 16:00

## 2022-02-24 RX ADMIN — Medication 1 PACKET: at 21:38

## 2022-02-24 RX ADMIN — GABAPENTIN 300 MG: 250 SOLUTION ORAL at 05:20

## 2022-02-24 RX ADMIN — HYDROXYZINE HYDROCHLORIDE 25 MG: 25 TABLET ORAL at 20:16

## 2022-02-24 RX ADMIN — Medication 15 ML: at 15:59

## 2022-02-24 RX ADMIN — MICONAZOLE NITRATE: 2 POWDER TOPICAL at 20:17

## 2022-02-24 RX ADMIN — SODIUM CHLORIDE 250 ML: 9 INJECTION, SOLUTION INTRAVENOUS at 17:20

## 2022-02-24 RX ADMIN — ENOXAPARIN SODIUM 60 MG: 60 INJECTION SUBCUTANEOUS at 20:16

## 2022-02-24 RX ADMIN — ENOXAPARIN SODIUM 60 MG: 60 INJECTION SUBCUTANEOUS at 09:17

## 2022-02-24 RX ADMIN — DIAZEPAM 5 MG: 5 TABLET ORAL at 04:29

## 2022-02-24 RX ADMIN — HYDROMORPHONE HYDROCHLORIDE 0.5 MG: 1 INJECTION, SOLUTION INTRAMUSCULAR; INTRAVENOUS; SUBCUTANEOUS at 04:20

## 2022-02-24 RX ADMIN — Medication 40 MG: at 09:16

## 2022-02-24 RX ADMIN — LEVOTHYROXINE SODIUM 200 MCG: 100 TABLET ORAL at 09:17

## 2022-02-24 RX ADMIN — DIAZEPAM 5 MG: 5 TABLET ORAL at 12:19

## 2022-02-24 RX ADMIN — Medication 1 PACKET: at 09:23

## 2022-02-24 RX ADMIN — Medication 25 MCG: at 09:17

## 2022-02-24 RX ADMIN — POTASSIUM CHLORIDE 20 MEQ: 1.5 POWDER, FOR SOLUTION ORAL at 20:16

## 2022-02-24 RX ADMIN — INSULIN ASPART 10 UNITS: 100 INJECTION, SOLUTION INTRAVENOUS; SUBCUTANEOUS at 15:58

## 2022-02-24 RX ADMIN — ACETAMINOPHEN 500 MG: 325 SUSPENSION ORAL at 09:17

## 2022-02-24 ASSESSMENT — ACTIVITIES OF DAILY LIVING (ADL)
ADLS_ACUITY_SCORE: 24
ADLS_ACUITY_SCORE: 24
ADLS_ACUITY_SCORE: 26
ADLS_ACUITY_SCORE: 24
ADLS_ACUITY_SCORE: 24
ADLS_ACUITY_SCORE: 26
ADLS_ACUITY_SCORE: 22
ADLS_ACUITY_SCORE: 24
ADLS_ACUITY_SCORE: 26
ADLS_ACUITY_SCORE: 24
ADLS_ACUITY_SCORE: 24
ADLS_ACUITY_SCORE: 26
ADLS_ACUITY_SCORE: 24
ADLS_ACUITY_SCORE: 22
ADLS_ACUITY_SCORE: 24
ADLS_ACUITY_SCORE: 26
ADLS_ACUITY_SCORE: 24

## 2022-02-24 NOTE — PROGRESS NOTES
02/24/22 1025   General Information   Onset of Illness/Injury or Date of Surgery 02/13/21   Referring Physician Iveth Cedeño MD   Patient/Family Therapy Goal Statement (SLP) To eat and drink   Pertinent History of Current Problem 62F DM2, PSA (on methadone), HCV, hypothyroidism, GERD, depression, anxiety, bipolar admitted initially to [facility] 2/10 for acute hypoxemic respiratory failure 2/2 COVID-19 and E coli UTI. Transferred to [facility] 2/13 following VT/Torsade arrest and subsequent bradycardia w/ potential need for temporary pacemaker.    General Observations Pt is alert and agreeable. She appears particular in her care but is eager to eat/drink    Past History of Dysphagia None per pt report, documented GERD   Type of Evaluation   Type of Evaluation Swallow Evaluation   Oral Motor   Oral Musculature generally intact   Structural Abnormalities none present   Mucosal Quality adequate   Dentition (Oral Motor)   Dentition (Oral Motor) significant number of missing teeth;dentition impacts chewing ability   Comment, Dentition (Oral Motor) Pt denies using dentures/partials   Facial Symmetry (Oral Motor)   Facial Symmetry (Oral Motor) WNL   Lip Function (Oral Motor)   Lip Range of Motion (Oral Motor) WNL   Tongue Function (Oral Motor)   Tongue ROM (Oral Motor) WNL   Vocal Quality/Secretion Management (Oral Motor)   Vocal Quality (Oral Motor) WFL;hoarse  (mildly hoarse)   Secretion Management (Oral Motor) WNL   General Swallowing Observations   Current Diet/Method of Nutritional Intake (General Swallowing Observations, NIS) NPO;nasogastric tube (NG)   Respiratory Support (General Swallowing Observations) supplemental oxygen;nasal cannula   Swallowing Evaluation Clinical swallow evaluation   Clinical Swallow Evaluation   Feeding Assistance frequent cues/help required  (d/t bilateral mitt restraints)   Clinical Swallow Evaluation Textures Trialed thin liquids;pureed;soft & bite-sized   Clinical Swallow  Eval: Thin Liquid Texture Trial   Mode of Presentation, Thin Liquids spoon;straw;fed by clinician   Volume of Liquid or Food Presented 6 oz   Oral Phase of Swallow WFL   Pharyngeal Phase of Swallow intact   Diagnostic Statement No overt s/sx of aspiration, no changes in breath sounds or vocal quality   Clinical Swallow Evaluation: Puree Solid Texture Trial   Mode of Presentation, Puree spoon   Volume of Puree Presented 4 oz    Oral Phase, Puree WFL   Pharyngeal Phase, Puree intact   Diagnostic Statement No overt s/sx of aspiration    Clinical Swallow Eval: Soft & Bite Sized   Mode of Presentation fed by clinician   Volume Presented softened cracker   Oral Phase impaired mastication;effortful AP movement;residue in oral cavity   Oral Residue   (minimal amounts of residue )   Pharyngeal Phase intact   Diagnostic Statement Extra time needed for mastication and oral clearance with solids given poor dentition. Soft solids are appropriate. No overt s/sx of aspiration    Esophageal Phase of Swallow   Patient reports or presents with symptoms of esophageal dysphagia Yes   Esophageal comments Documented GERD   Swallowing Recommendations   Diet Consistency Recommendations thin liquids (level 0)  ([facility]/dental)   Supervision Level for Intake 1:1 supervision needed  (and assistance at this time)   Mode of Delivery Recommendations bolus size, small;slow rate of intake   Swallowing Maneuver Recommendations alternate food and liquid intake   Monitoring/Assistance Required (Eating/Swallowing) check mouth frequently for oral residue/pocketing;cue for finger/lingual sweep if oral pocketing present;stop eating activities when fatigue is present;monitor for cough or change in vocal quality with intake   Recommended Feeding/Eating Techniques (Swallow Eval) maintain upright sitting position for eating;maintain upright posture during/after eating for 30 minutes;minimize distractions during oral intake;provide assist with feeding    Medication Administration Recommendations, Swallowing (SLP) As per pt preference    Instrumental Assessment Recommendations instrumental evaluation not recommended at this time   Clinical Impression   Criteria for Skilled Therapeutic Interventions Met (SLP Eval) No problems identified which require skilled intervention;Current level of function same as previous level of function   SLP Diagnosis Functional oropharyngeal swallow. Dentition impacting mastication - which appears baseline for her    Risks & Benefits of therapy have been explained evaluation/treatment results reviewed;care plan/treatment goals reviewed;participants voiced agreement with care plan;participants included;patient   Clinical Impression Comments Pt seen for clinical swallow evaluation. She consumed thin liquids, puree, and regular textures without overt s/sx of aspiration, no changes in breath sounds or vocal quality. Pt has many missing teeth which impacts her ability to adequately masticate solids. She reports selecting softer solids at baseline as her lack of dentition is not a new issue. Oropharyngeal swallow appears at baseline given poor dentition. Recommend mech/dental soft diet and thin liquids. She requires feeding assistance at this time. Pt should be positioned upright, take small bites/sips, at a slow rate, and alternate solids and liquids. Evaluation only, no further SLP services warranted.    SLP Discharge Planning   SLP Discharge Recommendation   (Defer to medical team and PT/OT)   SLP Rationale for DC Rec No further SLP services warranted   SLP Brief overview of current status  Oropharyngeal swallow appears at baseline given poor dentition. Recommend mech/dental soft diet and thin liquids. She requires feeding assistance at this time. Pt should be positioned upright, take small bites/sips, at a slow rate, and alternate solids and liquids. Evaluation only, no further SLP services warranted.     Total Evaluation Time   Total  Evaluation Time (Minutes) 11

## 2022-02-24 NOTE — PROVIDER NOTIFICATION
"Page sent to Dr. Billings via [employer]:    \"Lab just called with critical value:  lactic acid 2.2\"      "

## 2022-02-24 NOTE — PLAN OF CARE
Alert, waxes and wanes with orientation. Limited assessment at times d/t patient being uncooperative. VSS on 6L O2 via NC. Up with 2 and lift. T/r Q2. NPO, on tube feeds via NJ tube. Tube feeds running at 55ml/hr with 60ml free water flushes q4. NJ on L nare at 110cm. LS diminished. BS+. BM via rectal tube. Voiding via miguel. Redness and small wound on coccyx/ buttocks. Scattered bruises. Pain managed with Dilaudid, methadone. Denies nausea. Tele NSR. PIV saline locked. BG Q4. Sitter at bedside, patient wakes up confused and pulls on lines.

## 2022-02-24 NOTE — PROGRESS NOTES
CLINICAL NUTRITION SERVICES - REASSESSMENT NOTE      Recommendations Ordered by Registered Dietitian (RD):   Calorie Counts 2/25-2/27  Trial Ensure Max (vanilla) w/ lunch daily (150 kcal, 30 grams protein)    Malnutrition: (2/24)  % Weight Loss:  Unable to fully determine    % Intake: <75% for > 7 days (moderate malnutrition)  (received EN below goal rate for up to 6 days between proning and hospital transfers)   Subcutaneous Fat Loss:  Orbital region mild-moderate depletion  Muscle Loss:  Temporal region milk depletion, potential further losses possibly masked by adiposity   Fluid Retention: Trace     Malnutrition Diagnosis: Moderate malnutrition  In Context of:  Acute illness or injury       EVALUATION OF PROGRESS TOWARD GOALS   Diet:  Mechanical Dental Soft (IDDSI 7 Easy to Chew) - per SLP this morning    Nutrition Support:  TF continues as outlined below. Fiber packets added on 2/21 in hopes to improve stooling ~  Nutrition Support Enteral:  Type of Feeding Tube: Nasoduodenal   Enteral Frequency:  Continuous  Enteral Regimen: Vital HP at 55 mL/hr x 22 hours per day   Total Enteral Provisions: 1210 kcal, 105 g protein, 134 g CHO, 1012 mL H2O, 0 g fiber   Free Water Flush: 60 mL every 4 hours  ProSource TID= 120 kcal and 33 g protein  NutriSource Fiber 2 pkts BID = 60 kcal and 12 g fiber   Total = 1390 kcal (12 kcal/kg), 138 g protein (2.5 g/kg), 12 g fiber    Certavite daily     Intake/Tolerance:    Visited patient today, she is sleeping soundly   Spoke with sitter at bedside, states that patient was excited about diet advancement and seemed eager and motivated to take PO  Limited nutrition-focused physical exam completed, see below     Labs reviewed: Na 137, K 3.8. Mg 2.4 (H), Phos 3.1, LFTs elevated  Recent Labs   Lab 02/24/22  0803 02/24/22  0704 02/24/22  0547 02/24/22  0348 02/23/22  2353 02/23/22 2010   * 221* 227* 188* 191* 183*     Medications reviewed: VHSSI + 25 units lantus at bedtime and 30  "units lantus in the morning, Levothyroxine daily   Stooling:  - 2/20: BM x5  - 2/21: 700 mL   - 2/22: 50 mL   - 2/23: 400 mL  Wt: 251 lbs 15.77 oz - she is down 23 lbs from admission (8% in <2 weeks). She has been on lasix which makes it difficult to determine fluid vs true body wt lost (last received diuretic 2/20)  Vitals:    02/18/22 0200 02/19/22 0400 02/20/22 0200 02/21/22 0600   Weight: 120.5 kg (265 lb 10.5 oz) 115.6 kg (254 lb 13.6 oz) 116.2 kg (256 lb 2.8 oz) 116.6 kg (257 lb 0.9 oz)    02/22/22 0350   Weight: 114.3 kg (251 lb 15.8 oz)     - Per [facility] RD note 2/12:  \"Usual body weight appears to be about 280 lbs, unclear whether current weight is accurate or patient has been deliberately losing weight PTA. Per Chart review of visit from 9/17, pt was cutting back on sugary drinks and candy bars at that time.\"    Wt Readings from Last 10 Encounters:   02/22/22 114.3 kg (251 lb 15.8 oz)   02/13/22 122.7 kg (270 lb 6.4 oz)   02/09/22 126.1 kg (278 lb)   06/08/21 133.1 kg (293 lb 6.4 oz)   06/03/21 129.9 kg (286 lb 4.8 oz)   05/27/21 135.2 kg (298 lb 1.6 oz)   12/10/20 133.5 kg (294 lb 4.8 oz)   07/07/20 131.6 kg (290 lb 3.2 oz)   06/16/20 126.6 kg (279 lb)   02/03/20 130.5 kg (287 lb 11.2 oz)       ASSESSED NUTRITION NEEDS:  Dosing Weight: 114.3 kg (energy) and 54.5 kg (protein)  Estimated Energy Needs: 0112-8782+ kcals (11-14+ Kcal/Kg)  Justification: obese   Estimated Protein Needs: 109-136 grams protein (2-2.5 g pro/Kg)  Justification: hypercatabolism with critical illness and obesity guidelines       NEW FINDINGS:   WOCN seeing today  SLP eval his morning --> \"Oropharyngeal swallow appears at baseline given poor dentition. Recommend mech/dental soft diet and thin liquids.\"  Nocturnal BIPAP   COVID recovered as of 2/23   Transferred out of ICU 2/23   Extubated 2/22      Previous Goals:   TF + NutriSource Fiber + ProSource will meet % needs   Evaluation: Met    Previous Nutrition Diagnosis:   Altered GI " function related to stress, lack of fiber in TF formula as evidenced by need for additional fiber modulars   Evaluation: Improving      MALNUTRITION (updated)  % Weight Loss:  Unable to fully determine    % Intake: <75% for > 7 days (moderate malnutrition)  (received EN below goal rate for up to 6 days between proning and hospital transfers)   Subcutaneous Fat Loss:  Orbital region mild-moderate depletion  Muscle Loss:  Temporal region milk depletion, potential further losses possibly masked by adiposity   Fluid Retention: Trace     Malnutrition Diagnosis: Moderate malnutrition  In Context of:  Acute illness or injury    CURRENT NUTRITION DIAGNOSIS   Inadequate oral intake related to recent diet advancement >NPO with EN reliance as evidenced by just applesauce and apple juice taken so far this morning    INTERVENTIONS  Recommendations / Nutrition Prescription  Calorie Counts 2/25-2/27  Trial Ensure Max (vanilla) w/ lunch daily (150 kcal, 30 grams protein)     Implementation  Medical Food Supplement and Calorie Counts     Goals  Patient will consume >/=60% estimated needs to justify TF discontinuation       MONITORING AND EVALUATION:  Progress towards goals will be monitored and evaluated per protocol and Practice Guidelines      Lanny Man RD, LD  Clinical Dietitian

## 2022-02-24 NOTE — PROGRESS NOTES
[facility]    Medicine Progress Note - Hospitalist Service    Date of Admission:  2/13/2022    Assessment & Plan     No Yusuf is a 62 year old female admitted on 2/13/2022.      No Yusuf is a 62 year old female with past medical history of type 2 diabetes mellitus, hypertension, hyperlipidemia, bipolar disorder, fibromyalgia, history of polysubstance abuse and currently on methadone, hypothyroidism, chronic hepatitis C who presented initially to the [facility] emergency department on 2/8/2022 with chief complaint of shortness of breath.  Of note, the patient is vaccinated for COVID-19 however did not receive the booster.  The patient's symptoms started on 2/2/2022 and had a positive COVID-19 test on 2/2/2022.  In the emergency department the patient was found to be hypoxic requiring supplemental oxygen with high flow nasal cannula.-  The patient had increasing oxygen requirements requiring continuous BiPAP.  Due to bed availability the patient was transferred to [facility] on 2/10/2022 after requiring intubation and mechanical ventilation for progressive hypoxia secondary to COVID-19 pneumonia.  The patient was started on remdesivir, dexamethasone, and baricitinib, as well as ICU dosing Lovenox.    On 2/11 she had cardiac arrest at about 2:20 pm. She had been bradycardiac that morning and while being changed from proned to supine position she went into ventricular tachycardia consistent with Torsades adrina pointes. She had one cycle of chest compression, received one shock, and returned to sinus rhythm. Bradycardia persisted, however. Magnesium level during code was 2.0. She was given 2 gram IV Magnesium. Remdesivir and propofol were discontinued due to bradycardia. Dopamine drip was given for chronotropic support. PRN atropine was ordered. ECG showed ST depressions in V2-V6. Troponins became elevated to 893.  Heparin drip was started.   Cardiology was consulted.  Plan was for electrolyte monitoring and replacement as needed, bradycardia monitoring, continued heparin drip, and likely coronary angiogram once more stable.  Bradycardia persisted.  Several as needed doses of atropine were needed.  Cardiology recommended transfer to [facility] where cardiology EP and pacemaker capabilities were available.     # Acute hypoxemic respiratory failure, multifactorial  COVID-19 infection, now recovered  - COVID + 2/2  - was seen in ER at [facility] on 2/8, apparently hypoxic, initially on BiPAP but hypoxia worsened and was intubated and transferred to [facility] because of bed issues  - CXR on admission - Patchy opacities throughout the lungs, compatible with  patient's known Covid-19 infection  - CT chest 2/8- no PE, Patchy groundglass and consolidative opacities throughout the  Lungs  - treated with remdesivir, dexamethasone, and baricitinib, as well as ICU dosing Lovenox.  -Now have finished COVID-directed medications  - off special precautions now  -Respiratory failure due to COVID 19, ARDS, Hypoventilation- related to habitus/ARDS/medications  - intubated from 2/8 and management in ICU  - extubated on 2/22 , and now on 4-56L  -Continue on IMC  - wean off O2 as able  - sitter at bedside    Sepsis, resolving: Diagnosis based on HR > 90 bpm and WBC > 12 due to infection.    Lactic acidosis, mild   UTI- treated  -She was initially treated with ceftriaxone for her urinary tract infection, which was broadened to cefepime, vancomycin, and azithromycin   -She had received cefepime until 2/20  -Patient has been afebrile  -Still has mild leukocytosis and lactic acidosis  -Monitor off antibiotics for now      # Cardiac arrest - Torsade de Pointes  # Cardiomyopathy - improvement in EF from 20% to 40% (2/18); suspecetd stress-related cardiomyopathy   # Bradycardia   - on 2/11, while at [facility]- She had been bradycardiac that morning and while being  changed from proned to supine position she went into ventricular tachycardia consistent with Torsades adrian pointes  -  had one cycle of chest compression, received one shock, and returned to sinus rhythm. Bradycardia persisted and she was transferred to [facility]  - Remdesivir was discontinued  - Echo 2/12 - Severely decreased left ventricular systolic function; EF 20-25%.There  is severe global hypokinesia of the left ventricle. The basal segments appears to contract the best- possible stress cardiomyopathy?  - repeat Echo 2/27- improved EF 40-45%; there is mild global hypokinesia of the left ventricle. Mildly decreased right ventricular systolic function  - seen by cardiology- no need for PPM  - TSH was low  0.29 but fT4 1.39  - now HR in 's  - received few doses of Lasix; neg balance almost 10 liters now, does not look fluid overload at this time; PTA on Lasix 20 mg po daily; holding PTA Lasix due to elevated lactate as above, resume when able  - avoid QT prolonging medications; she was resumed on her PTA Methadone and tolerating so far, continue to monitor on telemetry  - Mg goal 2.5, K goal 4.5; high protocol replacement protocol  - ASA  -hold atorvastatin due to transaminitis   - OP cards f/u       # DM type 2  - PTA on Levemir 15 units qam and Metformin 1000 mg po BID; PTA Metformin held  - HbA1c 6.2 on 2/8  - BS had been elevated while on Decadron and Lantus doses adjusted  -188-239 currently on glargine 30u qAM  25 qpm PM  -Will change Lantus to 35 units a.m. and 30 units p.m. as blood sugar still on the higher side  -Continue with very high resistance ISS     Transaminitis   -mild , monitor     # Hypertension  - PTA on Norvasc 5 mg po daily   -Blood pressure today stable off anti hypertensive    # Hypothyroidism  - resumed PTA Synthroid     # History of polysubstance abuse and currently on methadone  # Fibromyalgia   - PTA on Methadone 80 mg po daily, Neurontin 1200 mg po TID  - while was in ICU, intubated-  she had been on multiple drips- Ketamine infusion, Dilaudid infusion and Midazolam drip- now weaned off  - it was recommended to avoid QTc-prolonging medications given her cardiac arrest/torsades de aleaxndria but she was restarted on Methadone 40 mg po BID for now while in ICU; last EKG 2/21- normal QTc 444  - also resumed Gabapentin at a lower dose 300 mg TID     # Acute toxic encephalopathy  - likely related to recent sedation, now off all drips  - resumed Methadone 40 mg po BID  -Patient was started on Valium 10 mg via NGT q8h while in ICU  - on methadone at home but no on benzodiazepines   - Valium tapered to 5mg via NGT TID 2/23 (hold for lethargy)  -Will taper rate to 5 mg twice daily today continue to taper down as tolerated  -Mentation seems to be improving gradually  - continue with BiPAP at bedtime  - PTA on Methadone 80 mg po daily, now on Methadone 40 mg po BID, hold for lethargy- restarted PTA Gabapentin at a lower dose 300 mg po TID  - serial ABG does not show hypercapnea but it seems that she would benefit from BIPAP at bedtime  - avoid sedating medications, hold Methadone for lethargy  - sitter at bedside     # Hyponatremia  - na was 131 on 2/22, up to 136 today  - BMP in am     # Bipolar disorder  - PTA on Cymbalta 60 mg po daily, Trazodone 50 mg po at bedtime and Atarax 100 mg po q6h prn for anxiety   - resumed PTA Cymbalta at a lower dose 20 mg po daily; PTA Trazodone on hold     # Dysphagia  Moderate malnutrition  -Malnutrition in the context of recent illness or injury, and dysphagia likely related to recent intubation  - has NGT in place, receiving TF  -Patient getting calorie count, remove NGT if adequate nutrition  - SLP following, currently on mechanical soft diet     # Diarrhea  - likely related to TF  - has rectal tube in place  - Cdiff negative on 2/15  - started on Nutrisource Fiber    Pressure injury on perianal area, hospital-acquired due to medical device related due to fecal management  system  -Wound care following     # Generalized weakness  - PT/OT  - SW consult       Diet: Mechanical/Dental Soft Diet  Adult Formula Drip Feeding: Continuous Vital High Protein; Nasoduodenal tube; Goal Rate: 55 mL/hr x 22 hours; mL/hr; Medication - Feeding Tube Flush Frequency: At least 15-30 mL water before and after medication administration and with tube cloggi...  Calorie Counts  Snacks/Supplements Adult: Ensure Max Protein (bariatric); With Meals    DVT Prophylaxis: Enoxaparin (Lovenox) SQ  Valle Catheter: PRESENT, indication: Strict 1-2 Hour I&O  Central Lines: None  Cardiac Monitoring: ACTIVE order. Indication: QTc prolonging medication (48 hours)  Code Status: Full Code      Disposition Plan   Expected Discharge: 03/04/2022     Anticipated discharge location:  Awaiting care coordination huddle  Delays:            The patient's care was discussed with the Bedside Nurse and Patient.    Meena Billings MD  Hospitalist Service  [facility]  Securely message with the Vocera Web Console (learn more here)  Text page via [employer] Paging/Directory         Clinically Significant Risk Factors Present on Admission                    ______________________________________________________________________    Interval History   Care assumed  Chart reviewed  Patient more alert today, cooperative.  Able to drink water.  Was aware she was in a hospital but not sure where she was.  She knew she went to [facility] but after that she does not recall the events.  No new nursing concerns.    Total unit/floor time 40 minutes . Greater than 50% was spent in coordination of care and counseling with the patient on discussion of treatment plan    Data reviewed today: I reviewed all medications, new labs and imaging results over the last 24 hours.    Physical Exam   Vital Signs: Temp: 97.3  F (36.3  C) Temp src: Axillary BP: 120/73 Pulse: 85   Resp: 23 SpO2: 100 % O2 Device: Nasal cannula Oxygen Delivery: 4 LPM  Weight:  251 lbs 15.77 oz  Exam:  Constitutional: Awake, alert and no distress. Appears comfortable  Head: Normocephalic. No masses, lesions, tenderness or abnormalities  ENT: ENT exam normal, no neck nodes or sinus tenderness  Cardiovascular: RRR.  No murmurs, no rubs or JVD  Respiratory: Normal WOB,b/l equal air entry, no wheezes or crackles   Gastrointestinal: Abdomen soft, non-tender. BS normal. No masses, organomegaly  : Deferred  Extremities : No edema , no clubbing or cyanosis    Neurologic: Cranial nerves II-XII grossly intact , power symmetrical, Reflexes normal and symmetric. Sensation grossly WNL.      Data   Recent Labs   Lab 02/24/22  1555 02/24/22  1221 02/24/22  0803 02/24/22  0704 02/23/22  0855 02/23/22  0518 02/22/22  0842 02/22/22  0420   WBC  --   --   --  13.8*  --  15.7*  --  18.0*   HGB  --   --   --  13.5  --  13.9  --  13.3   MCV  --   --   --  92  --  87  --  88   PLT  --   --   --  525*  --  651*  --  622*   NA  --   --   --  137  --  136  --  131*   POTASSIUM  --   --   --  3.8  --  4.1  --  4.2   CHLORIDE  --   --   --  106  --  104  --  99   CO2  --   --   --  28  --  25  --  25   BUN  --   --   --  51*  --  54*  --  44*   CR  --   --   --  0.61  --  0.58  --  0.56   ANIONGAP  --   --   --  3  --  7  --  7   RODOLFO  --   --   --  9.8  --  10.1  --  9.3   * 226* 223* 221*   < > 216*   < > 222*   ALBUMIN  --   --   --  2.6*  --   --   --  2.5*   PROTTOTAL  --   --   --  8.5  --   --   --  8.4   BILITOTAL  --   --   --  0.6  --   --   --  0.5   ALKPHOS  --   --   --  156*  --   --   --  138   ALT  --   --   --  278*  --   --   --  244*   AST  --   --   --  104*  --   --   --  53*    < > = values in this interval not displayed.     No results found for this or any previous visit (from the past 24 hour(s)).  Medications     dextrose       sodium chloride Stopped (02/23/22 0800)       sodium chloride 0.9%  250 mL Intravenous Once     acetaminophen  500 mg Oral or Feeding Tube TID     aspirin  81  mg Oral or Feeding Tube Daily     atorvastatin  20 mg Per Feeding Tube At Bedtime     cholecalciferol  1,250 mcg Oral Q7 Days     cyanocobalamin  1,000 mcg Oral or Feeding Tube Daily     [START ON 2/25/2022] diazepam  5 mg Oral or Feeding Tube Q12H     DULoxetine  20 mg Oral Daily     enoxaparin ANTICOAGULANT  0.5 mg/kg Subcutaneous Q12H     fiber modular (NUTRISOURCE FIBER)  2 packet Per Feeding Tube BID     fluticasone-vilanterol  1 puff Inhalation Daily     gabapentin  300 mg Oral or Feeding Tube Q8H RADHA     insulin aspart  1-22 Units Subcutaneous Q4H     insulin glargine  25 Units Subcutaneous At Bedtime     insulin glargine  30 Units Subcutaneous QAM AC     levothyroxine  200 mcg Oral or Feeding Tube Daily     methadone  40 mg Oral BID     miconazole   Topical BID     multivitamins w/minerals  15 mL Per Feeding Tube Daily     pantoprazole  40 mg Oral or Feeding Tube QAM AC     potassium chloride  20 mEq Oral BID     protein modular  1 packet Per Feeding Tube TID     sodium chloride (PF)  3 mL Intracatheter Q8H     Vitamin D3  25 mcg Oral or Feeding Tube Daily

## 2022-02-24 NOTE — CONSULTS
WO Nurse Inpatient Pressure Injury Assessment   Reason for consultation: Evaluate and treat  perianal wounds    Assessment  Pressure Injury: on perianal , hospital acquired ,   This is a Medical Device Related Pressure Injury (MDRPI) due to fecal management system  Pressure Injury is Stage Mucosal   Contributing factor of the pressure injury: pressure and moisture  Status: initial assessment  Recommend provider order: None, at this time     Groin and Labia with fungal rash    Treatment Plan  Perianal wounds: BID and PRN  1. After any incontinent episode cleanse with diogenes cleanse and protect and argenis dry wipes/washcloths.   2. Ensure area is completely dry by blotting and using circular motions, do not wipe as this can cause trauma to the skin   3. Dust perianal tissue with Stoma powder   4. Apply thin layer of critic aid barrier paste over powder and to perianal tissue. Remove only soiled paste, then reapply thin layer. If complete removal is needed use baby/mineral oil (located in pharmacy).   *Avoid pre moisten wipes.   *Avoid use of brief  *Use single covidien pad and limit number of linens underneath patient. Covidien pad can be used as incontinence pad and lift pad       Orders Written  Recommended provider order: Antifungal powder  WOC Nurse follow-up plan:weekly  Nursing to notify the Provider(s) and re-consult the WOC Nurse if wound(s) deteriorates or new skin concern.    Patient History  According to provider note(s):  No Yusuf is a 62 year old female with past medical history of type 2 diabetes mellitus, hypertension, hyperlipidemia, bipolar disorder, fibromyalgia, history of polysubstance abuse and currently on methadone, hypothyroidism, chronic hepatitis C who presented initially to the [facility] emergency department on 2/8/2022 with chief complaint of shortness of breath.  Of note, the patient is vaccinated for COVID-19 however did not receive the booster.  The patient's  symptoms started on 2/2/2022 and had a positive COVID-19 test on 2/2/2022.  In the emergency department the patient was found to be hypoxic requiring supplemental oxygen with high flow nasal cannula.  Initial lab work showed a sodium of 132, potassium 5.8, albumin 2.6, ALT 55, CRP 77, ferritin 439, A1c 6.2, procalcitonin 0.07, troponin 81.  Venous blood gas showed PCO2 of 54 and PO2 of 33 and bicarb of 31.  Urinalysis revealed 43 WBCs, moderate leukocyte esterase, positive nitrates, and many bacteria.  Chest x-ray showed patchy opacities throughout both lungs compatible with COVID-19 infection.  CT chest showed no pulmonary embolism however did note patchy opacities and consolidations throughout the lungs.  The patient had increasing oxygen requirements requiring continuous BiPAP.  Due to bed availability the patient was transferred to [facility] on 2/10/2022 after requiring intubation and mechanical ventilation for progressive hypoxia secondary to COVID-19 pneumonia.  The patient was started on remdesivir, dexamethasone, and baricitinib, as well as ICU dosing Lovenox.  She was initially treated with ceftriaxone for her urinary tract infection, which was broadened to cefepime, vancomycin, and azithromycin and switched to ciprofloxacin on 2/11 after transfer when urine culture grew E. coli resistant to penicillins and cephalosporins.  On 2/11 she had cardiac arrest at about 2:20 pm. She had been bradycardiac that morning and while being changed from proned to supine position she went into ventricular tachycardia consistent with Torsades adrian pointes. She had one cycle of chest compression, received one shock, and returned to sinus rhythm. Bradycardia persisted, however. Magnesium level during code was 2.0. She was given 2 gram IV Magnesium. Remdesivir and propofol were discontinued due to bradycardia. Dopamine drip was given for chronotropic support. PRN atropine was ordered. ECG showed ST depressions in  V2-V6. Troponins became elevated to 893.  Heparin drip was started.  Cardiology was consulted.  Plan was for electrolyte monitoring and replacement as needed, bradycardia monitoring, continued heparin drip, and likely coronary angiogram once more stable.  Bradycardia persisted.  Several as needed doses of atropine were needed.  Cardiology recommended transfer to [facility] where cardiology EP and pacemaker capabilities were available.    Objective Data  Containment of urine/stool: Incontinence Protocol, Indwelling catheter and Internal fecal management    Active Diet Order  Orders Placed This Encounter      NPO for Medical/Clinical Reasons Except for: NPO but receiving Tube Feeding      Output:   I/O last 3 completed shifts:  In: 760 [I.V.:20; NG/GT:300]  Out: 1925 [Urine:1525; Stool:400]    Risk Assessment:   Sensory Perception: 2-->very limited  Moisture: 3-->occasionally moist  Activity: 1-->bedfast  Mobility: 2-->very limited  Nutrition: 3-->adequate  Friction and Shear: 1-->problem  Rigoberto Score: 12                          Labs: Recent Labs   Lab 02/24/22  0704 02/23/22  0518 02/22/22  0420   ALBUMIN 2.6*  --  2.5*   HGB 13.5   < > 13.3   WBC 13.8*   < > 18.0*   CRP  --   --  23.8*    < > = values in this interval not displayed.       Physical Exam  Areas of skin assessed: focused Coccyx, buttock, perianal, heels, pannus, groin    Wound Location:  Perianal 6' Oclock  Date of last photo 2/24/22        Wound History: Pt with rectal tube pressing at 6 O'clock and mild leakage of stool    Wound Base: 100 % mucosa     Palpation of the wound bed: normal      Drainage: none     Description of drainage: none     Measurements (length x width x depth, in cm) 0.8  x 0.3  x  0.1 cm      Tunneling N/A     Undermining N/A  Periwound skin: denuded      Color: red      Temperature: normal   Odor: none  Pain: denies , none  Pain intervention prior to dressing change: NA    Groin/Labia: Moist denuded tissue,  pale    Interventions  Visual inspection and assessment completed   Wound Care Rationale Improve absorptive capacity, Promote moist wound healing without tissue dehydration  and Provide protection   Wound Care: done per plan of care  Supplies: gathered and at bedside  Current off-loading measures: Pillows  Current support surface: Standard  Atmos Air mattress  Education provided to: importance of repositioning, plan of care, Moisture management and Off-loading pressure  Discussed plan of care with Nurse    Luis Daniel Mayorga RN CWOCN

## 2022-02-24 NOTE — PROGRESS NOTES
02/24/22 1345   Quick Adds   Type of Visit Initial Occupational Therapy Evaluation   Living Environment   Living Environment Comments pt reports she lives in assisted living apartment in [place] ([place]   Self-Care   Activity/Exercise/Self-Care Comment pt reports she walks by herself at home with a walker. unable to state what type of walker. reports indp with ADL    Instrumental Activities of Daily Living (IADL)   IADL Comments pt reports she only has assistance for medication mgmt and meals   General Information   Onset of Illness/Injury or Date of Surgery 02/13/22   Referring Physician Iveth Cedeño MD   Patient/Family Therapy Goal Statement (OT) no goal stated at time of eval    Additional Occupational Profile Info/Pertinent History of Current Problem No Yusuf is a 62 year old female with past medical history of type 2 diabetes mellitus, hypertension, hyperlipidemia, bipolar disorder, fibromyalgia, history of polysubstance abuse and currently on methadone, hypothyroidism, chronic hepatitis C who presented initially to the [facility] emergency department on 2/8/2022 with chief complaint of shortness of breath.    Existing Precautions/Restrictions fall   General Observations and Info sitter present in room. numerous lines and tubes. supine in bed.    Cognitive Status Examination   Cognitive Status Comments somewhat oriented to place and situation. states we are in a hospital in [place], knows it is Feb and can state her birthday. lethargic, follows 1 step commands with increased time for processing    Visual Perception   Impact of Vision Impairment on Function (Vision) unable to assess at time of eval    Sensory   Sensory Comments reports no deficits or numbness/tingling   Pain Assessment   Patient Currently in Pain Yes, see Vital Sign flowsheet   Integumentary/Edema   Integumentary/Edema Comments edema vs body composition defer to RN   Range of Motion Comprehensive    General Range of Motion bilateral upper extremity ROM WFL   Strength Comprehensive (MMT)   General Manual Muscle Testing (MMT) Assessment upper extremity strength deficits identified   Comment, General Manual Muscle Testing (MMT) Assessment 3-/5 at bilateral SH, elbow, wrist. deconditioned   Coordination   Upper Extremity Coordination No deficits were identified   Coordination Comments only able to complete opposition bilaterally   Activities of Daily Living   BADL Assessment/Intervention feeding;grooming   Grooming Assessment/Training   [place] Level (Grooming) moderate assist (50% patient effort)   Comment, (Grooming) facial hygiene in supine with washcloth    Eating/Self Feeding   Comment, (Feeding) sitter reports pt attempting to feed self during lunch (cream of wheat)   Clinical Impression   Criteria for Skilled Therapeutic Interventions Met (OT) Yes, treatment indicated   OT Diagnosis decreased function in ADL   OT Problem List-Impairments impacting ADL problems related to;activity tolerance impaired;cognition;coordination;flexibility;mobility;strength;pain   Assessment of Occupational Performance 5 or more Performance Deficits   Identified Performance Deficits bathing, dressing, toileting, home mgmt, functional mobility    Planned Therapy Interventions (OT) ADL retraining;IADL retraining;progressive activity/exercise;transfer training;strengthening   Clinical Decision Making Complexity (OT) low complexity   Risk & Benefits of therapy have been explained evaluation/treatment results reviewed;care plan/treatment goals reviewed;risks/benefits reviewed;current/potential barriers reviewed;participants voiced agreement with care plan;participants included;patient   Clinical Impression Comments decreased function in ADL warrants skilled IP OT tx    OT Discharge Planning   OT Discharge Recommendation (DC Rec) Transitional Care Facility   OT Rationale for DC Rec pt well below baseline function in ADL and self  care. will need TCU at discharge to increase strength and function for ADL and self care. currently needs Ax2 dep cares and lift device   Total Evaluation Time (Minutes)   Total Evaluation Time (Minutes) 10   OT Goals   Therapy Frequency (OT) Daily   OT Predicated Duration/Target Date for Goal Attainment 03/07/22   OT Goals Hygiene/Grooming;Upper Body Dressing;Lower Body Dressing;Toilet Transfer/Toileting;Bed Mobility;Cognition   OT: Hygiene/Grooming minimal assist   OT: Upper Body Dressing Minimal assist   OT: Lower Body Dressing Maximum assist   OT: Bed Mobility supine to/from sitting;Moderate assist   OT: Toilet Transfer/Toileting toilet transfer;cleaning and garment management;using adaptive equipment;Maximum assist  (BSC )   OT: Cognitive Patient/caregiver will verbalize understanding of cognitive assessment results/recommendations as needed for safe discharge planning

## 2022-02-24 NOTE — PLAN OF CARE
Goal Outcome Evaluation:    Pt. transferred from ICU at 1615. MICHAEL orientation, pt lethargic/sedated. RASS -2. Vital signs stable on 6L humidified NC. Up w/ lift, repositioned Q2H. NPO, TF at goal rate of 55/hr. Rectal tube in place, + output. Valle in place, adequate output. Interdry in pannus. Tele NSR. Mitts and sitter present.

## 2022-02-25 LAB
ALBUMIN SERPL-MCNC: 2.2 G/DL (ref 3.4–5)
ALP SERPL-CCNC: 131 U/L (ref 40–150)
ALT SERPL W P-5'-P-CCNC: 282 U/L (ref 0–50)
ANION GAP SERPL CALCULATED.3IONS-SCNC: 5 MMOL/L (ref 3–14)
AST SERPL W P-5'-P-CCNC: 124 U/L (ref 0–45)
BILIRUB DIRECT SERPL-MCNC: 0.1 MG/DL (ref 0–0.2)
BILIRUB SERPL-MCNC: 0.4 MG/DL (ref 0.2–1.3)
BUN SERPL-MCNC: 30 MG/DL (ref 7–30)
CALCIUM SERPL-MCNC: 9.2 MG/DL (ref 8.5–10.1)
CHLORIDE BLD-SCNC: 100 MMOL/L (ref 94–109)
CO2 SERPL-SCNC: 27 MMOL/L (ref 20–32)
CREAT SERPL-MCNC: 0.56 MG/DL (ref 0.52–1.04)
ERYTHROCYTE [DISTWIDTH] IN BLOOD BY AUTOMATED COUNT: 14.7 % (ref 10–15)
GFR SERPL CREATININE-BSD FRML MDRD: >90 ML/MIN/1.73M2
GLUCOSE BLD-MCNC: 201 MG/DL (ref 70–99)
GLUCOSE BLDC GLUCOMTR-MCNC: 128 MG/DL (ref 70–99)
GLUCOSE BLDC GLUCOMTR-MCNC: 140 MG/DL (ref 70–99)
GLUCOSE BLDC GLUCOMTR-MCNC: 151 MG/DL (ref 70–99)
GLUCOSE BLDC GLUCOMTR-MCNC: 187 MG/DL (ref 70–99)
GLUCOSE BLDC GLUCOMTR-MCNC: 200 MG/DL (ref 70–99)
GLUCOSE BLDC GLUCOMTR-MCNC: 210 MG/DL (ref 70–99)
HCT VFR BLD AUTO: 36.6 % (ref 35–47)
HGB BLD-MCNC: 11.5 G/DL (ref 11.7–15.7)
MAGNESIUM SERPL-MCNC: 2 MG/DL (ref 1.6–2.3)
MCH RBC QN AUTO: 28.8 PG (ref 26.5–33)
MCHC RBC AUTO-ENTMCNC: 31.4 G/DL (ref 31.5–36.5)
MCV RBC AUTO: 92 FL (ref 78–100)
PHOSPHATE SERPL-MCNC: 2.9 MG/DL (ref 2.5–4.5)
PLATELET # BLD AUTO: 405 10E3/UL (ref 150–450)
POTASSIUM BLD-SCNC: 3.6 MMOL/L (ref 3.4–5.3)
PROT SERPL-MCNC: 7.1 G/DL (ref 6.8–8.8)
RBC # BLD AUTO: 3.99 10E6/UL (ref 3.8–5.2)
SODIUM SERPL-SCNC: 132 MMOL/L (ref 133–144)
WBC # BLD AUTO: 12.7 10E3/UL (ref 4–11)

## 2022-02-25 PROCEDURE — 250N000011 HC RX IP 250 OP 636: Performed by: INTERNAL MEDICINE

## 2022-02-25 PROCEDURE — 250N000013 HC RX MED GY IP 250 OP 250 PS 637: Performed by: INTERNAL MEDICINE

## 2022-02-25 PROCEDURE — 82248 BILIRUBIN DIRECT: CPT | Performed by: INTERNAL MEDICINE

## 2022-02-25 PROCEDURE — 84100 ASSAY OF PHOSPHORUS: CPT | Performed by: INTERNAL MEDICINE

## 2022-02-25 PROCEDURE — 36415 COLL VENOUS BLD VENIPUNCTURE: CPT | Performed by: INTERNAL MEDICINE

## 2022-02-25 PROCEDURE — 85027 COMPLETE CBC AUTOMATED: CPT | Performed by: INTERNAL MEDICINE

## 2022-02-25 PROCEDURE — 80053 COMPREHEN METABOLIC PANEL: CPT | Performed by: INTERNAL MEDICINE

## 2022-02-25 PROCEDURE — 120N000013 HC R&B IMCU

## 2022-02-25 PROCEDURE — 99232 SBSQ HOSP IP/OBS MODERATE 35: CPT | Performed by: INTERNAL MEDICINE

## 2022-02-25 PROCEDURE — 83735 ASSAY OF MAGNESIUM: CPT | Performed by: INTERNAL MEDICINE

## 2022-02-25 RX ORDER — CALCIUM CARBONATE 500 MG/1
500 TABLET, CHEWABLE ORAL DAILY PRN
Status: DISCONTINUED | OUTPATIENT
Start: 2022-02-25 | End: 2022-03-04 | Stop reason: HOSPADM

## 2022-02-25 RX ORDER — POTASSIUM CHLORIDE 20MEQ/15ML
20 LIQUID (ML) ORAL ONCE
Status: COMPLETED | OUTPATIENT
Start: 2022-02-25 | End: 2022-02-25

## 2022-02-25 RX ORDER — MAGNESIUM OXIDE 400 MG/1
400 TABLET ORAL 2 TIMES DAILY
Status: COMPLETED | OUTPATIENT
Start: 2022-02-25 | End: 2022-02-26

## 2022-02-25 RX ADMIN — METHADONE HYDROCHLORIDE 40 MG: 10 CONCENTRATE ORAL at 20:51

## 2022-02-25 RX ADMIN — DULOXETINE HYDROCHLORIDE 20 MG: 20 CAPSULE, DELAYED RELEASE ORAL at 08:17

## 2022-02-25 RX ADMIN — ENOXAPARIN SODIUM 60 MG: 60 INJECTION SUBCUTANEOUS at 08:17

## 2022-02-25 RX ADMIN — LEVOTHYROXINE SODIUM 200 MCG: 100 TABLET ORAL at 10:21

## 2022-02-25 RX ADMIN — POTASSIUM CHLORIDE 20 MEQ: 1.5 POWDER, FOR SOLUTION ORAL at 08:17

## 2022-02-25 RX ADMIN — INSULIN ASPART 7 UNITS: 100 INJECTION, SOLUTION INTRAVENOUS; SUBCUTANEOUS at 12:21

## 2022-02-25 RX ADMIN — CALCIUM CARBONATE (ANTACID) CHEW TAB 500 MG 500 MG: 500 CHEW TAB at 10:21

## 2022-02-25 RX ADMIN — INSULIN ASPART 8 UNITS: 100 INJECTION, SOLUTION INTRAVENOUS; SUBCUTANEOUS at 21:51

## 2022-02-25 RX ADMIN — POTASSIUM CHLORIDE 20 MEQ: 20 SOLUTION ORAL at 08:16

## 2022-02-25 RX ADMIN — Medication 2 PACKET: at 08:17

## 2022-02-25 RX ADMIN — Medication 400 MG: at 08:17

## 2022-02-25 RX ADMIN — ONDANSETRON 4 MG: 4 TABLET, ORALLY DISINTEGRATING ORAL at 09:37

## 2022-02-25 RX ADMIN — GABAPENTIN 300 MG: 250 SOLUTION ORAL at 06:38

## 2022-02-25 RX ADMIN — Medication 1 PACKET: at 08:17

## 2022-02-25 RX ADMIN — ACETAMINOPHEN 500 MG: 325 SUSPENSION ORAL at 22:15

## 2022-02-25 RX ADMIN — MICONAZOLE NITRATE: 2 POWDER TOPICAL at 08:19

## 2022-02-25 RX ADMIN — METHADONE HYDROCHLORIDE 40 MG: 10 CONCENTRATE ORAL at 08:32

## 2022-02-25 RX ADMIN — Medication 400 MG: at 22:16

## 2022-02-25 RX ADMIN — Medication 2 PACKET: at 22:16

## 2022-02-25 RX ADMIN — ENOXAPARIN SODIUM 40 MG: 40 INJECTION SUBCUTANEOUS at 22:16

## 2022-02-25 RX ADMIN — Medication 1 PACKET: at 22:16

## 2022-02-25 RX ADMIN — INSULIN ASPART 5 UNITS: 100 INJECTION, SOLUTION INTRAVENOUS; SUBCUTANEOUS at 04:04

## 2022-02-25 RX ADMIN — FLUTICASONE FUROATE AND VILANTEROL TRIFENATATE 1 PUFF: 100; 25 POWDER RESPIRATORY (INHALATION) at 08:18

## 2022-02-25 RX ADMIN — Medication 40 MG: at 08:16

## 2022-02-25 RX ADMIN — ACETAMINOPHEN 500 MG: 325 SUSPENSION ORAL at 16:17

## 2022-02-25 RX ADMIN — DIAZEPAM 5 MG: 5 TABLET ORAL at 00:24

## 2022-02-25 RX ADMIN — GABAPENTIN 300 MG: 250 SOLUTION ORAL at 14:51

## 2022-02-25 RX ADMIN — ASPIRIN 81 MG CHEWABLE TABLET 81 MG: 81 TABLET CHEWABLE at 08:17

## 2022-02-25 RX ADMIN — Medication 15 ML: at 16:23

## 2022-02-25 RX ADMIN — GABAPENTIN 300 MG: 250 SOLUTION ORAL at 22:16

## 2022-02-25 RX ADMIN — CYANOCOBALAMIN TAB 1000 MCG 1000 MCG: 1000 TAB at 08:17

## 2022-02-25 RX ADMIN — DIAZEPAM 5 MG: 5 TABLET ORAL at 11:33

## 2022-02-25 RX ADMIN — POTASSIUM CHLORIDE 20 MEQ: 1.5 POWDER, FOR SOLUTION ORAL at 22:28

## 2022-02-25 RX ADMIN — ACETAMINOPHEN 650 MG: 325 TABLET, FILM COATED ORAL at 03:41

## 2022-02-25 RX ADMIN — Medication 25 MCG: at 08:17

## 2022-02-25 RX ADMIN — Medication 1 PACKET: at 16:18

## 2022-02-25 RX ADMIN — INSULIN ASPART 1 UNITS: 100 INJECTION, SOLUTION INTRAVENOUS; SUBCUTANEOUS at 16:29

## 2022-02-25 RX ADMIN — MICONAZOLE NITRATE: 2 POWDER TOPICAL at 22:16

## 2022-02-25 RX ADMIN — HYDROXYZINE HYDROCHLORIDE 25 MG: 25 TABLET ORAL at 16:17

## 2022-02-25 RX ADMIN — INSULIN ASPART 2 UNITS: 100 INJECTION, SOLUTION INTRAVENOUS; SUBCUTANEOUS at 08:19

## 2022-02-25 RX ADMIN — ACETAMINOPHEN 500 MG: 325 SUSPENSION ORAL at 08:16

## 2022-02-25 RX ADMIN — INSULIN GLARGINE 35 UNITS: 100 INJECTION, SOLUTION SUBCUTANEOUS at 08:18

## 2022-02-25 ASSESSMENT — ACTIVITIES OF DAILY LIVING (ADL)
ADLS_ACUITY_SCORE: 24
ADLS_ACUITY_SCORE: 24
ADLS_ACUITY_SCORE: 22
ADLS_ACUITY_SCORE: 24
ADLS_ACUITY_SCORE: 22
ADLS_ACUITY_SCORE: 24
ADLS_ACUITY_SCORE: 22
ADLS_ACUITY_SCORE: 24
ADLS_ACUITY_SCORE: 22
ADLS_ACUITY_SCORE: 24
ADLS_ACUITY_SCORE: 22
ADLS_ACUITY_SCORE: 22
ADLS_ACUITY_SCORE: 24

## 2022-02-25 NOTE — PROGRESS NOTES
[facility]    Medicine Progress Note - Hospitalist Service        Date of Admission:  2/13/2022  2:35 PM    Assessment & Plan:   No Yusuf is a 62 year old female with past medical history of type 2 diabetes mellitus, hypertension, hyperlipidemia, bipolar disorder, fibromyalgia, polysubstance abuse and currently on methadone, hypothyroidism, chronic hepatitis C who presented initially to the [facility] emergency department on 2/8/2022 with chief complaint of shortness of breath.  In the emergency department the patient was found to be hypoxic requiring supplemental oxygen.Due to bed availability the patient was transferred to [facility] on 2/10/2022 after requiring intubation and mechanical ventilation for progressive hypoxia secondary to COVID-19 pneumonia. The patient was started on remdesivir, dexamethasone, and baricitinib.  On 2/11 she had cardiac arrest at about 2:20 pm. She had been bradycardiac that morning and while being changed from proned to supine position she went into ventricular tachycardia consistent with Torsades adrian pointes. She had one cycle of chest compression, received one shock, and returned to sinus rhythm. Bradycardia persisted, however. Cardiology recommended transfer to [facility] where cardiology EP and pacemaker capabilities were available.     Acute hypoxemic respiratory failure, multifactorial  COVID-19 pneumonia, now recovered  -Positive for COVID-19 on 2/2  -CT chest 2/8- no PE, Patchy groundglass and consolidative opacities throughout the  Lungs  -treated with remdesivir, dexamethasone, and baricitinib  -Now considered COVID recovered as of 2/23  -Respiratory failure due to COVID 19, ARDS, Hypoventilation- related to habitus/ARDS/medications  -intubated from 2/8 to 2/22  -Respiratory status improving, now weaned down to 2 L, continue to wean as able       Cardiac arrest - Torsade de Pointes  Suspected stress  related cardiomyopathy - improvement in EF from 20% to 40% (2/18)  -On 2/11 at [facility] she went into ventricular tachycardia consistent with Torsades adrian pointes  -had one cycle of chest compression, received one shock, and returned to sinus rhythm. Bradycardia persisted and she was transferred to [facility]  - Remdesivir was discontinued  - Echo 2/12 - Severely decreased left ventricular systolic function; EF 20-25%.There  is severe global hypokinesia of the left ventricle. The basal segments appears to contract the best- possible stress cardiomyopathy?  - repeat Echo 2/27- improved EF 40-45%; there is mild global hypokinesia of the left ventricle. Mildly decreased right ventricular systolic function  -seen by cardiology- no need for PPM  -TSH was low  0.29 but fT4 1.39  -now HR in 's  -received few doses of Lasix; neg balance almost 10 liters now, does not look fluid overload at this time; PTA on Lasix 20 mg po daily; holding PTA Lasix due to elevated lactate as above, resume when able  -avoid QT prolonging medications; she was resumed on her PTA Methadone and tolerating so far, continue to monitor on telemetry  -Mg goal 2.5, K goal 4.5; high protocol replacement protocol  -ASA  -hold atorvastatin due to transaminitis   -OP cards f/u    Sepsis, resolving  Diagnosis based on HR > 90 bpm and WBC > 12 due to infection.    Lactic acidosis, mild   UTI- treated  -She was initially treated with ceftriaxone for her urinary tract infection, which was broadened to cefepime, vancomycin, and azithromycin   -She had received cefepime until 2/20  -Patient has been afebrile  -Still has mild leukocytosis and lactic acidosis  -Monitor off antibiotics for now     Insulin requiring DM type 2  -PTA on Levemir 15 units qam and Metformin 1000 mg po BID; PTA Metformin held  -HbA1c 6.2 on 2/8  -Continue Lantus at 35 units a.m. and 30 units p.m.   -Continue with very high resistance ISS     Transaminitis   -mild , monitor       Hypertension  -PTA on Norvasc 5 mg po daily   -Antihypertensives have been on hold, blood pressure on the softer side without antihypertensives at this time.     Hypothyroidism  -Continue PTA Synthroid     History of polysubstance abuse and currently on methadone  Fibromyalgia   -PTA on Methadone 80 mg po daily, Neurontin 1200 mg po TID  -while was in ICU, intubated- she had been on multiple drips- Ketamine infusion, Dilaudid infusion and Midazolam drip- now weaned off  -it was recommended to avoid QTc-prolonging medications given her cardiac arrest/torsades de [place]lexandria but she was restarted on Methadone 40 mg po BID for now while in ICU; last EKG 2/21- normal QTc 444  -Continue gabapentin at a lower dose 300 mg TID     Acute toxic encephalopathy  -likely related to recent sedation, now off all drips  -Continues on methadone 40 mg po BID as above  -Patient was started on Valium 10 mg via NGT q8h while in ICU; Valium tapered to 5mg via NGT TID 2/23 (hold for lethargy)  -on methadone at home but not on benzodiazepines   -Mentation seems to be improving gradually  -continue with BiPAP at bedtime  - PTA on Methadone 80 mg po daily, now on Methadone 40 mg po BID, hold for lethargy-continue PTA Gabapentin at a lower dose 300 mg po TID  -avoid sedating medications, hold Methadone for lethargy  -sitter at bedside     Bipolar disorder  -PTA on Cymbalta 60 mg po daily, Trazodone 50 mg po at bedtime and Atarax 100 mg po q6h prn for anxiety   -continue PTA Cymbalta at a lower dose 20 mg po daily; PTA Trazodone on hold     Dysphagia  Moderate malnutrition  -Malnutrition in the context of recent illness or injury, and dysphagia likely related to recent intubation  -has NGT in place, receiving TF  -Patient getting calorie count, remove NGT if adequate nutrition  -SLP following, currently on mechanical soft diet     Diarrhea  -likely related to TF  -has rectal tube in place  -Cdiff negative on 2/15  -started on Nutrisource  Fiber     Pressure injury on perianal area, hospital-acquired due to medical device related due to fecal management system  -Wound care following     Generalized weakness  -PT/OT  -SW consult    Severe obesity  -OP f/u    Diet: Mechanical/Dental Soft Diet  Adult Formula Drip Feeding: Continuous Vital High Protein; Nasoduodenal tube; Goal Rate: 55 mL/hr x 22 hours; mL/hr; Medication - Feeding Tube Flush Frequency: At least 15-30 mL water before and after medication administration and with tube cloggi...  Calorie Counts  Snacks/Supplements Adult: Ensure Max Protein (bariatric); With Meals     DVT Prophylaxis: Enoxaparin (Lovenox) SQ   Valle Catheter: PRESENT, indication: Strict 1-2 Hour I&O  Code Status: Full Code     Disposition Plan    Expected Discharge: 03/04/2022     Anticipated discharge location:  Awaiting care coordination huddle  Delays:        Entered: Contreras Mckinney MD 02/25/2022, 11:41 AM        Clinically Significant Risk Factors Present on Admission                      The patient's care was discussed with the Bedside Nurse and Patient.    Contreras Mckinney MD  Hospitalist Service  [facility]  Text Page 7AM-6PM  Securely message with the Vocera Web Console (learn more here)  Text page via [employer] Paging/Directory    ______________________________________________________________________    Interval History   Still requiring 1: 1 sitter.  Weak overall.  Oral intake appears to be increasing.  On a calorie count.  Oxygen requirement improving and stable at 2 L.  Tolerating soft mechanical diet.    Data reviewed today: I reviewed all medications, new labs and imaging results over the last 24 hours. I personally reviewed no images or EKG's today.    Physical Exam   Vital signs:  Temp: 98.2  F (36.8  C) Temp src: Oral BP: 96/57 Pulse: 71   Resp: 19 SpO2: 95 % O2 Device: Nasal cannula Oxygen Delivery: 2 LPM   Weight: 114.3 kg (251 lb 15.8 oz)  Estimated body mass index is 43.25 kg/m   "as calculated from the following:    Height as of 6/3/21: 1.626 m (5' 4\").    Weight as of this encounter: 114.3 kg (251 lb 15.8 oz).      Wt Readings from Last 2 Encounters:   02/22/22 114.3 kg (251 lb 15.8 oz)   02/13/22 122.7 kg (270 lb 6.4 oz)       Gen: AAOX3, NAD, forgetful  HEENT: NG tube in place.  No pallor  Resp: CTA B/L, normal WOB, no crackles, no wheezes  CVS: RRR, no murmur  Abd/GI: Soft, non-tender. BS- normoactive.    Skin: Warm, dry no rashes  MSK: no pedal edema  Neuro- CN- intact. No focal deficits, but globally weak      Data   Recent Labs   Lab 02/25/22  0809 02/25/22  0520 02/25/22  0353 02/24/22  0803 02/24/22  0704 02/23/22  0855 02/23/22  0518   WBC  --  12.7*  --   --  13.8*  --  15.7*   HGB  --  11.5*  --   --  13.5  --  13.9   MCV  --  92  --   --  92  --  87   PLT  --  405  --   --  525*  --  651*   NA  --  132*  --   --  137  --  136   POTASSIUM  --  3.6  --   --  3.8  --  4.1   CHLORIDE  --  100  --   --  106  --  104   CO2  --  27  --   --  28  --  25   BUN  --  30  --   --  51*  --  54*   CR  --  0.56  --   --  0.61  --  0.58   ANIONGAP  --  5  --   --  3  --  7   RODOLFO  --  9.2  --   --  9.8  --  10.1   * 201* 187*   < > 221*   < > 216*   ALBUMIN  --  2.2*  --   --  2.6*  --   --    PROTTOTAL  --  7.1  --   --  8.5  --   --    BILITOTAL  --  0.4  --   --  0.6  --   --    ALKPHOS  --  131  --   --  156*  --   --    ALT  --  282*  --   --  278*  --   --    AST  --  124*  --   --  104*  --   --     < > = values in this interval not displayed.       No results found for this or any previous visit (from the past 24 hour(s)).  Medications     sodium chloride Stopped (02/23/22 0800)       acetaminophen  500 mg Oral or Feeding Tube TID     aspirin  81 mg Oral or Feeding Tube Daily     [Held by provider] atorvastatin  20 mg Per Feeding Tube At Bedtime     cholecalciferol  1,250 mcg Oral Q7 Days     cyanocobalamin  1,000 mcg Oral or Feeding Tube Daily     diazepam  5 mg Oral or Feeding " Tube Q12H     DULoxetine  20 mg Oral Daily     enoxaparin ANTICOAGULANT  0.5 mg/kg Subcutaneous Q12H     fiber modular (NUTRISOURCE FIBER)  2 packet Per Feeding Tube BID     fluticasone-vilanterol  1 puff Inhalation Daily     gabapentin  300 mg Oral or Feeding Tube Q8H RADHA     insulin aspart  1-22 Units Subcutaneous Q4H     insulin glargine  30 Units Subcutaneous At Bedtime     insulin glargine  35 Units Subcutaneous QAM AC     levothyroxine  200 mcg Oral or Feeding Tube Daily     magnesium oxide  400 mg Oral BID     methadone  40 mg Oral BID     miconazole   Topical BID     multivitamins w/minerals  15 mL Per Feeding Tube Daily     pantoprazole  40 mg Oral or Feeding Tube QAM AC     potassium chloride  20 mEq Oral BID     protein modular  1 packet Per Feeding Tube TID     sodium chloride (PF)  3 mL Intracatheter Q8H     Vitamin D3  25 mcg Oral or Feeding Tube Daily

## 2022-02-25 NOTE — PLAN OF CARE
2/25 [id number] [facility] [place]  [place] and oriented x4, forgetful and lethargic. Withdrawn. Limited assessment at times d/t patient being uncooperative. VSS on 2L O2 via NC, wean as tolerated. Soft BP. Up with 2 and lift. T/R Q2. Stood at bedside and pivot to commode. Mechanical soft diet, tolerating well, calorie count. L nostril NJ tube @ 110cm, tube feeds running at 55ml/hr with 60ml free water flushes q4. Swallows pills whole. LS diminished. BS+. +Loose BM, rectal tube removed. Valle in place. PIV SL. Redness and wound on coccyx/ buttocks, care per WOC orders. Scattered bruises, generalized edema. Generalized pain managed with scheduled Tylenol, nausea this AM - Zofran & Tums given. Tele NSR. PIV saline locked. BG Q4. Na 132, K 3.6 - replaced, ALT & AST elevated, Mg 2.0 - replacing. Long arm in place for impulsiveness. Discharge pending to TCU. WOC/PT/OT/SW following.

## 2022-02-25 NOTE — PLAN OF CARE
1900h-0700h: Patient AOx4. Slow to response, forgetful at times. O2Sat 96% on 4lpm via NC. Clear bilateral breath sound in UL & diminished in LL. NJ tube in place in L naris, with on going Vital High protein at 55ml/hr & 60 ml/4hr water flushes, tolerating. Kept HOB >30. On soft diet, thin liquid. Valle in place draining adequate yellow urine output. Rectal tube in place w/ large watery stool. Repositioned every 2h. Total lift. Dependent. No edema on BLE, pulses palpable. IV SL on L forearm. BG checked every 4h, insulin coverage given.    Ativan given for restlessness, sitter at bedside for safety. Generalized pain of 10/10, managed w/ tylenol PRN/schedule & sched methadone.  Titrated O2 gradually at 2lpm, saturating >92%.  6am Na 132      Goal Outcome Evaluation:  On going progress
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
## 2022-02-26 LAB
ANION GAP SERPL CALCULATED.3IONS-SCNC: 5 MMOL/L (ref 3–14)
BUN SERPL-MCNC: 22 MG/DL (ref 7–30)
CALCIUM SERPL-MCNC: 9.3 MG/DL (ref 8.5–10.1)
CHLORIDE BLD-SCNC: 98 MMOL/L (ref 94–109)
CO2 SERPL-SCNC: 27 MMOL/L (ref 20–32)
CREAT SERPL-MCNC: 0.57 MG/DL (ref 0.52–1.04)
ERYTHROCYTE [DISTWIDTH] IN BLOOD BY AUTOMATED COUNT: 14.6 % (ref 10–15)
GFR SERPL CREATININE-BSD FRML MDRD: >90 ML/MIN/1.73M2
GLUCOSE BLD-MCNC: 111 MG/DL (ref 70–99)
GLUCOSE BLDC GLUCOMTR-MCNC: 121 MG/DL (ref 70–99)
GLUCOSE BLDC GLUCOMTR-MCNC: 157 MG/DL (ref 70–99)
GLUCOSE BLDC GLUCOMTR-MCNC: 169 MG/DL (ref 70–99)
GLUCOSE BLDC GLUCOMTR-MCNC: 177 MG/DL (ref 70–99)
GLUCOSE BLDC GLUCOMTR-MCNC: 179 MG/DL (ref 70–99)
HCT VFR BLD AUTO: 37.7 % (ref 35–47)
HGB BLD-MCNC: 11.9 G/DL (ref 11.7–15.7)
MAGNESIUM SERPL-MCNC: 2.1 MG/DL (ref 1.6–2.3)
MCH RBC QN AUTO: 29.5 PG (ref 26.5–33)
MCHC RBC AUTO-ENTMCNC: 31.6 G/DL (ref 31.5–36.5)
MCV RBC AUTO: 93 FL (ref 78–100)
PHOSPHATE SERPL-MCNC: 4.1 MG/DL (ref 2.5–4.5)
PLATELET # BLD AUTO: 385 10E3/UL (ref 150–450)
POTASSIUM BLD-SCNC: 5.3 MMOL/L (ref 3.4–5.3)
RBC # BLD AUTO: 4.04 10E6/UL (ref 3.8–5.2)
SODIUM SERPL-SCNC: 130 MMOL/L (ref 133–144)
WBC # BLD AUTO: 10.2 10E3/UL (ref 4–11)

## 2022-02-26 PROCEDURE — 250N000011 HC RX IP 250 OP 636: Performed by: INTERNAL MEDICINE

## 2022-02-26 PROCEDURE — 250N000013 HC RX MED GY IP 250 OP 250 PS 637: Performed by: INTERNAL MEDICINE

## 2022-02-26 PROCEDURE — 80048 BASIC METABOLIC PNL TOTAL CA: CPT | Performed by: INTERNAL MEDICINE

## 2022-02-26 PROCEDURE — 120N000013 HC R&B IMCU

## 2022-02-26 PROCEDURE — 84100 ASSAY OF PHOSPHORUS: CPT | Performed by: INTERNAL MEDICINE

## 2022-02-26 PROCEDURE — 85014 HEMATOCRIT: CPT | Performed by: INTERNAL MEDICINE

## 2022-02-26 PROCEDURE — 99232 SBSQ HOSP IP/OBS MODERATE 35: CPT | Performed by: INTERNAL MEDICINE

## 2022-02-26 PROCEDURE — 250N000012 HC RX MED GY IP 250 OP 636 PS 637: Performed by: INTERNAL MEDICINE

## 2022-02-26 PROCEDURE — 83735 ASSAY OF MAGNESIUM: CPT | Performed by: INTERNAL MEDICINE

## 2022-02-26 PROCEDURE — 36415 COLL VENOUS BLD VENIPUNCTURE: CPT | Performed by: INTERNAL MEDICINE

## 2022-02-26 RX ADMIN — INSULIN GLARGINE 35 UNITS: 100 INJECTION, SOLUTION SUBCUTANEOUS at 09:06

## 2022-02-26 RX ADMIN — HYDROXYZINE HYDROCHLORIDE 25 MG: 25 TABLET ORAL at 09:07

## 2022-02-26 RX ADMIN — Medication 1 PACKET: at 21:40

## 2022-02-26 RX ADMIN — INSULIN ASPART 4 UNITS: 100 INJECTION, SOLUTION INTRAVENOUS; SUBCUTANEOUS at 00:21

## 2022-02-26 RX ADMIN — ACETAMINOPHEN 650 MG: 325 TABLET, FILM COATED ORAL at 09:07

## 2022-02-26 RX ADMIN — INSULIN ASPART 4 UNITS: 100 INJECTION, SOLUTION INTRAVENOUS; SUBCUTANEOUS at 12:52

## 2022-02-26 RX ADMIN — ENOXAPARIN SODIUM 40 MG: 40 INJECTION SUBCUTANEOUS at 20:24

## 2022-02-26 RX ADMIN — INSULIN ASPART 4 UNITS: 100 INJECTION, SOLUTION INTRAVENOUS; SUBCUTANEOUS at 16:09

## 2022-02-26 RX ADMIN — MICONAZOLE NITRATE: 2 POWDER TOPICAL at 09:08

## 2022-02-26 RX ADMIN — METHADONE HYDROCHLORIDE 40 MG: 10 CONCENTRATE ORAL at 21:40

## 2022-02-26 RX ADMIN — Medication 1 PACKET: at 09:07

## 2022-02-26 RX ADMIN — Medication 2 PACKET: at 09:07

## 2022-02-26 RX ADMIN — Medication 400 MG: at 20:24

## 2022-02-26 RX ADMIN — FLUTICASONE FUROATE AND VILANTEROL TRIFENATATE 1 PUFF: 100; 25 POWDER RESPIRATORY (INHALATION) at 09:07

## 2022-02-26 RX ADMIN — GABAPENTIN 300 MG: 250 SOLUTION ORAL at 16:09

## 2022-02-26 RX ADMIN — ASPIRIN 81 MG CHEWABLE TABLET 81 MG: 81 TABLET CHEWABLE at 09:07

## 2022-02-26 RX ADMIN — MICONAZOLE NITRATE: 2 POWDER TOPICAL at 20:30

## 2022-02-26 RX ADMIN — ACETAMINOPHEN 650 MG: 325 TABLET, FILM COATED ORAL at 16:09

## 2022-02-26 RX ADMIN — DIAZEPAM 5 MG: 5 TABLET ORAL at 00:15

## 2022-02-26 RX ADMIN — Medication 25 MCG: at 09:07

## 2022-02-26 RX ADMIN — ONDANSETRON 4 MG: 4 TABLET, ORALLY DISINTEGRATING ORAL at 20:24

## 2022-02-26 RX ADMIN — Medication 40 MG: at 09:06

## 2022-02-26 RX ADMIN — Medication 1 PACKET: at 16:10

## 2022-02-26 RX ADMIN — Medication 2 PACKET: at 21:40

## 2022-02-26 RX ADMIN — GABAPENTIN 300 MG: 250 SOLUTION ORAL at 09:06

## 2022-02-26 RX ADMIN — Medication 400 MG: at 09:07

## 2022-02-26 RX ADMIN — ENOXAPARIN SODIUM 40 MG: 40 INJECTION SUBCUTANEOUS at 09:06

## 2022-02-26 RX ADMIN — LEVOTHYROXINE SODIUM 200 MCG: 100 TABLET ORAL at 06:11

## 2022-02-26 RX ADMIN — ACETAMINOPHEN 650 MG: 325 TABLET, FILM COATED ORAL at 04:28

## 2022-02-26 RX ADMIN — Medication 15 ML: at 16:09

## 2022-02-26 RX ADMIN — CYANOCOBALAMIN TAB 1000 MCG 1000 MCG: 1000 TAB at 09:07

## 2022-02-26 RX ADMIN — ACETAMINOPHEN 500 MG: 325 SUSPENSION ORAL at 21:39

## 2022-02-26 RX ADMIN — INSULIN ASPART 2 UNITS: 100 INJECTION, SOLUTION INTRAVENOUS; SUBCUTANEOUS at 20:27

## 2022-02-26 RX ADMIN — DULOXETINE HYDROCHLORIDE 20 MG: 20 CAPSULE, DELAYED RELEASE ORAL at 09:07

## 2022-02-26 RX ADMIN — DIAZEPAM 5 MG: 5 TABLET ORAL at 12:52

## 2022-02-26 RX ADMIN — CALCIUM CARBONATE (ANTACID) CHEW TAB 500 MG 500 MG: 500 CHEW TAB at 09:07

## 2022-02-26 RX ADMIN — METHADONE HYDROCHLORIDE 40 MG: 10 CONCENTRATE ORAL at 09:23

## 2022-02-26 ASSESSMENT — ACTIVITIES OF DAILY LIVING (ADL)
ADLS_ACUITY_SCORE: 17
CHANGE_IN_FUNCTIONAL_STATUS_SINCE_ONSET_OF_CURRENT_ILLNESS/INJURY: YES
CONCENTRATING,_REMEMBERING_OR_MAKING_DECISIONS_DIFFICULTY: NO
ADLS_ACUITY_SCORE: 22
WALKING_OR_CLIMBING_STAIRS: AMBULATION DIFFICULTY, REQUIRES EQUIPMENT
ADLS_ACUITY_SCORE: 22
ADLS_ACUITY_SCORE: 17
WEAR_GLASSES_OR_BLIND: NO
EQUIPMENT_CURRENTLY_USED_AT_HOME: CANE, STRAIGHT
ADLS_ACUITY_SCORE: 17
ADLS_ACUITY_SCORE: 17
WALKING_OR_CLIMBING_STAIRS_DIFFICULTY: YES
ADLS_ACUITY_SCORE: 17
ADLS_ACUITY_SCORE: 17
ADLS_ACUITY_SCORE: 15
TOILETING_ISSUES: YES
ADLS_ACUITY_SCORE: 22
ADLS_ACUITY_SCORE: 22
ADLS_ACUITY_SCORE: 17
ADLS_ACUITY_SCORE: 17
ADLS_ACUITY_SCORE: 22
DRESSING/BATHING_DIFFICULTY: NO
ADLS_ACUITY_SCORE: 17
ADLS_ACUITY_SCORE: 22
ADLS_ACUITY_SCORE: 22
ADLS_ACUITY_SCORE: 17
ADLS_ACUITY_SCORE: 17
ADLS_ACUITY_SCORE: 22
ADLS_ACUITY_SCORE: 22
FALL_HISTORY_WITHIN_LAST_SIX_MONTHS: YES
DIFFICULTY_EATING/SWALLOWING: NO
DOING_ERRANDS_INDEPENDENTLY_DIFFICULTY: NO
TOILETING_ASSISTANCE: TOILETING DIFFICULTY, ASSISTANCE 1 PERSON
ADLS_ACUITY_SCORE: 17
ADLS_ACUITY_SCORE: 22
ADLS_ACUITY_SCORE: 17

## 2022-02-26 NOTE — PROGRESS NOTES
[facility]    Medicine Progress Note - Hospitalist Service        Date of Admission:  2/13/2022  2:35 PM    Assessment & Plan:   No Yusuf is a 62 year old female with past medical history of type 2 diabetes mellitus, hypertension, hyperlipidemia, bipolar disorder, fibromyalgia, polysubstance abuse and currently on methadone, hypothyroidism, chronic hepatitis C who presented initially to the [facility] emergency department on 2/8/2022 with chief complaint of shortness of breath.  In the emergency department the patient was found to be hypoxic requiring supplemental oxygen.Due to bed availability the patient was transferred to [facility] on 2/10/2022 after requiring intubation and mechanical ventilation for progressive hypoxia secondary to COVID-19 pneumonia. The patient was started on remdesivir, dexamethasone, and baricitinib.  On 2/11 she had cardiac arrest at about 2:20 pm. She had been bradycardiac that morning and while being changed from proned to supine position she went into ventricular tachycardia consistent with Torsades adrian pointes. She had one cycle of chest compression, received one shock, and returned to sinus rhythm. Bradycardia persisted, however. Cardiology recommended transfer to [facility] where cardiology EP and pacemaker capabilities were available.     Acute hypoxemic respiratory failure, multifactorial  COVID-19 pneumonia, now recovered  -Positive for COVID-19 on 2/2  -CT chest 2/8- no PE, Patchy groundglass and consolidative opacities throughout the  Lungs  -treated with remdesivir, dexamethasone, and baricitinib  -Now considered COVID recovered as of 2/23  -intubated from 2/8 to 2/22  -Respiratory status quite stable in the last several days, currently oxygen at 2 L via nasal cannula       Cardiac arrest - Torsade de Pointes  Suspected stress related cardiomyopathy - improvement in EF from 20% to 40% (2/18)  -On 2/11 at  [facility] she went into ventricular tachycardia consistent with Torsades adrian pointes  -had one cycle of chest compression, received one shock, and returned to sinus rhythm. Bradycardia persisted and she was transferred to [facility]  - Remdesivir was discontinued  - Echo 2/12 - Severely decreased left ventricular systolic function; EF 20-25%.There  is severe global hypokinesia of the left ventricle. The basal segments appears to contract the best- possible stress cardiomyopathy?  - repeat Echo 2/27- improved EF 40-45%; there is mild global hypokinesia of the left ventricle. Mildly decreased right ventricular systolic function  -seen by cardiology- no need for PPM  -TSH was low  0.29 but fT4 1.39  -now HR in 's  -received few doses of Lasix; neg balance almost 10 liters now, does not look fluid overload at this time; PTA on Lasix 20 mg po daily; holding PTA Lasix due to elevated lactate as above, resume when able  -avoid QT prolonging medications; she was resumed on her PTA Methadone and tolerating so far, continue to monitor on telemetry  -Mg goal 2.5, K goal 4.5; high protocol replacement protocol  -ASA  -hold atorvastatin due to transaminitis   -OP cards f/u    Sepsis, resolving  Diagnosis based on HR > 90 bpm and WBC > 12 due to infection.    Lactic acidosis, mild   UTI- treated  -She was initially treated with ceftriaxone for her urinary tract infection, which was broadened to cefepime, vancomycin, and azithromycin   -She had received cefepime until 2/20  -Patient has been afebrile  -Still has mild leukocytosis and lactic acidosis  -Monitor off antibiotics for now     Insulin requiring DM type 2  -PTA on Levemir 15 units qam and Metformin 1000 mg po BID; PTA Metformin held  -HbA1c 6.2 on 2/8  -Continue Lantus at 35 units a.m. and 30 units p.m.   -Continue with very high resistance ISS     Transaminitis   -mild , monitor      Hypertension  -PTA on Norvasc 5 mg po daily   -Antihypertensives have been on hold,  blood pressure on the softer side without antihypertensives at this time.     Hypothyroidism  -Continue PTA Synthroid     History of polysubstance abuse and currently on methadone  Fibromyalgia   -PTA on Methadone 80 mg po daily, Neurontin 1200 mg po TID  -while was in ICU, intubated- she had been on multiple drips- Ketamine infusion, Dilaudid infusion and Midazolam drip- now weaned off  -it was recommended to avoid QTc-prolonging medications given her cardiac arrest/torsades de alexandria but she was restarted on Methadone 40 mg po BID for now while in ICU; last EKG 2/21- normal QTc 444  -Continue gabapentin at a lower dose 300 mg TID     Acute toxic encephalopathy  -likely related to recent sedation, now off all drips  -Continues on methadone 40 mg po BID as above  -Patient was started on Valium 10 mg via NGT q8h while in ICU; Valium tapered to 5mg via NGT TID 2/23 (hold for lethargy)  -on methadone at home but not on benzodiazepines   -Mentation seems to be improving gradually  -continue with BiPAP at bedtime  - PTA on Methadone 80 mg po daily, now on Methadone 40 mg po BID, hold for lethargy-continue PTA Gabapentin at a lower dose 300 mg po TID  -avoid sedating medications, hold Methadone for lethargy  -sitter at bedside     Bipolar disorder  -PTA on Cymbalta 60 mg po daily, Trazodone 50 mg po at bedtime and Atarax 100 mg po q6h prn for anxiety   -continue PTA Cymbalta at a lower dose 20 mg po daily; PTA Trazodone on hold     Dysphagia  Moderate malnutrition  -Malnutrition in the context of recent illness or injury, and dysphagia likely related to recent intubation  -has NGT in place, receiving TF  -Patient getting calorie count, which will be completed tomorrow, remove NGT if adequate nutrition  -SLP following, currently on mechanical soft diet     Diarrhea  -likely related to TF  -has rectal tube in place  -Cdiff negative on 2/15  -Nutrisource Fiber     Pressure injury on perianal area, hospital-acquired due to  "medical device related due to fecal management system  -Wound care following     Generalized weakness  -PT/OT  -SW consult    Severe obesity  -OP f/u    Diet: Mechanical/Dental Soft Diet  Adult Formula Drip Feeding: Continuous Vital High Protein; Nasoduodenal tube; Goal Rate: 55 mL/hr x 22 hours; mL/hr; Medication - Feeding Tube Flush Frequency: At least 15-30 mL water before and after medication administration and with tube cloggi...  Calorie Counts  Snacks/Supplements Adult: Ensure Max Protein (bariatric); With Meals     DVT Prophylaxis: Enoxaparin (Lovenox) SQ   Valle Catheter: PRESENT, indication: Strict 1-2 Hour I&O  Code Status: Full Code     Disposition Plan    Expected Discharge: 03/04/2022     Anticipated discharge location:  Awaiting care coordination huddle  Delays:        Entered: Contreras Mckinney MD 02/26/2022, 12:36 PM        Clinically Significant Risk Factors Present on Admission                      The patient's care was discussed with the Bedside Nurse and Patient.    Contreras Mckinney MD  Hospitalist Service  [facility]  Text Page 7AM-6PM  Securely message with the Vocera Web Console (learn more here)  Text page via [employer] Paging/Directory    ______________________________________________________________________    Interval History   Oxygenation stable at 2 L via nasal cannula.  Denies dyspnea.  Intermittent abdominal pain but largely stable.    Data reviewed today: I reviewed all medications, new labs and imaging results over the last 24 hours. I personally reviewed no images or EKG's today.    Physical Exam   Vital signs:  Temp: 98.5  F (36.9  C) Temp src: Oral BP: 99/59 Pulse: 79   Resp: 16 SpO2: 94 % O2 Device: Nasal cannula Oxygen Delivery: 2 LPM   Weight: 114.3 kg (251 lb 15.8 oz)  Estimated body mass index is 43.25 kg/m  as calculated from the following:    Height as of 6/3/21: 1.626 m (5' 4\").    Weight as of this encounter: 114.3 kg (251 lb 15.8 oz).      Wt " Readings from Last 2 Encounters:   02/22/22 114.3 kg (251 lb 15.8 oz)   02/13/22 122.7 kg (270 lb 6.4 oz)       Gen: AAOX3, NAD, forgetful  HEENT: NG tube in place.  No pallor  Resp: CTA B/L, normal WOB, no crackles, no wheezes  CVS: RRR, no murmur  Abd/GI: Soft, non-tender. BS- normoactive.    Skin: Warm, dry no rashes  MSK: no pedal edema  Neuro- CN- intact. No focal deficits, but globally weak      Data   Recent Labs   Lab 02/26/22  1201 02/26/22  0516 02/26/22  0427 02/25/22  0809 02/25/22  0520 02/24/22  0803 02/24/22  0704   WBC  --  10.2  --   --  12.7*  --  13.8*   HGB  --  11.9  --   --  11.5*  --  13.5   MCV  --  93  --   --  92  --  92   PLT  --  385  --   --  405  --  525*   NA  --  130*  --   --  132*  --  137   POTASSIUM  --  5.3  --   --  3.6  --  3.8   CHLORIDE  --  98  --   --  100  --  106   CO2  --  27  --   --  27  --  28   BUN  --  22  --   --  30  --  51*   CR  --  0.57  --   --  0.56  --  0.61   ANIONGAP  --  5  --   --  5  --  3   RODOLFO  --  9.3  --   --  9.2  --  9.8   * 111* 121*   < > 201*   < > 221*   ALBUMIN  --   --   --   --  2.2*  --  2.6*   PROTTOTAL  --   --   --   --  7.1  --  8.5   BILITOTAL  --   --   --   --  0.4  --  0.6   ALKPHOS  --   --   --   --  131  --  156*   ALT  --   --   --   --  282*  --  278*   AST  --   --   --   --  124*  --  104*    < > = values in this interval not displayed.       No results found for this or any previous visit (from the past 24 hour(s)).  Medications     sodium chloride Stopped (02/23/22 0800)       acetaminophen  500 mg Oral or Feeding Tube TID     aspirin  81 mg Oral or Feeding Tube Daily     [Held by provider] atorvastatin  20 mg Per Feeding Tube At Bedtime     cholecalciferol  1,250 mcg Oral Q7 Days     cyanocobalamin  1,000 mcg Oral or Feeding Tube Daily     diazepam  5 mg Oral or Feeding Tube Q12H     DULoxetine  20 mg Oral Daily     enoxaparin ANTICOAGULANT  40 mg Subcutaneous Q12H     fiber modular (NUTRISOURCE FIBER)  2 packet  Per Feeding Tube BID     fluticasone-vilanterol  1 puff Inhalation Daily     gabapentin  300 mg Oral or Feeding Tube Q8H RADHA     insulin aspart  1-22 Units Subcutaneous Q4H     insulin glargine  30 Units Subcutaneous At Bedtime     insulin glargine  35 Units Subcutaneous QAM AC     levothyroxine  200 mcg Oral or Feeding Tube Daily     magnesium oxide  400 mg Oral BID     methadone  40 mg Oral BID     miconazole   Topical BID     multivitamins w/minerals  15 mL Per Feeding Tube Daily     pantoprazole  40 mg Oral or Feeding Tube QAM AC     [Held by provider] potassium chloride  20 mEq Oral BID     protein modular  1 packet Per Feeding Tube TID     sodium chloride (PF)  3 mL Intracatheter Q8H     Vitamin D3  25 mcg Oral or Feeding Tube Daily

## 2022-02-26 NOTE — PLAN OF CARE
IMC. VSS. 2L NC. Alert, disoriented to situation. Not aware of how weak she is. Denies shortness of breath. NG tube to left nostril with continuous tube feeding. Tolerated water and popsicles overnight. Takes pills whole with water. C/o gas discomfort overnight, up to commode several times. BM x2. Up with assist of 2 + ceiling lift to commode. Valle in place.

## 2022-02-26 NOTE — PROGRESS NOTES
CALORIE COUNT      Approximate Oral Intake for:  (2/25 - 2 meals recorded)     Calories: 835 cals  Protein: 50 gms    Continues on EN: via NJ  Vital HP at 55 mL/hr x 22 hours per day   Total Enteral Provisions: 1210 kcal, 105 g protein, 134 g CHO, 1012 mL H2O, 0 g fiber   ProSource TID= 120 kcal and 33 g protein  NutriSource Fiber 2 pkts BID = 60 kcal and 12 g fiber   Total = 1390 kcal (12 kcal/kg), 138 g protein (2.5 g/kg), 12 g fiber    Free Water Flush: 60 mL every 4 hours        Estimated Needs:    Dosing Weight: 114.3 kg (energy) and 54.5 kg (protein)  Estimated Energy Needs: 9881-6126+ kcals (11-14+ Kcal/Kg)  Justification: obese   Estimated Protein Needs: 109-136 grams protein (2-2.5 g pro/Kg)  Justification: hypercatabolism with critical illness and obesity guidelines       Summary:   Diet: Mechanical Dental Soft (IDDSI 7 Easy to Chew)           Ensure MAX with lunch meal (150 cals, 30 gm pro)  Continue calorie count through the weekend to assess po intake and ability to pull FT

## 2022-02-26 NOTE — PLAN OF CARE
2/26 [id number] Off [facility]  Alert and oriented x4, forgetful to situation and lethargic. VSS on 2L O2 via NC, continue to wean. Up with 2 and lift, tolerated chair today. T/R Q2. Mechanical soft diet, tolerating well, calorie count day 2/3. L nostril NJ tube @ 110cm, tube feeds running at 55ml/hr with 60ml flushes q4. LS diminished. BS+. Continent soft/loose BMs. Valle in place. PIV SL. Redness and wound on coccyx/ buttocks/perineum, care per WOC orders. Scattered bruises, generalized edema. Abdominal/back pain managed with scheduled Tylenol, PRN Atarax & Tums. Tele NSR with BBB. PIV saline locked. BG Q4. Na 132->130, Mg - replacing. Discharge pending to TCU. WOC/PT/OT/SW following.

## 2022-02-27 ENCOUNTER — APPOINTMENT (OUTPATIENT)
Dept: OCCUPATIONAL THERAPY | Facility: CLINIC | Age: 62
DRG: 207 | End: 2022-02-27
Attending: INTERNAL MEDICINE
Payer: COMMERCIAL

## 2022-02-27 ENCOUNTER — APPOINTMENT (OUTPATIENT)
Dept: PHYSICAL THERAPY | Facility: CLINIC | Age: 62
DRG: 207 | End: 2022-02-27
Attending: INTERNAL MEDICINE
Payer: COMMERCIAL

## 2022-02-27 LAB
ALBUMIN SERPL-MCNC: 1.9 G/DL (ref 3.4–5)
ALP SERPL-CCNC: 352 U/L (ref 40–150)
ALT SERPL W P-5'-P-CCNC: 1152 U/L (ref 0–50)
ANION GAP SERPL CALCULATED.3IONS-SCNC: 3 MMOL/L (ref 3–14)
AST SERPL W P-5'-P-CCNC: 551 U/L (ref 0–45)
BILIRUB SERPL-MCNC: 0.9 MG/DL (ref 0.2–1.3)
BUN SERPL-MCNC: 22 MG/DL (ref 7–30)
CALCIUM SERPL-MCNC: 8.7 MG/DL (ref 8.5–10.1)
CHLORIDE BLD-SCNC: 98 MMOL/L (ref 94–109)
CO2 SERPL-SCNC: 29 MMOL/L (ref 20–32)
CREAT SERPL-MCNC: 0.53 MG/DL (ref 0.52–1.04)
ERYTHROCYTE [DISTWIDTH] IN BLOOD BY AUTOMATED COUNT: 14.8 % (ref 10–15)
GFR SERPL CREATININE-BSD FRML MDRD: >90 ML/MIN/1.73M2
GLUCOSE BLD-MCNC: 115 MG/DL (ref 70–99)
GLUCOSE BLDC GLUCOMTR-MCNC: 154 MG/DL (ref 70–99)
GLUCOSE BLDC GLUCOMTR-MCNC: 168 MG/DL (ref 70–99)
GLUCOSE BLDC GLUCOMTR-MCNC: 194 MG/DL (ref 70–99)
GLUCOSE BLDC GLUCOMTR-MCNC: 238 MG/DL (ref 70–99)
GLUCOSE BLDC GLUCOMTR-MCNC: 243 MG/DL (ref 70–99)
GLUCOSE BLDC GLUCOMTR-MCNC: 68 MG/DL (ref 70–99)
HCT VFR BLD AUTO: 32.8 % (ref 35–47)
HGB BLD-MCNC: 10.5 G/DL (ref 11.7–15.7)
MAGNESIUM SERPL-MCNC: 2 MG/DL (ref 1.6–2.3)
MCH RBC QN AUTO: 28.9 PG (ref 26.5–33)
MCHC RBC AUTO-ENTMCNC: 32 G/DL (ref 31.5–36.5)
MCV RBC AUTO: 90 FL (ref 78–100)
PHOSPHATE SERPL-MCNC: 3.6 MG/DL (ref 2.5–4.5)
PLATELET # BLD AUTO: 353 10E3/UL (ref 150–450)
POTASSIUM BLD-SCNC: 4.1 MMOL/L (ref 3.4–5.3)
PROT SERPL-MCNC: 7.4 G/DL (ref 6.8–8.8)
RBC # BLD AUTO: 3.63 10E6/UL (ref 3.8–5.2)
SODIUM SERPL-SCNC: 130 MMOL/L (ref 133–144)
WBC # BLD AUTO: 7.4 10E3/UL (ref 4–11)

## 2022-02-27 PROCEDURE — 250N000013 HC RX MED GY IP 250 OP 250 PS 637: Performed by: INTERNAL MEDICINE

## 2022-02-27 PROCEDURE — 83735 ASSAY OF MAGNESIUM: CPT | Performed by: INTERNAL MEDICINE

## 2022-02-27 PROCEDURE — 80053 COMPREHEN METABOLIC PANEL: CPT | Performed by: INTERNAL MEDICINE

## 2022-02-27 PROCEDURE — 97530 THERAPEUTIC ACTIVITIES: CPT | Mod: GO | Performed by: OCCUPATIONAL THERAPIST

## 2022-02-27 PROCEDURE — 250N000011 HC RX IP 250 OP 636: Performed by: INTERNAL MEDICINE

## 2022-02-27 PROCEDURE — 97161 PT EVAL LOW COMPLEX 20 MIN: CPT | Mod: GP | Performed by: PHYSICAL THERAPIST

## 2022-02-27 PROCEDURE — 36415 COLL VENOUS BLD VENIPUNCTURE: CPT | Performed by: INTERNAL MEDICINE

## 2022-02-27 PROCEDURE — 99232 SBSQ HOSP IP/OBS MODERATE 35: CPT | Performed by: INTERNAL MEDICINE

## 2022-02-27 PROCEDURE — 120N000013 HC R&B IMCU

## 2022-02-27 PROCEDURE — 82040 ASSAY OF SERUM ALBUMIN: CPT | Performed by: INTERNAL MEDICINE

## 2022-02-27 PROCEDURE — 84100 ASSAY OF PHOSPHORUS: CPT | Performed by: INTERNAL MEDICINE

## 2022-02-27 PROCEDURE — 97530 THERAPEUTIC ACTIVITIES: CPT | Mod: GP | Performed by: PHYSICAL THERAPIST

## 2022-02-27 PROCEDURE — 85014 HEMATOCRIT: CPT | Performed by: INTERNAL MEDICINE

## 2022-02-27 RX ORDER — ASPIRIN 81 MG/1
81 TABLET, CHEWABLE ORAL DAILY
Status: DISCONTINUED | OUTPATIENT
Start: 2022-02-28 | End: 2022-03-04 | Stop reason: HOSPADM

## 2022-02-27 RX ORDER — LEVOTHYROXINE SODIUM 100 UG/1
200 TABLET ORAL DAILY
Status: DISCONTINUED | OUTPATIENT
Start: 2022-02-28 | End: 2022-03-04 | Stop reason: HOSPADM

## 2022-02-27 RX ORDER — LANOLIN ALCOHOL/MO/W.PET/CERES
1000 CREAM (GRAM) TOPICAL DAILY
Status: DISCONTINUED | OUTPATIENT
Start: 2022-02-28 | End: 2022-03-04 | Stop reason: HOSPADM

## 2022-02-27 RX ORDER — DIAZEPAM 2 MG
2 TABLET ORAL EVERY 12 HOURS
Status: DISCONTINUED | OUTPATIENT
Start: 2022-02-27 | End: 2022-03-03

## 2022-02-27 RX ORDER — MAGNESIUM OXIDE 400 MG/1
400 TABLET ORAL 2 TIMES DAILY
Status: DISPENSED | OUTPATIENT
Start: 2022-02-27 | End: 2022-03-01

## 2022-02-27 RX ORDER — GABAPENTIN 250 MG/5ML
300 SOLUTION ORAL EVERY 8 HOURS SCHEDULED
Status: DISCONTINUED | OUTPATIENT
Start: 2022-02-27 | End: 2022-03-04 | Stop reason: HOSPADM

## 2022-02-27 RX ADMIN — DIAZEPAM 5 MG: 5 TABLET ORAL at 00:33

## 2022-02-27 RX ADMIN — MICONAZOLE NITRATE: 2 POWDER TOPICAL at 21:14

## 2022-02-27 RX ADMIN — INSULIN ASPART 10 UNITS: 100 INJECTION, SOLUTION INTRAVENOUS; SUBCUTANEOUS at 00:36

## 2022-02-27 RX ADMIN — Medication 25 MCG: at 08:38

## 2022-02-27 RX ADMIN — DULOXETINE HYDROCHLORIDE 20 MG: 20 CAPSULE, DELAYED RELEASE ORAL at 08:38

## 2022-02-27 RX ADMIN — GABAPENTIN 300 MG: 250 SOLUTION ORAL at 08:39

## 2022-02-27 RX ADMIN — Medication 40 MG: at 08:39

## 2022-02-27 RX ADMIN — MICONAZOLE NITRATE: 2 POWDER TOPICAL at 08:42

## 2022-02-27 RX ADMIN — Medication 2 PACKET: at 08:41

## 2022-02-27 RX ADMIN — POTASSIUM CHLORIDE 20 MEQ: 1.5 POWDER, FOR SOLUTION ORAL at 08:39

## 2022-02-27 RX ADMIN — GABAPENTIN 300 MG: 250 SOLUTION ORAL at 00:37

## 2022-02-27 RX ADMIN — ENOXAPARIN SODIUM 40 MG: 40 INJECTION SUBCUTANEOUS at 08:39

## 2022-02-27 RX ADMIN — LEVOTHYROXINE SODIUM 200 MCG: 100 TABLET ORAL at 06:30

## 2022-02-27 RX ADMIN — CALCIUM CARBONATE (ANTACID) CHEW TAB 500 MG 500 MG: 500 CHEW TAB at 08:39

## 2022-02-27 RX ADMIN — Medication 1 PACKET: at 20:51

## 2022-02-27 RX ADMIN — DIAZEPAM 2 MG: 2 TABLET ORAL at 20:50

## 2022-02-27 RX ADMIN — INSULIN ASPART 6 UNITS: 100 INJECTION, SOLUTION INTRAVENOUS; SUBCUTANEOUS at 16:46

## 2022-02-27 RX ADMIN — Medication 15 ML: at 16:40

## 2022-02-27 RX ADMIN — ACETAMINOPHEN 650 MG: 325 TABLET, FILM COATED ORAL at 08:39

## 2022-02-27 RX ADMIN — Medication 1 PACKET: at 16:40

## 2022-02-27 RX ADMIN — Medication 400 MG: at 20:50

## 2022-02-27 RX ADMIN — ACETAMINOPHEN 500 MG: 325 SUSPENSION ORAL at 20:49

## 2022-02-27 RX ADMIN — FLUTICASONE FUROATE AND VILANTEROL TRIFENATATE 1 PUFF: 100; 25 POWDER RESPIRATORY (INHALATION) at 08:42

## 2022-02-27 RX ADMIN — DIAZEPAM 5 MG: 5 TABLET ORAL at 12:35

## 2022-02-27 RX ADMIN — INSULIN ASPART 2 UNITS: 100 INJECTION, SOLUTION INTRAVENOUS; SUBCUTANEOUS at 04:28

## 2022-02-27 RX ADMIN — INSULIN ASPART 3 UNITS: 100 INJECTION, SOLUTION INTRAVENOUS; SUBCUTANEOUS at 20:57

## 2022-02-27 RX ADMIN — ACETAMINOPHEN 650 MG: 325 TABLET, FILM COATED ORAL at 16:40

## 2022-02-27 RX ADMIN — Medication 2 PACKET: at 20:57

## 2022-02-27 RX ADMIN — POTASSIUM CHLORIDE 20 MEQ: 1.5 POWDER, FOR SOLUTION ORAL at 20:50

## 2022-02-27 RX ADMIN — HYDROXYZINE HYDROCHLORIDE 25 MG: 25 TABLET ORAL at 08:38

## 2022-02-27 RX ADMIN — ENOXAPARIN SODIUM 40 MG: 40 INJECTION SUBCUTANEOUS at 20:50

## 2022-02-27 RX ADMIN — CYANOCOBALAMIN TAB 1000 MCG 1000 MCG: 1000 TAB at 08:39

## 2022-02-27 RX ADMIN — Medication 1 PACKET: at 08:41

## 2022-02-27 RX ADMIN — ASPIRIN 81 MG CHEWABLE TABLET 81 MG: 81 TABLET CHEWABLE at 08:39

## 2022-02-27 RX ADMIN — Medication 400 MG: at 08:38

## 2022-02-27 RX ADMIN — HYDROXYZINE HYDROCHLORIDE 25 MG: 25 TABLET ORAL at 02:54

## 2022-02-27 RX ADMIN — INSULIN GLARGINE 35 UNITS: 100 INJECTION, SOLUTION SUBCUTANEOUS at 08:42

## 2022-02-27 RX ADMIN — INSULIN ASPART 11 UNITS: 100 INJECTION, SOLUTION INTRAVENOUS; SUBCUTANEOUS at 12:35

## 2022-02-27 RX ADMIN — CHOLECALCIFEROL CAP 1.25 MG (50000 UNIT) 1250 MCG: 1.25 CAP at 17:50

## 2022-02-27 RX ADMIN — GABAPENTIN 300 MG: 250 SUSPENSION ORAL at 16:40

## 2022-02-27 ASSESSMENT — ACTIVITIES OF DAILY LIVING (ADL)
ADLS_ACUITY_SCORE: 17

## 2022-02-27 NOTE — PROGRESS NOTES
[facility]    Medicine Progress Note - Hospitalist Service        Date of Admission:  2/13/2022  2:35 PM    Assessment & Plan:   No Yusuf is a 62 year old female with past medical history of type 2 diabetes mellitus, hypertension, hyperlipidemia, bipolar disorder, fibromyalgia, polysubstance abuse and currently on methadone, hypothyroidism, chronic hepatitis C who presented initially to the [facility] emergency department on 2/8/2022 with chief complaint of shortness of breath.  In the emergency department the patient was found to be hypoxic requiring supplemental oxygen.Due to bed availability the patient was transferred to [facility] on 2/10/2022 after requiring intubation and mechanical ventilation for progressive hypoxia secondary to COVID-19 pneumonia. The patient was started on remdesivir, dexamethasone, and baricitinib.  On 2/11 she had cardiac arrest at about 2:20 pm. She had been bradycardiac that morning and while being changed from proned to supine position she went into ventricular tachycardia consistent with Torsades adrian pointes. She had one cycle of chest compression, received one shock, and returned to sinus rhythm. Bradycardia persisted, however. Cardiology recommended transfer to [facility] where cardiology EP and pacemaker capabilities were available.     Acute hypoxemic respiratory failure, multifactorial  COVID-19 pneumonia, now recovered  -Positive for COVID-19 on 2/2  -CT chest 2/8- no PE, Patchy groundglass and consolidative opacities throughout the  Lungs  -treated with remdesivir, dexamethasone, and baricitinib  -Now considered COVID recovered as of 2/23  -intubated from 2/8 to 2/22  -Respiratory status quite stable in the last several days, currently oxygen at 2 L via nasal cannula       Cardiac arrest - Torsade de Pointes  Suspected stress related cardiomyopathy - improvement in EF from 20% to 40% (2/18)  -On 2/11 at  [facility] she went into ventricular tachycardia consistent with Torsades adrian pointes  -had one cycle of chest compression, received one shock, and returned to sinus rhythm. Bradycardia persisted and [facility]  - Remdesivir was discontinued  - Echo 2/12 - Severely decreased left ventricular systolic function; EF 20-25%.There  is severe global hypokinesia of the left ventricle. The basal segments appears to contract the best- possible stress cardiomyopathy?  - repeat Echo 2/27- improved EF 40-45%; there is mild global hypokinesia of the left ventricle. Mildly decreased right ventricular systolic function  -seen by cardiology- no need for PPM  -TSH was low  0.29 but fT4 1.39  -now HR in 's  -received few doses of Lasix; neg balance almost 10 liters now, does not look fluid overload at this time; PTA on Lasix 20 mg po daily; holding PTA Lasix due to elevated lactate as above, resume when able  -avoid QT prolonging medications; she was resumed on her PTA Methadone and tolerating so far, continue to monitor on telemetry  -Mg goal 2.5, K goal 4.5; high protocol replacement protocol  -ASA  -hold atorvastatin due to transaminitis.  Please see discussion below  -OP cards f/u    Sepsis due to UTI   -She was initially treated with ceftriaxone for her urinary tract infection, which was broadened to cefepime, vancomycin, and azithromycin   -She had received cefepime until 2/20  -Patient has been afebrile  -Still has mild leukocytosis and lactic acidosis  -Monitor off antibiotics     Insulin requiring DM type 2  -PTA on Levemir 15 units qam and Metformin 1000 mg po BID; PTA Metformin held  -HbA1c 6.2 on 2/8  -Continue Lantus at 35 units a.m. and 30 units p.m.(decreased tonight's dose to 15 units as she will be n.p.o. for ultrasound)  -Continue with very high resistance ISS     Transaminitis   -Patient with worsening LFTs in the last 48 hours, ALT now more than 1100 and AST more than 500  -Check right  upper quadrant ultrasonogram  -Continue to hold statin  -GI consult     Hypertension  -PTA on Norvasc 5 mg po daily   -Antihypertensives have been on hold, blood pressure on the softer side without antihypertensives at this time.     Hypothyroidism  -Continue PTA Synthroid     History of polysubstance abuse and currently on methadone  Fibromyalgia   -PTA on Methadone 80 mg po daily, Neurontin 1200 mg po TID  -while was in ICU, intubated- she had been on multiple drips- Ketamine infusion, Dilaudid infusion and Midazolam drip- now weaned off  -it was recommended to avoid QTc-prolonging medications given her cardiac arrest/torsades de alexandria but she was restarted on Methadone 40 mg po BID for now while in ICU; last EKG 2/21- normal QTc 444  -Continue gabapentin at a lower dose 300 mg TID     Acute toxic encephalopathy  -likely related to recent sedation, now off all drips  -Patient was started on Valium 10 mg 3 times daily in the ICU.  Tapered to 5mg  TID 2/23 (hold for lethargy)  -Continue to taper Valium, will start decreasing to 2 mg twice a day.  With completely stopping within the next 48-72 hours.  -Mentation seems to be improving gradually  -PTA on Methadone 80 mg po daily, now on Methadone 40 mg po BID, hold for lethargy-continue PTA Gabapentin at a lower dose 300 mg po TID  -avoid sedating medications, hold Methadone for lethargy  -Encephalopathy is gradually improving     Bipolar disorder  -PTA on Cymbalta 60 mg po daily, Trazodone 50 mg po at bedtime and Atarax 100 mg po q6h prn for anxiety   -continue PTA Cymbalta at a lower dose 20 mg po daily; PTA Trazodone on hold     Dysphagia  Moderate malnutrition  -Malnutrition in the context of recent illness or injury, and dysphagia likely related to recent intubation  -has NGT in place, receiving TF  -Patient getting calorie count, which will be completed tomorrow, remove NGT if adequate nutrition  -SLP following, currently on mechanical soft  diet     Diarrhea  -likely related to TF  -Improved  -Cdiff negative on 2/15  -Nutrisource Fiber     Pressure injury on perianal area, hospital-acquired due to medical device related due to fecal management system  -Wound care following     Generalized weakness  -PT/OT  -SW consult    Severe obesity  -OP f/u    Diet: Mechanical/Dental Soft Diet  Adult Formula Drip Feeding: Continuous Vital High Protein; Nasoduodenal tube; Goal Rate: 55 mL/hr x 22 hours; mL/hr; Medication - Feeding Tube Flush Frequency: At least 15-30 mL water before and after medication administration and with tube cloggi...  Calorie Counts  Snacks/Supplements Adult: Ensure Max Protein (bariatric); With Meals     DVT Prophylaxis: Enoxaparin (Lovenox) SQ   Valle Catheter: PRESENT, indication: Strict 1-2 Hour I&O  Code Status: Full Code     Disposition Plan    Expected Discharge: 03/04/2022     Anticipated discharge location:  Awaiting care coordination huddle  Delays:        Entered: Contreras Mckinney MD 02/27/2022, 8:04 AM        Clinically Significant Risk Factors Present on Admission                      The patient's care was discussed with the Bedside Nurse and Patient.    Contreras Mckinney MD  Hospitalist Service  [facility]  Text Page 7AM-6PM  Securely message with the [employer]oc[employer] Web Console (learn more here)  Text page via [employer] Paging/Directory    ______________________________________________________________________    Interval History   Continues to feel like she is improving albeit slowly.  Oxygen requirement stable.  She is eating better.  LFTs worsened in the last 48 hours she denies right upper quadrant pain.  She has had intermittent nonspecific abdominal pain which apparently is better today    Data reviewed today: I reviewed all medications, new labs and imaging results over the last 24 hours. I personally reviewed no images or EKG's today.    Physical Exam   Vital signs:  Temp: 97.6  F (36.4  C) Temp src: Oral  "BP: 131/80 Pulse: 82   Resp: 16 SpO2: 97 % O2 Device: Nasal cannula Oxygen Delivery: 2 LPM   Weight: 114.3 kg (251 lb 15.8 oz)  Estimated body mass index is 43.25 kg/m  as calculated from the following:    Height as of 6/3/21: 1.626 m (5' 4\").    Weight as of this encounter: 114.3 kg (251 lb 15.8 oz).      Wt Readings from Last 2 Encounters:   02/22/22 114.3 kg (251 lb 15.8 oz)   02/13/22 122.7 kg (270 lb 6.4 oz)       Gen: AAOX3, NAD, forgetful  HEENT: NG tube in place.  No pallor  Resp: CTA B/L, normal WOB, no crackles, no wheezes  CVS: RRR, no murmur  Abd/GI: Soft, non-tender. BS- normoactive.    Skin: Warm, dry no rashes  MSK: no pedal edema  Neuro- CN- intact. No focal deficits, but globally weak      Data   Recent Labs   Lab 02/27/22  0527 02/27/22  0427 02/27/22  0029 02/26/22  1201 02/26/22  0516 02/25/22  0809 02/25/22  0520   WBC 7.4  --   --   --  10.2  --  12.7*   HGB 10.5*  --   --   --  11.9  --  11.5*   MCV 90  --   --   --  93  --  92     --   --   --  385  --  405   *  --   --   --  130*  --  132*   POTASSIUM 4.1  --   --   --  5.3  --  3.6   CHLORIDE 98  --   --   --  98  --  100   CO2 29  --   --   --  27  --  27   BUN 22  --   --   --  22  --  30   CR 0.53  --   --   --  0.57  --  0.56   ANIONGAP 3  --   --   --  5  --  5   RODOLFO 8.7  --   --   --  9.3  --  9.2   * 154* 238*   < > 111*   < > 201*   ALBUMIN 1.9*  --   --   --   --   --  2.2*   PROTTOTAL 7.4  --   --   --   --   --  7.1   BILITOTAL 0.9  --   --   --   --   --  0.4   ALKPHOS 352*  --   --   --   --   --  131   ALT 1,152*  --   --   --   --   --  282*   *  --   --   --   --   --  124*    < > = values in this interval not displayed.       No results found for this or any previous visit (from the past 24 hour(s)).  Medications     sodium chloride Stopped (02/23/22 0800)       acetaminophen  500 mg Oral or Feeding Tube TID     aspirin  81 mg Oral or Feeding Tube Daily     [Held by provider] atorvastatin  20 mg " Per Feeding Tube At Bedtime     cholecalciferol  1,250 mcg Oral Q7 Days     cyanocobalamin  1,000 mcg Oral or Feeding Tube Daily     diazepam  5 mg Oral or Feeding Tube Q12H     DULoxetine  20 mg Oral Daily     enoxaparin ANTICOAGULANT  40 mg Subcutaneous Q12H     fiber modular (NUTRISOURCE FIBER)  2 packet Per Feeding Tube BID     fluticasone-vilanterol  1 puff Inhalation Daily     gabapentin  300 mg Oral or Feeding Tube Q8H RADHA     insulin aspart  1-22 Units Subcutaneous Q4H     insulin glargine  30 Units Subcutaneous At Bedtime     insulin glargine  35 Units Subcutaneous QAM AC     levothyroxine  200 mcg Oral or Feeding Tube Daily     magnesium oxide  400 mg Oral BID     methadone  40 mg Oral BID     miconazole   Topical BID     multivitamins w/minerals  15 mL Per Feeding Tube Daily     pantoprazole  40 mg Oral or Feeding Tube QAM AC     potassium chloride  20 mEq Oral BID     protein modular  1 packet Per Feeding Tube TID     sodium chloride (PF)  3 mL Intracatheter Q8H     Vitamin D3  25 mcg Oral or Feeding Tube Daily

## 2022-02-27 NOTE — PROGRESS NOTES
"   02/27/22 1330   Quick Adds   Type of Visit Initial PT Evaluation   Living Environment   Current Living Arrangements assisted living   Transportation Anticipated agency   Living Environment Comments Pt states she lives in [facility] in [place], not engaging in answering questions d/t being irritable and questionable cognition at this time.    Self-Care   Usual Activity Tolerance good   Current Activity Tolerance moderate   Equipment Currently Used at Home walker, rolling   Fall history within last six months yes   Number of times patient has fallen within last six months 2   Activity/Exercise/Self-Care Comment Pt states she uses a 4WW and usually can walk independently. reports independence with ADLs and has children that can assist.    General Information   Onset of Illness/Injury or Date of Surgery 02/13/22   Referring Physician Iveth Cedeño MD   Patient/Family Therapy Goals Statement (PT) return home per pt and have her children help her    Pertinent History of Current Problem (include personal factors and/or comorbidities that impact the POC)  62 year old female with past medical history of type 2 diabetes mellitus, hypertension, hyperlipidemia, bipolar disorder, fibromyalgia, polysubstance abuse and currently on methadone, hypothyroidism, chronic hepatitis C who presented initially to the [facility] emergency department on 2/8/2022 with chief complaint of shortness of breath.  In the emergency department the patient was found to be hypoxic requiring supplemental oxygen.Due to bed availability the patient was transferred to [facility] on 2/10/2022 after requiring intubation and mechanical ventilation for progressive hypoxia secondary to COVID-19 pneumonia intubated 2/8 through 2/22.    General Observations Pt on 2 L O2    Cognition   Orientation Status (Cognition) unable/difficult to assess   Cognitive Status Comments pt irritable being asking \"the same questions again and " "again\", did correctly state the month and her name, unable to state location or day of the week.    Pain Assessment   Patient Currently in Pain Yes, see Vital Sign flowsheet  (pain in buttocks and back )   Strength Comprehensive (MMT)   Comment, General Manual Muscle Testing (MMT) Assessment LEs grossly 3-/5 MMT, moving all limbs well however fatigues quickly    Bed Mobility   Comment, (Bed Mobility) Mod A x 1-2 for supine <> sit    Transfers   Comment, (Transfers) enma lift from bed <> chair    Gait/Stairs (Locomotion)   Distance in Feet (Required for LE Total Joints) unable to ambulate yet d/t weakness and fatigue    Sensory Examination   Sensory Perception patient reports no sensory changes   Clinical Impression   Criteria for Skilled Therapeutic Intervention Yes, treatment indicated   PT Diagnosis (PT) impaired mobility    Influenced by the following impairments weakness, pain, impaired balance, impaired endurance    Functional limitations due to impairments fall risk, decreased activity tolerance    Clinical Presentation (PT Evaluation Complexity) Stable/Uncomplicated   Clinical Presentation Rationale clinical judgement    Clinical Decision Making (Complexity) low complexity   Planned Therapy Interventions (PT) balance training;bed mobility training;gait training;patient/family education;strengthening;transfer training   Anticipated Equipment Needs at Discharge (PT) walker, rolling   Risk & Benefits of therapy have been explained evaluation/treatment results reviewed;care plan/treatment goals reviewed;risks/benefits reviewed;current/potential barriers reviewed;participants voiced agreement with care plan;participants included;patient   PT Discharge Planning   PT Discharge Recommendation (DC Rec) Transitional Care Facility   PT Rationale for DC Rec Pt currently well below baseline and needing a x 2 for safe mobility. Pt would continue to benefit from ongoing inpatient PT and PT in TCU setting prior to returning " home d/t prolonged hospitalization and resulting weakness/inability to ambulate.    PT Brief overview of current status Mod A x 2 supine <> sit, enma from bed <> chair, CGA/Min A sitting EOB x 5 mins    Plan of Care Review   Plan of Care Reviewed With patient   Total Evaluation Time   Total Evaluation Time (Minutes) 15   Physical Therapy Goals   PT Frequency 5x/week   PT Predicated Duration/Target Date for Goal Attainment 03/09/22   PT Goals Bed Mobility;Transfers;Gait   PT: Bed Mobility Minimal assist;Supine to/from sit   PT: Transfers Minimal assist;Sit to/from stand;Bed to/from chair;Assistive device   PT: Gait Minimal assist;Rolling walker;50 feet

## 2022-02-27 NOTE — PLAN OF CARE
Pt is A&Ox4, is forgetful at times, was awake most of the night. VSS ex on O2 @ 2LPM via NC. NJ in place for continuous TF @ 55ml/hr with 60cc flushes q4h. LS dim, PLATT, BS+ hyperactive, BMx1, flatus+. Valle in place, adequate UOP. Scheduled methadone and tylenol for pain . PRN atarax given x1 for anxiety. Redness in groin and under panus-interdry in place. Buttocks and coccyx red, barrier cream applied. Turn and repo, melissa bed with pulsate mattress in place. Pt is assistx2 and lift, on mech soft diet as well as TF. BG q4h. Tele: SR with occasional PVCs. Mag recheck this AM, NA continues to be low at 130.

## 2022-02-28 ENCOUNTER — APPOINTMENT (OUTPATIENT)
Dept: ULTRASOUND IMAGING | Facility: CLINIC | Age: 62
DRG: 207 | End: 2022-02-28
Attending: INTERNAL MEDICINE
Payer: COMMERCIAL

## 2022-02-28 ENCOUNTER — APPOINTMENT (OUTPATIENT)
Dept: OCCUPATIONAL THERAPY | Facility: CLINIC | Age: 62
DRG: 207 | End: 2022-02-28
Attending: INTERNAL MEDICINE
Payer: COMMERCIAL

## 2022-02-28 ENCOUNTER — APPOINTMENT (OUTPATIENT)
Dept: PHYSICAL THERAPY | Facility: CLINIC | Age: 62
DRG: 207 | End: 2022-02-28
Attending: INTERNAL MEDICINE
Payer: COMMERCIAL

## 2022-02-28 LAB
ALBUMIN SERPL-MCNC: 2.2 G/DL (ref 3.4–5)
ALP SERPL-CCNC: 297 U/L (ref 40–150)
ALT SERPL W P-5'-P-CCNC: 750 U/L (ref 0–50)
ANION GAP SERPL CALCULATED.3IONS-SCNC: 2 MMOL/L (ref 3–14)
AST SERPL W P-5'-P-CCNC: 181 U/L (ref 0–45)
BILIRUB DIRECT SERPL-MCNC: 0.1 MG/DL (ref 0–0.2)
BILIRUB SERPL-MCNC: 0.6 MG/DL (ref 0.2–1.3)
BUN SERPL-MCNC: 23 MG/DL (ref 7–30)
CALCIUM SERPL-MCNC: 8.9 MG/DL (ref 8.5–10.1)
CHLORIDE BLD-SCNC: 101 MMOL/L (ref 94–109)
CO2 SERPL-SCNC: 31 MMOL/L (ref 20–32)
CREAT SERPL-MCNC: 0.57 MG/DL (ref 0.52–1.04)
ERYTHROCYTE [DISTWIDTH] IN BLOOD BY AUTOMATED COUNT: 15.5 % (ref 10–15)
GFR SERPL CREATININE-BSD FRML MDRD: >90 ML/MIN/1.73M2
GLUCOSE BLD-MCNC: 144 MG/DL (ref 70–99)
GLUCOSE BLDC GLUCOMTR-MCNC: 121 MG/DL (ref 70–99)
GLUCOSE BLDC GLUCOMTR-MCNC: 130 MG/DL (ref 70–99)
GLUCOSE BLDC GLUCOMTR-MCNC: 188 MG/DL (ref 70–99)
GLUCOSE BLDC GLUCOMTR-MCNC: 191 MG/DL (ref 70–99)
GLUCOSE BLDC GLUCOMTR-MCNC: 86 MG/DL (ref 70–99)
GLUCOSE BLDC GLUCOMTR-MCNC: 87 MG/DL (ref 70–99)
HCT VFR BLD AUTO: 33.1 % (ref 35–47)
HGB BLD-MCNC: 10.7 G/DL (ref 11.7–15.7)
MAGNESIUM SERPL-MCNC: 2.3 MG/DL (ref 1.6–2.3)
MCH RBC QN AUTO: 29.4 PG (ref 26.5–33)
MCHC RBC AUTO-ENTMCNC: 32.3 G/DL (ref 31.5–36.5)
MCV RBC AUTO: 91 FL (ref 78–100)
PHOSPHATE SERPL-MCNC: 3.4 MG/DL (ref 2.5–4.5)
PLATELET # BLD AUTO: 349 10E3/UL (ref 150–450)
POTASSIUM BLD-SCNC: 4.1 MMOL/L (ref 3.4–5.3)
PROT SERPL-MCNC: 7.2 G/DL (ref 6.8–8.8)
RBC # BLD AUTO: 3.64 10E6/UL (ref 3.8–5.2)
SODIUM SERPL-SCNC: 134 MMOL/L (ref 133–144)
WBC # BLD AUTO: 7.5 10E3/UL (ref 4–11)

## 2022-02-28 PROCEDURE — 250N000013 HC RX MED GY IP 250 OP 250 PS 637: Performed by: INTERNAL MEDICINE

## 2022-02-28 PROCEDURE — 97110 THERAPEUTIC EXERCISES: CPT | Mod: GP

## 2022-02-28 PROCEDURE — 85027 COMPLETE CBC AUTOMATED: CPT | Performed by: INTERNAL MEDICINE

## 2022-02-28 PROCEDURE — 250N000011 HC RX IP 250 OP 636: Performed by: INTERNAL MEDICINE

## 2022-02-28 PROCEDURE — 250N000012 HC RX MED GY IP 250 OP 636 PS 637: Performed by: INTERNAL MEDICINE

## 2022-02-28 PROCEDURE — 99233 SBSQ HOSP IP/OBS HIGH 50: CPT | Performed by: INTERNAL MEDICINE

## 2022-02-28 PROCEDURE — 80053 COMPREHEN METABOLIC PANEL: CPT | Performed by: INTERNAL MEDICINE

## 2022-02-28 PROCEDURE — 120N000013 HC R&B IMCU

## 2022-02-28 PROCEDURE — 76705 ECHO EXAM OF ABDOMEN: CPT

## 2022-02-28 PROCEDURE — 82248 BILIRUBIN DIRECT: CPT | Performed by: INTERNAL MEDICINE

## 2022-02-28 PROCEDURE — 97530 THERAPEUTIC ACTIVITIES: CPT | Mod: GO | Performed by: OCCUPATIONAL THERAPIST

## 2022-02-28 PROCEDURE — 97530 THERAPEUTIC ACTIVITIES: CPT | Mod: GP

## 2022-02-28 PROCEDURE — 36415 COLL VENOUS BLD VENIPUNCTURE: CPT | Performed by: INTERNAL MEDICINE

## 2022-02-28 PROCEDURE — 84100 ASSAY OF PHOSPHORUS: CPT | Performed by: INTERNAL MEDICINE

## 2022-02-28 PROCEDURE — G0463 HOSPITAL OUTPT CLINIC VISIT: HCPCS

## 2022-02-28 PROCEDURE — 83735 ASSAY OF MAGNESIUM: CPT | Performed by: INTERNAL MEDICINE

## 2022-02-28 RX ORDER — FUROSEMIDE 20 MG
20 TABLET ORAL DAILY
Status: DISCONTINUED | OUTPATIENT
Start: 2022-02-28 | End: 2022-03-04 | Stop reason: HOSPADM

## 2022-02-28 RX ORDER — MULTIVITAMIN,THERAPEUTIC
1 TABLET ORAL DAILY
Status: DISCONTINUED | OUTPATIENT
Start: 2022-02-28 | End: 2022-03-04 | Stop reason: HOSPADM

## 2022-02-28 RX ADMIN — POTASSIUM CHLORIDE 20 MEQ: 1.5 POWDER, FOR SOLUTION ORAL at 09:55

## 2022-02-28 RX ADMIN — FUROSEMIDE 20 MG: 20 TABLET ORAL at 15:32

## 2022-02-28 RX ADMIN — DULOXETINE HYDROCHLORIDE 20 MG: 20 CAPSULE, DELAYED RELEASE ORAL at 09:56

## 2022-02-28 RX ADMIN — THERA TABS 1 TABLET: TAB at 09:56

## 2022-02-28 RX ADMIN — GABAPENTIN 300 MG: 250 SUSPENSION ORAL at 15:15

## 2022-02-28 RX ADMIN — FLUTICASONE FUROATE AND VILANTEROL TRIFENATATE 1 PUFF: 100; 25 POWDER RESPIRATORY (INHALATION) at 10:06

## 2022-02-28 RX ADMIN — ASPIRIN 81 MG CHEWABLE TABLET 81 MG: 81 TABLET CHEWABLE at 09:56

## 2022-02-28 RX ADMIN — ENOXAPARIN SODIUM 40 MG: 40 INJECTION SUBCUTANEOUS at 09:55

## 2022-02-28 RX ADMIN — DIAZEPAM 2 MG: 2 TABLET ORAL at 09:56

## 2022-02-28 RX ADMIN — MICONAZOLE NITRATE: 2 POWDER TOPICAL at 10:06

## 2022-02-28 RX ADMIN — CYANOCOBALAMIN TAB 1000 MCG 1000 MCG: 1000 TAB at 09:56

## 2022-02-28 RX ADMIN — INSULIN GLARGINE 35 UNITS: 100 INJECTION, SOLUTION SUBCUTANEOUS at 09:55

## 2022-02-28 RX ADMIN — ENOXAPARIN SODIUM 40 MG: 40 INJECTION SUBCUTANEOUS at 21:59

## 2022-02-28 RX ADMIN — Medication 25 MCG: at 09:56

## 2022-02-28 RX ADMIN — ACETAMINOPHEN 500 MG: 325 SUSPENSION ORAL at 15:15

## 2022-02-28 RX ADMIN — Medication 400 MG: at 09:56

## 2022-02-28 RX ADMIN — INSULIN ASPART 5 UNITS: 100 INJECTION, SOLUTION INTRAVENOUS; SUBCUTANEOUS at 00:48

## 2022-02-28 ASSESSMENT — ACTIVITIES OF DAILY LIVING (ADL)
ADLS_ACUITY_SCORE: 17
ADLS_ACUITY_SCORE: 21
ADLS_ACUITY_SCORE: 17

## 2022-02-28 NOTE — PROGRESS NOTES
CLINICAL NUTRITION SERVICES - REASSESSMENT NOTE      Recommendations for MD  Remove NJ, pt is eating adequately  Adjust insulin accordingly for discontinuation of TF     Recommendations Ordered by Registered Dietitian (RD):   Discontinue tube feeding, Prosource and nutrisource fiber  Change Ceravite to oral Mvi with minerals   Malnutrition: (2/24)  % Weight Loss:  Unable to fully determine    % Intake: <75% for > 7 days (moderate malnutrition)  (received EN below goal rate for up to 6 days between proning and hospital transfers)   Subcutaneous Fat Loss:  Orbital region mild-moderate depletion  Muscle Loss:  Temporal region milk depletion, potential further losses possibly masked by adiposity   Fluid Retention: Trace     Malnutrition Diagnosis: Moderate malnutrition  In Context of:  Acute illness or injury       EVALUATION OF PROGRESS TOWARD GOALS   Diet: NPO for abd US   Prior diet:  Mechanical Dental Soft (IDDSI 7 Easy to Chew)  Supplement: Ensure max daily    Nutrition Support:  TF continues as outlined below.  Nutrition Support Enteral:  Type of Feeding Tube: Nasoduodenal   Enteral Frequency:  Continuous  Enteral Regimen: Vital HP at 55 mL/hr x 22 hours per day   Total Enteral Provisions: 1210 kcal, 105 g protein, 134 g CHO, 1012 mL H2O, 0 g fiber   Free Water Flush: 60 mL every 4 hours  ProSource TID= 120 kcal and 33 g protein  NutriSource Fiber 2 pkts BID = 60 kcal and 12 g fiber   Total = 1390 kcal (12 kcal/kg), 138 g protein (2.5 g/kg), 12 g fiber    Certavite daily     Intake/Tolerance:    Calorie Count: 1739 kcal, 71 g protein over 2 meals and 1 supplement. Meeting >100% of her estimated kcal and 75% of her estimated protein needs.     3rd meal ordered but intake not documented    Labs reviewed: Na 134, improved.  LFTs elevated, improved from yesterday - Abd US today. BG in fair control  Recent Labs   Lab 02/28/22  0530 02/28/22  0219 02/28/22  0048 02/27/22  2056 02/27/22  1646 02/27/22  1148   *  "191* 188* 168* 194* 243*     Medications reviewed: VHSSI + 15 units lantus at bedtime and 35 units lantus in the morning, Levothyroxine daily, magox bid, methadone bid, protonix, kcl, ceravite    Stooling:  Last BM 2/26 soft x 2. Rectal tube removed    Wt: 251 lbs 15.77 oz - she is down 23 lbs from admission (8% in <2 weeks). She has been on lasix which makes it difficult to determine fluid vs true body wt lost (last received diuretic 2/20)  Vitals:    02/18/22 0200 02/19/22 0400 02/20/22 0200 02/21/22 0600   Weight: 120.5 kg (265 lb 10.5 oz) 115.6 kg (254 lb 13.6 oz) 116.2 kg (256 lb 2.8 oz) 116.6 kg (257 lb 0.9 oz)    02/22/22 0350   Weight: 114.3 kg (251 lb 15.8 oz)     - Per [facility] RD note 2/12:  \"Usual body weight appears to be about 280 lbs, unclear whether current weight is accurate or patient has been deliberately losing weight PTA. Per Chart review of visit from 9/17, pt was cutting back on sugary drinks and candy bars at that time.\"    Wt Readings from Last 10 Encounters:   02/22/22 114.3 kg (251 lb 15.8 oz)   02/13/22 122.7 kg (270 lb 6.4 oz)   02/09/22 126.1 kg (278 lb)   06/08/21 133.1 kg (293 lb 6.4 oz)   06/03/21 129.9 kg (286 lb 4.8 oz)   05/27/21 135.2 kg (298 lb 1.6 oz)   12/10/20 133.5 kg (294 lb 4.8 oz)   07/07/20 131.6 kg (290 lb 3.2 oz)   06/16/20 126.6 kg (279 lb)   02/03/20 130.5 kg (287 lb 11.2 oz)       ASSESSED NUTRITION NEEDS:  Dosing Weight: 114.3 kg (energy) and 54.5 kg (protein)  Estimated Energy Needs: 2586-8418+ kcals (11-14+ Kcal/Kg)  Justification: obese   Estimated Protein Needs: 109-136 grams protein (2-2.5 g pro/Kg)  Justification: hypercatabolism with critical illness and obesity guidelines       NEW FINDINGS:   2 L O2, course with crackles per nsg  NPO for Abd. US this am d/t elevated LFTs    Previous Goals:   Patient will consume >/=60% estimated needs to justify TF discontinuation   Evaluation: Met    Previous Nutrition Diagnosis:   Inadequate oral intake related to recent diet " advancement >NPO with EN reliance as evidenced by just applesauce and apple juice taken so far this morning  Evaluation: Resolved      MALNUTRITION (2/24)  % Weight Loss:  Unable to fully determine    % Intake: <75% for > 7 days (moderate malnutrition)  (received EN below goal rate for up to 6 days between proning and hospital transfers)   Subcutaneous Fat Loss:  Orbital region mild-moderate depletion  Muscle Loss:  Temporal region milk depletion, potential further losses possibly masked by adiposity   Fluid Retention: Trace     Malnutrition Diagnosis: Moderate malnutrition  In Context of:  Acute illness or injury    CURRENT NUTRITION DIAGNOSIS   Altered nutrition related lab values r/t liver function evidenced by elevated LFTs    INTERVENTIONS  Recommendations / Nutrition Prescription  Discontinue TF, nutrisource fiber and prosource  Recommend Discontinue NJ  Change ceravite to oral multivitamin with minerals  Recommend adjusting insulin regimen to meet needs with discontinuation of tube feeding.     Implementation  MvI with minerals  New weight    Goals  No increase in LFT level r/t p.o intake      MONITORING AND EVALUATION:  Progress towards goals will be monitored and evaluated per protocol and Practice Guidelines      Sharon Zamora RDN, LD  Clinical Dietitian

## 2022-02-28 NOTE — PROGRESS NOTES
[facility] Nurse Inpatient Pressure Injury Assessment   Reason for consultation: Evaluate and treat  perianal wounds    Assessment  Pressure Injury: on perianal , hospital acquired ,   This is a Medical Device Related Pressure Injury (MDRPI) due to fecal management system  Pressure Injury is Stage Mucosal   Contributing factor of the pressure injury: pressure and moisture  Status: Resolved 2/28  Recommend provider order: None, at this time     Groin and Labia with fungal rash-Resolving    Treatment Plan  None needed       Orders Updated  Recommended provider order: None, at this time  WO Nurse follow-up plan:weekly  Nursing to notify the Provider(s) and re-consult the WO Nurse if wound(s) deteriorates or new skin concern.    Patient History  According to provider note(s):  No Yusuf is a 62 year old female with past medical history of type 2 diabetes mellitus, hypertension, hyperlipidemia, bipolar disorder, fibromyalgia, history of polysubstance abuse and currently on methadone, hypothyroidism, chronic hepatitis C who presented initially to the [facility] emergency department on 2/8/2022 with chief complaint of shortness of breath.  Of note, the patient is vaccinated for COVID-19 however did not receive the booster.  The patient's symptoms started on 2/2/2022 and had a positive COVID-19 test on 2/2/2022.  In the emergency department the patient was found to be hypoxic requiring supplemental oxygen with high flow nasal cannula.  Initial lab work showed a sodium of 132, potassium 5.8, albumin 2.6, ALT 55, CRP 77, ferritin 439, A1c 6.2, procalcitonin 0.07, troponin 81.  Venous blood gas showed PCO2 of 54 and PO2 of 33 and bicarb of 31.  Urinalysis revealed 43 WBCs, moderate leukocyte esterase, positive nitrates, and many bacteria.  Chest x-ray showed patchy opacities throughout both lungs compatible with COVID-19 infection.  CT chest showed no pulmonary embolism however did note patchy opacities and  consolidations throughout the lungs.  The patient had increasing oxygen requirements requiring continuous BiPAP.  Due to bed availability the patient was transferred to [facility] on 2/10/2022 after requiring intubation and mechanical ventilation for progressive hypoxia secondary to COVID-19 pneumonia.  The patient was started on remdesivir, dexamethasone, and baricitinib, as well as ICU dosing Lovenox.  She was initially treated with ceftriaxone for her urinary tract infection, which was broadened to cefepime, vancomycin, and azithromycin and switched to ciprofloxacin on 2/11 after transfer when urine culture grew E. coli resistant to penicillins and cephalosporins.  On 2/11 she had cardiac arrest at about 2:20 pm. She had been bradycardiac that morning and while being changed from proned to supine position she went into ventricular tachycardia consistent with Torsades adrian pointes. She had one cycle of chest compression, received one shock, and returned to sinus rhythm. Bradycardia persisted, however. Magnesium level during code was 2.0. She was given 2 gram IV Magnesium. Remdesivir and propofol were discontinued due to bradycardia. Dopamine drip was given for chronotropic support. PRN atropine was ordered. ECG showed ST depressions in V2-V6. Troponins became elevated to 893.  Heparin drip was started.  Cardiology was consulted.  Plan was for electrolyte monitoring and replacement as needed, bradycardia monitoring, continued heparin drip, and likely coronary angiogram once more stable.  Bradycardia persisted.  Several as needed doses of atropine were needed.  Cardiology recommended transfer to [facility] where cardiology EP and pacemaker capabilities were available.    Objective Data  Containment of urine/stool: Incontinence Protocol, Indwelling catheter and Internal fecal management    Active Diet Order  Orders Placed This Encounter      NPO per Anesthesia Guidelines for  Procedure/Surgery Except for: Meds      Output:   I/O last 3 completed shifts:  In: 1561 [P.O.:240; I.V.:3; NG/GT:1318]  Out: 890 [Urine:890]    Risk Assessment:   Sensory Perception: 4-->no impairment  Moisture: 3-->occasionally moist  Activity: 2-->chairfast  Mobility: 2-->very limited  Nutrition: 3-->adequate  Friction and Shear: 1-->problem  Rigoberto Score: 15                          Labs:   Recent Labs   Lab 02/28/22  0530 02/27/22  0527 02/23/22  0518 02/22/22  0420   ALBUMIN 2.2* 1.9*   < > 2.5*   HGB  --  10.5*   < > 13.3   WBC  --  7.4   < > 18.0*   CRP  --   --   --  23.8*    < > = values in this interval not displayed.       Physical Exam  Areas of skin assessed: focused Coccyx, buttock, perianal, heels, pannus, groin    Wound Location:  Perianal 6' Oclock  Date of last photo 2/24/22        Wound History: Pt with rectal tube pressing at 6 O'clock and mild leakage of stool    Wound Base: 100 % Intact mucosa     Palpation of the wound bed: normal      Drainage: none     Description of drainage: none     Measurements (length x width x depth, in cm) NA     Tunneling N/A     Undermining N/A  Periwound skin: intact      Color: normal and consistent with surrounding tissue      Temperature: normal   Odor: none  Pain: denies , none  Pain intervention prior to dressing change: NA    Groin/Labia: Moist denuded tissue, pale    Interventions  Visual inspection and assessment completed   Wound Care Rationale none necessary  Wound Care: None necessary  Supplies: none  Current off-loading measures: Pillows  Current support surface: Bariatric Low air loss mattress  Education provided to: importance of repositioning, plan of care, Moisture management and Off-loading pressure  Discussed plan of care with Nurse    Luis Daniel Mayorga RN CWOCN

## 2022-02-28 NOTE — CONSULTS
Care Management Initial Consult    General Information  Assessment completed with: Care Team Member, Caty (Owner and ) and Rachel (Nurse) from pt's [facility]  Type of CM/SW Visit: Initial Assessment    Primary Care Provider verified and updated as needed: Yes   Readmission within the last 30 days: no previous admission in last 30 days      Reason for Consult: care coordination/care conference, discharge planning, facility placement  Advance Care Planning:       General Information Comments: High readmission risk    Communication Assessment  Patient's communication style: spoken language (English or Bilingual)    Hearing Difficulty or Deaf: no   Wear Glasses or Blind: no    Cognitive  Cognitive/Neuro/Behavioral: .WDL except  Level of Consciousness: lethargic  Arousal Level: opens eyes spontaneously  Orientation: oriented x 4  Mood/Behavior: calm, cooperative  Best Language: 0 - No aphasia  Speech: spontaneous, clear, logical    Living Environment:   People in home: facility resident     Current living Arrangements: assisted living, group home  Name of Facility: [facility]  Able to return to prior arrangements: no  Living Arrangement Comments: Pt is now requiring too much care to return to her [facility]    Family/Social Support:  Care provided by: other (see comments) ([facility] staff)  Provides care for: no one                Description of Support System:   Unable to assess        Current Resources:   Patient receiving home care services: No     Community Resources: [facility] Programs, County Worker, Financial/Insurance, Group Home  Equipment currently used at home: walker, rolling  Supplies currently used at home: Diabetic Supplies    Employment/Financial:  Employment Status: disabled        Financial Concerns:   NA          Lifestyle & Psychosocial Needs:  Social Determinants of Health     Tobacco Use: Medium Risk     Smoking Tobacco Use: Former Smoker     Smokeless Tobacco Use: Never Used   Alcohol Use:  Not on file   Financial Resource Strain: Not on file   Food Insecurity: Not on file   Transportation Needs: Not on file   Physical Activity: Not on file   Stress: Not on file   Social Connections: Not on file   Intimate Partner Violence: Not on file   Depression: Not at risk     PHQ-2 Score: 0   Housing Stability: Not on file       Functional Status:  Prior to admission patient needed assistance: Supportive assistance.  Pt was able to ambulate independently with a walker       Mental Health Status:  Mental Health Status: Other (see comment) (unable to assess)       Chemical Dependency Status:  Chemical Dependency Status: No Current Concerns  Chemical Dependency Management: Methadone  Methadone Clinic: [facility]    Reason for Methadone: Opioid addiction    Values/Beliefs:  Spiritual, Cultural Beliefs, Holiness Practices, Values that affect care:  NA             Additional Information:  Pt started out this admission at [facility] on 2/8/22, then transferred to [facility] on 2/10/22.  She was then transferred her on 2/13/22.  Pt's discharging diagnoses were:  Acute Hypoxic Respiratory Failure  Covid 19 Pneumonia  Sepsis  Bradycardia  Torsades adrian pointes   Ventricular tachycardia  S/p code blue 2/12 with electrical cardioversion  Urinary Tract Infection with E.coli   Type 2 Diabetes Mellitus, insulin dependent   Hx of Polysubstance Abuse,   Opiate replacement therapy with methadone prior to admission  Hyperkalemia on presentation    Conversed with Caty ([phone]) Owner and  of pt's current group home that is licensed as an assisted living.  There are three residents at this home.  Pt has lived at this home for one year.  As noted, prior to admission, pt was able to ambulate with a walker independently.  Her medications are dispensed for her.  The group home staff checks her BGMs 3x per day.  Pt is not employed and is her own decision maker.   Writer tried to have some communication with pt, unfortunately pt is  lethargic and noncommunicating.   Pt is on maintenance Methadone due to past polysubstance abuse thru [facility].  Raizami noted she receives her Methadone weekly.  Therapies are recommending TCU.  Nutrition is now recommending discontinuing her tube feedings.  SW is aware of need for TCU placement.  Pt may be a difficult placement due to Methadone.    Pt's care Team is:  [facility] (Primary case Professional): [phone]  [facility] (Psychiatrist): [phone]  Carlene Francois(CADI ): [email or web address]    Care Management will continue to follow for safe transition.      Therese Harding, RN   Inpatient Care Management  [phone]

## 2022-02-28 NOTE — PLAN OF CARE
Vital signs stable on 2 L nasal cannula. Alert and oriented. Lung sounds diminished with crackles heard. Bowel sounds active, flatus present. NJ tube at [facility], tube feeds stopped at 2300. Miconazole for madonna area reddening. Patient denies pain. Up with 2 and lift. Tolerating NPO. CMS/neuros intact. Incontinent of urine. Tele normal sinus. Turn and repo q2h.

## 2022-02-28 NOTE — PROGRESS NOTES
[facility]    Medicine Progress Note - Hospitalist Service        Date of Admission:  2/13/2022  2:35 PM    Assessment & Plan:   No Yusuf is a 62 year old female with past medical history of type 2 diabetes mellitus, hypertension, hyperlipidemia, bipolar disorder, fibromyalgia, polysubstance abuse and currently on methadone, hypothyroidism, chronic hepatitis C who presented initially to the [facility] emergency department on 2/8/2022 with chief complaint of shortness of breath.  In the emergency department the patient was found to be hypoxic requiring supplemental oxygen.Due to bed availability the patient was transferred to [facility] on 2/10/2022 after requiring intubation and mechanical ventilation for progressive hypoxia secondary to COVID-19 pneumonia. The patient was started on remdesivir, dexamethasone, and baricitinib.  On 2/11 she had cardiac arrest at about 2:20 pm. She had been bradycardiac that morning and while being changed from proned to supine position she went into ventricular tachycardia consistent with Torsades adrian pointes. She had one cycle of chest compression, received one shock, and returned to sinus rhythm. Bradycardia persisted, however. Cardiology recommended transfer to [facility] where cardiology EP and pacemaker capabilities were available.     Acute hypoxemic respiratory failure, multifactorial  COVID-19 pneumonia, now recovered  -Positive for COVID-19 on 2/2  -CT chest 2/8- no PE, Patchy groundglass and consolidative opacities throughout the  Lungs, patient was intubated from 2/8-2/28.  -treated with remdesivir, dexamethasone, and baricitinib  -Now considered COVID recovered as of 2/23  -Respiratory status quite stable in the last several days, currently oxygen at 2-3 L via nasal cannula       Cardiac arrest - Torsade de Pointes  Suspected stress related cardiomyopathy - improvement in EF from 20% to 40% (2/18)  -On  2/11 at [facility] she went into ventricular tachycardia consistent with Torsades adrian pointes.had one cycle of chest compression, received one shock, and returned to sinus rhythm. Bradycardia persisted and she was transferred to [facility].  - Echo 2/12 - Severely decreased left ventricular systolic function; EF 20-25%.There  is severe global hypokinesia of the left ventricle. The basal segments appears to contract the best- possible stress cardiomyopathy?  - repeat Echo 2/27- improved EF 40-45%; there is mild global hypokinesia of the left ventricle. Mildly decreased right ventricular systolic function  -seen by cardiology- no need for PPM  -TSH was low  0.29 but fT4 1.39  -He received few doses of Lasix, does not appear to be fluid overloaded, will start on Lasix 20 mg daily  -avoid QT prolonging medications; she was resumed on her PTA Methadone and tolerating so far, continue to monitor on telemetry  -Mg goal 2.5, K goal 4.5; high protocol replacement protocol  -ASA  -hold atorvastatin due to transaminitis.  Please see discussion below  -OP cards f/u    Sepsis due to UTI   -She was initially treated with ceftriaxone for her urinary tract infection, which was broadened to cefepime, vancomycin, and azithromycin   -She had received cefepime until 2/20  -Patient has been afebrile tremors  -Monitor off antibiotics     Insulin requiring DM type 2  -PTA on Levemir 15 units qam and Metformin 1000 mg po BID; PTA Metformin held  -HbA1c 6.2 on 2/8  -Continue Lantus at 35 units a.m. and 30 units p.m.(decreased tonight's dose to 15 units as she will be n.p.o. for ultrasound)  -Continue with very high resistance ISS     Transaminitis   -Patient with worsening LFTs in the last 48 hours, ALT now more than 1100 and AST more than 500  - liver ultrasound shows hepatic steatosis, LFTs are trending down.  Possibly secondary to shock liver, statin on hold.       Hypertension  -PTA on Norvasc 5 mg po daily   -Antihypertensives have been  on hold, blood pressure on the softer side without antihypertensives at this time.     Hypothyroidism  -Continue PTA Synthroid     History of polysubstance abuse and currently on methadone  Fibromyalgia   -PTA on Methadone 80 mg po daily, Neurontin 1200 mg po TID  -while was in ICU, intubated- she had been on multiple drips- Ketamine infusion, Dilaudid infusion and Midazolam drip- now weaned off  -it was recommended to avoid QTc-prolonging medications given her cardiac arrest/torsades de [place]lexandria but she was restarted on Methadone 40 mg po BID for now while in ICU; last EKG 2/21- normal QTc 444  -Continue gabapentin at a lower dose 300 mg TID     Acute toxic encephalopathy  -likely related to recent sedation, now off all drips  -Patient was started on Valium 10 mg 3 times daily in the ICU.  Tapered to 5mg  TID 2/23 (hold for lethargy)  -Continue to taper Valium, will start decreasing to 2 mg twice a day.  With completely stopping within the next 48-72 hours.  -Mentation seems to be improving gradually  -PTA on Methadone 80 mg po daily, now on Methadone 40 mg po BID, hold for lethargy-continue PTA Gabapentin at a lower dose 300 mg po TID  -avoid sedating medications, hold Methadone for lethargy  -Encephalopathy is gradually improving     Bipolar disorder  -PTA on Cymbalta 60 mg po daily, Trazodone 50 mg po at bedtime and Atarax 100 mg po q6h prn for anxiety   -continue PTA Cymbalta at a lower dose 20 mg po daily; PTA Trazodone on hold     Dysphagia  Moderate malnutrition  -Malnutrition in the context of recent illness or injury, and dysphagia likely related to recent intubation  -has NGT in place, receiving TF  -Patient getting calorie count, which will be completed tomorrow, remove NGT if adequate nutrition  -SLP following, currently on mechanical soft diet     Diarrhea  -likely related to TF  -Improved  -Cdiff negative on 2/15  -Nutrisource Fiber     Pressure injury on perianal area, hospital-acquired due to medical  "device related due to fecal management system  -Wound care following     Generalized weakness  -PT/OT  -SW consult    Severe obesity  -OP f/u    Diet: Snacks/Supplements Adult: Ensure Max Protein (bariatric); With Meals  NPO per Anesthesia Guidelines for Procedure/Surgery Except for: Meds     DVT Prophylaxis: Enoxaparin (Lovenox) SQ   Valle Catheter: Not present  Code Status: Full Code     Disposition Plan    Expected Discharge: 03/04/2022     Anticipated discharge location:  Awaiting care coordination huddle  Delays:        Entered: Hawa Holt MD 02/28/2022, 3:06 PM        Clinically Significant Risk Factors Present on Admission                      The patient's care was discussed with the Bedside Nurse and Patient.        ______________________________________________________________________    Interval History   Patient is more alert today, able to identify her location, and answer questions appropriately.  Denies any pain, NG tube clamped.    Data reviewed today: I reviewed all medications, new labs and imaging results over the last 24 hours. I personally reviewed no images or EKG's today.    Physical Exam   Vital signs:  Temp: 97.3  F (36.3  C) Temp src: Oral BP: 95/56 Pulse: 79   Resp: 17 SpO2: 95 % O2 Device: Nasal cannula Oxygen Delivery: 4 LPM   Weight: 114.3 kg (251 lb 15.8 oz)  Estimated body mass index is 43.25 kg/m  as calculated from the following:    Height as of 6/3/21: 1.626 m (5' 4\").    Weight as of this encounter: 114.3 kg (251 lb 15.8 oz).      Wt Readings from Last 2 Encounters:   02/22/22 114.3 kg (251 lb 15.8 oz)   02/13/22 122.7 kg (270 lb 6.4 oz)       Constitutional: Awake, alert, cooperative, no apparent distress, obese  Respiratory: Breath sounds reduced bilateral bases  Cardiovascular: Regular rate and rhythm, normal S1 and S2, and no murmur noted  GI: Normal bowel sounds, soft, non-distended, non-tender  Skin/Integumen: No rashes, no cyanosis, 1+ edema noted bilateral lower " extremities  Neuro : moving all 4 extremities, no focal deficit noted         Data   Recent Labs   Lab 02/28/22  1204 02/28/22  1202 02/28/22  0821 02/28/22  0530 02/27/22  0822 02/27/22  0527 02/26/22  1201 02/26/22  0516   WBC 7.5  --   --   --   --  7.4  --  10.2   HGB 10.7*  --   --   --   --  10.5*  --  11.9   MCV 91  --   --   --   --  90  --  93     --   --   --   --  353  --  385   NA  --   --   --  134  --  130*  --  130*   POTASSIUM  --   --   --  4.1  --  4.1  --  5.3   CHLORIDE  --   --   --  101  --  98  --  98   CO2  --   --   --  31  --  29  --  27   BUN  --   --   --  23  --  22  --  22   CR  --   --   --  0.57  --  0.53  --  0.57   ANIONGAP  --   --   --  2*  --  3  --  5   RODOLFO  --   --   --  8.9  --  8.7  --  9.3   GLC  --  130* 121* 144*   < > 115*   < > 111*   ALBUMIN  --   --   --  2.2*  --  1.9*  --   --    PROTTOTAL  --   --   --  7.2  --  7.4  --   --    BILITOTAL  --   --   --  0.6  --  0.9  --   --    ALKPHOS  --   --   --  297*  --  352*  --   --    ALT  --   --   --  750*  --  1,152*  --   --    AST  --   --   --  181*  --  551*  --   --     < > = values in this interval not displayed.       Recent Results (from the past 24 hour(s))   US Abdomen Limited    Narrative    US ABDOMEN LIMITED 2/28/2022 8:06 AM    CLINICAL HISTORY: elevated LFTS, same  TECHNIQUE: Limited abdominal ultrasound.    COMPARISON: None.    FINDINGS:    GALLBLADDER: Surgically absent.    BILE DUCTS: There is no intrahepatic bile duct dilation. The common  duct is obscured by bowel gas.    LIVER: Hepatic steatosis. No liver lesions.    RIGHT KIDNEY: No hydronephrosis.    PANCREAS: The visualized portions of the pancreas are normal.    No ascites.      Impression    IMPRESSION:  1.  Hepatic steatosis. No liver lesions or bile duct dilation.    NOMI HUA MD         SYSTEM ID:  [id number]     Medications     sodium chloride Stopped (02/23/22 0800)       acetaminophen  500 mg Oral or Feeding Tube TID     aspirin   81 mg Oral Daily     [Held by provider] atorvastatin  20 mg Per Feeding Tube At Bedtime     cholecalciferol  1,250 mcg Oral Q7 Days     cyanocobalamin  1,000 mcg Oral Daily     diazepam  2 mg Oral Q12H     DULoxetine  20 mg Oral Daily     enoxaparin ANTICOAGULANT  40 mg Subcutaneous Q12H     fluticasone-vilanterol  1 puff Inhalation Daily     gabapentin  300 mg Oral Q8H RADHA     insulin aspart  1-22 Units Subcutaneous Q4H     insulin glargine  15 Units Subcutaneous At Bedtime     insulin glargine  35 Units Subcutaneous QAM AC     levothyroxine  200 mcg Oral Daily     magnesium oxide  400 mg Oral BID     methadone  40 mg Oral BID     miconazole   Topical BID     multivitamin, therapeutic  1 tablet Oral Daily     pantoprazole  40 mg Oral QAM AC     potassium chloride  20 mEq Oral BID     sodium chloride (PF)  3 mL Intracatheter Q8H     Vitamin D3  25 mcg Oral or Feeding Tube Daily

## 2022-02-28 NOTE — PLAN OF CARE
2/27 [id number]  Lethargic/alert and oriented x4, forgetful to situation. VSS on 2L O2 via NC, continue to wean. Up with 2 and lift, tolerated chair today. T/R Q2. Mechanical soft diet, tolerating well, calorie count day 3/3, plan to remove NJ tomorrow. L nostril NJ tube @ 110cm, tube feeds running at 55ml/hr with 60ml flushes q4, stop at midnight for NPO. LS diminished. BS+. No BM. Valle removed, voiding well. PIV SL. Redness and wound on coccyx/ buttocks/perineum, care per WOC orders. Scattered bruises, generalized edema. Abdominal/back pain managed with scheduled Tylenol, PRN Atarax & Tums. Tele NSR. PIV saline locked. BG Q4. Na 130, Mg - replacing. Liver enzymes elevated. Plan of US of abdomen tomorrow. Discharge pending to TCU. WOC/PT/OT/SW following.

## 2022-03-01 LAB
ANION GAP SERPL CALCULATED.3IONS-SCNC: 4 MMOL/L (ref 3–14)
BUN SERPL-MCNC: 19 MG/DL (ref 7–30)
CALCIUM SERPL-MCNC: 9.2 MG/DL (ref 8.5–10.1)
CHLORIDE BLD-SCNC: 100 MMOL/L (ref 94–109)
CO2 SERPL-SCNC: 31 MMOL/L (ref 20–32)
CREAT SERPL-MCNC: 0.57 MG/DL (ref 0.52–1.04)
ERYTHROCYTE [DISTWIDTH] IN BLOOD BY AUTOMATED COUNT: 15.8 % (ref 10–15)
GFR SERPL CREATININE-BSD FRML MDRD: >90 ML/MIN/1.73M2
GLUCOSE BLD-MCNC: 110 MG/DL (ref 70–99)
GLUCOSE BLDC GLUCOMTR-MCNC: 101 MG/DL (ref 70–99)
GLUCOSE BLDC GLUCOMTR-MCNC: 106 MG/DL (ref 70–99)
GLUCOSE BLDC GLUCOMTR-MCNC: 119 MG/DL (ref 70–99)
GLUCOSE BLDC GLUCOMTR-MCNC: 131 MG/DL (ref 70–99)
GLUCOSE BLDC GLUCOMTR-MCNC: 133 MG/DL (ref 70–99)
GLUCOSE BLDC GLUCOMTR-MCNC: 95 MG/DL (ref 70–99)
HCT VFR BLD AUTO: 35.1 % (ref 35–47)
HGB BLD-MCNC: 11.3 G/DL (ref 11.7–15.7)
MAGNESIUM SERPL-MCNC: 2.2 MG/DL (ref 1.6–2.3)
MCH RBC QN AUTO: 29 PG (ref 26.5–33)
MCHC RBC AUTO-ENTMCNC: 32.2 G/DL (ref 31.5–36.5)
MCV RBC AUTO: 90 FL (ref 78–100)
PHOSPHATE SERPL-MCNC: 4.5 MG/DL (ref 2.5–4.5)
PLATELET # BLD AUTO: 322 10E3/UL (ref 150–450)
POTASSIUM BLD-SCNC: 4 MMOL/L (ref 3.4–5.3)
RBC # BLD AUTO: 3.9 10E6/UL (ref 3.8–5.2)
SODIUM SERPL-SCNC: 135 MMOL/L (ref 133–144)
WBC # BLD AUTO: 7.4 10E3/UL (ref 4–11)

## 2022-03-01 PROCEDURE — 80048 BASIC METABOLIC PNL TOTAL CA: CPT | Performed by: INTERNAL MEDICINE

## 2022-03-01 PROCEDURE — 250N000013 HC RX MED GY IP 250 OP 250 PS 637: Performed by: INTERNAL MEDICINE

## 2022-03-01 PROCEDURE — 85018 HEMOGLOBIN: CPT | Performed by: INTERNAL MEDICINE

## 2022-03-01 PROCEDURE — 36415 COLL VENOUS BLD VENIPUNCTURE: CPT | Performed by: INTERNAL MEDICINE

## 2022-03-01 PROCEDURE — 83735 ASSAY OF MAGNESIUM: CPT | Performed by: INTERNAL MEDICINE

## 2022-03-01 PROCEDURE — 84100 ASSAY OF PHOSPHORUS: CPT | Performed by: INTERNAL MEDICINE

## 2022-03-01 PROCEDURE — 120N000013 HC R&B IMCU

## 2022-03-01 PROCEDURE — 250N000011 HC RX IP 250 OP 636: Performed by: INTERNAL MEDICINE

## 2022-03-01 PROCEDURE — 99233 SBSQ HOSP IP/OBS HIGH 50: CPT | Performed by: INTERNAL MEDICINE

## 2022-03-01 RX ADMIN — ASPIRIN 81 MG CHEWABLE TABLET 81 MG: 81 TABLET CHEWABLE at 10:34

## 2022-03-01 RX ADMIN — DULOXETINE HYDROCHLORIDE 20 MG: 20 CAPSULE, DELAYED RELEASE ORAL at 10:35

## 2022-03-01 RX ADMIN — LEVOTHYROXINE SODIUM 200 MCG: 100 TABLET ORAL at 11:00

## 2022-03-01 RX ADMIN — ACETAMINOPHEN 500 MG: 325 SUSPENSION ORAL at 16:57

## 2022-03-01 RX ADMIN — Medication 25 MCG: at 10:35

## 2022-03-01 RX ADMIN — ENOXAPARIN SODIUM 40 MG: 40 INJECTION SUBCUTANEOUS at 21:40

## 2022-03-01 RX ADMIN — GABAPENTIN 300 MG: 250 SUSPENSION ORAL at 10:36

## 2022-03-01 RX ADMIN — DIAZEPAM 2 MG: 2 TABLET ORAL at 10:34

## 2022-03-01 RX ADMIN — THERA TABS 1 TABLET: TAB at 10:35

## 2022-03-01 RX ADMIN — FUROSEMIDE 20 MG: 20 TABLET ORAL at 10:34

## 2022-03-01 RX ADMIN — POTASSIUM CHLORIDE 20 MEQ: 1.5 POWDER, FOR SOLUTION ORAL at 10:33

## 2022-03-01 RX ADMIN — POTASSIUM CHLORIDE 20 MEQ: 1.5 POWDER, FOR SOLUTION ORAL at 21:40

## 2022-03-01 RX ADMIN — DIAZEPAM 2 MG: 2 TABLET ORAL at 21:40

## 2022-03-01 RX ADMIN — ACETAMINOPHEN 500 MG: 325 SUSPENSION ORAL at 21:40

## 2022-03-01 RX ADMIN — ACETAMINOPHEN 500 MG: 325 SUSPENSION ORAL at 10:33

## 2022-03-01 RX ADMIN — Medication 40 MG: at 10:36

## 2022-03-01 RX ADMIN — ENOXAPARIN SODIUM 40 MG: 40 INJECTION SUBCUTANEOUS at 10:36

## 2022-03-01 RX ADMIN — GABAPENTIN 300 MG: 250 SUSPENSION ORAL at 00:08

## 2022-03-01 RX ADMIN — CYANOCOBALAMIN TAB 1000 MCG 1000 MCG: 1000 TAB at 10:35

## 2022-03-01 RX ADMIN — FLUTICASONE FUROATE AND VILANTEROL TRIFENATATE 1 PUFF: 100; 25 POWDER RESPIRATORY (INHALATION) at 11:06

## 2022-03-01 ASSESSMENT — ACTIVITIES OF DAILY LIVING (ADL)
ADLS_ACUITY_SCORE: 21

## 2022-03-01 NOTE — PLAN OF CARE
Goal Outcome Evaluation:    Plan of Care Reviewed With: patient     Overall Patient Progress: improving         2/28 2300 - 0730  Pt is A & O x4, Lethargic and forgetful at times. Up with 2 and a lift. VSS on 2L O2 through NC exp for soft BP. Tele: SR. LS: Dim. Diet: No order for mechanical soft diet. No BM this shift, BS+. Purewick in place. PIV SL. Denies pain, SOB; nausea or vomiting. Refused some of her oral bedtime meds and her QAM levothyroxine. Scattered bruises, +1 generalized edema. Q4H BGL: 101 at 0000 and 117 at 0400. Bedtime Lantus placed on hold and subsequent dosage reduced. Pending discharge to TCU. PT/OT; WOC are following.

## 2022-03-01 NOTE — PROGRESS NOTES
Care Management Follow Up    Length of Stay (days): 16    Expected Discharge Date: 03/04/2022     Concerns to be Addressed:       Patient plan of care discussed at interdisciplinary rounds: Yes    Anticipated Discharge Disposition: Transitional Care     Anticipated Discharge Services:    Anticipated Discharge DME:      Patient/family educated on Medicare website which has current facility and service quality ratings:    Education Provided on the Discharge Plan:    Patient/Family in Agreement with the Plan: unable to assess    Referrals Placed by CM/SW:    Private pay costs discussed: Not applicable    Additional Information:  SW spoke with patient and requested TCU options. Patient informed [facility] she does not have any specific places but would like to be close to home. SW made referrals to: [facility], [facility] [place], [facility], [facility], [facility], [facility], and [facility]. [facility] will continue to follow.      LILLIE Wilson    [facility]      Addendum: SW received a call form the [facility] asking which location the patient would like. SW callled back and informed her [facility], [facility], or [facility]. SW was informed [facility] does not have any beds and she will send the referral onto [facility] and [facility].    LILLIE Wilson    [facility]

## 2022-03-01 NOTE — PLAN OF CARE
Goal Outcome Evaluation:    Plan of Care Reviewed With: patient     Overall Patient Progress: no change         A&Ox4, very tired. VSS on 2L NC. Declined PO meds tonight. Declined PO intake. BGs in 80s, lantus held. Denies pain. Incontinent of bowel and bladder, purewick in place. Repositions self in bed. Mechanical soft diet? - needs diet order placed.

## 2022-03-01 NOTE — PROGRESS NOTES
Cross cover note    Page regarding blood sugars in the 80s earlier this afternoon and around 9 PM.  Patient is apparently having minimal p.o. intake.  No report of concerning symptoms at this point.  Glargine had been decreased to 15 units but we will hold this for tonight.  Additionally I decrease the morning dose of glargine from 35 to 20 units-we will defer to daytime rounder tomorrow pending overnight/a.m. blood sugar levels.  Discussed with RN over the phone.

## 2022-03-01 NOTE — PROGRESS NOTES
[facility]    Medicine Progress Note - Hospitalist Service        Date of Admission:  2/13/2022  2:35 PM    Assessment & Plan:   No Yusuf is a 62 year old female with past medical history of type 2 diabetes mellitus, hypertension, hyperlipidemia, bipolar disorder, fibromyalgia, polysubstance abuse and currently on methadone, hypothyroidism, chronic hepatitis C who presented initially to the [facility] emergency department on 2/8/2022 with chief complaint of shortness of breath.  In the emergency department the patient was found to be hypoxic requiring supplemental oxygen.Due to bed availability the patient was transferred to [facility] on 2/10/2022 after requiring intubation and mechanical ventilation for progressive hypoxia secondary to COVID-19 pneumonia. The patient was started on remdesivir, dexamethasone, and baricitinib.  On 2/11 she had cardiac arrest at about 2:20 pm. She had been bradycardiac that morning and while being changed from proned to supine position she went into ventricular tachycardia consistent with Torsades adrian pointes. She had one cycle of chest compression, received one shock, and returned to sinus rhythm. Bradycardia persisted, however. Cardiology recommended transfer to [facility] where cardiology EP and pacemaker capabilities were available.     Acute hypoxemic respiratory failure, multifactorial  COVID-19 pneumonia, now recovered  -Positive for COVID-19 on 2/2  -CT chest 2/8- no PE, Patchy groundglass and consolidative opacities throughout the  Lungs, patient was intubated from 2/8-2/28.  -treated with remdesivir, dexamethasone, and baricitinib  -Now considered COVID recovered as of 2/23  -Respiratory status quite stable in the last several days, currently oxygen at 2-3 L via nasal cannula       Cardiac arrest - Torsade de Pointes  Suspected stress related cardiomyopathy - improvement in EF from 20% to 40% (2/18)  -On  2/11 at [facility] she went into ventricular tachycardia consistent with Torsades adrian pointes.had one cycle of chest compression, received one shock, and returned to sinus rhythm. Bradycardia persisted and she was transferred to [facility].  - Echo 2/12 - Severely decreased left ventricular systolic function; EF 20-25%.There  is severe global hypokinesia of the left ventricle. The basal segments appears to contract the best- possible stress cardiomyopathy?  - repeat Echo 2/27- improved EF 40-45%; there is mild global hypokinesia of the left ventricle. Mildly decreased right ventricular systolic function  -seen by cardiology- no need for PPM  -TSH was low  0.29 but fT4 1.39  -He received few doses of Lasix, does not appear to be fluid overloaded, will start on Lasix 20 mg daily  -avoid QT prolonging medications; she was resumed on her PTA Methadone and tolerating so far, continue to monitor on telemetry  -Mg goal 2.5, K goal 4.5; high protocol replacement protocol  -ASA  -hold atorvastatin due to transaminitis.  Please see discussion below  -OP cards f/u    Sepsis due to UTI   -She was initially treated with ceftriaxone for her urinary tract infection, which was broadened to cefepime, vancomycin, and azithromycin   -She had received cefepime until 2/20  -Patient has been afebrile tremors  -Monitor off antibiotics     Insulin requiring DM type 2  -PTA on Levemir 15 units qam and Metformin 1000 mg po BID; PTA Metformin held  -HbA1c 6.2 on 2/8  -Patient was on Lantus at 35 units a.m. and 30 units p.m this admission, 2/28 night and 3/1 her blood sugar started dropping, stopped nighttime Lantus, reduced her daytime Lantus to 15 units, please adjust accordingly.  -Continue with very high resistance ISS     Transaminitis   -Patient with worsening LFTs in the last 48 hours, ALT now more than 1100 and AST more than 500  - liver ultrasound shows hepatic steatosis, LFTs are trending down.  Possibly secondary to shock liver,  statin on hold.       Hypertension  -PTA on Norvasc 5 mg po daily   -Antihypertensives have been on hold, blood pressure on the softer side without antihypertensives at this time.     Hypothyroidism  -Continue PTA Synthroid     History of polysubstance abuse and currently on methadone  Fibromyalgia   -PTA on Methadone 80 mg po daily, Neurontin 1200 mg po TID  -while was in ICU, intubated- she had been on multiple drips- Ketamine infusion, Dilaudid infusion and Midazolam drip- now weaned off  -it was recommended to avoid QTc-prolonging medications given her cardiac arrest/torsades de alexandria but she was restarted on Methadone 40 mg po BID for now while in ICU; last EKG 2/21- normal QTc 444  -Continue gabapentin at a lower dose 300 mg TID     Acute toxic encephalopathy  -likely related to recent sedation, now off all drips  -Patient was started on Valium 10 mg 3 times daily in the ICU.  Tapered to 5mg  TID 2/23 (hold for lethargy)  -Continue to taper Valium, will start decreasing to 2 mg twice a day.  With completely stopping within the next 48-72 hours.  -Mentation seems to be improving gradually  -PTA on Methadone 80 mg po daily, now on Methadone 40 mg po BID, hold for lethargy-continue PTA Gabapentin at a lower dose 300 mg po TID  -avoid sedating medications, hold Methadone for lethargy  -Encephalopathy is gradually improving     Bipolar disorder  -PTA on Cymbalta 60 mg po daily, Trazodone 50 mg po at bedtime and Atarax 100 mg po q6h prn for anxiety   -continue PTA Cymbalta at a lower dose 20 mg po daily; PTA Trazodone on hold     Dysphagia  Moderate malnutrition  -Malnutrition in the context of recent illness or injury, and dysphagia likely related to recent intubation  -has NGT in place, receiving TF  -Patient getting calorie count, which will be completed tomorrow, remove NGT if adequate nutrition  -SLP following, patient is currently on mechanical soft diet,   NG tube removed on 2/28, started on mechanical soft  "diet, will transition medications to p.o.     Diarrhea  -likely related to TF  -Improved  -Cdiff negative on 2/15  -Nutrisource Fiber     Pressure injury on perianal area, hospital-acquired due to medical device related due to fecal management system  -Wound care following     Generalized weakness  -PT/OT  -SW consult    Severe obesity  -OP f/u    Diet: Snacks/Supplements Adult: Ensure Max Protein (bariatric); With Meals  Mechanical/Dental Soft Diet     DVT Prophylaxis: Enoxaparin (Lovenox) SQ   Valle Catheter: Not present  Code Status: Full Code     Disposition Plan    Expected Discharge: 03/04/2022     Anticipated discharge location:  Awaiting care coordination huddle  Delays:        Entered: Hawa Holt MD 03/01/2022, 1:14 PM        Clinically Significant Risk Factors Present on Admission                      The patient's care was discussed with the Bedside Nurse and Patient.        ______________________________________________________________________    Interval History   NG tube removed, more alert, denies any new concerns.    Data reviewed today: I reviewed all medications, new labs and imaging results over the last 24 hours. I personally reviewed no images or EKG's today.    Physical Exam   Vital signs:  Temp: 98.1  F (36.7  C) Temp src: Axillary BP: 113/69 Pulse: 81   Resp: 16 SpO2: 94 % O2 Device: Nasal cannula Oxygen Delivery: 4 LPM   Weight: 137.9 kg (304 lb)  Estimated body mass index is 52.18 kg/m  as calculated from the following:    Height as of 6/3/21: 1.626 m (5' 4\").    Weight as of this encounter: 137.9 kg (304 lb).      Wt Readings from Last 2 Encounters:   03/01/22 137.9 kg (304 lb)   02/13/22 122.7 kg (270 lb 6.4 oz)       Constitutional: Awake, alert, cooperative, no apparent distress, obese  Respiratory: Breath sounds reduced bilateral bases  Cardiovascular: Regular rate and rhythm, normal S1 and S2, and no murmur noted  GI: Normal bowel sounds, soft, non-distended, " non-tender  Skin/Integumen: No rashes, no cyanosis, 1+ edema noted bilateral lower extremities  Neuro : moving all 4 extremities, no focal deficit noted         Data   Recent Labs   Lab 03/01/22  1112 03/01/22  0723 03/01/22  0533 02/28/22  1513 02/28/22  1204 02/28/22  0821 02/28/22  0530 02/27/22  0822 02/27/22  0527   WBC  --   --  7.4  --  7.5  --   --   --  7.4   HGB  --   --  11.3*  --  10.7*  --   --   --  10.5*   MCV  --   --  90  --  91  --   --   --  90   PLT  --   --  322  --  349  --   --   --  353   NA  --   --  135  --   --   --  134  --  130*   POTASSIUM  --   --  4.0  --   --   --  4.1  --  4.1   CHLORIDE  --   --  100  --   --   --  101  --  98   CO2  --   --  31  --   --   --  31  --  29   BUN  --   --  19  --   --   --  23  --  22   CR  --   --  0.57  --   --   --  0.57  --  0.53   ANIONGAP  --   --  4  --   --   --  2*  --  3   RODOLFO  --   --  9.2  --   --   --  8.9  --  8.7   * 95 110*   < >  --    < > 144*   < > 115*   ALBUMIN  --   --   --   --   --   --  2.2*  --  1.9*   PROTTOTAL  --   --   --   --   --   --  7.2  --  7.4   BILITOTAL  --   --   --   --   --   --  0.6  --  0.9   ALKPHOS  --   --   --   --   --   --  297*  --  352*   ALT  --   --   --   --   --   --  750*  --  1,152*   AST  --   --   --   --   --   --  181*  --  551*    < > = values in this interval not displayed.       No results found for this or any previous visit (from the past 24 hour(s)).  Medications     sodium chloride Stopped (02/23/22 0800)       acetaminophen  500 mg Oral or Feeding Tube TID     aspirin  81 mg Oral Daily     [Held by provider] atorvastatin  20 mg Per Feeding Tube At Bedtime     cholecalciferol  1,250 mcg Oral Q7 Days     cyanocobalamin  1,000 mcg Oral Daily     diazepam  2 mg Oral Q12H     DULoxetine  20 mg Oral Daily     enoxaparin ANTICOAGULANT  40 mg Subcutaneous Q12H     fluticasone-vilanterol  1 puff Inhalation Daily     furosemide  20 mg Oral Daily     gabapentin  300 mg Oral Q8H RADHA      insulin aspart  1-22 Units Subcutaneous Q4H     [Held by provider] insulin glargine  15 Units Subcutaneous At Bedtime     insulin glargine  20 Units Subcutaneous QAM AC     levothyroxine  200 mcg Oral Daily     methadone  40 mg Oral BID     multivitamin, therapeutic  1 tablet Oral Daily     pantoprazole  40 mg Oral QAM AC     potassium chloride  20 mEq Oral BID     sodium chloride (PF)  3 mL Intracatheter Q8H     Vitamin D3  25 mcg Oral or Feeding Tube Daily

## 2022-03-01 NOTE — PLAN OF CARE
Pt is A&Ox4 but lethargic, sleeps in between cares. BG of 121, 130, and 87. Needs encouragement to eat and drink water throughout the day. Soft Bp's, provider aware. Liver US today, see results. NG tube removed, tolerating oral diet. WOC consulted today, see note. Tele SR. Continent for me this shift but does have periods of incontinence at times. Refused methadone doses this shift.

## 2022-03-02 ENCOUNTER — APPOINTMENT (OUTPATIENT)
Dept: PHYSICAL THERAPY | Facility: CLINIC | Age: 62
DRG: 207 | End: 2022-03-02
Attending: INTERNAL MEDICINE
Payer: COMMERCIAL

## 2022-03-02 ENCOUNTER — APPOINTMENT (OUTPATIENT)
Dept: OCCUPATIONAL THERAPY | Facility: CLINIC | Age: 62
DRG: 207 | End: 2022-03-02
Attending: INTERNAL MEDICINE
Payer: COMMERCIAL

## 2022-03-02 LAB
ALBUMIN SERPL-MCNC: 2.4 G/DL (ref 3.4–5)
ALP SERPL-CCNC: 270 U/L (ref 40–150)
ALT SERPL W P-5'-P-CCNC: 415 U/L (ref 0–50)
ANION GAP SERPL CALCULATED.3IONS-SCNC: 9 MMOL/L (ref 3–14)
AST SERPL W P-5'-P-CCNC: 88 U/L (ref 0–45)
BILIRUB SERPL-MCNC: 0.5 MG/DL (ref 0.2–1.3)
BUN SERPL-MCNC: 14 MG/DL (ref 7–30)
CALCIUM SERPL-MCNC: 9.1 MG/DL (ref 8.5–10.1)
CHLORIDE BLD-SCNC: 102 MMOL/L (ref 94–109)
CO2 SERPL-SCNC: 28 MMOL/L (ref 20–32)
CREAT SERPL-MCNC: 0.6 MG/DL (ref 0.52–1.04)
ERYTHROCYTE [DISTWIDTH] IN BLOOD BY AUTOMATED COUNT: 15.9 % (ref 10–15)
GFR SERPL CREATININE-BSD FRML MDRD: >90 ML/MIN/1.73M2
GLUCOSE BLD-MCNC: 147 MG/DL (ref 70–99)
GLUCOSE BLDC GLUCOMTR-MCNC: 118 MG/DL (ref 70–99)
GLUCOSE BLDC GLUCOMTR-MCNC: 151 MG/DL (ref 70–99)
GLUCOSE BLDC GLUCOMTR-MCNC: 156 MG/DL (ref 70–99)
GLUCOSE BLDC GLUCOMTR-MCNC: 159 MG/DL (ref 70–99)
GLUCOSE BLDC GLUCOMTR-MCNC: 175 MG/DL (ref 70–99)
GLUCOSE BLDC GLUCOMTR-MCNC: 204 MG/DL (ref 70–99)
HCT VFR BLD AUTO: 37.7 % (ref 35–47)
HGB BLD-MCNC: 12.1 G/DL (ref 11.7–15.7)
MAGNESIUM SERPL-MCNC: 2.3 MG/DL (ref 1.6–2.3)
MCH RBC QN AUTO: 29.3 PG (ref 26.5–33)
MCHC RBC AUTO-ENTMCNC: 32.1 G/DL (ref 31.5–36.5)
MCV RBC AUTO: 91 FL (ref 78–100)
PHOSPHATE SERPL-MCNC: 4.2 MG/DL (ref 2.5–4.5)
PLATELET # BLD AUTO: 291 10E3/UL (ref 150–450)
POTASSIUM BLD-SCNC: 3.9 MMOL/L (ref 3.4–5.3)
PROT SERPL-MCNC: 8.1 G/DL (ref 6.8–8.8)
RBC # BLD AUTO: 4.13 10E6/UL (ref 3.8–5.2)
SODIUM SERPL-SCNC: 139 MMOL/L (ref 133–144)
WBC # BLD AUTO: 6.5 10E3/UL (ref 4–11)

## 2022-03-02 PROCEDURE — 84100 ASSAY OF PHOSPHORUS: CPT | Performed by: INTERNAL MEDICINE

## 2022-03-02 PROCEDURE — 250N000013 HC RX MED GY IP 250 OP 250 PS 637: Performed by: INTERNAL MEDICINE

## 2022-03-02 PROCEDURE — 250N000011 HC RX IP 250 OP 636: Performed by: INTERNAL MEDICINE

## 2022-03-02 PROCEDURE — 80053 COMPREHEN METABOLIC PANEL: CPT | Performed by: INTERNAL MEDICINE

## 2022-03-02 PROCEDURE — 36415 COLL VENOUS BLD VENIPUNCTURE: CPT | Performed by: INTERNAL MEDICINE

## 2022-03-02 PROCEDURE — 83735 ASSAY OF MAGNESIUM: CPT | Performed by: INTERNAL MEDICINE

## 2022-03-02 PROCEDURE — 97535 SELF CARE MNGMENT TRAINING: CPT | Mod: GO | Performed by: OCCUPATIONAL THERAPIST

## 2022-03-02 PROCEDURE — 97110 THERAPEUTIC EXERCISES: CPT | Mod: GP | Performed by: PHYSICAL THERAPIST

## 2022-03-02 PROCEDURE — 99233 SBSQ HOSP IP/OBS HIGH 50: CPT | Performed by: HOSPITALIST

## 2022-03-02 PROCEDURE — 120N000013 HC R&B IMCU

## 2022-03-02 PROCEDURE — 82040 ASSAY OF SERUM ALBUMIN: CPT | Performed by: INTERNAL MEDICINE

## 2022-03-02 PROCEDURE — 85027 COMPLETE CBC AUTOMATED: CPT | Performed by: INTERNAL MEDICINE

## 2022-03-02 PROCEDURE — 97530 THERAPEUTIC ACTIVITIES: CPT | Mod: GP | Performed by: PHYSICAL THERAPIST

## 2022-03-02 RX ORDER — POTASSIUM CHLORIDE 1500 MG/1
20 TABLET, EXTENDED RELEASE ORAL 2 TIMES DAILY
Status: DISCONTINUED | OUTPATIENT
Start: 2022-03-02 | End: 2022-03-04 | Stop reason: HOSPADM

## 2022-03-02 RX ORDER — ACETAMINOPHEN 500 MG
500 TABLET ORAL 3 TIMES DAILY
Status: DISCONTINUED | OUTPATIENT
Start: 2022-03-02 | End: 2022-03-04 | Stop reason: HOSPADM

## 2022-03-02 RX ORDER — PANTOPRAZOLE SODIUM 40 MG/1
40 TABLET, DELAYED RELEASE ORAL
Status: DISCONTINUED | OUTPATIENT
Start: 2022-03-03 | End: 2022-03-04 | Stop reason: HOSPADM

## 2022-03-02 RX ORDER — VITAMIN B COMPLEX
25 TABLET ORAL DAILY
Status: DISCONTINUED | OUTPATIENT
Start: 2022-03-03 | End: 2022-03-04 | Stop reason: HOSPADM

## 2022-03-02 RX ADMIN — FLUTICASONE FUROATE AND VILANTEROL TRIFENATATE 1 PUFF: 100; 25 POWDER RESPIRATORY (INHALATION) at 11:43

## 2022-03-02 RX ADMIN — FUROSEMIDE 20 MG: 20 TABLET ORAL at 10:32

## 2022-03-02 RX ADMIN — Medication 25 MCG: at 10:32

## 2022-03-02 RX ADMIN — CYANOCOBALAMIN TAB 1000 MCG 1000 MCG: 1000 TAB at 10:32

## 2022-03-02 RX ADMIN — INSULIN ASPART 2 UNITS: 100 INJECTION, SOLUTION INTRAVENOUS; SUBCUTANEOUS at 00:03

## 2022-03-02 RX ADMIN — DULOXETINE HYDROCHLORIDE 20 MG: 20 CAPSULE, DELAYED RELEASE ORAL at 10:32

## 2022-03-02 RX ADMIN — DIAZEPAM 2 MG: 2 TABLET ORAL at 21:56

## 2022-03-02 RX ADMIN — POTASSIUM CHLORIDE 20 MEQ: 1500 TABLET, EXTENDED RELEASE ORAL at 21:56

## 2022-03-02 RX ADMIN — LEVOTHYROXINE SODIUM 200 MCG: 100 TABLET ORAL at 05:20

## 2022-03-02 RX ADMIN — GABAPENTIN 300 MG: 250 SUSPENSION ORAL at 11:44

## 2022-03-02 RX ADMIN — THERA TABS 1 TABLET: TAB at 10:31

## 2022-03-02 RX ADMIN — ACETAMINOPHEN 500 MG: 325 SUSPENSION ORAL at 10:30

## 2022-03-02 RX ADMIN — DIAZEPAM 2 MG: 2 TABLET ORAL at 10:31

## 2022-03-02 RX ADMIN — ACETAMINOPHEN 500 MG: 500 TABLET, FILM COATED ORAL at 16:48

## 2022-03-02 RX ADMIN — ENOXAPARIN SODIUM 40 MG: 40 INJECTION SUBCUTANEOUS at 10:32

## 2022-03-02 RX ADMIN — ACETAMINOPHEN 500 MG: 500 TABLET, FILM COATED ORAL at 21:56

## 2022-03-02 RX ADMIN — ASPIRIN 81 MG CHEWABLE TABLET 81 MG: 81 TABLET CHEWABLE at 10:30

## 2022-03-02 RX ADMIN — INSULIN ASPART 2 UNITS: 100 INJECTION, SOLUTION INTRAVENOUS; SUBCUTANEOUS at 04:23

## 2022-03-02 RX ADMIN — ONDANSETRON 4 MG: 4 TABLET, ORALLY DISINTEGRATING ORAL at 03:14

## 2022-03-02 RX ADMIN — POTASSIUM CHLORIDE 20 MEQ: 1.5 POWDER, FOR SOLUTION ORAL at 10:32

## 2022-03-02 RX ADMIN — ENOXAPARIN SODIUM 40 MG: 40 INJECTION SUBCUTANEOUS at 21:56

## 2022-03-02 ASSESSMENT — ACTIVITIES OF DAILY LIVING (ADL)
ADLS_ACUITY_SCORE: 21
ADLS_ACUITY_SCORE: 17
ADLS_ACUITY_SCORE: 19
ADLS_ACUITY_SCORE: 21
ADLS_ACUITY_SCORE: 19
ADLS_ACUITY_SCORE: 21
ADLS_ACUITY_SCORE: 19
ADLS_ACUITY_SCORE: 17
ADLS_ACUITY_SCORE: 19
ADLS_ACUITY_SCORE: 17
ADLS_ACUITY_SCORE: 19
ADLS_ACUITY_SCORE: 17
ADLS_ACUITY_SCORE: 19
ADLS_ACUITY_SCORE: 19

## 2022-03-02 NOTE — PLAN OF CARE
Goal Outcome Evaluation:      A&O x4. VSS on 2L NC. Pt has been very lethargic this shift. Pt up with assist of 2 with lift. Pt refused to take PO meds this evening. Denies pain. Voiding adequately, purewick in place. Pt is able to reposition self. Tolerating mechanical soft diet, poor appetite this evening. + BS, + flatus, pt had 1 XL BM this shift on the commode.

## 2022-03-02 NOTE — PROGRESS NOTES
[facility]    Medicine Progress Note - Hospitalist Service    Date of Admission:  2/13/2022    Assessment & Plan        No Yusuf is a 62 year old female with past medical history of type 2 diabetes mellitus, hypertension, hyperlipidemia, bipolar disorder, fibromyalgia, polysubstance abuse and currently on methadone, hypothyroidism, chronic hepatitis C who presented initially to the [facility] emergency department on 2/8/2022 with chief complaint of shortness of breath.  In the emergency department the patient was found to be hypoxic requiring supplemental oxygen.Due to bed availability the patient was transferred to [facility] on 2/10/2022 after requiring intubation and mechanical ventilation for progressive hypoxia secondary to COVID-19 pneumonia. The patient was started on remdesivir, dexamethasone, and baricitinib.  On 2/11 she had cardiac arrest at about 2:20 pm. She had been bradycardiac that morning and while being changed from proned to supine position she went into ventricular tachycardia consistent with Torsades adrian pointes. She had one cycle of chest compression, received one shock, and returned to sinus rhythm. Bradycardia persisted, however. Cardiology recommended transfer to [facility] where cardiology EP and pacemaker capabilities were available.     Acute hypoxemic respiratory failure, multifactorial  COVID-19 pneumonia, now recovered  -Positive for COVID-19 on 2/2.  -CT chest 2/8- no PE, Patchy groundglass and consolidative opacities throughout the  Lungs, patient was intubated from 2/8-2/28.  -Treated with remdesivir, dexamethasone, and baricitinib.  -Now considered COVID recovered as of 2/23.  -Respiratory status quite stable in the last several days, currently oxygen at 2-3 L via nasal cannula, wean as tolerated.       Cardiac arrest - Torsade de Pointes  Suspected stress related cardiomyopathy - improvement in EF from  20% to 40% (2/18)  On 2/11 at [facility] she went into ventricular tachycardia consistent with Torsades de pointes - had one cycle of chest compression, received one shock, and returned to sinus rhythm. Bradycardia persisted and she was transferred to [facility].  - Echo 2/12 - Severely decreased left ventricular systolic function; EF 20-25%.There  is severe global hypokinesia of the left ventricle. The basal segments appears to contract the best- possible stress cardiomyopathy?  - Repeat Echo 2/27- improved EF 40-45%; there is mild global hypokinesia of the left ventricle. Mildly decreased right ventricular systolic function  -Seen by cardiology - no need for PPM.  -TSH was low 0.29 but fT4 within normal limits at 1.39.  -He received few doses of Lasix, does not appear to be fluid overloaded, then started on Lasix 20 mg daily.  -Avoid QT prolonging medications; she was resumed on her PTA Methadone and tolerating so far, continue to monitor on telemetry.  -Mg goal 2.5, K goal 4.5; high protocol replacement protocol.  -ASA.  -Hold atorvastatin due to transaminitis.  Please see discussion below.  -Follow up with cardiology as an outpatient.     Sepsis due to UTI   -She was initially treated with ceftriaxone for her urinary tract infection, which was broadened to cefepime, vancomycin, and azithromycin.  -She had received cefepime until 2/20.  -Patient has been afebrile tremors.  -Monitor off antibiotics.     Diabetes mellitus, type 2  PTA on Levemir 15 units qam and Metformin 1000 mg po BID. PTA Metformin on hold.  Hemoglobin A1c 6.2 on 2/8.  -Lantus recently decreased (am dose decreased, pm dose discontinued) due to low blood pressures.  -Now on lantus 15 units daily in AM, continue the same.  -Continue accuchecks before meals and at bedtime and use sliding scale NovoLog as indicated.  -Monitor for hypoglycemia.     Transaminitis   -Patient with worsening LFTs recently, now trending down.  -Liver ultrasound shows  hepatic steatosis, LFTs are trending down.  Possibly secondary to shock liver, statin on hold.  -Recheck labs in AM.     Hypertension  -PTA on Norvasc 5 mg po daily.  -Antihypertensives have been on hold, blood pressure has been good without antihypertensives at this time.     Hypothyroidism  -Continue PTA Synthroid.     History of polysubstance abuse and currently on methadone  Fibromyalgia   -PTA on Methadone 80 mg po daily, Neurontin 1200 mg po TID  -While was in ICU, intubated- she had been on multiple drips- Ketamine infusion, Dilaudid infusion and Midazolam drip- now weaned off.  -It was recommended to avoid QTc-prolonging medications given her cardiac arrest/torsades de alexandria but she was restarted on Methadone 40 mg po BID for now while in ICU; last EKG 2/21- normal QTc 444.  -Continue gabapentin at a lower dose 300 mg TID.     Acute toxic encephalopathy  -Likely related to recent sedation, now off all drips.  -Patient was started on Valium 10 mg 3 times daily in the ICU, dose tapered, now down to 2 mg BID, consider stopping completely in the next 1-2 days.  -Mentation gradually improving.  -PTA on Methadone 80 mg po daily, now on Methadone 40 mg po BID.  -Continue PTA Gabapentin at a lower dose 300 mg po TID.  -Avoid sedating medications.     Bipolar disorder  -PTA on Cymbalta 60 mg po daily, Trazodone 50 mg po at bedtime and Atarax 100 mg po q6h prn for anxiety.  -Continue PTA Cymbalta at a lower dose 20 mg po daily; PTA Trazodone on hold.     Dysphagia  Moderate malnutrition  Malnutrition in the context of recent illness or injury, and dysphagia likely related to recent intubation.  -Had NGT in place, removed 2/28/22. Received tube feedings when NG tube was in place.  -Nutrition and SLP following.     Diarrhea  -Likely related to TF, which have been discontinued.  -Improved.  -Cdiff negative on 2/15.  -Nutrisource Fiber.     Pressure injury on perianal area, hospital-acquired due to medical device related  due to fecal management system  -Wound care following.     Generalized weakness  -PT/OT consulted, recommending TCU.  -SW consulted for discharge planning.     Severe obesity  -Body mass index is 52.34 kg/m .  -OP f/u with PCP.        Diet: Snacks/Supplements Adult: Ensure Max Protein (bariatric); With Meals  Mechanical/Dental Soft Diet    DVT Prophylaxis: Enoxaparin (Lovenox) SQ  Valle Catheter: Not present  Central Lines: None  Cardiac Monitoring: ACTIVE order. Indication: QTc prolonging medication (48 hours)  Code Status: Full Code      Disposition Plan   Expected Discharge: 03/03/2022 awaiting TCU placement  Anticipated discharge location:  Awaiting care coordination huddle  Delays:            The patient's care was discussed with the Bedside Nurse and Patient.    Chente Angel MD  Hospitalist Service  [facility]  Securely message with the Vocera Web Console (learn more here)  Text page via [employer] Paging/Directory         Clinically Significant Risk Factors Present on Admission                    ______________________________________________________________________    Interval History   No Yusuf was seen this afternoon. She feels OK. Some chronic back pain. No new complaints/concerns. Denies fevers, chest pain, shortness of breath, nausea, abdominal pain. Appetite improving, NG tube recently removed.    Data reviewed today: I reviewed all medications, new labs and imaging results over the last 24 hours. I personally reviewed no images or EKG's today.    Physical Exam   Vital Signs: Temp: 97.5  F (36.4  C) Temp src: Axillary BP: 112/79 Pulse: 84   Resp: 16 SpO2: 96 % O2 Device: Nasal cannula Oxygen Delivery: 2 LPM  Weight: 304 lbs 14.4 oz  Constitutional: awake, alert, cooperative, no apparent distress, sitting up in a chair  Respiratory: diminished at the bases  Cardiovascular: regular rate and rhythm, normal S1 and S2, no murmur noted  GI: normal bowel sounds, soft,  non-distended, non-tender  Skin: warm, dry  Musculoskeletal: 1+ lower extremity pitting edema present  Neurologic: awake, alert, answered questions appropriately, moves all extremities    Data   Recent Labs   Lab 03/02/22  1300 03/02/22  0644 03/02/22  0526 03/02/22  0414 03/01/22  0723 03/01/22  0533 02/28/22  1513 02/28/22  1204 02/28/22  0821 02/28/22  0530   WBC  --   --  6.5  --   --  7.4  --  7.5  --   --    HGB  --   --  12.1  --   --  11.3*  --  10.7*  --   --    MCV  --   --  91  --   --  90  --  91  --   --    PLT  --   --  291  --   --  322  --  349  --   --    NA  --  139  --   --   --  135  --   --   --  134   POTASSIUM  --  3.9  --   --   --  4.0  --   --   --  4.1   CHLORIDE  --  102  --   --   --  100  --   --   --  101   CO2  --  28  --   --   --  31  --   --   --  31   BUN  --  14  --   --   --  19  --   --   --  23   CR  --  0.60  --   --   --  0.57  --   --   --  0.57   ANIONGAP  --  9  --   --   --  4  --   --   --  2*   RODOLFO  --  9.1  --   --   --  9.2  --   --   --  8.9   * 147*  --  156*   < > 110*   < >  --    < > 144*   ALBUMIN  --  2.4*  --   --   --   --   --   --   --  2.2*   PROTTOTAL  --  8.1  --   --   --   --   --   --   --  7.2   BILITOTAL  --  0.5  --   --   --   --   --   --   --  0.6   ALKPHOS  --  270*  --   --   --   --   --   --   --  297*   ALT  --  415*  --   --   --   --   --   --   --  750*   AST  --  88*  --   --   --   --   --   --   --  181*    < > = values in this interval not displayed.     Medications     sodium chloride Stopped (02/23/22 0800)       acetaminophen  500 mg Oral TID     aspirin  81 mg Oral Daily     [Held by provider] atorvastatin  20 mg Per Feeding Tube At Bedtime     cholecalciferol  1,250 mcg Oral Q7 Days     cyanocobalamin  1,000 mcg Oral Daily     diazepam  2 mg Oral Q12H     DULoxetine  20 mg Oral Daily     enoxaparin ANTICOAGULANT  40 mg Subcutaneous Q12H     fluticasone-vilanterol  1 puff Inhalation Daily     furosemide  20 mg Oral Daily      gabapentin  300 mg Oral Q8H [facility]     insulin aspart  1-10 Units Subcutaneous TID AC     insulin aspart  1-7 Units Subcutaneous At Bedtime     insulin glargine  15 Units Subcutaneous QAM AC     levothyroxine  200 mcg Oral Daily     methadone  40 mg Oral BID     multivitamin, therapeutic  1 tablet Oral Daily     [START ON 3/3/2022] pantoprazole  40 mg Oral QAM AC     potassium chloride  20 mEq Oral BID     sodium chloride (PF)  3 mL Intracatheter Q8H     [START ON 3/3/2022] Vitamin D3  25 mcg Oral Daily

## 2022-03-02 NOTE — PROGRESS NOTES
Patient is A/O x 3 with forgetfulness, vss, a-febrile, denies pain, up to the chair with A2 and Lift, takes pills whole with water, refused Liquid garbapentin ant protonix and potasium, denies shortness of breath, PLATT, on 2 L NC, incontinent of B&B, pure-wick in place, voids per bed pan when a wake, repositions self in bed, appetite fair, tele SR, CTM.

## 2022-03-02 NOTE — PLAN OF CARE
Goal Outcome Evaluation:  A&Ox 4. VSS on 2 L NC. Up with a lift. Scheduled tylenol for pain and heat packs. Continent/incontinent of bowel and bladder. , 204, and 175, insulin coverage per sliding scale. Tele NSR. Patient was up in chair for most of shift and needs help ordering food and a tray set up.  Takes pills whole. Pending discharge to TCU.

## 2022-03-02 NOTE — PROGRESS NOTES
Care Management Follow Up    Length of Stay (days): 17    Expected Discharge Date: 03/03/2022     Concerns to be Addressed:       Patient plan of care discussed at interdisciplinary rounds: Yes    Anticipated Discharge Disposition: Transitional Care     Anticipated Discharge Services:    Anticipated Discharge DME:      Patient/family educated on Medicare website which has current facility and service quality ratings:    Education Provided on the Discharge Plan:    Patient/Family in Agreement with the Plan: unable to assess    Referrals Placed by CM/SW:    Private pay costs discussed: Not applicable    Additional Information:  [facility]. MACO was informed [place] denied due to history of IV Drug use. MACO was informed they will call back to update on [place].MACO will continue to follow.    LILLIE Wilson    [facility]      Addendum: MACO sent referral to [facility]. MACO will continue to follow.    LILLIE Wilson    [facility]

## 2022-03-02 NOTE — PROGRESS NOTES
Care Management Follow Up    Length of Stay (days): 17    Expected Discharge Date: 03/04/2022     Concerns to be Addressed:       Patient plan of care discussed at interdisciplinary rounds: Yes    Anticipated Discharge Disposition: Transitional Care     Anticipated Discharge Services:    Anticipated Discharge DME:      Patient/family educated on Medicare website which has current facility and service quality ratings:    Education Provided on the Discharge Plan:    Patient/Family in Agreement with the Plan: unable to assess    Referrals Placed by CM/SW:    Private pay costs discussed: Not applicable    Additional Information:  SW called [facility], [facility], and [facility] and left a message requesting a call back to follow up on TCU referrals. SW called [facility] and was told they do not have any bed but there is a slight possibility they would have an opening 03/04. SW called [facility] and was told they do not have any openings. SW called [facility] and was asked to resend the referral. SW resent referral. SW will continue to follow.      LILLIE Wilson    [facility]

## 2022-03-03 LAB
ANION GAP SERPL CALCULATED.3IONS-SCNC: 3 MMOL/L (ref 3–14)
BUN SERPL-MCNC: 14 MG/DL (ref 7–30)
CALCIUM SERPL-MCNC: 9 MG/DL (ref 8.5–10.1)
CHLORIDE BLD-SCNC: 103 MMOL/L (ref 94–109)
CO2 SERPL-SCNC: 30 MMOL/L (ref 20–32)
CREAT SERPL-MCNC: 0.71 MG/DL (ref 0.52–1.04)
ERYTHROCYTE [DISTWIDTH] IN BLOOD BY AUTOMATED COUNT: 15.6 % (ref 10–15)
GFR SERPL CREATININE-BSD FRML MDRD: >90 ML/MIN/1.73M2
GLUCOSE BLD-MCNC: 149 MG/DL (ref 70–99)
GLUCOSE BLDC GLUCOMTR-MCNC: 110 MG/DL (ref 70–99)
GLUCOSE BLDC GLUCOMTR-MCNC: 111 MG/DL (ref 70–99)
GLUCOSE BLDC GLUCOMTR-MCNC: 114 MG/DL (ref 70–99)
GLUCOSE BLDC GLUCOMTR-MCNC: 122 MG/DL (ref 70–99)
GLUCOSE BLDC GLUCOMTR-MCNC: 134 MG/DL (ref 70–99)
GLUCOSE BLDC GLUCOMTR-MCNC: 137 MG/DL (ref 70–99)
GLUCOSE BLDC GLUCOMTR-MCNC: 143 MG/DL (ref 70–99)
HCT VFR BLD AUTO: 36.9 % (ref 35–47)
HGB BLD-MCNC: 11.7 G/DL (ref 11.7–15.7)
MAGNESIUM SERPL-MCNC: 2.1 MG/DL (ref 1.6–2.3)
MCH RBC QN AUTO: 29 PG (ref 26.5–33)
MCHC RBC AUTO-ENTMCNC: 31.7 G/DL (ref 31.5–36.5)
MCV RBC AUTO: 91 FL (ref 78–100)
PHOSPHATE SERPL-MCNC: 4 MG/DL (ref 2.5–4.5)
PLATELET # BLD AUTO: 282 10E3/UL (ref 150–450)
POTASSIUM BLD-SCNC: 3.9 MMOL/L (ref 3.4–5.3)
RBC # BLD AUTO: 4.04 10E6/UL (ref 3.8–5.2)
SODIUM SERPL-SCNC: 136 MMOL/L (ref 133–144)
WBC # BLD AUTO: 5.7 10E3/UL (ref 4–11)

## 2022-03-03 PROCEDURE — 36415 COLL VENOUS BLD VENIPUNCTURE: CPT | Performed by: INTERNAL MEDICINE

## 2022-03-03 PROCEDURE — 250N000011 HC RX IP 250 OP 636: Performed by: INTERNAL MEDICINE

## 2022-03-03 PROCEDURE — 80048 BASIC METABOLIC PNL TOTAL CA: CPT | Performed by: INTERNAL MEDICINE

## 2022-03-03 PROCEDURE — 250N000013 HC RX MED GY IP 250 OP 250 PS 637: Performed by: INTERNAL MEDICINE

## 2022-03-03 PROCEDURE — 84100 ASSAY OF PHOSPHORUS: CPT | Performed by: INTERNAL MEDICINE

## 2022-03-03 PROCEDURE — 120N000013 HC R&B IMCU

## 2022-03-03 PROCEDURE — 85027 COMPLETE CBC AUTOMATED: CPT | Performed by: INTERNAL MEDICINE

## 2022-03-03 PROCEDURE — 99232 SBSQ HOSP IP/OBS MODERATE 35: CPT | Performed by: HOSPITALIST

## 2022-03-03 PROCEDURE — 83735 ASSAY OF MAGNESIUM: CPT | Performed by: INTERNAL MEDICINE

## 2022-03-03 RX ADMIN — Medication 25 MCG: at 09:09

## 2022-03-03 RX ADMIN — POTASSIUM CHLORIDE 20 MEQ: 1500 TABLET, EXTENDED RELEASE ORAL at 09:09

## 2022-03-03 RX ADMIN — FLUTICASONE FUROATE AND VILANTEROL TRIFENATATE 1 PUFF: 100; 25 POWDER RESPIRATORY (INHALATION) at 09:11

## 2022-03-03 RX ADMIN — PANTOPRAZOLE SODIUM 40 MG: 40 TABLET, DELAYED RELEASE ORAL at 06:45

## 2022-03-03 RX ADMIN — CYANOCOBALAMIN TAB 1000 MCG 1000 MCG: 1000 TAB at 09:08

## 2022-03-03 RX ADMIN — POTASSIUM CHLORIDE 20 MEQ: 1500 TABLET, EXTENDED RELEASE ORAL at 22:11

## 2022-03-03 RX ADMIN — THERA TABS 1 TABLET: TAB at 09:09

## 2022-03-03 RX ADMIN — LEVOTHYROXINE SODIUM 200 MCG: 100 TABLET ORAL at 06:45

## 2022-03-03 RX ADMIN — ACETAMINOPHEN 500 MG: 500 TABLET, FILM COATED ORAL at 22:11

## 2022-03-03 RX ADMIN — FUROSEMIDE 20 MG: 20 TABLET ORAL at 09:09

## 2022-03-03 RX ADMIN — ASPIRIN 81 MG CHEWABLE TABLET 81 MG: 81 TABLET CHEWABLE at 09:09

## 2022-03-03 RX ADMIN — ACETAMINOPHEN 500 MG: 500 TABLET, FILM COATED ORAL at 09:09

## 2022-03-03 RX ADMIN — DIAZEPAM 2 MG: 2 TABLET ORAL at 09:09

## 2022-03-03 RX ADMIN — ENOXAPARIN SODIUM 40 MG: 40 INJECTION SUBCUTANEOUS at 09:09

## 2022-03-03 RX ADMIN — ENOXAPARIN SODIUM 40 MG: 40 INJECTION SUBCUTANEOUS at 22:11

## 2022-03-03 RX ADMIN — ACETAMINOPHEN 500 MG: 500 TABLET, FILM COATED ORAL at 17:03

## 2022-03-03 RX ADMIN — DULOXETINE HYDROCHLORIDE 20 MG: 20 CAPSULE, DELAYED RELEASE ORAL at 09:09

## 2022-03-03 ASSESSMENT — ACTIVITIES OF DAILY LIVING (ADL)
ADLS_ACUITY_SCORE: 17
ADLS_ACUITY_SCORE: 19
ADLS_ACUITY_SCORE: 17
ADLS_ACUITY_SCORE: 19
ADLS_ACUITY_SCORE: 17
ADLS_ACUITY_SCORE: 17
ADLS_ACUITY_SCORE: 19
ADLS_ACUITY_SCORE: 17
ADLS_ACUITY_SCORE: 19
ADLS_ACUITY_SCORE: 17
ADLS_ACUITY_SCORE: 19
ADLS_ACUITY_SCORE: 17

## 2022-03-03 NOTE — PLAN OF CARE
A&O x4. VSS on 2L NC. Pt has been very lethargic this shift. Pt up with assist of 2 with lift. Pt refused to take PO meds this evening. Denies pain. Voiding adequately via bedpan this shift. Pt is able to reposition self. Tolerating mechanical soft diet, poor appetite this evening. + BS, + flatus, -BM this shift.Tele NSR.

## 2022-03-03 NOTE — PLAN OF CARE
Goal Outcome Evaluation:                    Date: March 3, 2022  Shift: [id number]  Name: No Yusuf  Age: 62 year old  YOB: 1960    Reason for Admission: Acute respiratory failure due to COVID-19 (H) [U07.1, J96.00]     Cognitive/Mentation: A&Ox4  Neuros/CMS: lethargic otherwise intact    VS: stable on 1L NC  Cardiac: NSR  GI: +BS, +flatus, no bm  : Voiding via bedpan  Pulmonary: LS dim  Pain: Denies, scheduled tylenol     Drains: None  Skin: Redness blanchable to coccyx/sacrum  Activity: Ax2 with lift to get in and out of bed, otherwise SBA with walker    Diet: Mechanical soft with thin, poor appetite     Therapies recs: None  Discharge: Tomorrow via stretcher at 10AM [facility] [place]    Shift summary: Pt in bad all shift, lethargic. Encouraged to get out of bed to sit up in chair with no success. Refused gabapentin and methadone today.

## 2022-03-03 NOTE — PROGRESS NOTES
Care Management Follow Up    Length of Stay (days): 18    Expected Discharge Date: 03/04/2022     Concerns to be Addressed:       Patient plan of care discussed at interdisciplinary rounds: Yes    Anticipated Discharge Disposition: Transitional Care     Anticipated Discharge Services:    Anticipated Discharge DME:      Patient/family educated on Medicare website which has current facility and service quality ratings:    Education Provided on the Discharge Plan:    Patient/Family in Agreement with the Plan: unable to assess    Referrals Placed by CM/SW:    Private pay costs discussed: Not applicable    Additional Information:  Patient has been accepted at [facility] for admission today or tomorrow. Writer paged MD to see if patient is medically ready for today.  called and states that patient may be ready tomorrow for discharge.     Per [facility], patient can come anytime tomorrow but they need orders 2 hours prior to admission.     Addendum: Writer received another call from facility and they are requesting a morning ride be set up. Writer paged MD to see if a AM discharge was feasible.    Writer set up a 10am stretcher ride due to patient being lethargic and unable to self regulate the oxygen by herself. Writer updated patient who was in agreement with plan.     PAS-RR    D: Per DHS regulation, SW completed and submitted PAS-RR to [place] [employer] Direct Connect via the [employer] Line.  PAS-RR confirmation # is : [id number]    I: SW spoke with patient and they are aware a PAS-RR has been submitted.  SW reviewed with patient that they may be contacted for a follow up appointment within 10 days of hospital discharge if their SNF stay is < 30 days.  Contact information for [facility] was also provided.    A: patient verbalized understanding.    P: Further questions may be directed to [facility] at #[phone], option #4 for PAS-RR staff.      Annette Saeed,  LSW

## 2022-03-03 NOTE — PROGRESS NOTES
CLINICAL NUTRITION SERVICES - REASSESSMENT NOTE      Malnutrition: (2/24)  % Weight Loss:  Unable to fully determine    % Intake: <75% for > 7 days (moderate malnutrition)  (received EN below goal rate for up to 6 days between proning and hospital transfers)   Subcutaneous Fat Loss:  Orbital region mild-moderate depletion  Muscle Loss:  Temporal region milk depletion, potential further losses possibly masked by adiposity   Fluid Retention: Trace      Malnutrition Diagnosis: Moderate malnutrition  In Context of:  Acute illness or injury       EVALUATION OF PROGRESS TOWARD GOALS   Diet:  Mechanical soft + Ensure Max with lunch     Nutrition Support:  TF was discontinued on 2/28 as patient was eating sufficiently at that time     Intake/Tolerance:  Visited with patient this morning.  She was quite lethargic and not very engaged in the conversation.  Patient notes that her appetite is poor and has been eating < 50% of her meals.  Meal ordering system is currently unavailable, therefore unsure of how much she has been ordering overall.  Patient added that she does not like the Ensure Max and is not taking it.      ASSESSED NUTRITION NEEDS:  Dosing Weight: 114.3 kg (energy) and 54.5 kg (protein)  Estimated Energy Needs: 0108-4497+ kcals (11-14+ Kcal/Kg)  Justification: obese   Estimated Protein Needs: 109-136 grams protein (2-2.5 g pro/Kg)  Justification: hypercatabolism with critical illness and obesity guidelines       NEW FINDINGS:   Abdominal US showed hepatic steatosis, LFTs trending downward (shock liver per MD notes)    Weight from 3/2 = 138.3 kg (up overall)    Previous Goals (2/28):   No increase in LFT level r/t p.o intake  Evaluation: Met    Previous Nutrition Diagnosis (2/28):   Altered nutrition related lab values r/t liver function evidenced by elevated LFTs  Evaluation: No change, however noted patient with shock liver (not nutritionally related)      CURRENT NUTRITION DIAGNOSIS  Inadequate oral intake  related to poor appetite, lethargy as evidenced by consuming 50% of meals at best, not taking supplement     INTERVENTIONS  Recommendations / Nutrition Prescription  Mechanical soft diet as tolerated  Discontinue the Ensure Max - patient declined any additional supplements at this time     Implementation  Medical Food Supplement:  Discontinue Ensure Max as above     Goals  Patient will increase intake to 75% of meals TID     MONITORING AND EVALUATION:  Progress towards goals will be monitored and evaluated per protocol and Practice Guidelines    Bella Cisneros RD, LD, CNSC   Clinical Dietitian - [facility]

## 2022-03-03 NOTE — PROGRESS NOTES
[facility]    Medicine Progress Note - Hospitalist Service    Date of Admission:  2/13/2022    Assessment & Plan        No Yusuf is a 62 year old female with past medical history of type 2 diabetes mellitus, hypertension, hyperlipidemia, bipolar disorder, fibromyalgia, polysubstance abuse and currently on methadone, hypothyroidism, chronic hepatitis C who presented initially to the [facility] emergency department on 2/8/2022 with chief complaint of shortness of breath.  In the emergency department the patient was found to be hypoxic requiring supplemental oxygen.Due to bed availability the patient was transferred to [facility] on 2/10/2022 after requiring intubation and mechanical ventilation for progressive hypoxia secondary to COVID-19 pneumonia. The patient was started on remdesivir, dexamethasone, and baricitinib.  On 2/11 she had cardiac arrest at about 2:20 pm. She had been bradycardiac that morning and while being changed from proned to supine position she went into ventricular tachycardia consistent with Torsades adrian pointes. She had one cycle of chest compression, received one shock, and returned to sinus rhythm. Bradycardia persisted, however. Cardiology recommended transfer to [facility] where cardiology EP and pacemaker capabilities were available.     Acute hypoxemic respiratory failure, multifactorial  COVID-19 pneumonia, now recovered  -Positive for COVID-19 on 2/2.  -CT chest 2/8- no PE, Patchy groundglass and consolidative opacities throughout the  Lungs, patient was intubated from 2/8-2/28.  -Treated with remdesivir, dexamethasone, and baricitinib.  -Now considered COVID recovered as of 2/23.  -Respiratory status quite stable in the last several days, currently oxygen at 2-3 L via nasal cannula, wean as tolerated.       Cardiac arrest - Torsade de Pointes  Suspected stress related cardiomyopathy - improvement in EF from  20% to 40% (2/18)  On 2/11 at [facility] she went into ventricular tachycardia consistent with Torsades de pointes - had one cycle of chest compression, received one shock, and returned to sinus rhythm. Bradycardia persisted and she was transferred to [facility].  - Echo 2/12 - Severely decreased left ventricular systolic function; EF 20-25%.There  is severe global hypokinesia of the left ventricle. The basal segments appears to contract the best- possible stress cardiomyopathy?  - Repeat Echo 2/27- improved EF 40-45%; there is mild global hypokinesia of the left ventricle. Mildly decreased right ventricular systolic function  -Seen by cardiology - no need for PPM.  -TSH was low 0.29 but fT4 within normal limits at 1.39.  -He received few doses of Lasix, does not appear to be fluid overloaded, then started on Lasix 20 mg daily.  -Avoid QT prolonging medications; she was resumed on her PTA Methadone and tolerating so far, continue to monitor on telemetry.  -Mg goal 2.5, K goal 4.5; high protocol replacement protocol.  -ASA.  -Hold atorvastatin due to transaminitis.  Please see discussion below.  -Follow up with cardiology as an outpatient.     Sepsis due to UTI   -She was initially treated with ceftriaxone for her urinary tract infection, which was broadened to cefepime, vancomycin, and azithromycin.  -She had received cefepime until 2/20.  -Patient has been afebrile tremors.  -Monitor off antibiotics.     Diabetes mellitus, type 2  PTA on Levemir 15 units qam and Metformin 1000 mg po BID. PTA Metformin on hold.  Hemoglobin A1c 6.2 on 2/8.  -Lantus recently decreased (am dose decreased, pm dose discontinued) due to low blood sugars.  -Now on lantus 15 units daily in AM, continue the same.  -Continue accuchecks before meals and at bedtime and use sliding scale NovoLog as indicated.  -Monitor for hypoglycemia.  -Blood sugars fairly well controlled.     Transaminitis   -Patient with worsening LFTs recently, now trending  down.  -Liver ultrasound shows hepatic steatosis, LFTs are trending down.  Possibly secondary to shock liver, statin on hold.  -Recheck labs in AM.     Hypertension  -PTA on Norvasc 5 mg po daily.  -Antihypertensives have been on hold, blood pressure has been good without antihypertensives at this time.     Hypothyroidism  -Continue PTA Synthroid.     History of polysubstance abuse and currently on methadone  Fibromyalgia   -PTA on Methadone 80 mg po daily, Neurontin 1200 mg po TID.   -Methadone hasn't been given since 2/26/22, so it was discontinued on 3/3/22.   -Neurontin dose decreased due to encephalopathy.  -While was in ICU, intubated- she had been on multiple drips- Ketamine infusion, Dilaudid infusion and Midazolam drip- now weaned off.  -It was recommended to avoid QTc-prolonging medications given her cardiac arrest/torsades de alexandria but she was restarted on Methadone 40 mg po BID for now while in ICU; last EKG 2/21- normal QTc 444.     Acute toxic encephalopathy  -Likely related to recent sedation, now off all drips.  -Patient was started on Valium 10 mg 3 times daily in the ICU, dose was tapered and finally discontinued on 3/3/22.  -Mentation gradually improving.  -PTA on Methadone 80 mg po daily, then changed to Methadone 40 mg po BID in the hospital. Hasn't had a dose of Methadone since 2/26/22. Will discontinue methadone for now.  -Continue PTA Gabapentin at a lower dose 300 mg po TID. If she is persistently drowsy, consider further decreasing gabapentin.  -Avoid sedating medications.     Bipolar disorder  -PTA on Cymbalta 60 mg po daily, Trazodone 50 mg po at bedtime and Atarax 100 mg po q6h prn for anxiety.  -Continue PTA Cymbalta at a lower dose 20 mg po daily; PTA Trazodone on hold.     Dysphagia  Moderate malnutrition  Malnutrition in the context of recent illness or injury, and dysphagia likely related to recent intubation.  -Had NGT in place, removed 2/28/22. Received tube feedings when NG tube  was in place.  -Nutrition and SLP following.  -Appetite not great, but improving.     Diarrhea  -Likely related to TF, which have been discontinued.  -Improved.  -Cdiff negative on 2/15.  -Nutrisource Fiber.     Pressure injury on perianal area, hospital-acquired due to medical device related due to fecal management system  -Wound care following.     Generalized weakness  -PT/OT consulted, recommending TCU.  -SW consulted for discharge planning.  -Possible discharge tomorrow.     Severe obesity  -Body mass index is 52.34 kg/m .  -OP f/u with PCP.        Diet: Mechanical/Dental Soft Diet    DVT Prophylaxis: Enoxaparin (Lovenox) SQ  Valle Catheter: Not present  Central Lines: None  Cardiac Monitoring: ACTIVE order. Indication: QTc prolonging medication (48 hours)  Code Status: Full Code      Disposition Plan   Expected Discharge: 03/04/2022     Anticipated discharge location:  Awaiting care coordination huddle  Delays:            The patient's care was discussed with the Bedside Nurse, Care Coordinator/ and Patient.    Chente Angel MD  Hospitalist Service  [facility]  Securely message with the Vocera Web Console (learn more here)  Text page via [facility] Paging/Directory         Clinically Significant Risk Factors Present on Admission                    ______________________________________________________________________    Interval History   No Yusuf was seen today. She feels OK. Tired. Denies fevers, chest pain, shortness of breath, nausea, abdominal pain. Discussed with social work, placement found, may be able to discharge tomorrow.    Data reviewed today: I reviewed all medications, new labs and imaging results over the last 24 hours. I personally reviewed no images or EKG's today.    Physical Exam   Vital Signs: Temp: 97.5  F (36.4  C) Temp src: Axillary BP: 100/59 Pulse: 81   Resp: 16 SpO2: 98 % O2 Device: Nasal cannula Oxygen Delivery: 2 LPM  Weight: 304 lbs  14.4 oz  Constitutional: awake, cooperative, no apparent distress, sitting up in a chair  Respiratory: clear to auscultation bilaterally, no crackles or wheezing  Cardiovascular: regular rate and rhythm, normal S1 and S2, no murmur noted  GI: normal bowel sounds, soft, obese, non-distended, non-tender  Skin: warm, dry  Musculoskeletal: no lower extremity pitting edema present  Neurologic: awake,more drowsy today, answers questions appropriately, moves all extremities    Data   Recent Labs   Lab 03/03/22  1257 03/03/22  0728 03/03/22  0525 03/02/22  1300 03/02/22  0644 03/02/22  0526 03/01/22  0723 03/01/22  0533 02/28/22  0821 02/28/22  0530   WBC  --   --  5.7  --   --  6.5  --  7.4   < >  --    HGB  --   --  11.7  --   --  12.1  --  11.3*   < >  --    MCV  --   --  91  --   --  91  --  90   < >  --    PLT  --   --  282  --   --  291  --  322   < >  --    NA  --   --  136  --  139  --   --  135  --  134   POTASSIUM  --   --  3.9  --  3.9  --   --  4.0  --  4.1   CHLORIDE  --   --  103  --  102  --   --  100  --  101   CO2  --   --  30  --  28  --   --  31  --  31   BUN  --   --  14  --  14  --   --  19  --  23   CR  --   --  0.71  --  0.60  --   --  0.57  --  0.57   ANIONGAP  --   --  3  --  9  --   --  4  --  2*   RODOLFO  --   --  9.0  --  9.1  --   --  9.2  --  8.9   * 143* 149*   < > 147*  --    < > 110*   < > 144*   ALBUMIN  --   --   --   --  2.4*  --   --   --   --  2.2*   PROTTOTAL  --   --   --   --  8.1  --   --   --   --  7.2   BILITOTAL  --   --   --   --  0.5  --   --   --   --  0.6   ALKPHOS  --   --   --   --  270*  --   --   --   --  297*   ALT  --   --   --   --  415*  --   --   --   --  750*   AST  --   --   --   --  88*  --   --   --   --  181*    < > = values in this interval not displayed.     Medications     sodium chloride Stopped (02/23/22 0800)       acetaminophen  500 mg Oral TID     aspirin  81 mg Oral Daily     [Held by provider] atorvastatin  20 mg Per Feeding Tube At Bedtime      cholecalciferol  1,250 mcg Oral Q7 Days     cyanocobalamin  1,000 mcg Oral Daily     DULoxetine  20 mg Oral Daily     enoxaparin ANTICOAGULANT  40 mg Subcutaneous Q12H     fluticasone-vilanterol  1 puff Inhalation Daily     furosemide  20 mg Oral Daily     gabapentin  300 mg Oral Q8H RADHA     insulin aspart  1-10 Units Subcutaneous TID AC     insulin aspart  1-7 Units Subcutaneous At Bedtime     insulin glargine  15 Units Subcutaneous QAM AC     levothyroxine  200 mcg Oral Daily     multivitamin, therapeutic  1 tablet Oral Daily     pantoprazole  40 mg Oral QAM AC     potassium chloride  20 mEq Oral BID     sodium chloride (PF)  3 mL Intracatheter Q8H     Vitamin D3  25 mcg Oral Daily

## 2022-03-04 VITALS
HEART RATE: 81 BPM | OXYGEN SATURATION: 94 % | WEIGHT: 293 LBS | RESPIRATION RATE: 20 BRPM | TEMPERATURE: 98.2 F | SYSTOLIC BLOOD PRESSURE: 109 MMHG | BODY MASS INDEX: 52.34 KG/M2 | DIASTOLIC BLOOD PRESSURE: 66 MMHG

## 2022-03-04 LAB
ALBUMIN SERPL-MCNC: 2.5 G/DL (ref 3.4–5)
ALP SERPL-CCNC: 221 U/L (ref 40–150)
ALT SERPL W P-5'-P-CCNC: 248 U/L (ref 0–50)
ANION GAP SERPL CALCULATED.3IONS-SCNC: 6 MMOL/L (ref 3–14)
AST SERPL W P-5'-P-CCNC: 49 U/L (ref 0–45)
BILIRUB DIRECT SERPL-MCNC: 0.1 MG/DL (ref 0–0.2)
BILIRUB SERPL-MCNC: 0.5 MG/DL (ref 0.2–1.3)
BUN SERPL-MCNC: 15 MG/DL (ref 7–30)
CALCIUM SERPL-MCNC: 9.5 MG/DL (ref 8.5–10.1)
CHLORIDE BLD-SCNC: 104 MMOL/L (ref 94–109)
CO2 SERPL-SCNC: 28 MMOL/L (ref 20–32)
CREAT SERPL-MCNC: 0.63 MG/DL (ref 0.52–1.04)
ERYTHROCYTE [DISTWIDTH] IN BLOOD BY AUTOMATED COUNT: 15.3 % (ref 10–15)
GFR SERPL CREATININE-BSD FRML MDRD: >90 ML/MIN/1.73M2
GLUCOSE BLD-MCNC: 131 MG/DL (ref 70–99)
GLUCOSE BLDC GLUCOMTR-MCNC: 121 MG/DL (ref 70–99)
GLUCOSE BLDC GLUCOMTR-MCNC: 130 MG/DL (ref 70–99)
HCT VFR BLD AUTO: 38.4 % (ref 35–47)
HGB BLD-MCNC: 11.9 G/DL (ref 11.7–15.7)
MAGNESIUM SERPL-MCNC: 1.9 MG/DL (ref 1.6–2.3)
MCH RBC QN AUTO: 28.7 PG (ref 26.5–33)
MCHC RBC AUTO-ENTMCNC: 31 G/DL (ref 31.5–36.5)
MCV RBC AUTO: 93 FL (ref 78–100)
PHOSPHATE SERPL-MCNC: 3.7 MG/DL (ref 2.5–4.5)
PLATELET # BLD AUTO: 267 10E3/UL (ref 150–450)
POTASSIUM BLD-SCNC: 3.9 MMOL/L (ref 3.4–5.3)
PROT SERPL-MCNC: 8.1 G/DL (ref 6.8–8.8)
RBC # BLD AUTO: 4.14 10E6/UL (ref 3.8–5.2)
SODIUM SERPL-SCNC: 138 MMOL/L (ref 133–144)
WBC # BLD AUTO: 5.5 10E3/UL (ref 4–11)

## 2022-03-04 PROCEDURE — 250N000013 HC RX MED GY IP 250 OP 250 PS 637: Performed by: INTERNAL MEDICINE

## 2022-03-04 PROCEDURE — 36415 COLL VENOUS BLD VENIPUNCTURE: CPT | Performed by: INTERNAL MEDICINE

## 2022-03-04 PROCEDURE — 82248 BILIRUBIN DIRECT: CPT | Performed by: INTERNAL MEDICINE

## 2022-03-04 PROCEDURE — 85027 COMPLETE CBC AUTOMATED: CPT | Performed by: INTERNAL MEDICINE

## 2022-03-04 PROCEDURE — 83735 ASSAY OF MAGNESIUM: CPT | Performed by: INTERNAL MEDICINE

## 2022-03-04 PROCEDURE — 99239 HOSP IP/OBS DSCHRG MGMT >30: CPT | Performed by: HOSPITALIST

## 2022-03-04 PROCEDURE — 250N000011 HC RX IP 250 OP 636: Performed by: INTERNAL MEDICINE

## 2022-03-04 PROCEDURE — 84100 ASSAY OF PHOSPHORUS: CPT | Performed by: INTERNAL MEDICINE

## 2022-03-04 PROCEDURE — 80053 COMPREHEN METABOLIC PANEL: CPT | Performed by: INTERNAL MEDICINE

## 2022-03-04 PROCEDURE — 80069 RENAL FUNCTION PANEL: CPT | Performed by: INTERNAL MEDICINE

## 2022-03-04 RX ORDER — DULOXETIN HYDROCHLORIDE 20 MG/1
20 CAPSULE, DELAYED RELEASE ORAL DAILY
DISCHARGE
Start: 2022-03-04

## 2022-03-04 RX ORDER — ATORVASTATIN CALCIUM 20 MG/1
20 TABLET, FILM COATED ORAL AT BEDTIME
DISCHARGE
Start: 2022-03-04 | End: 2022-04-01

## 2022-03-04 RX ORDER — ACETAMINOPHEN 325 MG/1
650 TABLET ORAL 2 TIMES DAILY PRN
DISCHARGE
Start: 2022-03-04

## 2022-03-04 RX ORDER — ASPIRIN 81 MG/1
81 TABLET, CHEWABLE ORAL DAILY
DISCHARGE
Start: 2022-03-04

## 2022-03-04 RX ADMIN — FLUTICASONE FUROATE AND VILANTEROL TRIFENATATE 1 PUFF: 100; 25 POWDER RESPIRATORY (INHALATION) at 09:44

## 2022-03-04 RX ADMIN — PANTOPRAZOLE SODIUM 40 MG: 40 TABLET, DELAYED RELEASE ORAL at 05:47

## 2022-03-04 RX ADMIN — ACETAMINOPHEN 500 MG: 500 TABLET, FILM COATED ORAL at 09:39

## 2022-03-04 RX ADMIN — POTASSIUM CHLORIDE 20 MEQ: 1500 TABLET, EXTENDED RELEASE ORAL at 09:39

## 2022-03-04 RX ADMIN — Medication 25 MCG: at 09:39

## 2022-03-04 RX ADMIN — CYANOCOBALAMIN TAB 1000 MCG 1000 MCG: 1000 TAB at 09:40

## 2022-03-04 RX ADMIN — ASPIRIN 81 MG CHEWABLE TABLET 81 MG: 81 TABLET CHEWABLE at 09:39

## 2022-03-04 RX ADMIN — ACETAMINOPHEN 650 MG: 325 TABLET, FILM COATED ORAL at 05:52

## 2022-03-04 RX ADMIN — THERA TABS 1 TABLET: TAB at 09:40

## 2022-03-04 RX ADMIN — LEVOTHYROXINE SODIUM 200 MCG: 100 TABLET ORAL at 05:47

## 2022-03-04 RX ADMIN — ENOXAPARIN SODIUM 40 MG: 40 INJECTION SUBCUTANEOUS at 09:41

## 2022-03-04 RX ADMIN — DULOXETINE HYDROCHLORIDE 20 MG: 20 CAPSULE, DELAYED RELEASE ORAL at 09:40

## 2022-03-04 ASSESSMENT — ACTIVITIES OF DAILY LIVING (ADL)
ADLS_ACUITY_SCORE: 19
ADLS_ACUITY_SCORE: 17

## 2022-03-04 NOTE — PLAN OF CARE
Physical Therapy Discharge Summary    Reason for therapy discharge:    Discharged to transitional care facility.    Progress towards therapy goal(s). See goals on Care Plan in [facility] electronic health record for goal details.  Goals not met.  Barriers to achieving goals:   limited tolerance for therapy.    Therapy recommendation(s):    Continued therapy is recommended.  Rationale/Recommendations:  TCU to maximize return to PLOF.

## 2022-03-04 NOTE — PLAN OF CARE
Occupational Therapy Discharge Summary    Reason for therapy discharge:    Discharged to transitional care facility.    Progress towards therapy goal(s). See goals on Care Plan in [facility] electronic health record for goal details.  Goals partially met.  Barriers to achieving goals:   limited tolerance for therapy and discharge from facility.    Therapy recommendation(s):    Continued therapy is recommended.  Rationale/Recommendations:  Recommend continued OT to increase endurance and independence in ADLS and IADLS.

## 2022-03-04 NOTE — DISCHARGE SUMMARY
[facility]  Hospitalist Discharge Summary      Date of Admission:  2/13/2022  Date of Discharge:  3/4/2022  Discharging Provider: Chente Angel MD  Discharge Service: Hospitalist Service    Discharge Diagnoses   Acute hypoxemic respiratory failure, multifactorial, resolved  COVID-19 pneumonia, now recovered  Cardiac arrest - Torsade de Pointes  Suspected stress related cardiomyopathy - improvement in EF from 20% to 40% (2/18)  Sepsis due to UTI, resolve  Diabetes mellitus, type 2  Transaminitis   Hypertension  Hypothyroidism  History of polysubstance abuse and currently on methadone  Fibromyalgia   Acute toxic encephalopathy  Bipolar disorder  Dysphagia  Moderate malnutrition  Diarrhea  Pressure injury on perianal area, hospital-acquired due to medical device related due to fecal management system  Generalized weakness  Severe obesity    Follow-ups Needed After Discharge   Follow-up Appointments     Follow Up and recommended labs and tests      Follow up with long-term physician.  The following labs/tests are   recommended: CBC and CMP in 3-4 days.  Follow up with Cardiology in 2-3 weeks for stress cardiomyopathy   follow-up.             Discharge Disposition   Discharged to short-term care facility  Condition at discharge: Stable    Hospital Course   No Yusuf is a 62 year old female with past medical history of type 2 diabetes mellitus, hypertension, hyperlipidemia, bipolar disorder, fibromyalgia, polysubstance abuse and currently on methadone, hypothyroidism, chronic hepatitis C who presented initially to the [facility] emergency department on 2/8/2022 with chief complaint of shortness of breath.  In the emergency department the patient was found to be hypoxic requiring supplemental oxygen.Due to bed availability the patient was transferred to [facility] on 2/10/2022 after requiring intubation and mechanical ventilation for progressive hypoxia  secondary to COVID-19 pneumonia. The patient was started on remdesivir, dexamethasone, and baricitinib.  On 2/11 she had cardiac arrest at about 2:20 pm. She had been bradycardiac that morning and while being changed from proned to supine position she went into ventricular tachycardia consistent with Torsades adrian pointes. She had one cycle of chest compression, received one shock, and returned to sinus rhythm. Bradycardia persisted, however. Cardiology recommended transfer to [facility] where cardiology EP and pacemaker capabilities were available.     Acute hypoxemic respiratory failure, multifactorial, resolved  COVID-19 pneumonia, now recovered  -Positive for COVID-19 on 2/2.  -CT chest 2/8- no PE, Patchy groundglass and consolidative opacities throughout the  Lungs, patient was intubated from 2/8-2/22.  -Treated with remdesivir, dexamethasone, and baricitinib.  -Considered COVID recovered as of 2/23.  -Respiratory status quite stable in the last several days, weaned off supplemental oxygen on 3/3/22.       Cardiac arrest - Torsade de Pointes  Suspected stress related cardiomyopathy - improvement in EF from 20% to 40% (2/18)  On 2/11 at [facility] she went into ventricular tachycardia consistent with Torsades de pointes - had one cycle of chest compression, received one shock, and returned to sinus rhythm. Bradycardia persisted and she was transferred to [facility].  - Echo 2/12 - Severely decreased left ventricular systolic function; EF 20-25%.There  is severe global hypokinesia of the left ventricle. The basal segments appears to contract the best- possible stress cardiomyopathy?  - Repeat Echo 2/27- improved EF 40-45%; there is mild global hypokinesia of the left ventricle. Mildly decreased right ventricular systolic function  -Seen by cardiology - no need for PPM.  -TSH was low 0.29 but fT4 within normal limits at 1.39.  -He received few doses of Lasix, does not appear to be fluid overloaded,  then started on PTA dose of Lasix at 20 mg daily.  -Avoid QT prolonging medications; she was resumed on her PTA Methadone and tolerated it OK from a QT standpoint - but it was discontinued due to encephalopathy.  -ASA.  -Hold atorvastatin due to transaminitis.  Please see discussion below.  -Follow up with cardiology as an outpatient.     Sepsis due to UTI, resolve  -She was initially treated with ceftriaxone for her urinary tract infection, which was broadened to cefepime, vancomycin, and azithromycin.  -She had received cefepime until 2/20.  -Patient has been afebrile tremors.  -She has doen well since her antibiotics were discontinued.     Diabetes mellitus, type 2  PTA on Levemir 15 units qam and Metformin 1000 mg po BID. PTA Metformin on hold.  Hemoglobin A1c 6.2 on 2/8.  -Blood sugars fairly well controlled with lantus 15 units daily in AM and medium dose sliding scale Novolog. Will continue long acting insulin in the morning and restart PTA metformin upon discharge.  -Monitor blood sugars before meals and at bedtime and adjust diabetes medications as needed.     Transaminitis   -Patient with worsening LFTs recently, now trending down.  -Liver ultrasound showed hepatic steatosis, LFTs are trending down.  Possibly secondary to shock liver, statin on hold - can resume as LFTs improve.  -Recheck labs at TCU with results to TCU provider.     Hypertension  -PTA on Norvasc 5 mg po daily.  -Amlodipine discontinued due to lower blood pressures, can be restarted as an outpatient if needed.     Hypothyroidism  -Continue PTA Synthroid.     History of polysubstance abuse and currently on methadone  Fibromyalgia   -While was in ICU, intubated- she had been on multiple drips- Ketamine infusion, Dilaudid infusion and Midazolam drip- now weaned off.  -It was recommended to avoid QTc-prolonging medications given her cardiac arrest/torsades de alexandria but she was restarted on Methadone 40 mg po BID for now while in ICU; last EKG  2/21- normal QTc 444.  -PTA on Methadone 80 mg po daily, Neurontin 1200 mg po TID.   -Methadone hasn't been given since 2/26/22, so it was discontinued on 3/3/22.   -Neurontin dose decreased due to encephalopathy, now on hold.  -Can add back methadone as she continues to recover, would start at a lower dose and titrate back up to her prior PTA dose.  -Can also add back gabapentin as she continues to recover, would also start at a lower dose and titrate up.     Acute toxic encephalopathy  -Likely related to recent sedation, now off all drips.  -Patient was started on Valium 10 mg 3 times daily in the ICU, dose was tapered and finally discontinued on 3/3/22.  -PTA on Methadone 80 mg po daily, then changed to Methadone 40 mg po BID in the hospital. Hasn't had a dose of Methadone since 2/26/22, so it was discontinued on 3/3/22. She was on methadone for history of polysubstance abuse, can be restarted as noted above.  -Gabapentin on hold for now.  -Avoid sedating medications.  -Still a little drowsy, but mental status improving.     Bipolar disorder  -PTA on Cymbalta 60 mg po daily, Trazodone 50 mg po at bedtime and Atarax 100 mg po q6h prn for anxiety.  -Continue PTA Cymbalta but at a lower dose of 20 mg po daily; PTA Trazodone on hold for now, can add back after discharge as needed.     Dysphagia  Moderate malnutrition  Malnutrition in the context of recent illness or injury, and dysphagia likely related to recent intubation.  -Received tube feedings when NG tube was in place. Had NGT in place, removed 2/28/22.  -Nutrition and SLP followed..  -Appetite not great, but improving.     Diarrhea  -Likely related to TF, which have been discontinued.  -Improved.  -Cdiff negative on 2/15.  -Nutrisource Fiber.     Pressure injury on perianal area, hospital-acquired due to medical device related due to fecal management system  -Wound care followed, pressure injury resolved.    Generalized weakness  -PT/OT consulted, recommending  TCU.  - consulted for discharge planning.  -Discharge to TCU for ongoing therapies.     Severe obesity  -Body mass index is 52.34 kg/m .  -Outpatient f/u with PCP.       Consultations This Hospital Stay   NUTRITION SERVICES ADULT IP CONSULT  PHARMACY IP CONSULT  PHARMACY IP CONSULT  INTENSIVIST IP CONSULT  PHARMACY IP CONSULT  NUTRITION SERVICES ADULT IP CONSULT  INFECTION PREVENTION IP CONSULT  PHYSICAL THERAPY ADULT IP CONSULT  OCCUPATIONAL THERAPY ADULT IP CONSULT  SPEECH LANGUAGE PATH ADULT IP CONSULT  WOUND OSTOMY CONTINENCE NURSE  IP CONSULT  CARE MANAGEMENT / SOCIAL WORK IP CONSULT  PHYSICAL THERAPY ADULT IP CONSULT  OCCUPATIONAL THERAPY ADULT IP CONSULT    Code Status   Full Code    Time Spent on this Encounter   I, Chente Angel MD, personally saw the patient today and spent greater than 30 minutes discharging this patient.       Chente Angel MD  [id number] [facility] [place] [address]  Phone: [phone]  ______________________________________________________________________    Physical Exam   Vital Signs: Temp: 98.2  F (36.8  C) Temp src: Oral BP: 109/66 Pulse: 81   Resp: 20 SpO2: 94 % O2 Device: None (Room air) Oxygen Delivery: 2 LPM  Weight: 304 lbs 14.4 oz  Constitutional: awake, alert, cooperative, no apparent distress, sitting up in a chair  Respiratory: clear to auscultation bilaterally, no crackles or wheezing  Cardiovascular: regular rate and rhythm, normal S1 and S2, no murmur noted  GI: normal bowel sounds, soft, non-distended, non-tender  Skin: warm, dry  Musculoskeletal: no lower extremity pitting edema present  Neurologic: awake, alert, answers questions appropriately, moves all extremities       Primary Care Physician   Price Naranjo    Discharge Orders      General info for SNF    Length of Stay Estimate: Short Term Care: Estimated # of Days <30  Condition at Discharge: Improving  Level of care:skilled   Rehabilitation Potential:  Good  Admission H&P remains valid and up-to-date: Yes  Recent Chemotherapy: N/A  Use Nursing Home Standing Orders: Yes     Mantoux instructions    Give two-step Mantoux (PPD) Per Facility Policy Yes     Follow Up and recommended labs and tests    Follow up with [facility] physician.  The following labs/tests are recommended: CBC and CMP in 3-4 days.  Follow up with Cardiology in 2-3 weeks for stress cardiomyopathy follow-up.     Reason for your hospital stay    COVID-19 pneumonia, respiratory failure, cardiac arrest     Glucose monitor nursing POCT    Before meals and at bedtime     Daily weights    Call Provider for weight gain of more than 2 pounds per day or 5 pounds per week.     Activity - Up with nursing assistance     Full Code     Physical Therapy Adult Consult    Evaluate and treat as clinically indicated.    Reason:  deconditioning     Occupational Therapy Adult Consult    Evaluate and treat as clinically indicated.    Reason:  deconditioning     Fall precautions     Diet    Follow this diet upon discharge: Orders Placed This Encounter      Calorie Counts      Mechanical/Dental Soft Diet       Significant Results and Procedures   Most Recent 3 CBC's:Recent Labs   Lab Test 03/04/22 0530 03/03/22  0525 03/02/22  0526   WBC 5.5 5.7 6.5   HGB 11.9 11.7 12.1   MCV 93 91 91    282 291     Most Recent 3 BMP's:Recent Labs   Lab Test 03/04/22  0816 03/04/22  0530 03/04/22  0221 03/03/22  0728 03/03/22  0525 03/02/22  1300 03/02/22  0644   NA  --  138  --   --  136  --  139   POTASSIUM  --  3.9  --   --  3.9  --  3.9   CHLORIDE  --  104  --   --  103  --  102   CO2  --  28  --   --  30  --  28   BUN  --  15  --   --  14  --  14   CR  --  0.63  --   --  0.71  --  0.60   ANIONGAP  --  6  --   --  3  --  9   RODOLFO  --  9.5  --   --  9.0  --  9.1   * 131* 130*   < > 149*   < > 147*    < > = values in this interval not displayed.     Most Recent 2 LFT's:Recent Labs   Lab Test 03/04/22 0530 03/02/22  0644    AST 49* 88*   * 415*   ALKPHOS 221* 270*   BILITOTAL 0.5 0.5   ,   Results for orders placed or performed during the hospital encounter of 02/13/22   XR Chest Port 1 View    Narrative    EXAM: XR CHEST PORT 1 VIEW  LOCATION: [facility]  DATE/TIME: 2/13/2022 3:04 PM    INDICATION: Endotracheal tube positioning.  COMPARISON: 2/13/2022 at 6:23 AM      Impression    IMPRESSION: Endotracheal tube tip 3.5 cm from the mariel. Right IJ line noted with tip in the mid to low SVC. Improved bilateral infiltrates compared to previous.   XR Chest Port 1 View    Narrative    EXAM: CHEST SINGLE VIEW PORTABLE  LOCATION: [facility]  DATE/TIME: 2/14/2022 5:00 AM    INDICATION: Intubated.  COMPARISON: 02/13/2022.      Impression    IMPRESSION:   1. A few patchy opacities within both lungs with a central predominance, overall mildly decreased since the recent comparison study.  2. No other significant interval change.  3. An endotracheal tube, enteric drainage tube, and right internal jugular central venous catheter are again noted.                  XR Chest Port 1 View    Narrative    EXAM: XR CHEST PORT 1 VIEW  LOCATION: [facility]  DATE/TIME: 2/15/2022 5:39 AM    INDICATION: icu intubated  COMPARISON: 02/11/2022      Impression    IMPRESSION: Endotracheal tube terminates at 2.5 cm from the mariel. Right-sided central venous catheter with tip over SVC.    Improved but persistent pulmonary vascular congestion with small left pleural effusion. No pneumothorax. Stable cardiomediastinal silhouette.   XR Abdomen Port 1 View    Narrative    ABDOMEN ONE VIEW PORTABLE February 15, 2022 3:58 PM     HISTORY: Verify small bowel feeding tube bedside placement.    COMPARISON: None.       Impression    IMPRESSION: Feeding tube coiled in the stomach.    RYAN JOHNSON MD         SYSTEM ID:  [id number]   XR Abdomen Port 1 View    Narrative    EXAM: XR ABDOMEN PORT 1  VIEWS  LOCATION: [facility]  DATE/TIME: 2/15/2022 11:01 PM    INDICATION: looking for ng tube placement  COMPARISON: None.      Impression    IMPRESSION: Feeding tube is looped within the stomach. The distal tip is in the second portion of the duodenum. Pulmonary infiltrates. Atherosclerotic aorta.    XR Chest Port 1 View    Narrative    EXAM: XR CHEST PORT 1 VIEW  LOCATION: [facility]  DATE/TIME: 2022 11:00 PM    INDICATION: icu intubated  COMPARISON: Yesterday.      Impression    IMPRESSION: Endotracheal tube in satisfactory position terminating 4 cm above the mariel. Right internal jugular venous catheter tip in the SVC. Enteric tube passes below the diaphragm and the inferior margin of the radiograph. Consolidation/atelectasis   of the lower left lung has worsened from previous. There may be a small left pleural effusion. No pneumothorax visible. Stable mild interstitial coarsening on the right.   US Abdomen Limited    Narrative    US ABDOMEN LIMITED 2022 8:06 AM    CLINICAL HISTORY: elevated LFTS, same  TECHNIQUE: Limited abdominal ultrasound.    COMPARISON: None.    FINDINGS:    GALLBLADDER: Surgically absent.    BILE DUCTS: There is no intrahepatic bile duct dilation. The common  duct is obscured by bowel gas.    LIVER: Hepatic steatosis. No liver lesions.    RIGHT KIDNEY: No hydronephrosis.    PANCREAS: The visualized portions of the pancreas are normal.    No ascites.      Impression    IMPRESSION:  1.  Hepatic steatosis. No liver lesions or bile duct dilation.    NOMI HUA MD         SYSTEM ID:  [id number]   Echocardiogram Limited     Value    LVEF  40-45%    Narrative    [id number]  [id number]  [id number]  [id number]^KASH^CHAYO^HUONG     [facility]  Echocardiography Laboratory  [address] [place] [address]     Name: ELLA MOULTON  MRN: [id number]  : 1960  Study Date: 2022 09:39 AM  Age: 62  yrs  Gender: Female  Patient Location: [facility]  Reason For Study: Cardiac Arrest  Ordering Physician: CHAYO HEWITT  Referring Physician: CHAYO HEWITT  Performed By: FLASH Hanks     BSA: 2.1 m2  Height: 63 in  Weight: 240 lb  HR: 77  BP: 122/62 mmHg  ______________________________________________________________________________  Procedure  Limited Portable Echo Adult. Optison (NDC #3013-2375) given intravenously.  ______________________________________________________________________________  Interpretation Summary     Mildly decreased left ventricular systolic function  The visual ejection fraction is 40-45%.  There is mild global hypokinesia of the left ventricle.  Mildly decreased right ventricular systolic function  No significant valve dysfunction.  The inferior vena cava was normal in size with preserved respiratory  variability.  There is no pericardial effusion.     Compared to study dated 2/12/2022, findings of LV dysfunction have improved.  ______________________________________________________________________________  Left Ventricle  The left ventricle is normal in size. Grade I or early diastolic dysfunction.  Mildly decreased left ventricular systolic function. The visual ejection  fraction is 40-45%. There is mild global hypokinesia of the left ventricle.     Right Ventricle  The right ventricle is normal size. Mildly decreased right ventricular  systolic function.     Atria  Normal left atrial size. Right atrial size is normal.     Mitral Valve  There is moderate mitral annular calcification. There is trace mitral  regurgitation.     Tricuspid Valve  The tricuspid valve is normal in structure and function. The right ventricular  systolic pressure is approximated at 28.3 mmHg plus the right atrial pressure.  Right ventricular systolic pressure is normal.     Aortic Valve  The aortic valve is normal in structure and function.     Pulmonic Valve  The pulmonic valve is normal in  structure and function.     Vessels  The aortic root is normal size. Normal size ascending aorta. The inferior vena  cava was normal in size with preserved respiratory variability.     Pericardium  There is no pericardial effusion.     ______________________________________________________________________________  MMode/2D Measurements & Calculations  IVSd: 0.98 cm  LVIDd: 3.2 cm  LVIDs: 2.3 cm  LVPWd: 0.95 cm  FS: 28.1 %  LV mass(C)d: 85.2 grams  LV mass(C)dI: 40.8 grams/m2     Ao root diam: 3.6 cm  asc Aorta Diam: 3.3 cm  LVOT diam: 2.1 cm  LVOT area: 3.5 cm2  LA Volume (BP): 36.6 ml  LA Volume Index (BP): 17.5 ml/m2     LA Volume Indexed (AL/bp): 19.3 ml/m2  RWT: 0.59  TAPSE: 1.9 cm     Doppler Measurements & Calculations  MV E max андрей: 74.7 cm/sec  MV A max андрей: 108.7 cm/sec  MV E/A: 0.69  MV dec time: 0.29 sec  PA acc time: 0.08 sec  TR max андрей: 266.0 cm/sec  TR max P.3 mmHg  Pulm Sys Андрей: 55.3 cm/sec  Pulm Ron Андрей: 48.4 cm/sec  Pulm A Revs Андрей: 33.4 cm/sec  Pulm S/D: 1.1  E/E' av.4  Lateral E/e': 12.0  Medial E/e': 12.7     ______________________________________________________________________________  Report approved by: Santo Mayer 2022 11:44 AM             Discharge Medications   Current Discharge Medication List      START taking these medications    Details   !! acetaminophen (TYLENOL) 325 MG tablet Take 2 tablets (650 mg) by mouth 2 times daily as needed for mild pain    Associated Diagnoses: Chronic low back pain, unspecified back pain laterality, unspecified whether sciatica present      aspirin (ASA) 81 MG chewable tablet Take 1 tablet (81 mg) by mouth daily    Associated Diagnoses: Benign essential hypertension       !! - Potential duplicate medications found. Please discuss with provider.      CONTINUE these medications which have CHANGED    Details   atorvastatin (LIPITOR) 20 MG tablet Take 1 tablet (20 mg) by mouth At Bedtime --- HOLD UNTIL FOLLOW UP WITH TCU PROVIDER  ---    Associated Diagnoses: Hyperlipidemia, unspecified hyperlipidemia type      diclofenac (VOLTAREN) 1 % topical gel Apply 2-4 g topically 2 times daily    Associated Diagnoses: Patellofemoral arthritis; Impingement syndrome of right shoulder      DULoxetine (CYMBALTA) 20 MG capsule Take 1 capsule (20 mg) by mouth daily    Associated Diagnoses: Anxiety disorder, unspecified type         CONTINUE these medications which have NOT CHANGED    Details   cholecalciferol 25 MCG (1000 UT) TABS Take 1,000 Units by mouth daily       fluticasone-salmeterol (ADVAIR) 250-50 MCG/DOSE inhaler Inhale 1 puff into the lungs 2 times daily      furosemide (LASIX) 20 MG tablet Take 20 mg by mouth daily      hydrOXYzine (ATARAX) 50 MG tablet Take 100 mg by mouth every 6 hours as needed for anxiety      insulin detemir (LEVEMIR PEN) 100 UNIT/ML pen Inject 15 Units Subcutaneous every morning  Qty: 3 mL, Refills: 1    Associated Diagnoses: Type 2 diabetes mellitus without complication, without long-term current use of insulin (H)      levalbuterol (XOPENEX HFA) 45 MCG/ACT inhaler Inhale 2 puffs into the lungs every 4 hours as needed for shortness of breath / dyspnea or wheezing       levothyroxine (SYNTHROID/LEVOTHROID) 200 MCG tablet Take 200 mcg by mouth daily       melatonin 5 MG tablet Take 5 mg by mouth At Bedtime      metFORMIN (GLUCOPHAGE) 1000 MG tablet Take 1,000 mg by mouth 2 times daily (with meals)      multivitamin w/minerals (THERA-VIT-M) tablet Take 1 tablet by mouth daily      NARCAN 4 MG/0.1ML nasal spray CALL 911. SPRAY CONTENTS OF ONE SPRAYER (0.1ML) INTO ONE NOSTRIL. REPEAT IN 2-3 MINS IF SYMPTOMS OF OPIOID PERSIST, ALTERNATE NOSTRILS  Refills: 0      nicotine polacrilex (NICORETTE) 4 MG gum Place 4 mg inside cheek every hour as needed for smoking cessation       omeprazole (PRILOSEC) 20 MG DR capsule Take 20 mg by mouth daily       polyethylene glycol (MIRALAX) 17 GM/Dose powder Take 17 g by mouth daily as needed for  constipation  Qty: 510 g, Refills: 0    Associated Diagnoses: Constipation, unspecified constipation type      potassium chloride ER (KLOR-CON) 8 MEQ CR tablet Take 8 mEq by mouth 2 times daily      vitamin B-12 (CYANOCOBALAMIN) 1000 MCG tablet Take 1,000 mcg by mouth daily      !! ACCU-CHEK RAFAELA PLUS test strip       !! acetaminophen (TYLENOL) 500 MG tablet Take 500 mg by mouth 3 times daily       Acidophilus Lactobacillus CAPS Take 1 tablet by mouth 3 times daily      Alcohol Swabs (ALCOHOL WIPES) 70 % PADS USE WITH INSULIN INJECTION ONCE DAILY. **100 DAYS SUPPLY**      !! blood glucose (ACCU-CHEK RAFAELA PLUS) test strip Use to check blood sugar 3x daily or as directed.      !! Blood Glucose Calibration (ACCU-CHEK ACTIVE GLUCOSE CONT VI) 1 each by Other route      !! blood glucose calibration (ACCU-CHEK RAFAELA) solution        !! - Potential duplicate medications found. Please discuss with provider.      STOP taking these medications       amLODIPine (NORVASC) 5 MG tablet Comments:   Reason for Stopping:         gabapentin (NEURONTIN) 400 MG capsule Comments:   Reason for Stopping:         methadone HCl 10 MG/5ML SOLN Comments:   Reason for Stopping:         traZODone (DESYREL) 50 MG tablet Comments:   Reason for Stopping:             Allergies   Allergies   Allergen Reactions     Promethazine Other (See Comments) and Anxiety     Noted in 8/30/08 ER     Codeine Phosphate GI Disturbance     Lamotrigine Other (See Comments)     hyponatremia     Oxcarbazepine Unknown and Other (See Comments)     Low sodium  [employer]#[id number]       Codeine Other (See Comments) and Rash     GI bleeding  [employer]acy[employer]#[id number]

## 2022-03-04 NOTE — PROGRESS NOTES
Discharge education provided to patient. Patient left with all personal belongings. Patient discharging to TCU and transportation provided by [facility] via stretcher.

## 2022-03-04 NOTE — PROGRESS NOTES
Care Management Discharge Note    Discharge Date: 03/04/2022       Discharge Disposition: Transitional Care- [facility]  Discharge Services:      Discharge DME:      Discharge Transportation: agency- [facility] at 10am     Private pay costs discussed: transportation costs    PAS Confirmation Code: [id number]  Patient/family educated on Medicare website which has current facility and service quality ratings:      Education Provided on the Discharge Plan:    Persons Notified of Discharge Plans: patient  Patient/Family in Agreement with the Plan: unable to assess    Handoff Referral Completed: Yes    Additional Information:  Patient will be discharging today at 10am to [facility] via [facility] due to patient being lethargic and needing oxygen that she is unable to self regulate. Writer faxed orders and updated facility. PAS completed. PCS Completed and faxed. Patient is in agreement with plan    LILLIE Bajwa

## 2022-03-04 NOTE — PROGRESS NOTES
A&O x4, lethargic/sleepy. VSS, On RA overnight , sats in mid 90's.  Tylenol given for pain. Tele: SR. LS: diminished. BS: audible. Incontinent of B/B, +void, -bm. Up SBA walker/ lift to get into bed. Plan for discharge in am, ride set for 10am.

## 2022-03-18 ENCOUNTER — CARE COORDINATION (OUTPATIENT)
Dept: CARDIOLOGY | Facility: CLINIC | Age: 62
End: 2022-03-18
Payer: COMMERCIAL

## 2022-03-18 NOTE — PROGRESS NOTES
S/B: Patient was had recent hospital admission and in discharge notes she was encouraged to follow up in cardiology for possible stress induced cardiomyopathy. She has been seen Dr. Lunsford 2 years ago.      Sending to HF RN to review    Plan:    Call [facility] ([phone]) to set up Cardiology f/u. Will schedule with Dr. Nolan on  Friday, April 1 at 2:00 with labs prior.

## 2022-03-21 NOTE — PROGRESS NOTES
Patient had recent lenghty hospitalization.  EF initially 20% but now up to 40-45%. Referral from inpatient staff requesting HF consult/f/u.    3/21/22  Patient currently resides in nursing home according to notes.  3/24/22  Called [facility] ([phone]) and spoke with Shaun to set up Cardiology f/u. Will schedule for Friday, April 1 at 2:00 with labs prior.

## 2022-03-24 ENCOUNTER — APPOINTMENT (OUTPATIENT)
Dept: GENERAL RADIOLOGY | Facility: CLINIC | Age: 62
End: 2022-03-24
Attending: STUDENT IN AN ORGANIZED HEALTH CARE EDUCATION/TRAINING PROGRAM
Payer: COMMERCIAL

## 2022-03-24 ENCOUNTER — HOSPITAL ENCOUNTER (OUTPATIENT)
Facility: CLINIC | Age: 62
Setting detail: OBSERVATION
Discharge: HOME | End: 2022-03-25
Attending: EMERGENCY MEDICINE | Admitting: INTERNAL MEDICINE
Payer: COMMERCIAL

## 2022-03-24 DIAGNOSIS — R07.89 OTHER CHEST PAIN: ICD-10-CM

## 2022-03-24 DIAGNOSIS — Z11.52 ENCOUNTER FOR SCREENING LABORATORY TESTING FOR SEVERE ACUTE RESPIRATORY SYNDROME CORONAVIRUS 2 (SARS-COV-2): ICD-10-CM

## 2022-03-24 DIAGNOSIS — Z86.74 HISTORY OF CARDIAC ARREST: ICD-10-CM

## 2022-03-24 DIAGNOSIS — R07.9 CHEST PAIN, UNSPECIFIED TYPE: ICD-10-CM

## 2022-03-24 PROCEDURE — 71046 X-RAY EXAM CHEST 2 VIEWS: CPT

## 2022-03-24 PROCEDURE — 80053 COMPREHEN METABOLIC PANEL: CPT | Performed by: STUDENT IN AN ORGANIZED HEALTH CARE EDUCATION/TRAINING PROGRAM

## 2022-03-24 PROCEDURE — C9803 HOPD COVID-19 SPEC COLLECT: HCPCS

## 2022-03-24 PROCEDURE — 83880 ASSAY OF NATRIURETIC PEPTIDE: CPT | Performed by: EMERGENCY MEDICINE

## 2022-03-24 PROCEDURE — 84484 ASSAY OF TROPONIN QUANT: CPT | Performed by: STUDENT IN AN ORGANIZED HEALTH CARE EDUCATION/TRAINING PROGRAM

## 2022-03-24 PROCEDURE — 71046 X-RAY EXAM CHEST 2 VIEWS: CPT | Mod: 26 | Performed by: RADIOLOGY

## 2022-03-24 PROCEDURE — 93005 ELECTROCARDIOGRAM TRACING: CPT

## 2022-03-24 PROCEDURE — 36415 COLL VENOUS BLD VENIPUNCTURE: CPT | Performed by: STUDENT IN AN ORGANIZED HEALTH CARE EDUCATION/TRAINING PROGRAM

## 2022-03-24 PROCEDURE — 99285 EMERGENCY DEPT VISIT HI MDM: CPT | Mod: 25

## 2022-03-24 PROCEDURE — 85025 COMPLETE CBC W/AUTO DIFF WBC: CPT | Performed by: STUDENT IN AN ORGANIZED HEALTH CARE EDUCATION/TRAINING PROGRAM

## 2022-03-24 PROCEDURE — 99285 EMERGENCY DEPT VISIT HI MDM: CPT | Mod: 25 | Performed by: EMERGENCY MEDICINE

## 2022-03-24 PROCEDURE — 93010 ELECTROCARDIOGRAM REPORT: CPT | Performed by: EMERGENCY MEDICINE

## 2022-03-25 VITALS
HEIGHT: 64 IN | SYSTOLIC BLOOD PRESSURE: 105 MMHG | RESPIRATION RATE: 16 BRPM | WEIGHT: 253.8 LBS | HEART RATE: 95 BPM | BODY MASS INDEX: 43.33 KG/M2 | OXYGEN SATURATION: 90 % | TEMPERATURE: 98.3 F | DIASTOLIC BLOOD PRESSURE: 64 MMHG

## 2022-03-25 PROBLEM — R07.9 CHEST PAIN, UNSPECIFIED TYPE: Status: ACTIVE | Noted: 2022-03-25

## 2022-03-25 PROBLEM — Z86.74 HISTORY OF CARDIAC ARREST: Status: ACTIVE | Noted: 2022-03-25

## 2022-03-25 LAB
ALBUMIN SERPL-MCNC: 3 G/DL (ref 3.4–5)
ALP SERPL-CCNC: 119 U/L (ref 40–150)
ALT SERPL W P-5'-P-CCNC: 46 U/L (ref 0–50)
ANION GAP SERPL CALCULATED.3IONS-SCNC: 10 MMOL/L (ref 3–14)
ANION GAP SERPL CALCULATED.3IONS-SCNC: 9 MMOL/L (ref 3–14)
AST SERPL W P-5'-P-CCNC: 25 U/L (ref 0–45)
ATRIAL RATE - MUSE: 95 BPM
BASOPHILS # BLD AUTO: 0.1 10E3/UL (ref 0–0.2)
BASOPHILS NFR BLD AUTO: 1 %
BILIRUB SERPL-MCNC: 0.4 MG/DL (ref 0.2–1.3)
BUN SERPL-MCNC: 12 MG/DL (ref 7–30)
BUN SERPL-MCNC: 13 MG/DL (ref 7–30)
CALCIUM SERPL-MCNC: 8.7 MG/DL (ref 8.5–10.1)
CALCIUM SERPL-MCNC: 9.1 MG/DL (ref 8.5–10.1)
CHLORIDE BLD-SCNC: 103 MMOL/L (ref 94–109)
CHLORIDE BLD-SCNC: 106 MMOL/L (ref 94–109)
CHOLEST SERPL-MCNC: 112 MG/DL
CO2 SERPL-SCNC: 24 MMOL/L (ref 20–32)
CO2 SERPL-SCNC: 27 MMOL/L (ref 20–32)
CREAT SERPL-MCNC: 0.79 MG/DL (ref 0.52–1.04)
CREAT SERPL-MCNC: 0.92 MG/DL (ref 0.52–1.04)
DIASTOLIC BLOOD PRESSURE - MUSE: NORMAL MMHG
EOSINOPHIL # BLD AUTO: 0.2 10E3/UL (ref 0–0.7)
EOSINOPHIL NFR BLD AUTO: 2 %
ERYTHROCYTE [DISTWIDTH] IN BLOOD BY AUTOMATED COUNT: 15.1 % (ref 10–15)
ERYTHROCYTE [DISTWIDTH] IN BLOOD BY AUTOMATED COUNT: 15.2 % (ref 10–15)
FASTING STATUS PATIENT QL REPORTED: ABNORMAL
GFR SERPL CREATININE-BSD FRML MDRD: 70 ML/MIN/1.73M2
GFR SERPL CREATININE-BSD FRML MDRD: 85 ML/MIN/1.73M2
GLUCOSE BLD-MCNC: 124 MG/DL (ref 70–99)
GLUCOSE BLD-MCNC: 162 MG/DL (ref 70–99)
GLUCOSE BLDC GLUCOMTR-MCNC: 109 MG/DL (ref 70–99)
GLUCOSE BLDC GLUCOMTR-MCNC: 116 MG/DL (ref 70–99)
HBA1C MFR BLD: 6.2 % (ref 0–5.6)
HCT VFR BLD AUTO: 37.8 % (ref 35–47)
HCT VFR BLD AUTO: 40.4 % (ref 35–47)
HDLC SERPL-MCNC: 28 MG/DL
HGB BLD-MCNC: 12 G/DL (ref 11.7–15.7)
HGB BLD-MCNC: 12.9 G/DL (ref 11.7–15.7)
HOLD SPECIMEN: NORMAL
HOLD SPECIMEN: NORMAL
IMM GRANULOCYTES # BLD: 0.1 10E3/UL
IMM GRANULOCYTES NFR BLD: 1 %
INTERPRETATION ECG - MUSE: NORMAL
LDLC SERPL CALC-MCNC: 21 MG/DL
LYMPHOCYTES # BLD AUTO: 3.8 10E3/UL (ref 0.8–5.3)
LYMPHOCYTES NFR BLD AUTO: 37 %
MAGNESIUM SERPL-MCNC: 1.5 MG/DL (ref 1.6–2.3)
MCH RBC QN AUTO: 29 PG (ref 26.5–33)
MCH RBC QN AUTO: 29.1 PG (ref 26.5–33)
MCHC RBC AUTO-ENTMCNC: 31.7 G/DL (ref 31.5–36.5)
MCHC RBC AUTO-ENTMCNC: 31.9 G/DL (ref 31.5–36.5)
MCV RBC AUTO: 91 FL (ref 78–100)
MCV RBC AUTO: 92 FL (ref 78–100)
MONOCYTES # BLD AUTO: 1.2 10E3/UL (ref 0–1.3)
MONOCYTES NFR BLD AUTO: 11 %
NEUTROPHILS # BLD AUTO: 5 10E3/UL (ref 1.6–8.3)
NEUTROPHILS NFR BLD AUTO: 48 %
NONHDLC SERPL-MCNC: 84 MG/DL
NRBC # BLD AUTO: 0 10E3/UL
NRBC BLD AUTO-RTO: 0 /100
NT-PROBNP SERPL-MCNC: 150 PG/ML (ref 0–900)
P AXIS - MUSE: 29 DEGREES
PLATELET # BLD AUTO: 293 10E3/UL (ref 150–450)
PLATELET # BLD AUTO: 312 10E3/UL (ref 150–450)
POTASSIUM BLD-SCNC: 3.7 MMOL/L (ref 3.4–5.3)
POTASSIUM BLD-SCNC: 3.9 MMOL/L (ref 3.4–5.3)
PR INTERVAL - MUSE: 148 MS
PROT SERPL-MCNC: 7.6 G/DL (ref 6.8–8.8)
QRS DURATION - MUSE: 84 MS
QT - MUSE: 362 MS
QTC - MUSE: 454 MS
R AXIS - MUSE: -14 DEGREES
RBC # BLD AUTO: 4.13 10E6/UL (ref 3.8–5.2)
RBC # BLD AUTO: 4.45 10E6/UL (ref 3.8–5.2)
SARS-COV-2 RNA RESP QL NAA+PROBE: NEGATIVE
SODIUM SERPL-SCNC: 139 MMOL/L (ref 133–144)
SODIUM SERPL-SCNC: 140 MMOL/L (ref 133–144)
SYSTOLIC BLOOD PRESSURE - MUSE: NORMAL MMHG
T AXIS - MUSE: 24 DEGREES
T4 FREE SERPL-MCNC: 1.36 NG/DL (ref 0.76–1.46)
TRIGL SERPL-MCNC: 313 MG/DL
TROPONIN I SERPL HS-MCNC: 70 NG/L
TROPONIN I SERPL HS-MCNC: 70 NG/L
TROPONIN I SERPL HS-MCNC: 74 NG/L
TSH SERPL DL<=0.005 MIU/L-ACNC: 0.15 MU/L (ref 0.4–4)
VENTRICULAR RATE- MUSE: 95 BPM
WBC # BLD AUTO: 10.3 10E3/UL (ref 4–11)
WBC # BLD AUTO: 9.6 10E3/UL (ref 4–11)

## 2022-03-25 PROCEDURE — U0003 INFECTIOUS AGENT DETECTION BY NUCLEIC ACID (DNA OR RNA); SEVERE ACUTE RESPIRATORY SYNDROME CORONAVIRUS 2 (SARS-COV-2) (CORONAVIRUS DISEASE [COVID-19]), AMPLIFIED PROBE TECHNIQUE, MAKING USE OF HIGH THROUGHPUT TECHNOLOGIES AS DESCRIBED BY CMS-2020-01-R: HCPCS | Performed by: EMERGENCY MEDICINE

## 2022-03-25 PROCEDURE — 83735 ASSAY OF MAGNESIUM: CPT | Performed by: NURSE PRACTITIONER

## 2022-03-25 PROCEDURE — 84484 ASSAY OF TROPONIN QUANT: CPT

## 2022-03-25 PROCEDURE — 250N000011 HC RX IP 250 OP 636: Performed by: NURSE PRACTITIONER

## 2022-03-25 PROCEDURE — 82962 GLUCOSE BLOOD TEST: CPT

## 2022-03-25 PROCEDURE — 84443 ASSAY THYROID STIM HORMONE: CPT

## 2022-03-25 PROCEDURE — 96374 THER/PROPH/DIAG INJ IV PUSH: CPT

## 2022-03-25 PROCEDURE — 80061 LIPID PANEL: CPT

## 2022-03-25 PROCEDURE — G0378 HOSPITAL OBSERVATION PER HR: HCPCS

## 2022-03-25 PROCEDURE — 250N000012 HC RX MED GY IP 250 OP 636 PS 637

## 2022-03-25 PROCEDURE — 85014 HEMATOCRIT: CPT

## 2022-03-25 PROCEDURE — 84439 ASSAY OF FREE THYROXINE: CPT

## 2022-03-25 PROCEDURE — 36415 COLL VENOUS BLD VENIPUNCTURE: CPT

## 2022-03-25 PROCEDURE — 83036 HEMOGLOBIN GLYCOSYLATED A1C: CPT

## 2022-03-25 PROCEDURE — 96372 THER/PROPH/DIAG INJ SC/IM: CPT | Mod: XS

## 2022-03-25 PROCEDURE — 250N000013 HC RX MED GY IP 250 OP 250 PS 637

## 2022-03-25 PROCEDURE — 80048 BASIC METABOLIC PNL TOTAL CA: CPT

## 2022-03-25 PROCEDURE — 99207 PR SC NO CHARGE VISIT: CPT | Performed by: INTERNAL MEDICINE

## 2022-03-25 PROCEDURE — 99235 HOSP IP/OBS SAME DATE MOD 70: CPT | Mod: 25 | Performed by: INTERNAL MEDICINE

## 2022-03-25 RX ORDER — ACETAMINOPHEN 325 MG/1
650 TABLET ORAL 2 TIMES DAILY PRN
Status: DISCONTINUED | OUTPATIENT
Start: 2022-03-25 | End: 2022-03-25 | Stop reason: HOSPADM

## 2022-03-25 RX ORDER — MAGNESIUM HYDROXIDE/ALUMINUM HYDROXICE/SIMETHICONE 120; 1200; 1200 MG/30ML; MG/30ML; MG/30ML
30 SUSPENSION ORAL EVERY 4 HOURS PRN
Status: DISCONTINUED | OUTPATIENT
Start: 2022-03-25 | End: 2022-03-25 | Stop reason: HOSPADM

## 2022-03-25 RX ORDER — PANTOPRAZOLE SODIUM 40 MG/1
40 TABLET, DELAYED RELEASE ORAL DAILY
Status: DISCONTINUED | OUTPATIENT
Start: 2022-03-25 | End: 2022-03-25 | Stop reason: HOSPADM

## 2022-03-25 RX ORDER — NICOTINE POLACRILEX 4 MG
15-30 LOZENGE BUCCAL
Status: DISCONTINUED | OUTPATIENT
Start: 2022-03-25 | End: 2022-03-25 | Stop reason: HOSPADM

## 2022-03-25 RX ORDER — NITROGLYCERIN 0.4 MG/1
0.4 TABLET SUBLINGUAL EVERY 5 MIN PRN
Status: DISCONTINUED | OUTPATIENT
Start: 2022-03-25 | End: 2022-03-25 | Stop reason: HOSPADM

## 2022-03-25 RX ORDER — ATORVASTATIN CALCIUM 40 MG/1
40 TABLET, FILM COATED ORAL AT BEDTIME
Status: DISCONTINUED | OUTPATIENT
Start: 2022-03-25 | End: 2022-03-25 | Stop reason: HOSPADM

## 2022-03-25 RX ORDER — DEXTROSE MONOHYDRATE 25 G/50ML
25-50 INJECTION, SOLUTION INTRAVENOUS
Status: DISCONTINUED | OUTPATIENT
Start: 2022-03-25 | End: 2022-03-25 | Stop reason: HOSPADM

## 2022-03-25 RX ORDER — ALBUTEROL SULFATE 90 UG/1
2 AEROSOL, METERED RESPIRATORY (INHALATION) EVERY 4 HOURS PRN
Status: ON HOLD | COMMUNITY
End: 2023-12-08

## 2022-03-25 RX ORDER — ASPIRIN 325 MG
325 TABLET ORAL ONCE
Status: COMPLETED | OUTPATIENT
Start: 2022-03-25 | End: 2022-03-25

## 2022-03-25 RX ORDER — ALBUTEROL SULFATE 90 UG/1
2 AEROSOL, METERED RESPIRATORY (INHALATION) EVERY 4 HOURS PRN
Status: DISCONTINUED | OUTPATIENT
Start: 2022-03-25 | End: 2022-03-25 | Stop reason: HOSPADM

## 2022-03-25 RX ORDER — ONDANSETRON 4 MG/1
4 TABLET, ORALLY DISINTEGRATING ORAL EVERY 8 HOURS PRN
Status: ON HOLD | COMMUNITY
End: 2023-08-06

## 2022-03-25 RX ORDER — METHADONE HYDROCHLORIDE 10 MG/1
90 TABLET ORAL ONCE
COMMUNITY

## 2022-03-25 RX ORDER — GABAPENTIN 100 MG/1
100 CAPSULE ORAL 3 TIMES DAILY
Status: ON HOLD | COMMUNITY
End: 2023-06-22

## 2022-03-25 RX ORDER — FUROSEMIDE 20 MG
20 TABLET ORAL DAILY
Status: DISCONTINUED | OUTPATIENT
Start: 2022-03-25 | End: 2022-03-25 | Stop reason: HOSPADM

## 2022-03-25 RX ORDER — MAGNESIUM SULFATE HEPTAHYDRATE 40 MG/ML
4 INJECTION, SOLUTION INTRAVENOUS ONCE
Status: COMPLETED | OUTPATIENT
Start: 2022-03-25 | End: 2022-03-25

## 2022-03-25 RX ORDER — LEVOTHYROXINE SODIUM 100 UG/1
200 TABLET ORAL DAILY
Status: DISCONTINUED | OUTPATIENT
Start: 2022-03-25 | End: 2022-03-25 | Stop reason: HOSPADM

## 2022-03-25 RX ORDER — ASPIRIN 81 MG/1
81 TABLET, CHEWABLE ORAL DAILY
Status: DISCONTINUED | OUTPATIENT
Start: 2022-03-25 | End: 2022-03-25 | Stop reason: HOSPADM

## 2022-03-25 RX ORDER — ATORVASTATIN CALCIUM 20 MG/1
20 TABLET, FILM COATED ORAL AT BEDTIME
Status: DISCONTINUED | OUTPATIENT
Start: 2022-03-25 | End: 2022-03-25

## 2022-03-25 RX ORDER — HYDROXYZINE HYDROCHLORIDE 50 MG/1
100 TABLET, FILM COATED ORAL EVERY 6 HOURS PRN
Status: DISCONTINUED | OUTPATIENT
Start: 2022-03-25 | End: 2022-03-25 | Stop reason: HOSPADM

## 2022-03-25 RX ORDER — POTASSIUM CHLORIDE 600 MG/1
8 TABLET, FILM COATED, EXTENDED RELEASE ORAL 2 TIMES DAILY
Status: DISCONTINUED | OUTPATIENT
Start: 2022-03-25 | End: 2022-03-25 | Stop reason: HOSPADM

## 2022-03-25 RX ORDER — ASPIRIN 81 MG/1
81 TABLET ORAL DAILY
Status: DISCONTINUED | OUTPATIENT
Start: 2022-03-26 | End: 2022-03-25 | Stop reason: DRUGHIGH

## 2022-03-25 RX ORDER — DULOXETIN HYDROCHLORIDE 20 MG/1
20 CAPSULE, DELAYED RELEASE ORAL DAILY
Status: DISCONTINUED | OUTPATIENT
Start: 2022-03-25 | End: 2022-03-25 | Stop reason: HOSPADM

## 2022-03-25 RX ADMIN — ASPIRIN 81 MG CHEWABLE TABLET 81 MG: 81 TABLET CHEWABLE at 08:17

## 2022-03-25 RX ADMIN — ASPIRIN 325 MG ORAL TABLET 325 MG: 325 PILL ORAL at 02:47

## 2022-03-25 RX ADMIN — MAGNESIUM SULFATE IN WATER 4 G: 40 INJECTION, SOLUTION INTRAVENOUS at 09:33

## 2022-03-25 RX ADMIN — LEVOTHYROXINE SODIUM 200 MCG: 100 TABLET ORAL at 08:17

## 2022-03-25 RX ADMIN — ACETAMINOPHEN 650 MG: 325 TABLET ORAL at 14:10

## 2022-03-25 RX ADMIN — DULOXETINE 20 MG: 20 CAPSULE, DELAYED RELEASE ORAL at 08:17

## 2022-03-25 RX ADMIN — ACETAMINOPHEN 650 MG: 325 TABLET ORAL at 04:00

## 2022-03-25 RX ADMIN — ATORVASTATIN CALCIUM 20 MG: 20 TABLET, FILM COATED ORAL at 02:47

## 2022-03-25 RX ADMIN — PANTOPRAZOLE SODIUM 40 MG: 40 TABLET, DELAYED RELEASE ORAL at 08:17

## 2022-03-25 RX ADMIN — INSULIN DETEMIR 8 UNITS: 100 INJECTION, SOLUTION SUBCUTANEOUS at 09:42

## 2022-03-25 RX ADMIN — HYDROXYZINE HYDROCHLORIDE 100 MG: 50 TABLET ORAL at 03:56

## 2022-03-25 RX ADMIN — FUROSEMIDE 20 MG: 20 TABLET ORAL at 08:17

## 2022-03-25 RX ADMIN — POTASSIUM CHLORIDE 8 MEQ: 600 TABLET, FILM COATED, EXTENDED RELEASE ORAL at 08:17

## 2022-03-25 RX ADMIN — ALUMINUM HYDROXIDE, MAGNESIUM HYDROXIDE, AND SIMETHICONE 30 ML: 200; 200; 20 SUSPENSION ORAL at 17:28

## 2022-03-25 RX ADMIN — FLUTICASONE FUROATE AND VILANTEROL TRIFENATATE 1 PUFF: 100; 25 POWDER RESPIRATORY (INHALATION) at 08:17

## 2022-03-25 ASSESSMENT — ENCOUNTER SYMPTOMS
CHEST TIGHTNESS: 1
VOMITING: 0
DIZZINESS: 0
NAUSEA: 0
HEADACHES: 0
JOINT SWELLING: 0

## 2022-03-25 NOTE — PROGRESS NOTES
PRIMARY DIAGNOSIS: CHEST PAIN  OUTPATIENT/OBSERVATION GOALS TO BE MET BEFORE DISCHARGE:  - Serial troponins and stress test complete: no  - Seen and cleared by consultant if applicable: no  - Adequate pain control on oral analgesia: yes  - Vital signs normal or at patient baseline: yes   - Safe disposition plan has been identified: yes  - Nurse to notify provider when observation goals have been met and patient is ready for discharge.- No

## 2022-03-25 NOTE — H&P
[facility]    Cardiology History and Physical - Cardiology         Date of Admission:  3/24/2022    Assessment & Plan: SL    No Yusuf is a 62 year old female admitted on 3/24/2022. She has a hx of type 2 diabetes mellitus, hypertension, hyperlipidemia, bipolar disorder, polysubstance use on methadone, hypothyroidism, chronic hepatitis C admitted for chest pain rule out.    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 Troponin 74, EKG without ST elevation, CXR with non specific hazy opacities. Last ECHO 2/13 demonstrated EF 40-45% with mild global hypokinesia of the left ventricle (this was an improvement of previous LV dysfunction in the setting of severe illness 2/2 COVID-19). Additionally, patient with recent VT (torsades) arrest in past hospitalizations. Given several risk factors, further workup could be considered (CTA vs stress test vs angiogram).  - S/p Aspirin 325 mg, continue ASA 81 mg daily  - Continue PTA Lipitor (increase to 40 mg from 20 mg)  - Nitroglycerin PRN  - Telemetry  - Repeat troponin  - Consider repeat TTE (not ordered)  - NPO for possible procedure   - A1c, lipid panel, TSH  - Diuretics: Euvolemic, resume PTA Lasix 20 mg    # T2DM  - On PTA 15U detemir  - Hold PTA metformin   - Detemir 8U, LDSS, hypoglycemic protocol     # Hypothyroidsim  - PTA levothyroxine 200 mcg  - TSH    # Mood disorder  - PTA duloxetine, atarax     # COPD  - PTA breo ellipta and levalbuterol     # GERD  - PTA PPI     Diet: NPO  DVT Prophylaxis: Defer to AM  Valle Catheter: Not present  Code Status:  Full  Fluids: None  Lines: PIV       Disposition Plan   Expected discharge: 2 - 3 days, recommended to prior living arrangement once ASC rule out.    Entered: Jass Buckner MD 03/25/2022, 1:19 AM     Patient to be staffed in AM.    Jass Buckner MD    Internal Medicine, PGY3  [facility]    ______________________________________________________________________    Chief Complaint   Chest Pain    History is obtained from the patient    History of Present Illness   No Yusuf is a 62 year old female who has a hx of type 2 diabetes mellitus, hypertension, hyperlipidemia, bipolar disorder, polysubstance use previously on methadone, hypothyroidism, and chronic hepatitis C who presented to ED for evaluation of chest pain. Pain started a few hours prior to presentation and started substernal and radiated to left side . It lasted about an hour. She had a headache, felt dizzy, and vomited x2. Patient is currently asymptomatic aside from occasional chest pressure. She does note that she did heavy workouts earlier in the day. She also feels like her chest pain is associated with her anxiety. Patient reports with her fibromyalgia she occasionally gets pain all over, however, today's episode felt different. She has baseline orthopnea (sleeps at incline). She denies any increased LE edema. Patient reports significant SOB since COVID.     She lives in care center after a prolonged hospitalization due to COVID c/b cardiac arrest w/ ROSC x 1 round of chest compressions.    In the ED, patient with P99, /79, satting in the 90s on room air. Labs notable for troponin 74 and . EKG w/o ST elevation. CXR with opacities in the upper lungs and LLL.     Review of Systems    The 10 point Review of Systems is negative other than noted in the HPI or here.     Past Medical History    I have reviewed this patient's medical history and updated it with pertinent information if needed.   Past Medical History:   Diagnosis Date     Arthritis      Bipolar affective (H)      Fibromyalgia      Hypertension        Past Surgical History   I have reviewed this patient's surgical history and updated it with pertinent information if  needed.  Past Surgical History:   Procedure Laterality Date     CHOLECYSTECTOMY       GYN SURGERY      c section     GYN SURGERY      oblation     ORTHOPEDIC SURGERY      left leg, left shoulder, back       Social History   I have reviewed this patient's social history and updated it with pertinent information if needed.  Social History     Tobacco Use     Smoking status: Former Smoker     Packs/day: 0.10     Smokeless tobacco: Never Used   Substance Use Topics     Alcohol use: No     Drug use: No     Family History   I have reviewed this patient's family history and updated it with pertinent information if needed.   I have reviewed this patient's family history and updated it with pertinent information if needed.  Family History   Problem Relation Age of Onset     Heart Disease Mother      Diabetes Mother      Ovarian Cancer Mother      Heart Disease Father      Lung Cancer Father      Diabetes Sister      Ovarian Cancer Sister      Diabetes Brother        Prior to Admission Medications   Prior to Admission Medications   Prescriptions Last Dose Informant Patient Reported? Taking?   ACCU-CHEK RAFAELA PLUS test strip   Yes No   Acidophilus Lactobacillus CAPS   Yes No   Sig: Take 1 tablet by mouth 3 times daily   Alcohol Swabs (ALCOHOL WIPES) 70 % PADS   Yes No   Sig: USE WITH INSULIN INJECTION ONCE DAILY. **100 DAYS SUPPLY**   Blood Glucose Calibration (ACCU-CHEK ACTIVE GLUCOSE CONT VI)   Yes No   Si each by Other route   DULoxetine (CYMBALTA) 20 MG capsule   No No   Sig: Take 1 capsule (20 mg) by mouth daily   NARCAN 4 MG/0.1ML nasal spray   Yes No   Sig: CALL 911. SPRAY CONTENTS OF ONE SPRAYER (0.1ML) INTO ONE NOSTRIL. REPEAT IN 2-3 MINS IF SYMPTOMS OF OPIOID PERSIST, ALTERNATE NOSTRILS   acetaminophen (TYLENOL) 325 MG tablet   No No   Sig: Take 2 tablets (650 mg) by mouth 2 times daily as needed for mild pain   acetaminophen (TYLENOL) 500 MG tablet   Yes No   Sig: Take 500 mg by mouth 3 times daily    aspirin  (ASA) 81 MG chewable tablet   No No   Sig: Take 1 tablet (81 mg) by mouth daily   atorvastatin (LIPITOR) 20 MG tablet   No No   Sig: Take 1 tablet (20 mg) by mouth At Bedtime --- HOLD UNTIL FOLLOW UP WITH TCU PROVIDER ---   blood glucose (ACCU-CHEK RAFAELA PLUS) test strip   Yes No   Sig: Use to check blood sugar 3x daily or as directed.   blood glucose calibration (ACCU-CHEK RAFAELA) solution   Yes No   cholecalciferol 25 MCG (1000 UT) TABS   Yes No   Sig: Take 1,000 Units by mouth daily    diclofenac (VOLTAREN) 1 % topical gel   No No   Sig: Apply 2-4 g topically 2 times daily   fluticasone-salmeterol (ADVAIR) 250-50 MCG/DOSE inhaler   Yes No   Sig: Inhale 1 puff into the lungs 2 times daily   furosemide (LASIX) 20 MG tablet   Yes No   Sig: Take 20 mg by mouth daily   hydrOXYzine (ATARAX) 50 MG tablet   Yes No   Sig: Take 100 mg by mouth every 6 hours as needed for anxiety   insulin detemir (LEVEMIR PEN) 100 UNIT/ML pen   No No   Sig: Inject 15 Units Subcutaneous every morning   levalbuterol (XOPENEX HFA) 45 MCG/ACT inhaler   Yes No   Sig: Inhale 2 puffs into the lungs every 4 hours as needed for shortness of breath / dyspnea or wheezing    levothyroxine (SYNTHROID/LEVOTHROID) 200 MCG tablet   Yes No   Sig: Take 200 mcg by mouth daily    melatonin 5 MG tablet   Yes No   Sig: Take 5 mg by mouth At Bedtime   metFORMIN (GLUCOPHAGE) 1000 MG tablet   Yes No   Sig: Take 1,000 mg by mouth 2 times daily (with meals)   multivitamin w/minerals (THERA-VIT-M) tablet   Yes No   Sig: Take 1 tablet by mouth daily   nicotine polacrilex (NICORETTE) 4 MG gum   Yes No   Sig: Place 4 mg inside cheek every hour as needed for smoking cessation    omeprazole (PRILOSEC) 20 MG DR capsule   Yes No   Sig: Take 20 mg by mouth daily    polyethylene glycol (MIRALAX) 17 GM/Dose powder   No No   Sig: Take 17 g by mouth daily as needed for constipation   potassium chloride ER (KLOR-CON) 8 MEQ CR tablet   Yes No   Sig: Take 8 mEq by mouth 2 times  daily   vitamin B-12 (CYANOCOBALAMIN) 1000 MCG tablet   Yes No   Sig: Take 1,000 mcg by mouth daily      Facility-Administered Medications: None     Allergies   Allergies   Allergen Reactions     Promethazine Other (See Comments) and Anxiety     Noted in 8/30/08 ER     Codeine Phosphate GI Disturbance     Lamotrigine Other (See Comments)     hyponatremia     Oxcarbazepine Unknown and Other (See Comments)     Low sodium  LegacyRecord#[id number]       Codeine Other (See Comments) and Rash     GI bleeding  LegacyRecord#[id number]         Physical Exam   Vital Signs:     BP: 117/78 Pulse: 93   Resp: 18 SpO2: 95 % O2 Device: None (Room air)    Weight: 0 lbs 0 oz    Constitutional: awake, alert, cooperative, no apparent distress, and appears stated age  Eyes: Lids and lashes normal, extra ocular muscles intact, sclera clear, conjunctiva normal  ENT: Normocephalic, without obvious abnormality, atraumatic, MMM  Respiratory: No increased work of breathing, good air exchange, clear to auscultation bilaterally, no wheezing  Cardiovascular:  regular rate and rhythm, no murmur appreciated, JVP 9  GI: soft, non-distended, non-tender  Skin: normal skin color  Musculoskeletal: trace bilateral pitting edema  Neurologic: Awake, alert, oriented. Moves all extremities  Neuropsychiatric: Euthymic, anxious    Data   Data reviewed today: I reviewed all medications, new labs and imaging results over the last 24 hours.    Recent Labs   Lab 03/24/22  2346   WBC 10.3   HGB 12.9   MCV 91         POTASSIUM 3.9   CHLORIDE 106   CO2 24   BUN 13   CR 0.92   ANIONGAP 10   RODOLFO 9.1   *   ALBUMIN 3.0*   PROTTOTAL 7.6   BILITOTAL 0.4   ALKPHOS 119   ALT 46   AST 25     Recent Results (from the past 24 hour(s))   Chest XR,  PA & LAT    Narrative    EXAM: CHEST 2 VIEWS  LOCATION: [facility]  DATE/TIME: 3/25/2022 12:17 AM    INDICATION: Chest pain.  COMPARISON: 02/16/2022.    FINDINGS: Large body  habitus, limiting evaluation of the lung parenchyma. A few apparent ill-defined hazy opacities project over the central aspects of the upper lungs bilaterally and the lower left lung. Normal size cardiac silhouette.      Impression    IMPRESSION:   1. A few apparent ill-defined hazy opacities project over the central aspect of the upper lungs bilaterally and the lower left lung. These are nonspecific, but could relate to an infectious/inflammatory process or scarring.  2. No other findings suspicious for active cardiopulmonary disease.

## 2022-03-25 NOTE — PROGRESS NOTES
Admission  Diagnosis: chest pain, N/V, dizziness    Admitted from:[facility]  Via: [facility]  Accompanied: Staff  Belongings: kept with patient  Admission Profile: Completed  Teaching: Call dont fall, room orientation, NPO status, possible procedures, call when symptomatic  Access: RT PIV  Telemetry: yes  Ht/Wt: yes  Skin check with Dinah BLAKE RN: skin intact. No pressure injuries.

## 2022-03-25 NOTE — PROGRESS NOTES
PRIMARY DIAGNOSIS: CHEST PAIN  OUTPATIENT/OBSERVATION GOALS TO BE MET BEFORE DISCHARGE:  - Serial troponins and stress test complete: no (cardiac MRI scheduled for Tuesday 03/29)  - Seen and cleared by consultant if applicable: yes  - Adequate pain control on oral analgesia: yes  - Vital signs normal or at patient baseline: yes   - Safe disposition plan has been identified: yes  - Nurse to notify provider when observation goals have been met and patient is ready for discharge.- No

## 2022-03-25 NOTE — DISCHARGE SUMMARY
[address] [address]  p: [phone]    Discharge Summary: Cardiology Service    No Yusuf MRN# [id number]   YOB: 1960 Age: 62 year old       Admission Date: 3/24/2022  Discharge Date: 03/25/22    Discharge Diagnoses:  Chest pain  HFmrEF  DMII  Hypothyroidism  Bipolar Disorder  COPD  GERD  HTN  HLD  Polysubstance use on methadone      Brief HPI:  No Yusuf is a 62 year old female with a history of DMII, HFmrEF HTN, HLD, COVID Pneumonia with extended hospitalization complicated by torsades de pointes arrest at OSH, bipolar disorder, polysubstance use on methadone, hypothyroidism, chronic hepatitis C who presents to ED for chest pain    Hospital Course by Diagnosis:  Chest pain  HFmrEF  HTN  HLD   Recently discharged from [facility] after extended hospitalization for COVID-19 PNA c/b torsades de pointes arrest. Has been working on rehabilitating at TCU with increasing PT starting yesterday. No chest pain or significant symptoms with exercise. Pain started before bed (around 9 pm) and described as substernal pain. States it felt similar to gas she has had in the past. Nausea and emesis X 1 with elevated BP (per patient report) prompting EMS activation. Chest pain resolved en route, Troponin negative, BNP negative, CXR with nonspecific hazy opacities and EKG without ST elevation. Recent ECHO from 2/13/22 with EF 40-45%, mild global hypokinesia of the left ventricle and mildly decreased right ventricular systolic function (when compared to previous echo, EF improved)  - Pain relieved with emesis X 1 and cardiac workup negative. Believe this is a combination of reflux, musculoskeletal and anxiety. Cardiac MRI w/ stress was discussed and would be beneficial in the outpatient setting.  - Goal directed medical therapy:   - BB: on hold due to bradycardia on previous admission. Consider restarting in outpatient  setting   ACEi/ARB: not indicated as EF > 40%   - Statin: continue PTA atorvastatin 20 mg. Recheck lipids in 3 months and consider increasing to 40 mg   - Volume status: appears euvolemic. Continue PTA Lasix  - Cardiology follow-up scheduled 4/1/22  - Cardiac MRI with stress ordered for outpatient    DMII  - Continue PTA 15U Detemir and metformin    Hypothyroidism  - Continue PTA Levothyroxine 200 mcg  - TSH 0.15, t4 free 1.36    Bipolar Disorder  - Continue PTA Duloxetine and atarax    COPD  - Continue PTA breo ellipta and Levalbuterol    GERD  - Continue PTA PPI    Polysubstance use on methadone  - History of 3 overdoses per patient from fentanyl laced heroin  - Patient also endorses 10 year remote history of cocaine use. Denies any current or recent use  - Continue PTA methadone per TCU provider recommendation    Pertinent Procedures:    Consults:  Cardiac Rehab      Discharge medications:   Current Discharge Medication List      CONTINUE these medications which have NOT CHANGED    Details   !! ACCU-CHEK RAFAELA PLUS test strip       !! acetaminophen (TYLENOL) 325 MG tablet Take 2 tablets (650 mg) by mouth 2 times daily as needed for mild pain    Associated Diagnoses: Chronic low back pain, unspecified back pain laterality, unspecified whether sciatica present      !! acetaminophen (TYLENOL) 500 MG tablet Take 500 mg by mouth 3 times daily       Acidophilus Lactobacillus CAPS Take 1 tablet by mouth 3 times daily      Alcohol Swabs (ALCOHOL WIPES) 70 % PADS USE WITH INSULIN INJECTION ONCE DAILY. **100 DAYS SUPPLY**      aspirin (ASA) 81 MG chewable tablet Take 1 tablet (81 mg) by mouth daily    Associated Diagnoses: Benign essential hypertension      atorvastatin (LIPITOR) 20 MG tablet Take 1 tablet (20 mg) by mouth At Bedtime --- HOLD UNTIL FOLLOW UP WITH TCU PROVIDER ---    Associated Diagnoses: Hyperlipidemia, unspecified hyperlipidemia type      !! blood glucose (ACCU-CHEK RAFAELA PLUS) test strip Use to check  blood sugar 3x daily or as directed.      !! Blood Glucose Calibration (ACCU-CHEK ACTIVE GLUCOSE CONT VI) 1 each by Other route      !! blood glucose calibration (ACCU-CHEK RAFAELA) solution       cholecalciferol 25 MCG (1000 UT) TABS Take 1,000 Units by mouth daily       diclofenac (VOLTAREN) 1 % topical gel Apply 2-4 g topically 2 times daily    Associated Diagnoses: Patellofemoral arthritis; Impingement syndrome of right shoulder      DULoxetine (CYMBALTA) 20 MG capsule Take 1 capsule (20 mg) by mouth daily    Associated Diagnoses: Anxiety disorder, unspecified type      fluticasone-salmeterol (ADVAIR) 250-50 MCG/DOSE inhaler Inhale 1 puff into the lungs 2 times daily      furosemide (LASIX) 20 MG tablet Take 20 mg by mouth daily      hydrOXYzine (ATARAX) 50 MG tablet Take 100 mg by mouth every 6 hours as needed for anxiety      insulin detemir (LEVEMIR PEN) 100 UNIT/ML pen Inject 15 Units Subcutaneous every morning  Qty: 3 mL, Refills: 1    Associated Diagnoses: Type 2 diabetes mellitus without complication, without long-term current use of insulin (H)      levalbuterol (XOPENEX HFA) 45 MCG/ACT inhaler Inhale 2 puffs into the lungs every 4 hours as needed for shortness of breath / dyspnea or wheezing       levothyroxine (SYNTHROID/LEVOTHROID) 200 MCG tablet Take 200 mcg by mouth daily       melatonin 5 MG tablet Take 5 mg by mouth At Bedtime      metFORMIN (GLUCOPHAGE) 1000 MG tablet Take 1,000 mg by mouth 2 times daily (with meals)      multivitamin w/minerals (THERA-VIT-M) tablet Take 1 tablet by mouth daily      NARCAN 4 MG/0.1ML nasal spray CALL 911. SPRAY CONTENTS OF ONE SPRAYER (0.1ML) INTO ONE NOSTRIL. REPEAT IN 2-3 MINS IF SYMPTOMS OF OPIOID PERSIST, ALTERNATE NOSTRILS  Refills: 0      nicotine polacrilex (NICORETTE) 4 MG gum Place 4 mg inside cheek every hour as needed for smoking cessation       omeprazole (PRILOSEC) 20 MG DR capsule Take 20 mg by mouth daily       polyethylene glycol (MIRALAX) 17  GM/Dose powder Take 17 g by mouth daily as needed for constipation  Qty: 510 g, Refills: 0    Associated Diagnoses: Constipation, unspecified constipation type      potassium chloride ER (KLOR-CON) 8 MEQ CR tablet Take 8 mEq by mouth 2 times daily      vitamin B-12 (CYANOCOBALAMIN) 1000 MCG tablet Take 1,000 mcg by mouth daily       !! - Potential duplicate medications found. Please discuss with provider.          Follow-up:  Cardiology 4/1/22    Labs or imaging requiring follow-up after discharge:      Code status:  FULL    Condition on discharge  Temp:  [98.6  F (37  C)-98.8  F (37.1  C)] 98.6  F (37  C)  Pulse:  [88-99] 95  Resp:  [16-18] 18  BP: (100-134)/(65-79) 111/69  Cuff Mean (mmHg):  [76] 76  SpO2:  [90 %-97 %] 90 %  General: Alert, interactive, no acute distress  HEENT: Normocephalic, atraumatic. Sclera anicteric.   Neck: JVP not elevated  Cardiovascular: Regular rate and rhythm, normal S1 and S2, no murmurs, gallops, or rubs. Radial and pedal pulses palpable bilaterally.   Resp: Regular work of breathing on room air. Clear to auscultation bilaterally, no rales, wheezes, or rhonchi  GI: Soft, nontender, nondistended.   Extremities: No edema, no cyanosis or clubbing, warm and well perfused  Skin: Warm and dry, no jaundice or rash  Neuro: Alert and oriented x 3. CN 2-12 intact, moves all extremities equally, normal speech  Psych: Mood and affect are appropriate    Imaging with results:  Echocardiogram 2/13/22:  Interpretation Summary     Mildly decreased left ventricular systolic function  The visual ejection fraction is 40-45%.  There is mild global hypokinesia of the left ventricle.  Mildly decreased right ventricular systolic function  No significant valve dysfunction.  The inferior vena cava was normal in size with preserved respiratory  variability.  There is no pericardial effusion.     Compared to study dated 2/12/2022, findings of LV dysfunction have improved.    EKG 12 Lead 3/24/22:        Recent  Labs   Lab 03/25/22  1148 03/25/22  0602 03/25/22  0538 03/24/22  2346   NA  --   --  139 140   POTASSIUM  --   --  3.7 3.9   CHLORIDE  --   --  103 106   CO2  --   --  27 24   ANIONGAP  --   --  9 10   * 116* 124* 162*   BUN  --   --  12 13   CR  --   --  0.79 0.92   GFRESTIMATED  --   --  85 70   RODOLFO  --   --  8.7 9.1   MAG  --   --  1.5*  --          Patient Care Team:  Price Naranjo MD as PCP - General (Family Medicine)  Evangelina Jang APRN CNP as Nurse Practitioner (OB/Gyn)  Christie Tenorio DO as Assigned PCP  Evangelina Jang APRN CNP as Assigned OBGYN Provider    Time Spent on this Encounter   I, ETTA Stcay CNP, personally saw the patient today and spent greater than 30 minutes discharging this patient.    >30 minutes spent in discharge, including >50% in counseling and coordination of care, medication review and plan of care recommended on follow up. Questions were answered.   It was our pleasure to care for No Yusuf during this hospitalization. Please do not hesitate to contact me should there be questions regarding the hospital course or discharge plan.    Patient discussed with staff cardiologist, Dr. Deuce Neves, who agrees with the above documentation and plan. Documentation represents joint decision making.     ETTA Ruby, CNP  [facility]

## 2022-03-25 NOTE — PHARMACY-ADMISSION MEDICATION HISTORY
Admission Medication History Completed by Pharmacy    See [facility] Admission Navigator for allergy information, preferred outpatient pharmacy, prior to admission medications and immunization status.     Medication History Sources:     [facility]    Changes made to [facility] medication list (reason):    Added: Methadone    Deleted: None    Changed: Levemir to Lantus    Additional Information:    Per patient, has been on methadone for several years in the past but off of it recently. Per patient, yesterday 3/24 she met with Debora from [facility] addiction medicine and was planning to restart this. Sounds like she may have received a one time 20 mg dose 3/24. Unclear what the follow up plan was. Patient mentioned possibly having a follow up appointment next Tuesday 3/29 and Thursday.      Prior to Admission medications    Medication Sig Last Dose Taking? Auth Provider   acetaminophen (TYLENOL) 325 MG tablet Take 2 tablets (650 mg) by mouth 2 times daily as needed for mild pain  Yes Chente Angel MD   Acidophilus Lactobacillus CAPS Take 1 tablet by mouth 3 times daily For gut health (Lactobacillus)  Yes Unknown, Entered By History   albuterol (PROAIR HFA/PROVENTIL HFA/VENTOLIN HFA) 108 (90 Base) MCG/ACT inhaler Inhale 2 puffs into the lungs every 4 hours as needed for shortness of breath / dyspnea or wheezing  Yes Unknown, Entered By History   aspirin (ASA) 81 MG chewable tablet Take 1 tablet (81 mg) by mouth daily  Yes Chente Angel MD   calcium carbonate antacid 1000 MG CHEW Take 1 chew tab by mouth every 6 hours as needed for heartburn  Yes Unknown, Entered By History   cholecalciferol 25 MCG (1000 UT) TABS Take 1,000 Units by mouth daily   Yes Zena Wheeler MD   diclofenac (VOLTAREN) 1 % topical gel Apply 2-4 g topically 2 times daily  Yes Chente Angel MD   DULoxetine (CYMBALTA) 20 MG capsule Take 1 capsule (20 mg) by mouth daily  Yes Chente Angel MD    fluticasone-salmeterol (ADVAIR) 250-50 MCG/DOSE inhaler Inhale 1 puff into the lungs 2 times daily  Yes Unknown, Entered By History   furosemide (LASIX) 20 MG tablet Take 20 mg by mouth daily  Yes Unknown, Entered By History   gabapentin (NEURONTIN) 100 MG capsule Take 100 mg by mouth 3 times daily  Yes Unknown, Entered By History   hydrOXYzine (ATARAX) 50 MG tablet Take 100 mg by mouth every 6 hours as needed for anxiety  Yes Unknown, Entered By History   insulin glargine (LANTUS PEN) 100 UNIT/ML pen Inject 15 Units Subcutaneous At Bedtime  Yes Unknown, Entered By History   levothyroxine (SYNTHROID/LEVOTHROID) 200 MCG tablet Take 200 mcg by mouth daily   Yes Reported, Patient   melatonin 5 MG tablet Take 5 mg by mouth At Bedtime  Yes Unknown, Entered By History   metFORMIN (GLUCOPHAGE) 1000 MG tablet Take 1,000 mg by mouth 2 times daily (with meals)  Yes Reported, Patient   methadone (DOLOPHINE) 10 MG tablet Take 20 mg by mouth once Per patient, has been on methadone for several years in the past but off recently. Per patient, yesterday 3/24 she met with Debora from [facility] addiction medicine and was planning to restart this. Sounds like she may have received a one time 20 mg dose 3/24. Unclear what the follow up plan was. Patient mentioned possibly having a follow up appointment next Tuesday 3/29.  Yes Unknown, Entered By History   multivitamin w/minerals (THERA-VIT-M) tablet Take 1 tablet by mouth daily  Yes Unknown, Entered By History   NARCAN 4 MG/0.1ML nasal spray CALL 911. SPRAY CONTENTS OF ONE SPRAYER (0.1ML) INTO ONE NOSTRIL. REPEAT IN 2-3 MINS IF SYMPTOMS OF OPIOID PERSIST, ALTERNATE NOSTRILS  Yes Reported, Patient   nicotine polacrilex (NICORETTE) 4 MG gum Place 4 mg inside cheek every hour as needed for smoking cessation   Yes Unknown, Entered By History   omeprazole (PRILOSEC) 20 MG DR capsule Take 20 mg by mouth daily   Yes Unknown, Entered By History   ondansetron (ZOFRAN-ODT) 4 MG ODT tab Take 4 mg  by mouth every 8 hours as needed for nausea  Yes Unknown, Entered By History   polyethylene glycol (MIRALAX) 17 GM/Dose powder Take 17 g by mouth daily as needed for constipation  Yes Jose Pope MD   potassium chloride ER (KLOR-CON) 8 MEQ CR tablet Take 8 mEq by mouth 2 times daily  Yes Unknown, Entered By History   vitamin B-12 (CYANOCOBALAMIN) 1000 MCG tablet Take 1,000 mcg by mouth daily  Yes Unknown, Entered By History   atorvastatin (LIPITOR) 20 MG tablet Take 1 tablet (20 mg) by mouth At Bedtime --- HOLD UNTIL FOLLOW UP WITH TCU PROVIDER ---   Chente Angel MD   blood glucose (ACCU-CHEK RAFAELA PLUS) test strip Use to check blood sugar 3x daily or as directed.   Reported, Patient   Blood Glucose Calibration (ACCU-CHEK ACTIVE GLUCOSE CONT VI) 1 each by Other route   Reported, Patient   blood glucose calibration (ACCU-CHEK RAFAELA) solution    Reported, Patient   insulin detemir (LEVEMIR PEN) 100 UNIT/ML pen Inject 15 Units Subcutaneous every morning   Jose Pope MD       Date completed: 03/25/22    Medication history completed by: SWAPNA JUNG [facility]

## 2022-03-25 NOTE — DISCHARGE SUMMARY
Care Management Discharge Note    Discharge Date: 03/25/2022       Discharge Disposition:      hospitals at [facility]  [address] [place] [id number]   [phone], [phone]    Discharge Services:  Back to [facility] to continue therapy services    Discharge DME:      Discharge Transportation:  [facility] [employer] [facility] [phone]    Private pay costs discussed: Not applicable    PAS Confirmation Code:  [id number]  Patient/family educated on Medicare website which has current facility and service quality ratings:      Education Provided on the Discharge Plan:    Persons Notified of Discharge Plans: yes  Patient/Family in Agreement with the Plan:  yes    Handoff Referral Completed: Yes    Additional Information:  Patient is discharging back to hospitals at [facility] at 6:30pm today with [facility] [phone] wheelchair ride. MACO informed nurses, , and the nurse practitioner who will sign the discharge orders. MACO faxed over the discharge orders to [phone].    ___________________    JOSIE Alba  6C   [facility]  Pager [phone]  Phone [phone]

## 2022-03-25 NOTE — ED PROVIDER NOTES
"ED Provider Note  [facility]      History     Chief Complaint   Patient presents with     Chest Pain     HPI  No Yusuf is a 62 year old female with a PMH of type 2 diabetes mellitus, torsades adrian pointes, hypertension, hyperlipidemia, bipolar disorder, fibromyalgia, polysubstance abuse (currently on methadone), hypothyroidism, chronic hepatitis C, COVID-19 pneumonia, who presents to the ED today complaining of chest pain.  Here in the ED patient states she is also having anxiety.  She states that mild central chest pain started around 9 PM this evening.  She states she was experiencing a bad headache and upon standing felt dizzy, nauseous and vomited twice.  Here in the ED patient states all of her symptoms have resolved, but she is still endorsing chest pressure.  She is currently on 20 mg furosemide.  Patient is currently residing at a care center to recover from a month-long Covid intubation.  She also notes she started methadone again today and states that she was recently withdrawing.  She also notes she was doing some upper body workouts today and thinks she may have \"overdone it\".  Patient denies leg swelling.    Patient was recently admitted to [facility] from 2/10/2022 to 2/13/2022 for shortness of breath, found to be hypoxic. Chest x-ray showed patchy opacities throughout both lungs compatible with COVID-19 infection.  CT chest showed no pulmonary embolism however did show patchy opacities and consolidations throughout the lungs. She had increasing oxygen requirements requiring continuous BiPAP. While at the [facility] she was started on remdesivir, dexamethasone, and baricitinib, and ICU dosing Lovenox. She was initially treated with ceftriaxone for her urinary tract infection, which was broadened to cefepime, vancomycin, and azithromycin and switched to ciprofloxacin on 2/11 after transfer when urine culture grew E. coli resistant to penicillins and " cephalosporins. On 2/11 she had cardiac arrest at about 2:20 pm. She had been bradycardiac that morning and while being changed from proned to supine position she went into ventricular tachycardia consistent with Torsades adrian pointes. She had one cycle of chest compression, received one shock, and returned to sinus rhythm.     EXAM: CHEST SINGLE VIEW PORTABLE  LOCATION: [facility]  DATE/TIME: 2/14/2022 5:00 AM  INDICATION: Intubated.  COMPARISON: 02/13/2022.                                                             IMPRESSION:   1. A few patchy opacities within both lungs with a central predominance, overall mildly decreased since the recent comparison study.  2. No other significant interval change.  3. An endotracheal tube, enteric drainage tube, and right internal jugular central venous catheter are again noted.           Past Medical History  Past Medical History:   Diagnosis Date     Arthritis      Bipolar affective (H)      Fibromyalgia      Hypertension      Past Surgical History:   Procedure Laterality Date     CHOLECYSTECTOMY       GYN SURGERY      c section     GYN SURGERY      oblation     ORTHOPEDIC SURGERY      left leg, left shoulder, back     No current outpatient medications on file.    Allergies   Allergen Reactions     Promethazine Other (See Comments) and Anxiety     Noted in 8/30/08 ER     Codeine Phosphate GI Disturbance     Lamotrigine Other (See Comments)     hyponatremia     Oxcarbazepine Unknown and Other (See Comments)     Low sodium  LegacyRecord#[id number]       Codeine Other (See Comments) and Rash     GI bleeding  LegacyRecord#[id number]       Family History  Family History   Problem Relation Age of Onset     Heart Disease Mother      Diabetes Mother      Ovarian Cancer Mother      Heart Disease Father      Lung Cancer Father      Diabetes Sister      Ovarian Cancer Sister      Diabetes Brother      Social History   Social History     Tobacco Use     Smoking status:  Former Smoker     Packs/day: 0.10     Smokeless tobacco: Never Used   Substance Use Topics     Alcohol use: No     Drug use: No      Past medical history, past surgical history, medications, allergies, family history, and social history were reviewed with the patient. No additional pertinent items.       Review of Systems   Respiratory: Positive for chest tightness.    Cardiovascular: Negative for chest pain.   Gastrointestinal: Negative for nausea and vomiting.   Musculoskeletal: Negative for joint swelling.   Neurological: Negative for dizziness and headaches.   All other systems reviewed and are negative.    A complete review of systems was performed with pertinent positives and negatives noted in the HPI, and all other systems negative.    Physical Exam   BP: 134/79  Pulse: 99  Resp: 18  SpO2: 91 %  Physical Exam  Constitutional:       General: She is not in acute distress.     Appearance: She is well-developed. She is obese. She is not ill-appearing.   HENT:      Head: Normocephalic and atraumatic.   Cardiovascular:      Rate and Rhythm: Normal rate and regular rhythm.      Heart sounds: Normal heart sounds.   Pulmonary:      Effort: Pulmonary effort is normal.      Breath sounds: Normal breath sounds. No wheezing, rhonchi or rales.   Abdominal:      Palpations: Abdomen is soft.      Tenderness: There is no abdominal tenderness. There is no guarding or rebound.   Musculoskeletal:      Cervical back: Normal range of motion and neck supple.   Skin:     General: Skin is warm.   Neurological:      General: No focal deficit present.      Mental Status: She is alert and oriented to person, place, and time.         ED Course     11:56 PM  The patient was seen and examined by Michele Marinelli MD in Room ED14.     Procedures            EKG Interpretation:      Interpreted by Michele Marinelli MD  Time reviewed: 0004  Symptoms at time of EKG: CP   Rhythm: normal sinus   Rate: 95  Axis: Normal  Ectopy: none  Conduction: normal  ST  Segments/ T Waves: Non-specific ST-T wave changes  Q Waves: none  Comparison to prior: no acute changes    Clinical Impression: No ST elevation or depression and non-specific EKG, nonspecific T wave inversions in the precordial leads V1 and 2     Results for orders placed or performed during the hospital encounter of 03/24/22   Chest XR,  PA & LAT     Status: None    Narrative    EXAM: CHEST 2 VIEWS  LOCATION: [facility]  DATE/TIME: 3/25/2022 12:17 AM    INDICATION: Chest pain.  COMPARISON: 02/16/2022.    FINDINGS: Large body habitus, limiting evaluation of the lung parenchyma. A few apparent ill-defined hazy opacities project over the central aspects of the upper lungs bilaterally and the lower left lung. Normal size cardiac silhouette.      Impression    IMPRESSION:   1. A few apparent ill-defined hazy opacities project over the central aspect of the upper lungs bilaterally and the lower left lung. These are nonspecific, but could relate to an infectious/inflammatory process or scarring.  2. No other findings suspicious for active cardiopulmonary disease.   Hester Draw     Status: None    Narrative    The following orders were created for panel order Hester Draw.  Procedure                               Abnormality         Status                     ---------                               -----------         ------                     Extra Blue Top Tube[[id number]]                              Final result               Extra Red Top Tube[[id number]]                               Final result               Extra Green Top (Lithium...[[id number]]                                                 Extra Purple Top Tube[[id number]]                                                         Please view results for these tests on the individual orders.   Extra Blue Top Tube     Status: None   Result Value Ref Range    Hold Specimen [facility]    Extra Red Top Tube     Status: None    Result Value Ref Range    Hold Specimen [facility]    Comprehensive metabolic panel     Status: Abnormal   Result Value Ref Range    Sodium 140 133 - 144 mmol/L    Potassium 3.9 3.4 - 5.3 mmol/L    Chloride 106 94 - 109 mmol/L    Carbon Dioxide (CO2) 24 20 - 32 mmol/L    Anion Gap 10 3 - 14 mmol/L    Urea Nitrogen 13 7 - 30 mg/dL    Creatinine 0.92 0.52 - 1.04 mg/dL    Calcium 9.1 8.5 - 10.1 mg/dL    Glucose 162 (H) 70 - 99 mg/dL    Alkaline Phosphatase 119 40 - 150 U/L    AST 25 0 - 45 U/L    ALT 46 0 - 50 U/L    Protein Total 7.6 6.8 - 8.8 g/dL    Albumin 3.0 (L) 3.4 - 5.0 g/dL    Bilirubin Total 0.4 0.2 - 1.3 mg/dL    GFR Estimate 70 >60 mL/min/1.73m2   Troponin I     Status: Abnormal   Result Value Ref Range    Troponin I High Sensitivity 74 (H) <54 ng/L   CBC with platelets and differential     Status: Abnormal   Result Value Ref Range    WBC Count 10.3 4.0 - 11.0 10e3/uL    RBC Count 4.45 3.80 - 5.20 10e6/uL    Hemoglobin 12.9 11.7 - 15.7 g/dL    Hematocrit 40.4 35.0 - 47.0 %    MCV 91 78 - 100 fL    MCH 29.0 26.5 - 33.0 pg    MCHC 31.9 31.5 - 36.5 g/dL    RDW 15.1 (H) 10.0 - 15.0 %    Platelet Count 312 150 - 450 10e3/uL    % Neutrophils 48 %    % Lymphocytes 37 %    % Monocytes 11 %    % Eosinophils 2 %    % Basophils 1 %    % Immature Granulocytes 1 %    NRBCs per 100 WBC 0 <1 /100    Absolute Neutrophils 5.0 1.6 - 8.3 10e3/uL    Absolute Lymphocytes 3.8 0.8 - 5.3 10e3/uL    Absolute Monocytes 1.2 0.0 - 1.3 10e3/uL    Absolute Eosinophils 0.2 0.0 - 0.7 10e3/uL    Absolute Basophils 0.1 0.0 - 0.2 10e3/uL    Absolute Immature Granulocytes 0.1 <=0.4 10e3/uL    Absolute NRBCs 0.0 10e3/uL   BNP     Status: Normal   Result Value Ref Range    N terminal Pro BNP Inpatient 150 0 - 900 pg/mL   EKG 12 lead     Status: None (Preliminary result)   Result Value Ref Range    Systolic Blood Pressure  mmHg    Diastolic Blood Pressure  mmHg    Ventricular Rate 95 BPM    Atrial Rate 95 BPM    ID Interval 148 ms    QRS Duration 84  ms     ms    QTc 454 ms    P Axis 29 degrees    R AXIS -14 degrees    T Axis 24 degrees    Interpretation ECG       Sinus rhythm  Cannot rule out Anterior infarct , age undetermined  Abnormal ECG     CBC with platelets differential     Status: Abnormal    Narrative    The following orders were created for panel order CBC with platelets differential.  Procedure                               Abnormality         Status                     ---------                               -----------         ------                     CBC with platelets and d...[[id number]]  Abnormal            Final result                 Please view results for these tests on the individual orders.     Medications   nitroGLYcerin (NITROSTAT) sublingual tablet 0.4 mg (has no administration in time range)   alum & mag hydroxide-simethicone (MAALOX) suspension 30 mL (has no administration in time range)   acetaminophen (TYLENOL) tablet 650 mg (has no administration in time range)   aspirin (ASA) chewable tablet 81 mg (has no administration in time range)   atorvastatin (LIPITOR) tablet 20 mg (20 mg Oral Given 3/25/22 0247)   DULoxetine (CYMBALTA) DR capsule 20 mg (has no administration in time range)   fluticasone-vilanterol (BREO ELLIPTA) 100-25 MCG/INH inhaler 1 puff (has no administration in time range)   furosemide (LASIX) tablet 20 mg (has no administration in time range)   hydrOXYzine (ATARAX) tablet 100 mg (has no administration in time range)   albuterol (PROVENTIL HFA/VENTOLIN HFA) inhaler (has no administration in time range)   levothyroxine (SYNTHROID/LEVOTHROID) tablet 200 mcg (has no administration in time range)   nicotine (NICORETTE) gum 4 mg (has no administration in time range)   pantoprazole (PROTONIX) EC tablet 40 mg (has no administration in time range)   potassium chloride ER (KLOR-CON) CR tablet 8 mEq (has no administration in time range)   glucose gel 15-30 g (has no administration in time range)     Or   dextrose 50 %  injection 25-50 mL (has no administration in time range)     Or   glucagon injection 1 mg (has no administration in time range)   insulin detemir (LEVEMIR PEN) injection 8 Units (has no administration in time range)   insulin aspart (NovoLOG) injection (RAPID ACTING) (has no administration in time range)   aspirin (ASA) tablet 325 mg (325 mg Oral Given 3/25/22 0247)        Assessments & Plan (with Medical Decision Making)   Patient arrives from nursing home for chest pain and pressure.  It started several hours prior to presentation.  She also endorsed some nausea and vomiting.  Denies previous cardiac stenting however she did have a recent cardiac arrest when she was admitted and intubated for severe covid infection.  Her EF is on echoes after this arrest were initially 20 to 25% and several days later had started to improve to 40 to 45%.  She states that she is still having the chest pressure it is not as severe as earlier.  Her EKG has nonspecific T wave changes no signs for acute ST elevation or depression.  Laboratory work showed a troponin of 74 remainder labs are benign.  Elevation in troponin may be due to some underlying cardiomyopathy with low EF due to her recent cardiac arrest versus actual CAD however given her recent history and her symptoms I feel she would benefit from observation to cardiology for further chest pain rule out.  Cardiology agreed for admission.    I have reviewed the nursing notes. I have reviewed the findings, diagnosis, plan and need for follow up with the patient.    Current Discharge Medication List          Final diagnoses:   Chest pain, unspecified type   History of cardiac arrest   IManish, am serving as a trained medical scribe to document services personally performed by Michele Marinelli MD, based on the provider's statements to me.     IMichele MD, was physically present and have reviewed and verified the accuracy of this note documented by Manish Chen.      --  Michele  MD Yves  [facility] EMERGENCY DEPARTMENT  3/24/2022     Michele Marinelli MD  03/25/22 0329

## 2022-03-25 NOTE — PROGRESS NOTES
DISCHARGE   Discharged to: TCU  Via: [employer]  Accompanied by: n/a  Discharge Instructions: diet, activity, medications, follow up appointments, when to call the MD, and what to watchout for (i.e. s/s of infection, increasing SOB, palpitations, chest pain,)  Prescriptions :n/a   Follow Up Appointments: arranged; information given  Belongings: All sent with pt  IV: out  Telemetry: off  Pt exhibits understanding of above discharge instructions; all questions answered.  Discharge Paperwork: faxed

## 2022-03-26 DIAGNOSIS — Z71.89 OTHER SPECIFIED COUNSELING: ICD-10-CM

## 2022-03-27 ENCOUNTER — PATIENT OUTREACH (OUTPATIENT)
Dept: CARE COORDINATION | Facility: CLINIC | Age: 62
End: 2022-03-27
Payer: COMMERCIAL

## 2022-03-27 NOTE — PROGRESS NOTES
Clinic Care Coordination Contact    Background: Care Coordination referral placed from [facility] discharge report for reason of patient meeting criteria for a TCM outreach call by [facility] team.    Assessment: Upon chart review, [facility] Team member will cancel/close the referral for TCM outreach due to reason below:    Patient has discharged to a Group home, Memory Care or Nursing Home    Plan: Care Coordination referral for TCM outreach canceled.    LILLIE Nieto  [facility] [place], [facility]

## 2022-03-29 DIAGNOSIS — I50.22 CHRONIC SYSTOLIC HEART FAILURE (H): Primary | ICD-10-CM

## 2022-03-31 NOTE — PROGRESS NOTES
Cardiology Clinic Note    HPI    Dear colleagues,     MsFani Yusuf is a 62 year old female with a past medical history of bipolar disorder, substance use disorder (fentanyl, heroin, meth) sober for last 3 years on methadone, HTN, insulin-dependant T2DM, HLD, chronic hep C who comes in to establish care for recently diagnosed heart failure.    Patient was recently hospitalized 02/08-02/10 due to COVID pneumonia and TdP s/p cardiac arrest requiring CPR and cardioversion with ROSC. After that she had an echo 02/12 showing EF 20-25% with severe global hypokinesia of LV. Prior to this she had normal echocardiograms and no cardiac history per patient. Repeat echo on 02/15 shows some restoration of cardiac function with EF at 40-45% and mild global hypokinesia of LV. Patient was started on furosemide 20 mg and orders for cardiac MRI and coronary angiogram were placed but not scheduled.    Patient currently at TCU for rehab. Doing well overall, much better since leaving hospital but in the last week or so has had worsening SOB on exertion notably going up a flight of stairs or walking 200 ft. Endorses worsening MOOK and weight gain. Denies chest pain, orthopnea, PND, lightheadedness or syncope.    PAST MEDICAL HISTORY:  Patient Active Problem List   Diagnosis     Pain in soft tissues of limb     Lymphedema of both lower extremities     Chronic venous insufficiency of lower extremity     Edema of both legs     Lymphedema     Cellulitis     Lower extremity edema     Backache     Benign essential hypertension     Chronic low back pain     Cocaine use disorder, severe, in early remission (H)     Episodic mood disorder (H)     GERD (gastroesophageal reflux disease)     Hemorrhoids     History of sudden cardiac arrest     Hyperglycemia     Hypothyroidism     Knee pain, bilateral     Thoracic or lumbosacral neuritis or radiculitis, unspecified     Methamphetamine use disorder, severe, in early remission (H)     Myalgia  and myositis     Opioid type dependence, abuse (H)     Opioid abuse, in remission (H)     Osteoarthrosis     Severe obesity (H)     Tobacco abuse     Type 2 diabetes mellitus, without long-term current use of insulin (H)     Vitamin D deficiency     Hepatitis C, chronic (H)     Acute respiratory failure with hypoxia and hypercapnia (H)     Infection due to 2019 novel coronavirus     Anxiety disorder     Chronic bilateral back pain     Chronic pain disorder     Pneumonia due to COVID-19 virus     Acute respiratory failure with hypoxia (H)     Bradycardia     Torsades de pointes (H)     Ventricular tachycardia (H)     Acute respiratory failure due to COVID-19 (H)     History of cardiac arrest     Chest pain, unspecified type     History of torsades de pointe due to drug        FAMILY HISTORY:  Family History   Problem Relation Age of Onset     Heart Disease Mother      Diabetes Mother      Ovarian Cancer Mother      Heart Disease Father      Lung Cancer Father      Diabetes Sister      Ovarian Cancer Sister      Diabetes Brother      - CABG in father  - Heart disease? In mother    SOCIAL HISTORY:  Social History     Tobacco Use     Smoking status: Current Some Day Smoker     Packs/day: 0.10     Smokeless tobacco: Never Used     Tobacco comment: Using nicotine vape occasionally   Substance Use Topics     Alcohol use: No     Drug use: No        ALLERGIES:  Allergies   Allergen Reactions     Promethazine Other (See Comments) and Anxiety     Noted in 8/30/08 ER     Codeine Phosphate GI Disturbance     Lamotrigine Other (See Comments)     hyponatremia     Oxcarbazepine Unknown and Other (See Comments)     Low sodium  LegacyRecord#[id number]       Codeine Other (See Comments) and Rash     GI bleeding  LegacyRecord#[id number]         CURRENT MEDICATIONS:  Current Outpatient Medications   Medication Sig Dispense Refill     acetaminophen (TYLENOL) 325 MG tablet Take 2 tablets (650 mg) by mouth 2 times daily as needed for mild pain        Acidophilus Lactobacillus CAPS Take 1 tablet by mouth 3 times daily For gut health (Lactobacillus)       albuterol (PROAIR HFA/PROVENTIL HFA/VENTOLIN HFA) 108 (90 Base) MCG/ACT inhaler Inhale 2 puffs into the lungs every 4 hours as needed for shortness of breath / dyspnea or wheezing       aspirin (ASA) 81 MG chewable tablet Take 1 tablet (81 mg) by mouth daily       atorvastatin (LIPITOR) 20 MG tablet Take 1 tablet (20 mg) by mouth At Bedtime --- HOLD UNTIL FOLLOW UP WITH TCU PROVIDER ---       blood glucose (ACCU-CHEK RAFAELA PLUS) test strip Use to check blood sugar 3x daily or as directed.       Blood Glucose Calibration (ACCU-CHEK ACTIVE GLUCOSE CONT VI) 1 each by Other route       blood glucose calibration (ACCU-CHEK RAFAELA) solution        calcium carbonate antacid 1000 MG CHEW Take 1 chew tab by mouth every 6 hours as needed for heartburn       cholecalciferol 25 MCG (1000 UT) TABS Take 1,000 Units by mouth daily        diclofenac (VOLTAREN) 1 % topical gel Apply 2-4 g topically 2 times daily       DULoxetine (CYMBALTA) 20 MG capsule Take 1 capsule (20 mg) by mouth daily       empagliflozin (JARDIANCE) 10 MG TABS tablet Take 1 tablet (10 mg) by mouth daily 90 tablet 3     fluticasone-salmeterol (ADVAIR) 250-50 MCG/DOSE inhaler Inhale 1 puff into the lungs 2 times daily       furosemide (LASIX) 20 MG tablet Take 20 mg by mouth daily       gabapentin (NEURONTIN) 100 MG capsule Take 100 mg by mouth 3 times daily       hydrOXYzine (ATARAX) 50 MG tablet Take 100 mg by mouth every 6 hours as needed for anxiety       insulin detemir (LEVEMIR PEN) 100 UNIT/ML pen Inject 15 Units Subcutaneous every morning 3 mL 1     insulin glargine (LANTUS PEN) 100 UNIT/ML pen Inject 15 Units Subcutaneous At Bedtime       levothyroxine (SYNTHROID/LEVOTHROID) 200 MCG tablet Take 200 mcg by mouth daily        melatonin 5 MG tablet Take 5 mg by mouth At Bedtime       metFORMIN (GLUCOPHAGE) 1000 MG tablet Take 1,000 mg by mouth 2 times  daily (with meals)       methadone (DOLOPHINE) 10 MG tablet Take 20 mg by mouth once Per patient, has been on methadone for several years in the past but off recently. Per patient, yesterday 3/24 [place] [facility] [place] planning to [place] this. Sounds like she may have received a one time 20 mg dose 3/24. Unclear what the follow up plan was. Patient mentioned possibly having a follow up appointment next Tuesday 3/29.       multivitamin w/minerals (THERA-VIT-M) tablet Take 1 tablet by mouth daily       NARCAN 4 MG/0.1ML nasal spray CALL 911. SPRAY CONTENTS OF ONE SPRAYER (0.1ML) INTO ONE NOSTRIL. REPEAT IN 2-3 MINS IF SYMPTOMS OF OPIOID PERSIST, ALTERNATE NOSTRILS  0     nicotine polacrilex (NICORETTE) 4 MG gum Place 4 mg inside cheek every hour as needed for smoking cessation        omeprazole (PRILOSEC) 20 MG DR capsule Take 20 mg by mouth daily        ondansetron (ZOFRAN-ODT) 4 MG ODT tab Take 4 mg by mouth every 8 hours as needed for nausea       polyethylene glycol (MIRALAX) 17 GM/Dose powder Take 17 g by mouth daily as needed for constipation 510 g 0     potassium chloride ER (KLOR-CON) 8 MEQ CR tablet Take 8 mEq by mouth 2 times daily       sacubitril-valsartan (ENTRESTO) 24-26 MG per tablet Take 1 tablet by mouth 2 times daily 180 tablet 3     vitamin B-12 (CYANOCOBALAMIN) 1000 MCG tablet Take 1,000 mcg by mouth daily         ROS:   Constitutional: No fever, chills, or sweats.   ENT: No visual disturbance, ear ache, epistaxis, sore throat.   Allergies/Immunologic: Negative.   Respiratory: As per HPI.  Cardiovascular: As per HPI.   GI: No nausea, vomiting, hematemesis, melena, or hematochezia.   : No urinary frequency, dysuria, or hematuria.   Integument: Negative.   Psychiatric: Pleasant, no major depression noted  Neuro: No focal neurological deficits noted  Endocrinology: Negative.   Musculoskeletal: No new joint pains    EXAM:  /82 (BP Location: Right arm, Patient  "Position: Chair, Cuff Size: Adult Large)   Pulse 106   Ht 1.588 m (5' 2.5\")   Wt 122 kg (269 lb)   LMP 11/01/2013   SpO2 90%   BMI 48.42 kg/m      General: appears comfortable, alert and articulate, obese  Head: normocephalic, atraumatic  Eyes: anicteric sclera, EOMI  Heart: regular rate and rhythm, S1/S2, no murmur, gallop, rub, estimated JVP 11 cm  Lungs: clear, no rales or wheezing  Abdomen: soft, non tender, distended, +BS  Extremities: tracce bilateral MOOK, legs covered in wrapping  Neurological: normal speech and affect, no gross motor deficits    Weight  Wt Readings from Last 10 Encounters:   04/01/22 122 kg (269 lb)   03/25/22 115.1 kg (253 lb 12.8 oz)   03/02/22 138.3 kg (304 lb 14.4 oz)   02/13/22 122.7 kg (270 lb 6.4 oz)   02/09/22 126.1 kg (278 lb)   06/08/21 133.1 kg (293 lb 6.4 oz)   06/03/21 129.9 kg (286 lb 4.8 oz)   05/27/21 135.2 kg (298 lb 1.6 oz)   12/10/20 133.5 kg (294 lb 4.8 oz)   07/07/20 131.6 kg (290 lb 3.2 oz)       Labs:    CBC RESULTS:  Lab Results   Component Value Date    WBC 9.6 03/25/2022    WBC 8.3 07/01/2021    RBC 4.13 03/25/2022    RBC 4.39 07/01/2021    HGB 12.0 03/25/2022    HGB 12.6 07/01/2021    HCT 37.8 03/25/2022    HCT 39.4 07/01/2021    MCV 92 03/25/2022    MCV 90 07/01/2021    MCH 29.1 03/25/2022    MCH 28.7 07/01/2021    MCHC 31.7 03/25/2022    MCHC 32.0 07/01/2021    RDW 15.2 (H) 03/25/2022    RDW 14.6 07/01/2021     03/25/2022     07/01/2021       CMP RESULTS:  Lab Results   Component Value Date     04/01/2022     07/01/2021    POTASSIUM 3.6 04/01/2022    POTASSIUM 3.9 07/01/2021    CHLORIDE 104 04/01/2022    CHLORIDE 104 07/01/2021    CO2 26 04/01/2022    CO2 30 07/01/2021    ANIONGAP 11 04/01/2022    ANIONGAP 4 07/01/2021    GLC 96 04/01/2022     (H) 07/01/2021    BUN 9 04/01/2022    BUN 9 07/01/2021    CR 0.70 04/01/2022    CR 0.76 07/01/2021    GFRESTIMATED >90 04/01/2022    GFRESTIMATED 84 07/01/2021    GFRESTBLACK >90 " 07/01/2021    RODOLFO 8.7 04/01/2022    RODOLFO 8.8 07/01/2021    BILITOTAL 0.4 03/24/2022    BILITOTAL 0.4 06/03/2021    ALBUMIN 3.0 (L) 03/24/2022    ALBUMIN 3.4 06/03/2021    ALKPHOS 119 03/24/2022    ALKPHOS 119 06/03/2021    ALT 46 03/24/2022    ALT 52 (H) 06/03/2021    AST 25 03/24/2022    AST 34 06/03/2021        BNP RESULTS:  Lab Results   Component Value Date    NTBNPI 150 03/24/2022    NTBNPI 60 05/27/2021       Testing/Procedures:  Dr Nolan and I personally visualized and interpreted:    - Echocardiogram 2/15/22: Mildly decreased left ventricular systolic function, LVEF 40-45%, mildly decreased RV systolic function, no significant valvular disease.  EF improved from prior    - Echocardiogram 2/12/22: Severely decreased LV systolic function, LVEF 20-25%.    - EKG sinus rhythm, questionable prior anterior infarct    Assessment and Plan:   Ms. No Yusuf is a 62 year old female with a past medical history of bipolar disorder, substance use disorder (fentanyl, heroin, meth) sober for last 3 years on methadone, HTN, insulin-dependant T2DM, HLD, chronic hep C who comes in to establish care for recently diagnosed heart failure.    Heart Failure with recovered to mid range EF, Stage C, NYHA III  Patient with unknown history of cardiac issues. EF was reduced after shock for recent cardiac arrest with some degree of recovering and currently is at 40-45%. Differential includes stunned heart, ICM due to LAD territory hypokinesis seen on echo, or other etiologies of NICM and notably given her history of drug use (however echo in 2020 was normal). Will need further work-up to identify etiology and start medications in the mean time.  - Entresto 24/26 BID  - Jardiance 10 mg daily  - Scheduling cardiac MRI to evaluate cardiomyoathy    HLD  - Continue atorvastatin 20 mg daily    T2DM  - Continue insulin, metformin, starting jardiance    Recommended patient stop smoking, and limit salt, sugar and fat intake and try to  stay active (150mn a week).    The patient states understanding and is agreeable with plan.   Feel free to contact myself regarding questions or concerns.  It was a pleasure to see this patient today.    RTC in 3 months with labs, labs in 2 weeks and scheduling cardiac MRI.    Discussed with Dr Nolan who agrees with the above.    Martita Jurado MD  PGY-2 internal medicine    I have seen and examined the patient on April 1, 2022. I have discussed the patient with Dr. Jurado and I agree with the assessment and plan as outlined below. I have personally reviewed vital signs, hemodynamics, medications, laboratory values, and diagnostic testing.     Heart failure with recovered ejection fraction to mid range.  Etiology probably nonischemic, but evaluation has not been done yet.  Had a cardiac MRI ordered while inpatient to be scheduled as an outpatient.  We will get the MRI.  In the meanwhile start Entresto and an SGLT2 inhibitor and follow-up from there.    Cam Nolan MD   of Medicine  Advanced Heart Failure and Transplant Cardiology    CC      Today's clinic visit entailed:  60 minutes spent on the date of the encounter doing chart review, history and exam, documentation and further activities per the note  Provider  Link to [facility] Help Grid     The level of medical decision making during this visit was of high complexity.

## 2022-04-01 ENCOUNTER — LAB (OUTPATIENT)
Dept: LAB | Facility: CLINIC | Age: 62
End: 2022-04-01
Attending: INTERNAL MEDICINE
Payer: COMMERCIAL

## 2022-04-01 ENCOUNTER — OFFICE VISIT (OUTPATIENT)
Dept: CARDIOLOGY | Facility: CLINIC | Age: 62
End: 2022-04-01
Attending: INTERNAL MEDICINE
Payer: COMMERCIAL

## 2022-04-01 VITALS
SYSTOLIC BLOOD PRESSURE: 130 MMHG | WEIGHT: 269 LBS | BODY MASS INDEX: 47.66 KG/M2 | HEART RATE: 106 BPM | OXYGEN SATURATION: 90 % | HEIGHT: 63 IN | DIASTOLIC BLOOD PRESSURE: 82 MMHG

## 2022-04-01 DIAGNOSIS — I50.22 CHRONIC SYSTOLIC HEART FAILURE (H): Primary | ICD-10-CM

## 2022-04-01 DIAGNOSIS — B19.20 HEPATITIS C VIRUS INFECTION WITHOUT HEPATIC COMA, UNSPECIFIED CHRONICITY: ICD-10-CM

## 2022-04-01 DIAGNOSIS — I50.22 CHRONIC SYSTOLIC HEART FAILURE (H): ICD-10-CM

## 2022-04-01 DIAGNOSIS — E78.5 HYPERLIPIDEMIA, UNSPECIFIED HYPERLIPIDEMIA TYPE: ICD-10-CM

## 2022-04-01 PROBLEM — Z86.79: Status: ACTIVE | Noted: 2022-03-09

## 2022-04-01 LAB
ANION GAP SERPL CALCULATED.3IONS-SCNC: 11 MMOL/L (ref 3–14)
BUN SERPL-MCNC: 9 MG/DL (ref 7–30)
CALCIUM SERPL-MCNC: 8.7 MG/DL (ref 8.5–10.1)
CHLORIDE BLD-SCNC: 104 MMOL/L (ref 94–109)
CO2 SERPL-SCNC: 26 MMOL/L (ref 20–32)
CREAT SERPL-MCNC: 0.7 MG/DL (ref 0.52–1.04)
GFR SERPL CREATININE-BSD FRML MDRD: >90 ML/MIN/1.73M2
GLUCOSE BLD-MCNC: 96 MG/DL (ref 70–99)
NT-PROBNP SERPL-MCNC: 672 PG/ML (ref 0–125)
POTASSIUM BLD-SCNC: 3.6 MMOL/L (ref 3.4–5.3)
SODIUM SERPL-SCNC: 141 MMOL/L (ref 133–144)

## 2022-04-01 PROCEDURE — 99000 SPECIMEN HANDLING OFFICE-LAB: CPT | Performed by: PATHOLOGY

## 2022-04-01 PROCEDURE — G0463 HOSPITAL OUTPT CLINIC VISIT: HCPCS

## 2022-04-01 PROCEDURE — 87522 HEPATITIS C REVRS TRNSCRPJ: CPT | Mod: 90 | Performed by: PATHOLOGY

## 2022-04-01 PROCEDURE — 80048 BASIC METABOLIC PNL TOTAL CA: CPT | Performed by: PATHOLOGY

## 2022-04-01 PROCEDURE — 99215 OFFICE O/P EST HI 40 MIN: CPT | Mod: GC | Performed by: INTERNAL MEDICINE

## 2022-04-01 PROCEDURE — 83880 ASSAY OF NATRIURETIC PEPTIDE: CPT | Performed by: PATHOLOGY

## 2022-04-01 PROCEDURE — 36415 COLL VENOUS BLD VENIPUNCTURE: CPT | Performed by: PATHOLOGY

## 2022-04-01 RX ORDER — SACUBITRIL AND VALSARTAN 24; 26 MG/1; MG/1
1 TABLET, FILM COATED ORAL 2 TIMES DAILY
Qty: 180 TABLET | Refills: 3 | Status: SHIPPED | OUTPATIENT
Start: 2022-04-01 | End: 2022-05-25 | Stop reason: DRUGHIGH

## 2022-04-01 RX ORDER — ATORVASTATIN CALCIUM 20 MG/1
20 TABLET, FILM COATED ORAL AT BEDTIME
Qty: 90 TABLET | Refills: 3 | Status: SHIPPED | OUTPATIENT
Start: 2022-04-01

## 2022-04-01 ASSESSMENT — PAIN SCALES - GENERAL: PAINLEVEL: NO PAIN (0)

## 2022-04-01 NOTE — LETTER
4/1/2022      RE: No Yusuf  [address] [facility] [place] [address]       Dear Colleague,    Thank you for the opportunity to participate in the care of your patient, No Yusuf, at the [facility] [place] at [facility] [place]. Please see a copy of my visit note below.    Cardiology Clinic Note    HPI    Dear colleagues,     Ms. No Yusuf is a 62 year old female with a past medical history of bipolar disorder, substance use disorder (fentanyl, heroin, meth) sober for last 3 years on methadone, HTN, insulin-dependant T2DM, HLD, chronic hep C who comes in to establish care for recently diagnosed heart failure.    Patient was recently hospitalized 02/08-02/10 due to COVID pneumonia and TdP s/p cardiac arrest requiring CPR and cardioversion with ROSC. After that she had an echo 02/12 showing EF 20-25% with severe global hypokinesia of LV. Prior to this she had normal echocardiograms and no cardiac history per patient. Repeat echo on 02/15 shows some restoration of cardiac function with EF at 40-45% and mild global hypokinesia of LV. Patient was started on furosemide 20 mg and orders for cardiac MRI and coronary angiogram were placed but not scheduled.    Patient currently at TCU for rehab. Doing well overall, much better since leaving hospital but in the last week or so has had worsening SOB on exertion notably going up a flight of stairs or walking 200 ft. Endorses worsening MOOK and weight gain. Denies chest pain, orthopnea, PND, lightheadedness or syncope.    PAST MEDICAL HISTORY:  Patient Active Problem List   Diagnosis     Pain in soft tissues of limb     Lymphedema of both lower extremities     Chronic venous insufficiency of lower extremity     Edema of both legs     Lymphedema     Cellulitis     Lower extremity edema     Backache     Benign essential hypertension     Chronic low back pain     Cocaine use disorder, severe, in  early remission (H)     Episodic mood disorder (H)     GERD (gastroesophageal reflux disease)     Hemorrhoids     History of sudden cardiac arrest     Hyperglycemia     Hypothyroidism     Knee pain, bilateral     Thoracic or lumbosacral neuritis or radiculitis, unspecified     Methamphetamine use disorder, severe, in early remission (H)     Myalgia and myositis     Opioid type dependence, abuse (H)     Opioid abuse, in remission (H)     Osteoarthrosis     Severe obesity (H)     Tobacco abuse     Type 2 diabetes mellitus, without long-term current use of insulin (H)     Vitamin D deficiency     Hepatitis C, chronic (H)     Acute respiratory failure with hypoxia and hypercapnia (H)     Infection due to 2019 novel coronavirus     Anxiety disorder     Chronic bilateral back pain     Chronic pain disorder     Pneumonia due to COVID-19 virus     Acute respiratory failure with hypoxia (H)     Bradycardia     Torsades de pointes (H)     Ventricular tachycardia (H)     Acute respiratory failure due to COVID-19 (H)     History of cardiac arrest     Chest pain, unspecified type     History of torsades de pointe due to drug        FAMILY HISTORY:  Family History   Problem Relation Age of Onset     Heart Disease Mother      Diabetes Mother      Ovarian Cancer Mother      Heart Disease Father      Lung Cancer Father      Diabetes Sister      Ovarian Cancer Sister      Diabetes Brother      - CABG in father  - Heart disease? In mother    SOCIAL HISTORY:  Social History     Tobacco Use     Smoking status: Current Some Day Smoker     Packs/day: 0.10     Smokeless tobacco: Never Used     Tobacco comment: Using nicotine vape occasionally   Substance Use Topics     Alcohol use: No     Drug use: No        ALLERGIES:  Allergies   Allergen Reactions     Promethazine Other (See Comments) and Anxiety     Noted in 8/30/08 ER     Codeine Phosphate GI Disturbance     Lamotrigine Other (See Comments)     hyponatremia     Oxcarbazepine Unknown  and Other (See Comments)     Low sodium  LegacyRecord#[id number]       Codeine Other (See Comments) and Rash     GI bleeding  LegacyRecord#[id number]         CURRENT MEDICATIONS:  Current Outpatient Medications   Medication Sig Dispense Refill     acetaminophen (TYLENOL) 325 MG tablet Take 2 tablets (650 mg) by mouth 2 times daily as needed for mild pain       Acidophilus Lactobacillus CAPS Take 1 tablet by mouth 3 times daily For gut health (Lactobacillus)       albuterol (PROAIR HFA/PROVENTIL HFA/VENTOLIN HFA) 108 (90 Base) MCG/ACT inhaler Inhale 2 puffs into the lungs every 4 hours as needed for shortness of breath / dyspnea or wheezing       aspirin (ASA) 81 MG chewable tablet Take 1 tablet (81 mg) by mouth daily       atorvastatin (LIPITOR) 20 MG tablet Take 1 tablet (20 mg) by mouth At Bedtime --- HOLD UNTIL FOLLOW UP WITH TCU PROVIDER ---       blood glucose (ACCU-CHEK RAFAELA PLUS) test strip Use to check blood sugar 3x daily or as directed.       Blood Glucose Calibration (ACCU-CHEK ACTIVE GLUCOSE CONT VI) 1 each by Other route       blood glucose calibration (ACCU-CHEK RAFAELA) solution        calcium carbonate antacid 1000 MG CHEW Take 1 chew tab by mouth every 6 hours as needed for heartburn       cholecalciferol 25 MCG (1000 UT) TABS Take 1,000 Units by mouth daily        diclofenac (VOLTAREN) 1 % topical gel Apply 2-4 g topically 2 times daily       DULoxetine (CYMBALTA) 20 MG capsule Take 1 capsule (20 mg) by mouth daily       empagliflozin (JARDIANCE) 10 MG TABS tablet Take 1 tablet (10 mg) by mouth daily 90 tablet 3     fluticasone-salmeterol (ADVAIR) 250-50 MCG/DOSE inhaler Inhale 1 puff into the lungs 2 times daily       furosemide (LASIX) 20 MG tablet Take 20 mg by mouth daily       gabapentin (NEURONTIN) 100 MG capsule Take 100 mg by mouth 3 times daily       hydrOXYzine (ATARAX) 50 MG tablet Take 100 mg by mouth every 6 hours as needed for anxiety       insulin detemir (LEVEMIR PEN) 100 UNIT/ML pen Inject 15  Units Subcutaneous every morning 3 mL 1     insulin glargine (LANTUS PEN) 100 UNIT/ML pen Inject 15 Units Subcutaneous At Bedtime       levothyroxine (SYNTHROID/LEVOTHROID) 200 MCG tablet Take 200 mcg by mouth daily        melatonin 5 MG tablet Take 5 mg by mouth At Bedtime       metFORMIN (GLUCOPHAGE) 1000 MG tablet Take 1,000 mg by mouth 2 times daily (with meals)       methadone (DOLOPHINE) 10 MG tablet Take 20 mg by mouth once Per patient, has been on methadone for several years in the past but off recently. Per patient, yesterday 3/24 she met with Debora from [facility] addiction medicine and was planning to restart this. Sounds like she may have received a one time 20 mg dose 3/24. Unclear what the follow up plan was. Patient mentioned possibly having a follow up appointment next Tuesday 3/29.       multivitamin w/minerals (THERA-VIT-M) tablet Take 1 tablet by mouth daily       NARCAN 4 MG/0.1ML nasal spray CALL 911. SPRAY CONTENTS OF ONE SPRAYER (0.1ML) INTO ONE NOSTRIL. REPEAT IN 2-3 MINS IF SYMPTOMS OF OPIOID PERSIST, ALTERNATE NOSTRILS  0     nicotine polacrilex (NICORETTE) 4 MG gum Place 4 mg inside cheek every hour as needed for smoking cessation        omeprazole (PRILOSEC) 20 MG DR capsule Take 20 mg by mouth daily        ondansetron (ZOFRAN-ODT) 4 MG ODT tab Take 4 mg by mouth every 8 hours as needed for nausea       polyethylene glycol (MIRALAX) 17 GM/Dose powder Take 17 g by mouth daily as needed for constipation 510 g 0     potassium chloride ER (KLOR-CON) 8 MEQ CR tablet Take 8 mEq by mouth 2 times daily       sacubitril-valsartan (ENTRESTO) 24-26 MG per tablet Take 1 tablet by mouth 2 times daily 180 tablet 3     vitamin B-12 (CYANOCOBALAMIN) 1000 MCG tablet Take 1,000 mcg by mouth daily         ROS:   Constitutional: No fever, chills, or sweats.   ENT: No visual disturbance, ear ache, epistaxis, sore throat.   Allergies/Immunologic: Negative.   Respiratory: As per HPI.  Cardiovascular: As per HPI.  "  GI: No nausea, vomiting, hematemesis, melena, or hematochezia.   : No urinary frequency, dysuria, or hematuria.   Integument: Negative.   Psychiatric: Pleasant, no major depression noted  Neuro: No focal neurological deficits noted  Endocrinology: Negative.   Musculoskeletal: No new joint pains    EXAM:  /82 (BP Location: Right arm, Patient Position: Chair, Cuff Size: Adult Large)   Pulse 106   Ht 1.588 m (5' 2.5\")   Wt 122 kg (269 lb)   LMP 11/01/2013   SpO2 90%   BMI 48.42 kg/m      General: appears comfortable, alert and articulate, obese  Head: normocephalic, atraumatic  Eyes: anicteric sclera, EOMI  Heart: regular rate and rhythm, S1/S2, no murmur, gallop, rub, estimated JVP 11 cm  Lungs: clear, no rales or wheezing  Abdomen: soft, non tender, distended, +BS  Extremities: tracce bilateral MOOK, legs covered in wrapping  Neurological: normal speech and affect, no gross motor deficits    Weight  Wt Readings from Last 10 Encounters:   04/01/22 122 kg (269 lb)   03/25/22 115.1 kg (253 lb 12.8 oz)   03/02/22 138.3 kg (304 lb 14.4 oz)   02/13/22 122.7 kg (270 lb 6.4 oz)   02/09/22 126.1 kg (278 lb)   06/08/21 133.1 kg (293 lb 6.4 oz)   06/03/21 129.9 kg (286 lb 4.8 oz)   05/27/21 135.2 kg (298 lb 1.6 oz)   12/10/20 133.5 kg (294 lb 4.8 oz)   07/07/20 131.6 kg (290 lb 3.2 oz)       Labs:    CBC RESULTS:  Lab Results   Component Value Date    WBC 9.6 03/25/2022    WBC 8.3 07/01/2021    RBC 4.13 03/25/2022    RBC 4.39 07/01/2021    HGB 12.0 03/25/2022    HGB 12.6 07/01/2021    HCT 37.8 03/25/2022    HCT 39.4 07/01/2021    MCV 92 03/25/2022    MCV 90 07/01/2021    MCH 29.1 03/25/2022    MCH 28.7 07/01/2021    MCHC 31.7 03/25/2022    MCHC 32.0 07/01/2021    RDW 15.2 (H) 03/25/2022    RDW 14.6 07/01/2021     03/25/2022     07/01/2021       CMP RESULTS:  Lab Results   Component Value Date     04/01/2022     07/01/2021    POTASSIUM 3.6 04/01/2022    POTASSIUM 3.9 07/01/2021    " CHLORIDE 104 04/01/2022    CHLORIDE 104 07/01/2021    CO2 26 04/01/2022    CO2 30 07/01/2021    ANIONGAP 11 04/01/2022    ANIONGAP 4 07/01/2021    GLC 96 04/01/2022     (H) 07/01/2021    BUN 9 04/01/2022    BUN 9 07/01/2021    CR 0.70 04/01/2022    CR 0.76 07/01/2021    GFRESTIMATED >90 04/01/2022    GFRESTIMATED 84 07/01/2021    GFRESTBLACK >90 07/01/2021    RODOLFO 8.7 04/01/2022    RODOLFO 8.8 07/01/2021    BILITOTAL 0.4 03/24/2022    BILITOTAL 0.4 06/03/2021    ALBUMIN 3.0 (L) 03/24/2022    ALBUMIN 3.4 06/03/2021    ALKPHOS 119 03/24/2022    ALKPHOS 119 06/03/2021    ALT 46 03/24/2022    ALT 52 (H) 06/03/2021    AST 25 03/24/2022    AST 34 06/03/2021        BNP RESULTS:  Lab Results   Component Value Date    NTBNPI 150 03/24/2022    NTBNPI 60 05/27/2021       Testing/Procedures:  Dr Nolan and I personally visualized and interpreted:    - Echocardiogram 2/15/22: Mildly decreased left ventricular systolic function, LVEF 40-45%, mildly decreased RV systolic function, no significant valvular disease.  EF improved from prior    - Echocardiogram 2/12/22: Severely decreased LV systolic function, LVEF 20-25%.    - EKG sinus rhythm, questionable prior anterior infarct    Assessment and Plan:   Ms. No Yusuf is a 62 year old female with a past medical history of bipolar disorder, substance use disorder (fentanyl, heroin, meth) sober for last 3 years on methadone, HTN, insulin-dependant T2DM, HLD, chronic hep C who comes in to establish care for recently diagnosed heart failure.    Heart Failure with recovered to mid range EF, Stage C, NYHA III  Patient with unknown history of cardiac issues. EF was reduced after shock for recent cardiac arrest with some degree of recovering and currently is at 40-45%. Differential includes stunned heart, ICM due to LAD territory hypokinesis seen on echo, or other etiologies of NICM and notably given her history of drug use (however echo in 2020 was normal). Will need further  work-up to identify etiology and start medications in the mean time.  - Entresto 24/26 BID  - Jardiance 10 mg daily  - Scheduling cardiac MRI to evaluate cardiomyoathy    HLD  - Continue atorvastatin 20 mg daily    T2DM  - Continue insulin, metformin, starting jardiance    Recommended patient stop smoking, and limit salt, sugar and fat intake and try to stay active (150mn a week).    The patient states understanding and is agreeable with plan.   Feel free to contact myself regarding questions or concerns.  It was a pleasure to see this patient today.    RTC in 3 months with labs, labs in 2 weeks and scheduling cardiac MRI.    Discussed with Dr Nolan who agrees with the above.    Martita Jurado MD  PGY-2 internal medicine    I have seen and examined the patient on April 1, 2022. I have discussed the patient with Dr. Jurado and I agree with the assessment and plan as outlined below. I have personally reviewed vital signs, hemodynamics, medications, laboratory values, and diagnostic testing.     Heart failure with recovered ejection fraction to mid range.  Etiology probably nonischemic, but evaluation has not been done yet.  Had a cardiac MRI ordered while inpatient to be scheduled as an outpatient.  We will get the MRI.  In the meanwhile start Entresto and an SGLT2 inhibitor and follow-up from there.      Today's clinic visit entailed:  60 minutes spent on the date of the encounter doing chart review, history and exam, documentation and further activities per the note  Provider  Link to [facility] Help Grid     The level of medical decision making during this visit was of high complexity.          Please do not hesitate to contact me if you have any questions/concerns.     Sincerely,     Cam Nolan MD

## 2022-04-01 NOTE — PATIENT INSTRUCTIONS
Cardiology Providers you saw during your visit:  Dr. Nolan    Medication changes:  1.  Start Entresto 24-26 mg twice a day.    2.  Start Jardiance 10 mg daily    If either medication is too expensive, please call the clinic and we can find some other ways to make the more affordable.     Follow up:  1.  Labs in 2 weeks  2.  Follow up with Dr Nolan in 3 months with labs  3.  Schedule the Cardiac MRI    Cardiac MRI    Magnetic resonance imaging (MRI) is a test that lets your doctor see detailed pictures of the inside of your body. MRI combines the use of strong magnets and radio waves to form an MRI image. A Cardiac MRI will give a detailed image of your heart.    Follow these instructions:    Please arrive to the [facility] Waiting Room in the [facility].   1. You will be required to lay flat and follow breath-hold instructions.   2. You will need to remove all metal and answer a safety questionnaire. Please wear clothes without metal (snaps and zippers). Please remove any body piercings and hair extensions before you arrive. You will also remove watches, jewelry, hairpins, wallets, dentures, partial dental plates and hearing aids. You may wear contact lenses, and you may be able to wear your rings. We have a safe place to keep your personal items, but it is safer to leave them at home.  3. You will be given IV contrast for this exam.  To prepare:  The day before the exam drink extra fluid - at least six 8oz glasses (unless you are on a fluid restriction per your doctor)     Please inform us if you would answer YES to any of the following questions:  Are you claustrophobic?   Do you have metals in your body?  Do you any contrast allergies?  Diabetes or Kidney disease?      If you are receiving oral or IV sedation:  IF YOU WILL RECEIVE SEDATION (take medicine to help you relax during your exam):    You must get the medicine from your doctor before you arrive. Bring the medicine to the exam. Do not take it at home.  For IV sedation, you will receive it when you arrive for your test.    Arrive one hour early - you will then be instructed to take your pill and given time for it to work. Bring someone who can take you home after the test. Your medicine will make you sleepy. After the exam, you may not drive, take a bus or take a taxi by yourself.    If receiving IV sedation, no eating 8 hours before your exam. You may have clear liquids up until 4 hours before your exam. (Clear liquids include water, clear tea, black coffee and fruit juice without pulp.)    If you are receiving General Anesthesia:  IF YOU WILL RECEIVE ANESTHESIA (be asleep for your exam):    Arrive 1 1/2 hours early. Bring someone who can take you home after the test. You may not drive, take a bus or take a taxi by yourself.    No eating 8 hours before your exam. You may have clear liquids up until 4 hours before your exam. (Clear liquids include water, clear tea, black coffee and fruit juice without pulp.)      Labs:  Results for ELLA MOULTON (MRN [id number]) as of 4/1/2022 15:03   Ref. Range 4/1/2022 13:24   Sodium Latest Ref Range: 133 - 144 mmol/L 141   Potassium Latest Ref Range: 3.4 - 5.3 mmol/L 3.6   Chloride Latest Ref Range: 94 - 109 mmol/L 104   Carbon Dioxide Latest Ref Range: 20 - 32 mmol/L 26   Urea Nitrogen Latest Ref Range: 7 - 30 mg/dL 9   Creatinine Latest Ref Range: 0.52 - 1.04 mg/dL 0.70   GFR Estimate Latest Ref Range: >60 mL/min/1.73m2 >90   Calcium Latest Ref Range: 8.5 - 10.1 mg/dL 8.7   Anion Gap Latest Ref Range: 3 - 14 mmol/L 11   N-Terminal Pro Bnp Latest Ref Range: 0 - 125 pg/mL 672 (H)   Glucose Latest Ref Range: 70 - 99 mg/dL 96       Please call if you have :  1. Weight gain of more than 2 pounds in a day or 5 pounds in a week  2. Increased shortness of breath, swelling or bloating  3. Dizziness, lightheadedness   4. Any questions or concerns.       Follow the American Heart Association Diet and Lifestyle  "recommendations:  Limit saturated fat, trans fat, sodium, red meat, sweets and sugar-sweetened beverages. If you choose to eat red meat, compare labels and select the leanest cuts available.  Aim for at least 150 minutes of moderate physical activity or 75 minutes of vigorous physical activity - or an equal combination of both - each week.      During business hours: [phone], press option # 1 to schedule or leave a message for your care team      After hours, weekends or holidays: On Call Cardiologist- [phone]   option #4 and ask to speak to the on-call Cardiologist. Inform them you are a CORE/heart failure patient at [facility].      Heart Failure Support Group  Virtual meetings 2022, , 1-2 p.m.  Monday, , 1-2 p.m.  Monday, , 1-2 p.m.  Monday, , 1-2 p.m.  Monday, May 2nd, 1-2 p.m.  Monday, , 1-2 p.m.  Monday, , 1-2 p.m.  Monday, 1-2 p.m.  Monday, , 1-2 p.m.  Monday, , 1-2 p.m.  Monday, , 1-2 p.m.  Monday, , 1-2 p.m.      Codie Ware RN BSN CHFN  Cardiology Care Coordinator - Heart Failure/ [facility]  [facility]   Questions and [phone]   First press #1 for the [facility] and then press #4 for \"To send a message to your care team\"      Patient Education     Sacubitril, Valsartan Oral tablet  What is this medicine?  SACUBITRIL; VALSARTAN (sak UE bi tril; weston DANIELLE tan) is a combination of 2 drugs used to reduce the risk of death and hospitalizations in people with long-lasting heart failure. It is usually used with other medicines to treat heart failure.  This medicine may be used for other purposes; ask your health care provider or pharmacist if you have questions.  What should I tell my health care provider before I take this medicine?  They need to know if you have any of these conditions:    diabetes and take a medicine that " contains aliskiren    kidney disease    liver disease    an unusual or allergic reaction to sacubitril; valsartan, drugs called angiotensin converting enzyme (ACE) inhibitors, angiotensin II receptor blockers (ARBs), other medicines, foods, dyes, or preservatives    pregnant or trying to get pregnant    breast-feeding  How should I use this medicine?  Take this medicine by mouth with a glass of water. Follow the directions on the prescription label. You can take it with or  without food. If it upsets your stomach, take it with food. Take your medicine at regular intervals. Do not take it more  often than directed. Do not stop taking except on your doctor's advice.  Talk to your pediatrician regarding the use of this medicine in children. Special care may be needed.  Overdosage: If you think you have taken too much of this medicine contact a poison control center or emergency room at once.  NOTE: This medicine is only for you. Do not share this medicine with others.  What if I miss a dose?  If you miss a dose, take it as soon as you can. If it is almost time for next dose, take only that dose. Do not take double or extra doses.  What may interact with this medicine?  Do not take this medicine with any of the following medicines:    aliskiren if you have diabetes    angiotensin-converting enzyme (ACE) inhibitors, like benazepril, captopril, enalapril, fosinopril, lisinopril, or ramipril  This medicine may also interact with the following medicines:    angiotensin II receptor blockers (ARBs) like azilsartan, candesartan, eprosartan, irbesartan, losartan, olmesartan, telmisartan, or valsartan    lithium    NSAIDS, medicines for pain and inflammation, like ibuprofen or naproxen    potassium-sparing diuretics like amiloride, spironolactone, and triamterene    potassium supplements  This list may not describe all possible interactions. Give your health care provider a list of all the medicines, herbs, non-prescription  drugs, or dietary supplements you use. Also tell them if you smoke, drink alcohol, or use illegal drugs. Some items may interact with your medicine.  What should I watch for while using this medicine?  Tell your doctor or healthcare professional if your symptoms do not start to get better or if they get worse.  Do not become pregnant while taking this medicine. Women should inform their doctor if they wish to become pregnant or think they might be pregnant. There is a potential for serious side effects to an unborn child. Talk to your health care professional or pharmacist for more information.  You may get dizzy. Do not drive, use machinery, or do anything that needs mental alertness until you know how this medicine affects you. Do not stand or sit up quickly, especially if you are an older patient. This reduces the risk of dizzy or fainting spells. Avoid alcoholic drinks; they can make you more dizzy.  What side effects may I notice from receiving this medicine?  Side effects that you should report to your doctor or health care professional as soon as possible:    allergic reactions like skin rash, itching or hives, swelling of the face, lips, or tongue    signs and symptoms of increased potassium like muscle weakness; chest pain; or fast, irregular heartbeat    signs and symptoms of kidney injury like trouble passing urine or change in the amount of urine    signs and symptoms of low blood pressure like feeling dizzy or lightheaded, or if you develop extreme fatigue.  Side effects that usually do not require medical attention (Report these to your doctor or health care professional if they continue or are bothersome.):    cough  This list may not describe all possible side effects. Call your doctor for medical advice about side effects. You may report side effects to FDA at 1-100-FDA-4758.  Where should I keep my medicine?  Keep out of the reach of children.  Store at room temperature between 15 and 30 degrees C  (59 and 86 degrees F). Throw away any unused medicine after the expiration date.  NOTE: This sheet is a summary. It may not cover all possible information. If you have questions about this medicine, talk to your doctor, pharmacist, or health care provider.  NOTE:This sheet is a summary. It may not cover all possible information. If you have questions about this medicine, talk to your doctor, pharmacist, or health care provider. Copyright  2016 Gold Standard           Patient Education     Empagliflozin Oral tablet  What is this medicine?  EMPAGLIGLOZIN (SERGIO pa caryl lovingn) helps to treat type 2 diabetes. It helps to control blood sugar. Treatment is combined with diet and exercise.  This medicine may be used for other purposes; ask your health care provider or pharmacist if you have questions.  What should I tell my health care provider before I take this medicine?  They need to know if you have any of these conditions:    dehydration    diabetic ketoacidosis    diet low in salt    eating less due to illness, surgery, dieting, or any other reason    having surgery    high cholesterol    high levels of potassium in the blood    history of pancreatitis or pancreas problems    history of yeast infection of the penis or vagina    if you often drink alcohol    infections in the bladder, kidneys, or urinary tract    kidney disease    liver disease    low blood pressure    on hemodialysis    problems urinating    type 1 diabetes    uncircumcised male    an unusual or allergic reaction to empagliflozin, other medicines, foods, dyes, or preservatives    pregnant or trying to get pregnant    breast-feeding  How should I use this medicine?  Take this medicine by mouth with a glass of water. Follow the directions on the prescription label. Take it in the morning, with or without food. Take your dose at the same time each day. Do not take more often than directed. Do not stop taking except on your doctor's advice.  Talk to your  pediatrician regarding the use of this medicine in children. Special care may be needed.  Overdosage: If you think you've taken too much of this medicine contact a poison control center or emergency room at once.  NOTE: This medicine is only for you. Do not share this medicine with others.  What if I miss a dose?  If you miss a dose, take it as soon as you can. If it is almost time for your next dose, take only that dose. Do not take double or extra doses.  What may interact with this medicine?  Do not take this medicine with any of the following medications:    gatifloxacin  This medicine may also interact with the following medications:    alcohol    certain medicines for blood pressure, heart disease    diuretics  This list may not describe all possible interactions. Give your health care provider a list of all the medicines, herbs, non-prescription drugs, or dietary supplements you use. Also tell them if you smoke, drink alcohol, or use illegal drugs. Some items may interact with your medicine.  What should I watch for while using this medicine?  Visit your doctor or health care professional for regular checks on your progress.  This medicine can cause a serious condition in which there is too much acid in the blood. If you develop nausea, vomiting, stomach pain, unusual tiredness, or breathing problems, stop taking this medicine and call your doctor right away. If possible, use a ketone dipstick to check for ketones in your urine.  A test called the HbA1C (A1C) will be monitored. This is a simple blood test. It measures your blood sugar control over the last 2 to 3 months. You will receive this test every 3 to 6 months.  Learn how to check your blood sugar. Learn the symptoms of low and high blood sugar and how to manage them.  Always carry a quick-source of sugar with you in case you have symptoms of low blood sugar. Examples include hard sugar candy or glucose tablets. Make sure others know that you can choke  if you eat or drink when you develop serious symptoms of low blood sugar, such as seizures or unconsciousness. They must get medical help at once.  Tell your doctor or health care professional if you have high blood sugar. You might need to change the dose of your medicine. If you are sick or exercising more than usual, you might need to change the dose of your medicine.  Do not skip meals. Ask your doctor or health care professional if you should avoid alcohol. Many nonprescription cough and cold products contain sugar or alcohol. These can affect blood sugar.  Wear a medical ID bracelet or chain, and carry a card that describes your disease and details of your medicine and dosage times.  What side effects may I notice from receiving this medicine?  Side effects that you should report to your doctor or health care professional as soon as possible:    allergic reactions like skin rash, itching or hives, swelling of the face, lips, or tongue    breathing problems    dizziness    fast or irregular heartbeat    feeling faint or lightheaded, falls    muscle weakness    nausea, vomiting, unusual stomach upset or pain    signs and symptoms of low blood sugar such as feeling anxious, confusion, dizziness, increased hunger, unusually weak or tired, sweating, shakiness, cold, irritable, headache, blurred vision, fast heartbeat, loss of consciousness    signs and symptoms of a urinary tract infection, such as fever, chills, a burning feeling when urinating, blood in the urine, back pain    trouble passing urine or change in the amount of urine, including an urgent need to urinate more often, in larger amounts, or at night    penile discharge, itching, or pain in men    unusual tiredness    vaginal discharge, itching, or odor in women  Side effects that usually do not require medical attention (Report these to your doctor or health care professional if they continue or are bothersome.):    joint pain    mild increase in  urination    thirsty  This list may not describe all possible side effects. Call your doctor for medical advice about side effects. You may report side effects to FDA at 3-924-HKF-0729.  Where should I keep my medicine?  Keep out of the reach of children.  Store at room temperature between 20 and 25 degrees C (68 and 77 degrees F). Throw away any unused medicine after the expiration date.  NOTE: This sheet is a summary. It may not cover all possible information. If you have questions about this medicine, talk to your doctor, pharmacist, or health care provider.  NOTE:This sheet is a summary. It may not cover all possible information. If you have questions about this medicine, talk to your doctor, pharmacist, or health care provider. Copyright  2016 Gold Standard

## 2022-04-01 NOTE — NURSING NOTE
Chief Complaint   Patient presents with     New Patient     New Heart Failure     Vitals were taken and medications reconciled.    LE Jacobson  2:13 PM

## 2022-04-01 NOTE — NURSING NOTE
Cardiology Providers you saw during your visit:  Dr. Nolan     Medication changes:  1.  Start Entresto 24-26 mg twice a day.    2.  Start Jardiance 10 mg daily  3. Restart Lipitor 20mg daily     If either medication is too expensive, please call the clinic and we can find some other ways to make the more affordable.      Follow up:  1.  Labs in 2 weeks  2.  Follow up with Dr Nolan in 3 months with labs  3.  Schedule the Cardiac MRI

## 2022-04-02 LAB — HCV RNA SERPL NAA+PROBE-ACNC: NOT DETECTED IU/ML

## 2022-04-03 DIAGNOSIS — K76.0 HEPATIC STEATOSIS: Primary | ICD-10-CM

## 2022-04-04 ENCOUNTER — TELEPHONE (OUTPATIENT)
Dept: GASTROENTEROLOGY | Facility: CLINIC | Age: 62
End: 2022-04-04
Payer: COMMERCIAL

## 2022-04-04 NOTE — LETTER
April 11, 2022       TO: Kristin Avila  [address]  [place] [address]       Dear MsVidya,    We are writing to inform you of your test results. Your last hepatitis C viral level was undetected which indicates you are cured of hepatitis C.    1) You are no longer considered to be infectious for Hepatitis C.      2) A cure does not mean you have immunity. You can still be reinfected if you come into contact with infected blood. Avoiding sex with infected people, avoid reusing needles and razors that came into contact with infected blood.      3) You will always test positive for the Hepatitis C antibody. Your Hepatitis C RNA Quant (viral level) will remain undetected as long as you avoid risks for reinfection.      Because imaging before starting treatment showed moderate to severe hepatic steatosis (fibrosis or some scarring in your liver), you will require further follow up in Hepatology. Please call the number listed below to schedule a follow up in Hepatology with Angel Orellana PA-C.    Clinic Staff - [phone] option 2    Sincerely,    Sherine CASTILLO RN Care Coordinator  [facility]

## 2022-04-15 ENCOUNTER — PATIENT OUTREACH (OUTPATIENT)
Dept: CARDIOLOGY | Facility: CLINIC | Age: 62
End: 2022-04-15
Payer: COMMERCIAL

## 2022-04-15 NOTE — TELEPHONE ENCOUNTER
Per nursing staff at [facility] No is tolerating the entresto and jardiance with no complaints or changes.  They will check labs next week and will fax results.

## 2022-04-25 ENCOUNTER — TELEPHONE (OUTPATIENT)
Dept: CARDIOLOGY | Facility: CLINIC | Age: 62
End: 2022-04-25
Payer: COMMERCIAL

## 2022-04-25 DIAGNOSIS — I50.22 CHRONIC SYSTOLIC HEART FAILURE (H): Primary | ICD-10-CM

## 2022-04-25 NOTE — TELEPHONE ENCOUNTER
Called [facility] to Follow-up on labs that were to be drawn last week. Spoke to RN who states labs were drawn but results were not faxed. They will fax them now.     Dinah Unger RN

## 2022-04-26 NOTE — TELEPHONE ENCOUNTER
Still have not received BMP results via fax.     carlene Cifuentesator, will reach out to [facility] and request labs be faxed again.     Dinah Unger RN

## 2022-04-28 NOTE — TELEPHONE ENCOUNTER
Navigator Esau reached out to [facility] to ask for labs to be faxed again. No labs received as of this AM.     Called [facility] and left a message with Director of Nursing Sherine to please call back clinic to discuss why lab results haven't been faxed. Writer has been trying to get lab results for 4 days.     Dinah Unger RN

## 2022-04-29 NOTE — LETTER
April 29, 2022       TO: [facility]  [address]  [address] [place] [address]       Dear ,    You have the following scheduled appointments at the

## 2022-05-02 RX ORDER — SPIRONOLACTONE 25 MG/1
25 TABLET ORAL DAILY
Qty: 90 TABLET | Refills: 3 | Status: ON HOLD | OUTPATIENT
Start: 2022-05-02 | End: 2023-12-08

## 2022-05-02 NOTE — PROGRESS NOTES
Follow up appointments for lab, fibrosis scan, and office visit with Angel Orellana PA-C were scheduled for 6/28/22. Appointment information mailed to patient at her current LTC facility.

## 2022-05-02 NOTE — TELEPHONE ENCOUNTER
Called [facility]; they state that labs drawn on 4-21 were finally made available in Epic.     Labs resulted under different provider.     Labs stable; Routing to Dr. Nolan to review.     Dinah Unger RN

## 2022-05-02 NOTE — TELEPHONE ENCOUNTER
Spironolactone 25 mg ordered per Dr. Nolan's recommendation below.     Spironolactone and BMP order printed and faxed to [facility].     Called [facility] and spoke to Rena ANGUIANO; confirmed starting medication and Follow-up labs.       Will call and Follow-up on how pt is feeling next week.       Dinah Nolan, Cam CARPIO MD  You 20 minutes ago (2:19 PM)     VM    Ok would start spironolactone 25mg daily, I think she can tolerate this dose. With another BMP in 2 weeks.

## 2022-05-10 NOTE — TELEPHONE ENCOUNTER
Called and spoke with RN from [facility]; pt is feeling well on Spironolactone 25 mg daily. No reports of light headedness/dizziness or hypotension.     Pt to have BMP drawn next week. Will Follow-up on lab results.     Dinah Unger RN

## 2022-05-17 ENCOUNTER — TELEPHONE (OUTPATIENT)
Dept: CARDIOLOGY | Facility: CLINIC | Age: 62
End: 2022-05-17
Payer: COMMERCIAL

## 2022-05-17 NOTE — TELEPHONE ENCOUNTER
Called [facility] to Follow-up on BMP to be drawn today; unable to get through. Will attempt to call again tomorrow.     Dinah Unger RN

## 2022-05-18 NOTE — TELEPHONE ENCOUNTER
Called [facility]; RN unavailable (rounding with resident) buy CMA will relay message to please make sure BMP got drawn yesterday and faxed to [facility]. Will Follow-up tomorrow to make sure fax is received.     Dinah Unger RN

## 2022-05-19 ENCOUNTER — TRANSFERRED RECORDS (OUTPATIENT)
Dept: HEALTH INFORMATION MANAGEMENT | Facility: CLINIC | Age: 62
End: 2022-05-19
Payer: COMMERCIAL

## 2022-05-19 ENCOUNTER — TELEPHONE (OUTPATIENT)
Dept: CARDIOLOGY | Facility: CLINIC | Age: 62
End: 2022-05-19
Payer: COMMERCIAL

## 2022-05-19 LAB
CREATININE (EXTERNAL): 0.97 MG/DL (ref 0.55–1.02)
GFR ESTIMATED (EXTERNAL): >60 ML/MIN/1.73M2
GLUCOSE (EXTERNAL): 142 MG/DL (ref 70–100)
POTASSIUM (EXTERNAL): 4.3 MMOL/L (ref 3.5–5.1)

## 2022-05-19 NOTE — TELEPHONE ENCOUNTER
No fax received with lab results     Third attempt. Called [facility]; unable to get through or leave a message.  Will attempt to call again later today/Monday.     Dinah Unger RN

## 2022-05-23 ENCOUNTER — TELEPHONE (OUTPATIENT)
Dept: CARDIOLOGY | Facility: CLINIC | Age: 62
End: 2022-05-23
Payer: COMMERCIAL

## 2022-05-23 NOTE — TELEPHONE ENCOUNTER
Labs results  from facility scanned in from right fax dated 5/19/22.     Pt current medication regime Entresto 24-26 mg BID, Jardiance 10 mg daily and Spironolactone 25 mg daily.     Pt is tolerating latest titration of Spironolactone. No complaints of lightheadedness and BP are stable.     Dinah Unger RN

## 2022-05-25 ENCOUNTER — TELEPHONE (OUTPATIENT)
Dept: CARDIOLOGY | Facility: CLINIC | Age: 62
End: 2022-05-25
Payer: COMMERCIAL

## 2022-05-25 DIAGNOSIS — I50.22 CHRONIC SYSTOLIC HEART FAILURE (H): ICD-10-CM

## 2022-05-25 RX ORDER — SACUBITRIL AND VALSARTAN 49; 51 MG/1; MG/1
1 TABLET, FILM COATED ORAL 2 TIMES DAILY
Qty: 180 TABLET | Refills: 2 | Status: ON HOLD | OUTPATIENT
Start: 2022-05-25 | End: 2023-12-08

## 2022-05-25 NOTE — TELEPHONE ENCOUNTER
Date: 5/25/2022    Time of Call: 11:26 AM     Diagnosis:  HF     [ VORB ] Ordering provider: Dr. Nolan    Order: Increase Entresto to 49-51 mg BID. BMP at MRI appt on 6/7/22     Order received by: Dinah Unger RN       Follow-up/additional notes: Called [facility] and left VM to increase Entresto; will call again tomorrow. Faxed updated medication order. Added lab only appt onto [facility] appt on 6/7/22.     Dinah Unger RN

## 2022-06-02 DIAGNOSIS — I50.22 CHRONIC SYSTOLIC HEART FAILURE (H): Primary | ICD-10-CM

## 2022-06-06 ENCOUNTER — TELEPHONE (OUTPATIENT)
Dept: CARDIOLOGY | Facility: CLINIC | Age: 62
End: 2022-06-06
Payer: COMMERCIAL

## 2022-06-06 NOTE — TELEPHONE ENCOUNTER
----- Message from Dinah Unger RN sent at 6/2/2022 12:50 PM CDT -----  Regarding: FW: Entresto increase/lab    ----- Message -----  From: Dinah Unger RN  Sent: 6/1/2022  12:00 AM CDT  To: Dinah Unger RN  Subject: Entresto increase/lab                            Check to see how pt is feeling after Entresto increase to 49-51 BID and confirm lab on 6/7 with [facility].

## 2022-06-06 NOTE — TELEPHONE ENCOUNTER
Called [facility] and spoke with Enedelia ANGUIANO. Rabia's BP have been stable with recent Entresto increase to 49-51 mg BID. BP's: 127/77 105/70, 119/75.  Pt denies any lightheadedness or dizziness.     Confimred with RN pt appt tomorrow at 11 am for labs and 11:115 for [facility]. Will Follow-up with results.     Dinah Unger RN

## 2022-06-07 NOTE — TELEPHONE ENCOUNTER
Noted that pt [facility] for today at 11:15 was cancelled. Appointment was confirmed yesterday with SILVIO Mcdaniels from [facility].     Called [facility] and spoke with patient and nurses nick Phylicia. They state that there was a mix up with her ride and she missed the appt; this is her second missed [facility].     Re-faxed BMP order to [facility] and asked that it be drawn asap as she was scheduled to have labs today as well. Will likely not be drawn until tomorrow at the earliest.     Called and re-scheduled [facility] for 8/22/22, earliest available. [facility] made aware of new [facility] appointment; will call 3 days prior to confirm pt ride is made.     Routing to Dr. Nolan as FYI.     Dinah Unger RN

## 2022-06-10 NOTE — TELEPHONE ENCOUNTER
No labs received from [facility]. Called and spoke to RN; she will check to see if labs were drawn and fax results if available. Will Follow-up on Monday.     Dinah Unger RN

## 2022-06-13 NOTE — TELEPHONE ENCOUNTER
Called [facility] to Follow-up on labs that were suppose have been drawn and faxed last week.     Spoke with Phylicia ANGUIANO; she states that patients RN is not available to talk to and the director of nursing is not available. Writer requested call back to confirm that labs have been faxed to [phone].     Dinah Unger RN

## 2022-06-28 ENCOUNTER — APPOINTMENT (OUTPATIENT)
Dept: GENERAL RADIOLOGY | Facility: CLINIC | Age: 62
End: 2022-06-28
Attending: EMERGENCY MEDICINE
Payer: COMMERCIAL

## 2022-06-28 ENCOUNTER — MEDICAL CORRESPONDENCE (OUTPATIENT)
Dept: INTENSIVE CARE | Facility: CLINIC | Age: 62
End: 2022-06-28

## 2022-06-28 ENCOUNTER — HOSPITAL ENCOUNTER (EMERGENCY)
Facility: CLINIC | Age: 62
Discharge: HOME | End: 2022-06-29
Attending: EMERGENCY MEDICINE
Payer: COMMERCIAL

## 2022-06-28 DIAGNOSIS — F11.20 PATIENT ON METHADONE MAINTENANCE THERAPY (H): ICD-10-CM

## 2022-06-28 DIAGNOSIS — G93.40 ACUTE ENCEPHALOPATHY: ICD-10-CM
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
## 2022-06-28 LAB
BASOPHILS # BLD AUTO: 0.1 10E3/UL (ref 0–0.2)
BASOPHILS NFR BLD AUTO: 1 %
EOSINOPHIL # BLD AUTO: 0.2 10E3/UL (ref 0–0.7)
EOSINOPHIL NFR BLD AUTO: 2 %
ERYTHROCYTE [DISTWIDTH] IN BLOOD BY AUTOMATED COUNT: 14.3 % (ref 10–15)
GLUCOSE BLDC GLUCOMTR-MCNC: 139 MG/DL (ref 70–99)
HCT VFR BLD AUTO: 42.5 % (ref 35–47)
HGB BLD-MCNC: 13.2 G/DL (ref 11.7–15.7)
IMM GRANULOCYTES # BLD: 0.1 10E3/UL
IMM GRANULOCYTES NFR BLD: 0 %
LYMPHOCYTES # BLD AUTO: 2.3 10E3/UL (ref 0.8–5.3)
LYMPHOCYTES NFR BLD AUTO: 20 %
MCH RBC QN AUTO: 26.4 PG (ref 26.5–33)
MCHC RBC AUTO-ENTMCNC: 31.1 G/DL (ref 31.5–36.5)
MCV RBC AUTO: 85 FL (ref 78–100)
MONOCYTES # BLD AUTO: 1 10E3/UL (ref 0–1.3)
MONOCYTES NFR BLD AUTO: 9 %
NEUTROPHILS # BLD AUTO: 7.6 10E3/UL (ref 1.6–8.3)
NEUTROPHILS NFR BLD AUTO: 68 %
NRBC # BLD AUTO: 0 10E3/UL
NRBC BLD AUTO-RTO: 0 /100
PLATELET # BLD AUTO: 297 10E3/UL (ref 150–450)
RBC # BLD AUTO: 5 10E6/UL (ref 3.8–5.2)
WBC # BLD AUTO: 11.3 10E3/UL (ref 4–11)

## 2022-06-28 PROCEDURE — 36415 COLL VENOUS BLD VENIPUNCTURE: CPT | Performed by: EMERGENCY MEDICINE

## 2022-06-28 PROCEDURE — 73610 X-RAY EXAM OF ANKLE: CPT | Mod: LT

## 2022-06-28 PROCEDURE — 73610 X-RAY EXAM OF ANKLE: CPT | Mod: 26 | Performed by: RADIOLOGY

## 2022-06-28 PROCEDURE — 85025 COMPLETE CBC W/AUTO DIFF WBC: CPT | Performed by: EMERGENCY MEDICINE

## 2022-06-28 PROCEDURE — 80048 BASIC METABOLIC PNL TOTAL CA: CPT | Performed by: EMERGENCY MEDICINE

## 2022-06-28 PROCEDURE — 99285 EMERGENCY DEPT VISIT HI MDM: CPT | Performed by: EMERGENCY MEDICINE

## 2022-06-28 PROCEDURE — 99285 EMERGENCY DEPT VISIT HI MDM: CPT | Mod: 25

## 2022-06-29 ENCOUNTER — APPOINTMENT (OUTPATIENT)
Dept: GENERAL RADIOLOGY | Facility: CLINIC | Age: 62
End: 2022-06-29
Attending: EMERGENCY MEDICINE
Payer: COMMERCIAL

## 2022-06-29 ENCOUNTER — APPOINTMENT (OUTPATIENT)
Dept: CT IMAGING | Facility: CLINIC | Age: 62
End: 2022-06-29
Attending: EMERGENCY MEDICINE
Payer: COMMERCIAL

## 2022-06-29 VITALS
OXYGEN SATURATION: 96 % | DIASTOLIC BLOOD PRESSURE: 65 MMHG | HEART RATE: 68 BPM | TEMPERATURE: 99 F | SYSTOLIC BLOOD PRESSURE: 106 MMHG | RESPIRATION RATE: 16 BRPM

## 2022-06-29 LAB
ALBUMIN UR-MCNC: NEGATIVE MG/DL
AMPHETAMINES UR QL SCN: NORMAL
ANION GAP SERPL CALCULATED.3IONS-SCNC: 13 MMOL/L (ref 7–15)
APPEARANCE UR: CLEAR
BARBITURATES UR QL SCN: NORMAL
BENZODIAZ UR QL SCN: NORMAL
BILIRUB UR QL STRIP: NEGATIVE
BUN SERPL-MCNC: 12.8 MG/DL (ref 8–23)
BZE UR QL SCN: NORMAL
CALCIUM SERPL-MCNC: 8.9 MG/DL (ref 8.8–10.2)
CANNABINOIDS UR QL SCN: NORMAL
CHLORIDE SERPL-SCNC: 100 MMOL/L (ref 98–107)
COLOR UR AUTO: ABNORMAL
CREAT SERPL-MCNC: 1 MG/DL (ref 0.51–0.95)
DEPRECATED HCO3 PLAS-SCNC: 25 MMOL/L (ref 22–29)
GFR SERPL CREATININE-BSD FRML MDRD: 64 ML/MIN/1.73M2
GLUCOSE SERPL-MCNC: 115 MG/DL (ref 70–99)
GLUCOSE UR STRIP-MCNC: >=1000 MG/DL
HGB UR QL STRIP: NEGATIVE
HOLD SPECIMEN: NORMAL
HOLD SPECIMEN: NORMAL
KETONES UR STRIP-MCNC: NEGATIVE MG/DL
LEUKOCYTE ESTERASE UR QL STRIP: NEGATIVE
NITRATE UR QL: NEGATIVE
OPIATES UR QL SCN: NORMAL
PH UR STRIP: 5.5 [PH] (ref 5–7)
POTASSIUM SERPL-SCNC: 3.8 MMOL/L (ref 3.4–5.3)
RBC URINE: <1 /HPF
SODIUM SERPL-SCNC: 138 MMOL/L (ref 136–145)
SP GR UR STRIP: 1.02 (ref 1–1.03)
SQUAMOUS EPITHELIAL: <1 /HPF
UROBILINOGEN UR STRIP-MCNC: NORMAL MG/DL
WBC URINE: 1 /HPF

## 2022-06-29 PROCEDURE — 70486 CT MAXILLOFACIAL W/O DYE: CPT | Mod: 26 | Performed by: RADIOLOGY

## 2022-06-29 PROCEDURE — 72125 CT NECK SPINE W/O DYE: CPT | Mod: 26 | Performed by: RADIOLOGY

## 2022-06-29 PROCEDURE — 70450 CT HEAD/BRAIN W/O DYE: CPT

## 2022-06-29 PROCEDURE — 73560 X-RAY EXAM OF KNEE 1 OR 2: CPT | Mod: 26 | Performed by: RADIOLOGY

## 2022-06-29 PROCEDURE — 72128 CT CHEST SPINE W/O DYE: CPT

## 2022-06-29 PROCEDURE — 70450 CT HEAD/BRAIN W/O DYE: CPT | Mod: 26 | Performed by: RADIOLOGY

## 2022-06-29 PROCEDURE — 72128 CT CHEST SPINE W/O DYE: CPT | Mod: 26 | Performed by: RADIOLOGY

## 2022-06-29 PROCEDURE — 81001 URINALYSIS AUTO W/SCOPE: CPT | Performed by: EMERGENCY MEDICINE

## 2022-06-29 PROCEDURE — 73560 X-RAY EXAM OF KNEE 1 OR 2: CPT | Mod: RT

## 2022-06-29 PROCEDURE — 80307 DRUG TEST PRSMV CHEM ANLYZR: CPT | Performed by: EMERGENCY MEDICINE

## 2022-06-29 PROCEDURE — 72125 CT NECK SPINE W/O DYE: CPT

## 2022-06-29 PROCEDURE — 70486 CT MAXILLOFACIAL W/O DYE: CPT

## 2022-06-29 PROCEDURE — 73552 X-RAY EXAM OF FEMUR 2/>: CPT | Mod: RT

## 2022-06-29 PROCEDURE — 73552 X-RAY EXAM OF FEMUR 2/>: CPT | Mod: 26 | Performed by: RADIOLOGY

## 2022-06-29 RX ORDER — MOXIFLOXACIN 5 MG/ML
1 SOLUTION/ DROPS OPHTHALMIC
Status: DISCONTINUED | OUTPATIENT
Start: 2022-06-29 | End: 2022-06-29

## 2022-06-29 ASSESSMENT — ENCOUNTER SYMPTOMS
DYSURIA: 0
BACK PAIN: 1
NAUSEA: 0
HEADACHES: 0
FATIGUE: 1
ABDOMINAL PAIN: 0
HEMATURIA: 0
VOMITING: 0
DIARRHEA: 0
SHORTNESS OF BREATH: 0

## 2022-06-29 NOTE — ED NOTES
Emergency Department Patient Sign-out       Brief HPI and ED course:  Patient is a 62 year old female signed out to me by Dr. Hernandez.  See initial ED Provider note for details of the presentation. In brief, patient with altered mental status, regularly takes methadone.  Patient had a fall, initial imaging was negative.  Labs unremarkable.  Patient remains fairly drowsy.    Vitals:   Patient Vitals for the past 24 hrs:   BP Temp Temp src Pulse Resp SpO2   06/29/22 0100 102/73 -- -- 73 -- 97 %   06/29/22 0050 116/74 -- -- -- 16 95 %   06/28/22 2330 124/77 -- -- 70 16 98 %   06/28/22 2303 110/62 99  F (37.2  C) Oral 74 18 93 %       Received Sign-out Plan:    Pending studies include: UA      Plan:   - f/u UA  - monitor for mental status clearing from likely methadone effects  - likely discharge back to nursing home    Events after assuming care:  After care was assumed, a focused history and physical was performed. Agree with findings relayed by previous provider.     With monitoring, patient's mental status cleared. Patient then clinically sober with steady gait and tolerating PO. Normalization of mental status with sobering makes other causes of altered mental status very unlikely - is consistent with effects of methadone. After counseling on the diagnosis, work-up, and treatment plan, the patient was discharged to her nursing home. Patient to follow-up with primary care in the coming days for recheck. Patient to return to the ED if any urgent/life-threatening concerns.     Final diagnoses:   Acute encephalopathy   Fall, initial encounter   Patient on methadone maintenance therapy (H)     Discharge Medication List as of 6/29/2022  6:47 AM          --  Gómez Mcleod MD   Emergency Medicine   [facility] EMERGENCY DEPARTMENT  6/28/2022         Gómez Mcleod MD  07/01/22 0701

## 2022-06-29 NOTE — ED TRIAGE NOTES
"BIBA from Nursing Home    30-45 mins prior to EMS arrival, pt \"fell asleep and fell down,\" hit L side of head, woke up immediately upon fall, no LOC. Not anticoagulated. C/o worsening chronic back and L shoulder pain. Pt assisted to stand by facility staff and put in w/c. Pt able to stand and walk short distance to cot for EMS. AOx4.         Pt states she hasn't gotten much sleep over last 2 days so she's over tired.       "

## 2022-06-29 NOTE — ED NOTES
Bed: ED17  Expected date:   Expected time:   Means of arrival:   Comments:  Elizabeth  62 F  Eval after fall

## 2022-06-29 NOTE — DISCHARGE INSTRUCTIONS
You have been seen in the emergency department today after your fall.  Your CT scans and x-rays do not show any acute injuries, no broken bones or evidence of bleeding.    It is possible that your sleepiness/lethargy has to do with your medications. If this is continuing to be a problem you need to speak with your prescribing clinic about this.       Instructions from your doctor today:  Emergency Department testing is focused on the potential causes of your symptoms that are the most dangerous possibilities, and cannot cover every possibility. Based on the evaluation, it was deemed sufficiently safe to discharge and continue management through the clinics. Thus, follow-up is very important to assess for improvement/worsening, potential further testing, and potential treatment adjustments. If you were given opioid pain medications or other medications that can make you drowsy while in the ED, you should not drive for at least several hours and not until you feel completely back to normal.     Please make an appointment to follow up with:  - Your Primary Care Provider in 2-7 days  - If you do not have a primary care provider, you can be seen in follow-up and establish care by calling any of the clinics below:     - [facility] (phone: [phone])     - Primary Care / [facility] (phone: [phone])   - Have your clinic provider review the results from today's visit with you again, including any potential follow-up or additional testing that may be needed based on the results. Occasionally, incidental findings are found on later review by radiologists that may need follow-up.     Return to the Emergency Department immediately if you have worsening symptoms, or any other urgent or potentially life-threatening concerns.

## 2022-06-29 NOTE — ED PROVIDER NOTES
"[facility] EMERGENCY DEPARTMENT ([facility])  6/28/22    History     Chief Complaint   Patient presents with     Fall     HPI  No Yusuf is a 62 year old female with PMH significant for insulin-dependent DM2, heart failure with midrange reduced EF, HTN, HLD, COVID-pneumonia c/b torsades de pointes arrest, bipolar disorder, polysubstance use on methadone, hypothyroidism, and chronic hepatitis C who presents to the ED for evaluation of fall at her nursing home.  Here in the ED the history provided by the patient is limited due to somnolence.  Patient reports that she \"fell down because she was tired\".  Patient reports that she had not gotten a lot of sleep over the past 2 days and that is why she was so tired that she fell.  She states that she was getting up to go to the bathroom and must have fallen asleep.  She states she was getting out of bed and needed to hold onto a table due to being unsteady.  She reports that she does not remember falling, but does remember hitting the left side of her face on the ground.  She denies headache or vision changes.  She does endorse some neck pain.    Patient also endorses left ankle pain, but is unable to indicate if this is new today, stating that it has been sore the past few days.  She also reports right knee pain.  Patient falls asleep several times during the interview, needing to be roused.  At one point, she falls asleep and begins mumbling to herself, then wakes up suddenly and states that she \"must have be dreaming again\" and that she must have dreaming about her sister.  She denies dysuria or hematuria.  Patient is unsure what medications she was given tonight at the nursing home or when this last was.  She denies chest pain or abdominal pain.  She denies shortness of breath.  She denies nausea, vomiting, or diarrhea.  Patient does endorse chronic back pain, states that it is unchanged since the fall.    Patient resides at the [facility]" nursing home. Per report, she evidently went out on a [place]/pass and was gone 6/23-6/28, coming back this afternoon/evening.  She was evidently supposed to come back on 6/26.     Per review of the chart, patient did have a nursing home visit on 6/9 for bilateral ankle pain, left more than right.  This was felt to be signs of early cellulitis of the left lower leg with retained hardware from a previous ankle fracture many years ago.  She was treated with Ceftin.  This was noted to be resolved on 6/23 nursing home visit.    Per documentation only note earlier today, patient's primary received page from patient's nursing facility requesting 2 days worth of methadone so she could go see her family.  This was approved. It is unclear if this is a delayed message as the patient evidently just came back from a family visit.     Spoke to nurse at patient's nursing home who reports that she did have an unwitnessed fall today.  She apparently did not lose consciousness, and after she got up from the fall she actually wanted to go out and smoke.  When she came back from smoking, staff noted that she seemed to be confused and somnolent and was slurring her speech.  It was for this reason that they called an ambulance.  Nursing home nurse does report that she received her 8 PM medications including her gabapentin.  They do not have a record of her receiving her methadone today, however she evidently told the ED nurse that she received her methadone late today. (Normally receives it in the morning).     Past Medical History  Past Medical History:   Diagnosis Date     Arthritis      Bipolar affective (H)      Fibromyalgia      Hypertension      Past Surgical History:   Procedure Laterality Date     CHOLECYSTECTOMY       GYN SURGERY      c section     GYN SURGERY      oblation     ORTHOPEDIC SURGERY      left leg, left shoulder, back     acetaminophen (TYLENOL) 325 MG tablet  Acidophilus Lactobacillus CAPS  albuterol (PROAIR  HFA/PROVENTIL HFA/VENTOLIN HFA) 108 (90 Base) MCG/ACT inhaler  aspirin (ASA) 81 MG chewable tablet  atorvastatin (LIPITOR) 20 MG tablet  blood glucose (ACCU-CHEK RAFAELA PLUS) test strip  Blood Glucose Calibration (ACCU-CHEK ACTIVE GLUCOSE CONT VI)  blood glucose calibration (ACCU-CHEK RAFAELA) solution  calcium carbonate antacid 1000 MG CHEW  cholecalciferol 25 MCG (1000 UT) TABS  diclofenac (VOLTAREN) 1 % topical gel  DULoxetine (CYMBALTA) 20 MG capsule  empagliflozin (JARDIANCE) 10 MG TABS tablet  fluticasone-salmeterol (ADVAIR) 250-50 MCG/DOSE inhaler  furosemide (LASIX) 20 MG tablet  gabapentin (NEURONTIN) 100 MG capsule  hydrOXYzine (ATARAX) 50 MG tablet  insulin detemir (LEVEMIR PEN) 100 UNIT/ML pen  insulin glargine (LANTUS PEN) 100 UNIT/ML pen  levothyroxine (SYNTHROID/LEVOTHROID) 200 MCG tablet  melatonin 5 MG tablet  metFORMIN (GLUCOPHAGE) 1000 MG tablet  methadone (DOLOPHINE) 10 MG tablet  multivitamin w/minerals (THERA-VIT-M) tablet  NARCAN 4 MG/0.1ML nasal spray  nicotine polacrilex (NICORETTE) 4 MG gum  omeprazole (PRILOSEC) 20 MG DR capsule  ondansetron (ZOFRAN-ODT) 4 MG ODT tab  polyethylene glycol (MIRALAX) 17 GM/Dose powder  potassium chloride ER (KLOR-CON) 8 MEQ CR tablet  sacubitril-valsartan (ENTRESTO) 49-51 MG per tablet  spironolactone (ALDACTONE) 25 MG tablet  vitamin B-12 (CYANOCOBALAMIN) 1000 MCG tablet      Allergies   Allergen Reactions     Promethazine Other (See Comments) and Anxiety     Noted in 8/30/08 ER     Codeine Phosphate GI Disturbance     Lamotrigine Other (See Comments)     hyponatremia     Oxcarbazepine Unknown and Other (See Comments)     Low sodium  LegacyRecord#[id number]       Codeine Other (See Comments) and Rash     GI bleeding  LegacyRecord#[id number]       Family History  Family History   Problem Relation Age of Onset     Heart Disease Mother      Diabetes Mother      Ovarian Cancer Mother      Heart Disease Father      Lung Cancer Father      Diabetes Sister      Ovarian Cancer  Sister      Diabetes Brother      Social History   Social History     Tobacco Use     Smoking status: Current Some Day Smoker     Packs/day: 0.10     Smokeless tobacco: Never Used     Tobacco comment: Using nicotine vape occasionally   Substance Use Topics     Alcohol use: No     Drug use: No      Past medical history, past surgical history, medications, allergies, family history, and social history were reviewed with the patient. No additional pertinent items.       Review of Systems   Constitutional: Positive for fatigue.   Eyes: Negative for visual disturbance.   Respiratory: Negative for shortness of breath.    Cardiovascular: Negative for chest pain.   Gastrointestinal: Negative for abdominal pain, diarrhea, nausea and vomiting.   Genitourinary: Negative for dysuria and hematuria.   Musculoskeletal: Positive for back pain (chronic).        L ankle pain  R knee pain   Neurological: Negative for headaches.     A complete review of systems was attempted but limited due to somnolence.    Physical Exam   BP: 110/62  Pulse: 74  Temp: 99  F (37.2  C)  Resp: 18  SpO2: 93 %  Physical Exam  Vitals and nursing note reviewed.   Constitutional:       General: She is not in acute distress.     Appearance: She is obese. She is not diaphoretic.      Comments: Adult female, somnolent and lethargic. Arouses to loud voice and attempts to answer questions but then falls asleep in mid-answer.    HENT:      Head:      Comments: Some TTP and slight bruising over L superior orbital rim     Mouth/Throat:      Mouth: Mucous membranes are moist.      Pharynx: Oropharynx is clear.   Eyes:      Pupils: Pupils are equal, round, and reactive to light.   Neck:      Comments: Some TTP along upper cervical spine and along L paraspinous region  Cardiovascular:      Rate and Rhythm: Normal rate and regular rhythm.      Heart sounds: Normal heart sounds.   Pulmonary:      Effort: No respiratory distress.      Breath sounds: Normal breath sounds.    Chest:      Chest wall: No tenderness.   Abdominal:      General: Bowel sounds are normal.      Palpations: Abdomen is soft.      Tenderness: There is no abdominal tenderness. There is no guarding or rebound.      Comments: Morbidly obese   Musculoskeletal:         General: Tenderness present.      Cervical back: No tenderness.      Thoracic back: Tenderness present.      Lumbar back: No tenderness.      Comments: TTP over L ankle with some swelling (potentially baseline), no TTP over foot. Cap refill normal.     TTP over R knee and distal thigh. Distal CMS WNL   Skin:     Findings: No abrasion or laceration.   Neurological:      Mental Status: She is lethargic.      GCS: GCS eye subscore is 3. GCS verbal subscore is 3. GCS motor subscore is 6.      Cranial Nerves: Cranial nerves 2-12 are intact.      Sensory: Sensation is intact.      Motor: Motor function is intact.      Comments: Slurred speech at times  Slow finger-nose-finger testing but accurate (when she keeps her eyes open)         ED Course      Procedures   11:26 PM  The patient was seen and examined by Lydia Hernandez MD in Room ED17.        The medical record was reviewed and interpreted.  Current labs reviewed and interpreted.              Results for orders placed or performed during the hospital encounter of 06/28/22   Head CT w/o contrast     Status: None    Narrative    EXAM: CT HEAD W/O CONTRAST, CT FACIAL BONES WITHOUT CONTRAST, CT CERVICAL SPINE W/O CONTRAST  LOCATION: [facility]  DATE/TIME: 6/29/2022 12:17 AM    INDICATION: fall, AMS, eval for bleed  COMPARISON: None.  TECHNIQUE:   1) Routine CT Head without IV contrast. Multiplanar reformats. Dose reduction techniques were used.  2) Routine CT Facial Bones without IV contrast. Multiplanar reformats. Dose reduction techniques were used.  3) Routine CT Cervical Spine without IV contrast. Multiplanar reformats. Dose reduction techniques were  used.    FINDINGS:  HEAD CT:   INTRACRANIAL CONTENTS: No intracranial hemorrhage, extraaxial collection, or mass effect.  No CT evidence of acute infarct. Small lacunar infarct right lentiform nucleus. Mild presumed chronic small vessel ischemic changes. Mild generalized volume loss.   No hydrocephalus.     OSSEOUS STRUCTURES/SOFT TISSUES: No significant abnormality.     FACIAL BONE CT:  Imaging slightly limited by motion.  OSSEOUS STRUCTURES/SOFT TISSUES: No localized soft tissue swelling/inflammation. No facial bone fracture or malalignment. No evidence for dental trauma or periapical abscess.    ORBITAL CONTENTS: No acute abnormality.    SINUSES: No paranasal sinus mucosal disease.    CERVICAL SPINE CT:   Imaging slightly limited by motion.  VERTEBRA: No fracture or posttraumatic subluxation. Straightening of cervical stenosis.    CANAL/FORAMINA: At C4-C5 there is moderate to severe right foraminal stenosis. At C5-C6 there is moderate right foraminal stenosis. At C6-C7 there is mild to moderate bilateral foraminal stenosis.    PARASPINAL: No extraspinal abnormality. Visualized lung fields are clear.      Impression    IMPRESSION:  HEAD CT:  1.  No acute intracranial process.    FACIAL BONE CT:  1.  No facial bone or mandibular fracture (imaging slightly limited by motion).    CERVICAL SPINE CT:  1.  No fracture or posttraumatic subluxation (imaging slightly limited by motion).   Cervical spine CT w/o contrast     Status: None    Narrative    EXAM: CT HEAD W/O CONTRAST, CT FACIAL BONES WITHOUT CONTRAST, CT CERVICAL SPINE W/O CONTRAST  LOCATION: [facility]  DATE/TIME: 6/29/2022 12:17 AM    INDICATION: fall, AMS, eval for bleed  COMPARISON: None.  TECHNIQUE:   1) Routine CT Head without IV contrast. Multiplanar reformats. Dose reduction techniques were used.  2) Routine CT Facial Bones without IV contrast. Multiplanar reformats. Dose reduction techniques were used.  3)  Routine CT Cervical Spine without IV contrast. Multiplanar reformats. Dose reduction techniques were used.    FINDINGS:  HEAD CT:   INTRACRANIAL CONTENTS: No intracranial hemorrhage, extraaxial collection, or mass effect.  No CT evidence of acute infarct. Small lacunar infarct right lentiform nucleus. Mild presumed chronic small vessel ischemic changes. Mild generalized volume loss.   No hydrocephalus.     OSSEOUS STRUCTURES/SOFT TISSUES: No significant abnormality.     FACIAL BONE CT:  Imaging slightly limited by motion.  OSSEOUS STRUCTURES/SOFT TISSUES: No localized soft tissue swelling/inflammation. No facial bone fracture or malalignment. No evidence for dental trauma or periapical abscess.    ORBITAL CONTENTS: No acute abnormality.    SINUSES: No paranasal sinus mucosal disease.    CERVICAL SPINE CT:   Imaging slightly limited by motion.  VERTEBRA: No fracture or posttraumatic subluxation. Straightening of cervical stenosis.    CANAL/FORAMINA: At C4-C5 there is moderate to severe right foraminal stenosis. At C5-C6 there is moderate right foraminal stenosis. At C6-C7 there is mild to moderate bilateral foraminal stenosis.    PARASPINAL: No extraspinal abnormality. Visualized lung fields are clear.      Impression    IMPRESSION:  HEAD CT:  1.  No acute intracranial process.    FACIAL BONE CT:  1.  No facial bone or mandibular fracture (imaging slightly limited by motion).    CERVICAL SPINE CT:  1.  No fracture or posttraumatic subluxation (imaging slightly limited by motion).   CT Facial Bones without Contrast     Status: None    Narrative    EXAM: CT HEAD W/O CONTRAST, CT FACIAL BONES WITHOUT CONTRAST, CT CERVICAL SPINE W/O CONTRAST  LOCATION: [facility]  DATE/TIME: 6/29/2022 12:17 AM    INDICATION: fall, AMS, eval for bleed  COMPARISON: None.  TECHNIQUE:   1) Routine CT Head without IV contrast. Multiplanar reformats. Dose reduction techniques were used.  2) Routine  CT Facial Bones without IV contrast. Multiplanar reformats. Dose reduction techniques were used.  3) Routine CT Cervical Spine without IV contrast. Multiplanar reformats. Dose reduction techniques were used.    FINDINGS:  HEAD CT:   INTRACRANIAL CONTENTS: No intracranial hemorrhage, extraaxial collection, or mass effect.  No CT evidence of acute infarct. Small lacunar infarct right lentiform nucleus. Mild presumed chronic small vessel ischemic changes. Mild generalized volume loss.   No hydrocephalus.     OSSEOUS STRUCTURES/SOFT TISSUES: No significant abnormality.     FACIAL BONE CT:  Imaging slightly limited by motion.  OSSEOUS STRUCTURES/SOFT TISSUES: No localized soft tissue swelling/inflammation. No facial bone fracture or malalignment. No evidence for dental trauma or periapical abscess.    ORBITAL CONTENTS: No acute abnormality.    SINUSES: No paranasal sinus mucosal disease.    CERVICAL SPINE CT:   Imaging slightly limited by motion.  VERTEBRA: No fracture or posttraumatic subluxation. Straightening of cervical stenosis.    CANAL/FORAMINA: At C4-C5 there is moderate to severe right foraminal stenosis. At C5-C6 there is moderate right foraminal stenosis. At C6-C7 there is mild to moderate bilateral foraminal stenosis.    PARASPINAL: No extraspinal abnormality. Visualized lung fields are clear.      Impression    IMPRESSION:  HEAD CT:  1.  No acute intracranial process.    FACIAL BONE CT:  1.  No facial bone or mandibular fracture (imaging slightly limited by motion).    CERVICAL SPINE CT:  1.  No fracture or posttraumatic subluxation (imaging slightly limited by motion).   CT Thoracic Spine w/o Contrast     Status: None    Narrative    EXAM: CT THORACIC SPINE W/O CONTRAST  LOCATION: [facility]  DATE/TIME: 6/29/2022 12:18 AM    INDICATION: fall, pain eval for fx  COMPARISON: None.  TECHNIQUE: Routine CT Thoracic Spine without IV contrast. Multiplanar reformats.  Dose reduction techniques were used.     FINDINGS:  VERTEBRA: Normal vertebral body heights and alignment. No fracture or posttraumatic subluxation. Moderate multilevel disc degenerative changes T6-T12 L1.    CANAL/FORAMINA: No high-grade canal or neural foraminal stenosis.    PARASPINAL: No extraspinal abnormality.      Impression    IMPRESSION:  1.  No fracture or posttraumatic subluxation.  2.  No high-grade spinal canal or neural foraminal stenosis.     XR Ankle Left G/E 3 Views     Status: None    Narrative    EXAM: XR ANKLE LEFT G/E 3 VIEWS  LOCATION: [facility]  DATE/TIME: 6/28/2022 11:54 PM    INDICATION: Pain. Fall with injury. Evaluate for fracture.  COMPARISON: X-ray left ankle 3 views 6/6/2021 at 1509 hours.      Impression    IMPRESSION: Screw fixation at the medial malleolus and the distal left fibula. No acute fracture or dislocation involving the left ankle. Degenerative narrowing of the ankle mortise with irregular articular surfaces, similar to prior. Mild soft tissue   swelling distal left leg and about the left ankle.   XR Knee Right 1/2 Views     Status: None    Narrative    EXAM: XR FEMUR RIGHT 2 VIEW, XR KNEE RT 1 /2 VW  LOCATION: [facility]  DATE/TIME: 6/28/2022 11:54 PM    INDICATION: Pain after fall.  COMPARISON: None.      Impression    IMPRESSION: No acute fracture or dislocation. Mild osteoarthritis in the hip and knee joints.   XR Femur Right 2 Views     Status: None    Narrative    EXAM: XR FEMUR RIGHT 2 VIEW, XR KNEE RT 1 /2 VW  LOCATION: [facility]  DATE/TIME: 6/28/2022 11:54 PM    INDICATION: Pain after fall.  COMPARISON: None.      Impression    IMPRESSION: No acute fracture or dislocation. Mild osteoarthritis in the hip and knee joints.   [place] Draw     Status: None (In process)    Narrative    The following orders were created for panel order  [place] Draw.  Procedure                               Abnormality         Status                     ---------                               -----------         ------                     Extra Blue Top Tube[[id number]]                                                         Extra Red Top Tube[[id number]]                                                          Extra Green Top (Lithium...[[id number]]                      In process                 Extra Purple Top Tube[[id number]]                            Final result                 Please view results for these tests on the individual orders.   Extra Purple Top Tube     Status: None   Result Value Ref Range    Hold Specimen [facility]    Basic metabolic panel     Status: Abnormal   Result Value Ref Range    Creatinine 1.00 (H) 0.51 - 0.95 mg/dL    Sodium 138 136 - 145 mmol/L    Potassium 3.8 3.4 - 5.3 mmol/L    Urea Nitrogen 12.8 8.0 - 23.0 mg/dL    Chloride 100 98 - 107 mmol/L    Carbon Dioxide (CO2) 25 22 - 29 mmol/L    Anion Gap 13 7 - 15 mmol/L    Glucose 115 (H) 70 - 99 mg/dL    GFR Estimate 64 >60 mL/min/1.73m2    Calcium 8.9 8.8 - 10.2 mg/dL   CBC with platelets and differential     Status: Abnormal   Result Value Ref Range    WBC Count 11.3 (H) 4.0 - 11.0 10e3/uL    RBC Count 5.00 3.80 - 5.20 10e6/uL    Hemoglobin 13.2 11.7 - 15.7 g/dL    Hematocrit 42.5 35.0 - 47.0 %    MCV 85 78 - 100 fL    MCH 26.4 (L) 26.5 - 33.0 pg    MCHC 31.1 (L) 31.5 - 36.5 g/dL    RDW 14.3 10.0 - 15.0 %    Platelet Count 297 150 - 450 10e3/uL    % Neutrophils 68 %    % Lymphocytes 20 %    % Monocytes 9 %    % Eosinophils 2 %    % Basophils 1 %    % Immature Granulocytes 0 %    NRBCs per 100 WBC 0 <1 /100    Absolute Neutrophils 7.6 1.6 - 8.3 10e3/uL    Absolute Lymphocytes 2.3 0.8 - 5.3 10e3/uL    Absolute Monocytes 1.0 0.0 - 1.3 10e3/uL    Absolute Eosinophils 0.2 0.0 - 0.7 10e3/uL    Absolute Basophils 0.1 0.0 - 0.2 10e3/uL    Absolute Immature Granulocytes 0.1 <=0.4 10e3/uL    Absolute  NRBCs 0.0 10e3/uL   Glucose by meter     Status: Abnormal   Result Value Ref Range    GLUCOSE BY METER POCT 139 (H) 70 - 99 mg/dL   CBC with Platelets & Differential     Status: Abnormal    Narrative    The following orders were created for panel order CBC with Platelets & Differential.  Procedure                               Abnormality         Status                     ---------                               -----------         ------                     CBC with platelets and d...[[id number]]  Abnormal            Final result                 Please view results for these tests on the individual orders.   Urine Drugs of Abuse Screen     Status: None (In process)    Narrative    The following orders were created for panel order Urine Drugs of Abuse Screen.  Procedure                               Abnormality         Status                     ---------                               -----------         ------                     Drug abuse screen 1 urin...[[id number]]                      In process                   Please view results for these tests on the individual orders.     Medications - No data to display     Assessments & Plan (with Medical Decision Making)   Patient presents to the emergency department today for fall and altered mental status.  Upon initial evaluation she is lethargic, falling asleep during the interview.  She is slurring her speech somewhat.  Patient reports that she simply is tired having spent several days with her family. However need to consider other etiologies of AMS including trauma/head injury.  Alternate etiology could be hypoglycemia, toxicologic, drug side effect, etc...      We did establish IV access and we did draw blood for laboratory analysis. Accucheck is 139.  CBC shows WBC of 11.3 otherwise WNL, BMP shows creatinine of 1.0, otherwise WNL.   UA pending, urine tox pending.    We did do imaging as well.  R femur XR shows no fx.  R knee XR shows arthritis without fx. Head  CT shows no acute process, C spine CT shows no acute fx or subluxation, facial bone CT shows no fx, T spine CT shows no fx.    Strongly suspect that her AMS has to do with her methadone (which she evidently got later than normal today). Alternatively, as she did leave the NH, other drug/intoxicant is possible. Still pending urine studies at this time.  Will sign patient out to overnight doc to f/u on urine studies. Expect that she should clear with time. Will prepare discharge instructions expecting that she will eventually clear and be appropriate for discharge - pending reassessment and f/u of urine which is pending.       I have reviewed the nursing notes. I have reviewed the findings, diagnosis, plan and need for follow up with the patient.    New Prescriptions    No medications on file       Final diagnoses:   Acute encephalopathy   Fall, initial encounter   Patient on methadone maintenance therapy (H)     INigel, am serving as a trained medical scribe to document services personally performed by Lydia Hernandez MD, based on the provider's statements to me.     ILydia MD, was physically present and have reviewed and verified the accuracy of this note documented by Nigel Salas.    --  Lydia Hernandez MD  [facility] EMERGENCY DEPARTMENT  6/28/2022     Lydia Hernandez MD  06/29/22 0154

## 2022-06-30 DIAGNOSIS — K76.0 HEPATIC STEATOSIS: Primary | ICD-10-CM

## 2022-06-30 LAB
HOLD SPECIMEN: NORMAL
HOLD SPECIMEN: NORMAL

## 2022-08-01 ENCOUNTER — OFFICE VISIT (OUTPATIENT)
Dept: CARDIOLOGY | Facility: CLINIC | Age: 62
End: 2022-08-01
Attending: INTERNAL MEDICINE
Payer: COMMERCIAL

## 2022-08-01 ENCOUNTER — LAB (OUTPATIENT)
Dept: LAB | Facility: CLINIC | Age: 62
End: 2022-08-01
Payer: COMMERCIAL

## 2022-08-01 VITALS
SYSTOLIC BLOOD PRESSURE: 98 MMHG | HEART RATE: 74 BPM | OXYGEN SATURATION: 90 % | WEIGHT: 246.7 LBS | BODY MASS INDEX: 44.4 KG/M2 | DIASTOLIC BLOOD PRESSURE: 60 MMHG

## 2022-08-01 DIAGNOSIS — I50.22 CHRONIC SYSTOLIC HEART FAILURE (H): ICD-10-CM

## 2022-08-01 DIAGNOSIS — K76.0 HEPATIC STEATOSIS: ICD-10-CM

## 2022-08-01 DIAGNOSIS — B19.20 HEPATITIS C VIRUS INFECTION WITHOUT HEPATIC COMA, UNSPECIFIED CHRONICITY: ICD-10-CM

## 2022-08-01 DIAGNOSIS — B18.2 CHRONIC HEPATITIS C WITHOUT HEPATIC COMA (H): ICD-10-CM

## 2022-08-01 LAB
ALBUMIN SERPL-MCNC: 3.1 G/DL (ref 3.4–5)
ALP SERPL-CCNC: 128 U/L (ref 40–150)
ALT SERPL W P-5'-P-CCNC: 20 U/L (ref 0–50)
ANION GAP SERPL CALCULATED.3IONS-SCNC: 4 MMOL/L (ref 3–14)
AST SERPL W P-5'-P-CCNC: 22 U/L (ref 0–45)
BILIRUB DIRECT SERPL-MCNC: 0.1 MG/DL (ref 0–0.2)
BILIRUB SERPL-MCNC: 0.4 MG/DL (ref 0.2–1.3)
BUN SERPL-MCNC: 14 MG/DL (ref 7–30)
CALCIUM SERPL-MCNC: 9 MG/DL (ref 8.5–10.1)
CHLORIDE BLD-SCNC: 100 MMOL/L (ref 94–109)
CO2 SERPL-SCNC: 31 MMOL/L (ref 20–32)
CREAT SERPL-MCNC: 0.99 MG/DL (ref 0.52–1.04)
ERYTHROCYTE [DISTWIDTH] IN BLOOD BY AUTOMATED COUNT: 17.1 % (ref 10–15)
GFR SERPL CREATININE-BSD FRML MDRD: 65 ML/MIN/1.73M2
GLUCOSE BLD-MCNC: 100 MG/DL (ref 70–99)
HBV CORE AB SERPL QL IA: NONREACTIVE
HBV SURFACE AG SERPL QL IA: NONREACTIVE
HCT VFR BLD AUTO: 41.6 % (ref 35–47)
HCV RNA SERPL NAA+PROBE-ACNC: NOT DETECTED IU/ML
HGB BLD-MCNC: 13 G/DL (ref 11.7–15.7)
INR PPP: 1.02 (ref 0.85–1.15)
MCH RBC QN AUTO: 26.1 PG (ref 26.5–33)
MCHC RBC AUTO-ENTMCNC: 31.3 G/DL (ref 31.5–36.5)
MCV RBC AUTO: 83 FL (ref 78–100)
PLATELET # BLD AUTO: 278 10E3/UL (ref 150–450)
POTASSIUM BLD-SCNC: 4.5 MMOL/L (ref 3.4–5.3)
PROT SERPL-MCNC: 8.2 G/DL (ref 6.8–8.8)
RBC # BLD AUTO: 4.99 10E6/UL (ref 3.8–5.2)
SODIUM SERPL-SCNC: 135 MMOL/L (ref 133–144)
WBC # BLD AUTO: 11.8 10E3/UL (ref 4–11)

## 2022-08-01 PROCEDURE — 36415 COLL VENOUS BLD VENIPUNCTURE: CPT | Performed by: PATHOLOGY

## 2022-08-01 PROCEDURE — 86704 HEP B CORE ANTIBODY TOTAL: CPT | Mod: 90 | Performed by: PATHOLOGY

## 2022-08-01 PROCEDURE — 87902 NFCT AGT GNTYP ALYS HEP C: CPT | Mod: 90 | Performed by: PATHOLOGY

## 2022-08-01 PROCEDURE — 87340 HEPATITIS B SURFACE AG IA: CPT | Mod: 90 | Performed by: PATHOLOGY

## 2022-08-01 PROCEDURE — 99215 OFFICE O/P EST HI 40 MIN: CPT | Performed by: INTERNAL MEDICINE

## 2022-08-01 PROCEDURE — G0463 HOSPITAL OUTPT CLINIC VISIT: HCPCS

## 2022-08-01 PROCEDURE — 99000 SPECIMEN HANDLING OFFICE-LAB: CPT | Performed by: PATHOLOGY

## 2022-08-01 PROCEDURE — 85027 COMPLETE CBC AUTOMATED: CPT | Performed by: PATHOLOGY

## 2022-08-01 PROCEDURE — 85610 PROTHROMBIN TIME: CPT | Performed by: PATHOLOGY

## 2022-08-01 PROCEDURE — 87522 HEPATITIS C REVRS TRNSCRPJ: CPT | Mod: 90 | Performed by: PATHOLOGY

## 2022-08-01 PROCEDURE — 80053 COMPREHEN METABOLIC PANEL: CPT | Performed by: PATHOLOGY

## 2022-08-01 PROCEDURE — 82248 BILIRUBIN DIRECT: CPT | Performed by: PATHOLOGY

## 2022-08-01 RX ORDER — NYSTATIN 100000 [USP'U]/G
POWDER TOPICAL
Status: ON HOLD | COMMUNITY
Start: 2022-05-17 | End: 2023-06-23

## 2022-08-01 ASSESSMENT — PAIN SCALES - GENERAL: PAINLEVEL: NO PAIN (0)

## 2022-08-01 NOTE — NURSING NOTE
Diet: Patient instructed regarding a heart failure healthy diet, including discussion of reduced fat and 2000 mg daily sodium restriction, daily weights, medication purpose and compliance, fluid restrictions and resources for patient and family to access for assistance with heart failure management.       Labs: Patient was given results of the laboratory testing obtained today and patient was instructed about when to return for the next laboratory testing.     Med Reconcile: Reviewed and verified all current medications with the patient. The updated medication list was printed and given to the patient.     Med changes: none    Return Appointment: Patient [facility] with Dr Nolan in 6 [facility]    Patient stated she understood all health information given and agreed to call with further questions or concerns.     Codie Ware RN

## 2022-08-01 NOTE — NURSING NOTE
Chief Complaint   Patient presents with     Follow Up     Return Heart Failure- RHF 62 year old female with HFrEF restored EF 40-45% presents for HF management with lab prior.      Vitals were taken and medications reconciled.    LE Jacobson  8:01 AM

## 2022-08-01 NOTE — LETTER
8/1/2022      RE: No Yusuf  [address] [facility] [address]       Dear Colleague,    Thank you for the opportunity to participate in the care of your patient, No Yusuf, at the [facility] [place] [facility]. Please see a copy of my visit note below.    Cardiology Clinic Note    HPI    Dear colleagues,     Ms. No Yusuf is a 62 year old female who I first met April 2022 for follow-up of a hospitalization that was complicated by newly reduced ejection fraction heart failure that is now recovered to a mid range EF.  She has a pertinent medical history of  bipolar disorder, substance use disorder (fentanyl, heroin, meth) sober for last 3 years on methadone, HTN, insulin-dependant T2DM, HLD, chronic hep C.    Patient was recently hospitalized 02/08-02/10 due to COVID pneumonia and TdP s/p cardiac arrest requiring CPR and cardioversion with ROSC. After that she had an echo 02/12 showing EF 20-25% with severe global hypokinesia of LV. Prior to this she had normal echocardiograms and no cardiac history per patient. Repeat echo on 02/15 shows some restoration of cardiac function with EF at 40-45% and mild global hypokinesia of LV. Patient was started on furosemide 20 mg and orders for cardiac MRI and coronary angiogram were placed but not scheduled.    When I first met her we had titrated and started new medications, including Entresto, spironolactone, Jardiance.  She is still residing in this nursing facility for rehabilitation, and they note adequate blood pressures.    She did have a recent ER visit for fall, after taking trazodone and melatonin, her blood pressure was normal at this time.    Doing well overall, much better since leaving hospital continues to have some shortness of breath when going up a flight of stairs or walking 200 ft. Endorses worsening left leg lower extremity. Denies chest pain, orthopnea,  PND, lightheadedness or syncope.  She does have occasional orthostasis when standing too fast but this goes away quickly.    PAST MEDICAL HISTORY:  Patient Active Problem List   Diagnosis     Pain in soft tissues of limb     Lymphedema of both lower extremities     Chronic venous insufficiency of lower extremity     Edema of both legs     Lymphedema     Cellulitis     Lower extremity edema     Backache     Benign essential hypertension     Chronic low back pain     Cocaine use disorder, severe, in early remission (H)     Episodic mood disorder (H)     GERD (gastroesophageal reflux disease)     Hemorrhoids     History of sudden cardiac arrest     Hyperglycemia     Hypothyroidism     Knee pain, bilateral     Thoracic or lumbosacral neuritis or radiculitis, unspecified     Methamphetamine use disorder, severe, in early remission (H)     Myalgia and myositis     Opioid type dependence, abuse (H)     Opioid abuse, in remission (H)     Osteoarthrosis     Severe obesity (H)     Tobacco abuse     Type 2 diabetes mellitus, without long-term current use of insulin (H)     Vitamin D deficiency     Hepatitis C, chronic (H)     Acute respiratory failure with hypoxia and hypercapnia (H)     Infection due to 2019 novel coronavirus     Anxiety disorder     Chronic bilateral back pain     Chronic pain disorder     Pneumonia due to COVID-19 virus     Acute respiratory failure with hypoxia (H)     Bradycardia     Torsades de pointes (H)     Ventricular tachycardia (H)     Acute respiratory failure due to COVID-19 (H)     History of cardiac arrest     Chest pain, unspecified type     History of torsades de pointe due to drug        FAMILY HISTORY:  Family History   Problem Relation Age of Onset     Heart Disease Mother      Diabetes Mother      Ovarian Cancer Mother      Heart Disease Father      Lung Cancer Father      Diabetes Sister      Ovarian Cancer Sister      Diabetes Brother      - CABG in father  - Heart disease? In  mother    SOCIAL HISTORY:  Social History     Tobacco Use     Smoking status: Current Some Day Smoker     Packs/day: 0.10     Smokeless tobacco: Never Used     Tobacco comment: Using nicotine vape occasionally   Substance Use Topics     Alcohol use: No     Drug use: No        ALLERGIES:  Allergies   Allergen Reactions     Promethazine Other (See Comments) and Anxiety     Noted in 8/30/08 ER     Codeine Phosphate GI Disturbance     Lamotrigine Other (See Comments)     hyponatremia     Oxcarbazepine Unknown and Other (See Comments)     Low sodium  LegacyRecord#[id number]       Codeine Other (See Comments) and Rash     GI bleeding  LegacyRecord#[id number]         CURRENT MEDICATIONS:  Current Outpatient Medications   Medication Sig Dispense Refill     acetaminophen (TYLENOL) 325 MG tablet Take 2 tablets (650 mg) by mouth 2 times daily as needed for mild pain       Acidophilus Lactobacillus CAPS Take 1 tablet by mouth 3 times daily For gut health (Lactobacillus)       albuterol (PROAIR HFA/PROVENTIL HFA/VENTOLIN HFA) 108 (90 Base) MCG/ACT inhaler Inhale 2 puffs into the lungs every 4 hours as needed for shortness of breath / dyspnea or wheezing       aspirin (ASA) 81 MG chewable tablet Take 1 tablet (81 mg) by mouth daily       atorvastatin (LIPITOR) 20 MG tablet Take 1 tablet (20 mg) by mouth At Bedtime 90 tablet 3     blood glucose (ACCU-CHEK RAFAELA PLUS) test strip Use to check blood sugar 3x daily or as directed.       Blood Glucose Calibration (ACCU-CHEK ACTIVE GLUCOSE CONT VI) 1 each by Other route       blood glucose calibration (ACCU-CHEK RAFAELA) solution        calcium carbonate antacid 1000 MG CHEW Take 1 chew tab by mouth every 6 hours as needed for heartburn       cholecalciferol 25 MCG (1000 UT) TABS Take 1,000 Units by mouth daily        diclofenac (VOLTAREN) 1 % topical gel Apply 2-4 g topically 2 times daily       DULoxetine (CYMBALTA) 20 MG capsule Take 1 capsule (20 mg) by mouth daily       empagliflozin  (JARDIANCE) 10 MG TABS tablet Take 1 tablet (10 mg) by mouth daily 90 tablet 3     fluticasone-salmeterol (ADVAIR) 250-50 MCG/DOSE inhaler Inhale 1 puff into the lungs 2 times daily       furosemide (LASIX) 20 MG tablet Take 20 mg by mouth daily       gabapentin (NEURONTIN) 100 MG capsule Take 100 mg by mouth 3 times daily       hydrOXYzine (ATARAX) 50 MG tablet Take 100 mg by mouth every 6 hours as needed for anxiety       insulin detemir (LEVEMIR PEN) 100 UNIT/ML pen Inject 15 Units Subcutaneous every morning 3 mL 1     insulin glargine (LANTUS PEN) 100 UNIT/ML pen Inject 15 Units Subcutaneous At Bedtime       levothyroxine (SYNTHROID/LEVOTHROID) 200 MCG tablet Take 200 mcg by mouth daily        melatonin 5 MG tablet Take 5 mg by mouth At Bedtime       metFORMIN (GLUCOPHAGE) 1000 MG tablet Take 1,000 mg by mouth 2 times daily (with meals)       methadone (DOLOPHINE) 10 MG tablet Take 20 mg by mouth once Per patient, has been on methadone for several years in the past but off recently. Per patient, yesterday 3/24 she met with Debora from [facility] and was planning to restart this. Sounds like she may have received a one time 20 mg dose 3/24. Unclear what the follow up plan was. Patient mentioned possibly having a follow up appointment next Tuesday 3/29.       multivitamin w/minerals (THERA-VIT-M) tablet Take 1 tablet by mouth daily       NARCAN 4 MG/0.1ML nasal spray CALL 911. SPRAY CONTENTS OF ONE SPRAYER (0.1ML) INTO ONE NOSTRIL. REPEAT IN 2-3 MINS IF SYMPTOMS OF OPIOID PERSIST, ALTERNATE NOSTRILS  0     nicotine polacrilex (NICORETTE) 4 MG gum Place 4 mg inside cheek every hour as needed for smoking cessation        omeprazole (PRILOSEC) 20 MG DR capsule Take 20 mg by mouth daily        ondansetron (ZOFRAN-ODT) 4 MG ODT tab Take 4 mg by mouth every 8 hours as needed for nausea       polyethylene glycol (MIRALAX) 17 GM/Dose powder Take 17 g by mouth daily as needed for constipation 510 g 0      potassium chloride ER (KLOR-CON) 8 MEQ CR tablet Take 8 mEq by mouth 2 times daily       sacubitril-valsartan (ENTRESTO) 49-51 MG per tablet Take 1 tablet by mouth 2 times daily 180 tablet 2     spironolactone (ALDACTONE) 25 MG tablet Take 1 tablet (25 mg) by mouth daily 90 tablet 3     vitamin B-12 (CYANOCOBALAMIN) 1000 MCG tablet Take 1,000 mcg by mouth daily         ROS:   Constitutional: No fever, chills, or sweats.   ENT: No visual disturbance, ear ache, epistaxis, sore throat.   Allergies/Immunologic: Negative.   Respiratory: As per HPI.  Cardiovascular: As per HPI.   GI: No nausea, vomiting, hematemesis, melena, or hematochezia.   : No urinary frequency, dysuria, or hematuria.   Integument: Negative.   Psychiatric: Pleasant, no major depression noted  Neuro: No focal neurological deficits noted  Endocrinology: Negative.   Musculoskeletal: No new joint pains    EXAM:  BP (!) 76/50 (BP Location: Right arm, Patient Position: Chair, Cuff Size: Adult Large)   Pulse 74   Wt 111.9 kg (246 lb 11.2 oz)   LMP 11/01/2013   SpO2 90%   BMI 44.40 kg/m      General: appears comfortable, alert and articulate, obese  Head: normocephalic, atraumatic  Eyes: anicteric sclera, EOMI  Heart: regular rate and rhythm, S1/S2, no murmur, gallop, rub, estimated JVP 11 cm  Lungs: clear, no rales or wheezing  Abdomen: soft, non tender, distended, +BS  Extremities: tracce bilateral MOOK, legs covered in wrapping  Neurological: normal speech and affect, no gross motor deficits    Weight  Wt Readings from Last 10 Encounters:   08/01/22 111.9 kg (246 lb 11.2 oz)   04/01/22 122 kg (269 lb)   03/25/22 115.1 kg (253 lb 12.8 oz)   03/02/22 138.3 kg (304 lb 14.4 oz)   02/13/22 122.7 kg (270 lb 6.4 oz)   02/09/22 126.1 kg (278 lb)   06/08/21 133.1 kg (293 lb 6.4 oz)   06/03/21 129.9 kg (286 lb 4.8 oz)   05/27/21 135.2 kg (298 lb 1.6 oz)   12/10/20 133.5 kg (294 lb 4.8 oz)       Labs:    CBC RESULTS:  Lab Results   Component Value Date    WBC  11.8 (H) 08/01/2022    WBC 8.3 07/01/2021    RBC 4.99 08/01/2022    RBC 4.39 07/01/2021    HGB 13.0 08/01/2022    HGB 12.6 07/01/2021    HCT 41.6 08/01/2022    HCT 39.4 07/01/2021    MCV 83 08/01/2022    MCV 90 07/01/2021    MCH 26.1 (L) 08/01/2022    MCH 28.7 07/01/2021    MCHC 31.3 (L) 08/01/2022    MCHC 32.0 07/01/2021    RDW 17.1 (H) 08/01/2022    RDW 14.6 07/01/2021     08/01/2022     07/01/2021       CMP RESULTS:  Lab Results   Component Value Date     08/01/2022     07/01/2021    POTASSIUM 4.5 08/01/2022    POTASSIUM 3.9 07/01/2021    CHLORIDE 100 08/01/2022    CHLORIDE 100 04/21/2022    CHLORIDE 104 07/01/2021    CO2 31 08/01/2022    CO2 30 07/01/2021    ANIONGAP 4 08/01/2022    ANIONGAP 4 07/01/2021     (H) 08/01/2022     (H) 07/01/2021    BUN 14 08/01/2022    BUN 9 07/01/2021    CR 0.99 08/01/2022    CR 0.76 07/01/2021    GFRESTIMATED 65 08/01/2022    GFRESTIMATED 84 07/01/2021    GFRESTBLACK >90 07/01/2021    RODOLFO 9.0 08/01/2022    RODOLFO 8.8 07/01/2021    BILITOTAL 0.4 08/01/2022    BILITOTAL 0.4 06/03/2021    ALBUMIN 3.1 (L) 08/01/2022    ALBUMIN 3.4 06/03/2021    ALKPHOS 128 08/01/2022    ALKPHOS 119 06/03/2021    ALT 20 08/01/2022    ALT 52 (H) 06/03/2021    AST 22 08/01/2022    AST 34 06/03/2021      BNP RESULTS:  Lab Results   Component Value Date    NTBNPI 150 03/24/2022    NTBNPI 60 05/27/2021     Testing/Procedures:  I personally visualized and interpreted:  - Echocardiogram 2/15/22: Mildly decreased left ventricular systolic function, LVEF 40-45%, mildly decreased RV systolic function, no significant valvular disease.  EF improved from prior  - Echocardiogram 2/12/22: Severely decreased LV systolic function, LVEF 20-25%.  - EKG sinus rhythm, questionable prior anterior infarct    Assessment and Plan:   Ms. No Yusuf is a 62 year old female with heart failure with recovered to mid range EF.  Pertinent history of bipolar disorder, substance use disorder  (fentanyl, heroin, meth) sober for last 3 years on methadone, HTN, insulin-dependant T2DM, HLD, chronic hep C who presents for follow up.    Heart Failure with recovered to mid range EF, Stage C, NYHA III  Patient with unknown history of cardiac issues. EF was reduced after shock for recent cardiac arrest with some degree of recovering and currently is at 40-45%. Differential includes stunned heart, ICM due to LAD territory hypokinesis seen on echo, or other etiologies of NICM and notably given her history of drug use (however echo in 2020 was normal). Etiology probably nonischemic, but evaluation has not been done yet.  Had a cardiac MRI ordered while inpatient to be scheduled as an outpatient.     Will need further work-up to identify etiology and continue medications in the mean time.  - Entresto 49-51 BID  - Jardiance 10 mg daily  - Spironolactone 25mg daily  - lasix 20mg daily  - Scheduling cardiac MRI to evaluate cardiomyoathy    HLD  - Continue atorvastatin 20 mg daily    T2DM  - Continue insulin, metformin, jardiance    Recommended patient stop smoking, and limit salt, sugar and fat intake and try to stay active (150mn a week).    The patient states understanding and is agreeable with plan.   Feel free to contact myself regarding questions or concerns.  It was a pleasure to see this patient today.    RTC in 6 months with labs, scheduling cardiac MRI.    Cam Nolan MD   of Medicine  Advanced Heart Failure and Transplant Cardiology    CC      Today's clinic visit entailed:  40 minutes spent on the date of the encounter doing chart review, history and exam, documentation and further activities per the note  Provider  Link to [facility] Help Grid     The level of medical decision making during this visit was of high complexity.          Please do not hesitate to contact me if you have any questions/concerns.     Sincerely,     Cam Nolan MD

## 2022-08-01 NOTE — PROGRESS NOTES
Cardiology Clinic Note    [facility]    Dear colleagues,     Ms. No Yusuf is a 62 year old female who I first met April 2022 for follow-up of a hospitalization that was complicated by newly reduced ejection fraction heart failure that is now recovered to a mid range EF.  She has a pertinent medical history of  bipolar disorder, substance use disorder (fentanyl, heroin, meth) sober for last 3 years on methadone, HTN, insulin-dependant T2DM, HLD, chronic hep C.    Patient was recently hospitalized 02/08-02/10 due to COVID pneumonia and TdP s/p cardiac arrest requiring CPR and cardioversion with ROSC. After that she had an echo 02/12 showing EF 20-25% with severe global hypokinesia of LV. Prior to this she had normal echocardiograms and no cardiac history per patient. Repeat echo on 02/15 shows some restoration of cardiac function with EF at 40-45% and mild global hypokinesia of LV. Patient was started on furosemide 20 mg and orders for cardiac MRI and coronary angiogram were placed but not scheduled.    When I first met her we had titrated and started new medications, including Entresto, spironolactone, Jardiance.  She is still residing in this nursing facility for rehabilitation, and they note adequate blood pressures.    She did have a recent ER visit for fall, after taking trazodone and melatonin, her blood pressure was normal at this time.    Doing well overall, much better since leaving hospital continues to have some shortness of breath when going up a flight of stairs or walking 200 ft. Endorses worsening left leg lower extremity. Denies chest pain, orthopnea, PND, lightheadedness or syncope.  She does have occasional orthostasis when standing too fast but this goes away quickly.    PAST MEDICAL HISTORY:  Patient Active Problem List   Diagnosis     Pain in soft tissues of limb     Lymphedema of both lower extremities     Chronic venous insufficiency of lower extremity     Edema of both legs     Lymphedema      Cellulitis     Lower extremity edema     Backache     Benign essential hypertension     Chronic low back pain     Cocaine use disorder, severe, in early remission (H)     Episodic mood disorder (H)     GERD (gastroesophageal reflux disease)     Hemorrhoids     History of sudden cardiac arrest     Hyperglycemia     Hypothyroidism     Knee pain, bilateral     Thoracic or lumbosacral neuritis or radiculitis, unspecified     Methamphetamine use disorder, severe, in early remission (H)     Myalgia and myositis     Opioid type dependence, abuse (H)     Opioid abuse, in remission (H)     Osteoarthrosis     Severe obesity (H)     Tobacco abuse     Type 2 diabetes mellitus, without long-term current use of insulin (H)     Vitamin D deficiency     Hepatitis C, chronic (H)     Acute respiratory failure with hypoxia and hypercapnia (H)     Infection due to 2019 novel coronavirus     Anxiety disorder     Chronic bilateral back pain     Chronic pain disorder     Pneumonia due to COVID-19 virus     Acute respiratory failure with hypoxia (H)     Bradycardia     Torsades de pointes (H)     Ventricular tachycardia (H)     Acute respiratory failure due to COVID-19 (H)     History of cardiac arrest     Chest pain, unspecified type     History of torsades de pointe due to drug        FAMILY HISTORY:  Family History   Problem Relation Age of Onset     Heart Disease Mother      Diabetes Mother      Ovarian Cancer Mother      Heart Disease Father      Lung Cancer Father      Diabetes Sister      Ovarian Cancer Sister      Diabetes Brother      - CABG in father  - Heart disease? In mother    SOCIAL HISTORY:  Social History     Tobacco Use     Smoking status: Current Some Day Smoker     Packs/day: 0.10     Smokeless tobacco: Never Used     Tobacco comment: Using nicotine vape occasionally   Substance Use Topics     Alcohol use: No     Drug use: No        ALLERGIES:  Allergies   Allergen Reactions     Promethazine Other (See Comments) and  Anxiety     Noted in 8/30/08 ER     Codeine Phosphate GI Disturbance     Lamotrigine Other (See Comments)     hyponatremia     Oxcarbazepine Unknown and Other (See Comments)     Low sodium  LegacyRecord#[id number]       Codeine Other (See Comments) and Rash     GI bleeding  LegacyRecord#[id number]         CURRENT MEDICATIONS:  Current Outpatient Medications   Medication Sig Dispense Refill     acetaminophen (TYLENOL) 325 MG tablet Take 2 tablets (650 mg) by mouth 2 times daily as needed for mild pain       Acidophilus Lactobacillus CAPS Take 1 tablet by mouth 3 times daily For gut health (Lactobacillus)       albuterol (PROAIR HFA/PROVENTIL HFA/VENTOLIN HFA) 108 (90 Base) MCG/ACT inhaler Inhale 2 puffs into the lungs every 4 hours as needed for shortness of breath / dyspnea or wheezing       aspirin (ASA) 81 MG chewable tablet Take 1 tablet (81 mg) by mouth daily       atorvastatin (LIPITOR) 20 MG tablet Take 1 tablet (20 mg) by mouth At Bedtime 90 tablet 3     blood glucose (ACCU-CHEK RAFAELA PLUS) test strip Use to check blood sugar 3x daily or as directed.       Blood Glucose Calibration (ACCU-CHEK ACTIVE GLUCOSE CONT VI) 1 each by Other route       blood glucose calibration (ACCU-CHEK RAFAELA) solution        calcium carbonate antacid 1000 MG CHEW Take 1 chew tab by mouth every 6 hours as needed for heartburn       cholecalciferol 25 MCG (1000 UT) TABS Take 1,000 Units by mouth daily        diclofenac (VOLTAREN) 1 % topical gel Apply 2-4 g topically 2 times daily       DULoxetine (CYMBALTA) 20 MG capsule Take 1 capsule (20 mg) by mouth daily       empagliflozin (JARDIANCE) 10 MG TABS tablet Take 1 tablet (10 mg) by mouth daily 90 tablet 3     fluticasone-salmeterol (ADVAIR) 250-50 MCG/DOSE inhaler Inhale 1 puff into the lungs 2 times daily       furosemide (LASIX) 20 MG tablet Take 20 mg by mouth daily       gabapentin (NEURONTIN) 100 MG capsule Take 100 mg by mouth 3 times daily       hydrOXYzine (ATARAX) 50 MG tablet Take  100 mg by mouth every 6 hours as needed for anxiety       insulin detemir (LEVEMIR PEN) 100 UNIT/ML pen Inject 15 Units Subcutaneous every morning 3 mL 1     insulin glargine (LANTUS PEN) 100 UNIT/ML pen Inject 15 Units Subcutaneous At Bedtime       levothyroxine (SYNTHROID/LEVOTHROID) 200 MCG tablet Take 200 mcg by mouth daily        melatonin 5 MG tablet Take 5 mg by mouth At Bedtime       metFORMIN (GLUCOPHAGE) 1000 MG tablet Take 1,000 mg by mouth 2 times daily (with meals)       methadone (DOLOPHINE) 10 MG tablet Take 20 mg by mouth once Per patient, has been on methadone for several years in the past but off recently. Per patient, yesterday 3/24 she met with Debora from [facility] addiction medicine and was planning to restart this. Sounds like she may have received a one time 20 mg dose 3/24. Unclear what the follow up plan was. Patient mentioned possibly having a follow up appointment next Tuesday 3/29.       multivitamin w/minerals (THERA-VIT-M) tablet Take 1 tablet by mouth daily       NARCAN 4 MG/0.1ML nasal spray CALL 911. SPRAY CONTENTS OF ONE SPRAYER (0.1ML) INTO ONE NOSTRIL. REPEAT IN 2-3 MINS IF SYMPTOMS OF OPIOID PERSIST, ALTERNATE NOSTRILS  0     nicotine polacrilex (NICORETTE) 4 MG gum Place 4 mg inside cheek every hour as needed for smoking cessation        omeprazole (PRILOSEC) 20 MG DR capsule Take 20 mg by mouth daily        ondansetron (ZOFRAN-ODT) 4 MG ODT tab Take 4 mg by mouth every 8 hours as needed for nausea       polyethylene glycol (MIRALAX) 17 GM/Dose powder Take 17 g by mouth daily as needed for constipation 510 g 0     potassium chloride ER (KLOR-CON) 8 MEQ CR tablet Take 8 mEq by mouth 2 times daily       sacubitril-valsartan (ENTRESTO) 49-51 MG per tablet Take 1 tablet by mouth 2 times daily 180 tablet 2     spironolactone (ALDACTONE) 25 MG tablet Take 1 tablet (25 mg) by mouth daily 90 tablet 3     vitamin B-12 (CYANOCOBALAMIN) 1000 MCG tablet Take 1,000 mcg by mouth daily          ROS:   Constitutional: No fever, chills, or sweats.   ENT: No visual disturbance, ear ache, epistaxis, sore throat.   Allergies/Immunologic: Negative.   Respiratory: As per HPI.  Cardiovascular: As per HPI.   GI: No nausea, vomiting, hematemesis, melena, or hematochezia.   : No urinary frequency, dysuria, or hematuria.   Integument: Negative.   Psychiatric: Pleasant, no major depression noted  Neuro: No focal neurological deficits noted  Endocrinology: Negative.   Musculoskeletal: No new joint pains    EXAM:  BP (!) 76/50 (BP Location: Right arm, Patient Position: Chair, Cuff Size: Adult Large)   Pulse 74   Wt 111.9 kg (246 lb 11.2 oz)   LMP 11/01/2013   SpO2 90%   BMI 44.40 kg/m      General: appears comfortable, alert and articulate, obese  Head: normocephalic, atraumatic  Eyes: anicteric sclera, EOMI  Heart: regular rate and rhythm, S1/S2, no murmur, gallop, rub, estimated JVP 11 cm  Lungs: clear, no rales or wheezing  Abdomen: soft, non tender, distended, +BS  Extremities: tracce bilateral MOOK, legs covered in wrapping  Neurological: normal speech and affect, no gross motor deficits    Weight  Wt Readings from Last 10 Encounters:   08/01/22 111.9 kg (246 lb 11.2 oz)   04/01/22 122 kg (269 lb)   03/25/22 115.1 kg (253 lb 12.8 oz)   03/02/22 138.3 kg (304 lb 14.4 oz)   02/13/22 122.7 kg (270 lb 6.4 oz)   02/09/22 126.1 kg (278 lb)   06/08/21 133.1 kg (293 lb 6.4 oz)   06/03/21 129.9 kg (286 lb 4.8 oz)   05/27/21 135.2 kg (298 lb 1.6 oz)   12/10/20 133.5 kg (294 lb 4.8 oz)       Labs:    CBC RESULTS:  Lab Results   Component Value Date    WBC 11.8 (H) 08/01/2022    WBC 8.3 07/01/2021    RBC 4.99 08/01/2022    RBC 4.39 07/01/2021    HGB 13.0 08/01/2022    HGB 12.6 07/01/2021    HCT 41.6 08/01/2022    HCT 39.4 07/01/2021    MCV 83 08/01/2022    MCV 90 07/01/2021    MCH 26.1 (L) 08/01/2022    MCH 28.7 07/01/2021    MCHC 31.3 (L) 08/01/2022    MCHC 32.0 07/01/2021    RDW 17.1 (H) 08/01/2022    RDW 14.6  07/01/2021     08/01/2022     07/01/2021       CMP RESULTS:  Lab Results   Component Value Date     08/01/2022     07/01/2021    POTASSIUM 4.5 08/01/2022    POTASSIUM 3.9 07/01/2021    CHLORIDE 100 08/01/2022    CHLORIDE 100 04/21/2022    CHLORIDE 104 07/01/2021    CO2 31 08/01/2022    CO2 30 07/01/2021    ANIONGAP 4 08/01/2022    ANIONGAP 4 07/01/2021     (H) 08/01/2022     (H) 07/01/2021    BUN 14 08/01/2022    BUN 9 07/01/2021    CR 0.99 08/01/2022    CR 0.76 07/01/2021    GFRESTIMATED 65 08/01/2022    GFRESTIMATED 84 07/01/2021    GFRESTBLACK >90 07/01/2021    RODOLFO 9.0 08/01/2022    RODOLFO 8.8 07/01/2021    BILITOTAL 0.4 08/01/2022    BILITOTAL 0.4 06/03/2021    ALBUMIN 3.1 (L) 08/01/2022    ALBUMIN 3.4 06/03/2021    ALKPHOS 128 08/01/2022    ALKPHOS 119 06/03/2021    ALT 20 08/01/2022    ALT 52 (H) 06/03/2021    AST 22 08/01/2022    AST 34 06/03/2021      BNP RESULTS:  Lab Results   Component Value Date    NTBNPI 150 03/24/2022    NTBNPI 60 05/27/2021     Testing/Procedures:  I personally visualized and interpreted:  - Echocardiogram 2/15/22: Mildly decreased left ventricular systolic function, LVEF 40-45%, mildly decreased RV systolic function, no significant valvular disease.  EF improved from prior  - Echocardiogram 2/12/22: Severely decreased LV systolic function, LVEF 20-25%.  - EKG sinus rhythm, questionable prior anterior infarct    Assessment and Plan:   Ms. No Yusuf is a 62 year old female with heart failure with recovered to mid range EF.  Pertinent history of bipolar disorder, substance use disorder (fentanyl, heroin, meth) sober for last 3 years on methadone, HTN, insulin-dependant T2DM, HLD, chronic hep C who presents for follow up.    Heart Failure with recovered to mid range EF, Stage C, NYHA III  Patient with unknown history of cardiac issues. EF was reduced after shock for recent cardiac arrest with some degree of recovering and currently is at  40-45%. Differential includes stunned heart, ICM due to LAD territory hypokinesis seen on echo, or other etiologies of NICM and notably given her history of drug use (however echo in 2020 was normal). Etiology probably nonischemic, but evaluation has not been done yet.  Had a cardiac MRI ordered while inpatient to be scheduled as an outpatient.     Will need further work-up to identify etiology and continue medications in the mean time.  - Entresto 49-51 BID  - Jardiance 10 mg daily  - Spironolactone 25mg daily  - lasix 20mg daily  - Scheduling cardiac MRI to evaluate cardiomyoathy    HLD  - Continue atorvastatin 20 mg daily    T2DM  - Continue insulin, metformin, jardiance    Recommended patient stop smoking, and limit salt, sugar and fat intake and try to stay active (150mn a week).    The patient states understanding and is agreeable with plan.   Feel free to contact myself regarding questions or concerns.  It was a pleasure to see this patient today.    RTC in 6 months with labs, scheduling cardiac MRI.    Cam Nolan MD   of Medicine  Advanced Heart Failure and Transplant Cardiology    CC      Today's clinic visit entailed:  40 minutes spent on the date of the encounter doing chart review, history and exam, documentation and further activities per the note  Provider  Link to [facility] Help Grid     The level of medical decision making during this visit was of high complexity.

## 2022-08-01 NOTE — PATIENT INSTRUCTIONS
"Cardiology Providers you saw during your visit: Dr. Nolan     Medication changes:    No changes      Follow up:   Follow up in 6 months with Dr Nolan with labs prior  Please complete your Cardiac MRI scheduled for 22     Labs:        Please call if you have :  1. Weight gain of more than 2 pounds in a day or 5 pounds in a week  2. Increased shortness of breath, swelling or bloating  3. Dizziness, lightheadedness   4. Any questions or concerns.       Follow the American Heart Association Diet and Lifestyle recommendations:  Limit saturated fat, trans fat, sodium, red meat, sweets and sugar-sweetened beverages. If you choose to eat red meat, compare labels and select the leanest cuts available.  Aim for at least 150 minutes of moderate physical activity or 75 minutes of vigorous physical activity - or an equal combination of both - each week.      During business hours: [phone], press option # 1 to schedule or leave a message for your care team      After hours, weekends or holidays: On Call Cardiologist- [phone]   option #4 and ask to speak to the on-call Cardiologist. Inform them you are a CORE/heart failure patient at the [facility].      Heart Failure Support Group  Virtual meetings 2022, May 2nd, 1-2 p.m.  Monday, , 1-2 p.m.  Monday, , 1-2 p.m.  Monday, 1-2 p.m.  Monday, , 1-2 p.m.  Monday, , 1-2 p.m.  Monday, , 1-2 p.m.  Monday, , 1-2 p.m.      Dinah Unger RN   Cardiology Care Coordinator - Heart Failure/ [facility] Clinic  [facility]   Questions and [phone]   First press #1 for the [facility] \"To send a message to your care team\"    "

## 2022-08-10 LAB — HCV GENTYP SERPL NAA+PROBE: NORMAL

## 2022-08-18 ENCOUNTER — TELEPHONE (OUTPATIENT)
Dept: CARDIOLOGY | Facility: CLINIC | Age: 62
End: 2022-08-18

## 2022-08-18 NOTE — TELEPHONE ENCOUNTER
----- Message from Dinah Unger RN sent at 6/7/2022  1:44 PM CDT -----  Regarding: Cardiac MRI  Call [facility] and confirm [facility] appt on 8/22/22 and make sure pt has ride set up. Pt has missed x2 appts for [facility].

## 2022-08-18 NOTE — TELEPHONE ENCOUNTER
"Reached out to pt to remind them of cMRI on 8/22/22. Pt needs to set up transportation and has missed last 2 cMRI due to transportation issues.     Called pt mobile number; talked to a woman who would not identify herself but stated that \"Rabia hasn't lived here in a long time.\" Writer asked if pt still resided at [facility]; woman states that she didn't know and pt had been taken to the hospital recently.     Called [facility] nurses station; was transferred to [facility]. Left  confirming appt for cMRI on 8/22 requested that they call back to confirm that transportation had been arranged.     Dinah Unger RN    "

## 2022-08-19 DIAGNOSIS — K76.0 NAFLD (NONALCOHOLIC FATTY LIVER DISEASE): Primary | ICD-10-CM

## 2022-08-19 DIAGNOSIS — B18.2 CHRONIC HEPATITIS C WITHOUT HEPATIC COMA (H): ICD-10-CM

## 2022-08-19 DIAGNOSIS — K76.0 HEPATIC STEATOSIS: ICD-10-CM

## 2022-08-19 PROCEDURE — 91200 LIVER ELASTOGRAPHY: CPT | Mod: 26 | Performed by: PHYSICIAN ASSISTANT

## 2022-08-19 NOTE — LETTER
"    September 16, 2022       TO: No Yusuf  [address] [address]       Dear Ms. Yusuf,    We are writing to inform you of your test results.      The scan to assess scar tissue (fibrosis) showed that you have moderate scarring. We give the liver fibrosis a score from 0 to 4. 0 means no scar tissue. 1 means a little bit (mild). 2 is some (moderate). 3 is called \"bridging fibrosis\" and represents significant scarring (severe). 4 indicates cirrhosis.     Your fibrosis score was 2.    S3 (Steatosis Grade) means Higher than 67% amount of liver with fatty changes     No routine follow up in hepatology clinic is needed.     Follow up FibroScan could be considered 3-5 years if risk factors for fatty liver disease progression. Continue optimization of blood glucose and slow gradual weight loss.         Regards,     Angel Orellana PA-C   "

## 2022-08-22 ENCOUNTER — TELEPHONE (OUTPATIENT)
Dept: CARDIOLOGY | Facility: CLINIC | Age: 62
End: 2022-08-22

## 2022-08-22 ENCOUNTER — HOSPITAL ENCOUNTER (OUTPATIENT)
Dept: MRI IMAGING | Facility: CLINIC | Age: 62
Discharge: HOME | End: 2022-08-22
Attending: INTERNAL MEDICINE | Admitting: INTERNAL MEDICINE
Payer: COMMERCIAL

## 2022-08-22 VITALS
DIASTOLIC BLOOD PRESSURE: 66 MMHG | OXYGEN SATURATION: 91 % | HEART RATE: 98 BPM | RESPIRATION RATE: 16 BRPM | SYSTOLIC BLOOD PRESSURE: 106 MMHG

## 2022-08-22 DIAGNOSIS — I50.22 CHRONIC SYSTOLIC HEART FAILURE (H): ICD-10-CM

## 2022-08-22 PROCEDURE — A9585 GADOBUTROL INJECTION: HCPCS | Performed by: INTERNAL MEDICINE

## 2022-08-22 PROCEDURE — 75563 CARD MRI W/STRESS IMG & DYE: CPT

## 2022-08-22 PROCEDURE — 250N000011 HC RX IP 250 OP 636: Performed by: RADIOLOGY

## 2022-08-22 PROCEDURE — 250N000013 HC RX MED GY IP 250 OP 250 PS 637: Performed by: RADIOLOGY

## 2022-08-22 PROCEDURE — 93010 ELECTROCARDIOGRAM REPORT: CPT | Mod: 76 | Performed by: INTERNAL MEDICINE

## 2022-08-22 PROCEDURE — 75563 CARD MRI W/STRESS IMG & DYE: CPT | Mod: 26 | Performed by: RADIOLOGY

## 2022-08-22 PROCEDURE — 255N000002 HC RX 255 OP 636: Performed by: INTERNAL MEDICINE

## 2022-08-22 PROCEDURE — 999N000054 HC STATISTIC EKG NON-CHARGEABLE

## 2022-08-22 RX ORDER — ACYCLOVIR 200 MG/1
0-1 CAPSULE ORAL
Status: DISCONTINUED | OUTPATIENT
Start: 2022-08-22 | End: 2022-08-23 | Stop reason: HOSPADM

## 2022-08-22 RX ORDER — CAFFEINE CITRATE 20 MG/ML
60 SOLUTION INTRAVENOUS
Status: DISCONTINUED | OUTPATIENT
Start: 2022-08-22 | End: 2022-08-23 | Stop reason: HOSPADM

## 2022-08-22 RX ORDER — AMINOPHYLLINE 25 MG/ML
100 INJECTION, SOLUTION INTRAVENOUS ONCE
Status: COMPLETED | OUTPATIENT
Start: 2022-08-22 | End: 2022-08-22

## 2022-08-22 RX ORDER — DIAZEPAM 5 MG
5 TABLET ORAL EVERY 30 MIN PRN
Status: DISCONTINUED | OUTPATIENT
Start: 2022-08-22 | End: 2022-08-23 | Stop reason: HOSPADM

## 2022-08-22 RX ORDER — ONDANSETRON 2 MG/ML
4 INJECTION INTRAMUSCULAR; INTRAVENOUS
Status: DISCONTINUED | OUTPATIENT
Start: 2022-08-22 | End: 2022-08-23 | Stop reason: HOSPADM

## 2022-08-22 RX ORDER — METHYLPREDNISOLONE SODIUM SUCCINATE 125 MG/2ML
125 INJECTION, POWDER, LYOPHILIZED, FOR SOLUTION INTRAMUSCULAR; INTRAVENOUS
Status: DISCONTINUED | OUTPATIENT
Start: 2022-08-22 | End: 2022-08-23 | Stop reason: HOSPADM

## 2022-08-22 RX ORDER — ALBUTEROL SULFATE 90 UG/1
2 AEROSOL, METERED RESPIRATORY (INHALATION) EVERY 5 MIN PRN
Status: DISCONTINUED | OUTPATIENT
Start: 2022-08-22 | End: 2022-08-23 | Stop reason: HOSPADM

## 2022-08-22 RX ORDER — DIPHENHYDRAMINE HCL 25 MG
25 CAPSULE ORAL
Status: DISCONTINUED | OUTPATIENT
Start: 2022-08-22 | End: 2022-08-23 | Stop reason: HOSPADM

## 2022-08-22 RX ORDER — GADOBUTROL 604.72 MG/ML
10 INJECTION INTRAVENOUS ONCE
Status: COMPLETED | OUTPATIENT
Start: 2022-08-22 | End: 2022-08-22

## 2022-08-22 RX ORDER — DIPHENHYDRAMINE HYDROCHLORIDE 50 MG/ML
25-50 INJECTION INTRAMUSCULAR; INTRAVENOUS
Status: DISCONTINUED | OUTPATIENT
Start: 2022-08-22 | End: 2022-08-23 | Stop reason: HOSPADM

## 2022-08-22 RX ORDER — REGADENOSON 0.08 MG/ML
0.4 INJECTION, SOLUTION INTRAVENOUS ONCE
Status: COMPLETED | OUTPATIENT
Start: 2022-08-22 | End: 2022-08-22

## 2022-08-22 RX ADMIN — GADOBUTROL 10 ML: 604.72 INJECTION INTRAVENOUS at 11:02

## 2022-08-22 RX ADMIN — REGADENOSON 0.4 MG: 0.08 INJECTION, SOLUTION INTRAVENOUS at 10:22

## 2022-08-22 RX ADMIN — AMINOPHYLLINE 100 MG: 25 INJECTION, SOLUTION INTRAVENOUS at 10:23

## 2022-08-22 RX ADMIN — DIAZEPAM 5 MG: 5 TABLET ORAL at 09:22

## 2022-08-22 NOTE — TELEPHONE ENCOUNTER
Called [facility], as pt phone number is no longer working, and talked to pt RN Enedelia. Relayed MRI test results, she will let pt know. Confirmed no changes to medications at this time.     Dinah Unger RN

## 2022-08-22 NOTE — TELEPHONE ENCOUNTER
----- Message from Cam Nolan MD sent at 8/22/2022  2:19 PM CDT -----  Completely normalized her EF and heart function, 20 to 40 to now 70.  Continue current management and no change in medications.

## 2022-08-23 LAB
ATRIAL RATE - MUSE: 84 BPM
ATRIAL RATE - MUSE: 84 BPM
DIASTOLIC BLOOD PRESSURE - MUSE: NORMAL MMHG
DIASTOLIC BLOOD PRESSURE - MUSE: NORMAL MMHG
INTERPRETATION ECG - MUSE: NORMAL
INTERPRETATION ECG - MUSE: NORMAL
P AXIS - MUSE: 32 DEGREES
P AXIS - MUSE: 33 DEGREES
PR INTERVAL - MUSE: 158 MS
PR INTERVAL - MUSE: 166 MS
QRS DURATION - MUSE: 86 MS
QRS DURATION - MUSE: 88 MS
QT - MUSE: 398 MS
QT - MUSE: 414 MS
QTC - MUSE: 470 MS
QTC - MUSE: 489 MS
R AXIS - MUSE: 11 DEGREES
R AXIS - MUSE: 8 DEGREES
SYSTOLIC BLOOD PRESSURE - MUSE: NORMAL MMHG
SYSTOLIC BLOOD PRESSURE - MUSE: NORMAL MMHG
T AXIS - MUSE: 29 DEGREES
T AXIS - MUSE: 40 DEGREES
VENTRICULAR RATE- MUSE: 84 BPM
VENTRICULAR RATE- MUSE: 84 BPM

## 2022-08-29 NOTE — PLAN OF CARE
Individual Psychotherapy (PhD/LCSW)    8/29/2022       Site:  [facility]         Therapeutic Intervention: Met with patient.  Outpatient - Insight oriented psychotherapy 45 min - CPT code 73816, Outpatient - Behavior modifying psychotherapy 45 min - CPT code 50777 and Outpatient - Supportive psychotherapy 45 min - CPT Code 06678    Chief complaint/reason for encounter: depression     Interval history and content of current session: Pt is a 29 year old single white female who presents today for a follow up therapy session. Pt reports that she is feeling well these past couple of weeks. Pt reports very low anxiety and did not follow through with the worry record due to report that she only had one episode of anxiety related to her car engine light going on. Pt reports that she has been staying very busy working at the [employer]. 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 Pt reports that she continues to feel like a disappointment as her younger brother is in his profession and getting . Pt and clinician spent time processing pt's feelings around this and clinician provided praise for pt's many accomplishments. 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 Pt and clinician practiced communication strategies for how to communicate this gently to her parents. Pt wondering if she has ADHD and plans on speaking with resident psychiatrist during next visit. Pt still having passive SI but denies a plan. Pt does not have any future therapy sessions scheduled but reports that she will figure this out soon once she purchases new health insurance. Pt reports that her school insurance has termed.       Treatment plan:  Target symptoms: depression, anxiety , work stress  Why chosen therapy is appropriate versus another modality: relevant to  VS: Stable   O2: Room air sat. > 90%.    Output: Voiding spontaneously without difficulty   Last BM: No BM tonight   Activity: Independent   Skin: Intact   Pain: Denies pain or discomfort   CMS: CMS and neuro intact   Dressing: None   Diet: Regular   LDA: None.   Equipment: Walker, personal belongings.    Plan: Possible discharge (back ) to CD treatment   Additional Info:        diagnosis, patient responds to this modality, evidence based practice  Outcome monitoring methods: self-report, observation  Therapeutic intervention type: insight oriented psychotherapy, behavior modifying psychotherapy, supportive psychotherapy    Risk parameters:  Patient reports suicidal ideation: passive SI, denies active plan  Patient reports no homicidal ideation  Patient reports no self-injurious behavior  Patient reports no violent behavior    Verbal deficits: None    Patient's response to intervention:  The patient's response to intervention is accepting.    Progress toward goals and other mental status changes:  The patient's progress toward goals is excellent.    Diagnosis:     ICD-10-CM ICD-9-CM   1. Current moderate episode of major depressive disorder, unspecified whether recurrent  F32.1 296.22   2. Generalized anxiety disorder  F41.1 300.02   3. PTSD (post-traumatic stress disorder)  F43.10 309.81       Plan:  individual psychotherapy and medication management by physician       Return to clinic: as needed, however was coming every two weeks. Pt no longer has health insurance and is waiting for coverage before she schedules next visit.     Length of Service (minutes): 45

## 2022-09-16 NOTE — ADDENDUM NOTE
Addended by: BETTIE PAGE on: 9/16/2022 10:20 AM     Modules accepted: Orders     Closed fracture of base of fifth metatarsal bone of right foot at metaphyseal-diaphyseal junction, initial encounter

## 2022-09-27 ENCOUNTER — TRANSCRIBE ORDERS (OUTPATIENT)
Dept: OTHER | Age: 62
End: 2022-09-27

## 2022-09-27 DIAGNOSIS — G89.29 CHRONIC BILATERAL BACK PAIN, UNSPECIFIED BACK LOCATION: Primary | ICD-10-CM

## 2022-09-27 DIAGNOSIS — M54.9 CHRONIC BILATERAL BACK PAIN, UNSPECIFIED BACK LOCATION: Primary | ICD-10-CM

## 2022-10-31 ENCOUNTER — HOSPITAL ENCOUNTER (EMERGENCY)
Facility: CLINIC | Age: 62
Discharge: HOME | End: 2022-10-31
Attending: EMERGENCY MEDICINE | Admitting: EMERGENCY MEDICINE
Payer: COMMERCIAL

## 2022-10-31 ENCOUNTER — APPOINTMENT (OUTPATIENT)
Dept: CT IMAGING | Facility: CLINIC | Age: 62
End: 2022-10-31
Attending: EMERGENCY MEDICINE
Payer: COMMERCIAL

## 2022-10-31 VITALS
BODY MASS INDEX: 39.74 KG/M2 | WEIGHT: 238.5 LBS | TEMPERATURE: 97.2 F | DIASTOLIC BLOOD PRESSURE: 78 MMHG | OXYGEN SATURATION: 100 % | HEART RATE: 70 BPM | RESPIRATION RATE: 18 BRPM | SYSTOLIC BLOOD PRESSURE: 111 MMHG | HEIGHT: 65 IN

## 2022-10-31 DIAGNOSIS — N30.90 BLADDER INFECTION: ICD-10-CM

## 2022-10-31 LAB
ALBUMIN UR-MCNC: NEGATIVE MG/DL
ANION GAP SERPL CALCULATED.3IONS-SCNC: 8 MMOL/L (ref 3–14)
APPEARANCE UR: CLEAR
BACTERIA #/AREA URNS HPF: ABNORMAL /HPF
BASOPHILS # BLD AUTO: 0.1 10E3/UL (ref 0–0.2)
BASOPHILS NFR BLD AUTO: 1 %
BILIRUB UR QL STRIP: NEGATIVE
BUN SERPL-MCNC: 15 MG/DL (ref 7–30)
CALCIUM SERPL-MCNC: 9.6 MG/DL (ref 8.5–10.1)
CHLORIDE BLD-SCNC: 104 MMOL/L (ref 94–109)
CO2 SERPL-SCNC: 26 MMOL/L (ref 20–32)
COLOR UR AUTO: ABNORMAL
CREAT SERPL-MCNC: 1.01 MG/DL (ref 0.52–1.04)
DEPRECATED S PYO AG THROAT QL EIA: NEGATIVE
EOSINOPHIL # BLD AUTO: 0.2 10E3/UL (ref 0–0.7)
EOSINOPHIL NFR BLD AUTO: 1 %
ERYTHROCYTE [DISTWIDTH] IN BLOOD BY AUTOMATED COUNT: 15.5 % (ref 10–15)
FLUAV RNA SPEC QL NAA+PROBE: NEGATIVE
FLUBV RNA RESP QL NAA+PROBE: NEGATIVE
GFR SERPL CREATININE-BSD FRML MDRD: 63 ML/MIN/1.73M2
GLUCOSE BLD-MCNC: 100 MG/DL (ref 70–99)
GLUCOSE BLDC GLUCOMTR-MCNC: 92 MG/DL (ref 70–99)
GLUCOSE UR STRIP-MCNC: 1000 MG/DL
GROUP A STREP BY PCR: NOT DETECTED
HCT VFR BLD AUTO: 44.4 % (ref 35–47)
HGB BLD-MCNC: 14.3 G/DL (ref 11.7–15.7)
HGB UR QL STRIP: NEGATIVE
IMM GRANULOCYTES # BLD: 0.1 10E3/UL
IMM GRANULOCYTES NFR BLD: 1 %
KETONES UR STRIP-MCNC: NEGATIVE MG/DL
LACTATE SERPL-SCNC: 1.7 MMOL/L (ref 0.7–2)
LEUKOCYTE ESTERASE UR QL STRIP: ABNORMAL
LYMPHOCYTES # BLD AUTO: 3.6 10E3/UL (ref 0.8–5.3)
LYMPHOCYTES NFR BLD AUTO: 28 %
MCH RBC QN AUTO: 27.1 PG (ref 26.5–33)
MCHC RBC AUTO-ENTMCNC: 32.2 G/DL (ref 31.5–36.5)
MCV RBC AUTO: 84 FL (ref 78–100)
MONOCYTES # BLD AUTO: 1.1 10E3/UL (ref 0–1.3)
MONOCYTES NFR BLD AUTO: 9 %
NEUTROPHILS # BLD AUTO: 7.7 10E3/UL (ref 1.6–8.3)
NEUTROPHILS NFR BLD AUTO: 60 %
NITRATE UR QL: NEGATIVE
NRBC # BLD AUTO: 0 10E3/UL
NRBC BLD AUTO-RTO: 0 /100
PH UR STRIP: 5 [PH] (ref 5–7)
PLATELET # BLD AUTO: 371 10E3/UL (ref 150–450)
POTASSIUM BLD-SCNC: 4.3 MMOL/L (ref 3.4–5.3)
RBC # BLD AUTO: 5.28 10E6/UL (ref 3.8–5.2)
RBC URINE: 2 /HPF
RSV RNA SPEC NAA+PROBE: NEGATIVE
SARS-COV-2 RNA RESP QL NAA+PROBE: NEGATIVE
SODIUM SERPL-SCNC: 138 MMOL/L (ref 133–144)
SP GR UR STRIP: 1.01 (ref 1–1.03)
SQUAMOUS EPITHELIAL: 2 /HPF
UROBILINOGEN UR STRIP-MCNC: NORMAL MG/DL
WBC # BLD AUTO: 12.7 10E3/UL (ref 4–11)
WBC CLUMPS #/AREA URNS HPF: PRESENT /HPF
WBC URINE: 79 /HPF

## 2022-10-31 PROCEDURE — 99284 EMERGENCY DEPT VISIT MOD MDM: CPT | Mod: 25 | Performed by: EMERGENCY MEDICINE

## 2022-10-31 PROCEDURE — 96366 THER/PROPH/DIAG IV INF ADDON: CPT | Performed by: EMERGENCY MEDICINE

## 2022-10-31 PROCEDURE — 83605 ASSAY OF LACTIC ACID: CPT | Performed by: EMERGENCY MEDICINE

## 2022-10-31 PROCEDURE — 85025 COMPLETE CBC W/AUTO DIFF WBC: CPT | Performed by: EMERGENCY MEDICINE

## 2022-10-31 PROCEDURE — 87040 BLOOD CULTURE FOR BACTERIA: CPT | Performed by: EMERGENCY MEDICINE

## 2022-10-31 PROCEDURE — 96365 THER/PROPH/DIAG IV INF INIT: CPT | Performed by: EMERGENCY MEDICINE

## 2022-10-31 PROCEDURE — 87086 URINE CULTURE/COLONY COUNT: CPT | Performed by: FAMILY MEDICINE

## 2022-10-31 PROCEDURE — 81001 URINALYSIS AUTO W/SCOPE: CPT | Performed by: FAMILY MEDICINE

## 2022-10-31 PROCEDURE — 87637 SARSCOV2&INF A&B&RSV AMP PRB: CPT | Performed by: EMERGENCY MEDICINE

## 2022-10-31 PROCEDURE — C9803 HOPD COVID-19 SPEC COLLECT: HCPCS | Performed by: EMERGENCY MEDICINE

## 2022-10-31 PROCEDURE — 82310 ASSAY OF CALCIUM: CPT | Performed by: EMERGENCY MEDICINE

## 2022-10-31 PROCEDURE — 250N000011 HC RX IP 250 OP 636: Performed by: EMERGENCY MEDICINE

## 2022-10-31 PROCEDURE — 36415 COLL VENOUS BLD VENIPUNCTURE: CPT | Performed by: EMERGENCY MEDICINE

## 2022-10-31 PROCEDURE — 82374 ASSAY BLOOD CARBON DIOXIDE: CPT | Performed by: EMERGENCY MEDICINE

## 2022-10-31 PROCEDURE — 74176 CT ABD & PELVIS W/O CONTRAST: CPT

## 2022-10-31 PROCEDURE — 258N000003 HC RX IP 258 OP 636: Performed by: EMERGENCY MEDICINE

## 2022-10-31 PROCEDURE — 999N000127 HC STATISTIC PERIPHERAL IV START W US GUIDANCE

## 2022-10-31 PROCEDURE — 250N000013 HC RX MED GY IP 250 OP 250 PS 637: Performed by: EMERGENCY MEDICINE

## 2022-10-31 PROCEDURE — 999N000285 HC STATISTIC VASC ACCESS LAB DRAW WITH PIV START

## 2022-10-31 PROCEDURE — 999N000040 HC STATISTIC CONSULT NO CHARGE VASC ACCESS

## 2022-10-31 PROCEDURE — 87651 STREP A DNA AMP PROBE: CPT | Performed by: EMERGENCY MEDICINE

## 2022-10-31 PROCEDURE — 99284 EMERGENCY DEPT VISIT MOD MDM: CPT | Performed by: EMERGENCY MEDICINE

## 2022-10-31 RX ORDER — HYDROXYZINE HYDROCHLORIDE 25 MG/1
25 TABLET, FILM COATED ORAL ONCE
Status: COMPLETED | OUTPATIENT
Start: 2022-10-31 | End: 2022-10-31

## 2022-10-31 RX ORDER — CEFTRIAXONE 1 G/1
1 INJECTION, POWDER, FOR SOLUTION INTRAMUSCULAR; INTRAVENOUS ONCE
Status: COMPLETED | OUTPATIENT
Start: 2022-10-31 | End: 2022-10-31

## 2022-10-31 RX ORDER — SULFAMETHOXAZOLE/TRIMETHOPRIM 800-160 MG
1 TABLET ORAL 2 TIMES DAILY
Qty: 10 TABLET | Refills: 0 | Status: SHIPPED | OUTPATIENT
Start: 2022-10-31 | End: 2022-11-05

## 2022-10-31 RX ORDER — SODIUM CHLORIDE 9 MG/ML
INJECTION, SOLUTION INTRAVENOUS CONTINUOUS
Status: DISCONTINUED | OUTPATIENT
Start: 2022-10-31 | End: 2022-10-31 | Stop reason: HOSPADM

## 2022-10-31 RX ADMIN — HYDROXYZINE HYDROCHLORIDE 25 MG: 25 TABLET, FILM COATED ORAL at 16:14

## 2022-10-31 RX ADMIN — CEFTRIAXONE SODIUM 1 G: 1 INJECTION, POWDER, FOR SOLUTION INTRAMUSCULAR; INTRAVENOUS at 15:14

## 2022-10-31 RX ADMIN — SODIUM CHLORIDE 1000 ML: 9 INJECTION, SOLUTION INTRAVENOUS at 15:12

## 2022-10-31 ASSESSMENT — ENCOUNTER SYMPTOMS
DIAPHORESIS: 1
FEVER: 0
DYSURIA: 1
VOMITING: 0
FREQUENCY: 1
SORE THROAT: 1
NAUSEA: 0
COUGH: 1
BACK PAIN: 1

## 2022-10-31 ASSESSMENT — ACTIVITIES OF DAILY LIVING (ADL)
ADLS_ACUITY_SCORE: 35
ADLS_ACUITY_SCORE: 33
ADLS_ACUITY_SCORE: 35

## 2022-10-31 NOTE — ED PROVIDER NOTES
[facility] EMERGENCY DEPARTMENT ([facility])    10/31/22          History   No chief complaint on file.    The history is provided by the patient and medical records.     No Yusuf is a 62 year old female with PMH significant for insulin-dependent DM2, heart failure with midrange reduced EF, HTN, HLD, COVID-pneumonia c/b torsades de pointes arrest, bipolar disorder, polysubstance use on methadone, hypothyroidism, and chronic hepatitis C who presents to the ED for evaluation of frequency and dysuria for 7-10 days.  The patient also reports centralized pelvic pain and lower left back pain that she describes as sore.  She does note a history of back problems.  She states that 1 week ago she had 1 episode of emesis after taking her methadone.  She states she is on 80 mg.  No current nausea or vomiting.  Last night she also began to develop a cough, sore throat, and diaphoresis.  No fever.  She reports she has diabetes and takes insulin and metformin.    Past Medical History  Past Medical History:   Diagnosis Date     Arthritis      Bipolar affective (H)      Fibromyalgia      Hypertension      Past Surgical History:   Procedure Laterality Date     CHOLECYSTECTOMY       GYN SURGERY      c section     GYN SURGERY      oblation     ORTHOPEDIC SURGERY      left leg, left shoulder, back     levothyroxine (SYNTHROID/LEVOTHROID) 200 MCG tablet  acetaminophen (TYLENOL) 325 MG tablet  Acidophilus Lactobacillus CAPS  albuterol (PROAIR HFA/PROVENTIL HFA/VENTOLIN HFA) 108 (90 Base) MCG/ACT inhaler  aspirin (ASA) 81 MG chewable tablet  atorvastatin (LIPITOR) 20 MG tablet  blood glucose (ACCU-CHEK RAFAELA PLUS) test strip  Blood Glucose Calibration (ACCU-CHEK ACTIVE GLUCOSE CONT VI)  blood glucose calibration (ACCU-CHEK RAFAELA) solution  calcium carbonate antacid 1000 MG CHEW  cholecalciferol 25 MCG (1000 UT) TABS  diclofenac (VOLTAREN) 1 % topical gel  DULoxetine (CYMBALTA) 20 MG capsule  empagliflozin (JARDIANCE)  10 MG TABS tablet  fluticasone-salmeterol (ADVAIR) 250-50 MCG/DOSE inhaler  furosemide (LASIX) 20 MG tablet  gabapentin (NEURONTIN) 100 MG capsule  hydrOXYzine (ATARAX) 50 MG tablet  insulin detemir (LEVEMIR PEN) 100 UNIT/ML pen  insulin glargine (LANTUS PEN) 100 UNIT/ML pen  melatonin 5 MG tablet  metFORMIN (GLUCOPHAGE) 1000 MG tablet  methadone (DOLOPHINE) 10 MG tablet  multivitamin w/minerals (THERA-VIT-M) tablet  NARCAN 4 MG/0.1ML nasal spray  nicotine polacrilex (NICORETTE) 4 MG gum  nystatin (NYSTOP) 727389 UNIT/GM external powder  omeprazole (PRILOSEC) 20 MG DR capsule  ondansetron (ZOFRAN-ODT) 4 MG ODT tab  polyethylene glycol (MIRALAX) 17 GM/Dose powder  potassium chloride ER (KLOR-CON) 8 MEQ CR tablet  sacubitril-valsartan (ENTRESTO) 49-51 MG per tablet  spironolactone (ALDACTONE) 25 MG tablet  vitamin B-12 (CYANOCOBALAMIN) 1000 MCG tablet      Allergies   Allergen Reactions     Promethazine Other (See Comments) and Anxiety     Noted in 8/30/08 ER     Codeine Phosphate GI Disturbance     Lamotrigine Other (See Comments)     hyponatremia     Oxcarbazepine Unknown and Other (See Comments)     Low sodium  LegacyRecord#[id number]       Codeine Other (See Comments) and Rash     GI bleeding  LegacyRecord#[id number]       Family History  Family History   Problem Relation Age of Onset     Heart Disease Mother      Diabetes Mother      Ovarian Cancer Mother      Heart Disease Father      Lung Cancer Father      Diabetes Sister      Ovarian Cancer Sister      Diabetes Brother      Social History   Social History     Tobacco Use     Smoking status: Former     Packs/day: 0.10     Types: Cigarettes     Smokeless tobacco: Never     Tobacco comments:     Using nicotine vape occasionally   Substance Use Topics     Alcohol use: No     Drug use: No      Past medical history, past surgical history, medications, allergies, family history, and social history were reviewed with the patient. No additional pertinent items.       Review of  Systems   Constitutional: Positive for diaphoresis. Negative for fever.   HENT: Positive for sore throat.    Respiratory: Positive for cough.    Gastrointestinal: Negative for nausea and vomiting.   Genitourinary: Positive for dysuria, frequency and pelvic pain.   Musculoskeletal: Positive for back pain (left).     A complete review of systems was performed with pertinent positives and negatives noted in the HPI, and all other systems negative.    Physical Exam      Physical Exam  Vitals and nursing note reviewed.   Constitutional:       General: She is not in acute distress.     Appearance: Normal appearance. She is not diaphoretic.   HENT:      Head: Atraumatic.      Mouth/Throat:      Pharynx: No oropharyngeal exudate.   Eyes:      General: No scleral icterus.     Pupils: Pupils are equal, round, and reactive to light.   Cardiovascular:      Rate and Rhythm: Normal rate and regular rhythm.      Heart sounds: Normal heart sounds.   Pulmonary:      Effort: No respiratory distress.      Breath sounds: Normal breath sounds.   Abdominal:      General: Bowel sounds are normal.      Palpations: Abdomen is soft.      Tenderness: There is no abdominal tenderness.   Musculoskeletal:         General: No tenderness.   Skin:     General: Skin is warm.      Findings: No rash.   Neurological:      General: No focal deficit present.      Mental Status: She is alert and oriented to person, place, and time.   Psychiatric:         Mood and Affect: Mood normal.         Behavior: Behavior normal.           ED Course     12:55 PM  The patient was seen and examined by Cally Maguire MD in [facility].     Procedures              No results found for any visits on 10/31/22.  Medications - No data to display     Assessments & Plan (with Medical Decision Making)     62 year old female with PMH significant for insulin-dependent DM2, heart failure with midrange reduced EF, HTN, HLD, COVID-pneumonia c/b torsades de pointes arrest, bipolar disorder,  polysubstance use on methadone, hypothyroidism, and chronic hepatitis C who presents to the ED for evaluation of frequency and dysuria for 7-10 days.  Vital signs stable and afebrile in triage including normal pulse ox and 95% on room air.  IV established, labs drawn sent reviewed document epic remarkable for mild leukocytosis of 12.7, UA with similar white cells and positive leukoesterase and bacteria.  Patient had a CT of the abdomen pelvis which revealed no acute process.  She was given ceftriaxone 1 g IV along with a liter normal saline IV fluid bolus.  Patient also requested an anxiolytic and was given hydroxyzine 25 mg p.o.  On repeat assessment patient symptoms improved.  Plan for discharge home with prescription for antibiotics and plan to follow-up with primary care provider within 3 to 4 days for further evaluation care.  Patient expressed verbal understanding and anticipatory guidance return precautions to the emergency room.    I have reviewed the nursing notes. I have reviewed the findings, diagnosis, plan and need for follow up with the patient.    New Prescriptions    No medications on file       Final diagnoses:   Bladder infection     I, Radha Hart, am serving as a trained medical scribe to document services personally performed by Cally Maguire MD based on the provider's statements to me on October 31, 2022.  This document has been checked and approved by the attending provider.    I, Cally Maguire MD, was physically present and have reviewed and verified the accuracy of this note documented by Radha Hart medical scribe.      --  Cally Maguire MD  [facility] EMERGENCY DEPARTMENT  10/31/2022     Cally Maguire MD  11/01/22 0946

## 2022-11-01 LAB
ATRIAL RATE - MUSE: 73 BPM
BACTERIA UR CULT: NORMAL
DIASTOLIC BLOOD PRESSURE - MUSE: NORMAL MMHG
INTERPRETATION ECG - MUSE: NORMAL
P AXIS - MUSE: 51 DEGREES
PR INTERVAL - MUSE: 154 MS
QRS DURATION - MUSE: 82 MS
QT - MUSE: 440 MS
QTC - MUSE: 484 MS
R AXIS - MUSE: 11 DEGREES
SYSTOLIC BLOOD PRESSURE - MUSE: NORMAL MMHG
T AXIS - MUSE: 33 DEGREES
VENTRICULAR RATE- MUSE: 73 BPM

## 2022-11-01 NOTE — RESULT ENCOUNTER NOTE
Final urine culture report is negative.  Adult Negative Urine culture parameters per protocol: Any # Urogenital single or mixed organism, <10,000 col/ml single organism (cath/midstream), and > 3 organisms (No susceptibilities performed).  [facility] Emergency Dept discharge antibiotic prescribed (If applicable): Bactrim DS  Treatment recommendations per [facility] ED Lab Result Urine Culture protocol.

## 2022-11-05 LAB
BACTERIA BLD CULT: NO GROWTH
BACTERIA BLD CULT: NO GROWTH

## 2022-12-15 ENCOUNTER — LAB REQUISITION (OUTPATIENT)
Dept: LAB | Facility: CLINIC | Age: 62
End: 2022-12-15
Payer: COMMERCIAL

## 2022-12-15 DIAGNOSIS — E11.9 TYPE 2 DIABETES MELLITUS WITHOUT COMPLICATIONS (H): ICD-10-CM

## 2022-12-15 LAB
ALBUMIN SERPL BCG-MCNC: 4.1 G/DL (ref 3.5–5.2)
ALP SERPL-CCNC: 116 U/L (ref 35–104)
ALT SERPL W P-5'-P-CCNC: 20 U/L (ref 10–35)
ANION GAP SERPL CALCULATED.3IONS-SCNC: 20 MMOL/L (ref 7–15)
AST SERPL W P-5'-P-CCNC: 30 U/L (ref 10–35)
BILIRUB SERPL-MCNC: 0.6 MG/DL
BUN SERPL-MCNC: 15.6 MG/DL (ref 8–23)
CALCIUM SERPL-MCNC: 9.7 MG/DL (ref 8.8–10.2)
CHLORIDE SERPL-SCNC: 100 MMOL/L (ref 98–107)
CHOLEST SERPL-MCNC: 121 MG/DL
CREAT SERPL-MCNC: 1.11 MG/DL (ref 0.51–0.95)
DEPRECATED HCO3 PLAS-SCNC: 20 MMOL/L (ref 22–29)
GFR SERPL CREATININE-BSD FRML MDRD: 56 ML/MIN/1.73M2
GLUCOSE SERPL-MCNC: 141 MG/DL (ref 70–99)
HDLC SERPL-MCNC: 37 MG/DL
LDLC SERPL CALC-MCNC: 54 MG/DL
NONHDLC SERPL-MCNC: 84 MG/DL
POTASSIUM SERPL-SCNC: 4.3 MMOL/L (ref 3.4–5.3)
PROT SERPL-MCNC: 9.1 G/DL (ref 6.4–8.3)
SODIUM SERPL-SCNC: 140 MMOL/L (ref 136–145)
TRIGL SERPL-MCNC: 150 MG/DL
TSH SERPL DL<=0.005 MIU/L-ACNC: 2.11 UIU/ML (ref 0.3–4.2)

## 2022-12-15 PROCEDURE — 84443 ASSAY THYROID STIM HORMONE: CPT | Performed by: FAMILY MEDICINE

## 2022-12-15 PROCEDURE — 80053 COMPREHEN METABOLIC PANEL: CPT | Mod: ORL | Performed by: FAMILY MEDICINE

## 2022-12-15 PROCEDURE — 80061 LIPID PANEL: CPT | Mod: ORL | Performed by: FAMILY MEDICINE

## 2023-02-03 ENCOUNTER — PRE VISIT (OUTPATIENT)
Dept: CARDIOLOGY | Facility: CLINIC | Age: 63
End: 2023-02-03
Payer: COMMERCIAL

## 2023-02-03 DIAGNOSIS — I50.22 CHRONIC SYSTOLIC HEART FAILURE (H): Primary | ICD-10-CM

## 2023-05-18 ENCOUNTER — LAB REQUISITION (OUTPATIENT)
Dept: LAB | Facility: CLINIC | Age: 63
End: 2023-05-18
Payer: COMMERCIAL

## 2023-05-18 DIAGNOSIS — E11.9 TYPE 2 DIABETES MELLITUS WITHOUT COMPLICATIONS (H): ICD-10-CM

## 2023-05-18 DIAGNOSIS — E03.9 HYPOTHYROIDISM, UNSPECIFIED: ICD-10-CM

## 2023-05-18 PROCEDURE — 80053 COMPREHEN METABOLIC PANEL: CPT | Mod: ORL | Performed by: FAMILY MEDICINE

## 2023-05-18 PROCEDURE — 84439 ASSAY OF FREE THYROXINE: CPT | Mod: ORL | Performed by: FAMILY MEDICINE

## 2023-05-18 PROCEDURE — 84443 ASSAY THYROID STIM HORMONE: CPT | Mod: ORL | Performed by: FAMILY MEDICINE

## 2023-05-18 PROCEDURE — 80061 LIPID PANEL: CPT | Performed by: FAMILY MEDICINE

## 2023-05-19 LAB
ALBUMIN SERPL BCG-MCNC: 3.8 G/DL (ref 3.5–5.2)
ALP SERPL-CCNC: 141 U/L (ref 35–104)
ALT SERPL W P-5'-P-CCNC: 16 U/L (ref 10–35)
ANION GAP SERPL CALCULATED.3IONS-SCNC: 12 MMOL/L (ref 7–15)
AST SERPL W P-5'-P-CCNC: 19 U/L (ref 10–35)
BILIRUB SERPL-MCNC: 0.3 MG/DL
BUN SERPL-MCNC: 14.4 MG/DL (ref 8–23)
CALCIUM SERPL-MCNC: 9.2 MG/DL (ref 8.8–10.2)
CHLORIDE SERPL-SCNC: 98 MMOL/L (ref 98–107)
CHOLEST SERPL-MCNC: 100 MG/DL
CREAT SERPL-MCNC: 0.99 MG/DL (ref 0.51–0.95)
DEPRECATED HCO3 PLAS-SCNC: 29 MMOL/L (ref 22–29)
GFR SERPL CREATININE-BSD FRML MDRD: 64 ML/MIN/1.73M2
GLUCOSE SERPL-MCNC: 79 MG/DL (ref 70–99)
HDLC SERPL-MCNC: 37 MG/DL
LDLC SERPL CALC-MCNC: 37 MG/DL
NONHDLC SERPL-MCNC: 63 MG/DL
POTASSIUM SERPL-SCNC: 4.6 MMOL/L (ref 3.4–5.3)
PROT SERPL-MCNC: 8.1 G/DL (ref 6.4–8.3)
SODIUM SERPL-SCNC: 139 MMOL/L (ref 136–145)
T4 FREE SERPL-MCNC: 1.23 NG/DL (ref 0.9–1.7)
TRIGL SERPL-MCNC: 129 MG/DL
TSH SERPL DL<=0.005 MIU/L-ACNC: 5.45 UIU/ML (ref 0.3–4.2)

## 2023-06-01 ENCOUNTER — MEDICAL CORRESPONDENCE (OUTPATIENT)
Dept: HEALTH INFORMATION MANAGEMENT | Facility: CLINIC | Age: 63
End: 2023-06-01
Payer: COMMERCIAL

## 2023-06-01 NOTE — ED AVS SNAPSHOT
[facility] [place], Emergency Department  [address]  [facility] [address]  Phone:  [phone]  Fax:  [phone]                                    No Yusuf   MRN: [id number]    Department:  [facility], Emergency Department   Date of Visit:  11/17/2019           After Visit Summary Signature Page    I have received my discharge instructions, and my questions have been answered. I have discussed any challenges I see with this plan with the nurse or doctor.    ..........................................................................................................................................  Patient/Patient Representative Signature      ..........................................................................................................................................  Patient Representative Print Name and Relationship to Patient    ..................................................               ................................................  Date                                   Time    ..........................................................................................................................................  Reviewed by Signature/Title    ...................................................              ..............................................  Date                                               Time          22EPIC Rev 08/18        "Having 4th toe on left foot amputated"

## 2023-06-21 ENCOUNTER — APPOINTMENT (OUTPATIENT)
Dept: ULTRASOUND IMAGING | Facility: CLINIC | Age: 63
End: 2023-06-21
Attending: FAMILY MEDICINE
Payer: COMMERCIAL

## 2023-06-21 ENCOUNTER — HOSPITAL ENCOUNTER (OUTPATIENT)
Facility: CLINIC | Age: 63
Setting detail: OBSERVATION
Discharge: HOME | End: 2023-06-23
Attending: FAMILY MEDICINE | Admitting: NURSE PRACTITIONER
Payer: COMMERCIAL

## 2023-06-21 ENCOUNTER — APPOINTMENT (OUTPATIENT)
Dept: GENERAL RADIOLOGY | Facility: CLINIC | Age: 63
End: 2023-06-21
Attending: FAMILY MEDICINE
Payer: COMMERCIAL

## 2023-06-21 DIAGNOSIS — R60.9 EDEMA: ICD-10-CM

## 2023-06-21 DIAGNOSIS — E11.9 DIABETES MELLITUS (H): ICD-10-CM

## 2023-06-21 DIAGNOSIS — N30.00 ACUTE CYSTITIS WITHOUT HEMATURIA: ICD-10-CM

## 2023-06-21 DIAGNOSIS — L03.116 LEFT LEG CELLULITIS: ICD-10-CM

## 2023-06-21 DIAGNOSIS — L03.116 CELLULITIS OF LEFT FOOT: ICD-10-CM

## 2023-06-21 DIAGNOSIS — B37.2 CANDIDIASIS OF SKIN AND NAILS: ICD-10-CM

## 2023-06-21 DIAGNOSIS — N30.00 ACUTE CYSTITIS: ICD-10-CM

## 2023-06-21 DIAGNOSIS — E11.9 TYPE 2 DIABETES MELLITUS WITHOUT COMPLICATION, WITHOUT LONG-TERM CURRENT USE OF INSULIN (H): ICD-10-CM

## 2023-06-21 DIAGNOSIS — I50.9 HEART FAILURE, UNSPECIFIED (H): ICD-10-CM

## 2023-06-21 DIAGNOSIS — N17.9 ACUTE KIDNEY FAILURE, UNSPECIFIED (H): ICD-10-CM

## 2023-06-21 DIAGNOSIS — B37.2 CANDIDIASIS OF SKIN: Primary | ICD-10-CM

## 2023-06-21 LAB
ALBUMIN SERPL BCG-MCNC: 3.6 G/DL (ref 3.5–5.2)
ALBUMIN UR-MCNC: NEGATIVE MG/DL
ALP SERPL-CCNC: 133 U/L (ref 35–104)
ALT SERPL W P-5'-P-CCNC: 24 U/L (ref 0–50)
ANION GAP SERPL CALCULATED.3IONS-SCNC: 11 MMOL/L (ref 7–15)
APPEARANCE UR: ABNORMAL
APTT PPP: 27 SECONDS (ref 22–38)
AST SERPL W P-5'-P-CCNC: 26 U/L (ref 0–45)
BASOPHILS # BLD AUTO: 0 10E3/UL (ref 0–0.2)
BASOPHILS NFR BLD AUTO: 0 %
BILIRUB SERPL-MCNC: 0.3 MG/DL
BILIRUB UR QL STRIP: NEGATIVE
BUN SERPL-MCNC: 13.1 MG/DL (ref 8–23)
CALCIUM SERPL-MCNC: 9.3 MG/DL (ref 8.8–10.2)
CHLORIDE SERPL-SCNC: 98 MMOL/L (ref 98–107)
COLOR UR AUTO: ABNORMAL
CREAT SERPL-MCNC: 1.08 MG/DL (ref 0.51–0.95)
CRP SERPL-MCNC: 9.25 MG/L
DEPRECATED HCO3 PLAS-SCNC: 28 MMOL/L (ref 22–29)
EOSINOPHIL # BLD AUTO: 0.2 10E3/UL (ref 0–0.7)
EOSINOPHIL NFR BLD AUTO: 2 %
ERYTHROCYTE [DISTWIDTH] IN BLOOD BY AUTOMATED COUNT: 17.2 % (ref 10–15)
GFR SERPL CREATININE-BSD FRML MDRD: 58 ML/MIN/1.73M2
GLUCOSE SERPL-MCNC: 110 MG/DL (ref 70–99)
GLUCOSE UR STRIP-MCNC: >=1000 MG/DL
HBA1C MFR BLD: 6.2 %
HCT VFR BLD AUTO: 38.9 % (ref 35–47)
HGB BLD-MCNC: 12.1 G/DL (ref 11.7–15.7)
HGB UR QL STRIP: NEGATIVE
HOLD SPECIMEN: NORMAL
IMM GRANULOCYTES # BLD: 0.1 10E3/UL
IMM GRANULOCYTES NFR BLD: 1 %
INR PPP: 0.96 (ref 0.85–1.15)
KETONES UR STRIP-MCNC: NEGATIVE MG/DL
LEUKOCYTE ESTERASE UR QL STRIP: ABNORMAL
LYMPHOCYTES # BLD AUTO: 1.6 10E3/UL (ref 0.8–5.3)
LYMPHOCYTES NFR BLD AUTO: 17 %
MCH RBC QN AUTO: 26.9 PG (ref 26.5–33)
MCHC RBC AUTO-ENTMCNC: 31.1 G/DL (ref 31.5–36.5)
MCV RBC AUTO: 86 FL (ref 78–100)
MONOCYTES # BLD AUTO: 0.9 10E3/UL (ref 0–1.3)
MONOCYTES NFR BLD AUTO: 9 %
MUCOUS THREADS #/AREA URNS LPF: PRESENT /LPF
NEUTROPHILS # BLD AUTO: 6.7 10E3/UL (ref 1.6–8.3)
NEUTROPHILS NFR BLD AUTO: 71 %
NITRATE UR QL: POSITIVE
NRBC # BLD AUTO: 0 10E3/UL
NRBC BLD AUTO-RTO: 0 /100
PH UR STRIP: 7 [PH] (ref 5–7)
PLATELET # BLD AUTO: 316 10E3/UL (ref 150–450)
POTASSIUM SERPL-SCNC: 4.4 MMOL/L (ref 3.4–5.3)
PROT SERPL-MCNC: 7.9 G/DL (ref 6.4–8.3)
RBC # BLD AUTO: 4.5 10E6/UL (ref 3.8–5.2)
RBC URINE: 1 /HPF
SODIUM SERPL-SCNC: 137 MMOL/L (ref 136–145)
SP GR UR STRIP: 1.02 (ref 1–1.03)
SQUAMOUS EPITHELIAL: 2 /HPF
UROBILINOGEN UR STRIP-MCNC: NORMAL MG/DL
WBC # BLD AUTO: 9.4 10E3/UL (ref 4–11)
WBC URINE: 172 /HPF

## 2023-06-21 PROCEDURE — 81001 URINALYSIS AUTO W/SCOPE: CPT | Performed by: EMERGENCY MEDICINE

## 2023-06-21 PROCEDURE — 84450 TRANSFERASE (AST) (SGOT): CPT | Performed by: EMERGENCY MEDICINE

## 2023-06-21 PROCEDURE — 96365 THER/PROPH/DIAG IV INF INIT: CPT

## 2023-06-21 PROCEDURE — 87086 URINE CULTURE/COLONY COUNT: CPT | Performed by: EMERGENCY MEDICINE

## 2023-06-21 PROCEDURE — 87040 BLOOD CULTURE FOR BACTERIA: CPT | Performed by: FAMILY MEDICINE

## 2023-06-21 PROCEDURE — G0378 HOSPITAL OBSERVATION PER HR: HCPCS

## 2023-06-21 PROCEDURE — 85730 THROMBOPLASTIN TIME PARTIAL: CPT | Performed by: FAMILY MEDICINE

## 2023-06-21 PROCEDURE — 73630 X-RAY EXAM OF FOOT: CPT | Mod: LT

## 2023-06-21 PROCEDURE — 99285 EMERGENCY DEPT VISIT HI MDM: CPT | Performed by: FAMILY MEDICINE

## 2023-06-21 PROCEDURE — 85610 PROTHROMBIN TIME: CPT | Performed by: FAMILY MEDICINE

## 2023-06-21 PROCEDURE — 85025 COMPLETE CBC W/AUTO DIFF WBC: CPT | Performed by: EMERGENCY MEDICINE

## 2023-06-21 PROCEDURE — 36415 COLL VENOUS BLD VENIPUNCTURE: CPT | Performed by: FAMILY MEDICINE

## 2023-06-21 PROCEDURE — 93971 EXTREMITY STUDY: CPT | Mod: LT

## 2023-06-21 PROCEDURE — 86140 C-REACTIVE PROTEIN: CPT | Performed by: FAMILY MEDICINE

## 2023-06-21 PROCEDURE — 250N000011 HC RX IP 250 OP 636: Performed by: FAMILY MEDICINE

## 2023-06-21 PROCEDURE — 258N000003 HC RX IP 258 OP 636: Performed by: FAMILY MEDICINE

## 2023-06-21 PROCEDURE — 73590 X-RAY EXAM OF LOWER LEG: CPT | Mod: LT

## 2023-06-21 PROCEDURE — 99285 EMERGENCY DEPT VISIT HI MDM: CPT | Mod: 25 | Performed by: FAMILY MEDICINE

## 2023-06-21 PROCEDURE — 83036 HEMOGLOBIN GLYCOSYLATED A1C: CPT | Performed by: FAMILY MEDICINE

## 2023-06-21 PROCEDURE — 99221 1ST HOSP IP/OBS SF/LOW 40: CPT | Mod: FS | Performed by: FAMILY MEDICINE

## 2023-06-21 RX ORDER — LIDOCAINE 40 MG/G
CREAM TOPICAL
Status: DISCONTINUED | OUTPATIENT
Start: 2023-06-21 | End: 2023-06-23 | Stop reason: HOSPADM

## 2023-06-21 RX ORDER — AMPICILLIN AND SULBACTAM 2; 1 G/1; G/1
3 INJECTION, POWDER, FOR SOLUTION INTRAMUSCULAR; INTRAVENOUS ONCE
Status: COMPLETED | OUTPATIENT
Start: 2023-06-21 | End: 2023-06-21

## 2023-06-21 RX ADMIN — SODIUM CHLORIDE 1000 ML: 9 INJECTION, SOLUTION INTRAVENOUS at 21:29

## 2023-06-21 RX ADMIN — AMPICILLIN SODIUM AND SULBACTAM SODIUM 3 G: 2; 1 INJECTION, POWDER, FOR SOLUTION INTRAMUSCULAR; INTRAVENOUS at 21:30

## 2023-06-21 ASSESSMENT — ACTIVITIES OF DAILY LIVING (ADL)
ADLS_ACUITY_SCORE: 35

## 2023-06-21 NOTE — ED TRIAGE NOTES
Triage Assessment     Row Name 06/21/23 1739       Triage Assessment (Adult)    Airway WDL WDL       Respiratory WDL    Respiratory WDL WDL       Skin Circulation/Temperature WDL    Skin Circulation/Temperature WDL WDL       Cardiac WDL    Cardiac WDL WDL       Peripheral/Neurovascular WDL    Peripheral Neurovascular WDL WDL       Cognitive/Neuro/Behavioral WDL    Cognitive/Neuro/Behavioral WDL WDL

## 2023-06-21 NOTE — LETTER
Transition Communication Hand-off for Care Transitions to Next Level of Care Provider    Name: No Yusuf  : 1960  MRN #: [id number]  Primary Care Provider: Price Naranjo     Primary Clinic: [facility]  [facility] [address]     Reason for Hospitalization:  Left leg cellulitis [L03.116]  Acute cystitis without hematuria [N30.00]  Type 2 diabetes mellitus without complication, without long-term current use of insulin (H) [E11.9]  Admit Date/Time: 2023  5:34 PM  Discharge Date: 2023      Payor Source: Payor: [id number] / Plan: [employer] / Product Type: HMO /     Readmission Assessment Measure (PATTIE) Risk Score/category: 26%           Reason for Communication Hand-off Referral: Fragility    Discharge Plan:       Concern for non-adherence with plan of care:   Y/N N  Discharge Needs Assessment:  Follow-up plan:  No future appointments.    Any outstanding tests or procedures:              Key Recommendations:  Per AVS    JOSIE GOMES    AVS/Discharge Summary is the source of truth; this is a helpful guide for improved communication of patient story

## 2023-06-21 NOTE — ED PROVIDER NOTES
[facility] [place] EMERGENCY DEPARTMENT ([facility])    6/21/23      ED PROVIDER NOTE  Vasileryder DE LA ROSA   History     Chief Complaint   Patient presents with     Leg Swelling     Fall     Pt had the leg swelling for over a week and redness started 7 days ago. Pt said it is painful. Pt fell last Saturday and an end table hit her forehead, had headache, and took tylenol. Denies loss of consciousness. Pt thinks she has UTI.      The history is provided by the patient and medical records.     No Yusuf is a 63 year old female with history of type 2 diabetes, prior cystitis, prior heart failure (last LVEF 76% on 8/22/22 on MRI cardiac), HTN, HLD, COVID-pneumonia c/b torsades de pointes arrest, bipolar disorder, polysubstance use on methadone, hypothyroidism, and chronic hepatitis C who presents with leg swelling, redness and pain for the past week, head injury 5 days ago, and UTI symptoms.  She fell last Saturday and hit her head against an end table.  She developed a headache with this and has been taking Tylenol.  No loss of consciousness with this.  In addition patient is concerned that she has a UTI.  Now presents evaluation patient history of previous cellulitis in the past.  No back pain nausea vomiting.  Headache is gone at this point no neuro changes noted.  No reports of anticoagulation therapy.    Past Medical History  Past Medical History:   Diagnosis Date     Arthritis      Bipolar affective (H)      Fibromyalgia      Hypertension      Past Surgical History:   Procedure Laterality Date     CHOLECYSTECTOMY       GYN SURGERY      c section     GYN SURGERY      oblation     ORTHOPEDIC SURGERY      left leg, left shoulder, back     No current outpatient medications on file.    Allergies   Allergen Reactions     Promethazine Other (See Comments) and Anxiety     Noted in 8/30/08 ER     Codeine Phosphate GI Disturbance     Lamotrigine Other (See Comments)     hyponatremia     Oxcarbazepine Unknown and Other (See  "Comments)     Low sodium  LegacyRecord#[id number]       Codeine Other (See Comments) and Rash     GI bleeding  LegacyRecord#[id number]       Family History  Family History   Problem Relation Age of Onset     Heart Disease Mother      Diabetes Mother      Ovarian Cancer Mother      Heart Disease Father      Lung Cancer Father      Diabetes Sister      Ovarian Cancer Sister      Diabetes Brother      Social History   Social History     Tobacco Use     Smoking status: Former     Packs/day: 0.10     Types: Cigarettes     Smokeless tobacco: Never     Tobacco comments:     Using nicotine vape occasionally   Substance Use Topics     Alcohol use: No     Drug use: No         A medically appropriate review of systems was performed with pertinent positives and negatives noted in the HPI, and all other systems negative.    Physical Exam   BP: 106/64  Pulse: 82  Temp: 98.7  F (37.1  C)  Resp: 16  Height: 165.1 cm (5' 5\")  Weight: 113.4 kg (250 lb)  SpO2: (!) 89 %  Physical Exam  Vitals and nursing note reviewed.   Constitutional:       General: She is in acute distress.      Appearance: Normal appearance. She is well-developed. She is not toxic-appearing.      Comments: Patient nontoxic    HENT:      Head: Normocephalic and atraumatic.      Nose: Nose normal.      Mouth/Throat:      Mouth: Mucous membranes are moist.      Pharynx: Oropharynx is clear.   Eyes:      General: No scleral icterus.     Extraocular Movements: Extraocular movements intact.      Conjunctiva/sclera: Conjunctivae normal.      Pupils: Pupils are equal, round, and reactive to light.   Cardiovascular:      Rate and Rhythm: Normal rate and regular rhythm.   Pulmonary:      Effort: Pulmonary effort is normal. No respiratory distress.      Breath sounds: Normal breath sounds. No stridor.   Abdominal:      General: Abdomen is flat. There is distension.      Palpations: Abdomen is soft.      Tenderness: There is no abdominal tenderness. There is no right CVA tenderness, left " CVA tenderness or guarding.   Musculoskeletal:         General: Swelling and tenderness present.      Cervical back: Normal range of motion and neck supple. No rigidity or tenderness.   Skin:     General: Skin is warm and dry.      Capillary Refill: Capillary refill takes less than 2 seconds.      Findings: Erythema present. No rash.      Comments: Left lower extremity reveals erythema of the distal third lower leg also some of the foot.  There is a lesion on the plantar aspect of the great toe at the MP joint but it does not appear to be markedly purulent drainage.  The foot has some minimal swelling but the swelling is more in the ankle.  No other lesions noted.  No crepitus   Neurological:      General: No focal deficit present.      Mental Status: She is alert and oriented to person, place, and time. Mental status is at baseline.   Psychiatric:      Comments: Appropriate here in the ER           ED Course, Procedures, & Data       Records reviewed in epic.  Patient history of opioid dependence with previous lab visits etc.  Patient was evaluated for bladder infection in October 2022.    Here in the ER patient had IV established liter normal saline given.  Patient had labs drawn.  CRP is 9.25.  Sodium 137 potassium 4.4.  Bicarb 28 gap 11 creatinine 1.08.  Alk phos 133.  Glucose 110  Hemoglobin A1c consistent with previous at 6.2  White count 9.4.  Hemoglobin is 12.  Platelets are 316.  Blood culture sent.  Urinalysis concerning for infection.    Patient had x-rays done of the lower extremity and foot without subcu air etc. previous surgeries noted.  Ultrasound negative left lower extremity for DVT.    Here in the ER patient did receive a liter normal saline in the ER I ordered a dose of Unasyn IV for left leg cellulitis as was treated before in the past with Augmentin no history of MRSA.  Also would hope the Unasyn would cover UTI also.    Patient ER otherwise feeling better comfortable being admitted will admit  to ED observation for treatment of UTI along with left lower extremity cellulitis without history of MRSA.  Patient and transferred to observation unit on the [facility].        Procedures                     Results for orders placed or performed during the hospital encounter of 06/21/23   US Lower Extremity Venous Duplex Left     Status: None    Narrative    EXAM: US LOWER EXTREMITY VENOUS DUPLEX LEFT  LOCATION: [facility]  DATE: 6/21/2023    INDICATION: Swelling and pain, redness  COMPARISON: None.  TECHNIQUE: Venous Duplex ultrasound of the left lower extremity with and without compression, augmentation and duplex. Color flow and spectral Doppler with waveform analysis performed.    FINDINGS: Exam includes the common femoral, femoral, popliteal, and contralateral common femoral veins as well as segmentally visualized deep calf veins and greater saphenous vein.     LEFT: No deep vein thrombosis. No superficial thrombophlebitis. No popliteal cyst.      Impression    IMPRESSION:  1.  No deep venous thrombosis in the left lower extremity.   XR Tibia and Fibula Left 2 Views     Status: None    Narrative    EXAM: XR TIBIA AND FIBULA LEFT 2 VIEWS, XR FOOT LEFT G/E 3 VIEWS  LOCATION: [facility]  DATE: 6/21/2023    INDICATION: diabetic with leg swelling eval for sub q air vs fb  COMPARISON: None.      Impression    IMPRESSION: Postoperative changes to the medial and lateral malleoli. No evidence for acute fracture. Severe degenerative change at the tibiotalar joint. Tibia and fibula are otherwise negative. Left foot negative for acute fracture. Additional   degenerative change at the first MTP joint. No evidence for foreign body. No radiographic evidence for osteomyelitis.   Foot XR, G/E 3 views, left     Status: None    Narrative    EXAM: XR TIBIA AND FIBULA LEFT 2 VIEWS, XR FOOT LEFT G/E 3 VIEWS  LOCATION: [facility]  DATE: 6/21/2023    INDICATION: diabetic with leg swelling eval for sub q air vs fb  COMPARISON: None.      Impression    IMPRESSION: Postoperative changes to the medial and lateral malleoli. No evidence for acute fracture. Severe degenerative change at the tibiotalar joint. Tibia and fibula are otherwise negative. Left foot negative for acute fracture. Additional   degenerative change at the first MTP joint. No evidence for foreign body. No radiographic evidence for osteomyelitis.   UA with Microscopic reflex to Culture     Status: Abnormal    Specimen: Urine, Clean Catch   Result Value Ref Range    Color Urine Light Yellow Colorless, Straw, Light Yellow, Yellow    Appearance Urine Slightly Cloudy (A) Clear    Glucose Urine >=1000 (A) Negative mg/dL    Bilirubin Urine Negative Negative    Ketones Urine Negative Negative mg/dL    Specific Gravity Urine 1.019 1.003 - 1.035    Blood Urine Negative Negative    pH Urine 7.0 5.0 - 7.0    Protein Albumin Urine Negative Negative mg/dL    Urobilinogen Urine Normal Normal, 2.0 mg/dL    Nitrite Urine Positive (A) Negative    Leukocyte Esterase Urine Large (A) Negative    Mucus Urine Present (A) None Seen /LPF    RBC Urine 1 <=2 /HPF    WBC Urine 172 (H) <=5 /HPF    Squamous Epithelials Urine 2 (H) <=1 /HPF    Narrative    Urine Culture ordered based on laboratory criteria   Comprehensive metabolic panel     Status: Abnormal   Result Value Ref Range    Sodium 137 136 - 145 mmol/L    Potassium 4.4 3.4 - 5.3 mmol/L    Chloride 98 98 - 107 mmol/L    Carbon Dioxide (CO2) 28 22 - 29 mmol/L    Anion Gap 11 7 - 15 mmol/L    Urea Nitrogen 13.1 8.0 - 23.0 mg/dL    Creatinine 1.08 (H) 0.51 - 0.95 mg/dL    Calcium 9.3 8.8 - 10.2 mg/dL    Glucose 110 (H) 70 - 99 mg/dL    Alkaline Phosphatase 133 (H) 35 - 104 U/L    AST 26 0 - 45 U/L    ALT 24 0 - 50 U/L    Protein Total 7.9 6.4 - 8.3 g/dL    Albumin 3.6 3.5 - 5.2 g/dL    Bilirubin Total 0.3 <=1.2  mg/dL    GFR Estimate 58 (L) >60 mL/min/1.73m2   CRP inflammation     Status: Abnormal   Result Value Ref Range    CRP Inflammation 9.25 (H) <5.00 mg/L   INR     Status: Normal   Result Value Ref Range    INR 0.96 0.85 - 1.15   Partial thromboplastin time     Status: Normal   Result Value Ref Range    aPTT 27 22 - 38 Seconds   Hemoglobin A1c     Status: Abnormal   Result Value Ref Range    Hemoglobin A1C 6.2 (H) <5.7 %   CBC with platelets and differential     Status: Abnormal   Result Value Ref Range    WBC Count 9.4 4.0 - 11.0 10e3/uL    RBC Count 4.50 3.80 - 5.20 10e6/uL    Hemoglobin 12.1 11.7 - 15.7 g/dL    Hematocrit 38.9 35.0 - 47.0 %    MCV 86 78 - 100 fL    MCH 26.9 26.5 - 33.0 pg    MCHC 31.1 (L) 31.5 - 36.5 g/dL    RDW 17.2 (H) 10.0 - 15.0 %    Platelet Count 316 150 - 450 10e3/uL    % Neutrophils 71 %    % Lymphocytes 17 %    % Monocytes 9 %    % Eosinophils 2 %    % Basophils 0 %    % Immature Granulocytes 1 %    NRBCs per 100 WBC 0 <1 /100    Absolute Neutrophils 6.7 1.6 - 8.3 10e3/uL    Absolute Lymphocytes 1.6 0.8 - 5.3 10e3/uL    Absolute Monocytes 0.9 0.0 - 1.3 10e3/uL    Absolute Eosinophils 0.2 0.0 - 0.7 10e3/uL    Absolute Basophils 0.0 0.0 - 0.2 10e3/uL    Absolute Immature Granulocytes 0.1 <=0.4 10e3/uL    Absolute NRBCs 0.0 10e3/uL   Extra Tube     Status: None    Narrative    The following orders were created for panel order Extra Tube.  Procedure                               Abnormality         Status                     ---------                               -----------         ------                     Extra Blood Culture Bottle[[id number]]                       Final result                 Please view results for these tests on the individual orders.   Extra Blood Culture Bottle     Status: None   Result Value Ref Range    Hold Specimen JIC    Glucose by meter     Status: Abnormal   Result Value Ref Range    GLUCOSE BY METER POCT 106 (H) 70 - 99 mg/dL   CBC with platelets differential      Status: Abnormal    Narrative    The following orders were created for panel order CBC with platelets differential.  Procedure                               Abnormality         Status                     ---------                               -----------         ------                     CBC with platelets and d...[[id number]]  Abnormal            Final result                 Please view results for these tests on the individual orders.     Medications   lidocaine 1 % 0.1-1 mL (has no administration in time range)   lidocaine (LMX4) cream (has no administration in time range)   sodium chloride (PF) 0.9% PF flush 3 mL (3 mLs Intracatheter Not Given 6/22/23 0145)   sodium chloride (PF) 0.9% PF flush 3 mL (has no administration in time range)   acetaminophen (TYLENOL) tablet 650 mg (has no administration in time range)   pantoprazole (PROTONIX) EC tablet 40 mg (has no administration in time range)   levothyroxine (SYNTHROID/LEVOTHROID) tablet 200 mcg (has no administration in time range)   amLODIPine (NORVASC) tablet 5 mg (has no administration in time range)   empagliflozin (JARDIANCE) tablet 10 mg ( Oral Automatically Held 6/25/23 0800)   spironolactone (ALDACTONE) tablet 25 mg (has no administration in time range)   DULoxetine (CYMBALTA) DR capsule 20 mg (has no administration in time range)   magnesium oxide (MAG-OX) half-tab 200 mg (has no administration in time range)   insulin detemir (LEVEMIR PEN) injection 15 Units (has no administration in time range)   melatonin tablet 5 mg (has no administration in time range)   cyanocobalamin (VITAMIN B-12) tablet 1,000 mcg (has no administration in time range)   Vitamin D3 (CHOLECALCIFEROL) tablet 25 mcg (has no administration in time range)   furosemide (LASIX) tablet 20 mg (has no administration in time range)   nicotine Patch in Place (has no administration in time range)   nicotine (NICODERM CQ) 21 MG/24HR 24 hr patch 1 patch (has no administration in time range)    potassium chloride ER (KLOR-CON) CR tablet 8 mEq (has no administration in time range)   metFORMIN (GLUCOPHAGE) tablet 1,000 mg ( Oral Automatically Held 6/25/23 1800)   sacubitril-valsartan (ENTRESTO) 49-51 MG per tablet 1 tablet (has no administration in time range)   atorvastatin (LIPITOR) tablet 20 mg (20 mg Oral $Given 6/22/23 0101)   nicotine (NICORETTE) gum 4 mg (has no administration in time range)   polyethylene glycol (MIRALAX) Packet 17 g (has no administration in time range)   albuterol (PROVENTIL) neb solution 2.5 mg (has no administration in time range)   hydrOXYzine (ATARAX) tablet 100 mg (has no administration in time range)   aspirin (ASA) chewable tablet 81 mg (has no administration in time range)   ampicillin-sulbactam (UNASYN) 3 g vial to attach to  mL bag (has no administration in time range)   lidocaine 1 % 0.1-1 mL (has no administration in time range)   lidocaine (LMX4) cream (has no administration in time range)   sodium chloride (PF) 0.9% PF flush 3 mL (3 mLs Intracatheter Not Given 6/22/23 0106)   sodium chloride (PF) 0.9% PF flush 3 mL (has no administration in time range)   ondansetron (ZOFRAN ODT) ODT tab 4 mg (has no administration in time range)     Or   ondansetron (ZOFRAN) injection 4 mg (has no administration in time range)   glucose gel 15-30 g (has no administration in time range)     Or   dextrose 50 % injection 25-50 mL (has no administration in time range)     Or   glucagon injection 1 mg (has no administration in time range)   insulin aspart (NovoLOG) injection (RAPID ACTING) (has no administration in time range)   insulin aspart (NovoLOG) injection (RAPID ACTING) ( Subcutaneous Not Given 6/22/23 0103)   methadone (DOLOPHINE) tablet 80 mg (has no administration in time range)   gabapentin (NEURONTIN) capsule 900 mg (has no administration in time range)   0.9% sodium chloride BOLUS (1,000 mLs Intravenous $New Bag 6/21/23 2129)   ampicillin-sulbactam (UNASYN) 3 g vial  to attach to  mL bag (0 g Intravenous Stopped 6/21/23 2207)     Labs Ordered and Resulted from Time of ED Arrival to Time of ED Departure   ROUTINE UA WITH MICROSCOPIC REFLEX TO CULTURE - Abnormal       Result Value    Color Urine Light Yellow      Appearance Urine Slightly Cloudy (*)     Glucose Urine >=1000 (*)     Bilirubin Urine Negative      Ketones Urine Negative      Specific Gravity Urine 1.019      Blood Urine Negative      pH Urine 7.0      Protein Albumin Urine Negative      Urobilinogen Urine Normal      Nitrite Urine Positive (*)     Leukocyte Esterase Urine Large (*)     Mucus Urine Present (*)     RBC Urine 1      WBC Urine 172 (*)     Squamous Epithelials Urine 2 (*)    COMPREHENSIVE METABOLIC PANEL - Abnormal    Sodium 137      Potassium 4.4      Chloride 98      Carbon Dioxide (CO2) 28      Anion Gap 11      Urea Nitrogen 13.1      Creatinine 1.08 (*)     Calcium 9.3      Glucose 110 (*)     Alkaline Phosphatase 133 (*)     AST 26      ALT 24      Protein Total 7.9      Albumin 3.6      Bilirubin Total 0.3      GFR Estimate 58 (*)    CRP INFLAMMATION - Abnormal    CRP Inflammation 9.25 (*)    HEMOGLOBIN A1C - Abnormal    Hemoglobin A1C 6.2 (*)    CBC WITH PLATELETS AND DIFFERENTIAL - Abnormal    WBC Count 9.4      RBC Count 4.50      Hemoglobin 12.1      Hematocrit 38.9      MCV 86      MCH 26.9      MCHC 31.1 (*)     RDW 17.2 (*)     Platelet Count 316      % Neutrophils 71      % Lymphocytes 17      % Monocytes 9      % Eosinophils 2      % Basophils 0      % Immature Granulocytes 1      NRBCs per 100 WBC 0      Absolute Neutrophils 6.7      Absolute Lymphocytes 1.6      Absolute Monocytes 0.9      Absolute Eosinophils 0.2      Absolute Basophils 0.0      Absolute Immature Granulocytes 0.1      Absolute NRBCs 0.0     INR - Normal    INR 0.96     PARTIAL THROMBOPLASTIN TIME - Normal    aPTT 27     BLOOD CULTURE   BLOOD CULTURE   URINE CULTURE     Foot XR, G/E 3 views, left   Final Result    IMPRESSION: Postoperative changes to the medial and lateral malleoli. No evidence for acute fracture. Severe degenerative change at the tibiotalar joint. Tibia and fibula are otherwise negative. Left foot negative for acute fracture. Additional    degenerative change at the first MTP joint. No evidence for foreign body. No radiographic evidence for osteomyelitis.      XR Tibia and Fibula Left 2 Views   Final Result   IMPRESSION: Postoperative changes to the medial and lateral malleoli. No evidence for acute fracture. Severe degenerative change at the tibiotalar joint. Tibia and fibula are otherwise negative. Left foot negative for acute fracture. Additional    degenerative change at the first MTP joint. No evidence for foreign body. No radiographic evidence for osteomyelitis.      US Lower Extremity Venous Duplex Left   Final Result   IMPRESSION:   1.  No deep venous thrombosis in the left lower extremity.             Critical care was not performed.     Medical Decision Making  The patient's presentation was of high complexity (an acute health issue posing potential threat to life or bodily function).    The patient's evaluation involved:  review of external note(s) from 2 sources (see separate area of note for details)  ordering and/or review of 3+ test(s) in this encounter (see separate area of note for details)  review of 3+ test result(s) ordered prior to this encounter (see separate area of note for details)  independent interpretation of testing performed by another health professional (see separate area of note for details)  discussion of management or test interpretation with another health professional (see separate area of note for details)    The patient's management necessitated high risk (a decision regarding hospitalization).      Assessment & Plan   63-year-old female history diabetes opioid dependence history of previous UTIs history of cellulitis presented ER with 1 week of left lower extremity  swelling erythema she is diabetic there is a small lesion at the plantar aspect of the great toe at the MP joint but no purulent drainage noted this could be a source of infection she has had erythema with warmth of the lower distal third of the lower extremity.  No crepitus noted.  Patient is also having UTI symptoms but no back pain.  Patient had hit her head reported this last weekend no headache currently neurologically intact not on anticoagulants.  Patient here in the ER evaluated with labs otherwise relatively stable.  Urinalysis concerning for infection.  Patient x-rays lower extremity negative for any subcu air or foreign body etc. some postoperative changes seen no osteomyelitis seen.  This personally reviewed by myself also.  Ultrasound negative for DVT.  In the ER we will treat for both UTI and cellulitis patient given IV fluids no sign of DKA or hyperglycemia treated with Unasyn and IV fluids IV admitted to ED observation as has been admitted before in the past.  Patient agrees with plan.       I have reviewed the nursing notes. I have reviewed the findings, diagnosis, plan and need for follow up with the patient.    Current Discharge Medication List          Final diagnoses:   Left leg cellulitis   Acute cystitis without hematuria   Type 2 diabetes mellitus without complication, without long-term current use of insulin (H)       Juan Nam MD  [facility] EMERGENCY DEPARTMENT  6/21/2023    This note was created at least in part by the use of dragon voice dictation system. Inadvertent typographical errors may still exist.  Juan Nam MD.    Patient evaluated in the emergency department during the COVID-19 pandemic period. Careful attention to patients safety was addressed throughout the evaluation. Evaluation and treatment management was initiated with disposition made efficiently and appropriate as possible to minimize any risk of potential exposure to patient during this  evaluation.       Juan Nam MD  06/22/23 [id number]

## 2023-06-22 LAB
ANION GAP SERPL CALCULATED.3IONS-SCNC: 9 MMOL/L (ref 7–15)
BUN SERPL-MCNC: 12.7 MG/DL (ref 8–23)
CALCIUM SERPL-MCNC: 8.6 MG/DL (ref 8.8–10.2)
CHLORIDE SERPL-SCNC: 103 MMOL/L (ref 98–107)
CREAT SERPL-MCNC: 0.95 MG/DL (ref 0.51–0.95)
CRP SERPL-MCNC: 8.57 MG/L
DEPRECATED HCO3 PLAS-SCNC: 28 MMOL/L (ref 22–29)
ERYTHROCYTE [DISTWIDTH] IN BLOOD BY AUTOMATED COUNT: 17.5 % (ref 10–15)
GFR SERPL CREATININE-BSD FRML MDRD: 67 ML/MIN/1.73M2
GLUCOSE BLDC GLUCOMTR-MCNC: 106 MG/DL (ref 70–99)
GLUCOSE BLDC GLUCOMTR-MCNC: 122 MG/DL (ref 70–99)
GLUCOSE BLDC GLUCOMTR-MCNC: 144 MG/DL (ref 70–99)
GLUCOSE BLDC GLUCOMTR-MCNC: 145 MG/DL (ref 70–99)
GLUCOSE BLDC GLUCOMTR-MCNC: 86 MG/DL (ref 70–99)
GLUCOSE BLDC GLUCOMTR-MCNC: 95 MG/DL (ref 70–99)
GLUCOSE SERPL-MCNC: 85 MG/DL (ref 70–99)
HCT VFR BLD AUTO: 37.5 % (ref 35–47)
HGB BLD-MCNC: 11.4 G/DL (ref 11.7–15.7)
MCH RBC QN AUTO: 27 PG (ref 26.5–33)
MCHC RBC AUTO-ENTMCNC: 30.4 G/DL (ref 31.5–36.5)
MCV RBC AUTO: 89 FL (ref 78–100)
PLATELET # BLD AUTO: 290 10E3/UL (ref 150–450)
POTASSIUM SERPL-SCNC: 4.5 MMOL/L (ref 3.4–5.3)
RBC # BLD AUTO: 4.22 10E6/UL (ref 3.8–5.2)
SODIUM SERPL-SCNC: 140 MMOL/L (ref 136–145)
WBC # BLD AUTO: 9.9 10E3/UL (ref 4–11)

## 2023-06-22 PROCEDURE — 250N000012 HC RX MED GY IP 250 OP 636 PS 637: Performed by: NURSE PRACTITIONER

## 2023-06-22 PROCEDURE — 250N000013 HC RX MED GY IP 250 OP 250 PS 637: Performed by: FAMILY MEDICINE

## 2023-06-22 PROCEDURE — 36415 COLL VENOUS BLD VENIPUNCTURE: CPT | Performed by: NURSE PRACTITIONER

## 2023-06-22 PROCEDURE — 250N000013 HC RX MED GY IP 250 OP 250 PS 637: Performed by: NURSE PRACTITIONER

## 2023-06-22 PROCEDURE — 99231 SBSQ HOSP IP/OBS SF/LOW 25: CPT | Performed by: PHYSICIAN ASSISTANT

## 2023-06-22 PROCEDURE — 80048 BASIC METABOLIC PNL TOTAL CA: CPT | Performed by: NURSE PRACTITIONER

## 2023-06-22 PROCEDURE — 96376 TX/PRO/DX INJ SAME DRUG ADON: CPT

## 2023-06-22 PROCEDURE — G0378 HOSPITAL OBSERVATION PER HR: HCPCS

## 2023-06-22 PROCEDURE — 82962 GLUCOSE BLOOD TEST: CPT

## 2023-06-22 PROCEDURE — 250N000011 HC RX IP 250 OP 636: Performed by: NURSE PRACTITIONER

## 2023-06-22 PROCEDURE — 85027 COMPLETE CBC AUTOMATED: CPT | Performed by: NURSE PRACTITIONER

## 2023-06-22 PROCEDURE — 86140 C-REACTIVE PROTEIN: CPT | Performed by: NURSE PRACTITIONER

## 2023-06-22 RX ORDER — GABAPENTIN 300 MG/1
900 CAPSULE ORAL 3 TIMES DAILY
Status: DISCONTINUED | OUTPATIENT
Start: 2023-06-22 | End: 2023-06-23 | Stop reason: HOSPADM

## 2023-06-22 RX ORDER — NICOTINE POLACRILEX 4 MG
15-30 LOZENGE BUCCAL
Status: DISCONTINUED | OUTPATIENT
Start: 2023-06-22 | End: 2023-06-23 | Stop reason: HOSPADM

## 2023-06-22 RX ORDER — ACETAMINOPHEN 325 MG/1
650 TABLET ORAL 2 TIMES DAILY PRN
Status: DISCONTINUED | OUTPATIENT
Start: 2023-06-22 | End: 2023-06-23 | Stop reason: HOSPADM

## 2023-06-22 RX ORDER — DULOXETIN HYDROCHLORIDE 20 MG/1
20 CAPSULE, DELAYED RELEASE ORAL DAILY
Status: DISCONTINUED | OUTPATIENT
Start: 2023-06-22 | End: 2023-06-23 | Stop reason: HOSPADM

## 2023-06-22 RX ORDER — NALOXONE HYDROCHLORIDE 0.4 MG/ML
0.2 INJECTION, SOLUTION INTRAMUSCULAR; INTRAVENOUS; SUBCUTANEOUS
Status: DISCONTINUED | OUTPATIENT
Start: 2023-06-22 | End: 2023-06-23 | Stop reason: HOSPADM

## 2023-06-22 RX ORDER — NALOXONE HYDROCHLORIDE 0.4 MG/ML
0.4 INJECTION, SOLUTION INTRAMUSCULAR; INTRAVENOUS; SUBCUTANEOUS
Status: DISCONTINUED | OUTPATIENT
Start: 2023-06-22 | End: 2023-06-23 | Stop reason: HOSPADM

## 2023-06-22 RX ORDER — GABAPENTIN 300 MG/1
900 CAPSULE ORAL 3 TIMES DAILY
COMMUNITY

## 2023-06-22 RX ORDER — NICOTINE 21 MG/24HR
1 PATCH, TRANSDERMAL 24 HOURS TRANSDERMAL DAILY
Status: DISCONTINUED | OUTPATIENT
Start: 2023-06-22 | End: 2023-06-23 | Stop reason: HOSPADM

## 2023-06-22 RX ORDER — FUROSEMIDE 20 MG
20 TABLET ORAL DAILY
Status: DISCONTINUED | OUTPATIENT
Start: 2023-06-22 | End: 2023-06-23 | Stop reason: HOSPADM

## 2023-06-22 RX ORDER — LANOLIN ALCOHOL/MO/W.PET/CERES
1000 CREAM (GRAM) TOPICAL DAILY
Status: DISCONTINUED | OUTPATIENT
Start: 2023-06-22 | End: 2023-06-23 | Stop reason: HOSPADM

## 2023-06-22 RX ORDER — METHADONE HYDROCHLORIDE 10 MG/ML
90 CONCENTRATE ORAL DAILY
Status: DISCONTINUED | OUTPATIENT
Start: 2023-06-22 | End: 2023-06-23 | Stop reason: HOSPADM

## 2023-06-22 RX ORDER — DEXTROSE MONOHYDRATE 25 G/50ML
25-50 INJECTION, SOLUTION INTRAVENOUS
Status: DISCONTINUED | OUTPATIENT
Start: 2023-06-22 | End: 2023-06-23 | Stop reason: HOSPADM

## 2023-06-22 RX ORDER — PANTOPRAZOLE SODIUM 40 MG/1
40 TABLET, DELAYED RELEASE ORAL
Status: DISCONTINUED | OUTPATIENT
Start: 2023-06-22 | End: 2023-06-23 | Stop reason: HOSPADM

## 2023-06-22 RX ORDER — LEVOTHYROXINE SODIUM 100 UG/1
200 TABLET ORAL
Status: DISCONTINUED | OUTPATIENT
Start: 2023-06-22 | End: 2023-06-23 | Stop reason: HOSPADM

## 2023-06-22 RX ORDER — ONDANSETRON 2 MG/ML
4 INJECTION INTRAMUSCULAR; INTRAVENOUS EVERY 6 HOURS PRN
Status: DISCONTINUED | OUTPATIENT
Start: 2023-06-22 | End: 2023-06-23 | Stop reason: HOSPADM

## 2023-06-22 RX ORDER — NYSTATIN 100000 U/G
CREAM TOPICAL 2 TIMES DAILY
Status: DISCONTINUED | OUTPATIENT
Start: 2023-06-23 | End: 2023-06-23 | Stop reason: HOSPADM

## 2023-06-22 RX ORDER — HYDROXYZINE HYDROCHLORIDE 50 MG/1
100 TABLET, FILM COATED ORAL EVERY 6 HOURS PRN
Status: DISCONTINUED | OUTPATIENT
Start: 2023-06-22 | End: 2023-06-23 | Stop reason: HOSPADM

## 2023-06-22 RX ORDER — GABAPENTIN 300 MG/1
300 CAPSULE ORAL 3 TIMES DAILY
Status: DISCONTINUED | OUTPATIENT
Start: 2023-06-22 | End: 2023-06-22

## 2023-06-22 RX ORDER — LIDOCAINE 40 MG/G
CREAM TOPICAL
Status: DISCONTINUED | OUTPATIENT
Start: 2023-06-22 | End: 2023-06-23 | Stop reason: HOSPADM

## 2023-06-22 RX ORDER — POTASSIUM CHLORIDE 600 MG/1
8 TABLET, FILM COATED, EXTENDED RELEASE ORAL 2 TIMES DAILY
Status: DISCONTINUED | OUTPATIENT
Start: 2023-06-22 | End: 2023-06-23 | Stop reason: HOSPADM

## 2023-06-22 RX ORDER — AMLODIPINE BESYLATE 5 MG/1
5 TABLET ORAL DAILY
Status: DISCONTINUED | OUTPATIENT
Start: 2023-06-22 | End: 2023-06-23 | Stop reason: HOSPADM

## 2023-06-22 RX ORDER — METHADONE HYDROCHLORIDE 10 MG/1
80 TABLET ORAL DAILY
Status: DISCONTINUED | OUTPATIENT
Start: 2023-06-22 | End: 2023-06-22 | Stop reason: DRUGHIGH

## 2023-06-22 RX ORDER — ASPIRIN 81 MG/1
81 TABLET, CHEWABLE ORAL DAILY
Status: DISCONTINUED | OUTPATIENT
Start: 2023-06-22 | End: 2023-06-23 | Stop reason: HOSPADM

## 2023-06-22 RX ORDER — ATORVASTATIN CALCIUM 20 MG/1
20 TABLET, FILM COATED ORAL AT BEDTIME
Status: DISCONTINUED | OUTPATIENT
Start: 2023-06-22 | End: 2023-06-23 | Stop reason: HOSPADM

## 2023-06-22 RX ORDER — ALBUTEROL SULFATE 0.83 MG/ML
2.5 SOLUTION RESPIRATORY (INHALATION)
Status: DISCONTINUED | OUTPATIENT
Start: 2023-06-22 | End: 2023-06-23 | Stop reason: HOSPADM

## 2023-06-22 RX ORDER — METHADONE HYDROCHLORIDE 5 MG/1
5 TABLET ORAL EVERY 8 HOURS
Status: DISCONTINUED | OUTPATIENT
Start: 2023-06-22 | End: 2023-06-22

## 2023-06-22 RX ORDER — ONDANSETRON 4 MG/1
4 TABLET, ORALLY DISINTEGRATING ORAL EVERY 6 HOURS PRN
Status: DISCONTINUED | OUTPATIENT
Start: 2023-06-22 | End: 2023-06-23 | Stop reason: HOSPADM

## 2023-06-22 RX ORDER — POLYETHYLENE GLYCOL 3350 17 G/17G
17 POWDER, FOR SOLUTION ORAL DAILY PRN
Status: DISCONTINUED | OUTPATIENT
Start: 2023-06-22 | End: 2023-06-23 | Stop reason: HOSPADM

## 2023-06-22 RX ORDER — VITAMIN B COMPLEX
25 TABLET ORAL DAILY
Status: DISCONTINUED | OUTPATIENT
Start: 2023-06-22 | End: 2023-06-23 | Stop reason: HOSPADM

## 2023-06-22 RX ORDER — SPIRONOLACTONE 25 MG/1
25 TABLET ORAL DAILY
Status: DISCONTINUED | OUTPATIENT
Start: 2023-06-22 | End: 2023-06-23 | Stop reason: HOSPADM

## 2023-06-22 RX ORDER — METHADONE HYDROCHLORIDE 10 MG/1
20 TABLET ORAL ONCE
Status: DISCONTINUED | OUTPATIENT
Start: 2023-06-22 | End: 2023-06-22

## 2023-06-22 RX ORDER — AMPICILLIN AND SULBACTAM 2; 1 G/1; G/1
3 INJECTION, POWDER, FOR SOLUTION INTRAMUSCULAR; INTRAVENOUS EVERY 6 HOURS
Status: DISCONTINUED | OUTPATIENT
Start: 2023-06-22 | End: 2023-06-23

## 2023-06-22 RX ADMIN — SPIRONOLACTONE 25 MG: 25 TABLET, FILM COATED ORAL at 09:20

## 2023-06-22 RX ADMIN — ACETAMINOPHEN 650 MG: 325 TABLET, FILM COATED ORAL at 21:20

## 2023-06-22 RX ADMIN — METHADONE HYDROCHLORIDE 90 MG: 10 CONCENTRATE ORAL at 09:46

## 2023-06-22 RX ADMIN — AMPICILLIN SODIUM AND SULBACTAM SODIUM 3 G: 2; 1 INJECTION, POWDER, FOR SOLUTION INTRAMUSCULAR; INTRAVENOUS at 09:19

## 2023-06-22 RX ADMIN — FUROSEMIDE 20 MG: 20 TABLET ORAL at 09:21

## 2023-06-22 RX ADMIN — Medication 25 MCG: at 09:22

## 2023-06-22 RX ADMIN — INSULIN DETEMIR 15 UNITS: 100 INJECTION, SOLUTION SUBCUTANEOUS at 09:19

## 2023-06-22 RX ADMIN — CYANOCOBALAMIN TAB 1000 MCG 1000 MCG: 1000 TAB at 09:22

## 2023-06-22 RX ADMIN — ATORVASTATIN CALCIUM 20 MG: 20 TABLET, FILM COATED ORAL at 21:20

## 2023-06-22 RX ADMIN — AMPICILLIN SODIUM AND SULBACTAM SODIUM 3 G: 2; 1 INJECTION, POWDER, FOR SOLUTION INTRAMUSCULAR; INTRAVENOUS at 03:51

## 2023-06-22 RX ADMIN — DULOXETINE HYDROCHLORIDE 20 MG: 20 CAPSULE, DELAYED RELEASE ORAL at 09:22

## 2023-06-22 RX ADMIN — GABAPENTIN 900 MG: 300 CAPSULE ORAL at 09:22

## 2023-06-22 RX ADMIN — AMLODIPINE BESYLATE 5 MG: 5 TABLET ORAL at 09:20

## 2023-06-22 RX ADMIN — LEVOTHYROXINE SODIUM 200 MCG: 100 TABLET ORAL at 08:30

## 2023-06-22 RX ADMIN — GABAPENTIN 900 MG: 300 CAPSULE ORAL at 14:50

## 2023-06-22 RX ADMIN — SACUBITRIL AND VALSARTAN 1 TABLET: 49; 51 TABLET, FILM COATED ORAL at 21:20

## 2023-06-22 RX ADMIN — ASPIRIN 81 MG CHEWABLE TABLET 81 MG: 81 TABLET CHEWABLE at 09:20

## 2023-06-22 RX ADMIN — GABAPENTIN 900 MG: 300 CAPSULE ORAL at 21:20

## 2023-06-22 RX ADMIN — POTASSIUM CHLORIDE 8 MEQ: 600 TABLET, FILM COATED, EXTENDED RELEASE ORAL at 21:21

## 2023-06-22 RX ADMIN — AMPICILLIN SODIUM AND SULBACTAM SODIUM 3 G: 2; 1 INJECTION, POWDER, FOR SOLUTION INTRAMUSCULAR; INTRAVENOUS at 14:51

## 2023-06-22 RX ADMIN — ATORVASTATIN CALCIUM 20 MG: 20 TABLET, FILM COATED ORAL at 01:01

## 2023-06-22 RX ADMIN — POTASSIUM CHLORIDE 8 MEQ: 600 TABLET, FILM COATED, EXTENDED RELEASE ORAL at 09:22

## 2023-06-22 RX ADMIN — MAGNESIUM OXIDE TAB 400 MG (241.3 MG ELEMENTAL MG) 200 MG: 400 (241.3 MG) TAB at 09:22

## 2023-06-22 RX ADMIN — PANTOPRAZOLE SODIUM 40 MG: 40 TABLET, DELAYED RELEASE ORAL at 09:21

## 2023-06-22 RX ADMIN — ACETAMINOPHEN 650 MG: 325 TABLET, FILM COATED ORAL at 15:32

## 2023-06-22 RX ADMIN — AMPICILLIN SODIUM AND SULBACTAM SODIUM 3 G: 2; 1 INJECTION, POWDER, FOR SOLUTION INTRAMUSCULAR; INTRAVENOUS at 21:21

## 2023-06-22 RX ADMIN — SACUBITRIL AND VALSARTAN 1 TABLET: 49; 51 TABLET, FILM COATED ORAL at 09:22

## 2023-06-22 RX ADMIN — INSULIN ASPART 1 UNITS: 100 INJECTION, SOLUTION INTRAVENOUS; SUBCUTANEOUS at 17:58

## 2023-06-22 ASSESSMENT — ACTIVITIES OF DAILY LIVING (ADL)
ADLS_ACUITY_SCORE: 35
ADLS_ACUITY_SCORE: 33
ADLS_ACUITY_SCORE: 31
ADLS_ACUITY_SCORE: 31
ADLS_ACUITY_SCORE: 35
ADLS_ACUITY_SCORE: 31
ADLS_ACUITY_SCORE: 35
ADLS_ACUITY_SCORE: 35
ADLS_ACUITY_SCORE: 33

## 2023-06-22 NOTE — PLAN OF CARE
Observation Goals        - Tolerating oral antibiotics or has plans for home infusion set up. Not Met  - Vital signs normal or at patient baseline: Met  - Adequate pain control on oral analgesia. Met  - Infection is improving- In progress   - Color, warmth, movement, sensation of affected area or limb is intact or at baseline: Not Met  - Return to baseline functional status. Not Met  - Safe disposition plan has been identified: Not Met

## 2023-06-22 NOTE — PROGRESS NOTES
ED OBSERVATION PROGRESS NOTE:  S:No Yusuf is a 62 year old female with PMH significant for insulin-dependent DM2, heart failure with midrange reduced EF, HTN, HLD, COVID-pneumonia c/b torsades de pointes arrest, bipolar disorder, polysubstance use on methadone, hypothyroidism, and chronic hepatitis C who presents to the ED with leg swelling for one week, fall last Saturday and hitting her head and urinary symptoms.     Chief Complaint   Patient presents with     Leg Swelling     Fall     Pt had the leg swelling for over a week and redness started 7 days ago. Pt said it is painful. Pt fell last Saturday and an end table hit her forehead, had headache, and took tylenol. Denies loss of consciousness. Pt thinks she has UTI.      1. Left leg cellulitis    2. Acute cystitis without hematuria    3. Type 2 diabetes mellitus without complication, without long-term current use of insulin (H)        Problem List:  Patient Active Problem List   Diagnosis     Pain in soft tissues of limb     Lymphedema of both lower extremities     Chronic venous insufficiency of lower extremity     Edema of both legs     Lymphedema     Cellulitis     Lower extremity edema     Backache     Benign essential hypertension     Chronic low back pain     Cocaine use disorder, severe, in early remission (H)     Episodic mood disorder (H)     GERD (gastroesophageal reflux disease)     Hemorrhoids     History of sudden cardiac arrest     Hyperglycemia     Hypothyroidism     Knee pain, bilateral     Thoracic or lumbosacral neuritis or radiculitis, unspecified     Methamphetamine use disorder, severe, in early remission (H)     Myalgia and myositis     Opioid type dependence, abuse (H)     Opioid abuse, in remission (H)     Osteoarthrosis     Severe obesity (H)     Tobacco abuse     Type 2 diabetes mellitus, without long-term current use of insulin (H)     Vitamin D deficiency     Hepatitis C, chronic (H)     Acute respiratory failure with  "hypoxia and hypercapnia (H)     Infection due to 2019 novel coronavirus     Anxiety disorder     Chronic bilateral back pain     Chronic pain disorder     Pneumonia due to COVID-19 virus     Acute respiratory failure with hypoxia (H)     Bradycardia     Torsades de pointes (H)     Ventricular tachycardia (H)     Acute respiratory failure due to COVID-19 (H)     History of cardiac arrest     Chest pain, unspecified type     History of torsades de pointe due to drug     Left leg cellulitis     Acute cystitis without hematuria     Type 2 diabetes mellitus without complication, without long-term current use of insulin (H)       MEDS:   No current outpatient medications on file.       ALLERGIES:    Allergies   Allergen Reactions     Promethazine Other (See Comments) and Anxiety     Noted in 8/30/08 ER     Codeine Phosphate GI Disturbance     Lamotrigine Other (See Comments)     hyponatremia     Oxcarbazepine Unknown and Other (See Comments)     Low sodium  [facility]acyRecord#[id number]       Codeine Other (See Comments) and Rash     GI bleeding  [employer]Record#[id number]         O:/70 (BP Location: Right arm)   Pulse 63   Temp 98.5  F (36.9  C) (Oral)   Resp 18   Ht 1.651 m (5' 5\")   Wt 113.4 kg (250 lb)   LMP 11/01/2013   SpO2 96%   BMI 41.60 kg/m      Physical Exam   Constitutional: Pt is oriented to person, place, and time.Pt appears well-developed and well-nourished.   HENT:   Head: Normocephalic and atraumatic.   Eyes: Conjunctivae are normal. Pupils are equal, round, and reactive to light.   Neck: Normal range of motion. Neck supple.   Cardiovascular: Normal rate, regular rhythm, normal heart sounds and intact distal pulses.    Pulmonary/Chest: Effort normal and breath sounds normal. No respiratory distress. Pt has no wheezes. Pt has no rales  Abdominal: Soft. Bowel sounds are normal. Pt exhibits no distension and no mass. No tenderness. Pt has no rebound and no guarding.   Musculoskeletal: Normal range of motion. Pt " exhibits no edema.   Neurological: Pt is alert and oriented to person, place, and time. Normal reflexes.   Skin: Skin is warm and dry. No rash noted.   Psychiatric: Pt has a normal mood and affect. Behavior is normal. Judgment and thought content normal.       Assessment/Plan:     No Yusuf is a 62 year old female with PMH significant for insulin-dependent DM2, heart failure with midrange reduced EF, HTN, HLD, COVID-pneumonia c/b torsades de pointes arrest, bipolar disorder, polysubstance use on methadone, hypothyroidism, and chronic hepatitis C who presents to the ED with leg swelling for one week, fall last Saturday and hitting her head and urinary symptoms.      # Cellulitis of the left lower extremity   # Hx of cellulitis  # BLE Edema  In ED, VSS, afebrile. Labs show normal CMP creatinine slightly elevated at 1.08, glucose 110, albumin 3.6 otherwise normal. WBC 12.7 on admssion, today 9.9. Hgb stable at 11.4,  CRP 8.57, INR 0.96,   Blood cultures with prelim negative thus far. UA with negative ketones, positive nitrites, Large LE indicating infection. UC growing E.coli. US bilateral LE negative for DVT. Xray of the the Tib/fib and left foot Postoperative changes to the medial and lateral malleoli. No evidence for acute fracture. Severe degenerative change at the tibiotalar joint. Tibia and fibula are otherwise negative. Left foot negative for acute fracture. Additional degenerative change at the first MTP joint. No evidence for foreign body. No radiographic evidence for osteomyelitis. She is on Unasyn and responding well. No fever, abdominal pain, nausea or vomiting. Overall clinically improving. Anticipate discharge tomorrow with oral antibiotic.   - Continued Unasyn   - Follow BC  - Elevate leg.  - Continue PTA Lasix 20mg daily,   - Follow daily CBC, inflammatory markers      # UTI  # Mild Acute kidney injury,   Mild possible due to dehydration. UA  negative ketones, positive nitrites, Large LE, UC  growing E. Coli. Susceptibility pending. On Unasyn. Will continue with IV antibiotic and transition to oral tomorrow.     - Follow UC  - Continue Unasyn     # Diabetes mellitus Last HbA1c 6/21/23 6.2.  - Hold home Metformin and Jardiance while on obs  - Continue PTA Levemir to 15 units  - Continue insulin sliding scale  - Continue BG checks TID and HS  - Hypoglycemic protocol     # Fall with head trauma   # Heart failure with midrange reduced EF   # Hx of COVID-pneumonia c/b torsades de pointes arrest   Fell 7 days ago, denies any dizziness or lightheadness.Last echo 2/2022 showed Mildly decreased left ventricular systolic function The visual ejection fraction is 40-45%. There is mild global hypokinesia of the left ventricle.. MRI cardiac stress completed 8/2022. The LV is normal in cavity size and wall thickness. The global systolic function is normal. The LVEF is 76%. There are no regional wall motion abnormalities. Reports taking tylenol as needed for headache  - Continue Tylenol as needed.   - Continuous cardiac monitoring      # History of opioid abuse: Continue prior to admission methadone.      # Hypothyroidism: Continue prior to admission levothyroxine  # GERD continue prior admission omeprazole  # Depression continue prior to admission duloxetine and hydroxyzine  # Insomnia PTA Melatonin prn  # Chronic neuropathic pain due to diabetes: Continue admission gabapentin and duloxetine.        # Hep C: not on treatment, [place] [facility] [place] HCV RNA Quant not detected on 8/1/22, hep C genotyping was indeterminate.      Consults: Low threshold   FEN: Regular   DVT prophylaxis: Early ambulation  Code Status: Full  Disposition: Stable vital signs. Patient return to baseline.  All labs/images reviewed    Signed:  Bianka Kelly PA-C  June 22, 2023 at 3:10 PM

## 2023-06-22 NOTE — PROGRESS NOTES
Pt arrived to Observation unit. Right PIV SL  Left leg redness note able and intermittent pain tingling and numbness in left leg. Trace +1 edema to left leg

## 2023-06-22 NOTE — PHARMACY
Opioid Treatment Program ([facility]) Information Note      Treatment [place] name[place] [facility]   Location (city): [place]   Phone number: [phone]              Spoke with: Leah      Patient's current methadone dose verified as: 90 mg PO daily   Last dose was administered on: Last seen in clinic 6/8 and given take home doses through 6/21. Patient is due back in clinic for additional doses today, 6/22   Take-home dose information (if applicable): see above      Note(s): Treatment programs in [place] dispense methadone using the 10 mg/ml oral concentrate.      Galilea Leslie, PharmD, BCPS

## 2023-06-22 NOTE — PROGRESS NOTES
OBS  Tolerating oral antibiotics or has plans for home infusion set up. Not Met    - Vital signs normal or at patient baseline: Soft BP's intermittently  - Adequate pain control on oral analgesia. In progress  - Infection is improving- In progress   - Color, warmth, movement, sensation of affected area or limb is intact or at baseline Not Met  - Return to baseline functional status. Not MET  - Safe disposition plan has been identified, Not Met    Status: a 62 year old female with PMH significant for insulin-dependent DM2, heart failure with midrange reduced EF, HTN, HLD, COVID-pneumonia c/b torsades de pointes arrest, bipolar disorder, polysubstance use on methadone, hypothyroidism, and chronic hepatitis C who presents to the ED with leg swelling for one week, fall last Saturday and hitting her head and urinary symptoms.       Aox4. Patient is on cardiac monitoring. Normal sinus rhythm. Patient has been voiding spontaneously. Patient is up ad jose luis despite intermittently having left leg pain. Patient was able to ambulate to bathroom. Patient received and tolerated Unasyn overnight. Patient received Lipitor and tolerated well. VSS and BS is within normal range overnight. Old incisional scar underneath navel healed. LLE trace +1 numbness and tingling in extremities. Patient has history of fibromyalgia reports feeling slight dull pain after being awake.

## 2023-06-22 NOTE — PROGRESS NOTES
Tolerating oral antibiotics or has plans for home infusion set up. Not Met    - Vital signs normal or at patient baseline: Soft BP's intermittently  - Adequate pain control on oral analgesia. In progress  - Infection is improving- In progress   - Color, warmth, movement, sensation of affected area or limb is intact or at baseline Not Met  - Return to baseline functional status. Not MET  - Safe disposition plan has been identified, Not Met

## 2023-06-22 NOTE — H&P
[facility] ED Observation Admission Note    Chief Complaint   Patient presents with    Leg Swelling    Fall     Pt had the leg swelling for over a week and redness started 7 days ago. Pt said it is painful. Pt fell last Saturday and an end table hit her forehead, had headache, and took tylenol. Denies loss of consciousness. Pt thinks she has UTI.        Assessment/Plan:    No Yusuf is a 62 year old female with PMH significant for insulin-dependent DM2, heart failure with midrange reduced EF, HTN, HLD, COVID-pneumonia c/b torsades de pointes arrest, bipolar disorder, polysubstance use on methadone, hypothyroidism, and chronic hepatitis C who presents to the ED with leg swelling for one week, fall last Saturday and hitting her head and urinary symptoms.     # Cellulitis of the left lower extremity   # Hx of cellulitis  # BLE Edema  In ED, VSS, afebrile. Labs show normal CMP creatinine slightly elevated at 1.08, glucose 110, albumin 3.6 otherwise normal. CBC WBC 12.7, Hgb stable at 12.1,  CRP 9.25, INR 0.96,   Blood cultures drawn and pending. UA with negative ketones, positive nitrites, Large LE, UC pending.  US bilateral LE negative for DVT. Xray of the the Tib/fib and left foot Postoperative changes to the medial and lateral malleoli. No evidence for acute fracture. Severe degenerative change at the tibiotalar joint. Tibia and fibula are otherwise negative. Left foot negative for acute fracture. Additional degenerative change at the first MTP joint. No evidence for foreign body. No radiographic evidence for osteomyelitis. In ED patient given 1 liter l NS bolus, Unasyn 3gm IV x 1.   - Continued Unasyn   - Follow UC/BC  - Elevate leg.  - Continue PTA Lasix 20mg daily,   - Follow daily CBC, inflammatory markers     # Fall with head trauma   # Heart failure with midrange reduced EF   # Hx of COVID-pneumonia c/b torsades de pointes arrest   Fell 7 days ago, denies any dizziness or lightheadness.Last echo 2/2022  "showed Mildly decreased left ventricular systolic function The visual ejection fraction is 40-45%. There is mild global hypokinesia of the left ventricle.. MRI cardiac stress completed 8/2022. The LV is normal in cavity size and wall thickness. The global systolic function is normal. The LVEF is 76%. There are no regional wall motion abnormalities. Reports taking tylenol as needed for headache  - continue Tylenol as needed.   - Continuous cardiac monitoring     # UTI  # Mild Acute kidney injury,   Mild possible due to dehydration. UA  negative ketones, positive nitrites, Large LE, UC pending. Received Unasyn in the ED.  - follow BC and UC  - Continue Unasyn     # Diabetes mellitus Last HbA1c 6/21/23 6.2.  - Hold home Metformin and Jardiance while on obs  - Continue PTA Levemir to 15 units  - Continue insulin sliding scale  - Continue BG checks TID and HS  - Hypoglycemic protocol      # History of opioid abuse: Continue prior to admission methadone.      # Hypothyroidism: Continue prior to admission levothyroxine  # GERD continue prior admission omeprazole  # Depression continue prior to admission duloxetine and hydroxyzine  # Insomnia PTA Melatonin prn  # Chronic neuropathic pain due to diabetes: Continue admission gabapentin and duloxetine.        # Hep C: not on treatment, followed at [facility] [place]  -per care everywhere HCV RNA Quant not detected on 8/1/22, hep C genotyping was indeterminate.    HPI:  Per ED note, \"No Yusuf is a 63 year old female with history of type 2 diabetes, prior cystitis, prior heart failure (last LVEF 76% on 8/22/22 on MRI cardiac), HTN, HLD, COVID-pneumonia c/b torsades de pointes arrest, bipolar disorder, polysubstance use on methadone, hypothyroidism, and chronic hepatitis C who presents with leg swelling, redness and pain for the past week, head injury 5 days ago, and UTI symptoms.  She fell last Saturday and hit her head against an end table.  She developed a headache with this and " "has been taking Tylenol.  No loss of consciousness with this.  In addition patient is concerned that she has a UTI.\"    History:    Past Medical History:   Diagnosis Date    Arthritis     Bipolar affective (H)     Fibromyalgia     Hypertension        Past Surgical History:   Procedure Laterality Date    CHOLECYSTECTOMY      GYN SURGERY      c section    GYN SURGERY      oblation    ORTHOPEDIC SURGERY      left leg, left shoulder, back       Family History   Problem Relation Age of Onset    Heart Disease Mother     Diabetes Mother     Ovarian Cancer Mother     Heart Disease Father     Lung Cancer Father     Diabetes Sister     Ovarian Cancer Sister     Diabetes Brother        Social History     Socioeconomic History    Marital status: Single     Spouse name: Not on file    Number of children: Not on file    Years of education: Not on file    Highest education level: Not on file   Occupational History    Not on file   Tobacco Use    Smoking status: Former     Packs/day: 0.10     Types: Cigarettes    Smokeless tobacco: Never    Tobacco comments:     Using nicotine vape occasionally   Substance and Sexual Activity    Alcohol use: No    Drug use: No    Sexual activity: Not Currently   Other Topics Concern    Not on file   Social History Narrative    Not on file     Social Determinants of Health     Financial Resource Strain: Not on file   Food Insecurity: Not on file   Transportation Needs: Not on file   Physical Activity: Not on file   Stress: Not on file   Social Connections: Not on file   Intimate Partner Violence: Not on file   Housing Stability: Not on file       No current facility-administered medications on file prior to encounter.  acetaminophen (TYLENOL) 325 MG tablet, Take 2 tablets (650 mg) by mouth 2 times daily as needed for mild pain  Acidophilus Lactobacillus CAPS, Take 1 tablet by mouth 3 times daily For gut health (Lactobacillus)  albuterol (PROAIR HFA/PROVENTIL HFA/VENTOLIN HFA) 108 (90 Base) MCG/ACT " inhaler, Inhale 2 puffs into the lungs every 4 hours as needed for shortness of breath / dyspnea or wheezing  aspirin (ASA) 81 MG chewable tablet, Take 1 tablet (81 mg) by mouth daily  atorvastatin (LIPITOR) 20 MG tablet, Take 1 tablet (20 mg) by mouth At Bedtime  blood glucose (ACCU-CHEK RAFAELA PLUS) test strip, Use to check blood sugar 3x daily or as directed.  Blood Glucose Calibration (ACCU-CHEK ACTIVE GLUCOSE CONT VI), 1 each by Other route  blood glucose calibration (ACCU-CHEK RAFAELA) solution,   calcium carbonate antacid 1000 MG CHEW, Take 1 chew tab by mouth every 6 hours as needed for heartburn  cholecalciferol 25 MCG (1000 UT) TABS, Take 1,000 Units by mouth daily   diclofenac (VOLTAREN) 1 % topical gel, Apply 2-4 g topically 2 times daily  DULoxetine (CYMBALTA) 20 MG capsule, Take 1 capsule (20 mg) by mouth daily  empagliflozin (JARDIANCE) 10 MG TABS tablet, Take 1 tablet (10 mg) by mouth daily  fluticasone-salmeterol (ADVAIR) 250-50 MCG/DOSE inhaler, Inhale 1 puff into the lungs 2 times daily  furosemide (LASIX) 20 MG tablet, Take 20 mg by mouth daily  gabapentin (NEURONTIN) 100 MG capsule, Take 100 mg by mouth 3 times daily  hydrOXYzine (ATARAX) 50 MG tablet, Take 100 mg by mouth every 6 hours as needed for anxiety  insulin detemir (LEVEMIR PEN) 100 UNIT/ML pen, Inject 15 Units Subcutaneous every morning  insulin glargine (LANTUS PEN) 100 UNIT/ML pen, Inject 15 Units Subcutaneous At Bedtime  levothyroxine (SYNTHROID/LEVOTHROID) 200 MCG tablet, Take 200 mcg by mouth daily   melatonin 5 MG tablet, Take 5 mg by mouth At Bedtime  metFORMIN (GLUCOPHAGE) 1000 MG tablet, Take 1,000 mg by mouth 2 times daily (with meals)  methadone (DOLOPHINE) 10 MG tablet, Take 20 mg by mouth once Per patient, has been on methadone for several years in the past but off recently. Per patient, yesterday 3/24 she met with Debora from [facility] addiction medicine and was planning to restart this. Sounds like she may have received a one  time 20 mg dose 3/24. Unclear what the follow up plan was. Patient mentioned possibly having a follow up appointment next Tuesday 3/29.  multivitamin w/minerals (THERA-VIT-M) tablet, Take 1 tablet by mouth daily  NARCAN 4 MG/0.1ML nasal spray, CALL 911. SPRAY CONTENTS OF ONE SPRAYER (0.1ML) INTO ONE NOSTRIL. REPEAT IN 2-3 MINS IF SYMPTOMS OF OPIOID PERSIST, ALTERNATE NOSTRILS  nicotine polacrilex (NICORETTE) 4 MG gum, Place 4 mg inside cheek every hour as needed for smoking cessation   nystatin (NYSTOP) 367709 UNIT/GM external powder, SMARTSI Application Topical 2-3 Times Daily  omeprazole (PRILOSEC) 20 MG DR capsule, Take 20 mg by mouth daily   ondansetron (ZOFRAN-ODT) 4 MG ODT tab, Take 4 mg by mouth every 8 hours as needed for nausea  polyethylene glycol (MIRALAX) 17 GM/Dose powder, Take 17 g by mouth daily as needed for constipation  potassium chloride ER (KLOR-CON) 8 MEQ CR tablet, Take 8 mEq by mouth 2 times daily  sacubitril-valsartan (ENTRESTO) 49-51 MG per tablet, Take 1 tablet by mouth 2 times daily  spironolactone (ALDACTONE) 25 MG tablet, Take 1 tablet (25 mg) by mouth daily  vitamin B-12 (CYANOCOBALAMIN) 1000 MCG tablet, Take 1,000 mcg by mouth daily        Data:    Results for orders placed or performed during the hospital encounter of 23   US Lower Extremity Venous Duplex Left     Status: None    Narrative    EXAM: US LOWER EXTREMITY VENOUS DUPLEX LEFT  LOCATION: [facility]  DATE: 2023    INDICATION: Swelling and pain, redness  COMPARISON: None.  TECHNIQUE: Venous Duplex ultrasound of the left lower extremity with and without compression, augmentation and duplex. Color flow and spectral Doppler with waveform analysis performed.    FINDINGS: Exam includes the common femoral, femoral, popliteal, and contralateral common femoral veins as well as segmentally visualized deep calf veins and greater saphenous vein.     LEFT: No deep vein thrombosis.  No superficial thrombophlebitis. No popliteal cyst.      Impression    IMPRESSION:  1.  No deep venous thrombosis in the left lower extremity.   UA with Microscopic reflex to Culture     Status: Abnormal    Specimen: Urine, Clean Catch   Result Value Ref Range    Color Urine Light Yellow Colorless, Straw, Light Yellow, Yellow    Appearance Urine Slightly Cloudy (A) Clear    Glucose Urine >=1000 (A) Negative mg/dL    Bilirubin Urine Negative Negative    Ketones Urine Negative Negative mg/dL    Specific Gravity Urine 1.019 1.003 - 1.035    Blood Urine Negative Negative    pH Urine 7.0 5.0 - 7.0    Protein Albumin Urine Negative Negative mg/dL    Urobilinogen Urine Normal Normal, 2.0 mg/dL    Nitrite Urine Positive (A) Negative    Leukocyte Esterase Urine Large (A) Negative    Mucus Urine Present (A) None Seen /LPF    RBC Urine 1 <=2 /HPF    WBC Urine 172 (H) <=5 /HPF    Squamous Epithelials Urine 2 (H) <=1 /HPF    Narrative    Urine Culture ordered based on laboratory criteria   Comprehensive metabolic panel     Status: Abnormal   Result Value Ref Range    Sodium 137 136 - 145 mmol/L    Potassium 4.4 3.4 - 5.3 mmol/L    Chloride 98 98 - 107 mmol/L    Carbon Dioxide (CO2) 28 22 - 29 mmol/L    Anion Gap 11 7 - 15 mmol/L    Urea Nitrogen 13.1 8.0 - 23.0 mg/dL    Creatinine 1.08 (H) 0.51 - 0.95 mg/dL    Calcium 9.3 8.8 - 10.2 mg/dL    Glucose 110 (H) 70 - 99 mg/dL    Alkaline Phosphatase 133 (H) 35 - 104 U/L    AST 26 0 - 45 U/L    ALT 24 0 - 50 U/L    Protein Total 7.9 6.4 - 8.3 g/dL    Albumin 3.6 3.5 - 5.2 g/dL    Bilirubin Total 0.3 <=1.2 mg/dL    GFR Estimate 58 (L) >60 mL/min/1.73m2   CRP inflammation     Status: Abnormal   Result Value Ref Range    CRP Inflammation 9.25 (H) <5.00 mg/L   INR     Status: Normal   Result Value Ref Range    INR 0.96 0.85 - 1.15   Partial thromboplastin time     Status: Normal   Result Value Ref Range    aPTT 27 22 - 38 Seconds   Hemoglobin A1c     Status: Abnormal   Result Value Ref  Range    Hemoglobin A1C 6.2 (H) <5.7 %   CBC with platelets and differential     Status: Abnormal   Result Value Ref Range    WBC Count 9.4 4.0 - 11.0 10e3/uL    RBC Count 4.50 3.80 - 5.20 10e6/uL    Hemoglobin 12.1 11.7 - 15.7 g/dL    Hematocrit 38.9 35.0 - 47.0 %    MCV 86 78 - 100 fL    MCH 26.9 26.5 - 33.0 pg    MCHC 31.1 (L) 31.5 - 36.5 g/dL    RDW 17.2 (H) 10.0 - 15.0 %    Platelet Count 316 150 - 450 10e3/uL    % Neutrophils 71 %    % Lymphocytes 17 %    % Monocytes 9 %    % Eosinophils 2 %    % Basophils 0 %    % Immature Granulocytes 1 %    NRBCs per 100 WBC 0 <1 /100    Absolute Neutrophils 6.7 1.6 - 8.3 10e3/uL    Absolute Lymphocytes 1.6 0.8 - 5.3 10e3/uL    Absolute Monocytes 0.9 0.0 - 1.3 10e3/uL    Absolute Eosinophils 0.2 0.0 - 0.7 10e3/uL    Absolute Basophils 0.0 0.0 - 0.2 10e3/uL    Absolute Immature Granulocytes 0.1 <=0.4 10e3/uL    Absolute NRBCs 0.0 10e3/uL   Extra Tube     Status: None    Narrative    The following orders were created for panel order Extra Tube.  Procedure                               Abnormality         Status                     ---------                               -----------         ------                     Extra Blood Culture Bottle[[id number]]                       Final result                 Please view results for these tests on the individual orders.   Extra Blood Culture Bottle     Status: None   Result Value Ref Range    Hold Specimen [facility]    CBC with platelets differential     Status: Abnormal    Narrative    The following orders were created for panel order CBC with platelets differential.  Procedure                               Abnormality         Status                     ---------                               -----------         ------                     CBC with platelets and d...[[id number]]  Abnormal            Final result                 Please view results for these tests on the individual orders.        ROS:    Review Of Systems  Skin: left lower  leg swelling and redness  Eyes: negative  Ears/Nose/Throat: negative  Respiratory: No shortness of breath, dyspnea on exertion, cough, or hemoptysis  Cardiovascular: negative  Gastrointestinal: negative  Genitourinary: cloudy and frequency   Musculoskeletal: negative  Neurologic: negative  Psychiatric: negative  Hematologic/Lymphatic/Immunologic: negative  Endocrine: negative    10 point ROS negative other than the symptoms noted above.      Exam:    Vitals:  B/P: 99/61, T: 97.9, P: 79, R: 18    Constitutional: healthy, alert and no distress   Head: Normocephalic. No masses, lesions, tenderness or abnormalities   Neck: Neck supple. No adenopathy. Thyroid symmetric, normal size,, Carotids without bruits.   ENT: ENT exam normal, no neck nodes or sinus tenderness   Cardiovascular: RRR. No murmurs, clicks gallops or rub   Respiratory: . Good diaphragmatic excursion. Lungs clear   Gastrointestinal: Abdomen soft,. BS normal. No masses, organomegaly.   : Deferred   Musculoskeletal: extremities normal- no gross deformities noted, gait normal and normal muscle tone   Skin: left lower extremity with redness and swelling.   Neurologic: Gait normal. Reflexes normal and symmetric. Sensation grossly WNL.   Psychiatric: mentation appears normal and affect normal/bright         Signed:  Demetrice Martinez, [facility], NP  [phone] Ex [id number]  June 21, 2023 at 9:04 PM

## 2023-06-22 NOTE — PLAN OF CARE
"Observation Goals        - Tolerating oral antibiotics or has plans for home infusion set up. Not Met, IV abx  - Vital signs normal or at patient baseline: Met  - Adequate pain control on oral analgesia. Met  - Infection is improving- In progress   - Color, warmth, movement, sensation of affected area or limb is intact or at baseline: In progress  - Return to baseline functional status. Met  - Safe disposition plan has been identified: Not Met    Pt declined tele- monitoring, provider updated.    /70 (BP Location: Right arm)   Pulse 63   Temp 98.5  F (36.9  C) (Oral)   Resp 18   Ht 1.651 m (5' 5\")   Wt 113.4 kg (250 lb)   LMP 11/01/2013   SpO2 96%   BMI 41.60 kg/m      "

## 2023-06-23 VITALS
DIASTOLIC BLOOD PRESSURE: 60 MMHG | TEMPERATURE: 98 F | HEIGHT: 65 IN | BODY MASS INDEX: 41.65 KG/M2 | SYSTOLIC BLOOD PRESSURE: 111 MMHG | HEART RATE: 61 BPM | WEIGHT: 250 LBS | OXYGEN SATURATION: 95 % | RESPIRATION RATE: 16 BRPM

## 2023-06-23 LAB
ANION GAP SERPL CALCULATED.3IONS-SCNC: 9 MMOL/L (ref 7–15)
BUN SERPL-MCNC: 18.1 MG/DL (ref 8–23)
CALCIUM SERPL-MCNC: 8.2 MG/DL (ref 8.8–10.2)
CHLORIDE SERPL-SCNC: 102 MMOL/L (ref 98–107)
CREAT SERPL-MCNC: 1 MG/DL (ref 0.51–0.95)
CRP SERPL-MCNC: 9.91 MG/L
DEPRECATED HCO3 PLAS-SCNC: 26 MMOL/L (ref 22–29)
ERYTHROCYTE [DISTWIDTH] IN BLOOD BY AUTOMATED COUNT: 17.5 % (ref 10–15)
GFR SERPL CREATININE-BSD FRML MDRD: 63 ML/MIN/1.73M2
GLUCOSE BLDC GLUCOMTR-MCNC: 120 MG/DL (ref 70–99)
GLUCOSE BLDC GLUCOMTR-MCNC: 161 MG/DL (ref 70–99)
GLUCOSE SERPL-MCNC: 141 MG/DL (ref 70–99)
HCT VFR BLD AUTO: 40.2 % (ref 35–47)
HGB BLD-MCNC: 11.9 G/DL (ref 11.7–15.7)
MCH RBC QN AUTO: 25.9 PG (ref 26.5–33)
MCHC RBC AUTO-ENTMCNC: 29.6 G/DL (ref 31.5–36.5)
MCV RBC AUTO: 87 FL (ref 78–100)
PLATELET # BLD AUTO: 307 10E3/UL (ref 150–450)
POTASSIUM SERPL-SCNC: 4.4 MMOL/L (ref 3.4–5.3)
RBC # BLD AUTO: 4.6 10E6/UL (ref 3.8–5.2)
SODIUM SERPL-SCNC: 137 MMOL/L (ref 136–145)
WBC # BLD AUTO: 9.1 10E3/UL (ref 4–11)

## 2023-06-23 PROCEDURE — 82962 GLUCOSE BLOOD TEST: CPT

## 2023-06-23 PROCEDURE — 86140 C-REACTIVE PROTEIN: CPT | Performed by: PHYSICIAN ASSISTANT

## 2023-06-23 PROCEDURE — 80048 BASIC METABOLIC PNL TOTAL CA: CPT | Performed by: PHYSICIAN ASSISTANT

## 2023-06-23 PROCEDURE — 93005 ELECTROCARDIOGRAM TRACING: CPT

## 2023-06-23 PROCEDURE — 85027 COMPLETE CBC AUTOMATED: CPT | Performed by: PHYSICIAN ASSISTANT

## 2023-06-23 PROCEDURE — 36415 COLL VENOUS BLD VENIPUNCTURE: CPT | Performed by: PHYSICIAN ASSISTANT

## 2023-06-23 PROCEDURE — 250N000013 HC RX MED GY IP 250 OP 250 PS 637: Performed by: FAMILY MEDICINE

## 2023-06-23 PROCEDURE — 96376 TX/PRO/DX INJ SAME DRUG ADON: CPT

## 2023-06-23 PROCEDURE — 250N000013 HC RX MED GY IP 250 OP 250 PS 637: Performed by: NURSE PRACTITIONER

## 2023-06-23 PROCEDURE — 250N000011 HC RX IP 250 OP 636: Performed by: NURSE PRACTITIONER

## 2023-06-23 PROCEDURE — 93010 ELECTROCARDIOGRAM REPORT: CPT | Performed by: INTERNAL MEDICINE

## 2023-06-23 PROCEDURE — G0378 HOSPITAL OBSERVATION PER HR: HCPCS

## 2023-06-23 PROCEDURE — 99239 HOSP IP/OBS DSCHRG MGMT >30: CPT | Performed by: PHYSICIAN ASSISTANT

## 2023-06-23 RX ORDER — CIPROFLOXACIN 500 MG/1
500 TABLET, FILM COATED ORAL EVERY 12 HOURS SCHEDULED
Status: DISCONTINUED | OUTPATIENT
Start: 2023-06-23 | End: 2023-06-23 | Stop reason: HOSPADM

## 2023-06-23 RX ORDER — CIPROFLOXACIN 500 MG/1
500 TABLET, FILM COATED ORAL 2 TIMES DAILY
Qty: 10 TABLET | Refills: 0 | Status: ON HOLD | OUTPATIENT
Start: 2023-06-23 | End: 2023-08-06

## 2023-06-23 RX ORDER — NYSTATIN 100000 [USP'U]/G
POWDER TOPICAL
Qty: 60 G | Refills: 0 | Status: ON HOLD | OUTPATIENT
Start: 2023-06-23 | End: 2023-12-08

## 2023-06-23 RX ORDER — CEPHALEXIN 500 MG/1
500 CAPSULE ORAL 4 TIMES DAILY
Qty: 20 CAPSULE | Refills: 0 | Status: ON HOLD | OUTPATIENT
Start: 2023-06-23 | End: 2023-08-06

## 2023-06-23 RX ADMIN — SACUBITRIL AND VALSARTAN 1 TABLET: 49; 51 TABLET, FILM COATED ORAL at 08:35

## 2023-06-23 RX ADMIN — Medication 25 MCG: at 08:27

## 2023-06-23 RX ADMIN — PANTOPRAZOLE SODIUM 40 MG: 40 TABLET, DELAYED RELEASE ORAL at 08:27

## 2023-06-23 RX ADMIN — DULOXETINE HYDROCHLORIDE 20 MG: 20 CAPSULE, DELAYED RELEASE ORAL at 08:27

## 2023-06-23 RX ADMIN — INSULIN DETEMIR 15 UNITS: 100 INJECTION, SOLUTION SUBCUTANEOUS at 08:35

## 2023-06-23 RX ADMIN — AMLODIPINE BESYLATE 5 MG: 5 TABLET ORAL at 08:27

## 2023-06-23 RX ADMIN — FUROSEMIDE 20 MG: 20 TABLET ORAL at 08:27

## 2023-06-23 RX ADMIN — SPIRONOLACTONE 25 MG: 25 TABLET, FILM COATED ORAL at 08:26

## 2023-06-23 RX ADMIN — POTASSIUM CHLORIDE 8 MEQ: 600 TABLET, FILM COATED, EXTENDED RELEASE ORAL at 08:30

## 2023-06-23 RX ADMIN — METHADONE HYDROCHLORIDE 90 MG: 10 CONCENTRATE ORAL at 08:40

## 2023-06-23 RX ADMIN — MAGNESIUM OXIDE TAB 400 MG (241.3 MG ELEMENTAL MG) 200 MG: 400 (241.3 MG) TAB at 08:28

## 2023-06-23 RX ADMIN — NYSTATIN: 100000 CREAM TOPICAL at 03:26

## 2023-06-23 RX ADMIN — GABAPENTIN 900 MG: 300 CAPSULE ORAL at 08:26

## 2023-06-23 RX ADMIN — CIPROFLOXACIN HYDROCHLORIDE 500 MG: 500 TABLET, FILM COATED ORAL at 08:26

## 2023-06-23 RX ADMIN — AMPICILLIN SODIUM AND SULBACTAM SODIUM 3 G: 2; 1 INJECTION, POWDER, FOR SOLUTION INTRAMUSCULAR; INTRAVENOUS at 03:26

## 2023-06-23 RX ADMIN — CYANOCOBALAMIN TAB 1000 MCG 1000 MCG: 1000 TAB at 08:27

## 2023-06-23 RX ADMIN — NYSTATIN: 100000 CREAM TOPICAL at 08:33

## 2023-06-23 RX ADMIN — ASPIRIN 81 MG CHEWABLE TABLET 81 MG: 81 TABLET CHEWABLE at 08:26

## 2023-06-23 RX ADMIN — ACETAMINOPHEN 650 MG: 325 TABLET, FILM COATED ORAL at 10:14

## 2023-06-23 RX ADMIN — LEVOTHYROXINE SODIUM 200 MCG: 100 TABLET ORAL at 08:26

## 2023-06-23 ASSESSMENT — ACTIVITIES OF DAILY LIVING (ADL)
ADLS_ACUITY_SCORE: 35

## 2023-06-23 NOTE — PLAN OF CARE
"Goal Outcome Evaluation:BP (P) 124/67 (BP Location: Right arm)   Pulse (P) 61   Temp (P) 98.3  F (36.8  C) (Oral)   Resp (P) 16   Ht 1.651 m (5' 5\")   Wt 113.4 kg (250 lb)   LMP 11/01/2013   SpO2 (P) 96%   BMI 41.60 kg/m         Tolerating oral antibiotics or has plans for home infusion set up. Not Met, IV abx  - Vital signs normal or at patient baseline: Met  - Adequate pain control on oral analgesia. Met  - Infection is improving- In progress   - Color, warmth, movement, sensation of affected area or limb is intact or at baseline: In progress  - Return to baseline functional status. Met  - Safe disposition plan has been identified: Not Met     Pt declined tele- monitoring, provider aware                    "

## 2023-06-23 NOTE — PLAN OF CARE
Goal Outcome Evaluation:  - Tolerating oral antibiotics or has plans for home infusion set up: Met   - Vital signs normal or at patient baseline: Met  - Adequate pain control on oral analgesia: Met   - Infection is improving: In progress   - Color, warmth, movement, sensation of affected area or limb is intact or at baseline: Not met   - Return to baseline functional status: Not met   - Safe disposition plan has been identified: Met

## 2023-06-23 NOTE — PLAN OF CARE
"Goal Outcome Evaluation:/62 (BP Location: Right arm)   Pulse 64   Temp 97.9  F (36.6  C) (Oral)   Resp 16   Ht 1.651 m (5' 5\")   Wt 113.4 kg (250 lb)   LMP 11/01/2013   SpO2 95%   BMI 41.60 kg/m       Tolerating oral antibiotics or has plans for home infusion set up. Not Met, IV abx  - Vital signs normal or at patient baseline: Met  - Adequate pain control on oral analgesia. Met  - Infection is improving- In progress   - Color, warmth, movement, sensation of affected area or limb is intact or at baseline: In progress  - Return to baseline functional status. Met  - Safe disposition plan has been identified: Not Met     Pt declined tele- monitoring, provider aware         "

## 2023-06-23 NOTE — DISCHARGE SUMMARY
"[facility]  ED Observation Discharge Summary      Date of Admission:  6/21/2023  Date of Discharge:  6/23/2023  Discharging Provider: Lennie Patel PA-C  Discharge Service: ED Observation Service    Discharge Diagnoses   Cellulitis of the LLE  Bilateral LE edema  UTI  DM II  History of fall with head trauma  Chronic HFmrEF  Hx COVID-pneumonia c/b torsades de pointes arrest  History of opioid abuse  Hypothyroidism  GERD  Depression  Insomnia  Chronic neuropathic pain due to diabetes  Hepatitis C    Clinically Significant Risk Factors     # Severe Obesity: Estimated body mass index is 41.6 kg/m  as calculated from the following:    Height as of this encounter: 1.651 m (5' 5\").    Weight as of this encounter: 113.4 kg (250 lb).       Follow-ups Needed After Discharge   Follow up with PCP in 1 week    Unresulted Labs Ordered in the Past 30 Days of this Admission     Date and Time Order Name Status Description    6/21/2023  6:36 PM Urine Culture Preliminary     6/21/2023  5:43 PM Blood Culture Peripheral Blood Preliminary     6/21/2023  5:43 PM Blood Culture Peripheral Blood Preliminary       These results will be followed up by ED Observation    Discharge Disposition   Discharged to home  Condition at discharge: Stable    Hospital Course   No Yusuf is a 62 year old female with PMH significant for insulin-dependent DM2, heart failure with midrange reduced EF, HTN, HLD, COVID-pneumonia c/b torsades de pointes arrest, bipolar disorder, polysubstance use on methadone, hypothyroidism, and chronic hepatitis C who presents to the ED with leg swelling for one week, fall last Saturday and hitting her head and urinary symptoms.      # Cellulitis of the left lower extremity   # BLE Edema  She remained afebrile throughout admission. Mild leukocytosis has resolved. Blood cultures are NGTD. US bilateral LE negative for DVT. Xray of the the Tib/fib and left foot with no " radiographic evidence for osteomyelitis. She received IV Unasyn while hospitalized and responded well. Plan to transition to Keflex on discharge.     # UTI  Urine culture grew E. Coli, resistant to a few different antibiotics. Sensitive to Ciprofloxacin, which was prescribed on discharge.    #Candidasis  Patient reports candidiasis of the groin, requested refill of her Nystatin on discharge which was provided.     # Diabetes mellitus II  Last HbA1c 6/21/23 6.2  - Continue PTA Metformin and Jardiance  - Continue PTA Levemir to 15 units     # Fall with head trauma   # Heart failure with midrange reduced EF   # Hx of COVID-pneumonia c/b torsades de pointes arrest  Fell 7 days ago, denies any dizziness or lightheadness. No LOC. No focal deficits on exam. Last echo 2/2022 showed Mildly decreased left ventricular systolic function The visual ejection fraction is 40-45%. There is mild global hypokinesia of the left ventricle.. MRI cardiac stress completed 8/2022. The LV is normal in cavity size and wall thickness. The global systolic function is normal. The LVEF is 76%. There are no regional wall motion abnormalities. Reports taking tylenol as needed for headache. No events on telemetry. EKG today is NSR with no acute changes, QTc 461.  - Continue Tylenol as needed     # History of opioid abuse: Continue prior to admission methadone.      # Hypothyroidism: Continue prior to admission levothyroxine  # GERD continue prior admission omeprazole  # Depression continue prior to admission duloxetine and hydroxyzine  # Insomnia PTA Melatonin prn  # Chronic neuropathic pain due to diabetes: Continue admission gabapentin and duloxetine.     # Hep C: not on treatment, followed at [facility], per care everywhere HCV RNA Quant not detected on 8/1/22, hep C genotyping was indeterminate.    Consultations This Hospital Stay   None    Code Status   Full Code    Time Spent on this Encounter   I, Lennie Patel PA-C, personally saw the  patient today and spent greater than 30 minutes discharging this patient.     [facility]  [address] [place] [id number]  Phone: [phone]  Fax: [phone]  ______________________________________________________________________    Physical Exam   Vital Signs: Temp: 98  F (36.7  C) Temp src: Oral BP: 111/60 Pulse: 61   Resp: 16 SpO2: 95 % O2 Device: None (Room air)    Weight: 250 lbs 0 oz  Exam:  Constitutional: alert, no distress, and cooperative  Head: normocephalic, atraumatic  Neck: no asymmetry, masses, or scars  ENT: throat normal without erythema or exudate  Cardiovascular: RRR  Respiratory: diminished, respirations unlabored  Gastrointestinal: (+) BS, non tender, soft  Musculoskeletal: normal muscle tone, no pitting edema  Skin: improving LLE erythema within marked lines  Neurologic: oriented x 3, moves all extremities, no slurred speech  Psychiatric: normal affect and mood       Primary Care Physician   Price Naranjo    Discharge Orders      Reason for your hospital stay    You were hospitalized for urinary tract infection and skin infection of your left lower leg. You were treated with IV antibiotics with improvement. We have prescribed you Keflex and Ciprofloxacin on discharge.     Activity    Your activity upon discharge: activity as tolerated     Adult [facility] Follow-up and recommended labs and tests    Follow up with primary care provider, Price Naranjo, within 7 days for hospital follow- up.  No follow up labs or test are needed.      Appointments on [facility] and/or [facility] (with [facility] or [facility] provider or service). Call [phone] if you haven't heard regarding these appointments within 7 days of discharge.     When to contact your care team    Contact your care team for fevers, intractable nausea or vomiting, severe flank pain, worsening burning with urination, increased urinary frequency,  increased redness of the lower extremity, severe lower extremity pain, or any other new or worsening problems.     Diet    Follow this diet upon discharge: Orders Placed This Encounter      Regular Diet Adult       Significant Results and Procedures   Results for orders placed or performed during the hospital encounter of 06/21/23   US Lower Extremity Venous Duplex Left    Narrative    EXAM: US LOWER EXTREMITY VENOUS DUPLEX LEFT  LOCATION: [facility]  DATE: 6/21/2023    INDICATION: Swelling and pain, redness  COMPARISON: None.  TECHNIQUE: Venous Duplex ultrasound of the left lower extremity with and without compression, augmentation and duplex. Color flow and spectral Doppler with waveform analysis performed.    FINDINGS: Exam includes the common femoral, femoral, popliteal, and contralateral common femoral veins as well as segmentally visualized deep calf veins and greater saphenous vein.     LEFT: No deep vein thrombosis. No superficial thrombophlebitis. No popliteal cyst.      Impression    IMPRESSION:  1.  No deep venous thrombosis in the left lower extremity.   XR Tibia and Fibula Left 2 Views    Narrative    EXAM: XR TIBIA AND FIBULA LEFT 2 VIEWS, XR FOOT LEFT G/E 3 VIEWS  LOCATION: [facility]  DATE: 6/21/2023    INDICATION: diabetic with leg swelling eval for sub q air vs fb  COMPARISON: None.      Impression    IMPRESSION: Postoperative changes to the medial and lateral malleoli. No evidence for acute fracture. Severe degenerative change at the tibiotalar joint. Tibia and fibula are otherwise negative. Left foot negative for acute fracture. Additional   degenerative change at the first MTP joint. No evidence for foreign body. No radiographic evidence for osteomyelitis.   Foot XR, G/E 3 views, left    Narrative    EXAM: XR TIBIA AND FIBULA LEFT 2 VIEWS, XR FOOT LEFT G/E 3 VIEWS  LOCATION: [facility]  DATE: 2023    INDICATION: diabetic with leg swelling eval for sub q air vs fb  COMPARISON: None.      Impression    IMPRESSION: Postoperative changes to the medial and lateral malleoli. No evidence for acute fracture. Severe degenerative change at the tibiotalar joint. Tibia and fibula are otherwise negative. Left foot negative for acute fracture. Additional   degenerative change at the first MTP joint. No evidence for foreign body. No radiographic evidence for osteomyelitis.       Discharge Medications   Current Discharge Medication List      START taking these medications    Details   cephALEXin (KEFLEX) 500 MG capsule Take 1 capsule (500 mg) by mouth 4 times daily  Qty: 20 capsule, Refills: 0    Associated Diagnoses: Left leg cellulitis      ciprofloxacin (CIPRO) 500 MG tablet Take 1 tablet (500 mg) by mouth 2 times daily  Qty: 10 tablet, Refills: 0    Associated Diagnoses: Acute cystitis without hematuria         CONTINUE these medications which have CHANGED    Details   nystatin (NYSTOP) 862349 UNIT/GM external powder SMARTSI Application Topical 2-3 Times Daily  Qty: 60 g, Refills: 0    Associated Diagnoses: Candidiasis of skin         CONTINUE these medications which have NOT CHANGED    Details   gabapentin (NEURONTIN) 300 MG capsule Take 900 mg by mouth 3 times daily      acetaminophen (TYLENOL) 325 MG tablet Take 2 tablets (650 mg) by mouth 2 times daily as needed for mild pain    Associated Diagnoses: Chronic low back pain, unspecified back pain laterality, unspecified whether sciatica present      Acidophilus Lactobacillus CAPS Take 1 tablet by mouth 3 times daily For gut health (Lactobacillus)      albuterol (PROAIR HFA/PROVENTIL HFA/VENTOLIN HFA) 108 (90 Base) MCG/ACT inhaler Inhale 2 puffs into the lungs every 4 hours as needed for shortness of breath / dyspnea or wheezing      aspirin (ASA) 81 MG chewable tablet Take 1 tablet (81 mg) by mouth daily    Associated  Diagnoses: Benign essential hypertension      atorvastatin (LIPITOR) 20 MG tablet Take 1 tablet (20 mg) by mouth At Bedtime  Qty: 90 tablet, Refills: 3    Associated Diagnoses: Hyperlipidemia, unspecified hyperlipidemia type      blood glucose (ACCU-CHEK RAFAELA PLUS) test strip Use to check blood sugar 3x daily or as directed.      !! Blood Glucose Calibration (ACCU-CHEK ACTIVE GLUCOSE CONT VI) 1 each by Other route      !! blood glucose calibration (ACCU-CHEK RAFAELA) solution       calcium carbonate antacid 1000 MG CHEW Take 1 chew tab by mouth every 6 hours as needed for heartburn      cholecalciferol 25 MCG (1000 UT) TABS Take 1,000 Units by mouth daily       diclofenac (VOLTAREN) 1 % topical gel Apply 2-4 g topically 2 times daily    Associated Diagnoses: Patellofemoral arthritis; Impingement syndrome of right shoulder      DULoxetine (CYMBALTA) 20 MG capsule Take 1 capsule (20 mg) by mouth daily    Associated Diagnoses: Anxiety disorder, unspecified type      empagliflozin (JARDIANCE) 10 MG TABS tablet Take 1 tablet (10 mg) by mouth daily  Qty: 90 tablet, Refills: 3    Associated Diagnoses: Chronic systolic heart failure (H)      fluticasone-salmeterol (ADVAIR) 250-50 MCG/DOSE inhaler Inhale 1 puff into the lungs 2 times daily      furosemide (LASIX) 20 MG tablet Take 20 mg by mouth daily      hydrOXYzine (ATARAX) 50 MG tablet Take 100 mg by mouth every 6 hours as needed for anxiety      insulin detemir (LEVEMIR PEN) 100 UNIT/ML pen Inject 15 Units Subcutaneous every morning  Qty: 3 mL, Refills: 1    Associated Diagnoses: Type 2 diabetes mellitus without complication, without long-term current use of insulin (H)      insulin glargine (LANTUS PEN) 100 UNIT/ML pen Inject 15 Units Subcutaneous At Bedtime      levothyroxine (SYNTHROID/LEVOTHROID) 200 MCG tablet Take 200 mcg by mouth daily       melatonin 5 MG tablet Take 5 mg by mouth At Bedtime      metFORMIN (GLUCOPHAGE) 1000 MG tablet Take 1,000 mg by mouth 2  times daily (with meals)      methadone (DOLOPHINE) 10 MG tablet Take 20 mg by mouth once Per patient, has been on methadone for several years in the past but off recently. Per patient, yesterday 3/24 she met with Debora from [facility] addiction medicine and was planning to restart this. Sounds like she may have received a one time 20 mg dose 3/24. Unclear what the follow up plan was. Patient mentioned possibly having a follow up appointment next Tuesday 3/29.      multivitamin w/minerals (THERA-VIT-M) tablet Take 1 tablet by mouth daily      NARCAN 4 MG/0.1ML nasal spray CALL 911. SPRAY CONTENTS OF ONE SPRAYER (0.1ML) INTO ONE NOSTRIL. REPEAT IN 2-3 MINS IF SYMPTOMS OF OPIOID PERSIST, ALTERNATE NOSTRILS  Refills: 0      nicotine polacrilex (NICORETTE) 4 MG gum Place 4 mg inside cheek every hour as needed for smoking cessation       omeprazole (PRILOSEC) 20 MG DR capsule Take 20 mg by mouth daily       ondansetron (ZOFRAN-ODT) 4 MG ODT tab Take 4 mg by mouth every 8 hours as needed for nausea      polyethylene glycol (MIRALAX) 17 GM/Dose powder Take 17 g by mouth daily as needed for constipation  Qty: 510 g, Refills: 0    Associated Diagnoses: Constipation, unspecified constipation type      potassium chloride ER (KLOR-CON) 8 MEQ CR tablet Take 8 mEq by mouth 2 times daily      sacubitril-valsartan (ENTRESTO) 49-51 MG per tablet Take 1 tablet by mouth 2 times daily  Qty: 180 tablet, Refills: 2    Associated Diagnoses: Chronic systolic heart failure (H)      spironolactone (ALDACTONE) 25 MG tablet Take 1 tablet (25 mg) by mouth daily  Qty: 90 tablet, Refills: 3    Associated Diagnoses: Chronic systolic heart failure (H)      vitamin B-12 (CYANOCOBALAMIN) 1000 MCG tablet Take 1,000 mcg by mouth daily       !! - Potential duplicate medications found. Please discuss with provider.        Allergies   Allergies   Allergen Reactions     Promethazine Other (See Comments) and Anxiety     Noted in 8/30/08 ER     Codeine Phosphate  GI Disturbance     Lamotrigine Other (See Comments)     hyponatremia     Oxcarbazepine Unknown and Other (See Comments)     Low sodium  LegacyRecord#[id number]       Codeine Other (See Comments) and Rash     GI bleeding  LegacyRecord#[id number]               This patient was discussed with the Care Team in the OBS Unit.  The patient's chart was reviewed and the patient was also seen and evaluated by me.  The plan of care was discussed and reviewed with the Care Team.  The above documentation reflects the evaluation, medical decision making and plan under my supervision.    Otis Jaimes MD, FACEP  [facility] Staff Emergency Physician

## 2023-06-23 NOTE — PROGRESS NOTES
Discharge instructions reviewed.  Patient verbalized understanding. PIV removed, patient ambulated to the main lobby. SW arranged for a cab ride for the patient. Patient discharged

## 2023-06-23 NOTE — PLAN OF CARE
"Observation Goals        - Tolerating oral antibiotics or has plans for home infusion set up. Not Met, IV abx  - Vital signs normal or at patient baseline: Met  - Adequate pain control on oral analgesia. Met  - Infection is improving- In progress   - Color, warmth, movement, sensation of affected area or limb is intact or at baseline: In progress  - Return to baseline functional status. Met  - Safe disposition plan has been identified: Not Met    Pt declined tele- monitoring, provider aware    /60 (BP Location: Right arm)   Pulse 67   Temp 97.8  F (36.6  C) (Oral)   Resp 18   Ht 1.651 m (5' 5\")   Wt 113.4 kg (250 lb)   LMP 11/01/2013   SpO2 97%   BMI 41.60 kg/m      "

## 2023-06-23 NOTE — PROGRESS NOTES
Care Management Discharge Note    Discharge Date: 06/23/2023       Discharge Disposition:  home    Discharge Services:      Discharge DME:      Discharge Transportation:  Health plan    Private pay costs discussed: Not applicable    Patient/Family in Agreement with the Plan:  yes    Handoff Referral Completed: Yes (1212 EHO sent)    Additional Information:    Per discussion with OBS HECTOR [place], pt is ready for discharge and PA told SW that pt might need assistance arranging discharge transportation    1043 SW met with No at bedside to offer assistance with arranging discharge transportation, if needed. SW introduced self and explained the reason for the visit. No accepted the offer. SW explained that SW would make the arrangements when pt was ready to discharge, and then provide update on arrangements. No expressed understanding.    4954 SW contacted [facility] [phone], spoke with Radha, and was able to secure transportation for 1230 pt pick-up with [employer] service ([phone]). SW provided Unit RN station ([phone]) for contact, and informed agent that pt will be at the ED entrance.     8299 SW updated bedside RN and pt with ride information.    SW will continue to follow as needed.    JOSIE Caldwell, [facility]  ED/OBS   [facility]  Phone: [phone]  Pager: [phone]  Fax: [phone]     On-call pager, [phone], 4:00 pm to midnight

## 2023-06-24 ENCOUNTER — PATIENT OUTREACH (OUTPATIENT)
Dept: CARE COORDINATION | Facility: CLINIC | Age: 63
End: 2023-06-24
Payer: COMMERCIAL

## 2023-06-24 NOTE — PROGRESS NOTES
[facility]    Background: Transitional Care Management program identified per system criteria and reviewed by [facility] team for possible outreach.    Assessment: Upon chart review, [facility] Team member will not proceed with patient outreach related to this episode of Transitional Care Management program due to reason below:    Patient has discharged to a Memory Care, Long-term Care, Assisted Living or Group Home where patient is receiving on-site support with their daily cares, including support with hospital follow up plan.    Plan: Transitional Care Management episode addressed appropriately per reason noted above.      Debroa Jaimes RN  [facility], [facility]    *Connected Care Resource Team does NOT follow patient ongoing. Referrals are identified based on internal discharge reports and the outreach is to ensure patient has an understanding of their discharge instructions.

## 2023-06-25 LAB — BACTERIA UR CULT: ABNORMAL

## 2023-06-26 LAB
ATRIAL RATE - MUSE: 65 BPM
BACTERIA BLD CULT: NO GROWTH
BACTERIA BLD CULT: NO GROWTH
DIASTOLIC BLOOD PRESSURE - MUSE: NORMAL MMHG
INTERPRETATION ECG - MUSE: NORMAL
P AXIS - MUSE: 21 DEGREES
PR INTERVAL - MUSE: 170 MS
QRS DURATION - MUSE: 80 MS
QT - MUSE: 444 MS
QTC - MUSE: 461 MS
R AXIS - MUSE: 18 DEGREES
SYSTOLIC BLOOD PRESSURE - MUSE: NORMAL MMHG
T AXIS - MUSE: 34 DEGREES
VENTRICULAR RATE- MUSE: 65 BPM

## 2023-08-06 ENCOUNTER — HOSPITAL ENCOUNTER (OUTPATIENT)
Facility: CLINIC | Age: 63
Setting detail: OBSERVATION
Discharge: NURSING, REHAB OR LONG-TERM CARE | End: 2023-08-08
Attending: EMERGENCY MEDICINE | Admitting: PHYSICIAN ASSISTANT
Payer: COMMERCIAL

## 2023-08-06 ENCOUNTER — APPOINTMENT (OUTPATIENT)
Dept: CT IMAGING | Facility: CLINIC | Age: 63
End: 2023-08-06
Attending: EMERGENCY MEDICINE
Payer: COMMERCIAL

## 2023-08-06 DIAGNOSIS — N39.0 URINARY TRACT INFECTION WITHOUT HEMATURIA, SITE UNSPECIFIED: ICD-10-CM

## 2023-08-06 DIAGNOSIS — L03.116 LEFT LEG CELLULITIS: Primary | ICD-10-CM

## 2023-08-06 DIAGNOSIS — G89.4 CHRONIC PAIN DISORDER: ICD-10-CM

## 2023-08-06 DIAGNOSIS — L03.116 CELLULITIS OF LEFT LEG: ICD-10-CM

## 2023-08-06 LAB
ALBUMIN SERPL BCG-MCNC: 4.3 G/DL (ref 3.5–5.2)
ALBUMIN UR-MCNC: 10 MG/DL
ALP SERPL-CCNC: 127 U/L (ref 35–104)
ALT SERPL W P-5'-P-CCNC: 26 U/L (ref 0–50)
ANION GAP SERPL CALCULATED.3IONS-SCNC: 11 MMOL/L (ref 7–15)
APPEARANCE UR: CLEAR
AST SERPL W P-5'-P-CCNC: 39 U/L (ref 0–45)
ATRIAL RATE - MUSE: 69 BPM
BACTERIA #/AREA URNS HPF: ABNORMAL /HPF
BASOPHILS # BLD AUTO: 0.1 10E3/UL (ref 0–0.2)
BASOPHILS NFR BLD AUTO: 1 %
BILIRUB SERPL-MCNC: 0.5 MG/DL
BILIRUB UR QL STRIP: NEGATIVE
BUN SERPL-MCNC: 16.1 MG/DL (ref 8–23)
CALCIUM SERPL-MCNC: 8.9 MG/DL (ref 8.8–10.2)
CHLORIDE SERPL-SCNC: 102 MMOL/L (ref 98–107)
COLOR UR AUTO: YELLOW
CREAT SERPL-MCNC: 0.94 MG/DL (ref 0.51–0.95)
CRP SERPL-MCNC: 26.5 MG/L
D DIMER PPP FEU-MCNC: 0.95 UG/ML FEU (ref 0–0.5)
DEPRECATED HCO3 PLAS-SCNC: 25 MMOL/L (ref 22–29)
DIASTOLIC BLOOD PRESSURE - MUSE: NORMAL MMHG
EOSINOPHIL # BLD AUTO: 0.3 10E3/UL (ref 0–0.7)
EOSINOPHIL NFR BLD AUTO: 2 %
ERYTHROCYTE [DISTWIDTH] IN BLOOD BY AUTOMATED COUNT: 16.9 % (ref 10–15)
ERYTHROCYTE [SEDIMENTATION RATE] IN BLOOD BY WESTERGREN METHOD: 52 MM/HR (ref 0–30)
GFR SERPL CREATININE-BSD FRML MDRD: 68 ML/MIN/1.73M2
GLUCOSE BLDC GLUCOMTR-MCNC: 114 MG/DL (ref 70–99)
GLUCOSE BLDC GLUCOMTR-MCNC: 140 MG/DL (ref 70–99)
GLUCOSE BLDC GLUCOMTR-MCNC: 78 MG/DL (ref 70–99)
GLUCOSE BLDC GLUCOMTR-MCNC: 90 MG/DL (ref 70–99)
GLUCOSE SERPL-MCNC: 88 MG/DL (ref 70–99)
GLUCOSE UR STRIP-MCNC: 300 MG/DL
HCO3 BLDV-SCNC: 30 MMOL/L (ref 21–28)
HCT VFR BLD AUTO: 39.1 % (ref 35–47)
HGB BLD-MCNC: 12 G/DL (ref 11.7–15.7)
HGB UR QL STRIP: NEGATIVE
IMM GRANULOCYTES # BLD: 0.1 10E3/UL
IMM GRANULOCYTES NFR BLD: 1 %
INR PPP: 1.32 (ref 0.85–1.15)
INTERPRETATION ECG - MUSE: NORMAL
KETONES UR STRIP-MCNC: NEGATIVE MG/DL
LACTATE BLD-SCNC: 0.7 MMOL/L
LACTATE SERPL-SCNC: 0.8 MMOL/L (ref 0.7–2)
LEUKOCYTE ESTERASE UR QL STRIP: ABNORMAL
LYMPHOCYTES # BLD AUTO: 2.6 10E3/UL (ref 0.8–5.3)
LYMPHOCYTES NFR BLD AUTO: 21 %
MAGNESIUM SERPL-MCNC: 2.2 MG/DL (ref 1.7–2.3)
MCH RBC QN AUTO: 26.3 PG (ref 26.5–33)
MCHC RBC AUTO-ENTMCNC: 30.7 G/DL (ref 31.5–36.5)
MCV RBC AUTO: 86 FL (ref 78–100)
MONOCYTES # BLD AUTO: 1.1 10E3/UL (ref 0–1.3)
MONOCYTES NFR BLD AUTO: 9 %
MUCOUS THREADS #/AREA URNS LPF: PRESENT /LPF
NEUTROPHILS # BLD AUTO: 8.4 10E3/UL (ref 1.6–8.3)
NEUTROPHILS NFR BLD AUTO: 66 %
NITRATE UR QL: NEGATIVE
NRBC # BLD AUTO: 0 10E3/UL
NRBC BLD AUTO-RTO: 0 /100
P AXIS - MUSE: 17 DEGREES
PCO2 BLDV: 49 MM HG (ref 40–50)
PH BLDV: 7.4 [PH] (ref 7.32–7.43)
PH UR STRIP: 6 [PH] (ref 5–7)
PHOSPHATE SERPL-MCNC: 3.2 MG/DL (ref 2.5–4.5)
PLATELET # BLD AUTO: 349 10E3/UL (ref 150–450)
PO2 BLDV: 32 MM HG (ref 25–47)
POTASSIUM SERPL-SCNC: 3.6 MMOL/L (ref 3.4–5.3)
PR INTERVAL - MUSE: 174 MS
PROT SERPL-MCNC: 8.6 G/DL (ref 6.4–8.3)
QRS DURATION - MUSE: 90 MS
QT - MUSE: 458 MS
QTC - MUSE: 490 MS
R AXIS - MUSE: 29 DEGREES
RBC # BLD AUTO: 4.57 10E6/UL (ref 3.8–5.2)
RBC URINE: 2 /HPF
SAO2 % BLDV: 60 % (ref 94–100)
SODIUM SERPL-SCNC: 138 MMOL/L (ref 136–145)
SP GR UR STRIP: 1.02 (ref 1–1.03)
SQUAMOUS EPITHELIAL: 1 /HPF
SYSTOLIC BLOOD PRESSURE - MUSE: NORMAL MMHG
T AXIS - MUSE: 40 DEGREES
T4 FREE SERPL-MCNC: 0.86 NG/DL (ref 0.9–1.7)
TSH SERPL DL<=0.005 MIU/L-ACNC: 29.3 UIU/ML (ref 0.3–4.2)
UROBILINOGEN UR STRIP-MCNC: NORMAL MG/DL
VENTRICULAR RATE- MUSE: 69 BPM
WBC # BLD AUTO: 12.5 10E3/UL (ref 4–11)
WBC URINE: 91 /HPF

## 2023-08-06 PROCEDURE — 83605 ASSAY OF LACTIC ACID: CPT

## 2023-08-06 PROCEDURE — 85652 RBC SED RATE AUTOMATED: CPT | Performed by: EMERGENCY MEDICINE

## 2023-08-06 PROCEDURE — 99285 EMERGENCY DEPT VISIT HI MDM: CPT | Mod: 25

## 2023-08-06 PROCEDURE — 86140 C-REACTIVE PROTEIN: CPT | Performed by: EMERGENCY MEDICINE

## 2023-08-06 PROCEDURE — 96376 TX/PRO/DX INJ SAME DRUG ADON: CPT | Mod: 59

## 2023-08-06 PROCEDURE — 250N000012 HC RX MED GY IP 250 OP 636 PS 637: Performed by: PHYSICIAN ASSISTANT

## 2023-08-06 PROCEDURE — 96365 THER/PROPH/DIAG IV INF INIT: CPT | Mod: 59

## 2023-08-06 PROCEDURE — 82962 GLUCOSE BLOOD TEST: CPT

## 2023-08-06 PROCEDURE — 84439 ASSAY OF FREE THYROXINE: CPT | Performed by: PHYSICIAN ASSISTANT

## 2023-08-06 PROCEDURE — 82803 BLOOD GASES ANY COMBINATION: CPT

## 2023-08-06 PROCEDURE — 250N000011 HC RX IP 250 OP 636: Performed by: EMERGENCY MEDICINE

## 2023-08-06 PROCEDURE — 96367 TX/PROPH/DG ADDL SEQ IV INF: CPT

## 2023-08-06 PROCEDURE — 84100 ASSAY OF PHOSPHORUS: CPT | Performed by: PHYSICIAN ASSISTANT

## 2023-08-06 PROCEDURE — 73701 CT LOWER EXTREMITY W/DYE: CPT | Mod: 26 | Performed by: RADIOLOGY

## 2023-08-06 PROCEDURE — 250N000013 HC RX MED GY IP 250 OP 250 PS 637: Performed by: PHYSICIAN ASSISTANT

## 2023-08-06 PROCEDURE — 99222 1ST HOSP IP/OBS MODERATE 55: CPT | Performed by: EMERGENCY MEDICINE

## 2023-08-06 PROCEDURE — 93005 ELECTROCARDIOGRAM TRACING: CPT

## 2023-08-06 PROCEDURE — G0378 HOSPITAL OBSERVATION PER HR: HCPCS

## 2023-08-06 PROCEDURE — 80053 COMPREHEN METABOLIC PANEL: CPT | Performed by: EMERGENCY MEDICINE

## 2023-08-06 PROCEDURE — 85025 COMPLETE CBC W/AUTO DIFF WBC: CPT | Performed by: EMERGENCY MEDICINE

## 2023-08-06 PROCEDURE — 85379 FIBRIN DEGRADATION QUANT: CPT | Performed by: EMERGENCY MEDICINE

## 2023-08-06 PROCEDURE — 85610 PROTHROMBIN TIME: CPT | Performed by: EMERGENCY MEDICINE

## 2023-08-06 PROCEDURE — 83735 ASSAY OF MAGNESIUM: CPT | Performed by: PHYSICIAN ASSISTANT

## 2023-08-06 PROCEDURE — 250N000011 HC RX IP 250 OP 636: Mod: JZ | Performed by: PHYSICIAN ASSISTANT

## 2023-08-06 PROCEDURE — 73701 CT LOWER EXTREMITY W/DYE: CPT | Mod: LT

## 2023-08-06 PROCEDURE — 83605 ASSAY OF LACTIC ACID: CPT | Performed by: EMERGENCY MEDICINE

## 2023-08-06 PROCEDURE — 36415 COLL VENOUS BLD VENIPUNCTURE: CPT | Performed by: EMERGENCY MEDICINE

## 2023-08-06 PROCEDURE — 81001 URINALYSIS AUTO W/SCOPE: CPT | Performed by: EMERGENCY MEDICINE

## 2023-08-06 PROCEDURE — 87086 URINE CULTURE/COLONY COUNT: CPT | Performed by: EMERGENCY MEDICINE

## 2023-08-06 PROCEDURE — 84443 ASSAY THYROID STIM HORMONE: CPT | Performed by: PHYSICIAN ASSISTANT

## 2023-08-06 RX ORDER — NICOTINE POLACRILEX 4 MG
15-30 LOZENGE BUCCAL
Status: DISCONTINUED | OUTPATIENT
Start: 2023-08-06 | End: 2023-08-08 | Stop reason: HOSPADM

## 2023-08-06 RX ORDER — HYDROXYZINE HYDROCHLORIDE 50 MG/1
100 TABLET, FILM COATED ORAL EVERY 6 HOURS PRN
Status: DISCONTINUED | OUTPATIENT
Start: 2023-08-06 | End: 2023-08-08 | Stop reason: HOSPADM

## 2023-08-06 RX ORDER — ACETAMINOPHEN 650 MG/1
650 SUPPOSITORY RECTAL EVERY 6 HOURS PRN
Status: DISCONTINUED | OUTPATIENT
Start: 2023-08-06 | End: 2023-08-08 | Stop reason: HOSPADM

## 2023-08-06 RX ORDER — ASPIRIN 81 MG/1
81 TABLET, CHEWABLE ORAL DAILY
Status: DISCONTINUED | OUTPATIENT
Start: 2023-08-06 | End: 2023-08-08 | Stop reason: HOSPADM

## 2023-08-06 RX ORDER — SPIRONOLACTONE 25 MG/1
25 TABLET ORAL DAILY
Status: DISCONTINUED | OUTPATIENT
Start: 2023-08-06 | End: 2023-08-08 | Stop reason: HOSPADM

## 2023-08-06 RX ORDER — AMPICILLIN AND SULBACTAM 2; 1 G/1; G/1
3 INJECTION, POWDER, FOR SOLUTION INTRAMUSCULAR; INTRAVENOUS EVERY 6 HOURS
Status: DISCONTINUED | OUTPATIENT
Start: 2023-08-06 | End: 2023-08-06

## 2023-08-06 RX ORDER — TRAZODONE HYDROCHLORIDE 50 MG/1
50 TABLET, FILM COATED ORAL AT BEDTIME
Status: ON HOLD | COMMUNITY
Start: 2022-08-07 | End: 2023-08-06

## 2023-08-06 RX ORDER — MAGNESIUM OXIDE 400 MG/1
400 TABLET ORAL DAILY
Status: DISCONTINUED | OUTPATIENT
Start: 2023-08-06 | End: 2023-08-08 | Stop reason: HOSPADM

## 2023-08-06 RX ORDER — METHADONE HYDROCHLORIDE 10 MG/1
20 TABLET ORAL ONCE
Status: COMPLETED | OUTPATIENT
Start: 2023-08-06 | End: 2023-08-06

## 2023-08-06 RX ORDER — LACTOBACILLUS ACIDOPHILUS 500MM CELL
1 CAPSULE ORAL 3 TIMES DAILY
Status: DISCONTINUED | OUTPATIENT
Start: 2023-08-06 | End: 2023-08-06

## 2023-08-06 RX ORDER — MAGNESIUM OXIDE 400 MG/1
200 TABLET ORAL DAILY
COMMUNITY
Start: 2023-03-09

## 2023-08-06 RX ORDER — ATORVASTATIN CALCIUM 20 MG/1
20 TABLET, FILM COATED ORAL AT BEDTIME
Status: DISCONTINUED | OUTPATIENT
Start: 2023-08-06 | End: 2023-08-08 | Stop reason: HOSPADM

## 2023-08-06 RX ORDER — IOPAMIDOL 755 MG/ML
135 INJECTION, SOLUTION INTRAVASCULAR ONCE
Status: COMPLETED | OUTPATIENT
Start: 2023-08-06 | End: 2023-08-06

## 2023-08-06 RX ORDER — POLYETHYLENE GLYCOL 3350 17 G/17G
17 POWDER, FOR SOLUTION ORAL DAILY PRN
Status: DISCONTINUED | OUTPATIENT
Start: 2023-08-06 | End: 2023-08-08 | Stop reason: HOSPADM

## 2023-08-06 RX ORDER — DULOXETIN HYDROCHLORIDE 20 MG/1
20 CAPSULE, DELAYED RELEASE ORAL DAILY
Status: DISCONTINUED | OUTPATIENT
Start: 2023-08-06 | End: 2023-08-08 | Stop reason: HOSPADM

## 2023-08-06 RX ORDER — ACETAMINOPHEN 325 MG/1
650 TABLET ORAL 2 TIMES DAILY PRN
Status: DISCONTINUED | OUTPATIENT
Start: 2023-08-06 | End: 2023-08-08 | Stop reason: HOSPADM

## 2023-08-06 RX ORDER — ACETAMINOPHEN 325 MG/1
650 TABLET ORAL EVERY 6 HOURS PRN
Status: DISCONTINUED | OUTPATIENT
Start: 2023-08-06 | End: 2023-08-08 | Stop reason: HOSPADM

## 2023-08-06 RX ORDER — DEXTROSE MONOHYDRATE 25 G/50ML
25-50 INJECTION, SOLUTION INTRAVENOUS
Status: DISCONTINUED | OUTPATIENT
Start: 2023-08-06 | End: 2023-08-08 | Stop reason: HOSPADM

## 2023-08-06 RX ORDER — PANTOPRAZOLE SODIUM 40 MG/1
40 TABLET, DELAYED RELEASE ORAL DAILY
Status: DISCONTINUED | OUTPATIENT
Start: 2023-08-06 | End: 2023-08-08 | Stop reason: HOSPADM

## 2023-08-06 RX ORDER — POTASSIUM CHLORIDE 600 MG/1
8 TABLET, FILM COATED, EXTENDED RELEASE ORAL 2 TIMES DAILY
Status: DISCONTINUED | OUTPATIENT
Start: 2023-08-06 | End: 2023-08-08 | Stop reason: HOSPADM

## 2023-08-06 RX ORDER — GABAPENTIN 300 MG/1
900 CAPSULE ORAL 3 TIMES DAILY
Status: DISCONTINUED | OUTPATIENT
Start: 2023-08-06 | End: 2023-08-08 | Stop reason: HOSPADM

## 2023-08-06 RX ORDER — ONDANSETRON 4 MG/1
4 TABLET, ORALLY DISINTEGRATING ORAL EVERY 6 HOURS PRN
Status: DISCONTINUED | OUTPATIENT
Start: 2023-08-06 | End: 2023-08-07

## 2023-08-06 RX ORDER — NYSTATIN 100000 [USP'U]/G
POWDER TOPICAL 3 TIMES DAILY
Status: DISCONTINUED | OUTPATIENT
Start: 2023-08-06 | End: 2023-08-06

## 2023-08-06 RX ORDER — LEVOTHYROXINE SODIUM 100 UG/1
200 TABLET ORAL DAILY
Status: DISCONTINUED | OUTPATIENT
Start: 2023-08-06 | End: 2023-08-08 | Stop reason: HOSPADM

## 2023-08-06 RX ORDER — FUROSEMIDE 20 MG
20 TABLET ORAL DAILY
Status: DISCONTINUED | OUTPATIENT
Start: 2023-08-06 | End: 2023-08-08 | Stop reason: HOSPADM

## 2023-08-06 RX ORDER — ONDANSETRON 2 MG/ML
4 INJECTION INTRAMUSCULAR; INTRAVENOUS EVERY 6 HOURS PRN
Status: DISCONTINUED | OUTPATIENT
Start: 2023-08-06 | End: 2023-08-07

## 2023-08-06 RX ORDER — AMLODIPINE BESYLATE 5 MG/1
5 TABLET ORAL DAILY
Status: ON HOLD | COMMUNITY
End: 2023-12-08

## 2023-08-06 RX ORDER — PIPERACILLIN SODIUM, TAZOBACTAM SODIUM 3; .375 G/15ML; G/15ML
3.38 INJECTION, POWDER, LYOPHILIZED, FOR SOLUTION INTRAVENOUS EVERY 6 HOURS
Status: DISCONTINUED | OUTPATIENT
Start: 2023-08-06 | End: 2023-08-08 | Stop reason: HOSPADM

## 2023-08-06 RX ADMIN — METHADONE HYDROCHLORIDE 20 MG: 10 TABLET ORAL at 09:02

## 2023-08-06 RX ADMIN — METFORMIN HYDROCHLORIDE 1000 MG: 500 TABLET ORAL at 17:36

## 2023-08-06 RX ADMIN — FUROSEMIDE 20 MG: 20 TABLET ORAL at 09:02

## 2023-08-06 RX ADMIN — ACETAMINOPHEN 650 MG: 325 TABLET, FILM COATED ORAL at 11:42

## 2023-08-06 RX ADMIN — POTASSIUM CHLORIDE 8 MEQ: 600 TABLET, FILM COATED, EXTENDED RELEASE ORAL at 20:20

## 2023-08-06 RX ADMIN — SACUBITRIL AND VALSARTAN 1 TABLET: 49; 51 TABLET, FILM COATED ORAL at 20:20

## 2023-08-06 RX ADMIN — EMPAGLIFLOZIN 10 MG: 10 TABLET, FILM COATED ORAL at 09:02

## 2023-08-06 RX ADMIN — LEVOTHYROXINE SODIUM 200 MCG: 100 TABLET ORAL at 09:02

## 2023-08-06 RX ADMIN — AMPICILLIN SODIUM AND SULBACTAM SODIUM 3 G: 2; 1 INJECTION, POWDER, FOR SOLUTION INTRAMUSCULAR; INTRAVENOUS at 04:22

## 2023-08-06 RX ADMIN — METFORMIN HYDROCHLORIDE 1000 MG: 500 TABLET ORAL at 09:02

## 2023-08-06 RX ADMIN — PANTOPRAZOLE SODIUM 40 MG: 40 TABLET, DELAYED RELEASE ORAL at 09:02

## 2023-08-06 RX ADMIN — IOPAMIDOL 135 ML: 755 INJECTION, SOLUTION INTRAVENOUS at 04:04

## 2023-08-06 RX ADMIN — PIPERACILLIN AND TAZOBACTAM 3.38 G: 3; .375 INJECTION, POWDER, LYOPHILIZED, FOR SOLUTION INTRAVENOUS at 14:36

## 2023-08-06 RX ADMIN — ASPIRIN 81 MG CHEWABLE TABLET 81 MG: 81 TABLET CHEWABLE at 09:02

## 2023-08-06 RX ADMIN — SPIRONOLACTONE 25 MG: 25 TABLET ORAL at 09:02

## 2023-08-06 RX ADMIN — SACUBITRIL AND VALSARTAN 1 TABLET: 49; 51 TABLET, FILM COATED ORAL at 09:02

## 2023-08-06 RX ADMIN — GABAPENTIN 900 MG: 300 CAPSULE ORAL at 20:20

## 2023-08-06 RX ADMIN — Medication 1 MG: at 22:50

## 2023-08-06 RX ADMIN — INSULIN DETEMIR 15 UNITS: 100 INJECTION, SOLUTION SUBCUTANEOUS at 11:36

## 2023-08-06 RX ADMIN — MAGNESIUM OXIDE TAB 400 MG (241.3 MG ELEMENTAL MG) 400 MG: 400 (241.3 MG) TAB at 09:02

## 2023-08-06 RX ADMIN — POTASSIUM CHLORIDE 8 MEQ: 600 TABLET, FILM COATED, EXTENDED RELEASE ORAL at 09:01

## 2023-08-06 RX ADMIN — DULOXETINE HYDROCHLORIDE 20 MG: 20 CAPSULE, DELAYED RELEASE ORAL at 09:02

## 2023-08-06 RX ADMIN — ATORVASTATIN CALCIUM 20 MG: 20 TABLET, FILM COATED ORAL at 21:54

## 2023-08-06 RX ADMIN — ACETAMINOPHEN 650 MG: 325 TABLET, FILM COATED ORAL at 22:48

## 2023-08-06 RX ADMIN — NICOTINE POLACRILEX 4 MG: 2 GUM, CHEWING ORAL at 14:12

## 2023-08-06 RX ADMIN — GABAPENTIN 900 MG: 300 CAPSULE ORAL at 14:12

## 2023-08-06 RX ADMIN — GABAPENTIN 900 MG: 300 CAPSULE ORAL at 09:02

## 2023-08-06 RX ADMIN — PIPERACILLIN AND TAZOBACTAM 3.38 G: 3; .375 INJECTION, POWDER, LYOPHILIZED, FOR SOLUTION INTRAVENOUS at 20:20

## 2023-08-06 RX ADMIN — PIPERACILLIN AND TAZOBACTAM 3.38 G: 3; .375 INJECTION, POWDER, LYOPHILIZED, FOR SOLUTION INTRAVENOUS at 09:01

## 2023-08-06 ASSESSMENT — ACTIVITIES OF DAILY LIVING (ADL)
DEPENDENT_IADLS:: CLEANING;COOKING;LAUNDRY;SHOPPING;MEAL PREPARATION;MEDICATION MANAGEMENT;TRANSPORTATION
ADLS_ACUITY_SCORE: 35
ADLS_ACUITY_SCORE: 31
ADLS_ACUITY_SCORE: 33
ADLS_ACUITY_SCORE: 35
ADLS_ACUITY_SCORE: 33
ADLS_ACUITY_SCORE: 33
ADLS_ACUITY_SCORE: 35

## 2023-08-06 NOTE — PHARMACY-CONSULT NOTE
Opioid Treatment Program (Methadone Clinic) Information Note      Treatment program name: [facility]   Location (city): [place], [place]   Phone number: [phone]; Spoke with: Stephon Carpio in inpatient pharmacy for after hours methadone dose verification     Patient's current methadone dose verified as: 90 mg PO q24h   Last dose was administered on: 8/4/23   Take-home dose information (if applicable): Received 12 take out doses at that time to last through 8/16/23      Note(s): Treatment programs in [place] dispense methadone using the 10 mg/ml oral concentrate.       Enedelia Sheriff, PharmD

## 2023-08-06 NOTE — PHARMACY-ADMISSION MEDICATION HISTORY
Pharmacist Admission Medication History    Admission medication history is complete. The information provided in this note is only as accurate as the sources available at the time of the update.    Medication reconciliation/reorder completed by provider prior to medication history? Yes    Information Source(s): Patient's pharmacy and Facility (U/[facility]/) medication list/MAR via phone    Pertinent Information:   -Information below obtained via MAR from facility, all last doses prior to Admission on 8/4  -Methadone information contained in Pharmacy Consult Note filed 8/6    Changes made to PTA medication list:  Added: None  Deleted: Keflex, Cipro, Lantus, Naproxen, Zofran  Changed: Magnesium (1 tab Po daily -> 1/2 tab PO daily)    Medication Affordability:       Allergies reviewed with patient and updates made in EHR: unable to assess    Medication History Completed By: Ronnie Galindo [facility] 8/6/2023 6:16 PM    Prior to Admission medications    Medication Sig Last Dose Taking? Auth Provider Long Term End Date   acetaminophen (TYLENOL) 325 MG tablet Take 2 tablets (650 mg) by mouth 2 times daily as needed for mild pain  Yes Chente Angel MD     Acidophilus Lactobacillus CAPS Take 1 tablet by mouth 3 times daily For gut health (Lactobacillus)  Yes Unknown, Entered By History     albuterol (PROAIR HFA/PROVENTIL HFA/VENTOLIN HFA) 108 (90 Base) MCG/ACT inhaler Inhale 2 puffs into the lungs every 4 hours as needed for shortness of breath / dyspnea or wheezing  Yes Unknown, Entered By History No    amLODIPine (NORVASC) 5 MG tablet Take 5 mg by mouth daily 8/4/23 Yes Reported, Patient No    aspirin (ASA) 81 MG chewable tablet Take 1 tablet (81 mg) by mouth daily 8/4/23 Yes Chente Angel MD     atorvastatin (LIPITOR) 20 MG tablet Take 1 tablet (20 mg) by mouth At Bedtime 8/4/23 Yes Cam Nolan MD Yes    calcium carbonate antacid 1000 MG CHEW Take 1 chew tab by mouth every 6 hours as needed for  heartburn  Yes Unknown, Entered By History     cholecalciferol 25 MCG (1000 UT) TABS Take 1,000 Units by mouth daily  8/4/23 Yes Zena Wheeler MD     diclofenac (VOLTAREN) 1 % topical gel Apply 2-4 g topically 2 times daily  Yes Chente Angel MD     DULoxetine (CYMBALTA) 20 MG capsule Take 1 capsule (20 mg) by mouth daily 8/4/23 Yes Chente Angel MD Yes    empagliflozin (JARDIANCE) 10 MG TABS tablet Take 1 tablet (10 mg) by mouth daily 8/4/23 Yes Cam Nolan MD     fluticasone-salmeterol (ADVAIR) 250-50 MCG/DOSE inhaler Inhale 1 puff into the lungs 2 times daily 8/4/23 Yes Unknown, Entered By History Yes    furosemide (LASIX) 20 MG tablet Take 20 mg by mouth daily 8/4/23 Yes Unknown, Entered By History No    gabapentin (NEURONTIN) 300 MG capsule Take 900 mg by mouth 3 times daily 8/4/23 Yes Unknown, Entered By History No    hydrOXYzine (ATARAX) 50 MG tablet Take 100 mg by mouth every 6 hours as needed for anxiety  Yes Unknown, Entered By History     insulin detemir (LEVEMIR PEN) 100 UNIT/ML pen Inject 15 Units Subcutaneous every morning 8/4/23 Yes Jose Pope MD Yes    levothyroxine (SYNTHROID/LEVOTHROID) 200 MCG tablet Take 200 mcg by mouth daily  8/4/23 Yes Reported, Patient Yes    magnesium oxide 400 MG tablet Take 200 mg by mouth daily 8/4/23 Yes Reported, Patient     melatonin 5 MG tablet Take 5 mg by mouth At Bedtime  Yes Unknown, Entered By History     metFORMIN (GLUCOPHAGE) 1000 MG tablet Take 1,000 mg by mouth 2 times daily (with meals) 8/4/23 Yes Reported, Patient Yes    methadone (DOLOPHINE) 10 MG tablet Take 90 mg by mouth once Takes 90mg daily per [facility] methad[facility], updated 8/6/2023 8/4/2023 Yes Unknown, Entered By History No    multivitamin w/minerals (THERA-VIT-M) tablet Take 1 tablet by mouth daily 8/4/23 Yes Unknown, Entered By History     nicotine polacrilex (NICORETTE) 4 MG gum Place 4 mg inside cheek every hour as needed for smoking cessation   Yes Unknown,  Entered By History     nystatin (NYSTOP) 495303 UNIT/GM external powder SMARTSI Application Topical 2-3 Times Daily 23 Yes Lennie Patel PA-C     omeprazole (PRILOSEC) 20 MG DR capsule Take 20 mg by mouth daily  23 Yes Unknown, Entered By History     polyethylene glycol (MIRALAX) 17 GM/Dose powder Take 17 g by mouth daily as needed for constipation  Yes Jose Pope MD     potassium chloride ER (KLOR-CON) 8 MEQ CR tablet Take 8 mEq by mouth 2 times daily 23 Yes Unknown, Entered By History     sacubitril-valsartan (ENTRESTO) 49-51 MG per tablet Take 1 tablet by mouth 2 times daily 23 Yes Cam Nolan MD Yes    spironolactone (ALDACTONE) 25 MG tablet Take 1 tablet (25 mg) by mouth daily 23 Yes Cam Nolan MD Yes    vitamin B-12 (CYANOCOBALAMIN) 1000 MCG tablet Take 1,000 mcg by mouth daily 23 Yes Unknown, Entered By History     blood glucose (ACCU-CHEK RAFAELA PLUS) test strip Use to check blood sugar 3x daily or as directed.   Reported, Patient     Blood Glucose Calibration (ACCU-CHEK ACTIVE GLUCOSE CONT VI) 1 each by Other route   Reported, Patient     blood glucose calibration (ACCU-CHEK RAFAELA) solution    Reported, Patient     NARCAN 4 MG/0.1ML nasal spray CALL 911. SPRAY CONTENTS OF ONE SPRAYER (0.1ML) INTO ONE NOSTRIL. REPEAT IN 2-3 MINS IF SYMPTOMS OF OPIOID PERSIST, ALTERNATE NOSTRILS   Reported, Patient Yes

## 2023-08-06 NOTE — CONSULTS
Care Management Initial Consult    General Information  Assessment completed with: Patient,    Type of CM/SW Visit: Initial Assessment    Primary Care Provider verified and updated as needed: Yes   Readmission within the last 30 days: no previous admission in last 30 days      Reason for Consult: discharge planning  Advance Care Planning: Advance Care Planning Reviewed: no concerns identified          Communication Assessment  Patient's communication style: spoken language (English or Bilingual)    Hearing Difficulty or Deaf: no        Cognitive  Cognitive/Neuro/Behavioral: WDL              Best Language: 0 - No aphasia       Living Environment:   People in home: facility resident     Current living Arrangements: assisted living  Name of Facility: [facility] (P: [phone])   Able to return to prior arrangements: yes       Family/Social Support:  Care provided by:    Provides care for: no one     Children, Facility resident(s)/Staff          Description of Support System: Supportive, Involved    Support Assessment: Adequate family and caregiver support, Adequate social supports    Current Resources:   Patient receiving home care services: No     Community Resources: [facility] Programs, [facility] Worker, Financial/Insurance, Group Home  Equipment currently used at home:    Supplies currently used at home: Diabetic Supplies    Employment/Financial:  Employment Status: disabled        Financial Concerns: No concerns identified   Referral to Financial Worker: No       Does the patient's insurance plan have a 3 day qualifying hospital stay waiver?  No    Lifestyle & Psychosocial Needs:  Social Determinants of Health     Tobacco Use: Medium Risk (10/31/2022)    Patient History     Smoking Tobacco Use: Former     Smokeless Tobacco Use: Never     Passive Exposure: Not on file   Alcohol Use: Not on file   Financial Resource Strain: Not on file   Food Insecurity: Not on file   Transportation Needs: Not on file    Physical Activity: Not on file   Stress: Not on file   Social Connections: Not on file   Intimate Partner Violence: Not on file   Depression: Not at risk (6/3/2021)    PHQ-2     PHQ-2 Score: 0   Housing Stability: Not on file       Functional Status:  Prior to admission patient needed assistance:   Dependent ADLs:: Ambulation-cane, Ambulation-walker  Dependent IADLs:: Cleaning, Cooking, Laundry, Shopping, Meal Preparation, Medication Management, Transportation  Assesssment of Functional Status: At functional baseline    Mental Health Status:  Mental Health Status: No Current Concerns       Chemical Dependency Status:  Chemical Dependency Status: Past Concern             Values/Beliefs:  Spiritual, Cultural Beliefs, Uatsdin Practices, Values that affect care: no               Additional Information:  No Yusuf is a 63 year old female admitted on 8/6/2023. She has a PMH notable for prior V. tach and torsades de points w/ prior cardiac arrest, DM 2, HTN, obesity, hypothyroidism, GERD, arthritis, fibromyalgia, back pain, some prior lymphedema/lower extremity edema and prior cellulitis, prior substance use disorder (methamphetamines, opioids reportedly in remission), et al., who presented to the [facility] ED w/ a 3 day hx of worsening left lower extremity sx's that she describes as being similar to her prior bouts of cellulitis, also reporting urine color change.      Chart reviewed. Case discussed with bedside RN and medical provider. Concerns for low levels of levothyroxine med; concerned if pt is receiving it and/or other medications.     Writer met with pt. Pt was slightly somnolent throughout the assessment and would fall sleep through some answers. Writer anticipates medical team may get more comprehensive answers from  RN on Monday to go over pt's med compliance.     Pt reports that she has been at the  for the past 4 years or so per her estimations. Pt reports that she is very happy at the  facility and would like to return at discharge.     Pt reports that she tries to maintain her independence as much as possible, report that she does manage many of her own ADLs/IADLs on her own, however, pt has the ability to receive assistance with her ADLs as noted above.     [facility] (P: [phone]) - SW/RNCC to follow up with  RN tomorrow to receive more accurate information on pt's consistency with taking her medications.     Writer anticipates medical transportation will need to be arranged at discharge.    ________________    JOSIE Mcnamara, [facility]  ED/Observation   [facility]  Phone: [phone]  Pager: [phone]  Fax: [phone]    On-call pager, [phone], 4:00pm to midnight

## 2023-08-06 NOTE — H&P
[facility]    History and Physical - ED Observation Service        Date of Admission:  8/6/2023    Assessment & Plan      No Yusuf is a 63 year old female admitted on 8/6/2023. She has a PMH notable for prior V. tach and torsades de points w/ prior cardiac arrest, DM 2, HTN, obesity, hypothyroidism, GERD, arthritis, fibromyalgia, back pain, some prior lymphedema/lower extremity edema and prior cellulitis, prior substance use disorder (methamphetamines, opioids reportedly in remission), et al., who presented to the [facility] ED w/ a 3 day hx of worsening left lower extremity sx's that she describes as being similar to her prior bouts of cellulitis, also reporting urine color change.      ##. Cellulitis of LLE:  3 days ago noticed increasing discomfort and then subsequent skin redness. Denies falling, hitting the leg on anything or any type of trauma. Similar to prior cellulitis in the past. Fatigue. No fevers or chills.  In ED, afebrile. CMP with  otherwise normal. Normal lactic acid. VBG unremarkable. WBC 12.5, abs neutrophil 8.4. CRP 26.50, ESR 52.  CT Tib/Fib w/ contrast shows areas of subcutaneous fat stranding of the left leg most prominent along the anterior aspect of the knee joint; no significant drainable collection; no evidence of soft tissue gas; no evidence of acute osseous pathology; degenerative changes most prominent at the tibiotalar joint. In ED patient was given Unasyn 3g IV x 1.   -Start Zosyn instead as this will cover both Cellulitis and UTI based on previous UTI sensitivities  -Monitor closely for improvement  -Repeat CBC, CRP, ESR in AM     ##. UTI: Patient admits to dysuria. UA with 300 glucose, 10 protein, large LE, 91 WBC, 2 RBC, many bacteria, 1 SE. UCx sent and pending. WBC 12.5, abs neutrophil 8.4. CRP 26.50, ESR 52. Prior UCx 6/21/23 with E. Coli resistant to ampicillin, cefazolin, cefoxitin and bactrim. UCx 2/8/22 w/ E.  Coli resistant to ampicillin, unasyn, cefzolin, cefoxitin, ceftazidime, ceftriaxone. UCx 10/21/21 w/ Klebsiella resistant to ampicillin.  -Start Zosyn to cover both UTI and cellulitis  -Follow Ucx pending  -Repeat CBC, CRP, ESR in AM     ##. Diabetes mellitus II: Last HbA1c 6/21/23 6.2. BG 88. Will let patient eat breakfast and repeat BG prior to meds  - Continue PTA Metformin and Jardiance  - Continue PTA Levemir to 15 units  - BG checks TID ac and Qhs  - Carbohydrate Consistent Diet  - Hypoglycemic protocol     ##. Heart failure with midrange reduced EF   ##. Hx of COVID-pneumonia c/b torsades de pointes arrest  Last echo 2/2022 showed Mildly decreased left ventricular systolic function; visual ejection fraction is 40-45%. There is mild global hypokinesia of the left ventricle. MRI cardiac stress completed 8/2022 reporting LV is normal in cavity size and wall thickness; global systolic function is normal; LVEF is 76%; no regional wall motion abnormalities.   - Check EKG now also to assess QTC  - Continue PTA Magnesium  - Check Mag, Phos now and replace per protocol  - Daily weights  - Strict I/O     ##. History of Opioid Abuse: Continue PTA methadone.      ##. Hypothyroidism: - Continue PTA levothyroxine    ##. GERD: - Continue PTA omeprazole    # Depression: - Continue PTA duloxetine (pharmacy consult to clarify if on 20mg or 60mg)  - Continue PTA hydroxyzine    ##. Insomnia: - Continue PTA Melatonin prn  ##. Chronic Neuropathic pain: - Continue PTA gabapentin   - Continue PTA duloxetine.     # Hep C: not on treatment, [place] [facility], per care everywhere HCV RNA Quant not detected on 8/1/22, hep C genotyping was indeterminate.    ##. Candidasis: - Continue with PTA Nystatin         Diet: Combination Diet No Caffeine Diet, Low Saturated Fat Na <2400mg Diet    DVT Prophylaxis: Ambulate every shift  Valle Catheter: Not present  Lines: None     Cardiac Monitoring: None  Code Status: Full Code    Clinically  "Significant Risk Factors Present on Admission                 # Coagulation Defect: INR = 1.32 (Ref range: 0.85 - 1.15) and/or PTT = 27 Seconds (Ref range: 22 - 38 Seconds), will monitor for bleeding    # Drug Induced Platelet Defect: home medication list includes an antiplatelet medication     # Hypertension: Noted on problem list               Disposition Plan      Expected Discharge Date: 08/07/2023                The patient's care was discussed with the Attending Physician, Dr. Jones .    HECTOR Morris  Hospitalist Service  [facility]  Securely message with [employer] (more info)  Text page via [facility] Paging/Directory     ______________________________________________________________________    Chief Complaint   Wound Infection     History is obtained from the patient    History of Present Illness   Per ED Note: \"No Yusuf is a 63 year old female who PMH notable for prior V. tach and torsades de points w/ prior cardiac arrest, DM 2, HTN, obesity, hypothyroidism, GERD, arthritis, fibromyalgia, back pain, some prior lymphedema/lower extremity edema and prior cellulitis, prior substance use disorder (methamphetamines, opioids reportedly in remission), et al., who presents today w/ a 3 day hx of worsening left lower extremity sx's that she describes as being similar to her prior bouts of cellulitis, also reporting urine color change.      Patient reports that she first began noticing symptoms in the left lower leg about 3 days ago.  She noticed some increasing discomfort and then some subsequent red type of skin changes.  She denies falling, no hitting the leg on anything or any type of trauma.  Some sensation of swelling of the lower leg in the mid to distal shin area has increased over the last few days, and feels as though the skin has become more red.  She feels as though it aches \"down into my bones\" but not particularly at any joint.  She reports " "that she has some chronic fibromyalgia with ankle, knee pain, etc. but that is unchanged and no new joint pain or swelling.  No new numbness, tingling or weakness.  No swelling down to the rest of the foot, just in the shin area.  She describes that it feels and looks like when she has had cellulitis in the past.  Is a bit tired or like she may be getting some sort of early infection, but has not had any actual fevers or chills.  No HEENT symptoms, no new neck or back symptoms, no neck pain or stiffness.  No chest pain or shortness of breath.  No abdominal pain.  No vomiting, no change to bowel.  If anything she thinks her urine might be a little bit darker, but no dysuria, no change to frequency, she is wondering if that just because its been a bit warmer out, etc.  No other  symptoms.  Other than the left lower leg/shin area no other extremity symptoms.  No other new symptoms or complaints at this time.  Full ROS completed without any additional findings. \"    In the ED, HR 77, /95, RR 18, SaO2 96% on RA, Temp 97.8  F . Labs show CMp with  otherwise normal. Normal lactic acid. VBG unremarkable. CBC with WBC 12.5, abs neutrophil 8.4. CRP 26.50, ESR 52. UA with 300 glucose, 10 protein, large LE, 91 WBC, 2 RBC, many bacteria, 1 SE. CT Tib/Fib w/ contrast shows areas of subcutaneous fat stranding of the left leg most prominent along the anterior aspect of the knee joint; no significant drainable collection; no evidence of soft tissue gas; no evidence of acute osseous pathology; degenerative changes most prominent at the tibiotalar joint. In the ED the patient was given Unasyn 3g IV x 1.  Patient is being admitted for IV antibiotics.      Past Medical History    Past Medical History:   Diagnosis Date    Arthritis     Bipolar affective (H)     Fibromyalgia     Hypertension        Past Surgical History   Past Surgical History:   Procedure Laterality Date    CHOLECYSTECTOMY      GYN SURGERY      c section    " GYN SURGERY      oblation    ORTHOPEDIC SURGERY      left leg, left shoulder, back       Prior to Admission Medications   Prior to Admission Medications   Prescriptions Last Dose Informant Patient Reported? Taking?   Acidophilus Lactobacillus CAPS   Yes No   Sig: Take 1 tablet by mouth 3 times daily For gut health (Lactobacillus)   Blood Glucose Calibration (ACCU-CHEK ACTIVE GLUCOSE CONT VI)   Yes No   Si each by Other route   DULoxetine (CYMBALTA) 20 MG capsule   No No   Sig: Take 1 capsule (20 mg) by mouth daily   NARCAN 4 MG/0.1ML nasal spray   Yes No   Sig: CALL 911. SPRAY CONTENTS OF ONE SPRAYER (0.1ML) INTO ONE NOSTRIL. REPEAT IN 2-3 MINS IF SYMPTOMS OF OPIOID PERSIST, ALTERNATE NOSTRILS   acetaminophen (TYLENOL) 325 MG tablet   No No   Sig: Take 2 tablets (650 mg) by mouth 2 times daily as needed for mild pain   albuterol (PROAIR HFA/PROVENTIL HFA/VENTOLIN HFA) 108 (90 Base) MCG/ACT inhaler   Yes No   Sig: Inhale 2 puffs into the lungs every 4 hours as needed for shortness of breath / dyspnea or wheezing   amLODIPine (NORVASC) 5 MG tablet   Yes No   Sig: Take 5 mg by mouth daily   aspirin (ASA) 81 MG chewable tablet   No No   Sig: Take 1 tablet (81 mg) by mouth daily   atorvastatin (LIPITOR) 20 MG tablet   No No   Sig: Take 1 tablet (20 mg) by mouth At Bedtime   blood glucose (ACCU-CHEK RAFAELA PLUS) test strip   Yes No   Sig: Use to check blood sugar 3x daily or as directed.   blood glucose calibration (ACCU-CHEK RAFAELA) solution   Yes No   calcium carbonate antacid 1000 MG CHEW   Yes No   Sig: Take 1 chew tab by mouth every 6 hours as needed for heartburn   cephALEXin (KEFLEX) 500 MG capsule   No No   Sig: Take 1 capsule (500 mg) by mouth 4 times daily   cholecalciferol 25 MCG (1000 UT) TABS   Yes No   Sig: Take 1,000 Units by mouth daily    ciprofloxacin (CIPRO) 500 MG tablet   No No   Sig: Take 1 tablet (500 mg) by mouth 2 times daily   diclofenac (VOLTAREN) 1 % topical gel   No No   Sig: Apply 2-4 g  topically 2 times daily   empagliflozin (JARDIANCE) 10 MG TABS tablet   No No   Sig: Take 1 tablet (10 mg) by mouth daily   fluticasone-salmeterol (ADVAIR) 250-50 MCG/DOSE inhaler   Yes No   Sig: Inhale 1 puff into the lungs 2 times daily   furosemide (LASIX) 20 MG tablet   Yes No   Sig: Take 20 mg by mouth daily   gabapentin (NEURONTIN) 300 MG capsule   Yes No   Sig: Take 900 mg by mouth 3 times daily   hydrOXYzine (ATARAX) 50 MG tablet   Yes No   Sig: Take 100 mg by mouth every 6 hours as needed for anxiety   insulin detemir (LEVEMIR PEN) 100 UNIT/ML pen   No No   Sig: Inject 15 Units Subcutaneous every morning   insulin glargine (LANTUS PEN) 100 UNIT/ML pen   Yes No   Sig: Inject 15 Units Subcutaneous At Bedtime   levothyroxine (SYNTHROID/LEVOTHROID) 200 MCG tablet   Yes No   Sig: Take 200 mcg by mouth daily    magnesium oxide 400 MG tablet   Yes Yes   Sig: Take 400 mg by mouth daily   melatonin 5 MG tablet   Yes No   Sig: Take 5 mg by mouth At Bedtime   metFORMIN (GLUCOPHAGE) 1000 MG tablet   Yes No   Sig: Take 1,000 mg by mouth 2 times daily (with meals)   methadone (DOLOPHINE) 10 MG tablet   Yes No   Sig: Take 20 mg by mouth once Per patient, has been on methadone for several years in the past but off recently. Per patient, yesterday 3/24 she met with Debora [facility] [place] [facility] and was planning to restart this. Sounds like she may have received a one time 20 mg dose 3/24. Unclear what the follow up plan was. Patient mentioned possibly having a follow up appointment next Tuesday 3/29.   multivitamin w/minerals (THERA-VIT-M) tablet   Yes No   Sig: Take 1 tablet by mouth daily   naproxen (NAPROSYN) 375 MG tablet   Yes Yes   Sig: Take 375 mg by mouth 2 times daily (with meals)   nicotine polacrilex (NICORETTE) 4 MG gum   Yes No   Sig: Place 4 mg inside cheek every hour as needed for smoking cessation    nystatin (NYSTOP) 700450 UNIT/GM external powder   No No   Sig: SMARTSI Application Topical 2-3  Times Daily   omeprazole (PRILOSEC) 20 MG DR capsule   Yes No   Sig: Take 20 mg by mouth daily    ondansetron (ZOFRAN-ODT) 4 MG ODT tab   Yes No   Sig: Take 4 mg by mouth every 8 hours as needed for nausea   polyethylene glycol (MIRALAX) 17 GM/Dose powder   No No   Sig: Take 17 g by mouth daily as needed for constipation   potassium chloride ER (KLOR-CON) 8 MEQ CR tablet   Yes No   Sig: Take 8 mEq by mouth 2 times daily   sacubitril-valsartan (ENTRESTO) 49-51 MG per tablet   No No   Sig: Take 1 tablet by mouth 2 times daily   spironolactone (ALDACTONE) 25 MG tablet   No No   Sig: Take 1 tablet (25 mg) by mouth daily   traZODone (DESYREL) 50 MG tablet   Yes Yes   Sig: Take 50 mg by mouth At Bedtime   vitamin B-12 (CYANOCOBALAMIN) 1000 MCG tablet   Yes No   Sig: Take 1,000 mcg by mouth daily      Facility-Administered Medications: None        Review of Systems    All other ROS negative except those mentioned in above note.        Physical Exam   Vital Signs: Temp: 97.8  F (36.6  C) Temp src: Oral BP: (!) 167/95 Pulse: 77   Resp: 18 SpO2: 96 % O2 Device: None (Room air)    Weight: 0 lbs 0 oz    Constitutional: sleepy but arousable, cooperative, no apparent distress, and appears stated age  Eyes: Lids and lashes normal, pupils equal, round and reactive to light, extra ocular muscles intact, sclera clear, conjunctiva normal  ENT: Normocephalic, without obvious abnormality, atraumatic, sinuses nontender on palpation, external ears without lesions, oral pharynx with moist mucous membranes, tonsils without erythema or exudates, gums normal and good dentition.  Hematologic / Lymphatic: no cervical lymphadenopathy  Respiratory: No increased work of breathing, good air exchange, clear to auscultation bilaterally, no crackles or wheezing  Cardiovascular: Normal apical impulse, regular rate and rhythm, normal S1 and S2, no S3 or S4, and no murmur noted  GI: No scars, normal bowel sounds, soft, non-distended, non-tender, no  masses palpated, no hepatosplenomegally  Skin: LLE with some erythema and mild edema  Musculoskeletal: There is no redness, warmth, or swelling of the joints.  Full range of motion noted.  Motor strength is 5 out of 5 all extremities bilaterally.  Tone is normal.  Neurologic: Awake, alert, oriented to name, place and time.  Cranial nerves II-XII are grossly intact.  Motor is 5 out of 5 bilaterally.    Neuropsychiatric: General: normal, calm and normal eye contact     Medical Decision Making         MINUTES SPENT BY ME on the date of service doing chart review, history, exam, documentation & further activities per the note.      Data     I have personally reviewed the following data over the past 24 hrs:    12.5 (H)  \   12.0   / 349     138 102 16.1 /  88   3.6 25 0.94 \     ALT: 26 AST: 39 AP: 127 (H) TBILI: 0.5   ALB: 4.3 TOT PROTEIN: 8.6 (H) LIPASE: N/A     Procal: N/A CRP: 26.50 (H) Lactic Acid: 0.7       INR:  1.32 (H) PTT:  N/A   D-dimer:  0.95 (H) Fibrinogen:  N/A       Imaging results reviewed over the past 24 hrs:   Recent Results (from the past 24 hour(s))   CT Tibia Fibula Lower Leg Left w Contrast    Narrative    EXAM: CT TIBIA FIBULA LOWER LEG LEFT WITH CONTRAST  LOCATION: [facility]  DATE: 08/06/2023    INDICATION: Necrotizing fasc or other complex infectious findings.  COMPARISON: None available.  TECHNIQUE: IV contrast. Axial, sagittal and coronal thin-section reconstruction. Dose reduction techniques were used.   CONTRAST: 135 mL of Isovue-[id number].    FINDINGS:     BONES:  -No evidence of acute fracture or dislocation. Postsurgical changes of distal tibia and fibula with screws. Degenerative changes of the knee and ankle joints, most significant at the tibiotalar joint with joint space narrowing, subcortical cystic changes   and marginal osteophytes. Small left joint effusion.    SOFT TISSUES:  -Area of subcutaneous fat stranding of the leg most prominent  along the anterior aspect of the knee joint. No significant drainable collection. No evidence of soft tissue gas. Prominent venous collaterals in the left leg.      Impression    IMPRESSION:  1.  Areas of subcutaneous fat stranding of the left leg most prominent along the anterior aspect of the knee joint. No significant drainable collection. No evidence of soft tissue gas.  2.  No evidence of acute osseous pathology. Degenerative changes most prominent at the tibiotalar joint.

## 2023-08-06 NOTE — ED PROVIDER NOTES
Sign Out Provider:  Dr. Carrero    Sign Out Plan: 63-year-old female here with left lower extremity cellulitis, pending CT scan of the left lower extremity, urinalysis, reevaluation, disposition at the time of signout.    Reassessment: I personally reviewed and interpreted CT scan of the left lower extremity which demonstrates subcutaneous fat stranding of the left leg, no evidence of drainable fluid collection, or soft tissue gas.  Given patient's history of left lower extremity cellulitis, previously treated with Unasyn, patient was started on Unasyn in the emergency department.  I reviewed urinalysis which is remarkable for large leukocyte esterase, many bacteria, 91 white blood cells.  Patient with leukocytosis of 12.5, ESR 52, CRP 26.5.  I discussed patient management with ED observation and will plan on admission for IV antibiotics.  Patient understands and agrees with plan.    Disposition: Admit ED observation       Lauren Jaimes MD  08/06/23 0797

## 2023-08-06 NOTE — ED PROVIDER NOTES
"ED Provider Note  [facility]      History     Chief Complaint   Patient presents with    Wound Infection     HPI  No Yusuf is a 63 year old female who PMH notable for prior V. tach and torsades de points w/ prior cardiac arrest, DM 2, HTN, obesity, hypothyroidism, GERD, arthritis, fibromyalgia, back pain, some prior lymphedema/lower extremity edema and prior cellulitis, prior substance use disorder (methamphetamines, opioids reportedly in remission), et al., who presents today w/ a 3 day hx of worsening left lower extremity sx's that she describes as being similar to her prior bouts of cellulitis, also reporting urine color change.     Patient reports that she first began noticing symptoms in the left lower leg about 3 days ago.  She noticed some increasing discomfort and then some subsequent red type of skin changes.  She denies falling, no hitting the leg on anything or any type of trauma.  Some sensation of swelling of the lower leg in the mid to distal shin area has increased over the last few days, and feels as though the skin has become more red.  She feels as though it aches \"down into my bones\" but not particularly at any joint.  She reports that she has some chronic fibromyalgia with ankle, knee pain, etc. but that is unchanged and no new joint pain or swelling.  No new numbness, tingling or weakness.  No swelling down to the rest of the foot, just in the shin area.  She describes that it feels and looks like when she has had cellulitis in the past.  Is a bit tired or like she may be getting some sort of early infection, but has not had any actual fevers or chills.  No HEENT symptoms, no new neck or back symptoms, no neck pain or stiffness.  No chest pain or shortness of breath.  No abdominal pain.  No vomiting, no change to bowel.  If anything she thinks her urine might be a little bit darker, but no dysuria, no change to frequency, she is wondering if that just " because its been a bit warmer out, etc.  No other  symptoms.  Other than the left lower leg/shin area no other extremity symptoms.  No other new symptoms or complaints at this time.  Full ROS completed without any additional findings.         Past Medical History  Past Medical History:   Diagnosis Date    Arthritis     Bipolar affective (H)     Fibromyalgia     Hypertension      Past Surgical History:   Procedure Laterality Date    CHOLECYSTECTOMY      GYN SURGERY      c section    GYN SURGERY      oblation    ORTHOPEDIC SURGERY      left leg, left shoulder, back     acetaminophen (TYLENOL) 325 MG tablet  Acidophilus Lactobacillus CAPS  albuterol (PROAIR HFA/PROVENTIL HFA/VENTOLIN HFA) 108 (90 Base) MCG/ACT inhaler  aspirin (ASA) 81 MG chewable tablet  atorvastatin (LIPITOR) 20 MG tablet  blood glucose (ACCU-CHEK RAFAELA PLUS) test strip  Blood Glucose Calibration (ACCU-CHEK ACTIVE GLUCOSE CONT VI)  blood glucose calibration (ACCU-CHEK RAFAELA) solution  calcium carbonate antacid 1000 MG CHEW  cephALEXin (KEFLEX) 500 MG capsule  cholecalciferol 25 MCG (1000 UT) TABS  ciprofloxacin (CIPRO) 500 MG tablet  diclofenac (VOLTAREN) 1 % topical gel  DULoxetine (CYMBALTA) 20 MG capsule  empagliflozin (JARDIANCE) 10 MG TABS tablet  fluticasone-salmeterol (ADVAIR) 250-50 MCG/DOSE inhaler  furosemide (LASIX) 20 MG tablet  gabapentin (NEURONTIN) 300 MG capsule  hydrOXYzine (ATARAX) 50 MG tablet  insulin detemir (LEVEMIR PEN) 100 UNIT/ML pen  insulin glargine (LANTUS PEN) 100 UNIT/ML pen  levothyroxine (SYNTHROID/LEVOTHROID) 200 MCG tablet  melatonin 5 MG tablet  metFORMIN (GLUCOPHAGE) 1000 MG tablet  methadone (DOLOPHINE) 10 MG tablet  multivitamin w/minerals (THERA-VIT-M) tablet  NARCAN 4 MG/0.1ML nasal spray  nicotine polacrilex (NICORETTE) 4 MG gum  nystatin (NYSTOP) 832527 UNIT/GM external powder  omeprazole (PRILOSEC) 20 MG DR capsule  ondansetron (ZOFRAN-ODT) 4 MG ODT tab  polyethylene glycol (MIRALAX) 17 GM/Dose  powder  potassium chloride ER (KLOR-CON) 8 MEQ CR tablet  sacubitril-valsartan (ENTRESTO) 49-51 MG per tablet  spironolactone (ALDACTONE) 25 MG tablet  vitamin B-12 (CYANOCOBALAMIN) 1000 MCG tablet      Allergies   Allergen Reactions    Promethazine Other (See Comments) and Anxiety     Noted in 8/30/08 ER    Codeine Phosphate GI Disturbance    Lamotrigine Other (See Comments)     hyponatremia    Oxcarbazepine Unknown and Other (See Comments)     Low sodium  LegacyRecord#[id number]      Codeine Other (See Comments) and Rash     GI bleeding  LegacyRecord#[id number]       Family History  Family History   Problem Relation Age of Onset    Heart Disease Mother     Diabetes Mother     Ovarian Cancer Mother     Heart Disease Father     Lung Cancer Father     Diabetes Sister     Ovarian Cancer Sister     Diabetes Brother      Social History   Social History     Tobacco Use    Smoking status: Former     Packs/day: 0.10     Types: Cigarettes    Smokeless tobacco: Never    Tobacco comments:     Using nicotine vape occasionally   Substance Use Topics    Alcohol use: No    Drug use: No      Past medical history, past surgical history, medications, allergies, family history, and social history were reviewed with the patient. No additional pertinent items.      A complete review of systems was performed with pertinent positives and negatives noted in the HPI, and all other systems negative.    Physical Exam   BP: (!) 167/95  Pulse: 77  Temp: 97.8  F (36.6  C)  Resp: 18  SpO2: 96 %  Physical Exam  CONSTITUTIONAL: Well-developed and well-nourished. Awake and alert. Non-toxic appearance. No acute distress.   HENT:   - Head: Normocephalic and atraumatic.   - Ears: External ear grossly normal.   - Nose: Nose normal. No rhinorrhea. No epistaxis.   - Mouth/Throat: MMM  EYES: Conjunctivae and lids are normal. No scleral icterus.   NECK: Normal range of motion and phonation normal. Neck supple.  No tracheal deviation, no stridor. No edema or erythema  noted.  CARDIOVASCULAR: Normal rate, regular rhythm and no appreciable abnormal heart sounds.  PULMONARY/CHEST: Normal work of breathing. No accessory muscle usage or stridor. No respiratory distress.  No appreciable abnormal breath sounds.  ABDOMEN: Soft, non-distended. No tenderness. No peritoneal findings, no rigidity, rebound or guarding.  No palpable masses or abnormal pulsatility appreciated.  MUSCULOSKELETAL: Extremities warm and seemingly well perfused. Does have some localized swelling left mid-distal shin/lower leg area. Some areas of erythema that are slightly warmer than the surrounding areas that appear normal in color/appearance. Has discomfort to palpation in this area of erythema and slight swelling. No crepitous.  Normal, soft compartments. No joint pain, swelling or abnormal warmth. ROM intact. Strength/sensation intact.   NEUROLOGIC: Awake, alert. Not disoriented. No seizure activity. GCS 15.  Strength/sensation intact.  SKIN: Skin is warm and dry. No rash noted. No diaphoresis. No pallor. No obvious acute strength/sensory deficits. No tremulousness.   PSYCHIATRIC: Normal mood and affect. Speech and behavior normal. Thought processes linear. Cognition and memory are normal. No SI/HI reported.      ED Course, Procedures, & Data        Critical care was not performed.     Medical Decision Making  The patient's presentation was of moderate complexity (an acute illness with systemic symptoms).    The patient's evaluation involved:  review of external note(s) from 2 sources (see separate area of note for details)  ordering and/or review of 3+ test(s) in this encounter (see separate area of note for details)  review of 3+ test result(s) ordered prior to this encounter (see separate area of note for details)  discussion of management or test interpretation with another health professional (see separate area of note for details)    The patient's management necessitated further care after sign-out to  overnight EM physician (see their note for further management).    Assessment & Plan    IMPRESSION:   63 year old female w/ PMH notable for prior V. tach and torsades de points w/ prior cardiac arrest, DM 2, HTN, obesity, hypothyroidism, GERD, arthritis, fibromyalgia, back pain, some prior lymphedema/lower extremity edema and prior cellulitis, prior substance use disorder (methamphetamines, opioids reportedly in remission), et al., who presents today w/ a 3 day hx of worsening left lower extremity sx's that she describes as being similar to her prior bouts of cellulitis.      Clinically, patient appears nontoxic, NAD.  Has some elevated BP in triage, but seemingly asymptomatic for such. Will continue to monitor. Otherwise on LE exam, does have some discomfort mid-distal left lower leg with some swelling there and skin changes. No crepitous, compartments soft. Seems to be well perfused. No obvious acute neuro changes.     Ddx includes, but not limited to, cellulitis. Considered necrotizing SSTI but thought to be less likely (no crepitous, no pain outside of areas of skin changes, no pain w/ passive movement, etc.). Also other issues like DVT (less likely), no findings for compartment syndrome, no acute neurovascular findings. No obvious neuro findings/deficits. No apparent joint or infectious/inflammatory arthritis findings.     PLAN:   - Labs, LE imaging, pain management, dispo pending ED course  - Risks/benefits of pursuing imaging reviewed and accepted.     RESULTS:  Labs:   - Lactate 0.70  - Full labs pending  Urine:   - No sample yet provided  Imaging: Pending at signout.     INTERVENTIONS:   - IV Abx (choice TBD once urine results return)    RE-EVALUATION:  - Pt otherwise continues to do well here in the ED, no acute issues or apparent concerning changes in vitals or clinical appearance.    DISCUSSIONS:  - w/ Patient: I have reviewed the available findings, tentative plan with the patient. She expressed  understanding and agreement with this plan. All questions answered to the best of our ability at this time.     SIGNOUT:  - Patient signed out to overnight EM Physician.  - Impression at shift change: LLE pain, swelling, skin changes  - Pending at shift change: Labs, CT LLE, Repeat BP  - Tentative plan: Follow-up pending studies, Abx as needed, (treat skin and UTI simultaneously if possible once UA returns), clinically reassess, consider outpatient vs ED Obs vs IM admission based on results, clinical appearance, etc.        ______________________________________________________________________              Demetrice Carrero MD  [facility] EMERGENCY DEPARTMENT  8/6/2023     Demetrice Carrero MD  08/07/23 0610

## 2023-08-07 ENCOUNTER — APPOINTMENT (OUTPATIENT)
Dept: PHYSICAL THERAPY | Facility: CLINIC | Age: 63
End: 2023-08-07
Payer: COMMERCIAL

## 2023-08-07 LAB
ALBUMIN SERPL BCG-MCNC: 3.6 G/DL (ref 3.5–5.2)
ALP SERPL-CCNC: 118 U/L (ref 35–104)
ALT SERPL W P-5'-P-CCNC: 18 U/L (ref 0–50)
ANION GAP SERPL CALCULATED.3IONS-SCNC: 9 MMOL/L (ref 7–15)
AST SERPL W P-5'-P-CCNC: 32 U/L (ref 0–45)
BACTERIA UR CULT: ABNORMAL
BASOPHILS # BLD AUTO: 0.1 10E3/UL (ref 0–0.2)
BASOPHILS NFR BLD AUTO: 1 %
BILIRUB SERPL-MCNC: 0.5 MG/DL
BUN SERPL-MCNC: 13 MG/DL (ref 8–23)
CALCIUM SERPL-MCNC: 8.5 MG/DL (ref 8.8–10.2)
CHLORIDE SERPL-SCNC: 103 MMOL/L (ref 98–107)
CREAT SERPL-MCNC: 1 MG/DL (ref 0.51–0.95)
CRP SERPL-MCNC: 21.3 MG/L
DEPRECATED HCO3 PLAS-SCNC: 25 MMOL/L (ref 22–29)
EOSINOPHIL # BLD AUTO: 0.3 10E3/UL (ref 0–0.7)
EOSINOPHIL NFR BLD AUTO: 4 %
ERYTHROCYTE [DISTWIDTH] IN BLOOD BY AUTOMATED COUNT: 17 % (ref 10–15)
ERYTHROCYTE [SEDIMENTATION RATE] IN BLOOD BY WESTERGREN METHOD: 47 MM/HR (ref 0–30)
GFR SERPL CREATININE-BSD FRML MDRD: 63 ML/MIN/1.73M2
GLUCOSE BLDC GLUCOMTR-MCNC: 116 MG/DL (ref 70–99)
GLUCOSE BLDC GLUCOMTR-MCNC: 122 MG/DL (ref 70–99)
GLUCOSE BLDC GLUCOMTR-MCNC: 132 MG/DL (ref 70–99)
GLUCOSE BLDC GLUCOMTR-MCNC: 136 MG/DL (ref 70–99)
GLUCOSE BLDC GLUCOMTR-MCNC: 160 MG/DL (ref 70–99)
GLUCOSE SERPL-MCNC: 106 MG/DL (ref 70–99)
HCT VFR BLD AUTO: 38.7 % (ref 35–47)
HGB BLD-MCNC: 11.9 G/DL (ref 11.7–15.7)
IMM GRANULOCYTES # BLD: 0.1 10E3/UL
IMM GRANULOCYTES NFR BLD: 1 %
LYMPHOCYTES # BLD AUTO: 2.5 10E3/UL (ref 0.8–5.3)
LYMPHOCYTES NFR BLD AUTO: 31 %
MAGNESIUM SERPL-MCNC: 2.2 MG/DL (ref 1.7–2.3)
MCH RBC QN AUTO: 27.2 PG (ref 26.5–33)
MCHC RBC AUTO-ENTMCNC: 30.7 G/DL (ref 31.5–36.5)
MCV RBC AUTO: 89 FL (ref 78–100)
MONOCYTES # BLD AUTO: 0.9 10E3/UL (ref 0–1.3)
MONOCYTES NFR BLD AUTO: 11 %
NEUTROPHILS # BLD AUTO: 4.4 10E3/UL (ref 1.6–8.3)
NEUTROPHILS NFR BLD AUTO: 52 %
NRBC # BLD AUTO: 0 10E3/UL
NRBC BLD AUTO-RTO: 0 /100
PLATELET # BLD AUTO: 335 10E3/UL (ref 150–450)
POTASSIUM SERPL-SCNC: 4.4 MMOL/L (ref 3.4–5.3)
PROT SERPL-MCNC: 7.6 G/DL (ref 6.4–8.3)
RBC # BLD AUTO: 4.37 10E6/UL (ref 3.8–5.2)
SODIUM SERPL-SCNC: 137 MMOL/L (ref 136–145)
WBC # BLD AUTO: 8.2 10E3/UL (ref 4–11)

## 2023-08-07 PROCEDURE — 99223 1ST HOSP IP/OBS HIGH 75: CPT | Performed by: STUDENT IN AN ORGANIZED HEALTH CARE EDUCATION/TRAINING PROGRAM

## 2023-08-07 PROCEDURE — 97530 THERAPEUTIC ACTIVITIES: CPT | Mod: GP

## 2023-08-07 PROCEDURE — 99231 SBSQ HOSP IP/OBS SF/LOW 25: CPT | Performed by: INTERNAL MEDICINE

## 2023-08-07 PROCEDURE — 85652 RBC SED RATE AUTOMATED: CPT | Performed by: PHYSICIAN ASSISTANT

## 2023-08-07 PROCEDURE — 85025 COMPLETE CBC W/AUTO DIFF WBC: CPT | Performed by: PHYSICIAN ASSISTANT

## 2023-08-07 PROCEDURE — 99418 PROLNG IP/OBS E/M EA 15 MIN: CPT | Performed by: STUDENT IN AN ORGANIZED HEALTH CARE EDUCATION/TRAINING PROGRAM

## 2023-08-07 PROCEDURE — 83735 ASSAY OF MAGNESIUM: CPT | Performed by: EMERGENCY MEDICINE

## 2023-08-07 PROCEDURE — 250N000013 HC RX MED GY IP 250 OP 250 PS 637: Performed by: STUDENT IN AN ORGANIZED HEALTH CARE EDUCATION/TRAINING PROGRAM

## 2023-08-07 PROCEDURE — 93005 ELECTROCARDIOGRAM TRACING: CPT

## 2023-08-07 PROCEDURE — G0378 HOSPITAL OBSERVATION PER HR: HCPCS

## 2023-08-07 PROCEDURE — 82962 GLUCOSE BLOOD TEST: CPT

## 2023-08-07 PROCEDURE — 97161 PT EVAL LOW COMPLEX 20 MIN: CPT | Mod: GP

## 2023-08-07 PROCEDURE — 250N000013 HC RX MED GY IP 250 OP 250 PS 637: Performed by: PHYSICIAN ASSISTANT

## 2023-08-07 PROCEDURE — 36415 COLL VENOUS BLD VENIPUNCTURE: CPT | Performed by: PHYSICIAN ASSISTANT

## 2023-08-07 PROCEDURE — 999N000128 HC STATISTIC PERIPHERAL IV START W/O US GUIDANCE

## 2023-08-07 PROCEDURE — 86140 C-REACTIVE PROTEIN: CPT | Performed by: PHYSICIAN ASSISTANT

## 2023-08-07 PROCEDURE — 80053 COMPREHEN METABOLIC PANEL: CPT | Performed by: PHYSICIAN ASSISTANT

## 2023-08-07 PROCEDURE — 93010 ELECTROCARDIOGRAM REPORT: CPT | Performed by: INTERNAL MEDICINE

## 2023-08-07 PROCEDURE — 96376 TX/PRO/DX INJ SAME DRUG ADON: CPT

## 2023-08-07 PROCEDURE — 250N000011 HC RX IP 250 OP 636: Mod: JZ | Performed by: PHYSICIAN ASSISTANT

## 2023-08-07 PROCEDURE — 250N000013 HC RX MED GY IP 250 OP 250 PS 637: Performed by: EMERGENCY MEDICINE

## 2023-08-07 RX ORDER — NALOXONE HYDROCHLORIDE 0.4 MG/ML
0.2 INJECTION, SOLUTION INTRAMUSCULAR; INTRAVENOUS; SUBCUTANEOUS
Status: DISCONTINUED | OUTPATIENT
Start: 2023-08-07 | End: 2023-08-08 | Stop reason: HOSPADM

## 2023-08-07 RX ORDER — METHADONE HYDROCHLORIDE 10 MG/ML
90 CONCENTRATE ORAL DAILY
Status: DISCONTINUED | OUTPATIENT
Start: 2023-08-08 | End: 2023-08-08 | Stop reason: HOSPADM

## 2023-08-07 RX ORDER — NALOXONE HYDROCHLORIDE 0.4 MG/ML
0.4 INJECTION, SOLUTION INTRAMUSCULAR; INTRAVENOUS; SUBCUTANEOUS
Status: DISCONTINUED | OUTPATIENT
Start: 2023-08-07 | End: 2023-08-07

## 2023-08-07 RX ORDER — NALOXONE HYDROCHLORIDE 0.4 MG/ML
0.4 INJECTION, SOLUTION INTRAMUSCULAR; INTRAVENOUS; SUBCUTANEOUS
Status: DISCONTINUED | OUTPATIENT
Start: 2023-08-07 | End: 2023-08-08 | Stop reason: HOSPADM

## 2023-08-07 RX ORDER — NALOXONE HYDROCHLORIDE 0.4 MG/ML
0.2 INJECTION, SOLUTION INTRAMUSCULAR; INTRAVENOUS; SUBCUTANEOUS
Status: DISCONTINUED | OUTPATIENT
Start: 2023-08-07 | End: 2023-08-07

## 2023-08-07 RX ORDER — METHADONE HYDROCHLORIDE 10 MG/ML
70 CONCENTRATE ORAL ONCE
Status: DISCONTINUED | OUTPATIENT
Start: 2023-08-07 | End: 2023-08-07

## 2023-08-07 RX ADMIN — POTASSIUM CHLORIDE 8 MEQ: 600 TABLET, FILM COATED, EXTENDED RELEASE ORAL at 19:59

## 2023-08-07 RX ADMIN — DULOXETINE HYDROCHLORIDE 20 MG: 20 CAPSULE, DELAYED RELEASE ORAL at 08:32

## 2023-08-07 RX ADMIN — METHADONE HYDROCHLORIDE 90 MG: 10 CONCENTRATE ORAL at 12:53

## 2023-08-07 RX ADMIN — FUROSEMIDE 20 MG: 20 TABLET ORAL at 08:32

## 2023-08-07 RX ADMIN — SACUBITRIL AND VALSARTAN 1 TABLET: 49; 51 TABLET, FILM COATED ORAL at 10:30

## 2023-08-07 RX ADMIN — ASPIRIN 81 MG CHEWABLE TABLET 81 MG: 81 TABLET CHEWABLE at 08:32

## 2023-08-07 RX ADMIN — MAGNESIUM OXIDE TAB 400 MG (241.3 MG ELEMENTAL MG) 400 MG: 400 (241.3 MG) TAB at 08:32

## 2023-08-07 RX ADMIN — GABAPENTIN 900 MG: 300 CAPSULE ORAL at 13:44

## 2023-08-07 RX ADMIN — SPIRONOLACTONE 25 MG: 25 TABLET ORAL at 08:32

## 2023-08-07 RX ADMIN — PIPERACILLIN AND TAZOBACTAM 3.38 G: 3; .375 INJECTION, POWDER, LYOPHILIZED, FOR SOLUTION INTRAVENOUS at 13:44

## 2023-08-07 RX ADMIN — PIPERACILLIN AND TAZOBACTAM 3.38 G: 3; .375 INJECTION, POWDER, LYOPHILIZED, FOR SOLUTION INTRAVENOUS at 02:29

## 2023-08-07 RX ADMIN — ACETAMINOPHEN 650 MG: 325 TABLET, FILM COATED ORAL at 10:34

## 2023-08-07 RX ADMIN — ACETAMINOPHEN 650 MG: 325 TABLET, FILM COATED ORAL at 18:16

## 2023-08-07 RX ADMIN — GABAPENTIN 900 MG: 300 CAPSULE ORAL at 08:32

## 2023-08-07 RX ADMIN — PANTOPRAZOLE SODIUM 40 MG: 40 TABLET, DELAYED RELEASE ORAL at 08:32

## 2023-08-07 RX ADMIN — Medication 1 MG: at 19:57

## 2023-08-07 RX ADMIN — EMPAGLIFLOZIN 10 MG: 10 TABLET, FILM COATED ORAL at 08:32

## 2023-08-07 RX ADMIN — POTASSIUM CHLORIDE 8 MEQ: 600 TABLET, FILM COATED, EXTENDED RELEASE ORAL at 10:30

## 2023-08-07 RX ADMIN — INSULIN DETEMIR 5 UNITS: 100 INJECTION, SOLUTION SUBCUTANEOUS at 08:33

## 2023-08-07 RX ADMIN — NICOTINE POLACRILEX 4 MG: 2 GUM, CHEWING ORAL at 19:57

## 2023-08-07 RX ADMIN — DICLOFENAC SODIUM 2 G: 10 GEL TOPICAL at 19:58

## 2023-08-07 RX ADMIN — PIPERACILLIN AND TAZOBACTAM 3.38 G: 3; .375 INJECTION, POWDER, LYOPHILIZED, FOR SOLUTION INTRAVENOUS at 08:33

## 2023-08-07 RX ADMIN — GABAPENTIN 900 MG: 300 CAPSULE ORAL at 19:58

## 2023-08-07 RX ADMIN — METFORMIN HYDROCHLORIDE 1000 MG: 500 TABLET ORAL at 08:32

## 2023-08-07 RX ADMIN — SACUBITRIL AND VALSARTAN 1 TABLET: 49; 51 TABLET, FILM COATED ORAL at 19:59

## 2023-08-07 RX ADMIN — DICLOFENAC SODIUM 2 G: 10 GEL TOPICAL at 08:32

## 2023-08-07 RX ADMIN — METFORMIN HYDROCHLORIDE 1000 MG: 500 TABLET ORAL at 18:14

## 2023-08-07 RX ADMIN — LEVOTHYROXINE SODIUM 200 MCG: 100 TABLET ORAL at 08:32

## 2023-08-07 RX ADMIN — ATORVASTATIN CALCIUM 20 MG: 20 TABLET, FILM COATED ORAL at 19:59

## 2023-08-07 RX ADMIN — NICOTINE POLACRILEX 4 MG: 2 GUM, CHEWING ORAL at 10:30

## 2023-08-07 RX ADMIN — PIPERACILLIN AND TAZOBACTAM 3.38 G: 3; .375 INJECTION, POWDER, LYOPHILIZED, FOR SOLUTION INTRAVENOUS at 19:57

## 2023-08-07 RX ADMIN — MICONAZOLE NITRATE 1 G: 20 POWDER TOPICAL at 20:00

## 2023-08-07 ASSESSMENT — ACTIVITIES OF DAILY LIVING (ADL)
ADLS_ACUITY_SCORE: 33

## 2023-08-07 NOTE — PROGRESS NOTES
Observation goals:   Diagnostic tests and consults completed and resulted: Not Met  Vital signs normal or at patient baseline: Met  Tolerating oral intake to maintain hydration: Met  Adequate pain control on oral analgesics: Met  Tolerating oral antibiotics or has plans for home infusion setup: Not Met, IV ABX  Infection is improving: Not Met  Returns to baseline functional status: Not Met  Safe disposition plan has been identified: Not Met  Pt consult completed: Met    Temp: 98  F (36.7  C) Temp src: Oral BP: 123/71 Pulse: 61   Resp: 18 SpO2: 100 % O2 Device: None (Room air)

## 2023-08-07 NOTE — PHARMACY-CONSULT NOTE
Pharmacy consulted to review medications for prolonged QTc    The following medications have known risk for prolonged QTc:  Ondansetron  Methadone    The following medications have conditional risk for prolonged QTc:  Hydroxyzine  Pantoprazole      Benitez ConleyD, BCPS

## 2023-08-07 NOTE — PLAN OF CARE
Observation goals:   Diagnostic tests and consults completed and resulted: Not met  Vital signs normal or at patient baseline: Met  Tolerating oral intake to maintain hydration: Met  Adequate pain control on oral analgesics: Met  Tolerating oral antibiotics or has plans for home infusion setup: not Met, IV ABX  Infection is improving: Not Met  Returns to baseline functional status: Not Met  Safe disposition plan has been identified: Not Met  Pt consult completed: Not Met

## 2023-08-07 NOTE — CONSULTS
[facility]   Consult Note - Addiction Service     Date of Admission:  8/6/2023    Consult Requested by: Dr. Patel  Reason for Consult: Prolonged QTc on methadone    Assessment & Plan   No Yusuf is a 62 yo woman with history of opioid use disorder on methadone, prior methamphetamine and cocaine use, prior V. Tach and torsades de point w/ prior cardiac arrest, DMII, HTN, obesity, hypothyroidism, GERD, fibromyalgia, and chronic back pain who was admitted to ED observation for cellulitis and urinary tract infection.     # Opioid Use Disorder, in remission on methadone  # History of cocaine and methamphetamine use  # QTc prolongation  # History of Ventricular Tachycardia and Torsades de Points with cardiac arrest  Denies using any substances for about 4 years. Has been in remission since being on methadone. Did not tolerate suboxone in the past. QTc 490 increased from 461 on 6/23/23. QTc has been in the 600 in the past. Reviewed cardiac history. Torsades de Points and cardiac arrest occurred in 2/2022 while patient was ill with COVID pneumonia and on multiple QTc prolonging medications. Patient endorsing some symptoms of withdrawal including body aches, nausea.  - Repeat EKG  - Pharmacy consult to review if any other medications contributing to prolonged QTc  - Avoid all other QTc prolonging medications  - Discussed risk and benefits of methadone in the setting of prolonged QTc. Patient would like to continue methadone as this has worked well for her and her OUD has been in remission for 4 years on methadone. Did not tolerate suboxone in the past- made her sick.   - Given her last full dose of methadone was 8/4 (received 20mg on 8/6 in ED), recommend ~25% dose reduction today. Plan for methadone 70mg today and if tolerates, resume methadone 90mg daily tomorrow (ordered).   - Follows at [facility]    If constipation, consider  --Constipation:  miralax, senna     # Tobacco use  Smokes 1/2 ppd. Does not want a patch as they give her nightmares, finds gum helpful (ordered)    # Hepatitis C   Has not been treated. [place] [facility] Per care everywhere HCV RNA Quant not detected on 8/1/22, hep C genotyping was indeterminate.  - Continue [place] [facility]    # Mental health:  # Depression, bipolar disorder  - Continue pta duloxetine  - Follows with a counselor at [facility]    # Harm Reduction:  - Discharge with narcan    # Immunization review:   Hep A and B vaccinated per [facility].     # Peer Support:   -Our peer  will meet the patient if agreeable and still hospitalized on Thursday, to provide additional outpatient resources  -To contact Cheri Peer  from [facility] call or text: [phone]    # Addiction Social Worker:   Our addiction social worker Vasiliy Ware can be contacted if needed, on her pager [phone] or texted/called at [phone]    # Linkage to Care:   - Follows at [facility]. Received 12 take out doses that should last through 8/16/23. Lives at group home and they help with medications.    The patient's care was discussed with the Patient and Primary team.    I spent 120 minutes on the unit/floor managing the care of Nofélix Porter Madelin. Over 50% of my time was spent on the following:   Significant education and counseling spent on: how substance use disorders and dependence occur, and how it can become a chronic relapsing and remitting medical condition.  In addition, the pharmacology of medical treatments including methadone.    Serenity Benson MD  [facility]   Contact information available via [facility] Paging/Directory  Please see sign in/sign out for up to date coverage information    ______________________________________________________________________    Chief Complaint   QTc prolongation while on methadone    History is  "obtained from the patient    History of Present Illness   No Yusuf is a 62 yo woman with history of opioid use disorder on methadone, prior methamphetamine and cocaine use, prior V. Tach and torsades de point w/ prior cardiac arrest, DMII, HTN, obesity, hypothyroidism, GERD, fibromyalgia, and chronic back pain who was admitted to ED observation for cellulitis and urinary tract infection.     Drug/Substance of Choice:  Heroin, has not used in 4 years    Opioid use history: No reports she started using opioid pills (percocet, morphine) when she was a teenager. She has trouble remember exactly what periods of her life she used, but does say she was sober throughout her multiple pregnancies and when her children were young. She thinks she switched to heroin from 5066-6978. Endorses injecting and smoking. She also intermittently used methamphetamine and cocaine. She has not used any substances for 4 years since starting on methadone. She tried suboxone but it made her sick. She is very happy with methadone and denies cravings. She [place] at [facility]. She previously followed at [place]. Her last dose of methadone at home was 8/4. She received 20mg in ED yesterday. She is starting to notice withdrawal symptoms including body aches. Endorses feeling \"a little dopesick.\"     Alcohol use history: Has not had a drink in many years, unable to quantify prior alcohol use. Drank socially.    Withdrawal history: Endorses history of withdrawal.    If injecting: what type of needles? No use in >4 years    Prior MAT history:  If so, methadone or suboxone? Has tried both suboxone and methadone    Other substances used: Cocaine, methamphetamine. Smokes 1/2 pack of cigarettes per day.    History of correction or shelter? Denies    Active or prior justice related issues secondary to substance use: Denies    Identified race/ethnicity/important cultural/spiritual/ethnic identities: Member of [place], lived there for years, but now " lives in cities  Employed: No  Housing status/insecurity: Lives in group home  Where patient lives/what town: [place]  Transportation status/insecurity: Group home provides transportation  Telephone status/insecurity: Lost cell phone recently, but new one on the way  Additional family member or friend who can support health goals: Reports supportive family and group home  HIV status: Nonreactive 2019  Hep C status: HCV RNA Quant not detected on 8/1/22, hep C genotyping was indeterminate.  Hep A status per [facility] or Hep A IgG:Vaccinated per [facility]  Hep B status per [facility] or serologies: Vaccinated per [facility]  Sexually active: Did not ask  Birth control: Did not ask      Review of Systems   The 5 point Review of Systems is negative other than noted in the HPI or here.     Past Medical History:   Diagnosis Date    Arthritis     Bipolar affective (H)     Fibromyalgia     Hypertension        Past Surgical History:   Procedure Laterality Date    CHOLECYSTECTOMY      GYN SURGERY      c section    GYN SURGERY      oblation    ORTHOPEDIC SURGERY      left leg, left shoulder, back       Social History   Social History     Socioeconomic History    Marital status: Single     Spouse name: Not on file    Number of children: Not on file    Years of education: Not on file    Highest education level: Not on file   Occupational History    Not on file   Tobacco Use    Smoking status: Former     Packs/day: 0.10     Types: Cigarettes    Smokeless tobacco: Never    Tobacco comments:     Using nicotine vape occasionally   Substance and Sexual Activity    Alcohol use: No    Drug use: No    Sexual activity: Not Currently   Other Topics Concern    Not on file   Social History Narrative    Not on file       Family History   I have reviewed this patient's family history and updated it with pertinent information if needed.  Family History   Problem Relation Age of Onset    Heart Disease Mother     Diabetes Mother     Ovarian Cancer Mother      Heart Disease Father     Lung Cancer Father     Diabetes Sister     Ovarian Cancer Sister     Diabetes Brother        Medications   I have reviewed this patient's current medications    Allergies   No Known Allergies    Physical Exam   Temp: 98  F (36.7  C) Temp src: Oral BP: 122/75 Pulse: 60   Resp: 18 SpO2: 96 % O2 Device: None (Room air)      Gen: no acute distress,   HEENT: NC/AT, MMM, EOMI,   CV: Extremities WWP, pulses assumed   Resp: Breathing comfortably on RA  Ext: No significant deformities or trauma, moving all ext freely  Skin: Erythematous LLE.  Neuro: Alert and oriented x3. Answering questions appropriately. No focal neurologic deficit  Psych: Flat affect    Due to regulation of Title 42 of the Code of Federal Regulations (CFR) Part 2: Confidentiality laws apply to this note and the information wherein.  Thus, this note cannot be copy and pasted into any other health care staff's note nor can it be included in general medical records sent to ANY outside agency without the patient's written consent.

## 2023-08-07 NOTE — PROGRESS NOTES
Care Management Follow Up    Length of Stay (days): 1    Expected Discharge Date: 08/08/2023     Concerns to be Addressed: discharge planning     Patient plan of care discussed at interdisciplinary rounds: Yes    Anticipated Discharge Disposition: Home Care, Group Home  [facility] (P: [phone]; SILVIO Watson; [phone]; Fax: [phone])      Anticipated Discharge Services: Transportation Services  Anticipated Discharge DME:      Patient/family educated on Medicare website which has current facility and service quality ratings:    Education Provided on the Discharge Plan:    Patient/Family in Agreement with the Plan: yes    Referrals Placed by CM/SW: Homecare  Private pay costs discussed: Not applicable    Additional Information:    Per conversation with OBS ETTA Chinchilla, PT recommends [facility] PT, and pt will likely discharge back to her  on 8/8/2023 SW agreed to place [facility] referral.    1515 Initial Homecare referral sent to [facility] via [employer]     1525 [facility] declined due to payor capacity. [facility] will outsource the referral.    1543 SW contacted  RN ([phone]) [facility] and provided discharge and [facility] update. RN told SW that the [facility] agency should call the  ([phone]) to set up arrangements. RN also provided  fax for discharge orders (fax: [phone]).    RN also confirmed that pt is compliant with her medications while at the residence but reported that when she visits her daughter(s), she typically stays 2-3 days longer than planned. RN reports that she now directs staff to provide an 2-3 day's supply for her medications whenever she visits her daughter(s) to ensure she has enough of her medications for her entire visit.  No additional questions or concerns were reported and the call was ended.    SW will continue to follow as needed.    JOSIE Caldwell, [facility]  ED/OBS   [facility]  Phone: [phone]  Pager: [phone]  Fax: [phone]      On-call pager, [phone], 4:00 pm to midnight

## 2023-08-07 NOTE — PROGRESS NOTES
Observation goals:   Diagnostic tests and consults completed and resulted: Not Met  Vital signs normal or at patient baseline: Met  Tolerating oral intake to maintain hydration: Met  Adequate pain control on oral analgesics: Met  Tolerating oral antibiotics or has plans for home infusion setup: Not Met, IV ABX  Infection is improving: Not Met  Returns to baseline functional status: Not Met  Safe disposition plan has been identified: Not Met  Pt consult completed: Met     /76 (BP Location: Left arm)   Pulse 67   Temp 97.7  F (36.5  C) (Oral)   Resp 18   Wt 114 kg (251 lb 5.2 oz)   LMP 11/01/2013   SpO2 94%   BMI 41.82 kg/m

## 2023-08-07 NOTE — PLAN OF CARE
Goal Outcome Evaluation:    Temp: 98  F (36.7  C) Temp src: Oral BP: 123/71 Pulse: 61   Resp: 18 SpO2: 100 % O2 Device: None (Room air)    NEURO: A&Ox4, calm/cooperative, able to make needs known  RESPIRATORY: WNL on RA, continuous pulse ox to monitor this evening as methadone restarted today, denies SOB  CARDIAC: WNL, denies chest pain, EKG ordered for 2000 this evening  GI/: SV, loose BMx1 this shift per pt, denies nausea, denies abd discomfort  SKIN: LLE cellulitis- marked w/ no extension past borders, pain and redness to area  DIET: Regular, BG ACHS  PAIN/NAUSEA: C/o LLE pain, minimal relief w/ voltaren gel to L-knee and foot as well as PRN tylenol x1, methadone restarted today, PRN nicotine gum available per pt request   IV ACCESS: PIV infusing NS TKO in between IV zosyn q6hrs  ACTIVITY: Independent w/ walker  LAB: Reviewed, Mg 2.2- no replacement needed, recheck scheduled tomorrow A.M.  PLAN: Continue w/ POC, obs goals, discharge time/date pending, plan to discharge on oral abx

## 2023-08-07 NOTE — PROGRESS NOTES
[facility]    ED Observation Progress Note - ED Observation    Date of Admission:  8/6/2023    Assessment & Plan   No Yusuf is a 63 year old female admitted on 8/6/2023. She has a PMH notable for prior V. tach and torsades de points w/ prior cardiac arrest, DM 2, HTN, obesity, hypothyroidism, GERD, arthritis, fibromyalgia, back pain, some prior lymphedema/lower extremity edema and prior cellulitis, prior substance use disorder (methamphetamines, opioids reportedly in remission), et al., who presented to the [facility] ED w/ a 3 day hx of worsening left lower extremity sx's that she describes as being similar to her prior bouts of cellulitis, also reporting urine color change.      ##. Cellulitis of LLE:  3 days ago noticed increasing discomfort and then subsequent skin redness. Denies falling, hitting the leg on anything or any type of trauma. Similar to prior cellulitis in the past. Fatigue. No fevers or chills.  In ED, afebrile. CMP with  otherwise normal. Normal lactic acid. VBG unremarkable. WBC 12.5, abs neutrophil 8.4. CRP 26.50, ESR 52.  CT Tib/Fib w/ contrast shows areas of subcutaneous fat stranding of the left leg most prominent along the anterior aspect of the knee joint; no significant drainable collection; no evidence of soft tissue gas; no evidence of acute osseous pathology; degenerative changes most prominent at the tibiotalar joint. In ED patient was given Unasyn 3g IV x 1.  No events overnight. Patient reports the pain in her LLE, chills have improved. The erythema on her LLE is significantly improved from yesterdays markings. Patient reports some intermittent body aches. Will monitor symptoms this morning and anticipated discharge on oral antibiotics. CRP, ESR, and WBC all improving this morning. UC shows >100,000 e.coli.   -Start Zosyn instead as this will cover both Cellulitis and UTI based on previous UTI sensitivities  -Monitor  closely for improvement       ##. UTI: Patient admits to dysuria. UA with 300 glucose, 10 protein, large LE, 91 WBC, 2 RBC, many bacteria, 1 SE. UCx sent and pending. WBC 12.5, abs neutrophil 8.4. CRP 26.50, ESR 52. Prior UCx 6/21/23 with E. Coli resistant to ampicillin, cefazolin, cefoxitin and bactrim. UCx 2/8/22 w/ E. Coli resistant to ampicillin, unasyn, cefzolin, cefoxitin, ceftazidime, ceftriaxone. UCx 10/21/21 w/ Klebsiella resistant to ampicillin. CRP, ESR, and WBC all improving this morning. UC shows >100,000 e.coli.   -Start Zosyn to cover both UTI and cellulitis  -Follow UC susceptabilites      ##. Diabetes mellitus II: Last HbA1c 6/21/23 6.2. BG 88. Will let patient eat breakfast and repeat BG prior to meds  - Continue PTA Metformin and Jardiance  - Continue PTA Levemir to 15 units  - BG checks TID ac and Qhs  - Carbohydrate Consistent Diet  - Hypoglycemic protocol      ##. Heart failure with midrange reduced EF   ##. Hx of COVID-pneumonia c/b torsades de pointes arrest  Last echo 2/2022 showed Mildly decreased left ventricular systolic function; visual ejection fraction is 40-45%. There is mild global hypokinesia of the left ventricle. MRI cardiac stress completed 8/2022 reporting LV is normal in cavity size and wall thickness; global systolic function is normal; LVEF is 76%; no regional wall motion abnormalities.   - Check EKG now also to assess QTC  - Continue PTA Magnesium  - Daily weights  - Strict I/O     ##. History of Opioid Abuse: Continue PTA methadone.   -Addiction consult with recs:  - Repeat EKG  - Pharmacy consult to review if any other medications contributing to prolonged QTc  - Avoid all other QTc prolonging medications  - Discussed risk and benefits of methadone in the setting of prolonged QTc. Patient would like to continue methadone as this has worked well for her and her OUD has been in remission for 4 years on methadone. Did not tolerate suboxone in the past- made her sick.   -  "Given her last full dose of methadone was 8/4 (received 20mg on 8/6 in ED), recommend ~25% dose reduction today. Plan for methadone 70mg today and if tolerates, resume methadone 90mg daily tomorrow (ordered).   - Follows at [facility]     If constipation, consider  --Constipation: miralax, senna       - Discharge with narcan     ##. Hypothyroidism: - Continue PTA levothyroxine    Addendum:  per SW \" contacted  RN ([phone]) [facility] and provided discharge and [facility] update. RN told  that the [facility] agency should call the  ([phone]) to set up arrangements.      RN also confirmed that pt is compliant with her medications while at the residence but reported that when she visits her daughter(s), she typically stays 2-3 days longer than planned. RN reports that she now directs staff to provide an 2-3 day's supply for her medications whenever she visits her daughter(s) to ensure she has enough of her medications for her entire visit.  No additional questions or concerns were reported and the call was ended.\"     ##. GERD: - Continue PTA omeprazole     # Depression: - Continue PTA duloxetine   - Continue PTA hydroxyzine  - Follows with a counselor at [facility]      ##. Insomnia: - Continue PTA Melatonin prn  ##. Chronic Neuropathic pain: - Continue PTA gabapentin   - Continue PTA duloxetine.     # Hep C: not on treatment, followed at [facility], per care everywhere HCV RNA Quant not detected on 8/1/22, hep C genotyping was indeterminate.     ##. Candidasis: - Continue with PTA Nystatin       Diet: Regular Diet Adult    DVT Prophylaxis: Ambulate every shift  Valle Catheter: Not present  Lines: None     Cardiac Monitoring: None  Code Status: Full Code      Clinically Significant Risk Factors Present on Admission               # Coagulation Defect: INR = 1.32 (Ref range: 0.85 - 1.15) and/or PTT = 27 Seconds (Ref range: 22 - 38 Seconds), will monitor for bleeding  # Drug Induced Platelet " Defect: home medication list includes an antiplatelet medication   # Hypertension: Noted on problem list               Disposition Plan      Expected Discharge Date: 08/07/2023      Destination: assisted living          The patient's care was discussed with the Attending Physician, Dr. Osorio, Bedside Nurse, Care Coordinator/, and Patient.    ETTA Jane CNP  Ed Observation  [facility]  Securely message with Vocera (more info)  Text page via [facility] Paging/Directory   ______________________________________________________________________    Interval History   No events overnight. Patient reports the pain in her LLE, chills have improved. The erythema on her LLE is significantly improved from yesterdays markings. Patient reports some intermittent body aches. Will monitor symptoms this morning and anticipated discharge on oral antibiotics.     Physical Exam   Vital Signs: Temp: 98  F (36.7  C) Temp src: Oral BP: 122/75 Pulse: 60   Resp: 18 SpO2: 96 % O2 Device: None (Room air)    Weight: 0 lbs 0 oz    Constitutional: sleepy but arousable, cooperative, no apparent distress, and appears stated age  Eyes: Lids and lashes normal, pupils equal, round and reactive to light, extra ocular muscles intact, sclera clear, conjunctiva normal  ENT: Normocephalic, without obvious abnormality, atraumatic  Hematologic / Lymphatic: no cervical lymphadenopathy  Respiratory: No increased work of breathing, good air exchange, clear to auscultation bilaterally, no crackles or wheezing  Cardiovascular: Normal apical impulse, regular rate and rhythm, normal S1 and S2, no S3 or S4, and no murmur noted  GI: No scars, normal bowel sounds, soft, non-distended, non-tender, no masses palpated, no hepatosplenomegally  Skin: LLE with some erythema and mild edema, improved from yesterdays markings  Musculoskeletal: There is no redness, warmth, or swelling of the joints.   Full range of motion noted.  Motor strength is 5 out of 5 all extremities bilaterally.  Tone is normal.  Neurologic: Awake, alert, oriented to name, place and time.  Cranial nerves II-XII are grossly intact.  Motor is 5 out of 5 bilaterally.    Neuropsychiatric: General: normal, calm and normal eye contact        Medical Decision Making       30 MINUTES SPENT BY ME on the date of service doing chart review, history, exam, documentation & further activities per the note.      Data   ------------------------- PAST 24 HR DATA REVIEWED -----------------------------------------------    I have personally reviewed the following data over the past 24 hrs:    8.2  \   11.9   / 335     137 103 13.0 /  116 (H)   4.4 25 1.00 (H) \     ALT: 18 AST: 32 AP: 118 (H) TBILI: 0.5   ALB: 3.6 TOT PROTEIN: 7.6 LIPASE: N/A     TSH: N/A T4: N/A A1C: N/A     Procal: N/A CRP: 21.30 (H) Lactic Acid: N/A         Imaging results reviewed over the past 24 hrs:   No results found for this or any previous visit (from the past 24 hour(s)).

## 2023-08-07 NOTE — PROGRESS NOTES
"   08/07/23 0700   Appointment Info   Signing Clinician's Name / Credentials (PT) Carmen Jonas, PT, DPT   Quick Adds   Quick Adds Certification   Living Environment   People in Home facility resident   Current Living Arrangements group home   Home Accessibility stairs within home   Number of Stairs, Within Home, Primary three;four   Stair Railings, Within Home, Primary railings on both sides of stairs   Transportation Anticipated agency   Living Environment Comments Per pt and EMR, pt lives in a group home where she receives assistance for cooking, cleaning, laundry, shopping, meal prep, medication mgmt, transportation, and functional mobility and ADLs PRN. States she lives in the basement with ~ 3-4 steps and bilateral railings. Tub shower with grab bars. At baseline she uses 4WW and SPC for functional mobility.   Self-Care   Usual Activity Tolerance fair   Current Activity Tolerance fair   Regular Exercise No   Equipment Currently Used at Home cane, straight;grab bar, tub/shower;grab bar, toilet;walker, rolling   Fall history within last six months yes   Number of times patient has fallen within last six months 3   Activity/Exercise/Self-Care Comment Pt reports IND with mobility and most ADLs using 4WW or SPC depending on day. Distance limited to household ambulation. Reports multiple falls in the last 6 months but unsure of exact number, attributes them to tripping and legs giving out. Denies activity at baseline other than walking and doing crafts at group home. States she has an appointment for a scooter coming up for community mobility.   General Information   Onset of Illness/Injury or Date of Surgery 08/06/23   Referring Physician Fang Lowry PA   Patient/Family Therapy Goals Statement (PT) go home   Pertinent History of Current Problem (include personal factors and/or comorbidities that impact the POC) \"No Yusuf is a 63 year old female admitted on 8/6/2023. She has a PMH notable for " "prior V. tach and torsades de points w/ prior cardiac arrest, DM 2, HTN, obesity, hypothyroidism, GERD, arthritis, fibromyalgia, back pain, some prior lymphedema/lower extremity edema and prior cellulitis, prior substance use disorder (methamphetamines, opioids reportedly in remission), et al., who presented to the [facility] ED w/ a 3 day hx of worsening left lower extremity sx's that she describes as being similar to her prior bouts of cellulitis, also reporting urine color change.\" Now being admitted for antibiotics d/t UTI and cellulitis.   Existing Precautions/Restrictions no known precautions/restrictions   Weight-Bearing Status - LUE full weight-bearing   Weight-Bearing Status - RUE full weight-bearing   Weight-Bearing Status - LLE full weight-bearing   Weight-Bearing Status - RLE full weight-bearing   General Observations Activity: ambulate with assist   Cognition   Affect/Mental Status (Cognition) WFL   Orientation Status (Cognition) oriented x 4   Follows Commands (Cognition) WFL   Pain Assessment   Patient Currently in Pain Yes, see Vital Sign flowsheet  (Pt reports 3/10 LLE pain)   Integumentary/Edema   Integumentary/Edema other (describe)   Integumentary/Edema Comments LLE cellulitis   Posture    Posture Forward head position;Protracted shoulders   Range of Motion (ROM)   Range of Motion ROM is WFL   Strength (Manual Muscle Testing)   Strength (Manual Muscle Testing) Deficits observed during functional mobility   Strength Comments grossly > 3+/5, limited primarily by decreased muscular endurance   Bed Mobility   Bed Mobility rolling left;rolling right;supine-sit;sit-supine   Rolling Left [place] (Bed Mobility) modified independence   Rolling Right [place] (Bed Mobility) modified independence   Supine-Sit [place] (Bed Mobility) modified independence   Sit-Supine [place] (Bed Mobility) modified independence   Comment, (Bed Mobility) increased time/effort to perform flat bed " mobility   Transfers   Transfers sit-stand transfer;toilet transfer   Sit-Stand Transfer   Sit-Stand [place] (Transfers) modified independence   Assistive Device (Sit-Stand Transfers) walker, 4-wheeled   Comment, (Sit-Stand Transfer) increased time/effort to complete   Toilet Transfer   [place] Level (Toilet Transfer) modified independence   Assistive Device (Toilet Transfer) grab bars/safety frame   Type (Toilet Transfer) stand-sit;sit-stand   Comment, (Toilet Transfer) increased time/effort to complete   Gait/Stairs (Locomotion)   [place] Level (Gait) supervision   Assistive Device (Gait) walker, 4-wheeled   Distance in Feet 140'   Pattern (Gait) step-through   Deviations/Abnormal Patterns (Gait) gait speed decreased;neo decreased;base of support, wide;antalgic   Balance   Balance no deficits were identified   Balance Comments generally intact, no gross LOB or unsteadiness noted   Sensory Examination   Sensory Perception patient reports no sensory changes   Coordination   Coordination no deficits were identified   Muscle Tone   Muscle Tone no deficits were identified   Clinical Impression   Criteria for Skilled Therapeutic Intervention Yes, treatment indicated   PT Diagnosis (PT) impaired functional mobility   Influenced by the following impairments pain, weakness, balance   Functional limitations due to impairments activity tolerance, ambulation, stairs, balance   Clinical Presentation (PT Evaluation Complexity) Stable/Uncomplicated   Clinical Presentation Rationale clinical judgement   Clinical Decision Making (Complexity) low complexity   Planned Therapy Interventions (PT) balance training;gait training;home exercise program;neuromuscular re-education;patient/family education;stair training;strengthening;transfer training;progressive activity/exercise;risk factor education;home program guidelines   Anticipated Equipment Needs at Discharge (PT)   (TBD: anticipate none)   Risk & Benefits of  therapy have been explained evaluation/treatment results reviewed;care plan/treatment goals reviewed;risks/benefits reviewed;current/potential barriers reviewed;participants voiced agreement with care plan;participants included;patient   PT Total Evaluation Time   PT Kayla, Low Complexity Minutes (05802) 15   Therapy Certification   Start of care date 08/06/23   Certification date from 08/06/23   Certification date to 08/28/23   Medical Diagnosis Cellulitis of left leg   Physical Therapy Goals   PT Frequency 3x/week   PT Predicted Duration/Target Date for Goal Attainment 08/28/23   PT Goals Gait;Stairs;PT Goal 1   PT: Gait Modified independent;Greater than 200 feet;Rolling walker   PT: Stairs 5 stairs;Modified independent;Rail on both sides   PT: Goal 1 Demonstrate low falls risk with a balance assessment score of < 13.5 seconds on TUG,  > 19/24 on Dynamic Gait Index, > 9/12 on 4-Item Dynamic Gait Index, >22/30 on Functional Gait Assessment, or > 45/56 on [place] Balance Assessment.   Interventions   Interventions Quick Adds Therapeutic Activity   Therapeutic Activity   Therapeutic Activities: dynamic activities to improve functional performance Minutes (17007) 30   Symptoms Noted During/After Treatment Increased pain   Treatment Detail/Skilled Intervention Pt supine in bed upon PT arrival, cleared by RN and agreeable to participate in session. Evaluation completed and treatment initiated. Facilitated bed mobility and transfer training within room and bathroom for improved IND and safety with functional mobility and ADLs. Pt performs all functional transfers and toileting mod I requiring increased time/effort to complete tasks but overall steady with no gross LOB. Pt able to transfer and ambulate short distances with no UE support however grabs towards furniture. Advised to continue to use 4WW for increased stability and decreased risk of falls. Once progressed to 4WW pt demo good safety awareness and use of 4WW  ensuring brakes are locked pre/post transfers. Pt ambulates 160ft in room and hallway with 4WW and SBA demo decreased velocity and short shuffled stepping pattern. Pt reports increased pain during ambulation through LLE, requiring multiple standing rest breaks but overall demonstrates good stability and appropraite use of 4WW. Post session pt left sitting at EOB with call bell and all needs in reach, and in NAD. Encouraged continued use of 4WW and SBA during hospitalization and once discharged for ambulation and stairs to decrease risk of falls d/t LLE pain. Pt in agreement, eager to return home and teary eyed throughout d/t multiple recent losses in family and hospitilization resulting in her missing the . Therapeutic listening provided and pt encouraged to continue to express any needs or concerns with medical staff, RN present and aware.   PT Discharge Planning   PT Plan progress gait with 4WW vs. SPC, formal balance assessment, functional strengthening, initiate LE HEP   PT Discharge Recommendation (DC Rec) home with assist;home with home care physical therapy   PT Rationale for DC Rec Pt mobilizing near functional baseline limited primarily by pain at this date. Anticipate once medically stable pt will be appropriate to return to group home with assist for higher level mobility, ADLs, and IADLs. Pt would benefit from HHPT at group home if eligible to assess safety within home environment and progress strength, endurance, and balance with functional mobility to reduce risk of falls and re-admission.   PT Brief overview of current status SBA, encourage hallway ambulation with staff 3-4x/day with 4WW      [employer] [facility]  OUTPATIENT PHYSICAL THERAPY EVALUATION  PLAN OF TREATMENT FOR OUTPATIENT REHABILITATION  (COMPLETE FOR INITIAL CLAIMS ONLY)  Patient's Last Name, First Name, M.I.  YOB: 1960  No Yusuf                        Provider's Name  [employer]  [facility] Medical Record No.  [id number]                             Onset Date:  08/06/23   Start of Care Date:  (P) 08/06/23   Type:     _X_PT   ___OT   ___SLP Medical Diagnosis:  (P) Cellulitis of left leg              PT Diagnosis:  (P) impaired functional mobility Visits from SOC:  1     See note for plan of treatment, functional goals and certification details    I CERTIFY THE NEED FOR THESE SERVICES FURNISHED UNDER        THIS PLAN OF TREATMENT AND WHILE UNDER MY CARE     (Physician co-signature of this document indicates review and certification of the therapy plan).

## 2023-08-07 NOTE — PROGRESS NOTES
Patient interviewed and examined. Labs, imaging and chart notes reviewed.   No Yusuf presented to the ED yesterday with LLE redness and swelling particularly over her left knee and shin. She has a history of past episodes of cellulitis that were similar. She has past history of type 2 DM, HTN, obesity, hypothyroidism, V tach, torsades with past cardiac arrest, GERD, past substance abuse (amphetamines and opioids) and bipolar disorder. She also noted some malodorous urine for the past few weeks Her leg symptoms started 4 days ago.    In the ED she had mildly elevated WBC at 12.5. UA had large LE, 91 WBC and many bacteria. UC is growing >100K E coli today. CT of the left knee had soft tissue fat stranding without tissue gas or bony abnormality. Lactate was normal. TSH elevated at 29 with mildly low T4. CRP was elevated at 26. Electrolytes and LFTs were normal.     Past Medical History:   Diagnosis Date    Arthritis     Bipolar affective (H)     Fibromyalgia     Hypertension        Past Surgical History:   Procedure Laterality Date    CHOLECYSTECTOMY      GYN SURGERY      c section    GYN SURGERY      oblation    ORTHOPEDIC SURGERY      left leg, left shoulder, back       Family History   Problem Relation Age of Onset    Heart Disease Mother     Diabetes Mother     Ovarian Cancer Mother     Heart Disease Father     Lung Cancer Father     Diabetes Sister     Ovarian Cancer Sister     Diabetes Brother        Social History     Tobacco Use    Smoking status: Former     Packs/day: 0.10     Types: Cigarettes    Smokeless tobacco: Never    Tobacco comments:     Using nicotine vape occasionally   Substance Use Topics    Alcohol use: No     Physical Exam:  Middle aged woman in NAD.  /71 (BP Location: Right arm)   Pulse 61   Temp 98  F (36.7  C) (Oral)   Resp 18   LMP 11/01/2013   SpO2 100%   HEENT: KAYDEN. EOMI.  Neck: No JVD.  Lungs: Clear.  Cardiac: RRR. Normal S1 and S2.  Abdomen: Morbidly obese, soft, non  tender.  Extrem: !+ pitting edema. Erythematous patch left distal extensor shin. Minimal erythema of proximal shin.  Neuro: No foacl deficits.  Psych: Normal mood and affect.    Labs/Imaging    Results for orders placed or performed during the hospital encounter of 08/06/23 (from the past 24 hour(s))   Glucose by meter   Result Value Ref Range    GLUCOSE BY METER POCT 90 70 - 99 mg/dL   Glucose by meter   Result Value Ref Range    GLUCOSE BY METER POCT 78 70 - 99 mg/dL   Glucose by meter   Result Value Ref Range    GLUCOSE BY METER POCT 114 (H) 70 - 99 mg/dL   Glucose by meter   Result Value Ref Range    GLUCOSE BY METER POCT 136 (H) 70 - 99 mg/dL   CBC with Platelets & Differential    Narrative    The following orders were created for panel order CBC with Platelets & Differential.  Procedure                               Abnormality         Status                     ---------                               -----------         ------                     CBC with platelets and d...[[id number]]  Abnormal            Final result                 Please view results for these tests on the individual orders.   Comprehensive metabolic panel   Result Value Ref Range    Sodium 137 136 - 145 mmol/L    Potassium 4.4 3.4 - 5.3 mmol/L    Chloride 103 98 - 107 mmol/L    Carbon Dioxide (CO2) 25 22 - 29 mmol/L    Anion Gap 9 7 - 15 mmol/L    Urea Nitrogen 13.0 8.0 - 23.0 mg/dL    Creatinine 1.00 (H) 0.51 - 0.95 mg/dL    Calcium 8.5 (L) 8.8 - 10.2 mg/dL    Glucose 106 (H) 70 - 99 mg/dL    Alkaline Phosphatase 118 (H) 35 - 104 U/L    AST 32 0 - 45 U/L    ALT 18 0 - 50 U/L    Protein Total 7.6 6.4 - 8.3 g/dL    Albumin 3.6 3.5 - 5.2 g/dL    Bilirubin Total 0.5 <=1.2 mg/dL    GFR Estimate 63 >60 mL/min/1.73m2   CRP inflammation   Result Value Ref Range    CRP Inflammation 21.30 (H) <5.00 mg/L   Erythrocyte sedimentation rate auto   Result Value Ref Range    Erythrocyte Sedimentation Rate 47 (H) 0 - 30 mm/hr   CBC with platelets and  differential   Result Value Ref Range    WBC Count 8.2 4.0 - 11.0 10e3/uL    RBC Count 4.37 3.80 - 5.20 10e6/uL    Hemoglobin 11.9 11.7 - 15.7 g/dL    Hematocrit 38.7 35.0 - 47.0 %    MCV 89 78 - 100 fL    MCH 27.2 26.5 - 33.0 pg    MCHC 30.7 (L) 31.5 - 36.5 g/dL    RDW 17.0 (H) 10.0 - 15.0 %    Platelet Count 335 150 - 450 10e3/uL    % Neutrophils 52 %    % Lymphocytes 31 %    % Monocytes 11 %    % Eosinophils 4 %    % Basophils 1 %    % Immature Granulocytes 1 %    NRBCs per 100 WBC 0 <1 /100    Absolute Neutrophils 4.4 1.6 - 8.3 10e3/uL    Absolute Lymphocytes 2.5 0.8 - 5.3 10e3/uL    Absolute Monocytes 0.9 0.0 - 1.3 10e3/uL    Absolute Eosinophils 0.3 0.0 - 0.7 10e3/uL    Absolute Basophils 0.1 0.0 - 0.2 10e3/uL    Absolute Immature Granulocytes 0.1 <=0.4 10e3/uL    Absolute NRBCs 0.0 10e3/uL   Glucose by meter   Result Value Ref Range    GLUCOSE BY METER POCT 116 (H) 70 - 99 mg/dL     Impression:  Middle aged woman with type 2 DM and recurrent cellulitis presented with LLE redness, swelling and discomfort. She appears to have recurrent cellulitis on exam. She was started on Zosyn in the ED yesterday. The rash is clinically improving today.    She appear to have E coli UTI. She has history of recurrent UTI with variable antibiotic resistance including 1st, 2nd and 3rd generation cephalosporins. She has history of past Torsades with arrest. Quinolones and erythromycin group antibiotics are not great choices. We will await sensitivities on UC before discharge.

## 2023-08-08 VITALS
HEART RATE: 71 BPM | TEMPERATURE: 98 F | RESPIRATION RATE: 18 BRPM | OXYGEN SATURATION: 96 % | SYSTOLIC BLOOD PRESSURE: 117 MMHG | WEIGHT: 251.32 LBS | DIASTOLIC BLOOD PRESSURE: 82 MMHG | BODY MASS INDEX: 41.82 KG/M2

## 2023-08-08 LAB
ANION GAP SERPL CALCULATED.3IONS-SCNC: 11 MMOL/L (ref 7–15)
ATRIAL RATE - MUSE: 56 BPM
BUN SERPL-MCNC: 10.8 MG/DL (ref 8–23)
CALCIUM SERPL-MCNC: 8.8 MG/DL (ref 8.8–10.2)
CHLORIDE SERPL-SCNC: 102 MMOL/L (ref 98–107)
CREAT SERPL-MCNC: 1.18 MG/DL (ref 0.51–0.95)
DEPRECATED HCO3 PLAS-SCNC: 24 MMOL/L (ref 22–29)
DIASTOLIC BLOOD PRESSURE - MUSE: NORMAL MMHG
ERYTHROCYTE [DISTWIDTH] IN BLOOD BY AUTOMATED COUNT: 17 % (ref 10–15)
GFR SERPL CREATININE-BSD FRML MDRD: 52 ML/MIN/1.73M2
GLUCOSE BLDC GLUCOMTR-MCNC: 105 MG/DL (ref 70–99)
GLUCOSE BLDC GLUCOMTR-MCNC: 168 MG/DL (ref 70–99)
GLUCOSE BLDC GLUCOMTR-MCNC: 169 MG/DL (ref 70–99)
GLUCOSE BLDC GLUCOMTR-MCNC: 92 MG/DL (ref 70–99)
GLUCOSE SERPL-MCNC: 163 MG/DL (ref 70–99)
HCT VFR BLD AUTO: 38.9 % (ref 35–47)
HGB BLD-MCNC: 11.9 G/DL (ref 11.7–15.7)
INTERPRETATION ECG - MUSE: NORMAL
MAGNESIUM SERPL-MCNC: 2.1 MG/DL (ref 1.7–2.3)
MCH RBC QN AUTO: 27.1 PG (ref 26.5–33)
MCHC RBC AUTO-ENTMCNC: 30.6 G/DL (ref 31.5–36.5)
MCV RBC AUTO: 89 FL (ref 78–100)
P AXIS - MUSE: 48 DEGREES
PLATELET # BLD AUTO: 360 10E3/UL (ref 150–450)
POTASSIUM SERPL-SCNC: 4.4 MMOL/L (ref 3.4–5.3)
PR INTERVAL - MUSE: 166 MS
QRS DURATION - MUSE: 80 MS
QT - MUSE: 454 MS
QTC - MUSE: 438 MS
R AXIS - MUSE: 24 DEGREES
RBC # BLD AUTO: 4.39 10E6/UL (ref 3.8–5.2)
SODIUM SERPL-SCNC: 137 MMOL/L (ref 136–145)
SYSTOLIC BLOOD PRESSURE - MUSE: NORMAL MMHG
T AXIS - MUSE: 34 DEGREES
VENTRICULAR RATE- MUSE: 56 BPM
WBC # BLD AUTO: 7.8 10E3/UL (ref 4–11)

## 2023-08-08 PROCEDURE — 82962 GLUCOSE BLOOD TEST: CPT

## 2023-08-08 PROCEDURE — 250N000013 HC RX MED GY IP 250 OP 250 PS 637: Performed by: PHYSICIAN ASSISTANT

## 2023-08-08 PROCEDURE — G0378 HOSPITAL OBSERVATION PER HR: HCPCS

## 2023-08-08 PROCEDURE — 80048 BASIC METABOLIC PNL TOTAL CA: CPT | Performed by: NURSE PRACTITIONER

## 2023-08-08 PROCEDURE — 36415 COLL VENOUS BLD VENIPUNCTURE: CPT | Performed by: NURSE PRACTITIONER

## 2023-08-08 PROCEDURE — 96376 TX/PRO/DX INJ SAME DRUG ADON: CPT

## 2023-08-08 PROCEDURE — 83735 ASSAY OF MAGNESIUM: CPT | Performed by: STUDENT IN AN ORGANIZED HEALTH CARE EDUCATION/TRAINING PROGRAM

## 2023-08-08 PROCEDURE — 250N000011 HC RX IP 250 OP 636: Mod: JZ | Performed by: PHYSICIAN ASSISTANT

## 2023-08-08 PROCEDURE — 85027 COMPLETE CBC AUTOMATED: CPT | Performed by: NURSE PRACTITIONER

## 2023-08-08 PROCEDURE — 99238 HOSP IP/OBS DSCHRG MGMT 30/<: CPT | Performed by: INTERNAL MEDICINE

## 2023-08-08 PROCEDURE — 250N000013 HC RX MED GY IP 250 OP 250 PS 637: Performed by: STUDENT IN AN ORGANIZED HEALTH CARE EDUCATION/TRAINING PROGRAM

## 2023-08-08 RX ORDER — CEFDINIR 300 MG/1
300 CAPSULE ORAL 2 TIMES DAILY
Qty: 20 CAPSULE | Refills: 0 | Status: SHIPPED | OUTPATIENT
Start: 2023-08-08 | End: 2023-08-08

## 2023-08-08 RX ORDER — CEPHALEXIN 500 MG/1
500 CAPSULE ORAL 4 TIMES DAILY
Qty: 28 CAPSULE | Refills: 0 | Status: SHIPPED | OUTPATIENT
Start: 2023-08-08 | End: 2023-08-15

## 2023-08-08 RX ORDER — SULFAMETHOXAZOLE/TRIMETHOPRIM 800-160 MG
1 TABLET ORAL 2 TIMES DAILY
Qty: 14 TABLET | Refills: 0 | Status: SHIPPED | OUTPATIENT
Start: 2023-08-08 | End: 2023-08-08

## 2023-08-08 RX ADMIN — METHADONE HYDROCHLORIDE 90 MG: 10 CONCENTRATE ORAL at 11:03

## 2023-08-08 RX ADMIN — DICLOFENAC SODIUM 2 G: 10 GEL TOPICAL at 08:33

## 2023-08-08 RX ADMIN — SACUBITRIL AND VALSARTAN 1 TABLET: 49; 51 TABLET, FILM COATED ORAL at 08:32

## 2023-08-08 RX ADMIN — GABAPENTIN 900 MG: 300 CAPSULE ORAL at 08:32

## 2023-08-08 RX ADMIN — INSULIN DETEMIR 5 UNITS: 100 INJECTION, SOLUTION SUBCUTANEOUS at 09:02

## 2023-08-08 RX ADMIN — MAGNESIUM OXIDE TAB 400 MG (241.3 MG ELEMENTAL MG) 400 MG: 400 (241.3 MG) TAB at 08:32

## 2023-08-08 RX ADMIN — DULOXETINE HYDROCHLORIDE 20 MG: 20 CAPSULE, DELAYED RELEASE ORAL at 08:32

## 2023-08-08 RX ADMIN — POTASSIUM CHLORIDE 8 MEQ: 600 TABLET, FILM COATED, EXTENDED RELEASE ORAL at 08:32

## 2023-08-08 RX ADMIN — GABAPENTIN 900 MG: 300 CAPSULE ORAL at 13:19

## 2023-08-08 RX ADMIN — LEVOTHYROXINE SODIUM 200 MCG: 100 TABLET ORAL at 06:44

## 2023-08-08 RX ADMIN — MICONAZOLE NITRATE 1 G: 20 POWDER TOPICAL at 08:34

## 2023-08-08 RX ADMIN — PIPERACILLIN AND TAZOBACTAM 3.38 G: 3; .375 INJECTION, POWDER, LYOPHILIZED, FOR SOLUTION INTRAVENOUS at 08:56

## 2023-08-08 RX ADMIN — SPIRONOLACTONE 25 MG: 25 TABLET ORAL at 08:32

## 2023-08-08 RX ADMIN — FUROSEMIDE 20 MG: 20 TABLET ORAL at 08:32

## 2023-08-08 RX ADMIN — METFORMIN HYDROCHLORIDE 1000 MG: 500 TABLET ORAL at 08:32

## 2023-08-08 RX ADMIN — ACETAMINOPHEN 650 MG: 325 TABLET, FILM COATED ORAL at 02:44

## 2023-08-08 RX ADMIN — EMPAGLIFLOZIN 10 MG: 10 TABLET, FILM COATED ORAL at 08:32

## 2023-08-08 RX ADMIN — ASPIRIN 81 MG CHEWABLE TABLET 81 MG: 81 TABLET CHEWABLE at 08:32

## 2023-08-08 RX ADMIN — PANTOPRAZOLE SODIUM 40 MG: 40 TABLET, DELAYED RELEASE ORAL at 08:32

## 2023-08-08 RX ADMIN — PIPERACILLIN AND TAZOBACTAM 3.38 G: 3; .375 INJECTION, POWDER, LYOPHILIZED, FOR SOLUTION INTRAVENOUS at 02:09

## 2023-08-08 ASSESSMENT — ACTIVITIES OF DAILY LIVING (ADL)
ADLS_ACUITY_SCORE: 33

## 2023-08-08 NOTE — PROGRESS NOTES
Care Management Discharge Note    Discharge Date: 08/08/2023       Discharge Disposition: Home Care, Group Home    Discharge Services: Transportation Services    Discharge DME:      Discharge Transportation: agency    Private pay costs discussed: Not applicable    Does the patient's insurance plan have a 3 day qualifying hospital stay waiver?  No    PAS Confirmation Code:  n/a  Patient/family educated on Medicare website which has current facility and service quality ratings:  no    Education Provided on the Discharge Plan:  by bedside RN  Persons Notified of Discharge Plans: patient  Patient/Family in Agreement with the Plan: yes    Handoff Referral Completed: Yes    Additional Information:  Patient to discharge back to PTA facility today. RNCC notified unit [facility] for ride set up with [facility] w/c transport. Pt has recs per PT [facility] vs. HC, which was unable to be found by [facility], 11 referrals made, potential reasons include insurance. RNCC will cease to follow case after hospital discharge.    BRENDA ThomasN, BA, RN, CMSRN, RNCC  [facility]  Covering Units 6D/OBS  Pager: [phone]  Phone: [phone]    6D  Ph: [phone]  6D  pager: [phone]    6C/D Weekend/holiday pager: [phone]    6A/B Weekend/holiday pager: [phone]    7A/7B/7C/7D/5C Weekend/holiday pager: [phone]    4A/4C/4E/5A/5B Weekend/holiday pager: [phone]    [facility] ED/5 Ortho/5 Med/Surg 6 Med/Surg 8A 10 ICU and [facility] Weekend/holiday pager: [phone]    Observation weekend/after hours phone: [phone]

## 2023-08-08 NOTE — PROGRESS NOTES
Patient interviewed and examined. Labs, imaging and chart notes reviewed.  No Yusuf presented to the ED 2 days ago with cellulitis of the left leg and UTI. She has had recurrent UTI in the past. 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 She had bilateral LE edema with erythema of the left shin and UTI. UC grew E coli. Based on past resistance, we awaited UC sensitivities. She has remained afebrile. Vital signs stable. WBC of 12.5 has declined to 7.8. The LLE erythema is resolving.    UC has E coli sensitive to 1st, 2nd and 3rd generation cephalosporins.     Past Medical History:   Diagnosis Date    Arthritis     Bipolar affective (H)     Fibromyalgia     Hypertension        Past Surgical History:   Procedure Laterality Date    CHOLECYSTECTOMY      GYN SURGERY      c section    GYN SURGERY      oblation    ORTHOPEDIC SURGERY      left leg, left shoulder, back       Family History   Problem Relation Age of Onset    Heart Disease Mother     Diabetes Mother     Ovarian Cancer Mother     Heart Disease Father     Lung Cancer Father     Diabetes Sister     Ovarian Cancer Sister     Diabetes Brother        Social History     Tobacco Use    Smoking status: Former     Packs/day: 0.10     Types: Cigarettes    Smokeless tobacco: Never    Tobacco comments:     Using nicotine vape occasionally   Substance Use Topics    Alcohol use: No     Physical Exam:  Middle aged female in NAD.  /70 (BP Location: Left arm)   Pulse 60   Temp 98.4  F (36.9  C) (Oral)   Resp 17   Wt 114 kg (251 lb 5.2 oz)   LMP 11/01/2013   SpO2 96%   BMI 41.82 kg/m    HEENT: PERRLA. EOMI  Lungs: Clear.  Cardiac: RRR. Normal S1 and S2. No JVD.  Abdomen: Obese, non tender.  Extrem: LLE with stable erythematous patch. Surrounding erythema resolved. 1+ edema left, trace edema right.   Neuro: Non focal.  Psych: Normal mood and affect.    Results for  orders placed or performed during the hospital encounter of 08/06/23 (from the past 48 hour(s))   Glucose by meter   Result Value Ref Range    GLUCOSE BY METER POCT 90 70 - 99 mg/dL   Glucose by meter   Result Value Ref Range    GLUCOSE BY METER POCT 78 70 - 99 mg/dL   Glucose by meter   Result Value Ref Range    GLUCOSE BY METER POCT 114 (H) 70 - 99 mg/dL   Glucose by meter   Result Value Ref Range    GLUCOSE BY METER POCT 136 (H) 70 - 99 mg/dL   CBC with Platelets & Differential    Narrative    The following orders were created for panel order CBC with Platelets & Differential.  Procedure                               Abnormality         Status                     ---------                               -----------         ------                     CBC with platelets and d...[[id number]]  Abnormal            Final result                 Please view results for these tests on the individual orders.   Comprehensive metabolic panel   Result Value Ref Range    Sodium 137 136 - 145 mmol/L    Potassium 4.4 3.4 - 5.3 mmol/L    Chloride 103 98 - 107 mmol/L    Carbon Dioxide (CO2) 25 22 - 29 mmol/L    Anion Gap 9 7 - 15 mmol/L    Urea Nitrogen 13.0 8.0 - 23.0 mg/dL    Creatinine 1.00 (H) 0.51 - 0.95 mg/dL    Calcium 8.5 (L) 8.8 - 10.2 mg/dL    Glucose 106 (H) 70 - 99 mg/dL    Alkaline Phosphatase 118 (H) 35 - 104 U/L    AST 32 0 - 45 U/L    ALT 18 0 - 50 U/L    Protein Total 7.6 6.4 - 8.3 g/dL    Albumin 3.6 3.5 - 5.2 g/dL    Bilirubin Total 0.5 <=1.2 mg/dL    GFR Estimate 63 >60 mL/min/1.73m2   CRP inflammation   Result Value Ref Range    CRP Inflammation 21.30 (H) <5.00 mg/L   Erythrocyte sedimentation rate auto   Result Value Ref Range    Erythrocyte Sedimentation Rate 47 (H) 0 - 30 mm/hr   CBC with platelets and differential   Result Value Ref Range    WBC Count 8.2 4.0 - 11.0 10e3/uL    RBC Count 4.37 3.80 - 5.20 10e6/uL    Hemoglobin 11.9 11.7 - 15.7 g/dL    Hematocrit 38.7 35.0 - 47.0 %    MCV 89 78 - 100 fL    MCH  27.2 26.5 - 33.0 pg    MCHC 30.7 (L) 31.5 - 36.5 g/dL    RDW 17.0 (H) 10.0 - 15.0 %    Platelet Count 335 150 - 450 10e3/uL    % Neutrophils 52 %    % Lymphocytes 31 %    % Monocytes 11 %    % Eosinophils 4 %    % Basophils 1 %    % Immature Granulocytes 1 %    NRBCs per 100 WBC 0 <1 /100    Absolute Neutrophils 4.4 1.6 - 8.3 10e3/uL    Absolute Lymphocytes 2.5 0.8 - 5.3 10e3/uL    Absolute Monocytes 0.9 0.0 - 1.3 10e3/uL    Absolute Eosinophils 0.3 0.0 - 0.7 10e3/uL    Absolute Basophils 0.1 0.0 - 0.2 10e3/uL    Absolute Immature Granulocytes 0.1 <=0.4 10e3/uL    Absolute NRBCs 0.0 10e3/uL   Magnesium   Result Value Ref Range    Magnesium 2.2 1.7 - 2.3 mg/dL   Glucose by meter   Result Value Ref Range    GLUCOSE BY METER POCT 116 (H) 70 - 99 mg/dL   Glucose by meter   Result Value Ref Range    GLUCOSE BY METER POCT 132 (H) 70 - 99 mg/dL   Glucose by meter   Result Value Ref Range    GLUCOSE BY METER POCT 160 (H) 70 - 99 mg/dL   EKG 12-lead, complete   Result Value Ref Range    Systolic Blood Pressure  mmHg    Diastolic Blood Pressure  mmHg    Ventricular Rate 56 BPM    Atrial Rate 56 BPM    WA Interval 166 ms    QRS Duration 80 ms     ms    QTc 438 ms    P Axis 48 degrees    R AXIS 24 degrees    T Axis 34 degrees    Interpretation ECG       Sinus bradycardia  Low voltage QRS  Borderline ECG  When compared with ECG of 06-AUG-2023 08:15,  Nonspecific T wave abnormality has replaced inverted T waves in Anterior leads     Glucose by meter   Result Value Ref Range    GLUCOSE BY METER POCT 122 (H) 70 - 99 mg/dL   Glucose by meter   Result Value Ref Range    GLUCOSE BY METER POCT 92 70 - 99 mg/dL   Magnesium   Result Value Ref Range    Magnesium 2.1 1.7 - 2.3 mg/dL   CBC with platelets   Result Value Ref Range    WBC Count 7.8 4.0 - 11.0 10e3/uL    RBC Count 4.39 3.80 - 5.20 10e6/uL    Hemoglobin 11.9 11.7 - 15.7 g/dL    Hematocrit 38.9 35.0 - 47.0 %    MCV 89 78 - 100 fL    MCH 27.1 26.5 - 33.0 pg    MCHC 30.6 (L)  31.5 - 36.5 g/dL    RDW 17.0 (H) 10.0 - 15.0 %    Platelet Count 360 150 - 450 10e3/uL   Basic metabolic panel   Result Value Ref Range    Sodium 137 136 - 145 mmol/L    Potassium 4.4 3.4 - 5.3 mmol/L    Chloride 102 98 - 107 mmol/L    Carbon Dioxide (CO2) 24 22 - 29 mmol/L    Anion Gap 11 7 - 15 mmol/L    Urea Nitrogen 10.8 8.0 - 23.0 mg/dL    Creatinine 1.18 (H) 0.51 - 0.95 mg/dL    Calcium 8.8 8.8 - 10.2 mg/dL    Glucose 163 (H) 70 - 99 mg/dL    GFR Estimate 52 (L) >60 mL/min/1.73m2   Glucose by meter   Result Value Ref Range    GLUCOSE BY METER POCT 168 (H) 70 - 99 mg/dL   Glucose by meter   Result Value Ref Range    GLUCOSE BY METER POCT 105 (H) 70 - 99 mg/dL     Impression:  E coli UTI, recurrent. The current organism is resistant to ampicillin, bactrim and cipro. It is broadly sensitive to cephalosporins.  The cellulitis is clinically improving on antibiotics. Recommend treatment with cefdinir plus bactrim for improved staph/strep coverage.  Edema. She has chronic systolic and diastolic heart failure. TSH was elevated and T4 low on admission. We should assure that she is taking thyroid replacement regularly. If she is compliant, increase of levothyroxine to 225 mcg may be beneficial.  She should follow up with Dr Naranjo.     Mike Steward MD

## 2023-08-08 NOTE — PLAN OF CARE
Goal Outcome Evaluation:      Observation goals:   Diagnostic tests and consults completed and resulted: in process  Vital signs normal or at patient baseline: met  Tolerating oral intake to maintain hydration: met  Adequate pain control on oral analgesics: met  Tolerating oral antibiotics or has plans for home infusion setup: not met, still on iv abx  Infection is improving: in process  Returns to baseline functional status: in process  Safe disposition plan has been identified: in process  Pt consult completed: met

## 2023-08-08 NOTE — PLAN OF CARE
Goal Outcome Evaluation:    Pt discharged to home with [facility] arranged by [facility]. Reviewed discharge paperwork. PIV removed. Discharge med picked up from pharmacy and given to Pt. All belongings sent with Pt    Observation goals:   Diagnostic tests and consults completed and resulted: met  Vital signs normal or at patient baseline: met  Tolerating oral intake to maintain hydration: met  Adequate pain control on oral analgesics: met  Tolerating oral antibiotics or has plans for home infusion setup: met  Infection is improving: met  Returns to baseline functional status: met  Safe disposition plan has been identified: met  Pt consult completed: met

## 2023-08-08 NOTE — PLAN OF CARE
Goal Outcome Evaluation:      Plan of Care Reviewed With: patient  Observation goals:   Diagnostic tests and consults completed and resulted: Not Met  Vital signs normal or at patient baseline: Met  Tolerating oral intake to maintain hydration: Met  Adequate pain control on oral analgesics: Met  Tolerating oral antibiotics or has plans for home infusion setup: Not Met, IV ABX  Infection is improving: Not Met  Returns to baseline functional status: Not Met  Safe disposition plan has been identified: Not Met  Pt consult completed: Met

## 2023-08-08 NOTE — PLAN OF CARE
Physical Therapy Discharge Summary    Reason for therapy discharge:    Discharged to home with home therapy.    Progress towards therapy goal(s). See goals on Care Plan in [facility] electronic health record for goal details.  Goals partially met.  Barriers to achieving goals:   discharge from facility.    Therapy recommendation(s):    Continued therapy is recommended.  Rationale/Recommendations:  Pt would benefit from continued PT intervention with HHPT to progress functional strength, activity tolerance, balance, and safety and IND with functional mobility to reduce risk for falls and re-admission.

## 2023-08-08 NOTE — DISCHARGE SUMMARY
[facility]  ED Observation Discharge Summary      Date of Admission:  8/6/2023  Date of Discharge:  8/8/2023  Discharging Provider: ETTA Jane CNP  Discharge Service:ED Observation    Discharge Diagnoses   UTI  Cellulitis of the LLE    Clinically Significant Risk Factors          Follow-ups Needed After Discharge   We recommend you follow up with your primary care provider within 1 week for recent hospital admission.     Discharge Disposition   Discharged to home  Condition at discharge: Stable    Hospital Course     No Yusuf is a 63 year old female admitted on 8/6/2023. She has a PMH notable for prior V. tach and torsades de points w/ prior cardiac arrest, DM 2, HTN, obesity, hypothyroidism, GERD, arthritis, fibromyalgia, back pain, some prior lymphedema/lower extremity edema and prior cellulitis, prior substance use disorder (methamphetamines, opioids reportedly in remission), et al., who presented to the [facility] ED w/ a 3 day hx of worsening left lower extremity sx's that she describes as being similar to her prior bouts of cellulitis, also reporting urine color change.      ##. Cellulitis of LLE:  3 days ago noticed increasing discomfort and then subsequent skin redness. Denies falling, hitting the leg on anything or any type of trauma. Similar to prior cellulitis in the past. Fatigue. No fevers or chills.  In ED, afebrile. CMP with  otherwise normal. Normal lactic acid. VBG unremarkable. WBC 12.5, abs neutrophil 8.4. CRP 26.50, ESR 52.  CT Tib/Fib w/ contrast shows areas of subcutaneous fat stranding of the left leg most prominent along the anterior aspect of the knee joint; no significant drainable collection; no evidence of soft tissue gas; no evidence of acute osseous pathology; degenerative changes most prominent at the tibiotalar joint. In ED patient was given Unasyn 3g IV x 1.  No events overnight. Patient reports the  pain in her LLE, chills have improved. The erythema on her LLE is significantly improved from yesterdays markings. Patient reports some intermittent body aches. Will monitor symptoms this morning and anticipated discharge on oral antibiotics. CRP, ESR, and WBC all improving this morning. UC shows >100,000 e.coli. Patient discharged on cephalexin 4x daily x 7 days per pharmacy recommendation. Labs are improved, vital signs are stable, patient is afebrile and cellulitis is greatly improved. Stable for discharge on oral antibiotics. Patient will be discharged back to group home with ride set up by [facility]O. Also discharged with Narcan per addiction medicine recommendation. Patient tolerating oral diet. Ambulating independently with assistive device. Home  care PT ordered and arranged per [facility].     ##. UTI: Patient admits to dysuria. UA with 300 glucose, 10 protein, large LE, 91 WBC, 2 RBC, many bacteria, 1 SE. UCx sent and pending. WBC 12.5, abs neutrophil 8.4. CRP 26.50, ESR 52. Prior UCx 6/21/23 with E. Coli resistant to ampicillin, cefazolin, cefoxitin and bactrim. UCx 2/8/22 w/ E. Coli resistant to ampicillin, unasyn, cefzolin, cefoxitin, ceftazidime, ceftriaxone. UCx 10/21/21 w/ Klebsiella resistant to ampicillin. CRP, ESR, and WBC all improving this morning. UC shows >100,000 e.coli.  Patient discharged on cephalexin 4x daily x 7 days per pharmacy recommendation. Labs are improved, vital signs are stable, patient is afebrile and cellulitis is greatly improved. Stable for discharge on oral antibiotics. Patient will be discharged back to group home with ride set up by [facility]. Also discharged with Narcan per addiction medicine recommendation. Patient tolerating oral diet. Ambulating independently with assistive device. Home  care PT ordered and arranged per [facility].        ##. Diabetes mellitus II: Last HbA1c 6/21/23 6.2. BG 88. Will let patient eat breakfast and repeat BG prior to meds  - Continue PTA Metformin and Jardiance  -  "Continue PTA Levemir to 15 units     ##. Heart failure with midrange reduced EF   ##. Hx of COVID-pneumonia c/b torsades de pointes arrest  Last echo 2/2022 showed Mildly decreased left ventricular systolic function; visual ejection fraction is 40-45%. There is mild global hypokinesia of the left ventricle. MRI cardiac stress completed 8/2022 reporting LV is normal in cavity size and wall thickness; global systolic function is normal; LVEF is 76%; no regional wall motion abnormalities.     - Continue PTA Magnesium     ##. History of Opioid Abuse: Continue PTA methadone.   -Addiction consult with recs:  - Repeat EKG  - Pharmacy consult to review if any other medications contributing to prolonged QTc  - Avoid all other QTc prolonging medications  - Discussed risk and benefits of methadone in the setting of prolonged QTc. Patient would like to continue methadone as this has worked well for her and her OUD has been in remission for 4 years on methadone. Did not tolerate suboxone in the past- made her sick.   - Given her last full dose of methadone was 8/4 (received 20mg on 8/6 in ED), recommend ~25% dose reduction today. Plan for methadone 70mg today and if tolerates, resume methadone 90mg daily   - Follows at [facility]     If constipation, consider  --Constipation: miralax, senna                   - Discharge with narcan      ##. Hypothyroidism: - Continue PTA levothyroxine   \"[facility] contacted  RN ([phone]) [place] and provided discharge and [facility] update. RN told MACO that the [facility] agency should call the  ([phone]) to set up arrangements.      RN also confirmed that pt is compliant with her medications while at the residence but reported that when she visits her daughter(s), she typically stays 2-3 days longer than planned. RN reports that she now directs staff to provide an 2-3 day's supply for her medications whenever she visits her daughter(s) to ensure she has enough of her medications for her " "entire visit.  No additional questions or concerns were reported and the call was ended.\"     ##. GERD: - Continue PTA omeprazole     # Depression: - Continue PTA duloxetine   - Continue PTA hydroxyzine  - Follows with a counselor at [facility]       ##. Insomnia: - Continue PTA Melatonin prn  ##. Chronic Neuropathic pain: - Continue PTA gabapentin   - Continue PTA duloxetine.     # Hep C: not on treatment, followed at [facility], per care everywhere HCV RNA Quant not detected on 8/1/22, hep C genotyping was indeterminate. Continue to follow with [facility]     ##. Candidasis: - Continue with PTA Nystatin    Consultations This Hospital Stay   PHYSICAL THERAPY ADULT IP CONSULT  CARE MANAGEMENT / SOCIAL WORK IP CONSULT  MEDICATION HISTORY IP PHARMACY CONSULT  MEDICATION HISTORY IP PHARMACY CONSULT  ADDICTION SERVICE ADULT IP CONSULT FOR [place]  PHARMACY LIAISON FOR MEDICATION COVERAGE CONSULT  NURSING TO CONSULT FOR VASCULAR ACCESS CARE IP CONSULT    Code Status   Full Code    Time Spent on this Encounter   I, ETTA Jane CNP, personally saw the patient today and spent less than or equal to 30 minutes discharging this patient.       ETTA Jane CNP  [facility] [address] OBSERVATION [place]  [address] [address]  Phone: [phone]  Fax: [phone]  ______________________________________________________________________    Physical Exam   Vital Signs: Temp: 98.4  F (36.9  C) Temp src: Oral BP: 111/70 Pulse: 60   Resp: 17 SpO2: 96 % O2 Device: None (Room air)    Weight: 251 lbs 5.19 oz  Constitutional: sleepy but arousable, cooperative, no apparent distress, and appears stated age  Eyes: Lids and lashes normal, pupils equal, round and reactive to light, extra ocular muscles intact, sclera clear, conjunctiva normal  ENT: Normocephalic, without obvious abnormality, atraumatic  Hematologic / Lymphatic: no cervical " lymphadenopathy  Respiratory: No increased work of breathing, good air exchange, clear to auscultation bilaterally, no crackles or wheezing  Cardiovascular: Normal apical impulse, regular rate and rhythm, normal S1 and S2, no S3 or S4, and no murmur noted  GI: No scars, normal bowel sounds, soft, non-distended, non-tender, no masses palpated, no hepatosplenomegally  Skin: LLE with some erythema and mild edema, improved from yesterdays markings  Musculoskeletal: There is no redness, warmth, or swelling of the joints.  Full range of motion noted.  Motor strength is 5 out of 5 all extremities bilaterally.  Tone is normal.  Neurologic: Awake, alert, oriented to name, place and time.  Cranial nerves II-XII are grossly intact.  Motor is 5 out of 5 bilaterally.    Neuropsychiatric: General: normal, calm and normal eye contact        Primary Care Physician   Price Naranjo    Discharge Orders      Medication Therapy Management Referral      Home Care Referral      Activity    Your activity upon discharge: activity as tolerated     Reason for your hospital stay    Cellulitis of LLE  UTI     Follow Up and recommended labs and tests    Follow up with primary care provider, Price Naranjo, within 7 days for hospital follow- up.  The following labs/tests are recommended: Thyroid labs.    We recommend you follow up with home care PT. A referral has been placed.     When to contact your care team    Return to the ED with fever, uncontrolled nausea, vomiting, unrelieved pain, bleeding not relieved with pressure, dizziness, chest pain, shortness of breath, loss of consciousness, and any new or concerning symptoms.     Discharge Instructions    You were admitted to ED Observation for evaluation of LLE cellulitis and UTI. You were treated with IV Zosyn and improved. You labwork was stable, vital signs stable. You should take cephalexin 4x/day for the next 7 days. You should also remain compliant everyday with your prior to  admission medications including your synthroid. You should resume your PTA dosing of the methadone. I have also prescribed narcan to use in case of emergency. We discussed the possible effected of methadone prolonging your QTC but you wish to remain on the medication. SW will follow up with you to coordinate home PT. Imagining of your LLE showed no damnable collection.     Diet    Follow this diet upon discharge: Orders Placed This Encounter      Regular Diet Adult         Significant Results and Procedures   Most Recent 3 CBC's:  Recent Labs   Lab Test 08/08/23 0622 08/07/23 0652 08/06/23 0219   WBC 7.8 8.2 12.5*   HGB 11.9 11.9 12.0   MCV 89 89 86    335 349     Most Recent 3 BMP's:  Recent Labs   Lab Test 08/08/23 0632 08/08/23 0622 08/08/23 0216 08/07/23 0733 08/07/23 0652 08/06/23 0825 08/06/23 0219   NA  --  137  --   --  137  --  138   POTASSIUM  --  4.4  --   --  4.4  --  3.6   CHLORIDE  --  102  --   --  103  --  102   CO2  --  24  --   --  25  --  25   BUN  --  10.8  --   --  13.0  --  16.1   CR  --  1.18*  --   --  1.00*  --  0.94   ANIONGAP  --  11  --   --  9  --  11   RODOLFO  --  8.8  --   --  8.5*  --  8.9   * 163* 92   < > 106*   < > 88    < > = values in this interval not displayed.     Most Recent 2 LFT's:  Recent Labs   Lab Test 08/07/23 0652 08/06/23 0219   AST 32 39   ALT 18 26   ALKPHOS 118* 127*   BILITOTAL 0.5 0.5     Most Recent 3 INR's:  Recent Labs   Lab Test 08/06/23 0219 06/21/23  1819 08/01/22  0708   INR 1.32* 0.96 1.02     Most Recent INR's and Anticoagulation Dosing History:  Anticoagulation Dose History  More data exists         Latest Ref Rng & Units 2/8/2022 2/9/2022 2/10/2022 2/14/2022   Recent Dosing and Labs   INR 0.85 - 1.15 0.89  1.01  0.99  1.13          8/1/2022 6/21/2023 8/6/2023   Recent Dosing and Labs   INR 1.02  0.96  1.32      Most Recent 3 Creatinines:  Recent Labs   Lab Test 08/08/23  0622 08/07/23  0652 08/06/23  0219   CR 1.18* 1.00* 0.94      Most Recent 3 Hemoglobins:  Recent Labs   Lab Test 08/08/23  0622 08/07/23  0652 08/06/23  0219   HGB 11.9 11.9 12.0     Most Recent 3 Troponin's:  Recent Labs   Lab Test 05/27/21  0137 07/06/20  1400 01/28/20  0025   TROPI <0.015 0.015 <0.015     Most Recent 3 BNP's:  Recent Labs   Lab Test 04/01/22  1324 03/24/22  2346 02/08/22  1027 05/27/21  0137   NTBNPI  --  150 218 60   NTBNP 672*  --   --   --      Most Recent D-dimer:  Recent Labs   Lab Test 08/06/23 0219   DD 0.95*     Most Recent Cholesterol Panel:  Recent Labs   Lab Test 05/18/23  1425   CHOL 100   LDL 37   HDL 37*   TRIG 129     7-Day Micro Results       Collected Updated Procedure Result Status      08/06/2023 0416 08/07/2023 2126 Urine Culture [[id number]]    (Abnormal)   Urine, Midstream    Final result Component Value   Culture >100,000 CFU/mL Escherichia coli        Susceptibility        Escherichia coli      EUGENIO      Ampicillin >=32 ug/mL Resistant      Ampicillin/ Sulbactam 16 ug/mL Intermediate      Cefazolin <=4 ug/mL Susceptible  [1]       Cefepime <=1 ug/mL Susceptible      Cefoxitin <=4 ug/mL Susceptible      Ceftazidime <=1 ug/mL Susceptible      Ceftriaxone <=1 ug/mL Susceptible      Ciprofloxacin 1 ug/mL Resistant      Gentamicin <=1 ug/mL Susceptible      Levofloxacin 1 ug/mL Intermediate      Nitrofurantoin <=16 ug/mL Susceptible      Piperacillin/Tazobactam <=4 ug/mL Susceptible      Tobramycin <=1 ug/mL Susceptible      Trimethoprim/Sulfamethoxazole >16/304 ug/mL Resistant                   [1]  Cefazolin EUGENIO breakpoints are for the treatment of uncomplicated urinary tract infections. For the treatment of systemic infections, please contact the laboratory for additional testing.                         Most Recent TSH and T4:  Recent Labs   Lab Test 08/06/23 0219   TSH 29.30*   T4 0.86*   ,   Results for orders placed or performed during the hospital encounter of 08/06/23   CT Tibia Fibula Lower Leg Left w Contrast     Narrative    EXAM: CT TIBIA FIBULA LOWER LEG LEFT WITH CONTRAST  LOCATION: [facility]  DATE: 08/06/2023    INDICATION: Necrotizing fasc or other complex infectious findings.  COMPARISON: None available.  TECHNIQUE: IV contrast. Axial, sagittal and coronal thin-section reconstruction. Dose reduction techniques were used.   CONTRAST: 135 mL of Isovue-370.    FINDINGS:     BONES:  -No evidence of acute fracture or dislocation. Postsurgical changes of distal tibia and fibula with screws. Degenerative changes of the knee and ankle joints, most significant at the tibiotalar joint with joint space narrowing, subcortical cystic changes   and marginal osteophytes. Small left joint effusion.    SOFT TISSUES:  -Area of subcutaneous fat stranding of the leg most prominent along the anterior aspect of the knee joint. No significant drainable collection. No evidence of soft tissue gas. Prominent venous collaterals in the left leg.      Impression    IMPRESSION:  1.  Areas of subcutaneous fat stranding of the left leg most prominent along the anterior aspect of the knee joint. No significant drainable collection. No evidence of soft tissue gas.  2.  No evidence of acute osseous pathology. Degenerative changes most prominent at the tibiotalar joint.           Discharge Medications   Current Discharge Medication List        START taking these medications    Details   cephALEXin (KEFLEX) 500 MG capsule Take 1 capsule (500 mg) by mouth 4 times daily for 7 days  Qty: 28 capsule, Refills: 0    Associated Diagnoses: Left leg cellulitis      !! naloxone (NARCAN) 4 MG/0.1ML nasal spray Spray 1 spray (4 mg) into one nostril alternating nostrils as needed for opioid reversal every 2-3 minutes until assistance arrives  Qty: 0.2 mL, Refills: 0    Associated Diagnoses: Chronic pain disorder       !! - Potential duplicate medications found. Please discuss with provider.        CONTINUE these  medications which have NOT CHANGED    Details   acetaminophen (TYLENOL) 325 MG tablet Take 2 tablets (650 mg) by mouth 2 times daily as needed for mild pain    Associated Diagnoses: Chronic low back pain, unspecified back pain laterality, unspecified whether sciatica present      Acidophilus Lactobacillus CAPS Take 1 tablet by mouth 3 times daily For gut health (Lactobacillus)      albuterol (PROAIR HFA/PROVENTIL HFA/VENTOLIN HFA) 108 (90 Base) MCG/ACT inhaler Inhale 2 puffs into the lungs every 4 hours as needed for shortness of breath / dyspnea or wheezing      amLODIPine (NORVASC) 5 MG tablet Take 5 mg by mouth daily      aspirin (ASA) 81 MG chewable tablet Take 1 tablet (81 mg) by mouth daily    Associated Diagnoses: Benign essential hypertension      atorvastatin (LIPITOR) 20 MG tablet Take 1 tablet (20 mg) by mouth At Bedtime  Qty: 90 tablet, Refills: 3    Associated Diagnoses: Hyperlipidemia, unspecified hyperlipidemia type      calcium carbonate antacid 1000 MG CHEW Take 1 chew tab by mouth every 6 hours as needed for heartburn      cholecalciferol 25 MCG (1000 UT) TABS Take 1,000 Units by mouth daily       diclofenac (VOLTAREN) 1 % topical gel Apply 2-4 g topically 2 times daily    Associated Diagnoses: Patellofemoral arthritis; Impingement syndrome of right shoulder      DULoxetine (CYMBALTA) 20 MG capsule Take 1 capsule (20 mg) by mouth daily    Associated Diagnoses: Anxiety disorder, unspecified type      empagliflozin (JARDIANCE) 10 MG TABS tablet Take 1 tablet (10 mg) by mouth daily  Qty: 90 tablet, Refills: 3    Associated Diagnoses: Chronic systolic heart failure (H)      fluticasone-salmeterol (ADVAIR) 250-50 MCG/DOSE inhaler Inhale 1 puff into the lungs 2 times daily      furosemide (LASIX) 20 MG tablet Take 20 mg by mouth daily      gabapentin (NEURONTIN) 300 MG capsule Take 900 mg by mouth 3 times daily      hydrOXYzine (ATARAX) 50 MG tablet Take 100 mg by mouth every 6 hours as needed for  anxiety      insulin detemir (LEVEMIR PEN) 100 UNIT/ML pen Inject 15 Units Subcutaneous every morning  Qty: 3 mL, Refills: 1    Associated Diagnoses: Type 2 diabetes mellitus without complication, without long-term current use of insulin (H)      levothyroxine (SYNTHROID/LEVOTHROID) 200 MCG tablet Take 200 mcg by mouth daily       magnesium oxide 400 MG tablet Take 200 mg by mouth daily      melatonin 5 MG tablet Take 5 mg by mouth At Bedtime      metFORMIN (GLUCOPHAGE) 1000 MG tablet Take 1,000 mg by mouth 2 times daily (with meals)      methadone (DOLOPHINE) 10 MG tablet Take 90 mg by mouth once Takes 90mg daily per [facility], updated 2023      multivitamin w/minerals (THERA-VIT-M) tablet Take 1 tablet by mouth daily      nicotine polacrilex (NICORETTE) 4 MG gum Place 4 mg inside cheek every hour as needed for smoking cessation       nystatin (NYSTOP) 025896 UNIT/GM external powder SMARTSI Application Topical 2-3 Times Daily  Qty: 60 g, Refills: 0    Associated Diagnoses: Candidiasis of skin      omeprazole (PRILOSEC) 20 MG DR capsule Take 20 mg by mouth daily       polyethylene glycol (MIRALAX) 17 GM/Dose powder Take 17 g by mouth daily as needed for constipation  Qty: 510 g, Refills: 0    Associated Diagnoses: Constipation, unspecified constipation type      potassium chloride ER (KLOR-CON) 8 MEQ CR tablet Take 8 mEq by mouth 2 times daily      sacubitril-valsartan (ENTRESTO) 49-51 MG per tablet Take 1 tablet by mouth 2 times daily  Qty: 180 tablet, Refills: 2    Associated Diagnoses: Chronic systolic heart failure (H)      spironolactone (ALDACTONE) 25 MG tablet Take 1 tablet (25 mg) by mouth daily  Qty: 90 tablet, Refills: 3    Associated Diagnoses: Chronic systolic heart failure (H)      vitamin B-12 (CYANOCOBALAMIN) 1000 MCG tablet Take 1,000 mcg by mouth daily      blood glucose (ACCU-CHEK RAFAELA PLUS) test strip Use to check blood sugar 3x daily or as directed.      !! Blood Glucose  Calibration (ACCU-CHEK ACTIVE GLUCOSE CONT VI) 1 each by Other route      !! blood glucose calibration (ACCU-CHEK RAFAELA) solution       !! NARCAN 4 MG/0.1ML nasal spray CALL 911. SPRAY CONTENTS OF ONE SPRAYER (0.1ML) INTO ONE NOSTRIL. REPEAT IN 2-3 MINS IF SYMPTOMS OF OPIOID PERSIST, ALTERNATE NOSTRILS  Refills: 0       !! - Potential duplicate medications found. Please discuss with provider.        Allergies   Allergies   Allergen Reactions    Promethazine Other (See Comments) and Anxiety     Noted in 8/30/08 ER    Codeine Phosphate GI Disturbance    Lamotrigine Other (See Comments)     hyponatremia    Oxcarbazepine Unknown and Other (See Comments)     Low sodium  [employer]acy[employer]#[id number]      Codeine Other (See Comments) and Rash     GI bleeding  [employer]acyRecord#[id number]

## 2023-08-08 NOTE — PROGRESS NOTES
Care Management    08/08: CHW met with pt at [facility] to verify pt's home address they will be discharging to. CHW also verified that pt's daughters could not provide a ride. Pt relayed that they have [employer] and has transportation coverage. [facility] ph. [phone] to schedule ride. Ride set for 1:30pm via transportation plus ph. [phone]. Pt does not have a cell phone so the  will be calling the bedside nurse at [phone]. CHW alerted bedside nurse of pickup time and that they would be calling her since pt does not have a phone.     Ivelisse WELLS  ICU & ED/OBS  Phone: [phone]

## 2023-08-08 NOTE — PLAN OF CARE
Goal Outcome Evaluation:      Plan of Care Reviewed With: patient  Plan of Care Reviewed With: patient  Observation goals:   Diagnostic tests and consults completed and resulted: Not Met  Vital signs normal or at patient baseline: Met /64 (BP Location: Left arm)   Pulse 62   Temp 98.5  F (36.9  C) (Oral)   Resp 18   Wt 114 kg (251 lb 5.2 oz)   LMP 11/01/2013   SpO2 94%   BMI 41.82 kg/m   RA.  Tolerating oral intake to maintain hydration: Met  Adequate pain control on oral analgesics: Met  Tolerating oral antibiotics or has plans for home infusion setup: Not Met, IV ABX  Infection is improving: Not Met  Returns to baseline functional status: Not Met  Safe disposition plan has been identified: Not Met  Pt consult completed: Met

## 2023-08-09 ENCOUNTER — PATIENT OUTREACH (OUTPATIENT)
Dept: CARE COORDINATION | Facility: CLINIC | Age: 63
End: 2023-08-09
Payer: COMMERCIAL

## 2023-08-09 NOTE — PROGRESS NOTES
[facility] [place]    Background: Transitional Care Management program identified per system criteria and reviewed by [facility] [place] team for possible outreach.    Assessment: Upon chart review, [facility] Team member will not proceed with patient outreach related to this episode of Transitional Care Management program due to reason below:    Patient has discharged to a Memory Care, Long-term Care, Assisted Living or Group Home where patient is receiving on-site support with their daily cares, including support with hospital follow up plan.    Plan: Transitional Care Management episode addressed appropriately per reason noted above.      Lucrecia Oneil RN  [facility], [facility]    *Connected Care Resource Team does NOT follow patient ongoing. Referrals are identified based on internal discharge reports and the outreach is to ensure patient has an understanding of their discharge instructions.

## 2023-08-22 ENCOUNTER — MEDICAL CORRESPONDENCE (OUTPATIENT)
Dept: HEALTH INFORMATION MANAGEMENT | Facility: CLINIC | Age: 63
End: 2023-08-22
Payer: COMMERCIAL

## 2023-08-23 ENCOUNTER — TRANSCRIBE ORDERS (OUTPATIENT)
Dept: OTHER | Age: 63
End: 2023-08-23

## 2023-08-23 DIAGNOSIS — L60.3 NAIL DYSTROPHY: ICD-10-CM

## 2023-08-23 DIAGNOSIS — L84 CALLUS OF FOOT: ICD-10-CM

## 2023-08-23 DIAGNOSIS — E11.9 TYPE 2 DIABETES MELLITUS WITHOUT COMPLICATION, WITHOUT LONG-TERM CURRENT USE OF INSULIN (H): Primary | ICD-10-CM

## 2023-11-16 ENCOUNTER — TELEPHONE (OUTPATIENT)
Dept: OPTOMETRY | Facility: CLINIC | Age: 63
End: 2023-11-16
Payer: COMMERCIAL

## 2023-11-16 NOTE — TELEPHONE ENCOUNTER
[facility] Call Center    Phone Message    May a detailed message be left on voicemail: yes     Reason for Call: Symptoms or Concerns     If patient has red-flag symptoms, warm transfer to triage line    Current symptom or concern: Comfort with pt's assisted living facility states that pt has been experiencing new floaters in both eyes that started this week. Preferred location is [facility].    Symptoms have been present for:  Few day(s)    Has patient previously been seen for this? No    By : N/A    Date: N/A    Are there any new or worsening symptoms? Yes: New floaters in both eyes.    Action Taken: Message routed to:  Other: [facility]    Travel Screening: Not Applicable

## 2023-11-20 NOTE — TELEPHONE ENCOUNTER
[facility] Call Center    Phone Message    May a detailed message be left on voicemail: yes     Reason for Call: Other: Comfort from [facility] living back that patient unable to make it today and to see if we can fit patient in sooner then next available in December. Patient cannot make it in same day apt.      Please call Comfort back at [phone]. Thank you.    Action Taken: Other: [facility]    Travel Screening: Not Applicable

## 2023-11-22 ENCOUNTER — TELEPHONE (OUTPATIENT)
Dept: OPHTHALMOLOGY | Facility: CLINIC | Age: 63
End: 2023-11-22

## 2023-11-22 NOTE — TELEPHONE ENCOUNTER
[facility] Call Center    Phone Message    May a detailed message be left on voicemail: yes     Reason for Call: Other: Reschedule today's appt for Floaters. Next available is 12/13.  Please call.  Thank you.     Action Taken: Message routed to:  [facility] Optometry    Travel Screening: Not Applicable

## 2023-11-22 NOTE — TELEPHONE ENCOUNTER
Rescheduled for 3rd appointment. Staff named comfort wanted to come next Wednesday. Appointment made.

## 2023-11-29 ENCOUNTER — OFFICE VISIT (OUTPATIENT)
Dept: OPTOMETRY | Facility: CLINIC | Age: 63
End: 2023-11-29
Payer: COMMERCIAL

## 2023-11-29 DIAGNOSIS — H43.393 VITREOUS SYNERESIS OF BOTH EYES: Primary | ICD-10-CM

## 2023-11-29 DIAGNOSIS — E11.9 TYPE 2 DIABETES MELLITUS WITHOUT COMPLICATION, WITHOUT LONG-TERM CURRENT USE OF INSULIN (H): ICD-10-CM

## 2023-11-29 PROCEDURE — 99203 OFFICE O/P NEW LOW 30 MIN: CPT

## 2023-11-29 ASSESSMENT — EXTERNAL EXAM - LEFT EYE: OS_EXAM: NORMAL

## 2023-11-29 ASSESSMENT — VISUAL ACUITY
METHOD: SNELLEN - LINEAR
OD_PH_SC: 20/30-2
OD_SC: 20/60-1
OS_SC: 20/60
OS_PH_SC: 20/30
OD_CC: 20/200
OS_CC: 20/200

## 2023-11-29 ASSESSMENT — CONF VISUAL FIELD
OS_NORMAL: 1
OS_INFERIOR_TEMPORAL_RESTRICTION: 0
OD_INFERIOR_NASAL_RESTRICTION: 0
OD_SUPERIOR_TEMPORAL_RESTRICTION: 0
OD_NORMAL: 1
OS_SUPERIOR_NASAL_RESTRICTION: 0
OS_SUPERIOR_TEMPORAL_RESTRICTION: 0
OD_SUPERIOR_NASAL_RESTRICTION: 0
OD_INFERIOR_TEMPORAL_RESTRICTION: 0
METHOD: COUNTING FINGERS
OS_INFERIOR_NASAL_RESTRICTION: 0

## 2023-11-29 ASSESSMENT — EXTERNAL EXAM - RIGHT EYE: OD_EXAM: NORMAL

## 2023-11-29 ASSESSMENT — SLIT LAMP EXAM - LIDS
COMMENTS: NORMAL
COMMENTS: NORMAL

## 2023-11-29 ASSESSMENT — TONOMETRY
OD_IOP_MMHG: 10
IOP_METHOD: TONOPEN
OS_IOP_MMHG: 11

## 2023-11-29 ASSESSMENT — CUP TO DISC RATIO
OD_RATIO: 0.3
OS_RATIO: 0.3

## 2023-11-29 NOTE — LETTER
11/29/2023         RE: No Yusuf  [address] [place] [place] [address]        Dear Colleague,    Thank you for referring your patient, No Yusuf, to the [facility]. Please see a copy of my visit note below.    Chief Complaint   Patient presents with     Floaters       Do you wear contact lenses? No    In the summer she started noticing spots in her vision.  Now she is seeing large and small spots in her vision.  She is diabetic and is wondering if that has something to do with what she is experiencing.        Zhane Curiel  Vision Services Supervisor     See Review Of Systems       Medical, surgical and family histories reviewed and updated 11/29/2023.         OBJECTIVE: See Ophthalmology exam    ASSESSMENT:    ICD-10-CM    1. Vitreous syneresis of both eyes  H43.393       2. Type 2 diabetes mellitus without complication, without long-term current use of insulin (H)  E11.9          PLAN:    Patient Instructions   Vitreous syneresis, both eyes: Retina flat and intact 360 today.  Patient educated on condition.  Reviewed signs/symptoms of RD.  Instructed patient to seek care immediately in the event of flashes, shower of floaters, or curtain/veil over vision.  Monitor annually.  T2DM: Patient educated on condition. Stressed importance of close BS/BP monitoring, diet/exercise, medication compliance, and regular visits with PCP. Monitor annually.     Return next available for updated refraction.      Erika Briscoe OD      Again, thank you for allowing me to participate in the care of your patient.        Sincerely,        Erika Briscoe OD

## 2023-11-29 NOTE — PATIENT INSTRUCTIONS
Vitreous syneresis, both eyes: Retina flat and intact 360 today.  Patient educated on condition.  Reviewed signs/symptoms of RD.  Instructed patient to seek care immediately in the event of flashes, shower of floaters, or curtain/veil over vision.  Monitor annually.  T2DM: Patient educated on condition. Stressed importance of close BS/BP monitoring, diet/exercise, medication compliance, and regular visits with PCP. Monitor annually.     Return next available for updated refraction.

## 2023-11-29 NOTE — PROGRESS NOTES
Chief Complaint   Patient presents with    Floaters       Do you wear contact lenses? No    In the summer she started noticing spots in her vision.  Now she is seeing large and small spots in her vision.  She is diabetic and is wondering if that has something to do with what she is experiencing.        Zhane Curiel  Vision Services Supervisor     See Review Of Systems       Medical, surgical and family histories reviewed and updated 11/29/2023.         OBJECTIVE: See Ophthalmology exam    ASSESSMENT:    ICD-10-CM    1. Vitreous syneresis of both eyes  H43.393       2. Type 2 diabetes mellitus without complication, without long-term current use of insulin (H)  E11.9          PLAN:    Patient Instructions   Vitreous syneresis, both eyes: Retina flat and intact 360 today.  Patient educated on condition.  Reviewed signs/symptoms of RD.  Instructed patient to seek care immediately in the event of flashes, shower of floaters, or curtain/veil over vision.  Monitor annually.  T2DM: Patient educated on condition. Stressed importance of close BS/BP monitoring, diet/exercise, medication compliance, and regular visits with PCP. Monitor annually.     Return next available for updated refraction.      Erika Briscoe, OD     Opt out

## 2023-12-05 ENCOUNTER — HOSPITAL ENCOUNTER (INPATIENT)
Facility: CLINIC | Age: 63
LOS: 3 days | Discharge: HOME | DRG: 871 | End: 2023-12-08
Attending: FAMILY MEDICINE | Admitting: FAMILY MEDICINE
Payer: COMMERCIAL

## 2023-12-05 ENCOUNTER — APPOINTMENT (OUTPATIENT)
Dept: GENERAL RADIOLOGY | Facility: CLINIC | Age: 63
DRG: 871 | End: 2023-12-05
Attending: FAMILY MEDICINE
Payer: COMMERCIAL

## 2023-12-05 ENCOUNTER — APPOINTMENT (OUTPATIENT)
Dept: CT IMAGING | Facility: CLINIC | Age: 63
DRG: 871 | End: 2023-12-05
Payer: COMMERCIAL

## 2023-12-05 DIAGNOSIS — R06.2 WHEEZING: ICD-10-CM

## 2023-12-05 DIAGNOSIS — J18.9 COMMUNITY ACQUIRED PNEUMONIA, UNSPECIFIED LATERALITY: ICD-10-CM

## 2023-12-05 DIAGNOSIS — N39.3 FEMALE STRESS INCONTINENCE: ICD-10-CM

## 2023-12-05 DIAGNOSIS — F17.200 TOBACCO USE DISORDER: ICD-10-CM

## 2023-12-05 DIAGNOSIS — E11.9 TYPE 2 DIABETES MELLITUS WITHOUT COMPLICATION, WITHOUT LONG-TERM CURRENT USE OF INSULIN (H): ICD-10-CM

## 2023-12-05 DIAGNOSIS — E66.01 SEVERE OBESITY (H): ICD-10-CM

## 2023-12-05 DIAGNOSIS — R09.02 HYPOXIA: ICD-10-CM

## 2023-12-05 DIAGNOSIS — J44.1 CHRONIC OBSTRUCTIVE PULMONARY DISEASE WITH ACUTE EXACERBATION (H): Primary | ICD-10-CM

## 2023-12-05 DIAGNOSIS — M17.10 PATELLOFEMORAL ARTHRITIS: ICD-10-CM

## 2023-12-05 DIAGNOSIS — R91.8 LUNG INFILTRATE: ICD-10-CM

## 2023-12-05 DIAGNOSIS — J20.5 ACUTE BRONCHITIS DUE TO RESPIRATORY SYNCYTIAL VIRUS (RSV): ICD-10-CM

## 2023-12-05 DIAGNOSIS — B37.2 CANDIDIASIS OF SKIN: ICD-10-CM

## 2023-12-05 DIAGNOSIS — M75.41 IMPINGEMENT SYNDROME OF RIGHT SHOULDER: ICD-10-CM

## 2023-12-05 LAB
ALBUMIN SERPL BCG-MCNC: 3.8 G/DL (ref 3.5–5.2)
ALBUMIN UR-MCNC: 10 MG/DL
ALP SERPL-CCNC: 118 U/L (ref 40–150)
ALT SERPL W P-5'-P-CCNC: 21 U/L (ref 0–50)
ANION GAP SERPL CALCULATED.3IONS-SCNC: 6 MMOL/L (ref 7–15)
APPEARANCE UR: CLEAR
APTT PPP: 31 SECONDS (ref 22–38)
AST SERPL W P-5'-P-CCNC: 41 U/L (ref 0–45)
ATRIAL RATE - MUSE: 93 BPM
BASOPHILS # BLD AUTO: 0.1 10E3/UL (ref 0–0.2)
BASOPHILS NFR BLD AUTO: 1 %
BILIRUB SERPL-MCNC: 0.7 MG/DL
BILIRUB UR QL STRIP: NEGATIVE
BUN SERPL-MCNC: 16.1 MG/DL (ref 8–23)
CALCIUM SERPL-MCNC: 8.8 MG/DL (ref 8.8–10.2)
CHLORIDE SERPL-SCNC: 97 MMOL/L (ref 98–107)
COLOR UR AUTO: YELLOW
CORTICOSTER SERPL-MCNC: 11.3 UG/DL (ref 4–22)
CREAT SERPL-MCNC: 1.17 MG/DL (ref 0.51–0.95)
CRP SERPL-MCNC: 112.99 MG/L
DEPRECATED HCO3 PLAS-SCNC: 31 MMOL/L (ref 22–29)
DIASTOLIC BLOOD PRESSURE - MUSE: NORMAL MMHG
EGFRCR SERPLBLD CKD-EPI 2021: 53 ML/MIN/1.73M2
EOSINOPHIL # BLD AUTO: 0 10E3/UL (ref 0–0.7)
EOSINOPHIL NFR BLD AUTO: 0 %
ERYTHROCYTE [DISTWIDTH] IN BLOOD BY AUTOMATED COUNT: 17.4 % (ref 10–15)
FLUAV RNA SPEC QL NAA+PROBE: NEGATIVE
FLUBV RNA RESP QL NAA+PROBE: NEGATIVE
GLUCOSE BLDC GLUCOMTR-MCNC: 180 MG/DL (ref 70–99)
GLUCOSE SERPL-MCNC: 128 MG/DL (ref 70–99)
GLUCOSE UR STRIP-MCNC: NEGATIVE MG/DL
HBA1C MFR BLD: 6.3 %
HCT VFR BLD AUTO: 36.5 % (ref 35–47)
HGB BLD-MCNC: 11.7 G/DL (ref 11.7–15.7)
HGB UR QL STRIP: NEGATIVE
IMM GRANULOCYTES # BLD: 0.1 10E3/UL
IMM GRANULOCYTES NFR BLD: 1 %
INR PPP: 1.09 (ref 0.85–1.15)
INTERPRETATION ECG - MUSE: NORMAL
KETONES UR STRIP-MCNC: NEGATIVE MG/DL
LACTATE SERPL-SCNC: 1.7 MMOL/L (ref 0.7–2)
LEUKOCYTE ESTERASE UR QL STRIP: NEGATIVE
LYMPHOCYTES # BLD AUTO: 1 10E3/UL (ref 0.8–5.3)
LYMPHOCYTES NFR BLD AUTO: 7 %
MCH RBC QN AUTO: 28 PG (ref 26.5–33)
MCHC RBC AUTO-ENTMCNC: 32.1 G/DL (ref 31.5–36.5)
MCV RBC AUTO: 87 FL (ref 78–100)
MONOCYTES # BLD AUTO: 1.6 10E3/UL (ref 0–1.3)
MONOCYTES NFR BLD AUTO: 12 %
NEUTROPHILS # BLD AUTO: 11.1 10E3/UL (ref 1.6–8.3)
NEUTROPHILS NFR BLD AUTO: 79 %
NITRATE UR QL: NEGATIVE
NRBC # BLD AUTO: 0 10E3/UL
NRBC BLD AUTO-RTO: 0 /100
NT-PROBNP SERPL-MCNC: 258 PG/ML (ref 0–900)
P AXIS - MUSE: 71 DEGREES
PH UR STRIP: 5.5 [PH] (ref 5–7)
PLATELET # BLD AUTO: 247 10E3/UL (ref 150–450)
POTASSIUM SERPL-SCNC: 4.7 MMOL/L (ref 3.4–5.3)
PR INTERVAL - MUSE: 152 MS
PROCALCITONIN SERPL IA-MCNC: 0.13 NG/ML
PROT SERPL-MCNC: 8.3 G/DL (ref 6.4–8.3)
QRS DURATION - MUSE: 82 MS
QT - MUSE: 378 MS
QTC - MUSE: 469 MS
R AXIS - MUSE: 38 DEGREES
RBC # BLD AUTO: 4.18 10E6/UL (ref 3.8–5.2)
RBC URINE: <1 /HPF
RSV RNA SPEC NAA+PROBE: POSITIVE
SARS-COV-2 RNA RESP QL NAA+PROBE: NEGATIVE
SODIUM SERPL-SCNC: 134 MMOL/L (ref 135–145)
SP GR UR STRIP: 1.03 (ref 1–1.03)
SQUAMOUS EPITHELIAL: 2 /HPF
SYSTOLIC BLOOD PRESSURE - MUSE: NORMAL MMHG
T AXIS - MUSE: 46 DEGREES
TROPONIN T SERPL HS-MCNC: 20 NG/L
TROPONIN T SERPL HS-MCNC: 23 NG/L
TSH SERPL DL<=0.005 MIU/L-ACNC: 1.71 UIU/ML (ref 0.3–4.2)
UROBILINOGEN UR STRIP-MCNC: NORMAL MG/DL
VENTRICULAR RATE- MUSE: 93 BPM
WBC # BLD AUTO: 13.9 10E3/UL (ref 4–11)
WBC URINE: 1 /HPF

## 2023-12-05 PROCEDURE — 83880 ASSAY OF NATRIURETIC PEPTIDE: CPT | Performed by: FAMILY MEDICINE

## 2023-12-05 PROCEDURE — 87077 CULTURE AEROBIC IDENTIFY: CPT | Performed by: STUDENT IN AN ORGANIZED HEALTH CARE EDUCATION/TRAINING PROGRAM

## 2023-12-05 PROCEDURE — 84484 ASSAY OF TROPONIN QUANT: CPT | Performed by: FAMILY MEDICINE

## 2023-12-05 PROCEDURE — 85025 COMPLETE CBC W/AUTO DIFF WBC: CPT | Performed by: FAMILY MEDICINE

## 2023-12-05 PROCEDURE — 84484 ASSAY OF TROPONIN QUANT: CPT | Performed by: STUDENT IN AN ORGANIZED HEALTH CARE EDUCATION/TRAINING PROGRAM

## 2023-12-05 PROCEDURE — 258N000003 HC RX IP 258 OP 636

## 2023-12-05 PROCEDURE — 87637 SARSCOV2&INF A&B&RSV AMP PRB: CPT | Performed by: FAMILY MEDICINE

## 2023-12-05 PROCEDURE — 99292 CRITICAL CARE ADDL 30 MIN: CPT | Mod: 25 | Performed by: FAMILY MEDICINE

## 2023-12-05 PROCEDURE — 94640 AIRWAY INHALATION TREATMENT: CPT

## 2023-12-05 PROCEDURE — 36415 COLL VENOUS BLD VENIPUNCTURE: CPT | Performed by: FAMILY MEDICINE

## 2023-12-05 PROCEDURE — 250N000011 HC RX IP 250 OP 636: Mod: JZ | Performed by: FAMILY MEDICINE

## 2023-12-05 PROCEDURE — 81001 URINALYSIS AUTO W/SCOPE: CPT

## 2023-12-05 PROCEDURE — 86140 C-REACTIVE PROTEIN: CPT | Performed by: FAMILY MEDICINE

## 2023-12-05 PROCEDURE — 93010 ELECTROCARDIOGRAM REPORT: CPT | Performed by: FAMILY MEDICINE

## 2023-12-05 PROCEDURE — 87205 SMEAR GRAM STAIN: CPT | Performed by: STUDENT IN AN ORGANIZED HEALTH CARE EDUCATION/TRAINING PROGRAM

## 2023-12-05 PROCEDURE — 99285 EMERGENCY DEPT VISIT HI MDM: CPT | Mod: 25 | Performed by: FAMILY MEDICINE

## 2023-12-05 PROCEDURE — 83605 ASSAY OF LACTIC ACID: CPT | Performed by: FAMILY MEDICINE

## 2023-12-05 PROCEDURE — 99223 1ST HOSP IP/OBS HIGH 75: CPT | Mod: AI

## 2023-12-05 PROCEDURE — 71045 X-RAY EXAM CHEST 1 VIEW: CPT

## 2023-12-05 PROCEDURE — 250N000013 HC RX MED GY IP 250 OP 250 PS 637

## 2023-12-05 PROCEDURE — 120N000002 HC R&B MED SURG/OB UMMC

## 2023-12-05 PROCEDURE — 94640 AIRWAY INHALATION TREATMENT: CPT | Performed by: FAMILY MEDICINE

## 2023-12-05 PROCEDURE — 71275 CT ANGIOGRAPHY CHEST: CPT

## 2023-12-05 PROCEDURE — 93005 ELECTROCARDIOGRAM TRACING: CPT | Performed by: FAMILY MEDICINE

## 2023-12-05 PROCEDURE — 94640 AIRWAY INHALATION TREATMENT: CPT | Mod: 76

## 2023-12-05 PROCEDURE — 87040 BLOOD CULTURE FOR BACTERIA: CPT | Performed by: FAMILY MEDICINE

## 2023-12-05 PROCEDURE — 83036 HEMOGLOBIN GLYCOSYLATED A1C: CPT

## 2023-12-05 PROCEDURE — 250N000011 HC RX IP 250 OP 636: Mod: JZ

## 2023-12-05 PROCEDURE — 250N000009 HC RX 250

## 2023-12-05 PROCEDURE — 84145 PROCALCITONIN (PCT): CPT | Performed by: STUDENT IN AN ORGANIZED HEALTH CARE EDUCATION/TRAINING PROGRAM

## 2023-12-05 PROCEDURE — 85730 THROMBOPLASTIN TIME PARTIAL: CPT | Performed by: FAMILY MEDICINE

## 2023-12-05 PROCEDURE — 84443 ASSAY THYROID STIM HORMONE: CPT

## 2023-12-05 PROCEDURE — 80053 COMPREHEN METABOLIC PANEL: CPT | Performed by: FAMILY MEDICINE

## 2023-12-05 PROCEDURE — 250N000009 HC RX 250: Performed by: STUDENT IN AN ORGANIZED HEALTH CARE EDUCATION/TRAINING PROGRAM

## 2023-12-05 PROCEDURE — 99291 CRITICAL CARE FIRST HOUR: CPT | Mod: 25 | Performed by: FAMILY MEDICINE

## 2023-12-05 PROCEDURE — 250N000013 HC RX MED GY IP 250 OP 250 PS 637: Performed by: FAMILY MEDICINE

## 2023-12-05 PROCEDURE — 85610 PROTHROMBIN TIME: CPT | Performed by: FAMILY MEDICINE

## 2023-12-05 PROCEDURE — 36415 COLL VENOUS BLD VENIPUNCTURE: CPT | Performed by: STUDENT IN AN ORGANIZED HEALTH CARE EDUCATION/TRAINING PROGRAM

## 2023-12-05 PROCEDURE — 250N000013 HC RX MED GY IP 250 OP 250 PS 637: Performed by: STUDENT IN AN ORGANIZED HEALTH CARE EDUCATION/TRAINING PROGRAM

## 2023-12-05 PROCEDURE — 82533 TOTAL CORTISOL: CPT

## 2023-12-05 PROCEDURE — 250N000009 HC RX 250: Performed by: FAMILY MEDICINE

## 2023-12-05 RX ORDER — AMOXICILLIN 250 MG
2 CAPSULE ORAL 2 TIMES DAILY PRN
Status: DISCONTINUED | OUTPATIENT
Start: 2023-12-05 | End: 2023-12-08 | Stop reason: HOSPADM

## 2023-12-05 RX ORDER — ASPIRIN 81 MG/1
81 TABLET, CHEWABLE ORAL DAILY
Status: DISCONTINUED | OUTPATIENT
Start: 2023-12-05 | End: 2023-12-08 | Stop reason: HOSPADM

## 2023-12-05 RX ORDER — ATORVASTATIN CALCIUM 20 MG/1
20 TABLET, FILM COATED ORAL AT BEDTIME
Status: DISCONTINUED | OUTPATIENT
Start: 2023-12-05 | End: 2023-12-08 | Stop reason: HOSPADM

## 2023-12-05 RX ORDER — DULOXETIN HYDROCHLORIDE 20 MG/1
20 CAPSULE, DELAYED RELEASE ORAL DAILY
Status: DISCONTINUED | OUTPATIENT
Start: 2023-12-05 | End: 2023-12-08 | Stop reason: HOSPADM

## 2023-12-05 RX ORDER — NALOXONE HYDROCHLORIDE 0.4 MG/ML
0.2 INJECTION, SOLUTION INTRAMUSCULAR; INTRAVENOUS; SUBCUTANEOUS
Status: DISCONTINUED | OUTPATIENT
Start: 2023-12-05 | End: 2023-12-08 | Stop reason: HOSPADM

## 2023-12-05 RX ORDER — FLUTICASONE FUROATE AND VILANTEROL 100; 25 UG/1; UG/1
1 POWDER RESPIRATORY (INHALATION) DAILY
Status: DISCONTINUED | OUTPATIENT
Start: 2023-12-05 | End: 2023-12-08 | Stop reason: HOSPADM

## 2023-12-05 RX ORDER — CALCIUM CARBONATE 500 MG/1
1000 TABLET, CHEWABLE ORAL 4 TIMES DAILY PRN
Status: DISCONTINUED | OUTPATIENT
Start: 2023-12-05 | End: 2023-12-08 | Stop reason: HOSPADM

## 2023-12-05 RX ORDER — ACETAMINOPHEN 325 MG/1
650 TABLET ORAL EVERY 4 HOURS PRN
Status: DISCONTINUED | OUTPATIENT
Start: 2023-12-05 | End: 2023-12-08 | Stop reason: HOSPADM

## 2023-12-05 RX ORDER — AMOXICILLIN 250 MG
1 CAPSULE ORAL 2 TIMES DAILY PRN
Status: DISCONTINUED | OUTPATIENT
Start: 2023-12-05 | End: 2023-12-08 | Stop reason: HOSPADM

## 2023-12-05 RX ORDER — NICOTINE POLACRILEX 4 MG
15-30 LOZENGE BUCCAL
Status: DISCONTINUED | OUTPATIENT
Start: 2023-12-05 | End: 2023-12-08 | Stop reason: HOSPADM

## 2023-12-05 RX ORDER — ONDANSETRON 4 MG/1
4 TABLET, ORALLY DISINTEGRATING ORAL EVERY 6 HOURS PRN
Status: DISCONTINUED | OUTPATIENT
Start: 2023-12-05 | End: 2023-12-08 | Stop reason: HOSPADM

## 2023-12-05 RX ORDER — METHADONE HYDROCHLORIDE 5 MG/5ML
90 SOLUTION ORAL DAILY
Status: DISCONTINUED | OUTPATIENT
Start: 2023-12-06 | End: 2023-12-05

## 2023-12-05 RX ORDER — ALBUTEROL SULFATE 90 UG/1
2 AEROSOL, METERED RESPIRATORY (INHALATION) EVERY 4 HOURS PRN
Status: DISCONTINUED | OUTPATIENT
Start: 2023-12-05 | End: 2023-12-08 | Stop reason: HOSPADM

## 2023-12-05 RX ORDER — IPRATROPIUM BROMIDE AND ALBUTEROL SULFATE 2.5; .5 MG/3ML; MG/3ML
3 SOLUTION RESPIRATORY (INHALATION)
Status: DISCONTINUED | OUTPATIENT
Start: 2023-12-05 | End: 2023-12-08 | Stop reason: HOSPADM

## 2023-12-05 RX ORDER — AMLODIPINE BESYLATE 5 MG/1
5 TABLET ORAL DAILY
Status: DISCONTINUED | OUTPATIENT
Start: 2023-12-06 | End: 2023-12-08 | Stop reason: HOSPADM

## 2023-12-05 RX ORDER — LIDOCAINE 40 MG/G
CREAM TOPICAL
Status: DISCONTINUED | OUTPATIENT
Start: 2023-12-05 | End: 2023-12-08 | Stop reason: HOSPADM

## 2023-12-05 RX ORDER — LACTOBACILLUS RHAMNOSUS GG 10B CELL
1 CAPSULE ORAL 2 TIMES DAILY
Status: DISCONTINUED | OUTPATIENT
Start: 2023-12-05 | End: 2023-12-08 | Stop reason: HOSPADM

## 2023-12-05 RX ORDER — GABAPENTIN 300 MG/1
900 CAPSULE ORAL 3 TIMES DAILY
Status: DISCONTINUED | OUTPATIENT
Start: 2023-12-05 | End: 2023-12-08 | Stop reason: HOSPADM

## 2023-12-05 RX ORDER — SPIRONOLACTONE 25 MG/1
25 TABLET ORAL DAILY
Status: DISCONTINUED | OUTPATIENT
Start: 2023-12-05 | End: 2023-12-08 | Stop reason: HOSPADM

## 2023-12-05 RX ORDER — IPRATROPIUM BROMIDE AND ALBUTEROL SULFATE 2.5; .5 MG/3ML; MG/3ML
3 SOLUTION RESPIRATORY (INHALATION) ONCE
Status: COMPLETED | OUTPATIENT
Start: 2023-12-05 | End: 2023-12-05

## 2023-12-05 RX ORDER — LIDOCAINE 40 MG/G
CREAM TOPICAL
Status: DISCONTINUED | OUTPATIENT
Start: 2023-12-05 | End: 2023-12-06

## 2023-12-05 RX ORDER — PANTOPRAZOLE SODIUM 40 MG/1
40 TABLET, DELAYED RELEASE ORAL DAILY
Status: DISCONTINUED | OUTPATIENT
Start: 2023-12-06 | End: 2023-12-08 | Stop reason: HOSPADM

## 2023-12-05 RX ORDER — PROCHLORPERAZINE MALEATE 10 MG
10 TABLET ORAL EVERY 6 HOURS PRN
Status: DISCONTINUED | OUTPATIENT
Start: 2023-12-05 | End: 2023-12-08 | Stop reason: HOSPADM

## 2023-12-05 RX ORDER — NALOXONE HYDROCHLORIDE 0.4 MG/ML
0.4 INJECTION, SOLUTION INTRAMUSCULAR; INTRAVENOUS; SUBCUTANEOUS
Status: DISCONTINUED | OUTPATIENT
Start: 2023-12-05 | End: 2023-12-08 | Stop reason: HOSPADM

## 2023-12-05 RX ORDER — POTASSIUM CHLORIDE 600 MG/1
8 TABLET, FILM COATED, EXTENDED RELEASE ORAL 2 TIMES DAILY
Status: DISCONTINUED | OUTPATIENT
Start: 2023-12-05 | End: 2023-12-08 | Stop reason: HOSPADM

## 2023-12-05 RX ORDER — MULTIPLE VITAMINS W/ MINERALS TAB 9MG-400MCG
1 TAB ORAL DAILY
Status: DISCONTINUED | OUTPATIENT
Start: 2023-12-05 | End: 2023-12-08 | Stop reason: HOSPADM

## 2023-12-05 RX ORDER — METHADONE HYDROCHLORIDE 10 MG/ML
90 CONCENTRATE ORAL DAILY
Status: DISCONTINUED | OUTPATIENT
Start: 2023-12-06 | End: 2023-12-08 | Stop reason: HOSPADM

## 2023-12-05 RX ORDER — ACETAMINOPHEN 500 MG
1000 TABLET ORAL ONCE
Status: COMPLETED | OUTPATIENT
Start: 2023-12-05 | End: 2023-12-05

## 2023-12-05 RX ORDER — LEVOTHYROXINE SODIUM 100 UG/1
200 TABLET ORAL DAILY
Status: DISCONTINUED | OUTPATIENT
Start: 2023-12-05 | End: 2023-12-08 | Stop reason: HOSPADM

## 2023-12-05 RX ORDER — DEXTROSE MONOHYDRATE 25 G/50ML
25-50 INJECTION, SOLUTION INTRAVENOUS
Status: DISCONTINUED | OUTPATIENT
Start: 2023-12-05 | End: 2023-12-08 | Stop reason: HOSPADM

## 2023-12-05 RX ORDER — IBUPROFEN 200 MG
200 TABLET ORAL ONCE
Status: COMPLETED | OUTPATIENT
Start: 2023-12-05 | End: 2023-12-05

## 2023-12-05 RX ORDER — HYDROXYZINE HYDROCHLORIDE 25 MG/1
50 TABLET, FILM COATED ORAL EVERY 6 HOURS PRN
Status: DISCONTINUED | OUTPATIENT
Start: 2023-12-05 | End: 2023-12-08 | Stop reason: HOSPADM

## 2023-12-05 RX ORDER — VITAMIN B COMPLEX
25 TABLET ORAL DAILY
Status: DISCONTINUED | OUTPATIENT
Start: 2023-12-06 | End: 2023-12-08 | Stop reason: HOSPADM

## 2023-12-05 RX ORDER — IOPAMIDOL 755 MG/ML
100 INJECTION, SOLUTION INTRAVASCULAR ONCE
Status: COMPLETED | OUTPATIENT
Start: 2023-12-05 | End: 2023-12-05

## 2023-12-05 RX ORDER — LEVALBUTEROL INHALATION SOLUTION 1.25 MG/3ML
1.25 SOLUTION RESPIRATORY (INHALATION) EVERY 4 HOURS PRN
Status: DISCONTINUED | OUTPATIENT
Start: 2023-12-05 | End: 2023-12-05

## 2023-12-05 RX ORDER — ONDANSETRON 2 MG/ML
4 INJECTION INTRAMUSCULAR; INTRAVENOUS EVERY 6 HOURS PRN
Status: DISCONTINUED | OUTPATIENT
Start: 2023-12-05 | End: 2023-12-08 | Stop reason: HOSPADM

## 2023-12-05 RX ORDER — ACETAMINOPHEN 650 MG/1
650 SUPPOSITORY RECTAL EVERY 4 HOURS PRN
Status: DISCONTINUED | OUTPATIENT
Start: 2023-12-05 | End: 2023-12-08 | Stop reason: HOSPADM

## 2023-12-05 RX ORDER — NYSTATIN 100000 [USP'U]/G
POWDER TOPICAL 2 TIMES DAILY PRN
Status: DISCONTINUED | OUTPATIENT
Start: 2023-12-05 | End: 2023-12-05

## 2023-12-05 RX ORDER — ENOXAPARIN SODIUM 100 MG/ML
40 INJECTION SUBCUTANEOUS EVERY 12 HOURS
Status: DISCONTINUED | OUTPATIENT
Start: 2023-12-05 | End: 2023-12-08 | Stop reason: HOSPADM

## 2023-12-05 RX ORDER — CEFTRIAXONE 1 G/1
1 INJECTION, POWDER, FOR SOLUTION INTRAMUSCULAR; INTRAVENOUS ONCE
Status: COMPLETED | OUTPATIENT
Start: 2023-12-05 | End: 2023-12-05

## 2023-12-05 RX ORDER — FUROSEMIDE 20 MG/1
20 TABLET ORAL DAILY
Status: DISCONTINUED | OUTPATIENT
Start: 2023-12-05 | End: 2023-12-08 | Stop reason: HOSPADM

## 2023-12-05 RX ORDER — LANOLIN ALCOHOL/MO/W.PET/CERES
1000 CREAM (GRAM) TOPICAL DAILY
Status: DISCONTINUED | OUTPATIENT
Start: 2023-12-06 | End: 2023-12-08 | Stop reason: HOSPADM

## 2023-12-05 RX ORDER — POLYETHYLENE GLYCOL 3350 17 G/17G
17 POWDER, FOR SOLUTION ORAL DAILY PRN
Status: DISCONTINUED | OUTPATIENT
Start: 2023-12-05 | End: 2023-12-08 | Stop reason: HOSPADM

## 2023-12-05 RX ORDER — PROCHLORPERAZINE 25 MG
25 SUPPOSITORY, RECTAL RECTAL EVERY 12 HOURS PRN
Status: DISCONTINUED | OUTPATIENT
Start: 2023-12-05 | End: 2023-12-08 | Stop reason: HOSPADM

## 2023-12-05 RX ADMIN — IPRATROPIUM BROMIDE AND ALBUTEROL SULFATE 3 ML: .5; 3 SOLUTION RESPIRATORY (INHALATION) at 20:15

## 2023-12-05 RX ADMIN — CEFTRIAXONE SODIUM 1 G: 1 INJECTION, POWDER, FOR SOLUTION INTRAMUSCULAR; INTRAVENOUS at 14:15

## 2023-12-05 RX ADMIN — SODIUM CHLORIDE 90 ML: 9 INJECTION, SOLUTION INTRAVENOUS at 15:19

## 2023-12-05 RX ADMIN — IPRATROPIUM BROMIDE AND ALBUTEROL SULFATE 3 ML: .5; 3 SOLUTION RESPIRATORY (INHALATION) at 12:31

## 2023-12-05 RX ADMIN — Medication 1 TABLET: at 17:37

## 2023-12-05 RX ADMIN — IBUPROFEN 200 MG: 200 TABLET, FILM COATED ORAL at 16:12

## 2023-12-05 RX ADMIN — DICLOFENAC SODIUM 4 G: 10 GEL TOPICAL at 20:43

## 2023-12-05 RX ADMIN — SALINE NASAL SPRAY 2 SPRAY: 1.5 SOLUTION NASAL at 22:10

## 2023-12-05 RX ADMIN — IOPAMIDOL 74 ML: 755 INJECTION, SOLUTION INTRAVENOUS at 15:18

## 2023-12-05 RX ADMIN — Medication 1 CAPSULE: at 20:43

## 2023-12-05 RX ADMIN — IPRATROPIUM BROMIDE AND ALBUTEROL SULFATE 3 ML: .5; 3 SOLUTION RESPIRATORY (INHALATION) at 11:40

## 2023-12-05 RX ADMIN — LEVALBUTEROL HYDROCHLORIDE 1.25 MG: 1.25 SOLUTION RESPIRATORY (INHALATION) at 15:42

## 2023-12-05 RX ADMIN — FLUTICASONE FUROATE AND VILANTEROL TRIFENATATE 1 PUFF: 100; 25 POWDER RESPIRATORY (INHALATION) at 20:42

## 2023-12-05 RX ADMIN — ACETAMINOPHEN 1000 MG: 500 TABLET ORAL at 13:51

## 2023-12-05 RX ADMIN — ATORVASTATIN CALCIUM 20 MG: 20 TABLET, FILM COATED ORAL at 22:10

## 2023-12-05 RX ADMIN — SODIUM CHLORIDE 1000 ML: 9 INJECTION, SOLUTION INTRAVENOUS at 20:54

## 2023-12-05 RX ADMIN — ENOXAPARIN SODIUM 40 MG: 40 INJECTION SUBCUTANEOUS at 17:40

## 2023-12-05 RX ADMIN — GABAPENTIN 900 MG: 300 CAPSULE ORAL at 20:42

## 2023-12-05 RX ADMIN — ASPIRIN 81 MG 81 MG: 81 TABLET ORAL at 17:37

## 2023-12-05 ASSESSMENT — ACTIVITIES OF DAILY LIVING (ADL)
WEAR_GLASSES_OR_BLIND: YES
CONCENTRATING,_REMEMBERING_OR_MAKING_DECISIONS_DIFFICULTY: NO
ADLS_ACUITY_SCORE: 20
HEARING_DIFFICULTY_OR_DEAF: NO
ADLS_ACUITY_SCORE: 20
ADLS_ACUITY_SCORE: 20
DRESSING/BATHING_DIFFICULTY: NO
ADLS_ACUITY_SCORE: 35
HEARING_DIFFICULTY_OR_DEAF: NO
CHANGE_IN_FUNCTIONAL_STATUS_SINCE_ONSET_OF_CURRENT_ILLNESS/INJURY: NO
ADLS_ACUITY_SCORE: 35
TOILETING_ISSUES: NO
FALL_HISTORY_WITHIN_LAST_SIX_MONTHS: NO
DIFFICULTY_EATING/SWALLOWING: NO
DIFFICULTY_COMMUNICATING: NO
WEAR_GLASSES_OR_BLIND: NO
WALKING_OR_CLIMBING_STAIRS_DIFFICULTY: NO
DOING_ERRANDS_INDEPENDENTLY_DIFFICULTY: NO
ADLS_ACUITY_SCORE: 35

## 2023-12-05 NOTE — PROGRESS NOTES
Admitted/transferred from:   2 RN full   skin assessment completed by Oscar Cheney RN and Jaya Sood.  Skin assessment finding: other Skin is intact, clean and clear, except +1 edema to BLLEs    Interventions/actions: skin interventions elevate lower leg on pillow when lying down      Will continue to monitor.

## 2023-12-05 NOTE — PHARMACY
Opioid Treatment Program (Methadone Clinic) Information Note      Treatment program name: [facility]   Location (city): [place]   Phone number: [phone]     Spoke with: Norman Carpio      Patient's current methadone dose verified as: 90 mg PO (unable to confirm if tablets or solution given)   Last dose was administered on: 11/24/2023   Take-home dose information (if applicable): enough to last until 12/6/23      Note(s): Treatment programs in [place] dispense methadone using the 10 mg/ml oral concentrate.       MORAIMA LOONEY, [facility]  [phone]

## 2023-12-05 NOTE — H&P
[facility]    History and Physical - [facility]       Date of Admission:  12/5/2023    Assessment & Plan      No Yusuf is a 63 year old female admitted on 12/5/2023. She has a PMHx of HTN, DM2, Opioid dependence, hypothyroidism, vitreous syneresis, asthma with probable undiagnosed COPD and is being admitted for SIRS 2/2 RSV pneumonia.    # SIRS (fever, tachypnea, tachycardia, leukocytosis with known source)  # Acute Hypoxemic Respiratory Failure  # RSV pneumonia  # Acute rhinosinusitis, likely viral  # History of urosepsis  Presented to the ED with a temperature of 102.8F, HR of 101, RR 25, /73, SpO2 85%. CXR revealed mild patchy opacities in the left midlung and left lung base. CT PE was negative for PE, but did show patchy airspace opacity bilaterally concerning for pneumonia. WBC 13.9, .99. LA, INR,  PTT WNL, and creatinine is elevated but stable compared to last admission. This does not meet criteria for KERRIE. Labs do not suggest end-organ dysfunction, which makes sepsis unlikely.  Respiratory Aerobic Bacterial Culture with Gram Stain is negative so far; will continue to monitor for growth. Procalcitonin is normal, making this less likely a bacterial pneumonia. Sinusitis resolved with doxycycline, but sinus tenderness, headache, and congestion returned about 2 weeks after the end of her antibiotic course. Her current symptoms began approximately 3 days ago, which does not meet the criteria for acute bacterial sinusitis at this point in time. Will treat with supportive cares.  Workup:  - UA, pending  - VBG in AM  - CRP on 12/7  Management:  - s/p Ceftriaxone x1 dose (No macrolide d/t hx of QTC prolongation with torsades)  - Nasal saline 2 sprays in each nostril BID  - Continuous pulse ox  - Vitals q4hr  - Breo Ellipta 100-25 MCG/ACT daily (substitute for PTA Advair)  - PTA Albuterol inhaler 2 puff q4hr PRN  - DuoNeb 3 mL  q4hr PRN  - SpO2 goal of >90%    # LVEF 76% (8/2022); history of chronic HFmrEF (40-45%)  # NSTEMI d/t demand ischemia 2/2 AHRF in setting of Covid-19 PNA c/b torsades de pointes arrest (2/2022)  # Hypertroponinemia, downtrending  # HTN  BNP WNL. Troponin 23, then 20 on repeat. MRI Cardiac on 8/22/2022 with contrast shows LVEF of 76% as well as normal RVEF of 57%. EKG: normal sinus rhythm without ischemic changes.  Management:  - Cardiac telemetry  - PTA Atorvastatin 20 mg at bedtime  - PTA magnesium oxide 200 mg daily   - HOLD PTA Amlodipine 5 mg daily in setting of soft BP  - HOLD PTA Furosemide 20 mg daily in setting of soft BP  - HOLD PTA Sacubitril-valsartan 49-51 mg BID in setting of soft BP  Considerations:  - ECHO to assess EF   - Avoid fluid overloading    # Adrenal adenoma, incidental  # Recurrent episodes of hypoglycemia  #T2DM  Last HbA1c on 6.2% on 6/21/23. Reports that she has had numerous episodes of hypoglycemia and needs to keep snacks near her to bring her BG back up. She and her doctor have been decreasing the number of medications in her diabetes regimen, and she is currently only on metformin and insulin detemir as needed. Will assess for possible adrenal insufficiency.   Workup:  - HbA1c  - ACTH stimulation panel  Management:  - HOLD PTA Metformin 1,000 mg BID with meals  - HOLD PTA insulin detemir  - Hypoglycemia protocol   - Low dose insulin sliding scale    Chronic, Stable  # Hypothyroidism  Last TSH was 0.15 on 03/25/22. TSH WNL on this admission. Hypothyroidism is likely not contributing to her weight gain.    - PTA Levothyroxine 200 mcg daily    #Polysubstance use disorder (opioids, methamphetamines), in remission  - PTA Methadone 90 mg daily- confirmed with clinic       Diet: Combination Diet Regular Diet Adult    DVT Prophylaxis: Enoxaparin (Lovenox) SQ  Valle Catheter: Not present  Fluids: s/p 1 L of NS over 4 hours  Lines: None     Cardiac Monitoring: ACTIVE order. Indication: QTc  "prolonging medication (48 hours)  Code Status: Full Code      Clinically Significant Risk Factors Present on Admission                # Drug Induced Platelet Defect: home medication list includes an antiplatelet medication   # Hypertension: Noted on problem list  # Heart failure, NOS: home medication list includes sacubitril/valsartan (Entresto) and last echo with EF 40-50%     # Severe Obesity: Estimated body mass index is 40.34 kg/m  as calculated from the following:    Height as of this encounter: 1.766 m (5' 9.53\").    Weight as of this encounter: 125.8 kg (277 lb 5.4 oz).              Disposition Plan      Expected Discharge Date: 12/07/2023                The patient's care was discussed with the Attending Physician, Dr. Niño .    Anette Farrar DO, CALLI  [facility]  [facility]  Securely message with Vocera (more info)  Text page via [facility] Paging/Directory   See signed in provider for up to date coverage information  ______________________________________________________________________    Chief Complaint   Shortness of breath, cough, fever    History is obtained from the patient and EMR    History of Present Illness   No Yusuf is a 63 year old female with a PMHx of HTN, DM2, opioid dependence, hypothyroidism, vitreous syneresis, HF 45-50% asthma who presents with shortness of breath, cough, fever, congestion, and headache for the past 3 days.     States that she has intermittently had a URI over the past few months. It will self-resolve, then start again about 2 weeks later. No was diagnosed with an acute bacterial sinus infection on 10/30 and prescribed 14 days of doxycycline. However, her symptoms persisted.     No also complains of having multiple episodes of hypoglycemia. Her PCP discontinued Jardiance, and she takes her scheduled insulin detemir as needed instead. She states that she needs to keep snacks near her to " make sure that she doesn't dip too low. However, she also notes that she has gained about 20 lbs since the summer time. She attributes it to her diet but also wonders if there may be another component to her weight gain.     On 2/8/2022, No presented to the emergency department with hypoxia and required supplemental oxygen. Due to bed availability, the patient was transferred to [facility] on 2/10/2022 after requiring intubation and mechanical ventilation for progressive hypoxia secondary to COVID-19 pneumonia. The patient was started on remdesivir, dexamethasone, and baricitinib. On 2/11, she had cardiac arrest at about 2:20 pm. She had been bradycardic that morning and while being changed from proned to supine position, she went into ventricular tachycardia consistent with Torsades adrian pointes. Underwent chest compressions and survived. Echo on 2/12/2022 showed an EF of 20-25% with severe LV dysfunction secondary to stress cardiomyopathy from her cardiac arrest that took place the day before. Echo on 2/15/22 showed ejection fraction of 40-45% with mild global hypokinesia of the left ventricle and mildly decreased right ventricular systolic function. MRI Cardiac with contrast shows LVEF of 76% as well as normal RVEF of 57%.     ED Course  Labs:  TSH 1.71.  Troponin was initially 23, then repeat was 20  Procalcitonin 0.13.  CRP is 112. BNP is 258.  Sodium 134, potassium 4.7.  Bicarb 31 BUN 16 creatinine is 1.17.  Calcium 8.8.  Glucose 128.  Liver functions normal limits.  White count is 13.9. Hemoglobin is 11.7. Lactic acid 1.7    Imaging:  CXR and CT PE (see results below)  EKG: normal sinus rhythm without ischemic changes    Management:  Ibuprofen 200 mg  Ceftriaxone 1 g ONCE  Tylenol 1,000 mg ONCE  DuoNeb x3    Past Medical History    Past Medical History:   Diagnosis Date    Arthritis     Bipolar affective (H)     Fibromyalgia     Hypertension        Past Surgical History   Past Surgical  History:   Procedure Laterality Date    CHOLECYSTECTOMY      GYN SURGERY      c section    GYN SURGERY      oblation    ORTHOPEDIC SURGERY      left leg, left shoulder, back       Prior to Admission Medications   Prior to Admission Medications   Prescriptions Last Dose Informant Patient Reported? Taking?   Acidophilus Lactobacillus CAPS 2023  Yes Yes   Sig: Take 1 tablet by mouth daily For gut health (Lactobacillus)   Blood Glucose Calibration (ACCU-CHEK ACTIVE GLUCOSE CONT VI) Unknown  Yes Yes   Si each by Other route   DULoxetine (CYMBALTA) 20 MG capsule 2023  No Yes   Sig: Take 1 capsule (20 mg) by mouth daily   acetaminophen (TYLENOL) 325 MG tablet 2023  No Yes   Sig: Take 2 tablets (650 mg) by mouth 2 times daily as needed for mild pain   albuterol (PROAIR HFA/PROVENTIL HFA/VENTOLIN HFA) 108 (90 Base) MCG/ACT inhaler 2023  Yes Yes   Sig: Inhale 2 puffs into the lungs every 4 hours as needed for shortness of breath / dyspnea or wheezing   amLODIPine (NORVASC) 5 MG tablet 2023  Yes Yes   Sig: Take 5 mg by mouth daily   aspirin (ASA) 81 MG chewable tablet 2023  No Yes   Sig: Take 1 tablet (81 mg) by mouth daily   atorvastatin (LIPITOR) 20 MG tablet 2023  No Yes   Sig: Take 1 tablet (20 mg) by mouth At Bedtime   blood glucose (ACCU-CHEK RAFAELA PLUS) test strip Unknown  Yes Yes   Sig: Use to check blood sugar 3x daily or as directed.   blood glucose calibration (ACCU-CHEK RAFAELA) solution Unknown  Yes Yes   calcium carbonate antacid 1000 MG CHEW More than a month  Yes Yes   Sig: Take 1 chew tab by mouth every 6 hours as needed for heartburn   cholecalciferol 25 MCG (1000 UT) TABS 2023  Yes Yes   Sig: Take 1,000 Units by mouth daily    diclofenac (VOLTAREN) 1 % topical gel 2023  No Yes   Sig: Apply 2-4 g topically 2 times daily   Patient taking differently: Apply 2-4 g topically 2 times daily as needed for moderate pain   empagliflozin (JARDIANCE) 10 MG TABS tablet Not  Taking  No No   Sig: Take 1 tablet (10 mg) by mouth daily   Patient not taking: Reported on 2023   fluticasone-salmeterol (ADVAIR) 250-50 MCG/DOSE inhaler 2023  Yes Yes   Sig: Inhale 1 puff into the lungs 2 times daily   furosemide (LASIX) 20 MG tablet 2023  Yes Yes   Sig: Take 20 mg by mouth daily   gabapentin (NEURONTIN) 300 MG capsule 2023  Yes Yes   Sig: Take 900 mg by mouth 3 times daily   hydrOXYzine (ATARAX) 50 MG tablet Past Month  Yes Yes   Sig: Take 100 mg by mouth every 6 hours as needed for anxiety   insulin detemir (LEVEMIR PEN) 100 UNIT/ML pen Past Week  No Yes   Sig: Inject 15 Units Subcutaneous every morning   levothyroxine (SYNTHROID/LEVOTHROID) 200 MCG tablet 2023  Yes Yes   Sig: Take 200 mcg by mouth daily    magnesium oxide 400 MG tablet Unknown  Yes Yes   Sig: Take 200 mg by mouth daily   melatonin 5 MG tablet 2023  Yes Yes   Sig: Take 5 mg by mouth At Bedtime   metFORMIN (GLUCOPHAGE) 1000 MG tablet 2023  Yes Yes   Sig: Take 1,000 mg by mouth 2 times daily (with meals)   methadone (DOLOPHINE) 10 MG tablet 2023  Yes Yes   Sig: Take 90 mg by mouth once Takes 90mg daily per [facility], updated 2023   multivitamin w/minerals (THERA-VIT-M) tablet 2023  Yes Yes   Sig: Take 1 tablet by mouth daily   naloxone (NARCAN) 4 MG/0.1ML nasal spray Unknown  No Yes   Sig: Spray 1 spray (4 mg) into one nostril alternating nostrils as needed for opioid reversal every 2-3 minutes until assistance arrives   nystatin (NYSTOP) 529722 UNIT/GM external powder Past Month  No Yes   Sig: SMARTSI Application Topical 2-3 Times Daily   omeprazole (PRILOSEC) 20 MG DR capsule 2023  Yes Yes   Sig: Take 20 mg by mouth daily    polyethylene glycol (MIRALAX) 17 GM/Dose powder Unknown  No Yes   Sig: Take 17 g by mouth daily as needed for constipation   potassium chloride ER (KLOR-CON) 8 MEQ CR tablet   Yes No   Sig: Take 8 mEq by mouth 2 times daily    sacubitril-valsartan (ENTRESTO) 49-51 MG per tablet   No No   Sig: Take 1 tablet by mouth 2 times daily   spironolactone (ALDACTONE) 25 MG tablet   No No   Sig: Take 1 tablet (25 mg) by mouth daily   vitamin B-12 (CYANOCOBALAMIN) 1000 MCG tablet 12/5/2023  Yes Yes   Sig: Take 1,000 mcg by mouth daily      Facility-Administered Medications: None        Physical Exam   Vital Signs: Temp: (!) 101.2  F (38.4  C) Temp src: Oral BP: 106/57 Pulse: 94   Resp: 16 SpO2: 92 % O2 Device: Nasal cannula Oxygen Delivery: 4 LPM  Weight: 277 lbs 5.42 oz    Physical Exam  Vitals reviewed.   Constitutional:       Appearance: Normal appearance.   HENT:      Head: Normocephalic and atraumatic.      Nose: Congestion present.      Right Sinus: Maxillary sinus tenderness present.      Left Sinus: Maxillary sinus tenderness present.      Mouth/Throat:      Mouth: Mucous membranes are moist.      Pharynx: Oropharynx is clear.   Eyes:      Extraocular Movements: Extraocular movements intact.      Conjunctiva/sclera: Conjunctivae normal.   Cardiovascular:      Rate and Rhythm: Normal rate and regular rhythm.      Heart sounds: Normal heart sounds.   Pulmonary:      Breath sounds: Wheezing (expiratory) present.      Comments: Prolonged expiration.  Abdominal:      General: There is no distension.      Palpations: Abdomen is soft.      Tenderness: There is no abdominal tenderness.   Musculoskeletal:         General: Normal range of motion.      Cervical back: Normal range of motion and neck supple.      Right lower leg: No edema.      Left lower leg: No edema.   Skin:     General: Skin is warm and dry.   Neurological:      Mental Status: She is alert and oriented to person, place, and time.   Psychiatric:         Mood and Affect: Mood normal.         Behavior: Behavior normal.         Thought Content: Thought content normal.         Judgment: Judgment normal.       Data     I have personally reviewed the following data over the past 24  hrs:    13.9 (H)  \   11.7   / 247     134 (L) 97 (L) 16.1 /  128 (H)   4.7 31 (H) 1.17 (H) \     ALT: 21 AST: 41 AP: 118 TBILI: 0.7   ALB: 3.8 TOT PROTEIN: 8.3 LIPASE: N/A     Trop: 20 (H) BNP: 258     TSH: 1.71 T4: N/A A1C: N/A     Procal: 0.13 CRP: 112.99 (H) Lactic Acid: 1.7       INR:  1.09 PTT:  31   D-dimer:  N/A Fibrinogen:  N/A       Imaging results reviewed over the past 24 hrs:   Recent Results (from the past 24 hour(s))   XR Chest Port 1 View    Narrative    XR CHEST PORT 1 VIEW 12/5/2023 12:51 PM    HISTORY: cough hypoxia    COMPARISON: 3/25/2022      Impression    IMPRESSION: Mild patchy opacities in the left midlung and left lung  base may be exaggerated by the expiratory technique, although small  infiltrate is not entirely excluded. No pleural effusion or  pneumothorax. Normal heart size.    PO WHELAN MD         SYSTEM ID:  [id number]   CT Chest Pulmonary Embolism w Contrast    Narrative    CT CHEST PULMONARY EMBOLISM W CONTRAST 12/5/2023 3:29 PM    CLINICAL HISTORY: Concern for PE, tachycardic and tachypneic in the  setting of significant dyspnea  TECHNIQUE: CT angiogram chest during arterial phase injection IV  contrast. 2D and 3D MIP reconstructions were performed by the CT  technologist. Dose reduction techniques were used.     CONTRAST: 74 mL isovue 370    COMPARISON: None.    FINDINGS:  ANGIOGRAM CHEST: Pulmonary arteries are normal caliber and negative  for pulmonary emboli. Thoracic aorta is negative for dissection. No CT  evidence of right heart strain.    LUNGS AND PLEURA: Bibasilar atelectasis. Small patchy airspace opacity  bilaterally. No effusion.    MEDIASTINUM/AXILLAE: Heart is normal in size. No mediastinal,  axillary, or hilar adenopathy.    UPPER ABDOMEN: 2.5 x 1.5 cm right adrenal nodule which is compatible  with an adenoma.    MUSCULOSKELETAL: Degenerative changes of the spine.      Impression    IMPRESSION:  1.  No pulmonary embolism.  2.  Patchy airspace opacity bilaterally  concerning for pneumonia.    HOMER ORTIZ MD         SYSTEM ID:  [id number]

## 2023-12-05 NOTE — LETTER
Transition Communication Hand-off for Care Transitions to Next Level of Care Provider    Name: No Yusuf  : 1960  MRN #: [id number]  Primary Care Provider: Price Naranjo     Primary Clinic: [facility]  [facility] [id number]     Reason for Hospitalization:  Wheezing [R06.2]  Severe obesity (H) [E66.01]  Hypoxia [R09.02]  Lung infiltrate [R91.8]  Acute bronchitis due to respiratory syncytial virus (RSV) [J20.5]  Admit Date/Time: 2023 11:14 AM  Discharge Date: 23  Payor Source: Payor: [employer] / Plan: [employer] / Product Type: HMO /            Reason for Communication Hand-off Referral: Other continuity of care    Discharge Plan:group home       Concern for non-adherence with plan of care:   No  Discharge Needs Assessment:  Needs      Flowsheet Row Most Recent Value   Equipment Currently Used at Home cane, straight, grab bar, toilet, grab bar, tub/shower, glucometer, raised toilet seat, shower chair, walker, rolling            Already enrolled in Tele-monitoring program and name of program:  n/a  Follow-up specialty is recommended: Yes    Follow-up plan:    Future Appointments   Date Time Provider Department Center   2023 12:30 PM Erika Briscoe OD [facility]       Any outstanding tests or procedures:        Referrals       Future Labs/Procedures    Adult Sleep Eval & Management  Referral     Process Instructions:    This will include comprehensive sleep evaluation with appropriate diagnostic testing, results follow up, and recommendations.    [employer]ang Total Score(s):  No data recorded    Consider Adult E-Consult to Sleep Medicine for the following conditions: insomnia, obstructive sleep apnea, parasomnias, restless leg syndrome.  E-Consult Cost: 0-$125.     Comments:    Please be aware that coverage of these services is subject to the terms and limitations of your health insurance plan.  Call member services at your  health plan with any benefit or coverage questions.  [facility] will call you to coordinate your care as prescribed by your provider. If you don't hear from a representative within 2 business days, please call [phone].              Supplies       Future Labs/Procedures    Incontinence Supplies Order for DME - ONLY FOR DME     Process Instructions:    By signing this order, the Authorizing Provider is attesting that they have completed a face-to-face evaluation on the patient to determine their need for this equipment in the last 60 days. A new face-to-face evaluation is required each time  A new prescription for one of the specified items is ordered.   If an additional provider completed the evaluation, please indicate their name below.     **As of 2018, an order requisition and face sheet will print for all DME orders. Please give printed order and face sheet to patient if not obtaining product from [facility] closet.     Comments:    I, the undersigned, certify that the above prescribed supplies are medically necessary for this patient and is both reasonable and necessary in reference to accepted standards of medical and necessary in reference to accepted standards of medical practice in the treatment of this patient's condition and is not prescribed as a convenience.             Key Recommendations:      BAKARI Jones  [phone]    AVS/Discharge Summary is the source of truth; this is a helpful guide for improved communication of patient story

## 2023-12-05 NOTE — ED PROVIDER NOTES
ED Provider Note  [facility]      History     Chief Complaint   Patient presents with    Cough     Onset 3 days ago  with cough and congestion, headache.     HPI  No Yusuf is a 63 year old female with PMH of HTN, DM2, Opioid dependence, hypothyroidism, vitreous syneresis, asthma who presents to the Emergency Department today due to cough, congestion, headache, and shortness of breath.  Patient with history also significant COVID infection rate with cardiac arrest torsades in February 2022.  Patient with some chronic systolic heart failure noted also.  Has noted over the last few weeks patient has been having the symptoms on and off the last 3 days notes increasing wheezing short of breath but no chest pain loose cough noted with some productive sputum patient feels wheezing now but no diagnosis of asthma according to patient.  She has no increasing leg pain or leg swelling no history of DVTs or PEs.  No abdominal pain nausea vomiting fevers noted generalized achiness also when she coughs she has a worsening headache without neurological changes no neck stiffness otherwise no nausea or vomiting.  Presents for evaluation.    Per chart review, patient presented to [facility] on 10/30/2023 due to URI symptoms, cough and sinus pain for 6-7 days. She was given albuterol, which helped her in the past, and antibiotics for potential pneumonia and sinus infection. She was discussed close monitoring and follow-up with her PCP.       Past Medical History  Past Medical History:   Diagnosis Date    Arthritis     Bipolar affective (H)     Fibromyalgia     Hypertension      Past Surgical History:   Procedure Laterality Date    CHOLECYSTECTOMY      GYN SURGERY      c section    GYN SURGERY      oblation    ORTHOPEDIC SURGERY      left leg, left shoulder, back     No current outpatient medications on file.    Allergies   Allergen Reactions    Promethazine Other (See Comments) and  "Anxiety     Noted in 8/30/08 ER    Codeine Phosphate GI Disturbance    Lamotrigine Other (See Comments)     hyponatremia    Oxcarbazepine Unknown and Other (See Comments)     Low sodium  LegacyRecord#[id number]      Codeine Other (See Comments) and Rash     GI bleeding  LegacyRecord#[id number]       Family History  Family History   Problem Relation Age of Onset    Heart Disease Mother     Diabetes Mother     Ovarian Cancer Mother     Heart Disease Father     Lung Cancer Father     Diabetes Sister     Ovarian Cancer Sister     Diabetes Brother      Social History   Social History     Tobacco Use    Smoking status: Former     Packs/day: .1     Types: Cigarettes    Smokeless tobacco: Never    Tobacco comments:     Using nicotine vape occasionally   Substance Use Topics    Alcohol use: No    Drug use: No         A medically appropriate review of systems was performed with pertinent positives and negatives noted in the HPI, and all other systems negative.    Physical Exam   BP: 130/75  Pulse: 90  Temp: 100.4  F (38  C)  Resp: 24  Height: 167.6 cm (5' 6\")  Weight: 124.7 kg (275 lb)  SpO2: (!) 85 %  Physical Exam  Vitals and nursing note reviewed.   Constitutional:       General: She is in acute distress.      Appearance: Normal appearance. She is well-developed. She is ill-appearing. She is not toxic-appearing.      Comments: Patient with mild labored breathing pattern but otherwise speaking full sentences not confused combative oxygen saturations noted to be 88% on room air requiring 4 L nasal cannulae   HENT:      Head: Normocephalic and atraumatic.      Nose: Congestion and rhinorrhea present.      Mouth/Throat:      Mouth: Mucous membranes are moist.      Pharynx: Oropharynx is clear. Posterior oropharyngeal erythema present.   Eyes:      General: No scleral icterus.     Extraocular Movements: Extraocular movements intact.      Conjunctiva/sclera: Conjunctivae normal.      Pupils: Pupils are equal, round, and reactive to light. "   Neck:      Comments: No meningeal signs  Cardiovascular:      Rate and Rhythm: Normal rate and regular rhythm.   Pulmonary:      Effort: Respiratory distress present.      Breath sounds: Wheezing present.   Chest:      Chest wall: No tenderness.   Abdominal:      General: Abdomen is flat. There is distension.      Palpations: Abdomen is soft. There is no mass.      Tenderness: There is no abdominal tenderness. There is no guarding or rebound.      Hernia: No hernia is present.   Musculoskeletal:      Cervical back: Normal range of motion and neck supple. No rigidity.      Right lower leg: Edema present.      Left lower leg: Edema present.      Comments: Negative Homans   Skin:     General: Skin is warm and dry.      Capillary Refill: Capillary refill takes less than 2 seconds.      Findings: No rash.   Neurological:      General: No focal deficit present.      Mental Status: She is alert and oriented to person, place, and time. Mental status is at baseline.   Psychiatric:      Comments: Patient appropriate here in the ER he is not delusional confused agitated etc.           ED Course, Procedures, & Data        Records reviewed in epic.  Patient's recent clinic visits patient history of COVID with COVID respiratory sides imaging reviewed labs etc.  Medications reviewed also.    The ER patient was evaluated in the ER.  EKG done noted below reviewed showing QTc of 469 slight elevation no other hyperacute ischemic changes noted.  Portable chest x-ray done revealing some infiltrative process in the left lower lung field chest x-ray personally reviewed by myself..  COVID influenza were negative but RSV was positive.  Strep negative.  Patient received Tylenol and then low-dose 200 mg ibuprofen for fever control here in the ER.  TSH 1.71.  Troponin was initially 23 I believe repeat was 20  Procalcitonin 0.13.  CRP is 112 which is elevated blood cultures drawn.  BNP is 258  Send 134 potassium 4.7.  Bicarb 31 gap is 6 BUN  16 creatinine is 1.17.  Calcium 8.8.  Glucose 128.  Liver functions normal limits.  White count is 13.9.  Hemoglobin is 11.7.  Lactic acid 1.7.    In the ER patient received repeated DuoNebs and then switched to Xopenex nebs here in the ER seems to be fairly stable had a spiking fevers noted to do treat this with Tylenol and then ibuprofen as noted blood cultures were drawn also treated the patient with ceftriaxone reviewing previous records concerns for torsades with a QTc slightly prolonged I did not give her azithromycin at this point.    Here in the ER discussed admission at this point with hypoxia quiring O2 treat for underlying pneumonia along with RSV bronchitis.    Patient does agree with plan.  Discussed with medicine with plan to [place] [facility]    Patient reassessed admitting service did order a CT of the chest.  I reassessed the patient after this she is stable and does not show signs of decompensation.  Patient is able to take orally he is hungry.    Further reviewed of the CT scan revealed no PE but bilateral patchy infiltrates concerning for pneumonia.    As noted patient remitted medicine service for further management.  Procedures            EKG Interpretation:      Interpreted by Juan Nam MD  Time reviewed: 1139  Symptoms at time of EKG: sob and cough and hypoxia   Rhythm: normal sinus   Rate: normal  Axis: normal  Ectopy: none  Conduction: normal  ST Segments/ T Waves: No hyperacute ST-T wave changes  Q Waves: none  Comparison to prior: No old EKG available    Clinical Impression: nsr without acute ischemic changes                 Results for orders placed or performed during the hospital encounter of 12/05/23   XR Chest Port 1 View     Status: None    Narrative    XR CHEST PORT 1 VIEW 12/5/2023 12:51 PM    HISTORY: cough hypoxia    COMPARISON: 3/25/2022      Impression    IMPRESSION: Mild patchy opacities in the left midlung and left lung  base may be  exaggerated by the expiratory technique, although small  infiltrate is not entirely excluded. No pleural effusion or  pneumothorax. Normal heart size.    PO WHELAN MD         SYSTEM ID:  [id number]   CT Chest Pulmonary Embolism w Contrast     Status: None    Narrative    CT CHEST PULMONARY EMBOLISM W CONTRAST 12/5/2023 3:29 PM    CLINICAL HISTORY: Concern for PE, tachycardic and tachypneic in the  setting of significant dyspnea  TECHNIQUE: CT angiogram chest during arterial phase injection IV  contrast. 2D and 3D MIP reconstructions were performed by the CT  technologist. Dose reduction techniques were used.     CONTRAST: 74 mL isovue 370    COMPARISON: None.    FINDINGS:  ANGIOGRAM CHEST: Pulmonary arteries are normal caliber and negative  for pulmonary emboli. Thoracic aorta is negative for dissection. No CT  evidence of right heart strain.    LUNGS AND PLEURA: Bibasilar atelectasis. Small patchy airspace opacity  bilaterally. No effusion.    MEDIASTINUM/AXILLAE: Heart is normal in size. No mediastinal,  axillary, or hilar adenopathy.    UPPER ABDOMEN: 2.5 x 1.5 cm right adrenal nodule which is compatible  with an adenoma.    MUSCULOSKELETAL: Degenerative changes of the spine.      Impression    IMPRESSION:  1.  No pulmonary embolism.  2.  Patchy airspace opacity bilaterally concerning for pneumonia.    HOMER ORTIZ MD         SYSTEM ID:  [id number]   Partial thromboplastin time     Status: Normal   Result Value Ref Range    aPTT 31 22 - 38 Seconds   INR     Status: Normal   Result Value Ref Range    INR 1.09 0.85 - 1.15   CRP inflammation     Status: Abnormal   Result Value Ref Range    CRP Inflammation 112.99 (H) <5.00 mg/L   Comprehensive metabolic panel     Status: Abnormal   Result Value Ref Range    Sodium 134 (L) 135 - 145 mmol/L    Potassium 4.7 3.4 - 5.3 mmol/L    Carbon Dioxide (CO2) 31 (H) 22 - 29 mmol/L    Anion Gap 6 (L) 7 - 15 mmol/L    Urea Nitrogen 16.1 8.0 - 23.0 mg/dL    Creatinine 1.17 (H)  0.51 - 0.95 mg/dL    GFR Estimate 53 (L) >60 mL/min/1.73m2    Calcium 8.8 8.8 - 10.2 mg/dL    Chloride 97 (L) 98 - 107 mmol/L    Glucose 128 (H) 70 - 99 mg/dL    Alkaline Phosphatase 118 40 - 150 U/L    AST 41 0 - 45 U/L    ALT 21 0 - 50 U/L    Protein Total 8.3 6.4 - 8.3 g/dL    Albumin 3.8 3.5 - 5.2 g/dL    Bilirubin Total 0.7 <=1.2 mg/dL   Troponin T, High Sensitivity     Status: Abnormal   Result Value Ref Range    Troponin T, High Sensitivity 23 (H) <=14 ng/L   Nt probnp inpatient (BNP)     Status: Normal   Result Value Ref Range    N terminal Pro BNP Inpatient 258 0 - 900 pg/mL   Lactic acid whole blood     Status: Normal   Result Value Ref Range    Lactic Acid 1.7 0.7 - 2.0 mmol/L   Symptomatic Influenza A/B, RSV, & SARS-CoV2 PCR (COVID-19) Nasopharyngeal     Status: Abnormal    Specimen: Nasopharyngeal; Swab   Result Value Ref Range    Influenza A PCR Negative Negative    Influenza B PCR Negative Negative    RSV PCR Positive (A) Negative    SARS CoV2 PCR Negative Negative    Narrative    Testing was performed using the Xpert Xpress CoV2/Flu/RSV Assay on the Cepheid GeneXpert Instrument. This test should be ordered for the detection of SARS-CoV-2, influenza, and RSV viruses in individuals who meet clinical and/or epidemiological criteria. Test performance is unknown in asymptomatic patients. This test is for in vitro diagnostic use under the FDA EUA for laboratories certified under CLIA to perform high or moderate complexity testing. This test has not been FDA cleared or approved. A negative result does not rule out the presence of PCR inhibitors in the specimen or target RNA in concentration below the limit of detection for the assay. If only one viral target is positive but coinfection with multiple targets is suspected, the sample should be re-tested with another FDA cleared, approved, or authorized test, if coinfection would change clinical management. This test was validated by the [facility]. These laboratories are certified under the Clinical Laboratory Improvement Amendments of 1988 (CLIA-88) as qualified to perform high complexity laboratory testing.   CBC with platelets and differential     Status: Abnormal   Result Value Ref Range    WBC Count 13.9 (H) 4.0 - 11.0 10e3/uL    RBC Count 4.18 3.80 - 5.20 10e6/uL    Hemoglobin 11.7 11.7 - 15.7 g/dL    Hematocrit 36.5 35.0 - 47.0 %    MCV 87 78 - 100 fL    MCH 28.0 26.5 - 33.0 pg    MCHC 32.1 31.5 - 36.5 g/dL    RDW 17.4 (H) 10.0 - 15.0 %    Platelet Count 247 150 - 450 10e3/uL    % Neutrophils 79 %    % Lymphocytes 7 %    % Monocytes 12 %    % Eosinophils 0 %    % Basophils 1 %    % Immature Granulocytes 1 %    NRBCs per 100 WBC 0 <1 /100    Absolute Neutrophils 11.1 (H) 1.6 - 8.3 10e3/uL    Absolute Lymphocytes 1.0 0.8 - 5.3 10e3/uL    Absolute Monocytes 1.6 (H) 0.0 - 1.3 10e3/uL    Absolute Eosinophils 0.0 0.0 - 0.7 10e3/uL    Absolute Basophils 0.1 0.0 - 0.2 10e3/uL    Absolute Immature Granulocytes 0.1 <=0.4 10e3/uL    Absolute NRBCs 0.0 10e3/uL   Troponin T, High Sensitivity     Status: Abnormal   Result Value Ref Range    Troponin T, High Sensitivity 20 (H) <=14 ng/L   Procalcitonin     Status: Normal   Result Value Ref Range    Procalcitonin 0.13 <0.50 ng/mL   TSH with free T4 reflex     Status: Normal   Result Value Ref Range    TSH 1.71 0.30 - 4.20 uIU/mL   EKG 12-lead, tracing only     Status: None   Result Value Ref Range    Systolic Blood Pressure  mmHg    Diastolic Blood Pressure  mmHg    Ventricular Rate 93 BPM    Atrial Rate 93 BPM    VT Interval 152 ms    QRS Duration 82 ms     ms    QTc 469 ms    P Axis 71 degrees    R AXIS 38 degrees    T Axis 46 degrees    Interpretation ECG       Sinus rhythm  Normal ECG  Unconfirmed report - interpretation of this ECG is computer generated - see medical record for final interpretation    Confirmed by - EMERGENCY ROOM, PHYSICIAN ([id number]),  BLANCA RADER ([id number]) on 12/5/2023  2:22:46 PM     Respiratory Aerobic Bacterial Culture with Gram Stain     Status: None (Preliminary result)    Specimen: Expectorate; Sputum   Result Value Ref Range    Gram Stain Result <10 Squamous epithelial cells/low power field     Gram Stain Result >25 PMNs/low power field     Gram Stain Result 3+ Mixed abril    CBC with platelets differential     Status: Abnormal    Narrative    The following orders were created for panel order CBC with platelets differential.  Procedure                               Abnormality         Status                     ---------                               -----------         ------                     CBC with platelets and d...[[id number]]  Abnormal            Final result                 Please view results for these tests on the individual orders.   Adrenal Corticotropin (ACTH) Stimulation Panel     Status: None (In process)    Narrative    The following orders were created for panel order Adrenal Corticotropin (ACTH) Stimulation Panel.  Procedure                               Abnormality         Status                     ---------                               -----------         ------                     Adrenal Corticotropin, B...[[id number]]                      In process                   Please view results for these tests on the individual orders.     Medications   lidocaine 1 % 0.1-1 mL (has no administration in time range)   lidocaine (LMX4) cream (has no administration in time range)   sodium chloride (PF) 0.9% PF flush 3 mL (3 mLs Intracatheter $Given 12/5/23 1200)   sodium chloride (PF) 0.9% PF flush 3 mL (has no administration in time range)   lidocaine 1 % 0.1-1 mL (has no administration in time range)   lidocaine (LMX4) cream (has no administration in time range)   sodium chloride (PF) 0.9% PF flush 3 mL (has no administration in time range)   senna-docusate (SENOKOT-S/PERICOLACE) 8.6-50 MG per tablet 1 tablet (has no administration in time range)     Or    senna-docusate (SENOKOT-S/PERICOLACE) 8.6-50 MG per tablet 2 tablet (has no administration in time range)   calcium carbonate (TUMS) chewable tablet 1,000 mg (has no administration in time range)   ondansetron (ZOFRAN ODT) ODT tab 4 mg (has no administration in time range)     Or   ondansetron (ZOFRAN) injection 4 mg (has no administration in time range)   prochlorperazine (COMPAZINE) injection 10 mg (has no administration in time range)     Or   prochlorperazine (COMPAZINE) tablet 10 mg (has no administration in time range)     Or   prochlorperazine (COMPAZINE) suppository 25 mg (has no administration in time range)   lactobacillus rhamnosus (GG) (CULTURELL) capsule 1 capsule (has no administration in time range)   albuterol (PROVENTIL HFA/VENTOLIN HFA) inhaler (has no administration in time range)   amLODIPine (NORVASC) tablet 5 mg ( Oral Automatically Held 12/9/23 0800)   aspirin (ASA) chewable tablet 81 mg ( Oral Automatically Held 12/8/23 0800)   atorvastatin (LIPITOR) tablet 20 mg (has no administration in time range)   Vitamin D3 (CHOLECALCIFEROL) tablet 25 mcg (has no administration in time range)   diclofenac (VOLTAREN) 1 % topical gel 2-4 g (has no administration in time range)   DULoxetine (CYMBALTA) DR capsule 20 mg (has no administration in time range)   fluticasone-vilanterol (BREO ELLIPTA) 100-25 MCG/ACT inhaler 1 puff (has no administration in time range)   furosemide (LASIX) tablet 20 mg ( Oral Automatically Held 12/8/23 0800)   gabapentin (NEURONTIN) capsule 900 mg (has no administration in time range)   hydrOXYzine HCl (ATARAX) tablet 50 mg (has no administration in time range)   levothyroxine (SYNTHROID/LEVOTHROID) tablet 200 mcg (has no administration in time range)   magnesium oxide (MAG-OX) half-tab 200 mg (has no administration in time range)   multivitamin w/minerals (THERA-VIT-M) tablet 1 tablet (1 tablet Oral $Given 12/5/23 5910)   nicotine polacrilex (NICORETTE) gum 4 mg (has no  administration in time range)   pantoprazole (PROTONIX) EC tablet 40 mg (has no administration in time range)   polyethylene glycol (MIRALAX) Packet 17 g (has no administration in time range)   sacubitril-valsartan (ENTRESTO) 49-51 MG per tablet 1 tablet ( Oral Automatically Held 12/8/23 2000)   spironolactone (ALDACTONE) tablet 25 mg ( Oral Automatically Held 12/8/23 0800)   cyanocobalamin (VITAMIN B-12) tablet 1,000 mcg (has no administration in time range)   potassium chloride ER (KLOR-CON) CR tablet 8 mEq ( Oral Automatically Held 12/8/23 2000)   naloxone (NARCAN) injection 0.2 mg (has no administration in time range)     Or   naloxone (NARCAN) injection 0.4 mg (has no administration in time range)     Or   naloxone (NARCAN) injection 0.2 mg (has no administration in time range)     Or   naloxone (NARCAN) injection 0.4 mg (has no administration in time range)   enoxaparin ANTICOAGULANT (LOVENOX) injection 40 mg (40 mg Subcutaneous $Given 12/5/23 1740)   acetaminophen (TYLENOL) tablet 650 mg (has no administration in time range)     Or   acetaminophen (TYLENOL) Suppository 650 mg (has no administration in time range)   sodium chloride 0.9% BOLUS 1,000 mL (has no administration in time range)   metFORMIN (GLUCOPHAGE) tablet 1,000 mg (has no administration in time range)   methadone (DOLOPHINE-INTENSOL) 10 MG/ML (HIGH CONC) solution 90 mg (has no administration in time range)   miconazole (MICATIN) 2 % powder (has no administration in time range)   ipratropium - albuterol 0.5 mg/2.5 mg/3 mL (DUONEB) neb solution 3 mL (has no administration in time range)   ipratropium - albuterol 0.5 mg/2.5 mg/3 mL (DUONEB) neb solution 3 mL (3 mLs Nebulization $Given 12/5/23 1140)   ipratropium - albuterol 0.5 mg/2.5 mg/3 mL (DUONEB) neb solution 3 mL (3 mLs Nebulization $Given 12/5/23 1231)   acetaminophen (TYLENOL) tablet 1,000 mg (1,000 mg Oral $Given 12/5/23 8092)   cefTRIAXone (ROCEPHIN) 1 g vial to attach to  mL bag  for ADULTS or NS 50 mL bag for PEDS (0 g Intravenous Stopped 12/5/23 1445)   ibuprofen (ADVIL/MOTRIN) tablet 200 mg (200 mg Oral $Given 12/5/23 1612)   iopamidol (ISOVUE-370) solution 100 mL (74 mLs Intravenous $Given 12/5/23 1518)   sodium chloride 0.9 % bag 100mL (90 mLs Intravenous $Given 12/5/23 1519)     Labs Ordered and Resulted from Time of ED Arrival to Time of ED Departure   CRP INFLAMMATION - Abnormal       Result Value    CRP Inflammation 112.99 (*)    COMPREHENSIVE METABOLIC PANEL - Abnormal    Sodium 134 (*)     Potassium 4.7      Carbon Dioxide (CO2) 31 (*)     Anion Gap 6 (*)     Urea Nitrogen 16.1      Creatinine 1.17 (*)     GFR Estimate 53 (*)     Calcium 8.8      Chloride 97 (*)     Glucose 128 (*)     Alkaline Phosphatase 118      AST 41      ALT 21      Protein Total 8.3      Albumin 3.8      Bilirubin Total 0.7     TROPONIN T, HIGH SENSITIVITY - Abnormal    Troponin T, High Sensitivity 23 (*)    INFLUENZA A/B, RSV, & SARS-COV2 PCR - Abnormal    Influenza A PCR Negative      Influenza B PCR Negative      RSV PCR Positive (*)     SARS CoV2 PCR Negative     CBC WITH PLATELETS AND DIFFERENTIAL - Abnormal    WBC Count 13.9 (*)     RBC Count 4.18      Hemoglobin 11.7      Hematocrit 36.5      MCV 87      MCH 28.0      MCHC 32.1      RDW 17.4 (*)     Platelet Count 247      % Neutrophils 79      % Lymphocytes 7      % Monocytes 12      % Eosinophils 0      % Basophils 1      % Immature Granulocytes 1      NRBCs per 100 WBC 0      Absolute Neutrophils 11.1 (*)     Absolute Lymphocytes 1.0      Absolute Monocytes 1.6 (*)     Absolute Eosinophils 0.0      Absolute Basophils 0.1      Absolute Immature Granulocytes 0.1      Absolute NRBCs 0.0     TROPONIN T, HIGH SENSITIVITY - Abnormal    Troponin T, High Sensitivity 20 (*)    PARTIAL THROMBOPLASTIN TIME - Normal    aPTT 31     INR - Normal    INR 1.09     NT PROBNP INPATIENT - Normal    N terminal Pro BNP Inpatient 258     LACTIC ACID WHOLE BLOOD - Normal     Lactic Acid 1.7     PROCALCITONIN - Normal    Procalcitonin 0.13     ROUTINE UA WITH MICROSCOPIC REFLEX TO CULTURE   BLOOD CULTURE   BLOOD CULTURE     CT Chest Pulmonary Embolism w Contrast   Final Result   IMPRESSION:   1.  No pulmonary embolism.   2.  Patchy airspace opacity bilaterally concerning for pneumonia.      HOMER ORTIZ MD            SYSTEM ID:  [id number]      XR Chest Port 1 View   Final Result   IMPRESSION: Mild patchy opacities in the left midlung and left lung   base may be exaggerated by the expiratory technique, although small   infiltrate is not entirely excluded. No pleural effusion or   pneumothorax. Normal heart size.      PO WHELAN MD            SYSTEM ID:  [id number]             Critical care was performed.   Critical Care Addendum  My initial assessment, based on my review of prehospital provider report, review of nursing observations, review of vital signs, focused history, physical exam, review of cardiac rhythm monitor, 12 lead ECG analysis, discussion with medicine team, and interpretation of labs and EKG and xray and response to treatment , established a high suspicion that No Yusuf has respiratory distress, which requires immediate intervention, and therefore she is critically ill.     After the initial assessment, the care team initiated multiple lab tests, initiated IV fluid administration, initiated medication therapy with duoneb and IV antibitiocs, and consulted with medicine  to provide stabilization care. Due to the critical nature of this patient, I reassessed nursing observations, vital signs, physical exam, review of cardiac rhythm monitor, 12 lead ECG analysis, interpretation of labs and cxr and response to treatment, mental status, neurologic status, and respiratory status multiple times prior to her disposition.     Time also spent performing documentation, reviewing test results, discussion with consultants, and coordination of care.     Critical care time  (excluding teaching time and procedures): 75 minutes.     Assessment & Plan   63-year-old female presents ER with increasing shortness of breath hypoxia wheezing cough headache without neurofocal findings.  Patient had history of significant COVID in February 2022 with cardiac arrest with history of torsades at that point.  Patient has some chronic heart failure also.  Here in the ER she is short of breath we did an EKG sinus rhythm without ischemic changes 2 troponins were downtrending at 23 and 20 BNP is not significant elevated chest x-ray revealed some haziness in the lower lung fields more left-sided patient COVID and influenza were negative but RSV was positive patient with wheezing treated with DuoNebs and Xopenex nebs patient started on ceftriaxone for potential infiltrate continued on 4 L nasal cannulae for oxygen saturations improved with initial hypoxia on room air.  Patient otherwise stable not combative did not decompensate had fever was treated with Tylenol and ibuprofen just a small dose with history of heart failure.  As noted also CT scan was done negative for PE but bilateral lower lobe infiltrate seen.  Patient admitted to the medicine service with the hypoxia with pneumonia with RSV with bronchospasm agrees with plan.       I have reviewed the nursing notes. I have reviewed the findings, diagnosis, plan and need for follow up with the patient.    Current Discharge Medication List          Final diagnoses:   Acute bronchitis due to respiratory syncytial virus (RSV)   Lung infiltrate   Hypoxia   Severe obesity (H)   Wheezing       Juan Nam  [facility] EMERGENCY DEPARTMENT  12/5/2023    This note was created at least in part by the use of dragon voice dictation system. Inadvertent typographical errors may still exist.  Juan Nam MD.  Patient evaluated in the emergency department during the COVID-19 pandemic period. Careful attention to patients safety was addressed  throughout the evaluation. Evaluation and treatment management was initiated with disposition made efficiently and appropriate as possible to minimize any risk of potential exposure to patient during this evaluation.       Juan Nam MD  12/05/23 1955

## 2023-12-06 ENCOUNTER — APPOINTMENT (OUTPATIENT)
Dept: CARDIOLOGY | Facility: CLINIC | Age: 63
DRG: 871 | End: 2023-12-06
Payer: COMMERCIAL

## 2023-12-06 LAB
ANION GAP SERPL CALCULATED.3IONS-SCNC: 4 MMOL/L (ref 7–15)
BASE EXCESS BLDV CALC-SCNC: 3.5 MMOL/L (ref -7.7–1.9)
BASOPHILS # BLD AUTO: 0.1 10E3/UL (ref 0–0.2)
BASOPHILS NFR BLD AUTO: 0 %
BUN SERPL-MCNC: 17.6 MG/DL (ref 8–23)
CALCIUM SERPL-MCNC: 8.4 MG/DL (ref 8.8–10.2)
CHLORIDE SERPL-SCNC: 97 MMOL/L (ref 98–107)
CREAT SERPL-MCNC: 1.13 MG/DL (ref 0.51–0.95)
DEPRECATED HCO3 PLAS-SCNC: 31 MMOL/L (ref 22–29)
EGFRCR SERPLBLD CKD-EPI 2021: 55 ML/MIN/1.73M2
EOSINOPHIL # BLD AUTO: 0 10E3/UL (ref 0–0.7)
EOSINOPHIL NFR BLD AUTO: 0 %
ERYTHROCYTE [DISTWIDTH] IN BLOOD BY AUTOMATED COUNT: 17.6 % (ref 10–15)
GLUCOSE BLDC GLUCOMTR-MCNC: 111 MG/DL (ref 70–99)
GLUCOSE BLDC GLUCOMTR-MCNC: 136 MG/DL (ref 70–99)
GLUCOSE BLDC GLUCOMTR-MCNC: 189 MG/DL (ref 70–99)
GLUCOSE BLDC GLUCOMTR-MCNC: 250 MG/DL (ref 70–99)
GLUCOSE BLDC GLUCOMTR-MCNC: 290 MG/DL (ref 70–99)
GLUCOSE SERPL-MCNC: 147 MG/DL (ref 70–99)
HCO3 BLDV-SCNC: 30 MMOL/L (ref 21–28)
HCT VFR BLD AUTO: 33.9 % (ref 35–47)
HGB BLD-MCNC: 10.5 G/DL (ref 11.7–15.7)
HOLD SPECIMEN: NORMAL
HOLD SPECIMEN: NORMAL
IMM GRANULOCYTES # BLD: 0.1 10E3/UL
IMM GRANULOCYTES NFR BLD: 0 %
LACTATE SERPL-SCNC: 0.7 MMOL/L (ref 0.7–2)
LVEF ECHO: NORMAL
LYMPHOCYTES # BLD AUTO: 1.7 10E3/UL (ref 0.8–5.3)
LYMPHOCYTES NFR BLD AUTO: 10 %
MCH RBC QN AUTO: 27.4 PG (ref 26.5–33)
MCHC RBC AUTO-ENTMCNC: 31 G/DL (ref 31.5–36.5)
MCV RBC AUTO: 89 FL (ref 78–100)
MONOCYTES # BLD AUTO: 2 10E3/UL (ref 0–1.3)
MONOCYTES NFR BLD AUTO: 12 %
MRSA DNA SPEC QL NAA+PROBE: NEGATIVE
NEUTROPHILS # BLD AUTO: 13.6 10E3/UL (ref 1.6–8.3)
NEUTROPHILS NFR BLD AUTO: 78 %
NRBC # BLD AUTO: 0 10E3/UL
NRBC BLD AUTO-RTO: 0 /100
O2/TOTAL GAS SETTING VFR VENT: 36 %
PCO2 BLDV: 54 MM HG (ref 40–50)
PH BLDV: 7.35 [PH] (ref 7.32–7.43)
PLATELET # BLD AUTO: 225 10E3/UL (ref 150–450)
PO2 BLDV: 66 MM HG (ref 25–47)
POTASSIUM SERPL-SCNC: 4.7 MMOL/L (ref 3.4–5.3)
RBC # BLD AUTO: 3.83 10E6/UL (ref 3.8–5.2)
SA TARGET DNA: NEGATIVE
SODIUM SERPL-SCNC: 132 MMOL/L (ref 135–145)
WBC # BLD AUTO: 17.4 10E3/UL (ref 4–11)

## 2023-12-06 PROCEDURE — 250N000012 HC RX MED GY IP 250 OP 636 PS 637

## 2023-12-06 PROCEDURE — 250N000009 HC RX 250

## 2023-12-06 PROCEDURE — 999N000208 ECHOCARDIOGRAM COMPLETE

## 2023-12-06 PROCEDURE — 250N000013 HC RX MED GY IP 250 OP 250 PS 637: Performed by: FAMILY MEDICINE

## 2023-12-06 PROCEDURE — 258N000003 HC RX IP 258 OP 636: Performed by: FAMILY MEDICINE

## 2023-12-06 PROCEDURE — 83605 ASSAY OF LACTIC ACID: CPT

## 2023-12-06 PROCEDURE — 255N000002 HC RX 255 OP 636: Performed by: INTERNAL MEDICINE

## 2023-12-06 PROCEDURE — 94640 AIRWAY INHALATION TREATMENT: CPT | Mod: 76

## 2023-12-06 PROCEDURE — 120N000002 HC R&B MED SURG/OB UMMC

## 2023-12-06 PROCEDURE — 99233 SBSQ HOSP IP/OBS HIGH 50: CPT | Mod: GC

## 2023-12-06 PROCEDURE — 80048 BASIC METABOLIC PNL TOTAL CA: CPT | Performed by: STUDENT IN AN ORGANIZED HEALTH CARE EDUCATION/TRAINING PROGRAM

## 2023-12-06 PROCEDURE — 87641 MR-STAPH DNA AMP PROBE: CPT | Performed by: FAMILY MEDICINE

## 2023-12-06 PROCEDURE — 258N000003 HC RX IP 258 OP 636

## 2023-12-06 PROCEDURE — 82803 BLOOD GASES ANY COMBINATION: CPT

## 2023-12-06 PROCEDURE — 36415 COLL VENOUS BLD VENIPUNCTURE: CPT

## 2023-12-06 PROCEDURE — 250N000011 HC RX IP 250 OP 636: Performed by: FAMILY MEDICINE

## 2023-12-06 PROCEDURE — 250N000013 HC RX MED GY IP 250 OP 250 PS 637

## 2023-12-06 PROCEDURE — 94640 AIRWAY INHALATION TREATMENT: CPT

## 2023-12-06 PROCEDURE — 250N000011 HC RX IP 250 OP 636: Mod: JZ

## 2023-12-06 PROCEDURE — 85025 COMPLETE CBC W/AUTO DIFF WBC: CPT | Performed by: STUDENT IN AN ORGANIZED HEALTH CARE EDUCATION/TRAINING PROGRAM

## 2023-12-06 PROCEDURE — 93306 TTE W/DOPPLER COMPLETE: CPT | Mod: 26 | Performed by: INTERNAL MEDICINE

## 2023-12-06 PROCEDURE — 999N000157 HC STATISTIC RCP TIME EA 10 MIN

## 2023-12-06 RX ORDER — PIPERACILLIN SODIUM, TAZOBACTAM SODIUM 4; .5 G/20ML; G/20ML
4.5 INJECTION, POWDER, LYOPHILIZED, FOR SOLUTION INTRAVENOUS EVERY 6 HOURS
Status: DISCONTINUED | OUTPATIENT
Start: 2023-12-06 | End: 2023-12-07 | Stop reason: ALTCHOICE

## 2023-12-06 RX ORDER — PREDNISONE 20 MG/1
40 TABLET ORAL DAILY
Status: DISCONTINUED | OUTPATIENT
Start: 2023-12-06 | End: 2023-12-08 | Stop reason: HOSPADM

## 2023-12-06 RX ORDER — ALBUTEROL SULFATE 0.83 MG/ML
SOLUTION RESPIRATORY (INHALATION)
Status: COMPLETED
Start: 2023-12-06 | End: 2023-12-06

## 2023-12-06 RX ORDER — IPRATROPIUM BROMIDE AND ALBUTEROL SULFATE 2.5; .5 MG/3ML; MG/3ML
3 SOLUTION RESPIRATORY (INHALATION) EVERY 4 HOURS PRN
Status: DISCONTINUED | OUTPATIENT
Start: 2023-12-06 | End: 2023-12-08 | Stop reason: HOSPADM

## 2023-12-06 RX ADMIN — SODIUM CHLORIDE 1000 ML: 9 INJECTION, SOLUTION INTRAVENOUS at 09:08

## 2023-12-06 RX ADMIN — IPRATROPIUM BROMIDE AND ALBUTEROL SULFATE 3 ML: .5; 3 SOLUTION RESPIRATORY (INHALATION) at 15:40

## 2023-12-06 RX ADMIN — DICLOFENAC SODIUM 2 G: 10 GEL TOPICAL at 20:00

## 2023-12-06 RX ADMIN — IPRATROPIUM BROMIDE AND ALBUTEROL SULFATE 3 ML: .5; 3 SOLUTION RESPIRATORY (INHALATION) at 08:43

## 2023-12-06 RX ADMIN — PANTOPRAZOLE SODIUM 40 MG: 40 TABLET, DELAYED RELEASE ORAL at 09:03

## 2023-12-06 RX ADMIN — PIPERACILLIN AND TAZOBACTAM 4.5 G: 4; .5 INJECTION, POWDER, FOR SOLUTION INTRAVENOUS at 09:08

## 2023-12-06 RX ADMIN — ACETAMINOPHEN 650 MG: 325 TABLET, FILM COATED ORAL at 00:41

## 2023-12-06 RX ADMIN — PIPERACILLIN AND TAZOBACTAM 4.5 G: 4; .5 INJECTION, POWDER, FOR SOLUTION INTRAVENOUS at 20:00

## 2023-12-06 RX ADMIN — ALBUTEROL SULFATE 2 PUFF: 90 AEROSOL, METERED RESPIRATORY (INHALATION) at 18:28

## 2023-12-06 RX ADMIN — Medication 1 CAPSULE: at 23:09

## 2023-12-06 RX ADMIN — IPRATROPIUM BROMIDE AND ALBUTEROL SULFATE 3 ML: .5; 3 SOLUTION RESPIRATORY (INHALATION) at 21:22

## 2023-12-06 RX ADMIN — GABAPENTIN 900 MG: 300 CAPSULE ORAL at 09:13

## 2023-12-06 RX ADMIN — FLUTICASONE FUROATE AND VILANTEROL TRIFENATATE 1 PUFF: 100; 25 POWDER RESPIRATORY (INHALATION) at 09:04

## 2023-12-06 RX ADMIN — CYANOCOBALAMIN TAB 1000 MCG 1000 MCG: 1000 TAB at 09:04

## 2023-12-06 RX ADMIN — DICLOFENAC SODIUM 2 G: 10 GEL TOPICAL at 09:04

## 2023-12-06 RX ADMIN — LEVOTHYROXINE SODIUM 200 MCG: 100 TABLET ORAL at 09:03

## 2023-12-06 RX ADMIN — METHADONE HYDROCHLORIDE 90 MG: 10 CONCENTRATE ORAL at 11:52

## 2023-12-06 RX ADMIN — Medication 25 MCG: at 09:03

## 2023-12-06 RX ADMIN — IPRATROPIUM BROMIDE AND ALBUTEROL SULFATE 3 ML: .5; 3 SOLUTION RESPIRATORY (INHALATION) at 12:22

## 2023-12-06 RX ADMIN — SALINE NASAL SPRAY 2 SPRAY: 1.5 SOLUTION NASAL at 09:04

## 2023-12-06 RX ADMIN — ATORVASTATIN CALCIUM 20 MG: 20 TABLET, FILM COATED ORAL at 23:09

## 2023-12-06 RX ADMIN — PREDNISONE 40 MG: 20 TABLET ORAL at 11:52

## 2023-12-06 RX ADMIN — DULOXETINE HYDROCHLORIDE 20 MG: 20 CAPSULE, DELAYED RELEASE ORAL at 11:52

## 2023-12-06 RX ADMIN — GABAPENTIN 900 MG: 300 CAPSULE ORAL at 14:06

## 2023-12-06 RX ADMIN — GABAPENTIN 900 MG: 300 CAPSULE ORAL at 23:09

## 2023-12-06 RX ADMIN — ALBUTEROL SULFATE 2.5 MG: 2.5 SOLUTION RESPIRATORY (INHALATION) at 03:45

## 2023-12-06 RX ADMIN — HYDROXYZINE HYDROCHLORIDE 50 MG: 25 TABLET ORAL at 23:20

## 2023-12-06 RX ADMIN — ALBUTEROL SULFATE 2 PUFF: 90 AEROSOL, METERED RESPIRATORY (INHALATION) at 00:45

## 2023-12-06 RX ADMIN — ENOXAPARIN SODIUM 40 MG: 40 INJECTION SUBCUTANEOUS at 23:09

## 2023-12-06 RX ADMIN — Medication 200 MG: at 09:03

## 2023-12-06 RX ADMIN — HUMAN ALBUMIN MICROSPHERES AND PERFLUTREN 4 ML: 10; .22 INJECTION, SOLUTION INTRAVENOUS at 11:59

## 2023-12-06 RX ADMIN — HYDROXYZINE HYDROCHLORIDE 50 MG: 25 TABLET ORAL at 00:42

## 2023-12-06 RX ADMIN — Medication 1 CAPSULE: at 09:03

## 2023-12-06 RX ADMIN — VANCOMYCIN HYDROCHLORIDE 750 MG: 1 INJECTION, POWDER, LYOPHILIZED, FOR SOLUTION INTRAVENOUS at 11:52

## 2023-12-06 RX ADMIN — SALINE NASAL SPRAY 2 SPRAY: 1.5 SOLUTION NASAL at 20:00

## 2023-12-06 RX ADMIN — ENOXAPARIN SODIUM 40 MG: 40 INJECTION SUBCUTANEOUS at 09:04

## 2023-12-06 RX ADMIN — PIPERACILLIN AND TAZOBACTAM 4.5 G: 4; .5 INJECTION, POWDER, FOR SOLUTION INTRAVENOUS at 14:06

## 2023-12-06 RX ADMIN — Medication 1 TABLET: at 09:03

## 2023-12-06 ASSESSMENT — ACTIVITIES OF DAILY LIVING (ADL)
ADLS_ACUITY_SCORE: 26
ADLS_ACUITY_SCORE: 20
ADLS_ACUITY_SCORE: 26
ADLS_ACUITY_SCORE: 26

## 2023-12-06 NOTE — PROGRESS NOTES
[facility]    Progress Note - [facility] Service       Date of Admission:  12/5/2023    Major Plans/Updates:  - Prednisone 40 mg daily x 5 days  - IV Zosyn 4.5g Q6h  - IV vancomycin 750 mg infusion BID      - MRSA nares swab   - Increase to Medium dose insulin sliding scale with steroids  - 1L normal saline bolus  - ECHO  - High flow O2 NC    Assessment & Plan   No Yusuf is a 63 year old female admitted on 12/5/2023. She has a PMHx of HTN, DM2, Opioid dependence, hypothyroidism, vitreous syneresis, asthma with probable undiagnosed COPD and is being admitted for sepsis 2/2 RSV pneumonia.     # Sepsis (fever, tachypnea, tachycardia, leukocytosis) with known source (RSV)  # Acute Hypercapnic Respiratory Failure  # RSV pneumonia  # Acute rhinosinusitis, likely viral  # History of sepsis 2/2 UTI  # Acute-on-chronic COPD exacerbation  Presented to the ED with a temperature of 102.8F, HR of 101, RR 25, /73, SpO2 85%. CXR revealed mild patchy opacities in the left midlung and left lung base. CT PE was negative for PE, but did show patchy airspace opacity bilaterally concerning for pneumonia. .99. WBC 13.9 in addition to tachypnea, tachycardia, fever and a known source of infection means that she meets criteria for sepsis. LA, INR,  PTT WNL, and creatinine is elevated but stable compared to last admission. This does not meet criteria for KERRIE. Labs do not suggest end-organ dysfunction. Respiratory Aerobic Bacterial Culture with Gram Stain is negative so far; will continue to monitor for growth.  Sinusitis resolved with doxycycline, but sinus tenderness, headache, and congestion returned about 2 weeks after the end of her antibiotic course. Her current symptoms began approximately 3 days ago, which does not meet the criteria for acute bacterial sinusitis at this point in time. VBG revealed respiratory acidosis with metabolic compensation  suggesting hypercapnic respiratory failure. Although No has never been diagnosed with COPD, she has increased dyspnea, increased cough, and increased sputum production. Given that WBC is worsening (17.4), high oxygen requirements (currently on 4 L O2 NC. She is not on baseline O2 at home), persistent fevers, and hypotensive episodes requiring fluid resuscitation, it is possible that there is a superimposed bacterial etiology in addition to viral pneumonia. Procal negative on admission, however patient at high risk of decompensation. Will start empiric antibiotics for now, given history of septic shock and cardiac arrest.   Workup:  - CBC, BMP daily  - CRP on 12/7  - MRSA/MSSA nares swab  Management:  - Nasal saline 2 sprays in each nostril BID  - Continuous pulse ox  - High flow nasal cannula  - Vitals q4hr  - Breo Ellipta 100-25 MCG/ACT daily (substitute for PTA Advair)  - PTA Albuterol inhaler 2 puff q4hr PRN  - DuoNeb 3 mL q4hr PRN  - SpO2 goal of 88-92%  - Prednisone 40 mg daily x 5 days  - 1L saline bolus  Antibiotics:  - s/p Ceftriaxone x1 dose (no macrolide d/t hx of QTC prolongation with torsades)  - IV Zosyn 4.5g q6hr  - IV Vancomycin 750 mg q12hr -plan to discontinue if nares negative    # LVEF 76% (8/2022); history of chronic HFmrEF (40-45%)  # NSTEMI d/t demand ischemia 2/2 AHRF in setting of Covid-19 PNA c/b torsades de pointes arrest (2/2022)  # NSTEMI Type II  # HTN  BNP WNL. Troponin 23, then 20 on repeat. MRI Cardiac on 8/22/2022 with contrast shows LVEF of 76% as well as normal RVEF of 57%. EKG: normal sinus rhythm without ischemic changes.  Management:  - Cardiac telemetry  - Echocardiogram  - PTA Atorvastatin 20 mg at bedtime  - PTA magnesium oxide 200 mg daily   - HOLD PTA Amlodipine 5 mg daily in setting of soft BP  - HOLD PTA Furosemide 20 mg daily in setting of soft BP  - HOLD PTA Sacubitril-valsartan 49-51 mg BID in setting of soft BP  Considerations:  - Avoid fluid overloading     #  Adrenal adenoma, incidental  # Recurrent episodes of hypoglycemia  #T2DM  Last HbA1c on 6.2% on 6/21/23. Reports that she has had numerous episodes of hypoglycemia and needs to keep snacks near her to bring her BG back up. She and her doctor have been decreasing the number of medications in her diabetes regimen, and she is currently only on metformin and insulin detemir as needed. ACTH stimulation panel is negative, ruling out adrenal insufficiency. HbA1c is 6.3%. Hypoglycemic episodes could potentially be related to medication regimen. Will likely have higher blood sugar in setting of steroid regimen.  Management:  - HOLD PTA Metformin 1,000 mg BID with meals  - HOLD PTA insulin detemir  - Hypoglycemia protocol   - Medium dose insulin sliding scale    # Hyponatremia  Na 132. Unclear etiology. Suspect hypovolemia.  - 1 L bolus of NS     Chronic, Stable  # Hypothyroidism  Last TSH was 0.15 on 03/25/22. TSH WNL on this admission. Hypothyroidism is likely not contributing to her weight gain.    - PTA Levothyroxine 200 mcg daily     #Polysubstance use disorder (opioids, methamphetamines), in remission  - PTA Methadone 90 mg daily- confirmed with clinic        Diet: Combination Diet Regular Diet Adult    DVT Prophylaxis: Enoxaparin (Lovenox) SQ  Valle Catheter: Not present  Fluids: 1 L saline bolus, PO  Lines: None     Cardiac Monitoring: ACTIVE order. Indication: QTc prolonging medication (48 hours)  Code Status: Full Code      Clinically Significant Risk Factors Present on Admission          # Hypocalcemia: Lowest Ca = 8.4 mg/dL in last 2 days, will monitor and replace as appropriate       # Drug Induced Platelet Defect: home medication list includes an antiplatelet medication   # Hypertension: Noted on problem list  # Heart failure, NOS: home medication list includes sacubitril/valsartan (Entresto) and last echo with EF 40-50%     # Severe Obesity: Estimated body mass index is 40.34 kg/m  as calculated from the  "following:    Height as of this encounter: 1.766 m (5' 9.53\").    Weight as of this encounter: 125.8 kg (277 lb 5.4 oz).              Disposition Plan      Expected Discharge Date: 12/07/2023                The patient's care was discussed with the Attending Physician, Dr. Deng .    Deepti Edwards, Medical Student    Anette Farrar DO, MPH  [facility]  [facility]  Securely message with [employer] (more info)  Text page via [email or web address] Paging/Directory   See signed in provider for up to date coverage information  ______________________________________________________________________    Interval History   NAEO. Febrile overnight, had several hypotensive episodes (87-90s systolic). Has been saturating into the 90s on 4L of O2. She does not use O2 at home. Patient initially sleeping comfortably with some snoring and then had increased work of breathing in sleep, which improved with DuoNeb. On further examination, patient breathing comfortably (still on 4L) and able to discuss with medical team today possible diagnoses and care plan.     She reported that she was a heavy smoker at one point, 1 pack per day, but has since decreased/stopped.      Physical Exam   Vital Signs: Temp: 98.7  F (37.1  C) Temp src: Axillary BP: 97/56 Pulse: 81   Resp: 14 SpO2: 95 % O2 Device: Nasal cannula Oxygen Delivery: 4 LPM  Weight: 277 lbs 5.42 oz    Constitutional: awake, alert, cooperative, and appears stated age  Eyes: Lids and lashes normal, extra ocular muscles intact, sclera clear, conjunctiva normal  ENT: Normocephalic, without obvious abnormality, atraumatic, frontal and maxillary sinus tenderness  Respiratory: Mild respiratory distress, expiratory wheezing, prolonged expirations  Cardiovascular: Normal regular rate and rhythm and no murmur or gallops noted  GI: No scars, normal bowel sounds, soft, non-distended, non-tender, no masses palpated  Skin: No " lesions  Neurologic: Awake, alert, oriented to name, place and time.  No gross deficits.  Neuropsychiatric: General: normal, calm, and normal eye contact    Data     I have personally reviewed the following data over the past 24 hrs:    17.4 (H)  \   10.5 (L)   / 225     132 (L) 97 (L) 17.6 /  189 (H)   4.7 31 (H) 1.13 (H) \     Trop: 20 (H) BNP: N/A     TSH: 1.71 T4: N/A A1C: N/A     Procal: N/A CRP: N/A Lactic Acid: 0.7         Imaging results reviewed over the past 24 hrs:   Recent Results (from the past 24 hour(s))   CT Chest Pulmonary Embolism w Contrast    Narrative    CT CHEST PULMONARY EMBOLISM W CONTRAST 12/5/2023 3:29 PM    CLINICAL HISTORY: Concern for PE, tachycardic and tachypneic in the  setting of significant dyspnea  TECHNIQUE: CT angiogram chest during arterial phase injection IV  contrast. 2D and 3D MIP reconstructions were performed by the CT  technologist. Dose reduction techniques were used.     CONTRAST: 74 mL isovue 370    COMPARISON: None.    FINDINGS:  ANGIOGRAM CHEST: Pulmonary arteries are normal caliber and negative  for pulmonary emboli. Thoracic aorta is negative for dissection. No CT  evidence of right heart strain.    LUNGS AND PLEURA: Bibasilar atelectasis. Small patchy airspace opacity  bilaterally. No effusion.    MEDIASTINUM/AXILLAE: Heart is normal in size. No mediastinal,  axillary, or hilar adenopathy.    UPPER ABDOMEN: 2.5 x 1.5 cm right adrenal nodule which is compatible  with an adenoma.    MUSCULOSKELETAL: Degenerative changes of the spine.      Impression    IMPRESSION:  1.  No pulmonary embolism.  2.  Patchy airspace opacity bilaterally concerning for pneumonia.    HOMER ORTIZ MD         SYSTEM ID:  [id number]

## 2023-12-06 NOTE — PLAN OF CARE
"Goal Outcome Evaluation:       Patient is A & O x 4; coherent of speech, and able to make her needs known to staff; arrived to this unit at 1650 from ED, with Dx. Acute bronchitis d/t respiratory syncytial virus. Patient is IND in the room with BRP; receiving oxygen at 4 LPM, and continuous pulse ox. Skin is intact, clean, and clear except +1 edema to BLEs. Will continue to monitor condition; call light is within reach.  /57 (BP Location: Right arm, Patient Position: Supine, Cuff Size: Adult Regular)   Pulse 94   Temp (!) 101.2  F (38.4  C) (Oral)   Resp 16   Ht 1.766 m (5' 9.53\")   Wt 125.8 kg (277 lb 5.4 oz)   LMP 11/01/2013   SpO2 92%   BMI 40.34 kg/m      Oscar Cheney RN                   "

## 2023-12-06 NOTE — PLAN OF CARE
"Rapid called on pt this AM around 0845 by Jessica's MD. Pt was sleepy but rousable.   1L NS given as a bolus this AM after rounds. Pt POC explained by RN and MDs and pt verbalized understanding of plan.     Aox4, VSS, BP soft. Pt pleasant in room.   Cont pulse ox, O2% >95%. Titrated to 2LPM per NC down from 4LPM today. Desaturated only to 86% with coughing and when off O2 to trial.   LS insp/expiratory wheezes audible without auscultation. High pitched in upper lobes. Cough infrequent but productive, secretions copious with use of IS. Without IS use minimal secretions, yellow, thick, thin, and brown. PRN inhalers given, nebs per respiratory. Pt said nebs offer significant relief and help her \"breathe easier.\" Denies pain ex minimal aches from coughing and fibromyalgia. Steroids started today.   Voids spontaneously without difficulty, up SBA with walker. LBM today, formed, soft.   Echocardiogram done today. One dose of vanco given prior to it being discontinued. Zosyn given per schedule. Isolation droplet and contact; MRSA swab done and sent to lab today. Skin WNL. PIV L arm TKO with NS between ABX.     Call light in reach, pt able to make needs known. Care plan updated.         "

## 2023-12-06 NOTE — PHARMACY-VANCOMYCIN DOSING SERVICE
"Pharmacy Vancomycin Initial Note  Date of Service 2023  Patient's  1960  63 year old, female    Indication: Community Acquired Pneumonia    Current estimated CrCl = Estimated Creatinine Clearance: 74 mL/min (A) (based on SCr of 1.13 mg/dL (H)).    Creatinine for last 3 days  2023: 11:57 AM Creatinine 1.17 mg/dL  2023:  4:36 AM Creatinine 1.13 mg/dL    Recent Vancomycin Level(s) for last 3 days  No results found for requested labs within last 3 days.      Vancomycin IV Administrations (past 72 hours)        No vancomycin orders with administrations in past 72 hours.                    Nephrotoxins and other renal medications (From now, onward)      Start     Dose/Rate Route Frequency Ordered Stop    23 0800  piperacillin-tazobactam (ZOSYN) 4.5 g vial to attach to  mL bag        Note to Pharmacy: For [facility], [facility] and [facility]: For Zosyn-naive patients, use the \"Zosyn initial dose + extended infusion\" order panel.    4.5 g  over 30 Minutes Intravenous EVERY 6 HOURS 23 0746      23 1630  [Held by provider]  furosemide (LASIX) half-tab 20 mg        (On hold since yesterday at 1631 until manually unheld; held by Anette Farrar DOHold Reason: Change in Vitals)   Note to Pharmacy: PTA Sig:Take 20 mg by mouth daily      20 mg Oral DAILY 23 1625              Contrast Orders - past 72 hours (72h ago, onward)      Start     Dose/Rate Route Frequency Stop    23 1520  iopamidol (ISOVUE-370) solution 100 mL         100 mL Intravenous ONCE 23 1518            [employer] Prediction of Planned Initial Vancomycin Regimen  Loading dose: N/A  Regimen: 750 mg IV every 12 hours.  Start time: 07:50 on 2023  Exposure target: AUC24 (range)400-600 mg/L.hr   AUC24,ss: 523 mg/L.hr  Probability of AUC24 > 400: 71 %  Ctrough,ss: 16.9 mg/L  Probability of Ctrough,ss > 20: 39 %  Probability of nephrotoxicity (Lodise AMANDA ): 13 %          Plan:  Start vancomycin  750 (6 mg/kg) " mg IV q12h.   Vancomycin monitoring method: AUC  Vancomycin therapeutic monitoring goal: 400-600 mg*h/L  Pharmacy will check vancomycin levels as appropriate in 1-3 Days.    Serum creatinine levels will be ordered daily for the first week of therapy and at least twice weekly for subsequent weeks.      LIONEL SAMS [facility]

## 2023-12-06 NOTE — PLAN OF CARE
Pt AXOx4, 4L NC, cough, needed to be on cardiac monitoring most of my shift but unit does not have and finally at end of shift pt was transferred to a unit with cardiac monitoring. Bolus started later. Pt has pain in head from pressure. UA sent to lab. Cont POC.

## 2023-12-06 NOTE — PLAN OF CARE
Goal Outcome Evaluation:  Arrived to unit pass midnight from 5 ortho.  Alert,well orientated.SBA1 and walker,steady gait.  LS with inspiratory/expiratory wheezing.  PRN inhaler and neb treatment given.  Complaints headache,managed with heat packs and tylenol.  Atarax offered/given to help manage with some anxiety.  Desats on room air,lowest at 86% at  rest.Kept on 4lpm via NC to keep satts above 90's.Reports breathing is better than when she first arrived in ED.  Lactic acid triggered,result came normal.  Voided in BR.No BM but reported had one yesterday.  Sleepy after atarax was given.  VBG result and soft BPs reported to provider,Jodi Woodard.  MD aware and no further orders at this time.  Pt does use call light to make needs known.    0650:Sleepy.Irritable when awoken pt.Requests to have her sleep more.  Maintaining satts well on 4lpm/NC.Headache improving.

## 2023-12-07 LAB
ANION GAP SERPL CALCULATED.3IONS-SCNC: 6 MMOL/L (ref 7–15)
BUN SERPL-MCNC: 15.9 MG/DL (ref 8–23)
CALCIUM SERPL-MCNC: 8.4 MG/DL (ref 8.8–10.2)
CHLORIDE SERPL-SCNC: 101 MMOL/L (ref 98–107)
CREAT SERPL-MCNC: 1.01 MG/DL (ref 0.51–0.95)
CRP SERPL-MCNC: 140.32 MG/L
DEPRECATED HCO3 PLAS-SCNC: 28 MMOL/L (ref 22–29)
EGFRCR SERPLBLD CKD-EPI 2021: 63 ML/MIN/1.73M2
ERYTHROCYTE [DISTWIDTH] IN BLOOD BY AUTOMATED COUNT: 16.9 % (ref 10–15)
GLUCOSE BLDC GLUCOMTR-MCNC: 127 MG/DL (ref 70–99)
GLUCOSE BLDC GLUCOMTR-MCNC: 157 MG/DL (ref 70–99)
GLUCOSE BLDC GLUCOMTR-MCNC: 206 MG/DL (ref 70–99)
GLUCOSE BLDC GLUCOMTR-MCNC: 222 MG/DL (ref 70–99)
GLUCOSE SERPL-MCNC: 229 MG/DL (ref 70–99)
HCT VFR BLD AUTO: 34.8 % (ref 35–47)
HGB BLD-MCNC: 10.7 G/DL (ref 11.7–15.7)
MCH RBC QN AUTO: 27.2 PG (ref 26.5–33)
MCHC RBC AUTO-ENTMCNC: 30.7 G/DL (ref 31.5–36.5)
MCV RBC AUTO: 88 FL (ref 78–100)
PLATELET # BLD AUTO: 257 10E3/UL (ref 150–450)
POTASSIUM SERPL-SCNC: 4.4 MMOL/L (ref 3.4–5.3)
RBC # BLD AUTO: 3.94 10E6/UL (ref 3.8–5.2)
SODIUM SERPL-SCNC: 135 MMOL/L (ref 135–145)
WBC # BLD AUTO: 11 10E3/UL (ref 4–11)

## 2023-12-07 PROCEDURE — 250N000013 HC RX MED GY IP 250 OP 250 PS 637

## 2023-12-07 PROCEDURE — 250N000011 HC RX IP 250 OP 636: Mod: JZ

## 2023-12-07 PROCEDURE — 250N000009 HC RX 250: Performed by: FAMILY MEDICINE

## 2023-12-07 PROCEDURE — 94640 AIRWAY INHALATION TREATMENT: CPT | Mod: 76

## 2023-12-07 PROCEDURE — 99232 SBSQ HOSP IP/OBS MODERATE 35: CPT | Mod: GC

## 2023-12-07 PROCEDURE — 120N000002 HC R&B MED SURG/OB UMMC

## 2023-12-07 PROCEDURE — 250N000013 HC RX MED GY IP 250 OP 250 PS 637: Performed by: FAMILY MEDICINE

## 2023-12-07 PROCEDURE — 85027 COMPLETE CBC AUTOMATED: CPT | Performed by: STUDENT IN AN ORGANIZED HEALTH CARE EDUCATION/TRAINING PROGRAM

## 2023-12-07 PROCEDURE — 94640 AIRWAY INHALATION TREATMENT: CPT

## 2023-12-07 PROCEDURE — 86140 C-REACTIVE PROTEIN: CPT

## 2023-12-07 PROCEDURE — 80048 BASIC METABOLIC PNL TOTAL CA: CPT | Performed by: STUDENT IN AN ORGANIZED HEALTH CARE EDUCATION/TRAINING PROGRAM

## 2023-12-07 PROCEDURE — 999N000157 HC STATISTIC RCP TIME EA 10 MIN

## 2023-12-07 PROCEDURE — 36415 COLL VENOUS BLD VENIPUNCTURE: CPT | Performed by: STUDENT IN AN ORGANIZED HEALTH CARE EDUCATION/TRAINING PROGRAM

## 2023-12-07 PROCEDURE — 250N000012 HC RX MED GY IP 250 OP 636 PS 637

## 2023-12-07 PROCEDURE — 250N000009 HC RX 250

## 2023-12-07 RX ORDER — DOXYCYCLINE 100 MG/1
100 CAPSULE ORAL EVERY 12 HOURS SCHEDULED
Status: DISCONTINUED | OUTPATIENT
Start: 2023-12-07 | End: 2023-12-08

## 2023-12-07 RX ORDER — LANOLIN ALCOHOL/MO/W.PET/CERES
3 CREAM (GRAM) TOPICAL
Status: DISCONTINUED | OUTPATIENT
Start: 2023-12-07 | End: 2023-12-08 | Stop reason: HOSPADM

## 2023-12-07 RX ORDER — CEFTRIAXONE 2 G/1
2 INJECTION, POWDER, FOR SOLUTION INTRAMUSCULAR; INTRAVENOUS EVERY 24 HOURS
Status: DISCONTINUED | OUTPATIENT
Start: 2023-12-07 | End: 2023-12-08 | Stop reason: HOSPADM

## 2023-12-07 RX ADMIN — ENOXAPARIN SODIUM 40 MG: 40 INJECTION SUBCUTANEOUS at 09:05

## 2023-12-07 RX ADMIN — LEVOTHYROXINE SODIUM 200 MCG: 100 TABLET ORAL at 09:05

## 2023-12-07 RX ADMIN — Medication 1 TABLET: at 09:05

## 2023-12-07 RX ADMIN — GABAPENTIN 900 MG: 300 CAPSULE ORAL at 14:03

## 2023-12-07 RX ADMIN — ATORVASTATIN CALCIUM 20 MG: 20 TABLET, FILM COATED ORAL at 22:29

## 2023-12-07 RX ADMIN — ACETAMINOPHEN 650 MG: 325 TABLET, FILM COATED ORAL at 20:32

## 2023-12-07 RX ADMIN — PIPERACILLIN AND TAZOBACTAM 4.5 G: 4; .5 INJECTION, POWDER, FOR SOLUTION INTRAVENOUS at 03:59

## 2023-12-07 RX ADMIN — DICLOFENAC SODIUM 4 G: 10 GEL TOPICAL at 22:34

## 2023-12-07 RX ADMIN — IPRATROPIUM BROMIDE AND ALBUTEROL SULFATE 3 ML: .5; 3 SOLUTION RESPIRATORY (INHALATION) at 13:08

## 2023-12-07 RX ADMIN — PREDNISONE 40 MG: 20 TABLET ORAL at 09:06

## 2023-12-07 RX ADMIN — IPRATROPIUM BROMIDE AND ALBUTEROL SULFATE 3 ML: .5; 3 SOLUTION RESPIRATORY (INHALATION) at 00:05

## 2023-12-07 RX ADMIN — PIPERACILLIN AND TAZOBACTAM 4.5 G: 4; .5 INJECTION, POWDER, FOR SOLUTION INTRAVENOUS at 09:03

## 2023-12-07 RX ADMIN — CYANOCOBALAMIN TAB 1000 MCG 1000 MCG: 1000 TAB at 09:06

## 2023-12-07 RX ADMIN — DOXYCYCLINE HYCLATE 100 MG: 100 CAPSULE ORAL at 20:32

## 2023-12-07 RX ADMIN — IPRATROPIUM BROMIDE AND ALBUTEROL SULFATE 3 ML: .5; 3 SOLUTION RESPIRATORY (INHALATION) at 20:45

## 2023-12-07 RX ADMIN — Medication 3 MG: at 22:29

## 2023-12-07 RX ADMIN — IPRATROPIUM BROMIDE AND ALBUTEROL SULFATE 3 ML: .5; 3 SOLUTION RESPIRATORY (INHALATION) at 08:36

## 2023-12-07 RX ADMIN — DOXYCYCLINE HYCLATE 100 MG: 100 CAPSULE ORAL at 12:34

## 2023-12-07 RX ADMIN — METHADONE HYDROCHLORIDE 90 MG: 10 CONCENTRATE ORAL at 10:25

## 2023-12-07 RX ADMIN — Medication 1 CAPSULE: at 20:33

## 2023-12-07 RX ADMIN — ALBUTEROL SULFATE 2 PUFF: 90 AEROSOL, METERED RESPIRATORY (INHALATION) at 14:05

## 2023-12-07 RX ADMIN — SALINE NASAL SPRAY 2 SPRAY: 1.5 SOLUTION NASAL at 20:33

## 2023-12-07 RX ADMIN — HYDROXYZINE HYDROCHLORIDE 50 MG: 25 TABLET ORAL at 22:29

## 2023-12-07 RX ADMIN — Medication 200 MG: at 09:06

## 2023-12-07 RX ADMIN — GABAPENTIN 900 MG: 300 CAPSULE ORAL at 09:05

## 2023-12-07 RX ADMIN — SALINE NASAL SPRAY 2 SPRAY: 1.5 SOLUTION NASAL at 09:06

## 2023-12-07 RX ADMIN — CEFTRIAXONE 2 G: 2 INJECTION, POWDER, FOR SOLUTION INTRAMUSCULAR; INTRAVENOUS at 14:04

## 2023-12-07 RX ADMIN — Medication 25 MCG: at 09:05

## 2023-12-07 RX ADMIN — Medication 1 CAPSULE: at 09:06

## 2023-12-07 RX ADMIN — MICONAZOLE NITRATE: 20 POWDER TOPICAL at 17:47

## 2023-12-07 RX ADMIN — DULOXETINE HYDROCHLORIDE 20 MG: 20 CAPSULE, DELAYED RELEASE ORAL at 09:06

## 2023-12-07 RX ADMIN — FLUTICASONE FUROATE AND VILANTEROL TRIFENATATE 1 PUFF: 100; 25 POWDER RESPIRATORY (INHALATION) at 09:06

## 2023-12-07 RX ADMIN — GABAPENTIN 900 MG: 300 CAPSULE ORAL at 20:33

## 2023-12-07 RX ADMIN — PANTOPRAZOLE SODIUM 40 MG: 40 TABLET, DELAYED RELEASE ORAL at 09:05

## 2023-12-07 RX ADMIN — ENOXAPARIN SODIUM 40 MG: 40 INJECTION SUBCUTANEOUS at 20:33

## 2023-12-07 ASSESSMENT — ACTIVITIES OF DAILY LIVING (ADL)
DEPENDENT_IADLS:: CLEANING;COOKING;LAUNDRY;SHOPPING;MEAL PREPARATION;MEDICATION MANAGEMENT;TRANSPORTATION
ADLS_ACUITY_SCORE: 27
ADLS_ACUITY_SCORE: 26
ADLS_ACUITY_SCORE: 27
ADLS_ACUITY_SCORE: 26
ADLS_ACUITY_SCORE: 27
ADLS_ACUITY_SCORE: 26
ADLS_ACUITY_SCORE: 26
ADLS_ACUITY_SCORE: 27

## 2023-12-07 NOTE — PLAN OF CARE
"No acute changes or concerns during this shift. Patient c/o pain, PRN med administered. Pt slept well through the NOC shift, no further concerns. Continue with POC.    Patient's most recent vitals:   /65 (BP Location: Left arm)   Pulse 76   Temp 98  F (36.7  C) (Oral)   Resp 14   Ht 1.766 m (5' 9.53\")   Wt 125.8 kg (277 lb 5.4 oz)   LMP 11/01/2013   SpO2 95%   BMI 40.34 kg/m     "

## 2023-12-07 NOTE — PROGRESS NOTES
[facility]    Progress Note - [facility] Medicine Service       Date of Admission:  12/5/2023    Major Plans/Updates:  - Discontinue IV zosyn 4.5g q6hr  - Discontinue IV vancomycin 750 mg BID   - Start IV ceftriaxone 2 g q24hr  - Start PO doxycycline 100 mg BID  - Outpatient Adult Sleep Medicine referral   - Outpatient Spirometry    Assessment & Plan   No Yusuf is a 63 year old female admitted on 12/5/2023. She has a PMHx of HTN, DM2, Opioid dependence, hypothyroidism, vitreous syneresis, asthma with probable undiagnosed COPD and is being admitted for sepsis 2/2 RSV pneumonia with superimposed CAP and acute-on-chronic COPD exacerbation.     # Acute Hypercapnic Respiratory Failure  # RSV pneumonia  # Acute rhinosinusitis, likely viral  # History of sepsis 2/2 UTI  # Acute-on-chronic COPD exacerbation  # Sepsis (fever, tachypnea, tachycardia, leukocytosis) with known source (RSV), resolved  Presented to the ED with a temperature of 102.8F, HR of 101, RR 25, /73, SpO2 85%. CXR revealed mild patchy opacities in the left midlung and left lung base. CT PE was negative for PE, but did show patchy airspace opacity bilaterally concerning for pneumonia. .99. WBC 13.9 in addition to tachypnea, tachycardia, fever and a known source of infection means that she meets criteria for sepsis. LA, INR,  PTT WNL, and creatinine is elevated but stable compared to last admission. This does not meet criteria for KERRIE. Labs do not suggest end-organ dysfunction. Respiratory Aerobic Bacterial Culture with Gram Stain is negative so far; will continue to monitor for growth.  Sinusitis resolved with doxycycline, but sinus tenderness, headache, and congestion returned about 2 weeks after the end of her antibiotic course. Her current symptoms began approximately 3 days ago, which does not meet the criteria for acute bacterial sinusitis at this point in time. VBG revealed  respiratory acidosis with metabolic compensation suggesting hypercapnic respiratory failure. Although No has never been diagnosed with COPD, she has increased dyspnea, increased cough, and increased sputum production. Given that WBC is worsening (17.4), high oxygen requirements (currently on 4 L O2 NC. She is not on baseline O2 at home), persistent fevers, and hypotensive episodes requiring fluid resuscitation, it is possible that there is a superimposed bacterial etiology in addition to viral pneumonia. Procal negative on admission, however patient at high risk of decompensation, will treat for CAP. On 12/7, WBC is normal, kidney function is improving, and oxygen requirements decreased from 4 L to 2 L.   Workup:  - CBC, BMP daily  Management:  - Nasal saline 2 sprays in each nostril BID  - Continuous pulse ox  - High flow nasal cannula - titrate down as tolerated  - Vitals q4hr  - Breo Ellipta 100-25 MCG/ACT daily (substitute for PTA Advair)  - PTA Albuterol inhaler 2 puff q4hr PRN  - DuoNeb 3 mL q4hr PRN  - SpO2 goal of 88-92%  - Prednisone 40 mg daily x 5 days  - Incentive spirometer  - Outpatient Spirometry  - Outpatient Adult Sleep Medicine referral  Antibiotics:  - s/p Ceftriaxone x1 dose (no macrolide d/t hx of QTC prolongation with torsades)  - Discontinue IV zosyn 4.5g q6hr  - Discontinue IV vancomycin 750 mg BID   - Start IV ceftriaxone 2 g q24hr  - Start PO doxycycline 100 mg BID    # LVEF 60-65% (12/6/23); history of chronic HFmrEF (40-45%)  # NSTEMI d/t demand ischemia 2/2 AHRF in setting of Covid-19 PNA c/b torsades de pointes arrest (2/2022)  # NSTEMI Type II  # HTN  BNP WNL. Troponin 23, then 20 on repeat. MRI Cardiac on 8/22/2022 with contrast shows LVEF of 76% as well as normal RVEF of 57%. EKG: normal sinus rhythm without ischemic changes. Echocardiogram was normal with EF of 60-65%, and normal left and right ventricular function.   Management:  - Cardiac telemetry  - PTA Atorvastatin 20 mg at  bedtime  - PTA Magnesium oxide 200 mg daily   - HOLD PTA Amlodipine 5 mg daily in setting of soft BP  - HOLD PTA Furosemide 20 mg daily in setting of soft BP  - HOLD PTA Sacubitril-valsartan 49-51 mg BID in setting of soft BP     # Adrenal adenoma, incidental  # Recurrent episodes of hypoglycemia  #T2DM  Last HbA1c on 6.2% on 6/21/23. Reports that she has had numerous episodes of hypoglycemia and needs to keep snacks near her to bring her BG back up. She and her doctor have been decreasing the number of medications in her diabetes regimen, and she is currently only on metformin and insulin detemir as needed. ACTH stimulation panel is negative, ruling out adrenal insufficiency. HbA1c is 6.3%. Hypoglycemic episodes could potentially be related to medication regimen. Will likely have higher blood sugar in setting of steroid regimen.  Management:  - HOLD PTA Metformin 1,000 mg BID with meals  - HOLD PTA insulin detemir  - Hypoglycemia protocol   - Medium dose insulin sliding scale    Chronic, Stable, Resolved  # Hypothyroidism  Last TSH was 0.15 on 03/25/22. TSH WNL on this admission. Hypothyroidism is likely not contributing to her weight gain.    - PTA Levothyroxine 200 mcg daily     #Polysubstance use disorder (opioids, methamphetamines), in remission  - PTA Methadone 90 mg daily- confirmed with clinic    # Hyponatremia, resolved  Na 132 on 12/6. Unclear etiology. Suspect hypovolemia. Resolved on 12/7 after 1 L bolus of NS.        Diet: Combination Diet Regular Diet Adult    DVT Prophylaxis: Enoxaparin (Lovenox) SQ  Valle Catheter: Not present  Fluids: PO  Lines: None     Cardiac Monitoring: ACTIVE order. Indication: QTc prolonging medication (48 hours)  Code Status: Full Code      Clinically Significant Risk Factors                  # Hypertension: Noted on problem list    # Heart failure with preserved ejection fraction: EF >50% and home medication list includes sacubitril/valsartan (Entresto)       # Severe  "Obesity: Estimated body mass index is 40.34 kg/m  as calculated from the following:    Height as of this encounter: 1.766 m (5' 9.53\").    Weight as of this encounter: 125.8 kg (277 lb 5.4 oz)., PRESENT ON ADMISSION     # Financial/Environmental Concerns: none         Disposition Plan      Expected Discharge Date: 12/10/2023      Destination: home;group home  Discharge Comments: CAP, hypoxic, low-normal blood pressures. Possible superimposed bacterial pneumonia needing IV ABX tx and monitoring.        The patient's care was discussed with the Attending Physician, Dr. Deng .    Anette Farrar DO, MPH  [facility]  [facility]  Securely message with [employer] info)  Text page via [facility] Paging/Directory   See signed in provider for up to date coverage information  ______________________________________________________________________    Interval History   NAOE. No reports that she feels better. She intermittently uses O2 nasal cannula, and when she does, it is only at 2 L. We answered her questions related to her incentive spirometer. She notes that she accidentally urinates when she coughs and would like to go home with depends and wipes. No is still coughing up sputum.     Physical Exam   Vital Signs: Temp: 98  F (36.7  C) Temp src: Oral BP: 114/76 Pulse: 68   Resp: 14 SpO2: 96 % O2 Device: Nasal cannula Oxygen Delivery: 2 LPM  Weight: 277 lbs 5.42 oz    Constitutional: awake, alert, cooperative, and appears stated age  Eyes: Lids and lashes normal, extra ocular muscles intact, sclera clear, conjunctiva normal  ENT: Normocephalic, without obvious abnormality, atraumatic.  Respiratory: Not in respiratory distress, expiratory wheezing, prolonged expirations  Cardiovascular: Normal regular rate and rhythm and no murmur or gallops noted  GI: No scars, normal bowel sounds, soft, non-distended, non-tender, no masses palpated  Skin: No " lesions  Neurologic: Awake, alert, oriented to name, place and time.  No gross deficits.  Neuropsychiatric: General: normal, calm, and normal eye contact    Data     I have personally reviewed the following data over the past 24 hrs:    11.0  \   10.7 (L)   / 257     135 101 15.9 /  222 (H)   4.4 28 1.01 (H) \     Procal: N/A CRP: 140.32 (H) Lactic Acid: N/A         Imaging results reviewed over the past 24 hrs:   No results found for this or any previous visit (from the past 24 hour(s)).

## 2023-12-07 NOTE — PROGRESS NOTES
"[facility]   BRIEF PROGRESS NOTE    SUBJECTIVE  Baseline.    Pt with questions/concerns about routine immunizations.       OBJECTIVE:  /59   Pulse 65   Temp 98.5  F (36.9  C) (Oral)   Resp 15   Ht 1.766 m (5' 9.53\")   Wt 125.8 kg (277 lb 5.4 oz)   LMP 11/01/2013   SpO2 99%   BMI 40.34 kg/m      Exam  General: Alert and oriented, anxious, tearful, somewhat confused about medical course   Respiratory: Nasal cannula in place. Good air movement, improved from yesterday. End-expiratory wheezes heard in lower lung fields.       ASSESSMENT/PLAN:    Continue with patient cares.     Will provide patient with information about immunizations.     Please see daily rounding note for full A/P.  A. Rebecca Woodard MD  7:41 PM    "

## 2023-12-07 NOTE — PLAN OF CARE
"No Yusuf is a 63 year old female who is here for RSV and CA PNA.    NURSING ASSESSMENT:  Neuro: Aox4. CMS wnl. Denies HA.   Respiratory: LS audible without auscultation, wheezes on inspiration & expiration. PLATT. SPO2 >95% on 1-2 L per NC. Desats to 89% when off O2 to get up to the bathroom. Pt is surprised at the nasal mucous and plugs she has been coughing up. Color ranges from brown to yellow, thick and thin. CDB encouraged, IS at bedside, pt uses independently. PRN albuterol INH given x1. Pt also said the ocean nasal spray \"gives her sinuses relief\" and helps her \"clear out.\"   Cardiac: WNL. Tele NS. Battery replaced today. Denies CP.  GI/: Voids spontaneously, LBM today, pt reports soft stools.   CHO diet, BGs 127, 222. Pre prandial novolog given per orders.   Activity/Musculoskeletal: IND in room. Walker in reach. Up in chair for meals and watching TV between cares.   Skin: WNL.   Psychosocial: Calm, pleasant, cooperative, engaged in cares asks questions about her antibiotics.     Changes this shift: No acute oxygen needs this shift.     PAIN MANAGEMENT:   Pt declines any pain intervention at this time.     NURSING PLAN:  Continue ABX and pulmonary toileting. Follow POC.    DISCHARGE DISPOSITION:  Estimated discharge date TBD. Pt will most likely go back to group home.     "

## 2023-12-07 NOTE — PLAN OF CARE
Goal Outcome Evaluation:      Plan of Care Reviewed With: patient    Overall Patient Progress: no changeOverall Patient Progress: no change    Outcome Evaluation: Met with patient in room to discuss discharge planning.    Leah Ware, MSN, APRN, AGCNS-BC - RN Care Coordinator  5MS [phone]    SEARCHABLE in [facility] - search CARE COORDINATOR     For Weekend & Holiday on call RN Care Coordinator:  (Tasks: Home care, home infusion, medical equipment, transportation, IMM & PELAEZ forms, etc.)  [place] (0800 - 1630) Saturday and Sunday    Units: 5A, 5B, & 5C: [phone]    Units: 6B, 6C, 6D: [phone]    Units: 7A, 7B, 7C, 7D: [phone]    Units: 6A/ICU : [phone]     [facility] ([phone]) Saturday and Sunday    Units: 6 Med/Surg, 8A, 10 ICU, & Pediatric Units: [phone]    Units: 5 Ortho, 5Med/Surg & WB ED: [phone]

## 2023-12-07 NOTE — CONSULTS
Care Management Initial Consult    General Information  Assessment completed with: Patient,    Type of CM/SW Visit: Initial Assessment    Primary Care Provider verified and updated as needed: Yes   Readmission within the last 30 days: no previous admission in last 30 days      Reason for Consult: discharge planning  Advance Care Planning: Advance Care Planning Reviewed: questions answered, other (see comments) (No HCD on file. Provided blank copy with education on Honoring Choices and a blank HCD.)          Communication Assessment  Patient's communication style: spoken language (English or Bilingual)    Hearing Difficulty or Deaf: no   Wear Glasses or Blind: yes    Cognitive  Cognitive/Neuro/Behavioral: WDL                      Living Environment:   People in home: facility resident (2 other roommates)     Current living Arrangements: group home      Able to return to prior arrangements: yes       Family/Social Support:  Care provided by: other (see comments) ([facility] [phone])  Provides care for: no one, unable/limited ability to care for self  Marital Status:   Children, Facility resident(s)/Staff          Description of Support System: Supportive, Involved    Support Assessment: Adequate family and caregiver support, Adequate social supports    Current Resources:   Patient receiving home care services: No (Lives in a group home with 24/7 aide care and 24/7 nurse on call)     Community Resources: County Programs, County Worker, Group Home  Equipment currently used at home: cane, straight, grab bar, toilet, grab bar, tub/shower, glucometer, raised toilet seat, shower chair, walker, rolling  Supplies currently used at home: Diabetic Supplies    Employment/Financial:  Employment Status: disabled        Financial Concerns: none   Referral to Financial Worker: No       Does the patient's insurance plan have a 3 day qualifying hospital stay waiver?  No    Lifestyle & Psychosocial  Needs:  Social Determinants of Health     Food Insecurity: Not on file   Depression: Not at risk (6/3/2021)    PHQ-2     PHQ-2 Score: 0   Housing Stability: Not on file   Tobacco Use: Medium Risk (12/5/2023)    Patient History     Smoking Tobacco Use: Former     Smokeless Tobacco Use: Never     Passive Exposure: Not on file   Financial Resource Strain: Not on file   Alcohol Use: Not on file   Transportation Needs: Not on file   Physical Activity: Not on file   Interpersonal Safety: Not on file   Stress: Not on file   Social Connections: Not on file       Functional Status:  Prior to admission patient needed assistance:   Dependent ADLs:: Ambulation-cane, Ambulation-walker  Dependent IADLs:: Cleaning, Cooking, Laundry, Shopping, Meal Preparation, Medication Management, Transportation       Mental Health Status:  Mental Health Status: Current Concern (Reports anxiety and depression)  Mental Health Management: Medication, Group Therapy    Chemical Dependency Status:  Chemical Dependency Status: Past Concern  Chemical Dependency Management: Methadone          Values/Beliefs:  Spiritual, Cultural Beliefs, Restorationist Practices, Values that affect care: no       Cultural/Restorationist Practices Patient Routinely Participates In:  (N/A)       Additional Information:  HPI: No Yusuf is a 63 year old female admitted on 12/5/2023. She has a PMHx of HTN, DM2, Opioid dependence, hypothyroidism, vitreous syneresis, asthma with probable undiagnosed COPD and is being admitted for sepsis 2/2 RSV pneumonia.     Met with patient in her room. Introduced self and RNCC role. Verified address, phone numbers, PCP, and health insurance. Patient does not have a healthcare directive on file. She accepted information on HCDs and writer provided her with a blank HCD.    Patient lives at [facility] with 2 other adults. There are aides available 24/7 and an on-call nurse available 24/7, according to the patient. The house is a split  "level with about 5 stairs leading up to the 2nd floor and about 5 stairs leading down to the downstairs floor where the patient mostly lives. She denies having difficulties taking the stairs in her home.    Patient has friends and family who are able to drive her to and from appointments. She also takes [employer] and the public bus. The group home is able to provide rides.    Patient states that she feels well supported by the staff at the group home, her daughters, other family, and her counselors.    Patient has a h/o opioid addiction but she has been \"clean\" for 5 years. Patient receives methadone maintenance therapy and frequently talks to counselors at the methadone clinic. Patient reports having depression and anxiety which are well managed with medications and weekly group therapy. Patient reports that she wishes to join another group with her sister to keep her connected to her  roots.    Patient states that she had a PCA in the past but could not offer more details.  She uses diabetic supplies, a cane, a 4 wheeled walker, grab bars at the toilet and shower, a raised toilet seat, and a shower chair. Patient has been eval'd for an electric scooter but has yet to receive the scooter. She needs to complete a home assessment and have a ramp installed.    RNCC to do:d  Patient is requesting an order for incontinence supplies (Depends and wipes) if possible upon discharge.    RNCC will need to f/up with [facility] to find out what other services they offer.      [facility] [place]  Phone: [phone]        Leah Ware, MSN, APRN, AGCNS-BC - RN Care Coordinator  5MS [phone]    SEARCHABLE in [facility] - search CARE COORDINATOR     For Weekend & Holiday on call RN Care Coordinator:  (Tasks: Home care, home infusion, medical equipment, transportation, IMM & PELAEZ forms, etc.)  [place] (0800 - 1630) Saturday and Sunday    Units: 5A, 5B, & 5C: [phone]    Units: 6B, 6C, 6D: [phone]    " Units: 7A, 7B, 7C, 7D: [phone]    Units: 6A/ICU : [phone]     [facility] [place] ([phone]) Saturday and Sunday    Units: 6 Med/Surg, 8A, 10 ICU, & Pediatric Units: [phone]    Units: 5 Ortho, 5Med/Surg &  ED: [phone]

## 2023-12-08 VITALS
HEART RATE: 90 BPM | BODY MASS INDEX: 39.7 KG/M2 | TEMPERATURE: 97.7 F | HEIGHT: 70 IN | DIASTOLIC BLOOD PRESSURE: 74 MMHG | SYSTOLIC BLOOD PRESSURE: 135 MMHG | OXYGEN SATURATION: 96 % | WEIGHT: 277.34 LBS | RESPIRATION RATE: 23 BRPM

## 2023-12-08 LAB
ANION GAP SERPL CALCULATED.3IONS-SCNC: 6 MMOL/L (ref 7–15)
BACTERIA SPT CULT: ABNORMAL
BACTERIA SPT CULT: ABNORMAL
BUN SERPL-MCNC: 16.1 MG/DL (ref 8–23)
CALCIUM SERPL-MCNC: 9 MG/DL (ref 8.8–10.2)
CHLORIDE SERPL-SCNC: 105 MMOL/L (ref 98–107)
CREAT SERPL-MCNC: 0.91 MG/DL (ref 0.51–0.95)
DEPRECATED HCO3 PLAS-SCNC: 29 MMOL/L (ref 22–29)
EGFRCR SERPLBLD CKD-EPI 2021: 71 ML/MIN/1.73M2
ERYTHROCYTE [DISTWIDTH] IN BLOOD BY AUTOMATED COUNT: 17.1 % (ref 10–15)
GLUCOSE BLDC GLUCOMTR-MCNC: 105 MG/DL (ref 70–99)
GLUCOSE BLDC GLUCOMTR-MCNC: 115 MG/DL (ref 70–99)
GLUCOSE BLDC GLUCOMTR-MCNC: 263 MG/DL (ref 70–99)
GLUCOSE BLDC GLUCOMTR-MCNC: 276 MG/DL (ref 70–99)
GLUCOSE SERPL-MCNC: 98 MG/DL (ref 70–99)
GRAM STAIN RESULT: ABNORMAL
HCT VFR BLD AUTO: 34.8 % (ref 35–47)
HGB BLD-MCNC: 10.7 G/DL (ref 11.7–15.7)
MCH RBC QN AUTO: 27.2 PG (ref 26.5–33)
MCHC RBC AUTO-ENTMCNC: 30.7 G/DL (ref 31.5–36.5)
MCV RBC AUTO: 88 FL (ref 78–100)
PLATELET # BLD AUTO: 293 10E3/UL (ref 150–450)
POTASSIUM SERPL-SCNC: 4.3 MMOL/L (ref 3.4–5.3)
RBC # BLD AUTO: 3.94 10E6/UL (ref 3.8–5.2)
SODIUM SERPL-SCNC: 140 MMOL/L (ref 135–145)
WBC # BLD AUTO: 10 10E3/UL (ref 4–11)

## 2023-12-08 PROCEDURE — 94640 AIRWAY INHALATION TREATMENT: CPT

## 2023-12-08 PROCEDURE — 250N000013 HC RX MED GY IP 250 OP 250 PS 637: Performed by: FAMILY MEDICINE

## 2023-12-08 PROCEDURE — 250N000012 HC RX MED GY IP 250 OP 636 PS 637

## 2023-12-08 PROCEDURE — 250N000009 HC RX 250

## 2023-12-08 PROCEDURE — 99238 HOSP IP/OBS DSCHRG MGMT 30/<: CPT | Mod: GC

## 2023-12-08 PROCEDURE — 85027 COMPLETE CBC AUTOMATED: CPT | Performed by: STUDENT IN AN ORGANIZED HEALTH CARE EDUCATION/TRAINING PROGRAM

## 2023-12-08 PROCEDURE — 36415 COLL VENOUS BLD VENIPUNCTURE: CPT | Performed by: STUDENT IN AN ORGANIZED HEALTH CARE EDUCATION/TRAINING PROGRAM

## 2023-12-08 PROCEDURE — 82374 ASSAY BLOOD CARBON DIOXIDE: CPT | Performed by: STUDENT IN AN ORGANIZED HEALTH CARE EDUCATION/TRAINING PROGRAM

## 2023-12-08 PROCEDURE — 250N000011 HC RX IP 250 OP 636: Mod: JZ

## 2023-12-08 PROCEDURE — 250N000013 HC RX MED GY IP 250 OP 250 PS 637

## 2023-12-08 PROCEDURE — 999N000157 HC STATISTIC RCP TIME EA 10 MIN

## 2023-12-08 PROCEDURE — 82310 ASSAY OF CALCIUM: CPT | Performed by: STUDENT IN AN ORGANIZED HEALTH CARE EDUCATION/TRAINING PROGRAM

## 2023-12-08 PROCEDURE — 94640 AIRWAY INHALATION TREATMENT: CPT | Mod: 76

## 2023-12-08 RX ORDER — NYSTATIN 100000 [USP'U]/G
POWDER TOPICAL
Qty: 60 G | Refills: 0 | Status: SHIPPED | OUTPATIENT
Start: 2023-12-08

## 2023-12-08 RX ORDER — DOXYCYCLINE 100 MG/1
100 CAPSULE ORAL 2 TIMES DAILY
Qty: 14 CAPSULE | Refills: 0 | Status: CANCELLED | OUTPATIENT
Start: 2023-12-08

## 2023-12-08 RX ORDER — ALBUTEROL SULFATE 90 UG/1
2 AEROSOL, METERED RESPIRATORY (INHALATION) EVERY 4 HOURS PRN
Qty: 18 G | Refills: 0 | Status: SHIPPED | OUTPATIENT
Start: 2023-12-08

## 2023-12-08 RX ORDER — CEFDINIR 300 MG/1
300 CAPSULE ORAL 2 TIMES DAILY
Qty: 6 CAPSULE | Refills: 0 | Status: SHIPPED | OUTPATIENT
Start: 2023-12-08

## 2023-12-08 RX ORDER — FLUTICASONE PROPIONATE AND SALMETEROL 250; 50 UG/1; UG/1
1 POWDER RESPIRATORY (INHALATION) 2 TIMES DAILY
Qty: 60 EACH | Refills: 0 | Status: SHIPPED | OUTPATIENT
Start: 2023-12-08

## 2023-12-08 RX ORDER — PREDNISONE 20 MG/1
40 TABLET ORAL DAILY
Qty: 4 TABLET | Refills: 0 | Status: SHIPPED | OUTPATIENT
Start: 2023-12-09

## 2023-12-08 RX ADMIN — ENOXAPARIN SODIUM 40 MG: 40 INJECTION SUBCUTANEOUS at 09:10

## 2023-12-08 RX ADMIN — GABAPENTIN 900 MG: 300 CAPSULE ORAL at 09:13

## 2023-12-08 RX ADMIN — PANTOPRAZOLE SODIUM 40 MG: 40 TABLET, DELAYED RELEASE ORAL at 09:13

## 2023-12-08 RX ADMIN — ALBUTEROL SULFATE 2 PUFF: 90 AEROSOL, METERED RESPIRATORY (INHALATION) at 09:10

## 2023-12-08 RX ADMIN — Medication 1 TABLET: at 12:50

## 2023-12-08 RX ADMIN — CEFTRIAXONE 2 G: 2 INJECTION, POWDER, FOR SOLUTION INTRAMUSCULAR; INTRAVENOUS at 12:50

## 2023-12-08 RX ADMIN — DULOXETINE HYDROCHLORIDE 20 MG: 20 CAPSULE, DELAYED RELEASE ORAL at 09:13

## 2023-12-08 RX ADMIN — IPRATROPIUM BROMIDE AND ALBUTEROL SULFATE 3 ML: .5; 3 SOLUTION RESPIRATORY (INHALATION) at 08:06

## 2023-12-08 RX ADMIN — PREDNISONE 40 MG: 20 TABLET ORAL at 09:13

## 2023-12-08 RX ADMIN — METHADONE HYDROCHLORIDE 90 MG: 10 CONCENTRATE ORAL at 09:36

## 2023-12-08 RX ADMIN — SALINE NASAL SPRAY 2 SPRAY: 1.5 SOLUTION NASAL at 09:10

## 2023-12-08 RX ADMIN — Medication 25 MCG: at 09:13

## 2023-12-08 RX ADMIN — LEVOTHYROXINE SODIUM 200 MCG: 100 TABLET ORAL at 09:14

## 2023-12-08 RX ADMIN — DOXYCYCLINE HYCLATE 100 MG: 100 CAPSULE ORAL at 09:13

## 2023-12-08 RX ADMIN — IPRATROPIUM BROMIDE AND ALBUTEROL SULFATE 3 ML: .5; 3 SOLUTION RESPIRATORY (INHALATION) at 12:04

## 2023-12-08 RX ADMIN — Medication 1 CAPSULE: at 09:13

## 2023-12-08 RX ADMIN — GABAPENTIN 900 MG: 300 CAPSULE ORAL at 14:23

## 2023-12-08 RX ADMIN — CYANOCOBALAMIN TAB 1000 MCG 1000 MCG: 1000 TAB at 09:13

## 2023-12-08 RX ADMIN — FLUTICASONE FUROATE AND VILANTEROL TRIFENATATE 1 PUFF: 100; 25 POWDER RESPIRATORY (INHALATION) at 09:09

## 2023-12-08 ASSESSMENT — ACTIVITIES OF DAILY LIVING (ADL)
ADLS_ACUITY_SCORE: 27

## 2023-12-08 NOTE — PLAN OF CARE
"Nursing Assessment:  VT: /74 (BP Location: Right arm, Cuff Size: Adult Regular)   Pulse 90   Temp 97.7  F (36.5  C) (Oral)   Resp 23   Ht 1.766 m (5' 9.53\")   Wt 125.8 kg (277 lb 5.4 oz)   LMP 11/01/2013   SpO2 96%   BMI 40.34 kg/m       Respiratory: frequent nonproductive cough    GI/: patient had 2 loose stools during shift    Additional Notes: she reported anxiety regarding discharge and concerns regarding neb treatments at home, Provider notified and they spoke with patient before she discharged    Discharge Disposition:  Pt. discharged at 1530 to group home, and left with personal belongings. Pt. received complete discharge paperwork and 6 medications as filled by discharge pharmacy. Pt. was given times of last dose for all discharge medications in writing on discharge medication sheets. Pt. had no further questions at the time of discharge and no unmet needs were identified.    "

## 2023-12-08 NOTE — PROGRESS NOTES
Patient has been assessed for Home Oxygen needs. Oxygen readings:    *Pulse oximetry (SpO2) = 96% on room air at rest while awake.    *SpO2 improved to 99% on 2liters/minute at rest.    *SpO2 = 94% on room air during activity/with exercise.    *SpO2 improved to 97% on 2liters/minute during activity/with exercise.

## 2023-12-08 NOTE — PLAN OF CARE
"BP (!) 141/92 (BP Location: Right arm)   Pulse 68   Temp 97.7  F (36.5  C) (Oral)   Resp 14   Ht 1.766 m (5' 9.53\")   Wt 125.8 kg (277 lb 5.4 oz)   LMP 11/01/2013   SpO2 96%   BMI 40.34 kg/m    A & O X 4. Denies pain. Oxygen saturation of >92 maintained with 1 L of oxygen through the shift. NC is patent  and functioning adequately , Continent and ambulated to the restroom. Pt. Is on cardiac monitoring. Isolation for contact/droplet precaution maintained through he shift. BG check at 0200 Hrs. Pt. Assessed able to use the call button effectively, Call button placed with reach, will continue with POC  "

## 2023-12-08 NOTE — PROGRESS NOTES
Care Management Discharge Note    Discharge Date: 12/08/2023       Discharge Disposition: Home    Discharge Services: None    Discharge DME: None    Discharge Transportation: agency    Private pay costs discussed: Not applicable    Does the patient's insurance plan have a 3 day qualifying hospital stay waiver?  No    PAS Confirmation Code:    Patient/family educated on Medicare website which has current facility and service quality ratings:      Education Provided on the Discharge Plan:    Persons Notified of Discharge Plans: Patient, [facility]  Patient/Family in Agreement with the Plan: yes    Handoff Referral Completed: Yes    Additional Information:    Notified that patient will discharge today.    Patient told nurse that she does not want to discharge. Writer went to discuss with patient. Patient states that she is concerned about a newer person working at her group home and she is concerned that this person cannot provide care for her. Writer called the group [facility] and notified them of the patient's concerns. They state that they will follow up with the patient and verify that their staff are well trained.    Writer paged Dr. Vaca to notify them that the patient has concerns also about her nebulizer.  States that she already addressed all of these concerns with the patient already.    Writer confirmed with the group [facility] that they will NOT provide a ride for the patient. Patient stated that she can get a med cab through her insurance.    Group home also requested that DME orders be sent to [facility]. Writer will contact them.    Writer called [facility]. They are unable to accept the patient's insurance. They advised that the patient get her incontinence supplies from [employer] or [employer] because most likely no DME company will accept her insurance. Writer notified patient. She said that she has CADI waiver and will ask them.    Faxed discharge paperwork to [facility].      [facility]  Home  Phone: [phone] or [phone]  Fax: [phone]    Called [employer] [facility] ride to schedule a ride at [phone].  Ride scheduled with [employer] ([phone]),  time 5pm. Will notify nurses.      Leah Ware, MSN, APRN, AGCNS-BC - RN Care Coordinator  5MS [phone]    SEARCHABLE in [facility] - search CARE COORDINATOR     For Weekend & Holiday on call RN Care Coordinator:  (Tasks: Home care, home infusion, medical equipment, transportation, IMM & PELAEZ forms, etc.)  [place] (0800 - 1630) Saturday and Sunday    Units: 5A, 5B, & 5C: [phone]    Units: 6B, 6C, 6D: [phone]    Units: 7A, 7B, 7C, 7D: [phone]    Units: 6A/ICU : [phone]     [facility] [place] ([phone]) Saturday and Sunday    Units: 6 Med/Surg, 8A, 10 ICU, & Pediatric Units: [phone]    Units: 5 Ortho, 5Med/Surg & WB ED: [phone]

## 2023-12-08 NOTE — DISCHARGE SUMMARY
"[facility]  Discharge Summary - Medicine & Pediatrics       Date of Admission:  12/5/2023  Date of Discharge:  12/8/2023  Discharging Provider: Oli Deng MD  Discharge Service: [facility]    Discharge Diagnoses   # Acute Hypercapnic Respiratory Failure, improved  # RSV pneumonia w/ superimposed H. Flu community-acquired pneumonia, stable  # Acute rhinosinusitis, likely viral, stable  # Acute-on-chronic COPD exacerbation, likely  # HTN  #T2DM   # Hypothyroidism  #Polysubstance use disorder (opioids, methamphetamines), in remission   # LVEF 60-65% (12/6/23); history of chronic HFmrEF (40-45%)  # History of NSTEMI d/t demand ischemia 2/2 AHRF in setting of Covid-19 PNA c/b torsades de pointes arrest (2/2022)     Clinically Significant Risk Factors     # Severe Obesity: Estimated body mass index is 40.34 kg/m  as calculated from the following:    Height as of this encounter: 1.766 m (5' 9.53\").    Weight as of this encounter: 125.8 kg (277 lb 5.4 oz).       Follow-ups Needed After Discharge   Outpatient adult sleep medicine referral  Outpatient pulmonary/spirometry referral. Consider transition to LAMA for maintenance if COPD confirmed  Outpatient PCP post-admission follow-up & for HTN medications (HELD due to hypotensive episodes in setting of AHRF 2/2 RSV and H. Flu pneumonia)    Unresulted Labs Ordered in the Past 30 Days of this Admission       Date and Time Order Name Status Description    12/5/2023  1:57 PM Blood Culture Hand, Right Preliminary     12/5/2023  1:57 PM Blood Culture Arm, Left Preliminary         These results will be followed up by outpatient primary care provider.    Discharge Disposition   Discharged to home - [facility]  Condition at discharge: Stable    Hospital Course   No Yusuf is a 63 year old female admitted on 12/5/2023. She has a PMHx of HTN, DM2, opioid use disorder, hypothyroidism, vitreous " syneresis, asthma with probable undiagnosed COPD and is being admitted for sepsis 2/2 RSV pneumonia with superimposed H. Flu CAP and likely acute-on-chronic COPD exacerbation.     # Acute Hypercapnic Respiratory Failure, improved  # RSV pneumonia w/ superimposed H. Flu community-acquired pneumonia, stable  # Acute rhinosinusitis, likely viral, stable  # Acute-on-chronic COPD exacerbation, likely  # Sepsis (fever, tachypnea, tachycardia, leukocytosis) with known source (RSV), resolved  Presented to the ED with sepsis and pneumonia. She was RSV positive on admission and thought to have RSV pneumonia. However, given that WBC worsened (13.9-->17.4), high oxygen requirements (on 4 L O2 NC at admission and she is not on baseline O2 at home), persistent fevers, hypotensive episodes requiring fluid resuscitation, she was treated for a presumed superimposed bacterial infection with empiric antibiotics. Two days later, respiratory aerobic bacterial culture was positive for H. flu. Narrowed antibiotic regimen from zosyn IV --> ceftriaxone IV and doxycycline PO--> cefdinir PO. Additionally, VBG revealed respiratory acidosis with metabolic compensation suggesting hypercapnic respiratory failure. Although No has never been diagnosed with COPD, she had increased dyspnea, increased cough, and increased sputum production. She was treated for an acute exacerbation of COPD. On 12/7, WBC normalized and oxygen weaned to room air on 12/8. Patient passed O2 walk test and will be discharged with the following home medications and referrals:   - PTA Advair 250-50 mcg/dose 1 puff BID  - PTA Albuterol inhaler 2 puff q4hr PRN   - Prednisone 40 mg daily x 2 days (12/6-12/10)  - PO cefdinir 300 mg BID x 3 days (12/9-12/11)  - Outpatient Spirometry  - Outpatient Adult Sleep Medicine referral  - Would encourage outpatient LAMA/LABA (spiriva) nebulizer, will defer to outpatient PCP.    # LVEF 60-65% (12/6/23); history of chronic HFmrEF  (40-45%)  # History of NSTEMI d/t demand ischemia 2/2 AHRF in setting of Covid-19 PNA c/b torsades de pointes arrest (2/2022)  # History of NSTEMI Type II  # HTN  On admission, BNP WNL, troponin 23, then 20 on repeat. EKG: normal sinus rhythm without ischemic changes. Echocardiogram 12/6/23 was normal with EF of 60-65%, and normal left and right ventricular function. Patient hypertensive medications (PTA amlodipine 5 mg daily, PTA furosemide 20 mg daily, and sacubitril-valsartan 49-51 BID) were held in the setting of hypotensive episodes in the setting of AHRF secondary to pneumonia. Patient blood pressures remain soft (104/71 this AM) and medications will CONTINUE TO BE HELD on discharge. Patient advised to see PCP soon for post-hospitalization follow-up and this issue. Medications on discharge:  - PTA atorvastatin 20 mg nightly  - PTA magnesium oxide 200 mg daily  - HOLD PTA amlodipine 5 mg daily   - HOLD PTA furosemide 20 mg daily   - HOLD PTA sacubitril-valsartan 49-51     # Adrenal adenoma, incidental  # Recurrent episodes of hypoglycemia  #T2DM  Last HbA1c on 6.3% on 12/5/23. Reports that she has had numerous episodes of hypoglycemia and needs to keep snacks near her to bring her BG back up. Hypoglycemic episodes could potentially be related to medication regimen. She and her doctor have been decreasing the number of medications in her diabetes regimen, and she is currently only on metformin and insulin detemir as needed. ACTH stimulation panel is negative, ruling out adrenal insufficiency.   - PTA metformin 1,000 mg BID with meals    # Hypothyroidism  Last TSH was 0.15 on 03/25/22. TSH WNL on this admission. Hypothyroidism is likely not contributing to her weight gain.   - PTA Levothyroxine 200 mcg daily.    #Polysubstance use disorder (opioids, methamphetamines), in remission  PTA Methadone 90 mg daily, confirmed with clinic. No PTA medication changes on discharge.    Consultations This Hospital Stay    PHARMACY TO DOSE VANCO  CARE MANAGEMENT / SOCIAL WORK IP CONSULT    Code Status   Full Code       The patient was discussed with Dr. Deng.    Deepti Edwards, Medical Student    I was present with the medical student who participated in the service and in the documentation of this note. I have verified the history and personally performed the physical exam and medical decision making, and have verified the content of the note, which accurately reflects my assessment of the patient and the plan of care.   Anette Farrar DO, MPH    [facility] Service  [facility] SUR[facility]  [address] [place] [id number]  Phone: [phone]  Fax: [phone]  ______________________________________________________________________    Physical Exam   Vital Signs: Temp: 97.7  F (36.5  C) Temp src: Oral BP: 135/74 Pulse: 90   Resp: 23 SpO2: 96 % O2 Device: None (Room air)    Weight: 277 lbs 5.42 oz  Constitutional: awake, alert, cooperative, and appears stated age  Eyes: Lids and lashes normal, extra ocular muscles intact, sclera clear, conjunctiva normal  ENT: Normocephalic, without obvious abnormality, atraumatic.  Respiratory: Not in respiratory distress, expiratory wheezing, prolonged expirations  Cardiovascular: Normal regular rate and rhythm and no murmur or gallops noted  GI: No scars, normal bowel sounds, soft, non-distended, non-tender, no masses palpated  Skin: No lesions  Neurologic: Awake, alert, oriented to name, place and time.  No gross deficits.  Neuropsychiatric: General: normal, calm, and normal eye contact      Primary Care Physician   Price Naranjo    Discharge Orders     Significant Results and Procedures   Most Recent Hemoglobin A1c:  Recent Labs   Lab Test 12/05/23  1157   A1C 6.3*     12/6/23 VBG   Latest Reference Range & Units 12/06/23 04:36   FIO2  36   Ph Venous 7.32 - 7.43  7.35   PCO2 Venous 40 - 50 mm Hg 54 (H)   PO2 Venous 25 - 47 mm Hg 66 (H)   Bicarbonate  Venous 21 - 28 mmol/L 30 (H)   Base Excess Venous -7.7 - 1.9 mmol/L 3.5 (H)       Discharge Medications   Discharge Medication List as of 2023  4:33 PM        START taking these medications    Details   cefdinir (OMNICEF) 300 MG capsule Take 1 capsule (300 mg) by mouth 2 times daily, Disp-6 capsule, R-0, E-Prescribe      predniSONE (DELTASONE) 20 MG tablet Take 2 tablets (40 mg) by mouth daily, Disp-4 tablet, R-0, E-Prescribe           CONTINUE these medications which have CHANGED    Details   albuterol (PROAIR HFA) 108 (90 Base) MCG/ACT inhaler Inhale 2 puffs into the lungs every 4 hours as needed for shortness of breath, wheezing or cough, Disp-18 g, R-0, E-PrescribePharmacy may dispense brand covered by insurance (Proair, or proventil or ventolin or generic albuterol inhaler)      fluticasone-salmeterol (ADVAIR) 250-50 MCG/ACT inhaler Inhale 1 puff into the lungs 2 times daily, Disp-60 each, R-0, E-Prescribe      nicotine polacrilex (NICORETTE) 4 MG gum Place 1 each (4 mg) inside cheek every hour as needed for smoking cessation, Disp-100 each, R-0, E-Prescribe      nystatin (NYSTOP) 969430 UNIT/GM external powder SMARTSI Application Topical 2-3 Times DailyDisp-60 g, [id number]           CONTINUE these medications which have NOT CHANGED    Details   acetaminophen (TYLENOL) 325 MG tablet Take 2 tablets (650 mg) by mouth 2 times daily as needed for mild pain, Transitional      Acidophilus Lactobacillus CAPS Take 1 tablet by mouth daily For gut health (Lactobacillus), Historical      aspirin (ASA) 81 MG chewable tablet Take 1 tablet (81 mg) by mouth daily, Transitional      atorvastatin (LIPITOR) 20 MG tablet Take 1 tablet (20 mg) by mouth At Bedtime, Disp-90 tablet, R-3, E-Prescribe      blood glucose (ACCU-CHEK RAAFELA PLUS) test strip Use to check blood sugar 3x daily or as directed., Historical      !! Blood Glucose Calibration (ACCU-CHEK ACTIVE GLUCOSE CONT VI) 1 each by Other route, Historical      !!  blood glucose calibration (ACCU-CHEK RAFAELA) solution Historical      calcium carbonate antacid 1000 MG CHEW Take 1 chew tab by mouth every 6 hours as needed for heartburn, Historical      cholecalciferol 25 MCG (1000 UT) TABS Take 1,000 Units by mouth daily , Historical      diclofenac (VOLTAREN) 1 % topical gel Apply 2-4 g topically 2 times daily, Transitional      DULoxetine (CYMBALTA) 20 MG capsule Take 1 capsule (20 mg) by mouth daily, Transitional      gabapentin (NEURONTIN) 300 MG capsule Take 900 mg by mouth 3 times daily, Historical      hydrOXYzine (ATARAX) 50 MG tablet Take 100 mg by mouth every 6 hours as needed for anxiety, Historical      insulin detemir (LEVEMIR PEN) 100 UNIT/ML pen Inject 15 Units Subcutaneous every morning, Disp-3 mL, R-1, E-Prescribe      levothyroxine (SYNTHROID/LEVOTHROID) 200 MCG tablet Take 200 mcg by mouth daily , Historical      magnesium oxide 400 MG tablet Take 200 mg by mouth daily, Historical      melatonin 5 MG tablet Take 5 mg by mouth At Bedtime, Historical      metFORMIN (GLUCOPHAGE) 1000 MG tablet Take 1,000 mg by mouth 2 times daily (with meals), Historical      methadone (DOLOPHINE) 10 MG tablet Take 90 mg by mouth once Takes 90mg daily per [facility], updated 8/6/2023, Historical      multivitamin w/minerals (THERA-VIT-M) tablet Take 1 tablet by mouth daily, Historical      naloxone (NARCAN) 4 MG/0.1ML nasal spray Spray 1 spray (4 mg) into one nostril alternating nostrils as needed for opioid reversal every 2-3 minutes until assistance arrives, Disp-0.2 mL, R-0, E-Prescribe      omeprazole (PRILOSEC) 20 MG DR capsule Take 20 mg by mouth daily , Historical      polyethylene glycol (MIRALAX) 17 GM/Dose powder Take 17 g by mouth daily as needed for constipation, Disp-510 g, R-0, E-Prescribe      vitamin B-12 (CYANOCOBALAMIN) 1000 MCG tablet Take 1,000 mcg by mouth daily, Historical      potassium chloride ER (KLOR-CON) 8 MEQ CR tablet Take 8 mEq by mouth 2  times daily, Historical       !! - Potential duplicate medications found. Please discuss with provider.        STOP taking these medications       amLODIPine (NORVASC) 5 MG tablet Comments:   Reason for Stopping:         empagliflozin (JARDIANCE) 10 MG TABS tablet Comments:   Reason for Stopping:         furosemide (LASIX) 20 MG tablet Comments:   Reason for Stopping:         sacubitril-valsartan (ENTRESTO) 49-51 MG per tablet Comments:   Reason for Stopping:         spironolactone (ALDACTONE) 25 MG tablet Comments:   Reason for Stopping:             Allergies   Allergies   Allergen Reactions    Promethazine Other (See Comments) and Anxiety     Noted in 8/30/08 ER    Codeine Phosphate GI Disturbance    Lamotrigine Other (See Comments)     hyponatremia    Oxcarbazepine Unknown and Other (See Comments)     Low sodium  LegacyRecord#[id number]      Codeine Other (See Comments) and Rash     GI bleeding  LegacyRecord#[id number]

## 2023-12-08 NOTE — PLAN OF CARE
Problem: Adult Inpatient Plan of Care  Goal: Plan of Care Review  Outcome: Progressing  Flowsheets (Taken 12/7/2023 2211)  Outcome Evaluation: Pt breath sounds coarse throughout chest- heart sounds distant.  Pt ind in the room. Took a shower today. Complained of a headache otherwise denies any new pain. N/T in BLE and some edema in BLE-ankles/feet. Encouraged pt to drink more fluids, states she doesn't like water- likes tea more. Continue POC.  Plan of Care Reviewed With: patient  Overall Patient Progress: no change  Goal: Patient-Specific Goal (Individualized)  Outcome: Progressing  Flowsheets (Taken 12/7/2023 2211)  Individualized Care Needs: Pt would like to be disconnected from IV when abx are not infusing, as this makes it hard for her to get to the bathroom quickly.  Goal: Absence of Hospital-Acquired Illness or Injury  Outcome: Progressing  Intervention: Identify and Manage Fall Risk  Recent Flowsheet Documentation  Taken 12/7/2023 2202 by Al Kelly RN  Safety Promotion/Fall Prevention:   assistive device/personal items within reach   clutter free environment maintained   increased rounding and observation   room organization consistent   safety round/check completed  Intervention: Prevent Skin Injury  Recent Flowsheet Documentation  Taken 12/7/2023 2202 by Al Kelly RN  Body Position: position changed independently  Intervention: Prevent Infection  Recent Flowsheet Documentation  Taken 12/7/2023 2202 by Al Kelly RN  Infection Prevention:   single patient room provided   rest/sleep promoted  Goal: Optimal Comfort and Wellbeing  Outcome: Progressing  Intervention: Monitor Pain and Promote Comfort  Recent Flowsheet Documentation  Taken 12/7/2023 2032 by Al Kelly RN  Pain Management Interventions: medication (see MAR)  Goal: Readiness for Transition of Care  Outcome: Progressing     Problem: Infection  Goal: Absence of Infection Signs and Symptoms  Outcome:  Progressing  Intervention: Prevent or Manage Infection  Recent Flowsheet Documentation  Taken 12/7/2023 2202 by Al Kelly, RN  Isolation Precautions:   droplet precautions maintained   contact precautions maintained   Goal Outcome Evaluation:      Plan of Care Reviewed With: patient    Overall Patient Progress: no change    Outcome Evaluation: Pt breath sounds coarse throughout chest- heart sounds distant.  Pt ind in the room. Took a shower today. Complained of a headache otherwise denies any new pain. N/T in BLE and some edema in BLE-ankles/feet. Encouraged pt to drink more fluids, states she doesn't like water- likes tea more. Continue POC.  BGs high,above 200, managed w/ insulin.

## 2023-12-10 LAB
BACTERIA BLD CULT: NO GROWTH
BACTERIA BLD CULT: NO GROWTH

## 2023-12-20 ENCOUNTER — OFFICE VISIT (OUTPATIENT)
Dept: OPTOMETRY | Facility: CLINIC | Age: 63
End: 2023-12-20
Payer: COMMERCIAL

## 2023-12-20 ENCOUNTER — APPOINTMENT (OUTPATIENT)
Dept: OPTOMETRY | Facility: CLINIC | Age: 63
End: 2023-12-20
Payer: COMMERCIAL

## 2023-12-20 DIAGNOSIS — H52.03 HYPEROPIA, BILATERAL: Primary | ICD-10-CM

## 2023-12-20 DIAGNOSIS — H52.4 PRESBYOPIA: ICD-10-CM

## 2023-12-20 PROCEDURE — 92015 DETERMINE REFRACTIVE STATE: CPT

## 2023-12-20 PROCEDURE — V2020 VISION SVCS FRAMES PURCHASES: HCPCS

## 2023-12-20 PROCEDURE — 99212 OFFICE O/P EST SF 10 MIN: CPT

## 2023-12-20 PROCEDURE — V2100 LENS SPHER SINGLE PLANO 4.00: HCPCS | Mod: RT

## 2023-12-20 ASSESSMENT — REFRACTION_MANIFEST
OD_CYLINDER: +0.75
OS_AXIS: 180
OS_CYLINDER: +0.75
OD_AXIS: 087
OS_AXIS: 007
OD_SPHERE: +0.75
OS_CYLINDER: +0.75
OD_SPHERE: +0.75
OD_CYLINDER: +1.00
OS_SPHERE: +1.25
METHOD_AUTOREFRACTION: 1
OS_ADD: +2.50
OS_SPHERE: +1.25
OD_ADD: +2.50
OD_AXIS: 180

## 2023-12-20 ASSESSMENT — CONF VISUAL FIELD
OD_NORMAL: 1
OD_INFERIOR_NASAL_RESTRICTION: 0
OD_SUPERIOR_TEMPORAL_RESTRICTION: 0
OS_NORMAL: 1
OS_SUPERIOR_NASAL_RESTRICTION: 0
OS_INFERIOR_NASAL_RESTRICTION: 0
OD_SUPERIOR_NASAL_RESTRICTION: 0
OS_SUPERIOR_TEMPORAL_RESTRICTION: 0
OD_INFERIOR_TEMPORAL_RESTRICTION: 0
OS_INFERIOR_TEMPORAL_RESTRICTION: 0

## 2023-12-20 ASSESSMENT — VISUAL ACUITY
OD_PH_SC+: +2
OD_PH_SC: 20/25
OD_SC: 20/50
OS_SC+: -1
OS_SC: 20/50
OD_SC+: -2
OS_PH_SC: 20/25
METHOD: SNELLEN - LINEAR
OD_SC: 20/50
OS_SC: 20/50

## 2023-12-20 ASSESSMENT — REFRACTION_WEARINGRX
OD_SPHERE: +3.25
SPECS_TYPE: OTC READERS
OS_SPHERE: +3.25

## 2023-12-20 ASSESSMENT — EXTERNAL EXAM - RIGHT EYE: OD_EXAM: NORMAL

## 2023-12-20 ASSESSMENT — KERATOMETRY
OD_K2POWER_DIOPTERS: 46.75
OS_AXISANGLE_DEGREES: 90
OS_AXISANGLE2_DEGREES: 180
OS_K2POWER_DIOPTERS: 45.50
OS_K1POWER_DIOPTERS: 45.25
OD_AXISANGLE2_DEGREES: 178
OD_K1POWER_DIOPTERS: 45.50
OD_AXISANGLE_DEGREES: 88

## 2023-12-20 ASSESSMENT — SLIT LAMP EXAM - LIDS
COMMENTS: NORMAL
COMMENTS: NORMAL

## 2023-12-20 ASSESSMENT — EXTERNAL EXAM - LEFT EYE: OS_EXAM: NORMAL

## 2023-12-20 NOTE — LETTER
12/20/2023         RE: No Porter Madelin  [address] [facility] [address]        Dear Colleague,    Thank you for referring your patient, No Yusuf, to the [facility]. Please see a copy of my visit note below.    Chief Complaint   Patient presents with     Diabetic Eye Exam        Chief Complaint(s) and History of Present Illness(es)       Diabetic Eye Exam              Diabetes Type: Type 2, on insulin and taking oral medications    Duration: 2 years    Blood Sugars: fluctuates                   Lab Results   Component Value Date    A1C 6.3 12/05/2023    A1C 6.2 06/21/2023    A1C 6.2 03/25/2022    A1C 6.2 02/08/2022    A1C 6.5 05/27/2021            Last Eye Exam: long time  Dilated Previously: Yes    What are you currently using to see?  Readers +3.25    Distance Vision Acuity: Noticed gradual change in both eyes    Near Vision Acuity: Not satisfied     Eye Comfort: dry, itchy, watery- allergy type occasionally   Do you use eye drops? : No      Kendallelle Stiven - Optometric Assistant     Medical, surgical and family histories reviewed and updated 12/20/2023.       OBJECTIVE: See Ophthalmology exam    ASSESSMENT:    ICD-10-CM    1. Hyperopia, bilateral  H52.03 REFRACTION      2. Presbyopia  H52.4 REFRACTION          PLAN:    No Yusuf aware  eye exam results will be sent to Price Naranjo.  Patient Instructions   Hyperopia, presbyopia: Updated bifocal glasses prescription for full-time wear.  Monitor annually.    Refraction only today.  See chart note from 11/29/23 for dilated fundus exam.  No diabetic retinopathy at that time.      Erika Briscoe, LAURO           Again, thank you for allowing me to participate in the care of your patient.        Sincerely,        Erika Briscoe, OD

## 2023-12-20 NOTE — PATIENT INSTRUCTIONS
Hyperopia, presbyopia: Updated bifocal glasses prescription for full-time wear.  Monitor annually.    Refraction only today.  See chart note from 11/29/23 for dilated fundus exam.  No diabetic retinopathy at that time.

## 2023-12-20 NOTE — PROGRESS NOTES
Chief Complaint   Patient presents with    Diabetic Eye Exam        Chief Complaint(s) and History of Present Illness(es)       Diabetic Eye Exam              Diabetes Type: Type 2, on insulin and taking oral medications    Duration: 2 years    Blood Sugars: fluctuates                   Lab Results   Component Value Date    A1C 6.3 12/05/2023    A1C 6.2 06/21/2023    A1C 6.2 03/25/2022    A1C 6.2 02/08/2022    A1C 6.5 05/27/2021            Last Eye Exam: long time  Dilated Previously: Yes    What are you currently using to see?  Readers +3.25    Distance Vision Acuity: Noticed gradual change in both eyes    Near Vision Acuity: Not satisfied     Eye Comfort: dry, itchy, watery- allergy type occasionally   Do you use eye drops? : No      Carlos Enrique Saleem - Optometric Assistant     Medical, surgical and family histories reviewed and updated 12/20/2023.       OBJECTIVE: See Ophthalmology exam    ASSESSMENT:    ICD-10-CM    1. Hyperopia, bilateral  H52.03 REFRACTION      2. Presbyopia  H52.4 REFRACTION          PLAN:    No Yusuf aware  eye exam results will be sent to Price Naranjo.  Patient Instructions   Hyperopia, presbyopia: Updated bifocal glasses prescription for full-time wear.  Monitor annually.    Refraction only today.  See chart note from 11/29/23 for dilated fundus exam.  No diabetic retinopathy at that time.      Erika Briscoe, OD

## 2023-12-29 NOTE — TELEPHONE ENCOUNTER
MACO received a VM from pt's SEBAS Hill at [facility], inquiring about pt's discharge. Liz mentioning that a group home was possibly in place for pt. SW attempted to reach Liz today but was unable to do so, VM left. SW left generic VM indicating that the mutual pt discharged to their previous living setting. No release on file to provide additional detail.    SHERRY Martinez  Unit 5/Unit 8 Ortho/Med/Surg & [facility] Adult ED  Phone: [phone] Pager: [phone]     Occupational Health Nurse/MA/Tech visit only.

## 2024-01-18 NOTE — PROGRESS NOTES
Assessment & Plan      No Yusuf is a 60 year old female with PMHx of fibromyalgia, hypertension, hypothyroidism, hep C, OUD on methadone maintenance who presented with bilateral LE redness, pain, and 40+ lb weight gain.     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 Echo last 8/2019 WNL, seen by cardiology 1 week PTA where impression was more of lymphedema and non-cardiac etiology of chronic edema. Consider GENE/Pickwickian and pulmonary hypertension, worsening diastolic dysfunction (previously grade 1). Hepatic steatosis without evidence of CORREA (nl LFTs though have fluctuated in the past, normal INR, no ascites on CT). Creatinine normal/baseline, no protein in UA to suggest nephrotic syndrome. Additional ddx includes hypoalbuminemia, known thyroid disease, calcium channel blocker  - stopped amlodipine, reassess choice if antihypertensive indicated   - repeat echo   Interpretation Summary 1/28  Left ventricular function, chamber size, wall motion, and wall thickness are  normal.The EF is 60-65%.  Right ventricular function, chamber size, wall motion, and thickness are  normal.  No hemodynamically significant valve abnormalities.  Pulmonary artery systolic pressure is normal.  bnp 328        # Cough ; resolved   # GGO on CT   Unclear etiology, atypical pneumonia vs pulmonary edema vs other. Exam benign, nonfocal, no hypoxia.    - diuresis  continue to monitor for now     # Mild normocytic anemia   Unclear etiology or duration.       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   - Referred for outpaitent sleep study, pending.     Chronic  ----- Message from Renetta Jennings sent at 1/17/2024  4:06 PM EST -----  Subject: Appointment Request    Reason for Call: Established Patient Appointment needed: Routine Medicare   AWV    QUESTIONS    Reason for appointment request? Available appointments did not meet   patient need     Additional Information for Provider? Pt is calling wanting to reschedule   her AWV that was scheduled on 01.18.24 and has been cancelled. The pt was   offered a VV and the pt refused. I looked all the the way to April for an   in office appt with around 11 and before we go to lunch.   ---------------------------------------------------------------------------  --------------  CALL BACK INFO  [phone]; OK to leave message on voicemail  ---------------------------------------------------------------------------  --------------  SCRIPT ANSWERS   "issues:     # MDD  Anxiety: continue duloxetine, hydroxyzine  . Appreciate psych input 1/28  # Abnormal imaging: nodules and adrenal adenoma will need outpatient PCP follow-up per radiology recs   # GERD: continue PPI, zantac   # OUD: continue methadone 70 mg per pharmacy  # Hep C: not on treatment, followed at [facility]   # Hypothyroidism: TSH elevated but improved from OSH records, fT4 nl. Continue levothyroxine   # Constipation: increase bowel regimen   Diet: Regular Diet Adult    DVT Prophylaxis: Enoxaparin (Lovenox) SQ  Valle Catheter: not present  Code Status: full      Diet: Regular Diet Adult    DVT Prophylaxis: Enoxaparin (Lovenox) SQ  Valle Catheter: not present             ______________________________________________________________________        Interval History     Feels leg swelling is improved ,pain is improved   Tolerating lasix     Data reviewed today: I reviewed all medications, new labs and imaging results over the last 24 hours.   On Exam ;   Alert and oriented . No acute distress  Vital signs:  Temp: 97.7  F (36.5  C) Temp src: Oral BP: 111/51 Pulse: 73   Resp: 16 SpO2: 96 % O2 Device: None (Room air) Oxygen Delivery: 1 LPM   Weight: 127.9 kg (282 lb)  Estimated body mass index is 49.95 kg/m  as calculated from the following:    Height as of 1/21/20: 1.6 m (5' 3\").    Weight as of this encounter: 127.9 kg (282 lb).     Chest ; equal BS bilaterally , no rales or rhonchi   CVS; RRR, no murmur /rubs or gallops  GI ; soft abdomen, non tender, BS positive  Ext ; pitting  edema , no cynosis  Neuro ; CN 2 to 12 grossly intact , No Facial asymmetry noticed  .  Psych ; appropriate mood and effect  Skin ; erythema both lower ext improving            "

## 2024-01-25 ENCOUNTER — HOSPITAL ENCOUNTER (EMERGENCY)
Facility: CLINIC | Age: 64
Discharge: NURSING, REHAB OR LONG-TERM CARE | End: 2024-01-25
Attending: EMERGENCY MEDICINE | Admitting: EMERGENCY MEDICINE
Payer: COMMERCIAL

## 2024-01-25 ENCOUNTER — APPOINTMENT (OUTPATIENT)
Dept: GENERAL RADIOLOGY | Facility: CLINIC | Age: 64
End: 2024-01-25
Attending: PHYSICIAN ASSISTANT
Payer: COMMERCIAL

## 2024-01-25 ENCOUNTER — APPOINTMENT (OUTPATIENT)
Dept: ULTRASOUND IMAGING | Facility: CLINIC | Age: 64
End: 2024-01-25
Attending: PHYSICIAN ASSISTANT
Payer: COMMERCIAL

## 2024-01-25 VITALS
RESPIRATION RATE: 16 BRPM | DIASTOLIC BLOOD PRESSURE: 73 MMHG | OXYGEN SATURATION: 94 % | BODY MASS INDEX: 46.92 KG/M2 | HEART RATE: 82 BPM | HEIGHT: 65 IN | WEIGHT: 281.6 LBS | SYSTOLIC BLOOD PRESSURE: 113 MMHG | TEMPERATURE: 98.6 F

## 2024-01-25 DIAGNOSIS — I87.2 VENOUS STASIS DERMATITIS OF BOTH LOWER EXTREMITIES: ICD-10-CM

## 2024-01-25 LAB
ALBUMIN SERPL BCG-MCNC: 3.9 G/DL (ref 3.5–5.2)
ALBUMIN UR-MCNC: NEGATIVE MG/DL
ALP SERPL-CCNC: 96 U/L (ref 40–150)
ALT SERPL W P-5'-P-CCNC: 19 U/L (ref 0–50)
ANION GAP SERPL CALCULATED.3IONS-SCNC: 9 MMOL/L (ref 7–15)
APPEARANCE UR: CLEAR
AST SERPL W P-5'-P-CCNC: 30 U/L (ref 0–45)
ATRIAL RATE - MUSE: 84 BPM
BASOPHILS # BLD AUTO: 0.1 10E3/UL (ref 0–0.2)
BASOPHILS NFR BLD AUTO: 1 %
BILIRUB SERPL-MCNC: 0.3 MG/DL
BILIRUB UR QL STRIP: NEGATIVE
BUN SERPL-MCNC: 10.9 MG/DL (ref 8–23)
CALCIUM SERPL-MCNC: 9.2 MG/DL (ref 8.8–10.2)
CHLORIDE SERPL-SCNC: 99 MMOL/L (ref 98–107)
COLOR UR AUTO: YELLOW
CREAT SERPL-MCNC: 1.03 MG/DL (ref 0.51–0.95)
CRP SERPL-MCNC: 5.21 MG/L
DEPRECATED HCO3 PLAS-SCNC: 31 MMOL/L (ref 22–29)
DIASTOLIC BLOOD PRESSURE - MUSE: NORMAL MMHG
EGFRCR SERPLBLD CKD-EPI 2021: 61 ML/MIN/1.73M2
EOSINOPHIL # BLD AUTO: 0.4 10E3/UL (ref 0–0.7)
EOSINOPHIL NFR BLD AUTO: 4 %
ERYTHROCYTE [DISTWIDTH] IN BLOOD BY AUTOMATED COUNT: 15.8 % (ref 10–15)
FLUAV RNA SPEC QL NAA+PROBE: NEGATIVE
FLUBV RNA RESP QL NAA+PROBE: NEGATIVE
GLUCOSE SERPL-MCNC: 130 MG/DL (ref 70–99)
GLUCOSE UR STRIP-MCNC: NEGATIVE MG/DL
HCT VFR BLD AUTO: 41.1 % (ref 35–47)
HGB BLD-MCNC: 12.6 G/DL (ref 11.7–15.7)
HGB UR QL STRIP: NEGATIVE
IMM GRANULOCYTES # BLD: 0 10E3/UL
IMM GRANULOCYTES NFR BLD: 0 %
INTERPRETATION ECG - MUSE: NORMAL
KETONES UR STRIP-MCNC: NEGATIVE MG/DL
LEUKOCYTE ESTERASE UR QL STRIP: NEGATIVE
LYMPHOCYTES # BLD AUTO: 2.4 10E3/UL (ref 0.8–5.3)
LYMPHOCYTES NFR BLD AUTO: 25 %
MCH RBC QN AUTO: 27.1 PG (ref 26.5–33)
MCHC RBC AUTO-ENTMCNC: 30.7 G/DL (ref 31.5–36.5)
MCV RBC AUTO: 88 FL (ref 78–100)
MONOCYTES # BLD AUTO: 0.9 10E3/UL (ref 0–1.3)
MONOCYTES NFR BLD AUTO: 10 %
MUCOUS THREADS #/AREA URNS LPF: PRESENT /LPF
NEUTROPHILS # BLD AUTO: 5.7 10E3/UL (ref 1.6–8.3)
NEUTROPHILS NFR BLD AUTO: 60 %
NITRATE UR QL: NEGATIVE
NRBC # BLD AUTO: 0 10E3/UL
NRBC BLD AUTO-RTO: 0 /100
NT-PROBNP SERPL-MCNC: 250 PG/ML (ref 0–900)
P AXIS - MUSE: 38 DEGREES
PH UR STRIP: 6 [PH] (ref 5–7)
PLATELET # BLD AUTO: 303 10E3/UL (ref 150–450)
POTASSIUM SERPL-SCNC: 4.4 MMOL/L (ref 3.4–5.3)
PR INTERVAL - MUSE: 156 MS
PROCALCITONIN SERPL IA-MCNC: <0.02 NG/ML
PROT SERPL-MCNC: 8.2 G/DL (ref 6.4–8.3)
QRS DURATION - MUSE: 84 MS
QT - MUSE: 402 MS
QTC - MUSE: 475 MS
R AXIS - MUSE: 8 DEGREES
RBC # BLD AUTO: 4.65 10E6/UL (ref 3.8–5.2)
RBC URINE: <1 /HPF
RSV RNA SPEC NAA+PROBE: NEGATIVE
SARS-COV-2 RNA RESP QL NAA+PROBE: NEGATIVE
SODIUM SERPL-SCNC: 139 MMOL/L (ref 135–145)
SP GR UR STRIP: 1.01 (ref 1–1.03)
SQUAMOUS EPITHELIAL: 1 /HPF
SYSTOLIC BLOOD PRESSURE - MUSE: NORMAL MMHG
T AXIS - MUSE: 32 DEGREES
TROPONIN T SERPL HS-MCNC: 22 NG/L
UROBILINOGEN UR STRIP-MCNC: NORMAL MG/DL
VENTRICULAR RATE- MUSE: 84 BPM
WBC # BLD AUTO: 9.5 10E3/UL (ref 4–11)
WBC URINE: 1 /HPF

## 2024-01-25 PROCEDURE — 36415 COLL VENOUS BLD VENIPUNCTURE: CPT | Performed by: PHYSICIAN ASSISTANT

## 2024-01-25 PROCEDURE — 85048 AUTOMATED LEUKOCYTE COUNT: CPT | Performed by: PHYSICIAN ASSISTANT

## 2024-01-25 PROCEDURE — 99285 EMERGENCY DEPT VISIT HI MDM: CPT | Mod: 25 | Performed by: EMERGENCY MEDICINE

## 2024-01-25 PROCEDURE — 71046 X-RAY EXAM CHEST 2 VIEWS: CPT

## 2024-01-25 PROCEDURE — 93010 ELECTROCARDIOGRAM REPORT: CPT | Performed by: EMERGENCY MEDICINE

## 2024-01-25 PROCEDURE — 83880 ASSAY OF NATRIURETIC PEPTIDE: CPT | Performed by: PHYSICIAN ASSISTANT

## 2024-01-25 PROCEDURE — 93970 EXTREMITY STUDY: CPT | Mod: 26 | Performed by: RADIOLOGY

## 2024-01-25 PROCEDURE — 93005 ELECTROCARDIOGRAM TRACING: CPT | Performed by: EMERGENCY MEDICINE

## 2024-01-25 PROCEDURE — 87637 SARSCOV2&INF A&B&RSV AMP PRB: CPT | Performed by: PHYSICIAN ASSISTANT

## 2024-01-25 PROCEDURE — 84484 ASSAY OF TROPONIN QUANT: CPT | Performed by: PHYSICIAN ASSISTANT

## 2024-01-25 PROCEDURE — 86140 C-REACTIVE PROTEIN: CPT | Performed by: PHYSICIAN ASSISTANT

## 2024-01-25 PROCEDURE — 93970 EXTREMITY STUDY: CPT

## 2024-01-25 PROCEDURE — 81001 URINALYSIS AUTO W/SCOPE: CPT | Performed by: EMERGENCY MEDICINE

## 2024-01-25 PROCEDURE — 84075 ASSAY ALKALINE PHOSPHATASE: CPT | Performed by: PHYSICIAN ASSISTANT

## 2024-01-25 PROCEDURE — 84145 PROCALCITONIN (PCT): CPT | Performed by: PHYSICIAN ASSISTANT

## 2024-01-25 ASSESSMENT — ACTIVITIES OF DAILY LIVING (ADL)
ADLS_ACUITY_SCORE: 35
ADLS_ACUITY_SCORE: 33

## 2024-01-25 ASSESSMENT — VISUAL ACUITY: OD: 20/50

## 2024-01-25 NOTE — ED PROVIDER NOTES
ED Provider Note  [facility]      History     Chief Complaint   Patient presents with    Rash     Pt report skin  redness and pain on bilateral legs for 3 days     UTI     Pt  reports uti symptoms 5 days      HPI  No Yusuf is a 63 year old female past medical history significant for bilateral lower extremity lymphedema, history of GERD and history of cocaine use history of severe obesity, type 2 diabetes, left leg cellulitis history who presents the emergency department tonight with concerns for bilateral lower extremity swelling and redness.  Patient presents alone.  She states over the last 3 to 4 days she has been experiencing slight increase in her bilateral lower extremity edema and erythema.  She notes some pain to her left calf with her symptoms without significant right calf discomfort.  She is not having any associated fevers cough dyspnea or chest pain with her symptoms.  No GI symptoms.  She states she has been feeling some generalized malaise over the last 1 week with some decreased appetite with her symptoms.  She has had some incomplete bladder emptying and frequency states she is being treated for UTI currently, and speaking with one of the nurses at her facility, Larissa, patient recently started Macrobid for UTI.  She is only taken 1-2 doses of that so far.    Patient states she is taking Lasix which was recently resumed for swelling.  She denies any accident or injury involving her legs.  She reports some bilateral lower extremity paresthesias which she attributes to neuropathy.  She is concerned about cellulitis.    Past Medical History  Past Medical History:   Diagnosis Date    Arthritis     Bipolar affective (H)     Fibromyalgia     Hypertension      Past Surgical History:   Procedure Laterality Date    CHOLECYSTECTOMY      GYN SURGERY      c section    GYN SURGERY      oblation    ORTHOPEDIC SURGERY      left leg, left shoulder, back     acetaminophen  (TYLENOL) 325 MG tablet  Acidophilus Lactobacillus CAPS  albuterol (PROAIR HFA) 108 (90 Base) MCG/ACT inhaler  aspirin (ASA) 81 MG chewable tablet  atorvastatin (LIPITOR) 20 MG tablet  blood glucose (ACCU-CHEK RAFAELA PLUS) test strip  Blood Glucose Calibration (ACCU-CHEK ACTIVE GLUCOSE CONT VI)  blood glucose calibration (ACCU-CHEK RAFAELA) solution  calcium carbonate antacid 1000 MG CHEW  cefdinir (OMNICEF) 300 MG capsule  cholecalciferol 25 MCG (1000 UT) TABS  diclofenac (VOLTAREN) 1 % topical gel  DULoxetine (CYMBALTA) 20 MG capsule  fluticasone-salmeterol (ADVAIR) 250-50 MCG/ACT inhaler  gabapentin (NEURONTIN) 300 MG capsule  hydrOXYzine (ATARAX) 50 MG tablet  insulin detemir (LEVEMIR PEN) 100 UNIT/ML pen  levothyroxine (SYNTHROID/LEVOTHROID) 200 MCG tablet  magnesium oxide 400 MG tablet  melatonin 5 MG tablet  metFORMIN (GLUCOPHAGE) 1000 MG tablet  methadone (DOLOPHINE) 10 MG tablet  multivitamin w/minerals (THERA-VIT-M) tablet  naloxone (NARCAN) 4 MG/0.1ML nasal spray  nicotine polacrilex (NICORETTE) 4 MG gum  nystatin (NYSTOP) 153848 UNIT/GM external powder  omeprazole (PRILOSEC) 20 MG DR capsule  polyethylene glycol (MIRALAX) 17 GM/Dose powder  potassium chloride ER (KLOR-CON) 8 MEQ CR tablet  predniSONE (DELTASONE) 20 MG tablet  vitamin B-12 (CYANOCOBALAMIN) 1000 MCG tablet      Allergies   Allergen Reactions    Promethazine Other (See Comments) and Anxiety     Noted in 8/30/08 ER    Codeine Phosphate GI Disturbance    Lamotrigine Other (See Comments)     hyponatremia    Oxcarbazepine Unknown and Other (See Comments)     Low sodium  LegacyRecord#[id number]      Codeine Other (See Comments) and Rash     GI bleeding  LegacyRecord#[id number]       Family History  Family History   Problem Relation Age of Onset    Heart Disease Mother     Diabetes Mother     Ovarian Cancer Mother     Cancer Father     Heart Disease Father     Lung Cancer Father     Diabetes Brother     Diabetes Sister     Ovarian Cancer Sister      Social  "History   Social History     Tobacco Use    Smoking status: Former     Packs/day: .1     Types: Cigarettes    Smokeless tobacco: Never    Tobacco comments:     Using nicotine vape occasionally   Substance Use Topics    Alcohol use: No    Drug use: No         A medically appropriate review of systems was performed with pertinent positives and negatives noted in the HPI, and all other systems negative.    Physical Exam   BP: 113/73  Pulse: 82  Temp: 98.6  F (37  C)  Resp: 16  Height: 165.1 cm (5' 5\")  Weight: 127.7 kg (281 lb 9.6 oz)  SpO2: 94 %  Physical Exam      GENERAL APPEARANCE: The patient is well developed, well appearing, and in no acute distress.  HEAD:  Normocephalic and atraumatic.   EENT: Voice normal.  NECK: Trachea is midline.No lymphadenopathy or tenderness.  LUNGS: Breath sounds are equal and clear bilaterally. No wheezes, rhonchi, or rales.  No increased work of breathing noted.  HEART: Regular rate and normal rhythm.   ABDOMEN: Soft, flat, and benign. No mass, tenderness, guarding, or rebound.Bowel sounds are present.  EXTREMITIES: Examination of the lower extremities reveals mild bilateral lower extremity edema of the ankles, 1+ pitting.  There are stasis dermatitis changes noted to the anterior shins bilaterally without increased warmth open skin noted lymphangitic streaking bilaterally.  No unilateral leg swelling noted.  Patient has PT pulses 2+ bilaterally.  Feet without swelling.  NEUROLOGIC: No focal sensory or motor deficits are noted.  PSYCHIATRIC: The patient is awake, alert.  Appropriate mood and affect.  SKIN: Warm, dry, and well perfused. Good turgor.    ED Course, Procedures, & Data         EKG 1/25/2024:    Normal sinus rhythm, ventricular rate 84 QTc 475.  When interpreted by myself the rhythm is regular.  There is a P wave before every QRS complex.  No visible ST elevation or depression to suggest ischemia.  EKG compared to prior from 12/5/2023 and appears similar.       Results for " "orders placed or performed during the hospital encounter of 01/25/24   Chest XR,  PA & LAT     Status: None    Narrative    EXAM: XR CHEST 2 VIEWS  LOCATION: [facility]  DATE: 1/25/2024    INDICATION: chf, lower leg swelling  COMPARISON: 12/05/2023      Impression    IMPRESSION: Clear lungs. Stable cardiac size. Normal pulmonary vascularity.   US Lower Extremity Venous Duplex Bilateral     Status: None    Narrative    ULTRASOUND LOWER EXTREMITY VENOUS DUPLEX BILATERAL 1/25/2024 4:30 PM    CLINICAL HISTORY: Bilateral lower extremity swelling and calf  tenderness.      COMPARISONS: Ultrasound lower extremity venous duplex left 6/21/2023    REFERRING PROVIDER: ROSARIO QUILES T    TECHNIQUE: Grayscale, color Doppler, Doppler waveform ultrasound  evaluation was performed through the bilateral common femoral,  femoral, and popliteal veins. Bilateral posterior tibial and peroneal  veins were evaluated with grayscale imaging and compression.    FINDINGS: Right common femoral, femoral, and popliteal veins are fully  compressible, patent, and demonstrate normal phasic Doppler waveforms  that augment normally.    Right posterior tibial veins are fully compressible.    Right peroneal veins are fully compressible.    Left common femoral, femoral, and popliteal veins are fully  compressible, patent, and demonstrate normal phasic Doppler waveforms  that augment normally.    Left posterior tibial veins are fully compressible.    Left peroneal veins are fully compressible.    Subcutaneous edema in bilateral ankles.      Impression    IMPRESSION:  1.  No deep venous thrombosis demonstrated in either leg.    2. Subcutaneous edema in bilateral ankles.    Reference: \"Duplex Ultrasound in the Diagnosis of Lower-Extremity Deep  Venous Thrombosis\"- Naomi Riley MD, S; Trey Florez MD  (Circulation. 2014;129:917-921. http://circ.ahajournals.org)    GEO SAMS MD         SYSTEM ID: "  [id number]   UA with Microscopic reflex to Culture     Status: Abnormal    Specimen: Urine, Clean Catch   Result Value Ref Range    Color Urine Yellow Colorless, Straw, Light Yellow, Yellow    Appearance Urine Clear Clear    Glucose Urine Negative Negative mg/dL    Bilirubin Urine Negative Negative    Ketones Urine Negative Negative mg/dL    Specific Gravity Urine 1.013 1.003 - 1.035    Blood Urine Negative Negative    pH Urine 6.0 5.0 - 7.0    Protein Albumin Urine Negative Negative mg/dL    Urobilinogen Urine Normal Normal, 2.0 mg/dL    Nitrite Urine Negative Negative    Leukocyte Esterase Urine Negative Negative    Mucus Urine Present (A) None Seen /LPF    RBC Urine <1 <=2 /HPF    WBC Urine 1 <=5 /HPF    Squamous Epithelials Urine 1 <=1 /HPF    Narrative    Urine Culture not indicated   Comprehensive metabolic panel     Status: Abnormal   Result Value Ref Range    Sodium 139 135 - 145 mmol/L    Potassium 4.4 3.4 - 5.3 mmol/L    Carbon Dioxide (CO2) 31 (H) 22 - 29 mmol/L    Anion Gap 9 7 - 15 mmol/L    Urea Nitrogen 10.9 8.0 - 23.0 mg/dL    Creatinine 1.03 (H) 0.51 - 0.95 mg/dL    GFR Estimate 61 >60 mL/min/1.73m2    Calcium 9.2 8.8 - 10.2 mg/dL    Chloride 99 98 - 107 mmol/L    Glucose 130 (H) 70 - 99 mg/dL    Alkaline Phosphatase 96 40 - 150 U/L    AST 30 0 - 45 U/L    ALT 19 0 - 50 U/L    Protein Total 8.2 6.4 - 8.3 g/dL    Albumin 3.9 3.5 - 5.2 g/dL    Bilirubin Total 0.3 <=1.2 mg/dL   CRP inflammation     Status: Abnormal   Result Value Ref Range    CRP Inflammation 5.21 (H) <5.00 mg/L   Procalcitonin     Status: Normal   Result Value Ref Range    Procalcitonin <0.02 <0.50 ng/mL   Nt probnp inpatient     Status: Normal   Result Value Ref Range    N terminal Pro BNP Inpatient 250 0 - 900 pg/mL   Asymptomatic Influenza A/B, RSV, & SARS-CoV2 PCR (COVID-19) Nasopharyngeal     Status: Normal    Specimen: Nasopharyngeal; Swab   Result Value Ref Range    Influenza A PCR Negative Negative    Influenza B PCR Negative  Negative    RSV PCR Negative Negative    SARS CoV2 PCR Negative Negative    Narrative    Testing was performed using the Xpert Xpress CoV2/Flu/RSV Assay on the [employer] GeneXpert Instrument. This test should be ordered for the detection of SARS-CoV-2, influenza, and RSV viruses in individuals who meet clinical and/or epidemiological criteria. Test performance is unknown in asymptomatic patients. This test is for in vitro diagnostic use under the FDA EUA for laboratories certified under CLIA to perform high or moderate complexity testing. This test has not been FDA cleared or approved. A negative result does not rule out the presence of PCR inhibitors in the specimen or target RNA in concentration below the limit of detection for the assay. If only one viral target is positive but coinfection with multiple targets is suspected, the sample should be re-tested with another FDA cleared, approved, or authorized test, if coinfection would change clinical management. This test was validated by the [facility]. These laboratories are certified under the Clinical Laboratory Improvement Amendments of 1988 (CLIA-88) as qualified to perform high complexity laboratory testing.   Troponin T, High Sensitivity     Status: Abnormal   Result Value Ref Range    Troponin T, High Sensitivity 22 (H) <=14 ng/L   CBC with platelets and differential     Status: Abnormal   Result Value Ref Range    WBC Count 9.5 4.0 - 11.0 10e3/uL    RBC Count 4.65 3.80 - 5.20 10e6/uL    Hemoglobin 12.6 11.7 - 15.7 g/dL    Hematocrit 41.1 35.0 - 47.0 %    MCV 88 78 - 100 fL    MCH 27.1 26.5 - 33.0 pg    MCHC 30.7 (L) 31.5 - 36.5 g/dL    RDW 15.8 (H) 10.0 - 15.0 %    Platelet Count 303 150 - 450 10e3/uL    % Neutrophils 60 %    % Lymphocytes 25 %    % Monocytes 10 %    % Eosinophils 4 %    % Basophils 1 %    % Immature Granulocytes 0 %    NRBCs per 100 WBC 0 <1 /100    Absolute Neutrophils 5.7 1.6 - 8.3 10e3/uL    Absolute Lymphocytes 2.4  0.8 - 5.3 10e3/uL    Absolute Monocytes 0.9 0.0 - 1.3 10e3/uL    Absolute Eosinophils 0.4 0.0 - 0.7 10e3/uL    Absolute Basophils 0.1 0.0 - 0.2 10e3/uL    Absolute Immature Granulocytes 0.0 <=0.4 10e3/uL    Absolute NRBCs 0.0 10e3/uL   EKG 12 lead     Status: None   Result Value Ref Range    Systolic Blood Pressure  mmHg    Diastolic Blood Pressure  mmHg    Ventricular Rate 84 BPM    Atrial Rate 84 BPM    MN Interval 156 ms    QRS Duration 84 ms     ms    QTc 475 ms    P Axis 38 degrees    R AXIS 8 degrees    T Axis 32 degrees    Interpretation ECG       Sinus rhythm  Normal ECG  Unconfirmed report - interpretation of this ECG is computer generated - see medical record for final interpretation  Confirmed by - EMERGENCY ROOM, PHYSICIAN ([id number]),  RAUL DOTSON ([id number]) on 1/25/2024 4:49:57 PM     CBC with platelets differential     Status: Abnormal    Narrative    The following orders were created for panel order CBC with platelets differential.  Procedure                               Abnormality         Status                     ---------                               -----------         ------                     CBC with platelets and d...[[id number]]  Abnormal            Final result                 Please view results for these tests on the individual orders.     Medications - No data to display  Labs Ordered and Resulted from Time of ED Arrival to Time of ED Departure   ROUTINE UA WITH MICROSCOPIC REFLEX TO CULTURE - Abnormal       Result Value    Color Urine Yellow      Appearance Urine Clear      Glucose Urine Negative      Bilirubin Urine Negative      Ketones Urine Negative      Specific Gravity Urine 1.013      Blood Urine Negative      pH Urine 6.0      Protein Albumin Urine Negative      Urobilinogen Urine Normal      Nitrite Urine Negative      Leukocyte Esterase Urine Negative      Mucus Urine Present (*)     RBC Urine <1      WBC Urine 1      Squamous Epithelials Urine 1     COMPREHENSIVE  "METABOLIC PANEL - Abnormal    Sodium 139      Potassium 4.4      Carbon Dioxide (CO2) 31 (*)     Anion Gap 9      Urea Nitrogen 10.9      Creatinine 1.03 (*)     GFR Estimate 61      Calcium 9.2      Chloride 99      Glucose 130 (*)     Alkaline Phosphatase 96      AST 30      ALT 19      Protein Total 8.2      Albumin 3.9      Bilirubin Total 0.3     CRP INFLAMMATION - Abnormal    CRP Inflammation 5.21 (*)    TROPONIN T, HIGH SENSITIVITY - Abnormal    Troponin T, High Sensitivity 22 (*)    CBC WITH PLATELETS AND DIFFERENTIAL - Abnormal    WBC Count 9.5      RBC Count 4.65      Hemoglobin 12.6      Hematocrit 41.1      MCV 88      MCH 27.1      MCHC 30.7 (*)     RDW 15.8 (*)     Platelet Count 303      % Neutrophils 60      % Lymphocytes 25      % Monocytes 10      % Eosinophils 4      % Basophils 1      % Immature Granulocytes 0      NRBCs per 100 WBC 0      Absolute Neutrophils 5.7      Absolute Lymphocytes 2.4      Absolute Monocytes 0.9      Absolute Eosinophils 0.4      Absolute Basophils 0.1      Absolute Immature Granulocytes 0.0      Absolute NRBCs 0.0     PROCALCITONIN - Normal    Procalcitonin <0.02     NT PROBNP INPATIENT - Normal    N terminal Pro BNP Inpatient 250     INFLUENZA A/B, RSV, & SARS-COV2 PCR - Normal    Influenza A PCR Negative      Influenza B PCR Negative      RSV PCR Negative      SARS CoV2 PCR Negative       Chest XR,  PA & LAT   Final Result   IMPRESSION: Clear lungs. Stable cardiac size. Normal pulmonary vascularity.      US Lower Extremity Venous Duplex Bilateral   Final Result   IMPRESSION:   1.  No deep venous thrombosis demonstrated in either leg.      2. Subcutaneous edema in bilateral ankles.      Reference: \"Duplex Ultrasound in the Diagnosis of Lower-Extremity Deep   Venous Thrombosis\"- Naomi Riley MD, S; Trey Florez MD   (Circulation. 2014;129:917-921. http://circ.ahajournals.org)      GEO SAMS MD            SYSTEM ID:  [id number]             Critical care was " not performed.     Medical Decision Making  The patient's presentation was of high complexity (a chronic illness severe exacerbation, progression, or side effect of treatment).    The patient's evaluation involved:  ordering and/or review of 3+ test(s) in this encounter (see separate area of note for details)    The patient's management necessitated only low risk treatment.    Assessment & Plan    This is a 63-year-old female with history of known bilateral lower extremity lymphedema present with concerns for several days of reported swelling and erythema to the lower legs in the absence of any chest pain dyspnea fevers.  On presentation here patient normoxic she is not tachycardic she is not febrile.  She has mild bilateral lower extremity edema noted, equal, with findings consistent with stasis dermatitis.  She has no lymphangitic streaking she has no open skin she has intact PT pulses bilaterally.  There is no unilateral swelling.  Differential considered includes possibility of bilateral lower extremity cellulitis, bilateral DVT, stasis dermatitis, CHF exacerbation, acute liver failure, amongst others.  Will initiate workup including infectious workup, bilateral lower extremity ultrasound, chest x-ray BNP EKG and troponin as patient is being treated for UTI currently nursing staff ordered a repeat UA.    EKG was obtained shows no findings suggestive of ischemia or other arrhythmias.  CBC without leukocytosis or anemia, chemistry shows creatinine 1.03, similar to prior.  Electrolytes within normal range.  CRP only slightly elevated 5.21 with normal procalcitonin, suggest against significant bacterial infection.  BNP normal at 250 suggest against CHF exacerbation.  Troponin here 22, This is similar to prior.  Patient not having any active chest pain at this time low suspicion of ACS.  I did obtain an x-ray of the chest and do not appreciate any obvious consolidation or significant cardiomegaly, radiologist  overread shows stable cardiac size without pleural effusion.  We did obtain as well bilateral lower extremity ultrasound which is negative for DVT and shows subcutaneous edema in the bilateral ankles.  Respiratory swab also obtained is negative.  At this time symptoms most consistent with stasis dermatitis with other findings suggest significant infection, or DVT.  Patient had a urine obtained here which does not suggest infection she is currently on Macrobid and she was instructed to continue this as directed.  Patient was made aware of return precautions.    Patient seen and discussed with attending physician , who agrees with my plan of care.    I have reviewed the nursing notes. I have reviewed the findings, diagnosis, plan and need for follow up with the patient.    Discharge Medication List as of 1/25/2024  5:32 PM          Final diagnoses:   Venous stasis dermatitis of both lower extremities       JONG Pastor  [facility] EMERGENCY DEPARTMENT  1/25/2024

## 2024-01-25 NOTE — DISCHARGE INSTRUCTIONS
Here in the emergency room you have reassuring evaluation.  Your findings for infection in your legs.  You do not have findings for blood clot as your ultrasound today was negative.  Symptoms are most consistent with stasis dermatitis which is skin change that can happen when your veins are not working as they normally showed.  I recommend elevating your legs, compression stockings.  You can follow-up with your primary care provider within the next 1 week.  Your urine sample today is not suggest infection recommend continuing the antibiotic that you are prescribed outpatient.    If you develop any new or worsening symptoms, is important to return right away to the emergency department for further evaluation and management.

## 2024-01-25 NOTE — ED TRIAGE NOTES
Pt  reports possible cellulitis on bilateral lower legs , and uti symptoms .      Triage Assessment (Adult)       Row Name 01/25/24 4301          Triage Assessment    Airway WDL WDL        Respiratory WDL    Respiratory WDL WDL        Skin Circulation/Temperature WDL    Skin Circulation/Temperature WDL WDL        Cardiac WDL    Cardiac WDL WDL        Peripheral/Neurovascular WDL    Peripheral Neurovascular WDL WDL        Cognitive/Neuro/Behavioral WDL    Cognitive/Neuro/Behavioral WDL WDL

## 2024-02-14 ENCOUNTER — APPOINTMENT (OUTPATIENT)
Dept: OPTOMETRY | Facility: CLINIC | Age: 64
End: 2024-02-14
Payer: COMMERCIAL

## 2024-02-14 PROCEDURE — 92340 FIT SPECTACLES MONOFOCAL: CPT

## 2024-02-23 ENCOUNTER — LAB REQUISITION (OUTPATIENT)
Dept: LAB | Facility: CLINIC | Age: 64
End: 2024-02-23
Payer: COMMERCIAL

## 2024-02-23 DIAGNOSIS — E11.9 TYPE 2 DIABETES MELLITUS WITHOUT COMPLICATIONS (H): ICD-10-CM

## 2024-02-23 LAB
ALBUMIN SERPL BCG-MCNC: 4.1 G/DL (ref 3.5–5.2)
ALP SERPL-CCNC: 115 U/L (ref 40–150)
ALT SERPL W P-5'-P-CCNC: 30 U/L (ref 0–50)
ANION GAP SERPL CALCULATED.3IONS-SCNC: 16 MMOL/L (ref 7–15)
AST SERPL W P-5'-P-CCNC: 26 U/L (ref 0–45)
BILIRUB SERPL-MCNC: 0.4 MG/DL
BUN SERPL-MCNC: 11 MG/DL (ref 8–23)
CALCIUM SERPL-MCNC: 9 MG/DL (ref 8.8–10.2)
CHLORIDE SERPL-SCNC: 99 MMOL/L (ref 98–107)
CHOLEST SERPL-MCNC: 93 MG/DL
CREAT SERPL-MCNC: 1.03 MG/DL (ref 0.51–0.95)
DEPRECATED HCO3 PLAS-SCNC: 21 MMOL/L (ref 22–29)
EGFRCR SERPLBLD CKD-EPI 2021: 61 ML/MIN/1.73M2
FASTING STATUS PATIENT QL REPORTED: NO
GLUCOSE SERPL-MCNC: 143 MG/DL (ref 70–99)
HDLC SERPL-MCNC: 34 MG/DL
LDLC SERPL CALC-MCNC: 29 MG/DL
NONHDLC SERPL-MCNC: 59 MG/DL
POTASSIUM SERPL-SCNC: 4.1 MMOL/L (ref 3.4–5.3)
PROT SERPL-MCNC: 8.2 G/DL (ref 6.4–8.3)
SODIUM SERPL-SCNC: 136 MMOL/L (ref 135–145)
TRIGL SERPL-MCNC: 149 MG/DL
TSH SERPL DL<=0.005 MIU/L-ACNC: 3.51 UIU/ML (ref 0.3–4.2)

## 2024-02-23 PROCEDURE — 84443 ASSAY THYROID STIM HORMONE: CPT | Mod: ORL | Performed by: FAMILY MEDICINE

## 2024-02-23 PROCEDURE — 80061 LIPID PANEL: CPT | Mod: ORL | Performed by: FAMILY MEDICINE

## 2024-02-23 PROCEDURE — 80053 COMPREHEN METABOLIC PANEL: CPT | Mod: ORL | Performed by: FAMILY MEDICINE

## 2024-02-26 ENCOUNTER — TRANSCRIBE ORDERS (OUTPATIENT)
Dept: OTHER | Age: 64
End: 2024-02-26

## 2024-02-26 DIAGNOSIS — I50.22 CHRONIC SYSTOLIC CONGESTIVE HEART FAILURE (H): ICD-10-CM

## 2024-02-26 DIAGNOSIS — E66.01 CLASS 3 SEVERE OBESITY WITH SERIOUS COMORBIDITY AND BODY MASS INDEX (BMI) OF 40.0 TO 44.9 IN ADULT, UNSPECIFIED OBESITY TYPE (H): ICD-10-CM

## 2024-02-26 DIAGNOSIS — E11.9 TYPE 2 DIABETES MELLITUS WITHOUT COMPLICATION, WITHOUT LONG-TERM CURRENT USE OF INSULIN (H): Primary | ICD-10-CM

## 2024-02-26 DIAGNOSIS — E03.9 ACQUIRED HYPOTHYROIDISM: ICD-10-CM

## 2024-02-26 DIAGNOSIS — E66.813 CLASS 3 SEVERE OBESITY WITH SERIOUS COMORBIDITY AND BODY MASS INDEX (BMI) OF 40.0 TO 44.9 IN ADULT, UNSPECIFIED OBESITY TYPE (H): ICD-10-CM

## 2024-04-05 ENCOUNTER — THERAPY VISIT (OUTPATIENT)
Dept: PHYSICAL THERAPY | Facility: CLINIC | Age: 64
End: 2024-04-05
Payer: COMMERCIAL

## 2024-04-05 ENCOUNTER — OFFICE VISIT (OUTPATIENT)
Dept: SLEEP MEDICINE | Facility: CLINIC | Age: 64
End: 2024-04-05
Payer: COMMERCIAL

## 2024-04-05 VITALS
DIASTOLIC BLOOD PRESSURE: 76 MMHG | HEART RATE: 72 BPM | BODY MASS INDEX: 45.82 KG/M2 | OXYGEN SATURATION: 91 % | HEIGHT: 65 IN | WEIGHT: 275 LBS | SYSTOLIC BLOOD PRESSURE: 119 MMHG

## 2024-04-05 DIAGNOSIS — E66.813 CLASS 3 SEVERE OBESITY DUE TO EXCESS CALORIES WITH SERIOUS COMORBIDITY AND BODY MASS INDEX (BMI) OF 45.0 TO 49.9 IN ADULT (H): ICD-10-CM

## 2024-04-05 DIAGNOSIS — E66.01 SEVERE OBESITY (H): ICD-10-CM

## 2024-04-05 DIAGNOSIS — E11.9 TYPE 2 DIABETES MELLITUS WITHOUT COMPLICATION, WITHOUT LONG-TERM CURRENT USE OF INSULIN (H): Primary | ICD-10-CM

## 2024-04-05 DIAGNOSIS — R06.89 DYSPNEA AND RESPIRATORY ABNORMALITY: ICD-10-CM

## 2024-04-05 DIAGNOSIS — G47.09 OTHER INSOMNIA: ICD-10-CM

## 2024-04-05 DIAGNOSIS — I50.22 CHRONIC SYSTOLIC CONGESTIVE HEART FAILURE (H): ICD-10-CM

## 2024-04-05 DIAGNOSIS — R53.83 MALAISE AND FATIGUE: ICD-10-CM

## 2024-04-05 DIAGNOSIS — R53.81 MALAISE AND FATIGUE: ICD-10-CM

## 2024-04-05 DIAGNOSIS — I10 ESSENTIAL HYPERTENSION: ICD-10-CM

## 2024-04-05 DIAGNOSIS — J96.12 CHRONIC RESPIRATORY FAILURE WITH HYPERCAPNIA (H): Primary | Chronic | ICD-10-CM

## 2024-04-05 DIAGNOSIS — G47.30 SLEEP APNEA, UNSPECIFIED TYPE: ICD-10-CM

## 2024-04-05 DIAGNOSIS — E66.01 CLASS 3 SEVERE OBESITY DUE TO EXCESS CALORIES WITH SERIOUS COMORBIDITY AND BODY MASS INDEX (BMI) OF 45.0 TO 49.9 IN ADULT (H): ICD-10-CM

## 2024-04-05 DIAGNOSIS — Z72.820 LACK OF ADEQUATE SLEEP: ICD-10-CM

## 2024-04-05 DIAGNOSIS — R06.00 DYSPNEA AND RESPIRATORY ABNORMALITY: ICD-10-CM

## 2024-04-05 PROBLEM — I47.21 TORSADES DE POINTES (H): Chronic | Status: ACTIVE | Noted: 2022-02-13

## 2024-04-05 PROBLEM — R07.9 CHEST PAIN, UNSPECIFIED TYPE: Status: RESOLVED | Noted: 2022-03-25 | Resolved: 2024-04-05

## 2024-04-05 PROBLEM — I89.0 LYMPHEDEMA OF BOTH LOWER EXTREMITIES: Chronic | Status: ACTIVE | Noted: 2020-01-21

## 2024-04-05 PROBLEM — U07.1 ACUTE RESPIRATORY FAILURE DUE TO COVID-19 (H): Chronic | Status: ACTIVE | Noted: 2022-02-13

## 2024-04-05 PROBLEM — L03.90 CELLULITIS: Status: RESOLVED | Noted: 2020-07-06 | Resolved: 2024-04-05

## 2024-04-05 PROBLEM — Z86.74 HISTORY OF SUDDEN CARDIAC ARREST: Chronic | Status: RESOLVED | Noted: 2019-08-24 | Resolved: 2024-04-05

## 2024-04-05 PROBLEM — L03.116 LEFT LEG CELLULITIS: Status: RESOLVED | Noted: 2023-06-21 | Resolved: 2024-04-05

## 2024-04-05 PROBLEM — Z86.74 HISTORY OF SUDDEN CARDIAC ARREST: Chronic | Status: ACTIVE | Noted: 2019-08-24

## 2024-04-05 PROBLEM — J96.02 ACUTE RESPIRATORY FAILURE WITH HYPOXIA AND HYPERCAPNIA (H): Status: RESOLVED | Noted: 2022-02-08 | Resolved: 2024-04-05

## 2024-04-05 PROBLEM — Z72.0 TOBACCO ABUSE: Chronic | Status: ACTIVE | Noted: 2018-05-17

## 2024-04-05 PROBLEM — J96.01 ACUTE RESPIRATORY FAILURE WITH HYPOXIA AND HYPERCAPNIA (H): Status: RESOLVED | Noted: 2022-02-08 | Resolved: 2024-04-05

## 2024-04-05 PROBLEM — I87.2 CHRONIC VENOUS INSUFFICIENCY OF LOWER EXTREMITY: Chronic | Status: ACTIVE | Noted: 2020-01-21

## 2024-04-05 PROBLEM — N30.00 ACUTE CYSTITIS WITHOUT HEMATURIA: Status: RESOLVED | Noted: 2023-06-21 | Resolved: 2024-04-05

## 2024-04-05 PROBLEM — F11.20 OPIOID TYPE DEPENDENCE, ABUSE (H): Chronic | Status: ACTIVE | Noted: 2020-02-15

## 2024-04-05 PROBLEM — J96.00 ACUTE RESPIRATORY FAILURE DUE TO COVID-19 (H): Chronic | Status: ACTIVE | Noted: 2022-02-13

## 2024-04-05 PROBLEM — R00.1 BRADYCARDIA: Status: RESOLVED | Noted: 2022-02-13 | Resolved: 2024-04-05

## 2024-04-05 PROBLEM — F41.9 ANXIETY DISORDER: Chronic | Status: ACTIVE | Noted: 2022-02-08

## 2024-04-05 PROBLEM — Z86.79: Status: RESOLVED | Noted: 2022-03-09 | Resolved: 2024-04-05

## 2024-04-05 PROCEDURE — 99205 OFFICE O/P NEW HI 60 MIN: CPT | Performed by: PHYSICIAN ASSISTANT

## 2024-04-05 PROCEDURE — 97110 THERAPEUTIC EXERCISES: CPT | Mod: GP | Performed by: PHYSICAL THERAPIST

## 2024-04-05 PROCEDURE — 97161 PT EVAL LOW COMPLEX 20 MIN: CPT | Mod: GP | Performed by: PHYSICAL THERAPIST

## 2024-04-05 ASSESSMENT — SLEEP AND FATIGUE QUESTIONNAIRES
HOW LIKELY ARE YOU TO NOD OFF OR FALL ASLEEP WHILE LYING DOWN TO REST IN THE AFTERNOON WHEN CIRCUMSTANCES PERMIT: SLIGHT CHANCE OF DOZING
HOW LIKELY ARE YOU TO NOD OFF OR FALL ASLEEP WHILE WATCHING TV: MODERATE CHANCE OF DOZING
HOW LIKELY ARE YOU TO NOD OFF OR FALL ASLEEP WHILE SITTING QUIETLY AFTER LUNCH WITHOUT ALCOHOL: SLIGHT CHANCE OF DOZING
HOW LIKELY ARE YOU TO NOD OFF OR FALL ASLEEP WHILE SITTING AND READING: MODERATE CHANCE OF DOZING
HOW LIKELY ARE YOU TO NOD OFF OR FALL ASLEEP WHILE SITTING AND TALKING TO SOMEONE: SLIGHT CHANCE OF DOZING
HOW LIKELY ARE YOU TO NOD OFF OR FALL ASLEEP WHILE SITTING INACTIVE IN A PUBLIC PLACE: WOULD NEVER DOZE
HOW LIKELY ARE YOU TO NOD OFF OR FALL ASLEEP WHEN YOU ARE A PASSENGER IN A CAR FOR AN HOUR WITHOUT A BREAK: MODERATE CHANCE OF DOZING
HOW LIKELY ARE YOU TO NOD OFF OR FALL ASLEEP IN A CAR, WHILE STOPPED FOR A FEW MINUTES IN TRAFFIC: WOULD NEVER DOZE

## 2024-04-05 NOTE — PATIENT INSTRUCTIONS
"          MY TREATMENT INFORMATION FOR SLEEP APNEA-  No Yusuf    DOCTOR : Roman Carver PA    Am I having a sleep study at a sleep center?  --->Due to normal delays, you will be contacted within 2-4 weeks to schedule    Am I having a home sleep study?  --->Watch the video for the device you are using:    -/drop off device-   https://www.[employer]aceutical[employer].com/watch?v=yGGFBdELGhk    -Disposable device sent out require phone/computer application-   https://www.[employer].com/watch?v=BCce_vbiwxE      Frequently asked questions:  1. What is Obstructive Sleep Apnea (GENE)? GENE is the most common type of sleep apnea. Apnea means, \"without breath.\"  Apnea is most often caused by narrowing or collapse of the upper airway as muscles relax during sleep.   Almost everyone has occasional apneas. Most people with sleep apnea have had brief interruptions at night frequently for many years.  The severity of sleep apnea is related to how frequent and severe the events are.   2. What are the consequences of GENE? Symptoms include: feeling sleepy during the day, snoring loudly, gasping or stopping of breathing, trouble sleeping, and occasionally morning headaches or heartburn at night.  Sleepiness can be serious and even increase the risk of falling asleep while driving. Other health consequences may include development of high blood pressure and other cardiovascular disease in persons who are susceptible. Untreated GENE  can contribute to heart disease, stroke and diabetes.   3. What are the treatment options? In most situations, sleep apnea is a lifelong disease that must be managed with daily therapy. Medications are not effective for sleep apnea and surgery is generally not considered until other therapies have been tried. Your treatment is your choice . Continuous Positive Airway (CPAP) works right away and is the therapy that is effective in nearly everyone. An oral device to hold your jaw forward is usually the next most " reliable option. Other options include postioning devices (to keep you off your back), weight loss, and surgery including a tongue pacing device. There is more detail about some of these options below.  4. Are my sleep studies covered by insurance? Although we will request verification of coverage, we advise you also check in advance of the study to ensure there is coverage.    Important tips for those choosing CPAP and similar devices  REMEMBER-IF YOU RECEIVE A CALL FROM  [phone]-->IT IS TO SETUP A DEVICE  For new devices, sign up for device ANGEL to monitor your device for your followup visits  We encourage you to utilize the [employer] angel or website ( https://Opendisc.[employer]/ ) to monitor your therapy progress and share the data with your healthcare team when you discuss your sleep apnea.                                                    Know your equipment:  CPAP is continuous positive airway pressure that prevents obstructive sleep apnea by keeping the throat from collapsing while you are sleeping. In most cases, the device is  smart  and can slowly self-adjusts if your throat collapses and keeps a record every day of how well you are treated-this information is available to you and your care team.  BPAP is bilevel positive airway pressure that keeps your throat open and also assists each breath with a pressure boost to maintain adequate breathing.  Special kinds of BPAP are used in patients who have inadequate breathing from lung or heart disease. In most cases, the device is  smart  and can slowly self-adjusts to assist breathing. Like CPAP, the device keeps a record of how well you are treated.  Your mask is your connection to the device. You get to choose what feels most comfortable and the staff will help to make sure if fits. Here: are some examples of the different masks that are available: Magnetic mask aids may assist with use but there are safety issues that should be addressed when  considering with magnets* ( see end of discussion).       Key points to remember on your journey with sleep apnea:  Sleep study.  PAP devices often need to be adjusted during a sleep study to show that they are effective and adjusted right.  Good tips to remember: Try wearing just the mask during a quiet time during the day so your body adapts to wearing it. A humidifier is recommended for comfort in most cases to prevent drying of your nose and throat. Allergy medication from your provider may help you if you are having nasal congestion.  Getting settled-in. It takes more than one night for most of us to get used to wearing a mask. Try wearing just the mask during a quiet time during the day so your body adapts to wearing it. A humidifier is recommended for comfort in most cases. Our team will work with you carefully on the first day and will be in contact within 4 days and again at 2 and 4 weeks for advice and remote device adjustments. Your therapy is evaluated by the device each day.   Use it every night. The more you are able to sleep naturally for 7-8 hours, the more likely you will have good sleep and to prevent health risks or symptoms from sleep apnea. Even if you use it 4 hours it helps. Occasionally all of us are unable to use a medical therapy, in sleep apnea, it is not dangerous to miss one night.   Communicate. Call our skilled team on the number provided on the first day if your visit for problems that make it difficult to wear the device. Over 2 out of 3 patients can learn to wear the device long-term with help from our team. Remember to call our team or your sleep providers if you are unable to wear the device as we may have other solutions for those who cannot adapt to mask CPAP therapy. It is recommended that you sleep your sleep provider within the first 3 months and yearly after that if you are not having problems.   Use it for your health. We encourage use of CPAP masks during daytime quiet  periods to allow your face and brain to adapt to the sensation of CPAP so that it will be a more natural sensation to awaken to at night or during naps. This can be very useful during the first few weeks or months of adapting to CPAP though it does not help medically to wear CPAP during wakefulness and  should not be used as a strategy just to meet guidelines.  Take care of your equipment. Make sure you clean your mask and tubing using directions every day and that your filter and mask are replaced as recommended or if they are not working.     *Masks with magnets:  Updated Contraindications  Masks with magnetic components are contraindicated for use by patients where they, or anyone in close physical contact while using the mask, have the following:   Active medical implants that interact with magnets (i.e., pacemakers, implantable cardioverter defibrillators (ICD), neurostimulators, cerebrospinal fluid (CSF) shunts, insulin/infusion pumps)   Metallic implants/objects containing ferromagnetic material (i.e., aneurysm clips/flow disruption devices, embolic coils, stents, valves, electrodes, implants to restore hearing or balance with implanted magnets, ocular implants, metallic splinters in the eye)  Updated Warning  Keep the mask magnets at a safe distance of at least 6 inches (150 mm) away from implants or medical devices that may be adversely affected by magnetic interference. This warning applies to you or anyone in close physical contact with your mask. The magnets are in the frame and lower headgear clips, with a magnetic field strength of up to 400mT. When worn, they connect to secure the mask but may inadvertently detach while asleep.  Implants/medical devices, including those listed within contraindications, may be adversely affected if they change function under external magnetic fields or contain ferromagnetic materials that attract/repel to magnetic fields (some metallic implants, e.g., contact lenses  with metal, dental implants, metallic cranial plates, screws, raza hole covers, and bone substitute devices). Consult your physician and  of your implant / other medical device for information on the potential adverse effects of magnetic fields.    BESIDES CPAP, WHAT OTHER THERAPIES ARE THERE?    Positioning Device  Positioning devices are generally used when sleep apnea is mild and only occurs on your back.This example shows a pillow that straps around the waist. It may be appropriate for those whose sleep study shows milder sleep apnea that occurs primarily when lying flat on one's back. Preliminary studies have shown benefit but effectiveness at home may need to be verified by a home sleep test. These devices are generally not covered by medical insurance.  Examples of devices that maintain sleeping on the back to prevent snoring and mild sleep apnea.    Belt type body positioner  http://[employer]Now/    Electronic reminder  http://nightshifttherapy.[employer]/            Oral Appliance  What is oral appliance therapy?  An oral appliance device fits on your teeth at night like a retainer used after having braces. The device is made by a specialized dentist and requires several visits over 1-2 months before a manufactured device is made to fit your teeth and is adjusted to prevent your sleep apnea. Once an oral device is working properly, snoring should be improved. A home sleep test may be recommended at that time if to determine whether the sleep apnea is adequately treated.       Some things to remember:  -Oral devices are often, but not always, covered by your medical insurance. Be sure to check with your insurance provider.   -If you are referred for oral therapy, you will be given a list of specialized dentists to consider or you may choose to visit the Web site of the American Academy of Dental Sleep Medicine  -Oral devices are less likely to work if you have severe sleep apnea or are extremely  overweight.     More detailed information  An oral appliance is a small acrylic device that fits over the upper and lower teeth  (similar to a retainer or a mouth guard). This device slightly moves jaw forward, which moves the base of the tongue forward, opens the airway, improves breathing for effective treat snoring and obstructive sleep apnea in perhaps 7 out of 10 people .  The best working devices are custom-made by a dental device  after a mold is made of the teeth 1, 2, 3.  When is an oral appliance indicated?  Oral appliance therapy is recommended as a first-line treatment for patients with primary snoring, mild sleep apnea, and for patients with moderate sleep apnea who prefer appliance therapy to use of CPAP4, 5. Severity of sleep apnea is determined by sleep testing and is based on the number of respiratory events per hour of sleep.   How successful is oral appliance therapy?  The success rate of oral appliance therapy in patients with mild sleep apnea is 75-80% while in patients with moderate sleep apnea it is 50-70%. The chance of success in patients with severe sleep apnea is 40-50%. The research also shows that oral appliances have a beneficial effect on the cardiovascular health of GENE patients at the same magnitude as CPAP therapy7.  Oral appliances should be a second-line treatment in cases of severe sleep apnea, but if not completely successful then a combination therapy utilizing CPAP plus oral appliance therapy may be effective. Oral appliances tend to be effective in a broad range of patients although studies show that the patients who have the highest success are females, younger patients, those with milder disease, and less severe obesity. 3, 6.   Finding a dentist that practices dental sleep medicine  Specific training is available through the American Academy of Dental Sleep Medicine for dentists interested in working in the field of sleep. To find a dentist who is educated in  the field of sleep and the use of oral appliances, near you, visit the Web site of the American Academy of Dental Sleep Medicine.    References  1. Samuel, et al. Objectively measured vs self-reported compliance during oral appliance therapy for sleep-disordered breathing. Chest 2013; 144(5): 0548-9211.  2. Blas et al. Objective measurement of compliance during oral appliance therapy for sleep-disordered breathing. Thorax 2013; 68(1): 91-96.  3. Rohan et al. Mandibular advancement devices in 620 men and women with GENE and snoring: tolerability and predictors of treatment success. Chest 2004; 125: 3406-4429.  4. Katelyn, et al. Oral appliances for snoring and GENE: a review. Sleep 2006; 29: 244-262.  5. Momo et al. Oral appliance treatment for GENE: an update. J Clin Sleep Med 2014; 10(2): 215-227.  6. Robert et al. Predictors of OSAH treatment outcome. J Dent Res 2007; 86: 6630-1387.      Weight Loss:   Your Body mass index is 45.76 kg/m .    Being overweight does not necessarily mean you will have health consequences.  Those who have BMI over 35 or over 27 with existing medical conditions carries greater risk.   Weight loss decreases severity of sleep apnea in most people with obesity. For those with mild obesity who have developed snoring with weight gain, even 15-30 pound weight loss can improve and occasionally milder eliminate sleep apnea.  Structured and life-long dietary and health habits are necessary to lose weight and keep healthier weight levels.     The Comprehensive Weight loss program offers all aspects of weight loss strategies including two Non-Surgical Weight Loss Programs: Medical Weight Management and our 24 Week Healthy Lifestyle Program:    Medical Weight Management: You will meet with a Medical Weight Management Provider, as well as a Registered Dietician. The program may include medication therapy, dietary education, recommended exercise and physical therapy programs,  monthly support group meetings, and possible psychological counseling. Follow up visits with the provider or dietician are scheduled based on your progress and needs.    24 Week Healthy Lifestyle Program: This unique program is designed to give you the support of weekly appointments and activities thru a 24-week period. It may include all of the components of the basic program (above), with the addition of 11 individual Health  Visits, 24-week access to the [facility] website for over 700 online classes, and monthly support group meetings. This program has an out-of-pocket expense of $499 to cover the items that can not be billed to insurance (health coaches and [facility] access), and is non-refundable/non-transferable (you may be able to use a Health Savings Account; ask your HSA provider). There may be an optional meal replacement plan prescribed as well.   Surgical management achieves meaningful long-term weight loss and improvement in health risks in most patients with more severe obesity.      Sleep Apnea Surgery:    Surgery for obstructive sleep apnea is considered generally only when other therapies fail to work. Surgery may be discussed with you if you are having a difficult time tolerating CPAP and or when there is an abnormal structure that requires surgical correction.  Nose and throat surgeries often enlarge the airway to prevent collapse.  Most of these surgeries create pain for 1-2 weeks and up to half of the most common surgeries are not effective throughout life.  You should carefully discuss the benefits and drawbacks to surgery with your sleep provider and surgeon to determine if it is the best solution for you.   More information  Surgery for GENE is directed at areas that are responsible for narrowing or complete obstruction of the airway during sleep.  There are a wide range of procedures available to enlarge and/or stabilize the airway to prevent blockage of breathing in the three major  areas where it can occur: the palate, tongue, and nasal regions.  Successful surgical treatment depends on the accurate identification of the factors responsible for obstructive sleep apnea in each person.  A personalized approach is required because there is no single treatment that works well for everyone.  Because of anatomic variation, consultation with an examination by a sleep surgeon is a critical first step in determining what surgical options are best for each patient.  In some cases, examination during sedation may be recommended in order to guide the selection of procedures.  Patients will be counseled about risks and benefits as well as the typical recovery course after surgery. Surgery is typically not a cure for a person s GENE.  However, surgery will often significantly improve one s GENE severity (termed  success rate ).  Even in the absence of a cure, surgery will decrease the cardiovascular risk associated with OSA7; improve overall quality of life8 (sleepiness, functionality, sleep quality, etc).      Palate Procedures:  Patients with GENE often have narrowing of their airway in the region of their tonsils and uvula.  The goals of palate procedures are to widen the airway in this region as well as to help the tissues resist collapse.  Modern palate procedure techniques focus on tissue conservation and soft tissue rearrangement, rather than tissue removal.  Often the uvula is preserved in this procedure. Residual sleep apnea is common in patient after pharyngoplasty with an average reduction in sleep apnea events of 33%2.      Tongue Procedures:  ExamWhile patients are awake, the muscles that surround the throat are active and keep this region open for breathing. These muscles relax during sleep, allowing the tongue and other structures to collapse and block breathing.  There are several different tongue procedures available.  Selection of a tongue base procedure depends on characteristics seen on  physical exam.  Generally, procedures are aimed at removing bulky tissues in this area or preventing the back of the tongue from falling back during sleep.  Success rates for tongue surgery range from 50-62%3.    Hypoglossal Nerve Stimulation:  Hypoglossal nerve stimulation has recently received approval from the [place] Food and Drug Administration for the treatment of obstructive sleep apnea.  This is based on research showing that the system was safe and effective in treating sleep apnea6.  Results showed that the median AHI score decreased 68%, from 29.3 to 9.0. This therapy uses an implant system that senses breathing patterns and delivers mild stimulation to airway muscles, which keeps the airway open during sleep.  The system consists of three fully implanted components: a small generator (similar in size to a pacemaker), a breathing sensor, and a stimulation lead.  Using a small handheld remote, a patient turns the therapy on before bed and off upon awakening.    Candidates for this device must be greater than 18 years of age, have moderate to severe obstructive sleep apnea with less than 25% central events  (AHI between 15-65), BMI less than 35, have tried CPAP/oral appliance for at least 8 weeks without success, and have appropriate upper airway anatomy (determined by a sleep endoscopy performed by Dr. Darius Hammonds or Dr. Dhaval Rodriguez).    Nasal Procedures:  Nasal obstruction can interfere with nasal breathing during the day and night.  Studies have shown that relief of nasal obstruction can improve the ability of some patients to tolerate positive airway pressure therapy for obstructive sleep apnea1.  Treatment options include medications such as nasal saline, topical corticosteroid and antihistamine sprays, and oral medications such as antihistamines or decongestants. Non-surgical treatments can include external nasal dilators for selected patients. If these are not successful by themselves,  surgery can improve the nasal airway either alone or in combination with these other options.        Combination Procedures:  Combination of surgical procedures and other treatments may be recommended, particularly if patients have more than one area of narrowing or persistent positional disease.  The success rate of combination surgery ranges from 66-80%2,3.    References  Tayo HILLIARD. The Role of the Nose in Snoring and Obstructive Sleep Apnoea: An Update.  Eur Arch Otorhinolaryngol. 2011; 268: 1365-73.   Jay SM; Mike JA; Latricia JR; Pallanch JF; Franklin MB; Deyanira SG; Jelly MCGILL. Surgical modifications of the upper airway for obstructive sleep apnea in adults: a systematic review and meta-analysis. SLEEP 2010;33(10):2802-8085. Radha LOPEZ. Hypopharyngeal surgery in obstructive sleep apnea: an evidence-based medicine review.  Arch Otolaryngol Head Neck Surg. 2006 Feb;132(2):206-13.  Foreign YH1, Sandra Y, Anuj JIGNESH. The efficacy of anatomically based multilevel surgery for obstructive sleep apnea. Otolaryngol Head Neck Surg. 2003 Oct;129(4):327-35.  Radha LOPEZ, Goldberg A. Hypopharyngeal Surgery in Obstructive Sleep Apnea: An Evidence-Based Medicine Review. Arch Otolaryngol Head Neck Surg. 2006 Feb;132(2):206-13.  Roberto CHOE et al. Upper-Airway Stimulation for Obstructive Sleep Apnea.  N Engl J Med. 2014 Jan 9;370(2):139-49.  Patricia Y et al. Increased Incidence of Cardiovascular Disease in Middle-aged Men with Obstructive Sleep Apnea. Am J Respir Crit Care Med; 2002 166: 159-165  Rosen EM et al. Studying Life Effects and Effectiveness of Palatopharyngoplasty (SLEEP) study: Subjective Outcomes of Isolated Uvulopalatopharyngoplasty. Otolaryngol Head Neck Surg. 2011; 144: 623-631.        WHAT IF I ONLY HAVE SNORING?    Mandibular advancement devices, lateral sleep positioning, long-term weight loss and treatment of nasal allergies have been shown to improve snoring.  Exercising tongue muscles with a game  (https://[employer]works/us/angel/soundly-reduce-snoring/jk3241397864) or stimulating the tongue during the day with a device (https://doi.org/10.3390/ivj86641197) have improved snoring in some individuals.  https://www.CLARED.[employer]/  https://www.sleepfoundation.org/best-anti-snoring-mouthpieces-and-mouthguards    Remember to Drive Safe... Drive Alive     Sleep health profoundly affects your health, mood, and your safety.  Thirty three percent of the population (one in three of us) is not getting enough sleep and many have a sleep disorder. Not getting enough sleep or having an untreated / undertreated sleep condition may make us sleepy without even knowing it. In fact, our driving could be dramatically impaired due to our sleep health. As your provider, here are some things I would like you to know about driving:     Here are some warning signs for impairment and dangerous drowsy driving:              -Having been awake more than 16 hours               -Looking tired               -Eyelid drooping              -Head nodding (it could be too late at this point)              -Driving for more than 30 minutes     Some things you could do to make the driving safer if you are experiencing some drowsiness:              -Stop driving and rest              -Call for transportation              -Make sure your sleep disorder is adequately treated     Some things that have been shown NOT to work when experiencing drowsiness while driving:              -Turning on the radio              -Opening windows              -Eating any  distracting  /  entertaining  foods (e.g., sunflower seeds, candy, or any other)              -Talking on the phone      Your decision may not only impact your life, but also the life of others. Please, remember to drive safe for yourself and all of us.          [facility]  Cognitive Behavioral Therapy for Insomnia       Core Sleep Training Module    Now that you understand a bit more about how  sleep works and what causes insomnia, you are ready to begin CBT-I Core Sleep Training.  This process involves five key strategies that will:    Strengthen your sleep drive and circadian sleep rhythm  Strengthen the link between your bed and sleep    It will be important that you continue to keep track of your sleep using your Insomnia  Ann-Marie or your paper sleep diary.      Core Sleep Strategies    Much like physical activity and healthy eating strengthens our body, studies show that the following Core strategies are key to achieving stronger, healthier sleep. The focus is on quality of sleep (not quantity) and improving how you feel during the day.     Reduce Your Time in Bed    Spending extra time in bed trying to get more sleep can cause more sleep disruption and weaken sleep efficiency. Sleep efficiency is simply the percentage of time a person spends asleep while in bed. Normal sleep efficiency is thought to be 85% or greater.  By reducing your time in bed, you will be awake longer during the day leading to quicker and deeper sleep at night. This strategy does not reduce the amount of sleep you get, just the time you are awake in bed.                                             Your provider has recommended the following initial sleep schedule determined by your  estimate of average sleep time over the past two weeks plus 30 minutes.  It also consider the best time for you to sleep.     Your Sleep Schedule Prescription                       Total Time in Bed:  6 hours                     Bedtime:  No earlier than 12 am                      Wake-up Time:  Out of bed every day by 6 am      Don't go to bed until you feel sleepy (not tired or fatigued)     This helps increase your sleep drive by keeping you awake longer.  If you go to bed when you're not sleepy, it gives your brain the wrong message and can lead to frustration.     If you feel sleepy before your prescribed bedtime, find activities that can help  you stay awake until it is time for you to go to bed. Even brief naps in the evening can be very disruptive of sleep at night.      Don't stay in bed unless you are sleeping      If you are unable to fall asleep or return to sleep after about 30 minutes, get out of bed. This helps train your brain that the bed is for sleep. It is harmful to your sleep when you are worried or frustrated in bed. Do not read, eat, worry, think about sleep, use mobile phones or tablets, or watch TV in bed. Do not watch the clock during the night.     Go to another room and do something relaxing. Plan things you can do when you get out of bed. Avoid use of mobile devices or computers when out of bed.    Return to bed only when you feel sleepy again.  Repeat as often as needed throughout the night.      Get out of bed at the same time every day    Getting up at the same time helps set your biological clock. It is important to stick to your wake time no matter how much sleep you got the night before or how you feel in the morning.    Varying your wake can have the same effect as jet lag.  It disrupts your biological clock and makes you feel more tired and exhausted.  Trying to  catch up  on sleep on the weekends only makes things worse and sets you up for a cycle of poor sleep the following weekdays.      Make sure you set an alarm and keep it away from the bed to prevent looking at the clock during the night.       Avoid daytime napping    Avoid daytime napping if possible. Napping partially fulfills your need for sleep and weakens your sleep drive at night.    However, if you find yourself sleepy (not just tired) you can take a brief 15-30-minute nap during the day if needed.  Naps within 7-8 hours of your prescribed wake time are less likely to affect your sleep the coming night.  Sleepy people make more mistakes and may hurt themselves or others. Therefore, safety trumps all other sleep prescriptions.  Never drive or operate equipment  if drowsy or sleepy.     Changing Your Sleep Schedule Prescription    After a week, you can adjust your sleep schedule prescription based on how well you are sleeping. Use the following guidelines to change your prescribed time in bed based on information from your Insomnia  angel or paper sleep diary.          Increase Time in Bed by 15 Minutes IF:    Your Insomnia  angel progress tracker estimates that your sleep efficiency has been greater than 90% over the past week (press Progress icon on the home screen and swipe left for this value).    OR you are falling asleep in less than 30 minutes AND awake less than 30 minutes during the night.              Decrease Time in Bed by 15 Minutes IF:    Your Insomnia  angel sleep diary progress tracker estimates that your sleep efficiency has been less than 80% over the past week (press Progress icon on the home screen and swipe left for this value).    OR it is taking longer than 30 minutes to fall asleep OR you are awake more than 30 minutes during the night.      DO NOT decrease your sleep schedule below 5.5  hours     Change is Challenging    Research shows that making significant changes in sleep habits improves sleep quality and how you feel. Improvement takes time and is not always steady. Change is challenging and can be stressful.  This is especially true if old habits - like spending more time in bed -- were a way to avoid worry about getting enough sleep.     Your Body mass index is 45.76 kg/m .  Weight management is a personal decision.  If you are interested in exploring weight loss strategies, the following discussion covers the approaches that may be successful. Body mass index (BMI) is one way to tell whether you are at a healthy weight, overweight, or obese. It measures your weight in relation to your height.  A BMI of 18.5 to 24.9 is in the healthy range. A person with a BMI of 25 to 29.9 is considered overweight, and someone with a BMI of 30 or  greater is considered obese. More than two-thirds of American adults are considered overweight or obese.  Being overweight or obese increases the risk for further weight gain. Excess weight may lead to heart disease and diabetes.  Creating and following plans for healthy eating and physical activity may help you improve your health.  Weight control is part of healthy lifestyle and includes exercise, emotional health, and healthy eating habits. Careful eating habits lifelong are the mainstay of weight control. Though there are significant health benefits from weight loss, long-term weight loss with diet alone may be very difficult to achieve- studies show long-term success with dietary management in less than 10% of people. Attaining a healthy weight may be especially difficult to achieve in those with severe obesity. In some cases, medications, devices and surgical management might be considered.  What can you do?  If you are overweight or obese and are interested in methods for weight loss, you should discuss this with your provider.   Consider reducing daily calorie intake by 500 calories.   Keep a food journal.   Avoiding skipping meals, consider cutting portions instead.    Diet combined with exercise helps maintain muscle while optimizing fat loss. Strength training is particularly important for building and maintaining muscle mass. Exercise helps reduce stress, increase energy, and improves fitness. Increasing exercise without diet control, however, may not burn enough calories to loose weight.     Start walking three days a week 10-20 minutes at a time  Work towards walking thirty minutes five days a week   Eventually, increase the speed of your walking for 1-2 minutes at time    In addition, we recommend that you review healthy lifestyles and methods for weight loss available through the National Institutes of Health patient information sites:  http://win.niddk.nih.gov/publications/index.htm    And look into  health and wellness programs that may be available through your health insurance provider, employer, local community center, or josué club.

## 2024-04-05 NOTE — NURSING NOTE
"Chief Complaint   Patient presents with    Sleep Problem     Patient presents to clinic for a sleep medicine consultation.  Patient was referred by Rebecca Woodard MD.           Initial /76   Pulse 72   Ht 1.651 m (5' 5\")   Wt 124.7 kg (275 lb)   LMP 11/01/2013   SpO2 91%   BMI 45.76 kg/m   Estimated body mass index is 45.76 kg/m  as calculated from the following:    Height as of this encounter: 1.651 m (5' 5\").    Weight as of this encounter: 124.7 kg (275 lb).    Medication Reconciliation: complete    Neck circumference: 17.25 inches / 44 centimeters.  Maritza Bautista CMA on 4/5/2024 at 8:36 AM     "

## 2024-04-05 NOTE — NURSING NOTE
Writer scheduled patient for sleep study and follow up with provider. WRiter handed sleep study information & VBG information to patient.

## 2024-04-05 NOTE — PATIENT INSTRUCTIONS
PT evaluation was completed today.   We discussed a walking program and initiated some basic leg exercises for strengthening and range of motion.     It was recommended to do exercises every day (see the printed handout).     Gloria Valles DPT  Physical Therapist  [address]  [address] [place] [address]    Appointments: [phone]

## 2024-04-05 NOTE — PROGRESS NOTES
PHYSICAL THERAPY EVALUATION  Type of Visit: Evaluation    See electronic medical record for Abuse and Falls Screening details.    Subjective       She feels like she needs to get her body back in gear. After COVID and being in rehab she was doing ok, but she was having a lot more fatigue from not sleeping well. She has pain that keeps her from exercising and she would like to work on that in a safe way. She has always had back problems, but is finally at a point where she is not taking narcotic medication. She has put on a lot of weight. She wants to be able to get out and walk. Even with a walker she cannot walk very far because of hip and shoulder pain. She wants to get her energy up again.   She is currently only able to walk about a block before she needs to sit down/stop.   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 in [place]  Type of home:   split level home   Stairs to enter the home:       1  Ramp:   no  Stairs inside the home:       6-8 stairs with 2 rails  Help at home:   Assistance for bathing, sometimes with getting out of bed, and for medication management  Equipment owned:   4WW, shower chair, cane, scooter (doesn't have yet), elevated toilet, grab bars    Employment:      Hobbies/Interests:   art work, bead work, reading, go out in the yard    Patient goals for therapy:   She wants to get back to exercising in a safe way    Pain assessment:   widespread pain in joints and muscles, more frequently on her left side     Objective      Cognitive Status Examination  Orientation: Oriented to person, place and time   Level of Consciousness: Alert  Follows Commands and Answers Questions: 100% of the time  Personal Safety and Judgement: Intact  Memory: Intact    OBSERVATION: Patient arrived to PT evaluation independently with 4WW. Appears very motivated to participate in physical therapy  INTEGUMENTARY:  Intact, but mild redness noted in lower legs. 1+ edema in lower legs bilatearally  POSTURE:  Forward head, rounded shoulders, excess lumbar lordosis  PALPATION: Tender to palpation of lower legs bilaterally  RANGE OF MOTION:  ROM WFL. Decreased left ankle DF ROM than right   STRENGTH: 5/5 throughout except hip flexion 4/5    BED MOBILITY: Min Assist-independent    TRANSFERS: Independent    WHEELCHAIR MOBILITY: N/A    GAIT:   Level of [place]:  modified independent with 4WW, SBA when ambulating with no right ear.  Assistive Device(s): Walker (four wheeled)  Gait Deviations:  wide base of support, flexed trunk. Decreased neo and increased lateral trunk sway when ambulating with no right ear.     Gait Distance: Patient ambulated 400 feet with 4ww with modified independence. Rated fatigue at 5/10 on a 0-10 scale. O2 sats on room air 92-94% following walk.   Stairs: SBA with rail    BALANCE:  Relies on external UE support for dynamic balance and tends to stop/reach for furniture or walls when walking without support.     SPECIAL TESTS  10 Meter Walk Test (Comfortable)  13.16 sec with 4WW. Gait speed .46 m/sec         SENSATION:  Numbness/tingling/pain in feet, particularly in her toes    REFLEXES: Not completed   COORDINATION: WFL  MUSCLE TONE: WFL    Assessment & Plan   CLINICAL IMPRESSIONS  Medical Diagnosis: Type II diabetes mellitus without complication    Treatment Diagnosis: Unsteadiness on feet   Impression/Assessment: Patient is a 64 year old female  with complaints of weakness and pain affecting.  The following significant findings have been identified: Pain, Decreased ROM/flexibility, Decreased strength, Impaired balance, Impaired sensation, Edema, Impaired gait, Decreased activity tolerance, Impaired posture, and Instability. These impairments interfere with their ability to perform self care tasks, recreational activities, household mobility, and community mobility as compared to previous level of function.     Clinical Decision Making (Complexity):  Clinical Presentation: Stable/Uncomplicated  Clinical Presentation Rationale: based on medical and personal factors listed in PT evaluation  Clinical Decision Making (Complexity): Low complexity    PLAN OF CARE  Treatment Interventions:  Interventions: Gait Training, Manual Therapy, Neuromuscular Re-education, Therapeutic Activity, Therapeutic Exercise, Self-Care/Home Management    Long Term Goals     PT Goal 1  Goal Identifier: HEP  Goal Description: Patient will be independent with Home Exercise Program for continued improvements in health and wellness upon d/c from therapy  Target Date: 05/30/24  PT Goal 2  Goal Identifier: Gait Distance  Goal Description: Patient will be able to ambulate 1000 feet or greater without the need to rest  Rationale: to maximize safety and independence within the community      Frequency of Treatment: 1x/week  Duration of Treatment: 8 weeks    Recommended Referrals to Other Professionals:  She may benefit from a dietetic consult for education on appropriate nutrition for her health conditions   Education Assessment:   Learner/Method: Patient  Education Comments: Educated on POC, initial HEP and importance of gradual and consistent exercise to see results.    Risks and benefits of evaluation/treatment have been explained.   Patient/Family/caregiver agrees with Plan of Care.     Evaluation Time:     PT Eval, Low Complexity Minutes (90318): 40       Signing Clinician: Delicia LIRIANO  Reena, PT      [facility]                                                                                   OUTPATIENT PHYSICAL THERAPY      PLAN OF TREATMENT FOR OUTPATIENT REHABILITATION   Patient's Last Name, First Name, No Motley YOB: 1960   Provider's Name   [facility]   Medical Record No.  [id number]     Onset Date: 02/26/24  Start of Care Date: 04/05/24     Medical Diagnosis:  Type II diabetes mellitus without complication      PT Treatment Diagnosis:  Unsteadiness on feet Plan of Treatment  Frequency/Duration: 1x/week/ 8 weeks    Certification date from 04/05/24 to 05/30/24         See note for plan of treatment details and functional goals     Delicia Valles, PT                         I CERTIFY THE NEED FOR THESE SERVICES FURNISHED UNDER        THIS PLAN OF TREATMENT AND WHILE UNDER MY CARE .             Physician Signature               Date    X_____________________________________________________                  Referring Provider:  Price Le    Initial Assessment  See Epic Evaluation- Start of Care Date: 04/05/24

## 2024-04-05 NOTE — PROGRESS NOTES
Outpatient Sleep Medicine Consultation:      Name: No Yusuf MRN# [id number]   Age: 64 year old YOB: 1960     Date of Consultation: April 5, 2024  Consultation is requested by: Rebecca Woodard MD  [address] [facility] [address]  [place] [address] Rebecca Woodard  Primary care provider: Price Naranjo       Reason for Sleep Consult:     No Yusuf is sent by Rebecca Woodard for a sleep consultation regarding sleep apnea.    Patient s Reason for visit  No Yusuf main reason for visit: quality of life during my wakeing hours  Patient states problem(s) started: 20 years  No Yusuf's goals for this visit: gain healthier sleep patterns           Assessment and Plan:     Summary Sleep Diagnoses & Recommendations:   1. Probable obstructive /central sleep apnea with coexisting hypoventilation based on BMI of 45, history of chronic hypercapnic respiratory failure, low normal oxygen saturation of 91%, chronic daily opioid use, loud snoring, witnessed apnea, non-refreshing sleep, excessive daytime sleepiness (ESS 9), crowded oropharynx, morning headaches, large neck circumference and co-morbid HTN and DM type 2. The STOP-BANG score is 7/8. Recommend Polysomnogram (using 4% desaturation/Medicare/2012 AASM 1B scoring rules) with transcutaneous C02 monitoring and pre-study VBG to evaluate for obstructive sleep apnea, hypoventilation and hypoxemia. Patient is a poor candidate for Home Sleep Testing due to chronic severe insomnia (NBA score 22-28), chronic daily opioid use, and morbid obesity (BMI >45) putting the patient at risk for central sleep apnea and hypoventilation.  We discussed PFTs but patient wanted to wait.      2. Insomnia, sleep onset and sleep maintenance, likely due to a variety of factors including inadequate sleep hygiene and possible delayed sleep phase. Co-occurring mood disorder identified and insomnia might be a secondary symptom. Untreated obstructive sleep  apnea may be implicated in sleep maintenance difficulties. Discussed sleep hygiene, stimulus control and sleep compression. See patient instructions for initial treatment recommendations and behavioral sleep plan.     3. Recommend weight management.  We discussed the link between obesity, sleep apnea, and health outcomes. Weight loss is recommended to address long term effects of obesity and sleep apnea.       Summary Recommendations:  Orders Placed This Encounter   Procedures    Comprehensive Sleep Study    Blood gas venous     Summary Counseling:    Sleep Testing Reviewed  Obstructive Sleep Apnea Reviewed  Complications of Untreated Sleep Apnea Reviewed      Medical Decision-making:   Educational materials provided in instructions    Total time spent reviewing medical records, history and physical examination, review of previous testing and interpretation as well as documentation on this date:60 minutes    CC: Rebecca Woodard          History of Present Illness:     Past Sleep Evaluations: Sleep evaluation in 2014 at [facility]. PSG recommended but not completed.     SLEEP-WAKE SCHEDULE:     Work/School Days: Patient goes to school/work: No   Usually gets into bed at 10:00 p.m.  Takes patient about about 2hours to fall asleep  Has trouble falling asleep 7 nights per week  Wakes up in the middle of the night 4 times.  Wakes up due to Snorting self awake;Pain;External stimuli (bed partner, pets, noise, etc);Use the bathroom;Anxiety;Nightmares  She has trouble falling back asleep   times a week.   It usually takes   to get back to sleep  Patient is usually up at 7 or 6  Uses alarm: No    Weekends/Non-work Days/All Other Days:  Usually gets into bed at 10pm   Takes patient about couple of hours to fall asleep  Patient is usually up at 7 or 6  Uses alarm: No    Sleep Need  Patient gets  about 5 hours sleep on average   Patient thinks she needs about 7 hours sleep    No Yusuf prefers to sleep in this  position(s): Side   Patient states they do the following activities in bed: Watch TV    Naps  Patient takes a purposeful nap 2 times a week and naps are usually 1 to 2 hours in duration  She feels better after a nap: Yes  She dozes off unintentionally almost everyday days per week  Patient has had a driving accident or near-miss due to sleepiness/drowsiness: No      SLEEP DISRUPTIONS:    Breathing/Snoring  Patient snores:Yes  Other people complain about her snoring: Yes  Patient has been told she stops breathing in her sleep:Yes  She has issues with the following: Morning headaches;Morning mouth dryness;Stuffy nose when you wake up;Heartburn or reflux at night;Getting up to urinate more than once    Movement:  Patient gets pain, discomfort, with an urge to move:  Yes  It happens when she is resting:  Yes  It happens more at night:  Yes  Patient has been told she kicks her legs at night:  No     Behaviors in Sleep:  No Yusuf has experienced the following behaviors while sleeping: Recurring Nightmares;Sleep-talking;Sleepwalking;Waking up paralyzed;Night terrors (screaming,yelling or acting afraid but not recalling event);See or hear things that are not really there upon awakening or just falling asleep  She has experienced sudden muscle weakness during the day: Yes      Is there anything else you would like your sleep provider to know: no      CAFFEINE AND OTHER SUBSTANCES:    Patient consumes caffeinated beverages per day:  3  Last caffeine use is usually: 5pm  List of any prescribed or over the counter stimulants that patient takes: none  List of any prescribed or over the counter sleep medication patient takes: melatonn and hydroxizine  List of previous sleep medications that patient has tried: seroquel  Patient drinks alcohol to help them sleep: No  Patient drinks alcohol near bedtime: No    Family History:  Patient has a family member been diagnosed with a sleep disorder: No             SCALES:    EPWORTH SLEEPINESS SCALE         4/5/2024     8:32 AM    [place] Sleepiness Scale ( KEM Pinto  [id number]<br>ESS - [place]/English - Final version - 21 Nov 07 - [facility] [employer] Research [place].)   Sitting and reading Moderate chance of dozing   Watching TV Moderate chance of dozing   Sitting, inactive in a public place (e.g. a theatre or a meeting) Would never doze   As a passenger in a car for an hour without a break Moderate chance of dozing   Lying down to rest in the afternoon when circumstances permit Slight chance of dozing   Sitting and talking to someone Slight chance of dozing   Sitting quietly after a lunch without alcohol Slight chance of dozing   In a car, while stopped for a few minutes in traffic Would never doze   [place] Score (MC) 9   [place] Score (Sleep) 9         INSOMNIA SEVERITY INDEX (NBA)          4/5/2024     8:06 AM   Insomnia Severity Index (NBA)   Difficulty falling asleep 3   Difficulty staying asleep 3   Problems waking up too early 3   How SATISFIED/DISSATISFIED are you with your CURRENT sleep pattern? 4   How NOTICEABLE to others do you think your sleep problem is in terms of impairing the quality of your life? 4   How WORRIED/DISTRESSED are you about your current sleep problem? 3   To what extent do you consider your sleep problem to INTERFERE with your daily functioning (e.g. daytime fatigue, mood, ability to function at work/daily chores, concentration, memory, mood, etc.) CURRENTLY? 3   NBA Total Score 23       Guidelines for Scoring/Interpretation:  Total score categories:  0-7 = No clinically significant insomnia   8-14 = Subthreshold insomnia   15-21 = Clinical insomnia (moderate severity)  22-28 = Clinical insomnia (severe)  Used via courtesy of www.myhealth.va.gov with permission from Dio Mallory PhD., [facility]      STOP BANG         4/5/2024     8:33 AM   STOP BANG Questionnaire (  2008, the American Society of Anesthesiologists, Inc. Maria Mello  "& [employer]   1. Snoring - Do you snore loudly (louder than talking or loud enough to be heard through closed doors)? Yes   2. Tired - Do you often feel tired, fatigued, or sleepy during daytime? Yes   3. Observed - Has anyone observed you stop breathing during your sleep? Yes   4. Blood pressure - Do you have or are you being treated for high blood pressure? Yes   5. BMI - BMI more than 35 kg/m2? Yes   6. Age - Age over 50 yr old? Yes   7. Neck circumference - Neck circumference greater than 40 cm? Yes   8. Gender - Gender male? No   STOP BANG Score (MC): 8 (High risk of GENE)         GAD7        6/3/2021     1:26 PM   RANDALL-7    1. Feeling nervous, anxious, or on edge 0   2. Not being able to stop or control worrying 1   3. Worrying too much about different things 1   4. Trouble relaxing 0   5. Being so restless that it is hard to sit still 0   6. Becoming easily annoyed or irritable 1   7. Feeling afraid, as if something awful might happen 1   RANDALL-7 Total Score 4         CAGE-AID         No data to display                CAGE-AID reprinted with permission from the [place] Medical Journal, SALVADOR Mcfarland. and RODRIGO Gilmore, \"Conjoint screening questionnaires for alcohol and drug abuse\" [place] Medical Journal 94: 135-140, 1995.      PATIENT HEALTH QUESTIONNAIRE-9 (PHQ - 9)        6/3/2021     1:26 PM   PHQ-9 ([employer])   1.  Little interest or pleasure in doing things 0   2.  Feeling down, depressed, or hopeless 0   3.  Trouble falling or staying asleep, or sleeping too much 1   4.  Feeling tired or having little energy 1   5.  Poor appetite or overeating 0   6.  Feeling bad about yourself - or that you are a failure or have let yourself or your family down 1   7.  Trouble concentrating on things, such as reading the newspaper or watching television 0   8.  Moving or speaking so slowly that other people could have noticed. Or the opposite - being so fidgety or restless that you have been moving around a lot more than " usual 0   9.  Thoughts that you would be better off dead, or of hurting yourself in some way 0   PHQ-9 Total Score 3   If you checked off any problems, how difficult have these problems made it for you to do your work, take care of things at home, or get along with other people? Not difficult at all   6.  Feeling bad about yourself 1   7.  Trouble concentrating 0   8.  Moving slowly or restless 0   9.  Suicidal or self-harm thoughts 0   Difficulty at work, home, or with people Not difficult at all       Developed by Placido Thomas, Marizol Sarkar, Giovany Erazo and colleagues, with an educational tylor from [employer]. No permission required to reproduce, translate, display or distribute.        Allergies:    Allergies   Allergen Reactions    Promethazine Other (See Comments) and Anxiety     Noted in 8/30/08 ER    Codeine Phosphate GI Disturbance    Lamotrigine Other (See Comments)     hyponatremia    Oxcarbazepine Unknown and Other (See Comments)     Low sodium  LegacyRecord#[id number]      Codeine Other (See Comments) and Rash     GI bleeding  LegacyRecord#[id number]         Medications:    Current Outpatient Medications   Medication Sig Dispense Refill    acetaminophen (TYLENOL) 325 MG tablet Take 2 tablets (650 mg) by mouth 2 times daily as needed for mild pain      Acidophilus Lactobacillus CAPS Take 1 tablet by mouth daily For gut health (Lactobacillus)      albuterol (PROAIR HFA) 108 (90 Base) MCG/ACT inhaler Inhale 2 puffs into the lungs every 4 hours as needed for shortness of breath, wheezing or cough 18 g 0    aspirin (ASA) 81 MG chewable tablet Take 1 tablet (81 mg) by mouth daily      atorvastatin (LIPITOR) 20 MG tablet Take 1 tablet (20 mg) by mouth At Bedtime 90 tablet 3    blood glucose (ACCU-CHEK RAFAELA PLUS) test strip Use to check blood sugar 3x daily or as directed.      Blood Glucose Calibration (ACCU-CHEK ACTIVE GLUCOSE CONT VI) 1 each by Other route      blood glucose calibration (ACCU-CHEK  RAFAELA) solution       calcium carbonate antacid 1000 MG CHEW Take 1 chew tab by mouth every 6 hours as needed for heartburn      cefdinir (OMNICEF) 300 MG capsule Take 1 capsule (300 mg) by mouth 2 times daily 6 capsule 0    cholecalciferol 25 MCG (1000 UT) TABS Take 1,000 Units by mouth daily       diclofenac (VOLTAREN) 1 % topical gel Apply 2-4 g topically 2 times daily      DULoxetine (CYMBALTA) 20 MG capsule Take 1 capsule (20 mg) by mouth daily      fluticasone-salmeterol (ADVAIR) 250-50 MCG/ACT inhaler Inhale 1 puff into the lungs 2 times daily 60 each 0    gabapentin (NEURONTIN) 300 MG capsule Take 900 mg by mouth 3 times daily      hydrOXYzine (ATARAX) 50 MG tablet Take 100 mg by mouth every 6 hours as needed for anxiety      insulin detemir (LEVEMIR PEN) 100 UNIT/ML pen Inject 15 Units Subcutaneous every morning 3 mL 1    levothyroxine (SYNTHROID/LEVOTHROID) 200 MCG tablet Take 200 mcg by mouth daily       magnesium oxide 400 MG tablet Take 200 mg by mouth daily      melatonin 5 MG tablet Take 5 mg by mouth At Bedtime      metFORMIN (GLUCOPHAGE) 1000 MG tablet Take 1,000 mg by mouth 2 times daily (with meals)      methadone (DOLOPHINE) 10 MG tablet Take 90 mg by mouth once Takes 90mg daily per [facility], updated 2023      multivitamin w/minerals (THERA-VIT-M) tablet Take 1 tablet by mouth daily      naloxone (NARCAN) 4 MG/0.1ML nasal spray Spray 1 spray (4 mg) into one nostril alternating nostrils as needed for opioid reversal every 2-3 minutes until assistance arrives 0.2 mL 0    nicotine polacrilex (NICORETTE) 4 MG gum Place 1 each (4 mg) inside cheek every hour as needed for smoking cessation 100 each 0    nystatin (NYSTOP) 875435 UNIT/GM external powder SMARTSI Application Topical 2-3 Times Daily 60 g 0    omeprazole (PRILOSEC) 20 MG DR capsule Take 20 mg by mouth daily       polyethylene glycol (MIRALAX) 17 GM/Dose powder Take 17 g by mouth daily as needed for constipation 510 g 0     potassium chloride ER (KLOR-CON) 8 MEQ CR tablet Take 8 mEq by mouth 2 times daily      predniSONE (DELTASONE) 20 MG tablet Take 2 tablets (40 mg) by mouth daily 4 tablet 0    vitamin B-12 (CYANOCOBALAMIN) 1000 MCG tablet Take 1,000 mcg by mouth daily         Problem List:  Patient Active Problem List    Diagnosis Date Noted    Chronic respiratory failure with hypercapnia (H) 04/05/2024     Priority: Medium     Arterial Blood Gas result:  pO2 ; pCO2 ; pH ;  HCO3 , %O2 Sat .   Venous Blood Gas        Hypoxia 12/05/2023     Priority: Medium    Lung infiltrate 12/05/2023     Priority: Medium    Acute bronchitis due to respiratory syncytial virus (RSV) 12/05/2023     Priority: Medium    Torsades de pointes (H) 02/13/2022     Priority: Medium     History of NSTEMI d/t demand ischemia 2/2 AHRF in setting of Covid-19 PNA c/b torsades de pointes arrest (2/2022)          Ventricular tachycardia (H) 02/13/2022     Priority: Medium    Acute respiratory failure due to COVID-19 (H) 02/13/2022     Priority: Medium    Pneumonia due to COVID-19 virus 02/10/2022     Priority: Medium    Infection due to 2019 novel coronavirus 02/08/2022     Priority: Medium    Anxiety disorder 02/08/2022     Priority: Medium    Chronic bilateral back pain 09/20/2021     Priority: Medium    Type 2 diabetes mellitus, without long-term current use of insulin (H) 01/09/2021     Priority: Medium    Chronic pain disorder 02/15/2020     Priority: Medium    Lymphedema of both lower extremities 01/21/2020     Priority: Medium    Chronic venous insufficiency of lower extremity 01/21/2020     Priority: Medium    Methamphetamine use disorder, severe, in early remission (H) 01/08/2020     Priority: Medium    Tobacco abuse 05/17/2018     Priority: Medium    Cocaine use disorder, severe, in early remission (H) 03/20/2017     Priority: Medium    Benign essential hypertension 07/17/2014     Priority: Medium     Formatting of this note might be different from the  original.  Overview Note: HYPERTENSION BENIGN ESSENTIAL #[id number]#    EXT_ID: [id number]      Myalgia and myositis 07/17/2014     Priority: Medium     Formatting of this note might be different from the original.  Overview Note: FIBROMYALGIA #[id number]#    EXT_ID: [id number]      Opioid abuse, in remission (H) 07/17/2014     Priority: Medium     Formatting of this note might be different from the original.  Overview Note: OPIOID ABUSE, IN REMISSION #[id number]#    EXT_ID: [id number]      Hepatitis C, chronic (H) 07/17/2014     Priority: Medium     Formatting of this note might be different from the original.  5/18/2012, hep C IGF=[id number]    Formatting of this note might be different from the original.  Overview Note: HEPATITIS C, CHRONIC #[id number]#    EXT_ID: [id number]      Hemorrhoids 06/18/2013     Priority: Medium     Formatting of this note might be different from the original.  1979      Osteoarthrosis 06/18/2013     Priority: Medium     Formatting of this note might be different from the original.  Knees, low back      Severe obesity (H) 06/18/2013     Priority: Medium     Formatting of this note might be different from the original.  6/7/2009, BMI=39  5/21/2013, BMI=40      Vitamin D deficiency 06/18/2013     Priority: Medium     Formatting of this note might be different from the original.  6/18/2013      Episodic mood disorder (H24) 03/18/2013     Priority: Medium     Formatting of this note might be different from the original.  Overview Note: DEPRESSION NOS #[id number]#    EXT_ID: [id number]    Formatting of this note might be different from the original.  MDD vs BPAD-spectrum      GERD (gastroesophageal reflux disease) 08/21/2012     Priority: Medium    Chronic low back pain 04/24/2012     Priority: Medium    Knee pain, bilateral 04/24/2012     Priority: Medium    Hyperglycemia 05/17/2009     Priority: Medium    Hypothyroidism 02/03/2003     Priority: Medium        Past Medical/Surgical History:  Past Medical History:   Diagnosis Date     Acute cystitis without hematuria     Acute respiratory failure with hypoxia and hypercapnia (H)     Arthritis     Bipolar affective (H)     Bradycardia     Cellulitis     Chest pain, unspecified type     Fibromyalgia     History of sudden cardiac arrest     History of torsades de pointe due to drug     Hypertension     Left leg cellulitis      Past Surgical History:   Procedure Laterality Date    CHOLECYSTECTOMY      GYN SURGERY      c section    GYN SURGERY      oblation    ORTHOPEDIC SURGERY      left leg, left shoulder, back       Social History:  Social History     Socioeconomic History    Marital status: Single     Spouse name: Not on file    Number of children: Not on file    Years of education: Not on file    Highest education level: Not on file   Occupational History    Not on file   Tobacco Use    Smoking status: Former     Packs/day: .1     Types: Cigarettes     Passive exposure: Never    Smokeless tobacco: Never    Tobacco comments:     Using nicotine vape occasionally   Vaping Use    Vaping Use: Some days    Substances: Nicotine    Devices: Disposable   Substance and Sexual Activity    Alcohol use: No    Drug use: No    Sexual activity: Not Currently   Other Topics Concern    Not on file   Social History Narrative    Not on file     Social Determinants of Health     Financial Resource Strain: Not on file   Food Insecurity: Not on file   Transportation Needs: Not on file   Physical Activity: Not on file   Stress: Not on file   Social Connections: Not on file   Interpersonal Safety: Not on file   Housing Stability: Not on file       Family History:  Family History   Problem Relation Age of Onset    Heart Disease Mother     Diabetes Mother     Ovarian Cancer Mother     Cancer Father     Heart Disease Father     Lung Cancer Father     Diabetes Brother     Diabetes Sister     Ovarian Cancer Sister        Review of Systems:  A complete review of systems reviewed by me is negative with the exeption of what has  "been mentioned in the history of present illness.  In the last TWO WEEKS have you experienced any of the following symptoms?  Fevers: No  Night Sweats: Yes  Weight Gain: Yes  Pain at Night: Yes  Double Vision: No  Changes in Vision: No  Difficulty Breathing through Nose: Yes  Sore Throat in Morning: No  Dry Mouth in the Morning: Yes  Shortness of Breath Lying Flat: Yes  Shortness of Breath With Activity: Yes  Awakening with Shortness of Breath: No  Increased Cough: No  Heart Racing at Night: No  Swelling in Feet or Legs: Yes  Diarrhea at Night: No  Heartburn at Night: No  Urinating More than Once at Night: Yes  Losing Control of Urine at Night: No  Joint Pains at Night: Yes  Headaches in Morning: Yes  Weakness in Arms or Legs: Yes  Depressed Mood: Yes  Anxiety: Yes     Physical Examination:  Vitals: /76   Pulse 72   Ht 1.651 m (5' 5\")   Wt 124.7 kg (275 lb)   LMP 11/01/2013   SpO2 91%   BMI 45.76 kg/m    BMI= Body mass index is 45.76 kg/m .    Neck Cir (cm): 44 cm      GENERAL APPEARANCE: alert and no distress  EYES: Eyes grossly normal to inspection, PERRL, and conjunctivae and sclerae normal  HENT: oropharynx crowded and poor dentition   NECK: no asymmetry, masses, or scars and thyroid normal to palpation  RESP: lungs clear to auscultation - no rales, rhonchi or wheezes  CV: regular rates and rhythm, normal S1 S2, no S3 or S4, and no murmur, click or rub  MS: extremities normal- no gross deformities noted  NEURO: mentation intact and speech normal  PSYCH: affect normal/bright  Mallampati Class: III.  Tonsillar Stage:          Data: All pertinent previous laboratory data reviewed     Recent Labs   Lab Test 02/23/24  1338 01/25/24  1541    139   POTASSIUM 4.1 4.4   CHLORIDE 99 99   CO2 21* 31*   ANIONGAP 16* 9   * 130*   BUN 11.0 10.9   CR 1.03* 1.03*   RODOLFO 9.0 9.2       Recent Labs   Lab Test 01/25/24  1541   WBC 9.5   RBC 4.65   HGB 12.6   HCT 41.1   MCV 88   MCH 27.1   MCHC 30.7*   RDW " 15.8*          Recent Labs   Lab Test 02/23/24  1338   PROTTOTAL 8.2   ALBUMIN 4.1   BILITOTAL 0.4   ALKPHOS 115   AST 26   ALT 30       TSH   Date Value   02/23/2024 3.51 uIU/mL   12/05/2023 1.71 uIU/mL   03/25/2022 0.15 mU/L (L)   02/13/2022 0.29 mU/L (L)   06/10/2020 9.07 mU/L (H)   01/28/2020 19.01 mU/L (H)       Amphetamine Qual Urine (no units)   Date Value   05/27/2021 Negative     Amphetamines Urine (no units)   Date Value   06/29/2022 Screen Negative     Barbituates Urine (no units)   Date Value   06/29/2022 Screen Negative     Cannabinoids Qual Urine (no units)   Date Value   05/27/2021 Negative     Cannabinoids Urine (no units)   Date Value   06/29/2022 Screen Negative     Cocaine Qual Urine (no units)   Date Value   05/27/2021 Negative     Cocaine Urine (no units)   Date Value   06/29/2022 Screen Negative     Opiates Qualitative Urine (no units)   Date Value   05/27/2021 Negative     Opiates Urine (no units)   Date Value   06/29/2022 Screen Negative       Iron Saturation Index   Date/Time Value Ref Range Status   01/28/2020 12:25 AM Not Calculated 15 - 46 % Final     Ferritin   Date/Time Value Ref Range Status   02/08/2022 10:28  (H) 8 - 252 ng/mL Final   01/28/2020 12:25 AM 32 8 - 252 ng/mL Final       pH Arterial (no units)   Date Value   02/23/2022 7.46 (H)   02/22/2022 7.45     pO2 Arterial (mm Hg)   Date Value   02/23/2022 91   02/22/2022 70 (L)     pCO2 Arterial (mm Hg)   Date Value   02/23/2022 39   02/22/2022 40     Bicarbonate Arterial (mmol/L)   Date Value   02/23/2022 28   02/22/2022 28     Base Excess/Deficit Arterial (mmol/L)   Date Value   02/23/2022 3.6 (H)   02/22/2022 3.3 (H)       Echocardiology: No results found for this or any previous visit (from the past 4320 hour(s)).    Chest x-ray:   Chest XR,  PA & LAT 01/25/2024    Narrative  EXAM: XR CHEST 2 VIEWS  LOCATION: [facility]  DATE: 1/25/2024    INDICATION: chf, lower leg  swelling  COMPARISON: 12/05/2023    Impression  IMPRESSION: Clear lungs. Stable cardiac size. Normal pulmonary vascularity.      Chest CT:   CT Chest Pulmonary Embolism w Contrast 12/05/2023    Narrative  CT CHEST PULMONARY EMBOLISM W CONTRAST 12/5/2023 3:29 PM    CLINICAL HISTORY: Concern for PE, tachycardic and tachypneic in the  setting of significant dyspnea  TECHNIQUE: CT angiogram chest during arterial phase injection IV  contrast. 2D and 3D MIP reconstructions were performed by the CT  technologist. Dose reduction techniques were used.    CONTRAST: 74 mL isovue 370    COMPARISON: None.    FINDINGS:  ANGIOGRAM CHEST: Pulmonary arteries are normal caliber and negative  for pulmonary emboli. Thoracic aorta is negative for dissection. No CT  evidence of right heart strain.    LUNGS AND PLEURA: Bibasilar atelectasis. Small patchy airspace opacity  bilaterally. No effusion.    MEDIASTINUM/AXILLAE: Heart is normal in size. No mediastinal,  axillary, or hilar adenopathy.    UPPER ABDOMEN: 2.5 x 1.5 cm right adrenal nodule which is compatible  with an adenoma.    MUSCULOSKELETAL: Degenerative changes of the spine.    Impression  IMPRESSION:  1.  No pulmonary embolism.  2.  Patchy airspace opacity bilaterally concerning for pneumonia.    HOMER ORTIZ MD      SYSTEM ID:  [id number]        HECTOR Mcdaniel 4/5/2024

## 2024-04-16 ENCOUNTER — THERAPY VISIT (OUTPATIENT)
Dept: PHYSICAL THERAPY | Facility: CLINIC | Age: 64
End: 2024-04-16
Payer: COMMERCIAL

## 2024-04-16 DIAGNOSIS — E11.9 TYPE 2 DIABETES MELLITUS WITHOUT COMPLICATION, WITHOUT LONG-TERM CURRENT USE OF INSULIN (H): Primary | ICD-10-CM

## 2024-04-16 DIAGNOSIS — I50.22 CHRONIC SYSTOLIC CONGESTIVE HEART FAILURE (H): ICD-10-CM

## 2024-04-16 PROCEDURE — 97110 THERAPEUTIC EXERCISES: CPT | Mod: GP | Performed by: PHYSICAL THERAPIST

## 2024-04-16 PROCEDURE — 97750 PHYSICAL PERFORMANCE TEST: CPT | Mod: GP | Performed by: PHYSICAL THERAPIST

## 2024-04-16 NOTE — PROGRESS NOTES
6 Minute Walk Test:      Total distance: 200.3 meters using 4WW.  bpm following. O2 sats 92% on room air. 3-4 on OMNI scale   3 min seated rest break after 3.5 min of walking (time for 6 min was stopped during break), then able to continue. Several minutes for rest/recovery following completion of 6 min.      Falls: 0     Adult Normative Data (meters)  Age Distance   40-49 611 + 85   50-59 588 + 91   60-69 559 + 80   70-79 514 + 71      Adult Male: 585 + 96   Adult Female: 555 + 81    15 minutes billed as physical performance test/measure.    *Normative data is based on healthy subjects from seven countries.     Reference:        1. lindsey Fuentes al., Lucina.  Respiratory Journal 2011 37: 150-156.          2. Corey  et al., (2010). Six-Minute Walk Test demonstrates motor fatigue in spinal muscular atrophy. Neurology, 74(10), 833-838.          3. Simon et al., (2016). Six-Minute Walk Test is Reliable and Valid in Spinal Muscular Atrophy. Muscle Nerve.   -------------------------------------------------------------------------------------------------------------------------------------------------------------------

## 2024-04-23 ENCOUNTER — THERAPY VISIT (OUTPATIENT)
Dept: PHYSICAL THERAPY | Facility: CLINIC | Age: 64
End: 2024-04-23
Payer: COMMERCIAL

## 2024-04-23 DIAGNOSIS — I50.22 CHRONIC SYSTOLIC CONGESTIVE HEART FAILURE (H): ICD-10-CM

## 2024-04-23 DIAGNOSIS — E11.9 TYPE 2 DIABETES MELLITUS WITHOUT COMPLICATION, WITHOUT LONG-TERM CURRENT USE OF INSULIN (H): Primary | ICD-10-CM

## 2024-04-23 PROCEDURE — 97110 THERAPEUTIC EXERCISES: CPT | Mod: GP | Performed by: PHYSICAL THERAPIST

## 2024-04-23 NOTE — PATIENT INSTRUCTIONS
Today we worked on strengthening and endurance activities. You were able to do 12 minutes on the NuStep. We added an additional exercise for your low back (pelvic tilt) and you were able to increase repetitions on your heel raises today, too!    Keep working on exercises and walking at home!    Gloria Valles DPT  Physical Therapist  [facility] [address]  [address] [place] [address]    Appointments: [phone]

## 2024-05-07 ENCOUNTER — TELEPHONE (OUTPATIENT)
Dept: PHYSICAL THERAPY | Facility: CLINIC | Age: 64
End: 2024-05-07

## 2024-05-07 NOTE — TELEPHONE ENCOUNTER
Patient was contacted due to missed PT visit on 5/7. She reports she forgot about the appointment. She was reminded of her next appointment on 5/14. Patient appreciative of reminder.

## 2024-06-18 NOTE — ED TRIAGE NOTES
BIBA with c/o chest pain starting tonight at 2100. She reports having a sudden onset of a bad headache, nausea, and the chest pain. She had one episode of emesis at her nursing home for which staff gave her 4mg of zofran. Upon arrival she is A&Ox4, nausea is resolved, and chest pain is at 2/10. She denies fever, cough, runny nose, or SOB.   
H

## 2024-07-12 NOTE — PROGRESS NOTES
VASCULAR SURGERY   INPATIENT CONSULT NOTE      Name: Yoseph Cordero   :   1950  [id number]     Date of Consultation: 2024       REFERRING PHYSICIAN: Uvaldo Echevarria MD  PRIMARY CARE PHYSICIAN:  Irving Sheets DO    HISTORY OF PRESENT ILLNESS:   Patient is a 74 year old male whom we have been asked to see regarding his right heel ulceration. He has a medical history of  CVA, dementia, is non-verbal, and has been bed/wheelchair bound since late May according to his son. Has had chronic right heel, left ischial, and sacral decubitus ulcers since he became bed bound . He now presents to [facility] emergency department with necrotic changes on the right heel ulcer and generalized weakness. Upon ED assessment, patient was admitted to the hospital and started on IV fluids and antibiotics - Vancomycin and Zosyn.     PAST MEDICAL HISTORY:    Past Medical History:    Anxiety state, unspecified    CVA (cerebral infarction)    Dementia (HCC)    Depression    Heterozygous factor V Leiden mutation (HCC)    Other and unspecified hyperlipidemia    Other ill-defined conditions(799.89)    Cardiomegaly Pleural effusion w/idopathic pleuropuricarditis    Pulmonary embolism (HCC)    Stroke (HCC)    Unspecified sleep apnea       PAST SURGICAL HISTORY:   Past Surgical History:   Procedure Laterality Date    Colonoscopy      Electrocardiogram, complete  1914    Scanned to Media Tab       MEDICATIONS:     Current Facility-Administered Medications:     potassium chloride 40 mEq in 250mL sodium chloride 0.9% IVPB premix, 40 mEq, Intravenous, Once    levETIRAcetam (Keppra) 500 mg/5mL injection 500 mg, 500 mg, Intravenous, Q12H    piperacillin-tazobactam (Zosyn) 3.375 g in dextrose 5% 100 mL IVPB-ADDV, 3.375 g, Intravenous, Q8H    [Held by provider] rivaroxaban (Xarelto) tab 20 mg, 20 mg, Oral, Daily with food    sodium hypochlorite (Dakin's) 0.125 % external solution, , Topical, BID    ibuprofen (Motrin) tab 600 mg, 600 mg,  [facility]   BRIEF PROGRESS NOTE    SUBJECTIVE  Assessed patient for baseline. Pt currently on high flow, 60% FiO2, 50L O2. Just removed oxygen, because she is anxious and has to urinate. Breathing is ok. ROS otherwise negative at this time.    OBJECTIVE:  /81   Pulse 80   Resp 22   LMP 11/01/2013   SpO2 96%   -High flow: FiO2: 60%, 50L O2    Exam:   General: Alert and oriented, NAD. Obese.  Skin: Warm and dry, no abnormalities noted.  Eyes: Extra-ocular muscles intact  ENT: Speech intact, nasal passages open, no hearing impairment noted.  CV: No cyanosis or pallor, warm and well perfused. RRR  Respiratory: No respiratory distress, no accessory muscle use. Difficult auscultation due to body habitus, but no wheezing/rales auscultated on anterior chest. Talking in full sentences.  Neuro: no focal deficits  Extremities: Warm, able to move all four extremities at will.      ASSESSMENT/PLAN:    # moderate hyperkalemia  K 5.8->6.1->6.4. Unclear etiology. S/P 250ml bolus NS followed by maintenance NS @ 150ml/hr, 2g calcium gluconate, albuterol inhaler, lasix 40mg IV, 2 units insulin, 50meQ bicarb. No ST/T wave changes on recent EKG.     Unclear why she is getting NS as this can cause hypercholermic metabolic acidosis and worsen hyperkalemia by shifting K out of the cells. Does not appear hypovolemic on exam. BP soft but stable with normal MAP.    Plan:  -2hr K checks   -if >6.5 will repeat EKG   -if >6, will give 10 units regular insulin + 50mL of 50% dextrose (to shift intracellullary)   -if <6, will space out K checks to q4h  -continue lasix 40mg BID, to shift K out of body  -monitor for hypotension  -stop IVF--> does not need resuscitation at this time (normal kidney function, euvolemic, and risk of fluid overload/worsening resp status due to COVID)    # acute hypoxic respiratory distress  # COVID  -continue high flow, wean as able  -if resp status worsens, next step is bipap-->intubation (full  Oral, Q4H PRN    sodium chloride 0.9% infusion, , Intravenous, Continuous    acetaminophen (Tylenol Extra Strength) tab 500 mg, 500 mg, Oral, Q4H PRN    ondansetron (Zofran) 4 MG/2ML injection 4 mg, 4 mg, Intravenous, Q6H PRN    OLANZapine (Zyprexa) 5 mg in sterile water for injection (PF) IM injection, 5 mg, Intramuscular, Q12H PRN    ALLERGIES:    He has No Known Allergies.    SOCIAL HISTORY:    Patient  reports that he has quit smoking. He has never used smokeless tobacco. He reports that he does not drink alcohol and does not use drugs.    FAMILY HISTORY:    Patient's family history includes Cancer in his father; Other in his mother.    ROS:    Comprehensive ROS reviewed and negative except for what's stated above.  Including negative for chest pain, shortness of breath, syncope.     EXAM:  /48 (BP Location: Right arm)   Pulse 97   Temp 98.4 °F (36.9 °C) (Axillary)   Resp 22   Ht 5' 7\" (1.702 m)   Wt 147 lb 6.4 oz (66.9 kg)   SpO2 100%   BMI 23.09 kg/m²   GENERAL: alert and orientated X 3, well developed, well nourished, in no apparent distress  NEURO/PSYCH: normal mood and affect  NECK: supple   CAROTID: No bruits  RESPIRATORY: no rales, rhonchi, or wheezes B  CARDIO: RRR without murmur, no murmur, no gallop   ABDOMEN: soft, non-tender with no palpable aneurysm or masses  EXTREMITIES: no tenderness  VASCULAR:        Femoral Popliteal DP PT Peroneal Edema   Right       2+ non-palpable non-palpable               Left  2+ palpable, strong palpable, strong                  Right heel with necrotic base ulcer with foul-smelling odor, and surrounding erythema extending into the ankle.   No sensory or motor deficits      Diagnostic Data:      LABS:  Recent Labs   Lab 07/09/24  1907 07/10/24  0613 07/11/24  0535 07/11/24  0822 07/12/24  0625 07/12/24  0924   WBC 22.3* 16.9* 12.3*  --  12.7* 12.1*   HGB 10.7* 8.6* 8.6*  --  8.0* 7.9*   MCV 87.0 85.9 84.4  --  84.9 84.8   .0* 436.0 375.0  --  417.0  code)  -continue dexamethasone, baracitinib, lovenox    Addendum:  6pm K check was not drawn until 830pm, and did not result until 945pm. Compass report placed. Fortunately, K normalized at 4.7. IVF remain stopped. Stop Q2h K checks. Recheck K in AM.    Please see daily rounding note for full A/P. See Dr. Jean-Baptiste's progress note from 2/8/22 at 5:22pm for details as well.    Ministerio Landon,   6:17 PM     380.0   INR  --   --   --  1.36*  --  1.22*       Recent Labs   Lab 07/09/24  1907 07/10/24  0614 07/11/24  0535 07/12/24  0625    138 144 143   K 3.6 3.2* 3.5  3.5 3.2*    105 112 109   CO2 26.0 27.0 28.0 28.0   BUN 13 8* 10 15   CREATSERUM 0.88 0.57* 0.57* 0.52*   * 117* 109* 131*   CA 9.2 8.5* 8.6* 8.6*   MG  --   --  1.7 1.7     Recent Labs   Lab 07/11/24  0822 07/12/24  0924   PTP 17.6* 16.2*   INR 1.36* 1.22*   PTT 37.3* 34.3     Recent Labs   Lab 07/09/24 1907 07/12/24  0625   ALT 28 23   AST 26 21   ALB 4.1 3.1*     No results for input(s): \"TROP\" in the last 168 hours.  No results found for: \"ANAS\", \"ADRIANO\", \"ANASCRN\"  No results for input(s): \"PCACT\", \"PSACT\", \"AT3ACT\", \"HIPAB\", \"PATHI\", \"STALA\", \"DRVVTRATIO\", \"DRVVT\", \"STACLOT\", \"CARIGG\", \"A8AG1VVWVK\", \"T8SL9IOFRN\", \"RA\", \"HAVIGM\", \"HBCIGM\", \"HCVAB\", \"HBSAG\", \"HBCAB\", \"HBVDNAINTERP\", \"ANAS\", \"C3\", \"C4\" in the last 168 hours.    Lab Results   Component Value Date    HGB 7.9 (L) 07/12/2024    HGB 8.0 (L) 07/12/2024    HGB 8.6 (L) 07/11/2024    HGB 8.6 (L) 07/10/2024    HGB 10.7 (L) 07/09/2024    HGB 12.8 (L) 05/10/2024    CREATSERUM 0.52 (L) 07/12/2024    CREATSERUM 0.57 (L) 07/11/2024    CREATSERUM 0.57 (L) 07/10/2024    CREATSERUM 0.88 07/09/2024    CREATSERUM 0.71 05/10/2024    CREATSERUM 0.76 05/09/2024           Radiology: CT BRAIN OR HEAD (69799)    Result Date: 7/12/2024  PROCEDURE: CT BRAIN OR HEAD (CPT=70450)  COMPARISON: [facility], CT BRAIN OR HEAD (CPT=70450), 5/07/2024, 9:13 PM.  INDICATIONS: Confusion. Transient alteration in awareness.  TECHNIQUE: CT images were obtained without contrast material.  Automated exposure control for dose reduction was used.  Dose information is transmitted to the ACR (American College of Radiology) NRDR (National Radiology Data Registry) which includes the Dose Index Registry.  FINDINGS:  CSF SPACES: No hydrocephalus, subarachnoid hemorrhage, or effacement of the basal cisterns  is appreciated. There is no extra-axial fluid collection. CEREBRUM: No acute intraparenchymal hemorrhage, edema, or cortical sulcal effacement is apparent. There is no space-occupying lesion, mass effect, or shift of midline structures.  Stable large chronic left middle cerebral artery territory infarct with associated ex vacuo dilation of the left lateral ventricle. CEREBELLUM: No edema, hemorrhage, mass, or acute infarction is seen. BRAINSTEM: No edema, hemorrhage, mass, or acute infarction is seen.  CALVARIUM: There is no apparent depressed fracture, mass, or other significant visible lesion.  SINUSES: Left maxillary sinus retention cyst or polyp. ORBITS: Limited views are grossly unremarkable.  OTHER: Atherosclerotic vascular calcifications are perceived in the carotid siphons.         CONCLUSION:  1. No acute intracranial hemorrhage or other acute intracranial abnormality. 2. Stable large chronic left middle cerebral artery territory infarct with associated ex vacuo dilation of the left lateral ventricle. 3. Disproportionate mesial temporal volume loss, which is unchanged, and can be seen in the setting of Alzheimer's disease. 4. Intracranial atherosclerosis. 5. Left maxillary sinus retention cyst or polyp.   elm-remote  Dictated by (CST): Jarvis Gardiner MD on 7/12/2024 at 7:58 AM     Finalized by (CST): Jarvis Gardiner MD on 7/12/2024 at 8:02 AM          MRI FOOT, RIGHT (CPT=73718)    Result Date: 7/10/2024  PROCEDURE: MRI FOOT, RIGHT (CPT=73718)  COMPARISON: None.  INDICATIONS: Right heel pressure ulcers, evaluate for osteomyelitis.  TECHNIQUE: A complete multi-planar examination was performed without contrast.  FINDINGS:    BONES/JOINTS: Erosive defect along the dorsal aspect of the calcaneus with periosteal fluid, and bone edema with diminished T1 marrow signal.  Findings are contiguous with a dorsal calcaneus ulceration which extends down to the bone.  Mild-to-moderate osteoarthritis of the tibiotalar  joint with small ankle joint effusion. SOFT TISSUES: Superior aspect of calcaneus ulceration extends into the distal Achilles tendon with a fluid-filled longitudinal split partial tear extending over a length of about 6 cm.  Maximum AP and transverse dimension of the fluid-filled partial tear  is 9 x 7 mm.  Edema in the superficial soft tissues and deep soft tissues of the lower leg and ankle.  OTHER: Negative.         CONCLUSION:  1. Stage IV heel ulcer extending down to bone with osteomyelitis involving the dorsal calcaneus. 2. Superior aspect of stage IV heel ulcer involves the distal Achilles tendon and there is a fluid-filled longitudinal split partial tear extending over a length of 6 cm, and this could be a intra tendinous abscess.     Dictated by (CST): Apolinar Barry MD on 7/10/2024 at 2:22 PM     Finalized by (CST): Apolinar Barry MD on 7/10/2024 at 2:28 PM          XR FOOT, COMPLETE (MIN 3 VIEWS), RIGHT (CPT=73630)    Result Date: 7/9/2024  PROCEDURE: XR FOOT, COMPLETE (MIN 3 VIEWS), RIGHT (CPT=73630)  COMPARISON: [facility] 2nd Floor, XR ANKLE (MIN 3 VIEWS), RIGHT (CPT=73610), 6/13/2024, 3:15 PM.  [facility], XR FOOT, COMPLETE (MIN 3 VIEWS), RIGHT (CPT=73630), 5/02/2024, 4:44 PM.  [facility], XR FOOT, COMPLETE (MIN 3 VIEWS), RIGHT (CPT=73630), 10/24/2022, 8:09 PM.  INDICATIONS: Pressure ulcers of the right foot.  TECHNIQUE: 4 views were obtained.   FINDINGS:  BONES: No acute fracture or dislocation is evident. No suspicious osseous lesions are seen. The joint spaces are preserved without evidence of significant arthropathy.  Apparent osseous erosion of the posterior margin of the calcaneus is again appreciated. There is no radiographically visible focal periosteal reaction. SOFT TISSUES: Circumferential soft tissue swelling is apparent. Negative for discernible radiopaque foreign body. EFFUSION: None visible.           CONCLUSION:   1. Again seen is osseous erosion involving the posterior calcaneus, not significant changed from the prior study.  2. Soft tissue swelling of the right foot without radiographic evidence of underlying osseous injury.    Dictated by (CST): Marcial Mendoza MD on 7/09/2024 at 8:22 PM     Finalized by (CST): Marcial Mendoza MD on 7/09/2024 at 8:25 PM          XR ANKLE (MIN 3 VIEWS), RIGHT (CPT=73610)    Result Date: 6/13/2024  PROCEDURE: XR ANKLE (MIN 3 VIEWS), RIGHT (CPT=73610)  COMPARISON: [facility], XR ANKLE (MIN 3 VIEWS), RIGHT (CPT=73610), 5/09/2024, 7:03 PM.  [facility] 2nd Floor, XR ANKLE (MIN 3 VIEWS), RIGHT (CPT=73610), 5/16/2024, 2:22 PM.  INDICATIONS: Nondisplaced fracture of medial malleolus of right tibia, subsequent encounter for closed fracture with routine healing  TECHNIQUE: 3 views were obtained.   FINDINGS:  BONES: Previously described cortical avulsion of the medial malleolus has healed.  There is a new cortical superficial erosive cortical defect dorsal aspect of the calcaneus which is contiguous with a deep heel ulceration.  OTHER: Atherosclerotic vascular calcification.         CONCLUSION:  1. Healed cortical avulsion fracture of medial malleolus. 2. Heel ulceration with a contiguous erosive defect dorsal calcaneus compatible with osteomyelitis.  fax  Dictated by (CST): Apolinar Barry MD on 6/13/2024 at 5:35 PM     Finalized by (CST): Apolinar Barry MD on 6/13/2024 at 5:46 PM                 ASSESSMENT AND PLAN:     The patient is a 74 year old male who presents with necrosis of the right heel. Given his non-ambulatory status, a below-knee amputation would be indicated. His family members are not ready to agree on this intervention at this time and wish to think about it further.   Podiatry service was also consulted on this case. A debridement of all necrotic tissue and bone of the right heel was recommended. Family members are leaning toward this  procedure, however they were informed the debridement may represent a temporary solution.   They  indicated an understanding of these issues and all questions were answered.     Case was discussed with Dr. Najjar who examined the patient and agreed to the plan above.     Thank you for allowing me to participate in the patient's care.     NABILA Pedroza  Division of Vascular Surgery with Dr. Najjar.

## 2024-07-18 ENCOUNTER — APPOINTMENT (OUTPATIENT)
Dept: ULTRASOUND IMAGING | Facility: CLINIC | Age: 64
End: 2024-07-18
Payer: COMMERCIAL

## 2024-07-18 ENCOUNTER — HOSPITAL ENCOUNTER (EMERGENCY)
Facility: CLINIC | Age: 64
Discharge: HOME | End: 2024-07-18
Attending: FAMILY MEDICINE | Admitting: FAMILY MEDICINE
Payer: COMMERCIAL

## 2024-07-18 VITALS
RESPIRATION RATE: 17 BRPM | HEART RATE: 81 BPM | WEIGHT: 267 LBS | TEMPERATURE: 98 F | OXYGEN SATURATION: 93 % | HEIGHT: 65 IN | DIASTOLIC BLOOD PRESSURE: 57 MMHG | BODY MASS INDEX: 44.48 KG/M2 | SYSTOLIC BLOOD PRESSURE: 105 MMHG

## 2024-07-18 DIAGNOSIS — L03.116 CELLULITIS OF LEFT LOWER LEG: ICD-10-CM

## 2024-07-18 LAB
ALBUMIN SERPL BCG-MCNC: 4.1 G/DL (ref 3.5–5.2)
ALP SERPL-CCNC: 122 U/L (ref 40–150)
ALT SERPL W P-5'-P-CCNC: 36 U/L (ref 0–50)
ANION GAP SERPL CALCULATED.3IONS-SCNC: 12 MMOL/L (ref 7–15)
AST SERPL W P-5'-P-CCNC: 39 U/L (ref 0–45)
BASOPHILS # BLD AUTO: 0.1 10E3/UL (ref 0–0.2)
BASOPHILS NFR BLD AUTO: 1 %
BILIRUB SERPL-MCNC: 0.4 MG/DL
BUN SERPL-MCNC: 23.1 MG/DL (ref 8–23)
CALCIUM SERPL-MCNC: 9.6 MG/DL (ref 8.8–10.4)
CHLORIDE SERPL-SCNC: 99 MMOL/L (ref 98–107)
CREAT SERPL-MCNC: 1.21 MG/DL (ref 0.51–0.95)
CRP SERPL-MCNC: 3.91 MG/L
EGFRCR SERPLBLD CKD-EPI 2021: 50 ML/MIN/1.73M2
EOSINOPHIL # BLD AUTO: 0.3 10E3/UL (ref 0–0.7)
EOSINOPHIL NFR BLD AUTO: 3 %
ERYTHROCYTE [DISTWIDTH] IN BLOOD BY AUTOMATED COUNT: 14.6 % (ref 10–15)
ERYTHROCYTE [SEDIMENTATION RATE] IN BLOOD BY WESTERGREN METHOD: 43 MM/HR (ref 0–30)
GLUCOSE SERPL-MCNC: 133 MG/DL (ref 70–99)
HCO3 SERPL-SCNC: 27 MMOL/L (ref 22–29)
HCT VFR BLD AUTO: 39 % (ref 35–47)
HGB BLD-MCNC: 12.7 G/DL (ref 11.7–15.7)
IMM GRANULOCYTES # BLD: 0.1 10E3/UL
IMM GRANULOCYTES NFR BLD: 1 %
LACTATE SERPL-SCNC: 2.3 MMOL/L (ref 0.7–2)
LACTATE SERPL-SCNC: 2.5 MMOL/L (ref 0.7–2)
LYMPHOCYTES # BLD AUTO: 2 10E3/UL (ref 0.8–5.3)
LYMPHOCYTES NFR BLD AUTO: 23 %
MCH RBC QN AUTO: 29.2 PG (ref 26.5–33)
MCHC RBC AUTO-ENTMCNC: 32.6 G/DL (ref 31.5–36.5)
MCV RBC AUTO: 90 FL (ref 78–100)
MONOCYTES # BLD AUTO: 0.8 10E3/UL (ref 0–1.3)
MONOCYTES NFR BLD AUTO: 10 %
NEUTROPHILS # BLD AUTO: 5.5 10E3/UL (ref 1.6–8.3)
NEUTROPHILS NFR BLD AUTO: 62 %
NRBC # BLD AUTO: 0 10E3/UL
NRBC BLD AUTO-RTO: 0 /100
PLATELET # BLD AUTO: 244 10E3/UL (ref 150–450)
POTASSIUM SERPL-SCNC: 4.2 MMOL/L (ref 3.4–5.3)
PROT SERPL-MCNC: 8.6 G/DL (ref 6.4–8.3)
RBC # BLD AUTO: 4.35 10E6/UL (ref 3.8–5.2)
SODIUM SERPL-SCNC: 138 MMOL/L (ref 135–145)
WBC # BLD AUTO: 8.7 10E3/UL (ref 4–11)

## 2024-07-18 PROCEDURE — 99285 EMERGENCY DEPT VISIT HI MDM: CPT | Mod: 25 | Performed by: FAMILY MEDICINE

## 2024-07-18 PROCEDURE — 250N000013 HC RX MED GY IP 250 OP 250 PS 637

## 2024-07-18 PROCEDURE — 83605 ASSAY OF LACTIC ACID: CPT

## 2024-07-18 PROCEDURE — 96365 THER/PROPH/DIAG IV INF INIT: CPT | Performed by: FAMILY MEDICINE

## 2024-07-18 PROCEDURE — 96361 HYDRATE IV INFUSION ADD-ON: CPT | Performed by: FAMILY MEDICINE

## 2024-07-18 PROCEDURE — 258N000003 HC RX IP 258 OP 636

## 2024-07-18 PROCEDURE — 80053 COMPREHEN METABOLIC PANEL: CPT

## 2024-07-18 PROCEDURE — 250N000011 HC RX IP 250 OP 636

## 2024-07-18 PROCEDURE — 93971 EXTREMITY STUDY: CPT | Mod: LT

## 2024-07-18 PROCEDURE — 85652 RBC SED RATE AUTOMATED: CPT

## 2024-07-18 PROCEDURE — 86140 C-REACTIVE PROTEIN: CPT

## 2024-07-18 PROCEDURE — 85004 AUTOMATED DIFF WBC COUNT: CPT

## 2024-07-18 PROCEDURE — 99284 EMERGENCY DEPT VISIT MOD MDM: CPT | Mod: FS | Performed by: FAMILY MEDICINE

## 2024-07-18 PROCEDURE — 36415 COLL VENOUS BLD VENIPUNCTURE: CPT

## 2024-07-18 RX ORDER — ACETAMINOPHEN 500 MG
1000 TABLET ORAL ONCE
Status: COMPLETED | OUTPATIENT
Start: 2024-07-18 | End: 2024-07-18

## 2024-07-18 RX ORDER — CEPHALEXIN 500 MG/1
500 CAPSULE ORAL 3 TIMES DAILY
Qty: 21 CAPSULE | Refills: 0 | Status: SHIPPED | OUTPATIENT
Start: 2024-07-18 | End: 2024-07-25

## 2024-07-18 RX ORDER — DOXYCYCLINE 100 MG/1
100 CAPSULE ORAL 2 TIMES DAILY
Qty: 14 CAPSULE | Refills: 0 | Status: SHIPPED | OUTPATIENT
Start: 2024-07-18 | End: 2024-07-25

## 2024-07-18 RX ORDER — CEFTRIAXONE 1 G/1
1 INJECTION, POWDER, FOR SOLUTION INTRAMUSCULAR; INTRAVENOUS ONCE
Status: COMPLETED | OUTPATIENT
Start: 2024-07-18 | End: 2024-07-18

## 2024-07-18 RX ADMIN — ACETAMINOPHEN 1000 MG: 500 TABLET ORAL at 13:50

## 2024-07-18 RX ADMIN — SODIUM CHLORIDE 1000 ML: 9 INJECTION, SOLUTION INTRAVENOUS at 13:52

## 2024-07-18 RX ADMIN — CEFTRIAXONE SODIUM 1 G: 1 INJECTION, POWDER, FOR SOLUTION INTRAMUSCULAR; INTRAVENOUS at 14:54

## 2024-07-18 ASSESSMENT — COLUMBIA-SUICIDE SEVERITY RATING SCALE - C-SSRS
6. HAVE YOU EVER DONE ANYTHING, STARTED TO DO ANYTHING, OR PREPARED TO DO ANYTHING TO END YOUR LIFE?: NO
1. IN THE PAST MONTH, HAVE YOU WISHED YOU WERE DEAD OR WISHED YOU COULD GO TO SLEEP AND NOT WAKE UP?: NO
2. HAVE YOU ACTUALLY HAD ANY THOUGHTS OF KILLING YOURSELF IN THE PAST MONTH?: NO

## 2024-07-18 ASSESSMENT — ACTIVITIES OF DAILY LIVING (ADL)
ADLS_ACUITY_SCORE: 37

## 2024-07-18 NOTE — DISCHARGE INSTRUCTIONS
Begin your antibiotics tomorrow and continue for full 7-day course for both  Touch base with your PCP office for close follow-up early next week for reevaluation recheck of the infected skin site especially after being on antibiotics for several days  Continue to use Tylenol as needed for pain and discomfort keep your legs elevated as much as possible when resting at home to help with swelling and inflammation  Otherwise, do not hesitate to seek urgent or emergent medical attention if you have any worsening or concerning signs or symptoms, especially with going into the weekend

## 2024-07-18 NOTE — ED TRIAGE NOTES
Patient presents due to swelling, pain and redness to left lower leg and foot. Patient reports history of cellulitis and is concerned about possible infection. Patient is concerned left lower leg might be starting to redden as well. Patient reports 7/10 constant burning to left lower extremity.     Triage Assessment (Adult)       Row Name 07/18/24 1234          Triage Assessment    Airway WDL WDL        Respiratory WDL    Respiratory WDL WDL        Skin Circulation/Temperature WDL    Skin Circulation/Temperature WDL X  redness, swelling and pain to left lower extremity        Cardiac WDL    Cardiac WDL WDL        Peripheral/Neurovascular WDL    Peripheral Neurovascular WDL WDL        Cognitive/Neuro/Behavioral WDL    Cognitive/Neuro/Behavioral WDL WDL

## 2024-07-18 NOTE — ED PROVIDER NOTES
[facility] EMERGENCY DEPARTMENT ([facility])    7/18/24      ED PROVIDER NOTE   ED 20  History     Chief Complaint   Patient presents with    Leg Pain     Patient presents due to swelling, pain and redness to left lower leg and foot. Patient reports history of cellulitis and is concerned about possible infection. Patient is concerned left lower leg might be starting to redden as well. Patient reports 7/10 constant burning to left lower extremity.     The history is provided by the patient and medical records. No  was used.     No Yusuf is a 64 year old female with prior history of type 2 diabetes, COPD, venous stasis dermatitis, cellulitis, arthritis, fibromyalgia, hypertension, prior history of polysubstance use disorder (opioids, meth, in remission), LVEF 60-65% (12/6/23); history of chronic HFmrEF (40-45%) who presents with pain, redness and swelling to left lower leg and foot. She states she began noticing tenderness, pain, and swelling to her distal left lower leg yesterday. She states she is worried due to her history of cellulitis requiring admission to the hospital for treatment. She reports concerns with color change to her left foot as well that she describes as looking like bruising that is different from her baseline. She denies numbness, tingling, weakness into her left lower leg or foot. She does have pain in her foot and distal left lower leg and ankle as well. She denies fever, chills, nausea, vomiting over the past 24 hours since leg symptoms started. She had a mild headache yesterday that resolved with OTC tylenol. She denies history of DVT or PE. She take aspirin 81 mg daily but otherwise denies other antiplatelet or anticoagulation therapy.     Past Medical History  Past Medical History:   Diagnosis Date    Acute cystitis without hematuria     Acute respiratory failure with hypoxia and hypercapnia (H)     Arthritis     Bipolar affective (H)     Bradycardia      Cellulitis     Chest pain, unspecified type     Fibromyalgia     History of sudden cardiac arrest     History of torsades de pointe due to drug     Hypertension     Left leg cellulitis      Past Surgical History:   Procedure Laterality Date    CHOLECYSTECTOMY      GYN SURGERY      c section    GYN SURGERY      oblation    ORTHOPEDIC SURGERY      left leg, left shoulder, back     cephALEXin (KEFLEX) 500 MG capsule  doxycycline hyclate (VIBRAMYCIN) 100 MG capsule  acetaminophen (TYLENOL) 325 MG tablet  Acidophilus Lactobacillus CAPS  albuterol (PROAIR HFA) 108 (90 Base) MCG/ACT inhaler  aspirin (ASA) 81 MG chewable tablet  atorvastatin (LIPITOR) 20 MG tablet  blood glucose (ACCU-CHEK RAFAELA PLUS) test strip  Blood Glucose Calibration (ACCU-CHEK ACTIVE GLUCOSE CONT VI)  blood glucose calibration (ACCU-CHEK RAFAELA) solution  calcium carbonate antacid 1000 MG CHEW  cefdinir (OMNICEF) 300 MG capsule  cholecalciferol 25 MCG (1000 UT) TABS  diclofenac (VOLTAREN) 1 % topical gel  DULoxetine (CYMBALTA) 20 MG capsule  fluticasone-salmeterol (ADVAIR) 250-50 MCG/ACT inhaler  gabapentin (NEURONTIN) 300 MG capsule  hydrOXYzine (ATARAX) 50 MG tablet  insulin detemir (LEVEMIR PEN) 100 UNIT/ML pen  levothyroxine (SYNTHROID/LEVOTHROID) 200 MCG tablet  magnesium oxide 400 MG tablet  melatonin 5 MG tablet  metFORMIN (GLUCOPHAGE) 1000 MG tablet  methadone (DOLOPHINE) 10 MG tablet  multivitamin w/minerals (THERA-VIT-M) tablet  naloxone (NARCAN) 4 MG/0.1ML nasal spray  nicotine polacrilex (NICORETTE) 4 MG gum  nystatin (NYSTOP) 392447 UNIT/GM external powder  omeprazole (PRILOSEC) 20 MG DR capsule  polyethylene glycol (MIRALAX) 17 GM/Dose powder  potassium chloride ER (KLOR-CON) 8 MEQ CR tablet  predniSONE (DELTASONE) 20 MG tablet  vitamin B-12 (CYANOCOBALAMIN) 1000 MCG tablet      Allergies   Allergen Reactions    Promethazine Other (See Comments) and Anxiety     Noted in 8/30/08 ER    Codeine Phosphate GI Disturbance    Lamotrigine  "Other (See Comments)     hyponatremia    Oxcarbazepine Unknown and Other (See Comments)     Low sodium  LegacyRecord#[id number]      Codeine Other (See Comments) and Rash     GI bleeding  LegacyRecord#[id number]       Family History  Family History   Problem Relation Age of Onset    Heart Disease Mother     Diabetes Mother     Ovarian Cancer Mother     Cancer Father     Heart Disease Father     Lung Cancer Father     Diabetes Brother     Diabetes Sister     Ovarian Cancer Sister      Social History   Social History     Tobacco Use    Smoking status: Former     Current packs/day: 0.10     Types: Cigarettes     Passive exposure: Never    Smokeless tobacco: Never    Tobacco comments:     Using nicotine vape occasionally   Vaping Use    Vaping status: Some Days    Substances: Nicotine    Devices: Disposable   Substance Use Topics    Alcohol use: No    Drug use: No      Past medical history, past surgical history, medications, allergies, family history, and social history were reviewed with the patient. No additional pertinent items.     A medically appropriate review of systems was performed with pertinent positives and negatives noted in the HPI, and all other systems negative.    Physical Exam   BP: 128/75  Pulse: 87  Temp: 98  F (36.7  C)  Resp: 18  Height: 165.1 cm (5' 5\")  Weight: 121.1 kg (267 lb)  SpO2: 98 %    Physical Exam  Constitutional:       General: She is not in acute distress.     Appearance: Normal appearance. She is obese. She is not ill-appearing, toxic-appearing or diaphoretic.   Cardiovascular:      Rate and Rhythm: Normal rate and regular rhythm.      Pulses: Normal pulses.           Dorsalis pedis pulses are 2+ on the right side and 2+ on the left side.   Pulmonary:      Effort: Pulmonary effort is normal. No respiratory distress.      Breath sounds: Normal breath sounds.   Musculoskeletal:      Right lower le+ Pitting Edema present.      Left lower leg: Tenderness present. No bony tenderness. 1+ Pitting " Edema present.      Right ankle: Normal.      Left ankle: Swelling present. No deformity, ecchymosis or lacerations. Tenderness (soft tissue surrounding the ankle) present. Normal range of motion.      Comments: Venous stasis color changes noted to bilateral dorsal feet; no cool or pale foot/ lower leg; capillary refill <2 seconds bilaterally   Skin:     General: Skin is warm.   Neurological:      General: No focal deficit present.      Mental Status: She is alert. Mental status is at baseline.   Psychiatric:         Mood and Affect: Mood normal.         Behavior: Behavior normal.       ED Course, Procedures, & Data     ED Course as of 07/18/24 2225   Thu Jul 18, 2024   1340 Lactic Acid(!): 2.5   1348 Sed Rate(!): 43   1404 Creatinine(!): 1.21   1435 Negative MRSA on 12/6/2023     1601 Lactic Acid(!): 2.3     Procedures         The Lactic acid level is elevated due to inflammation of the site of suspected cellulitis, at this time there is no sign of severe sepsis or septic shock.     Results for orders placed or performed during the hospital encounter of 07/18/24   US Lower Extremity Venous Duplex Left     Status: None    Narrative    [facility]  DATE: 7/18/2024    EXAM: LEFT LOWER EXTREMITY VENOUS ULTRASOUND    INDICATION: Left lower extremity swelling and redness and pain     TECHNIQUE: Duplex imaging was performed utilizing gray-scale,  compression, augmentation as appropriate, color-flow, Doppler waveform  analysis, and spectral Doppler imaging.    COMPARISON: No pertinent comparison study is available for review.    FINDINGS:  LEFT LEG: The common femoral, profunda femoral, femoral, popliteal,  and segmentally visualized calf veins are patent and compressible with  appropriate vascular waveforms. No deep venous thrombus is identified.      Impression    IMPRESSION:   1.  LEFT LEG: Negative for deep vein thrombosis.    NOLBERTO BOX MD         SYSTEM ID:  [id number]   Comprehensive metabolic panel      Status: Abnormal   Result Value Ref Range    Sodium 138 135 - 145 mmol/L    Potassium 4.2 3.4 - 5.3 mmol/L    Carbon Dioxide (CO2) 27 22 - 29 mmol/L    Anion Gap 12 7 - 15 mmol/L    Urea Nitrogen 23.1 (H) 8.0 - 23.0 mg/dL    Creatinine 1.21 (H) 0.51 - 0.95 mg/dL    GFR Estimate 50 (L) >60 mL/min/1.73m2    Calcium 9.6 8.8 - 10.4 mg/dL    Chloride 99 98 - 107 mmol/L    Glucose 133 (H) 70 - 99 mg/dL    Alkaline Phosphatase 122 40 - 150 U/L    AST 39 0 - 45 U/L    ALT 36 0 - 50 U/L    Protein Total 8.6 (H) 6.4 - 8.3 g/dL    Albumin 4.1 3.5 - 5.2 g/dL    Bilirubin Total 0.4 <=1.2 mg/dL   Lactic acid whole blood with 1x repeat in 2 hr when >2     Status: Abnormal   Result Value Ref Range    Lactic Acid, Initial 2.5 (H) 0.7 - 2.0 mmol/L   CRP inflammation     Status: Normal   Result Value Ref Range    CRP Inflammation 3.91 <5.00 mg/L   Erythrocyte sedimentation rate auto     Status: Abnormal   Result Value Ref Range    Erythrocyte Sedimentation Rate 43 (H) 0 - 30 mm/hr   CBC with platelets and differential     Status: None   Result Value Ref Range    WBC Count 8.7 4.0 - 11.0 10e3/uL    RBC Count 4.35 3.80 - 5.20 10e6/uL    Hemoglobin 12.7 11.7 - 15.7 g/dL    Hematocrit 39.0 35.0 - 47.0 %    MCV 90 78 - 100 fL    MCH 29.2 26.5 - 33.0 pg    MCHC 32.6 31.5 - 36.5 g/dL    RDW 14.6 10.0 - 15.0 %    Platelet Count 244 150 - 450 10e3/uL    % Neutrophils 62 %    % Lymphocytes 23 %    % Monocytes 10 %    % Eosinophils 3 %    % Basophils 1 %    % Immature Granulocytes 1 %    NRBCs per 100 WBC 0 <1 /100    Absolute Neutrophils 5.5 1.6 - 8.3 10e3/uL    Absolute Lymphocytes 2.0 0.8 - 5.3 10e3/uL    Absolute Monocytes 0.8 0.0 - 1.3 10e3/uL    Absolute Eosinophils 0.3 0.0 - 0.7 10e3/uL    Absolute Basophils 0.1 0.0 - 0.2 10e3/uL    Absolute Immature Granulocytes 0.1 <=0.4 10e3/uL    Absolute NRBCs 0.0 10e3/uL   Lactic acid whole blood     Status: Abnormal   Result Value Ref Range    Lactic Acid 2.3 (H) 0.7 - 2.0 mmol/L   CBC with  platelets differential     Status: None    Narrative    The following orders were created for panel order CBC with platelets differential.  Procedure                               Abnormality         Status                     ---------                               -----------         ------                     CBC with platelets and d...[[id number]]                      Final result                 Please view results for these tests on the individual orders.     Medications   acetaminophen (TYLENOL) tablet 1,000 mg (1,000 mg Oral $Given 7/18/24 1350)   sodium chloride 0.9% BOLUS 1,000 mL (0 mLs Intravenous Stopped 7/18/24 1622)   cefTRIAXone (ROCEPHIN) 1 g vial to attach to  mL bag for ADULTS or NS 50 mL bag for PEDS (0 g Intravenous Stopped 7/18/24 1532)     Labs Ordered and Resulted from Time of ED Arrival to Time of ED Departure   COMPREHENSIVE METABOLIC PANEL - Abnormal       Result Value    Sodium 138      Potassium 4.2      Carbon Dioxide (CO2) 27      Anion Gap 12      Urea Nitrogen 23.1 (*)     Creatinine 1.21 (*)     GFR Estimate 50 (*)     Calcium 9.6      Chloride 99      Glucose 133 (*)     Alkaline Phosphatase 122      AST 39      ALT 36      Protein Total 8.6 (*)     Albumin 4.1      Bilirubin Total 0.4     LACTIC ACID WHOLE BLOOD WITH 1X REPEAT IN 2 HR WHEN >2 - Abnormal    Lactic Acid, Initial 2.5 (*)    ERYTHROCYTE SEDIMENTATION RATE AUTO - Abnormal    Erythrocyte Sedimentation Rate 43 (*)    LACTIC ACID WHOLE BLOOD - Abnormal    Lactic Acid 2.3 (*)    CRP INFLAMMATION - Normal    CRP Inflammation 3.91     CBC WITH PLATELETS AND DIFFERENTIAL    WBC Count 8.7      RBC Count 4.35      Hemoglobin 12.7      Hematocrit 39.0      MCV 90      MCH 29.2      MCHC 32.6      RDW 14.6      Platelet Count 244      % Neutrophils 62      % Lymphocytes 23      % Monocytes 10      % Eosinophils 3      % Basophils 1      % Immature Granulocytes 1      NRBCs per 100 WBC 0      Absolute Neutrophils 5.5       Absolute Lymphocytes 2.0      Absolute Monocytes 0.8      Absolute Eosinophils 0.3      Absolute Basophils 0.1      Absolute Immature Granulocytes 0.1      Absolute NRBCs 0.0       US Lower Extremity Venous Duplex Left   Final Result   IMPRESSION:    1.  LEFT LEG: Negative for deep vein thrombosis.      NOLBERTO BOX MD            SYSTEM ID:  [id number]             Critical care was not performed.     Medical Decision Making  The patient's presentation was of high complexity (an acute health issue posing potential threat to life or bodily function).    The patient's evaluation involved:  review of external note(s) from 1 sources (ED encounter 8/6/2023 for similar complaints of cellulitis; ED encounter 1/25/2024)  review of 1 test result(s) ordered prior to this encounter (previous labs)  ordering and/or review of 3+ test(s) in this encounter (see separate area of note for details)    The patient's management necessitated moderate risk (prescription drug management including medications given in the ED).    Assessment & Plan    No is a 64-year-old female that presented to the ED with concerns of left lower leg pain, swelling, and redness.  Acceptable vital signs on presentation without tachycardia, hypotension, or fever.  Patient in no acute distress and nontoxic-appearing and otherwise hemodynamically stable throughout the ED visit.  Ultrasound left lower extremity negative for signs of DVT.  Lactic acid initially at 2.5 with 2-hour repeat at 2.3 after only IV NS bolus of 500 mL out of 1000 mL.  WBC 8.7.  Sed rate minimally elevated at 43 with CRP within normal limits.  Mild elevation in baseline creatinine without KERRIE.  Low suspicion systemic infection at this time and suspect elevated lactic acid due to inflammation of the leg area.  Leg otherwise negative for gross signs of necrosis or compartment syndrome.  P.o. Tylenol and initial dose of ceftriaxone IV given in the ED.  Discussed reassuring lab and imaging  results with the patient at length as well as the recommendation for discharge home and oral, outpatient treatment of suspected developing cellulitis of the left lower extremity.  Rx Keflex and doxycycline for 7 days.  Advised keeping leg elevated when resting at home as to help with swelling, inflammation, and pain.  May continue Tylenol as needed for symptoms of pain and discomfort.  Follow-up closely with PCP office next week for reevaluation, recheck of symptoms.  Patient was agreeable to the discharge treatment plan, voiced understanding of the plan as well as all lab and imaging results, and all questions answered prior to discharge.    I have reviewed the nursing notes. I have reviewed the findings, diagnosis, plan and need for follow up with the patient.    Discharge Medication List as of 7/18/2024  4:23 PM        START taking these medications    Details   cephALEXin (KEFLEX) 500 MG capsule Take 1 capsule (500 mg) by mouth 3 times daily for 7 days, Disp-21 capsule, R-0, E-Prescribe      doxycycline hyclate (VIBRAMYCIN) 100 MG capsule Take 1 capsule (100 mg) by mouth 2 times daily for 7 days, Disp-14 capsule, R-0, E-Prescribe             Final diagnoses:   Cellulitis of left lower leg       Luz Aguilar PA-C    [facility] EMERGENCY DEPARTMENT  7/18/2024     Luz Aguilar PA-C  07/18/24 2230  --    ED Attending Physician Attestation    I Misael Angel MD, cared for this patient with the Advanced Practice Provider (AURA). I personally provided a substantive portion of the care for this patient, including approving the care plan for the number and complexity of problems addressed and taking responsibility related to the risk of complications and/or morbidity or mortality of patient management. Please see the AURA's documentation for full details.    Summary of HPI, PE, ED Course   Patient is a 64 year old female evaluated in the emergency department for lower extremity redness swelling  and subjective chills. Exam and ED course notable for mild extremity edema on both sides slightly greater on the left with an area of erythema but no signs of abscess, compartment syndrome, necrotizing fasciitis or other complication.  Her labs are consistent with possible cellulitis her ultrasound is negative for DVT.  She was treated with IV antibiotics and appears to be an appropriate candidate for trial of outpatient management. After the completion of care in the emergency department, the patient was discharged.  We discussed the indications for emergency department return and follow-up.  Stable for discharge.        Misael Angel MD  Emergency Medicine          Misael Angel MD  07/22/24 [id number]

## 2024-08-12 ENCOUNTER — TELEPHONE (OUTPATIENT)
Dept: SLEEP MEDICINE | Facility: CLINIC | Age: 64
End: 2024-08-12
Payer: COMMERCIAL

## 2024-08-12 NOTE — TELEPHONE ENCOUNTER
Pt was called to reschedule appt on 9/4 for a  PSG. LVM for them to call back.     Rosie Carlos    Anjana Ding

## 2024-09-10 ENCOUNTER — LAB REQUISITION (OUTPATIENT)
Dept: LAB | Facility: CLINIC | Age: 64
End: 2024-09-10
Payer: COMMERCIAL

## 2024-09-10 DIAGNOSIS — E11.9 TYPE 2 DIABETES MELLITUS WITHOUT COMPLICATIONS (H): ICD-10-CM

## 2024-09-10 LAB
CREAT UR-MCNC: 34.2 MG/DL
MICROALBUMIN UR-MCNC: 15.6 MG/L
MICROALBUMIN/CREAT UR: 45.61 MG/G CR (ref 0–25)

## 2024-09-10 PROCEDURE — 82043 UR ALBUMIN QUANTITATIVE: CPT | Mod: ORL | Performed by: FAMILY MEDICINE

## 2024-10-03 ENCOUNTER — APPOINTMENT (OUTPATIENT)
Dept: GENERAL RADIOLOGY | Facility: CLINIC | Age: 64
End: 2024-10-03
Attending: PHYSICIAN ASSISTANT
Payer: COMMERCIAL

## 2024-10-03 ENCOUNTER — HOSPITAL ENCOUNTER (EMERGENCY)
Facility: CLINIC | Age: 64
Discharge: HOME | End: 2024-10-03
Attending: EMERGENCY MEDICINE | Admitting: EMERGENCY MEDICINE
Payer: COMMERCIAL

## 2024-10-03 VITALS
OXYGEN SATURATION: 94 % | RESPIRATION RATE: 16 BRPM | TEMPERATURE: 98.3 F | HEART RATE: 74 BPM | SYSTOLIC BLOOD PRESSURE: 114 MMHG | DIASTOLIC BLOOD PRESSURE: 62 MMHG

## 2024-10-03 DIAGNOSIS — L03.116 CELLULITIS OF LEFT LOWER EXTREMITY: ICD-10-CM

## 2024-10-03 LAB
ALBUMIN SERPL BCG-MCNC: 3.8 G/DL (ref 3.5–5.2)
ALBUMIN UR-MCNC: NEGATIVE MG/DL
ALP SERPL-CCNC: 154 U/L (ref 40–150)
ALT SERPL W P-5'-P-CCNC: 22 U/L (ref 0–50)
ANION GAP SERPL CALCULATED.3IONS-SCNC: 9 MMOL/L (ref 7–15)
APPEARANCE UR: CLEAR
AST SERPL W P-5'-P-CCNC: 28 U/L (ref 0–45)
BASOPHILS # BLD AUTO: 0.1 10E3/UL (ref 0–0.2)
BASOPHILS NFR BLD AUTO: 1 %
BILIRUB SERPL-MCNC: 0.3 MG/DL
BILIRUB UR QL STRIP: NEGATIVE
BUN SERPL-MCNC: 14.5 MG/DL (ref 8–23)
CALCIUM SERPL-MCNC: 9.3 MG/DL (ref 8.8–10.4)
CHLORIDE SERPL-SCNC: 100 MMOL/L (ref 98–107)
COLOR UR AUTO: ABNORMAL
CREAT SERPL-MCNC: 1.22 MG/DL (ref 0.51–0.95)
EGFRCR SERPLBLD CKD-EPI 2021: 50 ML/MIN/1.73M2
EOSINOPHIL # BLD AUTO: 0.4 10E3/UL (ref 0–0.7)
EOSINOPHIL NFR BLD AUTO: 5 %
ERYTHROCYTE [DISTWIDTH] IN BLOOD BY AUTOMATED COUNT: 14.8 % (ref 10–15)
FLUAV RNA SPEC QL NAA+PROBE: NEGATIVE
FLUBV RNA RESP QL NAA+PROBE: NEGATIVE
GLUCOSE BLDC GLUCOMTR-MCNC: 125 MG/DL (ref 70–99)
GLUCOSE SERPL-MCNC: 116 MG/DL (ref 70–99)
GLUCOSE UR STRIP-MCNC: NEGATIVE MG/DL
HCO3 SERPL-SCNC: 30 MMOL/L (ref 22–29)
HCT VFR BLD AUTO: 38.9 % (ref 35–47)
HGB BLD-MCNC: 12.6 G/DL (ref 11.7–15.7)
HGB UR QL STRIP: NEGATIVE
HYALINE CASTS: 4 /LPF
IMM GRANULOCYTES # BLD: 0 10E3/UL
IMM GRANULOCYTES NFR BLD: 0 %
KETONES UR STRIP-MCNC: NEGATIVE MG/DL
LEUKOCYTE ESTERASE UR QL STRIP: ABNORMAL
LYMPHOCYTES # BLD AUTO: 2.2 10E3/UL (ref 0.8–5.3)
LYMPHOCYTES NFR BLD AUTO: 25 %
MCH RBC QN AUTO: 29.6 PG (ref 26.5–33)
MCHC RBC AUTO-ENTMCNC: 32.4 G/DL (ref 31.5–36.5)
MCV RBC AUTO: 92 FL (ref 78–100)
MONOCYTES # BLD AUTO: 0.9 10E3/UL (ref 0–1.3)
MONOCYTES NFR BLD AUTO: 10 %
MUCOUS THREADS #/AREA URNS LPF: PRESENT /LPF
NEUTROPHILS # BLD AUTO: 5.3 10E3/UL (ref 1.6–8.3)
NEUTROPHILS NFR BLD AUTO: 59 %
NITRATE UR QL: NEGATIVE
NRBC # BLD AUTO: 0 10E3/UL
NRBC BLD AUTO-RTO: 0 /100
PH UR STRIP: 5 [PH] (ref 5–7)
PLATELET # BLD AUTO: 285 10E3/UL (ref 150–450)
POTASSIUM SERPL-SCNC: 4.3 MMOL/L (ref 3.4–5.3)
PROT SERPL-MCNC: 8.2 G/DL (ref 6.4–8.3)
RBC # BLD AUTO: 4.25 10E6/UL (ref 3.8–5.2)
RBC URINE: 1 /HPF
RSV RNA SPEC NAA+PROBE: NEGATIVE
SARS-COV-2 RNA RESP QL NAA+PROBE: NEGATIVE
SODIUM SERPL-SCNC: 139 MMOL/L (ref 135–145)
SP GR UR STRIP: 1.01 (ref 1–1.03)
SQUAMOUS EPITHELIAL: 1 /HPF
UROBILINOGEN UR STRIP-MCNC: NORMAL MG/DL
WBC # BLD AUTO: 8.9 10E3/UL (ref 4–11)
WBC URINE: 3 /HPF

## 2024-10-03 PROCEDURE — 80053 COMPREHEN METABOLIC PANEL: CPT | Performed by: PHYSICIAN ASSISTANT

## 2024-10-03 PROCEDURE — 250N000011 HC RX IP 250 OP 636: Performed by: PHYSICIAN ASSISTANT

## 2024-10-03 PROCEDURE — 36415 COLL VENOUS BLD VENIPUNCTURE: CPT | Performed by: PHYSICIAN ASSISTANT

## 2024-10-03 PROCEDURE — 71046 X-RAY EXAM CHEST 2 VIEWS: CPT

## 2024-10-03 PROCEDURE — 99284 EMERGENCY DEPT VISIT MOD MDM: CPT | Mod: FS | Performed by: EMERGENCY MEDICINE

## 2024-10-03 PROCEDURE — 87637 SARSCOV2&INF A&B&RSV AMP PRB: CPT | Performed by: PHYSICIAN ASSISTANT

## 2024-10-03 PROCEDURE — 96365 THER/PROPH/DIAG IV INF INIT: CPT | Performed by: EMERGENCY MEDICINE

## 2024-10-03 PROCEDURE — 85004 AUTOMATED DIFF WBC COUNT: CPT | Performed by: PHYSICIAN ASSISTANT

## 2024-10-03 PROCEDURE — 99285 EMERGENCY DEPT VISIT HI MDM: CPT | Mod: 25 | Performed by: EMERGENCY MEDICINE

## 2024-10-03 PROCEDURE — 81001 URINALYSIS AUTO W/SCOPE: CPT | Performed by: PHYSICIAN ASSISTANT

## 2024-10-03 PROCEDURE — 82962 GLUCOSE BLOOD TEST: CPT

## 2024-10-03 RX ORDER — DOXYCYCLINE 100 MG/1
100 CAPSULE ORAL 2 TIMES DAILY
Qty: 14 CAPSULE | Refills: 0 | Status: SHIPPED | OUTPATIENT
Start: 2024-10-03 | End: 2024-10-10

## 2024-10-03 RX ORDER — CEPHALEXIN 500 MG/1
500 CAPSULE ORAL 4 TIMES DAILY
Qty: 28 CAPSULE | Refills: 0 | Status: SHIPPED | OUTPATIENT
Start: 2024-10-03 | End: 2024-10-10

## 2024-10-03 RX ORDER — CEFTRIAXONE 1 G/1
1 INJECTION, POWDER, FOR SOLUTION INTRAMUSCULAR; INTRAVENOUS ONCE
Status: COMPLETED | OUTPATIENT
Start: 2024-10-03 | End: 2024-10-03

## 2024-10-03 RX ADMIN — CEFTRIAXONE SODIUM 1 G: 1 INJECTION, POWDER, FOR SOLUTION INTRAMUSCULAR; INTRAVENOUS at 18:43

## 2024-10-03 ASSESSMENT — ACTIVITIES OF DAILY LIVING (ADL)
ADLS_ACUITY_SCORE: 37
ADLS_ACUITY_SCORE: 35
ADLS_ACUITY_SCORE: 37
ADLS_ACUITY_SCORE: 37

## 2024-10-03 ASSESSMENT — COLUMBIA-SUICIDE SEVERITY RATING SCALE - C-SSRS
1. IN THE PAST MONTH, HAVE YOU WISHED YOU WERE DEAD OR WISHED YOU COULD GO TO SLEEP AND NOT WAKE UP?: NO
2. HAVE YOU ACTUALLY HAD ANY THOUGHTS OF KILLING YOURSELF IN THE PAST MONTH?: NO
6. HAVE YOU EVER DONE ANYTHING, STARTED TO DO ANYTHING, OR PREPARED TO DO ANYTHING TO END YOUR LIFE?: NO

## 2024-10-03 NOTE — ED PROVIDER NOTES
[facility] EMERGENCY DEPARTMENT ([facility])    10/03/24       ED PROVIDER NOTE    History     Chief Complaint   Patient presents with    Leg Pain     Patient reports pain and swelling on right lower leg and foot. Concerned for cellulitis    Cough     Cold symptoms onset 1 week ago; cough and wheezing onset 2 days ago; denies fever but reports chills; denies hx of asthma; no audible wheezing     HPI      No Yusuf is a 64 year old female with a past medical history of CHF, chronic bilateral low back pain, hypertension, chronic venous insuffiencey, hemorrhoids, GERD< episodic mood disorder, methamphetamine abuse, and type 2 diabetes, who presents to the ED for evaluation of left leg pain and redness.  Patient states about a week ago she started noticing some new redness to her left anterior distal shin which has been ongoing since that time.  She also notes around the same time point she started developing some dry cough after being around a sick individual.  Symptoms without any measured fevers, chest pain dyspnea.  She denies any vomiting abdominal pain diarrhea or urinary symptoms or than some frequency which she attributes to her diuretic.  She does have a history of cellulitis in the past and tells me she is concerned about early cellulitis.  She denies any specific injury to her left leg including any cuts or wounds.  She deals with baseline neuropathy in both feet.  She was seen for similar difficulties in July, diagnosed with left lower extremity cellulitis at that time given ceftriaxone, discharged on Keflex and Doxy and had improvement of symptoms at that time.    Past Medical History  Past Medical History:   Diagnosis Date    Acute cystitis without hematuria     Acute respiratory failure with hypoxia and hypercapnia (H)     Arthritis     Bipolar affective (H)     Bradycardia     Cellulitis     Chest pain, unspecified type     Fibromyalgia     History of sudden cardiac arrest     History  of torsades de pointe due to drug     Hypertension     Left leg cellulitis      Past Surgical History:   Procedure Laterality Date    CHOLECYSTECTOMY      GYN SURGERY      c section    GYN SURGERY      oblation    ORTHOPEDIC SURGERY      left leg, left shoulder, back     cephALEXin (KEFLEX) 500 MG capsule  doxycycline hyclate (VIBRAMYCIN) 100 MG capsule  acetaminophen (TYLENOL) 325 MG tablet  Acidophilus Lactobacillus CAPS  albuterol (PROAIR HFA) 108 (90 Base) MCG/ACT inhaler  aspirin (ASA) 81 MG chewable tablet  atorvastatin (LIPITOR) 20 MG tablet  blood glucose (ACCU-CHEK RAFAELA PLUS) test strip  Blood Glucose Calibration (ACCU-CHEK ACTIVE GLUCOSE CONT VI)  blood glucose calibration (ACCU-CHEK RAFAELA) solution  calcium carbonate antacid 1000 MG CHEW  cefdinir (OMNICEF) 300 MG capsule  cholecalciferol 25 MCG (1000 UT) TABS  diclofenac (VOLTAREN) 1 % topical gel  DULoxetine (CYMBALTA) 20 MG capsule  fluticasone-salmeterol (ADVAIR) 250-50 MCG/ACT inhaler  gabapentin (NEURONTIN) 300 MG capsule  hydrOXYzine (ATARAX) 50 MG tablet  insulin detemir (LEVEMIR PEN) 100 UNIT/ML pen  levothyroxine (SYNTHROID/LEVOTHROID) 200 MCG tablet  magnesium oxide 400 MG tablet  melatonin 5 MG tablet  metFORMIN (GLUCOPHAGE) 1000 MG tablet  methadone (DOLOPHINE) 10 MG tablet  multivitamin w/minerals (THERA-VIT-M) tablet  naloxone (NARCAN) 4 MG/0.1ML nasal spray  nicotine polacrilex (NICORETTE) 4 MG gum  nystatin (NYSTOP) 024623 UNIT/GM external powder  omeprazole (PRILOSEC) 20 MG DR capsule  polyethylene glycol (MIRALAX) 17 GM/Dose powder  potassium chloride ER (KLOR-CON) 8 MEQ CR tablet  predniSONE (DELTASONE) 20 MG tablet  vitamin B-12 (CYANOCOBALAMIN) 1000 MCG tablet      Allergies   Allergen Reactions    Promethazine Other (See Comments) and Anxiety     Noted in 8/30/08 ER    Codeine Phosphate GI Disturbance    Lamotrigine Other (See Comments)     hyponatremia    Oxcarbazepine Unknown and Other (See Comments)     Low  sodium  LegacyRecord#[id number]      Codeine Other (See Comments) and Rash     GI bleeding  LegacyRecord#[id number]       Family History  Family History   Problem Relation Age of Onset    Heart Disease Mother     Diabetes Mother     Ovarian Cancer Mother     Cancer Father     Heart Disease Father     Lung Cancer Father     Diabetes Brother     Diabetes Sister     Ovarian Cancer Sister      Social History   Social History     Tobacco Use    Smoking status: Former     Current packs/day: 0.10     Types: Cigarettes, Vaping Device     Passive exposure: Never    Smokeless tobacco: Never    Tobacco comments:     Using nicotine vape occasionally   Vaping Use    Vaping status: Some Days    Substances: Nicotine    Devices: Disposable   Substance Use Topics    Alcohol use: No    Drug use: No      A complete review of systems was performed with pertinent positives and negatives noted in the HPI, and all other systems negative.    Physical Exam   BP: 121/74  Pulse: 98  Temp: 98.5  F (36.9  C)  Resp: 20  SpO2: 98 %  Physical Exam      GENERAL APPEARANCE: The patient is well developed, well appearing, and in no acute distress.  HEAD:  Normocephalic and atraumatic.   EENT: Voice normal.  Uvula midline without exudates.  NECK: Trachea is midline.No lymphadenopathy or tenderness.  LUNGS: Breath sounds are equal and clear bilaterally. No wheezes, rhonchi, or rales.  HEART: Regular rate and normal rhythm.    ABDOMEN: Soft, flat, and benign. No mass, tenderness, guarding, or rebound.  EXTREMITIES: Exam of the left lower extremity shows mild erythema to the left anterior distal shin.  No significant tenderness to palpation.  No crepitance, fluctuance, findings suggest abscess or deep space infection.  This is not circumferential, no vomiting to the calf no tenderness to palpation of the left calf.  Left foot without open wounds or findings suggest infection.  Right lower extremity without open wounds, significant erythema or edema.  See Derm photos  below.  PT pulse 2+ on the left.  NEUROLOGIC: No focal sensory or motor deficits are noted.  PSYCHIATRIC: The patient is awake, alert.  Appropriate mood and affect.  SKIN: Warm, dry, and well perfused. Good turgor.                ED Course, Procedures, & Data           Results for orders placed or performed during the hospital encounter of 10/03/24   Chest XR,  PA & LAT     Status: None    Narrative    EXAM: XR CHEST 2 VIEWS  LOCATION: [facility]  DATE: 10/3/2024    INDICATION: cough  COMPARISON: 1/25/2024      Impression    IMPRESSION: Negative chest. Lungs clear. No substantial change.   Glucose by meter     Status: Abnormal   Result Value Ref Range    GLUCOSE BY METER POCT 125 (H) 70 - 99 mg/dL   Comprehensive metabolic panel     Status: Abnormal   Result Value Ref Range    Sodium 139 135 - 145 mmol/L    Potassium 4.3 3.4 - 5.3 mmol/L    Carbon Dioxide (CO2) 30 (H) 22 - 29 mmol/L    Anion Gap 9 7 - 15 mmol/L    Urea Nitrogen 14.5 8.0 - 23.0 mg/dL    Creatinine 1.22 (H) 0.51 - 0.95 mg/dL    GFR Estimate 50 (L) >60 mL/min/1.73m2    Calcium 9.3 8.8 - 10.4 mg/dL    Chloride 100 98 - 107 mmol/L    Glucose 116 (H) 70 - 99 mg/dL    Alkaline Phosphatase 154 (H) 40 - 150 U/L    AST 28 0 - 45 U/L    ALT 22 0 - 50 U/L    Protein Total 8.2 6.4 - 8.3 g/dL    Albumin 3.8 3.5 - 5.2 g/dL    Bilirubin Total 0.3 <=1.2 mg/dL   Symptomatic Influenza A/B, RSV, & SARS-CoV2 PCR (COVID-19) Nose     Status: Normal    Specimen: Nose; Swab   Result Value Ref Range    Influenza A PCR Negative Negative    Influenza B PCR Negative Negative    RSV PCR Negative Negative    SARS CoV2 PCR Negative Negative    Narrative    Testing was performed using the Xpert Xpress CoV2/Flu/RSV Assay on the [employer] Instrument. This test should be ordered for the detection of SARS-CoV-2, influenza, and RSV viruses in individuals who meet clinical and/or epidemiological criteria. Test performance is unknown  in asymptomatic patients. This test is for in vitro diagnostic use under the FDA EUA for laboratories certified under CLIA to perform high or moderate complexity testing. This test has not been FDA cleared or approved. A negative result does not rule out the presence of PCR inhibitors in the specimen or target RNA in concentration below the limit of detection for the assay. If only one viral target is positive but coinfection with multiple targets is suspected, the sample should be re-tested with another FDA cleared, approved, or authorized test, if coinfection would change clinical management. This test was validated by the [facility]. These laboratories are certified under the Clinical Laboratory Improvement Amendments of 1988 (CLIA-88) as qualified to perform high complexity laboratory testing.   UA with Microscopic reflex to Culture     Status: Abnormal    Specimen: Urine, Clean Catch   Result Value Ref Range    Color Urine Light Yellow Colorless, Straw, Light Yellow, Yellow    Appearance Urine Clear Clear    Glucose Urine Negative Negative mg/dL    Bilirubin Urine Negative Negative    Ketones Urine Negative Negative mg/dL    Specific Gravity Urine 1.012 1.003 - 1.035    Blood Urine Negative Negative    pH Urine 5.0 5.0 - 7.0    Protein Albumin Urine Negative Negative mg/dL    Urobilinogen Urine Normal Normal, 2.0 mg/dL    Nitrite Urine Negative Negative    Leukocyte Esterase Urine Small (A) Negative    Mucus Urine Present (A) None Seen /LPF    RBC Urine 1 <=2 /HPF    WBC Urine 3 <=5 /HPF    Squamous Epithelials Urine 1 <=1 /HPF    Hyaline Casts Urine 4 (H) <=2 /LPF    Narrative    Urine Culture not indicated   CBC with platelets and differential     Status: None   Result Value Ref Range    WBC Count 8.9 4.0 - 11.0 10e3/uL    RBC Count 4.25 3.80 - 5.20 10e6/uL    Hemoglobin 12.6 11.7 - 15.7 g/dL    Hematocrit 38.9 35.0 - 47.0 %    MCV 92 78 - 100 fL    MCH 29.6 26.5 - 33.0 pg    MCHC 32.4  31.5 - 36.5 g/dL    RDW 14.8 10.0 - 15.0 %    Platelet Count 285 150 - 450 10e3/uL    % Neutrophils 59 %    % Lymphocytes 25 %    % Monocytes 10 %    % Eosinophils 5 %    % Basophils 1 %    % Immature Granulocytes 0 %    NRBCs per 100 WBC 0 <1 /100    Absolute Neutrophils 5.3 1.6 - 8.3 10e3/uL    Absolute Lymphocytes 2.2 0.8 - 5.3 10e3/uL    Absolute Monocytes 0.9 0.0 - 1.3 10e3/uL    Absolute Eosinophils 0.4 0.0 - 0.7 10e3/uL    Absolute Basophils 0.1 0.0 - 0.2 10e3/uL    Absolute Immature Granulocytes 0.0 <=0.4 10e3/uL    Absolute NRBCs 0.0 10e3/uL   CBC with platelets differential     Status: None    Narrative    The following orders were created for panel order CBC with platelets differential.  Procedure                               Abnormality         Status                     ---------                               -----------         ------                     CBC with platelets and d...[[id number]]                      Final result                 Please view results for these tests on the individual orders.     Medications   pharmacy alert - intermittent dosing (has no administration in time range)   cefTRIAXone (ROCEPHIN) 1 g vial to attach to  mL bag for ADULTS or NS 50 mL bag for PEDS (0 g Intravenous Stopped 10/3/24 1918)     Labs Ordered and Resulted from Time of ED Arrival to Time of ED Departure   GLUCOSE BY METER - Abnormal       Result Value    GLUCOSE BY METER POCT 125 (*)    COMPREHENSIVE METABOLIC PANEL - Abnormal    Sodium 139      Potassium 4.3      Carbon Dioxide (CO2) 30 (*)     Anion Gap 9      Urea Nitrogen 14.5      Creatinine 1.22 (*)     GFR Estimate 50 (*)     Calcium 9.3      Chloride 100      Glucose 116 (*)     Alkaline Phosphatase 154 (*)     AST 28      ALT 22      Protein Total 8.2      Albumin 3.8      Bilirubin Total 0.3     ROUTINE UA WITH MICROSCOPIC REFLEX TO CULTURE - Abnormal    Color Urine Light Yellow      Appearance Urine Clear      Glucose Urine Negative       Bilirubin Urine Negative      Ketones Urine Negative      Specific Gravity Urine 1.012      Blood Urine Negative      pH Urine 5.0      Protein Albumin Urine Negative      Urobilinogen Urine Normal      Nitrite Urine Negative      Leukocyte Esterase Urine Small (*)     Mucus Urine Present (*)     RBC Urine 1      WBC Urine 3      Squamous Epithelials Urine 1      Hyaline Casts Urine 4 (*)    INFLUENZA A/B, RSV, & SARS-COV2 PCR - Normal    Influenza A PCR Negative      Influenza B PCR Negative      RSV PCR Negative      SARS CoV2 PCR Negative     GLUCOSE MONITOR NURSING POCT   CBC WITH PLATELETS AND DIFFERENTIAL    WBC Count 8.9      RBC Count 4.25      Hemoglobin 12.6      Hematocrit 38.9      MCV 92      MCH 29.6      MCHC 32.4      RDW 14.8      Platelet Count 285      % Neutrophils 59      % Lymphocytes 25      % Monocytes 10      % Eosinophils 5      % Basophils 1      % Immature Granulocytes 0      NRBCs per 100 WBC 0      Absolute Neutrophils 5.3      Absolute Lymphocytes 2.2      Absolute Monocytes 0.9      Absolute Eosinophils 0.4      Absolute Basophils 0.1      Absolute Immature Granulocytes 0.0      Absolute NRBCs 0.0       Chest XR,  PA & LAT   Final Result   IMPRESSION: Negative chest. Lungs clear. No substantial change.             Critical care was not performed.     Medical Decision Making  The patient's presentation was of moderate complexity (an undiagnosed new problem with uncertain prognosis).    The patient's evaluation involved:  review of external note(s) from 1 sources (see separate area of note for details)  review of 3+ test result(s) ordered prior to this encounter (see separate area of note for details)  ordering and/or review of 3+ test(s) in this encounter (see separate area of note for details)    The patient's management necessitated moderate risk (prescription drug management including medications given in the ED).    Assessment & Plan    This is a medically complex 64-year-old  female presenting with concerns for 1 week of left lower extremity erythema and swelling in the setting of history of prior left leg cellulitis.  She is also reporting some cough around the same time point with known sick exposure.  On presentation patient is afebrile, normoxic, without tachycardia.  She has focal left lower extremity erythema to the left anterior shin.  Consider differential diagnosis including possibility cellulitis, DVT, stasis dermatitis on differential for left lower extremity findings.  Without calf tenderness or erythema low suspicion of DVT do not feel ultrasound at this time is warranted.    Regards to the cough I considered possible viral URI bronchitis pneumonia on differential as well as possibility of CHF exacerbation of lower likelihood without dyspnea hypoxia or significant bilateral lower extremity edema.  Will initiate workup including CBC chemistry chest x-ray UA with patient reporting urinary frequency along with COVID testing.    Laboratory studies reviewed.  CBC without leukocytosis white count 8.9 today.  Chemistry shows similar creatinine to prior from July, otherwise normalElectrolytes.  UA obtained reviewed does not show UTI findings, respiratory swab negative.  I independently interpreted chest x-ray without findings for focal consolidation radiologist overread negative.    With findings above, symptoms appear most consistent with left lower extremity cellulitis.  Patient given ceftriaxone in the ER, will be discharged on Keflex and doxycycline.  I did outline the erythema with a skin marker prior to discharge.  Patient lives at assisted living facility, and will have staff monitor the region for increasing redness for which she would need to return back to the ED.  I also placed a referral to follow-up with primary care for her for early next week.  Patient has no other questions or concerns at this time.  Red flag signs were addressed, and they were in agreement with the  patient care plan provided.    Patient seen and discussed with attending physician , who agrees with my plan of care.    --    ED Attending Physician Attestation    I Rayray Lagunas DO, cared for this patient with the Advanced Practice Provider (AURA). I personally provided a substantive portion of the care for this patient, including approving the care plan for the number and complexity of problems addressed and taking responsibility related to the risk of complications and/or morbidity or mortality of patient management. Please see the AURA's documentation for full details.          Rayray Lagunas DO  Emergency Medicine      I have reviewed the nursing notes. I have reviewed the findings, diagnosis, plan and need for follow up with the patient.    Discharge Medication List as of 10/3/2024  8:06 PM        START taking these medications    Details   cephALEXin (KEFLEX) 500 MG capsule Take 1 capsule (500 mg) by mouth 4 times daily for 7 days., Disp-28 capsule, R-0, Local Print      doxycycline hyclate (VIBRAMYCIN) 100 MG capsule Take 1 capsule (100 mg) by mouth 2 times daily for 7 days., Disp-14 capsule, R-0, Local Print             Final diagnoses:   Cellulitis of left lower extremity       JONG Pastor  [facility] EMERGENCY DEPARTMENT  10/3/2024     Rosita Hoffman PA-C  10/03/24 2104       Rayray Lagunas DO  10/03/24 2137

## 2024-10-03 NOTE — ED TRIAGE NOTES
Triage Assessment (Adult)       Row Name 10/03/24 [id number]          Triage Assessment    Airway WDL WDL        Respiratory WDL    Respiratory WDL rhythm/pattern;cough     Rhythm/Pattern, Respiratory unlabored     Cough Frequency infrequent  clear and slightly yellow        Skin Circulation/Temperature WDL    Skin Circulation/Temperature WDL WDL        Cardiac WDL    Cardiac WDL WDL;rhythm     Pulse Rate & Regularity radial pulse regular

## 2024-10-04 NOTE — ED NOTES
"10/4/24 4:21 PM  Pharmacy Wing Pharmacist called asking \"why this pt is prescribed 2 antibiotics/\". Chart was checked and Pharmacist was informed of the pt's dx. {Er pharmacist \" that's all I needed to know\"  "

## 2024-10-04 NOTE — DISCHARGE INSTRUCTIONS
Here in the emergency room your reassuring evaluation.  Your lab work today is reassuring.  Your COVID test is negative.  Chest x-ray shows no findings for pneumonia.  We discussed it does look like you are starting to develop a skin infection in your left lower leg.  You are given IV antibiotics here and will be discharged on oral antibiotics that you should start tomorrow.  I did outline the redness with a skin marker here.  You should have the staff at the assisted living keeping a close eye on the redness for progression.  You should elevate your legs to help with the redness and swelling.  Return back to the emergency room if you develop any new or worsening symptoms including any fevers significant new redness, pain, any other new or worsening difficulties.  I did place a referral to follow-up as well with the [facility] clinic.

## 2024-10-04 NOTE — ED NOTES
Pt discharged to home by self. E-cab used for transport. Discharge education and instructions given, patient stated understanding. All questions and concerns addressed.

## 2024-12-09 ENCOUNTER — LAB REQUISITION (OUTPATIENT)
Dept: LAB | Facility: CLINIC | Age: 64
End: 2024-12-09
Payer: COMMERCIAL

## 2024-12-09 DIAGNOSIS — M79.662 PAIN IN LEFT LOWER LEG: ICD-10-CM

## 2024-12-09 LAB — D DIMER PPP FEU-MCNC: 0.46 UG/ML FEU (ref 0–0.5)

## 2024-12-09 PROCEDURE — 85379 FIBRIN DEGRADATION QUANT: CPT | Mod: ORL | Performed by: FAMILY MEDICINE

## 2024-12-23 ENCOUNTER — HOSPITAL ENCOUNTER (EMERGENCY)
Facility: CLINIC | Age: 64
Discharge: HOME | End: 2024-12-24
Attending: EMERGENCY MEDICINE | Admitting: EMERGENCY MEDICINE
Payer: COMMERCIAL

## 2024-12-23 ENCOUNTER — APPOINTMENT (OUTPATIENT)
Dept: GENERAL RADIOLOGY | Facility: CLINIC | Age: 64
End: 2024-12-23
Attending: EMERGENCY MEDICINE
Payer: COMMERCIAL

## 2024-12-23 ENCOUNTER — APPOINTMENT (OUTPATIENT)
Dept: CT IMAGING | Facility: CLINIC | Age: 64
End: 2024-12-23
Attending: EMERGENCY MEDICINE
Payer: COMMERCIAL

## 2024-12-23 VITALS
RESPIRATION RATE: 16 BRPM | TEMPERATURE: 99.7 F | OXYGEN SATURATION: 93 % | BODY MASS INDEX: 44.93 KG/M2 | WEIGHT: 270 LBS | DIASTOLIC BLOOD PRESSURE: 84 MMHG | HEART RATE: 93 BPM | SYSTOLIC BLOOD PRESSURE: 138 MMHG

## 2024-12-23 DIAGNOSIS — J18.9 PNEUMONIA DUE TO INFECTIOUS ORGANISM, UNSPECIFIED LATERALITY, UNSPECIFIED PART OF LUNG: ICD-10-CM

## 2024-12-23 LAB
ANION GAP SERPL CALCULATED.3IONS-SCNC: 15 MMOL/L (ref 7–15)
BASOPHILS # BLD AUTO: 0.1 10E3/UL (ref 0–0.2)
BASOPHILS NFR BLD AUTO: 1 %
BUN SERPL-MCNC: 11.6 MG/DL (ref 8–23)
CALCIUM SERPL-MCNC: 9.1 MG/DL (ref 8.8–10.4)
CHLORIDE SERPL-SCNC: 95 MMOL/L (ref 98–107)
CREAT SERPL-MCNC: 1.13 MG/DL (ref 0.51–0.95)
EGFRCR SERPLBLD CKD-EPI 2021: 54 ML/MIN/1.73M2
EOSINOPHIL # BLD AUTO: 0.3 10E3/UL (ref 0–0.7)
EOSINOPHIL NFR BLD AUTO: 2 %
ERYTHROCYTE [DISTWIDTH] IN BLOOD BY AUTOMATED COUNT: 13.9 % (ref 10–15)
FLUAV RNA SPEC QL NAA+PROBE: NEGATIVE
FLUBV RNA RESP QL NAA+PROBE: NEGATIVE
GLUCOSE SERPL-MCNC: 149 MG/DL (ref 70–99)
HCO3 SERPL-SCNC: 26 MMOL/L (ref 22–29)
HCT VFR BLD AUTO: 35.5 % (ref 35–47)
HGB BLD-MCNC: 11.3 G/DL (ref 11.7–15.7)
IMM GRANULOCYTES # BLD: 0.2 10E3/UL
IMM GRANULOCYTES NFR BLD: 1 %
LIPASE SERPL-CCNC: 12 U/L (ref 13–60)
LYMPHOCYTES # BLD AUTO: 2.4 10E3/UL (ref 0.8–5.3)
LYMPHOCYTES NFR BLD AUTO: 16 %
MCH RBC QN AUTO: 27.6 PG (ref 26.5–33)
MCHC RBC AUTO-ENTMCNC: 31.8 G/DL (ref 31.5–36.5)
MCV RBC AUTO: 87 FL (ref 78–100)
MONOCYTES # BLD AUTO: 1.5 10E3/UL (ref 0–1.3)
MONOCYTES NFR BLD AUTO: 10 %
NEUTROPHILS # BLD AUTO: 10.8 10E3/UL (ref 1.6–8.3)
NEUTROPHILS NFR BLD AUTO: 71 %
NRBC # BLD AUTO: 0 10E3/UL
NRBC BLD AUTO-RTO: 0 /100
PLATELET # BLD AUTO: 287 10E3/UL (ref 150–450)
POTASSIUM SERPL-SCNC: 3.7 MMOL/L (ref 3.4–5.3)
PROCALCITONIN SERPL IA-MCNC: 0.13 NG/ML
RBC # BLD AUTO: 4.1 10E6/UL (ref 3.8–5.2)
RSV RNA SPEC NAA+PROBE: NEGATIVE
SARS-COV-2 RNA RESP QL NAA+PROBE: NEGATIVE
SODIUM SERPL-SCNC: 136 MMOL/L (ref 135–145)
WBC # BLD AUTO: 15.2 10E3/UL (ref 4–11)

## 2024-12-23 PROCEDURE — 80048 BASIC METABOLIC PNL TOTAL CA: CPT | Performed by: EMERGENCY MEDICINE

## 2024-12-23 PROCEDURE — 87637 SARSCOV2&INF A&B&RSV AMP PRB: CPT | Performed by: EMERGENCY MEDICINE

## 2024-12-23 PROCEDURE — 84145 PROCALCITONIN (PCT): CPT | Performed by: EMERGENCY MEDICINE

## 2024-12-23 PROCEDURE — 99285 EMERGENCY DEPT VISIT HI MDM: CPT | Mod: 25 | Performed by: EMERGENCY MEDICINE

## 2024-12-23 PROCEDURE — 71046 X-RAY EXAM CHEST 2 VIEWS: CPT

## 2024-12-23 PROCEDURE — 82565 ASSAY OF CREATININE: CPT | Performed by: EMERGENCY MEDICINE

## 2024-12-23 PROCEDURE — 250N000011 HC RX IP 250 OP 636: Performed by: EMERGENCY MEDICINE

## 2024-12-23 PROCEDURE — 99284 EMERGENCY DEPT VISIT MOD MDM: CPT | Performed by: EMERGENCY MEDICINE

## 2024-12-23 PROCEDURE — 83690 ASSAY OF LIPASE: CPT | Performed by: EMERGENCY MEDICINE

## 2024-12-23 PROCEDURE — 36415 COLL VENOUS BLD VENIPUNCTURE: CPT | Performed by: EMERGENCY MEDICINE

## 2024-12-23 PROCEDURE — 74177 CT ABD & PELVIS W/CONTRAST: CPT

## 2024-12-23 PROCEDURE — 250N000009 HC RX 250: Performed by: EMERGENCY MEDICINE

## 2024-12-23 PROCEDURE — 85004 AUTOMATED DIFF WBC COUNT: CPT | Performed by: EMERGENCY MEDICINE

## 2024-12-23 PROCEDURE — 96374 THER/PROPH/DIAG INJ IV PUSH: CPT | Mod: 59 | Performed by: EMERGENCY MEDICINE

## 2024-12-23 RX ORDER — IOPAMIDOL 755 MG/ML
100 INJECTION, SOLUTION INTRAVASCULAR ONCE
Status: COMPLETED | OUTPATIENT
Start: 2024-12-23 | End: 2024-12-23

## 2024-12-23 RX ADMIN — IOPAMIDOL 132 ML: 755 INJECTION, SOLUTION INTRAVENOUS at 22:58

## 2024-12-23 RX ADMIN — MIDAZOLAM 1 MG: 1 INJECTION INTRAMUSCULAR; INTRAVENOUS at 22:44

## 2024-12-23 RX ADMIN — SODIUM CHLORIDE 66 ML: 9 INJECTION, SOLUTION INTRAVENOUS at 22:59

## 2024-12-23 ASSESSMENT — ACTIVITIES OF DAILY LIVING (ADL)
ADLS_ACUITY_SCORE: 57

## 2024-12-23 ASSESSMENT — COLUMBIA-SUICIDE SEVERITY RATING SCALE - C-SSRS
6. HAVE YOU EVER DONE ANYTHING, STARTED TO DO ANYTHING, OR PREPARED TO DO ANYTHING TO END YOUR LIFE?: NO
2. HAVE YOU ACTUALLY HAD ANY THOUGHTS OF KILLING YOURSELF IN THE PAST MONTH?: NO
1. IN THE PAST MONTH, HAVE YOU WISHED YOU WERE DEAD OR WISHED YOU COULD GO TO SLEEP AND NOT WAKE UP?: NO

## 2024-12-23 NOTE — ED PROVIDER NOTES
[facility] EMERGENCY DEPARTMENT ([facility])    12/23/24      ED PROVIDER NOTE     History     Chief Complaint   Patient presents with    Cough     Cough since Wednesday    Abdominal Pain     Right sided abdominal pain     The history is provided by the patient and medical records.     No Yusuf is a 64 year old female with history of type 2 diabetes, NSTEMI secondary to demand ischemia in setting of COVID-19 related respiratory failure complicated by torsade de points arrest in 2022, COPD, severe obesity, venous stasis dermatitis who presents to the emergency department with cough, rib pain and headache.     Per [facility] records, patient has had 4 doses of the Moderna COVID-19 vaccine.  Last Tdap was in 2018.  Has not received this years flu shot yet.    Past Medical History  Past Medical History:   Diagnosis Date    Acute cystitis without hematuria     Acute respiratory failure with hypoxia and hypercapnia (H)     Arthritis     Bipolar affective (H)     Bradycardia     Cellulitis     Chest pain, unspecified type     Fibromyalgia     History of sudden cardiac arrest     History of torsades de pointe due to drug     Hypertension     Left leg cellulitis      Past Surgical History:   Procedure Laterality Date    CHOLECYSTECTOMY      GYN SURGERY      c section    GYN SURGERY      oblation    ORTHOPEDIC SURGERY      left leg, left shoulder, back     acetaminophen (TYLENOL) 325 MG tablet  Acidophilus Lactobacillus CAPS  albuterol (PROAIR HFA) 108 (90 Base) MCG/ACT inhaler  aspirin (ASA) 81 MG chewable tablet  atorvastatin (LIPITOR) 20 MG tablet  blood glucose (ACCU-CHEK RAFAELA PLUS) test strip  Blood Glucose Calibration (ACCU-CHEK ACTIVE GLUCOSE CONT VI)  blood glucose calibration (ACCU-CHEK RAFAELA) solution  calcium carbonate antacid 1000 MG CHEW  cefdinir (OMNICEF) 300 MG capsule  cholecalciferol 25 MCG (1000 UT) TABS  diclofenac (VOLTAREN) 1 % topical gel  DULoxetine (CYMBALTA) 20 MG  capsule  fluticasone-salmeterol (ADVAIR) 250-50 MCG/ACT inhaler  gabapentin (NEURONTIN) 300 MG capsule  hydrOXYzine (ATARAX) 50 MG tablet  insulin detemir (LEVEMIR PEN) 100 UNIT/ML pen  levothyroxine (SYNTHROID/LEVOTHROID) 200 MCG tablet  magnesium oxide 400 MG tablet  melatonin 5 MG tablet  metFORMIN (GLUCOPHAGE) 1000 MG tablet  methadone (DOLOPHINE) 10 MG tablet  multivitamin w/minerals (THERA-VIT-M) tablet  naloxone (NARCAN) 4 MG/0.1ML nasal spray  nicotine polacrilex (NICORETTE) 4 MG gum  nystatin (NYSTOP) 699293 UNIT/GM external powder  omeprazole (PRILOSEC) 20 MG DR capsule  polyethylene glycol (MIRALAX) 17 GM/Dose powder  potassium chloride ER (KLOR-CON) 8 MEQ CR tablet  predniSONE (DELTASONE) 20 MG tablet  vitamin B-12 (CYANOCOBALAMIN) 1000 MCG tablet      Allergies   Allergen Reactions    Promethazine Other (See Comments) and Anxiety     Noted in 8/30/08 ER    Codeine Phosphate GI Disturbance    Lamotrigine Other (See Comments)     hyponatremia    Oxcarbazepine Other (See Comments) and Unknown     Low sodium    Codeine Other (See Comments) and Rash     GI bleeding     Family History  Family History   Problem Relation Age of Onset    Heart Disease Mother     Diabetes Mother     Ovarian Cancer Mother     Cancer Father     Heart Disease Father     Lung Cancer Father     Diabetes Brother     Diabetes Sister     Ovarian Cancer Sister      Social History   Social History     Tobacco Use    Smoking status: Former     Current packs/day: 0.10     Types: Cigarettes, Vaping Device     Passive exposure: Never    Smokeless tobacco: Never    Tobacco comments:     Using nicotine vape occasionally   Vaping Use    Vaping status: Some Days    Substances: Nicotine    Devices: Disposable   Substance Use Topics    Alcohol use: No    Drug use: No      A medically appropriate review of systems was performed with pertinent positives and negatives noted in the HPI, and all other systems negative.    Physical Exam   BP:  128/73  Pulse: 93  Temp: 98.8  F (37.1  C)  Resp: 18  SpO2: 90 %  Physical Exam  Vitals and nursing note reviewed.   Constitutional:       General: She is not in acute distress.     Appearance: Normal appearance. She is well-developed.   HENT:      Head: Normocephalic and atraumatic.   Eyes:      General: No scleral icterus.     Conjunctiva/sclera: Conjunctivae normal.   Cardiovascular:      Rate and Rhythm: Normal rate.   Pulmonary:      Effort: Pulmonary effort is normal. No respiratory distress.   Abdominal:      General: Abdomen is flat.   Musculoskeletal:      Cervical back: Normal range of motion and neck supple.   Skin:     General: Skin is warm and dry.      Findings: No rash.   Neurological:      Mental Status: She is alert and oriented to person, place, and time.       ED Course, Procedures, & Data      Procedures        No results found for any visits on 12/23/24.  Medications - No data to display  Labs Ordered and Resulted from Time of ED Arrival to Time of ED Departure - No data to display  No orders to display          Critical care was not performed.     Medical Decision Making  The patient's presentation was of high complexity (an acute health issue posing potential threat to life or bodily function).    The patient's evaluation involved:  ordering and/or review of 3+ test(s) in this encounter (see separate area of note for details)    The patient's management necessitated moderate risk (prescription drug management including medications given in the ED) and high risk (a parenteral controlled substance).    Assessment & Plan      64 year old female with history of type 2 diabetes, NSTEMI secondary to demand ischemia in setting of COVID-19 related respiratory failure complicated by torsade de points arrest in 2022, COPD, severe obesity, venous stasis dermatitis who presents to the emergency department with cough, rib pain and headache.  Vital signs notable for hypoxia with a pulse ox of 93% on room  air but otherwise stable and afebrile.  IV established, labs sent which revealed leukocytosis with WBC 15.2 but otherwise normal CBC electrolytes, normal procalcitonin and influenza and COVID-negative.  Patient sent to x-ray for imaging of chest which is interpreted as no acute process.  She was sent to CT for imaging abdomen pelvis which revealed no acute intra-abdominal process however mild opacities consistent with infection were visualized.  Patient required Versed 1 mg IV and sedation Novacet still for the CT.  She was subsequently administered Augmentin and doxycycline for community-acquired pneumonia and sent home with prescriptions for same.    I have reviewed the nursing notes. I have reviewed the findings, diagnosis, plan and need for follow up with the patient.    New Prescriptions    No medications on file       Final diagnoses:   Pneumonia due to infectious organism, unspecified laterality, unspecified part of lung     ILindsay, am serving as a trained medical scribe to document services personally performed by Cally Maguire MD based on the provider's statements to me on December 23, 2024.  This document has been checked and approved by the attending provider.    ICally MD was physically present and have reviewed and verified the accuracy of this note documented by Lindsay Sood, medical scribe.      Cally Maguire MD      [facility] EMERGENCY DEPARTMENT  12/23/2024        Cally Maguire MD  12/24/24 [id number]

## 2024-12-24 PROCEDURE — 250N000013 HC RX MED GY IP 250 OP 250 PS 637: Performed by: EMERGENCY MEDICINE

## 2024-12-24 RX ORDER — DOXYCYCLINE HYCLATE 100 MG
100 TABLET ORAL 2 TIMES DAILY
Qty: 20 TABLET | Refills: 0 | Status: SHIPPED | OUTPATIENT
Start: 2024-12-24

## 2024-12-24 RX ORDER — DOXYCYCLINE 100 MG/1
100 CAPSULE ORAL ONCE
Status: COMPLETED | OUTPATIENT
Start: 2024-12-24 | End: 2024-12-24

## 2024-12-24 RX ADMIN — DOXYCYCLINE HYCLATE 100 MG: 100 CAPSULE ORAL at 00:35

## 2024-12-24 RX ADMIN — AMOXICILLIN AND CLAVULANATE POTASSIUM 1 TABLET: 875; 125 TABLET, FILM COATED ORAL at 00:35

## 2025-03-24 ENCOUNTER — THERAPY VISIT (OUTPATIENT)
Dept: SLEEP MEDICINE | Facility: CLINIC | Age: 65
End: 2025-03-24
Payer: COMMERCIAL

## 2025-03-24 ENCOUNTER — LAB (OUTPATIENT)
Dept: LAB | Facility: CLINIC | Age: 65
End: 2025-03-24
Payer: COMMERCIAL

## 2025-03-24 ENCOUNTER — LAB REQUISITION (OUTPATIENT)
Dept: LAB | Facility: CLINIC | Age: 65
End: 2025-03-24
Payer: COMMERCIAL

## 2025-03-24 DIAGNOSIS — R53.81 MALAISE AND FATIGUE: ICD-10-CM

## 2025-03-24 DIAGNOSIS — G47.30 SLEEP APNEA, UNSPECIFIED TYPE: ICD-10-CM

## 2025-03-24 DIAGNOSIS — R06.00 DYSPNEA AND RESPIRATORY ABNORMALITY: ICD-10-CM

## 2025-03-24 DIAGNOSIS — B18.2 CHRONIC VIRAL HEPATITIS C (H): ICD-10-CM

## 2025-03-24 DIAGNOSIS — J96.12 CHRONIC RESPIRATORY FAILURE WITH HYPERCAPNIA (H): Chronic | ICD-10-CM

## 2025-03-24 DIAGNOSIS — E66.01 CLASS 3 SEVERE OBESITY DUE TO EXCESS CALORIES WITH SERIOUS COMORBIDITY AND BODY MASS INDEX (BMI) OF 45.0 TO 49.9 IN ADULT (H): ICD-10-CM

## 2025-03-24 DIAGNOSIS — R06.89 DYSPNEA AND RESPIRATORY ABNORMALITY: ICD-10-CM

## 2025-03-24 DIAGNOSIS — Z72.820 LACK OF ADEQUATE SLEEP: ICD-10-CM

## 2025-03-24 DIAGNOSIS — E66.01 SEVERE OBESITY (H): ICD-10-CM

## 2025-03-24 DIAGNOSIS — R53.83 MALAISE AND FATIGUE: ICD-10-CM

## 2025-03-24 DIAGNOSIS — I10 ESSENTIAL (PRIMARY) HYPERTENSION: ICD-10-CM

## 2025-03-24 DIAGNOSIS — E66.813 CLASS 3 SEVERE OBESITY DUE TO EXCESS CALORIES WITH SERIOUS COMORBIDITY AND BODY MASS INDEX (BMI) OF 45.0 TO 49.9 IN ADULT (H): ICD-10-CM

## 2025-03-24 DIAGNOSIS — Z11.3 ENCOUNTER FOR SCREENING FOR INFECTIONS WITH A PREDOMINANTLY SEXUAL MODE OF TRANSMISSION: ICD-10-CM

## 2025-03-24 DIAGNOSIS — E11.9 TYPE 2 DIABETES MELLITUS WITHOUT COMPLICATIONS (H): ICD-10-CM

## 2025-03-24 DIAGNOSIS — I10 ESSENTIAL HYPERTENSION: ICD-10-CM

## 2025-03-24 DIAGNOSIS — G47.09 OTHER INSOMNIA: ICD-10-CM

## 2025-03-24 LAB
BASE EXCESS BLDV CALC-SCNC: 4 MMOL/L (ref -3–3)
CPB POCT: NO
HCO3 BLDV-SCNC: 31 MMOL/L (ref 21–28)
HCT VFR BLD CALC: 44 %
HCV AB SERPL QL IA: REACTIVE
HGB BLD-MCNC: 15 G/DL (ref 11.7–15.7)
HIV 1+2 AB+HIV1 P24 AG SERPL QL IA: NONREACTIVE
PCO2 BLDV: 66 MM HG (ref 40–50)
PH BLDV: 7.27 [PH] (ref 7.32–7.43)
PO2 BLDV: 23 MM HG (ref 25–47)
POTASSIUM BLD-SCNC: 4.1 MMOL/L (ref 3.4–5.3)
SAO2 % BLDV: 31 % (ref 70–75)
SODIUM BLD-SCNC: 141 MMOL/L (ref 135–145)

## 2025-03-24 PROCEDURE — 84443 ASSAY THYROID STIM HORMONE: CPT | Mod: ORL | Performed by: FAMILY MEDICINE

## 2025-03-24 PROCEDURE — 80053 COMPREHEN METABOLIC PANEL: CPT | Mod: ORL | Performed by: FAMILY MEDICINE

## 2025-03-24 PROCEDURE — 82607 VITAMIN B-12: CPT | Mod: ORL | Performed by: FAMILY MEDICINE

## 2025-03-24 PROCEDURE — 87522 HEPATITIS C REVRS TRNSCRPJ: CPT | Mod: ORL | Performed by: FAMILY MEDICINE

## 2025-03-24 PROCEDURE — 87389 HIV-1 AG W/HIV-1&-2 AB AG IA: CPT | Mod: ORL | Performed by: FAMILY MEDICINE

## 2025-03-24 PROCEDURE — 86803 HEPATITIS C AB TEST: CPT | Mod: ORL | Performed by: FAMILY MEDICINE

## 2025-03-24 PROCEDURE — 80061 LIPID PANEL: CPT | Mod: ORL | Performed by: FAMILY MEDICINE

## 2025-03-25 LAB
ALBUMIN SERPL BCG-MCNC: 3.9 G/DL (ref 3.5–5.2)
ALP SERPL-CCNC: 104 U/L (ref 40–150)
ALT SERPL W P-5'-P-CCNC: 34 U/L (ref 0–50)
ANION GAP SERPL CALCULATED.3IONS-SCNC: 13 MMOL/L (ref 7–15)
AST SERPL W P-5'-P-CCNC: 45 U/L (ref 0–45)
BILIRUB SERPL-MCNC: 0.5 MG/DL
BUN SERPL-MCNC: 14 MG/DL (ref 8–23)
CALCIUM SERPL-MCNC: 9.1 MG/DL (ref 8.8–10.4)
CHLORIDE SERPL-SCNC: 99 MMOL/L (ref 98–107)
CHOLEST SERPL-MCNC: 174 MG/DL
CREAT SERPL-MCNC: 1.24 MG/DL (ref 0.51–0.95)
EGFRCR SERPLBLD CKD-EPI 2021: 48 ML/MIN/1.73M2
FASTING STATUS PATIENT QL REPORTED: ABNORMAL
FASTING STATUS PATIENT QL REPORTED: ABNORMAL
GLUCOSE SERPL-MCNC: 143 MG/DL (ref 70–99)
HCO3 SERPL-SCNC: 24 MMOL/L (ref 22–29)
HCV RNA SERPL NAA+PROBE-ACNC: NOT DETECTED IU/ML
HDLC SERPL-MCNC: 31 MG/DL
LDLC SERPL CALC-MCNC: 88 MG/DL
NONHDLC SERPL-MCNC: 143 MG/DL
POTASSIUM SERPL-SCNC: 4.1 MMOL/L (ref 3.4–5.3)
PROT SERPL-MCNC: 8.9 G/DL (ref 6.4–8.3)
SODIUM SERPL-SCNC: 136 MMOL/L (ref 135–145)
TRIGL SERPL-MCNC: 276 MG/DL
TSH SERPL DL<=0.005 MIU/L-ACNC: 8.17 UIU/ML (ref 0.3–4.2)
VIT B12 SERPL-MCNC: 1567 PG/ML (ref 232–1245)

## 2025-03-25 NOTE — NURSING NOTE
Completed a split night PSG per provider order.    Preliminary AHI 46.2.  A final therapeutic PAP pressure was achieved.    Supine REM was not seen on therapeutic pressure.    Patient reports feeling not refreshed in AM.

## 2025-04-03 PROBLEM — J12.82 PNEUMONIA DUE TO COVID-19 VIRUS: Status: RESOLVED | Noted: 2022-02-10 | Resolved: 2025-04-03

## 2025-04-03 PROBLEM — I50.22 CHRONIC SYSTOLIC CONGESTIVE HEART FAILURE (H): Status: RESOLVED | Noted: 2023-05-19 | Resolved: 2025-04-03

## 2025-04-03 PROBLEM — R09.02 HYPOXIA: Status: RESOLVED | Noted: 2023-12-05 | Resolved: 2025-04-03

## 2025-04-03 PROBLEM — I50.22 CHRONIC SYSTOLIC CONGESTIVE HEART FAILURE (H): Status: ACTIVE | Noted: 2023-05-19

## 2025-04-03 PROBLEM — J96.00 ACUTE RESPIRATORY FAILURE DUE TO COVID-19 (H): Chronic | Status: RESOLVED | Noted: 2022-02-13 | Resolved: 2025-04-03

## 2025-04-03 PROBLEM — F51.04 CHRONIC INSOMNIA: Status: ACTIVE | Noted: 2025-04-03

## 2025-04-03 PROBLEM — E78.5 HYPERLIPIDEMIA: Chronic | Status: ACTIVE | Noted: 2025-04-03

## 2025-04-03 PROBLEM — U07.1 PNEUMONIA DUE TO COVID-19 VIRUS: Status: RESOLVED | Noted: 2022-02-10 | Resolved: 2025-04-03

## 2025-04-03 PROBLEM — U07.1 INFECTION DUE TO 2019 NOVEL CORONAVIRUS: Status: RESOLVED | Noted: 2022-02-08 | Resolved: 2025-04-03

## 2025-04-03 PROBLEM — G89.4 CHRONIC PAIN DISORDER: Chronic | Status: ACTIVE | Noted: 2020-02-15

## 2025-04-03 PROBLEM — I50.22 CHRONIC SYSTOLIC CONGESTIVE HEART FAILURE (H): Chronic | Status: ACTIVE | Noted: 2023-05-19

## 2025-04-03 PROBLEM — G47.33 OSA (OBSTRUCTIVE SLEEP APNEA): Chronic | Status: ACTIVE | Noted: 2025-04-03

## 2025-04-03 PROBLEM — R09.02 HYPOXEMIA: Status: ACTIVE | Noted: 2025-04-03

## 2025-04-03 PROBLEM — J20.5 ACUTE BRONCHITIS DUE TO RESPIRATORY SYNCYTIAL VIRUS (RSV): Status: RESOLVED | Noted: 2023-12-05 | Resolved: 2025-04-03

## 2025-04-03 PROBLEM — U07.1 ACUTE RESPIRATORY FAILURE DUE TO COVID-19 (H): Chronic | Status: RESOLVED | Noted: 2022-02-13 | Resolved: 2025-04-03

## 2025-04-03 PROBLEM — I47.20 VENTRICULAR TACHYCARDIA (H): Status: RESOLVED | Noted: 2022-02-13 | Resolved: 2025-04-03

## 2025-04-03 PROBLEM — R91.8 LUNG INFILTRATE: Status: RESOLVED | Noted: 2023-12-05 | Resolved: 2025-04-03

## 2025-04-03 PROBLEM — Z86.79 HISTORY OF CARDIOMYOPATHY: Chronic | Status: ACTIVE | Noted: 2025-04-03

## 2025-04-03 PROBLEM — I47.21 TORSADES DE POINTES (H): Chronic | Status: RESOLVED | Noted: 2022-02-13 | Resolved: 2025-04-03

## 2025-04-03 PROBLEM — Z72.0 TOBACCO ABUSE: Status: ACTIVE | Noted: 2018-05-17

## 2025-04-03 LAB — SLPCOMP: NORMAL

## 2025-05-07 ENCOUNTER — RESULTS FOLLOW-UP (OUTPATIENT)
Dept: SLEEP MEDICINE | Facility: CLINIC | Age: 65
End: 2025-05-07

## 2025-05-19 ENCOUNTER — HOSPITAL ENCOUNTER (EMERGENCY)
Facility: CLINIC | Age: 65
Discharge: HOME | End: 2025-05-19
Attending: EMERGENCY MEDICINE | Admitting: EMERGENCY MEDICINE
Payer: COMMERCIAL

## 2025-05-19 VITALS
HEART RATE: 74 BPM | TEMPERATURE: 98.3 F | WEIGHT: 274.7 LBS | SYSTOLIC BLOOD PRESSURE: 136 MMHG | BODY MASS INDEX: 45.77 KG/M2 | DIASTOLIC BLOOD PRESSURE: 84 MMHG | OXYGEN SATURATION: 96 % | RESPIRATION RATE: 16 BRPM | HEIGHT: 65 IN

## 2025-05-19 DIAGNOSIS — N39.0 URINARY TRACT INFECTION WITHOUT HEMATURIA, SITE UNSPECIFIED: ICD-10-CM

## 2025-05-19 LAB
ALBUMIN UR-MCNC: 10 MG/DL
APPEARANCE UR: ABNORMAL
BACTERIA #/AREA URNS HPF: ABNORMAL /HPF
BILIRUB UR QL STRIP: NEGATIVE
COLOR UR AUTO: YELLOW
GLUCOSE UR STRIP-MCNC: NEGATIVE MG/DL
HGB UR QL STRIP: NEGATIVE
HYALINE CASTS: 11 /LPF
KETONES UR STRIP-MCNC: NEGATIVE MG/DL
LEUKOCYTE ESTERASE UR QL STRIP: ABNORMAL
MUCOUS THREADS #/AREA URNS LPF: PRESENT /LPF
NITRATE UR QL: NEGATIVE
PH UR STRIP: 6.5 [PH] (ref 5–7)
RBC URINE: 2 /HPF
SP GR UR STRIP: 1.02 (ref 1–1.03)
SQUAMOUS EPITHELIAL: 18 /HPF
UROBILINOGEN UR STRIP-MCNC: 3 MG/DL
WBC URINE: 44 /HPF

## 2025-05-19 PROCEDURE — 81003 URINALYSIS AUTO W/O SCOPE: CPT | Performed by: EMERGENCY MEDICINE

## 2025-05-19 PROCEDURE — 99284 EMERGENCY DEPT VISIT MOD MDM: CPT | Performed by: EMERGENCY MEDICINE

## 2025-05-19 PROCEDURE — 87186 SC STD MICRODIL/AGAR DIL: CPT | Performed by: EMERGENCY MEDICINE

## 2025-05-19 PROCEDURE — 250N000013 HC RX MED GY IP 250 OP 250 PS 637: Performed by: EMERGENCY MEDICINE

## 2025-05-19 PROCEDURE — 99283 EMERGENCY DEPT VISIT LOW MDM: CPT | Performed by: EMERGENCY MEDICINE

## 2025-05-19 RX ORDER — IBUPROFEN 600 MG/1
600 TABLET, FILM COATED ORAL ONCE
Status: COMPLETED | OUTPATIENT
Start: 2025-05-19 | End: 2025-05-19

## 2025-05-19 RX ORDER — NITROFURANTOIN 25; 75 MG/1; MG/1
100 CAPSULE ORAL ONCE
Status: COMPLETED | OUTPATIENT
Start: 2025-05-19 | End: 2025-05-19

## 2025-05-19 RX ORDER — NITROFURANTOIN 25; 75 MG/1; MG/1
100 CAPSULE ORAL 2 TIMES DAILY
Qty: 10 CAPSULE | Refills: 0 | Status: SHIPPED | OUTPATIENT
Start: 2025-05-19

## 2025-05-19 RX ADMIN — IBUPROFEN 600 MG: 600 TABLET ORAL at 13:38

## 2025-05-19 RX ADMIN — NITROFURANTOIN MONOHYDRATE/MACROCRYSTALS 100 MG: 75; 25 CAPSULE ORAL at 14:48

## 2025-05-19 ASSESSMENT — ACTIVITIES OF DAILY LIVING (ADL)
ADLS_ACUITY_SCORE: 57
ADLS_ACUITY_SCORE: 57

## 2025-05-19 NOTE — ED PROVIDER NOTES
[facility] [place] [place] EMERGENCY DEPARTMENT ([facility])    5/19/25      ED PROVIDER NOTE      History     Chief Complaint   Patient presents with    Urinary Frequency     HPI  No Yusuf is a 65 year old female with history of DMT2, NSTEMI secondary to demand ischemia in setting of COVID-19 related respiratory failure complicated by torsade de pointes arrest in 2022, COPD, venous stasis dermatitis, who presents to the ED with bilateral knee pain and urinary frequency. Patient reports her knees hurt the worst when standing, and she feels imbalanced when she turns. She received a steroid injection in her left knee, which has helped, and now her right knee has been more painful in the past week. She takes gabapentin and tylenol for pain, reports that ibuprofen can be hard on her stomach. Patient reports she has had urinary frequency since last Thursday, 5/13/25. She denies fevers.    Past Medical History  Past Medical History:   Diagnosis Date    Acute bronchitis due to respiratory syncytial virus (RSV) 12/05/2023    Acute respiratory failure due to COVID-19 (H) 02/13/2022    Arthritis     Bipolar affective (H)     Bradycardia 2/13/2022    Cellulitis of left leg 08/06/2023    Chronic pain     Diabetes mellitus, type 2 (H)     Fibromyalgia     RANDALL (generalized anxiety disorder)     History of sudden cardiac arrest 8/24/2019    History of torsades de pointe due to drug 3/9/2022    Hypertension     Hypertension     Infection due to 2019 novel coronavirus 02/08/2022    Pneumonia due to COVID-19 virus 02/10/2022    Substance abuse (H)     Torsades de pointes (H) 02/13/2022    History of NSTEMI d/t demand ischemia 2/2 AHRF in setting of Covid-19 PNA c/b torsades de pointes arrest (2/2022)           Past Surgical History:   Procedure Laterality Date    CHOLECYSTECTOMY      GYN SURGERY      c section    GYN SURGERY      oblation    ORTHOPEDIC SURGERY      left leg, left shoulder, back     acetaminophen (TYLENOL) 325 MG  tablet  Acidophilus Lactobacillus CAPS  albuterol (PROAIR HFA) 108 (90 Base) MCG/ACT inhaler  amoxicillin-clavulanate (AUGMENTIN) 875-125 MG tablet  aspirin (ASA) 81 MG chewable tablet  atorvastatin (LIPITOR) 20 MG tablet  blood glucose (ACCU-CHEK RAFAELA PLUS) test strip  Blood Glucose Calibration (ACCU-CHEK ACTIVE GLUCOSE CONT VI)  blood glucose calibration (ACCU-CHEK RAFAELA) solution  calcium carbonate antacid 1000 MG CHEW  cefdinir (OMNICEF) 300 MG capsule  cholecalciferol 25 MCG (1000 UT) TABS  diclofenac (VOLTAREN) 1 % topical gel  doxycycline hyclate (VIBRA-TABS) 100 MG tablet  DULoxetine (CYMBALTA) 20 MG capsule  fluticasone-salmeterol (ADVAIR) 250-50 MCG/ACT inhaler  gabapentin (NEURONTIN) 300 MG capsule  hydrOXYzine (ATARAX) 50 MG tablet  insulin detemir (LEVEMIR PEN) 100 UNIT/ML pen  levothyroxine (SYNTHROID/LEVOTHROID) 200 MCG tablet  magnesium oxide 400 MG tablet  melatonin 5 MG tablet  metFORMIN (GLUCOPHAGE) 1000 MG tablet  methadone (DOLOPHINE) 10 MG tablet  multivitamin w/minerals (THERA-VIT-M) tablet  naloxone (NARCAN) 4 MG/0.1ML nasal spray  nicotine polacrilex (NICORETTE) 4 MG gum  nitroFURantoin macrocrystal-monohydrate (MACROBID) 100 MG capsule  nystatin (NYSTOP) [id number] UNIT/GM external powder  omeprazole (PRILOSEC) 20 MG DR capsule  polyethylene glycol (MIRALAX) 17 GM/Dose powder  potassium chloride ER (KLOR-CON) 8 MEQ CR tablet  predniSONE (DELTASONE) 20 MG tablet  vitamin B-12 (CYANOCOBALAMIN) 1000 MCG tablet      Allergies   Allergen Reactions    Promethazine Other (See Comments) and Anxiety     Noted in 8/30/08 ER    Codeine Phosphate GI Disturbance    Lamotrigine Other (See Comments)     hyponatremia    Oxcarbazepine Other (See Comments) and Unknown     Low sodium    Codeine Other (See Comments) and Rash     GI bleeding     Family History  Family History   Problem Relation Age of Onset    Heart Disease Mother     Diabetes Mother     Ovarian Cancer Mother     Cancer Father     Heart Disease  "Father     Lung Cancer Father     Diabetes Brother     Diabetes Sister     Ovarian Cancer Sister      Social History   Social History     Tobacco Use    Smoking status: Former     Current packs/day: 0.10     Types: Cigarettes, Vaping Device     Passive exposure: Never    Smokeless tobacco: Never    Tobacco comments:     Using nicotine vape occasionally   Vaping Use    Vaping status: Some Days    Substances: Nicotine    Devices: Disposable   Substance Use Topics    Alcohol use: No    Drug use: No      Past medical history, past surgical history, medications, allergies, family history, and social history were reviewed with the patient. No additional pertinent items.   A complete review of systems was performed with pertinent positives and negatives noted in the HPI, and all other systems negative.    Physical Exam   BP: 136/84  Pulse: 74  Temp: 98.3  F (36.8  C)  Resp: 16  Height: 165.1 cm (5' 5\")  Weight: 124.6 kg (274 lb 11.2 oz)  SpO2: 96 %  Physical Exam  Vitals and nursing note reviewed.   Constitutional:       General: She is not in acute distress.     Appearance: She is well-developed. She is not ill-appearing.   HENT:      Head: Normocephalic and atraumatic.      Right Ear: External ear normal.      Left Ear: External ear normal.      Nose: Nose normal.   Eyes:      Extraocular Movements: Extraocular movements intact.      Conjunctiva/sclera: Conjunctivae normal.   Pulmonary:      Effort: Pulmonary effort is normal. No respiratory distress.   Abdominal:      General: There is no distension.   Musculoskeletal:         General: No swelling or deformity.      Cervical back: Normal range of motion and neck supple.   Skin:     General: Skin is warm and dry.   Neurological:      Mental Status: Mental status is at baseline.      Comments: Alert, oriented   Psychiatric:         Mood and Affect: Mood normal.         Behavior: Behavior normal.           ED Course, Procedures, & Data      Procedures         Results for " orders placed or performed during the hospital encounter of 05/19/25   UA with Microscopic reflex to Culture     Status: Abnormal    Specimen: Urine, Clean Catch   Result Value Ref Range    Color Urine Yellow Colorless, Straw, Light Yellow, Yellow    Appearance Urine Slightly Cloudy (A) Clear    Glucose Urine Negative Negative mg/dL    Bilirubin Urine Negative Negative    Ketones Urine Negative Negative mg/dL    Specific Gravity Urine 1.023 1.003 - 1.035    Blood Urine Negative Negative    pH Urine 6.5 5.0 - 7.0    Protein Albumin Urine 10 (A) Negative mg/dL    Urobilinogen Urine 3.0 (A) Normal mg/dL    Nitrite Urine Negative Negative    Leukocyte Esterase Urine Large (A) Negative    Bacteria Urine Few (A) None Seen /HPF    Mucus Urine Present (A) None Seen /LPF    RBC Urine 2 <=2 /HPF    WBC Urine 44 (H) <=5 /HPF    Squamous Epithelials Urine 18 (H) <=1 /HPF    Hyaline Casts Urine 11 (H) <=2 /LPF    Narrative    Urine Culture ordered based on laboratory criteria          Results for orders placed or performed during the hospital encounter of 05/19/25   UA with Microscopic reflex to Culture     Status: Abnormal    Specimen: Urine, Clean Catch   Result Value Ref Range    Color Urine Yellow Colorless, Straw, Light Yellow, Yellow    Appearance Urine Slightly Cloudy (A) Clear    Glucose Urine Negative Negative mg/dL    Bilirubin Urine Negative Negative    Ketones Urine Negative Negative mg/dL    Specific Gravity Urine 1.023 1.003 - 1.035    Blood Urine Negative Negative    pH Urine 6.5 5.0 - 7.0    Protein Albumin Urine 10 (A) Negative mg/dL    Urobilinogen Urine 3.0 (A) Normal mg/dL    Nitrite Urine Negative Negative    Leukocyte Esterase Urine Large (A) Negative    Bacteria Urine Few (A) None Seen /HPF    Mucus Urine Present (A) None Seen /LPF    RBC Urine 2 <=2 /HPF    WBC Urine 44 (H) <=5 /HPF    Squamous Epithelials Urine 18 (H) <=1 /HPF    Hyaline Casts Urine 11 (H) <=2 /LPF    Narrative    Urine Culture ordered  based on laboratory criteria     Medications   ibuprofen (ADVIL/MOTRIN) tablet 600 mg (600 mg Oral $Given 5/19/25 1337)   nitroFURantoin macrocrystal-monohydrate (MACROBID) capsule 100 mg (100 mg Oral $Given 5/19/25 144)     Labs Ordered and Resulted from Time of ED Arrival to Time of ED Departure   ROUTINE UA WITH MICROSCOPIC REFLEX TO CULTURE - Abnormal       Result Value    Color Urine Yellow      Appearance Urine Slightly Cloudy (*)     Glucose Urine Negative      Bilirubin Urine Negative      Ketones Urine Negative      Specific Gravity Urine 1.023      Blood Urine Negative      pH Urine 6.5      Protein Albumin Urine 10 (*)     Urobilinogen Urine 3.0 (*)     Nitrite Urine Negative      Leukocyte Esterase Urine Large (*)     Bacteria Urine Few (*)     Mucus Urine Present (*)     RBC Urine 2      WBC Urine 44 (*)     Squamous Epithelials Urine 18 (*)     Hyaline Casts Urine 11 (*)    URINE CULTURE     No orders to display          Medical Decision Making  The patient's presentation is strongly suggestive of low complexity (an acute and uncomplicated illness or injury).    The patient's evaluation involved:  review of external note(s) from 3+ sources (Most recent H&P in addition to clinic and ED note)  review of 2 test result(s) ordered prior to this encounter (Most recent BMP and CBC)    The patient's management involved moderate risk (prescription drug management including medications given in the ED).      Assessment & Plan    65-year-old female presents to us with a chief complaint of urinary frequency.  UA is consistent with a UTI.  Will treat her with Macrobid and have her follow-up with primary care.    I have reviewed the nursing notes. I have reviewed the findings, diagnosis, plan and need for follow up with the patient.    New Prescriptions    NITROFURANTOIN MACROCRYSTAL-MONOHYDRATE (MACROBID) 100 MG CAPSULE    Take 1 capsule (100 mg) by mouth 2 times daily.       Final diagnoses:   Urinary tract  infection without hematuria, site unspecified       I, Delmer Overton, am serving as a trained medical scribe to document services personally performed by Camron Larson DO based on the provider's statements to me on May 19, 2025.  This document has been checked and approved by the attending provider.    I, Camron Larson DO, was physically present and have reviewed and verified the accuracy of this note documented by Delmer Overton, medical scribe.      Camron Larson DO  [facility] EMERGENCY DEPARTMENT  5/19/2025     Camron Larson DO  05/19/25 [id number]

## 2025-05-20 LAB — BACTERIA UR CULT: ABNORMAL

## 2025-05-21 ENCOUNTER — TELEPHONE (OUTPATIENT)
Dept: NURSING | Facility: CLINIC | Age: 65
End: 2025-05-21
Payer: COMMERCIAL

## 2025-05-21 NOTE — TELEPHONE ENCOUNTER
5/21/25 4:30 pm 2nd Attempt, Pt no longer resides at [facility].    See note below.    Pt lives at [facility] and answering phone.  RN Recommendations/Instructions per [place] ED lab result protocol:   [facility] ED lab result protocol utilized: Urine Culture    Patient's current Symptoms at time of telephone encounter:   Pt states she is feeling a little bit better    Patient/care giver notified to contact your PCP clinic or return to the Emergency department if your:  Symptoms return.  Symptoms do not improve after 3 days on antibiotic.  Symptoms do not resolve after completing antibiotic.  Symptoms worsen or other concerning symptoms.     Meghana Tavarez RN

## 2025-05-21 NOTE — TELEPHONE ENCOUNTER
[facility] ([facility])    Reason for call: Lab Result Notification     Lab Result (including Rx patient on, if applicable).  If culture, copy of lab report at bottom.  Lab Result: Urine Culture  5/19/25 Prescribed NitroFURantoin macrocrystal-monohydrate (MACROBID) 100 MG capsule Take 1 capsule (100 mg) by mouth 2 times daily. - Oral (SUSCEPTIBLE)    Creatinine Level (mg/dl)   Creatinine   Date Value Ref Range Status   03/24/2025 1.24 (H) 0.51 - 0.95 mg/dL Final   07/01/2021 0.76 0.52 - 1.04 mg/dL Final    Creatinine clearance (ml/min), if applicable    Serum creatinine: 1.24 mg/dL (H) 03/24/25 1245  Estimated creatinine clearance: 60.8 mL/min (A)     RN Recommendations/Instructions per [place] ED lab result protocol:   [facility] ED lab result protocol utilized: Urine Culture    5/19/25 Presented to ED with symptoms: urinary frequency    Patient's current Symptoms:   Unable to assess  Pt resides at [facility] [phone], no answer, no ability to leave .  Need fax number to fax urine culture results to  Mail letter       Meghana Tavarez RN

## 2025-05-21 NOTE — LETTER
May 21, 2025        No Yusuf  [address] [place] [address]          Dear No Yusuf:    You were seen in the [facility] Emergency Department at [facility] ([facility] on 5/19/2025.  We are unable to reach you by phone, so we are sending you this letter.     It is important that you call [facility] Emergency Department lab result nurse at [phone], as we have information to relay to you AND/OR we MAY have to make some changes in your treatment.    Best time to call back is between 9AM and 5:30PM, 7 days a week.      Sincerely,     [facility] Emergency Department Lab Result RN  [phone]

## 2025-05-28 NOTE — PLAN OF CARE
ICU End of Shift Summary.  For vital signs and complete assessments, please see documentation flowsheets.     Pertinent assessments: Requiring high levels of sedation- is restrained and needs to be due to pulling at tubes.  She becomes very restless with cares.  Heart rate 45-55 unless cares are being done; then higher.  A-line is very dampened and reads are inaccurate.  Became very agitated with bath this morning and sats dropped to the mid 80s.  Increased FiO2 to 100% and increased sedation.  Major Shift Events: Does not tolerate turns well- becomes agitated and then loses reserve and becomes hypoxic.    Plan (Upcoming Events): Continue to go up on sedation.  Consider RN managed electrolyte replacement???  Discharge/Transfer Needs: TBD.    Bedside Shift Report Completed :   Bedside Safety Check Completed:       Medication failed protocol.    Medication: triazolam  Last office visit date: 12/11/2024  Next appointment scheduled?: Yes   Medication Refill Protocol Failed.  Failed criteria: See below. Sent to clinician to review.     triazolam (HALCION) 0.25 MG tablet          Sig: Take 1 tablet by mouth nightly.    Disp: 90 tablet    Refills: 1    Start: 5/27/2025    Class: Eprescribe    PDMP Review May Be Needed    Non-formulary For: Chronic insomnia    Last ordered: 6 months ago (11/20/2024) by Sanford Bowles MD    Last dispensed: 4/29/2025    Rx #: [id number]    Controlled Substances Refill Protocol - 12 Month Protocol - ALWAYS forward to ordering provider Tywfll5005/27/2025 03:01 PM   Protocol Details FORWARD TO PROVIDER - Refill requests for these medications must ALWAYS be forwarded to the ordering provider, even if all criteria are met    PDMP has been reviewed in last 24 hours    Urine/serum drug screen on file in the appropriate time frame    Medication (including dose and sig) on current med list    Seen by prescribing provider or same department within the last 6 months or has a future appt in 6 months - IF FAILED PLEASE LOOK AT CHART REVIEW FOR LAST VISIT AND PROCEED ACCORDINGLY    Active/up-to-date controlled substances agreement/consent on file

## 2025-06-09 ENCOUNTER — VIRTUAL VISIT (OUTPATIENT)
Dept: SLEEP MEDICINE | Facility: CLINIC | Age: 65
End: 2025-06-09
Payer: COMMERCIAL

## 2025-06-09 VITALS — WEIGHT: 274 LBS | HEIGHT: 65 IN | BODY MASS INDEX: 45.65 KG/M2

## 2025-06-09 DIAGNOSIS — G47.33 OBSTRUCTIVE SLEEP APNEA: Primary | ICD-10-CM

## 2025-06-09 DIAGNOSIS — J96.12 CHRONIC RESPIRATORY FAILURE WITH HYPERCAPNIA (H): Chronic | ICD-10-CM

## 2025-06-09 PROCEDURE — 98001 SYNCH AUDIO-VIDEO NEW LOW 30: CPT | Performed by: PHYSICIAN ASSISTANT

## 2025-06-09 ASSESSMENT — SLEEP AND FATIGUE QUESTIONNAIRES
HOW LIKELY ARE YOU TO NOD OFF OR FALL ASLEEP WHILE SITTING AND TALKING TO SOMEONE: MODERATE CHANCE OF DOZING
HOW LIKELY ARE YOU TO NOD OFF OR FALL ASLEEP WHILE SITTING INACTIVE IN A PUBLIC PLACE: SLIGHT CHANCE OF DOZING
HOW LIKELY ARE YOU TO NOD OFF OR FALL ASLEEP WHEN YOU ARE A PASSENGER IN A CAR FOR AN HOUR WITHOUT A BREAK: MODERATE CHANCE OF DOZING
HOW LIKELY ARE YOU TO NOD OFF OR FALL ASLEEP WHILE SITTING AND READING: MODERATE CHANCE OF DOZING
HOW LIKELY ARE YOU TO NOD OFF OR FALL ASLEEP WHILE WATCHING TV: HIGH CHANCE OF DOZING
HOW LIKELY ARE YOU TO NOD OFF OR FALL ASLEEP WHILE LYING DOWN TO REST IN THE AFTERNOON WHEN CIRCUMSTANCES PERMIT: HIGH CHANCE OF DOZING
HOW LIKELY ARE YOU TO NOD OFF OR FALL ASLEEP WHILE SITTING QUIETLY AFTER LUNCH WITHOUT ALCOHOL: MODERATE CHANCE OF DOZING
HOW LIKELY ARE YOU TO NOD OFF OR FALL ASLEEP IN A CAR, WHILE STOPPED FOR A FEW MINUTES IN TRAFFIC: WOULD NEVER DOZE

## 2025-06-09 ASSESSMENT — PAIN SCALES - GENERAL: PAINLEVEL_OUTOF10: SEVERE PAIN (8)

## 2025-06-09 NOTE — PROGRESS NOTES
Virtual Visit Details    Type of service:  Video Visit   Video Start Time: 12:56 PM  Video End Time:1:24 PM    Originating Location (pt. Location): Home    Distant Location (provider location):  On-site  Platform used for Video Visit: [facility]    Sleep Study Follow-Up Visit:    Date on this visit: 6/9/2025    No Yusuf comes in today for follow-up of her sleep study done on 3/24/2025 at the [facility]. A diagnostic polysomnogram was performed to evaluate for  probable obstructive, possible central sleep apnea with coexisting hypoventilation based on BMI of 45, history of chronic hypercapnic respiratory failure, low normal oxygen saturation of 91%, chronic daily opioid use, loud snoring, witnessed apnea, non-refreshing sleep, excessive daytime sleepiness (ESS 9), crowded oropharynx, morning headaches, large neck circumference and co-morbid HTN and DM type 2. After 155.0 minutes of sleep time the patient exhibited sufficient respiratory events qualifying her for a CPAP trial which was then initiated.     Polysomnogram Data: A full night polysomnogram recorded the standard physiologic parameters including EEG, EOG, EMG, ECG, nasal and oral airflow. Respiratory parameters of chest and abdominal movements were recorded with respiratory inductance plethysmography. Oxygen saturation was recorded by pulse oximetry.  Hypopnea scoring rule used: 1B 4%     Diagnostic PSG  Sleep Architecture:    The total recording time of the polysomnogram was 237.8 minutes. The total sleep time was 155.0 minutes. Sleep latency was at 26.8 minutes while watching TV without the use of a sleep aid. REM latency was n/a. Arousal index was normal at 17.8 arousals per hour. Sleep efficiency was decreased at 65.2%. Wake after sleep onset was 55.5 minutes. The patient spent 8.1% of total sleep time in Stage N1, 80.3% in Stage N2, 11.6% in Stage N3, and 0.0% in REM. Time in REM supine was 0 minutes.     Respiration:   The head of bed was elevated to 35 degrees.  Events ? The polysomnogram revealed a presence of 5 obstructive, 11 central, and 1 mixed apneas resulting in an apnea index of 6.6 events per hour. There were 124 obstructive hypopneas and 0 central hypopneas resulting in an obstructive hypopnea index of 48.0 and central hypopnea index of - events per hour. The combined apnea/hypopnea index was 54.6 events per hour (central apnea/hypopnea index was 4.3 events per hour).  The REM AHI was n/a. The supine AHI was 54.6, non-supine AHI was n/a events per hour. The RERA index was 1.5 events per hour. The RDI was 56.1 events per hour.  Snoring - was reported as moderate to loud.  Respiratory rate and pattern - was notable for normal respiratory rate and pattern.  Sustained Sleep Associated Hypoventilation - Transcutaneous carbon dioxide monitoring was used. TCM readings were flat with a maximum change from 46 to 49 mmHg and 0 minutes at or greater than 55 mmHg. VBG 7.27/66/23 (3/24/25)  Sleep Associated Hypoxemia - (Greater than 5 minutes O2 sat at or below 88%) was present. Baseline oxygen saturation was 85-89%. Lowest oxygen saturation was 77.0%. Time spent less than or equal to 88% was 58.0 minutes. Time spent less than or equal to 89% was 88.9 minutes.      Treatment PSG  Sleep Architecture:    At 01:05:05 AM the patient was placed on PAP treatment and was titrated at pressures ranging from CPAP 5 cmH2O up to CPAP 11 cmH2O with increasing central apneas. She was placed on ASV for a  time, The total recording time of the treatment portion of the study was 291.8 minutes. The total sleep time was 235.0 minutes. During the treatment portion of the study the sleep latency was 28.0 minutes. REM latency was 54.5 minutes. Arousal index was normal at 8.2 arousals per hour. Sleep efficiency was normal at 80.5%. Wake after sleep onset was 28.5 minutes. The patient spent 1.9% of total sleep time in Stage N1, 72.8% in Stage N2, 10.0% in  Stage N3, and 15.3% in REM. Time in REM supine was 36.0 minutes.      Respiration:   The final pressure was ASV 7/0/18 cmH2O with an AHI of 44.8 events per hour. Time in REM supine on final pressure was 0 minutes.   This titration was considered unacceptable (does not meet any of the other criteria).     Movement Activity:    Periodic Limb Movements  During the diagnostic portion of the study, there were 39 PLMs recorded. The PLM index was 15.1 movements per hour. The PLM Arousal Index was 1.2 per hour.  During the treatment portion of the study, there were 18 PLMs recorded. The PLM index was 4.6 movements per hour. The PLM Arousal Index was 0.3 per hour.  REM EMG Activity - Excessive transient/sustained muscle activity was not present.  Nocturnal Behavior - Abnormal sleep related behaviors were not noted   Bruxism - None apparent.        Past medical/surgical history, family history, social history, medications and allergies were reviewed.      Problem List:  Patient Active Problem List    Diagnosis Date Noted    History of cardiomyopathy 04/03/2025     Priority: Medium     Hospitalized 02/08-02/10 due to COVID pneumonia and s/p cardiac arrest (torsades) requiring CPR and cardioversion with ROSC. After that she had an echo 02/12 showing EF 20-25% with severe global hypokinesia of LV. Prior to this she had normal echocardiograms and no cardiac history per patient   Echo 2/2022 - Mildly decreased left ventricular systolic function The visual ejection fraction is 40-45%. There is mild global hypokinesia of the left ventricle.. MRI cardiac stress completed 8/2022. The LV is normal in cavity size and wall thickness. The global systolic function is normal. The LVEF is 76%. There are no regional wall motion abnormalities.   Echo 12/6/23 -  Global and regional left ventricular function is normal with an EF of 60-65%.      Hyperlipidemia 04/03/2025     Priority: Medium    GENE (obstructive sleep apnea) - severe (AHI 54)  04/03/2025     Priority: Medium     3/24/2025 [facility] Sleep Study (275 lbs) - AHI 54.6, RDI 56.1, Supine AHI 54.6, non-supine AHI was n/a, REM AHI n/a, Low O2% 77.0%, Time Spent <=88% 58.0, Time Spent <=89% 88.9. Treatment was titrated to a pressure of ASV 7/0/18 with an AHI 44.8. Time spent in REM supine at this pressure was - minutes.      Hypoxemia 04/03/2025     Priority: Medium    Chronic respiratory failure with hypercapnia (H) 04/05/2024     Priority: Medium     Venous Blood Gas 12/6/2023: pH 7.35, pC02 54, bicarbonate of 30        Anxiety disorder 02/08/2022     Priority: Medium    Chronic bilateral back pain 09/20/2021     Priority: Medium    Type 2 diabetes mellitus, without long-term current use of insulin (H) 01/09/2021     Priority: Medium    Chronic pain disorder 02/15/2020     Priority: Medium    Lymphedema of both lower extremities 01/21/2020     Priority: Medium    Chronic venous insufficiency of lower extremity 01/21/2020     Priority: Medium    Methamphetamine use disorder, severe, in early remission (H) 01/08/2020     Priority: Medium    Tobacco abuse 05/17/2018     Priority: Medium    Cocaine use disorder, severe, in early remission (H) 03/20/2017     Priority: Medium    Benign essential hypertension 07/17/2014     Priority: Medium    Fibromyalgia 07/17/2014     Priority: Medium            Opioid abuse, in remission (H) 07/17/2014     Priority: Medium            Hepatitis C, chronic (H) 07/17/2014     Priority: Medium     Per care everywhere HCV RNA Quant 1/7/2021 was 1,440,950 and 12/10/2019 was 838,044 and HEPATITIS C, RNA, QUANT 1/7/2021 was 6.2 and on 12/10/2019 was 5.9       Hemorrhoids 06/18/2013     Priority: Medium     1979      Osteoarthrosis 06/18/2013     Priority: Medium     Knees, low back      Severe obesity (H) 06/18/2013     Priority: Medium     Formatting of this note might be different from the original.  6/7/2009, BMI=39  5/21/2013, BMI=40      Vitamin D deficiency  06/18/2013     Priority: Medium     6/18/2013      Depression 03/18/2013     Priority: Medium    GERD (gastroesophageal reflux disease) 08/21/2012     Priority: Medium    Chronic low back pain 04/24/2012     Priority: Medium    Knee pain, bilateral 04/24/2012     Priority: Medium    Hyperglycemia 05/17/2009     Priority: Medium    Hypothyroidism 02/03/2003     Priority: Medium    Chronic insomnia 04/03/2025     Priority: Low        Impression/Plan:  Baseline hypoxemia   Severe obstructive sleep apnea in the supine position   Probable treatment related central apneas  Periodic limb movements of sleep      These findings were reviewed with patient.   Recommend:  -Auto CPAP 7-15 cm/H20  -6 minute walk test.   -PFT's   -ABG    She will follow up with me in about 3 month(s).     35 minutes spent on day of encounter doing chart review,  history and exam, counseling, coordinating plan of care, documentation and further activities as noted above.       The longitudinal plan of care for the diagnosis(es)/condition(s) as documented were addressed during this visit. Due to the added complexity in care, I will continue to support No in the subsequent management and with ongoing continuity of care.     Roman Carver PA-C  Sleep Medicine

## 2025-06-09 NOTE — NURSING NOTE
Current patient location: [address] [place] [address] [address] [place] [address]    Is the patient currently in the state of [place]? YES    Visit mode: VIDEO    If the visit is dropped, the patient can be reconnected by:VIDEO VISIT: Send to e-mail at: [email or web address]    Will anyone else be joining the visit? NO  (If patient encounters technical issues they should call [phone]    Are changes needed to the allergy or medication list? No, Pt stated no changes to allergies, and Pt stated no med changes    Are refills needed on medications prescribed by this physician? NO    Rooming Documentation:  Questionnaire(s) completed    Reason for visit: RECHFERNIE MEIERF

## 2025-06-09 NOTE — PATIENT INSTRUCTIONS
Your Body mass index is 45.6 kg/m .  Weight management is a personal decision.  If you are interested in exploring weight loss strategies, the following discussion covers the approaches that may be successful. Body mass index (BMI) is one way to tell whether you are at a healthy weight, overweight, or obese. It measures your weight in relation to your height.  A BMI of 18.5 to 24.9 is in the healthy range. A person with a BMI of 25 to 29.9 is considered overweight, and someone with a BMI of 30 or greater is considered obese. More than two-thirds of American adults are considered overweight or obese.  Being overweight or obese increases the risk for further weight gain. Excess weight may lead to heart disease and diabetes.  Creating and following plans for healthy eating and physical activity may help you improve your health.  Weight control is part of healthy lifestyle and includes exercise, emotional health, and healthy eating habits. Careful eating habits lifelong are the mainstay of weight control. Though there are significant health benefits from weight loss, long-term weight loss with diet alone may be very difficult to achieve- studies show long-term success with dietary management in less than 10% of people. Attaining a healthy weight may be especially difficult to achieve in those with severe obesity. In some cases, medications, devices and surgical management might be considered.  What can you do?  If you are overweight or obese and are interested in methods for weight loss, you should discuss this with your provider.   Consider reducing daily calorie intake by 500 calories.   Keep a food journal.   Avoiding skipping meals, consider cutting portions instead.    Diet combined with exercise helps maintain muscle while optimizing fat loss. Strength training is particularly important for building and maintaining muscle mass. Exercise helps reduce stress, increase energy, and improves fitness. Increasing  exercise without diet control, however, may not burn enough calories to loose weight.     Start walking three days a week 10-20 minutes at a time  Work towards walking thirty minutes five days a week   Eventually, increase the speed of your walking for 1-2 minutes at time    In addition, we recommend that you review healthy lifestyles and methods for weight loss available through the National Institutes of Health patient information sites:  http://win.niddk.nih.gov/publications/index.htm    And look into health and wellness programs that may be available through your health insurance provider, employer, local community center, or josué club.

## 2025-07-01 DIAGNOSIS — J96.12 CHRONIC RESPIRATORY FAILURE WITH HYPERCAPNIA (H): Chronic | ICD-10-CM

## 2025-07-01 DIAGNOSIS — G47.33 OBSTRUCTIVE SLEEP APNEA: ICD-10-CM

## 2025-07-01 LAB
6 MIN WALK (FT): 900 FT
6 MIN WALK (M): 274 M
BASE EXCESS BLDA CALC-SCNC: 6 MMOL/L (ref -3–3)
CPB POCT: NO
HCO3 BLDA-SCNC: 31 MMOL/L (ref 21–28)
HCT VFR BLD CALC: 37 % (ref 35–47)
HGB BLD-MCNC: 12.6 G/DL (ref 11.7–15.7)
PCO2 BLDA: 50 MM HG (ref 35–45)
PH BLDA: 7.4 [PH] (ref 7.35–7.45)
PO2 BLDA: 70 MM HG (ref 80–105)
POTASSIUM BLD-SCNC: 4.3 MMOL/L (ref 3.4–5.3)
SAO2 % BLDA: 93 % (ref 96–97)
SODIUM BLD-SCNC: 137 MMOL/L (ref 135–145)

## 2025-07-01 PROCEDURE — 94618 PULMONARY STRESS TESTING: CPT | Performed by: INTERNAL MEDICINE

## 2025-07-01 PROCEDURE — 82803 BLOOD GASES ANY COMBINATION: CPT | Performed by: INTERNAL MEDICINE

## 2025-07-01 PROCEDURE — 36600 WITHDRAWAL OF ARTERIAL BLOOD: CPT | Performed by: INTERNAL MEDICINE

## 2025-07-01 PROCEDURE — 94726 PLETHYSMOGRAPHY LUNG VOLUMES: CPT | Performed by: INTERNAL MEDICINE

## 2025-07-01 PROCEDURE — 94799 UNLISTED PULMONARY SVC/PX: CPT | Performed by: INTERNAL MEDICINE

## 2025-07-01 PROCEDURE — 84295 ASSAY OF SERUM SODIUM: CPT | Performed by: INTERNAL MEDICINE

## 2025-07-01 PROCEDURE — 94729 DIFFUSING CAPACITY: CPT | Performed by: INTERNAL MEDICINE

## 2025-07-01 PROCEDURE — 84132 ASSAY OF SERUM POTASSIUM: CPT | Performed by: INTERNAL MEDICINE

## 2025-07-01 PROCEDURE — 94375 RESPIRATORY FLOW VOLUME LOOP: CPT | Performed by: INTERNAL MEDICINE

## 2025-07-01 NOTE — PROGRESS NOTES
PFT Lab Note: Per Roman YOUNG, ABG drawn from left radial artery after successful Cedric's test. Skin prepped and bandaged per policy.  Supervising MD: Dr Kimble    pH = 7.402  CO2 = 49.7  O2 = 70  HCO3 = 30.9  sO2 = 93%  TCO2 = 32

## 2025-07-02 LAB
DLCOCOR-%PRED-PRE: 82 %
DLCOCOR-PRE: 15.37 ML/MIN/MMHG
DLCOUNC-%PRED-PRE: 80 %
DLCOUNC-PRE: 14.98 ML/MIN/MMHG
DLCOUNC-PRED: 18.67 ML/MIN/MMHG
ERV-%PRED-PRE: 34 %
ERV-PRE: 0.34 L
ERV-PRED: 0.99 L
EXPTIME-PRE: 6.63 SEC
FEF2575-%PRED-PRE: 184 %
FEF2575-PRE: 3.52 L/SEC
FEF2575-PRED: 1.91 L/SEC
FEFMAX-%PRED-PRE: 121 %
FEFMAX-PRE: 7.04 L/SEC
FEFMAX-PRED: 5.81 L/SEC
FEV1-%PRED-PRE: 110 %
FEV1-PRE: 2.33 L
FEV1FEV6-PRE: 88 %
FEV1FEV6-PRED: 80 %
FEV1FVC-PRE: 87 %
FEV1FVC-PRED: 80 %
FEV1SVC-PRE: 78 %
FEV1SVC-PRED: 70 %
FIFMAX-PRE: 5.13 L/SEC
FIO2-PRE: 21 %
FRCPLETH-%PRED-PRE: 70 %
FRCPLETH-PRE: 1.92 L
FRCPLETH-PRED: 2.73 L
FVC-%PRED-PRE: 101 %
FVC-PRE: 2.67 L
FVC-PRED: 2.64 L
IC-%PRED-PRE: 132 %
IC-PRE: 2.62 L
IC-PRED: 1.98 L
MEP-PRE: 119 CMH2O
MIP-PRE: -126 CMH2O
RVPLETH-%PRED-PRE: 82 %
RVPLETH-PRE: 1.58 L
RVPLETH-PRED: 1.9 L
TLCPLETH-%PRED-PRE: 96 %
TLCPLETH-PRE: 4.55 L
TLCPLETH-PRED: 4.69 L
VA-%PRED-PRE: 90 %
VA-PRE: 3.96 L
VC-%PRED-PRE: 98 %
VC-PRE: 2.97 L
VC-PRED: 3.02 L

## (undated) RX ORDER — LIDOCAINE HYDROCHLORIDE 10 MG/ML
INJECTION, SOLUTION EPIDURAL; INFILTRATION; INTRACAUDAL; PERINEURAL
Status: DISPENSED
Start: 2020-12-10

## (undated) RX ORDER — REGADENOSON 0.08 MG/ML
INJECTION, SOLUTION INTRAVENOUS
Status: DISPENSED
Start: 2022-08-22

## (undated) RX ORDER — TRIAMCINOLONE ACETONIDE 40 MG/ML
INJECTION, SUSPENSION INTRA-ARTICULAR; INTRAMUSCULAR
Status: DISPENSED
Start: 2020-12-10

## (undated) RX ORDER — DIAZEPAM 5 MG
TABLET ORAL
Status: DISPENSED
Start: 2022-08-22

## (undated) RX ORDER — AMINOPHYLLINE 25 MG/ML
INJECTION, SOLUTION INTRAVENOUS
Status: DISPENSED
Start: 2022-08-22